# Patient Record
Sex: FEMALE | Race: BLACK OR AFRICAN AMERICAN | Employment: OTHER | URBAN - METROPOLITAN AREA
[De-identification: names, ages, dates, MRNs, and addresses within clinical notes are randomized per-mention and may not be internally consistent; named-entity substitution may affect disease eponyms.]

---

## 2017-02-07 ENCOUNTER — HOSPITAL ENCOUNTER (EMERGENCY)
Age: 52
Discharge: HOME | End: 2017-02-07
Attending: EMERGENCY MEDICINE | Admitting: EMERGENCY MEDICINE
Payer: MEDICARE

## 2017-02-07 ENCOUNTER — APPOINTMENT (OUTPATIENT)
Dept: GENERAL RADIOLOGY | Age: 52
End: 2017-02-07
Attending: EMERGENCY MEDICINE
Payer: MEDICARE

## 2017-02-07 VITALS
RESPIRATION RATE: 20 BRPM | SYSTOLIC BLOOD PRESSURE: 152 MMHG | BODY MASS INDEX: 25.74 KG/M2 | DIASTOLIC BLOOD PRESSURE: 90 MMHG | HEART RATE: 95 BPM | WEIGHT: 154.5 LBS | OXYGEN SATURATION: 97 % | TEMPERATURE: 98 F | HEIGHT: 65 IN

## 2017-02-07 DIAGNOSIS — J02.0 ACUTE STREPTOCOCCAL PHARYNGITIS: Primary | ICD-10-CM

## 2017-02-07 DIAGNOSIS — J06.9 ACUTE UPPER RESPIRATORY INFECTION: ICD-10-CM

## 2017-02-07 LAB — S PYO AG THROAT QL: POSITIVE

## 2017-02-07 PROCEDURE — 71020 XR CHEST PA LAT: CPT

## 2017-02-07 PROCEDURE — 99283 EMERGENCY DEPT VISIT LOW MDM: CPT

## 2017-02-07 PROCEDURE — 87880 STREP A ASSAY W/OPTIC: CPT

## 2017-02-07 PROCEDURE — 93005 ELECTROCARDIOGRAM TRACING: CPT

## 2017-02-07 RX ORDER — AMOXICILLIN 500 MG/1
1000 TABLET, FILM COATED ORAL 2 TIMES DAILY
Qty: 20 TAB | Refills: 0 | Status: SHIPPED | OUTPATIENT
Start: 2017-02-07 | End: 2017-02-17

## 2017-02-07 NOTE — ED TRIAGE NOTES
Triage Note: patient is coming in with cough, sore throat and headache for the past 3 weeks. Patient is also having some chest pain with the coughing.

## 2017-02-08 LAB
ATRIAL RATE: 91 BPM
CALCULATED P AXIS, ECG09: 55 DEGREES
CALCULATED R AXIS, ECG10: 2 DEGREES
CALCULATED T AXIS, ECG11: 58 DEGREES
DIAGNOSIS, 93000: NORMAL
P-R INTERVAL, ECG05: 144 MS
Q-T INTERVAL, ECG07: 370 MS
QRS DURATION, ECG06: 66 MS
QTC CALCULATION (BEZET), ECG08: 455 MS
VENTRICULAR RATE, ECG03: 91 BPM
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
## 2017-02-08 NOTE — ED NOTES
Pt given discharge instructions. Pt had no questions regarding instructions. Pt ambulatory with slow, steady gait at the time of discharge.

## 2017-02-27 ENCOUNTER — HOSPITAL ENCOUNTER (EMERGENCY)
Age: 52
Discharge: HOME | End: 2017-02-28
Attending: EMERGENCY MEDICINE
Payer: MEDICARE

## 2017-02-27 ENCOUNTER — APPOINTMENT (OUTPATIENT)
Dept: GENERAL RADIOLOGY | Age: 52
End: 2017-02-27
Attending: EMERGENCY MEDICINE
Payer: MEDICARE

## 2017-02-27 DIAGNOSIS — G89.29 CHRONIC RIGHT-SIDED LOW BACK PAIN WITH SCIATICA, SCIATICA LATERALITY UNSPECIFIED: Primary | ICD-10-CM

## 2017-02-27 DIAGNOSIS — M54.40 CHRONIC RIGHT-SIDED LOW BACK PAIN WITH SCIATICA, SCIATICA LATERALITY UNSPECIFIED: Primary | ICD-10-CM

## 2017-02-27 DIAGNOSIS — R73.9 HYPERGLYCEMIA: ICD-10-CM

## 2017-02-27 LAB
ALBUMIN SERPL BCP-MCNC: 3.2 G/DL (ref 3.5–5)
ALBUMIN/GLOB SERPL: 0.7 {RATIO} (ref 1.1–2.2)
ALP SERPL-CCNC: 505 U/L (ref 45–117)
ALT SERPL-CCNC: 32 U/L (ref 12–78)
ANION GAP BLD CALC-SCNC: 12 MMOL/L (ref 5–15)
APPEARANCE UR: CLEAR
AST SERPL W P-5'-P-CCNC: 29 U/L (ref 15–37)
BACTERIA URNS QL MICRO: NEGATIVE /HPF
BASOPHILS # BLD AUTO: 0 K/UL (ref 0–0.1)
BASOPHILS # BLD: 0 % (ref 0–1)
BILIRUB SERPL-MCNC: 0.3 MG/DL (ref 0.2–1)
BILIRUB UR QL: NEGATIVE
BUN SERPL-MCNC: 79 MG/DL (ref 6–20)
BUN/CREAT SERPL: 16 (ref 12–20)
CALCIUM SERPL-MCNC: 8.4 MG/DL (ref 8.5–10.1)
CHLORIDE SERPL-SCNC: 94 MMOL/L (ref 97–108)
CO2 SERPL-SCNC: 21 MMOL/L (ref 21–32)
COLOR UR: ABNORMAL
CREAT SERPL-MCNC: 4.83 MG/DL (ref 0.55–1.02)
EOSINOPHIL # BLD: 0.3 K/UL (ref 0–0.4)
EOSINOPHIL NFR BLD: 3 % (ref 0–7)
EPITH CASTS URNS QL MICRO: ABNORMAL /LPF
ERYTHROCYTE [DISTWIDTH] IN BLOOD BY AUTOMATED COUNT: 12.2 % (ref 11.5–14.5)
GLOBULIN SER CALC-MCNC: 4.8 G/DL (ref 2–4)
GLUCOSE BLD STRIP.AUTO-MCNC: 240 MG/DL (ref 65–100)
GLUCOSE BLD STRIP.AUTO-MCNC: 420 MG/DL (ref 65–100)
GLUCOSE BLD STRIP.AUTO-MCNC: 488 MG/DL (ref 65–100)
GLUCOSE SERPL-MCNC: 450 MG/DL (ref 65–100)
GLUCOSE UR STRIP.AUTO-MCNC: >1000 MG/DL
HCT VFR BLD AUTO: 32.7 % (ref 35–47)
HGB BLD-MCNC: 11.3 G/DL (ref 11.5–16)
HGB UR QL STRIP: ABNORMAL
HYALINE CASTS URNS QL MICRO: ABNORMAL /LPF (ref 0–5)
KETONES UR QL STRIP.AUTO: NEGATIVE MG/DL
LEUKOCYTE ESTERASE UR QL STRIP.AUTO: NEGATIVE
LYMPHOCYTES # BLD AUTO: 29 % (ref 12–49)
LYMPHOCYTES # BLD: 2.2 K/UL (ref 0.8–3.5)
MCH RBC QN AUTO: 29.8 PG (ref 26–34)
MCHC RBC AUTO-ENTMCNC: 34.6 G/DL (ref 30–36.5)
MCV RBC AUTO: 86.3 FL (ref 80–99)
MONOCYTES # BLD: 0.6 K/UL (ref 0–1)
MONOCYTES NFR BLD AUTO: 8 % (ref 5–13)
NEUTS SEG # BLD: 4.6 K/UL (ref 1.8–8)
NEUTS SEG NFR BLD AUTO: 60 % (ref 32–75)
NITRITE UR QL STRIP.AUTO: NEGATIVE
PH UR STRIP: 6.5 [PH] (ref 5–8)
PLATELET # BLD AUTO: 293 K/UL (ref 150–400)
POTASSIUM SERPL-SCNC: 3.3 MMOL/L (ref 3.5–5.1)
PROT SERPL-MCNC: 8 G/DL (ref 6.4–8.2)
PROT UR STRIP-MCNC: 100 MG/DL
RBC # BLD AUTO: 3.79 M/UL (ref 3.8–5.2)
RBC #/AREA URNS HPF: ABNORMAL /HPF (ref 0–5)
SERVICE CMNT-IMP: ABNORMAL
SODIUM SERPL-SCNC: 127 MMOL/L (ref 136–145)
SP GR UR REFRACTOMETRY: 1.01 (ref 1–1.03)
UROBILINOGEN UR QL STRIP.AUTO: 0.2 EU/DL (ref 0.2–1)
WBC # BLD AUTO: 7.7 K/UL (ref 3.6–11)
WBC URNS QL MICRO: ABNORMAL /HPF (ref 0–4)

## 2017-02-27 PROCEDURE — 73502 X-RAY EXAM HIP UNI 2-3 VIEWS: CPT

## 2017-02-27 PROCEDURE — 85025 COMPLETE CBC W/AUTO DIFF WBC: CPT | Performed by: EMERGENCY MEDICINE

## 2017-02-27 PROCEDURE — 36415 COLL VENOUS BLD VENIPUNCTURE: CPT | Performed by: EMERGENCY MEDICINE

## 2017-02-27 PROCEDURE — 81001 URINALYSIS AUTO W/SCOPE: CPT | Performed by: EMERGENCY MEDICINE

## 2017-02-27 PROCEDURE — 72100 X-RAY EXAM L-S SPINE 2/3 VWS: CPT

## 2017-02-27 PROCEDURE — 80053 COMPREHEN METABOLIC PANEL: CPT | Performed by: EMERGENCY MEDICINE

## 2017-02-27 PROCEDURE — 74011636637 HC RX REV CODE- 636/637: Performed by: EMERGENCY MEDICINE

## 2017-02-27 PROCEDURE — 74011250636 HC RX REV CODE- 250/636: Performed by: EMERGENCY MEDICINE

## 2017-02-27 PROCEDURE — 74011250637 HC RX REV CODE- 250/637: Performed by: EMERGENCY MEDICINE

## 2017-02-27 PROCEDURE — 96361 HYDRATE IV INFUSION ADD-ON: CPT

## 2017-02-27 PROCEDURE — 96365 THER/PROPH/DIAG IV INF INIT: CPT

## 2017-02-27 PROCEDURE — 82962 GLUCOSE BLOOD TEST: CPT

## 2017-02-27 PROCEDURE — 99285 EMERGENCY DEPT VISIT HI MDM: CPT

## 2017-02-27 PROCEDURE — 96375 TX/PRO/DX INJ NEW DRUG ADDON: CPT

## 2017-02-27 RX ORDER — HYDROCODONE BITARTRATE AND ACETAMINOPHEN 10; 325 MG/1; MG/1
1 TABLET ORAL
Status: COMPLETED | OUTPATIENT
Start: 2017-02-27 | End: 2017-02-27

## 2017-02-27 RX ORDER — HYDROCODONE BITARTRATE AND ACETAMINOPHEN 5; 325 MG/1; MG/1
1-2 TABLET ORAL
Qty: 20 TAB | Refills: 0 | Status: ON HOLD | OUTPATIENT
Start: 2017-02-27 | End: 2017-03-14

## 2017-02-27 RX ORDER — DIAZEPAM 10 MG/2ML
2.5 INJECTION INTRAMUSCULAR
Status: COMPLETED | OUTPATIENT
Start: 2017-02-27 | End: 2017-02-27

## 2017-02-27 RX ORDER — INSULIN GLARGINE 100 [IU]/ML
20 INJECTION, SOLUTION SUBCUTANEOUS
Status: COMPLETED | OUTPATIENT
Start: 2017-02-27 | End: 2017-02-27

## 2017-02-27 RX ORDER — DIAZEPAM 5 MG/1
5 TABLET ORAL
Qty: 12 TAB | Refills: 0 | Status: SHIPPED | OUTPATIENT
Start: 2017-02-27 | End: 2017-07-27

## 2017-02-27 RX ADMIN — SODIUM CHLORIDE 1000 ML: 900 INJECTION, SOLUTION INTRAVENOUS at 22:53

## 2017-02-27 RX ADMIN — SODIUM CHLORIDE 1000 ML: 900 INJECTION, SOLUTION INTRAVENOUS at 23:37

## 2017-02-27 RX ADMIN — INSULIN GLARGINE 20 UNITS: 100 INJECTION, SOLUTION SUBCUTANEOUS at 23:36

## 2017-02-27 RX ADMIN — HUMAN INSULIN 10 UNITS: 100 INJECTION, SOLUTION SUBCUTANEOUS at 22:55

## 2017-02-27 RX ADMIN — HYDROCODONE BITARTRATE AND ACETAMINOPHEN 1 TABLET: 10; 325 TABLET ORAL at 22:52

## 2017-02-27 RX ADMIN — DIAZEPAM 2.5 MG: 5 INJECTION, SOLUTION INTRAMUSCULAR; INTRAVENOUS at 22:54

## 2017-02-27 NOTE — Clinical Note
Work up Vedantu was reassuring. I believe you have acute on chronic back pain. I am prescribing a short course of pain/spasm medication. It's imperative you get close follow up with your primary care doctor and a pain management doctor of your ongoing  symptoms.

## 2017-02-28 VITALS
SYSTOLIC BLOOD PRESSURE: 191 MMHG | HEART RATE: 102 BPM | WEIGHT: 153.5 LBS | TEMPERATURE: 97.4 F | HEIGHT: 65 IN | DIASTOLIC BLOOD PRESSURE: 98 MMHG | BODY MASS INDEX: 25.58 KG/M2 | RESPIRATION RATE: 16 BRPM | OXYGEN SATURATION: 95 %

## 2017-02-28 LAB
GLUCOSE BLD STRIP.AUTO-MCNC: 103 MG/DL (ref 65–100)
GLUCOSE BLD STRIP.AUTO-MCNC: 109 MG/DL (ref 65–100)
SERVICE CMNT-IMP: ABNORMAL
SERVICE CMNT-IMP: ABNORMAL

## 2017-02-28 PROCEDURE — 74011250636 HC RX REV CODE- 250/636: Performed by: EMERGENCY MEDICINE

## 2017-02-28 PROCEDURE — 82962 GLUCOSE BLOOD TEST: CPT

## 2017-02-28 RX ORDER — ONDANSETRON 2 MG/ML
8 INJECTION INTRAMUSCULAR; INTRAVENOUS
Status: COMPLETED | OUTPATIENT
Start: 2017-02-28 | End: 2017-02-28

## 2017-02-28 RX ORDER — HYDROMORPHONE HYDROCHLORIDE 1 MG/ML
1 INJECTION, SOLUTION INTRAMUSCULAR; INTRAVENOUS; SUBCUTANEOUS ONCE
Status: COMPLETED | OUTPATIENT
Start: 2017-02-28 | End: 2017-02-28

## 2017-02-28 RX ORDER — ONDANSETRON 2 MG/ML
INJECTION INTRAMUSCULAR; INTRAVENOUS
Status: DISCONTINUED
Start: 2017-02-28 | End: 2017-02-28 | Stop reason: HOSPADM

## 2017-02-28 RX ADMIN — ONDANSETRON 8 MG: 2 INJECTION INTRAMUSCULAR; INTRAVENOUS at 00:57

## 2017-02-28 RX ADMIN — HYDROMORPHONE HYDROCHLORIDE 1 MG: 1 INJECTION, SOLUTION INTRAMUSCULAR; INTRAVENOUS; SUBCUTANEOUS at 00:24

## 2017-02-28 RX ADMIN — PROMETHAZINE HYDROCHLORIDE 12.5 MG: 25 INJECTION INTRAMUSCULAR; INTRAVENOUS at 01:45

## 2017-02-28 NOTE — ED NOTES
MD reviewed discharge instructions and options with patient; patient verbalized understanding. RN reviewed discharge instructions using teachback method. Pt. Attempted to call spouse for ride home; went straight to . Pt sleeping on stretcher; we will continue to try to reach a ride.

## 2017-02-28 NOTE — DISCHARGE INSTRUCTIONS
Back Pain: Care Instructions  Your Care Instructions    Back pain has many possible causes. It is often related to problems with muscles and ligaments of the back. It may also be related to problems with the nerves, discs, or bones of the back. Moving, lifting, standing, sitting, or sleeping in an awkward way can strain the back. Sometimes you don't notice the injury until later. Arthritis is another common cause of back pain. Although it may hurt a lot, back pain usually improves on its own within several weeks. Most people recover in 12 weeks or less. Using good home treatment and being careful not to stress your back can help you feel better sooner. Follow-up care is a key part of your treatment and safety. Be sure to make and go to all appointments, and call your doctor if you are having problems. Its also a good idea to know your test results and keep a list of the medicines you take. How can you care for yourself at home? · Sit or lie in positions that are most comfortable and reduce your pain. Try one of these positions when you lie down:  ¨ Lie on your back with your knees bent and supported by large pillows. ¨ Lie on the floor with your legs on the seat of a sofa or chair. Sandra Gal on your side with your knees and hips bent and a pillow between your legs. ¨ Lie on your stomach if it does not make pain worse. · Do not sit up in bed, and avoid soft couches and twisted positions. Bed rest can help relieve pain at first, but it delays healing. Avoid bed rest after the first day of back pain. · Change positions every 30 minutes. If you must sit for long periods of time, take breaks from sitting. Get up and walk around, or lie in a comfortable position. · Try using a heating pad on a low or medium setting for 15 to 20 minutes every 2 or 3 hours. Try a warm shower in place of one session with the heating pad. · You can also try an ice pack for 10 to 15 minutes every 2 to 3 hours.  Put a thin cloth between the ice pack and your skin. · Take pain medicines exactly as directed. ¨ If the doctor gave you a prescription medicine for pain, take it as prescribed. ¨ If you are not taking a prescription pain medicine, ask your doctor if you can take an over-the-counter medicine. · Take short walks several times a day. You can start with 5 to 10 minutes, 3 or 4 times a day, and work up to longer walks. Walk on level surfaces and avoid hills and stairs until your back is better. · Return to work and other activities as soon as you can. Continued rest without activity is usually not good for your back. · To prevent future back pain, do exercises to stretch and strengthen your back and stomach. Learn how to use good posture, safe lifting techniques, and proper body mechanics. When should you call for help? Call your doctor now or seek immediate medical care if:  · You have new or worsening numbness in your legs. · You have new or worsening weakness in your legs. (This could make it hard to stand up.)  · You lose control of your bladder or bowels. Watch closely for changes in your health, and be sure to contact your doctor if:  · Your pain gets worse. · You are not getting better after 2 weeks. Where can you learn more? Go to http://nery-peña.info/. Enter S712 in the search box to learn more about \"Back Pain: Care Instructions. \"  Current as of: May 23, 2016  Content Version: 11.1  © 0897-9381 [employer]. Care instructions adapted under license by [employer] (which disclaims liability or warranty for this information). If you have questions about a medical condition or this instruction, always ask your healthcare professional. [address] any warranty or liability for your use of this information.          Chronic Pain: Care Instructions  Your Care Instructions  Chronic pain is pain that lasts a long time (months or even years) and may or may not have a clear cause. It is different from acute pain, which usually does have a clear cause--like an injury or illness--and gets better over time. Chronic pain:  · Lasts over time but may vary from day to day. · Does not go away despite efforts to end it. · May disrupt your sleep and lead to fatigue. · May cause depression or anxiety. · May make your muscles tense, causing more pain. · Can disrupt your work, hobbies, home life, and relationships with friends and family. Chronic pain is a very real condition. It is not just in your head. Treatment can help and usually includes several methods used together, such as medicines, physical therapy, exercise, and other treatments. Learning how to relax and changing negative thought patterns can also help you cope. Chronic pain is complex. Taking an active role in your treatment will help you better manage your pain. Tell your doctor if you have trouble dealing with your pain. You may have to try several things before you find what works best for you. Follow-up care is a key part of your treatment and safety. Be sure to make and go to all appointments, and call your doctor if you are having problems. Its also a good idea to know your test results and keep a list of the medicines you take. How can you care for yourself at home? · Pace yourself. Break up large jobs into smaller tasks. Save harder tasks for days when you have less pain, or go back and forth between hard tasks and easier ones. Take rest breaks. · Relax, and reduce stress. Relaxation techniques such as deep breathing or meditation can help. · Keep moving. Gentle, daily exercise can help reduce pain over the long run. Try low- or no-impact exercises such as walking, swimming, and stationary biking. Do stretches to stay flexible. · Try heat, cold packs, and massage. · Get enough sleep. Chronic pain can make you tired and drain your energy.  Talk with your doctor if you have trouble sleeping because of pain. · Think positive. Your thoughts can affect your pain level. Do things that you enjoy to distract yourself when you have pain instead of focusing on the pain. See a movie, read a book, listen to music, or spend time with a friend. · If you think you are depressed, talk to your doctor about treatment. · Keep a daily pain diary. Record how your moods, thoughts, sleep patterns, activities, and medicine affect your pain. You may find that your pain is worse during or after certain activities or when you are feeling a certain emotion. Having a record of your pain can help you and your doctor find the best ways to treat your pain. · Take pain medicines exactly as directed. ¨ If the doctor gave you a prescription medicine for pain, take it as prescribed. ¨ If you are not taking a prescription pain medicine, ask your doctor if you can take an over-the-counter medicine. Reducing constipation caused by pain medicine  · Include fruits, vegetables, beans, and whole grains in your diet each day. These foods are high in fiber. · Drink plenty of fluids, enough so that your urine is light yellow or clear like water. If you have kidney, heart, or liver disease and have to limit fluids, talk with your doctor before you increase the amount of fluids you drink. · If your doctor recommends it, get more exercise. Walking is a good choice. Bit by bit, increase the amount you walk every day. Try for at least 30 minutes on most days of the week. · Schedule time each day for a bowel movement. A daily routine may help. Take your time and do not strain when having a bowel movement. When should you call for help? Call your doctor now or seek immediate medical care if:  · Your pain gets worse or is out of control. · You feel down or blue, or you do not enjoy things like you once did. You may be depressed, which is common in people with chronic pain. Depression can be treated.   · You have vomiting or cramps for more than 2 hours. Watch closely for changes in your health, and be sure to contact your doctor if:  · You cannot sleep because of pain. · You are very worried or anxious about your pain. · You have trouble taking your pain medicine. · You have any concerns about your pain medicine. · You have trouble with bowel movements, such as:  ¨ No bowel movement in 3 days. ¨ Blood in the anal area, in your stool, or on the toilet paper. ¨ Diarrhea for more than 24 hours. Where can you learn more? Go to http://nery-peña.info/. Enter N004 in the search box to learn more about \"Chronic Pain: Care Instructions. \"  Current as of: February 19, 2016  Content Version: 11.1  © 1673-3399 [employer]. Care instructions adapted under license by [employer] (which disclaims liability or warranty for this information). If you have questions about a medical condition or this instruction, always ask your healthcare professional. [place] [address] any warranty or liability for your use of this information. Developing a Pain Management Plan: Care Instructions  Your Care Instructions  Some diseases and injuries can cause pain that lasts a long time. You don't need to live with uncontrolled pain. A pain management plan helps you find ways to control pain with side effects you can live with. There are things you can do to help with pain. Only you know how much pain you feel. Constant pain can make you depressed. It can cause stress and make it hard for you to eat and sleep. But there are ways to control the pain. They can help you stay active, improve your mood, and heal faster. Your plan can include many types of pain control. You may take prescription or over-the-counter drugs. You can also try physical treatments and behavioral methods. Some medical treatments also help with pain. For example, radiation can be used to reduce pain from bone cancer.   You and your doctor will work to make your plan. Be sure to read it often. Change it if your pain is not under control. Follow-up care is a key part of your treatment and safety. Be sure to make and go to all appointments, and call your doctor if you are having problems. It's also a good idea to know your test results and keep a list of the medicines you take. How can you care for yourself at home? Physical treatments  · Be safe with medicines. Take pain medicines exactly as directed. ¨ If the doctor gave you a prescription medicine for pain, take it exactly as prescribed. ¨ If you are not taking a prescription pain medicine, ask your doctor if you can take an over-the-counter medicine. · If your pain medicine causes side effects such as constipation or nausea, you may need to take other medicines for those problems. Talk to your doctor about any side effects you have. · Hot or cold compresses applied directly to the skin can help sore muscles. Put ice or a cold pack on the painful area for 10 to 20 minutes at a time. Put a thin cloth between the ice and your skin. You may find that it helps to switch between cold and heat. Try a heating pad set on low or a warm cloth on the area for 10 to 15 minutes at a time. · Hydrotherapy uses flowing water to relax muscles. You may want to sit in a hot tub or a steam bath. Or use a shower or a sitz bath to help your pain. · Massage therapy is rubbing the soft tissues of the body. It eases tension and pain. It also improves blood flow and helps you relax. · Transcutaneous electrical nerve stimulation (TENS) uses a gentle electric current applied to the skin for pain relief. You can use TENS at home after you learn how. · Acupuncture is a form of traditional [facility] medicine. It uses very thin needles inserted into certain points of the body. It is done by a person with special training (acupuncturist).   · If you get physical therapy, make sure to do any home exercises or stretching your therapist has prescribed. · Stay as active as you can. Try to get some physical activity every day. Behavioral treatments  · Biofeedback teaches you to control a body function that you normally don't think about. These are things like skin temperature, heart rate, and blood pressure. At first, you will use a machine to give you feedback on the function you want to control. Then a therapist will teach you what to do next. Over time, you can stop using the machine. · Breathing techniques can help you relax and get rid of tension. · Guided imagery is a series of thoughts and images that can focus your attention away from your pain. When you do it, you use all your senses to help your body respond as though what you are imagining is real.  · Hypnosis is a state of focused concentration that makes you less aware of your surroundings. It may cause your brain to release chemicals that relieve pain. You can have a therapist help you through hypnosis. Or you can learn to use it on yourself. · Cognitive behavioral therapy is a type of counseling. It helps you change your thought patterns. Changes in your thoughts can help change your behavior and the way you perceive your body. Other treatments and ideas  · Aromatherapy uses the scent of oils obtained from plants to help you relax or to relieve stress. · Meditation focuses your attention to give a clear awareness of your life. You sit quietly, focus on one image or sound, and breathe deeply. · Yoga uses stretching and exercises (called postures) to reduce stress and improve flexibility and health. · Keep track of your pain in a pain diary. This can help you understand how the things you do affect your pain. · In rare cases, your doctor may advise you to get a nerve block to help with pain. A nerve block is a shot of medicine to interrupt pain signals to your brain. · Some hospitals have special pain clinics or centers.  Your doctor may refer you to a pain clinic or [place]. Or you can ask your doctor about them. Where can you learn more? Go to http://nery-peña.info/. Enter K281 in the search box to learn more about \"Developing a Pain Management Plan: Care Instructions. \"  Current as of: February 19, 2016  Content Version: 11.1  © 8386-8828 [employer]. Care instructions adapted under license by [email or web address] (which disclaims liability or warranty for this information). If you have questions about a medical condition or this instruction, always ask your healthcare professional. [address] any warranty or liability for your use of this information. We hope that we have addressed all of your medical concerns. The examination and treatment you received in the Emergency Department were for an emergent problem and were not intended as complete care. It is important that you follow up with your healthcare provider(s) for ongoing care. If your symptoms worsen or do not improve as expected, and you are unable to reach your usual health care provider(s), you should return to the Emergency Department. Today's healthcare is undergoing tremendous change, and patient satisfaction surveys are one of the many tools to assess the quality of medical care. You may receive a survey from the [employer] organization regarding your experience in the Emergency Department. I hope that your experience has been completely positive, particularly the medical care that I provided. As such, please participate in the survey; anything less than excellent does not meet my expectations or intentions. [address] [facility] and [address] [place] [address] participate in nationally recognized quality of care measures. If your blood pressure is greater than 120/80, as reported below, we urge that you seek medical care to address the potential of high blood pressure, commonly known as hypertension. Hypertension can be hereditary or can be caused by certain medical conditions, pain, stress, or \"white coat syndrome. \"       Please make an appointment with your health care provider(s) for follow up of your Emergency Department visit. VITALS:   Patient Vitals for the past 8 hrs:   Temp Pulse Resp BP SpO2   02/27/17 2058 98.4 °F (36.9 °C) 100 16 (!) 179/97 99 %          Thank you for allowing us to provide you with medical care today. We realize that you have many choices for your emergency care needs. Please choose us in the future for any continued health care needs.       Nikhil Gilliland MD    [facility]   Office: [phone]            Recent Results (from the past 24 hour(s))   URINALYSIS W/MICROSCOPIC    Collection Time: 02/27/17  9:15 PM   Result Value Ref Range    Color YELLOW/STRAW      Appearance CLEAR CLEAR      Specific gravity 1.011 1.003 - 1.030      pH (UA) 6.5 5.0 - 8.0      Protein 100 (A) NEG mg/dL    Glucose >1000 (A) NEG mg/dL    Ketone NEGATIVE  NEG mg/dL    Bilirubin NEGATIVE  NEG      Blood MODERATE (A) NEG      Urobilinogen 0.2 0.2 - 1.0 EU/dL    Nitrites NEGATIVE  NEG      Leukocyte Esterase NEGATIVE  NEG      WBC 0-4 0 - 4 /hpf    RBC 5-10 0 - 5 /hpf    Epithelial cells FEW FEW /lpf    Bacteria NEGATIVE  NEG /hpf    Hyaline cast 0-2 0 - 5 /lpf   GLUCOSE, POC    Collection Time: 02/27/17  9:18 PM   Result Value Ref Range    Glucose (POC) 488 (H) 65 - 100 mg/dL    Performed by Jessica SHARMA    CBC WITH AUTOMATED DIFF    Collection Time: 02/27/17  9:46 PM   Result Value Ref Range    WBC 7.7 3.6 - 11.0 K/uL    RBC 3.79 (L) 3.80 - 5.20 M/uL    HGB 11.3 (L) 11.5 - 16.0 g/dL    HCT 32.7 (L) 35.0 - 47.0 %    MCV 86.3 80.0 - 99.0 FL    MCH 29.8 26.0 - 34.0 PG    MCHC 34.6 30.0 - 36.5 g/dL    RDW 12.2 11.5 - 14.5 %    PLATELET 308 755 - 433 K/uL    NEUTROPHILS 60 32 - 75 %    LYMPHOCYTES 29 12 - 49 %    MONOCYTES 8 5 - 13 %    EOSINOPHILS 3 0 - 7 % BASOPHILS 0 0 - 1 %    ABS. NEUTROPHILS 4.6 1.8 - 8.0 K/UL    ABS. LYMPHOCYTES 2.2 0.8 - 3.5 K/UL    ABS. MONOCYTES 0.6 0.0 - 1.0 K/UL    ABS. EOSINOPHILS 0.3 0.0 - 0.4 K/UL    ABS. BASOPHILS 0.0 0.0 - 0.1 K/UL   METABOLIC PANEL, COMPREHENSIVE    Collection Time: 02/27/17  9:46 PM   Result Value Ref Range    Sodium 127 (L) 136 - 145 mmol/L    Potassium 3.3 (L) 3.5 - 5.1 mmol/L    Chloride 94 (L) 97 - 108 mmol/L    CO2 21 21 - 32 mmol/L    Anion gap 12 5 - 15 mmol/L    Glucose 450 (H) 65 - 100 mg/dL    BUN 79 (H) 6 - 20 MG/DL    Creatinine 4.83 (H) 0.55 - 1.02 MG/DL    BUN/Creatinine ratio 16 12 - 20      GFR est AA 12 (L) >60 ml/min/1.73m2    GFR est non-AA 9 (L) >60 ml/min/1.73m2    Calcium 8.4 (L) 8.5 - 10.1 MG/DL    Bilirubin, total 0.3 0.2 - 1.0 MG/DL    ALT (SGPT) 32 12 - 78 U/L    AST (SGOT) 29 15 - 37 U/L    Alk. phosphatase 505 (H) 45 - 117 U/L    Protein, total 8.0 6.4 - 8.2 g/dL    Albumin 3.2 (L) 3.5 - 5.0 g/dL    Globulin 4.8 (H) 2.0 - 4.0 g/dL    A-G Ratio 0.7 (L) 1.1 - 2.2     GLUCOSE, POC    Collection Time: 02/27/17 10:59 PM   Result Value Ref Range    Glucose (POC) 420 (H) 65 - 100 mg/dL    Performed by Román Mireles, POC    Collection Time: 02/27/17 11:36 PM   Result Value Ref Range    Glucose (POC) 240 (H) 65 - 100 mg/dL    Performed by Meliton Cain        Xr Spine Lumb 2 Or 3 V    Result Date: 2/27/2017  EXAM:  XR SPINE LUMB 2 OR 3 V INDICATION:   pain COMPARISON: Lumbar spine exam of 9/24/2012. FINDINGS: AP, lateral and spot lateral views of the lumbar spine demonstrate normal alignment. The vertebral body heights and disc spaces are well-preserved with small marginal osteophytes diffusely. There is no fracture, subluxation or other abnormality. IMPRESSION: No acute findings. Generative spine change. Xr Hip Rt W Or Wo Pelv 2-3 Vws    Result Date: 2/27/2017  EXAM:  XR HIP RT W OR WO PELV 2-3 VWS INDICATION: Lower back pain, greater on the right, associated leg spasms. COMPARISON: CT abdomen pelvis 11/17/2016. [facility] FINDINGS: An AP view of the pelvis and a frogleg lateral view of the right hip demonstrate no fracture, dislocation or other acute abnormality. Atherosclerotic calcifications are noted. IMPRESSION: No acute findings.
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
## 2017-02-28 NOTE — ED TRIAGE NOTES
Today I started with lower back pain especially on my R side. I am also having severe spasms in my legs. Denies urinary discomfort.

## 2017-03-11 ENCOUNTER — HOSPITAL ENCOUNTER (EMERGENCY)
Age: 52
Discharge: HOME | End: 2017-03-11
Attending: EMERGENCY MEDICINE
Payer: MEDICARE

## 2017-03-11 ENCOUNTER — APPOINTMENT (OUTPATIENT)
Dept: CT IMAGING | Age: 52
End: 2017-03-11
Attending: EMERGENCY MEDICINE
Payer: MEDICARE

## 2017-03-11 VITALS
HEART RATE: 109 BPM | OXYGEN SATURATION: 98 % | DIASTOLIC BLOOD PRESSURE: 74 MMHG | TEMPERATURE: 98.3 F | RESPIRATION RATE: 18 BRPM | SYSTOLIC BLOOD PRESSURE: 108 MMHG | HEIGHT: 65 IN | BODY MASS INDEX: 24.16 KG/M2 | WEIGHT: 145 LBS

## 2017-03-11 DIAGNOSIS — R10.9 FLANK PAIN: Primary | ICD-10-CM

## 2017-03-11 DIAGNOSIS — G89.4 CHRONIC PAIN SYNDROME: ICD-10-CM

## 2017-03-11 DIAGNOSIS — R73.9 HYPERGLYCEMIA: ICD-10-CM

## 2017-03-11 DIAGNOSIS — I10 ESSENTIAL HYPERTENSION: ICD-10-CM

## 2017-03-11 LAB
ALBUMIN SERPL BCP-MCNC: 2.9 G/DL (ref 3.5–5)
ALBUMIN/GLOB SERPL: 0.5 {RATIO} (ref 1.1–2.2)
ALP SERPL-CCNC: 488 U/L (ref 45–117)
ALT SERPL-CCNC: 29 U/L (ref 12–78)
AMYLASE SERPL-CCNC: 55 U/L (ref 25–115)
ANION GAP BLD CALC-SCNC: 11 MMOL/L (ref 5–15)
APPEARANCE UR: CLEAR
AST SERPL W P-5'-P-CCNC: 31 U/L (ref 15–37)
BACTERIA URNS QL MICRO: NEGATIVE /HPF
BASOPHILS # BLD AUTO: 0 K/UL (ref 0–0.1)
BASOPHILS # BLD: 0 % (ref 0–1)
BILIRUB SERPL-MCNC: 0.4 MG/DL (ref 0.2–1)
BILIRUB UR QL: NEGATIVE
BUN SERPL-MCNC: 69 MG/DL (ref 6–20)
BUN/CREAT SERPL: 15 (ref 12–20)
CALCIUM SERPL-MCNC: 8.4 MG/DL (ref 8.5–10.1)
CHLORIDE SERPL-SCNC: 100 MMOL/L (ref 97–108)
CO2 SERPL-SCNC: 19 MMOL/L (ref 21–32)
COLOR UR: ABNORMAL
CREAT SERPL-MCNC: 4.66 MG/DL (ref 0.55–1.02)
EOSINOPHIL # BLD: 0.2 K/UL (ref 0–0.4)
EOSINOPHIL NFR BLD: 3 % (ref 0–7)
EPITH CASTS URNS QL MICRO: ABNORMAL /LPF
ERYTHROCYTE [DISTWIDTH] IN BLOOD BY AUTOMATED COUNT: 12.6 % (ref 11.5–14.5)
GLOBULIN SER CALC-MCNC: 5.3 G/DL (ref 2–4)
GLUCOSE SERPL-MCNC: 287 MG/DL (ref 65–100)
GLUCOSE UR STRIP.AUTO-MCNC: >1000 MG/DL
HCT VFR BLD AUTO: 34.6 % (ref 35–47)
HGB BLD-MCNC: 11.8 G/DL (ref 11.5–16)
HGB UR QL STRIP: ABNORMAL
HYALINE CASTS URNS QL MICRO: ABNORMAL /LPF (ref 0–5)
KETONES UR QL STRIP.AUTO: NEGATIVE MG/DL
LEUKOCYTE ESTERASE UR QL STRIP.AUTO: NEGATIVE
LIPASE SERPL-CCNC: 256 U/L (ref 73–393)
LYMPHOCYTES # BLD AUTO: 24 % (ref 12–49)
LYMPHOCYTES # BLD: 1.8 K/UL (ref 0.8–3.5)
MCH RBC QN AUTO: 30.1 PG (ref 26–34)
MCHC RBC AUTO-ENTMCNC: 34.1 G/DL (ref 30–36.5)
MCV RBC AUTO: 88.3 FL (ref 80–99)
MONOCYTES # BLD: 0.5 K/UL (ref 0–1)
MONOCYTES NFR BLD AUTO: 7 % (ref 5–13)
NEUTS SEG # BLD: 5 K/UL (ref 1.8–8)
NEUTS SEG NFR BLD AUTO: 66 % (ref 32–75)
NITRITE UR QL STRIP.AUTO: NEGATIVE
PH UR STRIP: 6.5 [PH] (ref 5–8)
PLATELET # BLD AUTO: 227 K/UL (ref 150–400)
POTASSIUM SERPL-SCNC: 5 MMOL/L (ref 3.5–5.1)
PROT SERPL-MCNC: 8.2 G/DL (ref 6.4–8.2)
PROT UR STRIP-MCNC: 300 MG/DL
RBC # BLD AUTO: 3.92 M/UL (ref 3.8–5.2)
RBC #/AREA URNS HPF: ABNORMAL /HPF (ref 0–5)
SODIUM SERPL-SCNC: 130 MMOL/L (ref 136–145)
SP GR UR REFRACTOMETRY: 1.01 (ref 1–1.03)
UA: UC IF INDICATED,UAUC: ABNORMAL
UROBILINOGEN UR QL STRIP.AUTO: 0.2 EU/DL (ref 0.2–1)
WBC # BLD AUTO: 7.5 K/UL (ref 3.6–11)
WBC URNS QL MICRO: ABNORMAL /HPF (ref 0–4)

## 2017-03-11 PROCEDURE — 74011250636 HC RX REV CODE- 250/636: Performed by: EMERGENCY MEDICINE

## 2017-03-11 PROCEDURE — 36415 COLL VENOUS BLD VENIPUNCTURE: CPT | Performed by: EMERGENCY MEDICINE

## 2017-03-11 PROCEDURE — 96374 THER/PROPH/DIAG INJ IV PUSH: CPT

## 2017-03-11 PROCEDURE — 85025 COMPLETE CBC W/AUTO DIFF WBC: CPT | Performed by: EMERGENCY MEDICINE

## 2017-03-11 PROCEDURE — 81001 URINALYSIS AUTO W/SCOPE: CPT | Performed by: EMERGENCY MEDICINE

## 2017-03-11 PROCEDURE — 80053 COMPREHEN METABOLIC PANEL: CPT | Performed by: EMERGENCY MEDICINE

## 2017-03-11 PROCEDURE — 83690 ASSAY OF LIPASE: CPT | Performed by: EMERGENCY MEDICINE

## 2017-03-11 PROCEDURE — 74176 CT ABD & PELVIS W/O CONTRAST: CPT

## 2017-03-11 PROCEDURE — 82150 ASSAY OF AMYLASE: CPT | Performed by: EMERGENCY MEDICINE

## 2017-03-11 PROCEDURE — 96375 TX/PRO/DX INJ NEW DRUG ADDON: CPT

## 2017-03-11 PROCEDURE — 96361 HYDRATE IV INFUSION ADD-ON: CPT

## 2017-03-11 PROCEDURE — 99284 EMERGENCY DEPT VISIT MOD MDM: CPT

## 2017-03-11 RX ORDER — OXYCODONE HYDROCHLORIDE 5 MG/1
5 CAPSULE ORAL
Qty: 10 CAP | Refills: 0 | Status: ON HOLD | OUTPATIENT
Start: 2017-03-11 | End: 2017-03-14

## 2017-03-11 RX ORDER — ONDANSETRON 2 MG/ML
8 INJECTION INTRAMUSCULAR; INTRAVENOUS
Status: COMPLETED | OUTPATIENT
Start: 2017-03-11 | End: 2017-03-11

## 2017-03-11 RX ORDER — HYDRALAZINE HYDROCHLORIDE 20 MG/ML
20 INJECTION INTRAMUSCULAR; INTRAVENOUS
Status: COMPLETED | OUTPATIENT
Start: 2017-03-11 | End: 2017-03-11

## 2017-03-11 RX ORDER — KETOROLAC TROMETHAMINE 30 MG/ML
30 INJECTION, SOLUTION INTRAMUSCULAR; INTRAVENOUS
Status: COMPLETED | OUTPATIENT
Start: 2017-03-11 | End: 2017-03-11

## 2017-03-11 RX ORDER — HYDROMORPHONE HYDROCHLORIDE 1 MG/ML
1 INJECTION, SOLUTION INTRAMUSCULAR; INTRAVENOUS; SUBCUTANEOUS
Status: COMPLETED | OUTPATIENT
Start: 2017-03-11 | End: 2017-03-11

## 2017-03-11 RX ADMIN — KETOROLAC TROMETHAMINE 30 MG: 30 INJECTION, SOLUTION INTRAMUSCULAR at 12:48

## 2017-03-11 RX ADMIN — HYDROMORPHONE HYDROCHLORIDE 1 MG: 1 INJECTION, SOLUTION INTRAMUSCULAR; INTRAVENOUS; SUBCUTANEOUS at 12:48

## 2017-03-11 RX ADMIN — HYDRALAZINE HYDROCHLORIDE 20 MG: 20 INJECTION INTRAMUSCULAR; INTRAVENOUS at 14:44

## 2017-03-11 RX ADMIN — SODIUM CHLORIDE 1000 ML: 900 INJECTION, SOLUTION INTRAVENOUS at 12:48

## 2017-03-11 RX ADMIN — ONDANSETRON 8 MG: 2 INJECTION INTRAMUSCULAR; INTRAVENOUS at 12:48

## 2017-03-11 NOTE — DISCHARGE INSTRUCTIONS
We hope that we have addressed all of your medical concerns. The examination and treatment you received in the Emergency Department were for an emergent problem and were not intended as complete care. It is important that you follow up with your healthcare provider(s) for ongoing care. If your symptoms worsen or do not improve as expected, and you are unable to reach your usual health care provider(s), you should return to the Emergency Department. Today's healthcare is undergoing tremendous change, and patient satisfaction surveys are one of the many tools to assess the quality of medical care. You may receive a survey from the [employer] regarding your experience in the Emergency Department. I hope that your experience has been completely positive, particularly the medical care that I provided. As such, please participate in the survey; anything less than excellent does not meet my expectations or intentions. [facility] and [facility] participate in nationally recognized quality of care measures. If your blood pressure is greater than 120/80, as reported below, we urge that you seek medical care to address the potential of high blood pressure, commonly known as hypertension. Hypertension can be hereditary or can be caused by certain medical conditions, pain, stress, or \"white coat syndrome. \"       Please make an appointment with your health care provider(s) for follow up of your Emergency Department visit. VITALS:   Patient Vitals for the past 8 hrs:   Temp Pulse Resp BP SpO2   03/11/17 1330 - - - (!) 170/130 96 %   03/11/17 1300 - - - (!) 200/112 98 %   03/11/17 1152 98.3 °F (36.8 °C) (!) 109 18 (!) 169/94 100 %          Thank you for allowing us to provide you with medical care today. We realize that you have many choices for your emergency care needs. Please choose us in the future for any continued health care needs. Anant Spear MD    [address] [facility].   Office: [phone]            Recent Results (from the past 24 hour(s))   CBC WITH AUTOMATED DIFF    Collection Time: 03/11/17 12:37 PM   Result Value Ref Range    WBC 7.5 3.6 - 11.0 K/uL    RBC 3.92 3.80 - 5.20 M/uL    HGB 11.8 11.5 - 16.0 g/dL    HCT 34.6 (L) 35.0 - 47.0 %    MCV 88.3 80.0 - 99.0 FL    MCH 30.1 26.0 - 34.0 PG    MCHC 34.1 30.0 - 36.5 g/dL    RDW 12.6 11.5 - 14.5 %    PLATELET 729 913 - 607 K/uL    NEUTROPHILS 66 32 - 75 %    LYMPHOCYTES 24 12 - 49 %    MONOCYTES 7 5 - 13 %    EOSINOPHILS 3 0 - 7 %    BASOPHILS 0 0 - 1 %    ABS. NEUTROPHILS 5.0 1.8 - 8.0 K/UL    ABS. LYMPHOCYTES 1.8 0.8 - 3.5 K/UL    ABS. MONOCYTES 0.5 0.0 - 1.0 K/UL    ABS. EOSINOPHILS 0.2 0.0 - 0.4 K/UL    ABS. BASOPHILS 0.0 0.0 - 0.1 K/UL   METABOLIC PANEL, COMPREHENSIVE    Collection Time: 03/11/17 12:37 PM   Result Value Ref Range    Sodium 130 (L) 136 - 145 mmol/L    Potassium 5.0 3.5 - 5.1 mmol/L    Chloride 100 97 - 108 mmol/L    CO2 19 (L) 21 - 32 mmol/L    Anion gap 11 5 - 15 mmol/L    Glucose 287 (H) 65 - 100 mg/dL    BUN 69 (H) 6 - 20 MG/DL    Creatinine 4.66 (H) 0.55 - 1.02 MG/DL    BUN/Creatinine ratio 15 12 - 20      GFR est AA 12 (L) >60 ml/min/1.73m2    GFR est non-AA 10 (L) >60 ml/min/1.73m2    Calcium 8.4 (L) 8.5 - 10.1 MG/DL    Bilirubin, total 0.4 0.2 - 1.0 MG/DL    ALT (SGPT) 29 12 - 78 U/L    AST (SGOT) 31 15 - 37 U/L    Alk.  phosphatase 488 (H) 45 - 117 U/L    Protein, total 8.2 6.4 - 8.2 g/dL    Albumin 2.9 (L) 3.5 - 5.0 g/dL    Globulin 5.3 (H) 2.0 - 4.0 g/dL    A-G Ratio 0.5 (L) 1.1 - 2.2     LIPASE    Collection Time: 03/11/17 12:37 PM   Result Value Ref Range    Lipase 256 73 - 393 U/L   AMYLASE    Collection Time: 03/11/17 12:37 PM   Result Value Ref Range    Amylase 55 25 - 115 U/L   URINALYSIS W/ REFLEX CULTURE    Collection Time: 03/11/17  1:50 PM   Result Value Ref Range    Color YELLOW/STRAW      Appearance CLEAR CLEAR      Specific gravity 1.013 1.003 - 1.030      pH (UA) 6.5 5.0 - 8.0      Protein 300 (A) NEG mg/dL    Glucose >1000 (A) NEG mg/dL    Ketone NEGATIVE  NEG mg/dL    Bilirubin NEGATIVE  NEG      Blood MODERATE (A) NEG      Urobilinogen 0.2 0.2 - 1.0 EU/dL    Nitrites NEGATIVE  NEG      Leukocyte Esterase NEGATIVE  NEG      WBC 0-4 0 - 4 /hpf    RBC 5-10 0 - 5 /hpf    Epithelial cells FEW FEW /lpf    Bacteria NEGATIVE  NEG /hpf    UA:UC IF INDICATED PENDING     Hyaline cast 0-2 0 - 5 /lpf       Ct Abd Pelv Wo Cont    Result Date: 3/11/2017  INDICATION: Left flank pain  since this morning. History of hepatitis C. COMPARISON: 11/17/2016 TECHNIQUE: Thin axial images were obtained through the abdomen and pelvis. Coronal and sagittal reconstructions were generated. Oral contrast was not administered. CT dose reduction was achieved through use of a standardized protocol tailored for this examination and automatic exposure control for dose modulation. The absence of intravenous contrast material reduces the sensitivity for evaluation of the solid parenchymal organs of the abdomen. FINDINGS: LUNG BASES: Clear. INCIDENTALLY IMAGED HEART AND MEDIASTINUM: Unremarkable. LIVER: No mass or biliary dilatation. GALLBLADDER: Unremarkable. SPLEEN: No mass. PANCREAS: No mass or ductal dilatation. ADRENALS: Unremarkable. KIDNEYS/URETERS: Nonobstructing intrarenal calculi bilaterally. No hydronephrosis or parenchymal mass obvious. STOMACH: Unremarkable. SMALL BOWEL: No dilatation or wall thickening. COLON: No dilatation or wall thickening. APPENDIX: Unremarkable. PERITONEUM: No ascites or pneumoperitoneum. RETROPERITONEUM: No lymphadenopathy or aortic aneurysm. REPRODUCTIVE ORGANS: The uterus is enlarged and heterogeneous with calcification, suggesting leiomyomata. URINARY BLADDER: No mass or calculus. Air in the urinary bladder lumen suggests recent catheterization. BONES: No destructive bone lesion.  ADDITIONAL COMMENTS: N/A IMPRESSION: 1. Nonobstructing intrarenal calculi bilaterally with no CT evidence for urinary obstruction at this time. 2. Uterine enlargement with degenerating leiomyomata. 3. Air in the urinary bladder lumen. Correlate for recent catheterization. Learning About High Blood Sugar  What is high blood sugar? Your body turns the food you eat into glucose (sugar), which it uses for energy. But if your body isn't able to use the sugar right away, it can build up in your blood and lead to high blood sugar. When the amount of sugar in your blood stays too high for too much of the time, you may have diabetes. Diabetes is a disease that can cause serious health problems. The good news is that lifestyle changes may help you get your blood sugar back to normal and avoid or delay diabetes. What causes high blood sugar? Sugar (glucose) can build up in your blood if you:  · Are overweight. · Have a family history of diabetes. · Take certain medicines, such as steroids. What are the symptoms? Having high blood sugar may not cause any symptoms at all. Or it may make you feel very thirsty or very hungry. You may also urinate more often than usual, have blurry vision, or lose weight without trying. How is high blood sugar treated? You can take steps to lower your blood sugar level if you understand what makes it get higher. Your doctor may want you to learn how to test your blood sugar level at home. Then you can see how illness, stress, or different kinds of food or medicine raise or lower your blood sugar level. Other tests may be needed to see if you have diabetes. How can you prevent high blood sugar? · Watch your weight. If you're overweight, losing just a small amount of weight may help. Reducing fat around your waist is most important. · Limit the amount of calories, sweets, and unhealthy fat you eat. Ask your doctor if a dietitian can help you.  A registered dietitian can help you create meal plans that fit your lifestyle. · Get at least 30 minutes of exercise on most days of the week. Exercise helps control your blood sugar. It also helps you maintain a healthy weight. Walking is a good choice. You also may want to do other activities, such as running, swimming, cycling, or playing tennis or team sports. · If your doctor prescribed medicines, take them exactly as prescribed. Call your doctor if you think you are having a problem with your medicine. You will get more details on the specific medicines your doctor prescribes. Follow-up care is a key part of your treatment and safety. Be sure to make and go to all appointments, and call your doctor if you are having problems. It's also a good idea to know your test results and keep a list of the medicines you take. Where can you learn more? Go to http://nery-peña.info/. Enter O108 in the search box to learn more about \"Learning About High Blood Sugar. \"  Current as of: May 23, 2016  Content Version: 11.1  © 2290-0862 [employer]. Care instructions adapted under license by [employer] (which disclaims liability or warranty for this information). If you have questions about a medical condition or this instruction, always ask your healthcare professional. [address] any warranty or liability for your use of this information. Abdominal Pain: Care Instructions  Your Care Instructions    Abdominal pain has many possible causes. Some aren't serious and get better on their own in a few days. Others need more testing and treatment. If your pain continues or gets worse, you need to be rechecked and may need more tests to find out what is wrong. You may need surgery to correct the problem. Don't ignore new symptoms, such as fever, nausea and vomiting, urination problems, pain that gets worse, and dizziness. These may be signs of a more serious problem.   Your doctor may have recommended a follow-up visit in the next 8 to 12 hours. If you are not getting better, you may need more tests or treatment. The doctor has checked you carefully, but problems can develop later. If you notice any problems or new symptoms, get medical treatment right away. Follow-up care is a key part of your treatment and safety. Be sure to make and go to all appointments, and call your doctor if you are having problems. It's also a good idea to know your test results and keep a list of the medicines you take. How can you care for yourself at home? · Rest until you feel better. · To prevent dehydration, drink plenty of fluids, enough so that your urine is light yellow or clear like water. Choose water and other caffeine-free clear liquids until you feel better. If you have kidney, heart, or liver disease and have to limit fluids, talk with your doctor before you increase the amount of fluids you drink. · If your stomach is upset, eat mild foods, such as rice, dry toast or crackers, bananas, and applesauce. Try eating several small meals instead of two or three large ones. · Wait until 48 hours after all symptoms have gone away before you have spicy foods, alcohol, and drinks that contain caffeine. · Do not eat foods that are high in fat. · Avoid anti-inflammatory medicines such as aspirin, ibuprofen (Advil, Motrin), and naproxen (Aleve). These can cause stomach upset. Talk to your doctor if you take daily aspirin for another health problem. When should you call for help? Call 911 anytime you think you may need emergency care. For example, call if:  · You passed out (lost consciousness). · You pass maroon or very bloody stools. · You vomit blood or what looks like coffee grounds. · You have new, severe belly pain. Call your doctor now or seek immediate medical care if:  · Your pain gets worse, especially if it becomes focused in one area of your belly. · You have a new or higher fever.   · Your stools are black and look like tar, or they have streaks of blood. · You have unexpected vaginal bleeding. · You have symptoms of a urinary tract infection. These may include:  ¨ Pain when you urinate. ¨ Urinating more often than usual.  ¨ Blood in your urine. · You are dizzy or lightheaded, or you feel like you may faint. Watch closely for changes in your health, and be sure to contact your doctor if:  · You are not getting better after 1 day (24 hours). Where can you learn more? Go to http://nery-peña.info/. Enter U730 in the search box to learn more about \"Abdominal Pain: Care Instructions. \"  Current as of: May 27, 2016  Content Version: 11.1  © 3150-3673 [employer]. Care instructions adapted under license by [employer] (which disclaims liability or warranty for this information). If you have questions about a medical condition or this instruction, always ask your healthcare professional. [place] [address] any warranty or liability for your use of this information. High Blood Pressure: Care Instructions  Your Care Instructions  If your blood pressure is usually above 140/90, you have high blood pressure, or hypertension. That means the top number is 140 or higher or the bottom number is 90 or higher, or both. Despite what a lot of people think, high blood pressure usually doesn't cause headaches or make you feel dizzy or lightheaded. It usually has no symptoms. But it does increase your risk for heart attack, stroke, and kidney or eye damage. The higher your blood pressure, the more your risk increases. Your doctor will give you a goal for your blood pressure. Your goal will be based on your health and your age. An example of a goal is to keep your blood pressure below 140/90. Lifestyle changes, such as eating healthy and being active, are always important to help lower blood pressure.  You might also take medicine to reach your blood pressure goal.  Follow-up care is a key part of your treatment and safety. Be sure to make and go to all appointments, and call your doctor if you are having problems. It's also a good idea to know your test results and keep a list of the medicines you take. How can you care for yourself at home? Medical treatment  · If you stop taking your medicine, your blood pressure will go back up. You may take one or more types of medicine to lower your blood pressure. Be safe with medicines. Take your medicine exactly as prescribed. Call your doctor if you think you are having a problem with your medicine. · Talk to your doctor before you start taking aspirin every day. Aspirin can help certain people lower their risk of a heart attack or stroke. But taking aspirin isn't right for everyone, because it can cause serious bleeding. · See your doctor regularly. You may need to see the doctor more often at first or until your blood pressure comes down. · If you are taking blood pressure medicine, talk to your doctor before you take decongestants or anti-inflammatory medicine, such as ibuprofen. Some of these medicines can raise blood pressure. · Learn how to check your blood pressure at home. Lifestyle changes  · Stay at a healthy weight. This is especially important if you put on weight around the waist. Losing even 10 pounds can help you lower your blood pressure. · If your doctor recommends it, get more exercise. Walking is a good choice. Bit by bit, increase the amount you walk every day. Try for at least 30 minutes on most days of the week. You also may want to swim, bike, or do other activities. · Avoid or limit alcohol. Talk to your doctor about whether you can drink any alcohol. · Try to limit how much sodium you eat to less than 2,300 milligrams (mg) a day. Your doctor may ask you to try to eat less than 1,500 mg a day.   · Eat plenty of fruits (such as bananas and oranges), vegetables, legumes, whole grains, and low-fat dairy products. · Lower the amount of saturated fat in your diet. Saturated fat is found in animal products such as milk, cheese, and meat. Limiting these foods may help you lose weight and also lower your risk for heart disease. · Do not smoke. Smoking increases your risk for heart attack and stroke. If you need help quitting, talk to your doctor about stop-smoking programs and medicines. These can increase your chances of quitting for good. When should you call for help? Call 911 anytime you think you may need emergency care. This may mean having symptoms that suggest that your blood pressure is causing a serious heart or blood vessel problem. Your blood pressure may be over 180/110. For example, call 911 if:  · You have symptoms of a heart attack. These may include:  ¨ Chest pain or pressure, or a strange feeling in the chest.  ¨ Sweating. ¨ Shortness of breath. ¨ Nausea or vomiting. ¨ Pain, pressure, or a strange feeling in the back, neck, jaw, or upper belly or in one or both shoulders or arms. ¨ Lightheadedness or sudden weakness. ¨ A fast or irregular heartbeat. · You have symptoms of a stroke. These may include:  ¨ Sudden numbness, tingling, weakness, or loss of movement in your face, arm, or leg, especially on only one side of your body. ¨ Sudden vision changes. ¨ Sudden trouble speaking. ¨ Sudden confusion or trouble understanding simple statements. ¨ Sudden problems with walking or balance. ¨ A sudden, severe headache that is different from past headaches. · You have severe back or belly pain. Do not wait until your blood pressure comes down on its own. Get help right away. Call your doctor now or seek immediate care if:  · Your blood pressure is much higher than normal (such as 180/110 or higher), but you don't have symptoms. · You think high blood pressure is causing symptoms, such as:  ¨ Severe headache. ¨ Blurry vision.   Watch closely for changes in your health, and be sure to contact your doctor if:  · Your blood pressure measures 140/90 or higher at least 2 times. That means the top number is 140 or higher or the bottom number is 90 or higher, or both. · You think you may be having side effects from your blood pressure medicine. · Your blood pressure is usually normal, but it goes above normal at least 2 times. Where can you learn more? Go to http://nery-peña.info/. Enter Y814 in the search box to learn more about \"High Blood Pressure: Care Instructions. \"  Current as of: August 8, 2016  Content Version: 11.1  © 5409-6587 [employer]. Care instructions adapted under license by [employer] (which disclaims liability or warranty for this information). If you have questions about a medical condition or this instruction, always ask your healthcare professional. [address] any warranty or liability for your use of this information.
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
## 2017-03-13 ENCOUNTER — APPOINTMENT (OUTPATIENT)
Dept: ULTRASOUND IMAGING | Age: 52
End: 2017-03-13
Attending: EMERGENCY MEDICINE
Payer: MEDICARE

## 2017-03-13 ENCOUNTER — HOSPITAL ENCOUNTER (OUTPATIENT)
Age: 52
Setting detail: OBSERVATION
Discharge: HOME | End: 2017-03-14
Attending: EMERGENCY MEDICINE | Admitting: FAMILY MEDICINE
Payer: MEDICARE

## 2017-03-13 DIAGNOSIS — R10.9 FLANK PAIN: Primary | ICD-10-CM

## 2017-03-13 DIAGNOSIS — N39.0 URINARY TRACT INFECTION WITHOUT HEMATURIA, SITE UNSPECIFIED: ICD-10-CM

## 2017-03-13 LAB
ALBUMIN SERPL BCP-MCNC: 3.2 G/DL (ref 3.5–5)
ALBUMIN SERPL BCP-MCNC: 3.3 G/DL (ref 3.5–5)
ALBUMIN/GLOB SERPL: 0.6 {RATIO} (ref 1.1–2.2)
ALBUMIN/GLOB SERPL: 0.6 {RATIO} (ref 1.1–2.2)
ALP SERPL-CCNC: 505 U/L (ref 45–117)
ALP SERPL-CCNC: 520 U/L (ref 45–117)
ALT SERPL-CCNC: 30 U/L (ref 12–78)
ALT SERPL-CCNC: 31 U/L (ref 12–78)
AMPHET UR QL SCN: NEGATIVE
ANION GAP BLD CALC-SCNC: 11 MMOL/L (ref 5–15)
ANION GAP BLD CALC-SCNC: 12 MMOL/L (ref 5–15)
APPEARANCE UR: ABNORMAL
AST SERPL W P-5'-P-CCNC: 26 U/L (ref 15–37)
AST SERPL W P-5'-P-CCNC: 26 U/L (ref 15–37)
BACTERIA URNS QL MICRO: ABNORMAL /HPF
BARBITURATES UR QL SCN: NEGATIVE
BASOPHILS # BLD AUTO: 0 K/UL (ref 0–0.1)
BASOPHILS # BLD AUTO: 0 K/UL (ref 0–0.1)
BASOPHILS # BLD: 0 % (ref 0–1)
BASOPHILS # BLD: 0 % (ref 0–1)
BENZODIAZ UR QL: NEGATIVE
BILIRUB SERPL-MCNC: 0.4 MG/DL (ref 0.2–1)
BILIRUB SERPL-MCNC: 0.5 MG/DL (ref 0.2–1)
BILIRUB UR QL: NEGATIVE
BUN SERPL-MCNC: 59 MG/DL (ref 6–20)
BUN SERPL-MCNC: 61 MG/DL (ref 6–20)
BUN/CREAT SERPL: 13 (ref 12–20)
BUN/CREAT SERPL: 13 (ref 12–20)
CALCIUM SERPL-MCNC: 8.9 MG/DL (ref 8.5–10.1)
CALCIUM SERPL-MCNC: 9 MG/DL (ref 8.5–10.1)
CANNABINOIDS UR QL SCN: NEGATIVE
CHLORIDE SERPL-SCNC: 100 MMOL/L (ref 97–108)
CHLORIDE SERPL-SCNC: 100 MMOL/L (ref 97–108)
CO2 SERPL-SCNC: 21 MMOL/L (ref 21–32)
CO2 SERPL-SCNC: 22 MMOL/L (ref 21–32)
COCAINE UR QL SCN: NEGATIVE
COLOR UR: ABNORMAL
CREAT SERPL-MCNC: 4.69 MG/DL (ref 0.55–1.02)
CREAT SERPL-MCNC: 4.73 MG/DL (ref 0.55–1.02)
DRUG SCRN COMMENT,DRGCM: NORMAL
EOSINOPHIL # BLD: 0.1 K/UL (ref 0–0.4)
EOSINOPHIL # BLD: 0.2 K/UL (ref 0–0.4)
EOSINOPHIL NFR BLD: 1 % (ref 0–7)
EOSINOPHIL NFR BLD: 2 % (ref 0–7)
EPITH CASTS URNS QL MICRO: ABNORMAL /LPF
ERYTHROCYTE [DISTWIDTH] IN BLOOD BY AUTOMATED COUNT: 12.4 % (ref 11.5–14.5)
ERYTHROCYTE [DISTWIDTH] IN BLOOD BY AUTOMATED COUNT: 12.7 % (ref 11.5–14.5)
EST. AVERAGE GLUCOSE BLD GHB EST-MCNC: 252 MG/DL
GLOBULIN SER CALC-MCNC: 5.3 G/DL (ref 2–4)
GLOBULIN SER CALC-MCNC: 5.4 G/DL (ref 2–4)
GLUCOSE SERPL-MCNC: 318 MG/DL (ref 65–100)
GLUCOSE SERPL-MCNC: 327 MG/DL (ref 65–100)
GLUCOSE UR STRIP.AUTO-MCNC: >1000 MG/DL
HBA1C MFR BLD: 10.4 % (ref 4.2–6.3)
HCT VFR BLD AUTO: 30.2 % (ref 35–47)
HCT VFR BLD AUTO: 36.2 % (ref 35–47)
HGB BLD-MCNC: 10.4 G/DL (ref 11.5–16)
HGB BLD-MCNC: 12.4 G/DL (ref 11.5–16)
HGB UR QL STRIP: ABNORMAL
HYALINE CASTS URNS QL MICRO: ABNORMAL /LPF (ref 0–5)
KETONES UR QL STRIP.AUTO: ABNORMAL MG/DL
LACTATE SERPL-SCNC: 1.3 MMOL/L (ref 0.4–2)
LEUKOCYTE ESTERASE UR QL STRIP.AUTO: ABNORMAL
LIPASE SERPL-CCNC: 238 U/L (ref 73–393)
LIPASE SERPL-CCNC: 258 U/L (ref 73–393)
LYMPHOCYTES # BLD AUTO: 18 % (ref 12–49)
LYMPHOCYTES # BLD AUTO: 25 % (ref 12–49)
LYMPHOCYTES # BLD: 1.5 K/UL (ref 0.8–3.5)
LYMPHOCYTES # BLD: 1.9 K/UL (ref 0.8–3.5)
MCH RBC QN AUTO: 29.9 PG (ref 26–34)
MCH RBC QN AUTO: 30 PG (ref 26–34)
MCHC RBC AUTO-ENTMCNC: 34.3 G/DL (ref 30–36.5)
MCHC RBC AUTO-ENTMCNC: 34.4 G/DL (ref 30–36.5)
MCV RBC AUTO: 86.8 FL (ref 80–99)
MCV RBC AUTO: 87.4 FL (ref 80–99)
METHADONE UR QL: NEGATIVE
MONOCYTES # BLD: 0.6 K/UL (ref 0–1)
MONOCYTES # BLD: 0.7 K/UL (ref 0–1)
MONOCYTES NFR BLD AUTO: 7 % (ref 5–13)
MONOCYTES NFR BLD AUTO: 9 % (ref 5–13)
NEUTS SEG # BLD: 5 K/UL (ref 1.8–8)
NEUTS SEG # BLD: 6.2 K/UL (ref 1.8–8)
NEUTS SEG NFR BLD AUTO: 64 % (ref 32–75)
NEUTS SEG NFR BLD AUTO: 74 % (ref 32–75)
NITRITE UR QL STRIP.AUTO: NEGATIVE
OPIATES UR QL: NEGATIVE
PCP UR QL: NEGATIVE
PH UR STRIP: 6 [PH] (ref 5–8)
PLATELET # BLD AUTO: 260 K/UL (ref 150–400)
PLATELET # BLD AUTO: 303 K/UL (ref 150–400)
POTASSIUM SERPL-SCNC: 3.6 MMOL/L (ref 3.5–5.1)
POTASSIUM SERPL-SCNC: 4.3 MMOL/L (ref 3.5–5.1)
PROT SERPL-MCNC: 8.5 G/DL (ref 6.4–8.2)
PROT SERPL-MCNC: 8.7 G/DL (ref 6.4–8.2)
PROT UR STRIP-MCNC: 300 MG/DL
RBC # BLD AUTO: 3.48 M/UL (ref 3.8–5.2)
RBC # BLD AUTO: 4.14 M/UL (ref 3.8–5.2)
RBC #/AREA URNS HPF: ABNORMAL /HPF (ref 0–5)
SODIUM SERPL-SCNC: 133 MMOL/L (ref 136–145)
SODIUM SERPL-SCNC: 133 MMOL/L (ref 136–145)
SP GR UR REFRACTOMETRY: 1.01 (ref 1–1.03)
UROBILINOGEN UR QL STRIP.AUTO: 0.2 EU/DL (ref 0.2–1)
WBC # BLD AUTO: 7.8 K/UL (ref 3.6–11)
WBC # BLD AUTO: 8.4 K/UL (ref 3.6–11)
WBC URNS QL MICRO: ABNORMAL /HPF (ref 0–4)

## 2017-03-13 PROCEDURE — 83036 HEMOGLOBIN GLYCOSYLATED A1C: CPT | Performed by: EMERGENCY MEDICINE

## 2017-03-13 PROCEDURE — 36415 COLL VENOUS BLD VENIPUNCTURE: CPT | Performed by: EMERGENCY MEDICINE

## 2017-03-13 PROCEDURE — 83605 ASSAY OF LACTIC ACID: CPT | Performed by: EMERGENCY MEDICINE

## 2017-03-13 PROCEDURE — 87186 SC STD MICRODIL/AGAR DIL: CPT | Performed by: EMERGENCY MEDICINE

## 2017-03-13 PROCEDURE — 81001 URINALYSIS AUTO W/SCOPE: CPT | Performed by: EMERGENCY MEDICINE

## 2017-03-13 PROCEDURE — 96367 TX/PROPH/DG ADDL SEQ IV INF: CPT

## 2017-03-13 PROCEDURE — 96372 THER/PROPH/DIAG INJ SC/IM: CPT

## 2017-03-13 PROCEDURE — 99218 HC RM OBSERVATION: CPT

## 2017-03-13 PROCEDURE — 96376 TX/PRO/DX INJ SAME DRUG ADON: CPT

## 2017-03-13 PROCEDURE — 85025 COMPLETE CBC W/AUTO DIFF WBC: CPT | Performed by: EMERGENCY MEDICINE

## 2017-03-13 PROCEDURE — 80053 COMPREHEN METABOLIC PANEL: CPT | Performed by: EMERGENCY MEDICINE

## 2017-03-13 PROCEDURE — 96361 HYDRATE IV INFUSION ADD-ON: CPT

## 2017-03-13 PROCEDURE — 74011250636 HC RX REV CODE- 250/636: Performed by: FAMILY MEDICINE

## 2017-03-13 PROCEDURE — 80307 DRUG TEST PRSMV CHEM ANLYZR: CPT | Performed by: EMERGENCY MEDICINE

## 2017-03-13 PROCEDURE — 74011000258 HC RX REV CODE- 258: Performed by: EMERGENCY MEDICINE

## 2017-03-13 PROCEDURE — 76770 US EXAM ABDO BACK WALL COMP: CPT

## 2017-03-13 PROCEDURE — 99284 EMERGENCY DEPT VISIT MOD MDM: CPT

## 2017-03-13 PROCEDURE — 87086 URINE CULTURE/COLONY COUNT: CPT | Performed by: EMERGENCY MEDICINE

## 2017-03-13 PROCEDURE — 83690 ASSAY OF LIPASE: CPT | Performed by: EMERGENCY MEDICINE

## 2017-03-13 PROCEDURE — 74011250637 HC RX REV CODE- 250/637: Performed by: EMERGENCY MEDICINE

## 2017-03-13 PROCEDURE — 96375 TX/PRO/DX INJ NEW DRUG ADDON: CPT

## 2017-03-13 PROCEDURE — 96365 THER/PROPH/DIAG IV INF INIT: CPT

## 2017-03-13 PROCEDURE — 87077 CULTURE AEROBIC IDENTIFY: CPT | Performed by: EMERGENCY MEDICINE

## 2017-03-13 PROCEDURE — 74011250637 HC RX REV CODE- 250/637: Performed by: FAMILY MEDICINE

## 2017-03-13 PROCEDURE — 65660000000 HC RM CCU STEPDOWN

## 2017-03-13 PROCEDURE — 74011250636 HC RX REV CODE- 250/636: Performed by: EMERGENCY MEDICINE

## 2017-03-13 PROCEDURE — 87040 BLOOD CULTURE FOR BACTERIA: CPT | Performed by: EMERGENCY MEDICINE

## 2017-03-13 RX ORDER — SODIUM CHLORIDE 9 MG/ML
150 INJECTION, SOLUTION INTRAVENOUS CONTINUOUS
Status: DISCONTINUED | OUTPATIENT
Start: 2017-03-13 | End: 2017-03-14

## 2017-03-13 RX ORDER — HYDROMORPHONE HYDROCHLORIDE 1 MG/ML
1 INJECTION, SOLUTION INTRAMUSCULAR; INTRAVENOUS; SUBCUTANEOUS
Status: COMPLETED | OUTPATIENT
Start: 2017-03-13 | End: 2017-03-13

## 2017-03-13 RX ORDER — HYDRALAZINE HYDROCHLORIDE 20 MG/ML
10 INJECTION INTRAMUSCULAR; INTRAVENOUS
Status: DISCONTINUED | OUTPATIENT
Start: 2017-03-13 | End: 2017-03-14 | Stop reason: HOSPADM

## 2017-03-13 RX ORDER — DIAZEPAM 5 MG/1
5 TABLET ORAL
Status: DISCONTINUED | OUTPATIENT
Start: 2017-03-13 | End: 2017-03-14 | Stop reason: HOSPADM

## 2017-03-13 RX ORDER — DEXTROSE 50 % IN WATER (D50W) INTRAVENOUS SYRINGE
12.5-25 AS NEEDED
Status: DISCONTINUED | OUTPATIENT
Start: 2017-03-13 | End: 2017-03-14 | Stop reason: HOSPADM

## 2017-03-13 RX ORDER — HYDROMORPHONE HYDROCHLORIDE 2 MG/1
2 TABLET ORAL
Status: DISCONTINUED | OUTPATIENT
Start: 2017-03-13 | End: 2017-03-14

## 2017-03-13 RX ORDER — CLONIDINE HYDROCHLORIDE 0.1 MG/1
0.2 TABLET ORAL
Status: COMPLETED | OUTPATIENT
Start: 2017-03-13 | End: 2017-03-13

## 2017-03-13 RX ORDER — INSULIN LISPRO 100 [IU]/ML
INJECTION, SOLUTION INTRAVENOUS; SUBCUTANEOUS EVERY 4 HOURS
Status: DISCONTINUED | OUTPATIENT
Start: 2017-03-13 | End: 2017-03-14 | Stop reason: HOSPADM

## 2017-03-13 RX ORDER — DIPHENHYDRAMINE HYDROCHLORIDE 50 MG/ML
25 INJECTION, SOLUTION INTRAMUSCULAR; INTRAVENOUS
Status: COMPLETED | OUTPATIENT
Start: 2017-03-13 | End: 2017-03-13

## 2017-03-13 RX ORDER — ONDANSETRON 2 MG/ML
4 INJECTION INTRAMUSCULAR; INTRAVENOUS
Status: DISCONTINUED | OUTPATIENT
Start: 2017-03-13 | End: 2017-03-14 | Stop reason: HOSPADM

## 2017-03-13 RX ORDER — KETOROLAC TROMETHAMINE 30 MG/ML
30 INJECTION, SOLUTION INTRAMUSCULAR; INTRAVENOUS
Status: COMPLETED | OUTPATIENT
Start: 2017-03-13 | End: 2017-03-13

## 2017-03-13 RX ORDER — MAGNESIUM SULFATE 100 %
4 CRYSTALS MISCELLANEOUS AS NEEDED
Status: DISCONTINUED | OUTPATIENT
Start: 2017-03-13 | End: 2017-03-14 | Stop reason: HOSPADM

## 2017-03-13 RX ORDER — INSULIN GLARGINE 100 [IU]/ML
25 INJECTION, SOLUTION SUBCUTANEOUS
Status: DISCONTINUED | OUTPATIENT
Start: 2017-03-13 | End: 2017-03-14

## 2017-03-13 RX ADMIN — HYDRALAZINE HYDROCHLORIDE 10 MG: 20 INJECTION INTRAMUSCULAR; INTRAVENOUS at 22:33

## 2017-03-13 RX ADMIN — DIAZEPAM 5 MG: 5 TABLET ORAL at 23:13

## 2017-03-13 RX ADMIN — HYDROMORPHONE HYDROCHLORIDE 2 MG: 2 TABLET ORAL at 23:13

## 2017-03-13 RX ADMIN — DIPHENHYDRAMINE HYDROCHLORIDE 25 MG: 50 INJECTION, SOLUTION INTRAMUSCULAR; INTRAVENOUS at 18:11

## 2017-03-13 RX ADMIN — CLONIDINE HYDROCHLORIDE 0.2 MG: 0.1 TABLET ORAL at 17:27

## 2017-03-13 RX ADMIN — SODIUM CHLORIDE 1000 ML: 900 INJECTION, SOLUTION INTRAVENOUS at 19:44

## 2017-03-13 RX ADMIN — KETOROLAC TROMETHAMINE 30 MG: 30 INJECTION INTRAMUSCULAR; INTRAVENOUS at 17:27

## 2017-03-13 RX ADMIN — HYDROMORPHONE HYDROCHLORIDE 1 MG: 1 INJECTION, SOLUTION INTRAMUSCULAR; INTRAVENOUS; SUBCUTANEOUS at 19:41

## 2017-03-13 RX ADMIN — CEFTRIAXONE 1 G: 1 INJECTION, POWDER, FOR SOLUTION INTRAMUSCULAR; INTRAVENOUS at 19:44

## 2017-03-13 RX ADMIN — PROMETHAZINE HYDROCHLORIDE 12.5 MG: 25 INJECTION INTRAMUSCULAR; INTRAVENOUS at 18:11

## 2017-03-13 RX ADMIN — SODIUM CHLORIDE 125 ML/HR: 900 INJECTION, SOLUTION INTRAVENOUS at 21:33

## 2017-03-13 RX ADMIN — ONDANSETRON 4 MG: 2 INJECTION INTRAMUSCULAR; INTRAVENOUS at 23:13

## 2017-03-13 RX ADMIN — SODIUM CHLORIDE 1000 ML: 900 INJECTION, SOLUTION INTRAVENOUS at 17:33

## 2017-03-13 NOTE — IP AVS SNAPSHOT
[address] 
[phone] Patient: Taurus Schmitt MRN: [id number] :1965 You are allergic to the following No active allergies Recent Documentation OB Status Smoking Status Postmenopausal Never Smoker Unresulted Labs Order Current Status CULTURE, MRSA In process CULTURE, BLOOD, PAIRED Preliminary result CULTURE, URINE Preliminary result Emergency Contacts Name Discharge Info Relation Home Work Mobile [facility] DISCHARGE CAREGIVER [3] Sister [23] [phone] Wayne Hwang DISCHARGE CAREGIVER [3] Spouse [3]   [phone] About your hospitalization You were admitted on:  2017 You last received care in the:  [address] [facility] You were discharged on:  2017 Unit phone number:  [phone] Why you were hospitalized Your primary diagnosis was:  Acute Cystitis Your diagnoses also included:  Abdominal Pain, Chronic Pain, Flank Pain, Ckd (Chronic Kidney Disease) Stage 4, Gfr 15-29 Ml/Min (Hcc), Chronic Pain Syndrome, Htn (Hypertension), Uti (Urinary Tract Infection) Providers Seen During Your Hospitalizations Provider Role Specialty Primary office phone Tito Betts MD Attending Provider Emergency Medicine [phone] Lolis Babcock MD Attending Provider Hospitalist [phone] Kathleen Schlatter, MD Attending Provider Hospitalist [phone] Your Primary Care Physician (PCP) Primary Care Physician Office Phone Office Fax Ignacioclementina GIRARD [phone] Follow-up Information Follow up With Details Comments Contact Info MD Heriberto Ruiz  [address] [facility] [address] 
[phone] Current Discharge Medication List  
  
START taking these medications Dose & Instructions Dispensing Information Comments Morning Noon Evening Bedtime  
 cephALEXin 500 mg capsule Commonly known as:  Aleja Screws Your last dose was: Your next dose is:    
   
   
 Dose:  500 mg Take 1 Cap by mouth two (2) times a day for 7 days. Indications: UTI Quantity:  14 Cap Refills:  0 CONTINUE these medications which have CHANGED Dose & Instructions Dispensing Information Comments Morning Noon Evening Bedtime  
 insulin glargine 100 unit/mL injection Commonly known as:  LANTUS What changed:  how much to take Your last dose was: Your next dose is:    
   
   
 Dose:  25 Units 25 Units by SubCUTAneous route nightly. Quantity:  1 Vial  
Refills:  0 CONTINUE these medications which have NOT CHANGED Dose & Instructions Dispensing Information Comments Morning Noon Evening Bedtime  
 acetaminophen 325 mg tablet Commonly known as:  TYLENOL Your last dose was: Your next dose is:    
   
   
 Dose:  650 mg Take 2 Tabs by mouth every four (4) hours as needed. Quantity:  30 Tab Refills:  0  
     
   
   
   
  
 aspirin 81 mg chewable tablet Your last dose was: Your next dose is:    
   
   
 Dose:  81 mg Take 1 Tab by mouth daily. Quantity:  30 Tab Refills:  0  
     
   
   
   
  
 bisacodyl 5 mg EC tablet Commonly known as:  DULCOLAX Your last dose was: Your next dose is:    
   
   
 Dose:  5 mg Take 1 Tab by mouth daily as needed for Constipation. Quantity:  30 Tab Refills:  0  
     
   
   
   
  
 diazePAM 5 mg tablet Commonly known as:  VALIUM Your last dose was: Your next dose is:    
   
   
 Dose:  5 mg Take 1 Tab by mouth three (3) times daily as needed for Anxiety (spasm). Max Daily Amount: 15 mg. Quantity:  12 Tab Refills:  0 HumaLOG 100 unit/mL injection Generic drug:  insulin lispro Your last dose was: Your next dose is:    
   
   
 Dose:  5 Units 5 Units by SubCUTAneous route three (3) times daily (with meals). Refills:  0  
     
   
   
   
  
 metoprolol tartrate 25 mg tablet Commonly known as:  LOPRESSOR Your last dose was: Your next dose is:    
   
   
 Dose:  25 mg Take 25 mg by mouth two (2) times a day. Refills:  0  
     
   
   
   
  
 ondansetron hcl 4 mg tablet Commonly known as:  ZOFRAN (AS HYDROCHLORIDE) Your last dose was: Your next dose is:    
   
   
 Dose:  4 mg Take 1 Tab by mouth every eight (8) hours as needed for Nausea. Quantity:  16 Tab Refills:  0  
     
   
   
   
  
 polyethylene glycol 17 gram packet Commonly known as:  Paulette Kehr Your last dose was: Your next dose is:    
   
   
 Dose:  17 g Take 1 Packet by mouth daily. Quantity:  30 Packet Refills:  0 Where to Get Your Medications Information on where to get these meds will be given to you by the nurse or doctor. ! Ask your nurse or doctor about these medications  
  cephALEXin 500 mg capsule  
 insulin glargine 100 unit/mL injection Discharge Instructions Discharge Instructions PATIENT ID: Jeanna Terry MRN: [id number] YOB: 1965 DATE OF ADMISSION: 3/13/2017  4:17 PM   
DATE OF DISCHARGE: 3/14/2017 PRIMARY CARE PROVIDER: Sangeeta Krishnan MD  
 
ATTENDING PHYSICIAN: Estrada Nelson MD 
DISCHARGING PROVIDER: Latosha Arevalo NP To contact this individual call [phone] and ask the  to page. If unavailable ask to be transferred the Adult Hospitalist Department. DISCHARGE DIAGNOSES UTI 
 
CONSULTATIONS: None PROCEDURES/SURGERIES: * No surgery found * PENDING TEST RESULTS:  
At the time of discharge the following test results are still pending: urine culture FOLLOW UP APPOINTMENTS:  
Follow-up Information Follow up With Details Comments Contact Info Ray Garrett MD   [address] 
 
[facility] [phone] ADDITIONAL CARE RECOMMENDATIONS: 
1. We have prescribed you Keflex an antibiotic for your urinary tract infection. 2. Recommending you increase your Lantus to 25 Units at bed time. Please follow up with your primary for further management of your diabetes. Make sure to check your sugars at least twice a day and record it to take to your follow up appointment. 3. Continue your other home medications as prescribed. DIET: Diabetic ACTIVITY: as tolerated WOUND CARE: none EQUIPMENT needed: none DISCHARGE MEDICATIONS: 
Cephalexin (By mouth) Cephalexin (tde-x-AFC-in) Treats infections. This medicine is a cephalosporin antibiotic. Brand Name(s):Keflex There may be other brand names for this medicine. When This Medicine Should Not Be Used: This medicine is not right for everyone. Do not use this medicine if you had an allergic reaction to cephalexin or another cephalosporin medicine. How to Use This Medicine:  
Capsule, Tablet, Tablet for Suspension, Liquid · Your doctor will tell you how much medicine to use. Do not use more than directed. · Read and follow the patient instructions that come with this medicine. Talk to your doctor or pharmacist if you have any questions. · You may take your medicine with food or milk to avoid stomach upset. · Oral liquid: Shake well just before use. Measure the oral liquid medicine with a marked measuring spoon, oral syringe, or medicine cup. · Take all of the medicine in your prescription to clear up your infection, even if you feel better after the first few doses. · Missed dose: Take a dose as soon as you remember. If it is almost time for your next dose, wait until then and take a regular dose.  Do not take extra medicine to make up for a missed dose. · Capsule or tablet: Store at room temperature away from heat, moisture, and direct light. · Oral liquid: Store in the refrigerator for 14 days. After 14 days, throw away any unused medicine. Do not freeze. Drugs and Foods to Avoid: Ask your doctor or pharmacist before using any other medicine, including over-the-counter medicines, vitamins, and herbal products. · Some medicines and foods can affect how cephalexin works. Tell your doctor if you are also using ¨ Metformin ¨ Probenecid Warnings While Using This Medicine: · Tell your doctor if you are pregnant or breastfeeding, or if you have kidney disease, liver disease, or a history of digestive problems, such as colitis. Tell your doctor if you are allergic to penicillin. · This medicine can cause diarrhea. Call your doctor if the diarrhea becomes severe, does not stop, or is bloody. Do not take any medicine to stop diarrhea until you have talked to your doctor. Diarrhea can occur 2 months or more after you stop taking this medicine. · Tell any doctor or dentist who treats you that you are using this medicine. This medicine may affect certain medical test results. · Call your doctor if your symptoms do not improve or if they get worse. · Keep all medicine out of the reach of children. Never share your medicine with anyone. Possible Side Effects While Using This Medicine:  
Call your doctor right away if you notice any of these side effects: · Allergic reaction: Itching or hives, swelling in your face or hands, swelling or tingling in your mouth or throat, chest tightness, trouble breathing · Blistering, peeling, red skin rash · Severe diarrhea, especially if bloody or ongoing · Severe stomach pain, vomiting If you notice these less serious side effects, talk with your doctor: · Mild diarrhea or nausea If you notice other side effects that you think are caused by this medicine, tell your doctor. Call your doctor for medical advice about side effects. You may report side effects to FDA at 3-141-FGI-5309 © 2016 [address] is for End User's use only and may not be sold, redistributed or otherwise used for commercial purposes. The above information is an  only. It is not intended as medical advice for individual conditions or treatments. Talk to your doctor, nurse or pharmacist before following any medical regimen to see if it is safe and effective for you. See Medication Reconciliation Form · It is important that you take the medication exactly as they are prescribed. · Keep your medication in the bottles provided by the pharmacist and keep a list of the medication names, dosages, and times to be taken in your wallet. · Do not take other medications without consulting your doctor. NOTIFY YOUR PHYSICIAN FOR ANY OF THE FOLLOWING:  
Fever over 101 degrees for 24 hours. Chest pain, shortness of breath, fever, chills, nausea, vomiting, diarrhea, change in mentation, falling, weakness, bleeding. Severe pain or pain not relieved by medications. Or, any other signs or symptoms that you may have questions about. DISPOSITION: 
 X Home With: 
 OT  PT  [facility]  RN  
  
 SNF/Inpatient Rehab/LTAC Independent/assisted living Hospice Other: PROBLEM LIST Updated: Yes X Signed:  
Michael Cotter NP 
3/14/2017 
12:42 PM 
 
 
Discharge Instructions Attachments/References UTI (URINARY TRACT INFECTION): FEMALE (ENGLISH) Discharge Orders None [employer] Announcement We are excited to announce that we are making your provider's discharge notes available to you in Weizoom. You will see these notes when they are completed and signed by the physician that discharged you from your recent hospital stay.   If you have any questions or concerns about any information you see in PluggedInt, please call the [facility] Information Department where you were seen or reach out to your Primary Care Provider for more information about your plan of care. Introducing [facility] [place] [facility]! Dalton Lau introduces [employer] patient portal. Now you can access parts of your medical record, email your doctor's office, and request medication refills online. 1. In your internet browser, go to [email or web address] 2. Click on the First Time User? Click Here link in the Sign In box. You will see the New Member Sign Up page. 3. Enter your [employer] Access Code exactly as it appears below. You will not need to use this code after youve completed the sign-up process. If you do not sign up before the expiration date, you must request a new code. · [employer] Access Code: [email or web address] Expires: 5/8/2017  7:47 PM 
 
4. Enter the last four digits of your Social Security Number (xxxx) and Date of Birth (mm/dd/yyyy) as indicated and click Submit. You will be taken to the next sign-up page. 5. Create a [employer]is ID. This will be your [employer] login ID and cannot be changed, so think of one that is secure and easy to remember. 6. Create a [employer] password. You can change your password at any time. 7. Enter your Password Reset Question and Answer. This can be used at a later time if you forget your password. 8. Enter your e-mail address. You will receive e-mail notification when new information is available in [address] 9. Click Sign Up. You can now view and download portions of your medical record. 10. Click the Download Summary menu link to download a portable copy of your medical information. If you have questions, please visit the Frequently Asked Questions section of the [employer] website. Remember, [employer] is NOT to be used for urgent needs. For medical emergencies, dial 911. Now available from your iPhone and Android! General Information Please provide this summary of care documentation to your next provider. Patient Signature:  ____________________________________________________________ Date:  ____________________________________________________________  
  
Molly Brianna Provider Signature:  ____________________________________________________________ Date:  ____________________________________________________________ More Information Urinary Tract Infection in Women: Care Instructions Your Care Instructions A urinary tract infection, or UTI, is a general term for an infection anywhere between the kidneys and the urethra (where urine comes out). Most UTIs are bladder infections. They often cause pain or burning when you urinate. UTIs are caused by bacteria and can be cured with antibiotics. Be sure to complete your treatment so that the infection goes away. Follow-up care is a key part of your treatment and safety. Be sure to make and go to all appointments, and call your doctor if you are having problems. It's also a good idea to know your test results and keep a list of the medicines you take. How can you care for yourself at home? · Take your antibiotics as directed. Do not stop taking them just because you feel better. You need to take the full course of antibiotics. · Drink extra water and other fluids for the next day or two. This may help wash out the bacteria that are causing the infection. (If you have kidney, heart, or liver disease and have to limit fluids, talk with your doctor before you increase your fluid intake.) · Avoid drinks that are carbonated or have caffeine. They can irritate the bladder. · Urinate often. Try to empty your bladder each time. · To relieve pain, take a hot bath or lay a heating pad set on low over your lower belly or genital area. Never go to sleep with a heating pad in place. To prevent UTIs · Drink plenty of water each day. This helps you urinate often, which clears bacteria from your system. (If you have kidney, heart, or liver disease and have to limit fluids, talk with your doctor before you increase your fluid intake.) · Consider adding cranberry juice to your diet. · Urinate when you need to. · Urinate right after you have sex. · Change sanitary pads often. · Avoid douches, bubble baths, feminine hygiene sprays, and other feminine hygiene products that have deodorants. · After going to the bathroom, wipe from front to back. When should you call for help? Call your doctor now or seek immediate medical care if: · Symptoms such as fever, chills, nausea, or vomiting get worse or appear for the first time. · You have new pain in your back just below your rib cage. This is called flank pain. · There is new blood or pus in your urine. · You have any problems with your antibiotic medicine. Watch closely for changes in your health, and be sure to contact your doctor if: 
· You are not getting better after taking an antibiotic for 2 days. · Your symptoms go away but then come back. Where can you learn more? Go to http://nery-peña.info/. Enter A290 in the search box to learn more about \"Urinary Tract Infection in Women: Care Instructions. \" Current as of: August 12, 2016 Content Version: 11.1 © 5935-0221 [employer]. Care instructions adapted under license by [employer] (which disclaims liability or warranty for this information). If you have questions about a medical condition or this instruction, always ask your healthcare professional. [address] any warranty or liability for your use of this information.

## 2017-03-13 NOTE — ED PROVIDER NOTES
HPI Comments: 46 y.o. female with past medical history significant for CKD, hyperlipidemia, pancreatitis, DM, HTN, chronic lower back pain, constipation, UTI, C. Difficile colitis, migraines, and chronic Hep C w/o coma who presents from home via private vehicle with chief complaint of abdominal pain. Pt c/o LUQ abdominal pain, left flank pain, and her upper back \"burning\". + Nausea and vomiting. Per , pt has intermittently had this complaint over the last 5 years. She has been evaluated ~35 times with multiple imaging without a definitive dx. He states that the pt will normally get a shot of medication and is then stable for d/c home. He seems to believe that the pt's condition is related to uncontrolled blood sugar, which he states fluctuates in the 300 - 400 range. Pt was last seen 2 days ago for this complaint with a negative work-up. There are no other acute medical concerns at this time. Social hx: Never smoker. No current alcohol use (Quit a few months ago, h/o alcohol abuse)    PCP: Nisha Vizcaino MD    Old Chart Review: 6 abdominal CT's over the last 2 years, most recently 2 days ago on 3/11/17, have all been unimpressive. Note written by Nicol Padilla, as dictated by Adalberto Lambert MD 4:55 PM    The history is provided by the patient and the spouse.         Past Medical History:   Diagnosis Date    C. difficile colitis 6/2012    Chronic low back pain     CKD (chronic kidney disease)     stage 3 to 4, baseline Cr 2    Constipation     Diabetes (HCC)     A1c 8.2 3/2012    Hep C w/o coma, chronic (HCC)     Hyperlipemia     Hypertension     Migraines     Pancreatitis 2780    alcoholic    UTI (lower urinary tract infection) 6/20012       Past Surgical History:   Procedure Laterality Date    HX ORTHOPAEDIC      lumbar sprain; back surgery    MI COLONOSCOPY FLX DX W/COLLJ SPEC WHEN PFRMD  11/12/2012              Family History:   Problem Relation Age of Onset    Diabetes Mother     Kidney Disease Mother     Diabetes Sister        Social History     Social History    Marital status:      Spouse name: manda maxwell give info    Number of children: 11    Years of education: 8th can re     Occupational History     Not Employed     on disability for [employer]      Social History Main Topics    Smoking status: Never Smoker    Smokeless tobacco: Never Used    Alcohol use No      Comment: Quit few months ago, hx of abuse    Drug use: No    Sexual activity: Yes     Partners: Male     Other Topics Concern    Not on file     Social History Narrative    Lives with daughter and     Ambulated independently    Doesn't work         ALLERGIES: Review of patient's allergies indicates no known allergies. Review of Systems   Constitutional: Negative for activity change, appetite change and fatigue. HENT: Negative for ear pain, facial swelling, sore throat and trouble swallowing. Eyes: Negative for pain, discharge and visual disturbance. Respiratory: Negative for chest tightness, shortness of breath and wheezing. Cardiovascular: Negative for chest pain and palpitations. Gastrointestinal: Positive for abdominal pain, nausea and vomiting. Negative for blood in stool. Genitourinary: Positive for flank pain. Negative for difficulty urinating and hematuria. Musculoskeletal: Negative for arthralgias, joint swelling, myalgias and neck pain. Skin: Negative for color change and rash. Neurological: Negative for dizziness, weakness, numbness and headaches. Hematological: Negative for adenopathy. Does not bruise/bleed easily. Psychiatric/Behavioral: Negative for behavioral problems, confusion and sleep disturbance. All other systems reviewed and are negative. Vitals:    03/13/17 1726   BP: (!) 191/103   Pulse: (!) 111   Resp: 24   Temp: 98.9 °F (37.2 °C)            Physical Exam   Constitutional: She is oriented to person, place, and time.  She appears well-developed and well-nourished. Pt is hysterical without tears. HENT:   Head: Normocephalic and atraumatic. Nose: Nose normal.   Mouth/Throat: Oropharynx is clear and moist.   Eyes: Conjunctivae and EOM are normal. Pupils are equal, round, and reactive to light. No scleral icterus. Neck: Normal range of motion. Neck supple. No JVD present. No tracheal deviation present. No thyromegaly present. No carotid bruits noted. Cardiovascular: Normal rate, regular rhythm, normal heart sounds and intact distal pulses. Exam reveals no gallop and no friction rub. No murmur heard. Pulmonary/Chest: Effort normal and breath sounds normal. No respiratory distress. She has no wheezes. She has no rales. She exhibits no tenderness. No chest wall tenderness. Abdominal: Soft. Bowel sounds are normal. She exhibits no distension and no mass. There is no tenderness. There is no rebound and no guarding. No reproducible tenderness. Musculoskeletal: Normal range of motion. She exhibits no edema or tenderness. Lymphadenopathy:     She has no cervical adenopathy. Neurological: She is alert and oriented to person, place, and time. She has normal reflexes. No cranial nerve deficit. Coordination normal.   Skin: Skin is warm and dry. No rash noted. No erythema. Psychiatric: She has a normal mood and affect. Her behavior is normal. Judgment and thought content normal.   Nursing note and vitals reviewed.      Note written by Nicol Wood, as dictated by Heidy Pickard MD 4:55 PM        MDM  Number of Diagnoses or Management Options     Amount and/or Complexity of Data Reviewed  Clinical lab tests: ordered and reviewed  Tests in the radiology section of CPT®: ordered and reviewed  Decide to obtain previous medical records or to obtain history from someone other than the patient: yes  Review and summarize past medical records: yes  Discuss the patient with other providers: yes  Independent visualization of images, tracings, or specimens: yes    Risk of Complications, Morbidity, and/or Mortality  Presenting problems: high  Diagnostic procedures: high  Management options: high    Patient Progress  Patient progress: stable    ED Course       Procedures    Urinalysis reveals possible UTI. Pt has a h/o urinary stents. According to the , he thinks the patient may still have the stents. Given the severity of the pain, her tachycardia and possible uti, will US the kidneys and have the hospitalist to see. I believe the patient has a chronic pain problem and am unclear as to the current cause of pain. This has been a recurrent problem for 5 years with the pain. CONSULT NOTE:  8:59 PM Jose Luis Leone MD spoke with Dr. Caridad Hill, Consult for Hospitalist.  Discussed available diagnostic tests and clinical findings. He is in agreement with care plans as outlined. Dr. Caridad Hill will see and admit the pt.

## 2017-03-13 NOTE — ED TRIAGE NOTES
Pt reports left rib/flank pain that began several days ago. Pt was seen here on 3/11 and was discharged.  reports pt was given prescription for percocet. Pt states her pain is much worse.

## 2017-03-14 ENCOUNTER — APPOINTMENT (OUTPATIENT)
Dept: CT IMAGING | Age: 52
End: 2017-03-14
Attending: FAMILY MEDICINE
Payer: MEDICARE

## 2017-03-14 VITALS
OXYGEN SATURATION: 98 % | TEMPERATURE: 97.9 F | SYSTOLIC BLOOD PRESSURE: 105 MMHG | RESPIRATION RATE: 18 BRPM | HEART RATE: 80 BPM | DIASTOLIC BLOOD PRESSURE: 69 MMHG

## 2017-03-14 PROBLEM — N30.00 ACUTE CYSTITIS: Status: ACTIVE | Noted: 2017-03-14

## 2017-03-14 LAB
ATRIAL RATE: 93 BPM
CALCULATED P AXIS, ECG09: 53 DEGREES
CALCULATED R AXIS, ECG10: 19 DEGREES
CALCULATED T AXIS, ECG11: 74 DEGREES
DIAGNOSIS, 93000: NORMAL
GLUCOSE BLD STRIP.AUTO-MCNC: 137 MG/DL (ref 65–100)
GLUCOSE BLD STRIP.AUTO-MCNC: 202 MG/DL (ref 65–100)
GLUCOSE BLD STRIP.AUTO-MCNC: 215 MG/DL (ref 65–100)
GLUCOSE BLD STRIP.AUTO-MCNC: 347 MG/DL (ref 65–100)
P-R INTERVAL, ECG05: 148 MS
Q-T INTERVAL, ECG07: 400 MS
QRS DURATION, ECG06: 68 MS
QTC CALCULATION (BEZET), ECG08: 497 MS
SERVICE CMNT-IMP: ABNORMAL
TROPONIN I SERPL-MCNC: 0.34 NG/ML
VENTRICULAR RATE, ECG03: 93 BPM

## 2017-03-14 PROCEDURE — 96376 TX/PRO/DX INJ SAME DRUG ADON: CPT

## 2017-03-14 PROCEDURE — 74176 CT ABD & PELVIS W/O CONTRAST: CPT

## 2017-03-14 PROCEDURE — 74011250636 HC RX REV CODE- 250/636: Performed by: FAMILY MEDICINE

## 2017-03-14 PROCEDURE — 84484 ASSAY OF TROPONIN QUANT: CPT | Performed by: FAMILY MEDICINE

## 2017-03-14 PROCEDURE — 74011250636 HC RX REV CODE- 250/636: Performed by: INTERNAL MEDICINE

## 2017-03-14 PROCEDURE — 36415 COLL VENOUS BLD VENIPUNCTURE: CPT | Performed by: FAMILY MEDICINE

## 2017-03-14 PROCEDURE — 93005 ELECTROCARDIOGRAM TRACING: CPT

## 2017-03-14 PROCEDURE — 74011250637 HC RX REV CODE- 250/637: Performed by: FAMILY MEDICINE

## 2017-03-14 PROCEDURE — 99218 HC RM OBSERVATION: CPT

## 2017-03-14 PROCEDURE — 74011250637 HC RX REV CODE- 250/637: Performed by: INTERNAL MEDICINE

## 2017-03-14 PROCEDURE — 82962 GLUCOSE BLOOD TEST: CPT

## 2017-03-14 RX ORDER — OXYCODONE HCL 10 MG/1
10 TABLET, FILM COATED, EXTENDED RELEASE ORAL EVERY 12 HOURS
Status: COMPLETED | OUTPATIENT
Start: 2017-03-14 | End: 2017-03-14

## 2017-03-14 RX ORDER — BISACODYL 5 MG
5 TABLET, DELAYED RELEASE (ENTERIC COATED) ORAL
Status: DISCONTINUED | OUTPATIENT
Start: 2017-03-14 | End: 2017-03-14 | Stop reason: HOSPADM

## 2017-03-14 RX ORDER — INSULIN GLARGINE 100 [IU]/ML
25 INJECTION, SOLUTION SUBCUTANEOUS
Status: DISCONTINUED | OUTPATIENT
Start: 2017-03-14 | End: 2017-03-14 | Stop reason: HOSPADM

## 2017-03-14 RX ORDER — SODIUM CHLORIDE 9 MG/ML
125 INJECTION, SOLUTION INTRAVENOUS CONTINUOUS
Status: DISCONTINUED | OUTPATIENT
Start: 2017-03-14 | End: 2017-03-14

## 2017-03-14 RX ORDER — GUAIFENESIN 100 MG/5ML
81 LIQUID (ML) ORAL DAILY
Status: DISCONTINUED | OUTPATIENT
Start: 2017-03-14 | End: 2017-03-14 | Stop reason: HOSPADM

## 2017-03-14 RX ORDER — METOPROLOL TARTRATE 25 MG/1
25 TABLET, FILM COATED ORAL 2 TIMES DAILY
Status: DISCONTINUED | OUTPATIENT
Start: 2017-03-14 | End: 2017-03-14 | Stop reason: HOSPADM

## 2017-03-14 RX ORDER — CEPHALEXIN 500 MG/1
500 CAPSULE ORAL 2 TIMES DAILY
Qty: 14 CAP | Refills: 0 | Status: SHIPPED | OUTPATIENT
Start: 2017-03-14 | End: 2017-03-21

## 2017-03-14 RX ORDER — INSULIN GLARGINE 100 [IU]/ML
25 INJECTION, SOLUTION SUBCUTANEOUS
Qty: 1 VIAL | Refills: 0 | Status: ON HOLD
Start: 2017-03-14 | End: 2018-11-27 | Stop reason: ALTCHOICE

## 2017-03-14 RX ORDER — ACETAMINOPHEN 325 MG/1
650 TABLET ORAL
Status: DISCONTINUED | OUTPATIENT
Start: 2017-03-14 | End: 2017-03-14 | Stop reason: HOSPADM

## 2017-03-14 RX ORDER — HYDROMORPHONE HYDROCHLORIDE 1 MG/ML
1 INJECTION, SOLUTION INTRAMUSCULAR; INTRAVENOUS; SUBCUTANEOUS
Status: DISCONTINUED | OUTPATIENT
Start: 2017-03-14 | End: 2017-03-14

## 2017-03-14 RX ORDER — SODIUM CHLORIDE 0.9 % (FLUSH) 0.9 %
5-10 SYRINGE (ML) INJECTION AS NEEDED
Status: DISCONTINUED | OUTPATIENT
Start: 2017-03-14 | End: 2017-03-14 | Stop reason: HOSPADM

## 2017-03-14 RX ORDER — POLYETHYLENE GLYCOL 3350 17 G/17G
17 POWDER, FOR SOLUTION ORAL DAILY
Status: DISCONTINUED | OUTPATIENT
Start: 2017-03-14 | End: 2017-03-14 | Stop reason: HOSPADM

## 2017-03-14 RX ORDER — SODIUM CHLORIDE 0.9 % (FLUSH) 0.9 %
5-10 SYRINGE (ML) INJECTION EVERY 8 HOURS
Status: DISCONTINUED | OUTPATIENT
Start: 2017-03-14 | End: 2017-03-14 | Stop reason: HOSPADM

## 2017-03-14 RX ORDER — OXYCODONE HYDROCHLORIDE 5 MG/1
5 TABLET ORAL
Status: DISCONTINUED | OUTPATIENT
Start: 2017-03-14 | End: 2017-03-14 | Stop reason: HOSPADM

## 2017-03-14 RX ADMIN — OXYCODONE HYDROCHLORIDE 10 MG: 10 TABLET, FILM COATED, EXTENDED RELEASE ORAL at 01:04

## 2017-03-14 RX ADMIN — ASPIRIN 81 MG CHEWABLE TABLET 81 MG: 81 TABLET CHEWABLE at 08:40

## 2017-03-14 RX ADMIN — Medication 10 ML: at 00:49

## 2017-03-14 RX ADMIN — HYDROMORPHONE HYDROCHLORIDE 1 MG: 1 INJECTION, SOLUTION INTRAMUSCULAR; INTRAVENOUS; SUBCUTANEOUS at 02:57

## 2017-03-14 RX ADMIN — OXYCODONE HYDROCHLORIDE 5 MG: 5 TABLET ORAL at 10:31

## 2017-03-14 RX ADMIN — Medication 1 CAPSULE: at 08:40

## 2017-03-14 RX ADMIN — ACETAMINOPHEN 650 MG: 325 TABLET, FILM COATED ORAL at 08:41

## 2017-03-14 RX ADMIN — INSULIN GLARGINE 25 UNITS: 100 INJECTION, SOLUTION SUBCUTANEOUS at 01:09

## 2017-03-14 RX ADMIN — INSULIN LISPRO 2 UNITS: 100 INJECTION, SOLUTION INTRAVENOUS; SUBCUTANEOUS at 05:23

## 2017-03-14 RX ADMIN — INSULIN LISPRO 7 UNITS: 100 INJECTION, SOLUTION INTRAVENOUS; SUBCUTANEOUS at 01:04

## 2017-03-14 RX ADMIN — POLYETHYLENE GLYCOL 3350 17 G: 17 POWDER, FOR SOLUTION ORAL at 08:45

## 2017-03-14 RX ADMIN — SODIUM CHLORIDE 125 ML/HR: 900 INJECTION, SOLUTION INTRAVENOUS at 00:46

## 2017-03-14 RX ADMIN — DIAZEPAM 5 MG: 5 TABLET ORAL at 08:43

## 2017-03-14 RX ADMIN — INSULIN LISPRO 3 UNITS: 100 INJECTION, SOLUTION INTRAVENOUS; SUBCUTANEOUS at 10:25

## 2017-03-14 RX ADMIN — Medication 10 ML: at 05:24

## 2017-03-14 RX ADMIN — METOPROLOL TARTRATE 25 MG: 25 TABLET ORAL at 08:42

## 2017-03-14 RX ADMIN — ACETAMINOPHEN 650 MG: 325 TABLET, FILM COATED ORAL at 11:55

## 2017-03-14 RX ADMIN — HYDROMORPHONE HYDROCHLORIDE 0.5 MG: 1 INJECTION, SOLUTION INTRAMUSCULAR; INTRAVENOUS; SUBCUTANEOUS at 11:55

## 2017-03-14 NOTE — PROGRESS NOTES
Notified oncoming nurse about pt's troponin 0.34. NP notified about pt's labs. Charge nurse notified of lab situation, and about calling hospitalist to no avail. Pt is sleeping at the moment.

## 2017-03-14 NOTE — PROGRESS NOTES
Bedside and Verbal shift change report given to Indiana Sosa (oncoming nurse) by Benedict Bhandari (offgoing nurse). Report included the following information SBAR, Kardex, Intake/Output, MAR and Accordion.

## 2017-03-14 NOTE — ED NOTES
Bedside report received from [facility]. All questions answered. Pt resting comfortably. V/S stable, and no distress noted. Call bell within reach.

## 2017-03-14 NOTE — H&P
[address] [facility], [address]   HISTORY AND PHYSICAL       Name:  Asia Dempsey   MR#:  [id number]   :  1965   Account #:  [de-identified]        Date of Adm:  2017       REFERRING PHYSICIAN: Kaylene Lima MD     CHIEF COMPLAINT: Left flank pain. HISTORY OF PRESENT ILLNESS: The patient is a 51-year-old   female with a history of multiple admissions secondary to abdominal   pain, history of CKD stage 4, hyperlipidemia, chronic pancreatitis,   diabetes mellitus type 2, hypertension, chronic back pain, constipation,   multiple episodes of UTIs, C difficile, migraines, and chronic hepatitis   C, who presents to the hospital with the above-mentioned symptoms. The patient reports that she started experiencing some left upper   quadrant/left flank pain that started in her upper back with nausea and   vomiting that started about 3-4 days back. The patient reports that she   was seen in the ER, had a CT scan done, which basically showed   some nonobstructive calculi with no hydronephrosis, was discharged   on oral pain medications, but continued to have significant pain with   the same. Before I entered the room, the patient was resting in bed   and appeared to be comfortable, but when I entered the room, the   patient started crying and appeared to be in significant distress and   started clutching the left flank. The patient has been evaluated more   than 35 times with multiple imaging without a definitive diagnosis, . The patient's , per ER note, stated that the patient will   normally get a shot of medication and is then stable for discharge   home. The patient's  reported to the ER physician that he   seems to believe that the patient's condition is related to blood sugar   fluctuations  .  The patient was given multiple doses of IV pain   medication and continued to be in pain and was requested to be   admitted for further management and evaluation. The patient currently   denies any headache, blurry vision, sore throat, trouble swallowing,   trouble with speech. Denies any chest pain, shortness of breath,   cough, fever, chills. Denies any diarrhea, constipation, hematemesis,   melena, hemoptysis, urinary symptoms, focal or generalized   neurological weakness, vaginal discharge, vaginal bleeding, or any   other symptoms or problems. The patient reports that only IV pain   medications work for her. PAST MEDICAL HISTORY: See above. HOME MEDICATIONS:   Currently the patient is on multiple medications including pain   medications. The patient is on   1. Oxycodone 5 mg every 4 hours as needed. 2. Norco 1-2 tablets every 4 hours as needed for pain. 3. Diazepam 5 mg t.i.d. as needed. 4. Lantus 25 units at bedtime. 5. Tylenol p.r.n.   6. Aspirin 81 mg daily. 7. Dulcolax 1 tablet as needed. 8. Roxicodone 5 mg every 4 hours as needed. 9. MiraLax as needed. 10. Zofran p.r.n.   11. Humalog sliding scale. 12. Lopressor 25 mg b.i.d. SOCIAL HISTORY: The patient denies tobacco abuse, or IV drug   abuse. Lives at home. The patient does have a history of significant   alcohol abuse. REVIEW OF SYSTEMS: A 10-point review of systems done, which   was essentially negative except for the symptoms mentioned above. FAMILY HISTORY: Mother had history of diabetes and kidney disease. A sister has history of diabetes. ALLERGIES: NO KNOWN DRUG ALLERGIES. PHYSICAL EXAMINATION   VITAL SIGNS: Temperature 98.4, pulse 105, respiratory rate 20, blood   pressure 185/98. GENERAL: Alert x3, tearful female, appears to be in significant pain. HEENT: Pupils equal and reactive to light. Dry mucous membranes. NECK: Supple. CHEST: Clear to auscultation bilaterally. CORONARY: Tachycardic. ABDOMEN: Soft, tender to palpation diffusely, but more pronounced in   the left upper quadrant and left flank. No rebound. Mild guarding.    Bowel sounds were hyperactive. EXTREMITIES: No clubbing, no cyanosis, no edema. NEUROPSYCHIATRIC: Pleasant mood and affect. Sensory grossly   within normal limits. DTR 1+. Strength 5/5. SKIN: Warm. LABORATORY DATA: White count 8.4, hemoglobin 10.4, hematocrit   30.2, platelets 855. Urine shows moderate amount of blood,    WBCs, 10-20 RBCs, 4+ bacteria. Sodium 133, potassium 3.6, chloride   100, bicarbonate 21, BUN 59, creatinine 4.69, calcium 8.9, bilirubin   total 0.4, ALT 31, AST 26, alkaline phosphatase 505, lipase 238. Lactic   acid 1.3. Blood cultures are pending. DIAGNOSTIC DATA: Ultrasound of the abdomen and pelvis was done,   which showed mild echogenic renal parenchyma as may be seen with   medical renal disease, no hydronephrosis. ASSESSMENT AND PLAN:   1. Left flank pain/abdominal pain, unclear etiology. The patient   appeared to have a urinary tract infection, but does not really decipher   the cause of her significant and distressing pain, and I am concerned   about opioid abuse and thus, will observe the patient in the hospital.   We will start the patient on IV hydration, IV antibiotics. Urine has been   sent for culture. I will not start the patient on any IV opioids and after   much deliberation, told the patient that she could have Dilaudid p.o.,   but not IV at this point of time. The patient understands and verbalizes   consent, does not appear to be happy with the same. May   consider getting a Neurology consult in the morning, and further   intervention will be per hospital course. The patient does not exhibit   any pain relating to any musculoskeletal problems at this point of time,   and thus will continue to monitor, provide neurovascular checks and   further intervention will be per hospital course. We will reassess as   needed. 2. Hyperglycemia with mild hyperosmolar hyperglycemic state.  The   patient will be started on aggressive IV hydration and continue sliding scale NovoLog insulin as well as Lantus. Will provide diabetic diet and   further intervention will be per hospital course. Will reassess as   needed. 3. Chronic kidney disease, stable. Continue to monitor. 4. Hypertension, suboptimally controlled. Will start the patient on p.r.n.   medications  for pain. We will continue to monitor. 5. Chronic hepatitis C without coma. Currently stable. Will monitor. 6. Gastrointestinal and deep venous thrombosis prophylaxis. The   patient will be on sequential compression devices.          Keith Bernardo MD MM / Rosalind Felder   D:  03/13/2017   22:09   T:  03/14/2017   06:29   Job #:  [id number]

## 2017-03-14 NOTE — PROGRESS NOTES
Primary Nurse Miquel Rashid RN and Ajith Reyes RN performed a dual skin assessment on this patient No impairment noted  Julius score is 23

## 2017-03-14 NOTE — DISCHARGE SUMMARY
Discharge Summary       PATIENT ID: Rupa Khan  MRN: [id number]   YOB: 1965    DATE OF ADMISSION: 3/13/2017  4:17 PM    DATE OF DISCHARGE: 3/14/2017   PRIMARY CARE PROVIDER: Ramy Cool MD     ATTENDING PHYSICIAN: David Wen MD  DISCHARGING PROVIDER: Juan Francisco Hogan NP    To contact this individual call [phone] and ask the  to page. If unavailable ask to be transferred the Adult Hospitalist Department. CONSULTATIONS: None    PROCEDURES/SURGERIES: * No surgery found *    ADMITTING DIAGNOSES & HOSPITAL COURSE:   Dx: UTI     46 yof with pmh of multiple admissions for abdominal pain, CKD stage 4, Hyperlipidemia, Chronic Pancreatitis, Type 2 DM, Hypertension, Chronic back pain, constipation, UTIs, Cdiff, migraines, and Hep C who presented with left flank pain with nausea and vomiting that started 3-4 days ago. CT of the abdomen showed no acute findings. UA + for UTI. Pt given abx with improvement of sxs. Patient tolerating po diet. Patient stable for discharge home.      DISCHARGE DIAGNOSES / PLAN:      UTI with left flank pain   -UA +, urine cx >100,000 gram negative rods   -change Rocephin to po Keflex   -CT abd negative for pyelo  -pt tolerating regular diet     Uncontrolled Type 2 DM   -hgb A1c 10.4   -pt reports she was prescribed 40 units of Lantus by pcp but decreased it herself to 20 units d/t blood sugars in the 60s in the morning   -will increase Lantus to 25 U   -continue meal time insulin   -f/u with pcp for further mgmt of DM     Chronic Pain   -reports she previously followed a pain mgmt provider who discharged her from the practice for breaking contract and getting medications from different ED providers   -advised pt to f/u with pcp for referral to pain mgmt provider     CKD  -at baseline   -continue OP f/u     Elevated Troponin   -denies any chest pain, EKG normal   -on chart review troponin chronically elevated   -suspect r/t dehydration with underlying CKD       PENDING TEST RESULTS:   At the time of discharge the following test results are still pending: urine culture    FOLLOW UP APPOINTMENTS:    Follow-up Information     Follow up With Details Nilda Herrera, [address] [address] [phone]           ADDITIONAL CARE RECOMMENDATIONS:  1. We have prescribed you Keflex an antibiotic for your urinary tract infection. 2. Recommending you increase your Lantus to 25 Units at bed time. Please follow up with your primary for further management of your diabetes. Make sure to check your sugars at least twice a day and record it to take to your follow up appointment. 3. Continue your other home medications as prescribed. DIET: Diabetic     ACTIVITY: Activity as tolerated    WOUND CARE: none    EQUIPMENT needed: none      DISCHARGE MEDICATIONS:  Discharge Medication List as of 3/14/2017  2:04 PM      START taking these medications    Details   cephALEXin (KEFLEX) 500 mg capsule Take 1 Cap by mouth two (2) times a day for 7 days. Indications: UTI, Print, Disp-14 Cap, R-0         CONTINUE these medications which have CHANGED    Details   insulin glargine (LANTUS) 100 unit/mL injection 25 Units by SubCUTAneous route nightly., No Print, Disp-1 Vial, R-0         CONTINUE these medications which have NOT CHANGED    Details   diazePAM (VALIUM) 5 mg tablet Take 1 Tab by mouth three (3) times daily as needed for Anxiety (spasm). Max Daily Amount: 15 mg., Print, Disp-12 Tab, R-0      acetaminophen (TYLENOL) 325 mg tablet Take 2 Tabs by mouth every four (4) hours as needed., Normal, Disp-30 Tab, R-0      aspirin 81 mg chewable tablet Take 1 Tab by mouth daily. , Normal, Disp-30 Tab, R-0      bisacodyl (DULCOLAX) 5 mg EC tablet Take 1 Tab by mouth daily as needed for Constipation. , Normal, Disp-30 Tab, R-0      polyethylene glycol (MIRALAX) 17 gram packet Take 1 Packet by mouth daily. , Normal, Disp-30 Packet, R-0      ondansetron hcl (ZOFRAN, AS HYDROCHLORIDE,) 4 mg tablet Take 1 Tab by mouth every eight (8) hours as needed for Nausea., Normal, Disp-16 Tab, R-0      insulin lispro (HUMALOG) 100 unit/mL injection 5 Units by SubCUTAneous route three (3) times daily (with meals). , Historical Med      metoprolol (LOPRESSOR) 25 mg tablet Take 25 mg by mouth two (2) times a day., Historical Med         STOP taking these medications       oxyCODONE IR (ROXICODONE) 5 mg immediate release tablet Comments:   Reason for Stopping:                 NOTIFY YOUR PHYSICIAN FOR ANY OF THE FOLLOWING:   Fever over 101 degrees for 24 hours. Chest pain, shortness of breath, fever, chills, nausea, vomiting, diarrhea, change in mentation, falling, weakness, bleeding. Severe pain or pain not relieved by medications. Or, any other signs or symptoms that you may have questions about. DISPOSITION:  X  Home With:   OT  PT  HH  RN       Long term SNF/Inpatient Rehab    Independent/assisted living    Hospice    Other:       PATIENT CONDITION AT DISCHARGE:     Functional status    Poor     Deconditioned    X Independent      Cognition   X  Lucid     Forgetful     Dementia      Catheters/lines (plus indication)    Parra     PICC     PEG    X None      Code status   X  Full code     DNR      PHYSICAL EXAMINATION AT DISCHARGE:  General: No acute distress, cooperative, pleasant   EENT: EOMI. Anicteric sclerae. Oral mucous moist, oropharynx benign  Resp: CTA bilaterally. No wheezing/rhonchi/rales. No accessory muscle use  CV: Regular rhythm, normal rate, no murmurs, gallops, rubs  GI: Soft, non distended, non tender. normoactive bowel sounds,   Extremities: No edema, warm, 2+ pulses throughout  Neurologic: Moves all extremities. AAOx3, CN II-XII grossly intact  Psych: Good insight. Not anxious nor agitated.   Skin: Good Turgor, no rashes or ulcers      CHRONIC MEDICAL DIAGNOSES:  Problem List as of 3/14/2017  Date Reviewed: 3/13/2017          Codes Class Noted - Resolved    * (Principal)Acute cystitis ICD-10-CM: N30.00  ICD-9-CM: 595.0  3/14/2017 - Present        Acute pancreatitis ICD-10-CM: K85.90  ICD-9-CM: 868.3  5/25/2015 - Present        Flank pain ICD-10-CM: R10.9  ICD-9-CM: 789.09  4/14/2015 - Present        Hydronephrosis with infection ICD-10-CM: N13.6  ICD-9-CM: 590.80  4/9/2015 - Present        SIRS (systemic inflammatory response syndrome) ([facility] ICD-10-CM: R65.10  ICD-9-CM: 995.90  4/8/2015 - Present        Hyperglycemia ICD-10-CM: R73.9  ICD-9-CM: 790.29  11/18/2013 - Present    Overview Signed 11/18/2013  8:47 PM by Francie Venegas     Hyperosmolar, hyperglycemia state             Abdominal pain ICD-10-CM: R10.9  ICD-9-CM: 789.00  11/18/2013 - Present        Lower urinary tract infectious disease ICD-10-CM: N39.0  ICD-9-CM: 599.0  11/18/2013 - Present        Chronic pain ICD-10-CM: G89.29  ICD-9-CM: 338.29  11/18/2013 - Present    Overview Signed 11/18/2013  8:48 PM by Francie Venegas     Low back, sees pain doctor, do not DC pt with pain RX, needs to go to her pain MD at DC             Hyponatremia ICD-10-CM: E87.1  ICD-9-CM: 276.1  11/18/2013 - Present        HTN (hypertension) ICD-10-CM: I10  ICD-9-CM: 401.9  12/20/2012 - Present        Chronic lumbar radiculopathy ICD-10-CM: M54.16  ICD-9-CM: 724.4  12/6/2012 - Present        Chronic pain syndrome ICD-10-CM: G89.4  ICD-9-CM: 338.4  12/6/2012 - Present        IDDM (insulin dependent diabetes mellitus) ([facility] ICD-10-CM: E11.9, Z79.4  ICD-9-CM: 250.00, V58.67  10/16/2012 - Present        Back pain, lumbosacral ICD-10-CM: M54.5, M54.89  ICD-9-CM: 724.2, 724.6  6/24/2012 - Present    Overview Signed 6/24/2012 10:18 PM by Erendira Fischer     Acute on chronic               Gastroparesis ICD-10-CM: K31.84  ICD-9-CM: 536.3  6/7/2012 - Present        Abdominal pain, LUQ (left upper quadrant) ICD-10-CM: R10.12  ICD-9-CM: 789.02  6/7/2012 - Present        Syncope and collapse ICD-10-CM: R55  ICD-9-CM: 780.2  4/22/2012 - Present        Depression ICD-10-CM: F32.9  ICD-9-CM: 359  4/22/2012 - Present        CKD (chronic kidney disease) stage 4, GFR 15-29 ml/min (HCC) (Chronic) ICD-10-CM: N18.4  ICD-9-CM: 585.4  4/22/2012 - Present        Intractable abdominal pain ICD-10-CM: R10.9  ICD-9-CM: 789.00  4/21/2012 - Present        HTN (hypertension) ICD-10-CM: I10  ICD-9-CM: 401.9  4/12/2012 - Present        RESOLVED: Pancreatitis ICD-10-CM: K85.90  ICD-9-CM: 191.8  11/17/2016 - 11/20/2016        RESOLVED: Constipation ICD-10-CM: K59.00  ICD-9-CM: 564.00  11/17/2016 - 11/20/2016        RESOLVED: Chest pain ICD-10-CM: R07.9  ICD-9-CM: 786.50  11/17/2016 - 11/20/2016        RESOLVED: Cellulitis ICD-10-CM: L03.90  ICD-9-CM: 682.9  2/3/2016 - 2/6/2016        RESOLVED: HTN (hypertension), malignant ICD-10-CM: I10  ICD-9-CM: 401.0  4/17/2015 - 4/20/2015        RESOLVED: HTN (hypertension), malignant ICD-10-CM: I10  ICD-9-CM: 401.0  11/6/2012 - 12/20/2012        RESOLVED: Chest pain, unspecified ICD-10-CM: R07.9  ICD-9-CM: 786.50  11/6/2012 - 11/15/2012        RESOLVED: UTI (lower urinary tract infection) ICD-10-CM: N39.0  ICD-9-CM: 599.0  11/6/2012 - 11/15/2012        RESOLVED: GI bleeding ICD-10-CM: K92.2  ICD-9-CM: 578.9  11/6/2012 - 12/20/2012        RESOLVED: Chest pain ICD-10-CM: R07.9  ICD-9-CM: 786.50  8/5/2012 - 10/16/2012        RESOLVED: Hyponatremia ICD-10-CM: E87.1  ICD-9-CM: 276.1  8/4/2012 - 11/15/2012        RESOLVED: ARF (acute renal failure) ([facility] ICD-10-CM: N17.9  ICD-9-CM: 584.9  8/4/2012 - 10/16/2012        RESOLVED: Leukocytosis, unspecified ICD-10-CM: L11.379  ICD-9-CM: 288.60  8/4/2012 - 12/20/2012        RESOLVED: Abdominal pain ICD-10-CM: R10.9  ICD-9-CM: 789.00  6/24/2012 - 10/16/2012    Overview Addendum 6/24/2012 10:19 PM by Tonie Henson     Acute on chronic left and right upper quadrant.              RESOLVED: Orthostatic hypotension ICD-10-CM: I95.1  ICD-9-CM: 458.0  6/24/2012 - 10/16/2012        RESOLVED: UTI (urinary tract infection) ICD-10-CM: N39.0  ICD-9-CM: 599.0  6/24/2012 - 10/16/2012        RESOLVED: Hyponatremia ICD-10-CM: E87.1  ICD-9-CM: 276.1  6/24/2012 - 11/15/2012    Overview Signed 6/24/2012 10:21 PM by Lora Plane     Acute on chronic             RESOLVED: DM (diabetes mellitus) type II controlled with renal manifestation ([facility] ICD-10-CM: E11.29  ICD-9-CM: 250.40  6/24/2012 - 10/16/2012        RESOLVED: Colitis due to Clostridium difficile ICD-10-CM: A04.7  ICD-9-CM: 008.45  6/7/2012 - 10/16/2012        RESOLVED: Diarrhea ICD-10-CM: R19.7  ICD-9-CM: 787.91  6/7/2012 - 10/16/2012        RESOLVED: Acute renal failure ([facility].) ICD-10-CM: N17.9  ICD-9-CM: 584.9  4/21/2012 - 10/16/2012        RESOLVED: Hyponatremia ICD-10-CM: E87.1  ICD-9-CM: 276.1  4/21/2012 - 11/15/2012        RESOLVED: UTI (urinary tract infection) ICD-10-CM: N39.0  ICD-9-CM: 599.0  4/21/2012 - 10/16/2012        RESOLVED: Nausea & vomiting ICD-10-CM: R11.2  ICD-9-CM: 787.01  3/16/2012 - 11/15/2012        RESOLVED: Acute pancreatitis ICD-10-CM: K85.90  ICD-9-CM: 577.0  8/1/2010 - 2/26/2012        RESOLVED: Cholelithiasis ICD-10-CM: K80.20  ICD-9-CM: 574.20  8/1/2010 - 11/20/2016              Greater than 30 minutes were spent with the patient on counseling and coordination of care    Signed:   Lupe Best NP  3/14/2017  12:56 PM

## 2017-03-14 NOTE — PROGRESS NOTES
Paged hospitalist concerning patient's HR and pain. Dr Cristine Li ordered dilaudid 1 Mg IV every 3 hours PRN .  To continue fluids on patient and continue monitoring

## 2017-03-14 NOTE — PROGRESS NOTES
[id number] paged hospitalist    0700 repaged hospitalist    0715 repaged hospitalist    0730 paged hospitalist and notified on coming charge nurse    Augustine 51 paged hospitalist and charge nurse paged hospitalist too

## 2017-03-14 NOTE — ED NOTES
Pt initially refusing pain medications after requesting them because \"they are pills. They are not going to work. I want  The liquid. Give it to me in my IV. \" explained to pt that this route of medication is what was ordered. Pt took medication.

## 2017-03-14 NOTE — PROGRESS NOTES
TRANSFER - IN REPORT:    Verbal report received from Emma(poornima) on Bishnu Theba  being received from ED(unit) for routine progression of care      Report consisted of patients Situation, Background, Assessment and   Recommendations(SBAR). Information from the following report(s) SBAR, Kardex, Intake/Output, MAR and Accordion was reviewed with the receiving nurse. Opportunity for questions and clarification was provided. Assessment completed upon patients arrival to unit and care assumed.

## 2017-03-14 NOTE — PROGRESS NOTES
Care Management Interventions  PCP Verified by CM: Yes (Dr. Vikas Simeon)  Palliative Care Consult (Criteria: CHF and RRAT>21): No  Reason for No Palliative Care Consult: Other (see comment) (no order/no needs)  Mode of Transport at Discharge: Other (see comment) (family/car)  Transition of Care Consult (CM Consult): Other (no needs/ follow up apt with PCP will be added to AVS by )  MyChart Signup: No  Discharge Durable Medical Equipment: No  Health Maintenance Reviewed: Yes  Physical Therapy Consult: No  Occupational Therapy Consult: No  Speech Therapy Consult: No  Current Support Network: Lives with Spouse  Plan discussed with Pt/Family/Caregiver: Yes  Discharge Location  Discharge Placement: Home    Order for follow up PCP apt. Noted. CRM called x3 and received VM with the practice. CRM emailed [address] [place] [address] specialist Sakshi Loges to try and get through to make this apt. And contact the patient at home ( the patient is being discharge) The patient was instructed that if she did not hear from anyone in the next 24 hours regarding this apt, she would need to call to make the apt. To see her PCP within 5-7 days.  ETHEL

## 2017-03-14 NOTE — ROUTINE PROCESS
TRANSFER - OUT REPORT:    Verbal report given to MICHEL Felix, RN(name) on Henrry Murry  being transferred to (unit) for routine progression of care       Report consisted of patients Situation, Background, Assessment and   Recommendations(SBAR). Information from the following report(s) SBAR, ED Summary, Intake/Output, MAR and Recent Results was reviewed with the receiving nurse. Lines:   Peripheral IV 03/13/17 Right Wrist (Active)   Site Assessment Clean, dry, & intact 3/13/2017  4:05 PM   Phlebitis Assessment 0 3/13/2017  4:05 PM   Infiltration Assessment 0 3/13/2017  4:05 PM   Dressing Status Clean, dry, & intact 3/13/2017  4:05 PM       Peripheral IV 03/13/17 Right; Outer Arm (Active)   Site Assessment Clean, dry, & intact 3/13/2017  6:11 PM   Phlebitis Assessment 0 3/13/2017  6:11 PM   Infiltration Assessment 0 3/13/2017  6:11 PM   Dressing Status Clean, dry, & intact 3/13/2017  6:11 PM   Hub Color/Line Status Pink;Flushed;Patent 3/13/2017  6:11 PM        Opportunity for questions and clarification was provided.       Patient transported with:  transport

## 2017-03-14 NOTE — DISCHARGE INSTRUCTIONS
Discharge Instructions       PATIENT ID: Brandy John  MRN: [id number]   YOB: 1965    DATE OF ADMISSION: 3/13/2017  4:17 PM    DATE OF DISCHARGE: 3/14/2017    PRIMARY CARE PROVIDER: Mayra Friedman MD     ATTENDING PHYSICIAN: Sharda Keyes MD  DISCHARGING PROVIDER: Roseann Anaya NP    To contact this individual call [phone] and ask the  to page. If unavailable ask to be transferred the Adult Hospitalist Department. DISCHARGE DIAGNOSES UTI    CONSULTATIONS: None    PROCEDURES/SURGERIES: * No surgery found *    PENDING TEST RESULTS:   At the time of discharge the following test results are still pending: urine culture    FOLLOW UP APPOINTMENTS:   Follow-up Information     Follow up With Details Comments [place] [address], [address]  [phone]             ADDITIONAL CARE RECOMMENDATIONS:  1. We have prescribed you Keflex an antibiotic for your urinary tract infection. 2. Recommending you increase your Lantus to 25 Units at bed time. Please follow up with your primary for further management of your diabetes. Make sure to check your sugars at least twice a day and record it to take to your follow up appointment. 3. Continue your other home medications as prescribed. DIET: Diabetic    ACTIVITY: as tolerated    WOUND CARE: none    EQUIPMENT needed: none      DISCHARGE MEDICATIONS:  Cephalexin (By mouth)   Cephalexin (oeb-b-DHE-in)  Treats infections. This medicine is a cephalosporin antibiotic. Brand Name(s):Keflex   There may be other brand names for this medicine. When This Medicine Should Not Be Used: This medicine is not right for everyone. Do not use this medicine if you had an allergic reaction to cephalexin or another cephalosporin medicine. How to Use This Medicine:   Capsule, Tablet, Tablet for Suspension, Liquid  · Your doctor will tell you how much medicine to use.  Do not use more than directed. · Read and follow the patient instructions that come with this medicine. Talk to your doctor or pharmacist if you have any questions. · You may take your medicine with food or milk to avoid stomach upset. · Oral liquid: Shake well just before use. Measure the oral liquid medicine with a marked measuring spoon, oral syringe, or medicine cup. · Take all of the medicine in your prescription to clear up your infection, even if you feel better after the first few doses. · Missed dose: Take a dose as soon as you remember. If it is almost time for your next dose, wait until then and take a regular dose. Do not take extra medicine to make up for a missed dose. · Capsule or tablet: Store at room temperature away from heat, moisture, and direct light. · Oral liquid: Store in the refrigerator for 14 days. After 14 days, throw away any unused medicine. Do not freeze. Drugs and Foods to Avoid:   Ask your doctor or pharmacist before using any other medicine, including over-the-counter medicines, vitamins, and herbal products. · Some medicines and foods can affect how cephalexin works. Tell your doctor if you are also using  ¨ Metformin  ¨ Probenecid  Warnings While Using This Medicine:   · Tell your doctor if you are pregnant or breastfeeding, or if you have kidney disease, liver disease, or a history of digestive problems, such as colitis. Tell your doctor if you are allergic to penicillin. · This medicine can cause diarrhea. Call your doctor if the diarrhea becomes severe, does not stop, or is bloody. Do not take any medicine to stop diarrhea until you have talked to your doctor. Diarrhea can occur 2 months or more after you stop taking this medicine. · Tell any doctor or dentist who treats you that you are using this medicine. This medicine may affect certain medical test results. · Call your doctor if your symptoms do not improve or if they get worse.   · Keep all medicine out of the reach of children. Never share your medicine with anyone. Possible Side Effects While Using This Medicine:   Call your doctor right away if you notice any of these side effects:  · Allergic reaction: Itching or hives, swelling in your face or hands, swelling or tingling in your mouth or throat, chest tightness, trouble breathing  · Blistering, peeling, red skin rash  · Severe diarrhea, especially if bloody or ongoing  · Severe stomach pain, vomiting  If you notice these less serious side effects, talk with your doctor:   · Mild diarrhea or nausea  If you notice other side effects that you think are caused by this medicine, tell your doctor. Call your doctor for medical advice about side effects. You may report side effects to FDA at 4-382-FJY-2957  © 2016 [address] is for End User's use only and may not be sold, redistributed or otherwise used for commercial purposes. The above information is an  only. It is not intended as medical advice for individual conditions or treatments. Talk to your doctor, nurse or pharmacist before following any medical regimen to see if it is safe and effective for you. See Medication Reconciliation Form    · It is important that you take the medication exactly as they are prescribed. · Keep your medication in the bottles provided by the pharmacist and keep a list of the medication names, dosages, and times to be taken in your wallet. · Do not take other medications without consulting your doctor. NOTIFY YOUR PHYSICIAN FOR ANY OF THE FOLLOWING:   Fever over 101 degrees for 24 hours. Chest pain, shortness of breath, fever, chills, nausea, vomiting, diarrhea, change in mentation, falling, weakness, bleeding. Severe pain or pain not relieved by medications. Or, any other signs or symptoms that you may have questions about.       DISPOSITION:   X Home With:   OT  PT  HH  RN       SNF/Inpatient Rehab/LTAC    Independent/assisted living    Hospice    Other:       PROBLEM LIST Updated:   Yes X       Signed:   López Baron NP  3/14/2017  12:42 PM

## 2017-03-14 NOTE — PROGRESS NOTES
I have reviewed discharge instructions with the patient. The patient and spouse verbalized understanding. Discussed with the patient and all questioned fully answered. Discharge medications reviewed with patient and appropriate educational materials and side effects teaching were provided. IV removed, went down in wheelchair with prescriptions and DC instructions.

## 2017-03-15 ENCOUNTER — TELEPHONE (OUTPATIENT)
Dept: FAMILY MEDICINE CLINIC | Age: 52
End: 2017-03-15

## 2017-03-15 LAB
BACTERIA SPEC CULT: ABNORMAL
CC UR VC: ABNORMAL
SERVICE CMNT-IMP: ABNORMAL
SERVICE CMNT-IMP: ABNORMAL

## 2017-03-18 LAB
BACTERIA SPEC CULT: NORMAL
SERVICE CMNT-IMP: NORMAL

## 2017-05-07 ENCOUNTER — HOSPITAL ENCOUNTER (EMERGENCY)
Age: 52
Discharge: HOME | End: 2017-05-07
Attending: EMERGENCY MEDICINE
Payer: MEDICARE

## 2017-05-07 ENCOUNTER — APPOINTMENT (OUTPATIENT)
Dept: GENERAL RADIOLOGY | Age: 52
End: 2017-05-07
Attending: PHYSICIAN ASSISTANT
Payer: MEDICARE

## 2017-05-07 VITALS
HEIGHT: 65 IN | HEART RATE: 94 BPM | OXYGEN SATURATION: 100 % | TEMPERATURE: 97.7 F | SYSTOLIC BLOOD PRESSURE: 159 MMHG | BODY MASS INDEX: 24.66 KG/M2 | RESPIRATION RATE: 16 BRPM | DIASTOLIC BLOOD PRESSURE: 93 MMHG | WEIGHT: 148 LBS

## 2017-05-07 DIAGNOSIS — M79.605 LEFT LEG PAIN: Primary | ICD-10-CM

## 2017-05-07 DIAGNOSIS — N39.0 URINARY TRACT INFECTION WITHOUT HEMATURIA, SITE UNSPECIFIED: ICD-10-CM

## 2017-05-07 LAB
ALBUMIN SERPL BCP-MCNC: 3 G/DL (ref 3.5–5)
ALBUMIN/GLOB SERPL: 0.6 {RATIO} (ref 1.1–2.2)
ALP SERPL-CCNC: 429 U/L (ref 45–117)
ALT SERPL-CCNC: 27 U/L (ref 12–78)
ANION GAP BLD CALC-SCNC: 11 MMOL/L (ref 5–15)
APPEARANCE UR: ABNORMAL
AST SERPL W P-5'-P-CCNC: 24 U/L (ref 15–37)
BACTERIA URNS QL MICRO: ABNORMAL /HPF
BASOPHILS # BLD AUTO: 0 K/UL (ref 0–0.1)
BASOPHILS # BLD: 0 % (ref 0–1)
BILIRUB SERPL-MCNC: 0.3 MG/DL (ref 0.2–1)
BILIRUB UR QL: NEGATIVE
BUN SERPL-MCNC: 86 MG/DL (ref 6–20)
BUN/CREAT SERPL: 16 (ref 12–20)
CALCIUM SERPL-MCNC: 8.6 MG/DL (ref 8.5–10.1)
CHLORIDE SERPL-SCNC: 101 MMOL/L (ref 97–108)
CO2 SERPL-SCNC: 23 MMOL/L (ref 21–32)
COLOR UR: ABNORMAL
CREAT SERPL-MCNC: 5.27 MG/DL (ref 0.55–1.02)
EOSINOPHIL # BLD: 0.2 K/UL (ref 0–0.4)
EOSINOPHIL NFR BLD: 2 % (ref 0–7)
EPITH CASTS URNS QL MICRO: ABNORMAL /LPF
ERYTHROCYTE [DISTWIDTH] IN BLOOD BY AUTOMATED COUNT: 13 % (ref 11.5–14.5)
GLOBULIN SER CALC-MCNC: 4.7 G/DL (ref 2–4)
GLUCOSE SERPL-MCNC: 258 MG/DL (ref 65–100)
GLUCOSE UR STRIP.AUTO-MCNC: 500 MG/DL
HCT VFR BLD AUTO: 32.4 % (ref 35–47)
HGB BLD-MCNC: 11 G/DL (ref 11.5–16)
HGB UR QL STRIP: ABNORMAL
KETONES UR QL STRIP.AUTO: NEGATIVE MG/DL
LEUKOCYTE ESTERASE UR QL STRIP.AUTO: ABNORMAL
LYMPHOCYTES # BLD AUTO: 27 % (ref 12–49)
LYMPHOCYTES # BLD: 2.2 K/UL (ref 0.8–3.5)
MCH RBC QN AUTO: 29.6 PG (ref 26–34)
MCHC RBC AUTO-ENTMCNC: 34 G/DL (ref 30–36.5)
MCV RBC AUTO: 87.3 FL (ref 80–99)
MONOCYTES # BLD: 0.7 K/UL (ref 0–1)
MONOCYTES NFR BLD AUTO: 8 % (ref 5–13)
NEUTS SEG # BLD: 5.2 K/UL (ref 1.8–8)
NEUTS SEG NFR BLD AUTO: 63 % (ref 32–75)
NITRITE UR QL STRIP.AUTO: POSITIVE
PH UR STRIP: 6 [PH] (ref 5–8)
PLATELET # BLD AUTO: 273 K/UL (ref 150–400)
POTASSIUM SERPL-SCNC: 3.6 MMOL/L (ref 3.5–5.1)
PROT SERPL-MCNC: 7.7 G/DL (ref 6.4–8.2)
PROT UR STRIP-MCNC: 100 MG/DL
RBC # BLD AUTO: 3.71 M/UL (ref 3.8–5.2)
RBC #/AREA URNS HPF: ABNORMAL /HPF (ref 0–5)
SODIUM SERPL-SCNC: 135 MMOL/L (ref 136–145)
SP GR UR REFRACTOMETRY: 1.01 (ref 1–1.03)
UROBILINOGEN UR QL STRIP.AUTO: 0.2 EU/DL (ref 0.2–1)
WBC # BLD AUTO: 8.2 K/UL (ref 3.6–11)
WBC URNS QL MICRO: ABNORMAL /HPF (ref 0–4)

## 2017-05-07 PROCEDURE — 80053 COMPREHEN METABOLIC PANEL: CPT | Performed by: PHYSICIAN ASSISTANT

## 2017-05-07 PROCEDURE — 99283 EMERGENCY DEPT VISIT LOW MDM: CPT

## 2017-05-07 PROCEDURE — 81001 URINALYSIS AUTO W/SCOPE: CPT | Performed by: PHYSICIAN ASSISTANT

## 2017-05-07 PROCEDURE — 87086 URINE CULTURE/COLONY COUNT: CPT | Performed by: PHYSICIAN ASSISTANT

## 2017-05-07 PROCEDURE — 36415 COLL VENOUS BLD VENIPUNCTURE: CPT | Performed by: PHYSICIAN ASSISTANT

## 2017-05-07 PROCEDURE — 85025 COMPLETE CBC W/AUTO DIFF WBC: CPT | Performed by: PHYSICIAN ASSISTANT

## 2017-05-07 PROCEDURE — 74011250637 HC RX REV CODE- 250/637: Performed by: PHYSICIAN ASSISTANT

## 2017-05-07 PROCEDURE — 93971 EXTREMITY STUDY: CPT

## 2017-05-07 PROCEDURE — 73502 X-RAY EXAM HIP UNI 2-3 VIEWS: CPT

## 2017-05-07 RX ORDER — CYCLOBENZAPRINE HCL 5 MG
5 TABLET ORAL 2 TIMES DAILY
Qty: 20 TAB | Refills: 0 | Status: SHIPPED | OUTPATIENT
Start: 2017-05-07 | End: 2017-07-27

## 2017-05-07 RX ORDER — OXYCODONE AND ACETAMINOPHEN 5; 325 MG/1; MG/1
1 TABLET ORAL
Status: COMPLETED | OUTPATIENT
Start: 2017-05-07 | End: 2017-05-07

## 2017-05-07 RX ORDER — HYDROCODONE BITARTRATE AND ACETAMINOPHEN 5; 325 MG/1; MG/1
1 TABLET ORAL
Qty: 12 TAB | Refills: 0 | Status: SHIPPED | OUTPATIENT
Start: 2017-05-07 | End: 2017-07-27

## 2017-05-07 RX ORDER — CEPHALEXIN 500 MG/1
500 CAPSULE ORAL 4 TIMES DAILY
Qty: 28 CAP | Refills: 0 | Status: SHIPPED | OUTPATIENT
Start: 2017-05-07 | End: 2017-05-14

## 2017-05-07 RX ADMIN — OXYCODONE HYDROCHLORIDE AND ACETAMINOPHEN 1 TABLET: 5; 325 TABLET ORAL at 14:13
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
## 2017-05-07 NOTE — ED TRIAGE NOTES
Triage Note Pt arrives to ED for pain to her left leg. States the pain began approximately 5 days ago. Denies any injury to leg, states she just woke up with this pain one morning.

## 2017-05-07 NOTE — PROCEDURES
[facility]  *** FINAL REPORT ***    Name: Tatyana Smith  MRN: [id number]  : 01 Dec 1965  HIS Order #: [id number]  [id number] [facility] Visit #: [id number]  Date: 07 May 2017    TYPE OF TEST: Peripheral Venous Testing    REASON FOR TEST  Pain in limb    Left Leg:-  Deep venous thrombosis:           No  Superficial venous thrombosis:    No  Deep venous insufficiency:        Not examined  Superficial venous insufficiency: Not examined      INTERPRETATION/FINDINGS  PROCEDURE:  Color duplex ultrasound imaging of lower extremity veins. FINDINGS:       Right: The common femoral vein is patent and without evidence of  thrombus. Phasic flow is observed. This extremity was not otherwise  evaluated. Left:   The common femoral, deep femoral, femoral, popliteal,  posterior tibial, peroneal, and great saphenous are patent and without   evidence of thrombus;  each is fully compressible and there is no  narrowing of the flow channel on color Doppler imaging. Phasic flow  is observed in the common femoral vein. IMPRESSION:  No evidence of left lower extremity vein thrombosis. ADDITIONAL COMMENTS    I have personally reviewed the data relevant to the interpretation of  this  study.     TECHNOLOGIST: Flaquito Koenig RVT  Signed: 2017 03:08 PM    PHYSICIAN: Idania Matso MD  Signed: 2017 07:18 AM

## 2017-05-09 LAB
BACTERIA SPEC CULT: NORMAL
CC UR VC: NORMAL
SERVICE CMNT-IMP: NORMAL

## 2017-06-25 ENCOUNTER — HOSPITAL ENCOUNTER (EMERGENCY)
Age: 52
Discharge: HOME | End: 2017-06-25
Attending: EMERGENCY MEDICINE
Payer: MEDICARE

## 2017-06-25 VITALS
DIASTOLIC BLOOD PRESSURE: 104 MMHG | OXYGEN SATURATION: 100 % | BODY MASS INDEX: 23.99 KG/M2 | HEART RATE: 95 BPM | WEIGHT: 144 LBS | SYSTOLIC BLOOD PRESSURE: 161 MMHG | HEIGHT: 65 IN | RESPIRATION RATE: 16 BRPM | TEMPERATURE: 97.8 F

## 2017-06-25 DIAGNOSIS — M79.605 LEG PAIN, BILATERAL: Primary | ICD-10-CM

## 2017-06-25 DIAGNOSIS — B37.2 SKIN YEAST INFECTION: ICD-10-CM

## 2017-06-25 DIAGNOSIS — M79.604 LEG PAIN, BILATERAL: Primary | ICD-10-CM

## 2017-06-25 LAB
ALBUMIN SERPL BCP-MCNC: 2.9 G/DL (ref 3.5–5)
ALBUMIN/GLOB SERPL: 0.6 {RATIO} (ref 1.1–2.2)
ALP SERPL-CCNC: 404 U/L (ref 45–117)
ALT SERPL-CCNC: 22 U/L (ref 12–78)
ANION GAP BLD CALC-SCNC: 12 MMOL/L (ref 5–15)
APPEARANCE UR: ABNORMAL
AST SERPL W P-5'-P-CCNC: 17 U/L (ref 15–37)
BACTERIA URNS QL MICRO: ABNORMAL /HPF
BASOPHILS # BLD AUTO: 0 K/UL (ref 0–0.1)
BASOPHILS # BLD: 0 % (ref 0–1)
BILIRUB SERPL-MCNC: 0.3 MG/DL (ref 0.2–1)
BILIRUB UR QL: NEGATIVE
BUN SERPL-MCNC: 71 MG/DL (ref 6–20)
BUN/CREAT SERPL: 14 (ref 12–20)
CALCIUM SERPL-MCNC: 8.4 MG/DL (ref 8.5–10.1)
CHLORIDE SERPL-SCNC: 101 MMOL/L (ref 97–108)
CK MB CFR SERPL CALC: 3.1 % (ref 0–2.5)
CK MB SERPL-MCNC: 3.9 NG/ML (ref 5–25)
CK SERPL-CCNC: 127 U/L (ref 26–192)
CO2 SERPL-SCNC: 20 MMOL/L (ref 21–32)
COLOR UR: ABNORMAL
CREAT SERPL-MCNC: 4.97 MG/DL (ref 0.55–1.02)
EOSINOPHIL # BLD: 0.2 K/UL (ref 0–0.4)
EOSINOPHIL NFR BLD: 3 % (ref 0–7)
EPITH CASTS URNS QL MICRO: ABNORMAL /LPF
ERYTHROCYTE [DISTWIDTH] IN BLOOD BY AUTOMATED COUNT: 12.9 % (ref 11.5–14.5)
GLOBULIN SER CALC-MCNC: 4.7 G/DL (ref 2–4)
GLUCOSE SERPL-MCNC: 246 MG/DL (ref 65–100)
GLUCOSE UR STRIP.AUTO-MCNC: 250 MG/DL
HCT VFR BLD AUTO: 31.8 % (ref 35–47)
HGB BLD-MCNC: 10.6 G/DL (ref 11.5–16)
HGB UR QL STRIP: ABNORMAL
KETONES UR QL STRIP.AUTO: NEGATIVE MG/DL
LEUKOCYTE ESTERASE UR QL STRIP.AUTO: ABNORMAL
LYMPHOCYTES # BLD AUTO: 23 % (ref 12–49)
LYMPHOCYTES # BLD: 1.7 K/UL (ref 0.8–3.5)
MCH RBC QN AUTO: 28.9 PG (ref 26–34)
MCHC RBC AUTO-ENTMCNC: 33.3 G/DL (ref 30–36.5)
MCV RBC AUTO: 86.6 FL (ref 80–99)
MONOCYTES # BLD: 0.7 K/UL (ref 0–1)
MONOCYTES NFR BLD AUTO: 9 % (ref 5–13)
NEUTS SEG # BLD: 4.6 K/UL (ref 1.8–8)
NEUTS SEG NFR BLD AUTO: 65 % (ref 32–75)
NITRITE UR QL STRIP.AUTO: NEGATIVE
PH UR STRIP: 6 [PH] (ref 5–8)
PLATELET # BLD AUTO: 258 K/UL (ref 150–400)
POTASSIUM SERPL-SCNC: 3.6 MMOL/L (ref 3.5–5.1)
PROT SERPL-MCNC: 7.6 G/DL (ref 6.4–8.2)
PROT UR STRIP-MCNC: 100 MG/DL
RBC # BLD AUTO: 3.67 M/UL (ref 3.8–5.2)
RBC #/AREA URNS HPF: ABNORMAL /HPF (ref 0–5)
SODIUM SERPL-SCNC: 133 MMOL/L (ref 136–145)
SP GR UR REFRACTOMETRY: 1.01 (ref 1–1.03)
UROBILINOGEN UR QL STRIP.AUTO: 0.2 EU/DL (ref 0.2–1)
WBC # BLD AUTO: 7.2 K/UL (ref 3.6–11)
WBC URNS QL MICRO: >100 /HPF (ref 0–4)

## 2017-06-25 PROCEDURE — 96374 THER/PROPH/DIAG INJ IV PUSH: CPT

## 2017-06-25 PROCEDURE — 81001 URINALYSIS AUTO W/SCOPE: CPT | Performed by: EMERGENCY MEDICINE

## 2017-06-25 PROCEDURE — 36415 COLL VENOUS BLD VENIPUNCTURE: CPT | Performed by: EMERGENCY MEDICINE

## 2017-06-25 PROCEDURE — 82550 ASSAY OF CK (CPK): CPT | Performed by: EMERGENCY MEDICINE

## 2017-06-25 PROCEDURE — 99283 EMERGENCY DEPT VISIT LOW MDM: CPT

## 2017-06-25 PROCEDURE — 74011250636 HC RX REV CODE- 250/636: Performed by: EMERGENCY MEDICINE

## 2017-06-25 PROCEDURE — 96361 HYDRATE IV INFUSION ADD-ON: CPT

## 2017-06-25 PROCEDURE — 93970 EXTREMITY STUDY: CPT

## 2017-06-25 PROCEDURE — 85025 COMPLETE CBC W/AUTO DIFF WBC: CPT | Performed by: EMERGENCY MEDICINE

## 2017-06-25 PROCEDURE — 80053 COMPREHEN METABOLIC PANEL: CPT | Performed by: EMERGENCY MEDICINE

## 2017-06-25 PROCEDURE — 74011250637 HC RX REV CODE- 250/637: Performed by: EMERGENCY MEDICINE

## 2017-06-25 RX ORDER — HYDROCODONE BITARTRATE AND ACETAMINOPHEN 5; 325 MG/1; MG/1
1 TABLET ORAL
Status: COMPLETED | OUTPATIENT
Start: 2017-06-25 | End: 2017-06-25

## 2017-06-25 RX ORDER — HYDROCODONE BITARTRATE AND ACETAMINOPHEN 10; 325 MG/1; MG/1
1 TABLET ORAL
Qty: 12 TAB | Refills: 0 | Status: SHIPPED | OUTPATIENT
Start: 2017-06-25 | End: 2017-07-27

## 2017-06-25 RX ORDER — KETOROLAC TROMETHAMINE 30 MG/ML
15 INJECTION, SOLUTION INTRAMUSCULAR; INTRAVENOUS
Status: DISCONTINUED | OUTPATIENT
Start: 2017-06-25 | End: 2017-06-25 | Stop reason: HOSPADM

## 2017-06-25 RX ORDER — NYSTATIN 100000 U/G
OINTMENT TOPICAL 2 TIMES DAILY
Qty: 15 G | Refills: 0 | Status: SHIPPED | OUTPATIENT
Start: 2017-06-25 | End: 2017-07-27

## 2017-06-25 RX ORDER — KETOROLAC TROMETHAMINE 30 MG/ML
15 INJECTION, SOLUTION INTRAMUSCULAR; INTRAVENOUS
Status: COMPLETED | OUTPATIENT
Start: 2017-06-25 | End: 2017-06-25

## 2017-06-25 RX ADMIN — HYDROCODONE BITARTRATE AND ACETAMINOPHEN 1 TABLET: 5; 325 TABLET ORAL at 19:13

## 2017-06-25 RX ADMIN — KETOROLAC TROMETHAMINE 15 MG: 30 INJECTION, SOLUTION INTRAMUSCULAR at 16:23

## 2017-06-25 RX ADMIN — SODIUM CHLORIDE 1000 ML: 900 INJECTION, SOLUTION INTRAVENOUS at 16:23

## 2017-06-25 NOTE — ED TRIAGE NOTES
Pt reports rash to left inner thigh. Pt also C/O bilateral al leg pain for the past 3 weeks. Pt denies fall or injury.

## 2017-06-25 NOTE — ED NOTES
I have reviewed discharge instructions with the patient. The patient verbalized understanding. Pt assisted to exit via wheelchair.

## 2017-06-25 NOTE — PROCEDURES
[address]  *** FINAL REPORT ***    Name: Tim Francis  MRN: [id number]  : 01 Dec 1965  HIS Order #: [id number]  [address] [facility] Visit #: [id number]  Date: 2017    TYPE OF TEST: Peripheral Venous Testing    REASON FOR TEST  Pain in limb    Right Leg:-  Deep venous thrombosis:           No  Superficial venous thrombosis:    No  Deep venous insufficiency:        Not examined  Superficial venous insufficiency: Not examined    Left Leg:-  Deep venous thrombosis:           No  Superficial venous thrombosis:    No  Deep venous insufficiency:        Not examined  Superficial venous insufficiency: Not examined      INTERPRETATION/FINDINGS  PROCEDURE:  Color duplex ultrasound imaging of lower extremity veins. FINDINGS:       Right: The common femoral, deep femoral, femoral, popliteal,  posterior tibial, peroneal, and great saphenous are patent and without   evidence of thrombus;  each is fully compressible and there is no  narrowing of the flow channel on color Doppler imaging. Phasic flow  is observed in the common femoral vein. Left:   The common femoral, deep femoral, femoral, popliteal,  posterior tibial, peroneal, and great saphenous are patent and without   evidence of thrombus;  each is fully compressible and there is no  narrowing of the flow channel on color Doppler imaging. Phasic flow  is observed in the common femoral vein. IMPRESSION:  No evidence of right or left lower extremity vein  thrombosis. ADDITIONAL COMMENTS    I have personally reviewed the data relevant to the interpretation of  this  study.     TECHNOLOGIST: Dali Katz RVT  Signed: 2017 06:41 PM    PHYSICIAN: Crystal Kidd MD  Signed: 2017 07:32 AM
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
## 2017-07-27 ENCOUNTER — APPOINTMENT (OUTPATIENT)
Dept: CT IMAGING | Age: 52
End: 2017-07-27
Attending: PHYSICIAN ASSISTANT
Payer: MEDICARE

## 2017-07-27 ENCOUNTER — HOSPITAL ENCOUNTER (EMERGENCY)
Age: 52
Discharge: HOME | End: 2017-07-27
Attending: EMERGENCY MEDICINE
Payer: MEDICARE

## 2017-07-27 VITALS
OXYGEN SATURATION: 97 % | TEMPERATURE: 98.3 F | HEIGHT: 65 IN | SYSTOLIC BLOOD PRESSURE: 138 MMHG | WEIGHT: 148.6 LBS | HEART RATE: 80 BPM | DIASTOLIC BLOOD PRESSURE: 80 MMHG | BODY MASS INDEX: 24.76 KG/M2 | RESPIRATION RATE: 16 BRPM

## 2017-07-27 DIAGNOSIS — M79.652 LEFT THIGH PAIN: Primary | ICD-10-CM

## 2017-07-27 PROCEDURE — 74011250637 HC RX REV CODE- 250/637: Performed by: PHYSICIAN ASSISTANT

## 2017-07-27 PROCEDURE — 73700 CT LOWER EXTREMITY W/O DYE: CPT

## 2017-07-27 PROCEDURE — 99283 EMERGENCY DEPT VISIT LOW MDM: CPT

## 2017-07-27 RX ORDER — HYDROCODONE BITARTRATE AND ACETAMINOPHEN 5; 325 MG/1; MG/1
1 TABLET ORAL
Status: COMPLETED | OUTPATIENT
Start: 2017-07-27 | End: 2017-07-27

## 2017-07-27 RX ORDER — CYCLOBENZAPRINE HCL 10 MG
10 TABLET ORAL
Qty: 12 TAB | Refills: 0 | Status: SHIPPED | OUTPATIENT
Start: 2017-07-27 | End: 2018-04-18

## 2017-07-27 RX ADMIN — HYDROCODONE BITARTRATE AND ACETAMINOPHEN 1 TABLET: 5; 325 TABLET ORAL at 13:29

## 2017-07-27 NOTE — DISCHARGE INSTRUCTIONS
We hope that we have addressed all of your medical concerns. The examination and treatment you received in the Emergency Department were for an emergent problem and were not intended as complete care. It is important that you follow up with your healthcare provider(s) for ongoing care. If your symptoms worsen or do not improve as expected, and you are unable to reach your usual health care provider(s), you should return to the Emergency Department. Today's healthcare is undergoing tremendous change, and patient satisfaction surveys are one of the many tools to assess the quality of medical care. You may receive a survey from the [facility] regarding your experience in the Emergency Department. I hope that your experience has been completely positive, particularly the medical care that I provided. As such, please participate in the survey; anything less than excellent does not meet my expectations or intentions. [facility] and [address] [place] [address] participate in nationally recognized quality of care measures. If your blood pressure is greater than 120/80, as reported below, we urge that you seek medical care to address the potential of high blood pressure, commonly known as hypertension. Hypertension can be hereditary or can be caused by certain medical conditions, pain, stress, or \"white coat syndrome. \"       Please make an appointment with your health care provider(s) for follow up of your Emergency Department visit. VITALS:   Patient Vitals for the past 8 hrs:   Temp Pulse Resp BP SpO2   07/27/17 1259 97.6 °F (36.4 °C) 86 18 (!) 158/93 99 %          Thank you for allowing us to provide you with medical care today. We realize that you have many choices for your emergency care needs. Please choose us in the future for any continued health care needs. Yara Lemos, [address] [place] [address]. Office: [phone]            No results found for this or any previous visit (from the past 24 hour(s)). Ct Low Ext Lt Wo Cont    Result Date: 7/27/2017  INDICATION:  persistent left hip pain symptoms present for a month. EXAM: CT bony pelvis. May 7, 2017 and November 17, 2016. Thin section axial images were obtained. From these sagittal and coronal reformats were performed. CT dose reduction was achieved through use of a standardized protocol tailored for this examination and automatic exposure control for dose modulation. Adaptive statistical iterative reconstruction (ASIR) was utilized. FINDINGS: Bone mineral density is decreased. There is subtle bone resorption at the SI joints bilaterally. Findings can be seen with renal osteodystrophy. There is no bone destruction. Alignment is normal. Minimal spurring at the bilateral hips without significant joint space loss. There are subchondral degenerative changes of the posterior margin of the right acetabulum. There are degenerative changes L4-5 and L5-S1. Patient has an enlarged uterus with multiple calcified fibroids. Intrapelvic bowel is nondistended. There are extensive vascular calcifications IMPRESSION: 1. 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 2. Mild degenerative changes of the bilateral hips. No acute fracture            Muscle Aches: Care Instructions  Your Care Instructions  Muscle aches have many possible causes. Some common ones are overuse, tension, and injuries such as a strained muscle. An infection such as the flu can cause muscle aches. Or the aches may be caused by some medicines, such as statins. Muscle aches may also be a symptom of a disease like lupus or fibromyalgia. Myalgia is the medical term for muscle aches. The doctor will do a physical exam and ask questions to try to find what is causing your pain. You may also have tests such as blood tests or imaging tests like X-rays.  These can help find or rule out serious problems. The doctor has checked you carefully, but problems can develop later. If you notice any problems or new symptoms, get medical treatment right away. Follow-up care is a key part of your treatment and safety. Be sure to make and go to all appointments, and call your doctor if you are having problems. It's also a good idea to know your test results and keep a list of the medicines you take. How can you care for yourself at home? · Rest the area that hurts. You may need to stop or reduce the activity that causes your symptoms. Then you can return to it slowly. · Put ice or a cold pack on the area for 10 to 20 minutes at a time to ease pain. Put a thin cloth between the ice and your skin. · Take an over-the-counter pain medicine, such as acetaminophen (Tylenol), ibuprofen (Advil, Motrin), or naproxen (Aleve). Be safe with medicines. Read and follow all instructions on the label. When should you call for help? Call your doctor now or seek immediate medical care if:  · Your pain gets worse. · You have new symptoms, such as a fever, swelling, or a rash. Watch closely for changes in your health, and be sure to contact your doctor if:  · You do not get better as expected. Where can you learn more? Go to http://nery-peña.info/. Enter G355 in the search box to learn more about \"Muscle Aches: Care Instructions. \"  Current as of: October 14, 2016  Content Version: 11.3  © 5549-9888 [employer]. Care instructions adapted under license by [employer] (which disclaims liability or warranty for this information). If you have questions about a medical condition or this instruction, always ask your healthcare professional. [address] any warranty or liability for your use of this information.
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
## 2017-07-27 NOTE — ED TRIAGE NOTES
Pt. complains of left leg pain. Denies back or hip pain. Pain present for a month, seen here 2-3 weeks ago for the same. No longer has PCP to follow-up with. Denies fall or injury.

## 2017-10-02 ENCOUNTER — APPOINTMENT (OUTPATIENT)
Dept: CT IMAGING | Age: 52
End: 2017-10-02
Attending: EMERGENCY MEDICINE
Payer: MEDICARE

## 2017-10-02 ENCOUNTER — HOSPITAL ENCOUNTER (EMERGENCY)
Age: 52
Discharge: HOME | End: 2017-10-02
Attending: EMERGENCY MEDICINE
Payer: MEDICARE

## 2017-10-02 ENCOUNTER — HOSPITAL ENCOUNTER (EMERGENCY)
Age: 52
Discharge: HOME | End: 2017-10-03
Attending: EMERGENCY MEDICINE
Payer: MEDICARE

## 2017-10-02 VITALS
OXYGEN SATURATION: 96 % | HEIGHT: 65 IN | HEART RATE: 116 BPM | SYSTOLIC BLOOD PRESSURE: 181 MMHG | DIASTOLIC BLOOD PRESSURE: 106 MMHG | TEMPERATURE: 98 F | RESPIRATION RATE: 20 BRPM

## 2017-10-02 DIAGNOSIS — R10.9 FLANK PAIN: Primary | ICD-10-CM

## 2017-10-02 DIAGNOSIS — N39.0 URINARY TRACT INFECTION WITHOUT HEMATURIA, SITE UNSPECIFIED: ICD-10-CM

## 2017-10-02 DIAGNOSIS — N30.00 ACUTE CYSTITIS WITHOUT HEMATURIA: ICD-10-CM

## 2017-10-02 DIAGNOSIS — M54.50 RIGHT-SIDED LOW BACK PAIN WITHOUT SCIATICA, UNSPECIFIED CHRONICITY: Primary | ICD-10-CM

## 2017-10-02 DIAGNOSIS — N18.9 CHRONIC RENAL IMPAIRMENT, UNSPECIFIED CKD STAGE: ICD-10-CM

## 2017-10-02 DIAGNOSIS — I15.9 SECONDARY HYPERTENSION: ICD-10-CM

## 2017-10-02 LAB
ALBUMIN SERPL-MCNC: 3.1 G/DL (ref 3.5–5)
ALBUMIN/GLOB SERPL: 0.6 {RATIO} (ref 1.1–2.2)
ALP SERPL-CCNC: 481 U/L (ref 45–117)
ALT SERPL-CCNC: 30 U/L (ref 12–78)
ANION GAP SERPL CALC-SCNC: 14 MMOL/L (ref 5–15)
ANION GAP SERPL CALC-SCNC: 16 MMOL/L (ref 5–15)
APPEARANCE UR: ABNORMAL
AST SERPL-CCNC: 30 U/L (ref 15–37)
BACTERIA URNS QL MICRO: ABNORMAL /HPF
BASOPHILS # BLD: 0 K/UL (ref 0–0.1)
BASOPHILS NFR BLD: 0 % (ref 0–1)
BILIRUB SERPL-MCNC: 0.4 MG/DL (ref 0.2–1)
BILIRUB UR QL: NEGATIVE
BUN SERPL-MCNC: 74 MG/DL (ref 6–20)
BUN SERPL-MCNC: 76 MG/DL (ref 6–20)
BUN/CREAT SERPL: 13 (ref 12–20)
BUN/CREAT SERPL: 13 (ref 12–20)
CALCIUM SERPL-MCNC: 7.8 MG/DL (ref 8.5–10.1)
CALCIUM SERPL-MCNC: 8.3 MG/DL (ref 8.5–10.1)
CHLORIDE SERPL-SCNC: 95 MMOL/L (ref 97–108)
CHLORIDE SERPL-SCNC: 98 MMOL/L (ref 97–108)
CO2 SERPL-SCNC: 21 MMOL/L (ref 21–32)
CO2 SERPL-SCNC: 22 MMOL/L (ref 21–32)
COLOR UR: ABNORMAL
CREAT SERPL-MCNC: 5.74 MG/DL (ref 0.55–1.02)
CREAT SERPL-MCNC: 5.74 MG/DL (ref 0.55–1.02)
EOSINOPHIL # BLD: 0.2 K/UL (ref 0–0.4)
EOSINOPHIL NFR BLD: 2 % (ref 0–7)
EPITH CASTS URNS QL MICRO: ABNORMAL /LPF
ERYTHROCYTE [DISTWIDTH] IN BLOOD BY AUTOMATED COUNT: 12.9 % (ref 11.5–14.5)
GLOBULIN SER CALC-MCNC: 4.9 G/DL (ref 2–4)
GLUCOSE SERPL-MCNC: 310 MG/DL (ref 65–100)
GLUCOSE SERPL-MCNC: 390 MG/DL (ref 65–100)
GLUCOSE UR STRIP.AUTO-MCNC: 500 MG/DL
HCT VFR BLD AUTO: 29.9 % (ref 35–47)
HGB BLD-MCNC: 10.2 G/DL (ref 11.5–16)
HGB UR QL STRIP: ABNORMAL
HYALINE CASTS URNS QL MICRO: ABNORMAL /LPF (ref 0–5)
KETONES UR QL STRIP.AUTO: NEGATIVE MG/DL
LEUKOCYTE ESTERASE UR QL STRIP.AUTO: ABNORMAL
LIPASE SERPL-CCNC: 194 U/L (ref 73–393)
LYMPHOCYTES # BLD: 1.6 K/UL (ref 0.8–3.5)
LYMPHOCYTES NFR BLD: 21 % (ref 12–49)
MCH RBC QN AUTO: 29.3 PG (ref 26–34)
MCHC RBC AUTO-ENTMCNC: 34.1 G/DL (ref 30–36.5)
MCV RBC AUTO: 85.9 FL (ref 80–99)
MONOCYTES # BLD: 0.5 K/UL (ref 0–1)
MONOCYTES NFR BLD: 6 % (ref 5–13)
NEUTS SEG # BLD: 5.2 K/UL (ref 1.8–8)
NEUTS SEG NFR BLD: 71 % (ref 32–75)
NITRITE UR QL STRIP.AUTO: NEGATIVE
PH UR STRIP: 6 [PH] (ref 5–8)
PLATELET # BLD AUTO: 268 K/UL (ref 150–400)
POTASSIUM SERPL-SCNC: 3.1 MMOL/L (ref 3.5–5.1)
POTASSIUM SERPL-SCNC: 3.7 MMOL/L (ref 3.5–5.1)
PROT SERPL-MCNC: 8 G/DL (ref 6.4–8.2)
PROT UR STRIP-MCNC: 300 MG/DL
RBC # BLD AUTO: 3.48 M/UL (ref 3.8–5.2)
RBC #/AREA URNS HPF: ABNORMAL /HPF (ref 0–5)
SODIUM SERPL-SCNC: 133 MMOL/L (ref 136–145)
SODIUM SERPL-SCNC: 133 MMOL/L (ref 136–145)
SP GR UR REFRACTOMETRY: 1.01 (ref 1–1.03)
UR CULT HOLD, URHOLD: NORMAL
UROBILINOGEN UR QL STRIP.AUTO: 0.2 EU/DL (ref 0.2–1)
WBC # BLD AUTO: 7.4 K/UL (ref 3.6–11)
WBC URNS QL MICRO: >100 /HPF (ref 0–4)

## 2017-10-02 PROCEDURE — 87186 SC STD MICRODIL/AGAR DIL: CPT | Performed by: EMERGENCY MEDICINE

## 2017-10-02 PROCEDURE — 80048 BASIC METABOLIC PNL TOTAL CA: CPT | Performed by: EMERGENCY MEDICINE

## 2017-10-02 PROCEDURE — 96375 TX/PRO/DX INJ NEW DRUG ADDON: CPT

## 2017-10-02 PROCEDURE — 36415 COLL VENOUS BLD VENIPUNCTURE: CPT | Performed by: EMERGENCY MEDICINE

## 2017-10-02 PROCEDURE — 87086 URINE CULTURE/COLONY COUNT: CPT | Performed by: EMERGENCY MEDICINE

## 2017-10-02 PROCEDURE — 85025 COMPLETE CBC W/AUTO DIFF WBC: CPT | Performed by: EMERGENCY MEDICINE

## 2017-10-02 PROCEDURE — 85610 PROTHROMBIN TIME: CPT | Performed by: EMERGENCY MEDICINE

## 2017-10-02 PROCEDURE — 96374 THER/PROPH/DIAG INJ IV PUSH: CPT

## 2017-10-02 PROCEDURE — 74176 CT ABD & PELVIS W/O CONTRAST: CPT

## 2017-10-02 PROCEDURE — 74011250636 HC RX REV CODE- 250/636: Performed by: EMERGENCY MEDICINE

## 2017-10-02 PROCEDURE — 87077 CULTURE AEROBIC IDENTIFY: CPT | Performed by: EMERGENCY MEDICINE

## 2017-10-02 PROCEDURE — 96361 HYDRATE IV INFUSION ADD-ON: CPT

## 2017-10-02 PROCEDURE — 99284 EMERGENCY DEPT VISIT MOD MDM: CPT

## 2017-10-02 RX ORDER — CEPHALEXIN 500 MG/1
500 CAPSULE ORAL
Status: DISCONTINUED | OUTPATIENT
Start: 2017-10-02 | End: 2017-10-02

## 2017-10-02 RX ORDER — CEFTRIAXONE 250 MG/8ML
250 INJECTION, POWDER, FOR SOLUTION INTRAMUSCULAR; INTRAVENOUS
Status: DISCONTINUED | OUTPATIENT
Start: 2017-10-02 | End: 2017-10-02

## 2017-10-02 RX ORDER — MORPHINE SULFATE 2 MG/ML
4 INJECTION, SOLUTION INTRAMUSCULAR; INTRAVENOUS ONCE
Status: COMPLETED | OUTPATIENT
Start: 2017-10-02 | End: 2017-10-02

## 2017-10-02 RX ORDER — METOPROLOL TARTRATE 25 MG/1
25 TABLET, FILM COATED ORAL
Status: COMPLETED | OUTPATIENT
Start: 2017-10-02 | End: 2017-10-03

## 2017-10-02 RX ORDER — ONDANSETRON 4 MG/1
4 TABLET, ORALLY DISINTEGRATING ORAL
Status: COMPLETED | OUTPATIENT
Start: 2017-10-02 | End: 2017-10-02

## 2017-10-02 RX ORDER — ONDANSETRON 2 MG/ML
4 INJECTION INTRAMUSCULAR; INTRAVENOUS ONCE
Status: COMPLETED | OUTPATIENT
Start: 2017-10-02 | End: 2017-10-02

## 2017-10-02 RX ORDER — ONDANSETRON 4 MG/1
4 TABLET, FILM COATED ORAL
Qty: 12 TAB | Refills: 0 | Status: SHIPPED | OUTPATIENT
Start: 2017-10-02 | End: 2017-10-03

## 2017-10-02 RX ORDER — ACETAMINOPHEN 325 MG/1
650 TABLET ORAL
Qty: 30 TAB | Refills: 0 | Status: SHIPPED | OUTPATIENT
Start: 2017-10-02 | End: 2018-02-27

## 2017-10-02 RX ORDER — CEPHALEXIN 500 MG/1
500 CAPSULE ORAL 2 TIMES DAILY
Qty: 14 CAP | Refills: 0 | Status: SHIPPED | OUTPATIENT
Start: 2017-10-02 | End: 2017-10-09

## 2017-10-02 RX ORDER — MORPHINE SULFATE 4 MG/ML
4 INJECTION, SOLUTION INTRAMUSCULAR; INTRAVENOUS ONCE
Status: COMPLETED | OUTPATIENT
Start: 2017-10-02 | End: 2017-10-02

## 2017-10-02 RX ORDER — ACETAMINOPHEN 325 MG/1
650 TABLET ORAL
Status: COMPLETED | OUTPATIENT
Start: 2017-10-02 | End: 2017-10-02

## 2017-10-02 RX ADMIN — ONDANSETRON 4 MG: 4 TABLET, ORALLY DISINTEGRATING ORAL at 13:06

## 2017-10-02 RX ADMIN — MORPHINE SULFATE 4 MG: 4 INJECTION, SOLUTION INTRAMUSCULAR; INTRAVENOUS at 10:47

## 2017-10-02 RX ADMIN — ACETAMINOPHEN 650 MG: 325 TABLET, FILM COATED ORAL at 13:06

## 2017-10-02 RX ADMIN — LIDOCAINE HYDROCHLORIDE 250 MG: 10 INJECTION, SOLUTION EPIDURAL; INFILTRATION; INTRACAUDAL; PERINEURAL at 13:14

## 2017-10-02 RX ADMIN — SODIUM CHLORIDE 1000 ML: 900 INJECTION, SOLUTION INTRAVENOUS at 23:46

## 2017-10-02 RX ADMIN — ONDANSETRON 4 MG: 2 INJECTION INTRAMUSCULAR; INTRAVENOUS at 23:46

## 2017-10-02 RX ADMIN — ONDANSETRON 4 MG: 4 TABLET, ORALLY DISINTEGRATING ORAL at 10:47

## 2017-10-02 RX ADMIN — MORPHINE SULFATE 4 MG: 2 INJECTION, SOLUTION INTRAMUSCULAR; INTRAVENOUS at 23:46

## 2017-10-02 NOTE — ED NOTES
Bedside and Verbal shift change report given to [address] [place] [address] (oncoming nurse) by Leda Mackay RN (offgoing nurse). Report included the following information ED Summary.
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
## 2017-10-02 NOTE — ED NOTES
Unable to obtain venous blood draw, PA notified new orders received for arterial blood draw.   Respiratory notified

## 2017-10-02 NOTE — ED NOTES
Went over discharge instructions with pt and family member. Pt and family member verbalized understanding. Gave prescriptions for zofran, acetaminophen, and keflex. Left department moaning and crying but no tears seen. Left in wheelchair.

## 2017-10-02 NOTE — ED TRIAGE NOTES
Pt hollering and screaming, c/o right lower back pain, pt seen at [facility] on Saturday and was told it was lumbar disc disease, family member stated she has been seen for the same here  , pt screaming that she is hot, family member stated it might be peripheral nerve pain, pt laying on her stomach across bed side ways. ........................ family member telling pt to calm down . .................. family stated that we need to use the ultrasound machine to start her iv

## 2017-10-02 NOTE — ED NOTES
I personally saw and examined the patient. I have reviewed and agree with the MLP's findings, including all diagnostic interpretations, and plans as written. I was present during the key portions of separately billed procedures.     Nilay Felix MD

## 2017-10-03 VITALS
RESPIRATION RATE: 18 BRPM | BODY MASS INDEX: 24.3 KG/M2 | HEART RATE: 89 BPM | OXYGEN SATURATION: 97 % | DIASTOLIC BLOOD PRESSURE: 90 MMHG | SYSTOLIC BLOOD PRESSURE: 155 MMHG | HEIGHT: 65 IN | TEMPERATURE: 97.3 F

## 2017-10-03 VITALS
BODY MASS INDEX: 24.32 KG/M2 | SYSTOLIC BLOOD PRESSURE: 144 MMHG | OXYGEN SATURATION: 100 % | RESPIRATION RATE: 18 BRPM | HEIGHT: 65 IN | TEMPERATURE: 98.1 F | DIASTOLIC BLOOD PRESSURE: 89 MMHG | WEIGHT: 146 LBS | HEART RATE: 87 BPM

## 2017-10-03 DIAGNOSIS — N30.00 ACUTE CYSTITIS WITHOUT HEMATURIA: Primary | ICD-10-CM

## 2017-10-03 LAB
ANION GAP BLD CALC-SCNC: 19 MMOL/L (ref 5–15)
APPEARANCE UR: ABNORMAL
BACTERIA URNS QL MICRO: NEGATIVE /HPF
BASOPHILS # BLD: 0 K/UL (ref 0–0.1)
BASOPHILS # BLD: 0 K/UL (ref 0–0.1)
BASOPHILS NFR BLD: 0 % (ref 0–1)
BASOPHILS NFR BLD: 0 % (ref 0–1)
BILIRUB UR QL: NEGATIVE
BUN BLD-MCNC: 84 MG/DL (ref 9–20)
CA-I BLD-MCNC: 0.94 MMOL/L (ref 1.12–1.32)
CHLORIDE BLD-SCNC: 96 MMOL/L (ref 98–107)
CO2 BLD-SCNC: 21 MMOL/L (ref 21–32)
COLOR UR: ABNORMAL
CREAT BLD-MCNC: 5.7 MG/DL (ref 0.6–1.3)
DIFFERENTIAL METHOD BLD: ABNORMAL
EOSINOPHIL # BLD: 0 K/UL (ref 0–0.4)
EOSINOPHIL # BLD: 0 K/UL (ref 0–0.4)
EOSINOPHIL NFR BLD: 0 % (ref 0–7)
EOSINOPHIL NFR BLD: 0 % (ref 0–7)
EPITH CASTS URNS QL MICRO: ABNORMAL /LPF
ERYTHROCYTE [DISTWIDTH] IN BLOOD BY AUTOMATED COUNT: 12.5 % (ref 11.5–14.5)
ERYTHROCYTE [DISTWIDTH] IN BLOOD BY AUTOMATED COUNT: 12.8 % (ref 11.5–14.5)
GLUCOSE BLD-MCNC: 302 MG/DL (ref 65–100)
GLUCOSE UR STRIP.AUTO-MCNC: 500 MG/DL
HCT VFR BLD AUTO: 31.3 % (ref 35–47)
HCT VFR BLD AUTO: 31.7 % (ref 35–47)
HCT VFR BLD CALC: 35 % (ref 35–47)
HGB BLD-MCNC: 10.8 G/DL (ref 11.5–16)
HGB BLD-MCNC: 10.8 G/DL (ref 11.5–16)
HGB BLD-MCNC: 11.9 GM/DL (ref 11.5–16)
HGB UR QL STRIP: ABNORMAL
HYALINE CASTS URNS QL MICRO: ABNORMAL /LPF (ref 0–5)
INR PPP: 1.1 (ref 0.9–1.1)
KETONES UR QL STRIP.AUTO: NEGATIVE MG/DL
LEUKOCYTE ESTERASE UR QL STRIP.AUTO: ABNORMAL
LYMPHOCYTES # BLD: 0.5 K/UL (ref 0.8–3.5)
LYMPHOCYTES # BLD: 1.4 K/UL (ref 0.8–3.5)
LYMPHOCYTES NFR BLD: 12 % (ref 12–49)
LYMPHOCYTES NFR BLD: 9 % (ref 12–49)
MCH RBC QN AUTO: 29.5 PG (ref 26–34)
MCH RBC QN AUTO: 29.6 PG (ref 26–34)
MCHC RBC AUTO-ENTMCNC: 34.1 G/DL (ref 30–36.5)
MCHC RBC AUTO-ENTMCNC: 34.5 G/DL (ref 30–36.5)
MCV RBC AUTO: 85.5 FL (ref 80–99)
MCV RBC AUTO: 86.8 FL (ref 80–99)
MONOCYTES # BLD: 0.1 K/UL (ref 0–1)
MONOCYTES # BLD: 0.7 K/UL (ref 0–1)
MONOCYTES NFR BLD: 2 % (ref 5–13)
MONOCYTES NFR BLD: 7 % (ref 5–13)
NEUTS SEG # BLD: 5.4 K/UL (ref 1.8–8)
NEUTS SEG # BLD: 8.8 K/UL (ref 1.8–8)
NEUTS SEG NFR BLD: 81 % (ref 32–75)
NEUTS SEG NFR BLD: 89 % (ref 32–75)
NITRITE UR QL STRIP.AUTO: NEGATIVE
PH UR STRIP: 7.5 [PH] (ref 5–8)
PLATELET # BLD AUTO: 311 K/UL (ref 150–400)
PLATELET # BLD AUTO: 319 K/UL (ref 150–400)
PLATELET COMMENTS,PCOM: ABNORMAL
POTASSIUM BLD-SCNC: 3.7 MMOL/L (ref 3.5–5.1)
PROT UR STRIP-MCNC: 300 MG/DL
PROTHROMBIN TIME: 10.8 SEC (ref 9–11.1)
RBC # BLD AUTO: 3.65 M/UL (ref 3.8–5.2)
RBC # BLD AUTO: 3.66 M/UL (ref 3.8–5.2)
RBC #/AREA URNS HPF: ABNORMAL /HPF (ref 0–5)
RBC MORPH BLD: ABNORMAL
SERVICE CMNT-IMP: ABNORMAL
SODIUM BLD-SCNC: 131 MMOL/L (ref 136–145)
SP GR UR REFRACTOMETRY: 1.02 (ref 1–1.03)
UA: UC IF INDICATED,UAUC: ABNORMAL
UROBILINOGEN UR QL STRIP.AUTO: 0.2 EU/DL (ref 0.2–1)
WBC # BLD AUTO: 10.9 K/UL (ref 3.6–11)
WBC # BLD AUTO: 6 K/UL (ref 3.6–11)
WBC URNS QL MICRO: >100 /HPF (ref 0–4)

## 2017-10-03 PROCEDURE — 96372 THER/PROPH/DIAG INJ SC/IM: CPT

## 2017-10-03 PROCEDURE — 87086 URINE CULTURE/COLONY COUNT: CPT | Performed by: EMERGENCY MEDICINE

## 2017-10-03 PROCEDURE — 74011250637 HC RX REV CODE- 250/637: Performed by: EMERGENCY MEDICINE

## 2017-10-03 PROCEDURE — 81001 URINALYSIS AUTO W/SCOPE: CPT | Performed by: EMERGENCY MEDICINE

## 2017-10-03 PROCEDURE — 36600 WITHDRAWAL OF ARTERIAL BLOOD: CPT

## 2017-10-03 PROCEDURE — 99283 EMERGENCY DEPT VISIT LOW MDM: CPT

## 2017-10-03 PROCEDURE — 80047 BASIC METABLC PNL IONIZED CA: CPT

## 2017-10-03 PROCEDURE — 74011250636 HC RX REV CODE- 250/636: Performed by: EMERGENCY MEDICINE

## 2017-10-03 RX ORDER — HYDROCODONE BITARTRATE AND ACETAMINOPHEN 5; 325 MG/1; MG/1
1 TABLET ORAL
Qty: 20 TAB | Refills: 0 | Status: SHIPPED | OUTPATIENT
Start: 2017-10-03 | End: 2018-02-27

## 2017-10-03 RX ORDER — MORPHINE SULFATE 4 MG/ML
4 INJECTION, SOLUTION INTRAMUSCULAR; INTRAVENOUS ONCE
Status: COMPLETED | OUTPATIENT
Start: 2017-10-03 | End: 2017-10-03

## 2017-10-03 RX ORDER — TRAMADOL HYDROCHLORIDE 50 MG/1
50 TABLET ORAL
COMMUNITY
End: 2018-02-27

## 2017-10-03 RX ORDER — ONDANSETRON 4 MG/1
4 TABLET, ORALLY DISINTEGRATING ORAL
Qty: 10 TAB | Refills: 0 | Status: SHIPPED | OUTPATIENT
Start: 2017-10-03 | End: 2018-04-18

## 2017-10-03 RX ORDER — ONDANSETRON 4 MG/1
4 TABLET, ORALLY DISINTEGRATING ORAL
Status: COMPLETED | OUTPATIENT
Start: 2017-10-03 | End: 2017-10-03

## 2017-10-03 RX ORDER — ONDANSETRON 2 MG/ML
4 INJECTION INTRAMUSCULAR; INTRAVENOUS ONCE
Status: DISCONTINUED | OUTPATIENT
Start: 2017-10-03 | End: 2017-10-03

## 2017-10-03 RX ORDER — CEPHALEXIN 500 MG/1
500 CAPSULE ORAL
Status: COMPLETED | OUTPATIENT
Start: 2017-10-03 | End: 2017-10-03

## 2017-10-03 RX ADMIN — METOPROLOL TARTRATE 25 MG: 25 TABLET, FILM COATED ORAL at 00:29

## 2017-10-03 RX ADMIN — ONDANSETRON 4 MG: 4 TABLET, ORALLY DISINTEGRATING ORAL at 03:20

## 2017-10-03 RX ADMIN — MORPHINE SULFATE 4 MG: 4 INJECTION, SOLUTION INTRAMUSCULAR; INTRAVENOUS at 18:09

## 2017-10-03 RX ADMIN — CEPHALEXIN 500 MG: 500 CAPSULE ORAL at 18:10

## 2017-10-03 NOTE — DISCHARGE INSTRUCTIONS
Chronic Kidney Disease: Care Instructions  Your Care Instructions  Chronic kidney disease happens when your kidneys don't work as well as they should. Your kidneys have a few important jobs. They remove waste from your blood. This waste leaves your body in your urine. They also balance your body's fluids and chemicals. When your kidneys don't work well, extra waste and fluid can build up. This can poison the body and sometimes cause death. The most common causes of this disease are diabetes and high blood pressure. In some cases, the disease develops in 2 to 3 months. But it usually develops over many years. If you take medicine and make healthy changes to your lifestyle, you may be able to prevent the disease from getting worse. But if your kidney damage gets worse, you may need dialysis or a kidney transplant. Dialysis uses a machine to filter waste from the blood. A transplant is surgery to give you a healthy kidney from another person. Follow-up care is a key part of your treatment and safety. Be sure to make and go to all appointments, and call your doctor if you are having problems. It's also a good idea to know your test results and keep a list of the medicines you take. How can you care for yourself at home? Treatments and appointments  · Be safe with medicines. Take your medicines exactly as prescribed. Call your doctor if you have any problems with your medicine. You also may take medicine to control your blood pressure or to treat diabetes. Many people who have diabetes take blood pressure medicine. · If you have diabetes, do your best to keep your blood sugar in your target range. You may do this by eating healthy food and exercising. You may also take medicines. · Go to your dialysis appointments if you have this treatment. · Do not take ibuprofen (Advil, Motrin), naproxen (Aleve), or similar medicines, unless your doctor tells you to. These may make the disease worse.   · Do not take any vitamins, over-the-counter medicines, or herbal products without talking to your doctor first.  · Do not smoke or use other tobacco products. Smoking can reduce blood flow to the kidneys. If you need help quitting, talk to your doctor about stop-smoking programs and medicines. These can increase your chances of quitting for good. · Do not drink alcohol or use illegal drugs. · Talk to your doctor about an exercise plan. Exercise helps lower your blood pressure. It also makes you feel better. · If you have an advance directive, let your doctor know. It may include a living will and a durable power of  for health care. If you don't have one, you may want to prepare one. It lets your doctor and loved ones know your health care wishes if you become unable to speak for yourself. Diet  · Talk to a registered dietitian. He or she can help you make a meal plan that is right for you. Most people with kidney disease need to limit salt (sodium), fluids, and protein. Some also have to limit potassium and phosphorus. · You may have to give up many foods you like. But try to focus on the fact that this will help you stay healthy for as long as possible. · If you have a hard time eating enough, talk to your doctor or dietitian about ways to add calories to your diet. · Your diet may change as your disease changes. See your doctor for regular testing. And work with a dietitian to change your diet as needed. When should you call for help? Call 911 anytime you think you may need emergency care. For example, call if:  · You passed out (lost consciousness). Call your doctor now or seek immediate medical care if:  · You have less urine than normal or no urine. · You have trouble urinating or can urinate only very small amounts. · You are confused or have trouble thinking clearly. · You feel weaker or more tired than usual.  · You are very thirsty, lightheaded, or dizzy. · You have nausea and vomiting.   · You have new swelling of your arms or feet, or your swelling is worse. · You have blood in your urine. · You have new or worse trouble breathing. Watch closely for changes in your health, and be sure to contact your doctor if:  · You have any problems with your medicine or other treatment. Where can you learn more? Go to http://nery-peña.info/. Enter N276 in the search box to learn more about \"Chronic Kidney Disease: Care Instructions. \"  Current as of: April 3, 2017  Content Version: 11.3  © 2696-1326 [employer]. Care instructions adapted under license by [employer] (which disclaims liability or warranty for this information). If you have questions about a medical condition or this instruction, always ask your healthcare professional. [address] any warranty or liability for your use of this information. Abdominal Pain: Care Instructions  Your Care Instructions    Abdominal pain has many possible causes. Some aren't serious and get better on their own in a few days. Others need more testing and treatment. If your pain continues or gets worse, you need to be rechecked and may need more tests to find out what is wrong. You may need surgery to correct the problem. Don't ignore new symptoms, such as fever, nausea and vomiting, urination problems, pain that gets worse, and dizziness. These may be signs of a more serious problem. Your doctor may have recommended a follow-up visit in the next 8 to 12 hours. If you are not getting better, you may need more tests or treatment. The doctor has checked you carefully, but problems can develop later. If you notice any problems or new symptoms, get medical treatment right away. Follow-up care is a key part of your treatment and safety. Be sure to make and go to all appointments, and call your doctor if you are having problems.  It's also a good idea to know your test results and keep a list of the medicines you take. How can you care for yourself at home? · Rest until you feel better. · To prevent dehydration, drink plenty of fluids, enough so that your urine is light yellow or clear like water. Choose water and other caffeine-free clear liquids until you feel better. If you have kidney, heart, or liver disease and have to limit fluids, talk with your doctor before you increase the amount of fluids you drink. · If your stomach is upset, eat mild foods, such as rice, dry toast or crackers, bananas, and applesauce. Try eating several small meals instead of two or three large ones. · Wait until 48 hours after all symptoms have gone away before you have spicy foods, alcohol, and drinks that contain caffeine. · Do not eat foods that are high in fat. · Avoid anti-inflammatory medicines such as aspirin, ibuprofen (Advil, Motrin), and naproxen (Aleve). These can cause stomach upset. Talk to your doctor if you take daily aspirin for another health problem. When should you call for help? Call 911 anytime you think you may need emergency care. For example, call if:  · You passed out (lost consciousness). · You pass maroon or very bloody stools. · You vomit blood or what looks like coffee grounds. · You have new, severe belly pain. Call your doctor now or seek immediate medical care if:  · Your pain gets worse, especially if it becomes focused in one area of your belly. · You have a new or higher fever. · Your stools are black and look like tar, or they have streaks of blood. · You have unexpected vaginal bleeding. · You have symptoms of a urinary tract infection. These may include:  ¨ Pain when you urinate. ¨ Urinating more often than usual.  ¨ Blood in your urine. · You are dizzy or lightheaded, or you feel like you may faint. Watch closely for changes in your health, and be sure to contact your doctor if:  · You are not getting better after 1 day (24 hours). Where can you learn more?   Go to http://nery-peña.info/. Enter O953 in the search box to learn more about \"Abdominal Pain: Care Instructions. \"  Current as of: March 20, 2017  Content Version: 11.3  © 8143-0397 [employer] Care instructions adapted under license by [employer] (which disclaims liability or warranty for this information). If you have questions about a medical condition or this instruction, always ask your healthcare professional. [place] [address] any warranty or liability for your use of this information. Urinary Tract Infection in Women: Care Instructions  Your Care Instructions    A urinary tract infection, or UTI, is a general term for an infection anywhere between the kidneys and the urethra (where urine comes out). Most UTIs are bladder infections. They often cause pain or burning when you urinate. UTIs are caused by bacteria and can be cured with antibiotics. Be sure to complete your treatment so that the infection goes away. Follow-up care is a key part of your treatment and safety. Be sure to make and go to all appointments, and call your doctor if you are having problems. It's also a good idea to know your test results and keep a list of the medicines you take. How can you care for yourself at home? · Take your antibiotics as directed. Do not stop taking them just because you feel better. You need to take the full course of antibiotics. · Drink extra water and other fluids for the next day or two. This may help wash out the bacteria that are causing the infection. (If you have kidney, heart, or liver disease and have to limit fluids, talk with your doctor before you increase your fluid intake.)  · Avoid drinks that are carbonated or have caffeine. They can irritate the bladder. · Urinate often. Try to empty your bladder each time. · To relieve pain, take a hot bath or lay a heating pad set on low over your lower belly or genital area.  Never go to sleep with a heating pad in place. To prevent UTIs  · Drink plenty of water each day. This helps you urinate often, which clears bacteria from your system. (If you have kidney, heart, or liver disease and have to limit fluids, talk with your doctor before you increase your fluid intake.)  · Urinate when you need to. · Urinate right after you have sex. · Change sanitary pads often. · Avoid douches, bubble baths, feminine hygiene sprays, and other feminine hygiene products that have deodorants. · After going to the bathroom, wipe from front to back. When should you call for help? Call your doctor now or seek immediate medical care if:  · Symptoms such as fever, chills, nausea, or vomiting get worse or appear for the first time. · You have new pain in your back just below your rib cage. This is called flank pain. · There is new blood or pus in your urine. · You have any problems with your antibiotic medicine. Watch closely for changes in your health, and be sure to contact your doctor if:  · You are not getting better after taking an antibiotic for 2 days. · Your symptoms go away but then come back. Where can you learn more? Go to http://nery-peña.info/. Enter K017 in the search box to learn more about \"Urinary Tract Infection in Women: Care Instructions. \"  Current as of: November 28, 2016  Content Version: 11.3  © 5858-4988 [employer]. Care instructions adapted under license by [employer] (which disclaims liability or warranty for this information). If you have questions about a medical condition or this instruction, always ask your healthcare professional. [address] any warranty or liability for your use of this information.

## 2017-10-03 NOTE — ED PROVIDER NOTES
HPI Comments: 46 y.o. female with past medical history significant for CKD, pancreatitis, DM, HTN, chronic low back pain, UTI, constipation, C.difficile colitis, migraines, hepatitis C, lumbar disc disease, and hyperlipidemia who presents from home with chief complaint of back pain. Pt was seen in the ED 2x in the past day for lower back and abd pain. She says she \"can't drink or eat and is throwing up black stuff. \" Pt thinks she is dehydrated. Pt states that \"they were going to admit last night but she had to go home to get her kids. \" Per , pt has renal disease but he has not heard anything about starting her on dialysis. Pt was given Tramadol last night but has had no relief of symptoms. There are no other acute medical concerns at this time. Social hx: hx of EtOH abuse (quit a few months ago); (-) tobacco use    PCP: Tracey Nagy MD    Note written by Nicol Mcelroy, as dictated by Do Fields MD 3:20 PM      The history is provided by the patient. No  was used.         Past Medical History:   Diagnosis Date    C. difficile colitis 6/2012    Chronic low back pain     CKD (chronic kidney disease)     stage 3 to 4, baseline Cr 2    Constipation     Diabetes (HCC)     A1c 8.2 3/2012    Hep C w/o coma, chronic (HCC)     Hyperlipemia     Hypertension     Lumbar disc disease     Migraines     Pancreatitis 0084    alcoholic    UTI (lower urinary tract infection) 6/20012       Past Surgical History:   Procedure Laterality Date    HX ORTHOPAEDIC      lumbar sprain; back surgery    MS COLONOSCOPY FLX DX W/COLLJ SPEC WHEN PFRMD  11/12/2012              Family History:   Problem Relation Age of Onset    Diabetes Mother     Kidney Disease Mother     Diabetes Sister        Social History     Social History    Marital status:      Spouse name: manda azul to give info    Number of children: 5    Years of education: 8th can re     Occupational History   Not Employed     on disability for [employer]      Social History Main Topics    Smoking status: Never Smoker    Smokeless tobacco: Never Used    Alcohol use No      Comment: Quit few months ago, hx of abuse    Drug use: No    Sexual activity: Yes     Partners: Male     Other Topics Concern    Not on file     Social History Narrative    Lives with daughter and     Ambulated independently    Doesn't work         ALLERGIES: Review of patient's allergies indicates no known allergies. Review of Systems   Constitutional: Positive for appetite change. Respiratory: Negative for shortness of breath. Cardiovascular: Negative for chest pain. Gastrointestinal: Positive for abdominal pain and vomiting. Musculoskeletal: Positive for back pain. All other systems reviewed and are negative. Vitals:    10/03/17 1513   BP: 153/80   Pulse: 99   Resp: 16   Temp: 98 °F (36.7 °C)   SpO2: 98%   Height: 5' 5\" (1.651 m)            Physical Exam   Constitutional: She is oriented to person, place, and time. She appears well-developed and well-nourished. No distress. HENT:   Head: Normocephalic and atraumatic. Eyes: Conjunctivae are normal. No scleral icterus. Neck: Neck supple. No tracheal deviation present. Cardiovascular: Normal rate, regular rhythm, normal heart sounds and intact distal pulses. Exam reveals no gallop and no friction rub. No murmur heard. Pulmonary/Chest: Effort normal and breath sounds normal. She has no wheezes. She has no rales. Abdominal: Soft. She exhibits no distension. There is no tenderness. There is no rebound and no guarding. Pt appears to be in moderate pain but abd pain was completely non-tender. No CVA tenderness. Musculoskeletal: She exhibits no edema. Neurological: She is alert and oriented to person, place, and time. Skin: Skin is warm and dry. No rash noted. Psychiatric: She has a normal mood and affect. Nursing note and vitals reviewed.      Note written by Nicol Thomas, as dictated by Xuan Kaiser MD 3:20 PM        [facility]  ED Course       Procedures

## 2017-10-03 NOTE — ED PROVIDER NOTES
HPI Comments: 49-year-old female with past medical history significant for chronic renal insufficiency, diabetes, hypertension, high cholesterol presents with complaints of right flank pain x3 days. Patient reports intermittent right flank pain worsening over past 3 days with nausea and vomiting. Denies urinary complaints. In the emergency department earlier today with same complaint and diagnosed with urinary tract infection. Denies fever, chills. Denies chest pain, shortness of breath. Patient reports minimal improvement while in emergency department earlier today. Discharged with Keflex. Patient also reports she is missed 2 days of blood pressure medication. Denies tobacco use or drug use of alcohol use  Primary care physicianCeleste  Denies having any nephrologist      The history is provided by the patient, the spouse and medical records. No  was used.         Past Medical History:   Diagnosis Date    C. difficile colitis 6/2012    Chronic low back pain     CKD (chronic kidney disease)     stage 3 to 4, baseline Cr 2    Constipation     Diabetes (HCC)     A1c 8.2 3/2012    Hep C w/o coma, chronic (HCC)     Hyperlipemia     Hypertension     Lumbar disc disease     Migraines     Pancreatitis 4492    alcoholic    UTI (lower urinary tract infection) 6/20012       Past Surgical History:   Procedure Laterality Date    HX ORTHOPAEDIC      lumbar sprain; back surgery    FL COLONOSCOPY FLX DX W/COLLJ SPEC WHEN PFRMD  11/12/2012              Family History:   Problem Relation Age of Onset    Diabetes Mother     Kidney Disease Mother     Diabetes Sister        Social History     Social History    Marital status:      Spouse name: manda azul to give info    Number of children: 5    Years of education: 8th can re     Occupational History     Not Employed     on disability for [employer]      Social History Main Topics    Smoking status: Never Smoker    Smokeless tobacco: Never Used    Alcohol use No      Comment: Quit few months ago, hx of abuse    Drug use: No    Sexual activity: Yes     Partners: Male     Other Topics Concern    Not on file     Social History Narrative    Lives with daughter and     Ambulated independently    Doesn't work         ALLERGIES: Review of patient's allergies indicates no known allergies. Review of Systems   Constitutional: Negative for chills and fever. HENT: Negative for congestion, nosebleeds and rhinorrhea. Eyes: Negative for pain and redness. Respiratory: Negative for cough and shortness of breath. Cardiovascular: Negative for chest pain and palpitations. Gastrointestinal: Positive for abdominal pain, nausea and vomiting. Genitourinary: Positive for flank pain. Negative for dysuria, frequency, vaginal bleeding and vaginal pain. Musculoskeletal: Negative for myalgias. Skin: Negative for rash and wound. Neurological: Negative for seizures, syncope and weakness. Hematological: Does not bruise/bleed easily. Psychiatric/Behavioral: Negative for agitation, confusion, dysphoric mood and suicidal ideas. The patient is not nervous/anxious. Vitals:    10/03/17 0029 10/03/17 0110 10/03/17 0123 10/03/17 0130   BP: (!) 177/101  (!) 170/102 (!) 169/105   Pulse: (!) 116 93 97 86   Resp:  14 14 18   Temp:       SpO2:  98% 98% 98%   Weight:       Height:                Physical Exam   Constitutional: She is oriented to person, place, and time. She appears well-developed and well-nourished. HENT:   Head: Normocephalic and atraumatic. Eyes: EOM are normal. Pupils are equal, round, and reactive to light. Neck: Normal range of motion. Neck supple. No tracheal deviation present. Cardiovascular: Normal rate, regular rhythm, normal heart sounds and intact distal pulses. Pulmonary/Chest: Effort normal and breath sounds normal. No stridor. No respiratory distress. She has no wheezes. She has no rales.  She exhibits no tenderness. Abdominal: Soft. Bowel sounds are normal. She exhibits no distension. There is no tenderness. There is no rebound. Musculoskeletal: Normal range of motion. She exhibits no edema or tenderness. Neurological: She is alert and oriented to person, place, and time. No cranial nerve deficit. Skin: Skin is warm and dry. No rash noted. No pallor. Psychiatric: She has a normal mood and affect. Nursing note and vitals reviewed. MDM  Number of Diagnoses or Management Options  Chronic renal impairment, unspecified CKD stage:   Flank pain:   Secondary hypertension:   Urinary tract infection without hematuria, site unspecified:   Diagnosis management comments: 57-year-old female with chronic renal insufficiency, hypertension, high cholesterol presents with right flank pain and nausea and vomiting x3 days. Diagnosed with urinary tract infection today in the ER. Noncompliant with antihypertensives. Patient appears uncomfortable, crying and holding her right flank in bed. Nontoxic, afebrile, hypertensive, tachycardic. Plan-CT abdomen and pelvis. Reviewed labs from earlier today we'll repeat metabolic panel to insure no acute change in creatinine. Labs remarkable for BUN 74, creatinine 5.74 (chronic elevation)  CT shows renal arterial calcification, no hydronephrosis       Amount and/or Complexity of Data Reviewed  Clinical lab tests: ordered and reviewed  Tests in the radiology section of CPT®: ordered and reviewed  Obtain history from someone other than the patient: yes  Review and summarize past medical records: yes  Independent visualization of images, tracings, or specimens: yes    Risk of Complications, Morbidity, and/or Mortality  Presenting problems: moderate  Diagnostic procedures: moderate  Management options: moderate    Patient Progress  Patient progress: improved    ED Course     PROGRESS NOTE:  1:56 AM  Pt feeling better. BP and HR down.  Pt agreeable with plan for discharge and to f/u with PCP and Nephrologist.     Procedures

## 2017-10-03 NOTE — ED TRIAGE NOTES
\"We were here and treated like wet dog. First shift did nothing we will see about this. \" Reports vomiting four times since leaving at 1330. Patient reports right sided pain. \"The Dr today tested her stomach and said it was soft but didn't see nothing. Didn't get a IV. \"

## 2017-10-03 NOTE — ED TRIAGE NOTES
Arrives crying in triage with no tears swelling or falling. Seen here twice yesterday for same complaint.  +abdominal pain and back pain x 6 days.

## 2017-10-03 NOTE — ED NOTES
Bedside and Verbal shift change report received from Issac Sanchez, [facility] (offgoing nurse). Report included the following information SBAR, ED Summary, MAR and Recent Results.

## 2017-10-03 NOTE — ED NOTES
PROGRESS NOTE:  6:39 PM  Pt now feels much better. Gave first dose of oral abx and encouraged her to take her abx as prescribed by Dr. Kim Velasco. Recommend renal f/u and close f/u by PCP. Pt has been very noncompliant and is a known chronic pain pt with drug seeking behavior.

## 2017-10-03 NOTE — ED NOTES
Pt states that she feels better and thinks that her stomach ache is due to acid reflux. MD notified.

## 2017-10-04 LAB
BACTERIA SPEC CULT: ABNORMAL
BACTERIA SPEC CULT: NORMAL
CC UR VC: ABNORMAL
CC UR VC: NORMAL
SERVICE CMNT-IMP: ABNORMAL
SERVICE CMNT-IMP: NORMAL

## 2017-11-21 PROCEDURE — 96374 THER/PROPH/DIAG INJ IV PUSH: CPT

## 2017-11-21 PROCEDURE — 96361 HYDRATE IV INFUSION ADD-ON: CPT

## 2017-11-21 PROCEDURE — 99285 EMERGENCY DEPT VISIT HI MDM: CPT

## 2017-11-22 ENCOUNTER — HOSPITAL ENCOUNTER (EMERGENCY)
Age: 52
Discharge: HOME | End: 2017-11-22
Attending: EMERGENCY MEDICINE | Admitting: EMERGENCY MEDICINE
Payer: MEDICARE

## 2017-11-22 ENCOUNTER — APPOINTMENT (OUTPATIENT)
Dept: CT IMAGING | Age: 52
End: 2017-11-22
Attending: EMERGENCY MEDICINE
Payer: MEDICARE

## 2017-11-22 VITALS
HEIGHT: 65 IN | RESPIRATION RATE: 16 BRPM | TEMPERATURE: 98.2 F | BODY MASS INDEX: 24.99 KG/M2 | OXYGEN SATURATION: 96 % | HEART RATE: 80 BPM | DIASTOLIC BLOOD PRESSURE: 86 MMHG | SYSTOLIC BLOOD PRESSURE: 150 MMHG | WEIGHT: 150 LBS

## 2017-11-22 DIAGNOSIS — K31.84 GASTROPARESIS: ICD-10-CM

## 2017-11-22 DIAGNOSIS — N18.5 CKD (CHRONIC KIDNEY DISEASE) STAGE 5, GFR LESS THAN 15 ML/MIN (HCC): ICD-10-CM

## 2017-11-22 DIAGNOSIS — G89.29 OTHER CHRONIC PAIN: ICD-10-CM

## 2017-11-22 DIAGNOSIS — R73.9 HYPERGLYCEMIA: Primary | ICD-10-CM

## 2017-11-22 LAB
ALBUMIN SERPL-MCNC: 3.3 G/DL (ref 3.5–5)
ALBUMIN/GLOB SERPL: 0.7 {RATIO} (ref 1.1–2.2)
ALP SERPL-CCNC: 501 U/L (ref 45–117)
ALT SERPL-CCNC: 20 U/L (ref 12–78)
AMYLASE SERPL-CCNC: 51 U/L (ref 25–115)
ANION GAP SERPL CALC-SCNC: 15 MMOL/L (ref 5–15)
APPEARANCE UR: CLEAR
ARTERIAL PATENCY WRIST A: ABNORMAL
AST SERPL-CCNC: 25 U/L (ref 15–37)
ATRIAL RATE: 117 BPM
B-OH-BUTYR SERPL-SCNC: 0.54 MMOL/L
BACTERIA URNS QL MICRO: NEGATIVE /HPF
BASE DEFICIT BLDV-SCNC: 4 MMOL/L
BASOPHILS # BLD: 0 K/UL (ref 0–0.1)
BASOPHILS NFR BLD: 0 % (ref 0–1)
BDY SITE: ABNORMAL
BILIRUB SERPL-MCNC: 0.4 MG/DL (ref 0.2–1)
BILIRUB UR QL: NEGATIVE
BUN SERPL-MCNC: 63 MG/DL (ref 6–20)
BUN/CREAT SERPL: 12 (ref 12–20)
CALCIUM SERPL-MCNC: 9.8 MG/DL (ref 8.5–10.1)
CALCULATED P AXIS, ECG09: 54 DEGREES
CALCULATED R AXIS, ECG10: 10 DEGREES
CALCULATED T AXIS, ECG11: 70 DEGREES
CHLORIDE SERPL-SCNC: 101 MMOL/L (ref 97–108)
CO2 SERPL-SCNC: 17 MMOL/L (ref 21–32)
COLOR UR: ABNORMAL
CREAT SERPL-MCNC: 5.08 MG/DL (ref 0.55–1.02)
DIAGNOSIS, 93000: NORMAL
EOSINOPHIL # BLD: 0.1 K/UL (ref 0–0.4)
EOSINOPHIL NFR BLD: 1 % (ref 0–7)
EPITH CASTS URNS QL MICRO: ABNORMAL /LPF
ERYTHROCYTE [DISTWIDTH] IN BLOOD BY AUTOMATED COUNT: 13.2 % (ref 11.5–14.5)
GAS FLOW.O2 O2 DELIVERY SYS: ABNORMAL L/MIN
GLOBULIN SER CALC-MCNC: 5 G/DL (ref 2–4)
GLUCOSE BLD STRIP.AUTO-MCNC: 274 MG/DL (ref 65–100)
GLUCOSE BLD STRIP.AUTO-MCNC: 416 MG/DL (ref 65–100)
GLUCOSE SERPL-MCNC: 418 MG/DL (ref 65–100)
GLUCOSE UR STRIP.AUTO-MCNC: >1000 MG/DL
HCO3 BLDV-SCNC: 20.6 MMOL/L (ref 23–28)
HCT VFR BLD AUTO: 31.4 % (ref 35–47)
HGB BLD-MCNC: 10.6 G/DL (ref 11.5–16)
HGB UR QL STRIP: ABNORMAL
HYALINE CASTS URNS QL MICRO: ABNORMAL /LPF (ref 0–5)
KETONES UR QL STRIP.AUTO: NEGATIVE MG/DL
LEUKOCYTE ESTERASE UR QL STRIP.AUTO: NEGATIVE
LIPASE SERPL-CCNC: 199 U/L (ref 73–393)
LYMPHOCYTES # BLD: 1.5 K/UL (ref 0.8–3.5)
LYMPHOCYTES NFR BLD: 16 % (ref 12–49)
MAGNESIUM SERPL-MCNC: 2.4 MG/DL (ref 1.6–2.4)
MCH RBC QN AUTO: 30.3 PG (ref 26–34)
MCHC RBC AUTO-ENTMCNC: 33.8 G/DL (ref 30–36.5)
MCV RBC AUTO: 89.7 FL (ref 80–99)
MONOCYTES # BLD: 0.5 K/UL (ref 0–1)
MONOCYTES NFR BLD: 6 % (ref 5–13)
NEUTS SEG # BLD: 7.3 K/UL (ref 1.8–8)
NEUTS SEG NFR BLD: 77 % (ref 32–75)
NITRITE UR QL STRIP.AUTO: NEGATIVE
P-R INTERVAL, ECG05: 150 MS
PCO2 BLDV: 34.2 MMHG (ref 41–51)
PH BLDV: 7.39 [PH] (ref 7.32–7.42)
PH UR STRIP: 6 [PH] (ref 5–8)
PHOSPHATE SERPL-MCNC: 5.3 MG/DL (ref 2.6–4.7)
PLATELET # BLD AUTO: 305 K/UL (ref 150–400)
PO2 BLDV: 99 MMHG (ref 25–40)
POTASSIUM SERPL-SCNC: 4.3 MMOL/L (ref 3.5–5.1)
PROT SERPL-MCNC: 8.3 G/DL (ref 6.4–8.2)
PROT UR STRIP-MCNC: 100 MG/DL
Q-T INTERVAL, ECG07: 338 MS
QRS DURATION, ECG06: 72 MS
QTC CALCULATION (BEZET), ECG08: 471 MS
RBC # BLD AUTO: 3.5 M/UL (ref 3.8–5.2)
RBC #/AREA URNS HPF: ABNORMAL /HPF (ref 0–5)
SAO2 % BLDV: 98 % (ref 65–88)
SERVICE CMNT-IMP: ABNORMAL
SERVICE CMNT-IMP: ABNORMAL
SODIUM SERPL-SCNC: 133 MMOL/L (ref 136–145)
SP GR UR REFRACTOMETRY: 1.01 (ref 1–1.03)
SPECIMEN TYPE: ABNORMAL
UR CULT HOLD, URHOLD: NORMAL
UROBILINOGEN UR QL STRIP.AUTO: 0.2 EU/DL (ref 0.2–1)
VENTRICULAR RATE, ECG03: 117 BPM
WBC # BLD AUTO: 9.4 K/UL (ref 3.6–11)
WBC URNS QL MICRO: ABNORMAL /HPF (ref 0–4)

## 2017-11-22 PROCEDURE — 74011250636 HC RX REV CODE- 250/636: Performed by: EMERGENCY MEDICINE

## 2017-11-22 PROCEDURE — 82803 BLOOD GASES ANY COMBINATION: CPT

## 2017-11-22 PROCEDURE — 36415 COLL VENOUS BLD VENIPUNCTURE: CPT | Performed by: EMERGENCY MEDICINE

## 2017-11-22 PROCEDURE — 74011000250 HC RX REV CODE- 250: Performed by: EMERGENCY MEDICINE

## 2017-11-22 PROCEDURE — 96374 THER/PROPH/DIAG INJ IV PUSH: CPT

## 2017-11-22 PROCEDURE — 82010 KETONE BODYS QUAN: CPT | Performed by: EMERGENCY MEDICINE

## 2017-11-22 PROCEDURE — 74176 CT ABD & PELVIS W/O CONTRAST: CPT

## 2017-11-22 PROCEDURE — 96375 TX/PRO/DX INJ NEW DRUG ADDON: CPT

## 2017-11-22 PROCEDURE — 93005 ELECTROCARDIOGRAM TRACING: CPT

## 2017-11-22 PROCEDURE — 82150 ASSAY OF AMYLASE: CPT | Performed by: EMERGENCY MEDICINE

## 2017-11-22 PROCEDURE — 74011636637 HC RX REV CODE- 636/637: Performed by: EMERGENCY MEDICINE

## 2017-11-22 PROCEDURE — 83690 ASSAY OF LIPASE: CPT | Performed by: EMERGENCY MEDICINE

## 2017-11-22 PROCEDURE — 85025 COMPLETE CBC W/AUTO DIFF WBC: CPT | Performed by: EMERGENCY MEDICINE

## 2017-11-22 PROCEDURE — 96361 HYDRATE IV INFUSION ADD-ON: CPT

## 2017-11-22 PROCEDURE — 83735 ASSAY OF MAGNESIUM: CPT | Performed by: EMERGENCY MEDICINE

## 2017-11-22 PROCEDURE — 81001 URINALYSIS AUTO W/SCOPE: CPT | Performed by: EMERGENCY MEDICINE

## 2017-11-22 PROCEDURE — 84100 ASSAY OF PHOSPHORUS: CPT | Performed by: EMERGENCY MEDICINE

## 2017-11-22 PROCEDURE — 80053 COMPREHEN METABOLIC PANEL: CPT | Performed by: EMERGENCY MEDICINE

## 2017-11-22 PROCEDURE — 82962 GLUCOSE BLOOD TEST: CPT

## 2017-11-22 RX ORDER — LABETALOL HYDROCHLORIDE 5 MG/ML
20 INJECTION, SOLUTION INTRAVENOUS
Status: COMPLETED | OUTPATIENT
Start: 2017-11-22 | End: 2017-11-22

## 2017-11-22 RX ORDER — HYDROMORPHONE HYDROCHLORIDE 2 MG/ML
1 INJECTION, SOLUTION INTRAMUSCULAR; INTRAVENOUS; SUBCUTANEOUS
Status: COMPLETED | OUTPATIENT
Start: 2017-11-22 | End: 2017-11-22

## 2017-11-22 RX ORDER — KETOROLAC TROMETHAMINE 30 MG/ML
30 INJECTION, SOLUTION INTRAMUSCULAR; INTRAVENOUS
Status: COMPLETED | OUTPATIENT
Start: 2017-11-22 | End: 2017-11-22

## 2017-11-22 RX ORDER — METOCLOPRAMIDE 10 MG/1
10 TABLET ORAL
Qty: 12 TAB | Refills: 0 | Status: SHIPPED | OUTPATIENT
Start: 2017-11-22 | End: 2017-12-02

## 2017-11-22 RX ORDER — ONDANSETRON 2 MG/ML
8 INJECTION INTRAMUSCULAR; INTRAVENOUS
Status: COMPLETED | OUTPATIENT
Start: 2017-11-22 | End: 2017-11-22

## 2017-11-22 RX ORDER — HYDROCODONE BITARTRATE AND ACETAMINOPHEN 5; 325 MG/1; MG/1
1-2 TABLET ORAL
Qty: 20 TAB | Refills: 0 | Status: SHIPPED | OUTPATIENT
Start: 2017-11-22 | End: 2018-02-27

## 2017-11-22 RX ADMIN — SODIUM CHLORIDE 500 ML: 900 INJECTION, SOLUTION INTRAVENOUS at 01:50

## 2017-11-22 RX ADMIN — HUMAN INSULIN 15 UNITS: 100 INJECTION, SOLUTION SUBCUTANEOUS at 03:32

## 2017-11-22 RX ADMIN — KETOROLAC TROMETHAMINE 30 MG: 30 INJECTION, SOLUTION INTRAMUSCULAR at 01:52

## 2017-11-22 RX ADMIN — ONDANSETRON 8 MG: 2 INJECTION INTRAMUSCULAR; INTRAVENOUS at 01:51

## 2017-11-22 RX ADMIN — HYDROMORPHONE HYDROCHLORIDE 1 MG: 2 INJECTION, SOLUTION INTRAMUSCULAR; INTRAVENOUS; SUBCUTANEOUS at 01:51

## 2017-11-22 RX ADMIN — LABETALOL HYDROCHLORIDE 20 MG: 5 INJECTION INTRAVENOUS at 04:22

## 2017-11-22 RX ADMIN — SODIUM CHLORIDE 1000 ML: 900 INJECTION, SOLUTION INTRAVENOUS at 03:23

## 2017-11-22 NOTE — ED PROVIDER NOTES
HPI Comments: 46 y.o. female with past medical history significant for CKD, DM, HTN, Pancreatitis, Hep C, C. Diff, Lumbar disc disease who presents from home with chief complaint of back pain and abdominal pain. Pt reports acute on chronic exacerbation of diffuse low back pain since 12 yesterday afternoon. She reports being evaluated at [facility] earlier in the evening for symptoms where she was given Reglan and had blood work performed. Pt denies any imaging being done. She states that the medication helped alleviate symptoms temporarily but that the pain returned at 2300 (2.5 hours pta). She describes the pain as severe. She also c/o generalized abdominal pain most significant to epigastric region, nausea and vomiting. She denies recent illness or urinary symptoms. There are no other acute medical concerns at this time. Chart review: Pt evaluated in ED multiple times for symptoms significant for flank pain, low back pain and UTI. Pt most recently evaluated 10/3/17 for acute cystitis. PCP: Hari Zaragoza MD    Note written by Clevester Jeans, Scribe, as dictated by Chelo Mccurdy MD 2:08 AM    The history is provided by the patient. No  was used.         Past Medical History:   Diagnosis Date    C. difficile colitis 6/2012    Chronic low back pain     CKD (chronic kidney disease)     stage 3 to 4, baseline Cr 2    Constipation     Diabetes (HCC)     A1c 8.2 3/2012    Hep C w/o coma, chronic (HCC)     Hyperlipemia     Hypertension     Lumbar disc disease     Migraines     Pancreatitis 3859    alcoholic    UTI (lower urinary tract infection) 6/20012       Past Surgical History:   Procedure Laterality Date    HX ORTHOPAEDIC      lumbar sprain; back surgery    RI COLONOSCOPY FLX DX W/COLLJ SPEC WHEN PFRMD  11/12/2012              Family History:   Problem Relation Age of Onset    Diabetes Mother     Kidney Disease Mother     Diabetes Sister        Social History     Social History    Marital status:      Spouse name: manda maxwell give info    Number of children: 11    Years of education: 8th can re     Occupational History     Not Employed     on disability for [employer]      Social History Main Topics    Smoking status: Never Smoker    Smokeless tobacco: Never Used    Alcohol use No      Comment: Quit few months ago, hx of abuse    Drug use: No    Sexual activity: Yes     Partners: Male     Other Topics Concern    Not on file     Social History Narrative    Lives with daughter and     Ambulated independently    Doesn't work         ALLERGIES: Review of patient's allergies indicates no known allergies. Review of Systems   Constitutional: Negative for fever. HENT: Negative for congestion and sore throat. Respiratory: Negative for cough and shortness of breath. Cardiovascular: Negative for chest pain. Gastrointestinal: Positive for abdominal pain (generalized, epigastric), diarrhea, nausea and vomiting. Negative for constipation. Genitourinary: Negative for difficulty urinating, dysuria and hematuria. Musculoskeletal: Positive for back pain. Negative for neck pain. Skin: Negative for rash. Neurological: Negative for headaches. All other systems reviewed and are negative. Vitals:    11/22/17 0002   BP: (!) 190/119   Pulse: (!) 121   Resp: 20   Temp: 98.4 °F (36.9 °C)   SpO2: 98%   Weight: 68 kg (150 lb)   Height: 5' 5\" (1.651 m)            Physical Exam   Nursing note and vitals reviewed. CONSTITUTIONAL: Well-appearing; well-nourished; in mild distress  HEAD: Normocephalic; atraumatic  EYES: PERRL; EOM intact; conjunctiva and sclera are clear bilaterally. ENT: No rhinorrhea; normal pharynx with no tonsillar hypertrophy; mucous membranes pink/moist, no erythema, no exudate. NECK: Supple; non-tender; no cervical lymphadenopathy  CARD: Normal S1, S2; no murmurs, rubs, or gallops. Regular rate and rhythm.   RESP: Normal respiratory effort; breath sounds clear and equal bilaterally; no wheezes, rhonchi, or rales. ABD: Normal bowel sounds; non-distended; non-tender; no palpable organomegaly, no masses, no bruits. Back Exam: Normal inspection; no vertebral point tenderness, no CVA tenderness. Normal range of motion. EXT: Normal ROM in all four extremities; non-tender to palpation; no swelling or deformity; distal pulses are normal, no edema. SKIN: Warm; dry; no rash. NEURO:Alert and oriented x 3, coherent, JESSE-XII grossly intact, sensory and motor are non-focal.      MDM  Number of Diagnoses or Management Options  CKD (chronic kidney disease) stage 5, GFR less than 15 ml/min ([address]):   Gastroparesis:   Hyperglycemia:   Other chronic pain:   Diagnosis management comments: Assessment: this 59-year-old female, who presents to the ED with a cockup of abdominal pain accompanied by nausea. No vomiting. The patient has a history of multiple ED visits for similar symptoms. In fact, she was just discharged from [facility] for this same approximately 24 hours ago. The patient has a history of chronic pain and narcotic dependency as well as multiple ED visits to the emergency room or hospital for the same symptoms      Plan: EKG/ lab/ IV fluid/ CT scan of the abdomen and pelvis/ antihypertensive/  antiemetics and analgesia/ p.o. challenge serial exam/ Monitor and Reevaluate.          Amount and/or Complexity of Data Reviewed  Clinical lab tests: ordered and reviewed  Tests in the radiology section of CPT®: ordered and reviewed  Tests in the medicine section of CPT®: reviewed and ordered  Discussion of test results with the performing providers: yes  Decide to obtain previous medical records or to obtain history from someone other than the patient: yes  Obtain history from someone other than the patient: yes  Review and summarize past medical records: yes  Discuss the patient with other providers: yes  Independent visualization of images, tracings, or specimens: yes    Risk of Complications, Morbidity, and/or Mortality  Presenting problems: moderate  Diagnostic procedures: moderate  Management options: moderate      ED Course       Procedures     ED EKG interpretation:  Rhythm: sinus tachycardia; and regular . Rate (approx.): 117; Axis: left axis deviation; P wave: normal; QRS interval: normal ; ST/T wave: non-specific changes; in  Lead: Diffusely; Other findings: abnormal ekg. This EKG was interpreted by Denys Denis MD,ED Provider. Progress Note:   Pt has been reexamined by Denys Denis MD. Pt is feeling much better. Symptoms have improved. All available results have been reviewed with pt and any available family. Pt understands sx, dx, and tx in ED. Care plan has been outlined and questions have been answered. Pt was given PO challenge that was well tolerated. She denies any further discomfort. Pt is ready to go home. Will send home on Gastroparesis/ HTN/ chronic pain/ hyperglycemia instructions. Prescription of Norco/ zofran. . Outpatient referral with PCP as needed. Written by Denys Denis MD,5:18 AM    .   .

## 2017-11-22 NOTE — ED TRIAGE NOTES
TRIAGE NOTE: Patient arrived from home with c/o upper abdominal pain that radiates to the back since about noon today. Patient took tylenol around 12pm with no relief. Denies urinary symptoms. +nausea. Patient was seen at [facility] today and discharged. Crying in triage.

## 2017-11-22 NOTE — DISCHARGE INSTRUCTIONS
We hope that we have addressed all of your medical concerns. The examination and treatment you received in the Emergency Department were for an emergent problem and were not intended as complete care. It is important that you follow up with your healthcare provider(s) for ongoing care. If your symptoms worsen or do not improve as expected, and you are unable to reach your usual health care provider(s), you should return to the Emergency Department. Today's healthcare is undergoing tremendous change, and patient satisfaction surveys are one of the many tools to assess the quality of medical care. You may receive a survey from the [employer] regarding your experience in the Emergency Department. I hope that your experience has been completely positive, particularly the medical care that I provided. As such, please participate in the survey; anything less than excellent does not meet my expectations or intentions. [facility] and 8 [facility] participate in nationally recognized quality of care measures. If your blood pressure is greater than 120/80, as reported below, we urge that you seek medical care to address the potential of high blood pressure, commonly known as hypertension. Hypertension can be hereditary or can be caused by certain medical conditions, pain, stress, or \"white coat syndrome. \"       Please make an appointment with your health care provider(s) for follow up of your Emergency Department visit. VITALS:   Patient Vitals for the past 8 hrs:   Temp Pulse Resp BP SpO2   11/22/17 0445 - - - 150/86 96 %   11/22/17 0430 - - - 146/85 97 %   11/22/17 0422 - (!) 109 - (!) 178/97 -   11/22/17 0330 - - - (!) 184/99 95 %   11/22/17 0315 - - - 156/86 95 %   11/22/17 0002 98.4 °F (36.9 °C) (!) 121 20 (!) 190/119 98 %          Thank you for allowing us to provide you with medical care today.   We realize that you have many choices for your emergency care needs. Please choose us in the future for any continued health care needs. Shilpi Damon MD    [address] [facility].   Office: [phone]            Recent Results (from the past 24 hour(s))   EKG, 12 LEAD, INITIAL    Collection Time: 11/22/17 12:07 AM   Result Value Ref Range    Ventricular Rate 117 BPM    Atrial Rate 117 BPM    P-R Interval 150 ms    QRS Duration 72 ms    Q-T Interval 338 ms    QTC Calculation (Bezet) 471 ms    Calculated P Axis 54 degrees    Calculated R Axis 10 degrees    Calculated T Axis 70 degrees    Diagnosis       Sinus tachycardia  When compared with ECG of 14-MAR-2017 08:33,  No significant change was found     CBC WITH AUTOMATED DIFF    Collection Time: 11/22/17 12:15 AM   Result Value Ref Range    WBC 9.4 3.6 - 11.0 K/uL    RBC 3.50 (L) 3.80 - 5.20 M/uL    HGB 10.6 (L) 11.5 - 16.0 g/dL    HCT 31.4 (L) 35.0 - 47.0 %    MCV 89.7 80.0 - 99.0 FL    MCH 30.3 26.0 - 34.0 PG    MCHC 33.8 30.0 - 36.5 g/dL    RDW 13.2 11.5 - 14.5 %    PLATELET 196 837 - 859 K/uL    NEUTROPHILS 77 (H) 32 - 75 %    LYMPHOCYTES 16 12 - 49 %    MONOCYTES 6 5 - 13 %    EOSINOPHILS 1 0 - 7 %    BASOPHILS 0 0 - 1 %    ABS. NEUTROPHILS 7.3 1.8 - 8.0 K/UL    ABS. LYMPHOCYTES 1.5 0.8 - 3.5 K/UL    ABS. MONOCYTES 0.5 0.0 - 1.0 K/UL    ABS. EOSINOPHILS 0.1 0.0 - 0.4 K/UL    ABS.  BASOPHILS 0.0 0.0 - 0.1 K/UL   METABOLIC PANEL, COMPREHENSIVE    Collection Time: 11/22/17 12:15 AM   Result Value Ref Range    Sodium 133 (L) 136 - 145 mmol/L    Potassium 4.3 3.5 - 5.1 mmol/L    Chloride 101 97 - 108 mmol/L    CO2 17 (L) 21 - 32 mmol/L    Anion gap 15 5 - 15 mmol/L    Glucose 418 (H) 65 - 100 mg/dL    BUN 63 (H) 6 - 20 MG/DL    Creatinine 5.08 (H) 0.55 - 1.02 MG/DL    BUN/Creatinine ratio 12 12 - 20      GFR est AA 11 (L) >60 ml/min/1.73m2    GFR est non-AA 9 (L) >60 ml/min/1.73m2    Calcium 9.8 8.5 - 10.1 MG/DL    Bilirubin, total 0.4 0.2 - 1.0 MG/DL    ALT (SGPT) 20 12 - 78 U/L    AST (SGOT) 25 15 - 37 U/L    Alk.  phosphatase 501 (H) 45 - 117 U/L    Protein, total 8.3 (H) 6.4 - 8.2 g/dL    Albumin 3.3 (L) 3.5 - 5.0 g/dL    Globulin 5.0 (H) 2.0 - 4.0 g/dL    A-G Ratio 0.7 (L) 1.1 - 2.2     LIPASE    Collection Time: 11/22/17 12:15 AM   Result Value Ref Range    Lipase 199 73 - 393 U/L   AMYLASE    Collection Time: 11/22/17 12:15 AM   Result Value Ref Range    Amylase 51 25 - 115 U/L   MAGNESIUM    Collection Time: 11/22/17 12:15 AM   Result Value Ref Range    Magnesium 2.4 1.6 - 2.4 mg/dL   PHOSPHORUS    Collection Time: 11/22/17 12:15 AM   Result Value Ref Range    Phosphorus 5.3 (H) 2.6 - 4.7 MG/DL   BETA-HYDROXYBUTYRATE    Collection Time: 11/22/17 12:15 AM   Result Value Ref Range    B-hydroxybutyrate 0.54 (H) <0.40 mmol/L   URINALYSIS W/MICROSCOPIC    Collection Time: 11/22/17  2:21 AM   Result Value Ref Range    Color YELLOW/STRAW      Appearance CLEAR CLEAR      Specific gravity 1.015 1.003 - 1.030      pH (UA) 6.0 5.0 - 8.0      Protein 100 (A) NEG mg/dL    Glucose >1000 (A) NEG mg/dL    Ketone NEGATIVE  NEG mg/dL    Bilirubin NEGATIVE  NEG      Blood SMALL (A) NEG      Urobilinogen 0.2 0.2 - 1.0 EU/dL    Nitrites NEGATIVE  NEG      Leukocyte Esterase NEGATIVE  NEG      WBC 0-4 0 - 4 /hpf    RBC 0-5 0 - 5 /hpf    Epithelial cells FEW FEW /lpf    Bacteria NEGATIVE  NEG /hpf    Hyaline cast 0-2 0 - 5 /lpf   URINE CULTURE HOLD SAMPLE    Collection Time: 11/22/17  2:21 AM   Result Value Ref Range    Urine culture hold URINE ON HOLD IN MICROBIOLOGY DEPT FOR 3 DAYS     GLUCOSE, POC    Collection Time: 11/22/17  3:18 AM   Result Value Ref Range    Glucose (POC) 416 (H) 65 - 100 mg/dL    Performed by Yazan GIRARD    POC VENOUS BLOOD GAS    Collection Time: 11/22/17  3:34 AM   Result Value Ref Range    Device: ROOM AIR      pH, venous (POC) 7.388 7.32 - 7.42      pCO2, venous (POC) 34.2 (L) 41 - 51 MMHG    pO2, venous (POC) 99 (H) 25 - 40 mmHg    HCO3, venous (POC) 20.6 (L) 23.0 - 28.0 MMOL/L    sO2, venous (POC) 98 (H) 65 - 88 %    Base deficit, venous (POC) 4 mmol/L    Allens test (POC) N/A      Site OTHER      Specimen type (POC) VENOUS BLOOD     GLUCOSE, POC    Collection Time: 11/22/17  4:54 AM   Result Value Ref Range    Glucose (POC) 274 (H) 65 - 100 mg/dL    Performed by Ryan GIRARD        Ct Abd Pelv Wo Cont    Result Date: 11/22/2017  EXAM:  CT ABD PELV WO CONT INDICATION: Epigastric pain since yesterday. COMPARISON: CT 10/2/2017. TECHNIQUE: Helical CT of the abdomen  and pelvis  without contrast. Coronal and sagittal reformats are performed. CT dose reduction was achieved through use of a standardized protocol tailored for this examination and automatic exposure control for dose modulation. Adaptive statistical iterative reconstruction (ASIR) was utilized. FINDINGS: Solid organ evaluation is limited without contrast. The visualized lung bases demonstrate no mass or consolidation. The heart size is normal. There is no pericardial or pleural effusion. There is no renal, ureteral, or bladder calculus. The kidneys are symmetric without hydronephrosis. There is no perinephric fluid or fat stranding. The liver, spleen, pancreas, and adrenal glands are normal.  The gall bladder is present  without intra- or extra-hepatic biliary dilatation. There are no dilated bowel loops. The appendix is normal.  There are no enlarged lymph nodes. There is no free fluid or free air. The aorta tapers without aneurysm. There is aortoiliac atherosclerosis and diffuse vascular calcification throughout the abdomen and pelvis. The urinary bladder is unremarkable. There is no adnexal mass. Uterine fibroids are again noted. Are degenerative changes in the spine without aggressive bony lesion. IMPRESSION: No acute abnormality in the abdomen or pelvis.             Chronic Pain: Care Instructions  Your Care Instructions    Chronic pain is pain that lasts a long time (months or even years) and may or may not have a clear cause. It is different from acute pain, which usually does have a clear cause-like an injury or illness-and gets better over time. Chronic pain:  · Lasts over time but may vary from day to day. · Does not go away despite efforts to end it. · May disrupt your sleep and lead to fatigue. · May cause depression or anxiety. · May make your muscles tense, causing more pain. · Can disrupt your work, hobbies, home life, and relationships with friends and family. Chronic pain is a very real condition. It is not just in your head. Treatment can help and usually includes several methods used together, such as medicines, physical therapy, exercise, and other treatments. Learning how to relax and changing negative thought patterns can also help you cope. Chronic pain is complex. Taking an active role in your treatment will help you better manage your pain. Tell your doctor if you have trouble dealing with your pain. You may have to try several things before you find what works best for you. Follow-up care is a key part of your treatment and safety. Be sure to make and go to all appointments, and call your doctor if you are having problems. It's also a good idea to know your test results and keep a list of the medicines you take. How can you care for yourself at home? · Pace yourself. Break up large jobs into smaller tasks. Save harder tasks for days when you have less pain, or go back and forth between hard tasks and easier ones. Take rest breaks. · Relax, and reduce stress. Relaxation techniques such as deep breathing or meditation can help. · Keep moving. Gentle, daily exercise can help reduce pain over the long run. Try low- or no-impact exercises such as walking, swimming, and stationary biking. Do stretches to stay flexible. · Try heat, cold packs, and massage. · Get enough sleep. Chronic pain can make you tired and drain your energy.  Talk with your doctor if you have trouble sleeping because of pain.  · Think positive. Your thoughts can affect your pain level. Do things that you enjoy to distract yourself when you have pain instead of focusing on the pain. See a movie, read a book, listen to music, or spend time with a friend. · If you think you are depressed, talk to your doctor about treatment. · Keep a daily pain diary. Record how your moods, thoughts, sleep patterns, activities, and medicine affect your pain. You may find that your pain is worse during or after certain activities or when you are feeling a certain emotion. Having a record of your pain can help you and your doctor find the best ways to treat your pain. · Take pain medicines exactly as directed. ¨ If the doctor gave you a prescription medicine for pain, take it as prescribed. ¨ If you are not taking a prescription pain medicine, ask your doctor if you can take an over-the-counter medicine. Reducing constipation caused by pain medicine  · Include fruits, vegetables, beans, and whole grains in your diet each day. These foods are high in fiber. · Drink plenty of fluids, enough so that your urine is light yellow or clear like water. If you have kidney, heart, or liver disease and have to limit fluids, talk with your doctor before you increase the amount of fluids you drink. · If your doctor recommends it, get more exercise. Walking is a good choice. Bit by bit, increase the amount you walk every day. Try for at least 30 minutes on most days of the week. · Schedule time each day for a bowel movement. A daily routine may help. Take your time and do not strain when having a bowel movement. When should you call for help? Call your doctor now or seek immediate medical care if:  ? · Your pain gets worse or is out of control. ? · You feel down or blue, or you do not enjoy things like you once did. You may be depressed, which is common in people with chronic pain. Depression can be treated.    ? · You have vomiting or cramps for more than 2 hours. ? Watch closely for changes in your health, and be sure to contact your doctor if:  ? · You cannot sleep because of pain. ? · You are very worried or anxious about your pain. ? · You have trouble taking your pain medicine. ? · You have any concerns about your pain medicine. ? · You have trouble with bowel movements, such as:  ¨ No bowel movement in 3 days. ¨ Blood in the anal area, in your stool, or on the toilet paper. ¨ Diarrhea for more than 24 hours. Where can you learn more? Go to http://nery-peña.info/. Enter N004 in the search box to learn more about \"Chronic Pain: Care Instructions. \"  Current as of: October 14, 2016  Content Version: 11.4  © 3522-1501 [employer] Care instructions adapted under license by [employer] (which disclaims liability or warranty for this information). If you have questions about a medical condition or this instruction, always ask your healthcare professional. [place] [address] any warranty or liability for your use of this information. Gastroparesis: Care Instructions  Your Care Instructions    When you have gastroparesis, your stomach takes a lot longer to empty. This delay can cause belly pain, bloating, and belching. It also can cause hiccups, heartburn, nausea or vomiting. You may not feel like eating. These symptoms may come and go. They most often occur during and after meals. You may feel full after only a few bites of food. This condition occurs when the nerves to the stomach don't work properly. Diabetes is the most common cause of this nerve damage. Gastroparesis can make it harder to control your blood sugar levels. But keeping your blood sugar levels under control may help with your symptoms. Parkinson's disease, stroke, and some medicines can also cause this condition. Home treatment can often help. Follow-up care is a key part of your treatment and safety.  Be sure to make and go to all appointments, and call your doctor if you are having problems. It's also a good idea to know your test results and keep a list of the medicines you take. How can you care for yourself at home? · Eat several small meals each day rather than three large meals. · Eat foods that are low in fiber and fat. · If your doctor suggests it, take medicines that help the stomach empty more quickly. These are called motility agents. When should you call for help? Call your doctor now or seek immediate medical care if:  ? · You are vomiting. ? · You have new or worse belly pain. ? · You have a fever. ? · You cannot pass stools or gas. ? Watch closely for changes in your health, and be sure to contact your doctor if you have any problems. Where can you learn more? Go to http://nery-peña.info/. Enter M106 in the search box to learn more about \"Gastroparesis: Care Instructions. \"  Current as of: May 12, 2017  Content Version: 11.4  © 5052-9184 [employer]. Care instructions adapted under license by [employer] (which disclaims liability or warranty for this information). If you have questions about a medical condition or this instruction, always ask your healthcare professional. [address] any warranty or liability for your use of this information. Kidney Disease and High Blood Pressure: Care Instructions  Your Care Instructions    Long-term (chronic) kidney disease happens when the kidneys cannot remove waste and keep your body's fluids and chemicals in balance. Usually, the kidneys remove waste from the blood through the urine. When the kidneys are not working well, waste can build up so much that it poisons the body. Kidney disease can make you very tired. It also can cause swelling, or edema, in your legs or other areas of your body. High blood pressure is one of the major causes of chronic kidney disease.  And kidney disease can also cause high blood pressure. No matter which came first, having high blood pressure damages the tiny blood vessels in the kidneys. If you have high blood pressure, it is important to lower it. There are many things you can do to lower your blood pressure, which may help slow or stop the damage to your kidneys. Follow-up care is a key part of your treatment and safety. Be sure to make and go to all appointments, and call your doctor if you are having problems. It's also a good idea to know your test results and keep a list of the medicines you take. How can you care for yourself at home? · Be safe with medicines. Take your medicines exactly as prescribed. Call your doctor if you have any problems with your medicine. You will probably need more than one medicine to lower your blood pressure. You will get more details on the specific medicines your doctor prescribes. · Work with your doctor and a dietitian to plan meals that have the right amount of nutrients for you. You will probably have to limit salt, fluids, and protein. · Stay at a healthy weight. This is very important if you put on weight around the waist. Losing even 10 pounds can help you lower your blood pressure. · Manage other health problems such as diabetes and high cholesterol. You can help lower your risk for heart disease and blood vessel problems with a healthy lifestyle along with medicines. · Do not take ibuprofen (Advil, Motrin) or naproxen (Aleve), or similar medicines, unless your doctor tells you to. They may make chronic kidney disease worse. It is okay to take acetaminophen (Tylenol). · If your doctor recommends it, get more exercise. Walking is a good choice. Bit by bit, increase the amount you walk every day. Try for at least 30 minutes on most days of the week. You also may want to swim, bike, or do other activities. · Limit or avoid alcohol. Talk to your doctor about whether you can drink any alcohol.   · Do not smoke or allow others to smoke around you. If you need help quitting, talk to your doctor about stop-smoking programs and medicines. These can increase your chances of quitting for good. When should you call for help? Call 911 anytime you think you may need emergency care. For example, call if:  ? · You passed out (lost consciousness). ?Call your doctor now or seek immediate medical care if:  ? · You have new or worse nausea and vomiting. ? · You have much less urine than normal, or you have no urine. ? · You are feeling confused or cannot think clearly. ? · You have new or more blood in your urine. ? · You have new swelling. ? · You are dizzy or lightheaded, or you feel like you may faint. ? Watch closely for changes in your health, and be sure to contact your doctor if:  ? · You do not get better as expected. Where can you learn more? Go to http://nery-peña.info/. Enter E358 in the search box to learn more about \"Kidney Disease and High Blood Pressure: Care Instructions. \"  Current as of: May 12, 2017  Content Version: 11.4  © 0461-3966 [employer]. Care instructions adapted under license by [employer] (which disclaims liability or warranty for this information). If you have questions about a medical condition or this instruction, always ask your healthcare professional. [address] any warranty or liability for your use of this information.

## 2017-11-23 ENCOUNTER — HOSPITAL ENCOUNTER (EMERGENCY)
Age: 52
Discharge: HOME | End: 2017-11-23
Attending: STUDENT IN AN ORGANIZED HEALTH CARE EDUCATION/TRAINING PROGRAM
Payer: MEDICARE

## 2017-11-23 VITALS
WEIGHT: 142.13 LBS | SYSTOLIC BLOOD PRESSURE: 185 MMHG | BODY MASS INDEX: 23.68 KG/M2 | DIASTOLIC BLOOD PRESSURE: 95 MMHG | TEMPERATURE: 98.7 F | HEIGHT: 65 IN | RESPIRATION RATE: 16 BRPM | HEART RATE: 104 BPM | OXYGEN SATURATION: 98 %

## 2017-11-23 DIAGNOSIS — R10.84 ABDOMINAL PAIN, GENERALIZED: Primary | ICD-10-CM

## 2017-11-23 LAB
ALBUMIN SERPL-MCNC: 3.3 G/DL (ref 3.5–5)
ALBUMIN/GLOB SERPL: 0.7 {RATIO} (ref 1.1–2.2)
ALP SERPL-CCNC: 504 U/L (ref 45–117)
ALT SERPL-CCNC: 21 U/L (ref 12–78)
ANION GAP SERPL CALC-SCNC: 13 MMOL/L (ref 5–15)
APPEARANCE UR: ABNORMAL
AST SERPL-CCNC: 18 U/L (ref 15–37)
BACTERIA URNS QL MICRO: ABNORMAL /HPF
BASOPHILS # BLD: 0 K/UL (ref 0–0.1)
BASOPHILS NFR BLD: 0 % (ref 0–1)
BILIRUB SERPL-MCNC: 0.4 MG/DL (ref 0.2–1)
BILIRUB UR QL: NEGATIVE
BUN SERPL-MCNC: 61 MG/DL (ref 6–20)
BUN/CREAT SERPL: 12 (ref 12–20)
CALCIUM SERPL-MCNC: 9 MG/DL (ref 8.5–10.1)
CHLORIDE SERPL-SCNC: 102 MMOL/L (ref 97–108)
CO2 SERPL-SCNC: 22 MMOL/L (ref 21–32)
COLOR UR: ABNORMAL
CREAT SERPL-MCNC: 5.13 MG/DL (ref 0.55–1.02)
EOSINOPHIL # BLD: 0.1 K/UL (ref 0–0.4)
EOSINOPHIL NFR BLD: 2 % (ref 0–7)
EPITH CASTS URNS QL MICRO: ABNORMAL /LPF
ERYTHROCYTE [DISTWIDTH] IN BLOOD BY AUTOMATED COUNT: 13.2 % (ref 11.5–14.5)
GLOBULIN SER CALC-MCNC: 4.8 G/DL (ref 2–4)
GLUCOSE SERPL-MCNC: 237 MG/DL (ref 65–100)
GLUCOSE UR STRIP.AUTO-MCNC: 500 MG/DL
HCT VFR BLD AUTO: 30.8 % (ref 35–47)
HGB BLD-MCNC: 10.2 G/DL (ref 11.5–16)
HGB UR QL STRIP: ABNORMAL
HYALINE CASTS URNS QL MICRO: ABNORMAL /LPF (ref 0–5)
KETONES UR QL STRIP.AUTO: NEGATIVE MG/DL
LEUKOCYTE ESTERASE UR QL STRIP.AUTO: NEGATIVE
LIPASE SERPL-CCNC: 199 U/L (ref 73–393)
LYMPHOCYTES # BLD: 1.6 K/UL (ref 0.8–3.5)
LYMPHOCYTES NFR BLD: 18 % (ref 12–49)
MCH RBC QN AUTO: 29.7 PG (ref 26–34)
MCHC RBC AUTO-ENTMCNC: 33.1 G/DL (ref 30–36.5)
MCV RBC AUTO: 89.8 FL (ref 80–99)
MONOCYTES # BLD: 0.4 K/UL (ref 0–1)
MONOCYTES NFR BLD: 4 % (ref 5–13)
NEUTS SEG # BLD: 6.7 K/UL (ref 1.8–8)
NEUTS SEG NFR BLD: 76 % (ref 32–75)
NITRITE UR QL STRIP.AUTO: NEGATIVE
PH UR STRIP: 7 [PH] (ref 5–8)
PLATELET # BLD AUTO: 294 K/UL (ref 150–400)
POTASSIUM SERPL-SCNC: 3.7 MMOL/L (ref 3.5–5.1)
PROT SERPL-MCNC: 8.1 G/DL (ref 6.4–8.2)
PROT UR STRIP-MCNC: 300 MG/DL
RBC # BLD AUTO: 3.43 M/UL (ref 3.8–5.2)
RBC #/AREA URNS HPF: ABNORMAL /HPF (ref 0–5)
SODIUM SERPL-SCNC: 137 MMOL/L (ref 136–145)
SP GR UR REFRACTOMETRY: 1.01 (ref 1–1.03)
UROBILINOGEN UR QL STRIP.AUTO: 0.2 EU/DL (ref 0.2–1)
WBC # BLD AUTO: 8.7 K/UL (ref 3.6–11)
WBC URNS QL MICRO: ABNORMAL /HPF (ref 0–4)

## 2017-11-23 PROCEDURE — 80053 COMPREHEN METABOLIC PANEL: CPT | Performed by: STUDENT IN AN ORGANIZED HEALTH CARE EDUCATION/TRAINING PROGRAM

## 2017-11-23 PROCEDURE — 96374 THER/PROPH/DIAG INJ IV PUSH: CPT

## 2017-11-23 PROCEDURE — 96376 TX/PRO/DX INJ SAME DRUG ADON: CPT

## 2017-11-23 PROCEDURE — 99284 EMERGENCY DEPT VISIT MOD MDM: CPT

## 2017-11-23 PROCEDURE — 74011250636 HC RX REV CODE- 250/636: Performed by: STUDENT IN AN ORGANIZED HEALTH CARE EDUCATION/TRAINING PROGRAM

## 2017-11-23 PROCEDURE — 96361 HYDRATE IV INFUSION ADD-ON: CPT

## 2017-11-23 PROCEDURE — 83690 ASSAY OF LIPASE: CPT | Performed by: STUDENT IN AN ORGANIZED HEALTH CARE EDUCATION/TRAINING PROGRAM

## 2017-11-23 PROCEDURE — 81001 URINALYSIS AUTO W/SCOPE: CPT | Performed by: STUDENT IN AN ORGANIZED HEALTH CARE EDUCATION/TRAINING PROGRAM

## 2017-11-23 PROCEDURE — 85025 COMPLETE CBC W/AUTO DIFF WBC: CPT | Performed by: STUDENT IN AN ORGANIZED HEALTH CARE EDUCATION/TRAINING PROGRAM

## 2017-11-23 PROCEDURE — 36415 COLL VENOUS BLD VENIPUNCTURE: CPT | Performed by: STUDENT IN AN ORGANIZED HEALTH CARE EDUCATION/TRAINING PROGRAM

## 2017-11-23 RX ORDER — HYDROMORPHONE HYDROCHLORIDE 1 MG/ML
2 INJECTION, SOLUTION INTRAMUSCULAR; INTRAVENOUS; SUBCUTANEOUS ONCE
Status: DISCONTINUED | OUTPATIENT
Start: 2017-11-23 | End: 2017-11-23

## 2017-11-23 RX ORDER — HYDROMORPHONE HYDROCHLORIDE 2 MG/ML
2 INJECTION, SOLUTION INTRAMUSCULAR; INTRAVENOUS; SUBCUTANEOUS ONCE
Status: COMPLETED | OUTPATIENT
Start: 2017-11-23 | End: 2017-11-23

## 2017-11-23 RX ORDER — HYDROMORPHONE HYDROCHLORIDE 2 MG/ML
1 INJECTION, SOLUTION INTRAMUSCULAR; INTRAVENOUS; SUBCUTANEOUS
Status: COMPLETED | OUTPATIENT
Start: 2017-11-23 | End: 2017-11-23

## 2017-11-23 RX ORDER — LORAZEPAM 2 MG/ML
2 INJECTION INTRAMUSCULAR
Status: DISCONTINUED | OUTPATIENT
Start: 2017-11-23 | End: 2017-11-23 | Stop reason: HOSPADM

## 2017-11-23 RX ADMIN — HYDROMORPHONE HYDROCHLORIDE 1 MG: 2 INJECTION, SOLUTION INTRAMUSCULAR; INTRAVENOUS; SUBCUTANEOUS at 12:13

## 2017-11-23 RX ADMIN — HYDROMORPHONE HYDROCHLORIDE 1 MG: 2 INJECTION, SOLUTION INTRAMUSCULAR; INTRAVENOUS; SUBCUTANEOUS at 13:19

## 2017-11-23 RX ADMIN — SODIUM CHLORIDE 1000 ML: 900 INJECTION, SOLUTION INTRAVENOUS at 12:00

## 2017-11-23 NOTE — DISCHARGE INSTRUCTIONS
Abdominal Pain: Care Instructions  Your Care Instructions    Abdominal pain has many possible causes. Some aren't serious and get better on their own in a few days. Others need more testing and treatment. If your pain continues or gets worse, you need to be rechecked and may need more tests to find out what is wrong. You may need surgery to correct the problem. Don't ignore new symptoms, such as fever, nausea and vomiting, urination problems, pain that gets worse, and dizziness. These may be signs of a more serious problem. Your doctor may have recommended a follow-up visit in the next 8 to 12 hours. If you are not getting better, you may need more tests or treatment. The doctor has checked you carefully, but problems can develop later. If you notice any problems or new symptoms, get medical treatment right away. Follow-up care is a key part of your treatment and safety. Be sure to make and go to all appointments, and call your doctor if you are having problems. It's also a good idea to know your test results and keep a list of the medicines you take. How can you care for yourself at home? · Rest until you feel better. · To prevent dehydration, drink plenty of fluids, enough so that your urine is light yellow or clear like water. Choose water and other caffeine-free clear liquids until you feel better. If you have kidney, heart, or liver disease and have to limit fluids, talk with your doctor before you increase the amount of fluids you drink. · If your stomach is upset, eat mild foods, such as rice, dry toast or crackers, bananas, and applesauce. Try eating several small meals instead of two or three large ones. · Wait until 48 hours after all symptoms have gone away before you have spicy foods, alcohol, and drinks that contain caffeine. · Do not eat foods that are high in fat. · Avoid anti-inflammatory medicines such as aspirin, ibuprofen (Advil, Motrin), and naproxen (Aleve).  These can cause stomach upset. Talk to your doctor if you take daily aspirin for another health problem. When should you call for help? Call 911 anytime you think you may need emergency care. For example, call if:  ? · You passed out (lost consciousness). ? · You pass maroon or very bloody stools. ? · You vomit blood or what looks like coffee grounds. ? · You have new, severe belly pain. ?Call your doctor now or seek immediate medical care if:  ? · Your pain gets worse, especially if it becomes focused in one area of your belly. ? · You have a new or higher fever. ? · Your stools are black and look like tar, or they have streaks of blood. ? · You have unexpected vaginal bleeding. ? · You have symptoms of a urinary tract infection. These may include:  ¨ Pain when you urinate. ¨ Urinating more often than usual.  ¨ Blood in your urine. ? · You are dizzy or lightheaded, or you feel like you may faint. ? Watch closely for changes in your health, and be sure to contact your doctor if:  ? · You are not getting better after 1 day (24 hours). Where can you learn more? Go to http://nery-peña.info/. Enter G783 in the search box to learn more about \"Abdominal Pain: Care Instructions. \"  Current as of: March 20, 2017  Content Version: 11.4  © 0702-2531 [employer]. Care instructions adapted under license by [employer] (which disclaims liability or warranty for this information). If you have questions about a medical condition or this instruction, always ask your healthcare professional. [place] [address] any warranty or liability for your use of this information.

## 2017-11-23 NOTE — ED NOTES
1145: Assumed care of pt. Bedside report received from Jose Atwood RN. Paramedic and RN at bedside for [address] IV placement. Pt placed on monitor x3.   states the problem is \"uncontrollable blood sugar, this morning it was 172\"
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
## 2017-11-23 NOTE — ED TRIAGE NOTES
Triage Note: Patient is coming in with abdominal pain for awhile was seen here in the ED on Monday. Patient has been having nausea and vomiting. Patient took 1 Hydrocodone pill about 1 hour ago. Patient is crying uncontrollably in triage.

## 2017-11-23 NOTE — ED NOTES
Patient verbalizes understanding of discharge instructions. Minda wheeled to waiting room in no acute distress at discharge.

## 2018-01-22 ENCOUNTER — HOSPITAL ENCOUNTER (EMERGENCY)
Age: 53
Discharge: LEFT BEFORE DISCHARGE | End: 2018-01-22
Attending: EMERGENCY MEDICINE
Payer: MEDICARE

## 2018-01-22 VITALS
DIASTOLIC BLOOD PRESSURE: 82 MMHG | BODY MASS INDEX: 24.16 KG/M2 | HEART RATE: 83 BPM | OXYGEN SATURATION: 98 % | TEMPERATURE: 98 F | SYSTOLIC BLOOD PRESSURE: 154 MMHG | HEIGHT: 65 IN | RESPIRATION RATE: 16 BRPM | WEIGHT: 145 LBS

## 2018-01-22 DIAGNOSIS — Z53.21 PATIENT LEFT WITHOUT BEING SEEN: Primary | ICD-10-CM

## 2018-01-22 PROCEDURE — 99281 EMR DPT VST MAYX REQ PHY/QHP: CPT

## 2018-01-23 NOTE — ED TRIAGE NOTES
Pt states that she has pain in her right and left thigh that is \"gettin worser\", pt states that she has had this pain for about a month, pt denies any injury, pt states that the pain started in her back put moved to her thighs.
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
## 2018-02-27 ENCOUNTER — HOSPITAL ENCOUNTER (EMERGENCY)
Age: 53
Discharge: HOME | End: 2018-02-27
Attending: EMERGENCY MEDICINE
Payer: MEDICARE

## 2018-02-27 ENCOUNTER — APPOINTMENT (OUTPATIENT)
Dept: GENERAL RADIOLOGY | Age: 53
End: 2018-02-27
Attending: EMERGENCY MEDICINE
Payer: MEDICARE

## 2018-02-27 VITALS
SYSTOLIC BLOOD PRESSURE: 160 MMHG | HEIGHT: 65 IN | WEIGHT: 145 LBS | OXYGEN SATURATION: 100 % | HEART RATE: 76 BPM | TEMPERATURE: 97.4 F | RESPIRATION RATE: 16 BRPM | BODY MASS INDEX: 24.16 KG/M2 | DIASTOLIC BLOOD PRESSURE: 95 MMHG

## 2018-02-27 DIAGNOSIS — M25.561 ARTHRALGIA OF BOTH LOWER LEGS: Primary | ICD-10-CM

## 2018-02-27 DIAGNOSIS — M25.562 ARTHRALGIA OF BOTH LOWER LEGS: Primary | ICD-10-CM

## 2018-02-27 LAB
ALBUMIN SERPL-MCNC: 3 G/DL (ref 3.5–5)
ALBUMIN/GLOB SERPL: 0.7 {RATIO} (ref 1.1–2.2)
ALP SERPL-CCNC: 359 U/L (ref 45–117)
ALT SERPL-CCNC: 19 U/L (ref 12–78)
ANION GAP SERPL CALC-SCNC: 12 MMOL/L (ref 5–15)
APPEARANCE UR: CLEAR
AST SERPL-CCNC: 14 U/L (ref 15–37)
BACTERIA URNS QL MICRO: NEGATIVE /HPF
BILIRUB SERPL-MCNC: 0.4 MG/DL (ref 0.2–1)
BILIRUB UR QL: NEGATIVE
BUN SERPL-MCNC: 79 MG/DL (ref 6–20)
BUN/CREAT SERPL: 14 (ref 12–20)
CALCIUM SERPL-MCNC: 8.3 MG/DL (ref 8.5–10.1)
CHLORIDE SERPL-SCNC: 103 MMOL/L (ref 97–108)
CK SERPL-CCNC: 92 U/L (ref 26–192)
CO2 SERPL-SCNC: 21 MMOL/L (ref 21–32)
COLOR UR: ABNORMAL
CREAT SERPL-MCNC: 5.58 MG/DL (ref 0.55–1.02)
EPITH CASTS URNS QL MICRO: ABNORMAL /LPF
GLOBULIN SER CALC-MCNC: 4.6 G/DL (ref 2–4)
GLUCOSE SERPL-MCNC: 107 MG/DL (ref 65–100)
GLUCOSE UR STRIP.AUTO-MCNC: 250 MG/DL
HGB UR QL STRIP: ABNORMAL
KETONES UR QL STRIP.AUTO: NEGATIVE MG/DL
LEUKOCYTE ESTERASE UR QL STRIP.AUTO: NEGATIVE
NITRITE UR QL STRIP.AUTO: NEGATIVE
PH UR STRIP: 7 [PH] (ref 5–8)
POTASSIUM SERPL-SCNC: 3.9 MMOL/L (ref 3.5–5.1)
PROT SERPL-MCNC: 7.6 G/DL (ref 6.4–8.2)
PROT UR STRIP-MCNC: 100 MG/DL
RBC #/AREA URNS HPF: ABNORMAL /HPF (ref 0–5)
SODIUM SERPL-SCNC: 136 MMOL/L (ref 136–145)
SP GR UR REFRACTOMETRY: 1.01 (ref 1–1.03)
UA: UC IF INDICATED,UAUC: ABNORMAL
UROBILINOGEN UR QL STRIP.AUTO: 0.2 EU/DL (ref 0.2–1)
WBC URNS QL MICRO: ABNORMAL /HPF (ref 0–4)

## 2018-02-27 PROCEDURE — 99283 EMERGENCY DEPT VISIT LOW MDM: CPT

## 2018-02-27 PROCEDURE — 74011250636 HC RX REV CODE- 250/636: Performed by: EMERGENCY MEDICINE

## 2018-02-27 PROCEDURE — 81001 URINALYSIS AUTO W/SCOPE: CPT | Performed by: EMERGENCY MEDICINE

## 2018-02-27 PROCEDURE — 72100 X-RAY EXAM L-S SPINE 2/3 VWS: CPT

## 2018-02-27 PROCEDURE — 96374 THER/PROPH/DIAG INJ IV PUSH: CPT

## 2018-02-27 PROCEDURE — 93970 EXTREMITY STUDY: CPT

## 2018-02-27 PROCEDURE — 80053 COMPREHEN METABOLIC PANEL: CPT | Performed by: EMERGENCY MEDICINE

## 2018-02-27 PROCEDURE — 82550 ASSAY OF CK (CPK): CPT | Performed by: EMERGENCY MEDICINE

## 2018-02-27 RX ORDER — HYDROCODONE BITARTRATE AND ACETAMINOPHEN 5; 325 MG/1; MG/1
1 TABLET ORAL
Qty: 12 TAB | Refills: 0 | Status: SHIPPED | OUTPATIENT
Start: 2018-02-27 | End: 2018-04-18

## 2018-02-27 RX ORDER — KETOROLAC TROMETHAMINE 30 MG/ML
30 INJECTION, SOLUTION INTRAMUSCULAR; INTRAVENOUS
Status: COMPLETED | OUTPATIENT
Start: 2018-02-27 | End: 2018-02-27

## 2018-02-27 RX ADMIN — KETOROLAC TROMETHAMINE 30 MG: 30 INJECTION, SOLUTION INTRAMUSCULAR at 14:29

## 2018-02-27 NOTE — DISCHARGE INSTRUCTIONS
Musculoskeletal Pain: Care Instructions  Your Care Instructions    Different problems with the bones, muscles, nerves, ligaments, and tendons in the body can cause pain. One or more areas of your body may ache or burn. Or they may feel tired, stiff, or sore. The medical term for this type of pain is musculoskeletal pain. It can have many different causes. Sometimes the pain is caused by an injury such as a strain or sprain. Or you might have pain from using one part of your body in the same way over and over again. This is called overuse. In some cases, the cause of the pain is another health problem such as arthritis or fibromyalgia. The doctor will examine you and ask you questions about your health to help find the cause of your pain. Blood tests or imaging tests like an X-ray may also be helpful. But sometimes doctors can't find a cause of the pain. Treatment depends on your symptoms and the cause of the pain, if known. The doctor has checked you carefully, but problems can develop later. If you notice any problems or new symptoms, get medical treatment right away. Follow-up care is a key part of your treatment and safety. Be sure to make and go to all appointments, and call your doctor if you are having problems. It's also a good idea to know your test results and keep a list of the medicines you take. How can you care for yourself at home? · Rest until you feel better. · Do not do anything that makes the pain worse. Return to exercise gradually if you feel better and your doctor says it's okay. · Be safe with medicines. Read and follow all instructions on the label. ¨ If the doctor gave you a prescription medicine for pain, take it as prescribed. ¨ If you are not taking a prescription pain medicine, ask your doctor if you can take an over-the-counter medicine. · Put ice or a cold pack on the area for 10 to 20 minutes at a time to ease pain.  Put a thin cloth between the ice and your skin.  When should you call for help? Call your doctor now or seek immediate medical care if:  ? · You have new pain, or your pain gets worse. ? · You have new symptoms such as a fever, a rash, or chills. ? Watch closely for changes in your health, and be sure to contact your doctor if:  ? · You do not get better as expected. Where can you learn more? Go to http://nery-peña.info/. Enter Y719 in the search box to learn more about \"Musculoskeletal Pain: Care Instructions. \"  Current as of: October 14, 2016  Content Version: 11.4  © 8175-6203 [employer]. Care instructions adapted under license by [employer] (which disclaims liability or warranty for this information). If you have questions about a medical condition or this instruction, always ask your healthcare professional. [address] any warranty or liability for your use of this information. Leg Pain: Care Instructions  Your Care Instructions  Many things can cause leg pain. Too much exercise or overuse can cause a muscle cramp (or charley horse). You can get leg cramps from not eating a balanced diet that has enough potassium, calcium, and other minerals. If you do not drink enough fluids or are taking certain medicines, you may develop leg cramps. Other causes of leg pain include injuries, blood flow problems, nerve damage, and twisted and enlarged veins (varicose veins). You can usually ease pain with self-care. Your doctor may recommend that you rest your leg and keep it elevated. Follow-up care is a key part of your treatment and safety. Be sure to make and go to all appointments, and call your doctor if you are having problems. It's also a good idea to know your test results and keep a list of the medicines you take. How can you care for yourself at home? · Take pain medicines exactly as directed.   ¨ If the doctor gave you a prescription medicine for pain, take it as prescribed. ¨ If you are not taking a prescription pain medicine, ask your doctor if you can take an over-the-counter medicine. · Take any other medicines exactly as prescribed. Call your doctor if you think you are having a problem with your medicine. · Rest your leg while you have pain, and avoid standing for long periods of time. · Prop up your leg at or above the level of your heart when possible. · Make sure you are eating a balanced diet that is rich in calcium, potassium, and magnesium, especially if you are pregnant. · If directed by your doctor, put ice or a cold pack on the area for 10 to 20 minutes at a time. Put a thin cloth between the ice and your skin. · Your leg may be in a splint, a brace, or an elastic bandage, and you may have crutches to help you walk. Follow your doctor's directions about how long to wear supports and how to use the crutches. When should you call for help? Call 911 anytime you think you may need emergency care. For example, call if:  ? · You have sudden chest pain and shortness of breath, or you cough up blood. ? · Your leg is cool or pale or changes color. ?Call your doctor now or seek immediate medical care if:  ? · You have increasing or severe pain. ? · Your leg suddenly feels weak and you cannot move it. ? · You have signs of a blood clot, such as:  ¨ Pain in your calf, back of the knee, thigh, or groin. ¨ Redness and swelling in your leg or groin. ? · You have signs of infection, such as:  ¨ Increased pain, swelling, warmth, or redness. ¨ Red streaks leading from the sore area. ¨ Pus draining from a place on your leg. ¨ A fever. ? · You cannot bear weight on your leg. ? Watch closely for changes in your health, and be sure to contact your doctor if:  ? · You do not get better as expected. Where can you learn more? Go to http://nery-peña.info/.   Enter Z442 in the search box to learn more about \"Leg Pain: Care Instructions. \"  Current as of: March 20, 2017  Content Version: 11.4  © 4194-7207 [employer], [facility]. Care instructions adapted under license by [employer] (which disclaims liability or warranty for this information). If you have questions about a medical condition or this instruction, always ask your healthcare professional. [place] [address] any warranty or liability for your use of this information.
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
## 2018-02-27 NOTE — PROCEDURES
[facility]  *** FINAL REPORT ***    Name: Zenon Reyes  MRN: [id number]  : 01 Dec 1965  HIS Order #: [id number]  [address] [facility] Visit #: [id number]  Date: 2018    TYPE OF TEST: Peripheral Venous Testing    REASON FOR TEST  Pain in limb    Right Leg:-  Deep venous thrombosis:           No  Superficial venous thrombosis:    No  Deep venous insufficiency:        Not examined  Superficial venous insufficiency: Not examined    Left Leg:-  Deep venous thrombosis:           No  Superficial venous thrombosis:    No  Deep venous insufficiency:        Not examined  Superficial venous insufficiency: Not examined      INTERPRETATION/FINDINGS  PROCEDURE:  Color duplex ultrasound imaging of lower extremity veins. FINDINGS:       Right: The common femoral, deep femoral, femoral, popliteal,  posterior tibial, peroneal, and great saphenous are patent and without   evidence of thrombus;  each is fully compressible and there is no  narrowing of the flow channel on color Doppler imaging. Phasic flow  is observed in the common femoral vein. Left:   The common femoral, deep femoral, femoral, popliteal,  posterior tibial, peroneal, and great saphenous are patent and without   evidence of thrombus;  each is fully compressible and there is no  narrowing of the flow channel on color Doppler imaging. Phasic flow  is observed in the common femoral vein. IMPRESSION:  No evidence of right or left lower extremity vein  thrombosis. Incidentally, extensive bilateral lower extremity  arterial calcification is noted. Doppler waveforms from the distal  anterior tibial artery are multiphasic bilaterally, which is not  consistent with hemodynamically significant lower extremity arterial  obstruction. ADDITIONAL COMMENTS    I have personally reviewed the data relevant to the interpretation of  this  study.     TECHNOLOGIST: Alex Palacio RVT  Signed: 2018 03:28 PM    PHYSICIAN: Leticia Chacko MD  Signed: 2018 04:12 PM

## 2018-02-27 NOTE — ED NOTES
The patient was given discharge instructions and questions were answered. Patient is in no apparent distress at time of discharge. Wheeled from department by .

## 2018-04-18 ENCOUNTER — HOSPITAL ENCOUNTER (EMERGENCY)
Age: 53
Discharge: HOME | End: 2018-04-18
Attending: EMERGENCY MEDICINE
Payer: MEDICARE

## 2018-04-18 VITALS
HEART RATE: 103 BPM | TEMPERATURE: 98.8 F | OXYGEN SATURATION: 100 % | BODY MASS INDEX: 24.16 KG/M2 | DIASTOLIC BLOOD PRESSURE: 87 MMHG | WEIGHT: 145 LBS | RESPIRATION RATE: 16 BRPM | SYSTOLIC BLOOD PRESSURE: 158 MMHG | HEIGHT: 65 IN

## 2018-04-18 DIAGNOSIS — L02.212 ABSCESS OF BACK: Primary | ICD-10-CM

## 2018-04-18 DIAGNOSIS — L03.312 CELLULITIS OF BACK: ICD-10-CM

## 2018-04-18 PROCEDURE — 99283 EMERGENCY DEPT VISIT LOW MDM: CPT

## 2018-04-18 PROCEDURE — 77030019895 HC PCKNG STRP IODO -A

## 2018-04-18 PROCEDURE — 75810000289 HC I&D ABSCESS SIMP/COMP/MULT

## 2018-04-18 RX ORDER — HYDROCODONE BITARTRATE AND ACETAMINOPHEN 5; 325 MG/1; MG/1
1 TABLET ORAL
Qty: 12 TAB | Refills: 0 | Status: SHIPPED | OUTPATIENT
Start: 2018-04-18 | End: 2018-04-21 | Stop reason: ALTCHOICE

## 2018-04-18 RX ORDER — LIDOCAINE HYDROCHLORIDE AND EPINEPHRINE 10; 10 MG/ML; UG/ML
1.5 INJECTION, SOLUTION INFILTRATION; PERINEURAL ONCE
Status: DISCONTINUED | OUTPATIENT
Start: 2018-04-18 | End: 2018-04-18 | Stop reason: HOSPADM

## 2018-04-18 RX ORDER — SULFAMETHOXAZOLE AND TRIMETHOPRIM 800; 160 MG/1; MG/1
2 TABLET ORAL 2 TIMES DAILY
Qty: 40 TAB | Refills: 0 | Status: SHIPPED | OUTPATIENT
Start: 2018-04-18 | End: 2018-04-28

## 2018-04-18 RX ORDER — IBUPROFEN 800 MG/1
800 TABLET ORAL
Qty: 20 TAB | Refills: 0 | Status: SHIPPED | OUTPATIENT
Start: 2018-04-18 | End: 2018-04-25

## 2018-04-18 NOTE — ED TRIAGE NOTES
Assumed care of this patient. She is alert and oriented x3. She is sitting on edge of bed in no acute distress, placed in gown. Callbell within reach. Patient reports that she has been having bumps appear on her trunk and back and one area has now become tender, swollen and reddened.
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
## 2018-04-21 ENCOUNTER — APPOINTMENT (OUTPATIENT)
Dept: CT IMAGING | Age: 53
End: 2018-04-21
Attending: EMERGENCY MEDICINE
Payer: MEDICARE

## 2018-04-21 ENCOUNTER — HOSPITAL ENCOUNTER (EMERGENCY)
Age: 53
Discharge: HOME | End: 2018-04-21
Attending: EMERGENCY MEDICINE
Payer: MEDICARE

## 2018-04-21 VITALS
WEIGHT: 145 LBS | OXYGEN SATURATION: 99 % | RESPIRATION RATE: 16 BRPM | DIASTOLIC BLOOD PRESSURE: 110 MMHG | SYSTOLIC BLOOD PRESSURE: 197 MMHG | TEMPERATURE: 98.9 F | HEART RATE: 112 BPM | HEIGHT: 65 IN | BODY MASS INDEX: 24.16 KG/M2

## 2018-04-21 DIAGNOSIS — N18.4 CKD (CHRONIC KIDNEY DISEASE) STAGE 4, GFR 15-29 ML/MIN (HCC): Chronic | ICD-10-CM

## 2018-04-21 DIAGNOSIS — R10.9 INTRACTABLE ABDOMINAL PAIN: Primary | ICD-10-CM

## 2018-04-21 DIAGNOSIS — R10.9 LEFT FLANK PAIN: ICD-10-CM

## 2018-04-21 LAB
ALBUMIN SERPL-MCNC: 3.2 G/DL (ref 3.5–5)
ALBUMIN/GLOB SERPL: 0.6 {RATIO} (ref 1.1–2.2)
ALP SERPL-CCNC: 211 U/L (ref 45–117)
ALT SERPL-CCNC: 19 U/L (ref 12–78)
ANION GAP BLD CALC-SCNC: 20 MMOL/L (ref 10–20)
ANION GAP SERPL CALC-SCNC: 15 MMOL/L (ref 5–15)
AST SERPL-CCNC: 21 U/L (ref 15–37)
BASOPHILS # BLD: 0 K/UL (ref 0–0.1)
BASOPHILS NFR BLD: 0 % (ref 0–1)
BILIRUB SERPL-MCNC: 0.3 MG/DL (ref 0.2–1)
BUN BLD-MCNC: 82 MG/DL (ref 9–20)
BUN SERPL-MCNC: 81 MG/DL (ref 6–20)
BUN/CREAT SERPL: 13 (ref 12–20)
CA-I BLD-MCNC: 1.07 MMOL/L (ref 1.12–1.32)
CALCIUM SERPL-MCNC: 9.3 MG/DL (ref 8.5–10.1)
CHLORIDE BLD-SCNC: 95 MMOL/L (ref 98–107)
CHLORIDE SERPL-SCNC: 95 MMOL/L (ref 97–108)
CO2 BLD-SCNC: 20 MMOL/L (ref 21–32)
CO2 SERPL-SCNC: 20 MMOL/L (ref 21–32)
CREAT BLD-MCNC: 6.5 MG/DL (ref 0.6–1.3)
CREAT SERPL-MCNC: 6.39 MG/DL (ref 0.55–1.02)
DIFFERENTIAL METHOD BLD: ABNORMAL
EOSINOPHIL # BLD: 0.1 K/UL (ref 0–0.4)
EOSINOPHIL NFR BLD: 1 % (ref 0–7)
ERYTHROCYTE [DISTWIDTH] IN BLOOD BY AUTOMATED COUNT: 12.1 % (ref 11.5–14.5)
GLOBULIN SER CALC-MCNC: 5.1 G/DL (ref 2–4)
GLUCOSE BLD-MCNC: 266 MG/DL (ref 65–100)
GLUCOSE SERPL-MCNC: 263 MG/DL (ref 65–100)
HCT VFR BLD AUTO: 27 % (ref 35–47)
HCT VFR BLD CALC: 30 % (ref 35–47)
HGB BLD-MCNC: 9.4 G/DL (ref 11.5–16)
IMM GRANULOCYTES # BLD: 0 K/UL (ref 0–0.04)
IMM GRANULOCYTES NFR BLD AUTO: 0 % (ref 0–0.5)
LIPASE SERPL-CCNC: 159 U/L (ref 73–393)
LYMPHOCYTES # BLD: 1.2 K/UL (ref 0.8–3.5)
LYMPHOCYTES NFR BLD: 12 % (ref 12–49)
MCH RBC QN AUTO: 29.8 PG (ref 26–34)
MCHC RBC AUTO-ENTMCNC: 34.8 G/DL (ref 30–36.5)
MCV RBC AUTO: 85.7 FL (ref 80–99)
MONOCYTES # BLD: 0.5 K/UL (ref 0–1)
MONOCYTES NFR BLD: 5 % (ref 5–13)
NEUTS SEG # BLD: 7.6 K/UL (ref 1.8–8)
NEUTS SEG NFR BLD: 81 % (ref 32–75)
NRBC # BLD: 0 K/UL (ref 0–0.01)
NRBC BLD-RTO: 0 PER 100 WBC
PLATELET # BLD AUTO: 309 K/UL (ref 150–400)
PMV BLD AUTO: 9 FL (ref 8.9–12.9)
POTASSIUM BLD-SCNC: 3.6 MMOL/L (ref 3.5–5.1)
POTASSIUM SERPL-SCNC: 3.7 MMOL/L (ref 3.5–5.1)
PROT SERPL-MCNC: 8.3 G/DL (ref 6.4–8.2)
RBC # BLD AUTO: 3.15 M/UL (ref 3.8–5.2)
SERVICE CMNT-IMP: ABNORMAL
SODIUM BLD-SCNC: 130 MMOL/L (ref 136–145)
SODIUM SERPL-SCNC: 130 MMOL/L (ref 136–145)
WBC # BLD AUTO: 9.4 K/UL (ref 3.6–11)

## 2018-04-21 PROCEDURE — 80053 COMPREHEN METABOLIC PANEL: CPT | Performed by: EMERGENCY MEDICINE

## 2018-04-21 PROCEDURE — 74011250636 HC RX REV CODE- 250/636: Performed by: EMERGENCY MEDICINE

## 2018-04-21 PROCEDURE — 85025 COMPLETE CBC W/AUTO DIFF WBC: CPT | Performed by: EMERGENCY MEDICINE

## 2018-04-21 PROCEDURE — 99284 EMERGENCY DEPT VISIT MOD MDM: CPT

## 2018-04-21 PROCEDURE — 74011250636 HC RX REV CODE- 250/636

## 2018-04-21 PROCEDURE — 96372 THER/PROPH/DIAG INJ SC/IM: CPT

## 2018-04-21 PROCEDURE — 83690 ASSAY OF LIPASE: CPT | Performed by: EMERGENCY MEDICINE

## 2018-04-21 PROCEDURE — 36415 COLL VENOUS BLD VENIPUNCTURE: CPT | Performed by: EMERGENCY MEDICINE

## 2018-04-21 PROCEDURE — 74011250637 HC RX REV CODE- 250/637: Performed by: EMERGENCY MEDICINE

## 2018-04-21 PROCEDURE — 74176 CT ABD & PELVIS W/O CONTRAST: CPT

## 2018-04-21 PROCEDURE — 80047 BASIC METABLC PNL IONIZED CA: CPT

## 2018-04-21 RX ORDER — ONDANSETRON 4 MG/1
4 TABLET, ORALLY DISINTEGRATING ORAL
Status: COMPLETED | OUTPATIENT
Start: 2018-04-21 | End: 2018-04-21

## 2018-04-21 RX ORDER — MORPHINE SULFATE 2 MG/ML
8 INJECTION, SOLUTION INTRAMUSCULAR; INTRAVENOUS
Status: COMPLETED | OUTPATIENT
Start: 2018-04-21 | End: 2018-04-21

## 2018-04-21 RX ORDER — MORPHINE SULFATE 4 MG/ML
INJECTION INTRAVENOUS
Status: DISCONTINUED
Start: 2018-04-21 | End: 2018-04-21 | Stop reason: HOSPADM

## 2018-04-21 RX ORDER — MORPHINE SULFATE 2 MG/ML
4 INJECTION, SOLUTION INTRAMUSCULAR; INTRAVENOUS
Status: DISCONTINUED | OUTPATIENT
Start: 2018-04-21 | End: 2018-04-21

## 2018-04-21 RX ORDER — ONDANSETRON 2 MG/ML
4 INJECTION INTRAMUSCULAR; INTRAVENOUS
Status: DISCONTINUED | OUTPATIENT
Start: 2018-04-21 | End: 2018-04-21

## 2018-04-21 RX ORDER — TRAMADOL HYDROCHLORIDE 50 MG/1
50 TABLET ORAL
Qty: 20 TAB | Refills: 0 | Status: SHIPPED | OUTPATIENT
Start: 2018-04-21 | End: 2018-05-09

## 2018-04-21 RX ORDER — FENTANYL CITRATE 50 UG/ML
100 INJECTION, SOLUTION INTRAMUSCULAR; INTRAVENOUS
Status: DISCONTINUED | OUTPATIENT
Start: 2018-04-21 | End: 2018-04-21

## 2018-04-21 RX ORDER — MORPHINE SULFATE 4 MG/ML
INJECTION INTRAVENOUS
Status: COMPLETED
Start: 2018-04-21 | End: 2018-04-21

## 2018-04-21 RX ORDER — ONDANSETRON 4 MG/1
4 TABLET, ORALLY DISINTEGRATING ORAL
Qty: 10 TAB | Refills: 0 | Status: SHIPPED | OUTPATIENT
Start: 2018-04-21 | End: 2018-05-09

## 2018-04-21 RX ORDER — MORPHINE SULFATE 4 MG/ML
4 INJECTION INTRAVENOUS
Status: COMPLETED | OUTPATIENT
Start: 2018-04-21 | End: 2018-04-21

## 2018-04-21 RX ORDER — LORAZEPAM 2 MG/ML
1 INJECTION INTRAMUSCULAR
Status: COMPLETED | OUTPATIENT
Start: 2018-04-21 | End: 2018-04-21

## 2018-04-21 RX ADMIN — MORPHINE SULFATE 4 MG: 4 INJECTION INTRAVENOUS at 13:07

## 2018-04-21 RX ADMIN — LORAZEPAM 1 MG: 2 INJECTION INTRAMUSCULAR; INTRAVENOUS at 14:06

## 2018-04-21 RX ADMIN — ONDANSETRON 4 MG: 4 TABLET, ORALLY DISINTEGRATING ORAL at 13:01

## 2018-04-21 RX ADMIN — ONDANSETRON 4 MG: 4 TABLET, ORALLY DISINTEGRATING ORAL at 15:06

## 2018-04-21 RX ADMIN — MORPHINE SULFATE 8 MG: 2 INJECTION, SOLUTION INTRAMUSCULAR; INTRAVENOUS at 14:47
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
## 2018-04-21 NOTE — ED TRIAGE NOTES
Pt arrived via EMS for complaint of lower back left flank pain, with radiating to the left abdomen. 10/10, pt crying and rolling around on stretcher redirected to to stay on stretcher to prevent fall.

## 2018-04-21 NOTE — DISCHARGE INSTRUCTIONS
Abdominal Pain: Care Instructions  Your Care Instructions    Abdominal pain has many possible causes. Some aren't serious and get better on their own in a few days. Others need more testing and treatment. If your pain continues or gets worse, you need to be rechecked and may need more tests to find out what is wrong. You may need surgery to correct the problem. Don't ignore new symptoms, such as fever, nausea and vomiting, urination problems, pain that gets worse, and dizziness. These may be signs of a more serious problem. Your doctor may have recommended a follow-up visit in the next 8 to 12 hours. If you are not getting better, you may need more tests or treatment. The doctor has checked you carefully, but problems can develop later. If you notice any problems or new symptoms, get medical treatment right away. Follow-up care is a key part of your treatment and safety. Be sure to make and go to all appointments, and call your doctor if you are having problems. It's also a good idea to know your test results and keep a list of the medicines you take. How can you care for yourself at home? · Rest until you feel better. · To prevent dehydration, drink plenty of fluids, enough so that your urine is light yellow or clear like water. Choose water and other caffeine-free clear liquids until you feel better. If you have kidney, heart, or liver disease and have to limit fluids, talk with your doctor before you increase the amount of fluids you drink. · If your stomach is upset, eat mild foods, such as rice, dry toast or crackers, bananas, and applesauce. Try eating several small meals instead of two or three large ones. · Wait until 48 hours after all symptoms have gone away before you have spicy foods, alcohol, and drinks that contain caffeine. · Do not eat foods that are high in fat. · Avoid anti-inflammatory medicines such as aspirin, ibuprofen (Advil, Motrin), and naproxen (Aleve).  These can cause stomach upset. Talk to your doctor if you take daily aspirin for another health problem. When should you call for help? Call 911 anytime you think you may need emergency care. For example, call if:  ? · You passed out (lost consciousness). ? · You pass maroon or very bloody stools. ? · You vomit blood or what looks like coffee grounds. ? · You have new, severe belly pain. ?Call your doctor now or seek immediate medical care if:  ? · Your pain gets worse, especially if it becomes focused in one area of your belly. ? · You have a new or higher fever. ? · Your stools are black and look like tar, or they have streaks of blood. ? · You have unexpected vaginal bleeding. ? · You have symptoms of a urinary tract infection. These may include:  ¨ Pain when you urinate. ¨ Urinating more often than usual.  ¨ Blood in your urine. ? · You are dizzy or lightheaded, or you feel like you may faint. ? Watch closely for changes in your health, and be sure to contact your doctor if:  ? · You are not getting better after 1 day (24 hours). Where can you learn more? Go to http://[employer].info/. Enter Z711 in the search box to learn more about \"Abdominal Pain: Care Instructions. \"  Current as of: March 20, 2017  Content Version: 11.4  © 9423-1232 [employer]. Care instructions adapted under license by [employer] (which disclaims liability or warranty for this information). If you have questions about a medical condition or this instruction, always ask your healthcare professional. [place] [address] any warranty or liability for your use of this information. Thank you! Thank you for allowing us to provide you with excellent care today. We hope we addressed all of your concerns and needs. We strive to provide excellent quality care in the Emergency Department. You may receive a survey after your visit to evaluate the care you were provided. Should you receive a survey from us, we invite you to share your experience and tell us what made it excellent. It was a pleasure serving you, we invite you to share your experience with us, in our pursuit for excellence, should you be selected to receive a survey. If you feel that you have not received excellent quality care or timely care, please ask to speak to the nurse manager. Please choose us in the future for your continued health care needs. ------------------------------------------------------------------------------------------------------------  The exam and treatment you received in the Emergency Department were for an urgent problem and are not intended as complete care. It is important that you follow up with a doctor, nurse practitioner, or physician assistant for ongoing care. If your symptoms become worse or you do not improve as expected and you are unable to reach your usual health care provider, you should return to the Emergency Department. We are available 24 hours a day. Please take your discharge instructions with you when you go to your follow-up appointment. If you have any problem arranging a follow-up appointment, contact the Emergency Department immediately. If a prescription has been provided, please have it filled as soon as possible to prevent a delay in treatment. Read the entire medication instruction sheet provided to you by the pharmacy. If you have any questions or reservations about taking the medication due to side effects or interactions with other medications, please call your primary care physician or contact the ER to speak with the charge nurse. Make an appointment with your family doctor or the physician you were referred to for follow-up of this visit as instructed on your discharge paperwork, as this is mandatory follow-up. Return to the ER if you are unable to be seen or if you are unable to be seen in a timely manner.     If you have any problem arranging the follow-up visit, contact the Emergency Department immediately. Flank Pain: Care Instructions  Your Care Instructions  Flank pain is pain on the side of the back just below the rib cage and above the waist. It can be on one or both sides. Flank pain has many possible causes, including a kidney stone, a urinary tract infection, or back strain. Flank pain may get better on its own. But don't ignore new symptoms, such as fever, nausea and vomiting, urination problems, pain that gets worse, and dizziness. These may be signs of a more serious problem. You may have to have tests or other treatment. Follow-up care is a key part of your treatment and safety. Be sure to make and go to all appointments, and call your doctor if you are having problems. It's also a good idea to know your test results and keep a list of the medicines you take. How can you care for yourself at home? · Rest until you feel better. · Take pain medicines exactly as directed. ¨ If the doctor gave you a prescription medicine for pain, take it as prescribed. ¨ If you are not taking a prescription pain medicine, ask your doctor if you can take an over-the-counter pain medicine, such as acetaminophen (Tylenol), ibuprofen (Advil, Motrin), or naproxen (Aleve). Read and follow all instructions on the label. · If your doctor prescribed antibiotics, take them as directed. Do not stop taking them just because you feel better. You need to take the full course of antibiotics. · To apply heat, put a warm water bottle, a heating pad set on low, or a warm cloth on the painful area. Do not go to sleep with a heating pad on your skin. · To prevent dehydration, drink plenty of fluids, enough so that your urine is light yellow or clear like water. Choose water and other caffeine-free clear liquids until you feel better.  If you have kidney, heart, or liver disease and have to limit fluids, talk with your doctor before you increase the amount of fluids you drink. When should you call for help? Call your doctor now or seek immediate medical care if:  ? · Your flank pain gets worse. ? · You have new symptoms, such as fever, nausea, or vomiting. ? · You have symptoms of a urinary problem. For example:  ¨ You have blood or pus in your urine. ¨ You have chills or body aches. ¨ It hurts to urinate. ¨ You have groin or belly pain. ? Watch closely for changes in your health, and be sure to contact your doctor if you do not get better as expected. Where can you learn more? Go to http://[employer].info/. Enter S191 in the search box to learn more about \"Flank Pain: Care Instructions. \"  Current as of: March 20, 2017  Content Version: 11.4  © 4987-8093 [employer]. Care instructions adapted under license by [email or web address] (which disclaims liability or warranty for this information). If you have questions about a medical condition or this instruction, always ask your healthcare professional. [place] [address] any warranty or liability for your use of this information.

## 2018-04-21 NOTE — ED NOTES
Pt continues to be restless, screaming and moving all around the room and stretcher. Refuses to keep gown on and and is screaming \"oh it hurts\" Pt is dry heaving and anxious. Attempted to redirect pt unsuccessful. Pt is inconsolable. No visible tears noted. Pt screams louder when RN or personal enter room. Offered reassurance to pt on plan of care.

## 2018-04-22 ENCOUNTER — HOSPITAL ENCOUNTER (EMERGENCY)
Age: 53
Discharge: HOME | End: 2018-04-22
Attending: EMERGENCY MEDICINE
Payer: MEDICARE

## 2018-04-22 ENCOUNTER — APPOINTMENT (OUTPATIENT)
Dept: CT IMAGING | Age: 53
End: 2018-04-22
Attending: EMERGENCY MEDICINE
Payer: MEDICARE

## 2018-04-22 VITALS
TEMPERATURE: 98.7 F | BODY MASS INDEX: 21.11 KG/M2 | HEIGHT: 69 IN | HEART RATE: 102 BPM | OXYGEN SATURATION: 100 % | WEIGHT: 142.5 LBS | SYSTOLIC BLOOD PRESSURE: 169 MMHG | DIASTOLIC BLOOD PRESSURE: 91 MMHG | RESPIRATION RATE: 15 BRPM

## 2018-04-22 DIAGNOSIS — R10.84 ABDOMINAL PAIN, GENERALIZED: Primary | ICD-10-CM

## 2018-04-22 DIAGNOSIS — R11.2 NON-INTRACTABLE VOMITING WITH NAUSEA, UNSPECIFIED VOMITING TYPE: ICD-10-CM

## 2018-04-22 LAB
ALBUMIN SERPL-MCNC: 3.1 G/DL (ref 3.5–5)
ALBUMIN/GLOB SERPL: 0.6 {RATIO} (ref 1.1–2.2)
ALP SERPL-CCNC: 187 U/L (ref 45–117)
ALT SERPL-CCNC: 20 U/L (ref 12–78)
ANION GAP SERPL CALC-SCNC: 19 MMOL/L (ref 5–15)
APPEARANCE UR: CLEAR
AST SERPL-CCNC: 26 U/L (ref 15–37)
BACTERIA URNS QL MICRO: NEGATIVE /HPF
BASOPHILS # BLD: 0 K/UL (ref 0–0.1)
BASOPHILS NFR BLD: 0 % (ref 0–1)
BILIRUB SERPL-MCNC: 0.3 MG/DL (ref 0.2–1)
BILIRUB UR QL: NEGATIVE
BUN SERPL-MCNC: 86 MG/DL (ref 6–20)
BUN/CREAT SERPL: 13 (ref 12–20)
CALCIUM SERPL-MCNC: 9 MG/DL (ref 8.5–10.1)
CHLORIDE SERPL-SCNC: 96 MMOL/L (ref 97–108)
CO2 SERPL-SCNC: 20 MMOL/L (ref 21–32)
COLOR UR: ABNORMAL
CREAT SERPL-MCNC: 6.43 MG/DL (ref 0.55–1.02)
DIFFERENTIAL METHOD BLD: ABNORMAL
EOSINOPHIL # BLD: 0 K/UL (ref 0–0.4)
EOSINOPHIL NFR BLD: 0 % (ref 0–7)
EPITH CASTS URNS QL MICRO: ABNORMAL /LPF
ERYTHROCYTE [DISTWIDTH] IN BLOOD BY AUTOMATED COUNT: 12.2 % (ref 11.5–14.5)
GLOBULIN SER CALC-MCNC: 5.1 G/DL (ref 2–4)
GLUCOSE SERPL-MCNC: 222 MG/DL (ref 65–100)
GLUCOSE UR STRIP.AUTO-MCNC: 500 MG/DL
HCT VFR BLD AUTO: 25.8 % (ref 35–47)
HGB BLD-MCNC: 8.9 G/DL (ref 11.5–16)
HGB UR QL STRIP: ABNORMAL
IMM GRANULOCYTES # BLD: 0.1 K/UL (ref 0–0.04)
IMM GRANULOCYTES NFR BLD AUTO: 1 % (ref 0–0.5)
KETONES UR QL STRIP.AUTO: NEGATIVE MG/DL
LEUKOCYTE ESTERASE UR QL STRIP.AUTO: NEGATIVE
LIPASE SERPL-CCNC: 483 U/L (ref 73–393)
LYMPHOCYTES # BLD: 0.9 K/UL (ref 0.8–3.5)
LYMPHOCYTES NFR BLD: 10 % (ref 12–49)
MCH RBC QN AUTO: 29.4 PG (ref 26–34)
MCHC RBC AUTO-ENTMCNC: 34.5 G/DL (ref 30–36.5)
MCV RBC AUTO: 85.1 FL (ref 80–99)
MONOCYTES # BLD: 0.7 K/UL (ref 0–1)
MONOCYTES NFR BLD: 7 % (ref 5–13)
NEUTS SEG # BLD: 7.4 K/UL (ref 1.8–8)
NEUTS SEG NFR BLD: 81 % (ref 32–75)
NITRITE UR QL STRIP.AUTO: NEGATIVE
NRBC # BLD: 0 K/UL (ref 0–0.01)
NRBC BLD-RTO: 0 PER 100 WBC
PH UR STRIP: 7.5 [PH] (ref 5–8)
PLATELET # BLD AUTO: 319 K/UL (ref 150–400)
PMV BLD AUTO: 8.2 FL (ref 8.9–12.9)
POTASSIUM SERPL-SCNC: 3.9 MMOL/L (ref 3.5–5.1)
PROT SERPL-MCNC: 8.2 G/DL (ref 6.4–8.2)
PROT UR STRIP-MCNC: >300 MG/DL
RBC # BLD AUTO: 3.03 M/UL (ref 3.8–5.2)
RBC #/AREA URNS HPF: ABNORMAL /HPF (ref 0–5)
SODIUM SERPL-SCNC: 135 MMOL/L (ref 136–145)
SP GR UR REFRACTOMETRY: 1.01 (ref 1–1.03)
UROBILINOGEN UR QL STRIP.AUTO: 0.2 EU/DL (ref 0.2–1)
WBC # BLD AUTO: 9 K/UL (ref 3.6–11)
WBC URNS QL MICRO: ABNORMAL /HPF (ref 0–4)

## 2018-04-22 PROCEDURE — 74176 CT ABD & PELVIS W/O CONTRAST: CPT

## 2018-04-22 PROCEDURE — 36600 WITHDRAWAL OF ARTERIAL BLOOD: CPT

## 2018-04-22 PROCEDURE — 74011250636 HC RX REV CODE- 250/636: Performed by: EMERGENCY MEDICINE

## 2018-04-22 PROCEDURE — 80053 COMPREHEN METABOLIC PANEL: CPT | Performed by: EMERGENCY MEDICINE

## 2018-04-22 PROCEDURE — 36415 COLL VENOUS BLD VENIPUNCTURE: CPT | Performed by: EMERGENCY MEDICINE

## 2018-04-22 PROCEDURE — 83690 ASSAY OF LIPASE: CPT | Performed by: EMERGENCY MEDICINE

## 2018-04-22 PROCEDURE — 81001 URINALYSIS AUTO W/SCOPE: CPT | Performed by: EMERGENCY MEDICINE

## 2018-04-22 PROCEDURE — 85025 COMPLETE CBC W/AUTO DIFF WBC: CPT | Performed by: EMERGENCY MEDICINE

## 2018-04-22 PROCEDURE — 99285 EMERGENCY DEPT VISIT HI MDM: CPT

## 2018-04-22 PROCEDURE — 96372 THER/PROPH/DIAG INJ SC/IM: CPT

## 2018-04-22 PROCEDURE — 74011250637 HC RX REV CODE- 250/637: Performed by: EMERGENCY MEDICINE

## 2018-04-22 RX ORDER — HYDROCODONE BITARTRATE AND ACETAMINOPHEN 5; 325 MG/1; MG/1
1 TABLET ORAL
Status: COMPLETED | OUTPATIENT
Start: 2018-04-22 | End: 2018-04-22

## 2018-04-22 RX ORDER — PROMETHAZINE HYDROCHLORIDE 25 MG/1
25 SUPPOSITORY RECTAL
Status: COMPLETED | OUTPATIENT
Start: 2018-04-22 | End: 2018-04-22

## 2018-04-22 RX ORDER — PROMETHAZINE HYDROCHLORIDE 25 MG/1
25 SUPPOSITORY RECTAL
Qty: 12 SUPPOSITORY | Refills: 0 | Status: SHIPPED | OUTPATIENT
Start: 2018-04-22 | End: 2018-05-10

## 2018-04-22 RX ORDER — MORPHINE SULFATE 2 MG/ML
4 INJECTION, SOLUTION INTRAMUSCULAR; INTRAVENOUS
Status: COMPLETED | OUTPATIENT
Start: 2018-04-22 | End: 2018-04-22

## 2018-04-22 RX ORDER — ONDANSETRON 4 MG/1
4 TABLET, ORALLY DISINTEGRATING ORAL
Status: COMPLETED | OUTPATIENT
Start: 2018-04-22 | End: 2018-04-22

## 2018-04-22 RX ORDER — PROMETHAZINE HYDROCHLORIDE 25 MG/1
25 TABLET ORAL
Qty: 12 TAB | Refills: 0 | Status: SHIPPED | OUTPATIENT
Start: 2018-04-22 | End: 2018-05-10

## 2018-04-22 RX ORDER — ONDANSETRON 2 MG/ML
4 INJECTION INTRAMUSCULAR; INTRAVENOUS
Status: DISCONTINUED | OUTPATIENT
Start: 2018-04-22 | End: 2018-04-22

## 2018-04-22 RX ADMIN — HYDROCODONE BITARTRATE AND ACETAMINOPHEN 1 TABLET: 5; 325 TABLET ORAL at 08:09

## 2018-04-22 RX ADMIN — PROMETHAZINE HYDROCHLORIDE 25 MG: 25 SUPPOSITORY RECTAL at 09:15

## 2018-04-22 RX ADMIN — MORPHINE SULFATE 4 MG: 2 INJECTION, SOLUTION INTRAMUSCULAR; INTRAVENOUS at 10:32

## 2018-04-22 RX ADMIN — ONDANSETRON 4 MG: 4 TABLET, ORALLY DISINTEGRATING ORAL at 08:09

## 2018-04-22 NOTE — ED NOTES
Pt C/O abdominal pain starting yesterday with N&V this AM. Pt has recent hx of reoccuring abdominal pain. Has been seen in several ED, including [facility] yesterday. Pt had vomited enroute to ED in ambulance. Emesis is a black liquid. Pt indicating LUQ pain radiating to her back. No active vomiting at this time. Pt is in fetal position on bed, holding LUQ. Emergency Department Nursing Plan of Care       The Nursing Plan of Care is developed from the Nursing assessment and Emergency Department Attending provider initial evaluation. The plan of care may be reviewed in the ED Provider note.     The Plan of Care was developed with the following considerations:   Patient / Family readiness to learn indicated by:verbalized understanding  Persons(s) to be included in education: patient  Barriers to Learning/Limitations:No    Signed     Amy Atkins RN    4/22/2018   6:59 AM

## 2018-04-22 NOTE — ED NOTES
Patient has been instructed that they have been given morphine which contains opioids, benzodiazepines, or other sedating drugs. Patient is aware that they  will need to refrain from driving or operating heavy machinery after taking this medication. Patient also instructed that they need to avoid drinking alcohol and using other products containing opioids, benzodiazepines, or other sedating drugs. Patient verbalized understanding.

## 2018-04-22 NOTE — ED PROVIDER NOTES
EMERGENCY DEPARTMENT HISTORY AND PHYSICAL EXAM      Date: 4/22/2018  Patient Name: Giovanni Stallings    History of Presenting Illness     Chief Complaint   Patient presents with    Abdominal Pain     N&V       History Provided By: Patient    HPI: Giovanni Stallings, 46 y.o. female with PMHx significant for CKD, Pancreatitis, DM, HTN, presents ambulatory to the ED with cc of chronic 10/10 left sided abdominal pain and left sided back pain with associated nausea and vomiting for the past few days. She has had 3 episodes of non bloody emesis yesterday and one episode on route today. Pt reports being seen at [facility] ED yesterday where she had blood drawn, a CT of her abdomen and pain medication. Pt states her pain continued after her discharge from [facility]. She has had similar pain in the past but does not remember what she was diagnosed with. Pt does have a hx of CKD but is not currently on dialysis. Pt denies any dysuria, CP, SOB, hx of abdominal surgeries, or problems with her bowels. In the room she is crying and in a supine position but able to answer questions. Per nursing pt had also been to other emergency departments recently including [facility] and [facility] for the same complaints. Social Hx: - Tobacco (-), - EtOH (hx of alcohol abuse but pt denies any recently), - illicit drug use (-)    PCP: Nathan Garza NP    There are no other complaints, changes, or physical findings at this time. Current Outpatient Prescriptions   Medication Sig Dispense Refill    promethazine (PHENERGAN) 25 mg tablet Take 1 Tab by mouth every six (6) hours as needed for Nausea. 12 Tab 0    promethazine (PHENERGAN) 25 mg suppository Insert 1 Suppository into rectum every six (6) hours as needed for Nausea. 12 Suppository 0    ondansetron (ZOFRAN ODT) 4 mg disintegrating tablet Take 1 Tab by mouth every eight (8) hours as needed for Nausea. 10 Tab 0    traMADol (ULTRAM) 50 mg tablet Take 1 Tab by mouth every six (6) hours as needed for Pain.  Max Daily Amount: 200 mg. 20 Tab 0    ibuprofen (MOTRIN) 800 mg tablet Take 1 Tab by mouth every eight (8) hours as needed for Pain for up to 7 days. 20 Tab 0    trimethoprim-sulfamethoxazole (BACTRIM DS, SEPTRA DS) 160-800 mg per tablet Take 2 Tabs by mouth two (2) times a day for 10 days. 40 Tab 0    insulin glargine (LANTUS) 100 unit/mL injection 25 Units by SubCUTAneous route nightly. 1 Vial 0    insulin lispro (HUMALOG) 100 unit/mL injection 5 Units by SubCUTAneous route three (3) times daily (with meals).  metoprolol (LOPRESSOR) 25 mg tablet Take 25 mg by mouth two (2) times a day. Past History     Past Medical History:  Past Medical History:   Diagnosis Date    C. difficile colitis 6/2012    Chronic low back pain     CKD (chronic kidney disease)     stage 3 to 4, baseline Cr 2    Constipation     Diabetes (HCC)     A1c 8.2 3/2012    Hep C w/o coma, chronic (HCC)     Hyperlipemia     Hypertension     Lumbar disc disease     Migraines     Pancreatitis 2256    alcoholic    UTI (lower urinary tract infection) 6/20012       Past Surgical History:  Past Surgical History:   Procedure Laterality Date    HX ORTHOPAEDIC      lumbar sprain; back surgery    UT COLONOSCOPY FLX DX W/COLLJ SPEC WHEN PFRMD  11/12/2012            Family History:  Family History   Problem Relation Age of Onset    Diabetes Mother     Kidney Disease Mother     Diabetes Sister        Social History:  Social History   Substance Use Topics    Smoking status: Never Smoker    Smokeless tobacco: Never Used    Alcohol use No      Comment: Quit few months ago, hx of abuse       Allergies:  No Known Allergies      Review of Systems   Review of Systems   Constitutional: Negative for chills and fever. HENT: Negative for congestion, rhinorrhea, sneezing and sore throat. Eyes: Negative for redness and visual disturbance. Respiratory: Negative for shortness of breath. Cardiovascular: Negative for leg swelling. Gastrointestinal: Positive for abdominal pain. Negative for nausea and vomiting. Genitourinary: Negative for difficulty urinating and frequency. Musculoskeletal: Positive for back pain. Negative for myalgias and neck stiffness. Skin: Negative for rash. Neurological: Negative for dizziness, syncope, weakness and headaches. Hematological: Negative for adenopathy. All other systems reviewed and are negative. Physical Exam   Physical Exam   Constitutional: She is oriented to person, place, and time. She appears well-developed and well-nourished. She appears distressed (moderate). HENT:   Head: Normocephalic. Mouth/Throat: Oropharynx is clear and moist.   Eyes: Conjunctivae and EOM are normal. Pupils are equal, round, and reactive to light. Neck: Normal range of motion. Neck supple. Cardiovascular: Normal rate, regular rhythm, normal heart sounds and intact distal pulses. Pulmonary/Chest: Effort normal and breath sounds normal.   Abdominal: Soft. Bowel sounds are normal. She exhibits no distension. There is tenderness in the left upper quadrant and left lower quadrant. There is no rebound. Musculoskeletal: Normal range of motion. She exhibits no edema or deformity. Neurological: She is alert and oriented to person, place, and time. Skin: Skin is warm and dry. Psychiatric: She has a normal mood and affect.  Her behavior is normal. Judgment and thought content normal.         Diagnostic Study Results     Labs -     Recent Results (from the past 12 hour(s))   CBC WITH AUTOMATED DIFF    Collection Time: 04/22/18  8:05 AM   Result Value Ref Range    WBC 9.0 3.6 - 11.0 K/uL    RBC 3.03 (L) 3.80 - 5.20 M/uL    HGB 8.9 (L) 11.5 - 16.0 g/dL    HCT 25.8 (L) 35.0 - 47.0 %    MCV 85.1 80.0 - 99.0 FL    MCH 29.4 26.0 - 34.0 PG    MCHC 34.5 30.0 - 36.5 g/dL    RDW 12.2 11.5 - 14.5 %    PLATELET 699 354 - 113 K/uL    MPV 8.2 (L) 8.9 - 12.9 FL    NRBC 0.0 0  WBC    ABSOLUTE NRBC 0.00 0.00 - 0.01 K/uL    NEUTROPHILS 81 (H) 32 - 75 %    LYMPHOCYTES 10 (L) 12 - 49 %    MONOCYTES 7 5 - 13 %    EOSINOPHILS 0 0 - 7 %    BASOPHILS 0 0 - 1 %    IMMATURE GRANULOCYTES 1 (H) 0.0 - 0.5 %    ABS. NEUTROPHILS 7.4 1.8 - 8.0 K/UL    ABS. LYMPHOCYTES 0.9 0.8 - 3.5 K/UL    ABS. MONOCYTES 0.7 0.0 - 1.0 K/UL    ABS. EOSINOPHILS 0.0 0.0 - 0.4 K/UL    ABS. BASOPHILS 0.0 0.0 - 0.1 K/UL    ABS. IMM. GRANS. 0.1 (H) 0.00 - 0.04 K/UL    DF AUTOMATED     METABOLIC PANEL, COMPREHENSIVE    Collection Time: 04/22/18  8:05 AM   Result Value Ref Range    Sodium 135 (L) 136 - 145 mmol/L    Potassium 3.9 3.5 - 5.1 mmol/L    Chloride 96 (L) 97 - 108 mmol/L    CO2 20 (L) 21 - 32 mmol/L    Anion gap 19 (H) 5 - 15 mmol/L    Glucose 222 (H) 65 - 100 mg/dL    BUN 86 (H) 6 - 20 MG/DL    Creatinine 6.43 (H) 0.55 - 1.02 MG/DL    BUN/Creatinine ratio 13 12 - 20      GFR est AA 8 (L) >60 ml/min/1.73m2    GFR est non-AA 7 (L) >60 ml/min/1.73m2    Calcium 9.0 8.5 - 10.1 MG/DL    Bilirubin, total 0.3 0.2 - 1.0 MG/DL    ALT (SGPT) 20 12 - 78 U/L    AST (SGOT) 26 15 - 37 U/L    Alk.  phosphatase 187 (H) 45 - 117 U/L    Protein, total 8.2 6.4 - 8.2 g/dL    Albumin 3.1 (L) 3.5 - 5.0 g/dL    Globulin 5.1 (H) 2.0 - 4.0 g/dL    A-G Ratio 0.6 (L) 1.1 - 2.2     LIPASE    Collection Time: 04/22/18  8:05 AM   Result Value Ref Range    Lipase 483 (H) 73 - 393 U/L   URINALYSIS W/ RFLX MICROSCOPIC    Collection Time: 04/22/18  9:18 AM   Result Value Ref Range    Color YELLOW/STRAW      Appearance CLEAR CLEAR      Specific gravity 1.015 1.003 - 1.030      pH (UA) 7.5 5.0 - 8.0      Protein >300 (A) NEG mg/dL    Glucose 500 (A) NEG mg/dL    Ketone NEGATIVE  NEG mg/dL    Bilirubin NEGATIVE  NEG      Blood SMALL (A) NEG      Urobilinogen 0.2 0.2 - 1.0 EU/dL    Nitrites NEGATIVE  NEG      Leukocyte Esterase NEGATIVE  NEG     URINE MICROSCOPIC ONLY    Collection Time: 04/22/18  9:18 AM   Result Value Ref Range    WBC 0-4 0 - 4 /hpf    RBC 0-5 0 - 5 /hpf    Epithelial cells MODERATE (A) FEW /lpf    Bacteria NEGATIVE  NEG /hpf       Radiologic Studies -   CT ABD PELV WO CONT   Final Result        CT Results  (Last 48 hours)               04/22/18 0941  CT ABD PELV WO CONT Final result    Impression:  IMPRESSION:       1. No acute genitourinary pathology. No significant change. 2. Findings compatible with moderate constipation. Narrative:  EXAM:  CT ABD PELV WO CONT       INDICATION: Abdominal pain       COMPARISON: CT 4/21/2018 in addition to numerous prior CT scans       CONTRAST:  None. TECHNIQUE:    Thin axial images were obtained through the abdomen and pelvis. Coronal and   sagittal reconstructions were generated. Oral contrast was not administered. CT   dose reduction was achieved through use of a standardized protocol tailored for   this examination and automatic exposure control for dose modulation. The absence of intravenous contrast material reduces the sensitivity for   evaluation of the solid parenchymal organs of the abdomen. FINDINGS:    LUNG BASES: Clear. INCIDENTALLY IMAGED HEART AND MEDIASTINUM: Unremarkable. LIVER: No mass or biliary dilatation. GALLBLADDER: Unremarkable. SPLEEN: No mass. PANCREAS: No mass or ductal dilatation. ADRENALS: Unremarkable. KIDNEYS/URETERS: No mass, calculus, or hydronephrosis. STOMACH: There is a small hiatal hernia   SMALL BOWEL: No dilatation or wall thickening. COLON: No dilatation or wall thickening. There is evidence of moderate   constipation. APPENDIX: Unremarkable. PERITONEUM: No ascites or pneumoperitoneum. RETROPERITONEUM: No lymphadenopathy or aortic aneurysm. REPRODUCTIVE ORGANS: Uterus is noted. There are extensive vascular   calcifications in the pelvis. A calcified uterine fibroid is suspected. There is   no significant pelvic free fluid. URINARY BLADDER: No mass or calculus. BONES: No destructive bone lesion.    ADDITIONAL COMMENTS: N/A           04/21/18 1419  CT ABD PELV WO CONT Final result    Impression:  IMPRESSION:       1. No urinary tract calculi or hydronephrosis. No acute findings in the abdomen   or pelvis. 2. Extensive vascular calcification. Narrative:  EXAM:  CT ABD PELV WO CONT       INDICATION: Flank pain, stone disease suspected  , left flank pain and lower   back pain       COMPARISON: CT of 11/22/2017       CONTRAST:  None. TECHNIQUE:    Thin axial images were obtained through the abdomen and pelvis. Coronal and   sagittal reconstructions were generated. Oral contrast was not administered. CT   dose reduction was achieved through use of a standardized protocol tailored for   this examination and automatic exposure control for dose modulation. The absence of intravenous contrast material reduces the sensitivity for   evaluation of the solid parenchymal organs of the abdomen. FINDINGS:    LUNG BASES: Clear. INCIDENTALLY IMAGED HEART AND MEDIASTINUM: Unremarkable. LIVER: No mass or biliary dilatation. GALLBLADDER: Unremarkable. SPLEEN: No mass. PANCREAS: No mass or ductal dilatation. ADRENALS: Unremarkable. KIDNEYS/URETERS: No mass, calculus, or hydronephrosis. Renal vascular   calcification. STOMACH: Unremarkable. SMALL BOWEL: No dilatation or wall thickening. COLON: No dilatation or wall thickening. Fecal retention. APPENDIX: Normal.   PERITONEUM: No ascites or pneumoperitoneum. RETROPERITONEUM: No lymphadenopathy or aortic aneurysm. REPRODUCTIVE ORGANS: Extensive uterine calcifications, unchanged. Some focal and   appear to be fibroids. Others more scattered and peripheral, of uncertain   etiology, possibly vascular. URINARY BLADDER: No mass or calculus. BONES: No destructive bone lesion. Sclerotic bones likely due to   hyperparathyroidism. ADDITIONAL COMMENTS: Extensive calcification in the visceral vessels. Limited   aortic calcification.                CXR Results  (Last 48 hours) None            Medical Decision Making   I am the first provider for this patient. I reviewed the vital signs, available nursing notes, past medical history, past surgical history, family history and social history. Vital Signs-Reviewed the patient's vital signs. Patient Vitals for the past 12 hrs:   Temp Pulse Resp BP SpO2   04/22/18 1041 - (!) 102 15 (!) 169/91 100 %   04/22/18 0820 - (!) 105 18 - -   04/22/18 0815 - - - - 100 %   04/22/18 0813 - - - 146/86 -   04/22/18 0652 98.7 °F (37.1 °C) (!) 117 22 (!) 167/98 100 %     Records Reviewed: Nursing Notes and Old Medical Records    Provider Notes (Medical Decision Making):   DDx: Cholecystitis, gastroenteritis, chronic abdominal pain. ED Course:   Initial assessment performed. The patients presenting problems have been discussed, and they are in agreement with the care plan formulated and outlined with them. I have encouraged them to ask questions as they arise throughout their visit. PROGRESS NOTE:  7:56 AM  Nursing is unable to get IV access, I will attempt an EJ. PROGRESS NOTE:  8:05 AM  Attempted EJ access but was unsuccessful, was very difficult to get access, after access was unable to thread the catheter. Will just straight stick her for labs. PROGRESS NOTE:  10:07 AM  Went to re-evaluate the pt, Pt was updated on her results, she still endorses being in pain. Will order some more pain medication and re-evaluate later. PROGRESS NOTE:  11:09 AM  Pt is feeling much better, is in bed smiling and asking for a cab home. Ready for discharge. Critical Care Time:   None. Disposition:  DISCHARGE NOTE  11:10 AM  The patient has been re-evaluated and is ready for discharge. Reviewed available results with patient. Counseled pt on diagnosis and care plan. Pt has expressed understanding, and all questions have been answered. Pt agrees with plan and agrees to F/U as recommended, or return to the ED if their sxs worsen.  Discharge instructions have been provided and explained to the pt, along with reasons to return to the ED. PLAN:  1. Current Discharge Medication List      START taking these medications    Details   promethazine (PHENERGAN) 25 mg tablet Take 1 Tab by mouth every six (6) hours as needed for Nausea. Qty: 12 Tab, Refills: 0      promethazine (PHENERGAN) 25 mg suppository Insert 1 Suppository into rectum every six (6) hours as needed for Nausea. Qty: 12 Suppository, Refills: 0           2. Follow-up Information     Follow up With Details Comments Contact Info    Ashley Stern NP Call  [address]  [facility] access hospital [address] [phone]      [facility] EMERGENCY DEPT  As needed, If symptoms worsen [address] [facility]  [phone]        Return to ED if worse     Diagnosis     Clinical Impression:   1. Abdominal pain, generalized    2. Non-intractable vomiting with nausea, unspecified vomiting type        Attestations: This note is prepared by Harjinder Tomas acting as Scribe for MD Gina Jackson MD : The scribe's documentation has been prepared under my direction and personally reviewed by me in its entirety. I confirm that the note above accurately reflects all work, treatment, procedures, and medical decision making performed by me.

## 2018-04-22 NOTE — ED NOTES
Patient states she was at [address] yesterday and they were unable to start an IV; also relays the morphine they gave her didn't help with her pain.

## 2018-04-22 NOTE — ED NOTES
Patient now crying out again. States the pain is 10/10. Patient noted to have her head face down in the bed with her butt in the air. Patient also noted to be dry heaving. Tried redirecting patient to proper positioning with no resolution. MD notified.

## 2018-04-22 NOTE — ED NOTES
Discharge instructions were given to the patient by DESIREE Newell RN. .     The patient left the Emergency Department ambulatory, alert and oriented and in no acute distress with 2 prescription(s). The patient was encouraged to call or return to the ED for worsening symptoms or problems and was encouraged to schedule a follow up appointment for continuing care. Patient leaving ED accompanied by self. The patient verbalized understanding of discharge instructions and prescriptions, all questions were answered. The patient has no further concerns at this time. Patient declined wheelchair transfer upon ED discharge.

## 2018-04-22 NOTE — ED NOTES
Patient has been instructed that they have been given hydrocodone which contains opioids, benzodiazepines, or other sedating drugs. Patient is aware that they  will need to refrain from driving or operating heavy machinery after taking this medication. Patient also instructed that they need to avoid drinking alcohol and using other products containing opioids, benzodiazepines, or other sedating drugs. Patient verbalized understanding.

## 2018-04-22 NOTE — DISCHARGE INSTRUCTIONS
Abdominal Pain: Care Instructions  Your Care Instructions    Abdominal pain has many possible causes. Some aren't serious and get better on their own in a few days. Others need more testing and treatment. If your pain continues or gets worse, you need to be rechecked and may need more tests to find out what is wrong. You may need surgery to correct the problem. Don't ignore new symptoms, such as fever, nausea and vomiting, urination problems, pain that gets worse, and dizziness. These may be signs of a more serious problem. Your doctor may have recommended a follow-up visit in the next 8 to 12 hours. If you are not getting better, you may need more tests or treatment. The doctor has checked you carefully, but problems can develop later. If you notice any problems or new symptoms, get medical treatment right away. Follow-up care is a key part of your treatment and safety. Be sure to make and go to all appointments, and call your doctor if you are having problems. It's also a good idea to know your test results and keep a list of the medicines you take. How can you care for yourself at home? · Rest until you feel better. · To prevent dehydration, drink plenty of fluids, enough so that your urine is light yellow or clear like water. Choose water and other caffeine-free clear liquids until you feel better. If you have kidney, heart, or liver disease and have to limit fluids, talk with your doctor before you increase the amount of fluids you drink. · If your stomach is upset, eat mild foods, such as rice, dry toast or crackers, bananas, and applesauce. Try eating several small meals instead of two or three large ones. · Wait until 48 hours after all symptoms have gone away before you have spicy foods, alcohol, and drinks that contain caffeine. · Do not eat foods that are high in fat. · Avoid anti-inflammatory medicines such as aspirin, ibuprofen (Advil, Motrin), and naproxen (Aleve).  These can cause stomach upset. Talk to your doctor if you take daily aspirin for another health problem. When should you call for help? Call 911 anytime you think you may need emergency care. For example, call if:  ? · You passed out (lost consciousness). ? · You pass maroon or very bloody stools. ? · You vomit blood or what looks like coffee grounds. ? · You have new, severe belly pain. ?Call your doctor now or seek immediate medical care if:  ? · Your pain gets worse, especially if it becomes focused in one area of your belly. ? · You have a new or higher fever. ? · Your stools are black and look like tar, or they have streaks of blood. ? · You have unexpected vaginal bleeding. ? · You have symptoms of a urinary tract infection. These may include:  ¨ Pain when you urinate. ¨ Urinating more often than usual.  ¨ Blood in your urine. ? · You are dizzy or lightheaded, or you feel like you may faint. ? Watch closely for changes in your health, and be sure to contact your doctor if:  ? · You are not getting better after 1 day (24 hours). Where can you learn more? Go to http://neryFanIQpeña.info/. Enter P287 in the search box to learn more about \"Abdominal Pain: Care Instructions. \"  Current as of: March 20, 2017  Content Version: 11.4  © 2675-8044 [employer]. Care instructions adapted under license by [employer] (which disclaims liability or warranty for this information). If you have questions about a medical condition or this instruction, always ask your healthcare professional. [place] [address] any warranty or liability for your use of this information. Nausea and Vomiting: Care Instructions  Your Care Instructions    When you are nauseated, you may feel weak and sweaty and notice a lot of saliva in your mouth. Nausea often leads to vomiting.  Most of the time you do not need to worry about nausea and vomiting, but they can be signs of other illnesses. Two common causes of nausea and vomiting are stomach flu and food poisoning. Nausea and vomiting from viral stomach flu will usually start to improve within 24 hours. Nausea and vomiting from food poisoning may last from 12 to 48 hours. The doctor has checked you carefully, but problems can develop later. If you notice any problems or new symptoms, get medical treatment right away. Follow-up care is a key part of your treatment and safety. Be sure to make and go to all appointments, and call your doctor if you are having problems. It's also a good idea to know your test results and keep a list of the medicines you take. How can you care for yourself at home? · To prevent dehydration, drink plenty of fluids, enough so that your urine is light yellow or clear like water. Choose water and other caffeine-free clear liquids until you feel better. If you have kidney, heart, or liver disease and have to limit fluids, talk with your doctor before you increase the amount of fluids you drink. · Rest in bed until you feel better. · When you are able to eat, try clear soups, mild foods, and liquids until all symptoms are gone for 12 to 48 hours. Other good choices include dry toast, crackers, cooked cereal, and gelatin dessert, such as Jell-O. When should you call for help? Call 911 anytime you think you may need emergency care. For example, call if:  ? · You passed out (lost consciousness). ?Call your doctor now or seek immediate medical care if:  ? · You have symptoms of dehydration, such as:  ¨ Dry eyes and a dry mouth. ¨ Passing only a little dark urine. ¨ Feeling thirstier than usual.   ? · You have new or worsening belly pain. ? · You have a new or higher fever. ? · You vomit blood or what looks like coffee grounds. ? Watch closely for changes in your health, and be sure to contact your doctor if:  ? · You have ongoing nausea and vomiting. ? · Your vomiting is getting worse.    ? · Your vomiting lasts longer than 2 days. ? · You are not getting better as expected. Where can you learn more? Go to http://nery-peña.info/. Enter 25 [id number] in the search box to learn more about \"Nausea and Vomiting: Care Instructions. \"  Current as of: March 20, 2017  Content Version: 11.4  © 5391-5138 [employer]. Care instructions adapted under license by [employer] (which disclaims liability or warranty for this information). If you have questions about a medical condition or this instruction, always ask your healthcare professional. [place] [address] any warranty or liability for your use of this information.

## 2018-04-22 NOTE — ED NOTES
Bedside and Verbal shift change report given to DESIREE Wilkinson RN (oncoming nurse) by Em Rodriguez RN   (offgoing nurse). Report included the following information SBAR.

## 2018-05-09 ENCOUNTER — HOSPITAL ENCOUNTER (EMERGENCY)
Age: 53
Discharge: HOME | End: 2018-05-09
Attending: EMERGENCY MEDICINE
Payer: MEDICARE

## 2018-05-09 ENCOUNTER — APPOINTMENT (OUTPATIENT)
Dept: GENERAL RADIOLOGY | Age: 53
End: 2018-05-09
Attending: EMERGENCY MEDICINE
Payer: MEDICARE

## 2018-05-09 ENCOUNTER — APPOINTMENT (OUTPATIENT)
Dept: CT IMAGING | Age: 53
End: 2018-05-09
Attending: EMERGENCY MEDICINE
Payer: MEDICARE

## 2018-05-09 VITALS
TEMPERATURE: 97.9 F | SYSTOLIC BLOOD PRESSURE: 198 MMHG | DIASTOLIC BLOOD PRESSURE: 107 MMHG | HEART RATE: 91 BPM | OXYGEN SATURATION: 100 % | RESPIRATION RATE: 18 BRPM

## 2018-05-09 DIAGNOSIS — R10.9 FLANK PAIN: Primary | ICD-10-CM

## 2018-05-09 LAB
ALBUMIN SERPL-MCNC: 3.8 G/DL (ref 3.5–5)
ALBUMIN/GLOB SERPL: 0.8 {RATIO} (ref 1.1–2.2)
ALP SERPL-CCNC: 264 U/L (ref 45–117)
ALT SERPL-CCNC: 28 U/L (ref 12–78)
ANION GAP SERPL CALC-SCNC: 10 MMOL/L (ref 5–15)
APPEARANCE UR: CLEAR
AST SERPL-CCNC: 16 U/L (ref 15–37)
ATRIAL RATE: 88 BPM
BACTERIA URNS QL MICRO: NEGATIVE /HPF
BASOPHILS # BLD: 0 K/UL (ref 0–0.1)
BASOPHILS NFR BLD: 0 % (ref 0–1)
BILIRUB SERPL-MCNC: 0.4 MG/DL (ref 0.2–1)
BILIRUB UR QL: NEGATIVE
BUN SERPL-MCNC: 63 MG/DL (ref 6–20)
BUN/CREAT SERPL: 13 (ref 12–20)
CALCIUM SERPL-MCNC: 9.5 MG/DL (ref 8.5–10.1)
CALCULATED P AXIS, ECG09: 65 DEGREES
CALCULATED R AXIS, ECG10: 28 DEGREES
CALCULATED T AXIS, ECG11: 66 DEGREES
CHLORIDE SERPL-SCNC: 98 MMOL/L (ref 97–108)
CO2 SERPL-SCNC: 25 MMOL/L (ref 21–32)
COLOR UR: ABNORMAL
CREAT SERPL-MCNC: 4.69 MG/DL (ref 0.55–1.02)
DIAGNOSIS, 93000: NORMAL
DIFFERENTIAL METHOD BLD: ABNORMAL
EOSINOPHIL # BLD: 0.2 K/UL (ref 0–0.4)
EOSINOPHIL NFR BLD: 2 % (ref 0–7)
EPITH CASTS URNS QL MICRO: ABNORMAL /LPF
ERYTHROCYTE [DISTWIDTH] IN BLOOD BY AUTOMATED COUNT: 13.2 % (ref 11.5–14.5)
GLOBULIN SER CALC-MCNC: 4.8 G/DL (ref 2–4)
GLUCOSE BLD STRIP.AUTO-MCNC: 282 MG/DL (ref 65–100)
GLUCOSE BLD STRIP.AUTO-MCNC: 311 MG/DL (ref 65–100)
GLUCOSE SERPL-MCNC: 293 MG/DL (ref 65–100)
GLUCOSE UR STRIP.AUTO-MCNC: 500 MG/DL
HCT VFR BLD AUTO: 30.5 % (ref 35–47)
HGB BLD-MCNC: 9.9 G/DL (ref 11.5–16)
HGB UR QL STRIP: ABNORMAL
HYALINE CASTS URNS QL MICRO: ABNORMAL /LPF (ref 0–5)
IMM GRANULOCYTES # BLD: 0 K/UL (ref 0–0.04)
IMM GRANULOCYTES NFR BLD AUTO: 0 % (ref 0–0.5)
INR PPP: 0.9 (ref 0.9–1.1)
KETONES UR QL STRIP.AUTO: NEGATIVE MG/DL
LEUKOCYTE ESTERASE UR QL STRIP.AUTO: NEGATIVE
LYMPHOCYTES # BLD: 2.3 K/UL (ref 0.8–3.5)
LYMPHOCYTES NFR BLD: 21 % (ref 12–49)
MAGNESIUM SERPL-MCNC: 1.7 MG/DL (ref 1.6–2.4)
MCH RBC QN AUTO: 29.5 PG (ref 26–34)
MCHC RBC AUTO-ENTMCNC: 32.5 G/DL (ref 30–36.5)
MCV RBC AUTO: 90.8 FL (ref 80–99)
MONOCYTES # BLD: 0.6 K/UL (ref 0–1)
MONOCYTES NFR BLD: 6 % (ref 5–13)
NEUTS SEG # BLD: 7.8 K/UL (ref 1.8–8)
NEUTS SEG NFR BLD: 71 % (ref 32–75)
NITRITE UR QL STRIP.AUTO: NEGATIVE
NRBC # BLD: 0 K/UL (ref 0–0.01)
NRBC BLD-RTO: 0 PER 100 WBC
P-R INTERVAL, ECG05: 164 MS
PH UR STRIP: 7 [PH] (ref 5–8)
PLATELET # BLD AUTO: 288 K/UL (ref 150–400)
PMV BLD AUTO: 9.1 FL (ref 8.9–12.9)
POTASSIUM SERPL-SCNC: 3.1 MMOL/L (ref 3.5–5.1)
PROT SERPL-MCNC: 8.6 G/DL (ref 6.4–8.2)
PROT UR STRIP-MCNC: 100 MG/DL
PROTHROMBIN TIME: 9.4 SEC (ref 9–11.1)
Q-T INTERVAL, ECG07: 388 MS
QRS DURATION, ECG06: 72 MS
QTC CALCULATION (BEZET), ECG08: 469 MS
RBC # BLD AUTO: 3.36 M/UL (ref 3.8–5.2)
RBC #/AREA URNS HPF: ABNORMAL /HPF (ref 0–5)
SERVICE CMNT-IMP: ABNORMAL
SERVICE CMNT-IMP: ABNORMAL
SODIUM SERPL-SCNC: 133 MMOL/L (ref 136–145)
SP GR UR REFRACTOMETRY: 1.01 (ref 1–1.03)
TROPONIN I SERPL-MCNC: <0.04 NG/ML
UROBILINOGEN UR QL STRIP.AUTO: 0.2 EU/DL (ref 0.2–1)
VENTRICULAR RATE, ECG03: 88 BPM
WBC # BLD AUTO: 10.9 K/UL (ref 3.6–11)
WBC URNS QL MICRO: ABNORMAL /HPF (ref 0–4)

## 2018-05-09 PROCEDURE — 96374 THER/PROPH/DIAG INJ IV PUSH: CPT

## 2018-05-09 PROCEDURE — 85610 PROTHROMBIN TIME: CPT | Performed by: EMERGENCY MEDICINE

## 2018-05-09 PROCEDURE — 74022 RADEX COMPL AQT ABD SERIES: CPT

## 2018-05-09 PROCEDURE — 85025 COMPLETE CBC W/AUTO DIFF WBC: CPT | Performed by: EMERGENCY MEDICINE

## 2018-05-09 PROCEDURE — 74011250636 HC RX REV CODE- 250/636: Performed by: EMERGENCY MEDICINE

## 2018-05-09 PROCEDURE — 82962 GLUCOSE BLOOD TEST: CPT

## 2018-05-09 PROCEDURE — 83735 ASSAY OF MAGNESIUM: CPT | Performed by: EMERGENCY MEDICINE

## 2018-05-09 PROCEDURE — 74011636637 HC RX REV CODE- 636/637: Performed by: EMERGENCY MEDICINE

## 2018-05-09 PROCEDURE — 96372 THER/PROPH/DIAG INJ SC/IM: CPT

## 2018-05-09 PROCEDURE — 74176 CT ABD & PELVIS W/O CONTRAST: CPT

## 2018-05-09 PROCEDURE — 96361 HYDRATE IV INFUSION ADD-ON: CPT

## 2018-05-09 PROCEDURE — 99284 EMERGENCY DEPT VISIT MOD MDM: CPT

## 2018-05-09 PROCEDURE — 80053 COMPREHEN METABOLIC PANEL: CPT | Performed by: EMERGENCY MEDICINE

## 2018-05-09 PROCEDURE — 36415 COLL VENOUS BLD VENIPUNCTURE: CPT | Performed by: EMERGENCY MEDICINE

## 2018-05-09 PROCEDURE — 81001 URINALYSIS AUTO W/SCOPE: CPT | Performed by: EMERGENCY MEDICINE

## 2018-05-09 PROCEDURE — 84484 ASSAY OF TROPONIN QUANT: CPT | Performed by: EMERGENCY MEDICINE

## 2018-05-09 PROCEDURE — 93005 ELECTROCARDIOGRAM TRACING: CPT

## 2018-05-09 RX ORDER — ONDANSETRON 4 MG/1
4 TABLET, ORALLY DISINTEGRATING ORAL
Qty: 10 TAB | Refills: 0 | Status: SHIPPED | OUTPATIENT
Start: 2018-05-09 | End: 2018-05-11

## 2018-05-09 RX ORDER — SODIUM CHLORIDE 0.9 % (FLUSH) 0.9 %
5-10 SYRINGE (ML) INJECTION EVERY 8 HOURS
Status: DISCONTINUED | OUTPATIENT
Start: 2018-05-09 | End: 2018-05-09 | Stop reason: HOSPADM

## 2018-05-09 RX ORDER — SODIUM CHLORIDE 0.9 % (FLUSH) 0.9 %
5-10 SYRINGE (ML) INJECTION AS NEEDED
Status: DISCONTINUED | OUTPATIENT
Start: 2018-05-09 | End: 2018-05-09 | Stop reason: HOSPADM

## 2018-05-09 RX ORDER — ONDANSETRON 2 MG/ML
4 INJECTION INTRAMUSCULAR; INTRAVENOUS
Status: DISCONTINUED | OUTPATIENT
Start: 2018-05-09 | End: 2018-05-09

## 2018-05-09 RX ORDER — MORPHINE SULFATE 4 MG/ML
4 INJECTION INTRAVENOUS
Status: DISCONTINUED | OUTPATIENT
Start: 2018-05-09 | End: 2018-05-09

## 2018-05-09 RX ORDER — ONDANSETRON 2 MG/ML
8 INJECTION INTRAMUSCULAR; INTRAVENOUS
Status: COMPLETED | OUTPATIENT
Start: 2018-05-09 | End: 2018-05-09

## 2018-05-09 RX ORDER — MORPHINE SULFATE 4 MG/ML
4 INJECTION INTRAVENOUS
Status: COMPLETED | OUTPATIENT
Start: 2018-05-09 | End: 2018-05-09

## 2018-05-09 RX ORDER — ONDANSETRON 2 MG/ML
4 INJECTION INTRAMUSCULAR; INTRAVENOUS
Status: COMPLETED | OUTPATIENT
Start: 2018-05-09 | End: 2018-05-09

## 2018-05-09 RX ORDER — TRAMADOL HYDROCHLORIDE 50 MG/1
50 TABLET ORAL
Qty: 6 TAB | Refills: 0 | Status: ON HOLD | OUTPATIENT
Start: 2018-05-09 | End: 2018-07-11

## 2018-05-09 RX ADMIN — ONDANSETRON 4 MG: 2 INJECTION INTRAMUSCULAR; INTRAVENOUS at 10:09

## 2018-05-09 RX ADMIN — MORPHINE SULFATE 4 MG: 4 INJECTION INTRAVENOUS at 10:09

## 2018-05-09 RX ADMIN — INSULIN HUMAN 5 UNITS: 100 INJECTION, SOLUTION PARENTERAL at 10:04

## 2018-05-09 RX ADMIN — SODIUM CHLORIDE 1000 ML: 900 INJECTION, SOLUTION INTRAVENOUS at 08:16

## 2018-05-09 RX ADMIN — INSULIN HUMAN 10 UNITS: 100 INJECTION, SOLUTION PARENTERAL at 10:10

## 2018-05-09 RX ADMIN — Medication 10 ML: at 08:17

## 2018-05-09 RX ADMIN — ONDANSETRON 8 MG: 2 INJECTION INTRAMUSCULAR; INTRAVENOUS at 08:17

## 2018-05-09 NOTE — ED NOTES
Pt standing up, bent over the side of the bed. Instructed pt that it is best to get in bed to prevent falling and to prevent loss of IV access. Informed pt that her IV will come out if she continues to stand.

## 2018-05-09 NOTE — ED NOTES
Male visitor arrived to pick pt up. Visitor asked pt what she received for medication. Pt stated morphine. Visitor asked \"why??\". Visitor then stated, \"come on here, you've out stayed your welcome. Lets go. \" Visitor requested wheel chair to get pt to car. Wheel chair provided. Male pt wheeled pt out of dept.

## 2018-05-09 NOTE — DISCHARGE INSTRUCTIONS
Learning About High Blood Sugar  What is high blood sugar? Your body turns the food you eat into glucose (sugar), which it uses for energy. But if your body isn't able to use the sugar right away, it can build up in your blood and lead to high blood sugar. When the amount of sugar in your blood stays too high for too much of the time, you may have diabetes. Diabetes is a disease that can cause serious health problems. The good news is that lifestyle changes may help you get your blood sugar back to normal and avoid or delay diabetes. What causes high blood sugar? Sugar (glucose) can build up in your blood if you:  · Are overweight. · Have a family history of diabetes. · Take certain medicines, such as steroids. What are the symptoms? Having high blood sugar may not cause any symptoms at all. Or it may make you feel very thirsty or very hungry. You may also urinate more often than usual, have blurry vision, or lose weight without trying. How is high blood sugar treated? You can take steps to lower your blood sugar level if you understand what makes it get higher. Your doctor may want you to learn how to test your blood sugar level at home. Then you can see how illness, stress, or different kinds of food or medicine raise or lower your blood sugar level. Other tests may be needed to see if you have diabetes. How can you prevent high blood sugar? · Watch your weight. If you're overweight, losing just a small amount of weight may help. Reducing fat around your waist is most important. · Limit the amount of calories, sweets, and unhealthy fat you eat. Ask your doctor if a dietitian can help you. A registered dietitian can help you create meal plans that fit your lifestyle. · Get at least 30 minutes of exercise on most days of the week. Exercise helps control your blood sugar. It also helps you maintain a healthy weight. Walking is a good choice.  You also may want to do other activities, such as running, swimming, cycling, or playing tennis or team sports. · If your doctor prescribed medicines, take them exactly as prescribed. Call your doctor if you think you are having a problem with your medicine. You will get more details on the specific medicines your doctor prescribes. Follow-up care is a key part of your treatment and safety. Be sure to make and go to all appointments, and call your doctor if you are having problems. It's also a good idea to know your test results and keep a list of the medicines you take. Where can you learn more? Go to http://nery-peña.info/. Enter O108 in the search box to learn more about \"Learning About High Blood Sugar. \"  Current as of: March 13, 2017  Content Version: 11.4  © 7271-8588 [employer]. Care instructions adapted under license by [employer] (which disclaims liability or warranty for this information). If you have questions about a medical condition or this instruction, always ask your healthcare professional. [place] [address] any warranty or liability for your use of this information. Abdominal Pain: Care Instructions  Your Care Instructions    Abdominal pain has many possible causes. Some aren't serious and get better on their own in a few days. Others need more testing and treatment. If your pain continues or gets worse, you need to be rechecked and may need more tests to find out what is wrong. You may need surgery to correct the problem. Don't ignore new symptoms, such as fever, nausea and vomiting, urination problems, pain that gets worse, and dizziness. These may be signs of a more serious problem. Your doctor may have recommended a follow-up visit in the next 8 to 12 hours. If you are not getting better, you may need more tests or treatment. The doctor has checked you carefully, but problems can develop later.  If you notice any problems or new symptoms, get medical treatment right away.  Follow-up care is a key part of your treatment and safety. Be sure to make and go to all appointments, and call your doctor if you are having problems. It's also a good idea to know your test results and keep a list of the medicines you take. How can you care for yourself at home? · Rest until you feel better. · To prevent dehydration, drink plenty of fluids, enough so that your urine is light yellow or clear like water. Choose water and other caffeine-free clear liquids until you feel better. If you have kidney, heart, or liver disease and have to limit fluids, talk with your doctor before you increase the amount of fluids you drink. · If your stomach is upset, eat mild foods, such as rice, dry toast or crackers, bananas, and applesauce. Try eating several small meals instead of two or three large ones. · Wait until 48 hours after all symptoms have gone away before you have spicy foods, alcohol, and drinks that contain caffeine. · Do not eat foods that are high in fat. · Avoid anti-inflammatory medicines such as aspirin, ibuprofen (Advil, Motrin), and naproxen (Aleve). These can cause stomach upset. Talk to your doctor if you take daily aspirin for another health problem. When should you call for help? Call 911 anytime you think you may need emergency care. For example, call if:  ? · You passed out (lost consciousness). ? · You pass maroon or very bloody stools. ? · You vomit blood or what looks like coffee grounds. ? · You have new, severe belly pain. ?Call your doctor now or seek immediate medical care if:  ? · Your pain gets worse, especially if it becomes focused in one area of your belly. ? · You have a new or higher fever. ? · Your stools are black and look like tar, or they have streaks of blood. ? · You have unexpected vaginal bleeding. ? · You have symptoms of a urinary tract infection. These may include:  ¨ Pain when you urinate.   ¨ Urinating more often than usual.  ¨ Blood in your urine. ? · You are dizzy or lightheaded, or you feel like you may faint. ? Watch closely for changes in your health, and be sure to contact your doctor if:  ? · You are not getting better after 1 day (24 hours). Where can you learn more? Go to http://nery-peña.info/. Enter N041 in the search box to learn more about \"Abdominal Pain: Care Instructions. \"  Current as of: March 20, 2017  Content Version: 11.4  © 8975-7928 [employer]. Care instructions adapted under license by [employer] (which disclaims liability or warranty for this information). If you have questions about a medical condition or this instruction, always ask your healthcare professional. [address] any warranty or liability for your use of this information.

## 2018-05-09 NOTE — ED NOTES
This RN was notified by [facility] that when passing pt's room, he witnessed pt with finger in her open mouth, gesturing it down her throat. Made MD aware of this information.
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
## 2018-05-09 NOTE — ED NOTES
Dr. Lashon Watkins at bedside to provide discharge paperwork. Vital signs stable. Pt in no apparent distress at this time. Mental status at baseline. Ambulatory to waiting room with steady gate, discharge paperwork in hand.

## 2018-05-09 NOTE — ED NOTES
Asked pt if she needed a phone to call a ride to pick her up. Pt denied. Asked pt if someone was coming to pick her up, she stated someone was coming to get her.

## 2018-05-10 ENCOUNTER — HOSPITAL ENCOUNTER (OUTPATIENT)
Age: 53
Setting detail: OBSERVATION
Discharge: HOME | End: 2018-05-11
Attending: EMERGENCY MEDICINE | Admitting: INTERNAL MEDICINE
Payer: MEDICARE

## 2018-05-10 DIAGNOSIS — R11.2 INTRACTABLE VOMITING WITH NAUSEA, UNSPECIFIED VOMITING TYPE: ICD-10-CM

## 2018-05-10 DIAGNOSIS — R03.0 ELEVATED BLOOD PRESSURE READING: ICD-10-CM

## 2018-05-10 DIAGNOSIS — R07.9 ACUTE CHEST PAIN: Primary | ICD-10-CM

## 2018-05-10 LAB
ALBUMIN SERPL-MCNC: 3.3 G/DL (ref 3.5–5)
ALBUMIN/GLOB SERPL: 0.7 {RATIO} (ref 1.1–2.2)
ALP SERPL-CCNC: 198 U/L (ref 45–117)
ALT SERPL-CCNC: 26 U/L (ref 12–78)
ANION GAP SERPL CALC-SCNC: 12 MMOL/L (ref 5–15)
AST SERPL-CCNC: 20 U/L (ref 15–37)
BASOPHILS # BLD: 0 K/UL (ref 0–0.1)
BASOPHILS NFR BLD: 0 % (ref 0–1)
BILIRUB SERPL-MCNC: 0.4 MG/DL (ref 0.2–1)
BUN SERPL-MCNC: 63 MG/DL (ref 6–20)
BUN/CREAT SERPL: 14 (ref 12–20)
CALCIUM SERPL-MCNC: 9.4 MG/DL (ref 8.5–10.1)
CHLORIDE SERPL-SCNC: 96 MMOL/L (ref 97–108)
CO2 SERPL-SCNC: 19 MMOL/L (ref 21–32)
CREAT SERPL-MCNC: 4.57 MG/DL (ref 0.55–1.02)
DIFFERENTIAL METHOD BLD: ABNORMAL
EOSINOPHIL # BLD: 0 K/UL (ref 0–0.4)
EOSINOPHIL NFR BLD: 0 % (ref 0–7)
ERYTHROCYTE [DISTWIDTH] IN BLOOD BY AUTOMATED COUNT: 13.1 % (ref 11.5–14.5)
GLOBULIN SER CALC-MCNC: 4.6 G/DL (ref 2–4)
GLUCOSE BLD STRIP.AUTO-MCNC: 311 MG/DL (ref 65–100)
GLUCOSE SERPL-MCNC: 272 MG/DL (ref 65–100)
HCT VFR BLD AUTO: 28.9 % (ref 35–47)
HGB BLD-MCNC: 9.2 G/DL (ref 11.5–16)
IMM GRANULOCYTES # BLD: 0 K/UL (ref 0–0.04)
IMM GRANULOCYTES NFR BLD AUTO: 0 % (ref 0–0.5)
LIPASE SERPL-CCNC: 218 U/L (ref 73–393)
LYMPHOCYTES # BLD: 1.7 K/UL (ref 0.8–3.5)
LYMPHOCYTES NFR BLD: 19 % (ref 12–49)
MCH RBC QN AUTO: 30.3 PG (ref 26–34)
MCHC RBC AUTO-ENTMCNC: 31.8 G/DL (ref 30–36.5)
MCV RBC AUTO: 95.1 FL (ref 80–99)
MONOCYTES # BLD: 0.6 K/UL (ref 0–1)
MONOCYTES NFR BLD: 6 % (ref 5–13)
NEUTS SEG # BLD: 6.8 K/UL (ref 1.8–8)
NEUTS SEG NFR BLD: 74 % (ref 32–75)
NRBC # BLD: 0 K/UL (ref 0–0.01)
NRBC BLD-RTO: 0 PER 100 WBC
PLATELET # BLD AUTO: 221 K/UL (ref 150–400)
PMV BLD AUTO: 9.1 FL (ref 8.9–12.9)
POTASSIUM SERPL-SCNC: 3.5 MMOL/L (ref 3.5–5.1)
PROT SERPL-MCNC: 7.9 G/DL (ref 6.4–8.2)
RBC # BLD AUTO: 3.04 M/UL (ref 3.8–5.2)
SERVICE CMNT-IMP: ABNORMAL
SODIUM SERPL-SCNC: 127 MMOL/L (ref 136–145)
TROPONIN I SERPL-MCNC: <0.04 NG/ML
WBC # BLD AUTO: 9.1 K/UL (ref 3.6–11)

## 2018-05-10 PROCEDURE — 99218 HC RM OBSERVATION: CPT

## 2018-05-10 PROCEDURE — 74011250636 HC RX REV CODE- 250/636: Performed by: INTERNAL MEDICINE

## 2018-05-10 PROCEDURE — 85025 COMPLETE CBC W/AUTO DIFF WBC: CPT | Performed by: PHYSICIAN ASSISTANT

## 2018-05-10 PROCEDURE — 96375 TX/PRO/DX INJ NEW DRUG ADDON: CPT

## 2018-05-10 PROCEDURE — 65660000000 HC RM CCU STEPDOWN

## 2018-05-10 PROCEDURE — 99285 EMERGENCY DEPT VISIT HI MDM: CPT

## 2018-05-10 PROCEDURE — 74011000250 HC RX REV CODE- 250: Performed by: EMERGENCY MEDICINE

## 2018-05-10 PROCEDURE — 84484 ASSAY OF TROPONIN QUANT: CPT | Performed by: PHYSICIAN ASSISTANT

## 2018-05-10 PROCEDURE — 74011636637 HC RX REV CODE- 636/637: Performed by: INTERNAL MEDICINE

## 2018-05-10 PROCEDURE — 36415 COLL VENOUS BLD VENIPUNCTURE: CPT | Performed by: PHYSICIAN ASSISTANT

## 2018-05-10 PROCEDURE — 74011250636 HC RX REV CODE- 250/636: Performed by: EMERGENCY MEDICINE

## 2018-05-10 PROCEDURE — 74011250637 HC RX REV CODE- 250/637: Performed by: EMERGENCY MEDICINE

## 2018-05-10 PROCEDURE — 83690 ASSAY OF LIPASE: CPT | Performed by: PHYSICIAN ASSISTANT

## 2018-05-10 PROCEDURE — 93005 ELECTROCARDIOGRAM TRACING: CPT

## 2018-05-10 PROCEDURE — 80053 COMPREHEN METABOLIC PANEL: CPT | Performed by: PHYSICIAN ASSISTANT

## 2018-05-10 PROCEDURE — 82962 GLUCOSE BLOOD TEST: CPT

## 2018-05-10 PROCEDURE — 96361 HYDRATE IV INFUSION ADD-ON: CPT

## 2018-05-10 PROCEDURE — 96374 THER/PROPH/DIAG INJ IV PUSH: CPT

## 2018-05-10 RX ORDER — SODIUM BICARBONATE 650 MG/1
650 TABLET ORAL 2 TIMES DAILY
COMMUNITY

## 2018-05-10 RX ORDER — INSULIN LISPRO 100 [IU]/ML
INJECTION, SOLUTION INTRAVENOUS; SUBCUTANEOUS
Status: DISCONTINUED | OUTPATIENT
Start: 2018-05-10 | End: 2018-05-11 | Stop reason: HOSPADM

## 2018-05-10 RX ORDER — HYDROMORPHONE HYDROCHLORIDE 2 MG/ML
0.5 INJECTION, SOLUTION INTRAMUSCULAR; INTRAVENOUS; SUBCUTANEOUS
Status: COMPLETED | OUTPATIENT
Start: 2018-05-10 | End: 2018-05-10

## 2018-05-10 RX ORDER — HYDRALAZINE HYDROCHLORIDE 20 MG/ML
10 INJECTION INTRAMUSCULAR; INTRAVENOUS
Status: DISCONTINUED | OUTPATIENT
Start: 2018-05-10 | End: 2018-05-11 | Stop reason: HOSPADM

## 2018-05-10 RX ORDER — METOPROLOL TARTRATE 50 MG/1
50 TABLET ORAL 2 TIMES DAILY
COMMUNITY
End: 2019-12-11

## 2018-05-10 RX ORDER — HYDROMORPHONE HYDROCHLORIDE 1 MG/ML
1 INJECTION, SOLUTION INTRAMUSCULAR; INTRAVENOUS; SUBCUTANEOUS
Status: DISCONTINUED | OUTPATIENT
Start: 2018-05-10 | End: 2018-05-11

## 2018-05-10 RX ORDER — DEXTROSE 50 % IN WATER (D50W) INTRAVENOUS SYRINGE
12.5-25 AS NEEDED
Status: DISCONTINUED | OUTPATIENT
Start: 2018-05-10 | End: 2018-05-11 | Stop reason: HOSPADM

## 2018-05-10 RX ORDER — SODIUM CHLORIDE 9 MG/ML
75 INJECTION, SOLUTION INTRAVENOUS CONTINUOUS
Status: DISCONTINUED | OUTPATIENT
Start: 2018-05-10 | End: 2018-05-11 | Stop reason: HOSPADM

## 2018-05-10 RX ORDER — ACETAMINOPHEN 325 MG/1
650 TABLET ORAL
Status: DISCONTINUED | OUTPATIENT
Start: 2018-05-10 | End: 2018-05-11 | Stop reason: HOSPADM

## 2018-05-10 RX ORDER — ONDANSETRON 2 MG/ML
4 INJECTION INTRAMUSCULAR; INTRAVENOUS
Status: COMPLETED | OUTPATIENT
Start: 2018-05-10 | End: 2018-05-10

## 2018-05-10 RX ORDER — ONDANSETRON 2 MG/ML
4 INJECTION INTRAMUSCULAR; INTRAVENOUS
Status: DISCONTINUED | OUTPATIENT
Start: 2018-05-10 | End: 2018-05-11 | Stop reason: HOSPADM

## 2018-05-10 RX ORDER — MAGNESIUM SULFATE 100 %
4 CRYSTALS MISCELLANEOUS AS NEEDED
Status: DISCONTINUED | OUTPATIENT
Start: 2018-05-10 | End: 2018-05-11 | Stop reason: HOSPADM

## 2018-05-10 RX ORDER — ATORVASTATIN CALCIUM 20 MG/1
20 TABLET, FILM COATED ORAL
COMMUNITY

## 2018-05-10 RX ORDER — CALCITRIOL 0.25 UG/1
0.25 CAPSULE ORAL DAILY
Status: ON HOLD | COMMUNITY
End: 2018-07-11

## 2018-05-10 RX ORDER — OXYCODONE AND ACETAMINOPHEN 5; 325 MG/1; MG/1
1 TABLET ORAL
Status: DISCONTINUED | OUTPATIENT
Start: 2018-05-10 | End: 2018-05-11

## 2018-05-10 RX ORDER — LABETALOL HYDROCHLORIDE 5 MG/ML
20 INJECTION, SOLUTION INTRAVENOUS ONCE
Status: COMPLETED | OUTPATIENT
Start: 2018-05-10 | End: 2018-05-10

## 2018-05-10 RX ADMIN — ONDANSETRON 4 MG: 2 INJECTION INTRAMUSCULAR; INTRAVENOUS at 19:18

## 2018-05-10 RX ADMIN — SODIUM CHLORIDE 75 ML/HR: 900 INJECTION, SOLUTION INTRAVENOUS at 21:54

## 2018-05-10 RX ADMIN — INSULIN LISPRO 4 UNITS: 100 INJECTION, SOLUTION INTRAVENOUS; SUBCUTANEOUS at 22:10

## 2018-05-10 RX ADMIN — HYDROMORPHONE HYDROCHLORIDE 0.5 MG: 2 INJECTION INTRAMUSCULAR; INTRAVENOUS; SUBCUTANEOUS at 19:28

## 2018-05-10 RX ADMIN — LIDOCAINE HYDROCHLORIDE 40 ML: 20 SOLUTION ORAL; TOPICAL at 20:23

## 2018-05-10 RX ADMIN — LABETALOL HYDROCHLORIDE 20 MG: 5 INJECTION, SOLUTION INTRAVENOUS at 19:21

## 2018-05-10 NOTE — ED NOTES
4:37 PM  I have evaluated the patient as the Provider in Triage. I have reviewed Her vital signs and the triage nurse assessment. I have talked with the patient and any available family and advised that I am the provider in triage and have ordered the appropriate study to initiate their work up based on the clinical presentation during my assessment. I have advised that the patient will be accommodated in the Main ED as soon as possible. I have also requested to contact the triage nurse or myself immediately if the patient experiences any changes in their condition during this brief waiting period. Patient reports 3 day history of epigastric pain and vomiting, pain radiates to her low back. Was seen at [facility] 2 days ago and was prescribed tramadol.   CHARITY Sheehan

## 2018-05-10 NOTE — ED TRIAGE NOTES
\"shes been bouncing around im gonna be honest with you. We got a panaceas problem. We give her insulin and it wont take. They cant find anything wrong. \" Patient tearful in triage. Reports chest pain and epigastric pain which has worsened. \"They wanted to prescribe malox but it anit helping. \" Reports vomitting for three days. Patient seen at [facility] yesterday for same issue. Patient states she was prescribed pain meds and nausea meds yesterday but has not gotten them from pharmacy.

## 2018-05-10 NOTE — IP AVS SNAPSHOT
[address] [place] [address] 
[phone] Patient: Doe Potts MRN: [id number] :1965 About your hospitalization You were admitted on:  May 10, 2018 You last received care in the:  [facility] 4 SURG/BARIATRICS You were discharged on:  May 11, 2018 Why you were hospitalized Your primary diagnosis was:  Chest Pain Follow-up Information Follow up With Details Comments Contact Info Francesca Mckee NP In 1 week Discharge follow up  Keyshawn Aguilar [address] [facility] 
[phone] Your Nephrologist  In 1 week Discharge follow up Discharge Orders None A check edgar indicates which time of day the medication should be taken. My Medications START taking these medications Instructions Each Dose to Equal  
 Morning Noon Evening Bedtime  
 aspirin 81 mg chewable tablet Start taking on:  2018 Your last dose was: Your next dose is: Take 1 Tab by mouth daily. 81 mg  
    
   
   
   
  
 cloNIDine HCl 0.1 mg tablet Commonly known as:  CATAPRES Your last dose was: Your next dose is: Take 1 Tab by mouth two (2) times a day. 0.1 mg  
    
   
   
   
  
  
CONTINUE taking these medications Instructions Each Dose to Equal  
 Morning Noon Evening Bedtime  
 atorvastatin 20 mg tablet Commonly known as:  LIPITOR Your last dose was: Your next dose is: Take 20 mg by mouth daily. 20 mg  
    
   
   
   
  
 calcitRIOL 0.25 mcg capsule Commonly known as:  ROCALTROL Your last dose was: Your next dose is: Take 0.25 mcg by mouth daily. 0.25 mcg HumaLOG U-100 Insulin 100 unit/mL injection Generic drug:  insulin lispro Your last dose was: Your next dose is:    
   
   
 5 Units by SubCUTAneous route three (3) times daily (with meals). 5 Units  
    
   
   
   
  
 insulin glargine 100 unit/mL injection Commonly known as:  LANTUS U-100 INSULIN Your last dose was: Your next dose is:    
   
   
 25 Units by SubCUTAneous route nightly. 25 Units  
    
   
   
   
  
 metoprolol tartrate 50 mg tablet Commonly known as:  LOPRESSOR Your last dose was: Your next dose is: Take 50 mg by mouth two (2) times a day. 50 mg  
    
   
   
   
  
 ondansetron 4 mg disintegrating tablet Commonly known as:  ZOFRAN ODT Your last dose was: Your next dose is: Take 1 Tab by mouth every eight (8) hours as needed for Nausea. 4 mg  
    
   
   
   
  
 sodium bicarbonate 650 mg tablet Your last dose was: Your next dose is: Take 650 mg by mouth two (2) times a day. 650 mg  
    
   
   
   
  
 traMADol 50 mg tablet Commonly known as:  ULTRAM  
   
Your last dose was: Your next dose is: Take 1 Tab by mouth every six (6) hours as needed for Pain. Max Daily Amount: 200 mg.  
 50 mg Where to Get Your Medications Information on where to get these meds will be given to you by the nurse or doctor. ! Ask your nurse or doctor about these medications  
  aspirin 81 mg chewable tablet  
 cloNIDine HCl 0.1 mg tablet  
 ondansetron 4 mg disintegrating tablet Opioid Education Prescription Opioids: What You Need to Know: 
 
 
 You have been admitted to the hospital with the following diagnoses: · Chest pain FOLLOW-UP CARE RECOMMENDATIONS: 
 
APPOINTMENTS: 
Follow-up Information Follow up With Details Comments Contact Jacob Mukherjee NP In 1 week Discharge follow up  Keyshawn Cadena 57 
[phone] Your Nephrologist  In 1 week Discharge follow up FOLLOW-UP TESTS recommended: - New BP medicine: Clonidine twice daily PENDING TEST RESULTS: 
At the time of your discharge the following test results are still pending: none Please make sure you review these results with your outpatient follow-up provider(s). SYMPTOMS to watch for: chest pain, shortness of breath, fever, chills, nausea, vomiting, diarrhea, change in mentation, falling, weakness, bleeding. DIET/what to eat:  Renal Diet ACTIVITY:  Activity as tolerated WOUND CARE: NONE 
 
EQUIPMENT needed:  none What to do if new or unexpected symptoms occur? If you experience any of the above symptoms (or should other concerns or questions arise after discharge) please call your primary care physician. Return to the emergency room if you cannot get hold of your doctor. · It is very important that you keep your follow-up appointment(s). · Please bring discharge papers, medication list (and/or medication bottles) to your follow-up appointments for review by your outpatient provider(s). · Please check the list of medications and be sure it includes every medication (even non-prescription medications) that your provider wants you to take. · It is important that you take the medication exactly as they are prescribed. · Keep your medication in the bottles provided by the pharmacist and keep a list of the medication names, dosages, and times to be taken in your wallet. · Do not take other medications without consulting your doctor.   
· If you have any questions about your medications or other instructions, please talk to your nurse or care provider before you leave the hospital. 
 
I understand that if any problems occur once I am at home I am to contact my physician. These instructions were explained to me and I had the opportunity to ask questions. [facility] Announcement We are excited to announce that we are making your provider's discharge notes available to you in [facility]. You will see these notes when they are completed and signed by the physician that discharged you from your recent hospital stay. If you have any questions or concerns about any information you see in [facility], please call the Health Information Department where you were seen or reach out to your Primary Care Provider for more information about your plan of care. Introducing [facility]! [facility] introduces [facility] patient portal. Now you can access parts of your medical record, email your doctor's office, and request medication refills online. 1. In your internet browser, go to [email or web address] 2. Click on the First Time User? Click Here link in the Sign In box. You will see the New Member Sign Up page. 3. Enter your [facility] Access Code exactly as it appears below. You will not need to use this code after youve completed the sign-up process. If you do not sign up before the expiration date, you must request a new code. · [facility] Access Code: [email or web address] Expires: 5/12/2018  8:30 PM 
 
4. Enter the last four digits of your Social Security Number (xxxx) and Date of Birth (mm/dd/yyyy) as indicated and click Submit. You will be taken to the next sign-up page. 5. Create a [facility] ID. This will be your [facility] login ID and cannot be changed, so think of one that is secure and easy to remember. 6. Create a [facility] password. You can change your password at any time. 7. Enter your Password Reset Question and Answer.  This can be used at a later time if you forget your password. 8. Enter your e-mail address. You will receive e-mail notification when new information is available in [address] 9. Click Sign Up. You can now view and download portions of your medical record. 10. Click the Download Summary menu link to download a portable copy of your medical information. If you have questions, please visit the Frequently Asked Questions section of the [employer] website. Remember, [employer] is NOT to be used for urgent needs. For medical emergencies, dial 911. Now available from your iPhone and Android! Introducing [employer] As a [employer] patient, I wanted to make you aware of our electronic visit tool called [employer] [employer]. [employer] 24/7 allows you to connect within minutes with a medical provider 24 hours a day, seven days a week via a mobile device or tablet or logging into a secure website from your computer. You can access [employer] Still from anywhere in the [place]. A virtual visit might be right for you when you have a simple condition and feel like you just dont want to get out of bed, or cant get away from work for an appointment, when your regular [employer] provider is not available (evenings, weekends or holidays), or when youre out of town and need minor care. Electronic visits cost only $49 and if the [employer] 24/7 provider determines a prescription is needed to treat your condition, one can be electronically transmitted to a nearby pharmacy*. Please take a moment to enroll today if you have not already done so. The enrollment process is free and takes just a few minutes. To enroll, please download the [employer] 24/7 monica to your tablet or phone, or visit [email or web address] to enroll on your computer.    
And, as an [address] [place] [address] patient with a [employer] account, the results of your visits will be scanned into your electronic medical record and your primary care provider will be able to view the scanned results. We urge you to continue to see your regular [facility] provider for your ongoing medical care. And while your primary care provider may not be the one available when you seek a [facility] virtual visit, the peace of mind you get from getting a real diagnosis real time can be priceless. For more information on [facility], view our Frequently Asked Questions (FAQs) at [email or web address]. Sincerely, 
 
Jada Bradford MD 
Chief Medical Officer [address] *:  certain medications cannot be prescribed via [facility] Unresulted Labs-Please follow up with your PCP about these lab tests Order Current Status CULTURE, MRSA In process EKG, 12 LEAD, INITIAL Preliminary result Providers Seen During Your Hospitalization Provider Specialty Primary office phone Dodie Pizano MD Emergency Medicine [phone] Vlad Roblero MD Internal Medicine [phone] Antonette Ordonez MD Internal Medicine [phone] Your Primary Care Physician (PCP) Primary Care Physician Office Phone Office Fax Selma Stevenson 316 [phone] You are allergic to the following No active allergies Recent Documentation Height Weight BMI OB Status Smoking Status 1.753 m 60.4 kg 19.66 kg/m2 Postmenopausal Never Smoker Emergency Contacts Name Discharge Info Relation Home Work [facility] DISCHARGE CAREGIVER [3] Spouse [3] [phone] [facility] DISCHARGE CAREGIVER [3] Sister [23] [phone] Patient Belongings The following personal items are in your possession at time of discharge: 
  Dental Appliances: None  Visual Aid: None      Home Medications: None   Jewelry: None  Clothing: Pants, At bedside    Other Valuables: None Discharge Instructions Attachments/References MEFS - CLONIDINE/CHLORTHALIDONE (CLORPRES) - (BY MOUTH) (ENGLISH) MEFS - ONDANSETRON (ZOFRAN, ZOFRAN ODT, ZUPLENZ) - (BY MOUTH, INTO THE MOUTH) (ENGLISH) Patient Handouts Clonidine/Chlorthalidone (Clorpres) - (By mouth) Why this medicine is used:  
Treats high blood pressure. Contact a nurse or doctor right away if you have: 
· Fast, slow, pounding, or uneven heartbeat · Shortness of breath · Lightheadedness, dizziness, fainting · Confusion, weakness, numbness or tingling in hands, feet, or lips · Dry mouth, increased thirst, muscle cramps, nausea or vomiting Common side effects: · Mild diarrhea, constipation, or stomach upset · Dry eyes, mouth, throat · Headache, tiredness, dizziness © 2017 [address] Information is for End User's use only and may not be sold, redistributed or otherwise used for commercial purposes. Ondansetron (Zofran, Zofran ODT, Zuplenz) - (By mouth, Into the mouth) Why this medicine is used:  
Prevents nausea and vomiting. Contact a nurse or doctor right away if you have: 
· Fast, pounding, or uneven heartbeat · Lightheadedness or fainting · Trouble breathing Common side effects: 
· Headache, tiredness · Constipation, diarrhea © 2017 [address] Information is for End User's use only and may not be sold, redistributed or otherwise used for commercial purposes. Please provide this summary of care documentation to your next provider. Signatures-by signing, you are acknowledging that this After Visit Summary has been reviewed with you and you have received a copy. Patient Signature:  ____________________________________________________________ Date:  ____________________________________________________________  
  
Daiana James Provider Signature:  ____________________________________________________________ Date:  ____________________________________________________________
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
## 2018-05-11 ENCOUNTER — APPOINTMENT (OUTPATIENT)
Dept: CT IMAGING | Age: 53
End: 2018-05-11
Attending: INTERNAL MEDICINE
Payer: MEDICARE

## 2018-05-11 VITALS
HEIGHT: 69 IN | OXYGEN SATURATION: 96 % | HEART RATE: 96 BPM | WEIGHT: 133.16 LBS | TEMPERATURE: 98 F | RESPIRATION RATE: 16 BRPM | BODY MASS INDEX: 19.72 KG/M2 | SYSTOLIC BLOOD PRESSURE: 164 MMHG | DIASTOLIC BLOOD PRESSURE: 95 MMHG

## 2018-05-11 LAB
ALBUMIN SERPL-MCNC: 3 G/DL (ref 3.5–5)
ALBUMIN/GLOB SERPL: 0.8 {RATIO} (ref 1.1–2.2)
ALP SERPL-CCNC: 173 U/L (ref 45–117)
ALT SERPL-CCNC: 22 U/L (ref 12–78)
AMPHET UR QL SCN: NEGATIVE
ANION GAP SERPL CALC-SCNC: 12 MMOL/L (ref 5–15)
APPEARANCE UR: CLEAR
AST SERPL-CCNC: 23 U/L (ref 15–37)
ATRIAL RATE: 99 BPM
ATTENDING PHYSICIAN, CST07: NORMAL
BACTERIA URNS QL MICRO: NEGATIVE /HPF
BARBITURATES UR QL SCN: NEGATIVE
BASOPHILS # BLD: 0 K/UL (ref 0–0.1)
BASOPHILS NFR BLD: 0 % (ref 0–1)
BENZODIAZ UR QL: NEGATIVE
BILIRUB SERPL-MCNC: 0.4 MG/DL (ref 0.2–1)
BILIRUB UR QL: NEGATIVE
BUN SERPL-MCNC: 64 MG/DL (ref 6–20)
BUN/CREAT SERPL: 14 (ref 12–20)
CALCIUM SERPL-MCNC: 8.7 MG/DL (ref 8.5–10.1)
CALCULATED P AXIS, ECG09: 64 DEGREES
CALCULATED R AXIS, ECG10: 10 DEGREES
CALCULATED T AXIS, ECG11: 70 DEGREES
CANNABINOIDS UR QL SCN: NEGATIVE
CHLORIDE SERPL-SCNC: 98 MMOL/L (ref 97–108)
CHOLEST SERPL-MCNC: 207 MG/DL
CK MB CFR SERPL CALC: 2.4 % (ref 0–2.5)
CK MB SERPL-MCNC: 2.3 NG/ML (ref 5–25)
CK SERPL-CCNC: 95 U/L (ref 26–192)
CO2 SERPL-SCNC: 23 MMOL/L (ref 21–32)
COCAINE UR QL SCN: NEGATIVE
COLOR UR: ABNORMAL
CREAT SERPL-MCNC: 4.55 MG/DL (ref 0.55–1.02)
CRP SERPL-MCNC: <0.29 MG/DL (ref 0–0.6)
DIAGNOSIS, 93000: NORMAL
DIAGNOSIS, 93000: NORMAL
DIFFERENTIAL METHOD BLD: ABNORMAL
DRUG SCRN COMMENT,DRGCM: ABNORMAL
DUKE TM SCORE RESULT, CST14: NORMAL
DUKE TREADMILL SCORE, CST13: NORMAL
ECG INTERP BEFORE EX, CST11: NORMAL
ECG INTERP DURING EX, CST12: NORMAL
EOSINOPHIL # BLD: 0.1 K/UL (ref 0–0.4)
EOSINOPHIL NFR BLD: 1 % (ref 0–7)
EPITH CASTS URNS QL MICRO: ABNORMAL /LPF
ERYTHROCYTE [DISTWIDTH] IN BLOOD BY AUTOMATED COUNT: 12.9 % (ref 11.5–14.5)
ERYTHROCYTE [SEDIMENTATION RATE] IN BLOOD: 61 MM/HR (ref 0–30)
EST. AVERAGE GLUCOSE BLD GHB EST-MCNC: 240 MG/DL
FERRITIN SERPL-MCNC: 559 NG/ML (ref 8–252)
FOLATE SERPL-MCNC: 13.4 NG/ML (ref 5–21)
FUNCTIONAL CAPACITY, CST17: NORMAL
GLOBULIN SER CALC-MCNC: 4 G/DL (ref 2–4)
GLUCOSE BLD STRIP.AUTO-MCNC: 140 MG/DL (ref 65–100)
GLUCOSE BLD STRIP.AUTO-MCNC: 208 MG/DL (ref 65–100)
GLUCOSE BLD STRIP.AUTO-MCNC: 227 MG/DL (ref 65–100)
GLUCOSE SERPL-MCNC: 201 MG/DL (ref 65–100)
GLUCOSE UR STRIP.AUTO-MCNC: 500 MG/DL
HBA1C MFR BLD: 10 % (ref 4.2–6.3)
HCT VFR BLD AUTO: 25.1 % (ref 35–47)
HDLC SERPL-MCNC: 68 MG/DL
HDLC SERPL: 3 {RATIO} (ref 0–5)
HGB BLD-MCNC: 8.3 G/DL (ref 11.5–16)
HGB UR QL STRIP: ABNORMAL
IMM GRANULOCYTES # BLD: 0 K/UL (ref 0–0.04)
IMM GRANULOCYTES NFR BLD AUTO: 1 % (ref 0–0.5)
IRON SATN MFR SERPL: 58 % (ref 20–50)
IRON SERPL-MCNC: 147 UG/DL (ref 35–150)
IRON SERPL-MCNC: 153 UG/DL (ref 35–150)
KETONES UR QL STRIP.AUTO: NEGATIVE MG/DL
KNOWN CARDIAC CONDITION, CST08: NORMAL
LDLC SERPL CALC-MCNC: 117.6 MG/DL (ref 0–100)
LEUKOCYTE ESTERASE UR QL STRIP.AUTO: NEGATIVE
LIPID PROFILE,FLP: ABNORMAL
LYMPHOCYTES # BLD: 1.5 K/UL (ref 0.8–3.5)
LYMPHOCYTES NFR BLD: 23 % (ref 12–49)
MAGNESIUM SERPL-MCNC: 1.7 MG/DL (ref 1.6–2.4)
MAX. DIASTOLIC BP, CST04: 99 MMHG
MAX. HEART RATE, CST05: 155 BPM
MAX. SYSTOLIC BP, CST03: 177 MMHG
MCH RBC QN AUTO: 30.3 PG (ref 26–34)
MCHC RBC AUTO-ENTMCNC: 33.1 G/DL (ref 30–36.5)
MCV RBC AUTO: 91.6 FL (ref 80–99)
METHADONE UR QL: NEGATIVE
MONOCYTES # BLD: 0.6 K/UL (ref 0–1)
MONOCYTES NFR BLD: 9 % (ref 5–13)
NEUTS SEG # BLD: 4.2 K/UL (ref 1.8–8)
NEUTS SEG NFR BLD: 66 % (ref 32–75)
NITRITE UR QL STRIP.AUTO: NEGATIVE
NRBC # BLD: 0 K/UL (ref 0–0.01)
NRBC BLD-RTO: 0 PER 100 WBC
OPIATES UR QL: POSITIVE
OVERALL BP RESPONSE TO EXERCISE, CST16: NORMAL
OVERALL HR RESPONSE TO EXERCISE, CST15: NORMAL
P-R INTERVAL, ECG05: 138 MS
PCP UR QL: NEGATIVE
PEAK EX METS, CST10: 1 METS
PH UR STRIP: 7.5 [PH] (ref 5–8)
PHOSPHATE SERPL-MCNC: 4.8 MG/DL (ref 2.6–4.7)
PLATELET # BLD AUTO: 217 K/UL (ref 150–400)
PMV BLD AUTO: 9.3 FL (ref 8.9–12.9)
POTASSIUM SERPL-SCNC: 3.8 MMOL/L (ref 3.5–5.1)
PROT SERPL-MCNC: 7 G/DL (ref 6.4–8.2)
PROT UR STRIP-MCNC: 300 MG/DL
PROTOCOL NAME, CST01: NORMAL
Q-T INTERVAL, ECG07: 360 MS
QRS DURATION, ECG06: 62 MS
QTC CALCULATION (BEZET), ECG08: 462 MS
RBC # BLD AUTO: 2.74 M/UL (ref 3.8–5.2)
RBC #/AREA URNS HPF: ABNORMAL /HPF (ref 0–5)
SERVICE CMNT-IMP: ABNORMAL
SODIUM SERPL-SCNC: 133 MMOL/L (ref 136–145)
SP GR UR REFRACTOMETRY: 1.01 (ref 1–1.03)
TEST INDICATION, CST09: NORMAL
TIBC SERPL-MCNC: 253 UG/DL (ref 250–450)
TRIGL SERPL-MCNC: 107 MG/DL (ref ?–150)
TROPONIN I SERPL-MCNC: <0.04 NG/ML
TROPONIN I SERPL-MCNC: <0.04 NG/ML
TSH SERPL DL<=0.05 MIU/L-ACNC: 0.88 UIU/ML (ref 0.36–3.74)
UROBILINOGEN UR QL STRIP.AUTO: 0.2 EU/DL (ref 0.2–1)
VENTRICULAR RATE, ECG03: 99 BPM
VIT B12 SERPL-MCNC: 1027 PG/ML (ref 193–986)
VLDLC SERPL CALC-MCNC: 21.4 MG/DL
WBC # BLD AUTO: 6.4 K/UL (ref 3.6–11)
WBC URNS QL MICRO: ABNORMAL /HPF (ref 0–4)

## 2018-05-11 PROCEDURE — 74011250637 HC RX REV CODE- 250/637: Performed by: HOSPITALIST

## 2018-05-11 PROCEDURE — 82728 ASSAY OF FERRITIN: CPT | Performed by: INTERNAL MEDICINE

## 2018-05-11 PROCEDURE — 86140 C-REACTIVE PROTEIN: CPT | Performed by: INTERNAL MEDICINE

## 2018-05-11 PROCEDURE — 74011250636 HC RX REV CODE- 250/636: Performed by: SPECIALIST

## 2018-05-11 PROCEDURE — 70450 CT HEAD/BRAIN W/O DYE: CPT

## 2018-05-11 PROCEDURE — 99218 HC RM OBSERVATION: CPT

## 2018-05-11 PROCEDURE — 82962 GLUCOSE BLOOD TEST: CPT

## 2018-05-11 PROCEDURE — 96372 THER/PROPH/DIAG INJ SC/IM: CPT

## 2018-05-11 PROCEDURE — 83540 ASSAY OF IRON: CPT | Performed by: INTERNAL MEDICINE

## 2018-05-11 PROCEDURE — 96376 TX/PRO/DX INJ SAME DRUG ADON: CPT

## 2018-05-11 PROCEDURE — 80061 LIPID PANEL: CPT | Performed by: INTERNAL MEDICINE

## 2018-05-11 PROCEDURE — 82550 ASSAY OF CK (CPK): CPT | Performed by: INTERNAL MEDICINE

## 2018-05-11 PROCEDURE — 74011636637 HC RX REV CODE- 636/637: Performed by: INTERNAL MEDICINE

## 2018-05-11 PROCEDURE — 80307 DRUG TEST PRSMV CHEM ANLYZR: CPT | Performed by: INTERNAL MEDICINE

## 2018-05-11 PROCEDURE — 83036 HEMOGLOBIN GLYCOSYLATED A1C: CPT | Performed by: INTERNAL MEDICINE

## 2018-05-11 PROCEDURE — 74011250637 HC RX REV CODE- 250/637: Performed by: INTERNAL MEDICINE

## 2018-05-11 PROCEDURE — 93351 STRESS TTE COMPLETE: CPT | Performed by: SPECIALIST

## 2018-05-11 PROCEDURE — 84443 ASSAY THYROID STIM HORMONE: CPT | Performed by: INTERNAL MEDICINE

## 2018-05-11 PROCEDURE — 85025 COMPLETE CBC W/AUTO DIFF WBC: CPT | Performed by: INTERNAL MEDICINE

## 2018-05-11 PROCEDURE — 96361 HYDRATE IV INFUSION ADD-ON: CPT

## 2018-05-11 PROCEDURE — 84484 ASSAY OF TROPONIN QUANT: CPT | Performed by: INTERNAL MEDICINE

## 2018-05-11 PROCEDURE — 80053 COMPREHEN METABOLIC PANEL: CPT | Performed by: INTERNAL MEDICINE

## 2018-05-11 PROCEDURE — 82607 VITAMIN B-12: CPT | Performed by: INTERNAL MEDICINE

## 2018-05-11 PROCEDURE — 84100 ASSAY OF PHOSPHORUS: CPT | Performed by: INTERNAL MEDICINE

## 2018-05-11 PROCEDURE — 74011250636 HC RX REV CODE- 250/636: Performed by: NURSE PRACTITIONER

## 2018-05-11 PROCEDURE — 83735 ASSAY OF MAGNESIUM: CPT | Performed by: INTERNAL MEDICINE

## 2018-05-11 PROCEDURE — 93351 STRESS TTE COMPLETE: CPT

## 2018-05-11 PROCEDURE — 36415 COLL VENOUS BLD VENIPUNCTURE: CPT | Performed by: INTERNAL MEDICINE

## 2018-05-11 PROCEDURE — 82746 ASSAY OF FOLIC ACID SERUM: CPT | Performed by: INTERNAL MEDICINE

## 2018-05-11 PROCEDURE — 74011250636 HC RX REV CODE- 250/636: Performed by: INTERNAL MEDICINE

## 2018-05-11 PROCEDURE — 81001 URINALYSIS AUTO W/SCOPE: CPT | Performed by: INTERNAL MEDICINE

## 2018-05-11 PROCEDURE — 85652 RBC SED RATE AUTOMATED: CPT | Performed by: INTERNAL MEDICINE

## 2018-05-11 RX ORDER — SODIUM BICARBONATE 650 MG/1
650 TABLET ORAL 2 TIMES DAILY
Status: DISCONTINUED | OUTPATIENT
Start: 2018-05-11 | End: 2018-05-11 | Stop reason: HOSPADM

## 2018-05-11 RX ORDER — GUAIFENESIN 100 MG/5ML
81 LIQUID (ML) ORAL DAILY
Status: DISCONTINUED | OUTPATIENT
Start: 2018-05-11 | End: 2018-05-11 | Stop reason: HOSPADM

## 2018-05-11 RX ORDER — INSULIN GLARGINE 100 [IU]/ML
25 INJECTION, SOLUTION SUBCUTANEOUS
Status: DISCONTINUED | OUTPATIENT
Start: 2018-05-11 | End: 2018-05-11 | Stop reason: HOSPADM

## 2018-05-11 RX ORDER — ATROPINE SULFATE 0.1 MG/ML
1 INJECTION INTRAVENOUS
Status: COMPLETED | OUTPATIENT
Start: 2018-05-11 | End: 2018-05-11

## 2018-05-11 RX ORDER — CLONIDINE HYDROCHLORIDE 0.1 MG/1
0.1 TABLET ORAL 2 TIMES DAILY
Status: DISCONTINUED | OUTPATIENT
Start: 2018-05-11 | End: 2018-05-11 | Stop reason: HOSPADM

## 2018-05-11 RX ORDER — SODIUM CHLORIDE 0.9 % (FLUSH) 0.9 %
5-10 SYRINGE (ML) INJECTION EVERY 8 HOURS
Status: DISCONTINUED | OUTPATIENT
Start: 2018-05-11 | End: 2018-05-11 | Stop reason: HOSPADM

## 2018-05-11 RX ORDER — ONDANSETRON 4 MG/1
4 TABLET, ORALLY DISINTEGRATING ORAL
Qty: 15 TAB | Refills: 0 | Status: SHIPPED | OUTPATIENT
Start: 2018-05-11 | End: 2019-07-18

## 2018-05-11 RX ORDER — CALCITRIOL 0.25 UG/1
0.25 CAPSULE ORAL DAILY
Status: DISCONTINUED | OUTPATIENT
Start: 2018-05-11 | End: 2018-05-11 | Stop reason: HOSPADM

## 2018-05-11 RX ORDER — CLONIDINE HYDROCHLORIDE 0.1 MG/1
0.1 TABLET ORAL 2 TIMES DAILY
Qty: 60 TAB | Refills: 1 | Status: SHIPPED | OUTPATIENT
Start: 2018-05-11

## 2018-05-11 RX ORDER — SODIUM CHLORIDE 0.9 % (FLUSH) 0.9 %
10 SYRINGE (ML) INJECTION
Status: COMPLETED | OUTPATIENT
Start: 2018-05-11 | End: 2018-05-11

## 2018-05-11 RX ORDER — METOPROLOL TARTRATE 50 MG/1
50 TABLET ORAL 2 TIMES DAILY
Status: DISCONTINUED | OUTPATIENT
Start: 2018-05-11 | End: 2018-05-11 | Stop reason: HOSPADM

## 2018-05-11 RX ORDER — ATORVASTATIN CALCIUM 20 MG/1
20 TABLET, FILM COATED ORAL DAILY
Status: DISCONTINUED | OUTPATIENT
Start: 2018-05-11 | End: 2018-05-11 | Stop reason: HOSPADM

## 2018-05-11 RX ORDER — HEPARIN SODIUM 5000 [USP'U]/ML
5000 INJECTION, SOLUTION INTRAVENOUS; SUBCUTANEOUS EVERY 8 HOURS
Status: DISCONTINUED | OUTPATIENT
Start: 2018-05-11 | End: 2018-05-11 | Stop reason: HOSPADM

## 2018-05-11 RX ORDER — GUAIFENESIN 100 MG/5ML
81 LIQUID (ML) ORAL DAILY
Qty: 30 TAB | Refills: 1 | Status: SHIPPED | OUTPATIENT
Start: 2018-05-12

## 2018-05-11 RX ORDER — SODIUM CHLORIDE 0.9 % (FLUSH) 0.9 %
5-10 SYRINGE (ML) INJECTION AS NEEDED
Status: DISCONTINUED | OUTPATIENT
Start: 2018-05-11 | End: 2018-05-11 | Stop reason: HOSPADM

## 2018-05-11 RX ORDER — DOBUTAMINE HYDROCHLORIDE 200 MG/100ML
10-40 INJECTION INTRAVENOUS
Status: DISCONTINUED | OUTPATIENT
Start: 2018-05-11 | End: 2018-05-11 | Stop reason: SDUPTHER

## 2018-05-11 RX ORDER — NALOXONE HYDROCHLORIDE 0.4 MG/ML
0.4 INJECTION, SOLUTION INTRAMUSCULAR; INTRAVENOUS; SUBCUTANEOUS AS NEEDED
Status: DISCONTINUED | OUTPATIENT
Start: 2018-05-11 | End: 2018-05-11 | Stop reason: HOSPADM

## 2018-05-11 RX ADMIN — CLONIDINE HYDROCHLORIDE 0.1 MG: 0.1 TABLET ORAL at 17:45

## 2018-05-11 RX ADMIN — Medication 10 ML: at 15:42

## 2018-05-11 RX ADMIN — SODIUM BICARBONATE 650 MG: 650 TABLET ORAL at 09:46

## 2018-05-11 RX ADMIN — SODIUM BICARBONATE 650 MG: 650 TABLET ORAL at 17:45

## 2018-05-11 RX ADMIN — ONDANSETRON 4 MG: 2 INJECTION INTRAMUSCULAR; INTRAVENOUS at 03:28

## 2018-05-11 RX ADMIN — METOPROLOL TARTRATE 50 MG: 50 TABLET ORAL at 09:46

## 2018-05-11 RX ADMIN — CALCITRIOL 0.25 MCG: 0.25 CAPSULE, LIQUID FILLED ORAL at 09:46

## 2018-05-11 RX ADMIN — ACETAMINOPHEN 650 MG: 325 TABLET, FILM COATED ORAL at 10:05

## 2018-05-11 RX ADMIN — CLONIDINE HYDROCHLORIDE 0.1 MG: 0.1 TABLET ORAL at 09:46

## 2018-05-11 RX ADMIN — HYDROMORPHONE HYDROCHLORIDE 1 MG: 1 INJECTION, SOLUTION INTRAMUSCULAR; INTRAVENOUS; SUBCUTANEOUS at 01:14

## 2018-05-11 RX ADMIN — INSULIN LISPRO 3 UNITS: 100 INJECTION, SOLUTION INTRAVENOUS; SUBCUTANEOUS at 06:32

## 2018-05-11 RX ADMIN — ASPIRIN 81 MG 81 MG: 81 TABLET ORAL at 09:46

## 2018-05-11 RX ADMIN — METOPROLOL TARTRATE 50 MG: 50 TABLET ORAL at 17:45

## 2018-05-11 RX ADMIN — HEPARIN SODIUM 5000 UNITS: 5000 INJECTION, SOLUTION INTRAVENOUS; SUBCUTANEOUS at 06:32

## 2018-05-11 RX ADMIN — ATROPINE SULFATE 0.5 MG: 0.1 INJECTION, SOLUTION INTRAVENOUS at 15:42

## 2018-05-11 RX ADMIN — DOBUTAMINE HYDROCHLORIDE 4.9 MCG/KG/MIN: 200 INJECTION INTRAVENOUS at 15:42

## 2018-05-11 RX ADMIN — INSULIN LISPRO 3 UNITS: 100 INJECTION, SOLUTION INTRAVENOUS; SUBCUTANEOUS at 11:32

## 2018-05-11 RX ADMIN — ATORVASTATIN CALCIUM 20 MG: 20 TABLET, FILM COATED ORAL at 09:46

## 2018-05-11 RX ADMIN — HEPARIN SODIUM 5000 UNITS: 5000 INJECTION, SOLUTION INTRAVENOUS; SUBCUTANEOUS at 15:38

## 2018-05-11 RX ADMIN — Medication 10 ML: at 15:38

## 2018-05-11 NOTE — DISCHARGE INSTRUCTIONS
Please bring this form with you to show your primary care provider at your follow-up appointment. Primary care provider:  Dr. Alfonso Vazquez NP    Discharging provider:  Tevin Quintana MD    You have been admitted to the hospital with the following diagnoses:  · Chest pain    FOLLOW-UP CARE RECOMMENDATIONS:    APPOINTMENTS:  Follow-up Information     Follow up With Details Comments Contact Info    Rgioberto Mercedes NP In 1 week Discharge follow up  [address] [facility]  [phone]      Your Nephrologist  In 1 week Discharge follow up              FOLLOW-UP TESTS recommended:   - New BP medicine: Clonidine twice daily    PENDING TEST RESULTS:  At the time of your discharge the following test results are still pending: none  Please make sure you review these results with your outpatient follow-up provider(s). SYMPTOMS to watch for: chest pain, shortness of breath, fever, chills, nausea, vomiting, diarrhea, change in mentation, falling, weakness, bleeding. DIET/what to eat:  Renal Diet    ACTIVITY:  Activity as tolerated    WOUND CARE: NONE    EQUIPMENT needed:  none      What to do if new or unexpected symptoms occur? If you experience any of the above symptoms (or should other concerns or questions arise after discharge) please call your primary care physician. Return to the emergency room if you cannot get hold of your doctor. · It is very important that you keep your follow-up appointment(s). · Please bring discharge papers, medication list (and/or medication bottles) to your follow-up appointments for review by your outpatient provider(s). · Please check the list of medications and be sure it includes every medication (even non-prescription medications) that your provider wants you to take. · It is important that you take the medication exactly as they are prescribed.    · Keep your medication in the bottles provided by the pharmacist and keep a list of the medication names, dosages, and times to be taken in your wallet. · Do not take other medications without consulting your doctor. · If you have any questions about your medications or other instructions, please talk to your nurse or care provider before you leave the hospital.    I understand that if any problems occur once I am at home I am to contact my physician. These instructions were explained to me and I had the opportunity to ask questions.

## 2018-05-11 NOTE — ROUTINE PROCESS
TRANSFER - IN REPORT:    Verbal report received from aldair(name) on Bambi Rios  being received from ED(unit) for routine progression of care      Report consisted of patients Situation, Background, Assessment and   Recommendations(SBAR). Information from the following report(s) SBAR, Kardex, ED Summary, Intake/Output, MAR, Recent Results and Cardiac Rhythm NSR was reviewed with the receiving nurse. Opportunity for questions and clarification was provided. Assessment completed upon patients arrival to unit and care assumed.

## 2018-05-11 NOTE — ED NOTES
Pt resting comfortably at this time. Respirations even and unlabored with symmetrical chest rise. Remains on cardiac monitor, VSS.

## 2018-05-11 NOTE — ED NOTES
Pt remains resting comfortably at this time,  Respirations even and unlabored with symmetrical chest rise.

## 2018-05-11 NOTE — DISCHARGE SUMMARY
Discharge Summary     Patient:  Mei Swanson       MRN: [id number]       YOB: 1965       Age: 46 y.o. Date of admission:  5/10/2018    Date of discharge:  5/11/2018    Primary care provider: Dr. Dierdre Lesch, NP    Admitting provider:  Abby Godinez MD    Discharging provider:  Sadi Jones MD - [phone]  If unavailable, call [phone] and ask the  to page the triage hospitalist.    Consultations  · IP CONSULT TO CARDIOLOGY    Procedures  · * No surgery found *    Discharge destination: HOME . The patient is stable for discharge. Admission diagnosis  · Chest pain    Current Discharge Medication List      START taking these medications    Details   aspirin 81 mg chewable tablet Take 1 Tab by mouth daily. Qty: 30 Tab, Refills: 1      cloNIDine HCl (CATAPRES) 0.1 mg tablet Take 1 Tab by mouth two (2) times a day. Qty: 60 Tab, Refills: 1         CONTINUE these medications which have CHANGED    Details   ondansetron (ZOFRAN ODT) 4 mg disintegrating tablet Take 1 Tab by mouth every eight (8) hours as needed for Nausea. Qty: 15 Tab, Refills: 0         CONTINUE these medications which have NOT CHANGED    Details   metoprolol tartrate (LOPRESSOR) 50 mg tablet Take 50 mg by mouth two (2) times a day. sodium bicarbonate 650 mg tablet Take 650 mg by mouth two (2) times a day. atorvastatin (LIPITOR) 20 mg tablet Take 20 mg by mouth daily. calcitRIOL (ROCALTROL) 0.25 mcg capsule Take 0.25 mcg by mouth daily. traMADol (ULTRAM) 50 mg tablet Take 1 Tab by mouth every six (6) hours as needed for Pain. Max Daily Amount: 200 mg. Qty: 6 Tab, Refills: 0    Associated Diagnoses: Flank pain      insulin glargine (LANTUS) 100 unit/mL injection 25 Units by SubCUTAneous route nightly.   Qty: 1 Vial, Refills: 0      insulin lispro (HUMALOG) 100 unit/mL injection 5 Units by SubCUTAneous route three (3) times daily (with meals). Follow-up Information     Follow up With Details Comments Contact Info    Paul Martinez NP In 1 week Discharge follow up  [address] [facility]  [phone]      Your Nephrologist  In 1 week Discharge follow up            Final discharge diagnoses and brief hospital course  Please also refer to the admission H&P for details on the presenting problem. 47 yo female with a PMH of HTN, HLD, CKD, DM, migraines, pancreatitis, and chronic low back pain who was admitted on 05/10/2018 for chest pain. Pt reported the chest pain started 3 days ago when she was lying in bed and sat up for a drink of water. Pt reported left sided, constant, dull chest pain rated 10/10. Pt denies any radiation of the pain, says it does not get worse with exertion but does worsen with deep breathing. Reports taking tums and that it helps the symptoms for approx 30 minutes and then they come back. Reports intermitent dizziness, palpitations, SOB, and abdominal pain since the chest pain started but denies any of these symptoms currently. She reports having a headache, N/V with associated chills for the past 3 days.     Chest pain, atypical  - trop minimally elevated , likely due to hypertension and CKD 5  - Stress echo: normal  - appreciate cardiology eval    HTN uncontrolled  - c/w Lopressor and ASA  - Added Clonidine BID    CKD 5  - stable  - follow outpatient nephrology    DM type 2 uncontrolled  - Better with diet control  - resume Lantus at home    HLD  - c/w lipitor     FOLLOW-UP TESTS recommended:   - New BP medicine: Clonidine twice daily     PENDING TEST RESULTS:  At the time of your discharge the following test results are still pending: none  Please make sure you review these results with your outpatient follow-up provider(s).     SYMPTOMS to watch for: chest pain, shortness of breath, fever, chills, nausea, vomiting, diarrhea, change in mentation, falling, weakness, bleeding.     DIET/what to eat:  Renal Diet     ACTIVITY:  Activity as tolerated     WOUND CARE: NONE     EQUIPMENT needed:  none       Physical examination at discharge  Visit Vitals    BP (!) 164/95 (BP Patient Position: At rest)    Pulse 96    Temp 98 °F (36.7 °C)    Resp 16    Ht 5' 9\" (1.753 m)    Wt 60.4 kg (133 lb 2.5 oz)    SpO2 96%    BMI 19.66 kg/m2     Ao3  PERRLA  EOMI  Lung: CTA  CVS: RRR  ABd; soft NT ND  Ext: no edema    Pertinent imaging studies:  Stress Echo:  Dobutamine at 10,20,30 and 40 mcg/kg/min   then atropine 0.5 mg to target heart rate   Images show normal doubutamine stress echo      Recent Labs      05/11/18   0349  05/10/18   1704  05/09/18   0823   WBC  6.4  9.1  10.9   HGB  8.3*  9.2*  9.9*   HCT  25.1*  28.9*  30.5*   PLT  217  221  288     Recent Labs      05/11/18   0349  05/10/18   1704 05/09/18   0823   NA  133*  127*  133*   K  3.8  3.5  3.1*   CL  98  96*  98   CO2  23  19*  25   BUN  64*  63*  63*   CREA  4.55*  4.57*  4.69*   GLU  201*  272*  293*   CA  8.7  9.4  9.5   MG  1.7   --   1.7   PHOS  4.8*   --    --      Recent Labs      05/11/18   0349  05/10/18   1704  05/09/18   0823   SGOT  23  20  16   AP  173*  198*  264*   TP  7.0  7.9  8.6*   ALB  3.0*  3.3*  3.8   GLOB  4.0  4.6*  4.8*   LPSE   --   218   --      Recent Labs      05/09/18   0823   INR  0.9   PTP  9.4      Recent Labs      05/11/18 0349   TIBC  253   PSAT  58*   FERR  559*      No results for input(s): PH, PCO2, PO2 in the last 72 hours.   Recent Labs      05/11/18 0349   CPK  95   CKMB  2.3     No components found for: [place]    Chronic Diagnoses:    Problem List as of 5/11/2018  Date Reviewed: 5/11/2018          Codes Class Noted - Resolved    * (Principal)Chest pain ICD-10-CM: R07.9  ICD-9-CM: 786.50  5/10/2018 - Present        Acute cystitis ICD-10-CM: N30.00  ICD-9-CM: 595.0  3/14/2017 - Present        Acute pancreatitis ICD-10-CM: K85.90  ICD-9-CM: 906.1  5/25/2015 - Present        Flank pain ICD-10-CM: R10.9  ICD-9-CM: 789.09  4/14/2015 - Present        Hydronephrosis with infection ICD-10-CM: N13.6  ICD-9-CM: 590.80  4/9/2015 - Present        SIRS (systemic inflammatory response syndrome) ([facility] ICD-10-CM: R65.10  ICD-9-CM: 995.90  4/8/2015 - Present        Hyperglycemia ICD-10-CM: R73.9  ICD-9-CM: 790.29  11/18/2013 - Present    Overview Signed 11/18/2013  8:47 PM by Manasa Lazo     Hyperosmolar, hyperglycemia state             Abdominal pain ICD-10-CM: R10.9  ICD-9-CM: 789.00  11/18/2013 - Present        Lower urinary tract infectious disease ICD-10-CM: N39.0  ICD-9-CM: 599.0  11/18/2013 - Present        Chronic pain ICD-10-CM: G89.29  ICD-9-CM: 338.29  11/18/2013 - Present    Overview Signed 11/18/2013  8:48 PM by Manasa Lazo     Low back, sees pain doctor, do not DC pt with pain RX, needs to go to her pain MD at [facility]             Hyponatremia ICD-10-CM: E87.1  ICD-9-CM: 276.1  11/18/2013 - Present        HTN (hypertension) ICD-10-CM: I10  ICD-9-CM: 401.9  12/20/2012 - Present        Chronic lumbar radiculopathy ICD-10-CM: M54.16  ICD-9-CM: 724.4  12/6/2012 - Present        Chronic pain syndrome ICD-10-CM: G89.4  ICD-9-CM: 338.4  12/6/2012 - Present        IDDM (insulin dependent diabetes mellitus) ([facility] ICD-10-CM: E11.9, Z79.4  ICD-9-CM: 250.00, V58.67  10/16/2012 - Present        Back pain, lumbosacral ICD-10-CM: M54.5  ICD-9-CM: 724.2, 724.6  6/24/2012 - Present    Overview Signed 6/24/2012 10:18 PM by Destiny Sack     Acute on chronic               Gastroparesis ICD-10-CM: K31.84  ICD-9-CM: 536.3  6/7/2012 - Present        Abdominal pain, LUQ (left upper quadrant) ICD-10-CM: R10.12  ICD-9-CM: 789.02  6/7/2012 - Present        Syncope and collapse ICD-10-CM: R55  ICD-9-CM: 780.2  4/22/2012 - Present        Depression ICD-10-CM: F32.9  ICD-9-CM: 051  4/22/2012 - Present        CKD (chronic kidney disease) stage 4, GFR 15-29 ml/min (HCC) (Chronic) ICD-10-CM: N18.4  ICD-9-CM: 585.4 4/22/2012 - Present        Intractable abdominal pain ICD-10-CM: R10.9  ICD-9-CM: 789.00  4/21/2012 - Present        HTN (hypertension) ICD-10-CM: I10  ICD-9-CM: 401.9  4/12/2012 - Present        RESOLVED: Pancreatitis ICD-10-CM: K85.90  ICD-9-CM: 112.4  11/17/2016 - 11/20/2016        RESOLVED: Constipation ICD-10-CM: K59.00  ICD-9-CM: 564.00  11/17/2016 - 11/20/2016        RESOLVED: Chest pain ICD-10-CM: R07.9  ICD-9-CM: 786.50  11/17/2016 - 11/20/2016        RESOLVED: Cellulitis ICD-10-CM: L03.90  ICD-9-CM: 682.9  2/3/2016 - 2/6/2016        RESOLVED: HTN (hypertension), malignant ICD-10-CM: I10  ICD-9-CM: 401.0  4/17/2015 - 4/20/2015        RESOLVED: HTN (hypertension), malignant ICD-10-CM: I10  ICD-9-CM: 401.0  11/6/2012 - 12/20/2012        RESOLVED: Chest pain, unspecified ICD-10-CM: R07.9  ICD-9-CM: 786.50  11/6/2012 - 11/15/2012        RESOLVED: UTI (lower urinary tract infection) ICD-10-CM: N39.0  ICD-9-CM: 599.0  11/6/2012 - 11/15/2012        RESOLVED: GI bleeding ICD-10-CM: K92.2  ICD-9-CM: 578.9  11/6/2012 - 12/20/2012        RESOLVED: Chest pain ICD-10-CM: R07.9  ICD-9-CM: 786.50  8/5/2012 - 10/16/2012        RESOLVED: Hyponatremia ICD-10-CM: E87.1  ICD-9-CM: 276.1  8/4/2012 - 11/15/2012        RESOLVED: ARF (acute renal failure) ([facility] ICD-10-CM: N17.9  ICD-9-CM: 584.9  8/4/2012 - 10/16/2012        RESOLVED: Leukocytosis, unspecified ICD-10-CM: E81.159  ICD-9-CM: 288.60  8/4/2012 - 12/20/2012        RESOLVED: Abdominal pain ICD-10-CM: R10.9  ICD-9-CM: 789.00  6/24/2012 - 10/16/2012    Overview Addendum 6/24/2012 10:19 PM by Archana Grant     Acute on chronic left and right upper quadrant.              RESOLVED: Orthostatic hypotension ICD-10-CM: I95.1  ICD-9-CM: 458.0  6/24/2012 - 10/16/2012        RESOLVED: UTI (urinary tract infection) ICD-10-CM: N39.0  ICD-9-CM: 599.0  6/24/2012 - 10/16/2012        RESOLVED: Hyponatremia ICD-10-CM: E87.1  ICD-9-CM: 276.1  6/24/2012 - 11/15/2012    Overview Signed 6/24/2012 10:21 PM by Yamileth Bruno     Acute on chronic             RESOLVED: DM (diabetes mellitus) type II controlled with renal manifestation ([facility] ICD-10-CM: E11.29  ICD-9-CM: 250.40  6/24/2012 - 10/16/2012        RESOLVED: Colitis due to Clostridium difficile ICD-10-CM: A04.72  ICD-9-CM: 008.45  6/7/2012 - 10/16/2012        RESOLVED: Diarrhea ICD-10-CM: R19.7  ICD-9-CM: 787.91  6/7/2012 - 10/16/2012        RESOLVED: Acute renal failure ([facility] ICD-10-CM: N17.9  ICD-9-CM: 584.9  4/21/2012 - 10/16/2012        RESOLVED: Hyponatremia ICD-10-CM: E87.1  ICD-9-CM: 276.1  4/21/2012 - 11/15/2012        RESOLVED: UTI (urinary tract infection) ICD-10-CM: N39.0  ICD-9-CM: 599.0  4/21/2012 - 10/16/2012        RESOLVED: Nausea & vomiting ICD-10-CM: R11.2  ICD-9-CM: 787.01  3/16/2012 - 11/15/2012        RESOLVED: Acute pancreatitis ICD-10-CM: K85.90  ICD-9-CM: 577.0  8/1/2010 - 2/26/2012        RESOLVED: Cholelithiasis ICD-10-CM: K80.20  ICD-9-CM: 574.20  8/1/2010 - 11/20/2016              Time spent on discharge related activities today greater than 30 minutes.       Signed:  Priscilla Ladd MD                 Hospitalist, Internal Medicine      Cc: Daniel Kessler NP

## 2018-05-11 NOTE — DIABETES MGMT
DTC Progress Note    Recommendations/ Comments: Chart reviewed for hyperglycemia, noted Lantus ordered. If appropriate, please consider:  - Adding mealtime insulin, Humalog 4 units AC     Current hospital DM medication: Lantus 25 units HS, Humalog for correction, normal sensitivity scale     Chart reviewed on Fatmata Cortes. Patient is a 46 y.o. female with hx Type 2 Diabetes on Lantus 25 units daily and Humalog 5 units AC  at home. A1c:   Lab Results   Component Value Date/Time    Hemoglobin A1c 10.0 (H) 05/11/2018 03:49 AM    Hemoglobin A1c 10.4 (H) 03/13/2017 05:21 PM       Recent Glucose Results:   Lab Results   Component Value Date/Time     (H) 05/11/2018 03:49 AM     (H) 05/10/2018 05:04 PM    GLUCPOC 227 (H) 05/11/2018 06:20 AM    GLUCPOC 311 (H) 05/10/2018 09:58 PM        Lab Results   Component Value Date/Time    Creatinine 4.55 (H) 05/11/2018 03:49 AM     Estimated Creatinine Clearance: 13.8 mL/min (based on Cr of 4.55). Active Orders   Diet    DIET DIABETIC CONSISTENT CARB Regular; 2 GM NA (House Low NA)        PO intake: No data found. Will continue to follow as needed.     Thank you  Twila Van RD  [facility]  Pager: [phone]

## 2018-05-11 NOTE — PROGRESS NOTES
1482 - patient on unit from ED. VSS.   0730 - Bedside and Verbal shift change report given to Connie (oncoming nurse) by Ana M Obando (offgoing nurse). Report included the following information SBAR, Kardex, ED Summary, Intake/Output, MAR, Recent Results and Cardiac Rhythm NSR.
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
## 2018-05-11 NOTE — ED NOTES
TRANSFER - OUT REPORT:    Verbal report given to [employer] on [employer] Solo  being transferred to [facility](unit) for routine progression of care       Report consisted of patients Situation, Background, Assessment and   Recommendations(SBAR). Information from the following report(s) ED Summary was reviewed with the receiving nurse. Lines:   Peripheral IV 05/10/18 Right Antecubital (Active)   Site Assessment Clean, dry, & intact 5/10/2018  7:00 PM   Phlebitis Assessment 0 5/10/2018  7:00 PM   Infiltration Assessment 0 5/10/2018  7:00 PM   Dressing Status Clean, dry, & intact 5/10/2018  7:00 PM   Dressing Type Transparent 5/10/2018  7:00 PM   Hub Color/Line Status Green 5/10/2018  7:00 PM   Alcohol Cap Used No 5/10/2018  7:00 PM        Opportunity for questions and clarification was provided.       Patient transported with:   [employer]

## 2018-05-11 NOTE — PROGRESS NOTES
Reason for Admission:  Chest pain, CKD                RRAT Score:  21                Resources/supports as identified by patient/family:   Patient lives with her daughters ages 6and 32years old. Top Challenges facing patient (as identified by patient/family and CM): Patient has had 6 ER visits since January 2018 for chronic pain. Patient said her problem is yet to be  diagnosed. Patient thinks her current chest pain is related to her back problem. She has a hx of a back surgery years ago. Patient said one of the reasons she is on disability was due to her back problems. Finances/Medication cost?   Patient is receiving disability benefits-[employer] and [employer] [place]. She also has Medicare and [facility] Medicaid to cover prescription costs               Transportation? Patient said her daughter would provide transportation as needed. Patient could also get transportation through [place]. Support system or lack thereof? Patient has support                     Living arrangements? Patient lives in her 2 story private home with steps inside and outside              Self-care/ADL's/Cognition? Patient is alert, oriented and answered questions appropriately. She is independent without any assistive devices          Current Advanced Directive/Advance Care Plan:  None on file and not interested at this time                          Plan for utilizing home health:  She would benefit from home care at discharge. Likelihood of readmission:  High due to hx of past frequent ER visits. Transition of Care Plan:  CM met with patient to discuss discharge planning and Obs. Status. Patient expressed that her pain has not been better managed causing her to  return to the ED every 2-3 days. She said she was given Tramadol which causes her to itch therefore could not take it but was told to take Tylenol which did not relieve her pain.   Patient said she could not make her PCP appointments because she was in and out of the ER. CM instructed patient to try and make her PCP appointments so her PCP could better manage her condition. Patient did not voice any discharge barriers. Patient did not have any questions regarding Obs. Status, she signed both the Medicare and State Obs. Letters. The Letters were placed in patient's chart. CM will follow patient as needed. Patient will be discharged home in care of her daughter and followed by her PCP for post hospital visit. Lieutenant Joe MSA, RN, CRM. Care Management Interventions  PCP Verified by CM: Yes  Palliative Care Criteria Met (RRAT>21 & CHF Dx)?: No  Mode of Transport at Discharge:  Other (see comment) (Private car)  Transition of Care Consult (CM Consult): Discharge Planning  MyChart Signup: No  Discharge Durable Medical Equipment: No  Health Maintenance Reviewed: Yes  Physical Therapy Consult: No  Occupational Therapy Consult: No  Speech Therapy Consult: No  Current Support Network: Lives Alone (Lives with her daughters ages 6 and 32.)  Confirm Follow Up Transport: Family  Plan discussed with Pt/Family/Caregiver: Yes  Discharge Location  Discharge Placement: Home with family assistance

## 2018-05-11 NOTE — ED NOTES
Care assumed of pt at this time. Currently alert and oriented times 4. Respirations even and unlabored with symmetrical chest rise. Remains on cardiac monitor, VSS.

## 2018-05-11 NOTE — PROGRESS NOTES
Primary Nurse Marianna Mccauley RN and KENNETH back performed a dual skin assessment on this patient Impairment noted- see wound doc flow sheet  Julius score is 21

## 2018-05-11 NOTE — PROGRESS NOTES
Admission Medication Reconciliation:    Information obtained from: patient, rx query    Significant PMH/Disease States:   Past Medical History:   Diagnosis Date    C. difficile colitis 2012    Chronic low back pain     CKD (chronic kidney disease)     stage 3 to 4, baseline Cr 2    Constipation     Diabetes (HCC)     A1c 8.2 3/2012    Hep C w/o coma, chronic (HCC)     Hyperlipemia     Hypertension     Lumbar disc disease     Migraines     Pancreatitis 0667    alcoholic    UTI (lower urinary tract infection)        Chief Complaint for this Admission:  abd pain    Allergies:  Review of patient's allergies indicates no known allergies. Prior to Admission Medications:   Prior to Admission Medications   Prescriptions Last Dose Informant Patient Reported? Taking?   atorvastatin (LIPITOR) 20 mg tablet 2018 at Unknown time  Yes Yes   Sig: Take 20 mg by mouth daily. calcitRIOL (ROCALTROL) 0.25 mcg capsule 2018 at Unknown time  Yes Yes   Sig: Take 0.25 mcg by mouth daily. insulin glargine (LANTUS) 100 unit/mL injection 2018 at Unknown time  No Yes   Si Units by SubCUTAneous route nightly. insulin lispro (HUMALOG) 100 unit/mL injection 2018 at Unknown time  Yes Yes   Si Units by SubCUTAneous route three (3) times daily (with meals). metoprolol tartrate (LOPRESSOR) 50 mg tablet 2018 at Unknown time  Yes Yes   Sig: Take 50 mg by mouth two (2) times a day. ondansetron (ZOFRAN ODT) 4 mg disintegrating tablet 5/10/2018 at Unknown time  No Yes   Sig: Take 1 Tab by mouth every eight (8) hours as needed for Nausea. sodium bicarbonate 650 mg tablet 2018 at Unknown time  Yes Yes   Sig: Take 650 mg by mouth two (2) times a day. traMADol (ULTRAM) 50 mg tablet   No Yes   Sig: Take 1 Tab by mouth every six (6) hours as needed for Pain. Max Daily Amount: 200 mg. Facility-Administered Medications: None         Comments/Recommendations: Removed promethazine.  Added sodium bicarb, atorvastatin, and calcitriol. Changed metoprolol from 25 mg BID to 50 mg BID. Patient confirmed NKDA. Patient did not have any questions at the time of encounter.

## 2018-05-11 NOTE — H&P
[address] [facility] [place]  HISTORY AND PHYSICAL      Kasia BOWIE  MR#: [id number]  : 1965  ACCOUNT #: [de-identified]   ADMIT DATE: 05/10/2018    PRIMARY CARE PHYSICIAN: Kavin Alfonso. Order for observation was placed at 2120 hours, and the patient was seen shortly after. SOURCE OF INFORMATION:  The patient. CHIEF COMPLAINT:  Chest pain. HISTORY OF PRESENT ILLNESS:  This is a 45-year-old woman with a past medical history significant for chronic kidney disease, type 2 diabetes, dyslipidemia, pancreatitis, chronic low back pain, migraine headache was in her usual state of health until about 3 days ago when the patient developed chest pain. The pain is located at the left side of the chest.  The pain is constant, 10/10 in severity. The pain was brought on by nausea and vomiting. No known relieving factors. No associated fever, rigors and chills. The nausea and vomiting started about three days ago as well. The patient has had history of pancreatitis and thought that nausea and vomiting are as a result of another episode of pancreatitis. Patient was seen at [facility] a day before. A CT scan of the abdomen and a chest x-ray were obtained; these were negative. The patient was discharged home on tramadol and Zofran without any significant relief. Patient has had multiple emergency room visits. She has been seen in the emergency room six times over the past five months. The patient has had a CT scan of the abdomen 22 times since 2013 according to the report. She was last admitted here from 2017 to 2017. Patient was admitted for suspected urinary tract infection. A kidney infection was ruled out. The patient came to the emergency room because of her worsening symptoms. When the patient arrived at the emergency room, her first set of troponin was negative. Patient was referred to the hospitalist service for evaluation for admission. Patient also complained of a headache. The headache is diffuse in location. No associated dizziness or blurring of vision. PAST MEDICAL HISTORY:  Hypertension, dyslipidemia, type 2 diabetes, chronic kidney disease, migraine headache, pancreatitis, chronic low back pain. ALLERGIES:  NO KNOWN DRUG ALLERGIES. MEDICATIONS:  Lipitor 20 mg daily, Rocaltrol 0.25 mcg daily, Lantus insulin 25 units subcutaneously daily at bedtime, sliding scale with insulin coverage, Lopressor 50 mg twice daily, Zofran 4 mg every 8 hours as needed for nausea, sodium bicarbonate 650 mg twice daily, tramadol 50 mg every 6 hours as needed for pain. FAMILY HISTORY:  This was reviewed. Her mother had kidney disease and diabetes. PAST SURGICAL HISTORY:  This is significant for back surgery. SOCIAL HISTORY:  The patient denies alcohol and tobacco abuse. REVIEW OF SYSTEMS:  HEAD, EYES, EARS, NOSE, THROAT:  This is positive for headache. No dizziness, no blurring of vision, no photophobia. RESPIRATORY SYSTEM:  No cough, no shortness of breath, no hemoptysis. CARDIOVASCULAR SYSTEM:  This is positive for chest pain. No orthopnea, no palpitation. GASTROINTESTINAL SYSTEM:  This is positive for nausea and vomiting. No diarrhea, no constipation. GENITOURINARY SYSTEM:  No dysuria, no urgency and no frequency. All other systems are reviewed, and they are negative. PHYSICAL EXAMINATION:  GENERAL APPEARANCE:  The patient appeared ill, in moderate distress. VITAL SIGNS:  On arrival at the emergency room, temperature 98.3, pulse 103, respiratory rate 16, blood pressure 195/118, oxygen saturation 98% on room air. HEAD:  Normocephalic, atraumatic. EYES:  Normal eye movement, no redness, no drainage, no discharge. EARS:  Normal external ears with no obvious drainage. NOSE:  No deformity and no drainage. MOUTH AND THROAT:  No visible oral lesions. Dry oral mucosa. NECK:  Supple, no JVD, no thyromegaly.   CHEST:  Clear breath sounds. No wheezing, no crackles. HEART:  Normal S1 and S2, regular. No clinically appreciable murmur. ABDOMEN:  Soft, nontender. Normal bowel sounds. CENTRAL NERVOUS SYSTEM:  Alert, oriented x3. No gross focal neurological deficit. EXTREMITIES:  No edema. Pulses 2+ bilaterally. MUSCULOSKELETAL SYSTEM:  No obvious joint deformity and swelling. SKIN:  No active skin lesions seen in the exposed part of the body. PSYCHIATRIC:  Normal mood and affect. LYMPHATIC SYSTEM:  No cervical lymphadenopathy. DIAGNOSTIC DATA:  EKG shows normal sinus rhythm, no ST and T-wave abnormalities. LABORATORY DATA:  Chemistry:  Sodium 127, potassium 3.5, chloride 96, CO2 of 19, glucose 272, BUN 63, creatinine 4.57, calcium 9.4, bilirubin total 0.4, ALT 26, AST 20, alkaline phosphatase 198, total protein 7.9, albumin level 3.3, globulin 4.6, lipase level 218. Cardiac profile, troponin is less than 0.04. Hematology:  WBC 9.1, hemoglobin 9.2, hematocrit 28.9, platelets are 711. ASSESSMENT:  1. Chest pain. 2.  Intractable nausea and vomiting. 3.  Hypertension. 4.  Type 2 diabetes with hyperglycemia. 5.  Dyslipidemia. 6.  Chronic kidney disease stage V.  7.  Hyponatremia. 8.  Headache. 9.  Anemia. PLAN:  1. Chest pain. We will place the patient on observation. We will obtain serial cardiac markers to rule out acute myocardial infarction. We will start the patient on aspirin and continue beta blocker. Cardiology consult will be requested to assist in the evaluation and treatment of chest pain. If the patient's chest pain is persistent after evaluation by cardiologist, the patient may require a VQ scan of the chest to evaluate the patient for a pulmonary embolism if her chest pain is determined by the cardiologist to be not cardiac in origin. Lipase is within normal limits. 2.  Intractable nausea and vomiting. We will carry out supportive therapy. We will check a urine drug screen.   Lipase is within normal limits. Patient has had multiple abdominal CT scans including a CT scan of the abdomen done yesterday at [facility], which was said to be negative. 3.  Hypertension. We will resume home medications. Patient will also be placed on Apresoline as needed for blood pressure control. We will check a TSH level. 4.  Type 2 diabetes with hyperglycemia. We will place the patient on sliding scale with insulin coverage. We will check hemoglobin A1c level. We will resume her home basal insulin. 5.  Dyslipidemia. We will resume Lipitor and check a lipid profile. 6.  Chronic kidney disease stage V. We will continue to monitor the patient's renal function. Patient will continue to follow by her nephrologist.  The patient may require inpatient nephrology consult if her status changed from observation status to full admission. 7.  Hyponatremia. This is most likely due to volume depletion. We will carry out hydration with normal saline. 8.  Headache. We will obtain a CT scan of the head to rule out acute pathology. We will check ESR and C-reactive protein levels to rule out systemic inflammation. 9.  Anemia. This is most likely due to chronic kidney disease. We will carry out anemia workup including checking a stool guaiac to rule out occult GI bleed. 10.  Other issues. CODE STATUS:  THE PATIENT IS FULL CODE. We will place the patient on heparin for DVT prophylaxis.       MD MARGIE Enriquez/MN  D: 05/11/2018 02:08     T: 05/11/2018 04:29  JOB #: [id number]  CC: Nerissa Juarez NP

## 2018-05-12 LAB
BACTERIA SPEC CULT: ABNORMAL
BACTERIA SPEC CULT: ABNORMAL
SERVICE CMNT-IMP: ABNORMAL

## 2018-05-27 ENCOUNTER — HOSPITAL ENCOUNTER (INPATIENT)
Age: 53
LOS: 2 days | Discharge: HOME | DRG: 439 | End: 2018-05-29
Attending: EMERGENCY MEDICINE | Admitting: INTERNAL MEDICINE
Payer: MEDICARE

## 2018-05-27 ENCOUNTER — APPOINTMENT (OUTPATIENT)
Dept: GENERAL RADIOLOGY | Age: 53
DRG: 439 | End: 2018-05-27
Attending: EMERGENCY MEDICINE
Payer: MEDICARE

## 2018-05-27 ENCOUNTER — APPOINTMENT (OUTPATIENT)
Dept: CT IMAGING | Age: 53
DRG: 439 | End: 2018-05-27
Attending: NURSE PRACTITIONER
Payer: MEDICARE

## 2018-05-27 DIAGNOSIS — K85.90 ACUTE PANCREATITIS, UNSPECIFIED COMPLICATION STATUS, UNSPECIFIED PANCREATITIS TYPE: Primary | ICD-10-CM

## 2018-05-27 DIAGNOSIS — R79.89 CREATININE ELEVATION: ICD-10-CM

## 2018-05-27 LAB
ALBUMIN SERPL-MCNC: 3.7 G/DL (ref 3.5–5)
ALBUMIN/GLOB SERPL: 0.7 {RATIO} (ref 1.1–2.2)
ALP SERPL-CCNC: 250 U/L (ref 45–117)
ALT SERPL-CCNC: 24 U/L (ref 12–78)
ANION GAP SERPL CALC-SCNC: 15 MMOL/L (ref 5–15)
APPEARANCE UR: CLEAR
AST SERPL-CCNC: 28 U/L (ref 15–37)
BACTERIA URNS QL MICRO: NEGATIVE /HPF
BASOPHILS # BLD: 0 K/UL (ref 0–0.1)
BASOPHILS NFR BLD: 0 % (ref 0–1)
BILIRUB SERPL-MCNC: 0.4 MG/DL (ref 0.2–1)
BILIRUB UR QL: NEGATIVE
BUN SERPL-MCNC: 64 MG/DL (ref 6–20)
BUN/CREAT SERPL: 12 (ref 12–20)
CALCIUM SERPL-MCNC: 9.5 MG/DL (ref 8.5–10.1)
CHLORIDE SERPL-SCNC: 103 MMOL/L (ref 97–108)
CO2 SERPL-SCNC: 19 MMOL/L (ref 21–32)
COLOR UR: ABNORMAL
CREAT SERPL-MCNC: 5.26 MG/DL (ref 0.55–1.02)
DIFFERENTIAL METHOD BLD: ABNORMAL
EOSINOPHIL # BLD: 0.1 K/UL (ref 0–0.4)
EOSINOPHIL NFR BLD: 2 % (ref 0–7)
EPITH CASTS URNS QL MICRO: ABNORMAL /LPF
ERYTHROCYTE [DISTWIDTH] IN BLOOD BY AUTOMATED COUNT: 13.6 % (ref 11.5–14.5)
GLOBULIN SER CALC-MCNC: 5.3 G/DL (ref 2–4)
GLUCOSE BLD STRIP.AUTO-MCNC: 267 MG/DL (ref 65–100)
GLUCOSE SERPL-MCNC: 192 MG/DL (ref 65–100)
GLUCOSE UR STRIP.AUTO-MCNC: 500 MG/DL
HCT VFR BLD AUTO: 35.1 % (ref 35–47)
HGB BLD-MCNC: 11.2 G/DL (ref 11.5–16)
HGB UR QL STRIP: ABNORMAL
HYALINE CASTS URNS QL MICRO: ABNORMAL /LPF (ref 0–5)
IMM GRANULOCYTES # BLD: 0 K/UL (ref 0–0.04)
IMM GRANULOCYTES NFR BLD AUTO: 0 % (ref 0–0.5)
KETONES UR QL STRIP.AUTO: NEGATIVE MG/DL
LEUKOCYTE ESTERASE UR QL STRIP.AUTO: NEGATIVE
LIPASE SERPL-CCNC: 442 U/L (ref 73–393)
LYMPHOCYTES # BLD: 1.7 K/UL (ref 0.8–3.5)
LYMPHOCYTES NFR BLD: 22 % (ref 12–49)
MCH RBC QN AUTO: 30.9 PG (ref 26–34)
MCHC RBC AUTO-ENTMCNC: 31.9 G/DL (ref 30–36.5)
MCV RBC AUTO: 96.7 FL (ref 80–99)
MONOCYTES # BLD: 0.4 K/UL (ref 0–1)
MONOCYTES NFR BLD: 6 % (ref 5–13)
NEUTS SEG # BLD: 5.7 K/UL (ref 1.8–8)
NEUTS SEG NFR BLD: 71 % (ref 32–75)
NITRITE UR QL STRIP.AUTO: NEGATIVE
NRBC # BLD: 0 K/UL (ref 0–0.01)
NRBC BLD-RTO: 0 PER 100 WBC
PH UR STRIP: 7 [PH] (ref 5–8)
PLATELET # BLD AUTO: 160 K/UL (ref 150–400)
POTASSIUM SERPL-SCNC: 4.4 MMOL/L (ref 3.5–5.1)
PROT SERPL-MCNC: 9 G/DL (ref 6.4–8.2)
PROT UR STRIP-MCNC: 300 MG/DL
RBC # BLD AUTO: 3.63 M/UL (ref 3.8–5.2)
RBC #/AREA URNS HPF: ABNORMAL /HPF (ref 0–5)
SERVICE CMNT-IMP: ABNORMAL
SODIUM SERPL-SCNC: 137 MMOL/L (ref 136–145)
SP GR UR REFRACTOMETRY: 1.01 (ref 1–1.03)
TROPONIN I SERPL-MCNC: <0.04 NG/ML
UR CULT HOLD, URHOLD: NORMAL
UROBILINOGEN UR QL STRIP.AUTO: 0.2 EU/DL (ref 0.2–1)
WBC # BLD AUTO: 8 K/UL (ref 3.6–11)
WBC URNS QL MICRO: ABNORMAL /HPF (ref 0–4)

## 2018-05-27 PROCEDURE — 36415 COLL VENOUS BLD VENIPUNCTURE: CPT | Performed by: EMERGENCY MEDICINE

## 2018-05-27 PROCEDURE — 74011250636 HC RX REV CODE- 250/636: Performed by: NURSE PRACTITIONER

## 2018-05-27 PROCEDURE — 96375 TX/PRO/DX INJ NEW DRUG ADDON: CPT

## 2018-05-27 PROCEDURE — 80053 COMPREHEN METABOLIC PANEL: CPT | Performed by: EMERGENCY MEDICINE

## 2018-05-27 PROCEDURE — 74011250636 HC RX REV CODE- 250/636: Performed by: INTERNAL MEDICINE

## 2018-05-27 PROCEDURE — 83690 ASSAY OF LIPASE: CPT | Performed by: EMERGENCY MEDICINE

## 2018-05-27 PROCEDURE — 84484 ASSAY OF TROPONIN QUANT: CPT | Performed by: EMERGENCY MEDICINE

## 2018-05-27 PROCEDURE — 96374 THER/PROPH/DIAG INJ IV PUSH: CPT

## 2018-05-27 PROCEDURE — 65660000000 HC RM CCU STEPDOWN

## 2018-05-27 PROCEDURE — 85025 COMPLETE CBC W/AUTO DIFF WBC: CPT | Performed by: EMERGENCY MEDICINE

## 2018-05-27 PROCEDURE — 93005 ELECTROCARDIOGRAM TRACING: CPT

## 2018-05-27 PROCEDURE — 74176 CT ABD & PELVIS W/O CONTRAST: CPT

## 2018-05-27 PROCEDURE — 82962 GLUCOSE BLOOD TEST: CPT

## 2018-05-27 PROCEDURE — 74022 RADEX COMPL AQT ABD SERIES: CPT

## 2018-05-27 PROCEDURE — 99284 EMERGENCY DEPT VISIT MOD MDM: CPT

## 2018-05-27 PROCEDURE — 96372 THER/PROPH/DIAG INJ SC/IM: CPT

## 2018-05-27 PROCEDURE — 96376 TX/PRO/DX INJ SAME DRUG ADON: CPT

## 2018-05-27 PROCEDURE — 81001 URINALYSIS AUTO W/SCOPE: CPT | Performed by: EMERGENCY MEDICINE

## 2018-05-27 PROCEDURE — 74011250637 HC RX REV CODE- 250/637: Performed by: INTERNAL MEDICINE

## 2018-05-27 RX ORDER — SODIUM CHLORIDE 9 MG/ML
75 INJECTION, SOLUTION INTRAVENOUS ONCE
Status: DISCONTINUED | OUTPATIENT
Start: 2018-05-27 | End: 2018-05-27

## 2018-05-27 RX ORDER — OXYCODONE AND ACETAMINOPHEN 5; 325 MG/1; MG/1
1 TABLET ORAL
Status: DISCONTINUED | OUTPATIENT
Start: 2018-05-27 | End: 2018-05-28

## 2018-05-27 RX ORDER — CALCITRIOL 0.25 UG/1
0.25 CAPSULE ORAL DAILY
Status: DISCONTINUED | OUTPATIENT
Start: 2018-05-28 | End: 2018-05-29 | Stop reason: HOSPADM

## 2018-05-27 RX ORDER — ONDANSETRON 4 MG/1
4 TABLET, ORALLY DISINTEGRATING ORAL
Status: DISCONTINUED | OUTPATIENT
Start: 2018-05-27 | End: 2018-05-28 | Stop reason: CLARIF

## 2018-05-27 RX ORDER — HYDRALAZINE HYDROCHLORIDE 20 MG/ML
20 INJECTION INTRAMUSCULAR; INTRAVENOUS ONCE
Status: DISPENSED | OUTPATIENT
Start: 2018-05-27 | End: 2018-05-28

## 2018-05-27 RX ORDER — HYDROMORPHONE HYDROCHLORIDE 1 MG/ML
0.5 INJECTION, SOLUTION INTRAMUSCULAR; INTRAVENOUS; SUBCUTANEOUS ONCE
Status: COMPLETED | OUTPATIENT
Start: 2018-05-27 | End: 2018-05-27

## 2018-05-27 RX ORDER — SODIUM CHLORIDE 0.9 % (FLUSH) 0.9 %
5-10 SYRINGE (ML) INJECTION EVERY 8 HOURS
Status: DISCONTINUED | OUTPATIENT
Start: 2018-05-27 | End: 2018-05-29 | Stop reason: HOSPADM

## 2018-05-27 RX ORDER — METOPROLOL TARTRATE 50 MG/1
50 TABLET ORAL 2 TIMES DAILY
Status: DISCONTINUED | OUTPATIENT
Start: 2018-05-27 | End: 2018-05-29 | Stop reason: HOSPADM

## 2018-05-27 RX ORDER — CLONIDINE HYDROCHLORIDE 0.1 MG/1
0.1 TABLET ORAL 3 TIMES DAILY
Status: DISCONTINUED | OUTPATIENT
Start: 2018-05-27 | End: 2018-05-29 | Stop reason: HOSPADM

## 2018-05-27 RX ORDER — INSULIN GLARGINE 100 [IU]/ML
25 INJECTION, SOLUTION SUBCUTANEOUS
Status: DISCONTINUED | OUTPATIENT
Start: 2018-05-27 | End: 2018-05-29 | Stop reason: HOSPADM

## 2018-05-27 RX ORDER — ONDANSETRON 2 MG/ML
4 INJECTION INTRAMUSCULAR; INTRAVENOUS
Status: COMPLETED | OUTPATIENT
Start: 2018-05-27 | End: 2018-05-27

## 2018-05-27 RX ORDER — HEPARIN SODIUM 5000 [USP'U]/ML
5000 INJECTION, SOLUTION INTRAVENOUS; SUBCUTANEOUS EVERY 8 HOURS
Status: DISCONTINUED | OUTPATIENT
Start: 2018-05-27 | End: 2018-05-29 | Stop reason: HOSPADM

## 2018-05-27 RX ORDER — GUAIFENESIN 100 MG/5ML
81 LIQUID (ML) ORAL DAILY
Status: DISCONTINUED | OUTPATIENT
Start: 2018-05-28 | End: 2018-05-29 | Stop reason: HOSPADM

## 2018-05-27 RX ORDER — SODIUM CHLORIDE 0.9 % (FLUSH) 0.9 %
5-10 SYRINGE (ML) INJECTION AS NEEDED
Status: DISCONTINUED | OUTPATIENT
Start: 2018-05-27 | End: 2018-05-29 | Stop reason: HOSPADM

## 2018-05-27 RX ORDER — ATORVASTATIN CALCIUM 20 MG/1
20 TABLET, FILM COATED ORAL DAILY
Status: DISCONTINUED | OUTPATIENT
Start: 2018-05-28 | End: 2018-05-29 | Stop reason: HOSPADM

## 2018-05-27 RX ORDER — KETOROLAC TROMETHAMINE 30 MG/ML
30 INJECTION, SOLUTION INTRAMUSCULAR; INTRAVENOUS ONCE
Status: COMPLETED | OUTPATIENT
Start: 2018-05-27 | End: 2018-05-27

## 2018-05-27 RX ORDER — HEPARIN SODIUM 5000 [USP'U]/ML
5000 INJECTION, SOLUTION INTRAVENOUS; SUBCUTANEOUS EVERY 8 HOURS
Status: DISCONTINUED | OUTPATIENT
Start: 2018-05-27 | End: 2018-05-27 | Stop reason: SDUPTHER

## 2018-05-27 RX ADMIN — HYDROMORPHONE HYDROCHLORIDE 0.5 MG: 1 INJECTION, SOLUTION INTRAMUSCULAR; INTRAVENOUS; SUBCUTANEOUS at 16:38

## 2018-05-27 RX ADMIN — HYDROMORPHONE HYDROCHLORIDE 0.5 MG: 1 INJECTION, SOLUTION INTRAMUSCULAR; INTRAVENOUS; SUBCUTANEOUS at 21:29

## 2018-05-27 RX ADMIN — HYDROMORPHONE HYDROCHLORIDE 0.5 MG: 1 INJECTION, SOLUTION INTRAMUSCULAR; INTRAVENOUS; SUBCUTANEOUS at 18:40

## 2018-05-27 RX ADMIN — KETOROLAC TROMETHAMINE 30 MG: 30 INJECTION, SOLUTION INTRAMUSCULAR; INTRAVENOUS at 15:35

## 2018-05-27 RX ADMIN — ONDANSETRON 4 MG: 2 INJECTION INTRAMUSCULAR; INTRAVENOUS at 15:35

## 2018-05-27 RX ADMIN — HEPARIN SODIUM 5000 UNITS: 5000 INJECTION, SOLUTION INTRAVENOUS; SUBCUTANEOUS at 21:28

## 2018-05-27 RX ADMIN — CLONIDINE HYDROCHLORIDE 0.1 MG: 0.1 TABLET ORAL at 21:29

## 2018-05-27 NOTE — ED TRIAGE NOTES
Triage:  Pt to ED due to sudden onset of sharp left lower abd and flank region pain following a bowel movement. Pt states since the pain has been sharp and consistent. Pt also mentions increased nausea and pain following the onset of the pain. Pt tearful in triage due to pain.

## 2018-05-27 NOTE — ED TRIAGE NOTES
Pt mentions recently complaint of constipation and took an enema this AM. Pt states 2 bowel movements since.

## 2018-05-27 NOTE — IP AVS SNAPSHOT
[address] [address] 
[phone] Patient: Bambi Rios MRN: [id number] :1965 A check edgar indicates which time of day the medication should be taken. My Medications CONTINUE taking these medications Instructions Each Dose to Equal  
 Morning Noon Evening Bedtime  
 aspirin 81 mg chewable tablet Your last dose was: Your next dose is: Take 1 Tab by mouth daily. 81 mg  
    
   
   
   
  
 atorvastatin 20 mg tablet Commonly known as:  LIPITOR Your last dose was: Your next dose is: Take 20 mg by mouth daily. 20 mg  
    
   
   
   
  
 calcitRIOL 0.25 mcg capsule Commonly known as:  ROCALTROL Your last dose was: Your next dose is: Take 0.25 mcg by mouth daily. 0.25 mcg  
    
   
   
   
  
 cloNIDine HCl 0.1 mg tablet Commonly known as:  CATAPRES Your last dose was: Your next dose is: Take 1 Tab by mouth two (2) times a day. 0.1 mg  
    
   
   
   
  
 HumaLOG U-100 Insulin 100 unit/mL injection Generic drug:  insulin lispro Your last dose was: Your next dose is:    
   
   
 5 Units by SubCUTAneous route three (3) times daily (with meals). 5 Units  
    
   
   
   
  
 insulin glargine 100 unit/mL injection Commonly known as:  LANTUS U-100 INSULIN Your last dose was: Your next dose is:    
   
   
 25 Units by SubCUTAneous route nightly. 25 Units  
    
   
   
   
  
 metoprolol tartrate 50 mg tablet Commonly known as:  LOPRESSOR Your last dose was: Your next dose is: Take 50 mg by mouth two (2) times a day. 50 mg  
    
   
   
   
  
 ondansetron 4 mg disintegrating tablet Commonly known as:  ZOFRAN ODT Your last dose was: Your next dose is: Take 1 Tab by mouth every eight (8) hours as needed for Nausea. 4 mg  
    
   
   
   
  
 sodium bicarbonate 650 mg tablet Your last dose was: Your next dose is: Take 650 mg by mouth two (2) times a day. 650 mg  
    
   
   
   
  
 traMADol 50 mg tablet Commonly known as:  ULTRAM  
   
Your last dose was: Your next dose is: Take 1 Tab by mouth every six (6) hours as needed for Pain.  Max Daily Amount: 200 mg.  
 50 mg

## 2018-05-27 NOTE — ED PROVIDER NOTES
HPI Comments: 46 y.o. female with past medical history significant for CKD with baseline creatinine of 2, hyperlipidemia, pancreatitis, diabetes, HTN, chronic low back pain, C. Diff, migraines, and Hep C who presents from home accompanied by a family member with chief complaint of flank pain. Patient reports acute onset of left flank pain radiating around to her superior umbilicus and left abdominal pain following a BM today. The pain is sharp and constant. + Nausea and vomiting. Patient has had 23 CT scans since 2013. There are no other acute medical concerns at this time. Social hx: Never smoker. No alcohol use (hx of alcohol abuse, quit a few months ago). No illicit drug use. PCP: Jeff Bansal NP  Past Medical History:  6/2012: C. difficile colitis  No date: Chronic low back pain  No date: CKD (chronic kidney disease)      Comment: stage 3 to 4, baseline Cr 2  No date: Constipation  No date: Diabetes ([facility] [address]      Comment: A1c 8.2 3/2012  No date: Hep C w/o coma, chronic (HCC)  No date: Hyperlipemia  No date: Hypertension  No date: Lumbar disc disease  No date: Migraines  2009: Pancreatitis      Comment: alcoholic  0/67228: UTI (lower urinary tract infection)    Past Surgical History:  No date: HX ORTHOPAEDIC      Comment: lumbar sprain; back surgery  11/12/2012: ID COLONOSCOPY FLX DX W/COLLJ SPEC WHEN PFRMD      Comment:        Note written by Nicol eLon, as dictated by Will Barnett NP 2:43 PM      The history is provided by the patient.         Past Medical History:   Diagnosis Date    C. difficile colitis 6/2012    Chronic low back pain     CKD (chronic kidney disease)     stage 3 to 4, baseline Cr 2    Constipation     Diabetes (HCC)     A1c 8.2 3/2012    Hep C w/o coma, chronic (HCC)     Hyperlipemia     Hypertension     Lumbar disc disease     Migraines     Pancreatitis 9185    alcoholic    UTI (lower urinary tract infection) 6/20012       Past Surgical History:   Procedure Laterality Date    HX ORTHOPAEDIC      lumbar sprain; back surgery    CA COLONOSCOPY FLX DX W/COLLJ SPEC WHEN PFRMD  11/12/2012              Family History:   Problem Relation Age of Onset    Diabetes Mother     Kidney Disease Mother     Diabetes Sister        Social History     Social History    Marital status:      Spouse name: manda maxwell give info    Number of children: 5    Years of education: 8th can re     Occupational History     Not Employed     on disability for [facility] [employer]      Social History Main Topics    Smoking status: Never Smoker    Smokeless tobacco: Never Used    Alcohol use No      Comment: Quit few months ago, hx of abuse    Drug use: No    Sexual activity: Yes     Partners: Male     Other Topics Concern    Not on file     Social History Narrative    Lives with daughter and     Ambulated independently    Doesn't work         ALLERGIES: Review of patient's allergies indicates no known allergies. Review of Systems   Constitutional: Negative for appetite change, chills, diaphoresis, fatigue and fever. HENT: Negative for congestion, ear discharge, ear pain, rhinorrhea, sinus pain, sinus pressure, sore throat and trouble swallowing. Eyes: Negative for photophobia, pain, redness and visual disturbance. Respiratory: Negative for cough, chest tightness, shortness of breath and wheezing. Cardiovascular: Negative for chest pain and palpitations. Gastrointestinal: Positive for abdominal pain, nausea and vomiting. Negative for abdominal distention and diarrhea. Endocrine: Negative. Genitourinary: Negative for difficulty urinating, dysuria, flank pain, frequency and urgency. Musculoskeletal: Negative for arthralgias, back pain, neck pain and neck stiffness. + Left flank pain   Skin: Negative for color change, pallor, rash and wound. Allergic/Immunologic: Negative.     Neurological: Negative for dizziness, speech difficulty, weakness, light-headedness and headaches. Hematological: Does not bruise/bleed easily. Psychiatric/Behavioral: Negative for behavioral problems. The patient is not nervous/anxious. All other systems reviewed and are negative. Vitals:    05/27/18 1328   BP: (!) 207/127   Pulse: (!) 120   Resp: 22   Temp: 98 °F (36.7 °C)   SpO2: 96%   Weight: 61.2 kg (135 lb)   Height: 5' 9\" (1.753 m)            Physical Exam   Constitutional: She is oriented to person, place, and time. She appears well-developed and well-nourished. She appears distressed (mild distress related to pain). HENT:   Head: Normocephalic and atraumatic. Right Ear: External ear normal.   Left Ear: External ear normal.   Nose: Nose normal.   Mouth/Throat: Oropharynx is clear and moist.   Eyes: Conjunctivae and EOM are normal. Pupils are equal, round, and reactive to light. Right eye exhibits no discharge. Left eye exhibits no discharge. Neck: Normal range of motion. Neck supple. No JVD present. No tracheal deviation present. Cardiovascular: Regular rhythm, normal heart sounds and intact distal pulses. Exam reveals no gallop. No murmur heard. Tachycardia 120's   Pulmonary/Chest: Effort normal and breath sounds normal. No respiratory distress. She has no wheezes. She has no rales. She exhibits no tenderness. Abdominal: Soft. Bowel sounds are normal. She exhibits no distension. There is tenderness. There is guarding. There is no rebound. Genitourinary:   Genitourinary Comments: Negative, denies any urinary urgency or frequency. Patient with increased creatinine, nephrology consulted, IVF infusing. Musculoskeletal: Normal range of motion. She exhibits no edema or tenderness. Neurological: She is alert and oriented to person, place, and time. Skin: Skin is warm and dry. No rash noted. No erythema. No pallor. Psychiatric: She has a normal mood and affect. Her behavior is normal. Judgment and thought content normal.   Nursing note and vitals reviewed. MDM  Number of Diagnoses or Management Options  Acute pancreatitis, unspecified complication status, unspecified pancreatitis type: new and requires workup  Creatinine elevation: established and worsening  Diagnosis management comments: Plan:  Admit to hospital for worsening creatinine, acute pancreatitis and pain control. Amount and/or Complexity of Data Reviewed  Clinical lab tests: ordered and reviewed  Tests in the radiology section of CPT®: ordered and reviewed          ED Course     2:43 PM  I have evaluated the patient as the Provider in Triage. I have reviewed Her vital signs and the triage nurse assessment. I have talked with the patient and any available family and advised that I am the provider in triage and have ordered the appropriate study to initiate their work up based on the clinical presentation during my assessment. I have advised that the patient will be accommodated in the Main ED as soon as possible. I have also requested to contact the triage nurse or myself immediately if the patient experiences any changes in their condition during this brief waiting period. Barb Savage NP    2:43 PM  C/o acute onset left abdomen and flank pain with associated nausea and vomiting. Patient crying in triage. R/o renal calculi. 1857: Spoke with Dr. Hayley Morrison who will admit patient for EZEKIEL and pancreatitis, pain control. 1908: Spoke with nephrology, Dr. Yahaira Ordonez, who will follow as inpatient.      Procedures

## 2018-05-27 NOTE — IP AVS SNAPSHOT
[address] 
[phone] Patient: Danny Miller MRN: [id number] :1965 About your hospitalization You were admitted on:  May 27, 2018 You last received care in the:  [facility] 6S NEURO-SCI TELE You were discharged on:  May 29, 2018 Why you were hospitalized Your primary diagnosis was:  Not on File Your diagnoses also included:  Pancreatitis Follow-up Information Follow up With Details Comments Contact Info Dutch Durand NP On 2018 Hospital f/u PCP appointment 18 @ 10:15 a.m.  [address] [facility] 
[phone] Discharge Orders None A check edgar indicates which time of day the medication should be taken. My Medications CONTINUE taking these medications Instructions Each Dose to Equal  
 Morning Noon Evening Bedtime  
 aspirin 81 mg chewable tablet Your last dose was: Your next dose is: Take 1 Tab by mouth daily. 81 mg  
    
   
   
   
  
 atorvastatin 20 mg tablet Commonly known as:  LIPITOR Your last dose was: Your next dose is: Take 20 mg by mouth daily. 20 mg  
    
   
   
   
  
 calcitRIOL 0.25 mcg capsule Commonly known as:  ROCALTROL Your last dose was: Your next dose is: Take 0.25 mcg by mouth daily. 0.25 mcg  
    
   
   
   
  
 cloNIDine HCl 0.1 mg tablet Commonly known as:  CATAPRES Your last dose was: Your next dose is: Take 1 Tab by mouth two (2) times a day. 0.1 mg  
    
   
   
   
  
 HumaLOG U-100 Insulin 100 unit/mL injection Generic drug:  insulin lispro Your last dose was: Your next dose is:    
   
   
 5 Units by SubCUTAneous route three (3) times daily (with meals). 5 Units  
    
   
   
   
  
 insulin glargine 100 unit/mL injection Commonly known as:  LANTUS U-100 INSULIN Your last dose was: Your next dose is:    
   
   
 25 Units by SubCUTAneous route nightly. 25 Units  
    
   
   
   
  
 metoprolol tartrate 50 mg tablet Commonly known as:  LOPRESSOR Your last dose was: Your next dose is: Take 50 mg by mouth two (2) times a day. 50 mg  
    
   
   
   
  
 ondansetron 4 mg disintegrating tablet Commonly known as:  ZOFRAN ODT Your last dose was: Your next dose is: Take 1 Tab by mouth every eight (8) hours as needed for Nausea. 4 mg  
    
   
   
   
  
 sodium bicarbonate 650 mg tablet Your last dose was: Your next dose is: Take 650 mg by mouth two (2) times a day. 650 mg  
    
   
   
   
  
 traMADol 50 mg tablet Commonly known as:  ULTRAM  
   
Your last dose was: Your next dose is: Take 1 Tab by mouth every six (6) hours as needed for Pain. Max Daily Amount: 200 mg.  
 50 mg Opioid Education Prescription Opioids: What You Need to Know: 
 
Prescription opioids can be used to help relieve moderate-to-severe pain and are often prescribed following a surgery or injury, or for certain health conditions. These medications can be an important part of treatment but also come with serious risks. Opioids are strong pain medicines. Examples include hydrocodone, oxycodone, fentanyl, and morphine. Heroin is an example of an illegal opioid. It is important to work with your health care provider to make sure you are getting the safest, most effective care. WHAT ARE THE RISKS AND SIDE EFFECTS OF OPIOID USE? Prescription opioids carry serious risks of addiction and overdose, especially with prolonged use. An opioid overdose, often marked by slow breathing, can cause sudden death.   The use of prescription opioids can have a number of side effects as well, even when taken as directed. · Tolerance-meaning you might need to take more of a medication for the same pain relief · Physical dependence-meaning you have symptoms of withdrawal when the medication is stopped. Withdrawal symptoms can include nausea, sweating, chills, diarrhea, stomach cramps, and muscle aches. Withdrawal can last up to several weeks, depending on which drug you took and how long you took it. · Increased sensitivity to pain · Constipation · Nausea, vomiting, and dry mouth · Sleepiness and dizziness · Confusion · Depression · Low levels of testosterone that can result in lower sex drive, energy, and strength · Itching and sweating RISKS ARE GREATER WITH:      
· History of drug misuse, substance use disorder, or overdose · Mental health conditions (such as depression or anxiety) · Sleep apnea · Older age (72 years or older) · Pregnancy Avoid alcohol while taking prescription opioids. Also, unless specifically advised by your health care provider, medications to avoid include: · Benzodiazepines (such as Xanax or Valium) · Muscle relaxants (such as Soma or Flexeril) · Hypnotics (such as Ambien or Lunesta) · Other prescription opioids KNOW YOUR OPTIONS Talk to your health care provider about ways to manage your pain that don't involve prescription opioids. Some of these options may actually work better and have fewer risks and side effects. Options may include: 
· Pain relievers such as acetaminophen, ibuprofen, and naproxen · Some medications that are also used for depression or seizures · Physical therapy and exercise · Counseling to help patients learn how to cope better with triggers of pain and stress. · Application of heat or cold compress · Massage therapy · Relaxation techniques Be Informed Make sure you know the name of your medication, how much and how often to take it, and its potential risks & side effects. IF YOU ARE PRESCRIBED OPIOIDS FOR PAIN: 
· Never take opioids in greater amounts or more often than prescribed. Remember the goal is not to be pain-free but to manage your pain at a tolerable level. · Follow up with your primary care provider to: · Work together to create a plan on how to manage your pain. · Talk about ways to help manage your pain that don't involve prescription opioids. · Talk about any and all concerns and side effects. · Help prevent misuse and abuse. · Never sell or share prescription opioids · Help prevent misuse and abuse. · Store prescription opioids in a secure place and out of reach of others (this may include visitors, children, friends, and family). · Safely dispose of unused/unwanted prescription opioids: Find your community drug take-back program or your pharmacy mail-back program, or flush them down the toilet, following guidance from the Food and Drug Administration (www.fda.gov/Drugs/ResourcesForYou). · Visit www.cdc.gov/drugoverdose to learn about the risks of opioid abuse and overdose. · If you believe you may be struggling with addiction, tell your health care provider and ask for guidance or call [address] [place] [address] at 2-236-863-FSQW. Discharge Instructions None [employer] Announcement We are excited to announce that we are making your provider's discharge notes available to you in [employer]. You will see these notes when they are completed and signed by the physician that discharged you from your recent hospital stay. If you have any questions or concerns about any information you see in [employer], please call the Health Information Department where you were seen or reach out to your Primary Care Provider for more information about your plan of care. Introducing [facility] & HEALTH SERVICES! [employer] introduces [employer] patient portal. Now you can access parts of your medical record, email your doctor's office, and request medication refills online. 1. In your internet browser, go to [email or web address] 2. Click on the First Time User? Click Here link in the Sign In box. You will see the New Member Sign Up page. 3. Enter your Cant[employer] Access Code exactly as it appears below. You will not need to use this code after youve completed the sign-up process. If you do not sign up before the expiration date, you must request a new code. · [employer] Access Code: [email or web address] Expires: 8/10/2018  5:24 AM 
 
4. Enter the last four digits of your Social Security Number (xxxx) and Date of Birth (mm/dd/yyyy) as indicated and click Submit. You will be taken to the next sign-up page. 5. Create a Cant[employer]ia ID. This will be your [employer]ia login ID and cannot be changed, so think of one that is secure and easy to remember. 6. Create a [employer] password. You can change your password at any time. 7. Enter your Password Reset Question and Answer. This can be used at a later time if you forget your password. 8. Enter your e-mail address. You will receive e-mail notification when new information is available in [address] 9. Click Sign Up. You can now view and download portions of your medical record. 10. Click the Download Summary menu link to download a portable copy of your medical information. If you have questions, please visit the Frequently Asked Questions section of the [employer] website. Remember, [employer] is NOT to be used for urgent needs. For medical emergencies, dial 911. Now available from your iPhone and Android! Introducing Cali Still As a [employer] patient, I wanted to make you aware of our electronic visit tool called Cali Still. [employer] 24/7 allows you to connect within minutes with a medical provider 24 hours a day, seven days a week via a mobile device or tablet or logging into a secure website from your computer. You can access [employer] from anywhere in the [place]. A virtual visit might be right for you when you have a simple condition and feel like you just dont want to get out of bed, or cant get away from work for an appointment, when your regular Benuel Tommy provider is not available (evenings, weekends or holidays), or when youre out of town and need minor care. Electronic visits cost only $49 and if the [employer] 24/7 provider determines a prescription is needed to treat your condition, one can be electronically transmitted to a nearby pharmacy*. Please take a moment to enroll today if you have not already done so. The enrollment process is free and takes just a few minutes. To enroll, please download the NPC III 24/7 monica to your tablet or phone, or visit [email or web address] to enroll on your computer. And, as an [address] [place] [address] patient with a [employer] account, the results of your visits will be scanned into your electronic medical record and your primary care provider will be able to view the scanned results. We urge you to continue to see your regular Benuel Tommy provider for your ongoing medical care. And while your primary care provider may not be the one available when you seek a Cali Moradahliafin virtual visit, the peace of mind you get from getting a real diagnosis real time can be priceless. For more information on Redeemdahliafin, view our Frequently Asked Questions (FAQs) at [email or web address]. Sincerely, 
 
Hilary Nelson MD 
Chief Medical Officer Eliane Gordon *:  certain medications cannot be prescribed via Redeemdahliafin Providers Seen During Your Hospitalization Provider Specialty Primary office phone Prashant Davidson MD Emergency Medicine [phone] Britany Griggs MD Internal Medicine [phone] Beti Rivers MD Hospitalist [phone] Dale Garibay MD Hospitalist [phone] Your Primary Care Physician (PCP) Primary Care Physician Office Phone Office Fax Selma Stevenson [phone] You are allergic to the following No active allergies Recent Documentation Height Weight Breastfeeding? BMI OB Status Smoking Status 1.753 m 63 kg No 20.51 kg/m2 Postmenopausal Never Smoker Emergency Contacts Name Discharge Info Relation Home Work [facility] DISCHARGE CAREGIVER [3] Spouse [3] [phone] [facility] DISCHARGE CAREGIVER [3] Sister [23] [phone] Patient Belongings The following personal items are in your possession at time of discharge: 
  Dental Appliances: None  Visual Aid: None      Home Medications: None   Jewelry: None  Clothing: None    Other Valuables: None Please provide this summary of care documentation to your next provider. Signatures-by signing, you are acknowledging that this After Visit Summary has been reviewed with you and you have received a copy. Patient Signature:  ____________________________________________________________ Date:  ____________________________________________________________  
  
[facility] Provider Signature:  ____________________________________________________________ Date:  ____________________________________________________________

## 2018-05-28 LAB
CREAT UR-MCNC: 29.3 MG/DL
GLUCOSE BLD STRIP.AUTO-MCNC: 133 MG/DL (ref 65–100)
GLUCOSE BLD STRIP.AUTO-MCNC: 194 MG/DL (ref 65–100)
GLUCOSE BLD STRIP.AUTO-MCNC: 260 MG/DL (ref 65–100)
GLUCOSE BLD STRIP.AUTO-MCNC: 301 MG/DL (ref 65–100)
GLUCOSE BLD STRIP.AUTO-MCNC: 62 MG/DL (ref 65–100)
GLUCOSE BLD STRIP.AUTO-MCNC: 70 MG/DL (ref 65–100)
POTASSIUM UR-SCNC: 31 MMOL/L
PROT UR-MCNC: 296 MG/DL (ref 0–11.9)
SERVICE CMNT-IMP: ABNORMAL
SERVICE CMNT-IMP: NORMAL
SODIUM UR-SCNC: 92 MMOL/L

## 2018-05-28 PROCEDURE — 74011250636 HC RX REV CODE- 250/636: Performed by: INTERNAL MEDICINE

## 2018-05-28 PROCEDURE — 82570 ASSAY OF URINE CREATININE: CPT | Performed by: INTERNAL MEDICINE

## 2018-05-28 PROCEDURE — 74011250637 HC RX REV CODE- 250/637: Performed by: INTERNAL MEDICINE

## 2018-05-28 PROCEDURE — 82962 GLUCOSE BLOOD TEST: CPT

## 2018-05-28 PROCEDURE — 65660000000 HC RM CCU STEPDOWN

## 2018-05-28 PROCEDURE — 84300 ASSAY OF URINE SODIUM: CPT | Performed by: INTERNAL MEDICINE

## 2018-05-28 PROCEDURE — 74011636637 HC RX REV CODE- 636/637: Performed by: INTERNAL MEDICINE

## 2018-05-28 PROCEDURE — 74011000250 HC RX REV CODE- 250: Performed by: INTERNAL MEDICINE

## 2018-05-28 PROCEDURE — 84156 ASSAY OF PROTEIN URINE: CPT | Performed by: INTERNAL MEDICINE

## 2018-05-28 PROCEDURE — 84133 ASSAY OF URINE POTASSIUM: CPT | Performed by: INTERNAL MEDICINE

## 2018-05-28 PROCEDURE — 74011000258 HC RX REV CODE- 258: Performed by: INTERNAL MEDICINE

## 2018-05-28 RX ORDER — ONDANSETRON 2 MG/ML
4 INJECTION INTRAMUSCULAR; INTRAVENOUS
Status: DISCONTINUED | OUTPATIENT
Start: 2018-05-28 | End: 2018-05-29 | Stop reason: HOSPADM

## 2018-05-28 RX ORDER — FENTANYL CITRATE 50 UG/ML
25 INJECTION, SOLUTION INTRAMUSCULAR; INTRAVENOUS
Status: DISCONTINUED | OUTPATIENT
Start: 2018-05-28 | End: 2018-05-28

## 2018-05-28 RX ORDER — SODIUM CHLORIDE 9 MG/ML
75 INJECTION, SOLUTION INTRAVENOUS CONTINUOUS
Status: DISCONTINUED | OUTPATIENT
Start: 2018-05-28 | End: 2018-05-28

## 2018-05-28 RX ORDER — DEXTROSE 50 % IN WATER (D50W) INTRAVENOUS SYRINGE
12.5-25 AS NEEDED
Status: DISCONTINUED | OUTPATIENT
Start: 2018-05-28 | End: 2018-05-29 | Stop reason: HOSPADM

## 2018-05-28 RX ORDER — LIDOCAINE 50 MG/G
2 PATCH TOPICAL EVERY 24 HOURS
Status: DISCONTINUED | OUTPATIENT
Start: 2018-05-28 | End: 2018-05-29 | Stop reason: HOSPADM

## 2018-05-28 RX ORDER — OXYCODONE AND ACETAMINOPHEN 5; 325 MG/1; MG/1
1 TABLET ORAL
Status: DISCONTINUED | OUTPATIENT
Start: 2018-05-28 | End: 2018-05-29 | Stop reason: HOSPADM

## 2018-05-28 RX ORDER — FENTANYL CITRATE 50 UG/ML
25 INJECTION, SOLUTION INTRAMUSCULAR; INTRAVENOUS ONCE
Status: COMPLETED | OUTPATIENT
Start: 2018-05-28 | End: 2018-05-28

## 2018-05-28 RX ORDER — INSULIN LISPRO 100 [IU]/ML
INJECTION, SOLUTION INTRAVENOUS; SUBCUTANEOUS EVERY 6 HOURS
Status: DISCONTINUED | OUTPATIENT
Start: 2018-05-28 | End: 2018-05-28

## 2018-05-28 RX ORDER — MAGNESIUM SULFATE 100 %
4 CRYSTALS MISCELLANEOUS AS NEEDED
Status: DISCONTINUED | OUTPATIENT
Start: 2018-05-28 | End: 2018-05-29 | Stop reason: HOSPADM

## 2018-05-28 RX ADMIN — Medication 10 ML: at 00:02

## 2018-05-28 RX ADMIN — Medication 10 ML: at 05:58

## 2018-05-28 RX ADMIN — CLONIDINE HYDROCHLORIDE 0.1 MG: 0.1 TABLET ORAL at 23:06

## 2018-05-28 RX ADMIN — ATORVASTATIN CALCIUM 20 MG: 20 TABLET, FILM COATED ORAL at 09:14

## 2018-05-28 RX ADMIN — SODIUM CHLORIDE: 450 INJECTION, SOLUTION INTRAVENOUS at 12:10

## 2018-05-28 RX ADMIN — SODIUM CHLORIDE 75 ML/HR: 900 INJECTION, SOLUTION INTRAVENOUS at 09:14

## 2018-05-28 RX ADMIN — ONDANSETRON 4 MG: 2 INJECTION INTRAMUSCULAR; INTRAVENOUS at 12:05

## 2018-05-28 RX ADMIN — FENTANYL CITRATE 25 MCG: 50 INJECTION, SOLUTION INTRAMUSCULAR; INTRAVENOUS at 02:33

## 2018-05-28 RX ADMIN — HEPARIN SODIUM 5000 UNITS: 5000 INJECTION, SOLUTION INTRAVENOUS; SUBCUTANEOUS at 23:04

## 2018-05-28 RX ADMIN — ASPIRIN 81 MG CHEWABLE TABLET 81 MG: 81 TABLET CHEWABLE at 09:13

## 2018-05-28 RX ADMIN — CLONIDINE HYDROCHLORIDE 0.1 MG: 0.1 TABLET ORAL at 09:13

## 2018-05-28 RX ADMIN — CLONIDINE HYDROCHLORIDE 0.1 MG: 0.1 TABLET ORAL at 15:26

## 2018-05-28 RX ADMIN — HEPARIN SODIUM 5000 UNITS: 5000 INJECTION, SOLUTION INTRAVENOUS; SUBCUTANEOUS at 12:28

## 2018-05-28 RX ADMIN — METOPROLOL TARTRATE 50 MG: 50 TABLET ORAL at 00:15

## 2018-05-28 RX ADMIN — ONDANSETRON 4 MG: 4 TABLET, ORALLY DISINTEGRATING ORAL at 09:13

## 2018-05-28 RX ADMIN — INSULIN LISPRO 2 UNITS: 100 INJECTION, SOLUTION INTRAVENOUS; SUBCUTANEOUS at 12:27

## 2018-05-28 RX ADMIN — OXYCODONE HYDROCHLORIDE AND ACETAMINOPHEN 1 TABLET: 5; 325 TABLET ORAL at 15:26

## 2018-05-28 RX ADMIN — OXYCODONE HYDROCHLORIDE AND ACETAMINOPHEN 1 TABLET: 5; 325 TABLET ORAL at 00:15

## 2018-05-28 RX ADMIN — METOPROLOL TARTRATE 50 MG: 50 TABLET ORAL at 18:36

## 2018-05-28 RX ADMIN — OXYCODONE HYDROCHLORIDE AND ACETAMINOPHEN 1 TABLET: 5; 325 TABLET ORAL at 09:18

## 2018-05-28 RX ADMIN — INSULIN GLARGINE 25 UNITS: 100 INJECTION, SOLUTION SUBCUTANEOUS at 00:16

## 2018-05-28 RX ADMIN — ONDANSETRON 4 MG: 4 TABLET, ORALLY DISINTEGRATING ORAL at 00:00

## 2018-05-28 RX ADMIN — METOPROLOL TARTRATE 50 MG: 50 TABLET ORAL at 09:14

## 2018-05-28 RX ADMIN — CALCITRIOL 0.25 MCG: 0.25 CAPSULE, LIQUID FILLED ORAL at 09:13

## 2018-05-28 RX ADMIN — Medication 10 ML: at 15:17

## 2018-05-28 RX ADMIN — INSULIN GLARGINE 25 UNITS: 100 INJECTION, SOLUTION SUBCUTANEOUS at 23:05

## 2018-05-28 RX ADMIN — HEPARIN SODIUM 5000 UNITS: 5000 INJECTION, SOLUTION INTRAVENOUS; SUBCUTANEOUS at 05:58

## 2018-05-28 NOTE — CONSULTS
Pt has been seeing Dr Gray Mckee lately in office  Have asked Dr Samuel Peraza to see today after d/w Dr Willis August MD  [facility]  Office :[phone]  Fax: [phone]

## 2018-05-28 NOTE — ED NOTES
TRANSFER - OUT REPORT:    Verbal report given to [address] [place] [address] on Luz Matos  being transferred to [facility] for routine progression of care       Report consisted of patients Situation, Background, Assessment and   Recommendations(SBAR). Information from the following report(s) SBAR was reviewed with the receiving nurse. Lines:       Opportunity for questions and clarification was provided.       Patient transported with:   Registered Nurse

## 2018-05-28 NOTE — CONSULTS
NEPHROLOGY CONSULT NOTE     Patient: Giovanni Stallings MRN: [id number]  PCP: Nathan Garza NP   :     1965  Age:   46 y.o. Sex:  female      Referring physician: Claire Hanson MD       Reason for consultation:      EZEKIEL  CKD      Ms. Rick Smith was seen and examined in Room 652 this morning. Admission Date: 2018  2:45 PM  LOS: 1 day     DISCUSSION / PLAN :   Ms. Rick Smith has an established history of Stage 5 CKD . She is not far from her baseline GFR. Pancreatitis may have caused a decrease in her oral intake ---> volume contraction. There are no clear-cut nephrotoxins. AIN / Gurvinder Wallace seem unlikely. Recommendations :      1. Urine lytes  2. Cautious hydration. Will switch to a bicarb based solution. 3. Avoid NSAIDs and IV contrast.  4. Follow lytes. 5. Check the PTH. 6. Check Iron Studies  7. Hold ACE_Is, ARBs and Renin Antagonists. There is no immediate indication for acute dialysis at this time. Active Problems / Assessment AAActive  : Active Problems:    Pancreatitis (2018)    EZEKIEL  CKD - Stage 4/5  HTN  Hyperlipidemia  Secondary Hyperparathyroidism  Vitamin D Deficiency  Metabolic Acidosis - sec to CKD     Subjective:     \" My side \"     \" Back hurting \"         HPI:       Ms. Rick Smith is a 46year old lady with a Hx of Stage 4 / 5 CKD, DM, HTN , HLD and Anemia in CKD. She is followed by my partner Dr. Vernon Contreras. She was last seen in the   Office on 2018 . Her BUN and Creat were 97 and 5.47 respectively (on 1/3/2018). CKD was attribute to DM and HTN. Ms. Rick Smith reported left - sided abdominal pain . It was graded @ 10/10 and radiated to the back. There was no suggestion of GI bleeding. There was no history of fever . However, she reported chills , myalgia and arthralgia. There were no reported new medications. She does not use herbal preparations. She was evaluated in the ER @ [facility]. She had an elevated lipase level.  She was admitted with a diagnosis of pancreatitis. Lab Review :    5/10/18 ---> BUN 63 , Creat 4.57    5/27/18 ---> BUN 64 , Creat 5.26        Past Medical Hx:   Past Medical History:   Diagnosis Date    C. difficile colitis 6/2012    Chronic low back pain     CKD (chronic kidney disease)     stage 3 to 4, baseline Cr 2    Constipation     Diabetes (HCC)     A1c 8.2 3/2012    Hep C w/o coma, chronic (HCC)     Hyperlipemia     Hypertension     Lumbar disc disease     Migraines     Pancreatitis 4775    alcoholic    UTI (lower urinary tract infection) 6/20012        Past Surgical Hx:     Past Surgical History:   Procedure Laterality Date    HX ORTHOPAEDIC      lumbar sprain; back surgery    PA COLONOSCOPY FLX DX W/COLLJ SPEC WHEN PFRMD  11/12/2012            Medications:  Prior to Admission medications    Medication Sig Start Date End Date Taking? Authorizing Provider   aspirin 81 mg chewable tablet Take 1 Tab by mouth daily. 5/12/18   Robel Brown MD   cloNIDine HCl (CATAPRES) 0.1 mg tablet Take 1 Tab by mouth two (2) times a day. 5/11/18   Robel Brown MD   ondansetron (ZOFRAN ODT) 4 mg disintegrating tablet Take 1 Tab by mouth every eight (8) hours as needed for Nausea. 5/11/18   Robel Brown MD   metoprolol tartrate (LOPRESSOR) 50 mg tablet Take 50 mg by mouth two (2) times a day. Historical Provider   sodium bicarbonate 650 mg tablet Take 650 mg by mouth two (2) times a day. Historical Provider   atorvastatin (LIPITOR) 20 mg tablet Take 20 mg by mouth daily. Historical Provider   calcitRIOL (ROCALTROL) 0.25 mcg capsule Take 0.25 mcg by mouth daily. Historical Provider   traMADol (ULTRAM) 50 mg tablet Take 1 Tab by mouth every six (6) hours as needed for Pain. Max Daily Amount: 200 mg. 5/9/18   Elizabeth Berry MD   insulin glargine (LANTUS) 100 unit/mL injection 25 Units by SubCUTAneous route nightly.  3/14/17   Horace Malagon NP   insulin lispro (HUMALOG) 100 unit/mL injection 5 Units by SubCUTAneous route three (3) times daily (with meals). Yann Venegas MD       No Known Allergies    Social Hx:     There is no history of smoking. She worked in [employer]. Family History   Problem Relation Age of Onset    Diabetes Mother     Kidney Disease Mother     Diabetes Sister      Mom ---> ESRD ()      Dad ----> ESRD ()            Review of Systems:        General - Chills    CVS - Neg for chest pain    RS - Neg for hemoptysis    GI - Anorexia , Vomiting    Renal - Neg for dysuria , hematuria    Derm - Pruritus    ENT - Neg for sore throat    M/S  - Myalgia , arthralgia    Psyche - Depression             Objective:    Vitals:    Vitals:    18 1944 18 0015 18 0517 18 0840   BP: (!) 181/99 (!) 180/102 173/84    Pulse: (!) 117 (!) 120 (!) 120    Resp:     Temp:  98.6 °F (37 °C) 98.5 °F (36.9 °C)    SpO2: 95% 97% 97%    Weight:    63.1 kg (139 lb 1.6 oz)   Height:         I&O's:   0701 -  0700  In: 300 [P.O.:300]  Out: 300 [Urine:300]  Visit Vitals    /84 (BP 1 Location: Right arm, BP Patient Position: At rest)    Pulse (!) 120    Temp 98.5 °F (36.9 °C)    Resp 18    Ht 5' 9\" (1.753 m)    Wt 63.1 kg (139 lb 1.6 oz)    LMP 09/15/2013    SpO2 97%    Breastfeeding No    BMI 20.54 kg/m2       Physical Exam:      Seen in Room 652 @ [facility]      General: Calm  '  HEENT: Sclerae without icterus.     Lungs : Clear to auscultation, No wheezes, no rales    CVS: RRR, S1 , S2 , No S 3 gallop , No pericardial rub    Abdomen: Soft, Non tender, No guarding    Extremities: No leg oedema    Skin: Tattoo - > right leg       MS: Wasting (mild)    Neurologic: Responding top questions appropriately    Psych: Normal affect          Laboratory Results:    Lab Results   Component Value Date    BUN 64 (H) 2018     2018    K 4.4 2018     2018    CO2 19 (L) 2018       Lab Results   Component Value Date    BUN 64 (H) 05/27/2018    BUN 64 (H) 05/11/2018    BUN 63 (H) 05/10/2018    BUN 63 (H) 05/09/2018    BUN 86 (H) 04/22/2018    K 4.4 05/27/2018    K 3.8 05/11/2018    K 3.5 05/10/2018    K 3.1 (L) 05/09/2018    K 3.9 04/22/2018       Lab Results   Component Value Date    WBC 8.0 05/27/2018    RBC 3.63 (L) 05/27/2018    HGB 11.2 (L) 05/27/2018    HCT 35.1 05/27/2018    MCV 96.7 05/27/2018    MCH 30.9 05/27/2018    RDW 13.6 05/27/2018     05/27/2018       Lab Results   Component Value Date    PHOS 4.8 (H) 05/11/2018       Urine dipstick:   Lab Results   Component Value Date/Time    Color YELLOW/STRAW 05/27/2018 08:24 PM    Appearance CLEAR 05/27/2018 08:24 PM    Specific gravity 1.013 05/27/2018 08:24 PM    Specific gravity 1.012 05/11/2018 09:49 AM    pH (UA) 7.0 05/27/2018 08:24 PM    Protein 300 (A) 05/27/2018 08:24 PM    Glucose 500 (A) 05/27/2018 08:24 PM    Ketone NEGATIVE  05/27/2018 08:24 PM    Bilirubin NEGATIVE  05/27/2018 08:24 PM    Urobilinogen 0.2 05/27/2018 08:24 PM    Nitrites NEGATIVE  05/27/2018 08:24 PM    Leukocyte Esterase NEGATIVE  05/27/2018 08:24 PM    Epithelial cells FEW 05/27/2018 08:24 PM    Bacteria NEGATIVE  05/27/2018 08:24 PM    WBC 0-4 05/27/2018 08:24 PM    RBC 5-10 05/27/2018 08:24 PM       I have reviewed the following:     Pertinent labs       Care Plan discussed with:  Patient           Medications list Personally Reviewed   [x]      Yes     []               No        Thank you for allowing us to participate in the care of this patient. Please dont hesitate to call with any questions    Eulalia Mandujano MD  5/28/2018    Michael Peña  [address]  Phone - [phone]   Fax - [phone]  [email or web address]

## 2018-05-28 NOTE — ED NOTES
Nieves Yee, , made aware of patient arrival. Per her, she will alert primary RN. PT assisted to bed and given blanket for comfort. Call bell in reach.  Bed in low locked position

## 2018-05-28 NOTE — PROGRESS NOTES
Problem: Falls - Risk of  Goal: *Absence of Falls  Document Darryl Fall Risk and appropriate interventions in the flowsheet.    Outcome: Progressing Towards Goal  Fall Risk Interventions:            Medication Interventions: Evaluate medications/consider consulting pharmacy, Patient to call before getting OOB

## 2018-05-28 NOTE — PROGRESS NOTES
Patient crying about abdomen pain, percocet given less than an hour ago, encouraged to use coping skills, and will monitor. 0300 patient was yelling and screaming in room called the on call for orders. A one time dose of fentanyl 25 mcg was given. 0400 pt started yelling and screaming again. Patient was encouraged to use other coping skills due to increase anxiety. 0500 Patient would not sit still for lab work stating \" the last time I came in they gave me pain meds in order to make me feel better. The patient also stated she was kicked out of the pain clinic. Patient also states she does not take tramadol because it does not work. I explained that all the CT were negative even the last one she had 3 weeks ago. I ask if the patient wanted me to call her spouse and she did not reply. I ask about deep breathing, anxiety medications and depression medication and the patient shouted at me I don't want any of that stuff. By this time the patient is crying out 90 minutes , no tears at this time. Patient is due pain medication at 0800, she is aware of this as well.

## 2018-05-28 NOTE — DIABETES MGMT
DTC Progress Note    Recommendations/ Comments: If appropriate, please consider:  1. Resuming home prandial insulin if pt consuming at least 50% of meals, would consider starting with 3 units ac tid given current renal status  2. Change correctional insulin scale to high sensitivity due to current renal status - Cr 5.26  3. Add consistent carb to diet orders     Current hospital DM medication:   -Lantus 25 units at bedtime  -Humalog normal sensitivity correction    Chart reviewed on [facility] for hyperglycemia. Patient is a 46 y.o. female with known history of Type 2 Diabetes on Humalog 5 units ac tid and Lantus 25 units nightly at home. A1c:   Lab Results   Component Value Date/Time    Hemoglobin A1c 10.0 (H) 05/11/2018 03:49 AM    Hemoglobin A1c 10.4 (H) 03/13/2017 05:21 PM       Recent Glucose Results:   Lab Results   Component Value Date/Time    GLUCPOC 260 (H) 05/28/2018 12:10 PM    GLUCPOC 301 (H) 05/28/2018 08:19 AM    GLUCPOC 267 (H) 05/27/2018 08:21 PM        Lab Results   Component Value Date/Time    Creatinine 5.26 (H) 05/27/2018 02:24 PM     Estimated Creatinine Clearance: 12.5 mL/min (based on Cr of 5.26). Active Orders   Diet    DIET RENAL Regular        PO intake: No data found. Will continue to follow as needed.     Thank you    Marianna Ramírez RD, [address]

## 2018-05-28 NOTE — CONSULTS
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 dextrose (D50W) injection syrg 12.5-25 g  12.5-25 g IntraVENous PRN    glucagon (GLUCAGEN) injection 1 mg  1 mg IntraMUSCular PRN    lidocaine (LIDODERM) 5 % patch 2 Patch  2 Patch TransDERmal Q24H    ondansetron (ZOFRAN) injection 4 mg  4 mg IntraVENous Q6H PRN    sodium bicarbonate (8.4%) 75 mEq in 0.45% sodium chloride 1,000 mL IVPB   IntraVENous CONTINUOUS    aspirin chewable tablet 81 mg  81 mg Oral DAILY    atorvastatin (LIPITOR) tablet 20 mg  20 mg Oral DAILY    calcitRIOL (ROCALTROL) capsule 0.25 mcg  0.25 mcg Oral DAILY    cloNIDine HCl (CATAPRES) tablet 0.1 mg  0.1 mg Oral TID    metoprolol tartrate (LOPRESSOR) tablet 50 mg  50 mg Oral BID    insulin glargine (LANTUS) injection 25 Units  25 Units SubCUTAneous QHS    heparin (porcine) injection 5,000 Units  5,000 Units SubCUTAneous Q8H    sodium chloride (NS) flush 5-10 mL  5-10 mL IntraVENous Q8H    sodium chloride (NS) flush 5-10 mL  5-10 mL IntraVENous PRN      SCHEDULED MEDICATIONS  Current Facility-Administered Medications   Medication Dose Route Frequency    insulin lispro (HUMALOG) injection   SubCUTAneous Q6H    lidocaine (LIDODERM) 5 % patch 2 Patch  2 Patch TransDERmal Q24H    sodium bicarbonate (8.4%) 75 mEq in 0.45% sodium chloride 1,000 mL IVPB   IntraVENous CONTINUOUS    aspirin chewable tablet 81 mg  81 mg Oral DAILY    atorvastatin (LIPITOR) tablet 20 mg  20 mg Oral DAILY    calcitRIOL (ROCALTROL) capsule 0.25 mcg  0.25 mcg Oral DAILY    cloNIDine HCl (CATAPRES) tablet 0.1 mg  0.1 mg Oral TID    metoprolol tartrate (LOPRESSOR) tablet 50 mg  50 mg Oral BID    insulin glargine (LANTUS) injection 25 Units  25 Units SubCUTAneous QHS    heparin (porcine) injection 5,000 Units  5,000 Units SubCUTAneous Q8H    sodium chloride (NS) flush 5-10 mL  5-10 mL IntraVENous Q8H                ASSESSMENT & PLAN        The patient Doe Potts is a 46 y.o.  female who presents at this time for treatment of the following diagnoses:  Patient Active Hospital Problem List:   Pancreatitis (5/27/2018)    Assessment: not present    Plan: as above  Pain: Likely fictitious, perhaps drug seeking. Possibility that stress leads to some discomfort and coming to hospital has a respite quality given likely quite active family life. Pt also impressive in her glibness. A coordinated, multidisplinary treatment team round was conducted with the patient; that includes the nurse, unit pharmcist,  and writer all present. The following regarding medications was addressed during rounds with patient:   the risks and benefits of the proposed medication. The patient was given the opportunity to ask questions. Informed consent given to the use of the above medications. I will continue to adjust psychiatric and non-psychiatric medications (see above \"medication\" section and orders section for details) as deemed appropriate & based upon diagnoses and response to treatment. I have reviewed admission (and previous/old) labs and medical tests in the EHR and or transferring hospital documents.  I will continue to order blood tests/labs and diagnostic tests as deemed appropriate and review results as they become available (see orders for details). I have reviewed old psychiatric and medical records available in the EHR. I Will order additional psychiatric records from other institutions to further elucidate the nature of patient's psychopathology and review once available. I will gather additional collateral information from   friends, family and o/p treatment team to further elucidate the nature of patient's psychopathology and baselline level of psychiatric functioning.   Discharge when medically stable                     SIGNED:    Georges Grey MD  5/28/2018

## 2018-05-28 NOTE — H&P
[address]  HISTORY AND PHYSICAL      Kurt BOWIE  MR#: [id number]  : 1965  ACCOUNT #: [de-identified]   ADMIT DATE: 2018    CHIEF COMPLAINT:  Left flank pain going into lower back pain for the last few days. HISTORY OF PRESENT ILLNESS:  The patient is a 41-year-old female with a known history of diabetes mellitus and end-stage renal disease who has stage IV kidney  failure, presents with complaints of left flank pain and lower back pain. The patient has a history of kidney stones and low back issues. She has been taking medications for pain in the past.  As per the patient, her condition got worse earlier this evening, described the pain about a 9/10. It was associated with some dizziness and nausea. No vomiting or diarrhea. PAST MEDICAL HISTORY:  Chronic kidney disease, stage IV; hypertension; chronic intractable abdominal pain; chronic lower back pain; history of migraines; history of pancreatitis, alcohol-induced; and hepatitis C.    SOCIAL HISTORY:  The patient has been a former smoker. Currently, denies any alcohol abuse. PAST SURGICAL HISTORY:  Status post history of orthopedic lumbar spine surgery. ALLERGIES:  NO KNOWN ALLERGIES. MEDICATIONS:  The patient is on the following medications:    1. Aspirin 81 mg p.o. q. day. 2.  Clonidine 0.1 mg 3 times a day. 3.  Zofran 4 mg q. 6 p.r.n.  4.  Metoprolol 50 mg p.o. b.i.d.  5.  Lipitor 20 mg p.o. at bedtime. 6.  Iron supplements. 7.  Insulin Lantus (glargine) 25 units subQ at bedtime. REVIEW OF SYSTEMS:    CONSTITUTIONAL:  Negative fever. Negative chills. Negative night sweats. EYES:  Negative visual problems. Negative eye pain. Negative diplopia. HENT:  Negative sore throat. Negative earaches. Negative postnasal drip. PULMONARY:  Negative cough. Negative wheezing. CARDIOVASCULAR:  Negative chest pain. Negative dyspnea on exertion.   GASTROINTESTINAL:  Positive abdominal pain, left flank. MUSCULOSKELETAL:  Positive pain. PHYSICAL EXAMINATION:  VITAL SIGNS:  The patient's blood pressure is 181/99, pulse rate of 117, temp of 98.4, saturation of 95% on room air. GENERAL:  Patient looks much older than her age. HEENT:  Pupils are equal and react to light. No pallor. No icterus. NECK:  Supple. LUNGS:  Clear. CARDIOVASCULAR:  S1, S2 audible. No S3 or S4. ABDOMEN:  Soft, nontender. Mild flank tenderness is noted. No guarding, no rigidity, no rebound. EXTREMITIES:  There is no edema. LABORATORY DATA:  Showed a normal WBC count. CMP showed a BUN of 64, creatinine of 5.26, same as the baseline. Alkaline phosphatase was 250. Lipase was elevated at 442. ASSESSMENT AND PLAN:  1. Possible pancreatitis causing the symptoms. The patient will be kept n.p.o. Pain management will manage her pain p.r.n. Currently, the patient is very comfortable with no discomfort at this time. 2.  Diabetes mellitus, type 2. We will cover with sliding scale and also with a fixed dose of Lantus. 3.  Hypertension. Blood pressure is elevated. For her hypertension, the patient was given a dose of hydralazine and was also put on clonidine. 4.  CODE STATUS:  FULL CODE. 5.  Deep venous thrombosis prophylaxis. The patient was given heparin.       MD MARY CARMEN Bob/SHIRA  D: 05/27/2018 20:02     T: 05/27/2018 20:52  JOB #: [id number]

## 2018-05-28 NOTE — PROGRESS NOTES
rec patient from emergency with BP of 187/99 heart rate 117, gave medication for medication for bp and pain.

## 2018-05-28 NOTE — PROGRESS NOTES
Hospitalist Progress Note  Claire Hanson MD  Answering service: [phone] from in house phone  Cell: [phone]      Date of Service:  2018  NAME:  Giovanni Stallings  :  1965  MRN:  [id number]      Admission Summary/Course: The patient is a 66-year-old female with a known history of diabetes mellitus and end-stage renal disease who has stage IV kidney  failure, presents with complaints of left flank pain and lower back pain. The patient has a history of kidney stones and low back issues. Per admission note she was admitted for pancreatitis, but patient is without abdominal or epigastric pain. Creatinine notable for elevation from baseline. nephro consulted. Interval history / Subjective:     Patient has been crying in pain to nursing. Concern for some drug seeking behavior. Patient seen at the bedside. Stating this is not different than her chronic pain. Assessment & Plan:     L flank/back pain, chronic: Low suspicion for pancreatitis or acute etiology.   -take off NPO. Start Renal diet  -pain management without IV narcotics  -concern for drug seeking    Tachycardia, on and off. Appears to be sinus.  -likely related to pain/agitation    Elevated creatinine in the setting of known CKD, stage IV: Nephrology was consulted. -continue fluids for now  -repeat bmp in AM  -appreciate recs    Diabetes mellitus, type 2: Continue sliding scale and lantus. Hypertension: Continue home meds. Code status: FULL  DVT prophylaxis: heparin    Care Plan discussed with: Patient/Family and Nurse  Disposition: Home w/Family, likely tomorrow     Hospital Problems  Date Reviewed: 2018          Codes Class Noted POA    Pancreatitis ICD-10-CM: K85.90  ICD-9-CM: 616.9  2018 Unknown              Review of Systems:   Pertinent items are noted in HPI. Vital Signs:    Last 24hrs VS reviewed since prior progress note.  Most recent are:  Visit Vitals    /83 (BP 1 Location: Right arm, BP Patient Position: At rest)    Pulse 94    Temp 98.4 °F (36.9 °C)    Resp 10    Ht 5' 9\" (1.753 m)    Wt 63.1 kg (139 lb 1.6 oz)    SpO2 97%    Breastfeeding No    BMI 20.54 kg/m2         Intake/Output Summary (Last 24 hours) at 05/28/18 1515  Last data filed at 05/28/18 1217   Gross per 24 hour   Intake              300 ml   Output              900 ml   Net             -600 ml        Physical Examination:             Constitutional:  No acute distress, cooperative   ENT:  Neck supple   Resp:  CTA bilaterally. No wheezing. CV:  Regular rhythm, normal rate, no murmurs. GI:  Soft, non distended, non tender, normoactive bowel sounds. Musculoskeletal:  Warm. No edema    Neurologic:  Moves all extremities. AAOx3. Data Review:    Review and/or order of clinical lab test  Review and/or order of tests in the medicine section of [facility]      Labs:     Recent Labs      05/27/18   1424   WBC  8.0   HGB  11.2*   HCT  35.1   PLT  160     Recent Labs      05/27/18   1424   NA  137   K  4.4   CL  103   CO2  19*   BUN  64*   CREA  5.26*   GLU  192*   CA  9.5     Recent Labs      05/27/18   1424   SGOT  28   ALT  24   AP  250*   TBILI  0.4   TP  9.0*   ALB  3.7   GLOB  5.3*   LPSE  442*     No results for input(s): INR, PTP, APTT in the last 72 hours. No lab exists for component: INREXT   No results for input(s): FE, TIBC, PSAT, FERR in the last 72 hours. Lab Results   Component Value Date/Time    Folate 13.4 05/11/2018 03:49 AM      No results for input(s): PH, PCO2, PO2 in the last 72 hours.   Recent Labs      05/27/18   1424   TROIQ  <0.04     Lab Results   Component Value Date/Time    Cholesterol, total 207 (H) 05/11/2018 03:49 AM    HDL Cholesterol 68 05/11/2018 03:49 AM    LDL,Direct 153 (H) 11/18/2013 09:18 PM    LDL, calculated 117.6 (H) 05/11/2018 03:49 AM    Triglyceride 107 05/11/2018 03:49 AM    CHOL/HDL Ratio 3.0 05/11/2018 03:49 AM     Lab Results   Component Value Date/Time    Glucose (POC) 260 (H) 05/28/2018 12:10 PM    Glucose (POC) 301 (H) 05/28/2018 08:19 AM    Glucose (POC) 267 (H) 05/27/2018 08:21 PM    Glucose (POC) 140 (H) 05/11/2018 05:26 PM    Glucose (POC) 208 (H) 05/11/2018 11:11 AM     Lab Results   Component Value Date/Time    Color YELLOW/STRAW 05/27/2018 08:24 PM    Appearance CLEAR 05/27/2018 08:24 PM    Specific gravity 1.013 05/27/2018 08:24 PM    Specific gravity 1.012 05/11/2018 09:49 AM    pH (UA) 7.0 05/27/2018 08:24 PM    Protein 300 (A) 05/27/2018 08:24 PM    Glucose 500 (A) 05/27/2018 08:24 PM    Ketone NEGATIVE  05/27/2018 08:24 PM    Bilirubin NEGATIVE  05/27/2018 08:24 PM    Urobilinogen 0.2 05/27/2018 08:24 PM    Nitrites NEGATIVE  05/27/2018 08:24 PM    Leukocyte Esterase NEGATIVE  05/27/2018 08:24 PM    Epithelial cells FEW 05/27/2018 08:24 PM    Bacteria NEGATIVE  05/27/2018 08:24 PM    WBC 0-4 05/27/2018 08:24 PM    RBC 5-10 05/27/2018 08:24 PM         Medications Reviewed:     Current Facility-Administered Medications   Medication Dose Route Frequency    oxyCODONE-acetaminophen (PERCOCET) 5-325 mg per tablet 1 Tab  1 Tab Oral Q8H PRN    insulin lispro (HUMALOG) injection   SubCUTAneous Q6H    glucose chewable tablet 16 g  4 Tab Oral PRN    dextrose (D50W) injection syrg 12.5-25 g  12.5-25 g IntraVENous PRN    glucagon (GLUCAGEN) injection 1 mg  1 mg IntraMUSCular PRN    lidocaine (LIDODERM) 5 % patch 2 Patch  2 Patch TransDERmal Q24H    ondansetron (ZOFRAN) injection 4 mg  4 mg IntraVENous Q6H PRN    sodium bicarbonate (8.4%) 75 mEq in 0.45% sodium chloride 1,000 mL IVPB   IntraVENous CONTINUOUS    aspirin chewable tablet 81 mg  81 mg Oral DAILY    atorvastatin (LIPITOR) tablet 20 mg  20 mg Oral DAILY    calcitRIOL (ROCALTROL) capsule 0.25 mcg  0.25 mcg Oral DAILY    cloNIDine HCl (CATAPRES) tablet 0.1 mg  0.1 mg Oral TID    metoprolol tartrate (LOPRESSOR) tablet 50 mg  50 mg Oral BID    insulin glargine (LANTUS) injection 25 Units  25 Units SubCUTAneous QHS    heparin (porcine) injection 5,000 Units  5,000 Units SubCUTAneous Q8H    sodium chloride (NS) flush 5-10 mL  5-10 mL IntraVENous Q8H    sodium chloride (NS) flush 5-10 mL  5-10 mL IntraVENous PRN     ______________________________________________________________________  EXPECTED LENGTH OF STAY: - - -  ACTUAL LENGTH OF STAY:          1                 Mario Romano MD

## 2018-05-29 VITALS
DIASTOLIC BLOOD PRESSURE: 74 MMHG | BODY MASS INDEX: 20.57 KG/M2 | OXYGEN SATURATION: 96 % | HEART RATE: 78 BPM | SYSTOLIC BLOOD PRESSURE: 123 MMHG | HEIGHT: 69 IN | RESPIRATION RATE: 16 BRPM | TEMPERATURE: 97.5 F | WEIGHT: 138.89 LBS

## 2018-05-29 LAB
ATRIAL RATE: 124 BPM
BACTERIA SPEC CULT: ABNORMAL
BACTERIA SPEC CULT: ABNORMAL
CALCULATED P AXIS, ECG09: 53 DEGREES
CALCULATED R AXIS, ECG10: 23 DEGREES
CALCULATED T AXIS, ECG11: 72 DEGREES
DIAGNOSIS, 93000: NORMAL
GLUCOSE BLD STRIP.AUTO-MCNC: 177 MG/DL (ref 65–100)
GLUCOSE BLD STRIP.AUTO-MCNC: 180 MG/DL (ref 65–100)
P-R INTERVAL, ECG05: 142 MS
Q-T INTERVAL, ECG07: 314 MS
QRS DURATION, ECG06: 68 MS
QTC CALCULATION (BEZET), ECG08: 451 MS
SERVICE CMNT-IMP: ABNORMAL
VENTRICULAR RATE, ECG03: 124 BPM

## 2018-05-29 PROCEDURE — 74011000258 HC RX REV CODE- 258: Performed by: INTERNAL MEDICINE

## 2018-05-29 PROCEDURE — 74011250637 HC RX REV CODE- 250/637: Performed by: INTERNAL MEDICINE

## 2018-05-29 PROCEDURE — 74011250636 HC RX REV CODE- 250/636: Performed by: INTERNAL MEDICINE

## 2018-05-29 PROCEDURE — 74011000250 HC RX REV CODE- 250: Performed by: INTERNAL MEDICINE

## 2018-05-29 PROCEDURE — 82962 GLUCOSE BLOOD TEST: CPT

## 2018-05-29 RX ORDER — LIDOCAINE 50 MG/G
PATCH TOPICAL
Qty: 1 EACH | Refills: 0 | Status: SHIPPED | OUTPATIENT
Start: 2018-05-30 | End: 2018-11-23

## 2018-05-29 RX ORDER — LIDOCAINE 50 MG/G
PATCH TOPICAL
Qty: 1 EACH | Refills: 0 | Status: SHIPPED | OUTPATIENT
Start: 2018-05-30 | End: 2018-05-29

## 2018-05-29 RX ADMIN — ASPIRIN 81 MG CHEWABLE TABLET 81 MG: 81 TABLET CHEWABLE at 09:44

## 2018-05-29 RX ADMIN — CALCITRIOL 0.25 MCG: 0.25 CAPSULE, LIQUID FILLED ORAL at 09:45

## 2018-05-29 RX ADMIN — METOPROLOL TARTRATE 50 MG: 50 TABLET ORAL at 09:45

## 2018-05-29 RX ADMIN — HEPARIN SODIUM 5000 UNITS: 5000 INJECTION, SOLUTION INTRAVENOUS; SUBCUTANEOUS at 04:00

## 2018-05-29 RX ADMIN — SODIUM CHLORIDE: 450 INJECTION, SOLUTION INTRAVENOUS at 02:51

## 2018-05-29 RX ADMIN — ATORVASTATIN CALCIUM 20 MG: 20 TABLET, FILM COATED ORAL at 09:45

## 2018-05-29 RX ADMIN — OXYCODONE HYDROCHLORIDE AND ACETAMINOPHEN 1 TABLET: 5; 325 TABLET ORAL at 09:45

## 2018-05-29 NOTE — PROGRESS NOTES
Patient difficult stick. Labs were unable to be obtained. MD Staci Juarez aware. Patient to obtain labs outpatient.

## 2018-05-29 NOTE — DIABETES MGMT
DTC Progress Note    Recommendations/ Comments: Chart reviewed on Mei Swanson for fluctuating blood sugars (ranged 62 - 260 mg/dL in the last 24 hours.)  If appropriate, please consider:  1. Resuming home prandial insulin if pt consuming at least 50% of meals, would consider starting with 3 units ac tid given current renal status  2. Adding back correctional insulin scale to high sensitivity due to current renal status - Cr 5.26     Current hospital DM medication:   -Lantus 25 units at bedtime    Patient is a 46 y.o. female with known history of Type 2 Diabetes on Humalog 5 units ac tid and Lantus 25 units nightly at home. A1c:   Lab Results   Component Value Date/Time    Hemoglobin A1c 10.0 (H) 05/11/2018 03:49 AM    Hemoglobin A1c 10.4 (H) 03/13/2017 05:21 PM       Recent Glucose Results:   Lab Results   Component Value Date/Time    GLUCPOC 180 (H) 05/29/2018 07:09 AM    GLUCPOC 194 (H) 05/28/2018 11:02 PM    GLUCPOC 133 (H) 05/28/2018 05:36 PM        Lab Results   Component Value Date/Time    Creatinine 5.26 (H) 05/27/2018 02:24 PM     Estimated Creatinine Clearance: 12.5 mL/min (based on Cr of 5.26). Active Orders   Diet    DIET DIABETIC WITH OPTIONS Consistent Carb 1800kcal; Regular; 70-70-70 (House)        PO intake: No data found. Will continue to follow as needed.     Thank you  Gabbie Lu, MS, RN, CDE

## 2018-05-29 NOTE — DISCHARGE SUMMARY
Discharge Summary       PATIENT ID: Dwight Starks  MRN: [id number]   YOB: 1965    DATE OF ADMISSION: 5/27/2018  2:45 PM    DATE OF DISCHARGE: 5/29/2018   PRIMARY CARE PROVIDER: Marjan Otoole NP     ATTENDING PHYSICIAN: Dr. Keyana Parson  DISCHARGING PROVIDER: Amanda Lara MD    To contact this individual call [phone] and ask the  to page. If unavailable ask to be transferred the Adult Hospitalist Department. CONSULTATIONS: IP CONSULT TO NEPHROLOGY  IP CONSULT TO NEPHROLOGY  IP CONSULT TO PSYCHIATRY    PROCEDURES/SURGERIES: * No surgery found *    ADMITTING DIAGNOSES & HOSPITAL COURSE:   43-year-old female with a known history of diabetes mellitus and end-stage renal disease who has stage IV kidney  failure, presents with complaints of left flank pain and lower back pain. DISCHARGE DIAGNOSES / PLAN:      L flank/back pain, chronic: Resolved spontaneously   -concern for drug seeking  - F/u pcp outpatient   1. PENDING TEST RESULTS:   At the time of discharge the following test results are still pending    FOLLOW UP APPOINTMENTS:    Follow-up Information     Follow up With Details Comments Contact Info    Paul Martinez NP   [address] [facility]  [phone]             ADDITIONAL CARE RECOMMENDATIONS:     DIET: Resume previous diet  Oral Nutritional Supplements:     ACTIVITY: Activity as tolerated    WOUND CARE: none    EQUIPMENT needed: none      DISCHARGE MEDICATIONS:  Current Discharge Medication List      CONTINUE these medications which have NOT CHANGED    Details   aspirin 81 mg chewable tablet Take 1 Tab by mouth daily. Qty: 30 Tab, Refills: 1      cloNIDine HCl (CATAPRES) 0.1 mg tablet Take 1 Tab by mouth two (2) times a day. Qty: 60 Tab, Refills: 1      ondansetron (ZOFRAN ODT) 4 mg disintegrating tablet Take 1 Tab by mouth every eight (8) hours as needed for Nausea.   Qty: 15 Tab, Refills: 0      metoprolol tartrate (LOPRESSOR) 50 mg tablet Take 50 mg by mouth two (2) times a day. sodium bicarbonate 650 mg tablet Take 650 mg by mouth two (2) times a day. atorvastatin (LIPITOR) 20 mg tablet Take 20 mg by mouth daily. calcitRIOL (ROCALTROL) 0.25 mcg capsule Take 0.25 mcg by mouth daily. traMADol (ULTRAM) 50 mg tablet Take 1 Tab by mouth every six (6) hours as needed for Pain. Max Daily Amount: 200 mg. Qty: 6 Tab, Refills: 0    Associated Diagnoses: Flank pain      insulin glargine (LANTUS) 100 unit/mL injection 25 Units by SubCUTAneous route nightly. Qty: 1 Vial, Refills: 0      insulin lispro (HUMALOG) 100 unit/mL injection 5 Units by SubCUTAneous route three (3) times daily (with meals). NOTIFY YOUR PHYSICIAN FOR ANY OF THE FOLLOWING:   Fever over 101 degrees for 24 hours. Chest pain, shortness of breath, fever, chills, nausea, vomiting, diarrhea, change in mentation, falling, weakness, bleeding. Severe pain or pain not relieved by medications. Or, any other signs or symptoms that you may have questions about.     DISPOSITION:    Home With:   OT  PT  HH  RN       Long term SNF/Inpatient Rehab    Independent/assisted living    Hospice    Other:       PATIENT CONDITION AT DISCHARGE:     Functional status    Poor     Deconditioned     Independent      Cognition     Lucid     Forgetful     Dementia      Catheters/lines (plus indication)    Parra     PICC     PEG     None      Code status     Full code     DNR      PHYSICAL EXAMINATION AT DISCHARGE:   Refer to Progress Note       CHRONIC MEDICAL DIAGNOSES:  Problem List as of 5/29/2018  Date Reviewed: 5/29/2018          Codes Class Noted - Resolved    Pancreatitis ICD-10-CM: K85.90  ICD-9-CM: 982.5  5/27/2018 - Present        Chest pain ICD-10-CM: R07.9  ICD-9-CM: 786.50  5/10/2018 - Present        Acute cystitis ICD-10-CM: N30.00  ICD-9-CM: 595.0  3/14/2017 - Present        Acute pancreatitis ICD-10-CM: K85.90  ICD-9-CM: 761.6  5/25/2015 - Present        Flank pain ICD-10-CM: R10.9  ICD-9-CM: 789.09  4/14/2015 - Present        Hydronephrosis with infection ICD-10-CM: N13.6  ICD-9-CM: 590.80  4/9/2015 - Present        SIRS (systemic inflammatory response syndrome) ([facility] ICD-10-CM: R65.10  ICD-9-CM: 995.90  4/8/2015 - Present        Hyperglycemia ICD-10-CM: R73.9  ICD-9-CM: 790.29  11/18/2013 - Present    Overview Signed 11/18/2013  8:47 PM by Jenelle Diez     Hyperosmolar, hyperglycemia state             Abdominal pain ICD-10-CM: R10.9  ICD-9-CM: 789.00  11/18/2013 - Present        Lower urinary tract infectious disease ICD-10-CM: N39.0  ICD-9-CM: 599.0  11/18/2013 - Present        Chronic pain ICD-10-CM: G89.29  ICD-9-CM: 338.29  11/18/2013 - Present    Overview Signed 11/18/2013  8:48 PM by Jenelle Diez     Low back, sees pain doctor, do not DC pt with pain RX, needs to go to her pain MD at DC             Hyponatremia ICD-10-CM: E87.1  ICD-9-CM: 276.1  11/18/2013 - Present        HTN (hypertension) ICD-10-CM: I10  ICD-9-CM: 401.9  12/20/2012 - Present        Chronic lumbar radiculopathy ICD-10-CM: M54.16  ICD-9-CM: 724.4  12/6/2012 - Present        Chronic pain syndrome ICD-10-CM: G89.4  ICD-9-CM: 338.4  12/6/2012 - Present        IDDM (insulin dependent diabetes mellitus) ([facility] ICD-10-CM: E11.9, Z79.4  ICD-9-CM: 250.00, V58.67  10/16/2012 - Present        Back pain, lumbosacral ICD-10-CM: M54.5  ICD-9-CM: 724.2, 724.6  6/24/2012 - Present    Overview Signed 6/24/2012 10:18 PM by Arvid Keto     Acute on chronic               Gastroparesis ICD-10-CM: K31.84  ICD-9-CM: 536.3  6/7/2012 - Present        Abdominal pain, LUQ (left upper quadrant) ICD-10-CM: R10.12  ICD-9-CM: 789.02  6/7/2012 - Present        Syncope and collapse ICD-10-CM: R55  ICD-9-CM: 780.2  4/22/2012 - Present        Depression ICD-10-CM: F32.9  ICD-9-CM: 816  4/22/2012 - Present        CKD (chronic kidney disease) stage 4, GFR 15-29 ml/min (HCC) (Chronic) ICD-10-CM: N18.4  ICD-9-CM: 585.4 4/22/2012 - Present        Intractable abdominal pain ICD-10-CM: R10.9  ICD-9-CM: 789.00  4/21/2012 - Present        HTN (hypertension) ICD-10-CM: I10  ICD-9-CM: 401.9  4/12/2012 - Present        RESOLVED: Pancreatitis ICD-10-CM: K85.90  ICD-9-CM: 740.9  11/17/2016 - 11/20/2016        RESOLVED: Constipation ICD-10-CM: K59.00  ICD-9-CM: 564.00  11/17/2016 - 11/20/2016        RESOLVED: Chest pain ICD-10-CM: R07.9  ICD-9-CM: 786.50  11/17/2016 - 11/20/2016        RESOLVED: Cellulitis ICD-10-CM: L03.90  ICD-9-CM: 682.9  2/3/2016 - 2/6/2016        RESOLVED: HTN (hypertension), malignant ICD-10-CM: I10  ICD-9-CM: 401.0  4/17/2015 - 4/20/2015        RESOLVED: HTN (hypertension), malignant ICD-10-CM: I10  ICD-9-CM: 401.0  11/6/2012 - 12/20/2012        RESOLVED: Chest pain, unspecified ICD-10-CM: R07.9  ICD-9-CM: 786.50  11/6/2012 - 11/15/2012        RESOLVED: UTI (lower urinary tract infection) ICD-10-CM: N39.0  ICD-9-CM: 599.0  11/6/2012 - 11/15/2012        RESOLVED: GI bleeding ICD-10-CM: K92.2  ICD-9-CM: 578.9  11/6/2012 - 12/20/2012        RESOLVED: Chest pain ICD-10-CM: R07.9  ICD-9-CM: 786.50  8/5/2012 - 10/16/2012        RESOLVED: Hyponatremia ICD-10-CM: E87.1  ICD-9-CM: 276.1  8/4/2012 - 11/15/2012        RESOLVED: ARF (acute renal failure) ([facility] ICD-10-CM: N17.9  ICD-9-CM: 584.9  8/4/2012 - 10/16/2012        RESOLVED: Leukocytosis, unspecified ICD-10-CM: H65.215  ICD-9-CM: 288.60  8/4/2012 - 12/20/2012        RESOLVED: Abdominal pain ICD-10-CM: R10.9  ICD-9-CM: 789.00  6/24/2012 - 10/16/2012    Overview Addendum 6/24/2012 10:19 PM by Archana Grant     Acute on chronic left and right upper quadrant.              RESOLVED: Orthostatic hypotension ICD-10-CM: I95.1  ICD-9-CM: 458.0  6/24/2012 - 10/16/2012        RESOLVED: UTI (urinary tract infection) ICD-10-CM: N39.0  ICD-9-CM: 599.0  6/24/2012 - 10/16/2012        RESOLVED: Hyponatremia ICD-10-CM: E87.1  ICD-9-CM: 276.1  6/24/2012 - 11/15/2012    Overview Signed 6/24/2012 10:21 PM by Pipo Foley     Acute on chronic             RESOLVED: DM (diabetes mellitus) type II controlled with renal manifestation ([facility] ICD-10-CM: E11.29  ICD-9-CM: 250.40  6/24/2012 - 10/16/2012        RESOLVED: Colitis due to Clostridium difficile ICD-10-CM: A04.72  ICD-9-CM: 008.45  6/7/2012 - 10/16/2012        RESOLVED: Diarrhea ICD-10-CM: R19.7  ICD-9-CM: 787.91  6/7/2012 - 10/16/2012        RESOLVED: Acute renal failure ([facility] ICD-10-CM: N17.9  ICD-9-CM: 584.9  4/21/2012 - 10/16/2012        RESOLVED: Hyponatremia ICD-10-CM: E87.1  ICD-9-CM: 276.1  4/21/2012 - 11/15/2012        RESOLVED: UTI (urinary tract infection) ICD-10-CM: N39.0  ICD-9-CM: 599.0  4/21/2012 - 10/16/2012        RESOLVED: Nausea & vomiting ICD-10-CM: R11.2  ICD-9-CM: 787.01  3/16/2012 - 11/15/2012        RESOLVED: Acute pancreatitis ICD-10-CM: K85.90  ICD-9-CM: 577.0  8/1/2010 - 2/26/2012        RESOLVED: Cholelithiasis ICD-10-CM: K80.20  ICD-9-CM: 574.20  8/1/2010 - 11/20/2016              Greater than 30 minutes were spent with the patient on counseling and coordination of care    Signed:   Jimmy Guadarrama MD  5/29/2018  11:06 AM

## 2018-05-29 NOTE — PROGRESS NOTES
Earlier attempt to draw blood for labs not successful. Pt refused to allow 2nd attempt. States she is a hard stick and is hard to get blood from. Will see if dayshift can change her mind.

## 2018-05-29 NOTE — PROGRESS NOTES
Name: Violet Quintero MRN: [id number]   : 1965 Hospital: [facility]   Date: 2018        IMPRESSION:   · EZEKIEL  · CKD  · HTN  · Pancreatitis - symptomatically better      PLAN:   · Ms. Carleen Reece is refusing lab draws this morning. · She is anticipating discharge today. · F/U in one to 2 weeks (post discharge)     Subjective/Interval History:       Abdominal pain has improved. There were no new complaints. MS. Carleen Reece refused lab draws this morning. Objective:   Vital Signs:    Visit Vitals    /74    Pulse 78    Temp 97.5 °F (36.4 °C)    Resp 16    Ht 5' 9\" (1.753 m)    Wt 63 kg (138 lb 14.2 oz)    LMP 09/15/2013    SpO2 96%    Breastfeeding No    BMI 20.51 kg/m2       O2 Device: Room air       Temp (24hrs), Av.1 °F (36.7 °C), Min:97.5 °F (36.4 °C), Max:98.6 °F (37 °C)       Intake/Output:   Last shift:         Last 3 shifts:  1901 -  0700  In: 9111 [P.O.:750; I.V.:1472]  Out: 1400 [Urine:1400]    Intake/Output Summary (Last 24 hours) at 18 1109  Last data filed at 18 0543   Gross per 24 hour   Intake             1922 ml   Output             1100 ml   Net              822 ml        Physical Exam:    Seen in Room 652    General:    Alert, cooperative, no distress, appears stated age. She was smiling    Head:   Normocephalic. Eyes:   No icterus. Lungs:   Clear to auscultation, No Wheezing ,rhonchi  Or rales. Heart:   Regular rate and rhythm,  no S 3  Gallop. Abdomen:   Soft, non-tender. Extremities: No leg oedema. Psych:  Calm. Neurologic: Alert and oriented .        DATA:  Labs:  Recent Labs      18   1424   NA  137   K  4.4   CL  103   CO2  19*   BUN  64*   CREA  5.26*   CA  9.5   ALB  3.7     Recent Labs      18   1424   WBC  8.0   HGB  11.2*   HCT  35.1   PLT  160     Recent Labs      18   1213   PRETTY  92   KU  31   CREAU  29.30       Total time spent with patient:             Care Plan discussed with:       Patient, Gopal Lambert MD

## 2018-05-29 NOTE — ROUTINE PROCESS
Bedside shift change report given to Gian Brizuela (oncoming nurse) by Caridad Alves (offgoing nurse). Report included the following information SBAR, Kardex, Procedure Summary, MAR, Accordion, Recent Results, Med Rec Status and Cardiac Rhythm NSR.

## 2018-05-29 NOTE — PROGRESS NOTES
Hospital follow-up PCP transitional care appointment has been scheduled with Dr. Yelitza Frost for Tuesday, 6/5/18 at 10:15 a.m. Pending patient discharge.   Tomeka Hay, Care Management Specialist.

## 2018-06-05 ENCOUNTER — HOSPITAL ENCOUNTER (EMERGENCY)
Age: 53
Discharge: HOME | End: 2018-06-06
Attending: EMERGENCY MEDICINE | Admitting: EMERGENCY MEDICINE
Payer: MEDICARE

## 2018-06-05 ENCOUNTER — APPOINTMENT (OUTPATIENT)
Dept: GENERAL RADIOLOGY | Age: 53
End: 2018-06-05
Attending: EMERGENCY MEDICINE
Payer: MEDICARE

## 2018-06-05 VITALS
BODY MASS INDEX: 21.03 KG/M2 | DIASTOLIC BLOOD PRESSURE: 99 MMHG | OXYGEN SATURATION: 100 % | HEART RATE: 102 BPM | SYSTOLIC BLOOD PRESSURE: 165 MMHG | TEMPERATURE: 98.2 F | RESPIRATION RATE: 18 BRPM | WEIGHT: 142.4 LBS

## 2018-06-05 DIAGNOSIS — K59.09 OTHER CONSTIPATION: Primary | ICD-10-CM

## 2018-06-05 PROCEDURE — 74022 RADEX COMPL AQT ABD SERIES: CPT

## 2018-06-05 PROCEDURE — 74011250637 HC RX REV CODE- 250/637: Performed by: EMERGENCY MEDICINE

## 2018-06-05 PROCEDURE — 99283 EMERGENCY DEPT VISIT LOW MDM: CPT

## 2018-06-05 RX ORDER — POLYETHYLENE GLYCOL 3350 17 G/17G
POWDER, FOR SOLUTION ORAL
Qty: 289 G | Refills: 0 | Status: ON HOLD | OUTPATIENT
Start: 2018-06-05 | End: 2018-07-11

## 2018-06-05 RX ADMIN — SODIUM PHOSPHATE, DIBASIC AND SODIUM PHOSPHATE, MONOBASIC 133 ML: 7; 19 ENEMA RECTAL at 23:18

## 2018-06-06 NOTE — ED PROVIDER NOTES
HPI Comments: 46 y.o. female with past medical history significant for DM, CKD, HTN, chronic abdominal pain, chronic lower back pain, s/p spinal surgery, history of migraines, history of alcohol-induced pancreatitis, Hepatitis C, who presents ambulatory to the ED accompanied by her spouse, with chief complaint of constipation. Pt reports that her LBM was at least 4 days ago. Additionally c/o moderate lower abdominal discomfort, as well as chronic leg pain.  states that when patient was admitted to the hospital a few weeks ago she was given narcotic pain medications for symptom control, but she did not receive a stool softener. Pt denies purchasing OTC stool-softeners, but she reports that she did purchase prune juice. She did not end up drinking the prune juice and instead decided to come to the ED for evaluation and treatment of her constipation. Pt denies abdominal surgical history. She specifically denies any nausea, vomiting, diarrhea, fevers, chills, cough, congestion, chest pain, shortness of breath, difficulty urinating, dysuria, skin rash, or headache. There are no other acute medical concerns at this time. Review of medical records indicats that patient was most recently admitted to the hospital 5/27-5/29/2018 for left flank pain and lower back pain, thought to be acute pancreatitis. Pt's symptoms resolved spontaneously and she was discharged. Social hx: Negative for Tobacco use; Negative for EtOH use; Negative for Illicit Drug Abuse    PCP: Hannah Barbour NP    Note written by Tressa Salazar. Gian Stanton, as dictated by Shahab Hewitt MD 10:53 PM     The history is provided by the patient, the spouse and medical records. No  was used.         Past Medical History:   Diagnosis Date    C. difficile colitis 6/2012    Chronic low back pain     CKD (chronic kidney disease)     stage 3 to 4, baseline Cr 2    Constipation     Diabetes (HCC)     A1c 8.2 3/2012    Hep C w/o coma, chronic ([facility] [address]     Hyperlipemia     Hypertension     Lumbar disc disease     Migraines     Pancreatitis 8366    alcoholic    UTI (lower urinary tract infection) 6/20012       Past Surgical History:   Procedure Laterality Date    HX ORTHOPAEDIC      lumbar sprain; back surgery    [place] COLONOSCOPY FLX DX W/COLLJ SPEC WHEN PFRMD  11/12/2012              Family History:   Problem Relation Age of Onset    Diabetes Mother     Kidney Disease Mother     Diabetes Sister        Social History     Social History    Marital status:      Spouse name: manda maxwell give info    Number of children: 5    Years of education: 8th can re     Occupational History     Not Employed     on disability for [employer]      Social History Main Topics    Smoking status: Never Smoker    Smokeless tobacco: Never Used    Alcohol use No      Comment: Quit few months ago, hx of abuse    Drug use: No    Sexual activity: Yes     Partners: Male     Other Topics Concern    Not on file     Social History Narrative    Lives with daughter and     Ambulated independently    Doesn't work         ALLERGIES: Review of patient's allergies indicates no known allergies. Review of Systems   Constitutional: Negative for chills and fever. HENT: Negative for congestion. Respiratory: Negative for cough and shortness of breath. Cardiovascular: Negative for chest pain. Gastrointestinal: Positive for abdominal pain and constipation. Negative for diarrhea, nausea and vomiting. Genitourinary: Negative for difficulty urinating and dysuria. Musculoskeletal: Positive for myalgias (leg pain). Skin: Negative for rash. Neurological: Negative for headaches. All other systems reviewed and are negative. Vitals:    06/05/18 2150   BP: (!) 165/99   Pulse: (!) 102   Resp: 18   Temp: 98.2 °F (36.8 °C)   SpO2: 100%   Weight: 64.6 kg (142 lb 6.4 oz)            Physical Exam   Nursing note and vitals reviewed. CONSTITUTIONAL: Well-appearing; well-nourished; in no apparent distress  HEAD: Normocephalic; atraumatic  EYES: PERRL; EOM intact; conjunctiva and sclera are clear bilaterally. ENT: No rhinorrhea; normal pharynx with no tonsillar hypertrophy; mucous membranes pink/moist, no erythema, no exudate. NECK: Supple; non-tender; no cervical lymphadenopathy  CARD: Normal S1, S2; no murmurs, rubs, or gallops. Regular rate and rhythm. RESP: Normal respiratory effort; breath sounds clear and equal bilaterally; no wheezes, rhonchi, or rales. ABD: Normal bowel sounds; non-distended; non-tender; no palpable organomegaly, no masses, no bruits. Back Exam: Normal inspection; no vertebral point tenderness, no CVA tenderness. Normal range of motion. EXT: Normal ROM in all four extremities; non-tender to palpation; no swelling or deformity; distal pulses are normal, no edema. SKIN: Warm; dry; no rash. NEURO:Alert and oriented x 3, coherent, JESSE-XII grossly intact, sensory and motor are non-focal.        MDM  Number of Diagnoses or Management Options  Other constipation:   Diagnosis management comments: Assessment: abdominal pain, suspect obstipation rule out bowel obstruction/ chronic pain and narcotic use. Plan: abdominal x-ray/ rectal Fleet enema/ Monitor and Reevaluate.          Amount and/or Complexity of Data Reviewed  Clinical lab tests: ordered and reviewed  Tests in the radiology section of CPT®: ordered and reviewed  Tests in the medicine section of CPT®: reviewed and ordered  Discussion of test results with the performing providers: yes  Decide to obtain previous medical records or to obtain history from someone other than the patient: yes  Obtain history from someone other than the patient: yes  Review and summarize past medical records: yes  Discuss the patient with other providers: yes  Independent visualization of images, tracings, or specimens: yes    Risk of Complications, Morbidity, and/or Mortality  Presenting problems: moderate  Diagnostic procedures: moderate  Management options: moderate          ED Course       Procedures     ABDOMINAL XRAY INTERPRETATION (ED MD)  Fecal impaction; No free air. No evidence of obstruction. No air-fluid levels. Mau Paulson MD 10:59 PM    Progress Note:   Pt has been reexamined by Mau Paulson MD. Pt is feeling much better. Symptoms have improved. All available results have been reviewed with pt and any available family. Pt understands sx, dx, and tx in ED. Care plan has been outlined and questions have been answered. Pt is ready to go home. Will send home on abdominal pain/constipation instruction. Prescription of MiraLax. Outpatient referral with PCP as needed. Written by Mau Paulson MD,11:00 PM    .   .

## 2018-06-06 NOTE — ED NOTES
Pt had results of moderate amount of formed stool. Pt reports some relief of rectal and abdominal pain.

## 2018-06-06 NOTE — ED TRIAGE NOTES
TRIAGE NOTE:  Patient arrives with c/o constipation, patient reports LBM 4 days ago. Patient denies nausea and vomiting.

## 2018-06-06 NOTE — DISCHARGE INSTRUCTIONS
Constipation: Care Instructions  Your Care Instructions       Constipation: Care Instructions  Your Care Instructions    Constipation means that you have a hard time passing stools (bowel movements). People pass stools from 3 times a day to once every 3 days. What is normal for you may be different. Constipation may occur with pain in the rectum and cramping. The pain may get worse when you try to pass stools. Sometimes there are small amounts of bright red blood on toilet paper or the surface of stools. This is because of enlarged veins near the rectum (hemorrhoids). A few changes in your diet and lifestyle may help you avoid ongoing constipation. Your doctor may also prescribe medicine to help loosen your stool. Some medicines can cause constipation. These include pain medicines and antidepressants. Tell your doctor about all the medicines you take. Your doctor may want to make a medicine change to ease your symptoms. Follow-up care is a key part of your treatment and safety. Be sure to make and go to all appointments, and call your doctor if you are having problems. It's also a good idea to know your test results and keep a list of the medicines you take. How can you care for yourself at home? · Drink plenty of fluids, enough so that your urine is light yellow or clear like water. If you have kidney, heart, or liver disease and have to limit fluids, talk with your doctor before you increase the amount of fluids you drink. · Include high-fiber foods in your diet each day. These include fruits, vegetables, beans, and whole grains. · Get at least 30 minutes of exercise on most days of the week. Walking is a good choice. You also may want to do other activities, such as running, swimming, cycling, or playing tennis or team sports. · Take a fiber supplement, such as Citrucel or Metamucil, every day. Read and follow all instructions on the label. · Schedule time each day for a bowel movement.  A daily routine may help. Take your time having your bowel movement. · Support your feet with a small step stool when you sit on the toilet. This helps flex your hips and places your pelvis in a squatting position. · Your doctor may recommend an over-the-counter laxative to relieve your constipation. Examples are Milk of Magnesia and MiraLax. Read and follow all instructions on the label. Do not use laxatives on a long-term basis. When should you call for help? Call your doctor now or seek immediate medical care if:  ? · You have new or worse belly pain. ? · You have new or worse nausea or vomiting. ? · You have blood in your stools. ? Watch closely for changes in your health, and be sure to contact your doctor if:  ? · Your constipation is getting worse. ? · You do not get better as expected. Where can you learn more? Go to http://neryiwipeña.info/. Enter 21 [phone] in the search box to learn more about \"Constipation: Care Instructions. \"  Current as of: March 20, 2017  Content Version: 11.4  © 3715-0638 [employer]. Care instructions adapted under license by [employer] (which disclaims liability or warranty for this information). If you have questions about a medical condition or this instruction, always ask your healthcare professional. [address] any warranty or liability for your use of this information. Constipation: Care Instructions  Your Care Instructions    Constipation means that you have a hard time passing stools (bowel movements). People pass stools from 3 times a day to once every 3 days. What is normal for you may be different. Constipation may occur with pain in the rectum and cramping. The pain may get worse when you try to pass stools. Sometimes there are small amounts of bright red blood on toilet paper or the surface of stools. This is because of enlarged veins near the rectum (hemorrhoids).   A few changes in your diet and lifestyle may help you avoid ongoing constipation. Your doctor may also prescribe medicine to help loosen your stool. Some medicines can cause constipation. These include pain medicines and antidepressants. Tell your doctor about all the medicines you take. Your doctor may want to make a medicine change to ease your symptoms. Follow-up care is a key part of your treatment and safety. Be sure to make and go to all appointments, and call your doctor if you are having problems. It's also a good idea to know your test results and keep a list of the medicines you take. How can you care for yourself at home? · Drink plenty of fluids, enough so that your urine is light yellow or clear like water. If you have kidney, heart, or liver disease and have to limit fluids, talk with your doctor before you increase the amount of fluids you drink. · Include high-fiber foods in your diet each day. These include fruits, vegetables, beans, and whole grains. · Get at least 30 minutes of exercise on most days of the week. Walking is a good choice. You also may want to do other activities, such as running, swimming, cycling, or playing tennis or team sports. · Take a fiber supplement, such as Citrucel or Metamucil, every day. Read and follow all instructions on the label. · Schedule time each day for a bowel movement. A daily routine may help. Take your time having your bowel movement. · Support your feet with a small step stool when you sit on the toilet. This helps flex your hips and places your pelvis in a squatting position. · Your doctor may recommend an over-the-counter laxative to relieve your constipation. Examples are Milk of Magnesia and MiraLax. Read and follow all instructions on the label. Do not use laxatives on a long-term basis. When should you call for help? Call your doctor now or seek immediate medical care if:  ? · You have new or worse belly pain. ? · You have new or worse nausea or vomiting.    ? · You have blood in your stools. ? Watch closely for changes in your health, and be sure to contact your doctor if:  ? · Your constipation is getting worse. ? · You do not get better as expected. Where can you learn more? Go to http://nery-peña.info/. Enter 21  in the search box to learn more about \"Constipation: Care Instructions. \"  Current as of: March 20, 2017  Content Version: 11.4  © 3852-5684 [employer]. Care instructions adapted under license by [employer] (which disclaims liability or warranty for this information). If you have questions about a medical condition or this instruction, always ask your healthcare professional. [address] any warranty or liability for your use of this information. Constipation means that you have a hard time passing stools (bowel movements). People pass stools from 3 times a day to once every 3 days. What is normal for you may be different. Constipation may occur with pain in the rectum and cramping. The pain may get worse when you try to pass stools. Sometimes there are small amounts of bright red blood on toilet paper or the surface of stools. This is because of enlarged veins near the rectum (hemorrhoids). A few changes in your diet and lifestyle may help you avoid ongoing constipation. Your doctor may also prescribe medicine to help loosen your stool. Some medicines can cause constipation. These include pain medicines and antidepressants. Tell your doctor about all the medicines you take. Your doctor may want to make a medicine change to ease your symptoms. Follow-up care is a key part of your treatment and safety. Be sure to make and go to all appointments, and call your doctor if you are having problems. It's also a good idea to know your test results and keep a list of the medicines you take. How can you care for yourself at home?   · Drink plenty of fluids, enough so that your urine is light yellow or clear like water. If you have kidney, heart, or liver disease and have to limit fluids, talk with your doctor before you increase the amount of fluids you drink. · Include high-fiber foods in your diet each day. These include fruits, vegetables, beans, and whole grains. · Get at least 30 minutes of exercise on most days of the week. Walking is a good choice. You also may want to do other activities, such as running, swimming, cycling, or playing tennis or team sports. · Take a fiber supplement, such as Citrucel or Metamucil, every day. Read and follow all instructions on the label. · Schedule time each day for a bowel movement. A daily routine may help. Take your time having your bowel movement. · Support your feet with a small step stool when you sit on the toilet. This helps flex your hips and places your pelvis in a squatting position. · Your doctor may recommend an over-the-counter laxative to relieve your constipation. Examples are Milk of Magnesia and MiraLax. Read and follow all instructions on the label. Do not use laxatives on a long-term basis. When should you call for help? Call your doctor now or seek immediate medical care if:  ? · You have new or worse belly pain. ? · You have new or worse nausea or vomiting. ? · You have blood in your stools. ? Watch closely for changes in your health, and be sure to contact your doctor if:  ? · Your constipation is getting worse. ? · You do not get better as expected. Where can you learn more? Go to http://nery-peña.info/. Enter 21 [phone] in the search box to learn more about \"Constipation: Care Instructions. \"  Current as of: March 20, 2017  Content Version: 11.4  © 2485-6805 [employer]. Care instructions adapted under license by [employer] (which disclaims liability or warranty for this information).  If you have questions about a medical condition or this instruction, always ask your healthcare professional. [address] any warranty or liability for your use of this information.

## 2018-06-06 NOTE — ED NOTES
Pt given extensive instructions on use of Fleet's Saline Enema. Pt verbalizes understanding to ambulated to restroom for self-administration. Male  with patient, states he will assist.  Pt instructed not to flush results, to allow staff to visualize results.

## 2018-06-11 ENCOUNTER — APPOINTMENT (OUTPATIENT)
Dept: GENERAL RADIOLOGY | Age: 53
End: 2018-06-11
Attending: EMERGENCY MEDICINE
Payer: MEDICARE

## 2018-06-11 ENCOUNTER — HOSPITAL ENCOUNTER (EMERGENCY)
Age: 53
Discharge: HOME | End: 2018-06-11
Attending: EMERGENCY MEDICINE
Payer: MEDICARE

## 2018-06-11 VITALS
HEART RATE: 91 BPM | WEIGHT: 145 LBS | TEMPERATURE: 98.2 F | SYSTOLIC BLOOD PRESSURE: 176 MMHG | RESPIRATION RATE: 16 BRPM | OXYGEN SATURATION: 98 % | HEIGHT: 65 IN | BODY MASS INDEX: 24.16 KG/M2 | DIASTOLIC BLOOD PRESSURE: 100 MMHG

## 2018-06-11 DIAGNOSIS — K59.00 CONSTIPATION, UNSPECIFIED CONSTIPATION TYPE: ICD-10-CM

## 2018-06-11 DIAGNOSIS — R10.12 ABDOMINAL PAIN, LUQ (LEFT UPPER QUADRANT): Primary | ICD-10-CM

## 2018-06-11 LAB
ALBUMIN SERPL-MCNC: 3.2 G/DL (ref 3.5–5)
ALBUMIN/GLOB SERPL: 0.7 {RATIO} (ref 1.1–2.2)
ALP SERPL-CCNC: 239 U/L (ref 45–117)
ALT SERPL-CCNC: 19 U/L (ref 12–78)
ANION GAP SERPL CALC-SCNC: 13 MMOL/L (ref 5–15)
APPEARANCE UR: CLEAR
AST SERPL-CCNC: 23 U/L (ref 15–37)
BACTERIA URNS QL MICRO: NEGATIVE /HPF
BASOPHILS # BLD: 0 K/UL (ref 0–0.1)
BASOPHILS NFR BLD: 0 % (ref 0–1)
BILIRUB SERPL-MCNC: 0.3 MG/DL (ref 0.2–1)
BILIRUB UR QL: NEGATIVE
BUN SERPL-MCNC: 69 MG/DL (ref 6–20)
BUN/CREAT SERPL: 12 (ref 12–20)
CALCIUM SERPL-MCNC: 8.8 MG/DL (ref 8.5–10.1)
CHLORIDE SERPL-SCNC: 102 MMOL/L (ref 97–108)
CO2 SERPL-SCNC: 20 MMOL/L (ref 21–32)
COLOR UR: ABNORMAL
CREAT SERPL-MCNC: 5.79 MG/DL (ref 0.55–1.02)
DIFFERENTIAL METHOD BLD: ABNORMAL
EOSINOPHIL # BLD: 0.3 K/UL (ref 0–0.4)
EOSINOPHIL NFR BLD: 3 % (ref 0–7)
EPITH CASTS URNS QL MICRO: ABNORMAL /LPF
ERYTHROCYTE [DISTWIDTH] IN BLOOD BY AUTOMATED COUNT: 13.6 % (ref 11.5–14.5)
GLOBULIN SER CALC-MCNC: 4.7 G/DL (ref 2–4)
GLUCOSE SERPL-MCNC: 163 MG/DL (ref 65–100)
GLUCOSE UR STRIP.AUTO-MCNC: 250 MG/DL
HCT VFR BLD AUTO: 29.2 % (ref 35–47)
HGB BLD-MCNC: 9.5 G/DL (ref 11.5–16)
HGB UR QL STRIP: ABNORMAL
IMM GRANULOCYTES # BLD: 0 K/UL (ref 0–0.04)
IMM GRANULOCYTES NFR BLD AUTO: 0 % (ref 0–0.5)
KETONES UR QL STRIP.AUTO: NEGATIVE MG/DL
LEUKOCYTE ESTERASE UR QL STRIP.AUTO: NEGATIVE
LIPASE SERPL-CCNC: 196 U/L (ref 73–393)
LYMPHOCYTES # BLD: 3 K/UL (ref 0.8–3.5)
LYMPHOCYTES NFR BLD: 35 % (ref 12–49)
MCH RBC QN AUTO: 30.5 PG (ref 26–34)
MCHC RBC AUTO-ENTMCNC: 32.5 G/DL (ref 30–36.5)
MCV RBC AUTO: 93.9 FL (ref 80–99)
MONOCYTES # BLD: 0.7 K/UL (ref 0–1)
MONOCYTES NFR BLD: 8 % (ref 5–13)
NEUTS SEG # BLD: 4.5 K/UL (ref 1.8–8)
NEUTS SEG NFR BLD: 53 % (ref 32–75)
NITRITE UR QL STRIP.AUTO: NEGATIVE
NRBC # BLD: 0 K/UL (ref 0–0.01)
NRBC BLD-RTO: 0 PER 100 WBC
PH UR STRIP: 7 [PH] (ref 5–8)
PLATELET # BLD AUTO: 308 K/UL (ref 150–400)
PMV BLD AUTO: 9.4 FL (ref 8.9–12.9)
POTASSIUM SERPL-SCNC: 4.4 MMOL/L (ref 3.5–5.1)
PROT SERPL-MCNC: 7.9 G/DL (ref 6.4–8.2)
PROT UR STRIP-MCNC: 100 MG/DL
RBC # BLD AUTO: 3.11 M/UL (ref 3.8–5.2)
RBC #/AREA URNS HPF: ABNORMAL /HPF (ref 0–5)
SODIUM SERPL-SCNC: 135 MMOL/L (ref 136–145)
SP GR UR REFRACTOMETRY: 1.01 (ref 1–1.03)
UROBILINOGEN UR QL STRIP.AUTO: 0.2 EU/DL (ref 0.2–1)
WBC # BLD AUTO: 8.4 K/UL (ref 3.6–11)
WBC URNS QL MICRO: ABNORMAL /HPF (ref 0–4)

## 2018-06-11 PROCEDURE — 99283 EMERGENCY DEPT VISIT LOW MDM: CPT

## 2018-06-11 PROCEDURE — 83690 ASSAY OF LIPASE: CPT | Performed by: EMERGENCY MEDICINE

## 2018-06-11 PROCEDURE — 80053 COMPREHEN METABOLIC PANEL: CPT | Performed by: EMERGENCY MEDICINE

## 2018-06-11 PROCEDURE — 85025 COMPLETE CBC W/AUTO DIFF WBC: CPT | Performed by: EMERGENCY MEDICINE

## 2018-06-11 PROCEDURE — 74022 RADEX COMPL AQT ABD SERIES: CPT

## 2018-06-11 PROCEDURE — 36415 COLL VENOUS BLD VENIPUNCTURE: CPT | Performed by: STUDENT IN AN ORGANIZED HEALTH CARE EDUCATION/TRAINING PROGRAM

## 2018-06-11 PROCEDURE — 81001 URINALYSIS AUTO W/SCOPE: CPT | Performed by: EMERGENCY MEDICINE

## 2018-06-11 RX ORDER — POLYETHYLENE GLYCOL 3350, SODIUM SULFATE ANHYDROUS, SODIUM BICARBONATE, SODIUM CHLORIDE, POTASSIUM CHLORIDE 236; 22.74; 6.74; 5.86; 2.97 G/4L; G/4L; G/4L; G/4L; G/4L
1000 POWDER, FOR SOLUTION ORAL
Qty: 2000 ML | Refills: 0 | Status: SHIPPED | OUTPATIENT
Start: 2018-06-11 | End: 2018-06-11

## 2018-06-11 NOTE — PROGRESS NOTES
Date of previous inpatient admission/ ED visit? EDV 6/5/18  Constipation, Inpatient Admission 5/27/18-5/29/18 Pancreatitis,  OBS 5/10/18-5/11/18 chest pain, EDV 5/9/18    What brought the patient back to ED? Left upper quadrant pain    Did patient decline recommended services during last admission/ ED visit (if yes, what)? No    Has patient seen a provider since their last inpatient admission/ED visit (if yes, when)? No    CM Interventions:    Care Management Interventions  PCP Verified by CM: Yes (Maggie Ordonez/BARBIE)  Palliative Care Criteria Met (RRAT>21 & CHF Dx)?: No  Transition of Care Consult (CM Consult): Discharge Planning, Other (Readmission  RRAT 23/1/8  , multiple ED visits, inpatient stay and OBS stay)  MyChart Signup: No  Discharge Durable Medical Equipment: No  Health Maintenance Reviewed: Yes  Physical Therapy Consult: No  Occupational Therapy Consult: No  Speech Therapy Consult: No  Current Support Network: Own Home (Lives with daughters, verified address, receives disability and food stamps.)  Plan discussed with Pt/Family/Caregiver: Yes (Met with patient in ED 17, discussed frequency of visits and asked  to work together with her to develop plan.)  Discharge Location  Discharge Placement: Home    Call made to PCP office, left voice message for BARBIE Kramer. RAMÓN attempting to find out when she was last seen in MD office and if she is following up from multiple EDV and inpatient by PCP. NP is with [facility]  [phone].       RAMÓN Lomax

## 2018-06-11 NOTE — DISCHARGE INSTRUCTIONS
Abdominal Pain: Care Instructions  Your Care Instructions    Abdominal pain has many possible causes. Some aren't serious and get better on their own in a few days. Others need more testing and treatment. If your pain continues or gets worse, you need to be rechecked and may need more tests to find out what is wrong. You may need surgery to correct the problem. Don't ignore new symptoms, such as fever, nausea and vomiting, urination problems, pain that gets worse, and dizziness. These may be signs of a more serious problem. Your doctor may have recommended a follow-up visit in the next 8 to 12 hours. If you are not getting better, you may need more tests or treatment. The doctor has checked you carefully, but problems can develop later. If you notice any problems or new symptoms, get medical treatment right away. Follow-up care is a key part of your treatment and safety. Be sure to make and go to all appointments, and call your doctor if you are having problems. It's also a good idea to know your test results and keep a list of the medicines you take. How can you care for yourself at home? · Rest until you feel better. · To prevent dehydration, drink plenty of fluids, enough so that your urine is light yellow or clear like water. Choose water and other caffeine-free clear liquids until you feel better. If you have kidney, heart, or liver disease and have to limit fluids, talk with your doctor before you increase the amount of fluids you drink. · If your stomach is upset, eat mild foods, such as rice, dry toast or crackers, bananas, and applesauce. Try eating several small meals instead of two or three large ones. · Wait until 48 hours after all symptoms have gone away before you have spicy foods, alcohol, and drinks that contain caffeine. · Do not eat foods that are high in fat. · Avoid anti-inflammatory medicines such as aspirin, ibuprofen (Advil, Motrin), and naproxen (Aleve).  These can cause stomach upset. Talk to your doctor if you take daily aspirin for another health problem. When should you call for help? Call 911 anytime you think you may need emergency care. For example, call if:  ? · You passed out (lost consciousness). ? · You pass maroon or very bloody stools. ? · You vomit blood or what looks like coffee grounds. ? · You have new, severe belly pain. ?Call your doctor now or seek immediate medical care if:  ? · Your pain gets worse, especially if it becomes focused in one area of your belly. ? · You have a new or higher fever. ? · Your stools are black and look like tar, or they have streaks of blood. ? · You have unexpected vaginal bleeding. ? · You have symptoms of a urinary tract infection. These may include:  ¨ Pain when you urinate. ¨ Urinating more often than usual.  ¨ Blood in your urine. ? · You are dizzy or lightheaded, or you feel like you may faint. ? Watch closely for changes in your health, and be sure to contact your doctor if:  ? · You are not getting better after 1 day (24 hours). Where can you learn more? Go to http://nery-peña.info/. Enter P678 in the search box to learn more about \"Abdominal Pain: Care Instructions. \"  Current as of: March 20, 2017  Content Version: 11.4  © 0432-8668 [employer]. Care instructions adapted under license by [employer] (which disclaims liability or warranty for this information). If you have questions about a medical condition or this instruction, always ask your healthcare professional. [place] [address] any warranty or liability for your use of this information. Constipation: Care Instructions  Your Care Instructions    Constipation means that you have a hard time passing stools (bowel movements). People pass stools from 3 times a day to once every 3 days. What is normal for you may be different.  Constipation may occur with pain in the rectum and cramping. The pain may get worse when you try to pass stools. Sometimes there are small amounts of bright red blood on toilet paper or the surface of stools. This is because of enlarged veins near the rectum (hemorrhoids). A few changes in your diet and lifestyle may help you avoid ongoing constipation. Your doctor may also prescribe medicine to help loosen your stool. Some medicines can cause constipation. These include pain medicines and antidepressants. Tell your doctor about all the medicines you take. Your doctor may want to make a medicine change to ease your symptoms. Follow-up care is a key part of your treatment and safety. Be sure to make and go to all appointments, and call your doctor if you are having problems. It's also a good idea to know your test results and keep a list of the medicines you take. How can you care for yourself at home? · Drink plenty of fluids, enough so that your urine is light yellow or clear like water. If you have kidney, heart, or liver disease and have to limit fluids, talk with your doctor before you increase the amount of fluids you drink. · Include high-fiber foods in your diet each day. These include fruits, vegetables, beans, and whole grains. · Get at least 30 minutes of exercise on most days of the week. Walking is a good choice. You also may want to do other activities, such as running, swimming, cycling, or playing tennis or team sports. · Take a fiber supplement, such as Citrucel or Metamucil, every day. Read and follow all instructions on the label. · Schedule time each day for a bowel movement. A daily routine may help. Take your time having your bowel movement. · Support your feet with a small step stool when you sit on the toilet. This helps flex your hips and places your pelvis in a squatting position. · Your doctor may recommend an over-the-counter laxative to relieve your constipation. Examples are Milk of Magnesia and MiraLax.  Read and follow all instructions on the label. Do not use laxatives on a long-term basis. When should you call for help? Call your doctor now or seek immediate medical care if:  ? · You have new or worse belly pain. ? · You have new or worse nausea or vomiting. ? · You have blood in your stools. ? Watch closely for changes in your health, and be sure to contact your doctor if:  ? · Your constipation is getting worse. ? · You do not get better as expected. Where can you learn more? Go to http://nery-peña.info/. Enter 21  in the search box to learn more about \"Constipation: Care Instructions. \"  Current as of: March 20, 2017  Content Version: 11.4  © 9380-4473 [employer]. Care instructions adapted under license by [employer] (which disclaims liability or warranty for this information). If you have questions about a medical condition or this instruction, always ask your healthcare professional. [place] [address] any warranty or liability for your use of this information.
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
## 2018-06-11 NOTE — Clinical Note
Continue your routine medications after discharge from the Emergency Department Use bowel prep as directed

## 2018-07-02 ENCOUNTER — HOSPITAL ENCOUNTER (EMERGENCY)
Age: 53
Discharge: HOME | End: 2018-07-02
Attending: EMERGENCY MEDICINE
Payer: MEDICARE

## 2018-07-02 ENCOUNTER — APPOINTMENT (OUTPATIENT)
Dept: CT IMAGING | Age: 53
End: 2018-07-02
Attending: PHYSICIAN ASSISTANT
Payer: MEDICARE

## 2018-07-02 VITALS
RESPIRATION RATE: 16 BRPM | TEMPERATURE: 98.3 F | SYSTOLIC BLOOD PRESSURE: 158 MMHG | DIASTOLIC BLOOD PRESSURE: 91 MMHG | HEART RATE: 95 BPM | OXYGEN SATURATION: 99 %

## 2018-07-02 DIAGNOSIS — N30.01 ACUTE HEMORRHAGIC CYSTITIS: Primary | ICD-10-CM

## 2018-07-02 LAB
ALBUMIN SERPL-MCNC: 3.7 G/DL (ref 3.5–5)
ALBUMIN/GLOB SERPL: 0.7 {RATIO} (ref 1.1–2.2)
ALP SERPL-CCNC: 277 U/L (ref 45–117)
ALT SERPL-CCNC: 21 U/L (ref 12–78)
ANION GAP SERPL CALC-SCNC: 17 MMOL/L (ref 5–15)
APPEARANCE UR: ABNORMAL
AST SERPL-CCNC: 20 U/L (ref 15–37)
BACTERIA URNS QL MICRO: ABNORMAL /HPF
BASOPHILS # BLD: 0 K/UL (ref 0–0.1)
BASOPHILS NFR BLD: 0 % (ref 0–1)
BILIRUB SERPL-MCNC: 0.4 MG/DL (ref 0.2–1)
BILIRUB UR QL: NEGATIVE
BUN SERPL-MCNC: 71 MG/DL (ref 6–20)
BUN/CREAT SERPL: 12 (ref 12–20)
CALCIUM SERPL-MCNC: 9.6 MG/DL (ref 8.5–10.1)
CHLORIDE SERPL-SCNC: 91 MMOL/L (ref 97–108)
CO2 SERPL-SCNC: 17 MMOL/L (ref 21–32)
COLOR UR: ABNORMAL
CREAT SERPL-MCNC: 6.04 MG/DL (ref 0.55–1.02)
DIFFERENTIAL METHOD BLD: ABNORMAL
EOSINOPHIL # BLD: 0.1 K/UL (ref 0–0.4)
EOSINOPHIL NFR BLD: 1 % (ref 0–7)
EPITH CASTS URNS QL MICRO: ABNORMAL /LPF
ERYTHROCYTE [DISTWIDTH] IN BLOOD BY AUTOMATED COUNT: 12.2 % (ref 11.5–14.5)
GLOBULIN SER CALC-MCNC: 5.2 G/DL (ref 2–4)
GLUCOSE SERPL-MCNC: 204 MG/DL (ref 65–100)
GLUCOSE UR STRIP.AUTO-MCNC: 250 MG/DL
HCT VFR BLD AUTO: 32.3 % (ref 35–47)
HGB BLD-MCNC: 11.1 G/DL (ref 11.5–16)
HGB UR QL STRIP: ABNORMAL
IMM GRANULOCYTES # BLD: 0 K/UL (ref 0–0.04)
IMM GRANULOCYTES NFR BLD AUTO: 0 % (ref 0–0.5)
KETONES UR QL STRIP.AUTO: NEGATIVE MG/DL
LEUKOCYTE ESTERASE UR QL STRIP.AUTO: ABNORMAL
LIPASE SERPL-CCNC: 230 U/L (ref 73–393)
LYMPHOCYTES # BLD: 1.4 K/UL (ref 0.8–3.5)
LYMPHOCYTES NFR BLD: 15 % (ref 12–49)
MCH RBC QN AUTO: 30.4 PG (ref 26–34)
MCHC RBC AUTO-ENTMCNC: 34.4 G/DL (ref 30–36.5)
MCV RBC AUTO: 88.5 FL (ref 80–99)
MONOCYTES # BLD: 0.6 K/UL (ref 0–1)
MONOCYTES NFR BLD: 6 % (ref 5–13)
NEUTS SEG # BLD: 7.3 K/UL (ref 1.8–8)
NEUTS SEG NFR BLD: 78 % (ref 32–75)
NITRITE UR QL STRIP.AUTO: POSITIVE
NRBC # BLD: 0 K/UL (ref 0–0.01)
NRBC BLD-RTO: 0 PER 100 WBC
PH UR STRIP: 5.5 [PH] (ref 5–8)
PLATELET # BLD AUTO: 310 K/UL (ref 150–400)
PMV BLD AUTO: 8.6 FL (ref 8.9–12.9)
POTASSIUM SERPL-SCNC: 3.7 MMOL/L (ref 3.5–5.1)
PROT SERPL-MCNC: 8.9 G/DL (ref 6.4–8.2)
PROT UR STRIP-MCNC: 100 MG/DL
RBC # BLD AUTO: 3.65 M/UL (ref 3.8–5.2)
RBC #/AREA URNS HPF: ABNORMAL /HPF (ref 0–5)
SODIUM SERPL-SCNC: 125 MMOL/L (ref 136–145)
SP GR UR REFRACTOMETRY: 1.01 (ref 1–1.03)
UR CULT HOLD, URHOLD: NORMAL
UROBILINOGEN UR QL STRIP.AUTO: 0.2 EU/DL (ref 0.2–1)
WBC # BLD AUTO: 9.4 K/UL (ref 3.6–11)
WBC URNS QL MICRO: >100 /HPF (ref 0–4)

## 2018-07-02 PROCEDURE — 81001 URINALYSIS AUTO W/SCOPE: CPT | Performed by: PHYSICIAN ASSISTANT

## 2018-07-02 PROCEDURE — 74011250637 HC RX REV CODE- 250/637: Performed by: PHYSICIAN ASSISTANT

## 2018-07-02 PROCEDURE — 83690 ASSAY OF LIPASE: CPT | Performed by: PHYSICIAN ASSISTANT

## 2018-07-02 PROCEDURE — 96361 HYDRATE IV INFUSION ADD-ON: CPT

## 2018-07-02 PROCEDURE — 99284 EMERGENCY DEPT VISIT MOD MDM: CPT

## 2018-07-02 PROCEDURE — 36415 COLL VENOUS BLD VENIPUNCTURE: CPT | Performed by: PHYSICIAN ASSISTANT

## 2018-07-02 PROCEDURE — 74011250636 HC RX REV CODE- 250/636: Performed by: PHYSICIAN ASSISTANT

## 2018-07-02 PROCEDURE — 74176 CT ABD & PELVIS W/O CONTRAST: CPT

## 2018-07-02 PROCEDURE — 96374 THER/PROPH/DIAG INJ IV PUSH: CPT

## 2018-07-02 PROCEDURE — 85025 COMPLETE CBC W/AUTO DIFF WBC: CPT | Performed by: PHYSICIAN ASSISTANT

## 2018-07-02 PROCEDURE — 96375 TX/PRO/DX INJ NEW DRUG ADDON: CPT

## 2018-07-02 PROCEDURE — 80053 COMPREHEN METABOLIC PANEL: CPT | Performed by: PHYSICIAN ASSISTANT

## 2018-07-02 RX ORDER — HALOPERIDOL 5 MG/ML
5 INJECTION INTRAMUSCULAR
Status: COMPLETED | OUTPATIENT
Start: 2018-07-02 | End: 2018-07-02

## 2018-07-02 RX ORDER — CEPHALEXIN 500 MG/1
500 CAPSULE ORAL 2 TIMES DAILY
Qty: 14 CAP | Refills: 0 | Status: ON HOLD | OUTPATIENT
Start: 2018-07-02 | End: 2018-07-11

## 2018-07-02 RX ORDER — KETOROLAC TROMETHAMINE 30 MG/ML
30 INJECTION, SOLUTION INTRAMUSCULAR; INTRAVENOUS
Status: COMPLETED | OUTPATIENT
Start: 2018-07-02 | End: 2018-07-02

## 2018-07-02 RX ORDER — ONDANSETRON 4 MG/1
4 TABLET, ORALLY DISINTEGRATING ORAL
Status: COMPLETED | OUTPATIENT
Start: 2018-07-02 | End: 2018-07-02

## 2018-07-02 RX ORDER — PHENAZOPYRIDINE HYDROCHLORIDE 200 MG/1
200 TABLET, FILM COATED ORAL 3 TIMES DAILY
Qty: 6 TAB | Refills: 0 | Status: SHIPPED | OUTPATIENT
Start: 2018-07-02 | End: 2018-07-04

## 2018-07-02 RX ORDER — ONDANSETRON 2 MG/ML
4 INJECTION INTRAMUSCULAR; INTRAVENOUS
Status: DISCONTINUED | OUTPATIENT
Start: 2018-07-02 | End: 2018-07-02

## 2018-07-02 RX ADMIN — KETOROLAC TROMETHAMINE 30 MG: 30 INJECTION INTRAMUSCULAR; INTRAVENOUS at 13:57

## 2018-07-02 RX ADMIN — SODIUM CHLORIDE 1000 ML: 900 INJECTION, SOLUTION INTRAVENOUS at 13:57

## 2018-07-02 RX ADMIN — HALOPERIDOL LACTATE 5 MG: 5 INJECTION, SOLUTION INTRAMUSCULAR at 13:57

## 2018-07-02 RX ADMIN — ONDANSETRON 4 MG: 4 TABLET, ORALLY DISINTEGRATING ORAL at 13:57

## 2018-07-02 NOTE — ED NOTES
I have reviewed discharge instructions with the patient. The patient verbalized understanding. Time allotted for questions. VSS. Pt ambulatory out of unit with .

## 2018-07-02 NOTE — ED NOTES
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
## 2018-07-02 NOTE — DISCHARGE INSTRUCTIONS
Urinary Tract Infection in Pregnancy: Care Instructions  Your Care Instructions    A urinary tract infection, or UTI, is an infection of the bladder and other urinary structures. Most UTIs occur in the bladder. They often cause pain or burning when you urinate. UTI is the most common bacterial infection in pregnancy. If untreated, a UTI could lead to problems such as a kidney infection or  labor. Most UTIs can be cured with antibiotics. Your doctor will prescribe an antibiotic that is safe during pregnancy. Be sure to finish your medicine so that the infection does not spread to your kidneys. Follow-up care is a key part of your treatment and safety. Be sure to make and go to all appointments, and call your doctor if you are having problems. It's also a good idea to know your test results and keep a list of the medicines you take. How can you care for yourself at home? · Take your antibiotics as directed. Do not stop taking them just because you feel better. You need to take the full course of antibiotics. · Drink extra water and other fluids for the next day or two. This will help wash out the bacteria causing the infection. If you have kidney, heart, or liver disease and have to limit fluids, talk with your doctor before you increase the amount of fluids you drink. · Do not drink alcohol. · Urinate often. Try to empty your bladder each time. Preventing UTIs  · Drink plenty of fluids, enough so that your urine is light yellow or clear like water. This helps you urinate often, which clears bacteria from your system. If you have kidney, heart, or liver disease and have to limit fluids, talk with your doctor before you increase the amount of fluids you drink. · Urinate when you first have the urge. · Urinate right after you have sex. This is the best way for women to avoid UTIs. · When going to the bathroom, wipe from front to back to keep bacteria from entering the vagina or urethra.   When should you call for help? Call your doctor now or seek immediate medical care if:  ? · You have symptoms of a worse urinary tract infection. These may include:  ¨ Pain or burning when you urinate. ¨ A frequent need to urinate without being able to pass much urine. ¨ Pain in the flank, which is just below the rib cage and above the waist on either side of the back. ¨ Blood in your urine. ¨ A fever. ? Watch closely for changes in your health, and be sure to contact your doctor if:  ? · You do not get better as expected. Where can you learn more? Go to http://nery-peña.info/. Enter M982 in the search box to learn more about \"Urinary Tract Infection in Pregnancy: Care Instructions. \"  Current as of: March 16, 2017  Content Version: 11.4  © 1524-6436 [employer]. Care instructions adapted under license by [employer] (which disclaims liability or warranty for this information). If you have questions about a medical condition or this instruction, always ask your healthcare professional. [address] any warranty or liability for your use of this information.

## 2018-07-06 ENCOUNTER — HOSPITAL ENCOUNTER (EMERGENCY)
Age: 53
Discharge: HOME | End: 2018-07-06
Attending: EMERGENCY MEDICINE
Payer: MEDICARE

## 2018-07-06 VITALS
BODY MASS INDEX: 22.44 KG/M2 | TEMPERATURE: 97.6 F | DIASTOLIC BLOOD PRESSURE: 90 MMHG | WEIGHT: 134.7 LBS | SYSTOLIC BLOOD PRESSURE: 149 MMHG | HEART RATE: 92 BPM | HEIGHT: 65 IN | RESPIRATION RATE: 16 BRPM | OXYGEN SATURATION: 98 %

## 2018-07-06 DIAGNOSIS — L02.91 ABSCESS: Primary | ICD-10-CM

## 2018-07-06 PROCEDURE — 74011250637 HC RX REV CODE- 250/637: Performed by: PHYSICIAN ASSISTANT

## 2018-07-06 PROCEDURE — 75810000289 HC I&D ABSCESS SIMP/COMP/MULT

## 2018-07-06 PROCEDURE — 99283 EMERGENCY DEPT VISIT LOW MDM: CPT

## 2018-07-06 PROCEDURE — 74011000250 HC RX REV CODE- 250: Performed by: PHYSICIAN ASSISTANT

## 2018-07-06 RX ORDER — NAPROXEN 500 MG/1
500 TABLET ORAL
Qty: 20 TAB | Refills: 0 | Status: SHIPPED | OUTPATIENT
Start: 2018-07-06 | End: 2018-07-06

## 2018-07-06 RX ORDER — NAPROXEN 500 MG/1
500 TABLET ORAL
Qty: 20 TAB | Refills: 0 | Status: ON HOLD | OUTPATIENT
Start: 2018-07-06 | End: 2018-07-11

## 2018-07-06 RX ORDER — NAPROXEN 250 MG/1
500 TABLET ORAL
Status: COMPLETED | OUTPATIENT
Start: 2018-07-06 | End: 2018-07-06

## 2018-07-06 RX ORDER — SULFAMETHOXAZOLE AND TRIMETHOPRIM 800; 160 MG/1; MG/1
2 TABLET ORAL
Status: COMPLETED | OUTPATIENT
Start: 2018-07-06 | End: 2018-07-06

## 2018-07-06 RX ORDER — SULFAMETHOXAZOLE AND TRIMETHOPRIM 800; 160 MG/1; MG/1
2 TABLET ORAL 2 TIMES DAILY
Qty: 40 TAB | Refills: 0 | Status: SHIPPED | OUTPATIENT
Start: 2018-07-06 | End: 2018-07-06

## 2018-07-06 RX ORDER — LIDOCAINE HYDROCHLORIDE 10 MG/ML
1 INJECTION, SOLUTION EPIDURAL; INFILTRATION; INTRACAUDAL; PERINEURAL ONCE
Status: COMPLETED | OUTPATIENT
Start: 2018-07-06 | End: 2018-07-06

## 2018-07-06 RX ORDER — SULFAMETHOXAZOLE AND TRIMETHOPRIM 800; 160 MG/1; MG/1
2 TABLET ORAL 2 TIMES DAILY
Qty: 40 TAB | Refills: 0 | Status: SHIPPED | OUTPATIENT
Start: 2018-07-06 | End: 2018-07-12

## 2018-07-06 RX ADMIN — NAPROXEN 500 MG: 250 TABLET ORAL at 19:13

## 2018-07-06 RX ADMIN — LIDOCAINE HYDROCHLORIDE 1 ML: 10 INJECTION, SOLUTION EPIDURAL; INFILTRATION; INTRACAUDAL; PERINEURAL at 18:46

## 2018-07-06 RX ADMIN — SULFAMETHOXAZOLE AND TRIMETHOPRIM 2 TABLET: 800; 160 TABLET ORAL at 19:13

## 2018-07-06 NOTE — DISCHARGE INSTRUCTIONS

## 2018-07-06 NOTE — LETTER
[address] 
[facility] EMERGENCY DEPT 
[address] [place] [address] [id number] [phone] Work/School Note Date: 7/6/2018 To Whom It May concern: 
 
Niko Mota was seen and treated today in the emergency room by the following provider(s): 
Attending Provider: Sawyer Silva MD 
Physician Assistant: CHARITY Olson. Niko Mota may return to work on 7/9/18 or sooner, if feeling better. Sincerely, Dee Donato, PA

## 2018-07-07 ENCOUNTER — HOSPITAL ENCOUNTER (EMERGENCY)
Age: 53
Discharge: HOME | End: 2018-07-07
Attending: EMERGENCY MEDICINE | Admitting: EMERGENCY MEDICINE
Payer: MEDICARE

## 2018-07-07 VITALS
OXYGEN SATURATION: 98 % | DIASTOLIC BLOOD PRESSURE: 90 MMHG | RESPIRATION RATE: 16 BRPM | BODY MASS INDEX: 22.42 KG/M2 | HEIGHT: 65 IN | HEART RATE: 78 BPM | SYSTOLIC BLOOD PRESSURE: 140 MMHG | TEMPERATURE: 97.4 F

## 2018-07-07 DIAGNOSIS — E11.42 PERIPHERAL SENSORY NEUROPATHY DUE TO TYPE 2 DIABETES MELLITUS (HCC): ICD-10-CM

## 2018-07-07 DIAGNOSIS — M54.50 LOW BACK PAIN WITHOUT SCIATICA, UNSPECIFIED BACK PAIN LATERALITY, UNSPECIFIED CHRONICITY: Primary | ICD-10-CM

## 2018-07-07 LAB
ALBUMIN SERPL-MCNC: 3.8 G/DL (ref 3.5–5)
ALBUMIN/GLOB SERPL: 0.7 {RATIO} (ref 1.1–2.2)
ALP SERPL-CCNC: 234 U/L (ref 45–117)
ALT SERPL-CCNC: 19 U/L (ref 12–78)
ANION GAP SERPL CALC-SCNC: 14 MMOL/L (ref 5–15)
APPEARANCE UR: CLEAR
AST SERPL-CCNC: 15 U/L (ref 15–37)
BACTERIA URNS QL MICRO: NEGATIVE /HPF
BASOPHILS # BLD: 0 K/UL (ref 0–0.1)
BASOPHILS NFR BLD: 0 % (ref 0–1)
BILIRUB SERPL-MCNC: 0.5 MG/DL (ref 0.2–1)
BILIRUB UR QL: NEGATIVE
BUN SERPL-MCNC: 67 MG/DL (ref 6–20)
BUN/CREAT SERPL: 11 (ref 12–20)
CALCIUM SERPL-MCNC: 9.2 MG/DL (ref 8.5–10.1)
CHLORIDE SERPL-SCNC: 97 MMOL/L (ref 97–108)
CO2 SERPL-SCNC: 19 MMOL/L (ref 21–32)
COLOR UR: ABNORMAL
CREAT SERPL-MCNC: 6.25 MG/DL (ref 0.55–1.02)
DIFFERENTIAL METHOD BLD: ABNORMAL
EOSINOPHIL # BLD: 0.1 K/UL (ref 0–0.4)
EOSINOPHIL NFR BLD: 1 % (ref 0–7)
EPITH CASTS URNS QL MICRO: ABNORMAL /LPF
ERYTHROCYTE [DISTWIDTH] IN BLOOD BY AUTOMATED COUNT: 12.5 % (ref 11.5–14.5)
GLOBULIN SER CALC-MCNC: 5.6 G/DL (ref 2–4)
GLUCOSE SERPL-MCNC: 282 MG/DL (ref 65–100)
GLUCOSE UR STRIP.AUTO-MCNC: 500 MG/DL
HCT VFR BLD AUTO: 33.3 % (ref 35–47)
HGB BLD-MCNC: 11.2 G/DL (ref 11.5–16)
HGB UR QL STRIP: ABNORMAL
HYALINE CASTS URNS QL MICRO: ABNORMAL /LPF (ref 0–5)
IMM GRANULOCYTES # BLD: 0.1 K/UL (ref 0–0.04)
IMM GRANULOCYTES NFR BLD AUTO: 1 % (ref 0–0.5)
KETONES UR QL STRIP.AUTO: NEGATIVE MG/DL
LEUKOCYTE ESTERASE UR QL STRIP.AUTO: ABNORMAL
LYMPHOCYTES # BLD: 1.5 K/UL (ref 0.8–3.5)
LYMPHOCYTES NFR BLD: 12 % (ref 12–49)
MCH RBC QN AUTO: 30.5 PG (ref 26–34)
MCHC RBC AUTO-ENTMCNC: 33.6 G/DL (ref 30–36.5)
MCV RBC AUTO: 90.7 FL (ref 80–99)
MONOCYTES # BLD: 0.5 K/UL (ref 0–1)
MONOCYTES NFR BLD: 4 % (ref 5–13)
NEUTS SEG # BLD: 10.6 K/UL (ref 1.8–8)
NEUTS SEG NFR BLD: 83 % (ref 32–75)
NITRITE UR QL STRIP.AUTO: NEGATIVE
NRBC # BLD: 0 K/UL (ref 0–0.01)
NRBC BLD-RTO: 0 PER 100 WBC
PH UR STRIP: 7.5 [PH] (ref 5–8)
PLATELET # BLD AUTO: 328 K/UL (ref 150–400)
PMV BLD AUTO: 8.6 FL (ref 8.9–12.9)
POTASSIUM SERPL-SCNC: 3.4 MMOL/L (ref 3.5–5.1)
PROT SERPL-MCNC: 9.4 G/DL (ref 6.4–8.2)
PROT UR STRIP-MCNC: 300 MG/DL
RBC # BLD AUTO: 3.67 M/UL (ref 3.8–5.2)
RBC #/AREA URNS HPF: ABNORMAL /HPF (ref 0–5)
SODIUM SERPL-SCNC: 130 MMOL/L (ref 136–145)
SP GR UR REFRACTOMETRY: 1.01 (ref 1–1.03)
UROBILINOGEN UR QL STRIP.AUTO: 0.2 EU/DL (ref 0.2–1)
WBC # BLD AUTO: 12.8 K/UL (ref 3.6–11)
WBC URNS QL MICRO: ABNORMAL /HPF (ref 0–4)

## 2018-07-07 PROCEDURE — 81001 URINALYSIS AUTO W/SCOPE: CPT | Performed by: NURSE PRACTITIONER

## 2018-07-07 PROCEDURE — 85025 COMPLETE CBC W/AUTO DIFF WBC: CPT | Performed by: NURSE PRACTITIONER

## 2018-07-07 PROCEDURE — 74011250636 HC RX REV CODE- 250/636: Performed by: NURSE PRACTITIONER

## 2018-07-07 PROCEDURE — 99283 EMERGENCY DEPT VISIT LOW MDM: CPT

## 2018-07-07 PROCEDURE — 80053 COMPREHEN METABOLIC PANEL: CPT | Performed by: NURSE PRACTITIONER

## 2018-07-07 PROCEDURE — 96375 TX/PRO/DX INJ NEW DRUG ADDON: CPT

## 2018-07-07 PROCEDURE — 36415 COLL VENOUS BLD VENIPUNCTURE: CPT | Performed by: NURSE PRACTITIONER

## 2018-07-07 PROCEDURE — 96374 THER/PROPH/DIAG INJ IV PUSH: CPT

## 2018-07-07 RX ORDER — HALOPERIDOL 5 MG/ML
5 INJECTION INTRAMUSCULAR
Status: COMPLETED | OUTPATIENT
Start: 2018-07-07 | End: 2018-07-07

## 2018-07-07 RX ORDER — FENTANYL CITRATE 50 UG/ML
50 INJECTION, SOLUTION INTRAMUSCULAR; INTRAVENOUS ONCE
Status: COMPLETED | OUTPATIENT
Start: 2018-07-07 | End: 2018-07-07

## 2018-07-07 RX ORDER — ONDANSETRON 2 MG/ML
4 INJECTION INTRAMUSCULAR; INTRAVENOUS
Status: COMPLETED | OUTPATIENT
Start: 2018-07-07 | End: 2018-07-07

## 2018-07-07 RX ORDER — KETOROLAC TROMETHAMINE 30 MG/ML
30 INJECTION, SOLUTION INTRAMUSCULAR; INTRAVENOUS
Status: DISCONTINUED | OUTPATIENT
Start: 2018-07-07 | End: 2018-07-07 | Stop reason: CLARIF

## 2018-07-07 RX ORDER — ONDANSETRON 4 MG/1
8 TABLET, ORALLY DISINTEGRATING ORAL
Status: DISCONTINUED | OUTPATIENT
Start: 2018-07-07 | End: 2018-07-07 | Stop reason: CLARIF

## 2018-07-07 RX ADMIN — FENTANYL CITRATE 50 MCG: 50 INJECTION, SOLUTION INTRAMUSCULAR; INTRAVENOUS at 12:08

## 2018-07-07 RX ADMIN — HALOPERIDOL LACTATE 5 MG: 5 INJECTION, SOLUTION INTRAMUSCULAR at 13:47

## 2018-07-07 RX ADMIN — ONDANSETRON 4 MG: 2 INJECTION INTRAMUSCULAR; INTRAVENOUS at 12:07

## 2018-07-07 NOTE — ED TRIAGE NOTES
Patient reports lower back pain, left side pain, abdominal pain, nausea, vomiting, tingling to L hand and bilateral feet beginning this morning. Patient crying in triage.
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
## 2018-07-07 NOTE — PROGRESS NOTES
Chart and NATHAN report reviewed. Pt has had 16 ED visits in the past 6 months with most recent as of yesterday at [facility] yesterday. During a recent ED admission, CM attempted to confirm pt's PCP, unable to reach PCP listed, Dr. Siobhan Siddiqi to confirm if pt is still active with her primary care doctor.  
 
ALINE Pinzon

## 2018-07-07 NOTE — ED NOTES
Attempted PIV for labs and medications without success. Pt's  states she normally needs ultrasound IV. Will attempt this when ultrasound is available. Pt does stop wailing when RN is attempting procedures but immediately starts again when they are done. Pt's  is at bedside.

## 2018-07-09 ENCOUNTER — HOSPITAL ENCOUNTER (EMERGENCY)
Age: 53
Discharge: HOME | End: 2018-07-09
Attending: EMERGENCY MEDICINE
Payer: MEDICARE

## 2018-07-09 VITALS
HEIGHT: 65 IN | DIASTOLIC BLOOD PRESSURE: 93 MMHG | SYSTOLIC BLOOD PRESSURE: 170 MMHG | WEIGHT: 134 LBS | TEMPERATURE: 98.1 F | BODY MASS INDEX: 22.33 KG/M2 | RESPIRATION RATE: 16 BRPM | HEART RATE: 89 BPM | OXYGEN SATURATION: 99 %

## 2018-07-09 DIAGNOSIS — G89.29 CHRONIC LOW BACK PAIN, UNSPECIFIED BACK PAIN LATERALITY, WITH SCIATICA PRESENCE UNSPECIFIED: ICD-10-CM

## 2018-07-09 DIAGNOSIS — M54.5 CHRONIC LOW BACK PAIN, UNSPECIFIED BACK PAIN LATERALITY, WITH SCIATICA PRESENCE UNSPECIFIED: ICD-10-CM

## 2018-07-09 DIAGNOSIS — R10.84 ABDOMINAL PAIN, GENERALIZED: Primary | ICD-10-CM

## 2018-07-09 LAB
ALBUMIN SERPL-MCNC: 3.7 G/DL (ref 3.5–5)
ALBUMIN/GLOB SERPL: 0.7 {RATIO} (ref 1.1–2.2)
ALP SERPL-CCNC: 213 U/L (ref 45–117)
ALT SERPL-CCNC: 21 U/L (ref 12–78)
ANION GAP SERPL CALC-SCNC: 14 MMOL/L (ref 5–15)
AST SERPL-CCNC: 21 U/L (ref 15–37)
BASOPHILS # BLD: 0 K/UL (ref 0–0.1)
BASOPHILS NFR BLD: 0 % (ref 0–1)
BILIRUB SERPL-MCNC: 0.4 MG/DL (ref 0.2–1)
BUN SERPL-MCNC: 72 MG/DL (ref 6–20)
BUN/CREAT SERPL: 10 (ref 12–20)
CALCIUM SERPL-MCNC: 9.1 MG/DL (ref 8.5–10.1)
CHLORIDE SERPL-SCNC: 101 MMOL/L (ref 97–108)
CO2 SERPL-SCNC: 19 MMOL/L (ref 21–32)
CREAT SERPL-MCNC: 7.05 MG/DL (ref 0.55–1.02)
DIFFERENTIAL METHOD BLD: ABNORMAL
EOSINOPHIL # BLD: 0.3 K/UL (ref 0–0.4)
EOSINOPHIL NFR BLD: 3 % (ref 0–7)
ERYTHROCYTE [DISTWIDTH] IN BLOOD BY AUTOMATED COUNT: 12.6 % (ref 11.5–14.5)
GLOBULIN SER CALC-MCNC: 5.4 G/DL (ref 2–4)
GLUCOSE SERPL-MCNC: 255 MG/DL (ref 65–100)
HCT VFR BLD AUTO: 33.6 % (ref 35–47)
HGB BLD-MCNC: 11.1 G/DL (ref 11.5–16)
IMM GRANULOCYTES # BLD: 0 K/UL (ref 0–0.04)
IMM GRANULOCYTES NFR BLD AUTO: 0 % (ref 0–0.5)
LIPASE SERPL-CCNC: 570 U/L (ref 73–393)
LYMPHOCYTES # BLD: 2.8 K/UL (ref 0.8–3.5)
LYMPHOCYTES NFR BLD: 31 % (ref 12–49)
MCH RBC QN AUTO: 30 PG (ref 26–34)
MCHC RBC AUTO-ENTMCNC: 33 G/DL (ref 30–36.5)
MCV RBC AUTO: 90.8 FL (ref 80–99)
MONOCYTES # BLD: 0.6 K/UL (ref 0–1)
MONOCYTES NFR BLD: 7 % (ref 5–13)
NEUTS SEG # BLD: 5.2 K/UL (ref 1.8–8)
NEUTS SEG NFR BLD: 58 % (ref 32–75)
NRBC # BLD: 0 K/UL (ref 0–0.01)
NRBC BLD-RTO: 0 PER 100 WBC
PLATELET # BLD AUTO: 339 K/UL (ref 150–400)
PMV BLD AUTO: 8.8 FL (ref 8.9–12.9)
POTASSIUM SERPL-SCNC: 4.5 MMOL/L (ref 3.5–5.1)
PROT SERPL-MCNC: 9.1 G/DL (ref 6.4–8.2)
RBC # BLD AUTO: 3.7 M/UL (ref 3.8–5.2)
SODIUM SERPL-SCNC: 134 MMOL/L (ref 136–145)
WBC # BLD AUTO: 9 K/UL (ref 3.6–11)

## 2018-07-09 PROCEDURE — 85025 COMPLETE CBC W/AUTO DIFF WBC: CPT | Performed by: EMERGENCY MEDICINE

## 2018-07-09 PROCEDURE — 74011000250 HC RX REV CODE- 250: Performed by: EMERGENCY MEDICINE

## 2018-07-09 PROCEDURE — 74011250637 HC RX REV CODE- 250/637: Performed by: EMERGENCY MEDICINE

## 2018-07-09 PROCEDURE — 83690 ASSAY OF LIPASE: CPT | Performed by: EMERGENCY MEDICINE

## 2018-07-09 PROCEDURE — 93005 ELECTROCARDIOGRAM TRACING: CPT

## 2018-07-09 PROCEDURE — 80053 COMPREHEN METABOLIC PANEL: CPT | Performed by: EMERGENCY MEDICINE

## 2018-07-09 PROCEDURE — 36415 COLL VENOUS BLD VENIPUNCTURE: CPT | Performed by: EMERGENCY MEDICINE

## 2018-07-09 PROCEDURE — 99283 EMERGENCY DEPT VISIT LOW MDM: CPT

## 2018-07-09 RX ORDER — FAMOTIDINE 20 MG/1
20 TABLET, FILM COATED ORAL
Status: COMPLETED | OUTPATIENT
Start: 2018-07-09 | End: 2018-07-09

## 2018-07-09 RX ORDER — SUCRALFATE 1 G/10ML
1 SUSPENSION ORAL 4 TIMES DAILY
Qty: 414 ML | Refills: 0 | Status: SHIPPED | OUTPATIENT
Start: 2018-07-09 | End: 2019-07-18

## 2018-07-09 RX ADMIN — FAMOTIDINE 20 MG: 20 TABLET ORAL at 21:27

## 2018-07-09 RX ADMIN — LIDOCAINE HYDROCHLORIDE 40 ML: 20 SOLUTION ORAL; TOPICAL at 21:27

## 2018-07-10 ENCOUNTER — HOSPITAL ENCOUNTER (INPATIENT)
Age: 53
LOS: 2 days | Discharge: HOME | DRG: 638 | End: 2018-07-12
Attending: EMERGENCY MEDICINE | Admitting: INTERNAL MEDICINE
Payer: MEDICARE

## 2018-07-10 ENCOUNTER — APPOINTMENT (OUTPATIENT)
Dept: CT IMAGING | Age: 53
DRG: 638 | End: 2018-07-10
Attending: EMERGENCY MEDICINE
Payer: MEDICARE

## 2018-07-10 DIAGNOSIS — G62.9 NEUROPATHY: ICD-10-CM

## 2018-07-10 DIAGNOSIS — I16.0 HYPERTENSIVE URGENCY: Primary | ICD-10-CM

## 2018-07-10 DIAGNOSIS — R07.89 ATYPICAL CHEST PAIN: ICD-10-CM

## 2018-07-10 DIAGNOSIS — K31.84 GASTROPARESIS: ICD-10-CM

## 2018-07-10 DIAGNOSIS — N18.4 CHRONIC RENAL INSUFFICIENCY, STAGE 4 (SEVERE) (HCC): ICD-10-CM

## 2018-07-10 DIAGNOSIS — R73.9 HYPERGLYCEMIA: ICD-10-CM

## 2018-07-10 PROBLEM — E11.10 DKA (DIABETIC KETOACIDOSES): Status: ACTIVE | Noted: 2018-07-10

## 2018-07-10 LAB
ADMINISTERED INITIALS, ADMINIT: NORMAL
ALBUMIN SERPL-MCNC: 3.8 G/DL (ref 3.5–5)
ALBUMIN/GLOB SERPL: 0.7 {RATIO} (ref 1.1–2.2)
ALP SERPL-CCNC: 214 U/L (ref 45–117)
ALT SERPL-CCNC: 19 U/L (ref 12–78)
ANION GAP BLD CALC-SCNC: 18 MMOL/L (ref 10–20)
ANION GAP SERPL CALC-SCNC: 15 MMOL/L (ref 5–15)
ANION GAP SERPL CALC-SCNC: 17 MMOL/L (ref 5–15)
ANION GAP SERPL CALC-SCNC: 20 MMOL/L (ref 5–15)
AST SERPL-CCNC: 21 U/L (ref 15–37)
ATRIAL RATE: 116 BPM
ATRIAL RATE: 118 BPM
BASOPHILS # BLD: 0 K/UL (ref 0–0.1)
BASOPHILS NFR BLD: 0 % (ref 0–1)
BILIRUB SERPL-MCNC: 0.3 MG/DL (ref 0.2–1)
BUN BLD-MCNC: 68 MG/DL (ref 9–20)
BUN SERPL-MCNC: 70 MG/DL (ref 6–20)
BUN SERPL-MCNC: 72 MG/DL (ref 6–20)
BUN SERPL-MCNC: 73 MG/DL (ref 6–20)
BUN/CREAT SERPL: 11 (ref 12–20)
BUN/CREAT SERPL: 12 (ref 12–20)
BUN/CREAT SERPL: 12 (ref 12–20)
CA-I BLD-MCNC: 1.2 MMOL/L (ref 1.12–1.32)
CALCIUM SERPL-MCNC: 9 MG/DL (ref 8.5–10.1)
CALCIUM SERPL-MCNC: 9.1 MG/DL (ref 8.5–10.1)
CALCIUM SERPL-MCNC: 9.5 MG/DL (ref 8.5–10.1)
CALCULATED P AXIS, ECG09: 66 DEGREES
CALCULATED P AXIS, ECG09: 75 DEGREES
CALCULATED R AXIS, ECG10: 13 DEGREES
CALCULATED R AXIS, ECG10: 44 DEGREES
CALCULATED T AXIS, ECG11: 66 DEGREES
CALCULATED T AXIS, ECG11: 82 DEGREES
CHLORIDE BLD-SCNC: 100 MMOL/L (ref 98–107)
CHLORIDE SERPL-SCNC: 103 MMOL/L (ref 97–108)
CHLORIDE SERPL-SCNC: 93 MMOL/L (ref 97–108)
CHLORIDE SERPL-SCNC: 98 MMOL/L (ref 97–108)
CK MB CFR SERPL CALC: 2.2 % (ref 0–2.5)
CK MB SERPL-MCNC: 3 NG/ML (ref 5–25)
CK SERPL-CCNC: 138 U/L (ref 26–192)
CO2 BLD-SCNC: 20 MMOL/L (ref 21–32)
CO2 SERPL-SCNC: 17 MMOL/L (ref 21–32)
CO2 SERPL-SCNC: 17 MMOL/L (ref 21–32)
CO2 SERPL-SCNC: 19 MMOL/L (ref 21–32)
CREAT BLD-MCNC: 6.6 MG/DL (ref 0.6–1.3)
CREAT SERPL-MCNC: 6.1 MG/DL (ref 0.55–1.02)
CREAT SERPL-MCNC: 6.15 MG/DL (ref 0.55–1.02)
CREAT SERPL-MCNC: 6.42 MG/DL (ref 0.55–1.02)
D50 ADMINISTERED, D50ADM: 0 ML
D50 ORDER, D50ORD: 0 ML
DIAGNOSIS, 93000: NORMAL
DIAGNOSIS, 93000: NORMAL
DIFFERENTIAL METHOD BLD: ABNORMAL
EOSINOPHIL # BLD: 0 K/UL (ref 0–0.4)
EOSINOPHIL NFR BLD: 0 % (ref 0–7)
ERYTHROCYTE [DISTWIDTH] IN BLOOD BY AUTOMATED COUNT: 12.6 % (ref 11.5–14.5)
EST. AVERAGE GLUCOSE BLD GHB EST-MCNC: 197 MG/DL
GLOBULIN SER CALC-MCNC: 5.1 G/DL (ref 2–4)
GLSCOM COMMENTS: NORMAL
GLUCOSE BLD STRIP.AUTO-MCNC: 214 MG/DL (ref 65–100)
GLUCOSE BLD STRIP.AUTO-MCNC: 282 MG/DL (ref 65–100)
GLUCOSE BLD STRIP.AUTO-MCNC: 412 MG/DL (ref 65–100)
GLUCOSE BLD-MCNC: 343 MG/DL (ref 65–100)
GLUCOSE SERPL-MCNC: 176 MG/DL (ref 65–100)
GLUCOSE SERPL-MCNC: 200 MG/DL (ref 65–100)
GLUCOSE SERPL-MCNC: 349 MG/DL (ref 65–100)
GLUCOSE, GLC: 106 MG/DL
GLUCOSE, GLC: 116 MG/DL
GLUCOSE, GLC: 207 MG/DL
GLUCOSE, GLC: 211 MG/DL
GLUCOSE, GLC: 214 MG/DL
GLUCOSE, GLC: 216 MG/DL
GLUCOSE, GLC: 241 MG/DL
GLUCOSE, GLC: 413 MG/DL
HBA1C MFR BLD: 8.5 % (ref 4.2–6.3)
HCT VFR BLD AUTO: 32.2 % (ref 35–47)
HCT VFR BLD CALC: 34 % (ref 35–47)
HGB BLD-MCNC: 11.1 G/DL (ref 11.5–16)
HIGH TARGET, HITG: 250 MG/DL
IMM GRANULOCYTES # BLD: 0 K/UL (ref 0–0.04)
IMM GRANULOCYTES NFR BLD AUTO: 0 % (ref 0–0.5)
INSULIN ADMINSTERED, INSADM: 0 UNITS/HOUR
INSULIN ADMINSTERED, INSADM: 0.6 UNITS/HOUR
INSULIN ADMINSTERED, INSADM: 2.9 UNITS/HOUR
INSULIN ADMINSTERED, INSADM: 3 UNITS/HOUR
INSULIN ADMINSTERED, INSADM: 3.1 UNITS/HOUR
INSULIN ADMINSTERED, INSADM: 3.1 UNITS/HOUR
INSULIN ADMINSTERED, INSADM: 3.6 UNITS/HOUR
INSULIN ADMINSTERED, INSADM: 7.1 UNITS/HOUR
INSULIN ORDER, INSORD: 0 UNITS/HOUR
INSULIN ORDER, INSORD: 0.6 UNITS/HOUR
INSULIN ORDER, INSORD: 2.9 UNITS/HOUR
INSULIN ORDER, INSORD: 3 UNITS/HOUR
INSULIN ORDER, INSORD: 3.1 UNITS/HOUR
INSULIN ORDER, INSORD: 3.1 UNITS/HOUR
INSULIN ORDER, INSORD: 3.6 UNITS/HOUR
INSULIN ORDER, INSORD: 7.1 UNITS/HOUR
LACTATE SERPL-SCNC: 1.1 MMOL/L (ref 0.4–2)
LIPASE SERPL-CCNC: 372 U/L (ref 73–393)
LOW TARGET, LOT: 150 MG/DL
LYMPHOCYTES # BLD: 1.1 K/UL (ref 0.8–3.5)
LYMPHOCYTES NFR BLD: 16 % (ref 12–49)
MAGNESIUM SERPL-MCNC: 2 MG/DL (ref 1.6–2.4)
MAGNESIUM SERPL-MCNC: 2.2 MG/DL (ref 1.6–2.4)
MCH RBC QN AUTO: 30.2 PG (ref 26–34)
MCHC RBC AUTO-ENTMCNC: 34.5 G/DL (ref 30–36.5)
MCV RBC AUTO: 87.7 FL (ref 80–99)
MINUTES UNTIL NEXT BG, NBG: 120 MIN
MINUTES UNTIL NEXT BG, NBG: 60 MIN
MONOCYTES # BLD: 0.2 K/UL (ref 0–1)
MONOCYTES NFR BLD: 3 % (ref 5–13)
MULTIPLIER, MUL: 0
MULTIPLIER, MUL: 0.01
MULTIPLIER, MUL: 0.02
NEUTS SEG # BLD: 5.4 K/UL (ref 1.8–8)
NEUTS SEG NFR BLD: 80 % (ref 32–75)
NRBC # BLD: 0 K/UL (ref 0–0.01)
NRBC BLD-RTO: 0 PER 100 WBC
ORDER INITIALS, ORDINIT: NORMAL
P-R INTERVAL, ECG05: 142 MS
P-R INTERVAL, ECG05: 154 MS
PHOSPHATE SERPL-MCNC: 5 MG/DL (ref 2.6–4.7)
PLATELET # BLD AUTO: 319 K/UL (ref 150–400)
PMV BLD AUTO: 8.7 FL (ref 8.9–12.9)
POTASSIUM BLD-SCNC: 4 MMOL/L (ref 3.5–5.1)
POTASSIUM SERPL-SCNC: 3.7 MMOL/L (ref 3.5–5.1)
POTASSIUM SERPL-SCNC: 3.7 MMOL/L (ref 3.5–5.1)
POTASSIUM SERPL-SCNC: 4.2 MMOL/L (ref 3.5–5.1)
PROT SERPL-MCNC: 8.9 G/DL (ref 6.4–8.2)
Q-T INTERVAL, ECG07: 328 MS
Q-T INTERVAL, ECG07: 336 MS
QRS DURATION, ECG06: 68 MS
QRS DURATION, ECG06: 70 MS
QTC CALCULATION (BEZET), ECG08: 455 MS
QTC CALCULATION (BEZET), ECG08: 470 MS
RBC # BLD AUTO: 3.67 M/UL (ref 3.8–5.2)
SERVICE CMNT-IMP: ABNORMAL
SODIUM BLD-SCNC: 132 MMOL/L (ref 136–145)
SODIUM SERPL-SCNC: 130 MMOL/L (ref 136–145)
SODIUM SERPL-SCNC: 132 MMOL/L (ref 136–145)
SODIUM SERPL-SCNC: 137 MMOL/L (ref 136–145)
TROPONIN I SERPL-MCNC: <0.05 NG/ML
VENTRICULAR RATE, ECG03: 116 BPM
VENTRICULAR RATE, ECG03: 118 BPM
WBC # BLD AUTO: 6.8 K/UL (ref 3.6–11)

## 2018-07-10 PROCEDURE — 74011250637 HC RX REV CODE- 250/637: Performed by: EMERGENCY MEDICINE

## 2018-07-10 PROCEDURE — 74011000258 HC RX REV CODE- 258: Performed by: INTERNAL MEDICINE

## 2018-07-10 PROCEDURE — 74011250636 HC RX REV CODE- 250/636: Performed by: INTERNAL MEDICINE

## 2018-07-10 PROCEDURE — 83735 ASSAY OF MAGNESIUM: CPT | Performed by: INTERNAL MEDICINE

## 2018-07-10 PROCEDURE — 83690 ASSAY OF LIPASE: CPT | Performed by: EMERGENCY MEDICINE

## 2018-07-10 PROCEDURE — 84484 ASSAY OF TROPONIN QUANT: CPT | Performed by: EMERGENCY MEDICINE

## 2018-07-10 PROCEDURE — 85025 COMPLETE CBC W/AUTO DIFF WBC: CPT | Performed by: EMERGENCY MEDICINE

## 2018-07-10 PROCEDURE — 96375 TX/PRO/DX INJ NEW DRUG ADDON: CPT

## 2018-07-10 PROCEDURE — 80053 COMPREHEN METABOLIC PANEL: CPT | Performed by: EMERGENCY MEDICINE

## 2018-07-10 PROCEDURE — 74011250636 HC RX REV CODE- 250/636: Performed by: EMERGENCY MEDICINE

## 2018-07-10 PROCEDURE — 74011000250 HC RX REV CODE- 250: Performed by: EMERGENCY MEDICINE

## 2018-07-10 PROCEDURE — 94761 N-INVAS EAR/PLS OXIMETRY MLT: CPT

## 2018-07-10 PROCEDURE — 80047 BASIC METABLC PNL IONIZED CA: CPT

## 2018-07-10 PROCEDURE — 74011250637 HC RX REV CODE- 250/637: Performed by: INTERNAL MEDICINE

## 2018-07-10 PROCEDURE — 83735 ASSAY OF MAGNESIUM: CPT

## 2018-07-10 PROCEDURE — 36415 COLL VENOUS BLD VENIPUNCTURE: CPT

## 2018-07-10 PROCEDURE — 96374 THER/PROPH/DIAG INJ IV PUSH: CPT

## 2018-07-10 PROCEDURE — 65660000001 HC RM ICU INTERMED STEPDOWN

## 2018-07-10 PROCEDURE — 83036 HEMOGLOBIN GLYCOSYLATED A1C: CPT | Performed by: INTERNAL MEDICINE

## 2018-07-10 PROCEDURE — 74011636637 HC RX REV CODE- 636/637: Performed by: INTERNAL MEDICINE

## 2018-07-10 PROCEDURE — 36600 WITHDRAWAL OF ARTERIAL BLOOD: CPT

## 2018-07-10 PROCEDURE — 82550 ASSAY OF CK (CPK): CPT | Performed by: EMERGENCY MEDICINE

## 2018-07-10 PROCEDURE — 93005 ELECTROCARDIOGRAM TRACING: CPT

## 2018-07-10 PROCEDURE — 74011000250 HC RX REV CODE- 250: Performed by: INTERNAL MEDICINE

## 2018-07-10 PROCEDURE — 83605 ASSAY OF LACTIC ACID: CPT | Performed by: EMERGENCY MEDICINE

## 2018-07-10 PROCEDURE — 99285 EMERGENCY DEPT VISIT HI MDM: CPT

## 2018-07-10 PROCEDURE — 74176 CT ABD & PELVIS W/O CONTRAST: CPT

## 2018-07-10 PROCEDURE — 80048 BASIC METABOLIC PNL TOTAL CA: CPT

## 2018-07-10 PROCEDURE — 74011636637 HC RX REV CODE- 636/637: Performed by: EMERGENCY MEDICINE

## 2018-07-10 PROCEDURE — 82962 GLUCOSE BLOOD TEST: CPT

## 2018-07-10 PROCEDURE — 84100 ASSAY OF PHOSPHORUS: CPT | Performed by: INTERNAL MEDICINE

## 2018-07-10 RX ORDER — MORPHINE SULFATE 2 MG/ML
2 INJECTION, SOLUTION INTRAMUSCULAR; INTRAVENOUS
Status: COMPLETED | OUTPATIENT
Start: 2018-07-10 | End: 2018-07-10

## 2018-07-10 RX ORDER — METOPROLOL TARTRATE 5 MG/5ML
5 INJECTION INTRAVENOUS EVERY 6 HOURS
Status: DISCONTINUED | OUTPATIENT
Start: 2018-07-10 | End: 2018-07-11

## 2018-07-10 RX ORDER — MORPHINE SULFATE 4 MG/ML
2 INJECTION, SOLUTION INTRAMUSCULAR; INTRAVENOUS ONCE
Status: DISCONTINUED | OUTPATIENT
Start: 2018-07-10 | End: 2018-07-10

## 2018-07-10 RX ORDER — DEXTROSE 50 % IN WATER (D50W) INTRAVENOUS SYRINGE
12.5-25 AS NEEDED
Status: DISCONTINUED | OUTPATIENT
Start: 2018-07-10 | End: 2018-07-10

## 2018-07-10 RX ORDER — DICYCLOMINE HYDROCHLORIDE 10 MG/ML
20 INJECTION INTRAMUSCULAR
Status: DISCONTINUED | OUTPATIENT
Start: 2018-07-10 | End: 2018-07-12 | Stop reason: HOSPADM

## 2018-07-10 RX ORDER — MAGNESIUM SULFATE 100 %
4 CRYSTALS MISCELLANEOUS AS NEEDED
Status: DISCONTINUED | OUTPATIENT
Start: 2018-07-10 | End: 2018-07-10

## 2018-07-10 RX ORDER — ONDANSETRON 2 MG/ML
4 INJECTION INTRAMUSCULAR; INTRAVENOUS
Status: COMPLETED | OUTPATIENT
Start: 2018-07-10 | End: 2018-07-10

## 2018-07-10 RX ORDER — DEXTROSE, SODIUM CHLORIDE, AND POTASSIUM CHLORIDE 5; .9; .15 G/100ML; G/100ML; G/100ML
150 INJECTION INTRAVENOUS CONTINUOUS
Status: DISCONTINUED | OUTPATIENT
Start: 2018-07-10 | End: 2018-07-11

## 2018-07-10 RX ORDER — LABETALOL HYDROCHLORIDE 5 MG/ML
20 INJECTION, SOLUTION INTRAVENOUS AS NEEDED
Status: DISCONTINUED | OUTPATIENT
Start: 2018-07-10 | End: 2018-07-10

## 2018-07-10 RX ORDER — MAGNESIUM SULFATE 100 %
4 CRYSTALS MISCELLANEOUS AS NEEDED
Status: DISCONTINUED | OUTPATIENT
Start: 2018-07-10 | End: 2018-07-12 | Stop reason: HOSPADM

## 2018-07-10 RX ORDER — SODIUM CHLORIDE 9 MG/ML
150 INJECTION, SOLUTION INTRAVENOUS CONTINUOUS
Status: DISCONTINUED | OUTPATIENT
Start: 2018-07-10 | End: 2018-07-10

## 2018-07-10 RX ORDER — LABETALOL HYDROCHLORIDE 5 MG/ML
20 INJECTION, SOLUTION INTRAVENOUS
Status: DISCONTINUED | OUTPATIENT
Start: 2018-07-10 | End: 2018-07-12 | Stop reason: HOSPADM

## 2018-07-10 RX ORDER — MORPHINE SULFATE 4 MG/ML
2 INJECTION INTRAVENOUS ONCE
Status: COMPLETED | OUTPATIENT
Start: 2018-07-10 | End: 2018-07-10

## 2018-07-10 RX ORDER — TRAMADOL HYDROCHLORIDE 50 MG/1
50 TABLET ORAL
Status: DISCONTINUED | OUTPATIENT
Start: 2018-07-10 | End: 2018-07-12 | Stop reason: HOSPADM

## 2018-07-10 RX ORDER — DEXTROSE 50 % IN WATER (D50W) INTRAVENOUS SYRINGE
12.5-25 AS NEEDED
Status: DISCONTINUED | OUTPATIENT
Start: 2018-07-10 | End: 2018-07-12 | Stop reason: HOSPADM

## 2018-07-10 RX ORDER — INSULIN LISPRO 100 [IU]/ML
INJECTION, SOLUTION INTRAVENOUS; SUBCUTANEOUS
Status: DISCONTINUED | OUTPATIENT
Start: 2018-07-10 | End: 2018-07-11

## 2018-07-10 RX ORDER — HYDRALAZINE HYDROCHLORIDE 20 MG/ML
20 INJECTION INTRAMUSCULAR; INTRAVENOUS
Status: COMPLETED | OUTPATIENT
Start: 2018-07-10 | End: 2018-07-10

## 2018-07-10 RX ORDER — DICYCLOMINE HYDROCHLORIDE 10 MG/1
10 CAPSULE ORAL 4 TIMES DAILY
Status: DISCONTINUED | OUTPATIENT
Start: 2018-07-10 | End: 2018-07-10

## 2018-07-10 RX ORDER — METOCLOPRAMIDE HYDROCHLORIDE 5 MG/ML
10 INJECTION INTRAMUSCULAR; INTRAVENOUS
Status: COMPLETED | OUTPATIENT
Start: 2018-07-10 | End: 2018-07-10

## 2018-07-10 RX ADMIN — SODIUM CHLORIDE 7.1 UNITS/HR: 900 INJECTION, SOLUTION INTRAVENOUS at 14:34

## 2018-07-10 RX ADMIN — MORPHINE SULFATE 2 MG: 2 INJECTION, SOLUTION INTRAMUSCULAR; INTRAVENOUS at 12:17

## 2018-07-10 RX ADMIN — PHENOBARBITAL ELIXIR 50 ML: 16.2; .1037; .0065; .0194 ELIXIR ORAL at 12:00

## 2018-07-10 RX ADMIN — MORPHINE SULFATE 2 MG: 4 INJECTION INTRAVENOUS at 16:20

## 2018-07-10 RX ADMIN — HYDRALAZINE HYDROCHLORIDE 20 MG: 20 INJECTION INTRAMUSCULAR; INTRAVENOUS at 14:30

## 2018-07-10 RX ADMIN — METOCLOPRAMIDE 10 MG: 5 INJECTION, SOLUTION INTRAMUSCULAR; INTRAVENOUS at 14:21

## 2018-07-10 RX ADMIN — ONDANSETRON 4 MG: 2 INJECTION, SOLUTION INTRAMUSCULAR; INTRAVENOUS at 12:00

## 2018-07-10 RX ADMIN — MORPHINE SULFATE 2 MG: 2 INJECTION, SOLUTION INTRAMUSCULAR; INTRAVENOUS at 14:21

## 2018-07-10 RX ADMIN — SODIUM CHLORIDE 1000 ML: 900 INJECTION, SOLUTION INTRAVENOUS at 12:00

## 2018-07-10 RX ADMIN — METOPROLOL TARTRATE 5 MG: 5 INJECTION, SOLUTION INTRAVENOUS at 23:54

## 2018-07-10 RX ADMIN — SODIUM CHLORIDE 150 ML/HR: 900 INJECTION, SOLUTION INTRAVENOUS at 16:34

## 2018-07-10 RX ADMIN — HUMAN INSULIN 10 UNITS: 100 INJECTION, SOLUTION SUBCUTANEOUS at 14:21

## 2018-07-10 RX ADMIN — METOPROLOL TARTRATE 5 MG: 5 INJECTION, SOLUTION INTRAVENOUS at 18:05

## 2018-07-10 RX ADMIN — DEXTROSE, SODIUM CHLORIDE, AND POTASSIUM CHLORIDE 150 ML/HR: 5; .9; .15 INJECTION INTRAVENOUS at 18:25

## 2018-07-10 NOTE — ED NOTES
Patient returns to ED for c/o back pain and generalized abdominal pain that has persisted intermittently since onset early this morning. Patient's  at bedside indicates patient was seen at [facility] last night for similar episode; states \"they didn't do nothing for her. She didn't even get narcotics or anything. \"  Patient did receive GI cocktail last night in ED and states mild relief of s/s last night. Patient tearful,restless and moaning at this time. Unable to sit in bed due to c/o pain. MD aware of patient's concerns. Emergency Department Nursing Plan of Care       The Nursing Plan of Care is developed from the Nursing assessment and Emergency Department Attending provider initial evaluation. The plan of care may be reviewed in the ED Provider note.     The Plan of Care was developed with the following considerations:   Patient / Family readiness to learn indicated by:verbalized understanding  Persons(s) to be included in education: patient and family  Barriers to Learning/Limitations:agitated    Signed     Tamera Xavier RN    7/10/2018   12:50 PM

## 2018-07-10 NOTE — IP AVS SNAPSHOT
[address] [facility] 
 
 
 [address] [facility] Patient: Bishnu Bobo MRN: [id number] :1965 About your hospitalization You were admitted on:  July 10, 2018 You last received care in the:  [place] [address] [address] [facility] You were discharged on:  2018 Why you were hospitalized Your primary diagnosis was:  Dka (Diabetic Ketoacidoses) (Hcc) Follow-up Information Follow up With Details Comments Contact Info Polina Logan NP On 2018 Your appointment is scheduled  at 2:15pm. [address] 
[phone] Mildred Viramontes MD On 2018 Your appointment time is 3:45PM. 208 [facility] [address] [place] [address] 
[phone] Lita Diehl MD Schedule an appointment as soon as possible for a visit Please call and schedule you follow-up. [address] [place] [address] 
[phone] Discharge Orders None A check edgar indicates which time of day the medication should be taken. My Medications START taking these medications Instructions Each Dose to Equal  
 Morning Noon Evening Bedtime  
 cephALEXin 250 mg capsule Commonly known as:  Aleja Screws Your last dose was: Your next dose is: Take 1 Cap by mouth every twenty-four (24) hours for 5 days. 250 mg CONTINUE taking these medications Instructions Each Dose to Equal  
 Morning Noon Evening Bedtime  
 aspirin 81 mg chewable tablet Your last dose was: Your next dose is: Take 1 Tab by mouth daily. 81 mg  
    
   
   
   
  
 atorvastatin 20 mg tablet Commonly known as:  LIPITOR Your last dose was: Your next dose is: Take 20 mg by mouth nightly. 20 mg  
    
   
   
   
  
 cloNIDine HCl 0.1 mg tablet Commonly known as:  CATAPRES  
   
 Your last dose was: Your next dose is: Take 1 Tab by mouth two (2) times a day. 0.1 mg  
    
   
   
   
  
 insulin aspart U-100 100 unit/mL injection Commonly known as:  Beni Torres Your last dose was: Your next dose is:    
   
   
 5 Units by SubCUTAneous route Before breakfast, lunch, and dinner. 5 Units  
    
   
   
   
  
 insulin glargine 100 unit/mL injection Commonly known as:  LANTUS U-100 INSULIN Your last dose was: Your next dose is:    
   
   
 25 Units by SubCUTAneous route nightly. 25 Units  
    
   
   
   
  
 lidocaine 5 % Commonly known as:  Shelda Euler Your last dose was: Your next dose is:    
   
   
 Apply patch to the affected area for 12 hours a day and remove for 12 hours a day. metoprolol tartrate 50 mg tablet Commonly known as:  LOPRESSOR Your last dose was: Your next dose is: Take 50 mg by mouth two (2) times a day. 50 mg  
    
   
   
   
  
 ondansetron 4 mg disintegrating tablet Commonly known as:  ZOFRAN ODT Your last dose was: Your next dose is: Take 1 Tab by mouth every eight (8) hours as needed for Nausea. 4 mg  
    
   
   
   
  
 sodium bicarbonate 650 mg tablet Your last dose was: Your next dose is: Take 650 mg by mouth two (2) times a day. 650 mg  
    
   
   
   
  
 sucralfate 100 mg/mL suspension Commonly known as:  Princess Fang Your last dose was: Your next dose is: Take 5 mL by mouth four (4) times daily. 1 tsp STOP taking these medications   
 trimethoprim-sulfamethoxazole 160-800 mg per tablet Commonly known as:  BACTRIM DS Where to Get Your Medications These medications were sent to [facility], [address], [address] [place] [id number] Phone:  [phone]  
  cephALEXin 250 mg capsule Discharge Instructions Blood Urea Nitrogen (BUN) Test: About This Test 
What is it? A blood urea nitrogen (BUN) test measures the amount of nitrogen in your blood that comes from the waste product urea. Urea is made in the liver and passed out of your body in the urine. If your kidneys are not able to remove urea from the blood normally, your BUN level rises. Dehydration can also make your BUN level higher. A BUN test may be done with a blood creatinine test. The level of creatinine in your blood also tells how well your kidneys are working. A high creatinine level may mean your kidneys are not working properly. BUN and creatinine tests can be used together to find the BUN-to-creatinine ratio. Why is this test done? A BUN test is done to: 
· See if your kidneys are working normally. · See if your kidney disease is getting worse. · See if treatment of your kidney disease is working. · Check for severe dehydration. Dehydration generally causes BUN levels to rise more than creatinine levels. This causes a high BUN-to-creatinine ratio. How can you prepare for the test? 
· Do not eat a lot of meat or other protein in the 24 hours before having a BUN test. 
What happens during the test? 
· A health professional takes a sample of your blood. What else should you know about the test? 
· Your results will include an explanation of what a \"normal\" result is. This is called a \"reference range. \" It is just a guide. Your doctor will evaluate your results based on your health and other factors. This means that a value that falls outside the normal values listed may still be normal for you. · Make sure your doctor knows all of the medicines, vitamins, herbal products, and supplements you take. What happens after the test? 
· You will probably be able to go home right away. · You can go back to your usual activities right away. Follow-up care is a key part of your treatment and safety. Be sure to make and go to all appointments, and call your doctor if you are having problems. It's also a good idea to keep a list of the medicines you take. Ask your doctor when you can expect to have your test results. Where can you learn more? Go to http://nery-peña.info/. Enter X153 in the search box to learn more about \"Blood Urea Nitrogen (BUN) Test: About This Test.\" Current as of: May 12, 2017 Content Version: 11.7 © 3352-3108 [employer]. Care instructions adapted under license by [employer] (which disclaims liability or warranty for this information). If you have questions about a medical condition or this instruction, always ask your healthcare professional. [address] any warranty or liability for your use of this information. Diabetic Ketoacidosis (DKA): Care Instructions Your Care Instructions Diabetic ketoacidosis (DKA) happens when the body does not have enough insulin and can't get the sugar it needs for energy. When the body can't use sugar for energy, it starts to use fat for energy. This process makes fatty acids called ketones. The ketones build up in the blood and change the chemical balance in your body. This problem can be very dangerous and needs to be treated. Without treatment, it can lead to a coma or death. DKA occurs most often in people with type 1 diabetes. But people with type 2 diabetes also can get it. DKA can be caused by many things. It can happen if you don't take enough insulin. It can also happen if you have an infection or illness like the flu. Sometimes it happens if you are very dehydrated. DKA can only be treated with insulin and fluids. These are often given in a vein (IV). Follow-up care is a key part of your treatment and safety.  Be sure to make and go to all appointments, and call your doctor if you are having problems. It's also a good idea to know your test results and keep a list of the medicines you take. How can you care for yourself at home? To reduce your chance of ketoacidosis: · Take your insulin and other diabetes medicines on time and in the right dose. ¨ If an infection caused your DKA and your doctor prescribed antibiotics, take them as directed. Do not stop taking them just because you feel better. You need to take the full course of antibiotics. · Test your blood sugar before meals and at bedtime or as often as your doctor advises. This is the best way to know when your blood sugar is high so you can treat it early. Watching for symptoms is not as helpful. This is because you may not have symptoms until your blood sugar is very high. Or you may not notice them. · Teach others at work and at home how to check your blood sugar. Make sure that someone else knows how do it in case you can't. · Wear or carry medical identification at all times. This is very important in case you are too sick or injured to speak for yourself. · Talk to your doctor about when you can start to exercise again. · Eat regular meals that spread your calories and carbohydrate throughout the day. This will help keep your blood sugar steady. · When you are sick: ¨ Take your insulin and diabetes medicines. This is important even if you are vomiting and having trouble eating or drinking. Your blood sugar may go up because you are sick. If you are eating less than normal, you may need to change your dose of insulin. Talk with your doctor about a plan when you are well. Then you will know what to do when you are sick. ¨ Drink extra fluids to prevent dehydration. These include water, broth, and sugar-free drinks. If you don't drink enough, the insulin from your shot may not get into your blood. So your blood sugar may go up. ¨ Try to eat as you normally do, with a focus on healthy food choices. ¨ Check your blood sugar at least every 3 to 4 hours. Check it more often if it's rising fast. If your doctor has told you to take an extra insulin dose for high blood sugar levels (for example, above 240 mg/dL) be sure to take the right amount. If you're not sure how much to take, call your doctor. ¨ Check your temperature and pulse often. If your temperature goes up, call your doctor. You may be getting worse. ¨ If you take insulin, check your urine or blood for ketones, especially when you have high blood sugar (for example, above 240 mg/dL). Call your doctor if your ketone level is moderate or high. If you know your blood sugar is high, treat it before it gets worse. · If you missed your usual dose of insulin or other diabetes medicine, take the missed dose or take the amount your doctor told you to take if this happens. · If you and your doctor decide on a dose of extra-fast-acting insulin, give yourself the right dose. If you take insulin and your doctor has not told you how much fast-acting insulin to take based on your blood sugar level, call your doctor. · Drink extra water or sugar-free drinks to prevent dehydration. · Wait 30 minutes after you take extra insulin or missed medicines. Then check your blood sugar again. · If symptoms of high blood sugar get worse or your blood sugar level keeps rising, call your doctor. If you start to feel sleepy or confused, call 911. When should you call for help? Call 911 anytime you think you may need emergency care. For example, call if: 
  · You passed out (lost consciousness).  
  · You are confused or cannot think clearly.  
  · Your blood sugar is very high or very low.  
 Watch closely for changes in your health, and be sure to contact your doctor if: 
  · Your blood sugar stays outside the level your doctor set for you.  
  · You have any problems. Where can you learn more? Go to http://nery-peña.info/. Titi Frye in the search box to learn more about \"Diabetic Ketoacidosis (DKA): Care Instructions. \" Current as of: December 7, 2017 Content Version: 11.7 © 5238-9994 [employer]. Care instructions adapted under license by [employer] (which disclaims liability or warranty for this information). If you have questions about a medical condition or this instruction, always ask your healthcare professional. [address] any warranty or liability for your use of this information. Kidney Disease and High Blood Pressure: Care Instructions Your Care Instructions Long-term (chronic) kidney disease happens when the kidneys cannot remove waste and keep your body's fluids and chemicals in balance. Usually, the kidneys remove waste from the blood through the urine. When the kidneys are not working well, waste can build up so much that it poisons the body. Kidney disease can make you very tired. It also can cause swelling, or edema, in your legs or other areas of your body. High blood pressure is one of the major causes of chronic kidney disease. And kidney disease can also cause high blood pressure. No matter which came first, having high blood pressure damages the tiny blood vessels in the kidneys. If you have high blood pressure, it is important to lower it. There are many things you can do to lower your blood pressure, which may help slow or stop the damage to your kidneys. Follow-up care is a key part of your treatment and safety. Be sure to make and go to all appointments, and call your doctor if you are having problems. It's also a good idea to know your test results and keep a list of the medicines you take. How can you care for yourself at home? · Be safe with medicines. Take your medicines exactly as prescribed. Call your doctor if you have any problems with your medicine.  You will probably need more than one medicine to lower your blood pressure. You will get more details on the specific medicines your doctor prescribes. · Work with your doctor and a dietitian to plan meals that have the right amount of nutrients for you. You will probably have to limit salt, fluids, and protein. · Stay at a healthy weight. This is very important if you put on weight around the waist. Losing even 10 pounds can help you lower your blood pressure. · Manage other health problems such as diabetes and high cholesterol. You can help lower your risk for heart disease and blood vessel problems with a healthy lifestyle along with medicines. · Do not take ibuprofen (Advil, Motrin) or naproxen (Aleve), or similar medicines, unless your doctor tells you to. They may make chronic kidney disease worse. It is okay to take acetaminophen (Tylenol). · If your doctor recommends it, get more exercise. Walking is a good choice. Bit by bit, increase the amount you walk every day. Try for at least 30 minutes on most days of the week. You also may want to swim, bike, or do other activities. · Limit or avoid alcohol. Talk to your doctor about whether you can drink any alcohol. · Do not smoke or allow others to smoke around you. If you need help quitting, talk to your doctor about stop-smoking programs and medicines. These can increase your chances of quitting for good. When should you call for help? Call 911 anytime you think you may need emergency care. For example, call if: 
  · You passed out (lost consciousness).  
 Call your doctor now or seek immediate medical care if: 
  · You have new or worse nausea and vomiting.  
  · You have much less urine than normal, or you have no urine.  
  · You are feeling confused or cannot think clearly.  
  · You have new or more blood in your urine.  
  · You have new swelling.  
  · You are dizzy or lightheaded, or you feel like you may faint.  Watch closely for changes in your health, and be sure to contact your doctor if: 
  · You do not get better as expected. Where can you learn more? Go to http://nery-peña.info/. Enter U883 in the search box to learn more about \"Kidney Disease and High Blood Pressure: Care Instructions. \" Current as of: May 12, 2017 Content Version: 11.7 © 4958-9889 [employer]. Care instructions adapted under license by [employer] (which disclaims liability or warranty for this information). If you have questions about a medical condition or this instruction, always ask your healthcare professional. [address] any warranty or liability for your use of this information. Learning About Chronic Kidney Disease and Potassium What is potassium? Potassium is a mineral. It helps keep the right mix of fluids in your body. It also helps your nerves, muscles, and heart work properly. Most people get enough potassium from the foods they eat. How does chronic kidney disease affect potassium levels? Healthy kidneys keep the right balance of minerals in your blood. This includes potassium. If you have long-term (chronic) kidney disease, it is hard for your kidneys to control the amount of potassium in your blood. You may get too much potassium. This can be harmful. In some cases, other medicines may make your body get rid of too much potassium. If this happens, you may need to take a potassium supplement. How can you manage your potassium level? You can learn how much potassium is in certain foods. Then you can control how much potassium you get in your diet. Your doctor or dietitian can help you plan a diet that gives you the right amount of potassium. There is no diet that is right for everyone. Your diet will be based on how well your kidneys are working and whether you are on dialysis. Your diet may change as your disease changes. See your doctor for regular testing. Testing helps you know when to change your diet. Your doctor or dietitian can help you do this. Changing your diet can be hard. You may have to give up many foods you like. But it is very important to make the recommended changes. They will help you stay healthy for as long as possible. What foods and products have potassium? You can control the amount of potassium in your diet if you know which foods are low or high in potassium. Foods low in potassium · Blueberries and raspberries · White or brown rice, spaghetti, and macaroni · Cucumbers, radishes, and hummus Foods high in potassium · Apricots, oranges, prunes, and bananas · Broccoli, spinach, and potatoes · Milk and yogurt Other products that may have potassium · Diet or protein drinks and diet bars often have potassium. It is also in sports drinks, such as Gatorade. These are meant to replace potassium you lose during exercise. · Do not use a salt substitute or \"lite\" salt without talking to your doctor first. These often are very high in potassium. · Be sure to tell your doctor about any prescription or over-the-counter medicines you take. Some medicines can raise your level of potassium. Where can you learn more? Go to http://nery-peña.info/. Enter H520 in the search box to learn more about \"Learning About Chronic Kidney Disease and Potassium. \" Current as of: May 12, 2017 Content Version: 11.7 © 6735-0738 [employer]. Care instructions adapted under license by [employer] (which disclaims liability or warranty for this information). If you have questions about a medical condition or this instruction, always ask your healthcare professional. [place] [address] any warranty or liability for your use of this information. Learning About Chronic Kidney Disease and Potassium What is potassium? Potassium is a mineral. It helps keep the right mix of fluids in your body. It also helps your nerves, muscles, and heart work properly. Most people get enough potassium from the foods they eat. How does chronic kidney disease affect potassium levels? Healthy kidneys keep the right balance of minerals in your blood. This includes potassium. If you have long-term (chronic) kidney disease, it is hard for your kidneys to control the amount of potassium in your blood. You may get too much potassium. This can be harmful. In some cases, other medicines may make your body get rid of too much potassium. If this happens, you may need to take a potassium supplement. How can you manage your potassium level? You can learn how much potassium is in certain foods. Then you can control how much potassium you get in your diet. Your doctor or dietitian can help you plan a diet that gives you the right amount of potassium. There is no diet that is right for everyone. Your diet will be based on how well your kidneys are working and whether you are on dialysis. Your diet may change as your disease changes. See your doctor for regular testing. Testing helps you know when to change your diet. Your doctor or dietitian can help you do this. Changing your diet can be hard. You may have to give up many foods you like. But it is very important to make the recommended changes. They will help you stay healthy for as long as possible. What foods and products have potassium? You can control the amount of potassium in your diet if you know which foods are low or high in potassium. Foods low in potassium · Blueberries and raspberries · White or brown rice, spaghetti, and macaroni · Cucumbers, radishes, and hummus Foods high in potassium · Apricots, oranges, prunes, and bananas · Broccoli, spinach, and potatoes · Milk and yogurt Other products that may have potassium · Diet or protein drinks and diet bars often have potassium. It is also in sports drinks, such as Gatorade. These are meant to replace potassium you lose during exercise. · Do not use a salt substitute or \"lite\" salt without talking to your doctor first. These often are very high in potassium. · Be sure to tell your doctor about any prescription or over-the-counter medicines you take. Some medicines can raise your level of potassium. Where can you learn more? Go to http://nery-peña.info/. Enter V197 in the search box to learn more about \"Learning About Chronic Kidney Disease and Potassium. \" Current as of: May 12, 2017 Content Version: 11.7 © 9729-0922 [employer]. Care instructions adapted under license by Teach[employer] (which disclaims liability or warranty for this information). If you have questions about a medical condition or this instruction, always ask your healthcare professional. [address] any warranty or liability for your use of this information. Diet for Chronic Kidney Disease (Before Dialysis): Care Instructions Your Care Instructions When you have chronic kidney disease, you need to change your diet to avoid foods that make your kidneys worse. You may need to limit salt, fluids, and protein. You also may need to limit minerals such as potassium and phosphorus. A diet for chronic kidney disease takes planning. A dietitian who specializes in kidney disease can help you plan meals that meet your needs. These guidelines are for people who are not on dialysis. Talk with your doctor or dietitian to make sure your diet is right for your condition. Do not change your diet without talking to your doctor or dietitian. Follow-up care is a key part of your treatment and safety.  Be sure to make and go to all appointments, and call your doctor if you are having problems. It's also a good idea to know your test results and keep a list of the medicines you take. How can you care for yourself at home? · Work with your doctor or dietitian to create a food plan. · Do not skip meals or go for many hours without eating. If you do not feel very hungry, try to eat 4 or 5 small meals instead of 1 or 2 big meals. · If you have a hard time eating enough, talk to your doctor or dietitian about ways you can add calories to your diet. · Do not take any vitamins or minerals, supplements, or herbal products without talking to your doctor first. 
· Check with your doctor about whether it is safe for you to drink alcohol. To get the right amount of protein · Ask your doctor or dietitian how much protein you can have each day. Most people with chronic kidney disease need to limit the amount of protein they eat. But you still need some protein to stay healthy. · Include all sources of protein in your daily protein count. Besides meat, poultry, fish, and eggs, protein is found in milk and milk products, beans and nuts, breads, cereals, and vegetables. To limit salt · Read food labels on cans and food packages. The labels tell you how much sodium is in each serving. Make sure that you look at the serving size. If you eat more than the serving size, you will get more sodium than what is listed on the label. · Do not add salt to your food. Avoid foods that list salt, sodium, or monosodium glutamate (MSG) as an ingredient. · Buy foods that are labeled \"no salt added,\" \"sodium-free,\" or \"low-sodium. \" Foods labeled \"reduced-sodium\" and \"light sodium\" may still have too much sodium. · Limit processed foods, fast food, and restaurant foods. These types of food are very high in sodium. · Avoid salted pretzels, chips, popcorn, and other salted snacks. · Avoid smoked, cured, salted, and canned meat, fish, and poultry. This includes ham, shaikh, hot dogs, and luncheon meats. · You may use lemon, herbs, and spices to flavor your meals. To limit potassium · Choose low-potassium fruits such as applesauce, pineapple, grapes, blueberries, and raspberries. · Choose low-potassium vegetables such as lettuce, green beans, cucumber, and radishes. · Choose low-potassium foods such as hummus, spaghetti, macaroni, rice, tortillas, and bagels. · Limit or avoid high-potassium foods such as milk, bananas, oranges, cantaloupe, potatoes, spinach, tomatoes, broccoli, and sweet potatoes. · Do not use a salt substitute or lite salt unless your doctor says it is okay. Most salt substitutes and lite salts are high in potassium. To limit phosphorus · Follow your food plan to know how much milk and milk products you can have. · Limit nuts, peanut butter, seeds, lentils, beans, organ meats, and sardines. · Avoid cola drinks. · Avoid bran breads or bran cereals. If you need to limit fluids · Know how much fluid you can drink. Every day fill a pitcher with that amount of water. If you drink another fluid (such as coffee) that day, pour an equal amount of water out of the pitcher. · Count foods that are liquid at room temperature, such as gelatin dessert and ice cream, as fluids. Where can you learn more? Go to http://nery-peña.info/. Enter L977 in the search box to learn more about \"Diet for Chronic Kidney Disease (Before Dialysis): Care Instructions. \" Current as of: June 5, 2017 Content Version: 11.7 © 2293-2468 [facility]. Care instructions adapted under license by [employer] (which disclaims liability or warranty for this information). If you have questions about a medical condition or this instruction, always ask your healthcare professional. [place] [address] any warranty or liability for your use of this information. Chronic Kidney Disease: Care Instructions Your Care Instructions Chronic kidney disease happens when your kidneys don't work as well as they should. Your kidneys have a few important jobs. They remove waste from your blood. This waste leaves your body in your urine. They also balance your body's fluids and chemicals. When your kidneys don't work well, extra waste and fluid can build up. This can poison the body and sometimes cause death. The most common causes of this disease are diabetes and high blood pressure. In some cases, the disease develops in 2 to 3 months. But it usually develops over many years. If you take medicine and make healthy changes to your lifestyle, you may be able to prevent the disease from getting worse. But if your kidney damage gets worse, you may need dialysis or a kidney transplant. Dialysis uses a machine to filter waste from the blood. A transplant is surgery to give you a healthy kidney from another person. Follow-up care is a key part of your treatment and safety. Be sure to make and go to all appointments, and call your doctor if you are having problems. It's also a good idea to know your test results and keep a list of the medicines you take. How can you care for yourself at home? 
 Treatments and appointments 
  · Be safe with medicines. Take your medicines exactly as prescribed. Call your doctor if you have any problems with your medicine. You also may take medicine to control your blood pressure or to treat diabetes. Many people who have diabetes take blood pressure medicine.  
  · If you have diabetes, do your best to keep your blood sugar in your target range. You may do this by eating healthy food and exercising. You may also take medicines.  
  · Go to your dialysis appointments if you have this treatment.  
  · Do not take ibuprofen (Advil, Motrin), naproxen (Aleve), or similar medicines, unless your doctor tells you to.  These may make the disease worse.  
  · Do not take any vitamins, over-the-counter medicines, or herbal products without talking to your doctor first.  
  · Do not smoke or use other tobacco products. Smoking can reduce blood flow to the kidneys. If you need help quitting, talk to your doctor about stop-smoking programs and medicines. These can increase your chances of quitting for good.  
  · Do not drink alcohol or use illegal drugs.  
  · Talk to your doctor about an exercise plan. Exercise helps lower your blood pressure. It also makes you feel better.  
  · If you have an advance directive, let your doctor know. It may include a living will and a durable power of  for health care. If you don't have one, you may want to prepare one. It lets your doctor and loved ones know your health care wishes if you become unable to speak for yourself. Diet 
  · Talk to a registered dietitian. He or she can help you make a meal plan that is right for you. Most people with kidney disease need to limit salt (sodium), fluids, and protein. Some also have to limit potassium and phosphorus.  
  · You may have to give up many foods you like. But try to focus on the fact that this will help you stay healthy for as long as possible.  
  · If you have a hard time eating enough, talk to your doctor or dietitian about ways to add calories to your diet.  
  · Your diet may change as your disease changes. See your doctor for regular testing. And work with a dietitian to change your diet as needed. When should you call for help? Call 911 anytime you think you may need emergency care. For example, call if: 
  · You passed out (lost consciousness).  
 Call your doctor now or seek immediate medical care if: 
  · You have less urine than normal or no urine.  
  · You have trouble urinating or can urinate only very small amounts.  
  · You are confused or have trouble thinking clearly.  
  · You feel weaker or more tired than usual.  
  · You are very thirsty, lightheaded, or dizzy.  
  · You have nausea and vomiting.   · You have new swelling of your arms or feet, or your swelling is worse.  
  · You have blood in your urine.  
  · You have new or worse trouble breathing.  
 Watch closely for changes in your health, and be sure to contact your doctor if: 
  · You have any problems with your medicine or other treatment. Where can you learn more? Go to http://nery-peña.info/. Enter N276 in the search box to learn more about \"Chronic Kidney Disease: Care Instructions. \" Current as of: May 12, 2017 Content Version: 11.7 © 2468-4445 [employer]. Care instructions adapted under license by [employer] (which disclaims liability or warranty for this information). If you have questions about a medical condition or this instruction, always ask your healthcare professional. [address] any warranty or liability for your use of this information. Zoomph Announcement We are excited to announce that we are making your provider's discharge notes available to you in Zoomph. You will see these notes when they are completed and signed by the physician that discharged you from your recent hospital stay. If you have any questions or concerns about any information you see in Zoomph, please call the Health Information Department where you were seen or reach out to your Primary Care Provider for more information about your plan of care. Introducing \[facility]\"" & HEALTH SERVICES! Alfred Ludwig introduces Zoomph patient portal. Now you can access parts of your medical record, email your doctor's office, and request medication refills online. 1. In your internet browser, go to [email or web address] 2. Click on the First Time User? Click Here link in the Sign In box. You will see the New Member Sign Up page. 3. Enter your Zoomph Access Code exactly as it appears below.  You will not need to use this code after youve completed the sign-up process. If you do not sign up before the expiration date, you must request a new code. · Anaergia Access Code: [email or web address] Expires: 8/10/2018  5:24 AM 
 
4. Enter the last four digits of your Social Security Number (xxxx) and Date of Birth (mm/dd/yyyy) as indicated and click Submit. You will be taken to the next sign-up page. 5. Create a Anaergia ID. This will be your Anaergia login ID and cannot be changed, so think of one that is secure and easy to remember. 6. Create a Anaergia password. You can change your password at any time. 7. Enter your Password Reset Question and Answer. This can be used at a later time if you forget your password. 8. Enter your e-mail address. You will receive e-mail notification when new information is available in [address] 9. Click Sign Up. You can now view and download portions of your medical record. 10. Click the Download Summary menu link to download a portable copy of your medical information. If you have questions, please visit the Frequently Asked Questions section of the Anaergia website. Remember, Anaergia is NOT to be used for urgent needs. For medical emergencies, dial 911. Now available from your iPhone and Android! Introducing Cali Still As a [facility] patient, I wanted to make you aware of our electronic visit tool called Cali Still. [facility] 24/7 allows you to connect within minutes with a medical provider 24 hours a day, seven days a week via a mobile device or tablet or logging into a secure website from your computer. You can access Cali Still from anywhere in the [place].  
 
A virtual visit might be right for you when you have a simple condition and feel like you just dont want to get out of bed, or cant get away from work for an appointment, when your regular [facility]ie Collado provider is not available (evenings, weekends or holidays), or when youre out of town and need minor care. Electronic visits cost only $49 and if the [facility] 24/[employer] provider determines a prescription is needed to treat your condition, one can be electronically transmitted to a nearby pharmacy*. Please take a moment to enroll today if you have not already done so. The enrollment process is free and takes just a few minutes. To enroll, please download the ShapeUp monica to your tablet or phone, or visit [email or web address] to enroll on your computer. And, as an [address] [place] [address] patient with a GliaCure account, the results of your visits will be scanned into your electronic medical record and your primary care provider will be able to view the scanned results. We urge you to continue to see your regular [facility] provider for your ongoing medical care. And while your primary care provider may not be the one available when you seek a PLAYD8dahliafin virtual visit, the peace of mind you get from getting a real diagnosis real time can be priceless. For more information on TaskBeat, view our Frequently Asked Questions (FAQs) at [email or web address]. Sincerely, 
 
Valeriy Simpson MD 
Chief Medical Officer [employer] [place] [employer] *:  certain medications cannot be prescribed via PLAYD8dahliafin Providers Seen During Your Hospitalization Provider Specialty Primary office phone Michelle Yeager MD Emergency Medicine [phone] Mohit Lan MD Hospitalist [phone] Your Primary Care Physician (PCP) Primary Care Physician Office Phone Office Fax Selma Stevenson [phone] You are allergic to the following No active allergies Recent Documentation Height Weight BMI OB Status Smoking Status 1.651 m 60 kg 22.01 kg/m2 Postmenopausal Never Smoker Emergency Contacts Name Discharge Info Relation Home Work [facility] DISCHARGE CAREGIVER [3] Spouse [3] [phone] [facility] DISCHARGE CAREGIVER [3] Sister [23] [phone] Patient Belongings The following personal items are in your possession at time of discharge: 
  Dental Appliances: None  Visual Aid: None      Home Medications: None   Jewelry: None  Clothing: Pants, Undergarments, With patient    Other Valuables: None  Personal Items Sent to Safe: none Please provide this summary of care documentation to your next provider. Signatures-by signing, you are acknowledging that this After Visit Summary has been reviewed with you and you have received a copy. Patient Signature:  ____________________________________________________________ Date:  ____________________________________________________________  
  
Tramaine Fournier Provider Signature:  ____________________________________________________________ Date:  ____________________________________________________________
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
## 2018-07-10 NOTE — PROGRESS NOTES
TRANSFER - IN REPORT:    Verbal report received from [facility]) on Kaley Guerra  being received from ED(unit) for routine progression of care      Report consisted of patients Situation, Background, Assessment and   Recommendations(SBAR). Information from the following report(s) SBAR, Kardex, ED Summary, Intake/Output, MAR, Recent Results, Med Rec Status and Cardiac Rhythm Sinus Tachycardia was reviewed with the receiving nurse. Opportunity for questions and clarification was provided. Assessment completed upon patients arrival to unit and care assumed. 1520 pt come to floor by stretcher. pt c/o abdominal pain. pt assist to bed. pt assessed and continue care assumed. pt on insulin drip at 7. 1units/hour by rt hand # 22 iv.

## 2018-07-10 NOTE — PROGRESS NOTES
CM reviewed chart. CM met with pt, but pt was hardly able to talk and asked to speak at a later time because she was not feeling well.        [facility] Madeline MSW, QMHP

## 2018-07-10 NOTE — ED TRIAGE NOTES
Patient reports vomiting yesterday and left hand and bilateral foot numbness. At end of Triage , reports pain under left breast to back.   Unable to keep meds down
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
## 2018-07-10 NOTE — DISCHARGE INSTRUCTIONS
Abdominal Pain: Care Instructions  Your Care Instructions    Abdominal pain has many possible causes. Some aren't serious and get better on their own in a few days. Others need more testing and treatment. If your pain continues or gets worse, you need to be rechecked and may need more tests to find out what is wrong. You may need surgery to correct the problem. Don't ignore new symptoms, such as fever, nausea and vomiting, urination problems, pain that gets worse, and dizziness. These may be signs of a more serious problem. Your doctor may have recommended a follow-up visit in the next 8 to 12 hours. If you are not getting better, you may need more tests or treatment. The doctor has checked you carefully, but problems can develop later. If you notice any problems or new symptoms, get medical treatment right away. Follow-up care is a key part of your treatment and safety. Be sure to make and go to all appointments, and call your doctor if you are having problems. It's also a good idea to know your test results and keep a list of the medicines you take. How can you care for yourself at home? · Rest until you feel better. · To prevent dehydration, drink plenty of fluids, enough so that your urine is light yellow or clear like water. Choose water and other caffeine-free clear liquids until you feel better. If you have kidney, heart, or liver disease and have to limit fluids, talk with your doctor before you increase the amount of fluids you drink. · If your stomach is upset, eat mild foods, such as rice, dry toast or crackers, bananas, and applesauce. Try eating several small meals instead of two or three large ones. · Wait until 48 hours after all symptoms have gone away before you have spicy foods, alcohol, and drinks that contain caffeine. · Do not eat foods that are high in fat. · Avoid anti-inflammatory medicines such as aspirin, ibuprofen (Advil, Motrin), and naproxen (Aleve).  These can cause stomach upset. Talk to your doctor if you take daily aspirin for another health problem. When should you call for help? Call 911 anytime you think you may need emergency care. For example, call if:  ? · You passed out (lost consciousness). ? · You pass maroon or very bloody stools. ? · You vomit blood or what looks like coffee grounds. ? · You have new, severe belly pain. ?Call your doctor now or seek immediate medical care if:  ? · Your pain gets worse, especially if it becomes focused in one area of your belly. ? · You have a new or higher fever. ? · Your stools are black and look like tar, or they have streaks of blood. ? · You have unexpected vaginal bleeding. ? · You have symptoms of a urinary tract infection. These may include:  ¨ Pain when you urinate. ¨ Urinating more often than usual.  ¨ Blood in your urine. ? · You are dizzy or lightheaded, or you feel like you may faint. ? Watch closely for changes in your health, and be sure to contact your doctor if:  ? · You are not getting better after 1 day (24 hours). Where can you learn more? Go to http://nery-peña.info/. Enter Z605 in the search box to learn more about \"Abdominal Pain: Care Instructions. \"  Current as of: March 20, 2017  Content Version: 11.4  © 5838-8032 [employer]. Care instructions adapted under license by [employer] (which disclaims liability or warranty for this information). If you have questions about a medical condition or this instruction, always ask your healthcare professional. [place] [address] any warranty or liability for your use of this information. We hope that we have addressed all of your medical concerns. The examination and treatment you received in the Emergency Department were for an emergent problem and were not intended as complete care. It is important that you follow up with your healthcare provider(s) for ongoing care.  If your symptoms worsen or do not improve as expected, and you are unable to reach your usual health care provider(s), you should return to the Emergency Department. Today's healthcare is undergoing tremendous change, and patient satisfaction surveys are one of the many tools to assess the quality of medical care. You may receive a survey from the [employer] organization regarding your experience in the Emergency Department. I hope that your experience has been completely positive, particularly the medical care that I provided. As such, please participate in the survey; anything less than excellent does not meet my expectations or intentions. [facility] and [address] [place] [address] participate in nationally recognized quality of care measures. If your blood pressure is greater than 120/80, as reported below, we urge that you seek medical care to address the potential of high blood pressure, commonly known as hypertension. Hypertension can be hereditary or can be caused by certain medical conditions, pain, stress, or \"white coat syndrome. \"       Please make an appointment with your health care provider(s) for follow up of your Emergency Department visit. VITALS:   Patient Vitals for the past 8 hrs:   Temp Pulse Resp BP SpO2   07/09/18 2254 98.1 °F (36.7 °C) 89 16 (!) 170/93 99 %   07/09/18 2029 98.2 °F (36.8 °C) (!) 122 16 (!) 200/108 95 %          Thank you for allowing us to provide you with medical care today. We realize that you have many choices for your emergency care needs. Please choose us in the future for any continued health care needs.       Tc Marx MD    [facility].   Office: [phone]            Recent Results (from the past 24 hour(s))   EKG, 12 LEAD, INITIAL    Collection Time: 07/09/18  8:46 PM   Result Value Ref Range    Ventricular Rate 116 BPM    Atrial Rate 116 BPM    P-R Interval 142 ms QRS Duration 70 ms    Q-T Interval 328 ms    QTC Calculation (Bezet) 455 ms    Calculated P Axis 75 degrees    Calculated R Axis 44 degrees    Calculated T Axis 82 degrees    Diagnosis       Sinus tachycardia  When compared with ECG of 27-MAY-2018 14:40,  No significant change was found     CBC WITH AUTOMATED DIFF    Collection Time: 07/09/18 10:00 PM   Result Value Ref Range    WBC 9.0 3.6 - 11.0 K/uL    RBC 3.70 (L) 3.80 - 5.20 M/uL    HGB 11.1 (L) 11.5 - 16.0 g/dL    HCT 33.6 (L) 35.0 - 47.0 %    MCV 90.8 80.0 - 99.0 FL    MCH 30.0 26.0 - 34.0 PG    MCHC 33.0 30.0 - 36.5 g/dL    RDW 12.6 11.5 - 14.5 %    PLATELET 603 089 - 063 K/uL    MPV 8.8 (L) 8.9 - 12.9 FL    NRBC 0.0 0  WBC    ABSOLUTE NRBC 0.00 0.00 - 0.01 K/uL    NEUTROPHILS 58 32 - 75 %    LYMPHOCYTES 31 12 - 49 %    MONOCYTES 7 5 - 13 %    EOSINOPHILS 3 0 - 7 %    BASOPHILS 0 0 - 1 %    IMMATURE GRANULOCYTES 0 0.0 - 0.5 %    ABS. NEUTROPHILS 5.2 1.8 - 8.0 K/UL    ABS. LYMPHOCYTES 2.8 0.8 - 3.5 K/UL    ABS. MONOCYTES 0.6 0.0 - 1.0 K/UL    ABS. EOSINOPHILS 0.3 0.0 - 0.4 K/UL    ABS. BASOPHILS 0.0 0.0 - 0.1 K/UL    ABS. IMM. GRANS. 0.0 0.00 - 0.04 K/UL    DF AUTOMATED     METABOLIC PANEL, COMPREHENSIVE    Collection Time: 07/09/18 10:00 PM   Result Value Ref Range    Sodium 134 (L) 136 - 145 mmol/L    Potassium 4.5 3.5 - 5.1 mmol/L    Chloride 101 97 - 108 mmol/L    CO2 19 (L) 21 - 32 mmol/L    Anion gap 14 5 - 15 mmol/L    Glucose 255 (H) 65 - 100 mg/dL    BUN 72 (H) 6 - 20 MG/DL    Creatinine 7.05 (H) 0.55 - 1.02 MG/DL    BUN/Creatinine ratio 10 (L) 12 - 20      GFR est AA 7 (L) >60 ml/min/1.73m2    GFR est non-AA 6 (L) >60 ml/min/1.73m2    Calcium 9.1 8.5 - 10.1 MG/DL    Bilirubin, total 0.4 0.2 - 1.0 MG/DL    ALT (SGPT) 21 12 - 78 U/L    AST (SGOT) 21 15 - 37 U/L    Alk.  phosphatase 213 (H) 45 - 117 U/L    Protein, total 9.1 (H) 6.4 - 8.2 g/dL    Albumin 3.7 3.5 - 5.0 g/dL    Globulin 5.4 (H) 2.0 - 4.0 g/dL    A-G Ratio 0.7 (L) 1.1 - 2.2 LIPASE    Collection Time: 07/09/18 10:00 PM   Result Value Ref Range    Lipase 570 (H) 73 - 393 U/L       No results found.

## 2018-07-10 NOTE — ED NOTES
Patient awake and resting in bed. Patient received Morphine and is now reporting 8/10 pain. Not tearful at this time.

## 2018-07-10 NOTE — ED NOTES
TRANSFER - OUT REPORT:    Verbal report given to Marek Hardy RN on Henrry Murry  being transferred to PCU  for routine progression of care       Report consisted of patients Situation, Background, Assessment and   Recommendations(SBAR). Information from the following report(s) SBAR was reviewed with the receiving nurse. Lines:   Peripheral IV 07/10/18 Left Hand (Active)   Site Assessment Clean, dry, & intact 7/10/2018  2:08 PM   Phlebitis Assessment 0 7/10/2018  2:08 PM   Infiltration Assessment 0 7/10/2018  2:08 PM   Dressing Status Clean, dry, & intact 7/10/2018  2:08 PM        Opportunity for questions and clarification was provided.       Patient transported with:   Registered Nurse

## 2018-07-10 NOTE — ED TRIAGE NOTES
Patient crying and moaning throughout traige. Patient states \"I have chest pain. \" When asked to point where pain is patient pointing to LUQ. Reports \"it might be acid reflux. \" Patient's 4th visit to this ER in a week per significant other for same issues. No active vomiting noted in triage.

## 2018-07-10 NOTE — ED NOTES
Pt received Morphine per MD order; soon after receiving pain medications patient is restless, tearful and crying again. States she can't stay in the stretcher b/c of back pain. MD aware of patient's escalating behavior. Patient does not follow commands. Patient on cardiac monitor-sinus tachycardia with a rate of 133 while agitated.

## 2018-07-10 NOTE — IP AVS SNAPSHOT
Espinoza Enamorado 
 
 
 [address] [facility] Patient: Henrry Murry MRN: [id number] :1965 A check edgar indicates which time of day the medication should be taken. My Medications START taking these medications Instructions Each Dose to Equal  
 Morning Noon Evening Bedtime  
 cephALEXin 250 mg capsule Commonly known as:  Carroll Gilbert Your last dose was: Your next dose is: Take 1 Cap by mouth every twenty-four (24) hours for 5 days. 250 mg CONTINUE taking these medications Instructions Each Dose to Equal  
 Morning Noon Evening Bedtime  
 aspirin 81 mg chewable tablet Your last dose was: Your next dose is: Take 1 Tab by mouth daily. 81 mg  
    
   
   
   
  
 atorvastatin 20 mg tablet Commonly known as:  LIPITOR Your last dose was: Your next dose is: Take 20 mg by mouth nightly. 20 mg  
    
   
   
   
  
 cloNIDine HCl 0.1 mg tablet Commonly known as:  CATAPRES Your last dose was: Your next dose is: Take 1 Tab by mouth two (2) times a day. 0.1 mg  
    
   
   
   
  
 insulin aspart U-100 100 unit/mL injection Commonly known as:  Erin Reese Your last dose was: Your next dose is:    
   
   
 5 Units by SubCUTAneous route Before breakfast, lunch, and dinner. 5 Units  
    
   
   
   
  
 insulin glargine 100 unit/mL injection Commonly known as:  LANTUS U-100 INSULIN Your last dose was: Your next dose is:    
   
   
 25 Units by SubCUTAneous route nightly. 25 Units  
    
   
   
   
  
 lidocaine 5 % Commonly known as:  Jin Serge Your last dose was: Your next dose is:    
   
   
 Apply patch to the affected area for 12 hours a day and remove for 12 hours a day. metoprolol tartrate 50 mg tablet Commonly known as:  LOPRESSOR Your last dose was: Your next dose is: Take 50 mg by mouth two (2) times a day. 50 mg  
    
   
   
   
  
 ondansetron 4 mg disintegrating tablet Commonly known as:  ZOFRAN ODT Your last dose was: Your next dose is: Take 1 Tab by mouth every eight (8) hours as needed for Nausea. 4 mg  
    
   
   
   
  
 sodium bicarbonate 650 mg tablet Your last dose was: Your next dose is: Take 650 mg by mouth two (2) times a day. 650 mg  
    
   
   
   
  
 sucralfate 100 mg/mL suspension Commonly known as:  Remington Candelaria Your last dose was: Your next dose is: Take 5 mL by mouth four (4) times daily. 1 tsp STOP taking these medications   
 trimethoprim-sulfamethoxazole 160-800 mg per tablet Commonly known as:  BACTRIM DS Where to Get Your Medications These medications were sent to [facility] , [address], [address] [place] [id number] Phone:  [phone]  
  cephALEXin 250 mg capsule
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
## 2018-07-11 LAB
ADMINISTERED INITIALS, ADMINIT: NORMAL
AMPHET UR QL SCN: NEGATIVE
ANION GAP SERPL CALC-SCNC: 14 MMOL/L (ref 5–15)
ANION GAP SERPL CALC-SCNC: 14 MMOL/L (ref 5–15)
ANION GAP SERPL CALC-SCNC: 15 MMOL/L (ref 5–15)
BACTERIA SPEC CULT: NORMAL
BACTERIA SPEC CULT: NORMAL
BARBITURATES UR QL SCN: NEGATIVE
BENZODIAZ UR QL: NEGATIVE
BUN SERPL-MCNC: 64 MG/DL (ref 6–20)
BUN SERPL-MCNC: 67 MG/DL (ref 6–20)
BUN SERPL-MCNC: 69 MG/DL (ref 6–20)
BUN/CREAT SERPL: 12 (ref 12–20)
CALCIUM SERPL-MCNC: 9.1 MG/DL (ref 8.5–10.1)
CALCIUM SERPL-MCNC: 9.2 MG/DL (ref 8.5–10.1)
CALCIUM SERPL-MCNC: 9.4 MG/DL (ref 8.5–10.1)
CANNABINOIDS UR QL SCN: NEGATIVE
CHLORIDE SERPL-SCNC: 101 MMOL/L (ref 97–108)
CHLORIDE SERPL-SCNC: 103 MMOL/L (ref 97–108)
CHLORIDE SERPL-SCNC: 107 MMOL/L (ref 97–108)
CO2 SERPL-SCNC: 17 MMOL/L (ref 21–32)
CO2 SERPL-SCNC: 18 MMOL/L (ref 21–32)
CO2 SERPL-SCNC: 19 MMOL/L (ref 21–32)
COCAINE UR QL SCN: NEGATIVE
CREAT SERPL-MCNC: 5.51 MG/DL (ref 0.55–1.02)
CREAT SERPL-MCNC: 5.76 MG/DL (ref 0.55–1.02)
CREAT SERPL-MCNC: 5.8 MG/DL (ref 0.55–1.02)
D50 ADMINISTERED, D50ADM: 0 ML
D50 ORDER, D50ORD: 0 ML
DRUG SCRN COMMENT,DRGCM: ABNORMAL
GLSCOM COMMENTS: NORMAL
GLUCOSE BLD STRIP.AUTO-MCNC: 106 MG/DL (ref 65–100)
GLUCOSE BLD STRIP.AUTO-MCNC: 116 MG/DL (ref 65–100)
GLUCOSE BLD STRIP.AUTO-MCNC: 121 MG/DL (ref 65–100)
GLUCOSE BLD STRIP.AUTO-MCNC: 122 MG/DL (ref 65–100)
GLUCOSE BLD STRIP.AUTO-MCNC: 129 MG/DL (ref 65–100)
GLUCOSE BLD STRIP.AUTO-MCNC: 171 MG/DL (ref 65–100)
GLUCOSE BLD STRIP.AUTO-MCNC: 171 MG/DL (ref 65–100)
GLUCOSE BLD STRIP.AUTO-MCNC: 174 MG/DL (ref 65–100)
GLUCOSE BLD STRIP.AUTO-MCNC: 195 MG/DL (ref 65–100)
GLUCOSE BLD STRIP.AUTO-MCNC: 207 MG/DL (ref 65–100)
GLUCOSE BLD STRIP.AUTO-MCNC: 208 MG/DL (ref 65–100)
GLUCOSE BLD STRIP.AUTO-MCNC: 211 MG/DL (ref 65–100)
GLUCOSE BLD STRIP.AUTO-MCNC: 216 MG/DL (ref 65–100)
GLUCOSE BLD STRIP.AUTO-MCNC: 234 MG/DL (ref 65–100)
GLUCOSE BLD STRIP.AUTO-MCNC: 241 MG/DL (ref 65–100)
GLUCOSE BLD STRIP.AUTO-MCNC: 258 MG/DL (ref 65–100)
GLUCOSE BLD STRIP.AUTO-MCNC: 261 MG/DL (ref 65–100)
GLUCOSE BLD STRIP.AUTO-MCNC: 269 MG/DL (ref 65–100)
GLUCOSE BLD STRIP.AUTO-MCNC: 275 MG/DL (ref 65–100)
GLUCOSE BLD STRIP.AUTO-MCNC: 279 MG/DL (ref 65–100)
GLUCOSE BLD STRIP.AUTO-MCNC: 289 MG/DL (ref 65–100)
GLUCOSE SERPL-MCNC: 127 MG/DL (ref 65–100)
GLUCOSE SERPL-MCNC: 170 MG/DL (ref 65–100)
GLUCOSE SERPL-MCNC: 284 MG/DL (ref 65–100)
GLUCOSE, GLC: 121 MG/DL
GLUCOSE, GLC: 122 MG/DL
GLUCOSE, GLC: 129 MG/DL
GLUCOSE, GLC: 171 MG/DL
GLUCOSE, GLC: 171 MG/DL
GLUCOSE, GLC: 174 MG/DL
GLUCOSE, GLC: 208 MG/DL
GLUCOSE, GLC: 234 MG/DL
GLUCOSE, GLC: 258 MG/DL
GLUCOSE, GLC: 269 MG/DL
GLUCOSE, GLC: 275 MG/DL
GLUCOSE, GLC: 279 MG/DL
GLUCOSE, GLC: 289 MG/DL
HIGH TARGET, HITG: 250 MG/DL
INSULIN ADMINSTERED, INSADM: 0 UNITS/HOUR
INSULIN ADMINSTERED, INSADM: 0.1 UNITS/HOUR
INSULIN ADMINSTERED, INSADM: 0.1 UNITS/HOUR
INSULIN ADMINSTERED, INSADM: 0.7 UNITS/HOUR
INSULIN ADMINSTERED, INSADM: 1.1 UNITS/HOUR
INSULIN ADMINSTERED, INSADM: 1.2 UNITS/HOUR
INSULIN ADMINSTERED, INSADM: 2.3 UNITS/HOUR
INSULIN ADMINSTERED, INSADM: 3.4 UNITS/HOUR
INSULIN ADMINSTERED, INSADM: 4.2 UNITS/HOUR
INSULIN ADMINSTERED, INSADM: 4.3 UNITS/HOUR
INSULIN ADMINSTERED, INSADM: 5.2 UNITS/HOUR
INSULIN ADMINSTERED, INSADM: 5.9 UNITS/HOUR
INSULIN ADMINSTERED, INSADM: 6.6 UNITS/HOUR
INSULIN ORDER, INSORD: 0 UNITS/HOUR
INSULIN ORDER, INSORD: 0.1 UNITS/HOUR
INSULIN ORDER, INSORD: 0.1 UNITS/HOUR
INSULIN ORDER, INSORD: 0.7 UNITS/HOUR
INSULIN ORDER, INSORD: 1.1 UNITS/HOUR
INSULIN ORDER, INSORD: 1.2 UNITS/HOUR
INSULIN ORDER, INSORD: 2.3 UNITS/HOUR
INSULIN ORDER, INSORD: 3.4 UNITS/HOUR
INSULIN ORDER, INSORD: 4.2 UNITS/HOUR
INSULIN ORDER, INSORD: 4.3 UNITS/HOUR
INSULIN ORDER, INSORD: 5.2 UNITS/HOUR
INSULIN ORDER, INSORD: 5.9 UNITS/HOUR
INSULIN ORDER, INSORD: 6.6 UNITS/HOUR
LOW TARGET, LOT: 150 MG/DL
MAGNESIUM SERPL-MCNC: 2 MG/DL (ref 1.6–2.4)
MAGNESIUM SERPL-MCNC: 2 MG/DL (ref 1.6–2.4)
MAGNESIUM SERPL-MCNC: 2.1 MG/DL (ref 1.6–2.4)
METHADONE UR QL: NEGATIVE
MINUTES UNTIL NEXT BG, NBG: 60 MIN
MULTIPLIER, MUL: 0
MULTIPLIER, MUL: 0.01
MULTIPLIER, MUL: 0.02
MULTIPLIER, MUL: 0.03
OPIATES UR QL: POSITIVE
ORDER INITIALS, ORDINIT: NORMAL
PCP UR QL: NEGATIVE
POTASSIUM SERPL-SCNC: 3.9 MMOL/L (ref 3.5–5.1)
SERVICE CMNT-IMP: ABNORMAL
SERVICE CMNT-IMP: NORMAL
SODIUM SERPL-SCNC: 134 MMOL/L (ref 136–145)
SODIUM SERPL-SCNC: 135 MMOL/L (ref 136–145)
SODIUM SERPL-SCNC: 139 MMOL/L (ref 136–145)

## 2018-07-11 PROCEDURE — 80048 BASIC METABOLIC PNL TOTAL CA: CPT

## 2018-07-11 PROCEDURE — 74011636637 HC RX REV CODE- 636/637: Performed by: INTERNAL MEDICINE

## 2018-07-11 PROCEDURE — 83735 ASSAY OF MAGNESIUM: CPT | Performed by: INTERNAL MEDICINE

## 2018-07-11 PROCEDURE — 74011250637 HC RX REV CODE- 250/637: Performed by: EMERGENCY MEDICINE

## 2018-07-11 PROCEDURE — 82962 GLUCOSE BLOOD TEST: CPT

## 2018-07-11 PROCEDURE — 83735 ASSAY OF MAGNESIUM: CPT

## 2018-07-11 PROCEDURE — 74011250636 HC RX REV CODE- 250/636: Performed by: INTERNAL MEDICINE

## 2018-07-11 PROCEDURE — 36600 WITHDRAWAL OF ARTERIAL BLOOD: CPT

## 2018-07-11 PROCEDURE — 36415 COLL VENOUS BLD VENIPUNCTURE: CPT

## 2018-07-11 PROCEDURE — 74011250637 HC RX REV CODE- 250/637: Performed by: INTERNAL MEDICINE

## 2018-07-11 PROCEDURE — 74011000250 HC RX REV CODE- 250: Performed by: INTERNAL MEDICINE

## 2018-07-11 PROCEDURE — 80307 DRUG TEST PRSMV CHEM ANLYZR: CPT | Performed by: INTERNAL MEDICINE

## 2018-07-11 PROCEDURE — 65660000000 HC RM CCU STEPDOWN

## 2018-07-11 RX ORDER — MAGNESIUM SULFATE 100 %
4 CRYSTALS MISCELLANEOUS AS NEEDED
Status: DISCONTINUED | OUTPATIENT
Start: 2018-07-11 | End: 2018-07-11 | Stop reason: SDUPTHER

## 2018-07-11 RX ORDER — DIPHENHYDRAMINE HCL 25 MG
CAPSULE ORAL
Status: DISPENSED
Start: 2018-07-11 | End: 2018-07-11

## 2018-07-11 RX ORDER — ATORVASTATIN CALCIUM 10 MG/1
20 TABLET, FILM COATED ORAL
Status: DISCONTINUED | OUTPATIENT
Start: 2018-07-11 | End: 2018-07-12 | Stop reason: HOSPADM

## 2018-07-11 RX ORDER — SODIUM BICARBONATE 650 MG/1
650 TABLET ORAL 2 TIMES DAILY
Status: DISCONTINUED | OUTPATIENT
Start: 2018-07-11 | End: 2018-07-12 | Stop reason: HOSPADM

## 2018-07-11 RX ORDER — DEXTROSE 50 % IN WATER (D50W) INTRAVENOUS SYRINGE
12.5-25 AS NEEDED
Status: DISCONTINUED | OUTPATIENT
Start: 2018-07-11 | End: 2018-07-11 | Stop reason: SDUPTHER

## 2018-07-11 RX ORDER — SUCRALFATE 1 G/10ML
0.5 SUSPENSION ORAL
Status: DISCONTINUED | OUTPATIENT
Start: 2018-07-11 | End: 2018-07-12 | Stop reason: HOSPADM

## 2018-07-11 RX ORDER — INSULIN LISPRO 100 [IU]/ML
INJECTION, SOLUTION INTRAVENOUS; SUBCUTANEOUS
Status: DISCONTINUED | OUTPATIENT
Start: 2018-07-11 | End: 2018-07-12 | Stop reason: HOSPADM

## 2018-07-11 RX ORDER — CALCITRIOL 0.25 UG/1
0.25 CAPSULE ORAL DAILY
Status: DISCONTINUED | OUTPATIENT
Start: 2018-07-12 | End: 2018-07-12 | Stop reason: HOSPADM

## 2018-07-11 RX ORDER — SODIUM CHLORIDE 9 MG/ML
75 INJECTION, SOLUTION INTRAVENOUS CONTINUOUS
Status: DISCONTINUED | OUTPATIENT
Start: 2018-07-11 | End: 2018-07-12

## 2018-07-11 RX ORDER — INSULIN GLARGINE 100 [IU]/ML
20 INJECTION, SOLUTION SUBCUTANEOUS
Status: COMPLETED | OUTPATIENT
Start: 2018-07-11 | End: 2018-07-11

## 2018-07-11 RX ORDER — CLONIDINE HYDROCHLORIDE 0.1 MG/1
0.1 TABLET ORAL 2 TIMES DAILY
Status: DISCONTINUED | OUTPATIENT
Start: 2018-07-11 | End: 2018-07-12 | Stop reason: HOSPADM

## 2018-07-11 RX ORDER — INSULIN ASPART 100 [IU]/ML
5 INJECTION, SOLUTION INTRAVENOUS; SUBCUTANEOUS
COMMUNITY

## 2018-07-11 RX ORDER — DIPHENHYDRAMINE HCL 25 MG
50 CAPSULE ORAL
Status: COMPLETED | OUTPATIENT
Start: 2018-07-11 | End: 2018-07-11

## 2018-07-11 RX ORDER — GUAIFENESIN 100 MG/5ML
81 LIQUID (ML) ORAL DAILY
Status: DISCONTINUED | OUTPATIENT
Start: 2018-07-12 | End: 2018-07-12 | Stop reason: HOSPADM

## 2018-07-11 RX ORDER — METOPROLOL TARTRATE 50 MG/1
50 TABLET ORAL 2 TIMES DAILY
Status: DISCONTINUED | OUTPATIENT
Start: 2018-07-11 | End: 2018-07-12 | Stop reason: HOSPADM

## 2018-07-11 RX ADMIN — SODIUM BICARBONATE TAB 650 MG 650 MG: 650 TAB at 17:07

## 2018-07-11 RX ADMIN — DICYCLOMINE HYDROCHLORIDE 20 MG: 20 INJECTION, SOLUTION INTRAMUSCULAR at 04:02

## 2018-07-11 RX ADMIN — INSULIN LISPRO 5 UNITS: 100 INJECTION, SOLUTION INTRAVENOUS; SUBCUTANEOUS at 17:07

## 2018-07-11 RX ADMIN — DEXTROSE, SODIUM CHLORIDE, AND POTASSIUM CHLORIDE 150 ML/HR: 5; .9; .15 INJECTION INTRAVENOUS at 01:31

## 2018-07-11 RX ADMIN — METOPROLOL TARTRATE 50 MG: 50 TABLET ORAL at 22:55

## 2018-07-11 RX ADMIN — DIPHENHYDRAMINE HYDROCHLORIDE 50 MG: 25 CAPSULE ORAL at 01:15

## 2018-07-11 RX ADMIN — LABETALOL HYDROCHLORIDE 20 MG: 5 INJECTION, SOLUTION INTRAVENOUS at 09:33

## 2018-07-11 RX ADMIN — SODIUM CHLORIDE 75 ML/HR: 900 INJECTION, SOLUTION INTRAVENOUS at 12:28

## 2018-07-11 RX ADMIN — INSULIN GLARGINE 20 UNITS: 100 INJECTION, SOLUTION SUBCUTANEOUS at 12:27

## 2018-07-11 RX ADMIN — DEXTROSE, SODIUM CHLORIDE, AND POTASSIUM CHLORIDE 150 ML/HR: 5; .9; .15 INJECTION INTRAVENOUS at 08:21

## 2018-07-11 RX ADMIN — METOPROLOL TARTRATE 5 MG: 5 INJECTION, SOLUTION INTRAVENOUS at 12:28

## 2018-07-11 RX ADMIN — CLONIDINE HYDROCHLORIDE 0.1 MG: 0.1 TABLET ORAL at 17:07

## 2018-07-11 RX ADMIN — METOPROLOL TARTRATE 5 MG: 5 INJECTION, SOLUTION INTRAVENOUS at 06:16

## 2018-07-11 RX ADMIN — DICYCLOMINE HYDROCHLORIDE 20 MG: 20 INJECTION, SOLUTION INTRAMUSCULAR at 00:00

## 2018-07-11 RX ADMIN — ATORVASTATIN CALCIUM 20 MG: 10 TABLET, FILM COATED ORAL at 22:55

## 2018-07-11 RX ADMIN — TRAMADOL HYDROCHLORIDE 50 MG: 50 TABLET, FILM COATED ORAL at 01:15

## 2018-07-11 NOTE — PROGRESS NOTES
[facility]: MED RECONCILIATION    Comments/Recommendations:    Patient was able to name most medications without prompting   Per patient, was prescribed cephalexin for UTI on 7/2/18 x 7 days, but only took one dose   Per patient, was prescribed trimethoprim/sulfamethoxazole on 7/7/18 x 10 days for \"a boil that was removed\" from her back. She has not started this yet - would not recommend this agent with her renal function   Patient was prescribed #4 tablets of hydromorphone 2 mg on 18   Per patient, has not taken any oral medications recently due to vomiting   Per patient, used a lidocaine patch yesterday, but is not currently wearing one    Medications removed:  · Calcitriol 0.25 mcg daily - patient states not a current med (refilled in 2018)  · Cephalexin - patient only took one dose and then stopped  · Naproxen - not a current med  · Polyethylene glycol - not a current med  · Tramadol - not a current med. Causes itching    Medications adjusted:  · Humalog changed to Novolog (refilled in May 2018)    Information obtained from: Patient, current electronic medical records, and RxQuery    Allergies: Review of patient's allergies indicates no known allergies. Prior to Admission Medications:   Prior to Admission Medications   Prescriptions Last Dose Informant Patient Reported? Taking?   aspirin 81 mg chewable tablet  Self No Yes   Sig: Take 1 Tab by mouth daily. atorvastatin (LIPITOR) 20 mg tablet  Self Yes Yes   Sig: Take 20 mg by mouth nightly. cloNIDine HCl (CATAPRES) 0.1 mg tablet  Self No Yes   Sig: Take 1 Tab by mouth two (2) times a day. insulin aspart U-100 (NOVOLOG) 100 unit/mL injection 2018 Self Yes Yes   Si Units by SubCUTAneous route Before breakfast, lunch, and dinner. insulin glargine (LANTUS) 100 unit/mL injection 2018 Self No Yes   Si Units by SubCUTAneous route nightly.    lidocaine (LIDODERM) 5 % 7/10/2018 at Unknown time Self No Yes   Sig: Apply patch to the affected area for 12 hours a day and remove for 12 hours a day. metoprolol tartrate (LOPRESSOR) 50 mg tablet  Self Yes Yes   Sig: Take 50 mg by mouth two (2) times a day. ondansetron (ZOFRAN ODT) 4 mg disintegrating tablet  Self No Yes   Sig: Take 1 Tab by mouth every eight (8) hours as needed for Nausea. sodium bicarbonate 650 mg tablet  Self Yes Yes   Sig: Take 650 mg by mouth two (2) times a day. sucralfate (CARAFATE) 100 mg/mL suspension  Self No Yes   Sig: Take 5 mL by mouth four (4) times daily. trimethoprim-sulfamethoxazole (BACTRIM DS) 160-800 mg per tablet Not started yet Self No Yes   Sig: Take 2 Tabs by mouth two (2) times a day.         Thank you,  Devon Ferguson, PharmD, BCPS  [phone]

## 2018-07-11 NOTE — PROGRESS NOTES
Pt able to tolerate clear liquid. no nausea/vomiting noticed. 1400 pt sleeping. Insulin drip discontinued as per Dr Yun Hutchins. 1415 pt spouse at bedside. 1730 pt tolerated GI Lyte diet. no discomfort noticed. 1910 . Bedside and Verbal shift change report given to [facility] Rn (oncoming nurse) by Brooklyn Bacon rn (offgoing nurse). Report included the following information SBAR, Kardex, Intake/Output, MAR, Recent Results, Med Rec Status and Cardiac Rhythm NSR.

## 2018-07-11 NOTE — DIABETES MGMT
DTC Progress Note Recommendations/ Comments: Pt discussed with rounding team and Dr. Mushtaq Rodriguez- patient crying during rounds- consult received for assessment of home management- will defer education at this time given patient's emotional status. Patient remains on insulin drip at this time. Per Dr. Mushtaq Rodriguez, anion gap slightly elevated is not a concern as patient with stage 5 CKD. Chart reviewed on Yaneli Al during Multidisciplinary Rounds. A1c:  
Lab Results Component Value Date/Time Hemoglobin A1c 8.5 (H) 07/10/2018 05:05 PM  
 
 
Recent Glucose Results:  
Lab Results Component Value Date/Time  (H) 07/11/2018 09:15 AM  
  (H) 07/11/2018 05:32 AM  
  (H) 07/11/2018 01:34 AM  
 GLUCPOC 171 (H) 07/11/2018 11:45 AM  
 GLUCPOC 129 (H) 07/11/2018 10:30 AM  
 GLUCPOC 121 (H) 07/11/2018 09:22 AM  
  
 
Lab Results Component Value Date/Time Creatinine 5.51 (H) 07/11/2018 09:15 AM  
 
Estimated Creatinine Clearance: 10.7 mL/min (based on Cr of 5.51). Active Orders Diet DIET CLEAR LIQUID Disposable Dishes PO intake: No data found. Will continue to follow as needed. Thank you. Dre Cox RD, CDE [facility] Office:  [phone]

## 2018-07-11 NOTE — ROUTINE PROCESS
1925: Bedside shift change report given to [facility] (oncoming nurse) by Antonette Smalls (offgoing nurse). Report included the following information SBAR, Kardex, ED Summary, Intake/Output, MAR, Recent Results and Cardiac Rhythm SR, ST.   2340: Bed alarm kept going off. Pt kept trying to get OOB. RN instructed pt to use call bell for assistance and to stay in bed. Pt still trying to get OOB. Pt yelling, tearless crying, stating her stomach and back hurt. 2345: Pt c/o 10/10, throbbing abd and back pain. Ultram offered. Pt refused. Pt stated Ultram makes her itch. 0000: Bentyl IM given. 0002: Pt resting quietly with eyes closed. 0044: Pt trying to get OOB, yelling, tearless crying. Pt states her back hurts. 200: Spoke with Dr. Dean Flores. New order given: Benadryl 50mg PO once so pt can take Ultram.   0052: Pt resting quietly with eyes closed. Benadryl needs to be verified by pharmacy. 0115: Benadryl and Ultram given. 0124: Pt heard from nurses' station to be wretching. RN went to assess pt. Emesis bag given to pt. Pt gagged once while RN was at bedside. RN stayed at bedside for several minutes. 0145: Pt resting quietly with eyes closed. 0720: Bedside shift change report given to Samir (oncoming nurse) by [facility] (offgoing nurse).  Report included the following information SBAR, Kardex, ED Summary, Intake/Output, MAR, Recent Results and Cardiac Rhythm SR, ST.

## 2018-07-11 NOTE — PROGRESS NOTES
Hospitalist Progress Note NAME: Nunu Mroeno :  1965 MRN:  [id number] Room Number:  Ifeoma Guillen  @ [facility] Summary: 46 y.o. female whom presented on 7/10/2018 with Assessment / Plan: 
 
Mild DKA:POA resolved Poorly controlled, IDDM Non compliance with insulin A1c 8.5,  
diabetic diet Give Lantus 20 units now,ISS Significant time spent on counseling patient on the need to be compliant with diabetic medications, including risks of damage to renal, cardiac and nervous systems. 
  
Acute on chronic intractable nausea, vomiting and abdominal pain As per records this is the patient's 28 CT abdominal pelvis and bones are persistent since . Again, no acute process in the abdomen and pelvis were determined. UDS pos for opiods 
advance to clears, IV fluids, prn pain meds and antiemetics Gen surgery consult appreciated, no plans for surgical intervention Concern for opioid seeking behavior States only dilaudid works for her, not ready to take any other pain med Hypertension Resume Metoprolol CKD stage V Not on dialysis Follows up with nephrology outpatient Chronic anemia and metabolic acidosis Anemia of chronic kidney disease Body mass index is 22.01 kg/(m^2). Surrogate Decision Maker: Code status: Full Prophylaxis: Hep SQ Recommended Disposition: Home w/Family Subjective: Chief Complaint / Reason for Physician Visit \"crying in pain,no one dose anything for me\". Discussed with RN events overnight. Review of Systems: 
Symptom Y/N Comments  Symptom Y/N Comments Fever/Chills n   Chest Pain n   
Poor Appetite n   Edema n   
Cough n   Abdominal Pain y Sputum n   Joint Pain n   
SOB/GRIFFITHS n   Pruritis/Rash Nausea/vomit n   Tolerating PT/OT Diarrhea n   Tolerating Diet y Constipation n   Other Could NOT obtain due to:   
 
Objective: VITALS:  
Last 24hrs VS reviewed since prior progress note. Most recent are: 
Patient Vitals for the past 24 hrs: 
 Temp Pulse Resp BP SpO2  
07/11/18 1151 97.4 °F (36.3 °C) 80 12 132/69 100 % 07/11/18 0933 - (!) 104 - (!) 181/105 -  
07/11/18 0827 98.2 °F (36.8 °C) (!) 104 20 (!) 187/104 100 % 07/11/18 0600 - (!) 105 14 (!) 177/94 100 % 07/11/18 0359 97.7 °F (36.5 °C) (!) 111 14 163/81 100 % 07/11/18 0004 - 96 - - -  
07/10/18 2353 97.7 °F (36.5 °C) (!) 114 14 155/82 100 % 07/10/18 2000 97.9 °F (36.6 °C) 87 12 122/77 100 % 07/10/18 1805 - (!) 104 - 146/85 -  
07/10/18 1539 - - - - 100 % 07/10/18 1537 99.1 °F (37.3 °C) (!) 110 22 132/65 100 % 07/10/18 1443 - (!) 120 19 - 100 % 07/10/18 1440 - (!) 116 22 136/77 100 % 07/10/18 1430 - (!) 122 - (!) 169/95 - Intake/Output Summary (Last 24 hours) at 07/11/18 1327 Last data filed at 07/11/18 5522 Gross per 24 hour Intake          1915.23 ml Output             1600 ml Net           315.23 ml PHYSICAL EXAM: 
General: WD, WN. Alert, cooperative, CRYING IN PAIN   
EENT:  EOMI. Anicteric sclerae. MMM Resp:  CTA bilaterally, no wheezing or rales. No accessory muscle use CV:  Regular  rhythm,  No edema GI:  Soft, Non distended, LUQ tender+.  +Bowel sounds Neurologic:  Alert and oriented X 3, normal speech, Psych:   Good insight. Not anxious nor agitated Skin:  No rashes. No jaundice Reviewed most current lab test results and cultures  YES Reviewed most current radiology test results   YES Review and summation of old records today    NO Reviewed patient's current orders and MAR    YES 
PMH/SH reviewed - no change compared to H&P 
________________________________________________________________________ Care Plan discussed with: 
  Comments Patient x Family RN x Care Manager x Consultant  x Multidiciplinary team rounds were held today with , nursing, pharmacist and clinical coordinator.   Patient's plan of care was discussed; medications were reviewed and discharge planning was addressed. ________________________________________________________________________ Total NON critical care TIME:  40   Minutes Total CRITICAL CARE TIME Spent:   Minutes non procedure based Comments >50% of visit spent in counseling and coordination of care    
________________________________________________________________________ Rodney Ceja MD  
 
Procedures: see electronic medical records for all procedures/Xrays and details which were not copied into this note but were reviewed prior to creation of Plan. LABS: 
I reviewed today's most current labs and imaging studies. Pertinent labs include: 
Recent Labs  
   07/10/18 
 1116  07/09/18 
 2200 WBC  6.8  9.0 HGB  11.1*  11.1*  
HCT  32.2*  33.6*  
PLT  319  339 Recent Labs  
   07/11/18 
 0940  07/11/18 
 0915  07/11/18 
 0532  07/11/18 
 0134   07/10/18 
 1705  07/10/18 
 1116  07/09/18 
 2200 NA   --   139  135*  134*   < >  132*  130*  134* K   --   3.9  3.9  3.9   < >  3.7  4.2  4.5  
CL   --   107  103  101   < >  98  93*  101 CO2   --   17*  18*  19*   < >  17*  17*  19* GLU   --   127*  284*  170*   < >  200*  349*  255* BUN   --   64*  67*  69*   < >  73*  70*  72* CREA   --   5.51*  5.76*  5.80*   < >  6.15*  6.42*  7.05* CA   --   9.4  9.2  9.1   < >  9.0  9.5  9.1 MG  2.0   --   2.0  2.1   < >  2.0   --    --   
PHOS   --    --    --    --    --   5.0*   --    --   
ALB   --    --    --    --    --    --   3.8  3.7 TBILI   --    --    --    --    --    --   0.3  0.4 SGOT   --    --    --    --    --    --   21  21 ALT   --    --    --    --    --    --   19  21  
 < > = values in this interval not displayed.   
 
 
Signed: Rodney Ceja MD

## 2018-07-11 NOTE — PROGRESS NOTES
Problem: Falls - Risk of  Goal: *Absence of Falls  Document Darryl Fall Risk and appropriate interventions in the flowsheet.    Outcome: Progressing Towards Goal  Fall Risk Interventions:            Medication Interventions: Teach patient to arise slowly    Elimination Interventions: Bed/chair exit alarm, Call light in reach

## 2018-07-11 NOTE — PROGRESS NOTES
1045: IDR with team.  Patient sitting in the chair. Crying in pain stating. \" no one do anything, I'm telling you something is wrong, nobody cares they treat me bad (patient used profanity). MD reassure her that the whole team is here to discuss her care. Patient kept moaning and crying. MD state she will consult Dr. Raffaele Sanders. reviewed medical records patient has been to the ER a couple of times before being admitted. 18:  CM reviewed patient chart  Patient 45 y/o female insured with [employer] Medicare Part A&B and [facility] Medicaid. Admitted at [facility] on 7/10/2018 for DKA. PMHx  CKD, hyperlipemia, gastroparesis, pancreatitis, DM, HTN, UTI, lumbar disc dz. Patient state she [facility] [employer] [facility] [employer] [facility] [address]. CM went to assess patient this afternoon and patient is in the bed sitting up and eating. More engage in communicating with me. CM ask what was different from this morning due to her being upset. Patient state the RN gave me some pain medication and her pain is better. Patient last  PCP visit on Friday 7/6/2018. Patient support system include her  Yayo Olea [phone] and sister Davon Roche [phone]. Patient is inpatient has Medicare. Will issue IMM Letter. CM to follow-up tomorrow. Assess for ongoing await surgery evaluation. Lisa Jones [facility] CM. Care Management Interventions  PCP Verified by CM: Yes  Last Visit to PCP: 07/06/18  Palliative Care Criteria Met (RRAT>21 & CHF Dx)?: No  Mode of Transport at Discharge:  Other (see comment) ()  Transition of Care Consult (CM Consult): Discharge Planning  Health Maintenance Reviewed: Yes  Current Support Network: Lives with Spouse  Confirm Follow Up Transport: Family  Plan discussed with Pt/Family/Caregiver: Yes  Freedom of Choice Offered: Yes  Discharge Location  Discharge Placement: Home

## 2018-07-11 NOTE — PROGRESS NOTES
Problem: Falls - Risk of  Goal: *Absence of Falls  Document Darryl Fall Risk and appropriate interventions in the flowsheet.    Outcome: Progressing Towards Goal  Fall Risk Interventions:            Medication Interventions: Teach patient to arise slowly, Patient to call before getting OOB

## 2018-07-12 VITALS
HEART RATE: 85 BPM | RESPIRATION RATE: 16 BRPM | SYSTOLIC BLOOD PRESSURE: 127 MMHG | BODY MASS INDEX: 22.04 KG/M2 | HEIGHT: 65 IN | WEIGHT: 132.28 LBS | DIASTOLIC BLOOD PRESSURE: 80 MMHG | TEMPERATURE: 98.1 F | OXYGEN SATURATION: 100 %

## 2018-07-12 LAB
ANION GAP SERPL CALC-SCNC: 12 MMOL/L (ref 5–15)
BUN SERPL-MCNC: 59 MG/DL (ref 6–20)
BUN/CREAT SERPL: 11 (ref 12–20)
CALCIUM SERPL-MCNC: 8.1 MG/DL (ref 8.5–10.1)
CHLORIDE SERPL-SCNC: 107 MMOL/L (ref 97–108)
CO2 SERPL-SCNC: 19 MMOL/L (ref 21–32)
CREAT SERPL-MCNC: 5.44 MG/DL (ref 0.55–1.02)
GLUCOSE SERPL-MCNC: 154 MG/DL (ref 65–100)
POTASSIUM SERPL-SCNC: 4.1 MMOL/L (ref 3.5–5.1)
SODIUM SERPL-SCNC: 138 MMOL/L (ref 136–145)

## 2018-07-12 PROCEDURE — 36415 COLL VENOUS BLD VENIPUNCTURE: CPT | Performed by: INTERNAL MEDICINE

## 2018-07-12 PROCEDURE — 74011636637 HC RX REV CODE- 636/637: Performed by: INTERNAL MEDICINE

## 2018-07-12 PROCEDURE — 74011250636 HC RX REV CODE- 250/636: Performed by: INTERNAL MEDICINE

## 2018-07-12 PROCEDURE — 80048 BASIC METABOLIC PNL TOTAL CA: CPT | Performed by: INTERNAL MEDICINE

## 2018-07-12 PROCEDURE — 74011250637 HC RX REV CODE- 250/637: Performed by: INTERNAL MEDICINE

## 2018-07-12 PROCEDURE — 82962 GLUCOSE BLOOD TEST: CPT

## 2018-07-12 RX ORDER — HEPARIN SODIUM 5000 [USP'U]/ML
5000 INJECTION, SOLUTION INTRAVENOUS; SUBCUTANEOUS EVERY 8 HOURS
Status: DISCONTINUED | OUTPATIENT
Start: 2018-07-12 | End: 2018-07-12 | Stop reason: HOSPADM

## 2018-07-12 RX ORDER — INSULIN GLARGINE 100 [IU]/ML
25 INJECTION, SOLUTION SUBCUTANEOUS
Status: DISCONTINUED | OUTPATIENT
Start: 2018-07-12 | End: 2018-07-12 | Stop reason: HOSPADM

## 2018-07-12 RX ORDER — CEPHALEXIN 250 MG/1
250 CAPSULE ORAL EVERY 24 HOURS
Qty: 5 CAP | Refills: 0 | Status: SHIPPED | OUTPATIENT
Start: 2018-07-12 | End: 2018-07-17

## 2018-07-12 RX ORDER — CEPHALEXIN 250 MG/1
250 CAPSULE ORAL EVERY 24 HOURS
Status: DISCONTINUED | OUTPATIENT
Start: 2018-07-12 | End: 2018-07-12 | Stop reason: HOSPADM

## 2018-07-12 RX ADMIN — SODIUM BICARBONATE TAB 650 MG 650 MG: 650 TAB at 09:33

## 2018-07-12 RX ADMIN — ASPIRIN 81 MG 81 MG: 81 TABLET ORAL at 09:33

## 2018-07-12 RX ADMIN — METOPROLOL TARTRATE 50 MG: 50 TABLET ORAL at 09:36

## 2018-07-12 RX ADMIN — SODIUM CHLORIDE 75 ML/HR: 900 INJECTION, SOLUTION INTRAVENOUS at 02:00

## 2018-07-12 RX ADMIN — CALCITRIOL 0.25 MCG: 0.25 CAPSULE, LIQUID FILLED ORAL at 12:33

## 2018-07-12 RX ADMIN — CEPHALEXIN 250 MG: 250 CAPSULE ORAL at 12:32

## 2018-07-12 RX ADMIN — CLONIDINE HYDROCHLORIDE 0.1 MG: 0.1 TABLET ORAL at 09:33

## 2018-07-12 RX ADMIN — INSULIN LISPRO 2 UNITS: 100 INJECTION, SOLUTION INTRAVENOUS; SUBCUTANEOUS at 12:33

## 2018-07-12 NOTE — DISCHARGE INSTRUCTIONS
Blood Urea Nitrogen (BUN) Test: About This Test  What is it? A blood urea nitrogen (BUN) test measures the amount of nitrogen in your blood that comes from the waste product urea. Urea is made in the liver and passed out of your body in the urine. If your kidneys are not able to remove urea from the blood normally, your BUN level rises. Dehydration can also make your BUN level higher. A BUN test may be done with a blood creatinine test. The level of creatinine in your blood also tells how well your kidneys are working. A high creatinine level may mean your kidneys are not working properly. BUN and creatinine tests can be used together to find the BUN-to-creatinine ratio. Why is this test done? A BUN test is done to:  · See if your kidneys are working normally. · See if your kidney disease is getting worse. · See if treatment of your kidney disease is working. · Check for severe dehydration. Dehydration generally causes BUN levels to rise more than creatinine levels. This causes a high BUN-to-creatinine ratio. How can you prepare for the test?  · Do not eat a lot of meat or other protein in the 24 hours before having a BUN test.  What happens during the test?  · A health professional takes a sample of your blood. What else should you know about the test?  · Your results will include an explanation of what a \"normal\" result is. This is called a \"reference range. \" It is just a guide. Your doctor will evaluate your results based on your health and other factors. This means that a value that falls outside the normal values listed may still be normal for you. · Make sure your doctor knows all of the medicines, vitamins, herbal products, and supplements you take. What happens after the test?  · You will probably be able to go home right away. · You can go back to your usual activities right away. Follow-up care is a key part of your treatment and safety.  Be sure to make and go to all appointments, and call your doctor if you are having problems. It's also a good idea to keep a list of the medicines you take. Ask your doctor when you can expect to have your test results. Where can you learn more? Go to http://nery-peña.info/. Enter E488 in the search box to learn more about \"Blood Urea Nitrogen (BUN) Test: About This Test.\"  Current as of: May 12, 2017  Content Version: 11.7  © 6755-3267 [employer]. Care instructions adapted under license by [facility] (which disclaims liability or warranty for this information). If you have questions about a medical condition or this instruction, always ask your healthcare professional. [address] any warranty or liability for your use of this information. Diabetic Ketoacidosis (DKA): Care Instructions  Your Care Instructions  Diabetic ketoacidosis (DKA) happens when the body does not have enough insulin and can't get the sugar it needs for energy. When the body can't use sugar for energy, it starts to use fat for energy. This process makes fatty acids called ketones. The ketones build up in the blood and change the chemical balance in your body. This problem can be very dangerous and needs to be treated. Without treatment, it can lead to a coma or death. DKA occurs most often in people with type 1 diabetes. But people with type 2 diabetes also can get it. DKA can be caused by many things. It can happen if you don't take enough insulin. It can also happen if you have an infection or illness like the flu. Sometimes it happens if you are very dehydrated. DKA can only be treated with insulin and fluids. These are often given in a vein (IV). Follow-up care is a key part of your treatment and safety. Be sure to make and go to all appointments, and call your doctor if you are having problems. It's also a good idea to know your test results and keep a list of the medicines you take.   How can you care for yourself at home? To reduce your chance of ketoacidosis:  · Take your insulin and other diabetes medicines on time and in the right dose. ¨ If an infection caused your DKA and your doctor prescribed antibiotics, take them as directed. Do not stop taking them just because you feel better. You need to take the full course of antibiotics. · Test your blood sugar before meals and at bedtime or as often as your doctor advises. This is the best way to know when your blood sugar is high so you can treat it early. Watching for symptoms is not as helpful. This is because you may not have symptoms until your blood sugar is very high. Or you may not notice them. · Teach others at work and at home how to check your blood sugar. Make sure that someone else knows how do it in case you can't. · Wear or carry medical identification at all times. This is very important in case you are too sick or injured to speak for yourself. · Talk to your doctor about when you can start to exercise again. · Eat regular meals that spread your calories and carbohydrate throughout the day. This will help keep your blood sugar steady. · When you are sick:  ¨ Take your insulin and diabetes medicines. This is important even if you are vomiting and having trouble eating or drinking. Your blood sugar may go up because you are sick. If you are eating less than normal, you may need to change your dose of insulin. Talk with your doctor about a plan when you are well. Then you will know what to do when you are sick. ¨ Drink extra fluids to prevent dehydration. These include water, broth, and sugar-free drinks. If you don't drink enough, the insulin from your shot may not get into your blood. So your blood sugar may go up. ¨ Try to eat as you normally do, with a focus on healthy food choices. ¨ Check your blood sugar at least every 3 to 4 hours.  Check it more often if it's rising fast. If your doctor has told you to take an extra insulin dose for high blood sugar levels (for example, above 240 mg/dL) be sure to take the right amount. If you're not sure how much to take, call your doctor. ¨ Check your temperature and pulse often. If your temperature goes up, call your doctor. You may be getting worse. ¨ If you take insulin, check your urine or blood for ketones, especially when you have high blood sugar (for example, above 240 mg/dL). Call your doctor if your ketone level is moderate or high. If you know your blood sugar is high, treat it before it gets worse. · If you missed your usual dose of insulin or other diabetes medicine, take the missed dose or take the amount your doctor told you to take if this happens. · If you and your doctor decide on a dose of extra-fast-acting insulin, give yourself the right dose. If you take insulin and your doctor has not told you how much fast-acting insulin to take based on your blood sugar level, call your doctor. · Drink extra water or sugar-free drinks to prevent dehydration. · Wait 30 minutes after you take extra insulin or missed medicines. Then check your blood sugar again. · If symptoms of high blood sugar get worse or your blood sugar level keeps rising, call your doctor. If you start to feel sleepy or confused, call 911. When should you call for help? Call 911 anytime you think you may need emergency care. For example, call if:    · You passed out (lost consciousness).     · You are confused or cannot think clearly.     · Your blood sugar is very high or very low.    Watch closely for changes in your health, and be sure to contact your doctor if:    · Your blood sugar stays outside the level your doctor set for you.     · You have any problems. Where can you learn more? Go to http://nery-peña.info/. [place] in the search box to learn more about \"Diabetic Ketoacidosis (DKA): Care Instructions. \"  Current as of: December 7, 2017  Content Version: 11.7  © 5747-0832 [employer]. Care instructions adapted under license by [employer] (which disclaims liability or warranty for this information). If you have questions about a medical condition or this instruction, always ask your healthcare professional. [address] any warranty or liability for your use of this information. Kidney Disease and High Blood Pressure: Care Instructions  Your Care Instructions    Long-term (chronic) kidney disease happens when the kidneys cannot remove waste and keep your body's fluids and chemicals in balance. Usually, the kidneys remove waste from the blood through the urine. When the kidneys are not working well, waste can build up so much that it poisons the body. Kidney disease can make you very tired. It also can cause swelling, or edema, in your legs or other areas of your body. High blood pressure is one of the major causes of chronic kidney disease. And kidney disease can also cause high blood pressure. No matter which came first, having high blood pressure damages the tiny blood vessels in the kidneys. If you have high blood pressure, it is important to lower it. There are many things you can do to lower your blood pressure, which may help slow or stop the damage to your kidneys. Follow-up care is a key part of your treatment and safety. Be sure to make and go to all appointments, and call your doctor if you are having problems. It's also a good idea to know your test results and keep a list of the medicines you take. How can you care for yourself at home? · Be safe with medicines. Take your medicines exactly as prescribed. Call your doctor if you have any problems with your medicine. You will probably need more than one medicine to lower your blood pressure. You will get more details on the specific medicines your doctor prescribes.   · Work with your doctor and a dietitian to plan meals that have the right amount of nutrients for you. You will probably have to limit salt, fluids, and protein. · Stay at a healthy weight. This is very important if you put on weight around the waist. Losing even 10 pounds can help you lower your blood pressure. · Manage other health problems such as diabetes and high cholesterol. You can help lower your risk for heart disease and blood vessel problems with a healthy lifestyle along with medicines. · Do not take ibuprofen (Advil, Motrin) or naproxen (Aleve), or similar medicines, unless your doctor tells you to. They may make chronic kidney disease worse. It is okay to take acetaminophen (Tylenol). · If your doctor recommends it, get more exercise. Walking is a good choice. Bit by bit, increase the amount you walk every day. Try for at least 30 minutes on most days of the week. You also may want to swim, bike, or do other activities. · Limit or avoid alcohol. Talk to your doctor about whether you can drink any alcohol. · Do not smoke or allow others to smoke around you. If you need help quitting, talk to your doctor about stop-smoking programs and medicines. These can increase your chances of quitting for good. When should you call for help? Call 911 anytime you think you may need emergency care. For example, call if:    · You passed out (lost consciousness).    Call your doctor now or seek immediate medical care if:    · You have new or worse nausea and vomiting.     · You have much less urine than normal, or you have no urine.     · You are feeling confused or cannot think clearly.     · You have new or more blood in your urine.     · You have new swelling.     · You are dizzy or lightheaded, or you feel like you may faint.    Watch closely for changes in your health, and be sure to contact your doctor if:    · You do not get better as expected. Where can you learn more? Go to http://nery-peña.info/.   Enter D010 in the search box to learn more about \"Kidney Disease and High Blood Pressure: Care Instructions. \"  Current as of: May 12, 2017  Content Version: 11.7  © 9488-1514 [employer]. Care instructions adapted under license by [employer] (which disclaims liability or warranty for this information). If you have questions about a medical condition or this instruction, always ask your healthcare professional. [address] any warranty or liability for your use of this information. Learning About Chronic Kidney Disease and Potassium  What is potassium? Potassium is a mineral. It helps keep the right mix of fluids in your body. It also helps your nerves, muscles, and heart work properly. Most people get enough potassium from the foods they eat. How does chronic kidney disease affect potassium levels? Healthy kidneys keep the right balance of minerals in your blood. This includes potassium. If you have long-term (chronic) kidney disease, it is hard for your kidneys to control the amount of potassium in your blood. You may get too much potassium. This can be harmful. In some cases, other medicines may make your body get rid of too much potassium. If this happens, you may need to take a potassium supplement. How can you manage your potassium level? You can learn how much potassium is in certain foods. Then you can control how much potassium you get in your diet. Your doctor or dietitian can help you plan a diet that gives you the right amount of potassium. There is no diet that is right for everyone. Your diet will be based on how well your kidneys are working and whether you are on dialysis. Your diet may change as your disease changes. See your doctor for regular testing. Testing helps you know when to change your diet. Your doctor or dietitian can help you do this. Changing your diet can be hard. You may have to give up many foods you like. But it is very important to make the recommended changes.  They will help you stay healthy for as long as possible. What foods and products have potassium? You can control the amount of potassium in your diet if you know which foods are low or high in potassium. Foods low in potassium  · Blueberries and raspberries  · White or brown rice, spaghetti, and macaroni  · Cucumbers, radishes, and hummus  Foods high in potassium  · Apricots, oranges, prunes, and bananas  · Broccoli, spinach, and potatoes  · Milk and yogurt  Other products that may have potassium  · Diet or protein drinks and diet bars often have potassium. It is also in sports drinks, such as Gatorade. These are meant to replace potassium you lose during exercise. · Do not use a salt substitute or \"lite\" salt without talking to your doctor first. These often are very high in potassium. · Be sure to tell your doctor about any prescription or over-the-counter medicines you take. Some medicines can raise your level of potassium. Where can you learn more? Go to http://nery-peña.info/. Enter Y498 in the search box to learn more about \"Learning About Chronic Kidney Disease and Potassium. \"  Current as of: May 12, 2017  Content Version: 11.7  © 8484-9398 [employer]. Care instructions adapted under license by [employer] (which disclaims liability or warranty for this information). If you have questions about a medical condition or this instruction, always ask your healthcare professional. [place] [address] any warranty or liability for your use of this information. Learning About Chronic Kidney Disease and Potassium  What is potassium? Potassium is a mineral. It helps keep the right mix of fluids in your body. It also helps your nerves, muscles, and heart work properly. Most people get enough potassium from the foods they eat. How does chronic kidney disease affect potassium levels? Healthy kidneys keep the right balance of minerals in your blood.  This includes potassium. If you have long-term (chronic) kidney disease, it is hard for your kidneys to control the amount of potassium in your blood. You may get too much potassium. This can be harmful. In some cases, other medicines may make your body get rid of too much potassium. If this happens, you may need to take a potassium supplement. How can you manage your potassium level? You can learn how much potassium is in certain foods. Then you can control how much potassium you get in your diet. Your doctor or dietitian can help you plan a diet that gives you the right amount of potassium. There is no diet that is right for everyone. Your diet will be based on how well your kidneys are working and whether you are on dialysis. Your diet may change as your disease changes. See your doctor for regular testing. Testing helps you know when to change your diet. Your doctor or dietitian can help you do this. Changing your diet can be hard. You may have to give up many foods you like. But it is very important to make the recommended changes. They will help you stay healthy for as long as possible. What foods and products have potassium? You can control the amount of potassium in your diet if you know which foods are low or high in potassium. Foods low in potassium  · Blueberries and raspberries  · White or brown rice, spaghetti, and macaroni  · Cucumbers, radishes, and hummus  Foods high in potassium  · Apricots, oranges, prunes, and bananas  · Broccoli, spinach, and potatoes  · Milk and yogurt  Other products that may have potassium  · Diet or protein drinks and diet bars often have potassium. It is also in sports drinks, such as Gatorade. These are meant to replace potassium you lose during exercise. · Do not use a salt substitute or \"lite\" salt without talking to your doctor first. These often are very high in potassium. · Be sure to tell your doctor about any prescription or over-the-counter medicines you take.  Some medicines can raise your level of potassium. Where can you learn more? Go to http://nery-peña.info/. Enter X333 in the search box to learn more about \"Learning About Chronic Kidney Disease and Potassium. \"  Current as of: May 12, 2017  Content Version: 11.7  © 4413-5203 [employer]. Care instructions adapted under license by [employer] (which disclaims liability or warranty for this information). If you have questions about a medical condition or this instruction, always ask your healthcare professional. [address] any warranty or liability for your use of this information. Diet for Chronic Kidney Disease (Before Dialysis): Care Instructions  Your Care Instructions  When you have chronic kidney disease, you need to change your diet to avoid foods that make your kidneys worse. You may need to limit salt, fluids, and protein. You also may need to limit minerals such as potassium and phosphorus. A diet for chronic kidney disease takes planning. A dietitian who specializes in kidney disease can help you plan meals that meet your needs. These guidelines are for people who are not on dialysis. Talk with your doctor or dietitian to make sure your diet is right for your condition. Do not change your diet without talking to your doctor or dietitian. Follow-up care is a key part of your treatment and safety. Be sure to make and go to all appointments, and call your doctor if you are having problems. It's also a good idea to know your test results and keep a list of the medicines you take. How can you care for yourself at home? · Work with your doctor or dietitian to create a food plan. · Do not skip meals or go for many hours without eating. If you do not feel very hungry, try to eat 4 or 5 small meals instead of 1 or 2 big meals.   · If you have a hard time eating enough, talk to your doctor or dietitian about ways you can add calories to your diet. · Do not take any vitamins or minerals, supplements, or herbal products without talking to your doctor first.  · Check with your doctor about whether it is safe for you to drink alcohol. To get the right amount of protein  · Ask your doctor or dietitian how much protein you can have each day. Most people with chronic kidney disease need to limit the amount of protein they eat. But you still need some protein to stay healthy. · Include all sources of protein in your daily protein count. Besides meat, poultry, fish, and eggs, protein is found in milk and milk products, beans and nuts, breads, cereals, and vegetables. To limit salt  · Read food labels on cans and food packages. The labels tell you how much sodium is in each serving. Make sure that you look at the serving size. If you eat more than the serving size, you will get more sodium than what is listed on the label. · Do not add salt to your food. Avoid foods that list salt, sodium, or monosodium glutamate (MSG) as an ingredient. · Buy foods that are labeled \"no salt added,\" \"sodium-free,\" or \"low-sodium. \" Foods labeled \"reduced-sodium\" and \"light sodium\" may still have too much sodium. · Limit processed foods, fast food, and restaurant foods. These types of food are very high in sodium. · Avoid salted pretzels, chips, popcorn, and other salted snacks. · Avoid smoked, cured, salted, and canned meat, fish, and poultry. This includes ham, shaikh, hot dogs, and luncheon meats. · You may use lemon, herbs, and spices to flavor your meals. To limit potassium  · Choose low-potassium fruits such as applesauce, pineapple, grapes, blueberries, and raspberries. · Choose low-potassium vegetables such as lettuce, green beans, cucumber, and radishes. · Choose low-potassium foods such as hummus, spaghetti, macaroni, rice, tortillas, and bagels.   · Limit or avoid high-potassium foods such as milk, bananas, oranges, cantaloupe, potatoes, spinach, tomatoes, broccoli, and sweet potatoes. · Do not use a salt substitute or lite salt unless your doctor says it is okay. Most salt substitutes and lite salts are high in potassium. To limit phosphorus  · Follow your food plan to know how much milk and milk products you can have. · Limit nuts, peanut butter, seeds, lentils, beans, organ meats, and sardines. · Avoid cola drinks. · Avoid bran breads or bran cereals. If you need to limit fluids  · Know how much fluid you can drink. Every day fill a pitcher with that amount of water. If you drink another fluid (such as coffee) that day, pour an equal amount of water out of the pitcher. · Count foods that are liquid at room temperature, such as gelatin dessert and ice cream, as fluids. Where can you learn more? Go to http://neryBIMApeña.info/. Enter M232 in the search box to learn more about \"Diet for Chronic Kidney Disease (Before Dialysis): Care Instructions. \"  Current as of: June 5, 2017  Content Version: 11.7  © 7784-3303 [employer]. Care instructions adapted under license by [employer] (which disclaims liability or warranty for this information). If you have questions about a medical condition or this instruction, always ask your healthcare professional. [place] [address] any warranty or liability for your use of this information. Chronic Kidney Disease: Care Instructions  Your Care Instructions    Chronic kidney disease happens when your kidneys don't work as well as they should. Your kidneys have a few important jobs. They remove waste from your blood. This waste leaves your body in your urine. They also balance your body's fluids and chemicals. When your kidneys don't work well, extra waste and fluid can build up. This can poison the body and sometimes cause death. The most common causes of this disease are diabetes and high blood pressure. In some cases, the disease develops in 2 to 3 months. But it usually develops over many years. If you take medicine and make healthy changes to your lifestyle, you may be able to prevent the disease from getting worse. But if your kidney damage gets worse, you may need dialysis or a kidney transplant. Dialysis uses a machine to filter waste from the blood. A transplant is surgery to give you a healthy kidney from another person. Follow-up care is a key part of your treatment and safety. Be sure to make and go to all appointments, and call your doctor if you are having problems. It's also a good idea to know your test results and keep a list of the medicines you take. How can you care for yourself at home?   Treatments and appointments    · Be safe with medicines. Take your medicines exactly as prescribed. Call your doctor if you have any problems with your medicine. You also may take medicine to control your blood pressure or to treat diabetes. Many people who have diabetes take blood pressure medicine.     · If you have diabetes, do your best to keep your blood sugar in your target range. You may do this by eating healthy food and exercising. You may also take medicines.     · Go to your dialysis appointments if you have this treatment.     · Do not take ibuprofen (Advil, Motrin), naproxen (Aleve), or similar medicines, unless your doctor tells you to. These may make the disease worse.     · Do not take any vitamins, over-the-counter medicines, or herbal products without talking to your doctor first.     · Do not smoke or use other tobacco products. Smoking can reduce blood flow to the kidneys. If you need help quitting, talk to your doctor about stop-smoking programs and medicines. These can increase your chances of quitting for good.     · Do not drink alcohol or use illegal drugs.     · Talk to your doctor about an exercise plan. Exercise helps lower your blood pressure. It also makes you feel better.     · If you have an advance directive, let your doctor know. It may include a living will and a durable power of  for health care. If you don't have one, you may want to prepare one. It lets your doctor and loved ones know your health care wishes if you become unable to speak for yourself. Diet    · Talk to a registered dietitian. He or she can help you make a meal plan that is right for you. Most people with kidney disease need to limit salt (sodium), fluids, and protein. Some also have to limit potassium and phosphorus.     · You may have to give up many foods you like. But try to focus on the fact that this will help you stay healthy for as long as possible.     · If you have a hard time eating enough, talk to your doctor or dietitian about ways to add calories to your diet.     · Your diet may change as your disease changes. See your doctor for regular testing. And work with a dietitian to change your diet as needed. When should you call for help? Call 911 anytime you think you may need emergency care. For example, call if:    · You passed out (lost consciousness).    Call your doctor now or seek immediate medical care if:    · You have less urine than normal or no urine.     · You have trouble urinating or can urinate only very small amounts.     · You are confused or have trouble thinking clearly.     · You feel weaker or more tired than usual.     · You are very thirsty, lightheaded, or dizzy.     · You have nausea and vomiting.     · You have new swelling of your arms or feet, or your swelling is worse.     · You have blood in your urine.     · You have new or worse trouble breathing.    Watch closely for changes in your health, and be sure to contact your doctor if:    · You have any problems with your medicine or other treatment. Where can you learn more? Go to http://nery-peña.info/. Enter N276 in the search box to learn more about \"Chronic Kidney Disease: Care Instructions. \"  Current as of:  May 12, 2017  Content Version: 11.7  © 4304-2129 [employer], [employer]. Care instructions adapted under license by [employer] (which disclaims liability or warranty for this information). If you have questions about a medical condition or this instruction, always ask your healthcare professional. [address] any warranty or liability for your use of this information.
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
## 2018-07-12 NOTE — PROGRESS NOTES
Attended interdisciplinary rounds on PCU where patient's care was discussed. Chaplain Juan Cha M.Div.    [facility] Service [phone])

## 2018-07-12 NOTE — PROGRESS NOTES
Problem: Falls - Risk of  Goal: *Absence of Falls  Document Darryl Fall Risk and appropriate interventions in the flowsheet.    Outcome: Resolved/Met Date Met: 07/12/18  Fall Risk Interventions:            Medication Interventions: Teach patient to arise slowly, Patient to call before getting OOB    Elimination Interventions: Bed/chair exit alarm

## 2018-07-12 NOTE — PROGRESS NOTES
1025: IDR with team.  Patient cleared for discharged. Discuss patient electrolyte and patient diet which she was able to tolerate. Pharmacy had some concern of antibiotic stated patient prescribed on July 7th for a broil on her back. RN and MD to follow up. Lisa Jnoes [facility] CM. Reason for Admission: DKA, Pain on side. RRAT Score: 23  Resources/supports as identified by patient/family:  Aurea Anand [phone]. Sister Edward Gardner [phone]. Top Challenges facing patient:  Finances: At this time patient state no financial distress. Medication cost: Patient has Medicare and Medicaid. Transportation:  [place]  Support system or lack thereof:   and sister. Living arrangements: Lives with daughters, grandchildren. Self-care/ADLs/Cognition:  Patient is able to provide self-care. Current Advanced Directive/Advance Care Plan: Not on file  Plan for utilizing home health: Not at this time. Likelihood of readmission: Moderate to High  Transition of Care Plan:     1119:  CM went to see patient. Patient in much better spirit today. Patient state her pain is controled and feels better. CM inform patient of scheduled appointments. Patient  Simi Daugherty at bedside. CM has permission to speak by patient. Patient IDDM poorly controlled. Cm ask patient what assistance she needs to help maintain. Patient state when she is sick she does not take her medications. CM suggest teaching with a DTC RN patient state she knows what to do. But does not when she is sick. Patient has had 12 ED visits within the past six month. Dates: 7/10, 7/9, 7/7, 7/6, 7/2, 6/11, 6/5. There is some concern of opioids seeking. Patient has hx of alcoholic pancreatitis and opioid seeking behavior. CM reviewed old CM notes. Patient receives disability and food stamps. Discussion between patient and CM ( ED) or better management and control of diabetes, medication ED visits, and PCP visits. IMM Letter sign.  On patient chart. Lisa Jones [facility] CM. Care Management Interventions  PCP Verified by CM: Yes  Last Visit to PCP: 07/06/18  Palliative Care Criteria Met (RRAT>21 & CHF Dx)?: No (patient RRAT score 23)  Mode of Transport at Discharge:  Other (see comment) ()  Transition of Care Consult (CM Consult): Discharge Planning  Discharge Durable Medical Equipment: No  Health Maintenance Reviewed: Yes  Physical Therapy Consult: No  Occupational Therapy Consult: No  Speech Therapy Consult: No  Current Support Network: Own Home (Patient lives with daughters and granddaughters.)  Confirm Follow Up Transport: Family  Plan discussed with Pt/Family/Caregiver: Yes  Freedom of Choice Offered: Yes  Discharge Location  Discharge Placement: Home

## 2018-07-12 NOTE — PROGRESS NOTES
0730 Bedside and Verbal shift change report given to me (oncoming nurse) by Alisa Page RN (offgoing nurse). Report included the following information SBAR, Kardex, MAR, Recent Results and Cardiac Rhythm Sinus rhythm, sinus tachycardia.

## 2018-07-12 NOTE — PROGRESS NOTES
Problem: Falls - Risk of  Goal: *Absence of Falls  Document Darryl Fall Risk and appropriate interventions in the flowsheet. Outcome: Progressing Towards Goal  Fall Risk Interventions:            Medication Interventions: Teach patient to arise slowly, Patient to call before getting OOB    Elimination Interventions: Bed/chair exit alarm             Problem: DKA: Discharge Outcomes  Goal: *Blood glucose at patient's target range  Outcome: Progressing Towards Goal  HS BG was 195.  No insulin coverage given  Goal: *Acidosis resolved  Outcome: Resolved/Met Date Met: 07/12/18  Anion gap closed and patient off insulin gtt as of 7/11/12  Goal: *Tolerating diet  Outcome: Progressing Towards Goal  Patient tolerating diet but has fair appetite

## 2018-07-12 NOTE — DISCHARGE SUMMARY
Hospitalist Discharge Summary     Patient ID:  Kaley Guerra  [id number]  20 y.o.  1965    PCP on record: Gerardo Talbert NP    Admit date: 7/10/2018  Discharge date and time: 7/12/2018      Admission Diagnoses: DKA (diabetic ketoacidoses) [facility])    Discharge Diagnoses:    Principal Problem:    DKA (diabetic ketoacidoses) ([address] (7/10/2018)         Hospital Course:     Mild DKA:POA resolved  Poorly controlled, IDDM  Non compliance with insulin  A1c 8.5,   diabetic diet  Give Lantus 20 units now,ISS  Significant time spent on counseling patient on the need to be compliant with diabetic medications, including risks of damage to renal, cardiac and nervous systems. Acute on chronic intractable nausea, vomiting and abdominal pain  As per records this is the patient's 28 CT abdominal pelvis and bones are persistent since 2012. Again, no acute process in the abdomen and pelvis were determined. UDS pos for opiods  advance to clears, IV fluids, prn pain meds and antiemetics  Gen surgery consult appreciated, no plans for surgical intervention     Concern for opioid seeking behavior  States only dilaudid works for her, not ready to take any other pain med    Hypertension  Resume Metoprolol    CKD stage V  Not on dialysis  Follows up with nephrology outpatient    Chronic anemia and metabolic acidosis  Anemia of chronic kidney disease     boil on back, keflex given for 5 days      CONSULTATIONS:  IP CONSULT TO HOSPITALIST  IP CONSULT TO GENERAL SURGERY    Excerpted HPI from H&P of Marjan Rowe MD:  Svitlana Coulter is a 46 y.o.  female with PMH of IDDM,ABD PAIN,HTN who presents to ED with c/o above. Patient reports acute on chronic onset of left-sided chest pain which is constant, 10 x 10 in severity and radiating to the back. It is also associated with nausea and vomiting. Patient reports taking over the counter meds without any relief.   Patient had innumerable admissions regarding the same problem in multiple hospitals and also multiple ED visits. This is a 4th ED visit in past 1 week. She also reports unexpected weight loss due to decreased appetite and increased acute on chronic numbness in her bilateral upper and lower extremities. She states compliance with her Humalog nut has not been taking her Lantus due to inability to keep anything down. She has had multiple CT abdomen and pelvis without any significant finding. Radiology report this is patient's 28 CT abdomen pelvis and bonsecour system since 2012. In ED, patient was in moderate distress secondary to abdominal pain, nausea and vomiting. Labs were remarkable for mild DKA with anion gap 20.         ______________________________________________________________________  DISCHARGE SUMMARY/HOSPITAL COURSE:  for full details see H&P, daily progress notes, labs, consult notes. _______________________________________________________________________  Patient seen and examined by me on discharge day. Pertinent Findings:  Gen:    Not in distress  Chest: Clear lungs  CVS:   Regular rhythm. No edema  Abd:  Soft, not distended, not tender  Neuro:  Alert with good insight. Oriented to person, place, and time   _______________________________________________________________________  DISCHARGE MEDICATIONS:   Current Discharge Medication List      START taking these medications    Details   cephALEXin (KEFLEX) 250 mg capsule Take 1 Cap by mouth every twenty-four (24) hours for 5 days. Qty: 5 Cap, Refills: 0         CONTINUE these medications which have NOT CHANGED    Details   insulin aspart U-100 (NOVOLOG) 100 unit/mL injection 5 Units by SubCUTAneous route Before breakfast, lunch, and dinner. sucralfate (CARAFATE) 100 mg/mL suspension Take 5 mL by mouth four (4) times daily. Qty: 414 mL, Refills: 0      lidocaine (LIDODERM) 5 % Apply patch to the affected area for 12 hours a day and remove for 12 hours a day.   Qty: 1 Each, Refills: 0      aspirin 81 mg chewable tablet Take 1 Tab by mouth daily. Qty: 30 Tab, Refills: 1      cloNIDine HCl (CATAPRES) 0.1 mg tablet Take 1 Tab by mouth two (2) times a day. Qty: 60 Tab, Refills: 1      ondansetron (ZOFRAN ODT) 4 mg disintegrating tablet Take 1 Tab by mouth every eight (8) hours as needed for Nausea. Qty: 15 Tab, Refills: 0      metoprolol tartrate (LOPRESSOR) 50 mg tablet Take 50 mg by mouth two (2) times a day. sodium bicarbonate 650 mg tablet Take 650 mg by mouth two (2) times a day. atorvastatin (LIPITOR) 20 mg tablet Take 20 mg by mouth nightly. insulin glargine (LANTUS) 100 unit/mL injection 25 Units by SubCUTAneous route nightly. Qty: 1 Vial, Refills: 0         STOP taking these medications       trimethoprim-sulfamethoxazole (BACTRIM DS) 160-800 mg per tablet Comments:   Reason for Stopping:         calcitRIOL (ROCALTROL) 0.25 mcg capsule Comments:   Reason for Stopping:               My Recommended Diet, Activity, Wound Care, and follow-up labs are listed in the patient's Discharge Insturctions which I have personally completed and reviewed.     _______________________________________________________________________  DISPOSITION:     Home with Family: x   Home with HH/PT/OT/RN:    SNF/LTC:    BINA:    OTHER:        Condition at Discharge:  Stable  _______________________________________________________________________  Follow up with:   PCP : Sudie Shone, NP  Follow-up Information     Follow up With Details Comments 3612 Dax Mireles NP   [address] [facility]  [phone]                Total time in minutes spent coordinating this discharge (includes going over instructions, follow-up, prescriptions, and preparing report for sign off to her PCP) :  30 minutes    Signed:  Isidro Cueva MD

## 2018-07-13 ENCOUNTER — TELEPHONE (OUTPATIENT)
Dept: CASE MANAGEMENT | Age: 53
End: 2018-07-13

## 2018-07-13 LAB
GLUCOSE BLD STRIP.AUTO-MCNC: 120 MG/DL (ref 65–100)
GLUCOSE BLD STRIP.AUTO-MCNC: 143 MG/DL (ref 65–100)
SERVICE CMNT-IMP: ABNORMAL
SERVICE CMNT-IMP: ABNORMAL

## 2018-07-13 NOTE — TELEPHONE ENCOUNTER
CM follow-up call:  CM called patient. [phone]. Patient  answered. CM identified self. CM ask to speak with patient.  continues to speak for patient about medication. CM ask again to speak with the patient. Patient verified full name and . Patient has no questions about discharged. Patient state she tried to go pick-up her medication at [facility]. Patient state the pharmacy did not have her medication. CM called pharmacy [phone]. Spoke with the pharmacist. He state a keflex was pick-up yesterday @ 2382. CM called patient back. Stated what pharmacist said. She stated she assume she would have more medication. Per AVS only the Keflex was ordered. Patient had no other questions. Lisa Jones [facility] CM.

## 2018-08-02 ENCOUNTER — HOSPITAL ENCOUNTER (OUTPATIENT)
Dept: GENERAL RADIOLOGY | Age: 53
Discharge: HOME | End: 2018-08-02
Payer: MEDICARE

## 2018-08-02 DIAGNOSIS — M54.9 BACK PAIN: ICD-10-CM

## 2018-08-02 DIAGNOSIS — M25.559 HIP PAIN: ICD-10-CM

## 2018-08-02 PROCEDURE — 73502 X-RAY EXAM HIP UNI 2-3 VIEWS: CPT

## 2018-08-02 PROCEDURE — 72100 X-RAY EXAM L-S SPINE 2/3 VWS: CPT

## 2018-08-13 ENCOUNTER — HOSPITAL ENCOUNTER (OUTPATIENT)
Dept: PHYSICAL THERAPY | Age: 53
Discharge: HOME | End: 2018-08-13
Payer: MEDICARE

## 2018-08-13 PROCEDURE — 97110 THERAPEUTIC EXERCISES: CPT | Performed by: PHYSICAL THERAPIST

## 2018-08-13 PROCEDURE — 97162 PT EVAL MOD COMPLEX 30 MIN: CPT | Performed by: PHYSICAL THERAPIST

## 2018-08-13 PROCEDURE — G8978 MOBILITY CURRENT STATUS: HCPCS | Performed by: PHYSICAL THERAPIST

## 2018-08-13 PROCEDURE — G8979 MOBILITY GOAL STATUS: HCPCS | Performed by: PHYSICAL THERAPIST

## 2018-08-13 NOTE — PROGRESS NOTES
PT INITIAL EVALUATION NOTE - [facility] 2-15    Patient Name: Darrius Bennett  Date:2018  : 1965  [x]  Patient  Verified  [place]or: [place] MED[place]ARE / Plan: [place] MEDICARE PART A & B / Product Type: Medicare /    In time:1208  Out time:100  Total Treatment Time (min): 52  Total Timed Codes (min): 25  1:1 Treatment Time ( only): 42   Visit #: 1     Treatment Area: Low back pain [M54.5]    SUBJECTIVE  Pain Level (0-10 scale): 10 (wrote 3/10 on her intake form)   Any medication changes, allergies to medications, adverse drug reactions, diagnosis change, or new procedure performed?: [] No    [x] Yes (see summary sheet for update)  Subjective:    Pt reports that she has had back pain for 5 years. She states that it all began during her surgery. She is unable to relate what surgery she had at this time. She indicates that the pain is in the low back and the anterior left hip. She describes this pain as tingling. It does not travel below the hip. She reports she has problems getting out of bed, bending, walking, and completing household chores. She lives with 2 daughters who are able to help with some of the household chores. She has 3-4 steps to enter her home, but does not climb steps tot he second floor. She also complains of a headache today. Recent x-rays were negative with the exception of some wedging at L4. OBJECTIVE  Posture:  Slouched in chair  Gait and Functional Mobility:  compensated trendelenburg, antalgic  Palpation: tenderness through out the low back and hips        Lumbar AROM:          R  L    Flexion     50%P!      Extension    75%      Side Bending   100%  100%    Rotation   75%  50%        LOWER QUARTER   MUSCLE STRENGTH  KEY       R  L  0 - No Contraction  L1, L2 Psoas  2  2  1 - Trace   L3 Quads  4  4  2 - Poor   L4 Tib Ant  3  3  3 - Fair    L5 EHL  3  3  4 - Good   S1 Peroneals  NT  NT  5 - Normal   S2 Hams  3  3    Flexibility: decreased hamstring       MMT:               HIP ER: 3              HIP Abd: 2  Neurological: Reflexes / Sensations: grossly intact  Special Tests:    Trendelenberg: +    FABERS: +   Forward Bend: +    Slump: +   H.S. SLR: +     Piriformis Ext: +   Long Sit: +     Lumbar Distraction: NT   SI Compression/Distraction: +    Modality rationale: decrease inflammation and decrease pain to improve the patients ability to complete all activity   Min Type Additional Details                       10 [x]  Ice     []  Heat  []  Ice massage Position:supine  Location:low back and hip       25 min Therapeutic Exercise:  [x] See flow sheet :   Rationale: increase ROM, increase strength, improve coordination, improve balance and increase proprioception to improve the patients ability to complete all activity            With   [x] TE   [] TA   [] neuro   [] other: Patient Education: [x] Review HEP    [] Progressed/Changed HEP based on:   [x] positioning   [x] body mechanics   [] transfers   [x] heat/ice application    [] other:      Other Objective/Functional Measures:FOTO 31    Pain Level (0-10 scale) post treatment: 8-9    ASSESSMENT/Changes in Function:     [x]  See Plan of YAKOV Franks 8/13/2018  12:08 PM

## 2018-08-16 ENCOUNTER — HOSPITAL ENCOUNTER (OUTPATIENT)
Dept: PHYSICAL THERAPY | Age: 53
Discharge: HOME | End: 2018-08-16
Payer: MEDICARE

## 2018-08-16 PROCEDURE — 97110 THERAPEUTIC EXERCISES: CPT | Performed by: PHYSICAL THERAPIST

## 2018-08-16 NOTE — PROGRESS NOTES
PT DAILY TREATMENT NOTE - [facility] 2-15    Patient Name: Carmle Mast  Date:2018  : 1965  [x]  Patient  Verified  Payor: [place] MEDICARE / Plan: [place] MEDICARE PART A & B / Product Type: Medicare /    In time:230  Out time:321  Total Treatment Time (min): 51  Total Timed Codes (min): 41  1:1 Treatment Time (MC only): 26   Visit #: 2     Treatment Area: Low back pain [M54.5]    SUBJECTIVE  Pain Level (0-10 scale): 7  Any medication changes, allergies to medications, adverse drug reactions, diagnosis change, or new procedure performed?: [x] No    [] Yes (see summary sheet for update)  Subjective functional status/changes:   [] No changes reported  Pt reports that she may be a little better    OBJECTIVE    Modality rationale: decrease inflammation and decrease pain to improve the patients ability to complete all activity   Min Type Additional Details                       10 [x]  Ice     []  Heat  []  Ice massage Position:supine  Location:left hip and low back       41 min Therapeutic Exercise:  [x] See flow sheet :   Rationale: increase ROM, increase strength, improve coordination, improve balance and increase proprioception to improve the patients ability to tolerate all activity    Pain Level (0-10 scale) post treatment: 7    ASSESSMENT/Changes in Function:   Pt has a poor tolerance for activity and has increased pain the more she is asked to do. There is palpable swelling/inflammation at the proximal anterior left thigh that is tender with attempted soft tissue work. This pain is located over hip flexors. Progression will be difficult given her tolerance for activity at this time.   Will continue as able  Patient will continue to benefit from skilled PT services to modify and progress therapeutic interventions, address functional mobility deficits, address ROM deficits, address strength deficits, analyze and address soft tissue restrictions, analyze and cue movement patterns, analyze and modify body mechanics/ergonomics, assess and modify postural abnormalities and address imbalance/dizziness to attain remaining goals.      []  See Plan of Care  []  See progress note/recertification  []  See Discharge Summary         PLAN  [x]  Upgrade activities as tolerated     [x]  Continue plan of care  [x]  Update interventions per flow sheet       []  Discharge due to:_  []  Other:_      Brianna Dose, PT 8/16/2018  3:26 PM

## 2018-08-21 ENCOUNTER — APPOINTMENT (OUTPATIENT)
Dept: PHYSICAL THERAPY | Age: 53
End: 2018-08-21
Payer: MEDICARE

## 2018-08-22 NOTE — PROGRESS NOTES
[address] [address] (MOB IV), [address] [facility]  [address] [facility] [address],  [address]  Phone: [phone] Fax: [phone]     Plan of Care/Statement of Necessity for Physical Therapy Services  2-15    Patient name: Chloe Waller  : 1965  Provider#: [id number]  Referral source: Beau Barnett MD      Medical/Treatment Diagnosis: Low back pain [M54.5]     Prior Hospitalization: see medical history     Comorbidities: back pain, diabetes, headaches,HBP, kidney/bladder/urination problems, prior surgery  Prior Level of Function: 20 minutes of exercise seldom if ever  Medications: Verified on Patient Summary List  Start of Care: 18      Onset Date: chronic   The Plan of Care and following information is based on the information from the initial evaluation. Assessment/ key information: Pt is a 45 yo female with a chronic history of LBP. She is in today to begin therapy. She presents with decreased lumbar ROM into flexion and extension and she demonstrates decreased if not poor strength testing bilaterally with hip flexion being the weakest.  Her gait is antalgic with compensated trendelenburg gait. She is tender to palpation through the back and left hip. It is difficult to assess specifically as all activity produces high levels of pain. She will benefit from PT to assist in pain reduction through exercise and modalities to improve her ability to maintain better core stability to support her back with all ADL's  Evaluation Complexity History HIGH Complexity :3+ comorbidities / personal factors will impact the outcome/ POC ; Examination HIGH Complexity : 4+ Standardized tests and measures addressing body structure, function, activity limitation and / or participation in recreation  ;Presentation MEDIUM Complexity : Evolving with changing characteristics  ; Clinical Decision Making MEDIUM Complexity : FOTO score of 26-74  Overall Complexity Rating: MEDIUM   Problem List: pain affecting function, decrease ROM, decrease strength, edema affecting function, impaired gait/ balance, decrease ADL/ functional abilitiies, decrease activity tolerance, decrease flexibility/ joint mobility and decrease transfer abilities   Treatment Plan may include any combination of the following: Therapeutic exercise, Therapeutic activities, Neuromuscular re-education, Physical agent/modality, Gait/balance training, Manual therapy, Patient education, Self Care training, Functional mobility training, Home safety training and Stair training  Patient / Family readiness to learn indicated by: asking questions, trying to perform skills and interest  Persons(s) to be included in education: patient (P)  Barriers to Learning/Limitations: None  Patient Goal (s): ease pain  Patient Self Reported Health Status: poor  Rehabilitation Potential: fair    Short Term Goals: To be accomplished in 6-8 treatments:   Pt will be I with HEP   Pt will complain of pain 5-6/10 with all activity   Pt will increase her ROM to complete all ADL's  Long Term Goals: To be accomplished in 12-14 treatments:   Pt will complain of pain 1-2/10 with all activity   Pt will increase core stability to stabilize the spine and support her low back with all activity   Pt will be able to complete all ADL's and household chores without increased symptoms  Frequency / Duration: Patient to be seen 2 times per week for 12-14 treatments. Patient/ Caregiver education and instruction: self care, activity modification and exercises    [x]  Plan of care has been reviewed with PTA    G-Codes (GP)  Mobility   Current  CL= 60-79%   Goal  CK= 40-59%    The severity rating is based on clinical judgment and the FOTO Score score.     Certification Period: 8/13/18-11/11/18  Bull Blancas, PT 8/22/2018 9:27 AM    ________________________________________________________________________    I certify that the above Therapy Services are being furnished while the patient is under my care. I agree with the treatment plan and certify that this therapy is necessary.     [de-identified] Signature:____________________  Date:____________Time: _________

## 2018-08-23 ENCOUNTER — HOSPITAL ENCOUNTER (OUTPATIENT)
Dept: PHYSICAL THERAPY | Age: 53
Discharge: HOME | End: 2018-08-23
Payer: MEDICARE

## 2018-08-23 PROCEDURE — 97110 THERAPEUTIC EXERCISES: CPT

## 2018-08-23 NOTE — PROGRESS NOTES
PT DAILY TREATMENT NOTE - [facility] 2-15    Patient Name: Mercedes Maldonado  Date:2018  : 1965  [x]  Patient  Verified  [place] / Plan: [place] MED[place] PART [place] & B / Product Type: Medicare /    In time:1015  Out time:1105  Total Treatment Time (min): 50  Total Timed Codes (min): 40  1:1 Treatment Time ( only): 40   Visit #: 3     Treatment Area: Low back pain [M54.5]    SUBJECTIVE  Pain Level (0-10 scale): 5/10  Any medication changes, allergies to medications, adverse drug reactions, diagnosis change, or new procedure performed?: [x] No    [] Yes (see summary sheet for update)  Subjective functional status/changes:   [] No changes reported  Patient reports being on her feet and walking around increases her pain, and sitting down is the only thing to help elevate the pain. Hasn't been able to perform her HEP every day.     OBJECTIVE    Modality rationale: decrease inflammation and decrease pain to improve the patients ability to perform ADLs and reduce pain levels   Min Type Additional Details    [] Estim: []Att   []Unatt        []TENS instruct                  []IFC  []Premod   []NMES                     []Other:  []w/US   []w/ice   []w/heat  Position:  Location:    []  Traction: [] Cervical       []Lumbar                       [] Prone          []Supine                       []Intermittent   []Continuous Lbs:  [] before manual  [] after manual  []w/heat    []  Ultrasound: []Continuous   [] Pulsed at:                           []1MHz   []3MHz Location:  W/cm2:    [] Paraffin         Location:   []w/heat   10 [x]  Ice     []  Heat  []  Ice massage Position: supine  Location: L hip & low back    []  Laser  []  Other: Position:  Location:      []  Vasopneumatic Device Pressure:       [] lo [] med [] hi   Temperature:      [x] Skin assessment post-treatment:  [x]intact []redness- no adverse reaction    []redness  adverse reaction:     40 min Therapeutic Exercise:  [x] See flow sheet :   Rationale: increase ROM and increase strength to improve the patients ability to perform ADLs and reduce pain levels    With   [x] TE   [] TA   [] neuro   [] other: Patient Education: [x] Review HEP    [] Progressed/Changed HEP based on:   [] positioning   [] body mechanics   [] transfers   [] heat/ice application    [] other:      Other Objective/Functional Measures: none noted     Pain Level (0-10 scale) post treatment: 0/10    ASSESSMENT/Changes in Function:   Difficulty performing PPT due to poor TA contraction ability. Difficulty with getting full extension in the RLE due to tightness of the hamstrings. Pain experienced while performing standing hip extension and abduction. Demonstrates compensating trendelenburg bilateral. Will continue to progress therex as tolerated. Patient will continue to benefit from skilled PT services to modify and progress therapeutic interventions, address functional mobility deficits, address ROM deficits, address strength deficits, analyze and address soft tissue restrictions, analyze and cue movement patterns and analyze and modify body mechanics/ergonomics to attain remaining goals. []  See Plan of Care  []  See progress note/recertification  []  See Discharge Summary         Progress towards goals / Updated goals:  Patient is progressing towards goals, will continue to strengthen the hip stabilizers in order to reduce pain levels.     PLAN  []  Upgrade activities as tolerated     []  Continue plan of care  []  Update interventions per flow sheet       []  Discharge due to:_  []  Other:_      Nathaniel Cohen 8/23/2018  10:16 AM

## 2018-10-15 ENCOUNTER — APPOINTMENT (OUTPATIENT)
Dept: CT IMAGING | Age: 53
End: 2018-10-15
Attending: EMERGENCY MEDICINE
Payer: MEDICARE

## 2018-10-15 ENCOUNTER — HOSPITAL ENCOUNTER (EMERGENCY)
Age: 53
Discharge: HOME | End: 2018-10-15
Attending: EMERGENCY MEDICINE
Payer: MEDICARE

## 2018-10-15 ENCOUNTER — HOSPITAL ENCOUNTER (EMERGENCY)
Age: 53
Discharge: HOME | End: 2018-10-15
Attending: EMERGENCY MEDICINE | Admitting: EMERGENCY MEDICINE
Payer: MEDICARE

## 2018-10-15 ENCOUNTER — HOSPITAL ENCOUNTER (EMERGENCY)
Age: 53
Discharge: HOME | End: 2018-10-16
Attending: EMERGENCY MEDICINE
Payer: MEDICARE

## 2018-10-15 VITALS
WEIGHT: 115 LBS | BODY MASS INDEX: 19.63 KG/M2 | SYSTOLIC BLOOD PRESSURE: 131 MMHG | OXYGEN SATURATION: 100 % | TEMPERATURE: 98.2 F | DIASTOLIC BLOOD PRESSURE: 80 MMHG | HEIGHT: 64 IN | HEART RATE: 97 BPM | RESPIRATION RATE: 17 BRPM

## 2018-10-15 VITALS
DIASTOLIC BLOOD PRESSURE: 108 MMHG | RESPIRATION RATE: 18 BRPM | SYSTOLIC BLOOD PRESSURE: 191 MMHG | HEIGHT: 64 IN | HEART RATE: 117 BPM | WEIGHT: 130 LBS | OXYGEN SATURATION: 100 % | BODY MASS INDEX: 22.2 KG/M2

## 2018-10-15 DIAGNOSIS — R10.84 ABDOMINAL PAIN, GENERALIZED: Primary | ICD-10-CM

## 2018-10-15 DIAGNOSIS — R73.9 HYPERGLYCEMIA: ICD-10-CM

## 2018-10-15 DIAGNOSIS — R10.13 ABDOMINAL PAIN, EPIGASTRIC: ICD-10-CM

## 2018-10-15 DIAGNOSIS — H60.501 ACUTE OTITIS EXTERNA OF RIGHT EAR, UNSPECIFIED TYPE: Primary | ICD-10-CM

## 2018-10-15 DIAGNOSIS — R11.2 NON-INTRACTABLE VOMITING WITH NAUSEA, UNSPECIFIED VOMITING TYPE: Primary | ICD-10-CM

## 2018-10-15 DIAGNOSIS — N18.6 END STAGE CHRONIC KIDNEY DISEASE (HCC): ICD-10-CM

## 2018-10-15 LAB
ALBUMIN SERPL-MCNC: 3.5 G/DL (ref 3.5–5)
ALBUMIN/GLOB SERPL: 0.8 {RATIO} (ref 1.1–2.2)
ALP SERPL-CCNC: 189 U/L (ref 45–117)
ALT SERPL-CCNC: 20 U/L (ref 12–78)
ANION GAP SERPL CALC-SCNC: 15 MMOL/L (ref 5–15)
AST SERPL-CCNC: 19 U/L (ref 15–37)
BILIRUB SERPL-MCNC: 0.3 MG/DL (ref 0.2–1)
BUN SERPL-MCNC: 93 MG/DL (ref 6–20)
BUN/CREAT SERPL: 16 (ref 12–20)
CALCIUM SERPL-MCNC: 9.5 MG/DL (ref 8.5–10.1)
CHLORIDE SERPL-SCNC: 94 MMOL/L (ref 97–108)
CO2 SERPL-SCNC: 22 MMOL/L (ref 21–32)
CREAT SERPL-MCNC: 5.91 MG/DL (ref 0.55–1.02)
GLOBULIN SER CALC-MCNC: 4.6 G/DL (ref 2–4)
GLUCOSE SERPL-MCNC: 293 MG/DL (ref 65–100)
LACTATE SERPL-SCNC: 1.1 MMOL/L (ref 0.4–2)
LIPASE SERPL-CCNC: 259 U/L (ref 73–393)
POTASSIUM SERPL-SCNC: 3.5 MMOL/L (ref 3.5–5.1)
PROT SERPL-MCNC: 8.1 G/DL (ref 6.4–8.2)
SODIUM SERPL-SCNC: 131 MMOL/L (ref 136–145)

## 2018-10-15 PROCEDURE — 74011250636 HC RX REV CODE- 250/636: Performed by: EMERGENCY MEDICINE

## 2018-10-15 PROCEDURE — 99284 EMERGENCY DEPT VISIT MOD MDM: CPT

## 2018-10-15 PROCEDURE — 74176 CT ABD & PELVIS W/O CONTRAST: CPT

## 2018-10-15 PROCEDURE — 74011250637 HC RX REV CODE- 250/637: Performed by: EMERGENCY MEDICINE

## 2018-10-15 PROCEDURE — 96372 THER/PROPH/DIAG INJ SC/IM: CPT

## 2018-10-15 PROCEDURE — 83605 ASSAY OF LACTIC ACID: CPT

## 2018-10-15 PROCEDURE — 36415 COLL VENOUS BLD VENIPUNCTURE: CPT

## 2018-10-15 PROCEDURE — 96361 HYDRATE IV INFUSION ADD-ON: CPT

## 2018-10-15 PROCEDURE — 96360 HYDRATION IV INFUSION INIT: CPT

## 2018-10-15 PROCEDURE — 83690 ASSAY OF LIPASE: CPT

## 2018-10-15 PROCEDURE — 96374 THER/PROPH/DIAG INJ IV PUSH: CPT

## 2018-10-15 PROCEDURE — 80053 COMPREHEN METABOLIC PANEL: CPT

## 2018-10-15 PROCEDURE — 96375 TX/PRO/DX INJ NEW DRUG ADDON: CPT

## 2018-10-15 PROCEDURE — 74011636637 HC RX REV CODE- 636/637: Performed by: EMERGENCY MEDICINE

## 2018-10-15 RX ORDER — CLONIDINE HYDROCHLORIDE 0.1 MG/1
0.1 TABLET ORAL
Status: COMPLETED | OUTPATIENT
Start: 2018-10-15 | End: 2018-10-15

## 2018-10-15 RX ORDER — HALOPERIDOL 5 MG/ML
5 INJECTION INTRAMUSCULAR
Status: COMPLETED | OUTPATIENT
Start: 2018-10-15 | End: 2018-10-15

## 2018-10-15 RX ORDER — HYDROCODONE BITARTRATE AND ACETAMINOPHEN 5; 325 MG/1; MG/1
1 TABLET ORAL
Qty: 20 TAB | Refills: 0 | Status: SHIPPED | OUTPATIENT
Start: 2018-10-15 | End: 2019-07-18

## 2018-10-15 RX ORDER — HYDROCODONE BITARTRATE AND ACETAMINOPHEN 5; 325 MG/1; MG/1
1 TABLET ORAL
Status: COMPLETED | OUTPATIENT
Start: 2018-10-15 | End: 2018-10-15

## 2018-10-15 RX ORDER — CIPROFLOXACIN AND FLUOCINOLONE ACETONIDE .75; .0625 MG/.25ML; MG/.25ML
0.25 SOLUTION AURICULAR (OTIC)
Status: COMPLETED | OUTPATIENT
Start: 2018-10-15 | End: 2018-10-15

## 2018-10-15 RX ORDER — METOPROLOL TARTRATE 50 MG/1
50 TABLET ORAL
Status: COMPLETED | OUTPATIENT
Start: 2018-10-15 | End: 2018-10-15

## 2018-10-15 RX ORDER — HALOPERIDOL 5 MG/ML
5 INJECTION INTRAMUSCULAR
Status: DISCONTINUED | OUTPATIENT
Start: 2018-10-15 | End: 2018-10-15

## 2018-10-15 RX ORDER — CIPROFLOXACIN AND DEXAMETHASONE 3; 1 MG/ML; MG/ML
4 SUSPENSION/ DROPS AURICULAR (OTIC) 2 TIMES DAILY
Qty: 10 ML | Refills: 0 | Status: SHIPPED | OUTPATIENT
Start: 2018-10-15 | End: 2019-07-18

## 2018-10-15 RX ORDER — MORPHINE SULFATE 2 MG/ML
4 INJECTION, SOLUTION INTRAMUSCULAR; INTRAVENOUS
Status: COMPLETED | OUTPATIENT
Start: 2018-10-15 | End: 2018-10-15

## 2018-10-15 RX ORDER — AMOXICILLIN AND CLAVULANATE POTASSIUM 875; 125 MG/1; MG/1
1 TABLET, FILM COATED ORAL
Status: COMPLETED | OUTPATIENT
Start: 2018-10-15 | End: 2018-10-15

## 2018-10-15 RX ORDER — AMOXICILLIN AND CLAVULANATE POTASSIUM 875; 125 MG/1; MG/1
1 TABLET, FILM COATED ORAL 2 TIMES DAILY
Qty: 14 TAB | Refills: 0 | Status: SHIPPED | OUTPATIENT
Start: 2018-10-15 | End: 2019-02-23

## 2018-10-15 RX ORDER — ONDANSETRON 4 MG/1
4 TABLET, ORALLY DISINTEGRATING ORAL
Qty: 10 TAB | Refills: 0 | Status: SHIPPED | OUTPATIENT
Start: 2018-10-15 | End: 2019-07-18

## 2018-10-15 RX ORDER — INSULIN LISPRO 100 [IU]/ML
5 INJECTION, SOLUTION INTRAVENOUS; SUBCUTANEOUS
Status: COMPLETED | OUTPATIENT
Start: 2018-10-15 | End: 2018-10-15

## 2018-10-15 RX ORDER — ONDANSETRON 2 MG/ML
4 INJECTION INTRAMUSCULAR; INTRAVENOUS
Status: COMPLETED | OUTPATIENT
Start: 2018-10-15 | End: 2018-10-15

## 2018-10-15 RX ADMIN — HALOPERIDOL LACTATE 5 MG: 5 INJECTION, SOLUTION INTRAMUSCULAR at 22:58

## 2018-10-15 RX ADMIN — CLONIDINE HYDROCHLORIDE 0.1 MG: 0.1 TABLET ORAL at 14:59

## 2018-10-15 RX ADMIN — METOPROLOL TARTRATE 50 MG: 50 TABLET ORAL at 14:59

## 2018-10-15 RX ADMIN — SODIUM CHLORIDE 1000 ML: 900 INJECTION, SOLUTION INTRAVENOUS at 14:24

## 2018-10-15 RX ADMIN — HYDROCODONE BITARTRATE AND ACETAMINOPHEN 1 TABLET: 5; 325 TABLET ORAL at 06:18

## 2018-10-15 RX ADMIN — MORPHINE SULFATE 4 MG: 2 INJECTION, SOLUTION INTRAMUSCULAR; INTRAVENOUS at 12:28

## 2018-10-15 RX ADMIN — (CIPROFLOXACIN AND FLUOCINOLONE ACETONIDE) 0.25 ML: .75; .0625 SOLUTION AURICULAR (OTIC) at 06:18

## 2018-10-15 RX ADMIN — SODIUM CHLORIDE 1000 ML: 900 INJECTION, SOLUTION INTRAVENOUS at 22:43

## 2018-10-15 RX ADMIN — INSULIN LISPRO 5 UNITS: 100 INJECTION, SOLUTION INTRAVENOUS; SUBCUTANEOUS at 14:24

## 2018-10-15 RX ADMIN — AMOXICILLIN AND CLAVULANATE POTASSIUM 1 TABLET: 875; 125 TABLET, FILM COATED ORAL at 06:08

## 2018-10-15 RX ADMIN — ONDANSETRON 4 MG: 2 INJECTION, SOLUTION INTRAMUSCULAR; INTRAVENOUS at 12:25

## 2018-10-15 NOTE — ED NOTES
PIV removed intact and pt for DC home. Plan of care accepted by pt and medication. Patient (s)  given copy of dc instructions and 1 script(s). Patient (s)  verbalized understanding of instructions and script (s). Patient given a current medication reconciliation form and verbalized understanding of their medications. Patient (s) verbalized understanding of the importance of discussing medications with  his or her physician or clinic they will be following up with. Patient alert and oriented and in no acute distress. Patient discharged home ambulatory with self.

## 2018-10-15 NOTE — DISCHARGE INSTRUCTIONS

## 2018-10-15 NOTE — DISCHARGE INSTRUCTIONS
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
## 2018-10-15 NOTE — ED NOTES
Pt c/o abdominal pain and N/V. Pt crying and unable to acquire answers to questions r/t her symptoms crying. Emergency Department Nursing Plan of Care The Nursing Plan of Care is developed from the Nursing assessment and Emergency Department Attending provider initial evaluation. The plan of care may be reviewed in the ED Provider note. The Plan of Care was developed with the following considerations:  
Patient / Family readiness to learn indicated by:verbalized understanding Persons(s) to be included in education: patient Barriers to Learning/Limitations:No 
 
Signed Keke Molina RN   
10/15/2018   12:09 PM

## 2018-10-16 VITALS
OXYGEN SATURATION: 99 % | HEART RATE: 114 BPM | WEIGHT: 130 LBS | SYSTOLIC BLOOD PRESSURE: 157 MMHG | RESPIRATION RATE: 22 BRPM | BODY MASS INDEX: 21.66 KG/M2 | TEMPERATURE: 98.1 F | HEIGHT: 65 IN | DIASTOLIC BLOOD PRESSURE: 92 MMHG

## 2018-10-16 LAB
ALBUMIN SERPL-MCNC: 4.7 G/DL (ref 3.5–5)
ALBUMIN/GLOB SERPL: 0.7 {RATIO} (ref 1.1–2.2)
ALP SERPL-CCNC: 241 U/L (ref 45–117)
ALT SERPL-CCNC: 31 U/L (ref 12–78)
ANION GAP SERPL CALC-SCNC: 12 MMOL/L (ref 5–15)
AST SERPL-CCNC: 39 U/L (ref 15–37)
BASOPHILS # BLD: 0 K/UL (ref 0–0.1)
BASOPHILS NFR BLD: 0 % (ref 0–1)
BILIRUB SERPL-MCNC: 0.5 MG/DL (ref 0.2–1)
BUN SERPL-MCNC: 96 MG/DL (ref 6–20)
BUN/CREAT SERPL: 16 (ref 12–20)
CALCIUM SERPL-MCNC: 10.9 MG/DL (ref 8.5–10.1)
CHLORIDE SERPL-SCNC: 95 MMOL/L (ref 97–108)
CO2 SERPL-SCNC: 24 MMOL/L (ref 21–32)
CREAT SERPL-MCNC: 6.05 MG/DL (ref 0.55–1.02)
DIFFERENTIAL METHOD BLD: ABNORMAL
EOSINOPHIL # BLD: 0 K/UL (ref 0–0.4)
EOSINOPHIL NFR BLD: 0 % (ref 0–7)
ERYTHROCYTE [DISTWIDTH] IN BLOOD BY AUTOMATED COUNT: 12.6 % (ref 11.5–14.5)
GLOBULIN SER CALC-MCNC: 6.6 G/DL (ref 2–4)
GLUCOSE SERPL-MCNC: 190 MG/DL (ref 65–100)
HCT VFR BLD AUTO: 38.4 % (ref 35–47)
HGB BLD-MCNC: 13.2 G/DL (ref 11.5–16)
IMM GRANULOCYTES # BLD: 0 K/UL (ref 0–0.04)
IMM GRANULOCYTES NFR BLD AUTO: 0 % (ref 0–0.5)
LYMPHOCYTES # BLD: 1.1 K/UL (ref 0.8–3.5)
LYMPHOCYTES NFR BLD: 15 % (ref 12–49)
MCH RBC QN AUTO: 29.5 PG (ref 26–34)
MCHC RBC AUTO-ENTMCNC: 34.4 G/DL (ref 30–36.5)
MCV RBC AUTO: 85.9 FL (ref 80–99)
MONOCYTES # BLD: 0.2 K/UL (ref 0–1)
MONOCYTES NFR BLD: 2 % (ref 5–13)
NEUTS SEG # BLD: 6.2 K/UL (ref 1.8–8)
NEUTS SEG NFR BLD: 83 % (ref 32–75)
NRBC # BLD: 0 K/UL (ref 0–0.01)
NRBC BLD-RTO: 0 PER 100 WBC
PLATELET # BLD AUTO: 309 K/UL (ref 150–400)
PMV BLD AUTO: 8.9 FL (ref 8.9–12.9)
POTASSIUM SERPL-SCNC: 4.3 MMOL/L (ref 3.5–5.1)
PROT SERPL-MCNC: 11.3 G/DL (ref 6.4–8.2)
RBC # BLD AUTO: 4.47 M/UL (ref 3.8–5.2)
SODIUM SERPL-SCNC: 131 MMOL/L (ref 136–145)
WBC # BLD AUTO: 7.5 K/UL (ref 3.6–11)

## 2018-10-16 PROCEDURE — 85025 COMPLETE CBC W/AUTO DIFF WBC: CPT | Performed by: EMERGENCY MEDICINE

## 2018-10-16 PROCEDURE — 36415 COLL VENOUS BLD VENIPUNCTURE: CPT | Performed by: EMERGENCY MEDICINE

## 2018-10-16 PROCEDURE — 96361 HYDRATE IV INFUSION ADD-ON: CPT

## 2018-10-16 PROCEDURE — 80053 COMPREHEN METABOLIC PANEL: CPT | Performed by: EMERGENCY MEDICINE

## 2018-10-16 NOTE — ED TRIAGE NOTES
Pt brought to room from triage co abdominal pain starting at 7am.  Pt states has been N/V. Denies diarrhea. Pt placed on monitor x2, pt refusing to stay seated on bed.

## 2018-10-16 NOTE — DISCHARGE INSTRUCTIONS
Abdominal Pain: Care Instructions  Your Care Instructions    Abdominal pain has many possible causes. Some aren't serious and get better on their own in a few days. Others need more testing and treatment. If your pain continues or gets worse, you need to be rechecked and may need more tests to find out what is wrong. You may need surgery to correct the problem. Don't ignore new symptoms, such as fever, nausea and vomiting, urination problems, pain that gets worse, and dizziness. These may be signs of a more serious problem. Your doctor may have recommended a follow-up visit in the next 8 to 12 hours. If you are not getting better, you may need more tests or treatment. The doctor has checked you carefully, but problems can develop later. If you notice any problems or new symptoms, get medical treatment right away. Follow-up care is a key part of your treatment and safety. Be sure to make and go to all appointments, and call your doctor if you are having problems. It's also a good idea to know your test results and keep a list of the medicines you take. How can you care for yourself at home? · Rest until you feel better. · To prevent dehydration, drink plenty of fluids, enough so that your urine is light yellow or clear like water. Choose water and other caffeine-free clear liquids until you feel better. If you have kidney, heart, or liver disease and have to limit fluids, talk with your doctor before you increase the amount of fluids you drink. · If your stomach is upset, eat mild foods, such as rice, dry toast or crackers, bananas, and applesauce. Try eating several small meals instead of two or three large ones. · Wait until 48 hours after all symptoms have gone away before you have spicy foods, alcohol, and drinks that contain caffeine. · Do not eat foods that are high in fat. · Avoid anti-inflammatory medicines such as aspirin, ibuprofen (Advil, Motrin), and naproxen (Aleve).  These can cause stomach upset. Talk to your doctor if you take daily aspirin for another health problem. When should you call for help? Call 911 anytime you think you may need emergency care. For example, call if:    · You passed out (lost consciousness).     · You pass maroon or very bloody stools.     · You vomit blood or what looks like coffee grounds.     · You have new, severe belly pain.    Call your doctor now or seek immediate medical care if:    · Your pain gets worse, especially if it becomes focused in one area of your belly.     · You have a new or higher fever.     · Your stools are black and look like tar, or they have streaks of blood.     · You have unexpected vaginal bleeding.     · You have symptoms of a urinary tract infection. These may include:  ¨ Pain when you urinate. ¨ Urinating more often than usual.  ¨ Blood in your urine.     · You are dizzy or lightheaded, or you feel like you may faint.    Watch closely for changes in your health, and be sure to contact your doctor if:    · You are not getting better after 1 day (24 hours). Where can you learn more? Go to http://neryFriendseepeña.info/. Enter D463 in the search box to learn more about \"Abdominal Pain: Care Instructions. \"  Current as of: November 20, 2017  Content Version: 11.8  © 6921-8392 [employer]. Care instructions adapted under license by [employer] (which disclaims liability or warranty for this information). If you have questions about a medical condition or this instruction, always ask your healthcare professional. [place] [address] any warranty or liability for your use of this information. Nausea and Vomiting: Care Instructions  Your Care Instructions    When you are nauseated, you may feel weak and sweaty and notice a lot of saliva in your mouth. Nausea often leads to vomiting.  Most of the time you do not need to worry about nausea and vomiting, but they can be signs of other illnesses. Two common causes of nausea and vomiting are stomach flu and food poisoning. Nausea and vomiting from viral stomach flu will usually start to improve within 24 hours. Nausea and vomiting from food poisoning may last from 12 to 48 hours. The doctor has checked you carefully, but problems can develop later. If you notice any problems or new symptoms, get medical treatment right away. Follow-up care is a key part of your treatment and safety. Be sure to make and go to all appointments, and call your doctor if you are having problems. It's also a good idea to know your test results and keep a list of the medicines you take. How can you care for yourself at home? · To prevent dehydration, drink plenty of fluids, enough so that your urine is light yellow or clear like water. Choose water and other caffeine-free clear liquids until you feel better. If you have kidney, heart, or liver disease and have to limit fluids, talk with your doctor before you increase the amount of fluids you drink. · Rest in bed until you feel better. · When you are able to eat, try clear soups, mild foods, and liquids until all symptoms are gone for 12 to 48 hours. Other good choices include dry toast, crackers, cooked cereal, and gelatin dessert, such as Jell-O. When should you call for help? Call 911 anytime you think you may need emergency care. For example, call if:    · You passed out (lost consciousness).    Call your doctor now or seek immediate medical care if:    · You have symptoms of dehydration, such as:  ¨ Dry eyes and a dry mouth. ¨ Passing only a little dark urine.   ¨ Feeling thirstier than usual.     · You have new or worsening belly pain.     · You have a new or higher fever.     · You vomit blood or what looks like coffee grounds.    Watch closely for changes in your health, and be sure to contact your doctor if:    · You have ongoing nausea and vomiting.     · Your vomiting is getting worse.     · Your vomiting lasts longer than 2 days.     · You are not getting better as expected. Where can you learn more? Go to http://nery-peña.info/. Enter 25 [id number] in the search box to learn more about \"Nausea and Vomiting: Care Instructions. \"  Current as of: November 20, 2017  Content Version: 11.8  © 4986-2613 [employer]. Care instructions adapted under license by [employer] (which disclaims liability or warranty for this information). If you have questions about a medical condition or this instruction, always ask your healthcare professional. [address] any warranty or liability for your use of this information. Kidney Disease and High Blood Pressure: Care Instructions  Your Care Instructions    Long-term (chronic) kidney disease happens when the kidneys cannot remove waste and keep your body's fluids and chemicals in balance. Usually, the kidneys remove waste from the blood through the urine. When the kidneys are not working well, waste can build up so much that it poisons the body. Kidney disease can make you very tired. It also can cause swelling, or edema, in your legs or other areas of your body. High blood pressure is one of the major causes of chronic kidney disease. And kidney disease can also cause high blood pressure. No matter which came first, having high blood pressure damages the tiny blood vessels in the kidneys. If you have high blood pressure, it is important to lower it. There are many things you can do to lower your blood pressure, which may help slow or stop the damage to your kidneys. Follow-up care is a key part of your treatment and safety. Be sure to make and go to all appointments, and call your doctor if you are having problems. It's also a good idea to know your test results and keep a list of the medicines you take. How can you care for yourself at home? · Be safe with medicines.  Take your medicines exactly as prescribed. Call your doctor if you have any problems with your medicine. You will probably need more than one medicine to lower your blood pressure. You will get more details on the specific medicines your doctor prescribes. · Work with your doctor and a dietitian to plan meals that have the right amount of nutrients for you. You will probably have to limit salt, fluids, and protein. · Stay at a healthy weight. This is very important if you put on weight around the waist. Losing even 10 pounds can help you lower your blood pressure. · Manage other health problems such as diabetes and high cholesterol. You can help lower your risk for heart disease and blood vessel problems with a healthy lifestyle along with medicines. · Do not take ibuprofen (Advil, Motrin) or naproxen (Aleve), or similar medicines, unless your doctor tells you to. They may make chronic kidney disease worse. It is okay to take acetaminophen (Tylenol). · If your doctor recommends it, get more exercise. Walking is a good choice. Bit by bit, increase the amount you walk every day. Try for at least 30 minutes on most days of the week. You also may want to swim, bike, or do other activities. · Limit or avoid alcohol. Talk to your doctor about whether you can drink any alcohol. · Do not smoke or allow others to smoke around you. If you need help quitting, talk to your doctor about stop-smoking programs and medicines. These can increase your chances of quitting for good. When should you call for help? Call 911 anytime you think you may need emergency care.  For example, call if:    · You passed out (lost consciousness).    Call your doctor now or seek immediate medical care if:    · You have new or worse nausea and vomiting.     · You have much less urine than normal, or you have no urine.     · You are feeling confused or cannot think clearly.     · You have new or more blood in your urine.     · You have new swelling.     · You are dizzy or lightheaded, or you feel like you may faint.    Watch closely for changes in your health, and be sure to contact your doctor if:    · You do not get better as expected. Where can you learn more? Go to http://nery-peña.info/. Enter A917 in the search box to learn more about \"Kidney Disease and High Blood Pressure: Care Instructions. \"  Current as of: March 15, 2018  Content Version: 11.8  © 7406-5214 [employer]. Care instructions adapted under license by [employer] (which disclaims liability or warranty for this information). If you have questions about a medical condition or this instruction, always ask your healthcare professional. [address] any warranty or liability for your use of this information.
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
## 2018-10-16 NOTE — ED NOTES
3rd attempt at IV insertion with no success. Jolene Carpio MD informed. Will have attempt at [phone] [facility] guided IV.

## 2018-10-16 NOTE — ED NOTES
Discharge instructions given to patient by Homa Cotter MD. Pt verbalized understanding of discharge instructions. Pt discharged without difficulty. Pt discharged in stable condition via ambulation, accompanied by significant other.

## 2018-10-30 NOTE — ANCILLARY DISCHARGE INSTRUCTIONS
[facility] . [address] (MO[address] IV), [address] [place] Phone: [phone] Fax: [phone] Discharge Summary 2-15 Patient name: Bishnu Bobo  : 1965  Provider#: [id number] Referral source: Marilee Canseco MD     
Medical/Treatment Diagnosis: Low back pain [M54.5] Prior Hospitalization: see medical history Comorbidities: See Plan of Care Prior Level of Function: See Plan of Care Medications: Verified on Patient Summary List 
 
Start of Care: 18      Onset Date:Visits from Start of Care: 3     Missed Visits: 2 Reporting Period : 18 to 18 Assessment/Summary of care: Pt is discharged today, 10/30/2018, as they have stopped attending therapy. Final objective data and outcomes were unable to be obtained. RECOMMENDATIONS: 
[x]Discontinue therapy: []Patient has reached or is progressing toward set goals [x]Patient is non-compliant or has abdicated 
   []Due to lack of appreciable progress towards set goals []Other Lala Generous, PT 10/30/2018 12:55 PM

## 2018-11-07 ENCOUNTER — DOCUMENTATION ONLY (OUTPATIENT)
Dept: CASE MANAGEMENT | Age: 53
End: 2018-11-07

## 2018-11-07 NOTE — MANAGEMENT PLAN
Patient Management Plan        Pt Name: Adrienne powell :1965        Management Plan by: High ED Utilizers Team                                                                                           Date Placed:  18     Pt's Physicians:         Primary Care:  Lindsey Valentine [phone] (Outside Provider)                  Summary    Reason for Referral: This patient has been provided a management plan for Chronic Pain and Radiation Limitation     Patient with frequent visits for: Various pain complaints (ear pain, abdominal pain)     Warning/Safety Alert:     :   Prescriptions: 7   Prescribers:  6  Pharmacies:  3  Pt scored a 33 on the VOA ([employer] Opioid Advisor). This score indicates a 55.1% probability of OD or Resp. Depression within the next 6 months. Situation: Chronic Conditions Summary -     Root Cause Medical Problems List:  CKD, DM, HTN, CAD, pancreatitis, Hep C, chronic pain  Root Cause Psychosocial Problems List:  Depression, hx of ETOH abuse, Opioid Use D/O (18 note by psychiatrist, Dr. Rachell Bence: Pain: Likely fictitious, perhaps drug seeking)    Root Cause Social Determinants of Health (SDOH):  , unemployed (disability), 5 children, 3 grandchildren who are cared for by patient and  (stressor? ?). Significant childhood and adult trauma hx    Incidence of Testing: Over past 12 months: 13 XR, 15 CT (numerous testing in the past several years)    Pattern of access between Oct 15, 2018-Oct 18, 2018:  [facility] ED visits, 7 ED visits @ [facility], 3 ED visits at [facility]. Sporadic times. 4 hospitalizations. Goals/Interventions:      ED Provider/nursing(together) to discuss mgmt. plan w/ pt (document)   ED Provider to review  with pt   Nursing to obtain ordered UDS   Nursing/CM obtain updated list of current providers and attempt to obtain BEBETO for coordination of care   Consider non-narcotic medications/alternatives to benzodiazepines unless emergently necessary.  Educate patient on the appropriate use of ED. Provide Urgent Care and Dispatch Real information   Include the Opioid overdose: Naloxone general Info in AVS. Encourage pt to identify someone who can administer Narcan in the event pt becomes unresponsive.  Provide pt with High ED utilizer letter and [facility] Opioid Prescribing Guidelines    Offer pt resource: [facility]PPORT WARM LINE  ([phone]) 8 am - midnight 7 days/week   Consider Palliative consult   Referral for [facility] strongly encouraged: [phone] (let her know they offer counseling and psychiatry as well)   CM encouraged to determine if pt sees endocrinologist and assist w/ f/u and/or DTC consult/Referral   May also refer to [facility] group at [facility]: [phone]    During Business Hours: CM/PCPs Office   After Hours: BSMART(only if warranted)     Challenges for Self Management:      Impairment of cognition and/or functional limitations:  8th grade educational level    Financial Constraints:  Medicaid/Medicare   Poor follow ups    Utilization: HIGH ED Utilization and admissions   Emotional status of Patient: May demonstrate emotional dysregulation   Behavioral Health Factors:  RADHA may contribute to pain perception; poor coping skills   Motivational level:  Given hx, motivation unlikely but continued support/encouragement/education is recommended  Transportation: Prior to arranging Medicaid transport, ask pt to call family member for ride home, if pt cannot locate ride, staff to call emergency contact and request ride for pt. Medication Management: Complications from poor adherence, Poor Adherence  Non-Compliance w/ insulin     Advanced Care Plan: None          ** This plan has been created by the Care Coordination Committee, a multi-disciplinary team. The patient and their physician were invited to participate in this plan.  This management plan is intended to provide consistent evaluation and treatment for this patient not to supersede physician judgment. **

## 2018-11-23 ENCOUNTER — HOSPITAL ENCOUNTER (INPATIENT)
Age: 53
LOS: 4 days | Discharge: HOME | DRG: 682 | End: 2018-11-27
Attending: EMERGENCY MEDICINE | Admitting: INTERNAL MEDICINE
Payer: MEDICARE

## 2018-11-23 ENCOUNTER — APPOINTMENT (OUTPATIENT)
Dept: GENERAL RADIOLOGY | Age: 53
DRG: 682 | End: 2018-11-23
Attending: INTERNAL MEDICINE
Payer: MEDICARE

## 2018-11-23 ENCOUNTER — APPOINTMENT (OUTPATIENT)
Dept: CT IMAGING | Age: 53
DRG: 682 | End: 2018-11-23
Attending: INTERNAL MEDICINE
Payer: MEDICARE

## 2018-11-23 DIAGNOSIS — N17.9 ACUTE RENAL FAILURE, UNSPECIFIED ACUTE RENAL FAILURE TYPE (HCC): Primary | ICD-10-CM

## 2018-11-23 DIAGNOSIS — E11.42 DIABETIC POLYNEUROPATHY ASSOCIATED WITH TYPE 2 DIABETES MELLITUS (HCC): ICD-10-CM

## 2018-11-23 DIAGNOSIS — G93.40 ENCEPHALOPATHY: ICD-10-CM

## 2018-11-23 DIAGNOSIS — F19.20 POLYSUBSTANCE (INCLUDING OPIOIDS) DEPENDENCE, DAILY USE (HCC): ICD-10-CM

## 2018-11-23 DIAGNOSIS — M54.50 ACUTE LEFT-SIDED LOW BACK PAIN WITHOUT SCIATICA: ICD-10-CM

## 2018-11-23 DIAGNOSIS — F11.20 POLYSUBSTANCE (INCLUDING OPIOIDS) DEPENDENCE, DAILY USE (HCC): ICD-10-CM

## 2018-11-23 PROBLEM — M54.9 BACK PAIN: Status: ACTIVE | Noted: 2018-11-23

## 2018-11-23 PROBLEM — R41.82 ALTERED MENTAL STATE: Status: ACTIVE | Noted: 2018-11-23

## 2018-11-23 PROBLEM — N18.9 CKD (CHRONIC KIDNEY DISEASE): Status: ACTIVE | Noted: 2018-11-23

## 2018-11-23 LAB
ALBUMIN SERPL-MCNC: 3.8 G/DL (ref 3.5–5)
ALBUMIN/GLOB SERPL: 0.7 {RATIO} (ref 1.1–2.2)
ALP SERPL-CCNC: 256 U/L (ref 45–117)
ALT SERPL-CCNC: 24 U/L (ref 12–78)
AMMONIA PLAS-SCNC: <10 UMOL/L
AMPHET UR QL SCN: NEGATIVE
ANION GAP SERPL CALC-SCNC: 14 MMOL/L (ref 5–15)
APPEARANCE UR: ABNORMAL
APTT PPP: 27.9 SEC (ref 22.1–32)
ARTERIAL PATENCY WRIST A: YES
AST SERPL-CCNC: 19 U/L (ref 15–37)
ATRIAL RATE: 113 BPM
BACTERIA URNS QL MICRO: ABNORMAL /HPF
BARBITURATES UR QL SCN: NEGATIVE
BASE DEFICIT BLDA-SCNC: 4.8 MMOL/L
BDY SITE: ABNORMAL
BENZODIAZ UR QL: NEGATIVE
BILIRUB SERPL-MCNC: 0.3 MG/DL (ref 0.2–1)
BILIRUB UR QL: NEGATIVE
BREATHS.SPONTANEOUS ON VENT: 20
BUN SERPL-MCNC: 98 MG/DL (ref 6–20)
BUN/CREAT SERPL: 14 (ref 12–20)
CALCIUM SERPL-MCNC: 9.1 MG/DL (ref 8.5–10.1)
CALCULATED P AXIS, ECG09: 55 DEGREES
CALCULATED R AXIS, ECG10: 34 DEGREES
CALCULATED T AXIS, ECG11: 55 DEGREES
CANNABINOIDS UR QL SCN: NEGATIVE
CHLORIDE SERPL-SCNC: 97 MMOL/L (ref 97–108)
CK SERPL-CCNC: 96 U/L (ref 26–192)
CO2 SERPL-SCNC: 21 MMOL/L (ref 21–32)
COCAINE UR QL SCN: NEGATIVE
COLOR UR: ABNORMAL
CREAT SERPL-MCNC: 7.08 MG/DL (ref 0.55–1.02)
DIAGNOSIS, 93000: NORMAL
DRUG SCRN COMMENT,DRGCM: NORMAL
EPITH CASTS URNS QL MICRO: ABNORMAL /LPF
ERYTHROCYTE [DISTWIDTH] IN BLOOD BY AUTOMATED COUNT: 12.9 % (ref 11.5–14.5)
EST. AVERAGE GLUCOSE BLD GHB EST-MCNC: 235 MG/DL
EST. AVERAGE GLUCOSE BLD GHB EST-MCNC: 237 MG/DL
GLOBULIN SER CALC-MCNC: 5.4 G/DL (ref 2–4)
GLUCOSE BLD STRIP.AUTO-MCNC: 154 MG/DL (ref 65–100)
GLUCOSE BLD STRIP.AUTO-MCNC: 179 MG/DL (ref 65–100)
GLUCOSE BLD STRIP.AUTO-MCNC: 205 MG/DL (ref 65–100)
GLUCOSE BLD STRIP.AUTO-MCNC: 205 MG/DL (ref 65–100)
GLUCOSE BLD STRIP.AUTO-MCNC: 208 MG/DL (ref 65–100)
GLUCOSE BLD STRIP.AUTO-MCNC: 295 MG/DL (ref 65–100)
GLUCOSE BLD STRIP.AUTO-MCNC: 388 MG/DL (ref 65–100)
GLUCOSE SERPL-MCNC: 415 MG/DL (ref 65–100)
GLUCOSE UR STRIP.AUTO-MCNC: 500 MG/DL
HBA1C MFR BLD: 9.8 % (ref 4.2–6.3)
HBA1C MFR BLD: 9.9 % (ref 4.2–6.3)
HCO3 BLDA-SCNC: 21 MMOL/L (ref 22–26)
HCT VFR BLD AUTO: 32.9 % (ref 35–47)
HGB BLD-MCNC: 11.4 G/DL (ref 11.5–16)
HGB UR QL STRIP: ABNORMAL
HYALINE CASTS URNS QL MICRO: ABNORMAL /LPF (ref 0–5)
INR PPP: 1 (ref 0.9–1.1)
KETONES UR QL STRIP.AUTO: NEGATIVE MG/DL
LEUKOCYTE ESTERASE UR QL STRIP.AUTO: ABNORMAL
LIPASE SERPL-CCNC: 242 U/L (ref 73–393)
MCH RBC QN AUTO: 29.9 PG (ref 26–34)
MCHC RBC AUTO-ENTMCNC: 34.7 G/DL (ref 30–36.5)
MCV RBC AUTO: 86.4 FL (ref 80–99)
METHADONE UR QL: NEGATIVE
NITRITE UR QL STRIP.AUTO: NEGATIVE
NRBC # BLD: 0 K/UL (ref 0–0.01)
NRBC BLD-RTO: 0 PER 100 WBC
OPIATES UR QL: NEGATIVE
P-R INTERVAL, ECG05: 156 MS
PCO2 BLDA: 39 MMHG (ref 35–45)
PCP UR QL: NEGATIVE
PH BLDA: 7.34 [PH] (ref 7.35–7.45)
PH UR STRIP: 7.5 [PH] (ref 5–8)
PLATELET # BLD AUTO: 300 K/UL (ref 150–400)
PMV BLD AUTO: 9.1 FL (ref 8.9–12.9)
PO2 BLDA: 102 MMHG (ref 80–100)
POTASSIUM SERPL-SCNC: 3.6 MMOL/L (ref 3.5–5.1)
PROT SERPL-MCNC: 9.2 G/DL (ref 6.4–8.2)
PROT UR STRIP-MCNC: 100 MG/DL
PROTHROMBIN TIME: 10 SEC (ref 9–11.1)
Q-T INTERVAL, ECG07: 344 MS
QRS DURATION, ECG06: 72 MS
QTC CALCULATION (BEZET), ECG08: 471 MS
RBC # BLD AUTO: 3.81 M/UL (ref 3.8–5.2)
RBC #/AREA URNS HPF: ABNORMAL /HPF (ref 0–5)
SAO2 % BLD: 97 % (ref 92–97)
SAO2% DEVICE SAO2% SENSOR NAME: ABNORMAL
SERVICE CMNT-IMP: ABNORMAL
SODIUM SERPL-SCNC: 132 MMOL/L (ref 136–145)
SP GR UR REFRACTOMETRY: 1.01 (ref 1–1.03)
SPECIMEN SITE: ABNORMAL
THERAPEUTIC RANGE,PTTT: NORMAL SECS (ref 58–77)
TROPONIN I SERPL-MCNC: <0.05 NG/ML
TROPONIN I SERPL-MCNC: <0.05 NG/ML
UA: UC IF INDICATED,UAUC: ABNORMAL
UROBILINOGEN UR QL STRIP.AUTO: 0.2 EU/DL (ref 0.2–1)
VENTRICULAR RATE, ECG03: 113 BPM
WBC # BLD AUTO: 8.6 K/UL (ref 3.6–11)
WBC URNS QL MICRO: ABNORMAL /HPF (ref 0–4)

## 2018-11-23 PROCEDURE — 74011250636 HC RX REV CODE- 250/636: Performed by: EMERGENCY MEDICINE

## 2018-11-23 PROCEDURE — 74011250637 HC RX REV CODE- 250/637: Performed by: EMERGENCY MEDICINE

## 2018-11-23 PROCEDURE — 82550 ASSAY OF CK (CPK): CPT

## 2018-11-23 PROCEDURE — 81001 URINALYSIS AUTO W/SCOPE: CPT

## 2018-11-23 PROCEDURE — 84484 ASSAY OF TROPONIN QUANT: CPT

## 2018-11-23 PROCEDURE — 80053 COMPREHEN METABOLIC PANEL: CPT

## 2018-11-23 PROCEDURE — 77030011943

## 2018-11-23 PROCEDURE — 85730 THROMBOPLASTIN TIME PARTIAL: CPT

## 2018-11-23 PROCEDURE — 96376 TX/PRO/DX INJ SAME DRUG ADON: CPT

## 2018-11-23 PROCEDURE — 74011636637 HC RX REV CODE- 636/637: Performed by: INTERNAL MEDICINE

## 2018-11-23 PROCEDURE — 74011250636 HC RX REV CODE- 250/636: Performed by: HOSPITALIST

## 2018-11-23 PROCEDURE — 71045 X-RAY EXAM CHEST 1 VIEW: CPT

## 2018-11-23 PROCEDURE — 83690 ASSAY OF LIPASE: CPT

## 2018-11-23 PROCEDURE — 74011000250 HC RX REV CODE- 250: Performed by: INTERNAL MEDICINE

## 2018-11-23 PROCEDURE — 74011250637 HC RX REV CODE- 250/637: Performed by: INTERNAL MEDICINE

## 2018-11-23 PROCEDURE — 83036 HEMOGLOBIN GLYCOSYLATED A1C: CPT

## 2018-11-23 PROCEDURE — 70450 CT HEAD/BRAIN W/O DYE: CPT

## 2018-11-23 PROCEDURE — 65660000000 HC RM CCU STEPDOWN

## 2018-11-23 PROCEDURE — 36600 WITHDRAWAL OF ARTERIAL BLOOD: CPT

## 2018-11-23 PROCEDURE — 74011250636 HC RX REV CODE- 250/636: Performed by: INTERNAL MEDICINE

## 2018-11-23 PROCEDURE — 80307 DRUG TEST PRSMV CHEM ANLYZR: CPT

## 2018-11-23 PROCEDURE — 74011250636 HC RX REV CODE- 250/636

## 2018-11-23 PROCEDURE — 36415 COLL VENOUS BLD VENIPUNCTURE: CPT

## 2018-11-23 PROCEDURE — 87086 URINE CULTURE/COLONY COUNT: CPT

## 2018-11-23 PROCEDURE — 96375 TX/PRO/DX INJ NEW DRUG ADDON: CPT

## 2018-11-23 PROCEDURE — 72100 X-RAY EXAM L-S SPINE 2/3 VWS: CPT

## 2018-11-23 PROCEDURE — 85027 COMPLETE CBC AUTOMATED: CPT

## 2018-11-23 PROCEDURE — 82962 GLUCOSE BLOOD TEST: CPT

## 2018-11-23 PROCEDURE — 74011000258 HC RX REV CODE- 258: Performed by: INTERNAL MEDICINE

## 2018-11-23 PROCEDURE — 96361 HYDRATE IV INFUSION ADD-ON: CPT

## 2018-11-23 PROCEDURE — 99285 EMERGENCY DEPT VISIT HI MDM: CPT

## 2018-11-23 PROCEDURE — 85610 PROTHROMBIN TIME: CPT

## 2018-11-23 PROCEDURE — 93005 ELECTROCARDIOGRAM TRACING: CPT

## 2018-11-23 PROCEDURE — 82803 BLOOD GASES ANY COMBINATION: CPT

## 2018-11-23 PROCEDURE — 74011000250 HC RX REV CODE- 250: Performed by: EMERGENCY MEDICINE

## 2018-11-23 PROCEDURE — 74011636637 HC RX REV CODE- 636/637: Performed by: EMERGENCY MEDICINE

## 2018-11-23 PROCEDURE — 82140 ASSAY OF AMMONIA: CPT

## 2018-11-23 PROCEDURE — 96374 THER/PROPH/DIAG INJ IV PUSH: CPT

## 2018-11-23 RX ORDER — METOPROLOL TARTRATE 50 MG/1
50 TABLET ORAL 2 TIMES DAILY
Status: DISCONTINUED | OUTPATIENT
Start: 2018-11-23 | End: 2018-11-24

## 2018-11-23 RX ORDER — ATORVASTATIN CALCIUM 20 MG/1
20 TABLET, FILM COATED ORAL
Status: DISCONTINUED | OUTPATIENT
Start: 2018-11-23 | End: 2018-11-27 | Stop reason: HOSPADM

## 2018-11-23 RX ORDER — GUAIFENESIN 100 MG/5ML
81 LIQUID (ML) ORAL DAILY
Status: DISCONTINUED | OUTPATIENT
Start: 2018-11-24 | End: 2018-11-27 | Stop reason: HOSPADM

## 2018-11-23 RX ORDER — KETOROLAC TROMETHAMINE 30 MG/ML
30 INJECTION, SOLUTION INTRAMUSCULAR; INTRAVENOUS
Status: DISCONTINUED | OUTPATIENT
Start: 2018-11-23 | End: 2018-11-23

## 2018-11-23 RX ORDER — HYDRALAZINE HYDROCHLORIDE 20 MG/ML
10 INJECTION INTRAMUSCULAR; INTRAVENOUS
Status: DISCONTINUED | OUTPATIENT
Start: 2018-11-23 | End: 2018-11-27 | Stop reason: HOSPADM

## 2018-11-23 RX ORDER — ONDANSETRON 2 MG/ML
INJECTION INTRAMUSCULAR; INTRAVENOUS
Status: COMPLETED
Start: 2018-11-23 | End: 2018-11-23

## 2018-11-23 RX ORDER — CYCLOBENZAPRINE HCL 5 MG
5 TABLET ORAL
Qty: 20 TAB | Refills: 0 | Status: SHIPPED | OUTPATIENT
Start: 2018-11-23 | End: 2019-07-18

## 2018-11-23 RX ORDER — NIFEDIPINE 60 MG/1
60 TABLET, EXTENDED RELEASE ORAL DAILY
Status: DISCONTINUED | OUTPATIENT
Start: 2018-11-23 | End: 2018-11-24

## 2018-11-23 RX ORDER — CYCLOBENZAPRINE HCL 10 MG
10 TABLET ORAL
Status: COMPLETED | OUTPATIENT
Start: 2018-11-23 | End: 2018-11-23

## 2018-11-23 RX ORDER — ONDANSETRON 2 MG/ML
4 INJECTION INTRAMUSCULAR; INTRAVENOUS
Status: ACTIVE | OUTPATIENT
Start: 2018-11-23 | End: 2018-11-23

## 2018-11-23 RX ORDER — METOPROLOL TARTRATE 5 MG/5ML
5 INJECTION INTRAVENOUS
Status: COMPLETED | OUTPATIENT
Start: 2018-11-23 | End: 2018-11-23

## 2018-11-23 RX ORDER — CLONIDINE HYDROCHLORIDE 0.1 MG/1
0.1 TABLET ORAL
Status: COMPLETED | OUTPATIENT
Start: 2018-11-23 | End: 2018-11-23

## 2018-11-23 RX ORDER — METOCLOPRAMIDE HYDROCHLORIDE 5 MG/ML
10 INJECTION INTRAMUSCULAR; INTRAVENOUS
Status: COMPLETED | OUTPATIENT
Start: 2018-11-23 | End: 2018-11-23

## 2018-11-23 RX ORDER — SUCRALFATE 1 G/10ML
0.5 SUSPENSION ORAL 4 TIMES DAILY
Status: DISCONTINUED | OUTPATIENT
Start: 2018-11-23 | End: 2018-11-27 | Stop reason: HOSPADM

## 2018-11-23 RX ORDER — MAGNESIUM SULFATE 100 %
4 CRYSTALS MISCELLANEOUS AS NEEDED
Status: DISCONTINUED | OUTPATIENT
Start: 2018-11-23 | End: 2018-11-27 | Stop reason: HOSPADM

## 2018-11-23 RX ORDER — LIDOCAINE 4 G/100G
1 PATCH TOPICAL EVERY 24 HOURS
Status: DISCONTINUED | OUTPATIENT
Start: 2018-11-24 | End: 2018-11-27 | Stop reason: HOSPADM

## 2018-11-23 RX ORDER — INSULIN LISPRO 100 [IU]/ML
INJECTION, SOLUTION INTRAVENOUS; SUBCUTANEOUS
Status: DISCONTINUED | OUTPATIENT
Start: 2018-11-23 | End: 2018-11-23

## 2018-11-23 RX ORDER — LIDOCAINE 4 G/100G
1 PATCH TOPICAL
Status: COMPLETED | OUTPATIENT
Start: 2018-11-23 | End: 2018-11-23

## 2018-11-23 RX ORDER — MAGNESIUM SULFATE 100 %
4 CRYSTALS MISCELLANEOUS AS NEEDED
Status: DISCONTINUED | OUTPATIENT
Start: 2018-11-23 | End: 2018-11-25 | Stop reason: SDUPTHER

## 2018-11-23 RX ORDER — LIDOCAINE 4 G/100G
1 PATCH TOPICAL EVERY 12 HOURS
Qty: 10 PATCH | Refills: 0 | Status: SHIPPED | OUTPATIENT
Start: 2018-11-23 | End: 2019-07-31

## 2018-11-23 RX ORDER — CLONIDINE HYDROCHLORIDE 0.1 MG/1
0.2 TABLET ORAL 2 TIMES DAILY
Status: DISCONTINUED | OUTPATIENT
Start: 2018-11-23 | End: 2018-11-24

## 2018-11-23 RX ORDER — INSULIN LISPRO 100 [IU]/ML
INJECTION, SOLUTION INTRAVENOUS; SUBCUTANEOUS
Status: DISCONTINUED | OUTPATIENT
Start: 2018-11-23 | End: 2018-11-27 | Stop reason: HOSPADM

## 2018-11-23 RX ORDER — INSULIN GLARGINE 100 [IU]/ML
25 INJECTION, SOLUTION SUBCUTANEOUS
Status: DISCONTINUED | OUTPATIENT
Start: 2018-11-23 | End: 2018-11-25

## 2018-11-23 RX ORDER — INSULIN GLARGINE 100 [IU]/ML
20 INJECTION, SOLUTION SUBCUTANEOUS
Status: DISCONTINUED | OUTPATIENT
Start: 2018-11-23 | End: 2018-11-23

## 2018-11-23 RX ORDER — SODIUM BICARBONATE 650 MG/1
650 TABLET ORAL 2 TIMES DAILY
Status: DISCONTINUED | OUTPATIENT
Start: 2018-11-23 | End: 2018-11-27 | Stop reason: HOSPADM

## 2018-11-23 RX ORDER — ONDANSETRON 2 MG/ML
4 INJECTION INTRAMUSCULAR; INTRAVENOUS
Status: DISCONTINUED | OUTPATIENT
Start: 2018-11-23 | End: 2018-11-27 | Stop reason: HOSPADM

## 2018-11-23 RX ORDER — DEXTROSE 50 % IN WATER (D50W) INTRAVENOUS SYRINGE
25-50 AS NEEDED
Status: DISCONTINUED | OUTPATIENT
Start: 2018-11-23 | End: 2018-11-25 | Stop reason: SDUPTHER

## 2018-11-23 RX ORDER — LORAZEPAM 2 MG/ML
1 INJECTION INTRAMUSCULAR
Status: COMPLETED | OUTPATIENT
Start: 2018-11-23 | End: 2018-11-23

## 2018-11-23 RX ORDER — GABAPENTIN 300 MG/1
300 CAPSULE ORAL
Status: COMPLETED | OUTPATIENT
Start: 2018-11-23 | End: 2018-11-23

## 2018-11-23 RX ORDER — DOCUSATE SODIUM 100 MG/1
100 CAPSULE, LIQUID FILLED ORAL 2 TIMES DAILY
Status: DISCONTINUED | OUTPATIENT
Start: 2018-11-23 | End: 2018-11-27 | Stop reason: HOSPADM

## 2018-11-23 RX ORDER — CLONIDINE HYDROCHLORIDE 0.1 MG/1
0.2 TABLET ORAL
Status: COMPLETED | OUTPATIENT
Start: 2018-11-23 | End: 2018-11-23

## 2018-11-23 RX ORDER — DEXTROSE 50 % IN WATER (D50W) INTRAVENOUS SYRINGE
25-50 AS NEEDED
Status: DISCONTINUED | OUTPATIENT
Start: 2018-11-23 | End: 2018-11-27 | Stop reason: HOSPADM

## 2018-11-23 RX ORDER — SODIUM CHLORIDE 0.9 % (FLUSH) 0.9 %
5-10 SYRINGE (ML) INJECTION AS NEEDED
Status: DISCONTINUED | OUTPATIENT
Start: 2018-11-23 | End: 2018-11-27 | Stop reason: HOSPADM

## 2018-11-23 RX ORDER — KETOROLAC TROMETHAMINE 30 MG/ML
15 INJECTION, SOLUTION INTRAMUSCULAR; INTRAVENOUS
Status: COMPLETED | OUTPATIENT
Start: 2018-11-23 | End: 2018-11-23

## 2018-11-23 RX ORDER — CLONIDINE HYDROCHLORIDE 0.1 MG/1
0.1 TABLET ORAL 2 TIMES DAILY
Status: DISCONTINUED | OUTPATIENT
Start: 2018-11-23 | End: 2018-11-23

## 2018-11-23 RX ORDER — GABAPENTIN 300 MG/1
300 CAPSULE ORAL 3 TIMES DAILY
Qty: 60 CAP | Refills: 0 | Status: SHIPPED | OUTPATIENT
Start: 2018-11-23 | End: 2019-07-18

## 2018-11-23 RX ORDER — LABETALOL HYDROCHLORIDE 5 MG/ML
20 INJECTION, SOLUTION INTRAVENOUS
Status: DISCONTINUED | OUTPATIENT
Start: 2018-11-23 | End: 2018-11-27 | Stop reason: HOSPADM

## 2018-11-23 RX ORDER — SODIUM CHLORIDE 0.9 % (FLUSH) 0.9 %
5-10 SYRINGE (ML) INJECTION EVERY 8 HOURS
Status: DISCONTINUED | OUTPATIENT
Start: 2018-11-23 | End: 2018-11-27 | Stop reason: HOSPADM

## 2018-11-23 RX ADMIN — CLONIDINE HYDROCHLORIDE 0.2 MG: 0.1 TABLET ORAL at 15:31

## 2018-11-23 RX ADMIN — LABETALOL HYDROCHLORIDE 20 MG: 5 INJECTION INTRAVENOUS at 16:27

## 2018-11-23 RX ADMIN — KETOROLAC TROMETHAMINE 15 MG: 30 INJECTION, SOLUTION INTRAMUSCULAR at 03:00

## 2018-11-23 RX ADMIN — METOPROLOL TARTRATE 5 MG: 5 INJECTION, SOLUTION INTRAVENOUS at 14:00

## 2018-11-23 RX ADMIN — Medication 10 ML: at 08:28

## 2018-11-23 RX ADMIN — NIFEDIPINE 60 MG: 60 TABLET, FILM COATED, EXTENDED RELEASE ORAL at 17:31

## 2018-11-23 RX ADMIN — METOCLOPRAMIDE 10 MG: 5 INJECTION, SOLUTION INTRAMUSCULAR; INTRAVENOUS at 03:28

## 2018-11-23 RX ADMIN — INSULIN LISPRO 7 UNITS: 100 INJECTION, SOLUTION INTRAVENOUS; SUBCUTANEOUS at 11:35

## 2018-11-23 RX ADMIN — CEFTRIAXONE 1 G: 1 INJECTION, POWDER, FOR SOLUTION INTRAMUSCULAR; INTRAVENOUS at 11:01

## 2018-11-23 RX ADMIN — INSULIN HUMAN 10 UNITS: 100 INJECTION, SOLUTION PARENTERAL at 04:16

## 2018-11-23 RX ADMIN — ONDANSETRON 4 MG: 2 INJECTION INTRAMUSCULAR; INTRAVENOUS at 03:01

## 2018-11-23 RX ADMIN — SODIUM CHLORIDE 1000 ML: 900 INJECTION, SOLUTION INTRAVENOUS at 03:30

## 2018-11-23 RX ADMIN — CLONIDINE HYDROCHLORIDE 0.1 MG: 0.1 TABLET ORAL at 14:28

## 2018-11-23 RX ADMIN — SUCRALFATE 0.5 G: 1 SUSPENSION ORAL at 17:39

## 2018-11-23 RX ADMIN — Medication 10 ML: at 21:32

## 2018-11-23 RX ADMIN — METOPROLOL TARTRATE 50 MG: 50 TABLET ORAL at 17:39

## 2018-11-23 RX ADMIN — GABAPENTIN 300 MG: 300 CAPSULE ORAL at 03:12

## 2018-11-23 RX ADMIN — HYDRALAZINE HYDROCHLORIDE 10 MG: 20 INJECTION INTRAMUSCULAR; INTRAVENOUS at 11:34

## 2018-11-23 RX ADMIN — CYCLOBENZAPRINE HYDROCHLORIDE 10 MG: 10 TABLET, FILM COATED ORAL at 03:12

## 2018-11-23 RX ADMIN — SODIUM CHLORIDE 1000 ML: 900 INJECTION, SOLUTION INTRAVENOUS at 04:18

## 2018-11-23 RX ADMIN — SODIUM CHLORIDE 500 ML: 900 INJECTION, SOLUTION INTRAVENOUS at 20:10

## 2018-11-23 RX ADMIN — SUCRALFATE 0.5 G: 1 SUSPENSION ORAL at 15:33

## 2018-11-23 RX ADMIN — Medication 10 ML: at 15:32

## 2018-11-23 RX ADMIN — LORAZEPAM 1 MG: 2 INJECTION INTRAMUSCULAR; INTRAVENOUS at 03:28

## 2018-11-23 RX ADMIN — LORAZEPAM 1 MG: 2 INJECTION INTRAMUSCULAR; INTRAVENOUS at 07:29

## 2018-11-23 RX ADMIN — INSULIN LISPRO 2 UNITS: 100 INJECTION, SOLUTION INTRAVENOUS; SUBCUTANEOUS at 17:38

## 2018-11-23 RX ADMIN — CLONIDINE HYDROCHLORIDE 0.2 MG: 0.1 TABLET ORAL at 17:39

## 2018-11-23 RX ADMIN — SODIUM BICARBONATE 650 MG: 650 TABLET ORAL at 17:39

## 2018-11-23 RX ADMIN — CLONIDINE HYDROCHLORIDE 0.1 MG: 0.1 TABLET ORAL at 05:05

## 2018-11-23 RX ADMIN — INSULIN GLARGINE 25 UNITS: 100 INJECTION, SOLUTION SUBCUTANEOUS at 21:31

## 2018-11-23 NOTE — ED NOTES
Patient easily aroused. Patient awake long enough to take sips of water with and without straw. Patient given clonidine.

## 2018-11-23 NOTE — DIABETES MGMT
DTC Consult Note Recommendations/ Comments: Noted consult for Assessment of Home management. Pt currently not appropriate at this time as pt with AMS, remains confused. Noted Lantus 20 units ordered and lispro correction - resistant sensitivity ordered. If appropriate, please consider :  
1. Changing correction to high sensitivity given renal status 2. Continue titrating Lantus towards home dose until fasting BG , 180 mg/dL 3. Resuming prandial insulin once diet resumed and pt eating 50% or more of all meals Current hospital DM medication: Lantus 20 units, lispro correction - resistant sensitivity Consult received for:  [x]             Assessment of home management 
              []      Medication Recommendations []             Meter/monitoring 
   []             Insulin instruction []             New diagnosis []             Outpatient education []             Insulin pump patient []             Insulin infusion 
   []             DKA/HHS Chart reviewed and initial evaluation complete on Sandra Wilkins. Patient is a 46 y.o. female with known DM on Lantus 25 units, novolog 5 units with meals at home. A1c:  
Lab Results Component Value Date/Time Hemoglobin A1c 8.5 (H) 07/10/2018 05:05 PM  
 
 
Recent Glucose Results:  
Lab Results Component Value Date/Time  (H) 11/23/2018 03:03 AM  
 GLUCPOC 295 (H) 11/23/2018 11:17 AM  
 GLUCPOC 208 (H) 11/23/2018 07:26 AM  
 GLUCPOC 179 (H) 11/23/2018 05:59 AM  
  
 
Lab Results Component Value Date/Time Creatinine 7.08 (H) 11/23/2018 03:03 AM  
 
Estimated Creatinine Clearance: 8 mL/min (A) (based on SCr of 7.08 mg/dL (H)). Active Orders Diet DIET NPO  
  
 
PO intake: No data found. Will continue to follow as needed. Thank you. Wander Jean [facility] Office: [phone]

## 2018-11-23 NOTE — PROGRESS NOTES
Renal - 
 
Called with Renal consult, but Ms. Prince Toribio has been followed by [facility] in the past.  
 
Changed consult to [facility] and her nurse is calling the consult now. BP and HR quite high - I ordered a one time dose of Clonidine 0.2 mg po. Phi Rashid

## 2018-11-23 NOTE — CONSULTS
NEUROLOGY CONSULT NOTE    Patient ID:  Venecia Fisher  [id number]  63 y.o.  1965    Date of Consultation:  November 23, 2018    Referring Physician: Dr. Kal Moe    Reason for Consultation:  Altered mental status    History of Present Illness:     Patient Active Problem List    Diagnosis Date Noted    Back pain 11/23/2018    CKD (chronic kidney disease) 11/23/2018    EZEKIEL (acute kidney injury) ([address] 11/23/2018    Altered mental state 11/23/2018    DKA (diabetic ketoacidoses) ([address] 07/10/2018    Pancreatitis 05/27/2018    Chest pain 05/10/2018    Acute cystitis 03/14/2017    Acute pancreatitis 05/25/2015    Flank pain 04/14/2015    Hydronephrosis with infection 04/09/2015    SIRS (systemic inflammatory response syndrome) [facility] 04/08/2015    Hyperglycemia 11/18/2013    Abdominal pain 11/18/2013    Lower urinary tract infectious disease 11/18/2013    Chronic pain 11/18/2013    Hyponatremia 11/18/2013    HTN (hypertension) 12/20/2012    Chronic lumbar radiculopathy 12/06/2012    Chronic pain syndrome 12/06/2012    IDDM (insulin dependent diabetes mellitus) ([facility] 10/16/2012    Back pain, lumbosacral 06/24/2012    Gastroparesis 06/07/2012    Abdominal pain, LUQ (left upper quadrant) 06/07/2012    Syncope and collapse 04/22/2012    Depression 04/22/2012    CKD (chronic kidney disease) stage 4, GFR 15-29 ml/min ([facility]) 04/22/2012    Intractable abdominal pain 04/21/2012    HTN (hypertension) 04/12/2012     Past Medical History:   Diagnosis Date    C. difficile colitis 6/2012    Chronic low back pain     CKD (chronic kidney disease)     stage 3 to 4, baseline Cr 2    Constipation     Diabetes ([address]     A1c 8.2 3/2012    Hep C w/o coma, chronic ([facility])     Hyperlipemia     Hypertension     Lumbar disc disease     Migraines     Pancreatitis 3704    alcoholic    UTI (lower urinary tract infection) 6/20012      Past Surgical History:   Procedure Laterality Date    HX ORTHOPAEDIC lumbar sprain; back surgery    HX UROLOGICAL  2014    kidney stone removed    MS COLONOSCOPY FLX DX W/COLLJ SPEC WHEN PFRMD  11/12/2012           Prior to Admission medications    Medication Sig Start Date End Date Taking? Authorizing Provider   gabapentin (NEURONTIN) 300 mg capsule Take 1 Cap by mouth three (3) times daily. 11/23/18  Yes Ashley Barnard MD   cyclobenzaprine (FLEXERIL) 5 mg tablet Take 1 Tab by mouth three (3) times daily as needed for Muscle Spasm(s). 11/23/18  Yes Ashley Barnard MD   lidocaine (LIDOCARE) 4 % patch 1 Patch by TransDERmal route every twelve (12) hours every twelve (12) hours. 11/23/18  Yes Ashley Barnard MD   HYDROcodone-acetaminophen (NORCO) 5-325 mg per tablet Take 1 Tab by mouth every four (4) hours as needed for Pain. Max Daily Amount: 6 Tabs. 10/15/18   Clementina Sun MD   ciprofloxacin-dexamethasone (CIPRODEX) 0.3-0.1 % otic suspension Administer 4 Drops in left ear two (2) times a day. 10/15/18   Clementina Sun MD   amoxicillin-clavulanate (AUGMENTIN) 875-125 mg per tablet Take 1 Tab by mouth two (2) times a day. 10/15/18   Clementina Sun MD   ondansetron (ZOFRAN ODT) 4 mg disintegrating tablet Take 1 Tab by mouth every eight (8) hours as needed for Nausea. 10/15/18   Leigh Ann Landaverde MD   insulin aspart U-100 (NOVOLOG) 100 unit/mL injection 5 Units by SubCUTAneous route Before breakfast, lunch, and dinner. Provider, Historical   sucralfate (CARAFATE) 100 mg/mL suspension Take 5 mL by mouth four (4) times daily. 7/9/18   Isidoro Woods MD   aspirin 81 mg chewable tablet Take 1 Tab by mouth daily. 5/12/18   Malcolm Rashid MD   cloNIDine HCl (CATAPRES) 0.1 mg tablet Take 1 Tab by mouth two (2) times a day. 5/11/18   Malcolm Rashid MD   ondansetron (ZOFRAN ODT) 4 mg disintegrating tablet Take 1 Tab by mouth every eight (8) hours as needed for Nausea.  5/11/18   Malcolm Rashid MD   metoprolol tartrate (LOPRESSOR) 50 mg tablet Take 50 mg by mouth two (2) times a day. Provider, Historical   sodium bicarbonate 650 mg tablet Take 650 mg by mouth two (2) times a day. Provider, Historical   atorvastatin (LIPITOR) 20 mg tablet Take 20 mg by mouth nightly. Provider, Historical   insulin glargine (LANTUS) 100 unit/mL injection 25 Units by SubCUTAneous route nightly. 3/14/17   Yamilex Arias NP     No Known Allergies   Social History     Tobacco Use    Smoking status: Never Smoker    Smokeless tobacco: Never Used   Substance Use Topics    Alcohol use: No     Comment: Quit few months ago, hx of abuse      Family History   Problem Relation Age of Onset    Diabetes Mother     Kidney Disease Mother     Diabetes Sister         Subjective:      Fernando Brock is a 46 y.o.  AA female with history of diabetic neuropathy, CKD, HLD, pancreatitis, DM, HTN, chronic lower back pain, UTI, difficile colitis, migraines, Hep C without coma, lumbar disc disease who was admitted from the ER altered mental status, back and leg pain and worsening renal failure. Per ER note, patient complained of moderate constant and sharp back pain. Also mentions bilateral leg pain and tingling for the past 2 weeks. Patient reports missing medications she is suppose to be taking. She states the pain is worse with movement and weight bearing. Noted to be lethargic in the ER. In the blood pressure is 157/108 with tachycardia at 114. .  Laboratory workup revealed mild anemia with hemoglobin of 11.4, worsening creatinine at 7.08, decreased GFR, hyponatremia with sodium 132 and significantly elevated blood sugar at 388. Urine drug screen was negative. Urinalysis and likely has UTI. Lumbar x-ray revealed degenerative disc disease L5-S1. Head CT without contrast revealed no acute process. Chest x-ray was negative. Today, when seen patient was lethargic but arousable to name calling and able to respond intermittently and follows simple commands.     Outside reports reviewed: office notes, ER records, radiology reports, lab reports, historical medical records. Review of Systems:    Pertinent items are noted in HPI. Objective:     Patient Vitals for the past 8 hrs:   BP Temp Pulse Resp SpO2 Height Weight   11/23/18 1428 (!) 185/112  (!) 111       11/23/18 1419 (!) 185/112 98.3 °F (36.8 °C) (!) 107 23 100 %     11/23/18 1400 (!) 183/106  (!) 123       11/23/18 1314 (!) 168/99 98.4 °F (36.9 °C) (!) 116 27 99 %     11/23/18 1215 (!) 170/99  (!) 116 25 100 %     11/23/18 1134 (!) 172/95  (!) 109       11/23/18 1130 (!) 172/95  (!) 106 25 100 %     11/23/18 1045 (!) 181/100  (!) 107  100 %     11/23/18 1033 (!) 186/105  (!) 113 20 100 % 5' 4\" (1.626 m) 130 lb (59 kg)   11/23/18 0732 (!) 176/96 98 °F (36.7 °C) (!) 113 18 100 %             NEUROLOGICAL EXAM:    Appearance: The patient is well developed, well nourished, unable to provide a coherent history and is in no acute distress. Mental Status: Lethargic but easily arousable to name calling and tactile stimulation. Oriented to place and person. Fluent, no aphasia or dysarthria. Good name and repetition. Intermittently follows simple commands. Cranial Nerves:   Blinks to threat. TAMIKA, EOM's full, no nystagmus, no ptosis. Facial sensation is normal. Corneal reflexes are intact. Facial movement is symmetric. Hearing is normal bilaterally. Palate is midline with normal sternocleidomastoid and trapezius muscles are normal. Tongue is midline. Motor:  5/5 strength. . Normal bulk and tone. No fasciculations. Reflexes:   Deep tendon reflexes 1+/4 and symmetrical. Toes downgoing. Sensory:   Decrease stocking distribution. Gait:  Not tested. Tremor:   No tremor noted. Cerebellar:  Intact. Neurovascular:  No carotid bruits.        Imaging  CT Head: reviewed    Lab Review    Recent Results (from the past 24 hour(s))   CBC W/O DIFF    Collection Time: 11/23/18  3:03 AM   Result Value Ref Range WBC 8.6 3.6 - 11.0 K/uL    RBC 3.81 3.80 - 5.20 M/uL    HGB 11.4 (L) 11.5 - 16.0 g/dL    HCT 32.9 (L) 35.0 - 47.0 %    MCV 86.4 80.0 - 99.0 FL    MCH 29.9 26.0 - 34.0 PG    MCHC 34.7 30.0 - 36.5 g/dL    RDW 12.9 11.5 - 14.5 %    PLATELET 458 059 - 296 K/uL    MPV 9.1 8.9 - 12.9 FL    NRBC 0.0 0  WBC    ABSOLUTE NRBC 0.00 0.00 - 3.45 K/uL   METABOLIC PANEL, COMPREHENSIVE    Collection Time: 11/23/18  3:03 AM   Result Value Ref Range    Sodium 132 (L) 136 - 145 mmol/L    Potassium 3.6 3.5 - 5.1 mmol/L    Chloride 97 97 - 108 mmol/L    CO2 21 21 - 32 mmol/L    Anion gap 14 5 - 15 mmol/L    Glucose 415 (H) 65 - 100 mg/dL    BUN 98 (H) 6 - 20 MG/DL    Creatinine 7.08 (H) 0.55 - 1.02 MG/DL    BUN/Creatinine ratio 14 12 - 20      GFR est AA 7 (L) >60 ml/min/1.73m2    GFR est non-AA 6 (L) >60 ml/min/1.73m2    Calcium 9.1 8.5 - 10.1 MG/DL    Bilirubin, total 0.3 0.2 - 1.0 MG/DL    ALT (SGPT) 24 12 - 78 U/L    AST (SGOT) 19 15 - 37 U/L    Alk.  phosphatase 256 (H) 45 - 117 U/L    Protein, total 9.2 (H) 6.4 - 8.2 g/dL    Albumin 3.8 3.5 - 5.0 g/dL    Globulin 5.4 (H) 2.0 - 4.0 g/dL    A-G Ratio 0.7 (L) 1.1 - 2.2     LIPASE    Collection Time: 11/23/18  3:03 AM   Result Value Ref Range    Lipase 242 73 - 393 U/L   GLUCOSE, POC    Collection Time: 11/23/18  3:25 AM   Result Value Ref Range    Glucose (POC) 388 (H) 65 - 100 mg/dL    Performed by Nallely Chacon, POC    Collection Time: 11/23/18  5:59 AM   Result Value Ref Range    Glucose (POC) 179 (H) 65 - 100 mg/dL    Performed by [address] [place] [address], POC    Collection Time: 11/23/18  7:26 AM   Result Value Ref Range    Glucose (POC) 208 (H) 65 - 100 mg/dL    Performed by Jong Anaya    EKG, 12 LEAD, INITIAL    Collection Time: 11/23/18  8:08 AM   Result Value Ref Range    Ventricular Rate 113 BPM    Atrial Rate 113 BPM    P-R Interval 156 ms    QRS Duration 72 ms    Q-T Interval 344 ms    QTC Calculation (Bezet) 471 ms    Calculated P Axis 55 degrees Calculated R Axis 34 degrees    Calculated T Axis 55 degrees    Diagnosis       Sinus tachycardia  When compared with ECG of 10-JUL-2018 10:57,  Criteria for Septal infarct are no longer present  Confirmed by Magno Ty ([id number]) on 11/23/2018 9:55:22 AM     DRUG SCREEN, URINE    Collection Time: 11/23/18  9:36 AM   Result Value Ref Range    AMPHETAMINES NEGATIVE  NEG      BARBITURATES NEGATIVE  NEG      BENZODIAZEPINES NEGATIVE  NEG      COCAINE NEGATIVE  NEG      METHADONE NEGATIVE  NEG      OPIATES NEGATIVE  NEG      PCP(PHENCYCLIDINE) NEGATIVE  NEG      THC (TH-CANNABINOL) NEGATIVE  NEG      Drug screen comment (NOTE)    URINALYSIS W/ REFLEX CULTURE    Collection Time: 11/23/18  9:36 AM   Result Value Ref Range    Color YELLOW/STRAW      Appearance CLOUDY (A) CLEAR      Specific gravity 1.012 1.003 - 1.030      pH (UA) 7.5 5.0 - 8.0      Protein 100 (A) NEG mg/dL    Glucose 500 (A) NEG mg/dL    Ketone NEGATIVE  NEG mg/dL    Bilirubin NEGATIVE  NEG      Blood SMALL (A) NEG      Urobilinogen 0.2 0.2 - 1.0 EU/dL    Nitrites NEGATIVE  NEG      Leukocyte Esterase TRACE (A) NEG      WBC 5-10 0 - 4 /hpf    RBC 0-5 0 - 5 /hpf    Epithelial cells FEW FEW /lpf    Bacteria 2+ (A) NEG /hpf    UA:UC IF INDICATED URINE CULTURE ORDERED (A) CNI      Hyaline cast 0-2 0 - 5 /lpf   PTT    Collection Time: 11/23/18  9:36 AM   Result Value Ref Range    aPTT 27.9 22.1 - 32.0 sec    aPTT, therapeutic range     58.0 - 77.0 SECS   PROTHROMBIN TIME + INR    Collection Time: 11/23/18  9:36 AM   Result Value Ref Range    INR 1.0 0.9 - 1.1      Prothrombin time 10.0 9.0 - 11.1 sec   CK    Collection Time: 11/23/18  9:36 AM   Result Value Ref Range    CK 96 26 - 192 U/L   TROPONIN I    Collection Time: 11/23/18  9:36 AM   Result Value Ref Range    Troponin-I, Qt. <0.05 <0.05 ng/mL   AMMONIA    Collection Time: 11/23/18  9:36 AM   Result Value Ref Range    Ammonia <10 <32 UMOL/L   HEMOGLOBIN A1C WITH EAG    Collection Time: 11/23/18  9:36 AM   Result Value Ref Range    Hemoglobin A1c 9.8 (H) 4.2 - 6.3 %    Est. average glucose 235 mg/dL   BLOOD GAS, ARTERIAL    Collection Time: 11/23/18  9:50 AM   Result Value Ref Range    pH 7.34 (L) 7.35 - 7.45      PCO2 39 35.0 - 45.0 mmHg    PO2 102 (H) 80 - 100 mmHg    O2 SAT 97 92 - 97 %    BICARBONATE 21 (L) 22 - 26 mmol/L    BASE DEFICIT 4.8 mmol/L    O2 METHOD ROOM AIR      SPONTANEOUS RATE 20.0      Sample source ARTERIAL      SITE RIGHT RADIAL      REDD'S TEST YES     GLUCOSE, POC    Collection Time: 11/23/18 11:17 AM   Result Value Ref Range    Glucose (POC) 295 (H) 65 - 100 mg/dL    Performed by Sammie Perrin (PCT)          Assessment:   Encephalopathy  Diabetic neuropathy    Plan:   Neurological examination mainly reveals cognitive impairment with no focal findings. Findings more consistent with encephalopathy  There is likely multifactorial (medication, hyperglycemia, hyponatremia, worsening renal function, infection and etc.). Exam also reveals distal lower extremity sensory deficits in this patient with known history of diabetic neuropathy. Likely aggravated by chronic kidney disease as well. Head CT without contrast did not reveal any acute process. No indication to do any other neuroimaging at this time. Recommend limiting sedating medication. Recommend EEG if fluctuation in mentation persist despite treatment of her underlying medical issues. No further recommendations from a neurological standpoint. Thank you for the consult.

## 2018-11-23 NOTE — H&P
Hospitalist Admission NoteNAME: Aleksandr Cancino :  1965 MRN:  [id number] Date/Time:  2018 7:47 AM 
 
Patient PCP: Laurie Roe NP 
______________________________________________________________________ Given the patient's current clinical presentation, I have a high level of concern for decompensation if discharged from the emergency department. Complex decision making was performed, which includes reviewing the patient's available past medical records, laboratory results, and x-ray films. My assessment of this patient's clinical condition and my plan of care is as follows. Assessment / Plan: Altered Mental Status: Likely multifactorial 2/2 ezekiel/ckd/pain meds/hyperglycemia 
- renal consulted 
- fingersticks 
- alcohol and Utox/abg/ammonia/abg ordered - CT head ordered 
- no signs/sx of sepsis at this time but will have a low threshold to culture  
and start abx (CXR PND, UA  Ordered) 
- continuous pulse oximetry with supplemental O2 prn 
- fall/aspiration precautions - CM consult for drug abuse resources Back pain Per er no neuro  sx No signs of infection in back No h/o ivda 
xrays ordered Lido patch Poorly controlled controlled, IDDM 
lantus ISS 
a1c 
  
Concern for opioid w/d Limit pain meds Has clonidine for HTN Use lido patch Check ua tox Hypertensive urgency As needed hydralazine Resume home meds once able to tolerate p.o. EZEKIEL/CKD stage V Not on dialysis Renal consult today Chronic anemia Anemia of chronic kidney disease 
  
Body mass index is 22.01 kg/(m^2). Code Status: full Surrogate Decision Maker: 
  
DVT Prophylaxis: SCD's if ct head ngt use heparin GI Prophylaxis: not indicated 
  
Baseline: independent Subjective: CHIEF COMPLAINT: back pain 
vomiting HISTORY OF PRESENT ILLNESS:   Per records Lawerance Search is a 46 y.o.   female with PMHx significant for diabetic neuropathy, CKD, HLD, pancreatitis, DM, HTN, chronic lower back pain, UTI, difficile colitis, migraines, Hep C without coma, lumbar disc disease, presents by EMS to the ED with cc of moderate, constant, sharp back pain that stared 2 hours ago. Pt states she has had bilateral leg pain for the past 2 weeks. Pt notes some tingling to her bilateral LEs as well. Pt notes she has not been lifting anything heavy lately. She states the pain is worse with movement and weight bearing. She specifically denies numbness, weakness, incontinence (bowel or bladder), saddle anesthesia, nausea, vomiting, fever, chills, neck pain, SOB, CP, dysuria, hematuria.   
  
 
 
We were asked to admit for work up and evaluation of the above problems. Past Medical History:  
Diagnosis Date  C. difficile colitis 6/2012  Chronic low back pain  CKD (chronic kidney disease) stage 3 to 4, baseline Cr 2  
 Constipation  Diabetes ([facility] [address] A1c 8.2 3/2012  Hep C w/o coma, chronic (HCC)  Hyperlipemia  Hypertension  Lumbar disc disease  Migraines  Pancreatitis 3965  
 alcoholic  UTI (lower urinary tract infection) 6/20012 Past Surgical History:  
Procedure Laterality Date  HX ORTHOPAEDIC    
 lumbar sprain; back surgery  HX UROLOGICAL  2014  
 kidney stone removed  NC COLONOSCOPY FLX DX W/COLLJ SPEC WHEN PFRMD  11/12/2012 Social History Tobacco Use  Smoking status: Never Smoker  Smokeless tobacco: Never Used Substance Use Topics  Alcohol use: No  
  Comment: Quit few months ago, hx of abuse Family History Problem Relation Age of Onset  Diabetes Mother  Kidney Disease Mother  Diabetes Sister No Known Allergies Prior to Admission medications Medication Sig Start Date End Date Taking? Authorizing Provider  
gabapentin (NEURONTIN) 300 mg capsule Take 1 Cap by mouth three (3) times daily.  11/23/18  Yes Urvashi Muñoz MD  
 cyclobenzaprine (FLEXERIL) 5 mg tablet Take 1 Tab by mouth three (3) times daily as needed for Muscle Spasm(s). 11/23/18  Yes Risa Lundy MD  
lidocaine (LIDOCARE) 4 % patch 1 Patch by TransDERmal route every twelve (12) hours every twelve (12) hours. 11/23/18  Yes Risa Lundy MD  
HYDROcodone-acetaminophen (NORCO) 5-325 mg per tablet Take 1 Tab by mouth every four (4) hours as needed for Pain. Max Daily Amount: 6 Tabs. 10/15/18   Clementina Sun MD  
ciprofloxacin-dexamethasone (CIPRODEX) 0.3-0.1 % otic suspension Administer 4 Drops in left ear two (2) times a day. 10/15/18   Clementina Sun MD  
amoxicillin-clavulanate (AUGMENTIN) 875-125 mg per tablet Take 1 Tab by mouth two (2) times a day. 10/15/18   Clementina Sun MD  
ondansetron (ZOFRAN ODT) 4 mg disintegrating tablet Take 1 Tab by mouth every eight (8) hours as needed for Nausea. 10/15/18   Rosalia Porter MD  
insulin aspart U-100 (NOVOLOG) 100 unit/mL injection 5 Units by SubCUTAneous route Before breakfast, lunch, and dinner. Provider, Historical  
sucralfate (CARAFATE) 100 mg/mL suspension Take 5 mL by mouth four (4) times daily. 7/9/18   Jessi Roblero MD  
aspirin 81 mg chewable tablet Take 1 Tab by mouth daily. 5/12/18   Luli Ribeiro MD  
cloNIDine HCl (CATAPRES) 0.1 mg tablet Take 1 Tab by mouth two (2) times a day. 5/11/18   Luli Ribeiro MD  
ondansetron (ZOFRAN ODT) 4 mg disintegrating tablet Take 1 Tab by mouth every eight (8) hours as needed for Nausea. 5/11/18   Luli Ribeiro MD  
metoprolol tartrate (LOPRESSOR) 50 mg tablet Take 50 mg by mouth two (2) times a day. Provider, Historical  
sodium bicarbonate 650 mg tablet Take 650 mg by mouth two (2) times a day. Provider, Historical  
atorvastatin (LIPITOR) 20 mg tablet Take 20 mg by mouth nightly.     Provider, Historical  
insulin glargine (LANTUS) 100 unit/mL injection 25 Units by SubCUTAneous route nightly. 3/14/17   Erik Barrera NP  
 
 
REVIEW OF SYSTEMS:    
I am not able to complete the review of systems because: The patient is intubated and sedated  
x The patient has altered mental status due to his acute medical problems The patient has baseline aphasia from prior stroke(s) The patient has baseline dementia and is not reliable historian The patient is in acute medical distress and unable to provide information Objective: VITALS:   
Visit Vitals BP (!) 174/98 Pulse (!) 114 Temp 98.2 °F (36.8 °C) Resp 16 SpO2 100% PHYSICAL EXAM: 
 
General:    lethargic, responds to simple commands  no distress, appears stated age. Very dishevel HEENT: Atraumatic, anicteric sclerae, pink conjunctivae No oral ulcers, mucosa moist, throat clear, dentition poor Neck:  Supple, symmetrical,  thyroid: non tender no meningismus Lungs:   Decrease bs  bilaterally. No Wheezing or Rhonchi. No rales. Chest wall:  No tenderness  No Accessory muscle use. Heart:   Tachy rhythm,  No  murmur   No edema Abdomen:   Soft, mild tender. no rt no guarding  Not distended. Bowel sounds normal 
Extremities: No cyanosis. No clubbing,   
  Skin turgor normal, Capillary refill normal, Radial dial pulse 2+ Skin:     Not pale. Not Jaundiced  No rashes Psych:  Unable to assess . Not anxious or agitated. Neurologic: EOMs intact. No facial asymmetry. No aphasia or slurred speech. Symmetrical strength, Alert and oriented X 1. Unable to fully assess neuro status 
 
_______________________________________________________________________ Care Plan discussed with: 
  Comments Patient x Family RN x Care Manager Consultant:  isaias   
_______________________________________________________________________ Expected  Disposition:  
Home with Family x HH/PT/OT/RN   
SNF/LTC   
BINA   
________________________________________________________________________ TOTAL TIME:  90 Minutes Critical Care Provided     Minutes non procedure based Comments  
 x Reviewed previous records  
>50% of visit spent in counseling and coordination of care x Discussion with patient and/or family and questions answered 
  
 
________________________________________________________________________ Signed: Elio Peace MD 
 
Procedures: see electronic medical records for all procedures/Xrays and details which were not copied into this note but were reviewed prior to creation of Plan. LAB DATA REVIEWED:   
Recent Results (from the past 24 hour(s)) CBC W/O DIFF Collection Time: 11/23/18  3:03 AM  
Result Value Ref Range WBC 8.6 3.6 - 11.0 K/uL  
 RBC 3.81 3.80 - 5.20 M/uL  
 HGB 11.4 (L) 11.5 - 16.0 g/dL HCT 32.9 (L) 35.0 - 47.0 % MCV 86.4 80.0 - 99.0 FL  
 MCH 29.9 26.0 - 34.0 PG  
 MCHC 34.7 30.0 - 36.5 g/dL  
 RDW 12.9 11.5 - 14.5 % PLATELET 955 948 - 705 K/uL MPV 9.1 8.9 - 12.9 FL  
 NRBC 0.0 0  WBC ABSOLUTE NRBC 0.00 0.00 - 0.01 K/uL METABOLIC PANEL, COMPREHENSIVE Collection Time: 11/23/18  3:03 AM  
Result Value Ref Range Sodium 132 (L) 136 - 145 mmol/L Potassium 3.6 3.5 - 5.1 mmol/L Chloride 97 97 - 108 mmol/L  
 CO2 21 21 - 32 mmol/L Anion gap 14 5 - 15 mmol/L Glucose 415 (H) 65 - 100 mg/dL BUN 98 (H) 6 - 20 MG/DL Creatinine 7.08 (H) 0.55 - 1.02 MG/DL  
 BUN/Creatinine ratio 14 12 - 20 GFR est AA 7 (L) >60 ml/min/1.73m2 GFR est non-AA 6 (L) >60 ml/min/1.73m2 Calcium 9.1 8.5 - 10.1 MG/DL Bilirubin, total 0.3 0.2 - 1.0 MG/DL  
 ALT (SGPT) 24 12 - 78 U/L  
 AST (SGOT) 19 15 - 37 U/L Alk. phosphatase 256 (H) 45 - 117 U/L Protein, total 9.2 (H) 6.4 - 8.2 g/dL Albumin 3.8 3.5 - 5.0 g/dL Globulin 5.4 (H) 2.0 - 4.0 g/dL A-G Ratio 0.7 (L) 1.1 - 2.2 LIPASE Collection Time: 11/23/18  3:03 AM  
Result Value Ref Range  Lipase 242 73 - 393 U/L  
GLUCOSE, POC  
 Collection Time: 11/23/18  3:25 AM  
Result Value Ref Range Glucose (POC) 388 (H) 65 - 100 mg/dL Performed by Janis Swenson GLUCOSE, POC Collection Time: 11/23/18  5:59 AM  
Result Value Ref Range Glucose (POC) 179 (H) 65 - 100 mg/dL Performed by Ayesha Tyler GLUCOSE, POC Collection Time: 11/23/18  7:26 AM  
Result Value Ref Range Glucose (POC) 208 (H) 65 - 100 mg/dL Performed by Janis Swenson

## 2018-11-23 NOTE — PROGRESS NOTES
Physical Therapy Returned to see pt for eval. Spoke with RN who states pt is still lethargic and with very high BPs currently. He recommends deferring at this time. Will monitor and follow for eval as pt is able to tolerate.  
 
Tamar Braga, PT

## 2018-11-23 NOTE — CONSULTS
NEPHROLOGY CONSULT NOTE     Patient: Marcie Harrington MRN: [id number]  PCP: Cathy Quevedo NP   :     1965  Age:   46 y.o. Sex:  female      Referring physician: Jo Ann Hua,*  Reason for consultation: 46 y.o. female with EZEKIEL (acute kidney injury) ([facility]  CKD (chronic kidney disease)  Back pain  EZEKIEL (acute kidney injury) ([facility]  CKD (chronic kidney disease)  Altered mental state complicated by EZEKIEL   Admission Date: 2018  2:18 AM  LOS: 0 days      ASSESSMENT and PLAN :   EZEKIEL on CKD:  - mild rise may be from her HTN, but likely has progressive CKD  - BP control for tonight  - daily labs  - she is approaching dialysis but is in no urgent need tonight    CKD V:  - from DM and HTN  - baseline Cr 5.5 to 6  - f/b Dr. Alex Guevara    Malignant HTN:  - increase clonidine to 0.2mg BID and cont metoprolol  - will add nifedipine XL 60mg daily to start tonight  - PRN IV labetalol and hydralazine for SBP > 180    AMS:  - neg head CT, ammonia neg  - per neuro    Anemia of CKD  - hgb stable    Back pain:  - neg plain films  - per hospitalist    IDDM:  - per hospitalist     Active Problems / Assessment AAActive  : Active Problems:    Back pain (2018)      CKD (chronic kidney disease) (2018)      EZEKIEL (acute kidney injury) ([facility] (2018)      Altered mental state (2018)         Subjective:   HPI: Marcie Harrington is a 46 y.o.  female who has been admitted to the hospital for back pain and BP 220s/120s and AMS. She has a hx of CKD V, f/b Dr. Alex Guevara, baseline Cr appears to be around 5.5 to 6. Her Cr today is 7, normal K and bicarb. She also has malignant HTN, poorly controlled DM, chronic pancreatitis. She was given clonidine and hydralazine with minimal improvement in her BP. CT head neg, CXR and XR spine neg. She is encephalopathic and unable to provide any history. Urine drug screen neg on admission. Past Medical Hx:   Past Medical History:   Diagnosis Date    C. difficile colitis 6/2012    Chronic low back pain     CKD (chronic kidney disease)     stage 3 to 4, baseline Cr 2    Constipation     Diabetes (HCC)     A1c 8.2 3/2012    Hep C w/o coma, chronic (HCC)     Hyperlipemia     Hypertension     Lumbar disc disease     Migraines     Pancreatitis 1555    alcoholic    UTI (lower urinary tract infection) 6/20012        Past Surgical Hx:     Past Surgical History:   Procedure Laterality Date    HX ORTHOPAEDIC      lumbar sprain; back surgery    HX UROLOGICAL  2014    kidney stone removed    SD COLONOSCOPY FLX DX W/COLLJ SPEC WHEN PFRMD  11/12/2012            Medications:  Prior to Admission medications    Medication Sig Start Date End Date Taking? Authorizing Provider   gabapentin (NEURONTIN) 300 mg capsule Take 1 Cap by mouth three (3) times daily. 11/23/18  Yes Ashley Barnard MD   cyclobenzaprine (FLEXERIL) 5 mg tablet Take 1 Tab by mouth three (3) times daily as needed for Muscle Spasm(s). 11/23/18  Yes Ashley Barnard MD   lidocaine (LIDOCARE) 4 % patch 1 Patch by TransDERmal route every twelve (12) hours every twelve (12) hours. 11/23/18  Yes Ashley Barnard MD   HYDROcodone-acetaminophen (NORCO) 5-325 mg per tablet Take 1 Tab by mouth every four (4) hours as needed for Pain. Max Daily Amount: 6 Tabs. 10/15/18   Clementina Sun MD   ciprofloxacin-dexamethasone (CIPRODEX) 0.3-0.1 % otic suspension Administer 4 Drops in left ear two (2) times a day. 10/15/18   Clementina Sun MD   amoxicillin-clavulanate (AUGMENTIN) 875-125 mg per tablet Take 1 Tab by mouth two (2) times a day. 10/15/18   Clementina Sun MD   ondansetron (ZOFRAN ODT) 4 mg disintegrating tablet Take 1 Tab by mouth every eight (8) hours as needed for Nausea. 10/15/18   Leigh Ann Landaverde MD   insulin aspart U-100 (NOVOLOG) 100 unit/mL injection 5 Units by SubCUTAneous route Before breakfast, lunch, and dinner.     Provider, Historical   sucralfate (CARAFATE) 100 mg/mL suspension Take 5 mL by mouth four (4) times daily. 7/9/18   Martínez Andujar MD   aspirin 81 mg chewable tablet Take 1 Tab by mouth daily. 5/12/18   Shay Dickerson MD   cloNIDine HCl (CATAPRES) 0.1 mg tablet Take 1 Tab by mouth two (2) times a day. 5/11/18   Shay Dickerson MD   ondansetron (ZOFRAN ODT) 4 mg disintegrating tablet Take 1 Tab by mouth every eight (8) hours as needed for Nausea. 5/11/18   Shay Dickerson MD   metoprolol tartrate (LOPRESSOR) 50 mg tablet Take 50 mg by mouth two (2) times a day. Provider, Historical   sodium bicarbonate 650 mg tablet Take 650 mg by mouth two (2) times a day. Provider, Historical   atorvastatin (LIPITOR) 20 mg tablet Take 20 mg by mouth nightly. Provider, Historical   insulin glargine (LANTUS) 100 unit/mL injection 25 Units by SubCUTAneous route nightly. 3/14/17   Carrol Neumann NP       No Known Allergies    Social Hx:  reports that  has never smoked. she has never used smokeless tobacco. She reports that she does not drink alcohol or use drugs. Family History   Problem Relation Age of Onset    Diabetes Mother     Kidney Disease Mother     Diabetes Sister        Review of Systems:  A twelve point review of system was performed today. Pertinent positives and negatives are mentioned in the HPI. The reminder of the ROS is negative and noncontributory.      Objective:    Vitals:    Vitals:    11/23/18 1419 11/23/18 1428 11/23/18 1535 11/23/18 1610   BP: (!) 185/112 (!) 185/112 (!) 188/109 (!) 190/108   Pulse: (!) 107 (!) 111 (!) 105 (!) 108   Resp: 23 18 18 21   Temp: 98.3 °F (36.8 °C) 98.2 °F (36.8 °C) 98.1 °F (36.7 °C) 98.2 °F (36.8 °C)   SpO2: 100%   100%   Weight:       Height:         I&O's:  11/22 0701 - 11/23 0700  In: 2000 [I.V.:2000]  Out: -   Visit Vitals  BP (!) 190/108   Pulse (!) 108   Temp 98.2 °F (36.8 °C)   Resp 21   Ht 5' 4\" (1.626 m)   Wt 59 kg (130 lb)   LMP 09/15/2013   SpO2 100%   BMI 22.31 kg/m²       Physical Exam:  General:confused  HEENT: Eyes are PERRL. Conjunctiva without pallor ,erythema. The sclerae without icterus. .   Neck:Supple,no mass palpable  Lungs : Clears to auscultation Bilaterally, Normal respiratory effort  CVS: RRR, S1 S2 normal, No rub,  no LE edema  Abdomen: Soft, Non tender, No hepatosplenomegaly, bowel sounds present  Extremities: No cyanosis, No clubbing  Skin: No rash or lesions.   Lymph nodes: No palpable nodes  MS: No joint swelling, erythema, warmth  Neurologic: confused  Psych: unable to assess    Laboratory Results:    Lab Results   Component Value Date    BUN 98 (H) 11/23/2018     (L) 11/23/2018    K 3.6 11/23/2018    CL 97 11/23/2018    CO2 21 11/23/2018       Lab Results   Component Value Date    BUN 98 (H) 11/23/2018    BUN 96 (H) 10/16/2018    BUN 93 (H) 10/15/2018    BUN 59 (H) 07/12/2018    BUN 64 (H) 07/11/2018    K 3.6 11/23/2018    K 4.3 10/16/2018    K 3.5 10/15/2018    K 4.1 07/12/2018    K 3.9 07/11/2018       Lab Results   Component Value Date    WBC 8.6 11/23/2018    RBC 3.81 11/23/2018    HGB 11.4 (L) 11/23/2018    HCT 32.9 (L) 11/23/2018    MCV 86.4 11/23/2018    MCH 29.9 11/23/2018    RDW 12.9 11/23/2018     11/23/2018       Lab Results   Component Value Date    PHOS 5.0 (H) 07/10/2018       Urine dipstick:   Lab Results   Component Value Date/Time    Color YELLOW/STRAW 11/23/2018 09:36 AM    Appearance CLOUDY (A) 11/23/2018 09:36 AM    Specific gravity 1.012 11/23/2018 09:36 AM    Specific gravity 1.012 05/11/2018 09:49 AM    pH (UA) 7.5 11/23/2018 09:36 AM    Protein 100 (A) 11/23/2018 09:36 AM    Glucose 500 (A) 11/23/2018 09:36 AM    Ketone NEGATIVE  11/23/2018 09:36 AM    Bilirubin NEGATIVE  11/23/2018 09:36 AM    Urobilinogen 0.2 11/23/2018 09:36 AM    Nitrites NEGATIVE  11/23/2018 09:36 AM    Leukocyte Esterase TRACE (A) 11/23/2018 09:36 AM    Epithelial cells FEW 11/23/2018 09:36 AM    Bacteria 2+ (A) 11/23/2018 09:36 AM    WBC 5-10 11/23/2018 09:36 AM RBC 0-5 11/23/2018 09:36 AM       I have reviewed the following: All pertinent labs, microbiology data, radiology imaging for my assessment          Thank you for allowing us to participate in the care of this patient. We will follow patient.  Please dont hesitate to call with any questions    Marcelino Dejesus MD  11/23/2018    [facility]

## 2018-11-23 NOTE — LETTER
[address] 
\[facility]\"" [address] [facility] [address] [address] 
[phone] Work/School Note Date: 11/23/2018 To Whom It May concern: 
 
Marcie Harrington was seen and treated and realized from hospital today  by the following [address] Sincerely, Pearl Jimenez MD

## 2018-11-23 NOTE — ED NOTES
Assumed care of patient from [facility], [facility]. Patient resting comfortably on stretcher with call bell within reach. Rates pain 3/10. Patient's stretcher in lowest position, with side rails up x2. Patient on the monitor x2. Patient updated on plan of care at this time. Patient's belongings are in close proximity. Patient assessed for all personal needs that may be completed by RN. No further complaints noted at this time.

## 2018-11-23 NOTE — ED NOTES
Have not heard back from Neurologist at this time. Office states physician is aware of consult.  
 
 
Verbal order for 5mg of lopressor provided for patient's HR and BP

## 2018-11-23 NOTE — PROGRESS NOTES
Physical Therapy Received PT eval order. Chart reviewed. Pt currently at testing. Will monitor and eval as available and appropriate.  
 
Shani David, PT

## 2018-11-23 NOTE — DISCHARGE INSTRUCTIONS
Please the list of Pain Management Specialist below for your convenience:    List of Pain Management Specialists:    Siri Vu M.D. [phone] Pain Management  Jennie Larios M.D. [phone] Physical Medicine and Radha Buckley M.D. [phone] Physical Medicine and Cam Saint., M.D. [phone] Pain Management  Yulia Ngo M.D. [phone] Pain Management    Dr. Maris Parikh, [facility]8 [address]                                         566 [facility], [address] [facility], [address]  [phone]    Pain Management Center                            LUIS DANIEL Marti MD DO  [address], [address]  [phone]    Dr. Merlyn Hamm                                   [address]                                     [place] [phone]                                                                            Dr. Terri Padilla, [facility] [address] [facility]  [address] [place] [address] [facility], [facility]9 [facility], [address] [place] [address]  www. [employer]                                            [phone]  [phone] Dr. Joao Lema, [address] [place] [address], [address]       [phone]              Dr. Danielle Scanlon[address] [place], 21 [facility]  21       Dr. Charito Campbell [address] [place] [address]. [address] [place] [address]. [address], [address] [facility], [facility] [address]  [phone]  [phone]   [address] [facility]  7785 [facility], 200 [address] [facility]  [phone]    Pain Management, Physical Medicine & Rehabilitation  Dr. Sivan Azar, [address] [place] [address]    Pain Management  Dr. Elizabeth Raya  1282 [facility], 40 [facility]    Physical Medicine & Rehabilitation, Pain Management  Ronit Mac MD  5534 [facility], [address] [place] [address]   Dr. Rizwan Drake DO, PMR  [address] [facility], [address] [place] [address]  [phone]      Please also consider natural alternatives to pain relief such as physical therapy, massage therapy, acupuncture, chiropractors, reflexology, and trigger point injections. [employer]  [email or web address]

## 2018-11-23 NOTE — ROUTINE PROCESS
TRANSFER - IN REPORT: 
 
Verbal report received from Genesis(name) on Jeny Singh  being received from ED(unit) for routine progression of care Report consisted of patients Situation, Background, Assessment and  
Recommendations(SBAR). Information from the following report(s) SBAR, Kardex, Intake/Output, MAR, Accordion and Recent Results was reviewed with the receiving nurse. Opportunity for questions and clarification was provided. Assessment completed upon patients arrival to unit and care assumed. Primary Nurse Josefina Escobar RN and Kaylene David RN performed a dual skin assessment on this patient Impairment noted- see wound doc flow sheet Julius score is 14 Small boil left lower gluteal.  Bruises throughout back side. 1450: PT blood pressure elevated 185/112. 
1458: MD oneida robbins, RN told to page Nephrology. 1505: No answer from Nephrology on call MD.  
1520: MD Christin robbins, will come assess pt. MD Pb Calderon ordered one time dose of Clonidine 0.2 mg po 
1720: RN not able to complete admission database, pt not able focus and does not want to answer questions at this time. 1910: Bedside shift change report given to Carolyn (oncoming nurse) by Xuan Marcial (offgoing nurse). Report included the following information SBAR, Kardex, Intake/Output, MAR, Accordion and Recent Results.

## 2018-11-23 NOTE — ED PROVIDER NOTES
EMERGENCY DEPARTMENT HISTORY AND PHYSICAL EXAM 
 
 
Date: 11/23/2018 Patient Name: Fernando Brock History of Presenting Illness Chief Complaint Patient presents with  Generalized Body Aches  
  x several weeks  Back Pain  
  lower back pain x several hours  Vomiting 1 episode that started after the back pain, hx of DM History Provided By: Patient HPI: Fernando Brock, 46 y.o. female with PMHx significant for diabetic neuropathy, CKD, HLD, pancreatitis, DM, HTN, chronic lower back pain, UTI, difficile colitis, migraines, Hep C without coma, lumbar disc disease, presents by EMS to the ED with cc of moderate, constant, sharp back pain that stared 2 hours ago. Pt states she has had bilateral leg pain for the past 2 weeks. Pt notes some tingling to her bilateral LEs as well. Pt states she has her PCP manage her diabetic neuropathy and has a care management plan. She states she has not had her gabapentin or lantis. Pt notes she has not been lifting anything heavy lately. She states the pain is worse with movement and weight bearing. She specifically denies numbness, weakness, incontinence (bowel or bladder), saddle anesthesia, nausea, vomiting, fever, chills, neck pain, SOB, CP, dysuria, hematuria. There are no other complaints, changes, or physical findings at this time. Medical History: diabetic neuropathy, CKD, HLD, pancreatitis, DM, HTN, chronic lower back pain, UTI, difficile colitis, migraines, Hep C without coma, lumbar disc disease Surgical History: lumbar sprain (back surgery), colonoscopy, kidney stone removal 
Social History: -tobacco, -EtOH (former smoker), -Illicit Drugs PCP: Eran Richard NP Current Facility-Administered Medications Medication Dose Route Frequency Provider Last Rate Last Dose  lidocaine 4 % patch 1 Patch  1 Patch TransDERmal NOW Marycruz Ray MD   1 Patch at 11/23/18 0300  ondansetron (ZOFRAN) injection 4 mg  4 mg IntraVENous NOW Marycruz Ray MD      
 cloNIDine HCl (CATAPRES) tablet 0.1 mg  0.1 mg Oral NOW Marycruz Ray MD      
 sodium chloride 0.9 % bolus infusion 1,000 mL  1,000 mL IntraVENous NOW Marycruz Ray MD      
 sodium chloride 0.9 % bolus infusion 1,000 mL  1,000 mL IntraVENous NOW Marycruz Ray MD      
 insulin regular (NOVOLIN R, HUMULIN R) injection 10 Units  10 Units IntraVENous NOW Marycruz Ray MD      
 
Current Outpatient Medications Medication Sig Dispense Refill  gabapentin (NEURONTIN) 300 mg capsule Take 1 Cap by mouth three (3) times daily. 60 Cap 0  cyclobenzaprine (FLEXERIL) 5 mg tablet Take 1 Tab by mouth three (3) times daily as needed for Muscle Spasm(s). 20 Tab 0  
 lidocaine (LIDOCARE) 4 % patch 1 Patch by TransDERmal route every twelve (12) hours every twelve (12) hours. 10 Patch 0  
 HYDROcodone-acetaminophen (NORCO) 5-325 mg per tablet Take 1 Tab by mouth every four (4) hours as needed for Pain. Max Daily Amount: 6 Tabs. 20 Tab 0  
 ciprofloxacin-dexamethasone (CIPRODEX) 0.3-0.1 % otic suspension Administer 4 Drops in left ear two (2) times a day. 10 mL 0  
 amoxicillin-clavulanate (AUGMENTIN) 875-125 mg per tablet Take 1 Tab by mouth two (2) times a day. 14 Tab 0  
 ondansetron (ZOFRAN ODT) 4 mg disintegrating tablet Take 1 Tab by mouth every eight (8) hours as needed for Nausea. 10 Tab 0  
 insulin aspart U-100 (NOVOLOG) 100 unit/mL injection 5 Units by SubCUTAneous route Before breakfast, lunch, and dinner.  sucralfate (CARAFATE) 100 mg/mL suspension Take 5 mL by mouth four (4) times daily. 414 mL 0  
 aspirin 81 mg chewable tablet Take 1 Tab by mouth daily. 30 Tab 1  cloNIDine HCl (CATAPRES) 0.1 mg tablet Take 1 Tab by mouth two (2) times a day. 60 Tab 1  
 ondansetron (ZOFRAN ODT) 4 mg disintegrating tablet Take 1 Tab by mouth every eight (8) hours as needed for Nausea.  15 Tab 0  
  metoprolol tartrate (LOPRESSOR) 50 mg tablet Take 50 mg by mouth two (2) times a day.  sodium bicarbonate 650 mg tablet Take 650 mg by mouth two (2) times a day.  atorvastatin (LIPITOR) 20 mg tablet Take 20 mg by mouth nightly.  insulin glargine (LANTUS) 100 unit/mL injection 25 Units by SubCUTAneous route nightly. 1 Vial 0 Past History Past Medical History: 
Past Medical History:  
Diagnosis Date  C. difficile colitis 6/2012  Chronic low back pain  CKD (chronic kidney disease) stage 3 to 4, baseline Cr 2  
 Constipation  Diabetes ([facility] [address] A1c 8.2 3/2012  Hep C w/o coma, chronic (HCC)  Hyperlipemia  Hypertension  Lumbar disc disease  Migraines  Pancreatitis 4812  
 alcoholic  UTI (lower urinary tract infection) 6/20012 Past Surgical History: 
Past Surgical History:  
Procedure Laterality Date  HX ORTHOPAEDIC    
 lumbar sprain; back surgery  HX UROLOGICAL  2014  
 kidney stone removed  NV COLONOSCOPY FLX DX W/COLLJ SPEC WHEN PFRMD  11/12/2012 Family History: 
Family History Problem Relation Age of Onset  Diabetes Mother  Kidney Disease Mother  Diabetes Sister Social History: 
Social History Tobacco Use  Smoking status: Never Smoker  Smokeless tobacco: Never Used Substance Use Topics  Alcohol use: No  
  Comment: Quit few months ago, hx of abuse  Drug use: No  
 
 
Allergies: 
No Known Allergies Review of Systems Review of Systems Constitutional: Negative for chills and fever. Respiratory: Negative for cough and shortness of breath. Cardiovascular: Negative for chest pain. Gastrointestinal: Negative for constipation, diarrhea, nausea and vomiting. Genitourinary: Negative for dysuria and hematuria. Musculoskeletal: Positive for back pain. Negative for neck pain. Reports LE pain Neurological: Negative for dizziness, weakness, light-headedness, numbness and headaches. Denies incontinence (bowel or bladder) denies saddle anesthesia, reports tingling to her bilateral LEs All other systems reviewed and are negative. Physical Exam  
Physical Exam  
Constitutional: She is oriented to person, place, and time. She appears well-developed and well-nourished. Pt is crying HENT:  
Head: Normocephalic and atraumatic. Eyes: Conjunctivae and EOM are normal.  
Neck: Normal range of motion. Neck supple. Cardiovascular: Regular rhythm. Tachycardia present. Pulmonary/Chest: Effort normal and breath sounds normal. No respiratory distress. Abdominal: Soft. She exhibits no distension. There is no tenderness. Musculoskeletal: Normal range of motion. Tender over left lower back. Negative straight leg sign. Pain elicited on movement Neurological: She is alert and oriented to person, place, and time. Skin: Skin is warm and dry. Psychiatric:  
Pt has a flat affect Nursing note and vitals reviewed. Diagnostic Study Results Labs - Recent Results (from the past 12 hour(s)) CBC W/O DIFF Collection Time: 11/23/18  3:03 AM  
Result Value Ref Range WBC 8.6 3.6 - 11.0 K/uL  
 RBC 3.81 3.80 - 5.20 M/uL  
 HGB 11.4 (L) 11.5 - 16.0 g/dL HCT 32.9 (L) 35.0 - 47.0 % MCV 86.4 80.0 - 99.0 FL  
 MCH 29.9 26.0 - 34.0 PG  
 MCHC 34.7 30.0 - 36.5 g/dL  
 RDW 12.9 11.5 - 14.5 % PLATELET 876 850 - 892 K/uL MPV 9.1 8.9 - 12.9 FL  
 NRBC 0.0 0  WBC ABSOLUTE NRBC 0.00 0.00 - 0.01 K/uL METABOLIC PANEL, COMPREHENSIVE Collection Time: 11/23/18  3:03 AM  
Result Value Ref Range Sodium 132 (L) 136 - 145 mmol/L Potassium 3.6 3.5 - 5.1 mmol/L Chloride 97 97 - 108 mmol/L  
 CO2 21 21 - 32 mmol/L Anion gap 14 5 - 15 mmol/L Glucose 415 (H) 65 - 100 mg/dL BUN 98 (H) 6 - 20 MG/DL Creatinine 7.08 (H) 0.55 - 1.02 MG/DL  
 BUN/Creatinine ratio 14 12 - 20 GFR est AA 7 (L) >60 ml/min/1.73m2 GFR est non-AA 6 (L) >60 ml/min/1.73m2 Calcium 9.1 8.5 - 10.1 MG/DL Bilirubin, total 0.3 0.2 - 1.0 MG/DL  
 ALT (SGPT) 24 12 - 78 U/L  
 AST (SGOT) 19 15 - 37 U/L Alk. phosphatase 256 (H) 45 - 117 U/L Protein, total 9.2 (H) 6.4 - 8.2 g/dL Albumin 3.8 3.5 - 5.0 g/dL Globulin 5.4 (H) 2.0 - 4.0 g/dL A-G Ratio 0.7 (L) 1.1 - 2.2 LIPASE Collection Time: 11/23/18  3:03 AM  
Result Value Ref Range Lipase 242 73 - 393 U/L  
GLUCOSE, POC Collection Time: 11/23/18  3:25 AM  
Result Value Ref Range Glucose (POC) 388 (H) 65 - 100 mg/dL Performed by Donnie Vigil Medical Decision Making I am the first provider for this patient. I reviewed the vital signs, available nursing notes, past medical history, past surgical history, family history and social history. Vital Signs-Reviewed the patient's vital signs. Patient Vitals for the past 12 hrs: 
 Temp Pulse Resp BP SpO2  
11/23/18 0330     99 % 11/23/18 0245    (!) 151/122 100 % 11/23/18 0222 98.2 °F (36.8 °C) (!) 114 16 (!) 157/108 100 % Pulse Oximetry Analysis - 100% on RA Cardiac Monitor:  
Rate: 114 bpm 
Rhythm: Sinus Tachycardia Records Reviewed: Nursing Notes, Old Medical Records, Previous Radiology Studies and Previous Laboratory Studies Provider Notes (Medical Decision Making): The patient complains of low back pain. These symptoms are consistent with a lumbar strain. Less likely  pathology, aortic dissection or AAA, or cauda equina given that there are no red flags and a benign physical exam. Possibly opioid dependence and withdrawal Sx. Will not give narcotics. Also presenting with diabetic neuropathy. Will do lidocaine patch, toradol, flexeril and gabapentin and have her follow up with pain management. Pt has a care management plan. Will check over her care management plan.   
 
I have recommended rest, avoiding heavy lifting until better, use of intermittent heat (avoid sleeping on a heating pad), and use of OTC NSAID's (Advil, Aleve etc) or Tylenol prn for pain. Call PCP if back pain persists or she develops leg symptoms. It has also been explained that this may take up to three months to fully resolved and that smoking may slow that process even more. ED Course:  
Initial assessment performed. The patients presenting problems have been discussed, and they are in agreement with the care plan formulated and outlined with them. I have encouraged them to ask questions as they arise throughout their visit. Medications  
lidocaine 4 % patch 1 Patch (1 Patch TransDERmal Apply Patch 11/23/18 0300) ondansetron [facility]) injection 4 mg ( IntraVENous Canceled Entry 11/23/18 0252) cloNIDine HCl (CATAPRES) tablet 0.1 mg (not administered)  
sodium chloride 0.9 % bolus infusion 1,000 mL (not administered)  
sodium chloride 0.9 % bolus infusion 1,000 mL (not administered)  
insulin regular (NOVOLIN R, HUMULIN R) injection 10 Units (not administered)  
gabapentin (NEURONTIN) capsule 300 mg (300 mg Oral Given 11/23/18 0312) cyclobenzaprine (FLEXERIL) tablet 10 mg (10 mg Oral Given 11/23/18 0312) ondansetron (ZOFRAN) 4 mg/2 mL injection (4 mg  Given 11/23/18 0301)  
ketorolac (TORADOL) injection 15 mg (15 mg IntraVENous Given 11/23/18 0300) metoclopramide HCl (REGLAN) injection 10 mg (10 mg IntraVENous Given 11/23/18 0328) LORazepam (ATIVAN) injection 1 mg (1 mg IntraVENous Given 11/23/18 0328) Progress Note: 
2:55 AM 
Pt is vomiting. With Hx of pancreatitis will check lipase and liver function. Progress Note: 
3:49 AM 
Pts creatinine today was 7. GFR 7. A significant increase from 1 month ago. Her creatinine has been steadily climbing. Spoke with patient. Her nausea is slightly better. She is not on dialysis and followed up with nephrologist 1 month ago. SO says she is dehydrated.  She has been drinking water but probably not enough. When asked about narcotic use, Pt denies using narcotics, however her SO says she takes [place]. Discussed the possibility of her vomiting being from withdrawal.  
 
Consult Note: 
3:51 AM 
Golden Hui MD spoke with Dr. El Homans, DO, Specialty: hospitalist 
Discussed pt's hx, disposition, and available diagnostic and imaging results. Reviewed care plans. Consultant agrees with plans as outlined. Critical Care Time: CRITICAL CARE NOTE : 
 
3:51 AM 
IMPENDING DETERIORATION -Metabolic and Renal 
ASSOCIATED RISK FACTORS - Hypotension, Metabolic changes and Dehydration MANAGEMENT- Bedside Assessment and Supervision of Care INTERPRETATION -  Blood Pressure and Cardiac Output Measures INTERVENTIONS - Metobolic interventions CASE REVIEW - Hospitalist, Nursing and Family TREATMENT RESPONSE -Stable PERFORMED BY - Self NOTES   : 
I have spent 40 minutes of critical care time involved in lab review, consultations with specialist, family decision- making, bedside attention and documentation. During this entire length of time I was immediately available to the patient . Disposition: 
Admit Note: 
3:51 AM 
Pt is being admitted by Dr. El Homans, DO. The results of their tests and reason(s) for their admission have been discussed with pt and/or available family. They convey agreement and understanding for the need to be admitted and for admission diagnosis. PLAN: 
1. Current Discharge Medication List  
  
START taking these medications Details  
gabapentin (NEURONTIN) 300 mg capsule Take 1 Cap by mouth three (3) times daily. Qty: 60 Cap, Refills: 0  
  
cyclobenzaprine (FLEXERIL) 5 mg tablet Take 1 Tab by mouth three (3) times daily as needed for Muscle Spasm(s). Qty: 20 Tab, Refills: 0  
  
lidocaine (LIDOCARE) 4 % patch 1 Patch by TransDERmal route every twelve (12) hours every twelve (12) hours. Qty: 10 Patch, Refills: 0 STOP taking these medications  
  
 lidocaine (LIDODERM) 5 % Comments:  
Reason for Stoppin.  
Follow-up Information Follow up With Specialties Details Why Contact Info Memo Azevedo NP Nurse Practitioner Schedule an appointment as soon as possible for a visit  [address] [facility] Tammi Lilly 
[phone] Pain Management  Schedule an appointment as soon as possible for a visit Return to ED if worse Diagnosis Clinical Impression: 1. Acute renal failure, unspecified acute renal failure type ([address] 2. Acute left-sided low back pain without sciatica 3. Polysubstance (including opioids) dependence, daily use ([address] 4. Diabetic polyneuropathy associated with type 2 diabetes mellitus ([address] Attestations: This note is prepared by Josh Whitley. Yair Brown, acting as Scribe for Alicia Burkitt, MD. Alicia Burkitt, MD: The scribe's documentation has been prepared under my direction and personally reviewed by me in its entirety. I confirm that the note above accurately reflects all work, treatment, procedures, and medical decision making performed by me. This note will not be viewable in [address]

## 2018-11-23 NOTE — ED NOTES
Patient placed on the monitor x3 at this time. Patient Sinus Tach on the monitor.  PCT at bedside to obtain second PIV

## 2018-11-23 NOTE — ED NOTES
Care of patient assumed from triage. Patient presents with complaints of flank/back pain x Several hours.

## 2018-11-24 LAB
ALBUMIN SERPL-MCNC: 2.9 G/DL (ref 3.5–5)
ALBUMIN/GLOB SERPL: 0.8 {RATIO} (ref 1.1–2.2)
ALP SERPL-CCNC: 156 U/L (ref 45–117)
ALT SERPL-CCNC: 18 U/L (ref 12–78)
ANION GAP SERPL CALC-SCNC: 11 MMOL/L (ref 5–15)
AST SERPL-CCNC: 18 U/L (ref 15–37)
BACTERIA SPEC CULT: ABNORMAL
BASOPHILS # BLD: 0 K/UL (ref 0–0.1)
BASOPHILS NFR BLD: 0 % (ref 0–1)
BILIRUB SERPL-MCNC: 0.4 MG/DL (ref 0.2–1)
BUN SERPL-MCNC: 90 MG/DL (ref 6–20)
BUN/CREAT SERPL: 14 (ref 12–20)
CALCIUM SERPL-MCNC: 8.7 MG/DL (ref 8.5–10.1)
CC UR VC: ABNORMAL
CHLORIDE SERPL-SCNC: 108 MMOL/L (ref 97–108)
CK SERPL-CCNC: 69 U/L (ref 26–192)
CO2 SERPL-SCNC: 21 MMOL/L (ref 21–32)
CREAT SERPL-MCNC: 6.48 MG/DL (ref 0.55–1.02)
DIFFERENTIAL METHOD BLD: ABNORMAL
EOSINOPHIL # BLD: 0.2 K/UL (ref 0–0.4)
EOSINOPHIL NFR BLD: 3 % (ref 0–7)
ERYTHROCYTE [DISTWIDTH] IN BLOOD BY AUTOMATED COUNT: 13.4 % (ref 11.5–14.5)
GLOBULIN SER CALC-MCNC: 3.6 G/DL (ref 2–4)
GLUCOSE BLD STRIP.AUTO-MCNC: 116 MG/DL (ref 65–100)
GLUCOSE BLD STRIP.AUTO-MCNC: 179 MG/DL (ref 65–100)
GLUCOSE BLD STRIP.AUTO-MCNC: 292 MG/DL (ref 65–100)
GLUCOSE BLD STRIP.AUTO-MCNC: 72 MG/DL (ref 65–100)
GLUCOSE BLD STRIP.AUTO-MCNC: 77 MG/DL (ref 65–100)
GLUCOSE BLD STRIP.AUTO-MCNC: 95 MG/DL (ref 65–100)
GLUCOSE SERPL-MCNC: 83 MG/DL (ref 65–100)
HCT VFR BLD AUTO: 26.7 % (ref 35–47)
HGB BLD-MCNC: 9.1 G/DL (ref 11.5–16)
IMM GRANULOCYTES # BLD: 0 K/UL (ref 0–0.04)
IMM GRANULOCYTES NFR BLD AUTO: 0 % (ref 0–0.5)
LYMPHOCYTES # BLD: 2.3 K/UL (ref 0.8–3.5)
LYMPHOCYTES NFR BLD: 35 % (ref 12–49)
MAGNESIUM SERPL-MCNC: 1.9 MG/DL (ref 1.6–2.4)
MCH RBC QN AUTO: 30.1 PG (ref 26–34)
MCHC RBC AUTO-ENTMCNC: 34.1 G/DL (ref 30–36.5)
MCV RBC AUTO: 88.4 FL (ref 80–99)
MONOCYTES # BLD: 0.7 K/UL (ref 0–1)
MONOCYTES NFR BLD: 11 % (ref 5–13)
NEUTS SEG # BLD: 3.3 K/UL (ref 1.8–8)
NEUTS SEG NFR BLD: 51 % (ref 32–75)
NRBC # BLD: 0 K/UL (ref 0–0.01)
NRBC BLD-RTO: 0 PER 100 WBC
PLATELET # BLD AUTO: 238 K/UL (ref 150–400)
PMV BLD AUTO: 8.6 FL (ref 8.9–12.9)
POTASSIUM SERPL-SCNC: 3.7 MMOL/L (ref 3.5–5.1)
PROT SERPL-MCNC: 6.5 G/DL (ref 6.4–8.2)
RBC # BLD AUTO: 3.02 M/UL (ref 3.8–5.2)
SERVICE CMNT-IMP: ABNORMAL
SERVICE CMNT-IMP: NORMAL
SODIUM SERPL-SCNC: 140 MMOL/L (ref 136–145)
TSH SERPL DL<=0.05 MIU/L-ACNC: 0.83 UIU/ML (ref 0.36–3.74)
VIT B12 SERPL-MCNC: 943 PG/ML (ref 193–986)
WBC # BLD AUTO: 6.5 K/UL (ref 3.6–11)

## 2018-11-24 PROCEDURE — 74011000258 HC RX REV CODE- 258: Performed by: INTERNAL MEDICINE

## 2018-11-24 PROCEDURE — 74011250636 HC RX REV CODE- 250/636: Performed by: HOSPITALIST

## 2018-11-24 PROCEDURE — G8979 MOBILITY GOAL STATUS: HCPCS

## 2018-11-24 PROCEDURE — 80053 COMPREHEN METABOLIC PANEL: CPT

## 2018-11-24 PROCEDURE — 82962 GLUCOSE BLOOD TEST: CPT

## 2018-11-24 PROCEDURE — 36415 COLL VENOUS BLD VENIPUNCTURE: CPT

## 2018-11-24 PROCEDURE — 84443 ASSAY THYROID STIM HORMONE: CPT

## 2018-11-24 PROCEDURE — 97116 GAIT TRAINING THERAPY: CPT

## 2018-11-24 PROCEDURE — G8978 MOBILITY CURRENT STATUS: HCPCS

## 2018-11-24 PROCEDURE — 74011636637 HC RX REV CODE- 636/637: Performed by: INTERNAL MEDICINE

## 2018-11-24 PROCEDURE — 83735 ASSAY OF MAGNESIUM: CPT

## 2018-11-24 PROCEDURE — 74011250636 HC RX REV CODE- 250/636: Performed by: INTERNAL MEDICINE

## 2018-11-24 PROCEDURE — 74011250637 HC RX REV CODE- 250/637: Performed by: INTERNAL MEDICINE

## 2018-11-24 PROCEDURE — 74011000250 HC RX REV CODE- 250: Performed by: INTERNAL MEDICINE

## 2018-11-24 PROCEDURE — 82607 VITAMIN B-12: CPT

## 2018-11-24 PROCEDURE — 85025 COMPLETE CBC W/AUTO DIFF WBC: CPT

## 2018-11-24 PROCEDURE — 82550 ASSAY OF CK (CPK): CPT

## 2018-11-24 PROCEDURE — 74011250637 HC RX REV CODE- 250/637: Performed by: HOSPITALIST

## 2018-11-24 PROCEDURE — 97161 PT EVAL LOW COMPLEX 20 MIN: CPT

## 2018-11-24 PROCEDURE — 65660000000 HC RM CCU STEPDOWN

## 2018-11-24 RX ORDER — CLONIDINE HYDROCHLORIDE 0.1 MG/1
0.1 TABLET ORAL 2 TIMES DAILY
Status: DISCONTINUED | OUTPATIENT
Start: 2018-11-24 | End: 2018-11-26

## 2018-11-24 RX ORDER — METOPROLOL TARTRATE 25 MG/1
25 TABLET, FILM COATED ORAL 2 TIMES DAILY
Status: DISCONTINUED | OUTPATIENT
Start: 2018-11-24 | End: 2018-11-26

## 2018-11-24 RX ORDER — MIDODRINE HYDROCHLORIDE 5 MG/1
10 TABLET ORAL ONCE
Status: COMPLETED | OUTPATIENT
Start: 2018-11-24 | End: 2018-11-24

## 2018-11-24 RX ORDER — OXYCODONE AND ACETAMINOPHEN 5; 325 MG/1; MG/1
1 TABLET ORAL ONCE
Status: COMPLETED | OUTPATIENT
Start: 2018-11-24 | End: 2018-11-24

## 2018-11-24 RX ORDER — HEPARIN SODIUM 5000 [USP'U]/ML
5000 INJECTION, SOLUTION INTRAVENOUS; SUBCUTANEOUS EVERY 12 HOURS
Status: DISCONTINUED | OUTPATIENT
Start: 2018-11-24 | End: 2018-11-27 | Stop reason: HOSPADM

## 2018-11-24 RX ADMIN — SUCRALFATE 0.5 G: 1 SUSPENSION ORAL at 09:30

## 2018-11-24 RX ADMIN — HEPARIN SODIUM 5000 UNITS: 5000 INJECTION INTRAVENOUS; SUBCUTANEOUS at 09:29

## 2018-11-24 RX ADMIN — ASPIRIN 81 MG 81 MG: 81 TABLET ORAL at 09:30

## 2018-11-24 RX ADMIN — INSULIN GLARGINE 25 UNITS: 100 INJECTION, SOLUTION SUBCUTANEOUS at 21:53

## 2018-11-24 RX ADMIN — HEPARIN SODIUM 5000 UNITS: 5000 INJECTION INTRAVENOUS; SUBCUTANEOUS at 21:52

## 2018-11-24 RX ADMIN — SUCRALFATE 0.5 G: 1 SUSPENSION ORAL at 21:52

## 2018-11-24 RX ADMIN — MIDODRINE HYDROCHLORIDE 10 MG: 5 TABLET ORAL at 02:56

## 2018-11-24 RX ADMIN — CEFTRIAXONE 1 G: 1 INJECTION, POWDER, FOR SOLUTION INTRAMUSCULAR; INTRAVENOUS at 11:07

## 2018-11-24 RX ADMIN — OXYCODONE AND ACETAMINOPHEN 1 TABLET: 5; 325 TABLET ORAL at 21:52

## 2018-11-24 RX ADMIN — ATORVASTATIN CALCIUM 20 MG: 20 TABLET, FILM COATED ORAL at 21:52

## 2018-11-24 RX ADMIN — SODIUM BICARBONATE 650 MG: 650 TABLET ORAL at 17:39

## 2018-11-24 RX ADMIN — Medication 10 ML: at 21:53

## 2018-11-24 RX ADMIN — INSULIN LISPRO 2 UNITS: 100 INJECTION, SOLUTION INTRAVENOUS; SUBCUTANEOUS at 21:53

## 2018-11-24 RX ADMIN — SODIUM CHLORIDE 250 ML: 900 INJECTION, SOLUTION INTRAVENOUS at 02:12

## 2018-11-24 RX ADMIN — Medication 10 ML: at 13:29

## 2018-11-24 RX ADMIN — SUCRALFATE 0.5 G: 1 SUSPENSION ORAL at 13:28

## 2018-11-24 RX ADMIN — SUCRALFATE 0.5 G: 1 SUSPENSION ORAL at 17:40

## 2018-11-24 RX ADMIN — SODIUM BICARBONATE 650 MG: 650 TABLET ORAL at 09:30

## 2018-11-24 NOTE — PROGRESS NOTES
Nephrology Progress Note [facility] Date of Admission : 11/23/2018 CC: Follow up for CKD V and HTN Assessment and Plan EZEKIEL on CKD: 
- mild rise may be from her HTN, but likely has progressive CKD - Cr stable 
- no urgent needs for HD 
- daily labs while here 
  
CKD V: 
- from DM and HTN 
- baseline Cr 5.5 to 6 
- f/b Dr. Darci Chang 
  
Malignant HTN: 
- clonidine and metoprolol reduced 
- hold nifedipine tonight 
  
AMS: 
- neg head CT, ammonia neg - per neuro and primary team 
  
Anemia of CKD 
- hgb stable 
  
Back pain: 
- neg plain films - per hospitalist 
  
IDDM: 
- per hospitalist  
 
 
Interval History: 
Seen and examined. BP dropped overnight.  meds reduced this AM.  Still lethargic. Cr stable. Current Medications: all current  Medications have been eviewed in [facility] Review of Systems: Pertinent items are noted in HPI. Objective: 
Vitals:   
Vitals:  
 11/24/18 0930 11/24/18 1000 11/24/18 1030 11/24/18 1100 BP: 99/66 92/63 113/78 112/78 Pulse: 78 78 82 82 Resp: 11 14 12 12 Temp:    98.3 °F (36.8 °C) SpO2: 100% 99% 100% 100% Weight:      
Height:      
 
Intake and Output: 
No intake/output data recorded. 11/22 1901 - 11/24 0700 In: 2050 [I.V.:2050] Out: 1270 [ACASO:4319] Physical Examination:General:  lethargic Neck:  Supple, no mass Resp:  Lungs CTA B/L, no wheezing , normal respiratory effort CV:  RRR,  no murmur or rub, no LE edema GI:  Soft, NT, + Bowel sounds, no hepatosplenomegaly Neurologic:  Confused and lethargic Psych:             Unable to evaluate Skin:  No Rash :  No mckeon []    High complexity decision making was performed 
[]    Patient is at high-risk of decompensation with multiple organ involvement Lab Data Personally Reviewed: I have reviewed all the pertinent labs, microbiology data and radiology studies during assessment. Recent Labs  
  11/24/18 
0555 11/23/18 
0936 11/23/18 
0303   --  132* K 3.7  --  3.6   --  97  
CO2 21  --  21  
GLU 83  --  415* BUN 90*  --  98* CREA 6.48*  --  7.08* CA 8.7  --  9.1 MG 1.9  --   --   
ALB 2.9*  --  3.8 SGOT 18  --  19 ALT 18  --  24 INR  --  1.0  --   
 
Recent Labs  
  11/24/18 
0555 11/23/18 
0303 WBC 6.5 8.6 HGB 9.1* 11.4* HCT 26.7* 32.9*  
 300 Lab Results Component Value Date/Time Specimen Description: NARES 11/18/2013 10:02 PM  
 Specimen Description: BLOOD 11/18/2013 09:18 PM  
 Specimen Description: URINE 11/18/2013 09:18 PM  
 
Lab Results Component Value Date/Time Culture result: ALPHA STREPTOCOCCUS (A) 11/23/2018 09:36 AM  
 Culture result: MRSA NOT PRESENT 07/10/2018 06:31 PM  
 Culture result:  07/10/2018 06:31 PM  
      Screening of patient nares for MRSA is for surveillance purposes and, if positive, to facilitate isolation considerations in high risk settings. It is not intended for automatic decolonization interventions per se as regimens are not sufficiently effective to warrant routine use. Recent Results (from the past 24 hour(s)) GLUCOSE, POC Collection Time: 11/23/18  4:21 PM  
Result Value Ref Range Glucose (POC) 205 (H) 65 - 100 mg/dL Performed by Joshua Gonzalez TROPONIN I Collection Time: 11/23/18  5:53 PM  
Result Value Ref Range Troponin-I, Qt. <0.05 <0.05 ng/mL HEMOGLOBIN A1C WITH EAG Collection Time: 11/23/18  5:53 PM  
Result Value Ref Range Hemoglobin A1c 9.9 (H) 4.2 - 6.3 % Est. average glucose 237 mg/dL GLUCOSE, POC Collection Time: 11/23/18  8:29 PM  
Result Value Ref Range Glucose (POC) 205 (H) 65 - 100 mg/dL Performed by Kate Medicbarb GLUCOSE, POC Collection Time: 11/23/18  8:57 PM  
Result Value Ref Range Glucose (POC) 154 (H) 65 - 100 mg/dL Performed by Joaquim Arriaga (RENETTA) METABOLIC PANEL, COMPREHENSIVE Collection Time: 11/24/18  5:55 AM  
Result Value Ref Range  Sodium 140 136 - 145 mmol/L  
 Potassium 3.7 3.5 - 5.1 mmol/L Chloride 108 97 - 108 mmol/L  
 CO2 21 21 - 32 mmol/L Anion gap 11 5 - 15 mmol/L Glucose 83 65 - 100 mg/dL BUN 90 (H) 6 - 20 MG/DL Creatinine 6.48 (H) 0.55 - 1.02 MG/DL  
 BUN/Creatinine ratio 14 12 - 20 GFR est AA 8 (L) >60 ml/min/1.73m2 GFR est non-AA 7 (L) >60 ml/min/1.73m2 Calcium 8.7 8.5 - 10.1 MG/DL Bilirubin, total 0.4 0.2 - 1.0 MG/DL  
 ALT (SGPT) 18 12 - 78 U/L  
 AST (SGOT) 18 15 - 37 U/L Alk. phosphatase 156 (H) 45 - 117 U/L Protein, total 6.5 6.4 - 8.2 g/dL Albumin 2.9 (L) 3.5 - 5.0 g/dL Globulin 3.6 2.0 - 4.0 g/dL A-G Ratio 0.8 (L) 1.1 - 2.2 MAGNESIUM Collection Time: 11/24/18  5:55 AM  
Result Value Ref Range Magnesium 1.9 1.6 - 2.4 mg/dL VITAMIN B12 Collection Time: 11/24/18  5:55 AM  
Result Value Ref Range Vitamin B12 943 193 - 986 pg/mL CBC WITH AUTOMATED DIFF Collection Time: 11/24/18  5:55 AM  
Result Value Ref Range WBC 6.5 3.6 - 11.0 K/uL  
 RBC 3.02 (L) 3.80 - 5.20 M/uL HGB 9.1 (L) 11.5 - 16.0 g/dL HCT 26.7 (L) 35.0 - 47.0 % MCV 88.4 80.0 - 99.0 FL  
 MCH 30.1 26.0 - 34.0 PG  
 MCHC 34.1 30.0 - 36.5 g/dL  
 RDW 13.4 11.5 - 14.5 % PLATELET 738 435 - 927 K/uL MPV 8.6 (L) 8.9 - 12.9 FL  
 NRBC 0.0 0  WBC ABSOLUTE NRBC 0.00 0.00 - 0.01 K/uL NEUTROPHILS 51 32 - 75 % LYMPHOCYTES 35 12 - 49 % MONOCYTES 11 5 - 13 % EOSINOPHILS 3 0 - 7 % BASOPHILS 0 0 - 1 % IMMATURE GRANULOCYTES 0 0.0 - 0.5 % ABS. NEUTROPHILS 3.3 1.8 - 8.0 K/UL  
 ABS. LYMPHOCYTES 2.3 0.8 - 3.5 K/UL  
 ABS. MONOCYTES 0.7 0.0 - 1.0 K/UL  
 ABS. EOSINOPHILS 0.2 0.0 - 0.4 K/UL  
 ABS. BASOPHILS 0.0 0.0 - 0.1 K/UL  
 ABS. IMM. GRANS. 0.0 0.00 - 0.04 K/UL  
 DF AUTOMATED    
CK Collection Time: 11/24/18  5:55 AM  
Result Value Ref Range CK 69 26 - 192 U/L  
TSH 3RD GENERATION Collection Time: 11/24/18  5:55 AM  
Result Value Ref Range TSH 0.83 0.36 - 3.74 uIU/mL GLUCOSE, POC  
 Collection Time: 11/24/18  7:52 AM  
Result Value Ref Range Glucose (POC) 95 65 - 100 mg/dL Performed by Matt Lazcano GLUCOSE, POC Collection Time: 11/24/18 11:26 AM  
Result Value Ref Range Glucose (POC) 77 65 - 100 mg/dL Performed by Matt Smith MD 
[facility]  
[address], [address] [facility] Phone - [phone] Fax - [phone] 
[email or web address]

## 2018-11-24 NOTE — PROGRESS NOTES
PCU SHIFT NURSING NOTE Bedside and Verbal shift change report given to Claudette Correa RN (oncoming nurse) by Severo Jo RN (offgoing nurse). Report included the following information SBAR, Kardex, ED Summary, MAR, Accordion and Recent Results. Shift Summary:  
 
 
Admission Date 11/23/2018 Admission Diagnosis EZEKIEL (acute kidney injury) ([facility] [address] CKD (chronic kidney disease) Back pain EZEKIEL (acute kidney injury) ([facility] [address] CKD (chronic kidney disease) Altered mental state Consults IP CONSULT TO NEUROLOGY 
IP CONSULT TO NEPHROLOGY Consults []PT []OT []Speech  
[]Case Management  
  
[] Palliative Cardiac Monitoring Order []Yes []No  
 
IV drips []Yes Drip:                            Dose: 
Drip:                            Dose: 
Drip:                            Dose:  
[]No  
 
GI Prophylaxis []Yes []No  
 
 
 
DVT Prophylaxis SCDs:  Sequential Compression Device: Bilateral  
  Patient Refused VTE Prophylaxis: Yes Chris stockings:     
  
[] Medication []Contraindicated []None Activity Level Activity Level: Bed Rest   
 Activity Assistance: Partial (one person) Purposeful Rounding every 1-2 hour? []Yes Medrano Score  Total Score: 4 Bed Alarm (If score 3 or >) []Yes  
[] Refused (See signed refusal form in chart) Julius Score  Julius Score: 16 Julius Score (if score 14 or less) []PMT consult  
[]Wound Care consult []Specialty bed  
[] Nutrition consult Needs prior to discharge:  
Home O2 required:   
[]Yes []No  
 If yes, how much O2 required? Other:  
 Last Bowel Movement:    
  
Influenza Vaccine Received Flu Vaccine for Current Season (usually Sept-March): No  
 Patient/Guardian Refused (Notify MD): Yes Pneumonia Vaccine Diet Active Orders Diet DIET NPO  
  
LDAs Peripheral IV 11/23/18 Anterior;Right;Superior; Upper Arm (Active) Site Assessment Clean, dry, & intact 11/23/2018  7:45 PM  
 Phlebitis Assessment 0 11/23/2018  7:45 PM  
Infiltration Assessment 0 11/23/2018  7:45 PM  
Dressing Status Clean, dry, & intact 11/23/2018  7:45 PM  
Dressing Type Tape;Transparent 11/23/2018  3:04 PM  
Hub Color/Line Status Pink;Capped;Flushed;Patent 11/23/2018  3:04 PM  
Action Taken Open ports on tubing capped 11/23/2018  3:04 PM  
Alcohol Cap Used Yes 11/23/2018  3:04 PM  
   
Peripheral IV 11/23/18 Left Antecubital (Active) Site Assessment Clean, dry, & intact 11/23/2018  7:45 PM  
Phlebitis Assessment 0 11/23/2018  7:45 PM  
Infiltration Assessment 0 11/23/2018  7:45 PM  
Dressing Status Clean, dry, & intact 11/23/2018  7:45 PM  
Dressing Type Tape;Transparent 11/23/2018  3:04 PM  
Hub Color/Line Status Blue 11/23/2018  3:04 PM  
Action Taken Blood drawn 11/23/2018  9:42 AM  
                  
Urinary Catheter Intake & Output Date 11/23/18 0700 - 11/24/18 2362 11/24/18 0700 - 11/25/18 8385 Shift 7055-0021 1647-5159 24 Hour Total 4636-1988 2373-1007 24 Hour Total  
INTAKE  
I.V.(mL/kg/hr) 50(0.1)  50 Volume (cefTRIAXone (ROCEPHIN) 1 g in 0.9% sodium chloride (MBP/ADV) 50 mL) 50  50 Shift Total(mL/kg) 50(0.8)  50(0.8) OUTPUT Urine(mL/kg/hr) 770(1.1) 500 1270 Urine 300  300 Urine Voided 470 500 970 Urine Occurrence(s) 1 x  1 x Shift Total(mL/kg) 770(13.1) 500(8.5) 1270(21.5) NET -720 500 -1220 Weight (kg) 59 59 59 59 59 59 Readmission Risk Assessment Tool Score Medium Risk   
      
 20 Total Score 4 IP Visits Last 12 Months (1-3=4, 4=9, >4=11) 9 Pt. Coverage (Medicare=5 , Medicaid, or Self-Pay=4) 7 Charlson Comorbidity Score (Age + Comorbid Conditions) Criteria that do not apply:  
 Has Seen PCP in Last 6 Months (Yes=3, No=0) . Living with Significant Other. Assisted Living. LTAC. SNF. or  
Rehab Patient Length of Stay (>5 days = 3) Expected Length of Stay - - - Actual Length of Stay 1

## 2018-11-24 NOTE — PROGRESS NOTES
**Consult Information** 
Member Facility: [facility] Facility MRN: [id number] Consult ID: [id number] Facility Time Zone: ET 
Date and Time of Consult: 11/24/2018 01:50:58 AM 
Requesting Clinician: Bev Bruno Patient Name: Susana Grace Date of Birth: [birth year] Gender: Female **Clinical Note** Clinical Note: Patient has finished bolus and BPs are trending back down. Last 3 BPs were 64/44, 73/53, and 81/57. Patient does not endorse any symptoms of the BP. Patient also just voided 500. Would you like to do another bolus or possibly some albumin? Will give another 250 cc bolus.  one time midodrine 10 mg

## 2018-11-24 NOTE — PROGRESS NOTES
PCU SHIFT NURSING NOTE Bedside and Verbal shift change report given to Bernice Lopez RN (oncoming nurse) by Keri Haynes RN (offgoing nurse). Report included the following information SBAR, Kardex, MAR, Recent Results and Med Rec Status. Shift Summary:  
0930:  Spoke with MD.  Hold am BP meds. Will continue to monitor. May allow pt to eat. Placed order for Diabetic Diet. 1126:  Glucose 77. Tech provided juice. 1154:  Glucose 72. Went ahead and set pt up to eat with crackers. 1229:  Pt glucose now 116. Pt has eaten tray as well. Will monitor. 1735:  /69. Pt scheduled to received Metoprolol 25 mg PO and Clonidine 0.1mg PO. Paged and spoke with Dr. Tara Engel, received verbal order to hold these 2 meds for now. Admission Date 11/23/2018 Admission Diagnosis EZEKIEL (acute kidney injury) ([facility] [address] CKD (chronic kidney disease) Back pain EZEKIEL (acute kidney injury) ([facility] [address] CKD (chronic kidney disease) Altered mental state Consults IP CONSULT TO NEUROLOGY 
IP CONSULT TO NEPHROLOGY Consults [x]PT []OT []Speech [x]Case Management  
  
[] Palliative Cardiac Monitoring Order  
[x]Yes []No  
 
IV drips []Yes Drip:                            Dose: 
Drip:                            Dose: 
Drip:                            Dose:  
[x]No  
 
GI Prophylaxis [x]Yes []No  
 
 
 
DVT Prophylaxis SCDs:  Sequential Compression Device: Bilateral  
  Patient Refused VTE Prophylaxis: Yes Chris stockings:     
  
[x] Medication []Contraindicated []None Activity Level Activity Level: Bed Rest   
 Activity Assistance: Partial (one person) Purposeful Rounding every 1-2 hour? [x]Yes Medrano Score  Total Score: 4 Bed Alarm (If score 3 or >) [x]Yes  
[] Refused (See signed refusal form in chart) Julius Score  Julius Score: 16 Julius Score (if score 14 or less) []PMT consult  
[]Wound Care consult []Specialty bed  
[] Nutrition consult Needs prior to discharge:  
Home O2 required:   
[]Yes  
[x]No  
 If yes, how much O2 required? Other:  
 Last Bowel Movement:    
  
Influenza Vaccine Received Flu Vaccine for Current Season (usually Sept-March): No  
 Patient/Guardian Refused (Notify MD): Yes Pneumonia Vaccine Diet Active Orders Diet DIET DIABETIC CONSISTENT CARB Regular LDAs Peripheral IV 11/23/18 Anterior;Right;Superior; Upper Arm (Active) Site Assessment Clean, dry, & intact 11/24/2018  8:24 AM  
Phlebitis Assessment 0 11/24/2018  8:24 AM  
Infiltration Assessment 0 11/24/2018  8:24 AM  
Dressing Status Clean, dry, & intact 11/24/2018  8:24 AM  
Dressing Type Tape;Transparent 11/24/2018  8:24 AM  
Hub Color/Line Status Pink 11/24/2018  8:24 AM  
Action Taken Open ports on tubing capped 11/23/2018  3:04 PM  
Alcohol Cap Used Yes 11/23/2018  3:04 PM  
   
Peripheral IV 11/23/18 Left Antecubital (Active) Site Assessment Clean, dry, & intact 11/24/2018  8:24 AM  
Phlebitis Assessment 0 11/24/2018  8:24 AM  
Infiltration Assessment 0 11/24/2018  8:24 AM  
Dressing Status Clean, dry, & intact 11/24/2018  8:24 AM  
Dressing Type Tape;Transparent 11/24/2018  8:24 AM  
Hub Color/Line Status Pink 11/24/2018  8:24 AM  
Action Taken Blood drawn 11/23/2018  9:42 AM  
                  
Urinary Catheter Intake & Output Date 11/23/18 0700 - 11/24/18 9440 11/24/18 0700 - 11/25/18 5824 Shift [id number] 1900-0659 24 Hour Total 2753-6100 5848-0424 24 Hour Total  
INTAKE  
P.O.    480  480  
  P. O.    480  480  
I. V.(mL/kg/hr) 50(0.1)  50(0) Volume (cefTRIAXone (ROCEPHIN) 1 g in 0.9% sodium chloride (MBP/ADV) 50 mL) 50  50 Shift Total(mL/kg) 50(0.8)  50(0.8) 480(8.1)  480(8.1) OUTPUT Urine(mL/kg/hr) 770(1.1) 500(0.7) 1270(0.9) Urine 300  300 Urine Voided 470 500 970 Urine Occurrence(s) 1 x  1 x 1 x  1 x Shift Total(mL/kg) 770(13.1) 500(8.5) 1270(21.5) NET -720 -500 -1220 843 355 Weight (kg) 59 59 59 59 59 59 Readmission Risk Assessment Tool Score Medium Risk   
      
 20 Total Score 4 IP Visits Last 12 Months (1-3=4, 4=9, >4=11) 9 Pt. Coverage (Medicare=5 , Medicaid, or Self-Pay=4) 7 Charlson Comorbidity Score (Age + Comorbid Conditions) Criteria that do not apply:  
 Has Seen PCP in Last 6 Months (Yes=3, No=0) . Living with Significant Other. Assisted Living. LTAC. SNF. or  
Rehab Patient Length of Stay (>5 days = 3) Expected Length of Stay - - - Actual Length of Stay 1

## 2018-11-24 NOTE — PROGRESS NOTES
Hospitalist Progress Note NAME: Venecia Fisher :  1965 MRN:  [id number] Assessment / Plan: 
CKD V/End stage renal disease, worsening function. nephrology on board , she is approaching dialysis but is in no urgent need tonight as per nephro.no urgent HD needs. Baseline Cr 5.5 to 6 and on admission 7.08 Will continue to follow up renal function Electrolytes Within Acceptable Range. Malignant HTN/ hypertensive emergency 
nephrology help appreciated Reduced doses of BP meds for hypotension Now on clonidine to 0.1mg BID and cont metoprolol 25 mg Also on nifedipine XL 60mg daily PRN IV labetalol and hydralazine for SBP > 180 Metabolic encephalopathy from above reasons. Resolved  
neg head CT, ammonia neg May do EEG if worse as per neurology. Poorly controlled controlled, type 2 DM Hemoglobin A1c is 9.8 Now on lantus 25 at bed time and Humalog as per the sliding scale. Anemia of CKD 
hgb stable at 9.1 this am. 
Back pain: 
neg plain films Pain meds for now. Suspected opiate abuse on admission, will watch for that. Body mass index is 22.31 kg/m².: 18.5 - 24.9 Normal weight Code status: Full Prophylaxis: Hep SQ Recommended Disposition: TBD Subjective: Chief Complaint / Reason for Physician Visit \"i am feeling fine now she said. \". Discussed with RN events overnight. Review of Systems: 
Symptom Y/N Comments  Symptom Y/N Comments Fever/Chills n   Chest Pain n   
Poor Appetite    Edema Cough n   Abdominal Pain n   
Sputum    Joint Pain y Back pain is better now. SOB/GRIFFITHS n   Pruritis/Rash Nausea/vomit    Tolerating PT/OT Diarrhea    Tolerating Diet Constipation    Other Could NOT obtain due to:   
 
Objective: VITALS:  
Last 24hrs VS reviewed since prior progress note. Most recent are: 
Patient Vitals for the past 24 hrs: 
 Temp Pulse Resp BP SpO2  
18 0744 97.8 °F (36.6 °C) 77 16 (!) 86/54 95 % 11/24/18 0333 98 °F (36.7 °C) 88 16 (!) 87/65 100 % 11/23/18 2310 97.6 °F (36.4 °C) 92 17 102/78 100 % 11/23/18 2030  96 15 97/71 100 % 11/23/18 2015  98 (!) 33 104/73 100 % 11/23/18 2000  100 17 (!) 86/61 100 % 11/23/18 1945 98 °F (36.7 °C) 99 16 99/68 100 % 11/23/18 1930  (!) 101 18 (!) 78/53 100 % 11/23/18 1700 98.2 °F (36.8 °C) (!) 104 18 141/83 100 % 11/23/18 1643 98 °F (36.7 °C) 100 20 (!) 156/103 100 % 11/23/18 1610 98.2 °F (36.8 °C) (!) 108 21 (!) 190/108 100 % 11/23/18 1535 98.1 °F (36.7 °C) (!) 105 18 (!) 188/109   
11/23/18 1428 98.2 °F (36.8 °C) (!) 111 18 (!) 185/112   
11/23/18 1419 98.3 °F (36.8 °C) (!) 107 23 (!) 185/112 100 % 11/23/18 1400  (!) 123  (!) 183/106   
11/23/18 1314 98.4 °F (36.9 °C) (!) 116 27 (!) 168/99 99 % 11/23/18 1215  (!) 116 25 (!) 170/99 100 % 11/23/18 1134  (!) 109  (!) 172/95   
11/23/18 1130  (!) 106 25 (!) 172/95 100 % 11/23/18 1045  (!) 107  (!) 181/100 100 % 11/23/18 1033  (!) 113 20 (!) 186/105 100 % Intake/Output Summary (Last 24 hours) at 11/24/2018 4399 Last data filed at 11/24/2018 3926 Gross per 24 hour Intake 50 ml Output 1270 ml Net -1220 ml PHYSICAL EXAM: 
General: WD, WN. Alert, cooperative, no acute distress   
EENT:  EOMI. Anicteric sclerae. MMM Resp:  CTA bilaterally, no wheezing or rales. No accessory muscle use CV:  Regular  rhythm,  No edema GI:  Soft, Non distended, Non tender.  +Bowel sounds Neurologic:  Alert and oriented X 3, normal speech, Reviewed most current lab test results and cultures  YES Reviewed most current radiology test results   YES Review and summation of old records today    NO Reviewed patient's current orders and MAR    YES 
PMH/SH reviewed - no change compared to H&P 
________________________________________________________________________ Care Plan discussed with: 
  Comments Patient y Family RN y   
Care Manager Consultant Multidiciplinary team rounds were held today with , nursing, pharmacist and clinical coordinator. Patient's plan of care was discussed; medications were reviewed and discharge planning was addressed. ________________________________________________________________________ Total NON critical care TIME:  30   Minutes Total CRITICAL CARE TIME Spent:   Minutes non procedure based Comments >50% of visit spent in counseling and coordination of care    
________________________________________________________________________ Angelina Landers MD  
 
Procedures: see electronic medical records for all procedures/Xrays and details which were not copied into this note but were reviewed prior to creation of Plan. LABS: 
I reviewed today's most current labs and imaging studies. Pertinent labs include: 
Recent Labs  
  11/24/18 
0555 11/23/18 
0303 WBC 6.5 8.6 HGB 9.1* 11.4* HCT 26.7* 32.9*  
 300 Recent Labs  
  11/24/18 
0555 11/23/18 
0936 11/23/18 
0303   --  132* K 3.7  --  3.6   --  97  
CO2 21  --  21  
GLU 83  --  415* BUN 90*  --  98* CREA 6.48*  --  7.08* CA 8.7  --  9.1 MG 1.9  --   --   
ALB 2.9*  --  3.8 TBILI 0.4  --  0.3 SGOT 18  --  19 ALT 18  --  24 INR  --  1.0  --   
 
 
Signed:  Angelina Landers MD

## 2018-11-24 NOTE — PROGRESS NOTES
Problem: Mobility Impaired (Adult and Pediatric) Goal: *Acute Goals and Plan of Care (Insert Text) Physical Therapy Goals Initiated 11/24/2018 1. Patient will move from supine to sit and sit to supine  in bed with independence within 7 day(s). 2.  Patient will transfer from bed to chair and chair to bed with modified independence using the least restrictive device within 7 day(s). 3.  Patient will perform sit to stand with independence within 7 day(s). 4.  Patient will ambulate with modified independence for 150 feet with the least restrictive device within 7 day(s). 5.  Patient will ascend/descend 5 stairs with 2 handrail(s) with supervision/set-up within 7 day(s). physical Therapy EVALUATION Patient: Speedy Blum (62 y.o. female) Date: 11/24/2018 Primary Diagnosis: EZEKIEL (acute kidney injury) ([facility] [address] CKD (chronic kidney disease) Back pain EZEKIEL (acute kidney injury) ([facility] [address] CKD (chronic kidney disease) Altered mental state Precautions: back pain, fall, unsteady ASSESSMENT : 
Based on the objective data described below, the patient presents with admitted with AMS, back pain and leg pain. Pts mental status seems to have moslty cleared . Pt answered questions appropriately, however she has a flat affect and is still very sleepy. Pt's vitals were stable , and cleared by nurse. Pt's bed mobility is good, and she stood with min A. However her balance is compromised for her age, and HHA ambulation was unsafe. Pt did better with walker. Gait is step through elif,but antalgic with limp on L. Pt had back surgery 10 years ago. Generally pt is limited in balance safety, gait distance, gait quality, and endurance. Due to isolation room (old MRSA) pt stayed in room today, and did several laps with PT -total 40 ft. Pain 7/10 Needs walker for safety. Pt has a walker at home.   Discussed ways to help with back pain and suggested pt see PCP re possible therapy. Will cont seeing pt 3x/wk and if pt improves should not need any HHPT. Pt has family living with her. Suggest OP PT if possible at discharge. (Pt does not have a car). Chandni Hall Patient will benefit from skilled intervention to address the above impairments. Patients rehabilitation potential is considered to be Good Factors which may influence rehabilitation potential include:  
[]         None noted 
[x]         Mental ability/status [x]         Medical condition 
[x]         Home/family situation and support systems 
[]         Safety awareness [x]         Pain tolerance/management 
[]         Other: PLAN : 
Recommendations and Planned Interventions: 
[x]           Bed Mobility Training             []    Neuromuscular Re-Education 
[x]           Transfer Training                   []    Orthotic/Prosthetic Training 
[x]           Gait Training                         []    Modalities [x]           Therapeutic Exercises           []    Edema Management/Control 
[x]           Therapeutic Activities            [x]    Patient and Family Training/Education 
[]           Other (comment): Frequency/Duration: Patient will be followed by physical therapy  3 times a week to address goals. Discharge Recommendations: Outpatient and To Be Determined Further Equipment Recommendations for Discharge: SUBJECTIVE:  
Patient stated very limited verbage, yes, no, etc. 
 
OBJECTIVE DATA SUMMARY:  
HISTORY:   
Past Medical History:  
Diagnosis Date  C. difficile colitis 6/2012  Chronic low back pain  CKD (chronic kidney disease) stage 3 to 4, baseline Cr 2  
 Constipation  Diabetes ([facility] [address] A1c 8.2 3/2012  Hep C w/o coma, chronic (HCC)  Hyperlipemia  Hypertension  Lumbar disc disease  Migraines  Pancreatitis 1475  
 alcoholic  UTI (lower urinary tract infection) 6/20012 Past Surgical History:  
Procedure Laterality Date  HX ORTHOPAEDIC    
 lumbar sprain; back surgery  HX UROLOGICAL  2014  
 kidney stone removed  NH COLONOSCOPY FLX DX W/COLLJ SPEC WHEN PFRMD  11/12/2012 Prior Level of Function/Home Situation: independent Personal factors and/or comorbidities impacting plan of care: no car making OP Pt difficult Home Situation Home Environment: Private residence # Steps to Enter: 3 Rails to Enter: Yes Hand Rails : Bilateral 
One/Two Story Residence: Other (Comment) Living Alone: No 
Support Systems: Family member(s) Patient Expects to be Discharged to[de-identified] Private residence Current DME Used/Available at Home: None EXAMINATION/PRESENTATION/DECISION MAKING: Critical Behavior: 
Neurologic State: Drowsy, Alert Orientation Level: Oriented X4 Cognition: Follows commands Hearing: Auditory Auditory Impairment: NoneSkin:   
Edema:  
Range Of Motion: 
AROM: Generally decreased, functional 
  
  
  
PROM: Generally decreased, functional 
  
  
  
Strength:   
Strength: Generally decreased, functional 
  
  
  
  
  
  
Tone & Sensation:  
Tone: Normal 
  
  
  
  
Sensation: Intact Coordination: 
Coordination: Generally decreased, functional 
Vision:  
  
Functional Mobility: 
Bed Mobility: 
Rolling: Supervision Supine to Sit: Supervision Transfers: 
Sit to Stand: Contact guard assistance Stand to Sit: Contact guard assistance(plopped) Balance:  
Sitting: Intact Standing: Impaired Standing - Static: Good Standing - Dynamic : FairAmbulation/Gait Training:Distance (ft): 40 Feet (ft) Assistive Device: Walker, rolling(and HHA, better with RW) Ambulation - Level of Assistance: Contact guard assistance with walker, min A HHA Gait Description (WDL): Exceptions to [facility] Gait Abnormalities: Antalgic Right Side Weight Bearing: Full Left Side Weight Bearing: Full Base of Support: Widened Speed/Yamilka: Pace decreased (<100 feet/min); Shuffled Step Length: Left shortened;Right shortened Stairs: needs practice Therapeutic Exercises:  
Discussed strengthening LEs, and walking for exercise, as well as moving properly to control back pain Functional Measure: 
 
Elder Mobility Scale 
 
13/20 EMS and G-code impairment scale: 
Percentage of impairment CH 
0% CI 
1-19% CJ 
20-39% CK 
40-59% CL 
60-79% CM 
80-99% CN 
100% EMS Score 0-20 20 17-19 13-16 9-12 5-8 1-4 0 Scores under 10  generally these patients are dependent in mobility maneuvers; require help with 
basic ADL, such as transfers, toileting and dressing. Scores between 10  13  generally these patients are borderline in terms of safe mobility and 
independence in ADL i.e. they require some help with some mobility maneuvers. Scores over 14  Generally these patients are able to perform mobility maneuvers alone and safely 
and are independent in basic ADL. G codes: In compliance with CMSs Claims Based Outcome Reporting, the following G-code set was chosen for this patient based on their primary functional limitation being treated: The outcome measure chosen to determine the severity of the functional limitation was the MEMS with a score of 13/20 which was correlated with the impairment scale. ? Mobility - Walking and Moving Around:  
  - CURRENT STATUS: CJ - 20%-39% impaired, limited or restricted  - GOAL STATUS: CI - 1%-19% impaired, limited or restricted  - D/C STATUS:  ---------------To be determined--------------- Physical Therapy Evaluation Charge Determination History Examination Presentation Decision-Making MEDIUM  Complexity : 1-2 comorbidities / personal factors will impact the outcome/ POC  LOW Complexity : 1-2 Standardized tests and measures addressing body structure, function, activity limitation and / or participation in recreation  LOW Complexity : Stable, uncomplicated  LOW Complexity : FOTO score of  Based on the above components, the patient evaluation is determined to be of the following complexity level: LOW Pain: 
Pain Scale 1: Numeric (0 - 10) Pain Intensity 1: 7 Pain Location 1: Back;Flank Pain Orientation 1: Lower; Left Pain Description 1: Aching;Constant Activity Tolerance:  
fair Please refer to the flowsheet for vital signs taken during this treatment. After treatment:  
[x]         Patient left in no apparent distress sitting up in chair 
[]         Patient left in no apparent distress in bed 
[x]         Call bell left within reach [x]         Nursing notified 
[]         Caregiver present [x]         Bed alarm activated COMMUNICATION/EDUCATION:  
The patients plan of care was discussed with: Registered Nurse. 
[]         Fall prevention education was provided and the patient/caregiver indicated understanding. [x]         Patient/family have participated as able in goal setting and plan of care. []         Patient/family agree to work toward stated goals and plan of care. []         Patient understands intent and goals of therapy, but is neutral about his/her participation. []         Patient is unable to participate in goal setting and plan of care. Thank you for this referral. 
Phil Mccurdy, PT Time Calculation: 22 mins

## 2018-11-24 NOTE — PROGRESS NOTES
**Consult Information** 
Member Facility: [facility] Facility MRN: [id number] Consult ID: [id number] Facility Time Zone: ET 
Date and Time of Consult: 11/23/2018 07:36:39 PM 
Requesting Clinician: Chelle Nelson Time of Call : 11/23/2018 07:45:00 PM 
Patient Name: Shan Jones Date of Birth: [id number] Gender: Female **Clinical Note** Clinical Note: Patient is a new admission. Blood pressures had been high all day. Previous nurse gave multiple medications to bring BP down. Patient is in kidney failure with a BUN of 98 and cr of 7. BP has come down from triples over triples to 70's over 46s. Please advise. will give 500 cc NS bolus ; closely monitor BP. Patient has received IV hydralazine, labetalol as well as oral lopressor, clonidine.

## 2018-11-25 LAB
ALBUMIN SERPL-MCNC: 2.9 G/DL (ref 3.5–5)
ALBUMIN/GLOB SERPL: 0.6 {RATIO} (ref 1.1–2.2)
ALP SERPL-CCNC: 184 U/L (ref 45–117)
ALT SERPL-CCNC: 19 U/L (ref 12–78)
ANION GAP SERPL CALC-SCNC: 9 MMOL/L (ref 5–15)
AST SERPL-CCNC: 16 U/L (ref 15–37)
BACTERIA SPEC CULT: ABNORMAL
BASOPHILS # BLD: 0 K/UL (ref 0–0.1)
BASOPHILS NFR BLD: 0 % (ref 0–1)
BILIRUB SERPL-MCNC: 0.3 MG/DL (ref 0.2–1)
BUN SERPL-MCNC: 89 MG/DL (ref 6–20)
BUN/CREAT SERPL: 14 (ref 12–20)
CALCIUM SERPL-MCNC: 8.3 MG/DL (ref 8.5–10.1)
CHLORIDE SERPL-SCNC: 103 MMOL/L (ref 97–108)
CO2 SERPL-SCNC: 22 MMOL/L (ref 21–32)
CREAT SERPL-MCNC: 6.24 MG/DL (ref 0.55–1.02)
DIFFERENTIAL METHOD BLD: ABNORMAL
EOSINOPHIL # BLD: 0.4 K/UL (ref 0–0.4)
EOSINOPHIL NFR BLD: 6 % (ref 0–7)
ERYTHROCYTE [DISTWIDTH] IN BLOOD BY AUTOMATED COUNT: 13.2 % (ref 11.5–14.5)
GLOBULIN SER CALC-MCNC: 4.5 G/DL (ref 2–4)
GLUCOSE BLD STRIP.AUTO-MCNC: 145 MG/DL (ref 65–100)
GLUCOSE BLD STRIP.AUTO-MCNC: 146 MG/DL (ref 65–100)
GLUCOSE BLD STRIP.AUTO-MCNC: 81 MG/DL (ref 65–100)
GLUCOSE BLD STRIP.AUTO-MCNC: 88 MG/DL (ref 65–100)
GLUCOSE SERPL-MCNC: 152 MG/DL (ref 65–100)
HCT VFR BLD AUTO: 28.1 % (ref 35–47)
HGB BLD-MCNC: 9.4 G/DL (ref 11.5–16)
IMM GRANULOCYTES # BLD: 0 K/UL (ref 0–0.04)
IMM GRANULOCYTES NFR BLD AUTO: 0 % (ref 0–0.5)
LYMPHOCYTES # BLD: 3.4 K/UL (ref 0.8–3.5)
LYMPHOCYTES NFR BLD: 47 % (ref 12–49)
MCH RBC QN AUTO: 29.8 PG (ref 26–34)
MCHC RBC AUTO-ENTMCNC: 33.5 G/DL (ref 30–36.5)
MCV RBC AUTO: 89.2 FL (ref 80–99)
MONOCYTES # BLD: 0.6 K/UL (ref 0–1)
MONOCYTES NFR BLD: 8 % (ref 5–13)
NEUTS SEG # BLD: 2.9 K/UL (ref 1.8–8)
NEUTS SEG NFR BLD: 39 % (ref 32–75)
NRBC # BLD: 0 K/UL (ref 0–0.01)
NRBC BLD-RTO: 0 PER 100 WBC
PLATELET # BLD AUTO: 232 K/UL (ref 150–400)
PMV BLD AUTO: 8.5 FL (ref 8.9–12.9)
POTASSIUM SERPL-SCNC: 4 MMOL/L (ref 3.5–5.1)
PROT SERPL-MCNC: 7.4 G/DL (ref 6.4–8.2)
RBC # BLD AUTO: 3.15 M/UL (ref 3.8–5.2)
SERVICE CMNT-IMP: ABNORMAL
SERVICE CMNT-IMP: NORMAL
SERVICE CMNT-IMP: NORMAL
SODIUM SERPL-SCNC: 134 MMOL/L (ref 136–145)
WBC # BLD AUTO: 7.3 K/UL (ref 3.6–11)

## 2018-11-25 PROCEDURE — 97166 OT EVAL MOD COMPLEX 45 MIN: CPT

## 2018-11-25 PROCEDURE — 82962 GLUCOSE BLOOD TEST: CPT

## 2018-11-25 PROCEDURE — 74011000250 HC RX REV CODE- 250: Performed by: INTERNAL MEDICINE

## 2018-11-25 PROCEDURE — 74011250637 HC RX REV CODE- 250/637: Performed by: INTERNAL MEDICINE

## 2018-11-25 PROCEDURE — 74011250636 HC RX REV CODE- 250/636: Performed by: INTERNAL MEDICINE

## 2018-11-25 PROCEDURE — 80053 COMPREHEN METABOLIC PANEL: CPT

## 2018-11-25 PROCEDURE — 74011250636 HC RX REV CODE- 250/636: Performed by: HOSPITALIST

## 2018-11-25 PROCEDURE — 36415 COLL VENOUS BLD VENIPUNCTURE: CPT

## 2018-11-25 PROCEDURE — 97530 THERAPEUTIC ACTIVITIES: CPT

## 2018-11-25 PROCEDURE — 85025 COMPLETE CBC W/AUTO DIFF WBC: CPT

## 2018-11-25 PROCEDURE — 94760 N-INVAS EAR/PLS OXIMETRY 1: CPT

## 2018-11-25 PROCEDURE — 74011636637 HC RX REV CODE- 636/637: Performed by: INTERNAL MEDICINE

## 2018-11-25 PROCEDURE — 74011000258 HC RX REV CODE- 258: Performed by: INTERNAL MEDICINE

## 2018-11-25 PROCEDURE — 65660000000 HC RM CCU STEPDOWN

## 2018-11-25 RX ORDER — ACETAMINOPHEN 10 MG/ML
1000 INJECTION, SOLUTION INTRAVENOUS ONCE
Status: COMPLETED | OUTPATIENT
Start: 2018-11-25 | End: 2018-11-25

## 2018-11-25 RX ORDER — INSULIN GLARGINE 100 [IU]/ML
15 INJECTION, SOLUTION SUBCUTANEOUS
Status: DISCONTINUED | OUTPATIENT
Start: 2018-11-25 | End: 2018-11-27 | Stop reason: HOSPADM

## 2018-11-25 RX ADMIN — DOCUSATE SODIUM 100 MG: 100 CAPSULE, LIQUID FILLED ORAL at 09:02

## 2018-11-25 RX ADMIN — SUCRALFATE 0.5 G: 1 SUSPENSION ORAL at 22:09

## 2018-11-25 RX ADMIN — SODIUM BICARBONATE 650 MG: 650 TABLET ORAL at 09:01

## 2018-11-25 RX ADMIN — Medication 10 ML: at 22:09

## 2018-11-25 RX ADMIN — CLONIDINE HYDROCHLORIDE 0.1 MG: 0.1 TABLET ORAL at 09:02

## 2018-11-25 RX ADMIN — ASPIRIN 81 MG 81 MG: 81 TABLET ORAL at 09:02

## 2018-11-25 RX ADMIN — SODIUM BICARBONATE 650 MG: 650 TABLET ORAL at 18:46

## 2018-11-25 RX ADMIN — HEPARIN SODIUM 5000 UNITS: 5000 INJECTION INTRAVENOUS; SUBCUTANEOUS at 09:02

## 2018-11-25 RX ADMIN — METOPROLOL TARTRATE 25 MG: 25 TABLET ORAL at 18:46

## 2018-11-25 RX ADMIN — ATORVASTATIN CALCIUM 20 MG: 20 TABLET, FILM COATED ORAL at 22:08

## 2018-11-25 RX ADMIN — ACETAMINOPHEN 1000 MG: 10 INJECTION, SOLUTION INTRAVENOUS at 22:08

## 2018-11-25 RX ADMIN — METOPROLOL TARTRATE 25 MG: 25 TABLET ORAL at 09:02

## 2018-11-25 RX ADMIN — CLONIDINE HYDROCHLORIDE 0.1 MG: 0.1 TABLET ORAL at 18:46

## 2018-11-25 RX ADMIN — SUCRALFATE 0.5 G: 1 SUSPENSION ORAL at 18:46

## 2018-11-25 RX ADMIN — INSULIN GLARGINE 15 UNITS: 100 INJECTION, SOLUTION SUBCUTANEOUS at 22:08

## 2018-11-25 RX ADMIN — HEPARIN SODIUM 5000 UNITS: 5000 INJECTION INTRAVENOUS; SUBCUTANEOUS at 22:09

## 2018-11-25 RX ADMIN — SUCRALFATE 0.5 G: 1 SUSPENSION ORAL at 09:01

## 2018-11-25 RX ADMIN — CEFTRIAXONE 1 G: 1 INJECTION, POWDER, FOR SOLUTION INTRAMUSCULAR; INTRAVENOUS at 10:21

## 2018-11-25 RX ADMIN — DOCUSATE SODIUM 100 MG: 100 CAPSULE, LIQUID FILLED ORAL at 18:46

## 2018-11-25 NOTE — PROGRESS NOTES
**Consult Information** 
Member Facility: [address] [place] [address],[address] Facility MRN: [id number] Consult ID: [id number] Facility Time Zone: ET 
Date and Time of Consult: 11/24/2018 08:51:34 PM 
Requesting Clinician: Aury Rodriguez Patient Name: Cecille Severin Date of Birth: [id number] Gender: Female **Clinical Note** Clinical Note: Chart reviewed, known back pain history. Noted concerns of abuse of pain meds. Will give one dose of Percocet for tonight.

## 2018-11-25 NOTE — PROGRESS NOTES
Hospitalist Progress Note NAME: Neo Varela :  1965 MRN:  [id number] Assessment / Plan: 
CKD V/End stage renal disease, worsening function. nephrology on board. no urgent HD needs. Baseline Cr 5.5 to 6 and on admission 7.08-->6.24 Will continue to follow up renal function Electrolytes Within Acceptable Range. Malignant HTN/ hypertensive emergency. Controlled now. nephrology help appreciated Reduced doses of BP meds for hypotension Now on clonidine to 0.1mg BID and cont metoprolol 25 mg Also on nifedipine XL 60mg daily PRN IV labetalol and hydralazine for SBP > 180 Metabolic encephalopathy from above reasons. Resolved  
neg head CT, ammonia neg May do EEG if worse as per neurology. Poorly controlled controlled, type 2 DM Hemoglobin A1c is 9.8 Continue  lantus 15 units at bed time and Humalog as per the sliding scale in order. Glucose Within Acceptable Range. Anemia of CKD 
hgb stable at 9.1-->9.4  this am. 
Back pain: 
neg plain films Pain meds for now. Suspected opiate abuse on admission, will watch for that. Body mass index is 23.41 kg/m².: 18.5 - 24.9 Normal weight Code status: Full Prophylaxis: Hep SQ Recommended Disposition: TBD Subjective: Chief Complaint / Reason for Physician Visit \"i am feeling fine now she said. \". Discussed with RN events overnight. Review of Systems: 
Symptom Y/N Comments  Symptom Y/N Comments Fever/Chills n   Chest Pain n   
Poor Appetite    Edema Cough n   Abdominal Pain n   
Sputum    Joint Pain y Back pain is better now. SOB/GRIFFITHS n   Pruritis/Rash Nausea/vomit    Tolerating PT/OT Diarrhea    Tolerating Diet Constipation    Other Could NOT obtain due to:   
 
Objective: VITALS:  
Last 24hrs VS reviewed since prior progress note. Most recent are: 
Patient Vitals for the past 24 hrs: 
 Temp Pulse Resp BP SpO2  
18 0305 98.7 °F (37.1 °C) 87 16 110/73 100 % 11/24/18 2330 98.6 °F (37 °C) 92 16 115/71 100 % 11/24/18 2034 97.9 °F (36.6 °C) (!) 101 16 128/84 100 % 11/24/18 1830  91  118/82 100 % 11/24/18 1800  85  121/85 100 % 11/24/18 1730  85  104/69 100 % 11/24/18 1700  85  114/79 99 % 11/24/18 1630  84  115/78 100 % 11/24/18 1600  86  117/81 100 % 11/24/18 1545    105/70   
11/24/18 1537  89  95/69 100 % 11/24/18 1500 98.3 °F (36.8 °C) 88 16 114/77 100 % 11/24/18 1430  83  99/72 99 % 11/24/18 1400  83  107/71 99 % 11/24/18 1330  79  100/67 100 % 11/24/18 1300  79  100/65 99 % 11/24/18 1230  81  105/74   
11/24/18 1200  95  99/78 100 % 11/24/18 1130  88  106/68 100 % 11/24/18 1100 98.3 °F (36.8 °C) 82 12 112/78 100 % 11/24/18 1030  82 12 113/78 100 % 11/24/18 1000  78 14 92/63 99 % 11/24/18 0930  78 11 99/66 100 % 11/24/18 0830  82 15 96/67 100 % 11/24/18 0824  82  104/67  Intake/Output Summary (Last 24 hours) at 11/25/2018 [facility] Last data filed at 11/25/2018 7596 Gross per 24 hour Intake 480 ml Output 1500 ml Net -1020 ml PHYSICAL EXAM: 
General: WD, WN. Alert, cooperative, no acute distress   
EENT:  EOMI. Anicteric sclerae. MMM Resp:  CTA bilaterally, no wheezing or rales. No accessory muscle use CV:  Regular  rhythm,  No edema GI:  Soft, Non distended, Non tender.  +Bowel sounds Neurologic:  Alert and oriented X 3, normal speech, Reviewed most current lab test results and cultures  YES Reviewed most current radiology test results   YES Review and summation of old records today    NO Reviewed patient's current orders and MAR    YES 
PMH/ reviewed - no change compared to H&P 
________________________________________________________________________ Care Plan discussed with: 
  Comments Patient y Family RN y   
Care Manager Consultant                   Multidiciplinary team rounds were held today with case manager, nursing, pharmacist and clinical coordinator. Patient's plan of care was discussed; medications were reviewed and discharge planning was addressed. ________________________________________________________________________ Total NON critical care TIME:  30   Minutes Total CRITICAL CARE TIME Spent:   Minutes non procedure based Comments >50% of visit spent in counseling and coordination of care    
________________________________________________________________________ Angelina Landers MD  
 
Procedures: see electronic medical records for all procedures/Xrays and details which were not copied into this note but were reviewed prior to creation of Plan. LABS: 
I reviewed today's most current labs and imaging studies. Pertinent labs include: 
Recent Labs  
  11/25/18 
0543 11/24/18 
0555 11/23/18 
0303 WBC 7.3 6.5 8.6 HGB 9.4* 9.1* 11.4* HCT 28.1* 26.7* 32.9*  
 238 300 Recent Labs  
  11/25/18 
0543 11/24/18 
0555 11/23/18 
4923 11/23/18 
0303 * 140  --  132* K 4.0 3.7  --  3.6  108  --  97  
CO2 22 21  --  21  
* 83  --  415* BUN 89* 90*  --  98* CREA 6.24* 6.48*  --  7.08* CA 8.3* 8.7  --  9.1 MG  --  1.9  --   --   
ALB 2.9* 2.9*  --  3.8 TBILI 0.3 0.4  --  0.3 SGOT 16 18  --  19 ALT 19 18  --  24 INR  --   --  1.0  --   
 
 
Signed:  Angelina Landers MD

## 2018-11-25 NOTE — PROGRESS NOTES
Problem: Self Care Deficits Care Plan (Adult) Goal: *Acute Goals and Plan of Care (Insert Text) Occupational Therapy Goals Initiated 11/25/2018 1. Patient will perform grooming standing with supervision/set-up within 7 day(s). 2.  Patient will perform lower body dressing with supervision/set-up within 7 day(s). 3.  Patient will perform toilet transfers with supervision/set-up within 7 day(s). 4.  Patient will perform all aspects of toileting with supervision/set-up within 7 day(s). 5.  Patient will participate in upper extremity therapeutic exercise/activities with supervision/set-up for 8 minutes within 7 day(s). 6.  Patient will utilize energy conservation techniques during functional activities with verbal cues within 7 day(s). Occupational Therapy EVALUATION Patient: Ketty Pérez (37 y.o. female) Date: 11/25/2018 Primary Diagnosis: EZEKIEL (acute kidney injury) ([facility] [address] CKD (chronic kidney disease) Back pain EZEKIEL (acute kidney injury) ([facility] [address] CKD (chronic kidney disease) Altered mental state Precautions: Fall ASSESSMENT : 
Based on the objective data described below, the patient presents with impaired activity tolerance, balance and mobility necessary in ADL tasks s/p EZEKIEL CKD with AMS. Nursing cleared for therapy. Received in bed, agreeable for toileting to bathroom. Completed orthostatics patient positive with standing and then reporting dizziness, requesting to sit and needing to urinate. Unable to remain standing to assess additional BP. Use of bed pan secondary to decrease BP, dizziness and urgency. Bridging with supervision, hygiene with set up for bladder and for hands. Encouraged OOB to chair, patient reporting poor sleep last night and requesting to rest in bed. Patient with self limiting behaviors in regards to progressing with therapy and activity with nursing.   Recommend continued OT while in hospital with recommendations for [facility] vs SNF at [place] depending on progression. Recommend with nursing patient to complete as able in order to maintain strength, endurance and independence: ADLs with supervision/setup, OOB to chair 3x/day and mobilizing to the [facility] with RW and min A with BP monitoring. Recommend not to leave patient unattended on [facility]. Discussed with nursing. Vitals:  
 11/25/18 0900 11/25/18 1040 11/25/18 1048 11/25/18 1057 BP: 108/80 101/67 (!) 89/68 103/75 BP 1 Location: Right arm BP Patient Position: Supine; At rest Sitting Standing Supine; Post activity Pulse:      
Resp:      
Temp:      
SpO2:      
Weight:      
Height:      
 
 
 
 
Patient will benefit from skilled intervention to address the above impairments. Patients rehabilitation potential is considered to be Fair Factors which may influence rehabilitation potential include:  
[x]             None noted []             Mental ability/status []             Medical condition []             Home/family situation and support systems []             Safety awareness []             Pain tolerance/management 
[]             Other: PLAN : 
Recommendations and Planned Interventions: 
[x]               Self Care Training                  [x]        Therapeutic Activities [x]               Functional Mobility Training    []        Cognitive Retraining 
[x]               Therapeutic Exercises           [x]        Endurance Activities [x]               Balance Training                   []        Neuromuscular Re-Education []               Visual/Perceptual Training     [x]   Home Safety Training 
[x]               Patient Education                 [x]        Family Training/Education []               Other (comment): Frequency/Duration: Patient will be followed by occupational therapy 4 times a week to address goals. Discharge Recommendations: Home Health with 24 /7 supervision vs SNF depending on progression Further Equipment Recommendations for Discharge: anticipate none SUBJECTIVE:  
Patient stated I didn't go to sleep until 7 this morngin.  OBJECTIVE DATA SUMMARY:  
HISTORY:  
Past Medical History:  
Diagnosis Date  C. difficile colitis 6/2012  Chronic low back pain  CKD (chronic kidney disease) stage 3 to 4, baseline Cr 2  
 Constipation  Diabetes ([facility] [address] A1c 8.2 3/2012  Hep C w/o coma, chronic (HCC)  Hyperlipemia  Hypertension  Lumbar disc disease  Migraines  Pancreatitis 8136  
 alcoholic  UTI (lower urinary tract infection) 6/20012 Past Surgical History:  
Procedure Laterality Date  HX ORTHOPAEDIC    
 lumbar sprain; back surgery  HX UROLOGICAL  2014  
 kidney stone removed  NC COLONOSCOPY FLX DX W/COLLJ SPEC WHEN PFRMD  11/12/2012 Prior Level of Function/Environment/Context: Home with multiple family members. She does not have a formal job, helps to watch her grandchildren of multiple ages. PTA indep with ADL tasks without AD. Occupations in which the patient is/was successful, what are the barriers preventing that success:  
Performance Patterns (routines, roles, habits, and rituals):  
Personal Interests and/or values:  
Expanded or extensive additional review of patient history:  
 
Home Situation Home Environment: Private residence # Steps to Enter: 3 Rails to Enter: Yes Hand Rails : Bilateral 
One/Two Story Residence: Two story, live on 1st floor Living Alone: No 
Support Systems: Family member(s) Patient Expects to be Discharged to[de-identified] Private residence Current DME Used/Available at Home: Walker, rolling Tub or Shower Type: Tub/Shower combination Hand dominance: RightEXAMINATION OF PERFORMANCE DEFICITS: 
Cognitive/Behavioral Status: 
  
Hearing: Auditory Auditory Impairment: None Vision/Perceptual:   
    
 
Corrective Lenses: Reading glasses Range of Motion: 
AROM: Generally decreased, functional 
 PROM: Generally decreased, functional 
  
  
  
  
  
  
Strength: 
Strength: Generally decreased, functional 
  
  
  
  
Coordination: 
Coordination: Generally decreased, functional 
Fine Motor Skills-Upper: Left Intact; Right Intact Gross Motor Skills-Upper: Left Intact; Right Intact Tone & Sensation: 
Tone: Normal 
Sensation: Intact Balance: 
Sitting: Intact Standing: Impaired Standing - Static: Fair Standing - Dynamic : (did not assess secondary to orthostatic hypotension) Functional Mobility and Transfers for ADLs:Bed Mobility: 
Supine to Sit: Supervision Sit to Supine: Supervision Scooting: Supervision Transfers: 
Sit to Stand: Contact guard assistance Stand to Sit: Contact guard assistance ADL Assessment: 
Feeding: Setup Oral Facial Hygiene/Grooming: Setup(seated) Bathing: Moderate assistance Upper Body Dressing: Setup Lower Body Dressing: Moderate assistance Toileting: Moderate assistance(bed level secondary to BP) ADL Intervention and task modifications: 
 Verbal cues for hand placement, sequencing and pacing with bed mob, sitting balance and sit > stand. While standing patient with decrease in BP, reported dizziness and requesting to sit. Reported urgent need for urination, assisted to bed pan with supervision for bridging. Toileting hygiene and hand hygiene at bed level with set up. Encouraged back out of bed to chair, patient requesting to rest.  Agreeable to get up in afternoon, discussed with nursing. BP returning and dizziness resolved with return to supine. Patient instructed and indicated understanding the benefits of maintaining activity tolerance, functional mobility, and independence with self care tasks during acute stay  to ensure safe return home and to baseline.  Encouraged patient to increase frequency and duration OOB, be out of bed for all meals, perform daily ADLs (as approved by RN/MD regarding bathing etc), and performing functional mobility to/from bathroom. Grooming Washing Hands: Supervision/set-up(bed level) Toileting Bladder Hygiene: Supervision/set-up(bed level s/p bed pan) Functional Measure: 
Barthel Index: 
 
Bathin Bladder: 10 Bowels: 10 
Groomin Dressin Feeding: 10 Mobility: 0 Stairs: 0 Toilet Use: 5 Transfer (Bed to Chair and Back): 10 Total: 55 Barthel and G-code impairment scale: 
Percentage of impairment CH 
0% CI 
1-19% CJ 
20-39% CK 
40-59% CL 
60-79% CM 
80-99% CN 
100% Barthel Score 0-100 100 99-80 79-60 59-40 20-39 1-19 
 0 Barthel Score 0-20 20 17-19 13-16 9-12 5-8 1-4 0 The Barthel ADL Index: Guidelines 1. The index should be used as a record of what a patient does, not as a record of what a patient could do. 2. The main aim is to establish degree of independence from any help, physical or verbal, however minor and for whatever reason. 3. The need for supervision renders the patient not independent. 4. A patient's performance should be established using the best available evidence. Asking the patient, friends/relatives and nurses are the usual sources, but direct observation and common sense are also important. However direct testing is not needed. 5. Usually the patient's performance over the preceding 24-48 hours is important, but occasionally longer periods will be relevant. 6. Middle categories imply that the patient supplies over 50 per cent of the effort. 7. Use of aids to be independent is allowed. Rosita Steele., Barthel, D.W. (8347). Functional evaluation: the Barthel Index. [facility] (14)2. MONSE Conley, Emerson Causey.Zach., Tamara, [address] [place] [address] (). Measuring the change indisability after inpatient rehabilitation; comparison of the responsiveness of the Barthel Index and Functional [place] Measure. Journal of Neurology, Neurosurgery, and Psychiatry, 66(4), 772-555. NOEMI Pérez, BRENDA Sullivan, & [place] Leung M.A. (2004.) Assessment of post-stroke quality of life in cost-effectiveness studies: The usefulness of the Barthel Index and the EuroQoL-5D. [facility], 51, 505-87 G codes: In compliance with CMSs Claims Based Outcome Reporting, the following G-code set was chosen for this patient based on their primary functional limitation being treated: The outcome measure chosen to determine the severity of the functional limitation was the Bartehl index with a score of 55/100 which was correlated with the impairment scale. ? Self Care:  
  - CURRENT STATUS: CK - 40%-59% impaired, limited or restricted  - GOAL STATUS: CJ - 20%-39% impaired, limited or restricted  - D/C STATUS:  ---------------To be determined--------------- Occupational Therapy Evaluation Charge Determination History Examination Decision-Making LOW Complexity : Brief history review  LOW Complexity : 1-3 performance deficits relating to physical, cognitive , or psychosocial skils that result in activity limitations and / or participation restrictions  LOW Complexity : No comorbidities that affect functional and no verbal or physical assistance needed to complete eval tasks Based on the above components, the patient evaluation is determined to be of the following complexity level: LOW Pain: 
  Denies pain Activity Tolerance:  
See assessment for BPs with change in position After treatment:  
[] Patient left in no apparent distress sitting up in chair 
[x] Patient left in no apparent distress in bed 
[x] Call bell left within reach [x] Nursing notified 
[] Caregiver present [x] Bed alarm activated COMMUNICATION/EDUCATION:  
The patients plan of care was discussed with: Physical Therapist and Registered Nurse. 
[] Home safety education was provided and the patient/caregiver indicated understanding. [] Patient/family have participated as able in goal setting and plan of care. [x] Patient/family agree to work toward stated goals and plan of care. [] Patient understands intent and goals of therapy, but is neutral about his/her participation. [] Patient is unable to participate in goal setting and plan of care. This patients plan of care is appropriate for delegation to KIMBERLY. Thank you for this referral. 
Brenda Jacobs, OT Time Calculation: 17 mins

## 2018-11-25 NOTE — PROGRESS NOTES
Nephrology Progress Note Ashleigh Garcia Date of Admission : 11/23/2018 CC: Follow up for CKD V and HTN Assessment and Plan EZEKIEL on CKD: 
- mild rise may be from her HTN, but likely has progressive CKD - Cr stable and close to baseline 
- no urgent needs for HD 
- daily labs while here 
  
CKD V: 
- from DM and HTN 
- baseline Cr 5.5 to 6 
- f/b Dr. Claude Godfrey 
  
Malignant HTN: 
- now controlled and stable 
  
Encephalopathy: 
- resolved 
- neg head CT, ammonia neg - per neuro and primary team 
  
Anemia of CKD 
- hgb stable 
  
Back pain: 
- neg plain films - per hospitalist 
  
IDDM: 
- per hospitalist  
 
 
Interval History: 
Seen and examined. BP dropped overnight but stable this AM.  Pt asymptomatic. Awake and talking this AM.  No cp, sob, n/v/d or HAs. Current Medications: all current  Medications have been eviewed in [facility] Review of Systems: Pertinent items are noted in HPI. Objective: 
Vitals:   
Vitals:  
 11/25/18 0900 11/25/18 1040 11/25/18 1048 11/25/18 1057 BP: 108/80 101/67 (!) 89/68 103/75 Pulse:      
Resp:      
Temp:      
SpO2:      
Weight:      
Height:      
 
Intake and Output: 
No intake/output data recorded. 11/23 1901 - 11/25 0700 In: 480 [P.O.:480] Out: 2000 [[id number]] Physical Examination:General:  lethargic Neck:  Supple, no mass Resp:  Lungs CTA B/L, no wheezing , normal respiratory effort CV:  RRR,  no murmur or rub, no LE edema GI:  Soft, NT, + Bowel sounds, no hepatosplenomegaly Neurologic:  Confused and lethargic Psych:             Unable to evaluate Skin:  No Rash :  No mckeon []    High complexity decision making was performed 
[]    Patient is at high-risk of decompensation with multiple organ involvement Lab Data Personally Reviewed: I have reviewed all the pertinent labs, microbiology data and radiology studies during assessment. Recent Labs  
  11/25/18 
0543 11/24/18 
0555 11/23/18 
2768 11/23/18 
4988 * 140  --  132* K 4.0 3.7  --  3.6  108  --  97  
CO2 22 21  --  21  
* 83  --  415* BUN 89* 90*  --  98* CREA 6.24* 6.48*  --  7.08* CA 8.3* 8.7  --  9.1 MG  --  1.9  --   --   
ALB 2.9* 2.9*  --  3.8 SGOT 16 18  --  19 ALT 19 18  --  24 INR  --   --  1.0  --   
 
Recent Labs  
  11/25/18 
0543 11/24/18 
0555 11/23/18 
0303 WBC 7.3 6.5 8.6 HGB 9.4* 9.1* 11.4* HCT 28.1* 26.7* 32.9*  
 238 300 Lab Results Component Value Date/Time Specimen Description: NARES 11/18/2013 10:02 PM  
 Specimen Description: BLOOD 11/18/2013 09:18 PM  
 Specimen Description: URINE 11/18/2013 09:18 PM  
 
Lab Results Component Value Date/Time Culture result: MRSA PRESENT (A) 11/24/2018 11:05 AM  
 Culture result: (A) 11/24/2018 11:05 AM  
  REPORT OF MRSA PRESENT CALLED TO AND READ BACK BY GENA TELLES (KENNETH/[facility]) ON 11/25/18 AT 1151 BY VI  
 Culture result:  11/24/2018 11:05 AM  
      Screening of patient nares for MRSA is for surveillance purposes and, if positive, to facilitate isolation considerations in high risk settings. It is not intended for automatic decolonization interventions per se as regimens are not sufficiently effective to warrant routine use. Recent Results (from the past 24 hour(s)) GLUCOSE, POC Collection Time: 11/24/18 12:29 PM  
Result Value Ref Range Glucose (POC) 116 (H) 65 - 100 mg/dL Performed by Lina Francis GLUCOSE, POC Collection Time: 11/24/18  4:43 PM  
Result Value Ref Range Glucose (POC) 179 (H) 65 - 100 mg/dL Performed by Lina Francis GLUCOSE, POC Collection Time: 11/24/18  9:32 PM  
Result Value Ref Range Glucose (POC) 292 (H) 65 - 100 mg/dL Performed by Megan Willis (CON) PCT   
CBC WITH AUTOMATED DIFF Collection Time: 11/25/18  5:43 AM  
Result Value Ref Range WBC 7.3 3.6 - 11.0 K/uL  
 RBC 3.15 (L) 3.80 - 5.20 M/uL HGB 9.4 (L) 11.5 - 16.0 g/dL HCT 28.1 (L) 35.0 - 47.0 % MCV 89.2 80.0 - 99.0 FL  
 MCH 29.8 26.0 - 34.0 PG  
 MCHC 33.5 30.0 - 36.5 g/dL  
 RDW 13.2 11.5 - 14.5 % PLATELET 945 853 - 637 K/uL MPV 8.5 (L) 8.9 - 12.9 FL  
 NRBC 0.0 0  WBC ABSOLUTE NRBC 0.00 0.00 - 0.01 K/uL NEUTROPHILS 39 32 - 75 % LYMPHOCYTES 47 12 - 49 % MONOCYTES 8 5 - 13 % EOSINOPHILS 6 0 - 7 % BASOPHILS 0 0 - 1 % IMMATURE GRANULOCYTES 0 0.0 - 0.5 % ABS. NEUTROPHILS 2.9 1.8 - 8.0 K/UL  
 ABS. LYMPHOCYTES 3.4 0.8 - 3.5 K/UL  
 ABS. MONOCYTES 0.6 0.0 - 1.0 K/UL  
 ABS. EOSINOPHILS 0.4 0.0 - 0.4 K/UL  
 ABS. BASOPHILS 0.0 0.0 - 0.1 K/UL  
 ABS. IMM. GRANS. 0.0 0.00 - 0.04 K/UL  
 DF AUTOMATED METABOLIC PANEL, COMPREHENSIVE Collection Time: 11/25/18  5:43 AM  
Result Value Ref Range Sodium 134 (L) 136 - 145 mmol/L Potassium 4.0 3.5 - 5.1 mmol/L Chloride 103 97 - 108 mmol/L  
 CO2 22 21 - 32 mmol/L Anion gap 9 5 - 15 mmol/L Glucose 152 (H) 65 - 100 mg/dL BUN 89 (H) 6 - 20 MG/DL Creatinine 6.24 (H) 0.55 - 1.02 MG/DL  
 BUN/Creatinine ratio 14 12 - 20 GFR est AA 9 (L) >60 ml/min/1.73m2 GFR est non-AA 7 (L) >60 ml/min/1.73m2 Calcium 8.3 (L) 8.5 - 10.1 MG/DL Bilirubin, total 0.3 0.2 - 1.0 MG/DL  
 ALT (SGPT) 19 12 - 78 U/L  
 AST (SGOT) 16 15 - 37 U/L Alk. phosphatase 184 (H) 45 - 117 U/L Protein, total 7.4 6.4 - 8.2 g/dL Albumin 2.9 (L) 3.5 - 5.0 g/dL Globulin 4.5 (H) 2.0 - 4.0 g/dL A-G Ratio 0.6 (L) 1.1 - 2.2 GLUCOSE, POC Collection Time: 11/25/18  7:54 AM  
Result Value Ref Range Glucose (POC) 146 (H) 65 - 100 mg/dL Performed by Lina Francis GLUCOSE, POC Collection Time: 11/25/18 11:04 AM  
Result Value Ref Range Glucose (POC) 81 65 - 100 mg/dL Performed by Lina Wilkinson MD 
[address] [place] [address] [facility] Phone - [phone] Fax - [phone] 
[email or web address]

## 2018-11-26 LAB
ALBUMIN SERPL-MCNC: 2.8 G/DL (ref 3.5–5)
ALBUMIN/GLOB SERPL: 0.7 {RATIO} (ref 1.1–2.2)
ALP SERPL-CCNC: 175 U/L (ref 45–117)
ALT SERPL-CCNC: 23 U/L (ref 12–78)
ANION GAP SERPL CALC-SCNC: 11 MMOL/L (ref 5–15)
AST SERPL-CCNC: 24 U/L (ref 15–37)
BASOPHILS # BLD: 0 K/UL (ref 0–0.1)
BASOPHILS NFR BLD: 0 % (ref 0–1)
BILIRUB SERPL-MCNC: 0.2 MG/DL (ref 0.2–1)
BUN SERPL-MCNC: 95 MG/DL (ref 6–20)
BUN/CREAT SERPL: 15 (ref 12–20)
CALCIUM SERPL-MCNC: 8.6 MG/DL (ref 8.5–10.1)
CHLORIDE SERPL-SCNC: 106 MMOL/L (ref 97–108)
CO2 SERPL-SCNC: 21 MMOL/L (ref 21–32)
CREAT SERPL-MCNC: 6.17 MG/DL (ref 0.55–1.02)
DIFFERENTIAL METHOD BLD: ABNORMAL
EOSINOPHIL # BLD: 0.4 K/UL (ref 0–0.4)
EOSINOPHIL NFR BLD: 6 % (ref 0–7)
ERYTHROCYTE [DISTWIDTH] IN BLOOD BY AUTOMATED COUNT: 13.2 % (ref 11.5–14.5)
GLOBULIN SER CALC-MCNC: 4 G/DL (ref 2–4)
GLUCOSE BLD STRIP.AUTO-MCNC: 122 MG/DL (ref 65–100)
GLUCOSE BLD STRIP.AUTO-MCNC: 136 MG/DL (ref 65–100)
GLUCOSE BLD STRIP.AUTO-MCNC: 208 MG/DL (ref 65–100)
GLUCOSE BLD STRIP.AUTO-MCNC: 92 MG/DL (ref 65–100)
GLUCOSE SERPL-MCNC: 68 MG/DL (ref 65–100)
HCT VFR BLD AUTO: 27.2 % (ref 35–47)
HGB BLD-MCNC: 9 G/DL (ref 11.5–16)
IMM GRANULOCYTES # BLD: 0 K/UL (ref 0–0.04)
IMM GRANULOCYTES NFR BLD AUTO: 0 % (ref 0–0.5)
LYMPHOCYTES # BLD: 2.8 K/UL (ref 0.8–3.5)
LYMPHOCYTES NFR BLD: 41 % (ref 12–49)
MCH RBC QN AUTO: 29.9 PG (ref 26–34)
MCHC RBC AUTO-ENTMCNC: 33.1 G/DL (ref 30–36.5)
MCV RBC AUTO: 90.4 FL (ref 80–99)
MONOCYTES # BLD: 0.6 K/UL (ref 0–1)
MONOCYTES NFR BLD: 8 % (ref 5–13)
NEUTS SEG # BLD: 2.9 K/UL (ref 1.8–8)
NEUTS SEG NFR BLD: 44 % (ref 32–75)
NRBC # BLD: 0 K/UL (ref 0–0.01)
NRBC BLD-RTO: 0 PER 100 WBC
PLATELET # BLD AUTO: 251 K/UL (ref 150–400)
PMV BLD AUTO: 9 FL (ref 8.9–12.9)
POTASSIUM SERPL-SCNC: 3.8 MMOL/L (ref 3.5–5.1)
PROT SERPL-MCNC: 6.8 G/DL (ref 6.4–8.2)
RBC # BLD AUTO: 3.01 M/UL (ref 3.8–5.2)
SERVICE CMNT-IMP: ABNORMAL
SERVICE CMNT-IMP: NORMAL
SODIUM SERPL-SCNC: 138 MMOL/L (ref 136–145)
WBC # BLD AUTO: 6.7 K/UL (ref 3.6–11)

## 2018-11-26 PROCEDURE — 74011250636 HC RX REV CODE- 250/636: Performed by: INTERNAL MEDICINE

## 2018-11-26 PROCEDURE — 74011000258 HC RX REV CODE- 258: Performed by: INTERNAL MEDICINE

## 2018-11-26 PROCEDURE — 82962 GLUCOSE BLOOD TEST: CPT

## 2018-11-26 PROCEDURE — 74011250637 HC RX REV CODE- 250/637: Performed by: INTERNAL MEDICINE

## 2018-11-26 PROCEDURE — 36415 COLL VENOUS BLD VENIPUNCTURE: CPT

## 2018-11-26 PROCEDURE — 74011000250 HC RX REV CODE- 250: Performed by: INTERNAL MEDICINE

## 2018-11-26 PROCEDURE — 65660000000 HC RM CCU STEPDOWN

## 2018-11-26 PROCEDURE — 74011636637 HC RX REV CODE- 636/637: Performed by: INTERNAL MEDICINE

## 2018-11-26 PROCEDURE — 85025 COMPLETE CBC W/AUTO DIFF WBC: CPT

## 2018-11-26 PROCEDURE — 97535 SELF CARE MNGMENT TRAINING: CPT | Performed by: OCCUPATIONAL THERAPIST

## 2018-11-26 PROCEDURE — 80053 COMPREHEN METABOLIC PANEL: CPT

## 2018-11-26 RX ORDER — METOPROLOL TARTRATE 50 MG/1
50 TABLET ORAL 2 TIMES DAILY
Status: DISCONTINUED | OUTPATIENT
Start: 2018-11-26 | End: 2018-11-27 | Stop reason: HOSPADM

## 2018-11-26 RX ORDER — CLONIDINE HYDROCHLORIDE 0.1 MG/1
0.2 TABLET ORAL 2 TIMES DAILY
Status: DISCONTINUED | OUTPATIENT
Start: 2018-11-26 | End: 2018-11-27 | Stop reason: HOSPADM

## 2018-11-26 RX ADMIN — Medication 10 ML: at 14:12

## 2018-11-26 RX ADMIN — SODIUM BICARBONATE 650 MG: 650 TABLET ORAL at 18:01

## 2018-11-26 RX ADMIN — SUCRALFATE 0.5 G: 1 SUSPENSION ORAL at 13:17

## 2018-11-26 RX ADMIN — METOPROLOL TARTRATE 50 MG: 50 TABLET ORAL at 18:01

## 2018-11-26 RX ADMIN — Medication 10 ML: at 22:17

## 2018-11-26 RX ADMIN — METOPROLOL TARTRATE 50 MG: 50 TABLET ORAL at 11:08

## 2018-11-26 RX ADMIN — CEFTRIAXONE 1 G: 1 INJECTION, POWDER, FOR SOLUTION INTRAMUSCULAR; INTRAVENOUS at 11:05

## 2018-11-26 RX ADMIN — HEPARIN SODIUM 5000 UNITS: 5000 INJECTION INTRAVENOUS; SUBCUTANEOUS at 22:17

## 2018-11-26 RX ADMIN — SUCRALFATE 0.5 G: 1 SUSPENSION ORAL at 08:46

## 2018-11-26 RX ADMIN — ASPIRIN 81 MG 81 MG: 81 TABLET ORAL at 08:47

## 2018-11-26 RX ADMIN — IRON SUCROSE 300 MG: 20 INJECTION, SOLUTION INTRAVENOUS at 12:14

## 2018-11-26 RX ADMIN — HEPARIN SODIUM 5000 UNITS: 5000 INJECTION INTRAVENOUS; SUBCUTANEOUS at 08:47

## 2018-11-26 RX ADMIN — SODIUM BICARBONATE 650 MG: 650 TABLET ORAL at 08:46

## 2018-11-26 RX ADMIN — INSULIN LISPRO 1 UNITS: 100 INJECTION, SOLUTION INTRAVENOUS; SUBCUTANEOUS at 22:17

## 2018-11-26 RX ADMIN — ATORVASTATIN CALCIUM 20 MG: 20 TABLET, FILM COATED ORAL at 22:17

## 2018-11-26 RX ADMIN — CLONIDINE HYDROCHLORIDE 0.1 MG: 0.1 TABLET ORAL at 08:47

## 2018-11-26 RX ADMIN — ERYTHROPOIETIN 40000 UNITS: 40000 INJECTION, SOLUTION INTRAVENOUS; SUBCUTANEOUS at 14:11

## 2018-11-26 RX ADMIN — SUCRALFATE 0.5 G: 1 SUSPENSION ORAL at 18:01

## 2018-11-26 RX ADMIN — INSULIN GLARGINE 15 UNITS: 100 INJECTION, SOLUTION SUBCUTANEOUS at 22:17

## 2018-11-26 RX ADMIN — CLONIDINE HYDROCHLORIDE 0.2 MG: 0.1 TABLET ORAL at 18:01

## 2018-11-26 RX ADMIN — SUCRALFATE 0.5 G: 1 SUSPENSION ORAL at 22:17

## 2018-11-26 NOTE — DIABETES MGMT
DTC Consult Note Recommendations/ Comments:  No recommendations at this time. DTC will continue to follow. Current hospital DM medication: Lantus 15 units daily and Humalog for correction, high sensitivity scale Consult received for:  [x]             Assessment of home management Chart reviewed and initial evaluation complete on Ashleigh Garcia. Hx of CKD 5, no HD, nephrology following. Also hx of hepatitis c, pancreatitis Patient is a 46 y.o. female with hx Type 2 Diabetes on Lantus 25 units (per PTA medication list) but pt states that her insurance no longer covers Lantus and she mentioned another insulin starting with T but did not remember the name, I suggested [place] and she thinks that is the right insulin, she states to take 18 units of that new insulin ) likely Tresiba, also takes Novolog 4 units before or after meals at home. She reports to miss Novolog sometimes because she forgets. BG monitoring at home 2 times per day, fasting and bedtime. She reports to have frequent hypoglycemia at home and that's why she now decreased her basal insulin to 18 units, on her own. Assessed and instructed patient on the following:  
·  interpretation of lab results, blood sugar goals, complications of diabetes mellitus, hypoglycemia prevention and treatment, SMBG skills, nutrition, referred to Diabetes Educator, site rotation, use of insulin pen and self-injection of insulin Encouraged the following:  
· dietary modifications: plate method, avoid concentrated sweets, regular blood sugar monitorin-3 times daily, follow up with PCP Provided patient with the following: [x]             Survival skills education materials [x]             Insulin education materials []             CHO counting education materials [x]             Outpatient DTC contact number 
             []             Glucometer Discussed with patient and/or family need for follow up appointment for diabetes management after discharge. A1c:  
Lab Results Component Value Date/Time Hemoglobin A1c 9.9 (H) 11/23/2018 05:53 PM  
 
 
Recent Glucose Results:  
Lab Results Component Value Date/Time GLU 68 11/26/2018 05:39 AM  
 GLUCPOC 122 (H) 11/26/2018 04:34 PM  
 GLUCPOC 136 (H) 11/26/2018 11:21 AM  
 GLUCPOC 92 11/26/2018 07:34 AM  
  
 
Lab Results Component Value Date/Time Creatinine 6.17 (H) 11/26/2018 05:39 AM  
 
Estimated Creatinine Clearance: 9.2 mL/min (A) (based on SCr of 6.17 mg/dL (H)). Active Orders Diet DIET DIABETIC CONSISTENT CARB Regular PO intake:  
Patient Vitals for the past 72 hrs: 
 % Diet Eaten 11/26/18 1326 75 % 11/25/18 1758 100 % 11/25/18 1057 100 % 11/25/18 0734 100 % 11/24/18 1200 30 % Will continue to follow as needed. Thank you. Arya Simmons RD, CDE [facility] Pager: [phone]

## 2018-11-26 NOTE — PROGRESS NOTES
Nephrology Progress Note Codey Calderon  
 
[facility]. [facility]                  Phone - [phone] Patient: Bonita Turner YOB: 1965     Admit Date: 11/23/2018 Date- 11/26/2018 CC: Follow up for ckd, htn Subjective: Interval History:  
-  Cr. High but stable 
bp on high side No nausea or vomiting No sob Feels better No chest pain ROS:- as above Assessment:  
 
EZEKIEL on CKD: 
- mild rise may be from her HTN, but likely has progressive CKD 
  
CKD V: 
- from DM and HTN 
- baseline Cr 5.5 to 6 
- f/b Dr. Christiano Robertson 
  
Malignant HTN: 
  
Encephalopathy: 
  
Anemia of CKD 
  
Back pain: 
  
 
 Plan: · Cr stable and close to baseline ·  no urgent needs for HD 
· Increase clonidine to 0.2 mg bid · Give epogen today · Give venofer today · Okay to d/c home- renal stand point 
· F./u with  in 4 weeks. Labs in 1-2 weeks. Physical Exam:  
GEN: NAD NECK- Supple, no thyromegaly RESP: Clear b/l, no wheezing, No accessory muscle use CVS: RRR,S1,S2 SKIN: No Rash, ABDO: soft , non tender, No hepatosplenomegaly EXT: No Edema NEURO: non focal, normal speech Care Plan discussed with:patient Objective:  
Patient Vitals for the past 24 hrs: 
 Temp Pulse Resp BP SpO2  
11/26/18 0846  89  152/87   
11/26/18 0735 98.4 °F (36.9 °C) 91 18 (!) 175/93 100 % 11/26/18 0330 98.8 °F (37.1 °C) 83 18 157/88 100 % 11/25/18 2330 98.6 °F (37 °C) 79 16 (!) 147/91 99 % 11/25/18 1940 98.2 °F (36.8 °C) 83 16 133/88 100 % 11/25/18 1500 98.4 °F (36.9 °C) 82 18 (!) 144/96 100 % 11/25/18 1057    103/75   
11/25/18 1048    (!) 89/68  11/25/18 1040    101/67  Last 3 Recorded Weights in this Encounter 11/23/18 1033 11/25/18 1031 Weight: 59 kg (130 lb) 61.9 kg (136 lb 6.4 oz)  
 
11/24 1901 - 11/26 0700 In: 1180 [P.O.:1080; I.V.:100] Out: 2600 [Urine:2600] Chart reviewed. Pertinent Notes reviewed. Medication list  reviewed Current Facility-Administered Medications Medication  metoprolol tartrate (LOPRESSOR) tablet 50 mg  epoetin kourtney (EPOGEN;PROCRIT) injection 40,000 Units  iron sucrose (VENOFER) 300 mg in 0.9% sodium chloride 250 mL IVPB  cloNIDine HCl (CATAPRES) tablet 0.2 mg  
 insulin glargine (LANTUS) injection 15 Units  heparin (porcine) injection 5,000 Units  atorvastatin (LIPITOR) tablet 20 mg  
 sodium bicarbonate tablet 650 mg  
 sucralfate (CARAFATE) 100 mg/mL oral suspension 0.5 g  
 aspirin chewable tablet 81 mg  
 hydrALAZINE (APRESOLINE) 20 mg/mL injection 10 mg  
 sodium chloride (NS) flush 5-10 mL  sodium chloride (NS) flush 5-10 mL  ondansetron (ZOFRAN) injection 4 mg  docusate sodium (COLACE) capsule 100 mg  
 glucose chewable tablet 16 g  
 dextrose (D50W) injection syrg 12.5-25 g  
 glucagon (GLUCAGEN) injection 1 mg  lidocaine 4 % patch 1 Patch  cefTRIAXone (ROCEPHIN) 1 g in 0.9% sodium chloride (MBP/ADV) 50 mL  insulin lispro (HUMALOG) injection  labetalol (NORMODYNE;TRANDATE) injection 20 mg Data Review : 
Recent Labs  
  11/26/18 
0539 11/25/18 
0543 11/24/18 
4438 WBC 6.7 7.3 6.5 HGB 9.0* 9.4* 9.1*  
 232 238 ANEU 2.9 2.9 3.3  134* 140  
K 3.8 4.0 3.7 GLU 68 152* 83 BUN 95* 89* 90* CREA 6.17* 6.24* 6.48* ALT 23 19 18 SGOT 24 16 18 TBILI 0.2 0.3 0.4 * 184* 156* CA 8.6 8.3* 8.7 MG  --   --  1.9 Lab Results Component Value Date/Time  Culture result: MRSA PRESENT (A) 11/24/2018 11:05 AM  
 Culture result: (A) 11/24/2018 11:05 AM  
  REPORT OF MRSA PRESENT CALLED TO AND READ BACK BY GENA TELLES (RN/[facility]) ON 11/25/18 AT 1151 BY VI  
 Culture result:  11/24/2018 11:05 AM  
      Screening of patient nares for MRSA is for surveillance purposes and, if positive, to facilitate isolation considerations in high risk settings. It is not intended for automatic decolonization interventions per se as regimens are not sufficiently effective to warrant routine use. Lab Results Component Value Date/Time Specimen Description: NARES 11/18/2013 10:02 PM  
 Specimen Description: BLOOD 11/18/2013 09:18 PM  
 Specimen Description: URINE 11/18/2013 09:18 PM  
 
No results for input(s): FE, TIBC, PSAT, FERR in the last 72 hours. Lab Results Component Value Date/Time Sodium,urine random 92 05/28/2018 12:13 PM  
 Creatinine, urine 29.30 05/28/2018 12:13 PM  
 
 
 
 
Sruthi Stafford MD 
[facility] 
 www. [facility] [employer] [address] [place], [address] Phone - [phone] Fax - [phone]

## 2018-11-26 NOTE — PROGRESS NOTES
Hospitalist Progress Note NAME: Aleksandr Cancino :  1965 MRN:  [id number] Assessment / Plan: 
CKD V/End stage renal disease, worsening function. nephrology on board. no urgent HD needs. Baseline Cr 5.5 to 6 and on admission 7.08-->6.24-->6.17 Will continue to follow up renal function Electrolytes Within Acceptable Range. Malignant HTN/ hypertensive emergency. labile BP still adjusting BP 
nephrology help appreciated Now on clonidine to 0.1mg BID and cont metoprolol 25-->50 mg Also on nifedipine XL 60mg daily PRN IV labetalol and hydralazine for SBP > 180 Metabolic encephalopathy from above reasons. Resolved  
neg head CT, ammonia neg May do EEG if worse as per neurology. Type 2 diabetes mellitus with hypoglycemia without coma in the setting of Poorly controlled DM2 Hemoglobin A1c is 9.8 Continue  lantus 15 units at bed time and Humalog as per the sliding scale in order. Glucose Within Acceptable Range. Anemia of CKD 
hgb stable at 9.1-->9.4  this am. 
Back pain: 
neg plain films Pain meds for now. Suspected opiate abuse on admission, will watch for that. Body mass index is 23.41 kg/m².: 18.5 - 24.9 Normal weight Code status: Full Prophylaxis: Hep SQ Recommended Disposition: TBD Subjective: Chief Complaint / Reason for Physician Visit \"i am feeling fine now she said. \". Discussed with RN events overnight. Review of Systems: 
Symptom Y/N Comments  Symptom Y/N Comments Fever/Chills n   Chest Pain n   
Poor Appetite    Edema Cough n   Abdominal Pain n   
Sputum    Joint Pain y Back pain is better now. SOB/GRIFFITHS n   Pruritis/Rash Nausea/vomit    Tolerating PT/OT Diarrhea    Tolerating Diet Constipation    Other Could NOT obtain due to:   
 
Objective: VITALS:  
Last 24hrs VS reviewed since prior progress note. Most recent are: 
Patient Vitals for the past 24 hrs: 
 Temp Pulse Resp BP SpO2 11/26/18 0735 98.4 °F (36.9 °C) 91 18 (!) 175/93 100 % 11/26/18 0330 98.8 °F (37.1 °C) 83 18 157/88 100 % 11/25/18 2330 98.6 °F (37 °C) 79 16 (!) 147/91 99 % 11/25/18 1940 98.2 °F (36.8 °C) 83 16 133/88 100 % 11/25/18 1500 98.4 °F (36.9 °C) 82 18 (!) 144/96 100 % 11/25/18 1057    103/75   
11/25/18 1048    (!) 89/68   
11/25/18 1040    101/67   
11/25/18 0900    108/80  Intake/Output Summary (Last 24 hours) at 11/26/2018 4779 Last data filed at 11/25/2018 1758 Gross per 24 hour Intake 820 ml Output 900 ml Net -80 ml PHYSICAL EXAM: 
General: WD, WN. Alert, cooperative, no acute distress   
EENT:  EOMI. Anicteric sclerae. MMM Resp:  CTA bilaterally, no wheezing or rales. No accessory muscle use CV:  Regular  rhythm,  No edema GI:  Soft, Non distended, Non tender.  +Bowel sounds Neurologic:  Alert and oriented X 3, normal speech, Reviewed most current lab test results and cultures  YES Reviewed most current radiology test results   YES Review and summation of old records today    NO Reviewed patient's current orders and MAR    YES 
PMH/ reviewed - no change compared to H&P 
________________________________________________________________________ Care Plan discussed with: 
  Comments Patient y Family RN y   
Care Manager Consultant Multidiciplinary team rounds were held today with , nursing, pharmacist and clinical coordinator. Patient's plan of care was discussed; medications were reviewed and discharge planning was addressed. ________________________________________________________________________ Total NON critical care TIME:  30   Minutes Total CRITICAL CARE TIME Spent:   Minutes non procedure based Comments >50% of visit spent in counseling and coordination of care    
________________________________________________________________________ [place], MD  
 
 Procedures: see electronic medical records for all procedures/Xrays and details which were not copied into this note but were reviewed prior to creation of Plan. LABS: 
I reviewed today's most current labs and imaging studies. Pertinent labs include: 
Recent Labs  
  11/26/18 0539 11/25/18 0543 11/24/18 0555 WBC 6.7 7.3 6.5 HGB 9.0* 9.4* 9.1*  
HCT 27.2* 28.1* 26.7*  
 232 238 Recent Labs  
  11/26/18 0539 11/25/18 0543 11/24/18 0555 11/23/18 
3732  134* 140  --   
K 3.8 4.0 3.7  --   
 103 108  --   
CO2 21 22 21  --   
GLU 68 152* 83  --   
BUN 95* 89* 90*  --   
CREA 6.17* 6.24* 6.48*  --   
CA 8.6 8.3* 8.7  --   
MG  --   --  1.9  --   
ALB 2.8* 2.9* 2.9*  --   
TBILI 0.2 0.3 0.4  --   
SGOT 24 16 18  --   
ALT 23 19 18  --   
INR  --   --   --  1.0 Signed:  Krista Mora MD

## 2018-11-26 NOTE — PROGRESS NOTES
Problem: Falls - Risk of 
Goal: *Absence of Falls Document [facility] Fall Risk and appropriate interventions in the flowsheet. Outcome: Progressing Towards Goal 
Fall Risk Interventions: 
Mobility Interventions: Bed/chair exit alarm, Patient to call before getting OOB Mentation Interventions: Adequate sleep, hydration, pain control, Bed/chair exit alarm, Door open when patient unattended Medication Interventions: Bed/chair exit alarm, Patient to call before getting OOB, Teach patient to arise slowly Elimination Interventions: Bed/chair exit alarm, Call light in reach, Patient to call for help with toileting needs Problem: Pressure Injury - Risk of 
Goal: *Prevention of pressure injury Document Julius Scale and appropriate interventions in the flowsheet. Outcome: Progressing Towards Goal 
Pressure Injury Interventions: 
Sensory Interventions: Check visual cues for pain, Monitor skin under medical devices Moisture Interventions: Apply protective barrier, creams and emollients, Absorbent underpads Activity Interventions: Increase time out of bed, Pressure redistribution bed/mattress(bed type) Mobility Interventions: PT/OT evaluation Nutrition Interventions: Offer support with meals,snacks and hydration Friction and Shear Interventions: Foam dressings/transparent film/skin sealants, Minimize layers

## 2018-11-26 NOTE — CDMP QUERY
There is noted documentation of \"Poorly controlled controlled, type 2 DM \" on this record. Could this be further clarified as 
 
=>Type 2 diabetes mellitus with hypoglycemia without coma in the setting of Poorly controlled DM2 requiring Inpatient management =>Type 2 diabetes mellitus with hypoglycemia without coma in the setting of Elevated Hgb A1c requiring Inpatient management  
=>Other Explanation of clinical findings =>Clinically Undetermined (no explanation for clinical findings) The medical record reflects the following clinical findings, treatment, and risk factors: 
 
Risk Factors: hx of poorly controlled DM Clinical Indicators: Hgb A1C: 9.8; BS: 415 Treatment: Lantus; SSI Please clarify and document your clinical opinion in the progress notes and discharge summary including the definitive and/or presumptive diagnosis, (suspected or probable), related to the above clinical findings. Please include clinical findings supporting your diagnosis Thank Jean [facility] 
[phone]

## 2018-11-26 NOTE — PROGRESS NOTES
PCU SHIFT NURSING NOTE Bedside shift change report given to Renae Lao (oncoming nurse) by Abigail Chaudhary (offgoing nurse). Report included the following information SBAR, Kardex, Intake/Output and MAR. Shift Summary:  
1235 - PT/OT in to see pt. Reports that pt was very orthostatic and slightly dizzy. 1610 - Ambulated pt to bathroom. Slightly unsteady gait. 200 -  Paged Dr. Isela Cat for pain medication. Waiting for return call. Admission Date 11/23/2018 Admission Diagnosis EZEKIEL (acute kidney injury) ([facility] [address] CKD (chronic kidney disease) Back pain EZEKIEL (acute kidney injury) ([facility] [address] CKD (chronic kidney disease) Altered mental state Consults IP CONSULT TO NEUROLOGY 
IP CONSULT TO NEPHROLOGY Consults []PT []OT []Speech  
[]Case Management  
  
[] Palliative Cardiac Monitoring Order []Yes []No  
 
IV drips []Yes Drip:                            Dose: 
Drip:                            Dose: 
Drip:                            Dose:  
[]No  
 
GI Prophylaxis []Yes []No  
 
 
 
DVT Prophylaxis SCDs:  Sequential Compression Device: Bilateral  
  Patient Refused VTE Prophylaxis: Yes Chris stockings:     
  
[] Medication []Contraindicated []None Activity Level Activity Level: Up with Assistance Activity Assistance: Partial (one person) Purposeful Rounding every 1-2 hour? []Yes Medrano Score  Total Score: 4 Bed Alarm (If score 3 or >) []Yes  
[] Refused (See signed refusal form in chart) Julius Score  Julius Score: 17 Julius Score (if score 14 or less) []PMT consult  
[]Wound Care consult []Specialty bed  
[] Nutrition consult Needs prior to discharge:  
Home O2 required:   
[]Yes []No  
 If yes, how much O2 required? Other:  
 Last Bowel Movement:    
  
Influenza Vaccine Received Flu Vaccine for Current Season (usually Sept-March): No  
 Patient/Guardian Refused (Notify MD): Yes Pneumonia Vaccine Diet Active Orders Diet DIET DIABETIC CONSISTENT CARB Regular LDAs Peripheral IV 11/23/18 Anterior;Right;Superior; Upper Arm (Active) Site Assessment Clean, dry, & intact 11/25/2018  3:05 AM  
Phlebitis Assessment 0 11/25/2018  3:05 AM  
Infiltration Assessment 0 11/25/2018  3:05 AM  
Dressing Status Clean, dry, & intact 11/25/2018  3:05 AM  
Dressing Type Tape;Transparent 11/24/2018  3:00 PM  
Hub Color/Line Status Pink 11/24/2018  3:00 PM  
Action Taken Open ports on tubing capped 11/23/2018  3:04 PM  
Alcohol Cap Used Yes 11/23/2018  3:04 PM  
   
Peripheral IV 11/23/18 Left Antecubital (Active) Site Assessment Clean, dry, & intact 11/25/2018  3:05 AM  
Phlebitis Assessment 0 11/25/2018  3:05 AM  
Infiltration Assessment 0 11/25/2018  3:05 AM  
Dressing Status Clean, dry, & intact 11/25/2018  3:05 AM  
Dressing Type Tape;Transparent 11/24/2018  3:00 PM  
Hub Color/Line Status Pink 11/24/2018  3:00 PM  
Action Taken Blood drawn 11/23/2018  9:42 AM  
                  
Urinary Catheter Intake & Output Date 11/24/18 1900 - 11/25/18 4134 11/25/18 0700 - 11/26/18 9643 Shift 5967-5854 24 Hour Total 2399-7729 4455-7900 24 Hour Total  
INTAKE  
P.O.  480     
  P. O.  480 Shift Total(mL/kg)  480(7.8) OUTPUT Urine(mL/kg/hr) 1500 1500 650(0.9)  650 Urine Voided 1500 1500 650  650 Urine Occurrence(s)  1 x Shift Total(mL/kg) 8004(29.3) 2599(14.2) 650(10.5)  650(10.5) NET -1500 -1020 -650  -650 Weight (kg) 61.9 61.9 61.9 61.9 61.9 Readmission Risk Assessment Tool Score Medium Risk   
      
 20 Total Score 4 IP Visits Last 12 Months (1-3=4, 4=9, >4=11) 9 Pt. Coverage (Medicare=5 , Medicaid, or Self-Pay=4) 7 Charlson Comorbidity Score (Age + Comorbid Conditions) Criteria that do not apply:  
 Has Seen PCP in Last 6 Months (Yes=3, No=0) . Living with Significant Other. Assisted Living. LTAC. SNF. or  
Rehab Patient Length of Stay (>5 days = 3) Expected Length of Stay - - - Actual Length of Stay 2

## 2018-11-26 NOTE — PROGRESS NOTES
SPEECH THERAPY SCREENING: 
SERVICES ARE NOT INDICATED AT THIS TIME [facility] triggered for speech therapy based on results obtained during the nursing admission assessment. The patients chart was reviewed and the patient is not appropriate for a skilled therapy evaluation at this time. Please consult speech therapy if any therapy needs arise. Thank you. Jose Lu, SLP

## 2018-11-26 NOTE — PROGRESS NOTES
Reason for Admission: EZEKIEL, HCC, CKD, back pain, altered mental status RRAT Score:  20 Do you (patient/family) have any concerns for transition/discharge? CM's concerns: pt declining HH, pt seemed to still be in altered mental state and disclosing information that contradicts therapy/MD notes, pt has Lantus questions and concerns. Plan for utilizing home health:  Pt declined [facility] [place] Likelihood of readmission? High, pt declining HH and does not seem to have the support needed at home Transition of Care Plan:       
 
CM made room visit with patient. Pt confirmed demographics, emergency contact, insurance, and PCP (last seen in October 2018). Pt stated she uses [address] on [address] Pt stated she has no problems affording her medication and takes them as prescribed. Pt disclosed she has some questions and concerns about her insurance no longer covering Lantus and being prescribed a different insulin with different instructions of when to take it. CM consulted with pt nurse and expressed pts concerns. Pt stated she lives in a two story home with her three daughters (ages 32, 25, and 6) and three grandchildren. Pt stated there are about 10 steps inside the home between the floors and about 4 entry steps. Pt stated she was independent with ADLs prior to admission. Pt disclosed she does not drive and does not have any DME. Pt stated she has previously used HH but has no history with SNF or Rehab. Pt stated she does no have an advanced directive and does not wish to have one at this time. CM went over possible d/c needs upon d/c. Pt declined all services including HH. CM encouraged [facility] [place] and made pt aware of PT's recommendations.  CM wrote name and work number on pt board and asked for pt to call if she changed her mind before d/c. CM also made pt aware that if she felt that she needed some kind of help post d/c then she could contact her PCP and have services arranged. Pt also stated that she would need transportation upon d/c. CM will set up Ly[employer] upon d/c. CM will continue to follow. Care Management Interventions PCP Verified by CM: Yes Mode of Transport at Discharge: Other (see comment)(pt needs transportation set up) Transition of Care Consult (CM Consult): Discharge Planning Discharge Durable Medical Equipment: No 
Physical Therapy Consult: Yes Occupational Therapy Consult: Yes Speech Therapy Consult: No 
Current Support Network: Own Home, Other(lives with three daughters and three grandchildren) Confirm Follow Up Transport: Other (see comment)(CM to set up Lyft) Plan discussed with Pt/Family/Caregiver: Yes Discharge [facility] [address] [place] [address]

## 2018-11-26 NOTE — PROGRESS NOTES
**Consult Information** 
Member Facility: [address] [place] [address],[address] Facility MRN: [id number] Consult ID: [id number] Facility Time Zone: ET 
Date and Time of Consult: 11/25/2018 07:45:35 PM 
Requesting Clinician: Kj Miller Patient Name: Eda Henson Date of Birth: [id number] Gender: Female **Clinical Note** Clinical Note: Patient requesting Roxicodone for back pain. as per hospitalist note there is concern for opiate abuse. ordered tylenol IV x 1. goal to limit narcotics.

## 2018-11-26 NOTE — PROGRESS NOTES
Problem: Self Care Deficits Care Plan (Adult) Goal: *Acute Goals and Plan of Care (Insert Text) Occupational Therapy Goals Initiated 11/25/2018 1. Patient will perform grooming standing with supervision/set-up within 7 day(s). 2.  Patient will perform lower body dressing with supervision/set-up within 7 day(s). 3.  Patient will perform toilet transfers with supervision/set-up within 7 day(s). 4.  Patient will perform all aspects of toileting with supervision/set-up within 7 day(s). 5.  Patient will participate in upper extremity therapeutic exercise/activities with supervision/set-up for 8 minutes within 7 day(s). 6.  Patient will utilize energy conservation techniques during functional activities with verbal cues within 7 day(s). Occupational Therapy TREATMENT Patient: Marco Antonio Murcia (56 y.o. female) Date: 11/26/2018 Diagnosis: EZEKIEL (acute kidney injury) ([facility] [address] CKD (chronic kidney disease) Back pain EZEKIEL (acute kidney injury) ([facility] [address] CKD (chronic kidney disease) Altered mental state <principal problem not specified> Precautions:   
 
ASSESSMENT: 
Patient has made significant progress, now demonstrating appropriate cognition for the performance of ADLs, as well as intact strength, balance and activity tolerance. She is back to functioning at her independent baseline for ADLs and functional mobility. Patient also able to perform bed making independently. At this time the patient has surpassed all goals and is without further OT needs. Progression toward goals: 
[]       Improving appropriately and progressing toward goals [x]       Improving slowly and progressing toward goals 
[]       Not making progress toward goals and plan of care will be adjusted PLAN: Discharge from OT services. Discharge Recommendations:  None OBJECTIVE DATA SUMMARY:  
Cognitive/Behavioral Status: 
Neurologic State: Alert Orientation Level: Oriented X4 
 Cognition: Appropriate for age attention/concentration; Appropriate safety awareness; Appropriate decision making; Follows commands Safety/Judgement: Awareness of environment; Insight into deficits Functional Mobility and Transfers for ADLs:Bed Mobility: 
Rolling: Independent Supine to Sit: Independent Scooting: Independent Transfers: 
Sit to Stand: Independent Toilet Transfer : Independent Bathroom Mobility: Independent Balance: 
Sitting: Intact Standing: Intact ADL Intervention: 
Grooming Grooming Assistance: Independent(standing at sink) Washing Face: Independent Washing Hands: Independent Upper Body Dressing Assistance Shirt simulation with hospital gown: Independent Lower Body Dressing Assistance Underpants: Independent Socks: Independent Leg Crossed Method Used: Yes Position Performed: Seated edge of bed;Standing;Bending forward method Toileting Toileting Assistance: Independent Bowel Hygiene: Independent Clothing Management: Independent Simulated ADL setup:  
Independent to open and reach into low drawers, as well as to reach high shelves Cognitive Retraining Safety/Judgement: Awareness of environment; Insight into deficits IADL Intervention: 
Patient performed bed making independently Pain: 
Pain Scale 1: Numeric (0 - 10) Pain Intensity 1: 2 Activity Tolerance: VSS After treatment:  
[] Patient left in no apparent distress sitting up in chair 
[x] Patient left in no apparent distress in bed 
[x] Call bell left within reach [x] Nursing notified 
[] Caregiver present [x] Bed alarm activated COMMUNICATION/COLLABORATION:  
The patients plan of care was discussed with: Registered Nurse and  Nick Cunningham, OTR/L Time Calculation: 17 mins

## 2018-11-26 NOTE — PROGRESS NOTES
PCU SHIFT NURSING NOTE Bedside and Verbal shift change report given to Alma Rosa Gardner RN (oncoming nurse) by Tomas Coe RN (offgoing nurse). Report included the following information SBAR, Kardex, MAR, Recent Results and Cardiac Rhythm NSR. Shift Summary:  
0730:  Pt resting in bed. No signs of distress. 0340:  Medicated with am meds. Gave am clonidine. Will recheck BP and then give metoprolol. 1655:  Pt states she is leaving. Pt is starting to get dressed. Attempted to explain to pt that Nephrology was OK with her being discharged but it was up to the hospitalist.  She states she has to leave, she has court and her kids are missing work to get kids on the bus. Pt sat back in bed and nurse called MD> 
1700:  Spoke with Dr. Darin Barton and he is not discharging pt today. He wants to monitor bp with medication changes over the evening and discharge tomorrow. Spoke with pt. Pt sitting on bed staring at wall. She wants time to think it over, if she will stay or leave. Explained to pt about AMA and the risk of her leaving. Will check back with pt.  
1740:  Pt asleep and resting. Will continue to monitor. Admission Date 11/23/2018 Admission Diagnosis EZEKIEL (acute kidney injury) ([facility] [address] CKD (chronic kidney disease) Back pain EZEKIEL (acute kidney injury) ([facility] [address] CKD (chronic kidney disease) Altered mental state Consults IP CONSULT TO NEUROLOGY 
IP CONSULT TO NEPHROLOGY Consults [x]PT [x]OT []Speech [x]Case Management  
  
[] Palliative Cardiac Monitoring Order  
[x]Yes []No  
 
IV drips []Yes Drip:                            Dose: 
Drip:                            Dose: 
Drip:                            Dose:  
[x]No  
 
GI Prophylaxis [x]Yes []No  
 
 
 
DVT Prophylaxis SCDs:  Sequential Compression Device: Bilateral  
  Patient Refused VTE Prophylaxis: Yes Chris stockings:     
  
[x] Medication []Contraindicated []None Activity Level Activity Level: Up with Assistance Activity Assistance: Partial (one person) Purposeful Rounding every 1-2 hour? []Yes Medrano Score  Total Score: 4 Bed Alarm (If score 3 or >) [x]Yes  
[] Refused (See signed refusal form in chart) Julius Score  Julius Score: 21 Julius Score (if score 14 or less) []PMT consult  
[]Wound Care consult []Specialty bed  
[] Nutrition consult Needs prior to discharge:  
Home O2 required:   
[]Yes  
[x]No  
 If yes, how much O2 required? Other:  
 Last Bowel Movement:    
  
Influenza Vaccine Received Flu Vaccine for Current Season (usually Sept-March): No  
 Patient/Guardian Refused (Notify MD): Yes Pneumonia Vaccine Diet Active Orders Diet DIET DIABETIC CONSISTENT CARB Regular LDAs Peripheral IV 11/23/18 Left Antecubital (Active) Site Assessment Clean, dry, & intact 11/26/2018  3:30 AM  
Phlebitis Assessment 0 11/26/2018  3:30 AM  
Infiltration Assessment 0 11/26/2018  3:30 AM  
Dressing Status Clean, dry, & intact 11/26/2018  3:30 AM  
Dressing Type Tape;Transparent 11/25/2018  5:58 PM  
Hub Color/Line Status Infusing 11/25/2018  5:58 PM  
Action Taken Blood drawn 11/23/2018  9:42 AM  
                  
Urinary Catheter Intake & Output Date 11/25/18 0700 - 11/26/18 0772 11/26/18 0700 - 11/27/18 2585 Shift 5834-8914 2832-9208 24 Hour Total 6188-0120 7416-9917 24 Hour Total  
INTAKE  
[address]  1080     
  P. O[address]  1080     
I. V.(mL/kg/hr) 100(0.1)  100(0.1) Volume (cefTRIAXone (ROCEPHIN) 1 g in 0.9% sodium chloride (MBP/ADV) 50 mL) 100  100 Shift Total(mL/kg) 1180(19.1)  1180(19.1) OUTPUT Urine(mL/kg/hr) 1100(1.5)  1100(0.7) Urine Voided 1100  1100 Shift Total(mL/kg) 1100(17.8)  1100(17.8) NET 80  80 Weight (kg) 61.9 61.9 61.9 61.9 61.9 61.9 Readmission Risk Assessment Tool Score Medium Risk   
      
 20 Total Score 4 IP Visits Last 12 Months (1-3=4, 4=9, >4=11) 9 Pt. Coverage (Medicare=5 , Medicaid, or Self-Pay=4) 7 Charlson Comorbidity Score (Age + Comorbid Conditions) Criteria that do not apply:  
 Has Seen PCP in Last 6 Months (Yes=3, No=0) . Living with Significant Other. Assisted Living. LTAC. SNF. or  
Rehab Patient Length of Stay (>5 days = 3) Expected Length of Stay - - - Actual Length of Stay 3

## 2018-11-27 VITALS
DIASTOLIC BLOOD PRESSURE: 90 MMHG | RESPIRATION RATE: 18 BRPM | WEIGHT: 132.28 LBS | HEIGHT: 64 IN | TEMPERATURE: 98.1 F | BODY MASS INDEX: 22.58 KG/M2 | SYSTOLIC BLOOD PRESSURE: 138 MMHG | HEART RATE: 74 BPM | OXYGEN SATURATION: 95 %

## 2018-11-27 LAB
ALBUMIN SERPL-MCNC: 2.7 G/DL (ref 3.5–5)
ALBUMIN/GLOB SERPL: 0.6 {RATIO} (ref 1.1–2.2)
ALP SERPL-CCNC: 179 U/L (ref 45–117)
ALT SERPL-CCNC: 23 U/L (ref 12–78)
ANION GAP SERPL CALC-SCNC: 8 MMOL/L (ref 5–15)
AST SERPL-CCNC: 20 U/L (ref 15–37)
BASOPHILS # BLD: 0 K/UL (ref 0–0.1)
BASOPHILS NFR BLD: 0 % (ref 0–1)
BILIRUB SERPL-MCNC: 0.2 MG/DL (ref 0.2–1)
BUN SERPL-MCNC: 89 MG/DL (ref 6–20)
BUN/CREAT SERPL: 15 (ref 12–20)
CALCIUM SERPL-MCNC: 8.8 MG/DL (ref 8.5–10.1)
CHLORIDE SERPL-SCNC: 104 MMOL/L (ref 97–108)
CO2 SERPL-SCNC: 22 MMOL/L (ref 21–32)
CREAT SERPL-MCNC: 5.77 MG/DL (ref 0.55–1.02)
DIFFERENTIAL METHOD BLD: ABNORMAL
EOSINOPHIL # BLD: 0.4 K/UL (ref 0–0.4)
EOSINOPHIL NFR BLD: 5 % (ref 0–7)
ERYTHROCYTE [DISTWIDTH] IN BLOOD BY AUTOMATED COUNT: 13.3 % (ref 11.5–14.5)
GLOBULIN SER CALC-MCNC: 4.3 G/DL (ref 2–4)
GLUCOSE BLD STRIP.AUTO-MCNC: 111 MG/DL (ref 65–100)
GLUCOSE BLD STRIP.AUTO-MCNC: 86 MG/DL (ref 65–100)
GLUCOSE SERPL-MCNC: 198 MG/DL (ref 65–100)
HCT VFR BLD AUTO: 28 % (ref 35–47)
HGB BLD-MCNC: 9.2 G/DL (ref 11.5–16)
IMM GRANULOCYTES # BLD: 0.1 K/UL (ref 0–0.04)
IMM GRANULOCYTES NFR BLD AUTO: 1 % (ref 0–0.5)
LYMPHOCYTES # BLD: 2.5 K/UL (ref 0.8–3.5)
LYMPHOCYTES NFR BLD: 37 % (ref 12–49)
MCH RBC QN AUTO: 29.7 PG (ref 26–34)
MCHC RBC AUTO-ENTMCNC: 32.9 G/DL (ref 30–36.5)
MCV RBC AUTO: 90.3 FL (ref 80–99)
MONOCYTES # BLD: 0.6 K/UL (ref 0–1)
MONOCYTES NFR BLD: 9 % (ref 5–13)
NEUTS SEG # BLD: 3.2 K/UL (ref 1.8–8)
NEUTS SEG NFR BLD: 47 % (ref 32–75)
NRBC # BLD: 0 K/UL (ref 0–0.01)
NRBC BLD-RTO: 0 PER 100 WBC
PLATELET # BLD AUTO: 245 K/UL (ref 150–400)
PMV BLD AUTO: 8.9 FL (ref 8.9–12.9)
POTASSIUM SERPL-SCNC: 4 MMOL/L (ref 3.5–5.1)
PROT SERPL-MCNC: 7 G/DL (ref 6.4–8.2)
RBC # BLD AUTO: 3.1 M/UL (ref 3.8–5.2)
SERVICE CMNT-IMP: ABNORMAL
SERVICE CMNT-IMP: NORMAL
SODIUM SERPL-SCNC: 134 MMOL/L (ref 136–145)
WBC # BLD AUTO: 6.7 K/UL (ref 3.6–11)

## 2018-11-27 PROCEDURE — 74011250637 HC RX REV CODE- 250/637: Performed by: INTERNAL MEDICINE

## 2018-11-27 PROCEDURE — 36415 COLL VENOUS BLD VENIPUNCTURE: CPT

## 2018-11-27 PROCEDURE — 85025 COMPLETE CBC W/AUTO DIFF WBC: CPT

## 2018-11-27 PROCEDURE — 74011000250 HC RX REV CODE- 250: Performed by: INTERNAL MEDICINE

## 2018-11-27 PROCEDURE — 80053 COMPREHEN METABOLIC PANEL: CPT

## 2018-11-27 PROCEDURE — 82962 GLUCOSE BLOOD TEST: CPT

## 2018-11-27 RX ORDER — TRAMADOL HYDROCHLORIDE 50 MG/1
50 TABLET ORAL
Status: DISCONTINUED | OUTPATIENT
Start: 2018-11-27 | End: 2018-11-27 | Stop reason: HOSPADM

## 2018-11-27 RX ORDER — ACETAMINOPHEN 325 MG/1
650 TABLET ORAL
Status: DISCONTINUED | OUTPATIENT
Start: 2018-11-27 | End: 2018-11-27 | Stop reason: HOSPADM

## 2018-11-27 RX ADMIN — SUCRALFATE 0.5 G: 1 SUSPENSION ORAL at 10:24

## 2018-11-27 RX ADMIN — METOPROLOL TARTRATE 50 MG: 50 TABLET ORAL at 10:24

## 2018-11-27 RX ADMIN — ASPIRIN 81 MG 81 MG: 81 TABLET ORAL at 10:24

## 2018-11-27 RX ADMIN — DOCUSATE SODIUM 100 MG: 100 CAPSULE, LIQUID FILLED ORAL at 10:24

## 2018-11-27 RX ADMIN — CLONIDINE HYDROCHLORIDE 0.2 MG: 0.1 TABLET ORAL at 10:24

## 2018-11-27 RX ADMIN — TRAMADOL HYDROCHLORIDE 50 MG: 50 TABLET, FILM COATED ORAL at 04:01

## 2018-11-27 RX ADMIN — SODIUM BICARBONATE 650 MG: 650 TABLET ORAL at 10:24

## 2018-11-27 NOTE — DISCHARGE SUMMARY
Hospitalist Discharge Summary     Patient ID:  Praveena Marquez  [id number]  89 y.o.  1965    PCP on record: Lindsey Valentine NP    Admit date: 11/23/2018  Discharge date and time: 11/27/2018      DISCHARGE DIAGNOSIS:    CKD V/End stage renal disease, worsening function. nephrology on board. no urgent HD needs. Baseline Cr 5.5 to 6 and on admission 7.08-->6.24-->6.17 , no need of HD as per nephroloy  Electrolytes Within Acceptable Range. Malignant HTN/ hypertensive emergency. labile BP still adjusting BP  Continue home medications   Metabolic encephalopathy from above reasons. Resolved   neg head CT, ammonia neg  May do EEG if worse as per neurology. Type 2 diabetes mellitus with hypoglycemia without coma in the setting of Poorly controlled DM2  Hemoglobin A1c is 9.8  continue home meds  Anemia of CKD  hgb stable at 9.1-->9.4  this am.  Back pain:  neg plain films  Pain meds for now. Suspected opiate abuse on admission. CONSULTATIONS:  IP CONSULT TO NEUROLOGY  IP CONSULT TO NEPHROLOGY    Excerpted HPI from H&P of Edie Tristan MD:  Marylu Mina is a 46 y.o.   female with PMHx significant for diabetic neuropathy, CKD, HLD, pancreatitis, DM, HTN, chronic lower back pain, UTI, difficile colitis, migraines, Hep C without coma, lumbar disc disease, presents by EMS to the ED with cc of moderate, constant, sharp back pain that stared 2 hours ago. Pt states she has had bilateral leg pain for the past 2 weeks. Pt notes some tingling to her bilateral LEs as well. Pt notes she has not been lifting anything heavy lately. She states the pain is worse with movement and weight bearing.  She specifically denies numbness, weakness, incontinence (bowel or bladder), saddle anesthesia, nausea, vomiting, fever, chills, neck pain, SOB, CP, dysuria, hematuria.             ______________________________________________________________________  DISCHARGE SUMMARY/HOSPITAL COURSE:  for full details see H&P, daily progress notes, labs, consult notes. _______________________________________________________________________  Patient seen and examined by me on discharge day. Pertinent Findings:  General:          WD, WN. Alert, cooperative, no acute distress    EENT:              EOMI. Anicteric sclerae. MMM  Resp:               CTA bilaterally, no wheezing or rales. No accessory muscle use  CV:                  Regular  rhythm,  No edema  GI:                   Soft, Non distended, Non tender.  +Bowel sounds  Neurologic:      Alert and oriented X 3, normal speech,      _______________________________________________________________________  DISCHARGE MEDICATIONS:   Current Discharge Medication List      START taking these medications    Details   gabapentin (NEURONTIN) 300 mg capsule Take 1 Cap by mouth three (3) times daily. Qty: 60 Cap, Refills: 0      cyclobenzaprine (FLEXERIL) 5 mg tablet Take 1 Tab by mouth three (3) times daily as needed for Muscle Spasm(s). Qty: 20 Tab, Refills: 0      lidocaine (LIDOCARE) 4 % patch 1 Patch by TransDERmal route every twelve (12) hours every twelve (12) hours. Qty: 10 Patch, Refills: 0         CONTINUE these medications which have NOT CHANGED    Details   HYDROcodone-acetaminophen (NORCO) 5-325 mg per tablet Take 1 Tab by mouth every four (4) hours as needed for Pain. Max Daily Amount: 6 Tabs. Qty: 20 Tab, Refills: 0    Associated Diagnoses: Acute otitis externa of right ear, unspecified type      ciprofloxacin-dexamethasone (CIPRODEX) 0.3-0.1 % otic suspension Administer 4 Drops in left ear two (2) times a day. Qty: 10 mL, Refills: 0      amoxicillin-clavulanate (AUGMENTIN) 875-125 mg per tablet Take 1 Tab by mouth two (2) times a day. Qty: 14 Tab, Refills: 0      !! ondansetron (ZOFRAN ODT) 4 mg disintegrating tablet Take 1 Tab by mouth every eight (8) hours as needed for Nausea.   Qty: 10 Tab, Refills: 0      insulin aspart U-100 (NOVOLOG) 100 unit/mL injection 5 Units by SubCUTAneous route Before breakfast, lunch, and dinner. sucralfate (CARAFATE) 100 mg/mL suspension Take 5 mL by mouth four (4) times daily. Qty: 414 mL, Refills: 0      aspirin 81 mg chewable tablet Take 1 Tab by mouth daily. Qty: 30 Tab, Refills: 1      cloNIDine HCl (CATAPRES) 0.1 mg tablet Take 1 Tab by mouth two (2) times a day. Qty: 60 Tab, Refills: 1      !! ondansetron (ZOFRAN ODT) 4 mg disintegrating tablet Take 1 Tab by mouth every eight (8) hours as needed for Nausea. Qty: 15 Tab, Refills: 0      metoprolol tartrate (LOPRESSOR) 50 mg tablet Take 50 mg by mouth two (2) times a day. sodium bicarbonate 650 mg tablet Take 650 mg by mouth two (2) times a day. atorvastatin (LIPITOR) 20 mg tablet Take 20 mg by mouth nightly. insulin glargine (LANTUS) 100 unit/mL injection 25 Units by SubCUTAneous route nightly. Qty: 1 Vial, Refills: 0       !! - Potential duplicate medications found. Please discuss with provider. STOP taking these medications       lidocaine (LIDODERM) 5 % Comments:   Reason for Stopping:               My Recommended Diet, Activity, Wound Care, and follow-up labs are listed in the patient's Discharge Insturctions which I have personally completed and reviewed.     ______________________________________________________________________    Risk of deterioration: Moderate    Condition at Discharge:  Stable  ______________________________________________________________________    Disposition  Home with family, no needs    ______________________________________________________________________    Care Plan discussed with:   Patient, Family, RN, Care Manager, Consultant    Comment:   ______________________________________________________________________    Code Status: Full Code  ___

## 2018-11-27 NOTE — PROGRESS NOTES
CM has set up transportation via [employer] that will be arriving today at 11:30am in the  loop. CM will continue to follow until pt is picked up via [employer]. UPDATE: 
Pt stated that she has a key to her home and her daughter will be at home upon arrival. Pt stated daughter is aware of d/c. [address] [place] [address]

## 2018-11-27 NOTE — ROUTINE PROCESS
Nephrology follow up appointment scheduled with Dr. Bre Koch on 1/3/18 at 3:30PM.  Apt added to AVS

## 2018-11-27 NOTE — PROGRESS NOTES
Reviewed discharge instructions with Pt. Verbalized understanding. Copy of discharge instructions, AVS and printed prescriptions provided in discharge booklet. Follow up appointments were made for patient. All questions were appropriate to content and were answered to pt's satisfaction. Telemetry and all IV's were removed. Pt assisted in getting dressed. Voices no c/o at this time. Pt taken to exit via wheelchair with PCT Vicente Bermudez.

## 2019-02-22 ENCOUNTER — APPOINTMENT (OUTPATIENT)
Dept: CT IMAGING | Age: 54
End: 2019-02-22
Attending: EMERGENCY MEDICINE
Payer: MEDICARE

## 2019-02-22 ENCOUNTER — HOSPITAL ENCOUNTER (EMERGENCY)
Age: 54
Discharge: HOME | End: 2019-02-22
Attending: EMERGENCY MEDICINE
Payer: MEDICARE

## 2019-02-22 ENCOUNTER — HOSPITAL ENCOUNTER (EMERGENCY)
Age: 54
Discharge: HOME | End: 2019-02-23
Attending: EMERGENCY MEDICINE
Payer: MEDICARE

## 2019-02-22 VITALS
HEIGHT: 65 IN | BODY MASS INDEX: 24.16 KG/M2 | SYSTOLIC BLOOD PRESSURE: 184 MMHG | HEART RATE: 119 BPM | OXYGEN SATURATION: 98 % | DIASTOLIC BLOOD PRESSURE: 133 MMHG | WEIGHT: 145 LBS | TEMPERATURE: 98.6 F | RESPIRATION RATE: 22 BRPM

## 2019-02-22 DIAGNOSIS — R73.9 HYPERGLYCEMIA: ICD-10-CM

## 2019-02-22 DIAGNOSIS — K59.00 CONSTIPATION, UNSPECIFIED CONSTIPATION TYPE: ICD-10-CM

## 2019-02-22 DIAGNOSIS — Z76.5 DRUG-SEEKING BEHAVIOR: Primary | ICD-10-CM

## 2019-02-22 DIAGNOSIS — R10.84 ABDOMINAL PAIN, GENERALIZED: ICD-10-CM

## 2019-02-22 DIAGNOSIS — I10 ACCELERATED HYPERTENSION: ICD-10-CM

## 2019-02-22 DIAGNOSIS — E13.65 UNCONTROLLED OTHER SPECIFIED DIABETES MELLITUS WITH HYPERGLYCEMIA (HCC): ICD-10-CM

## 2019-02-22 DIAGNOSIS — N18.9 CHRONIC KIDNEY DISEASE, UNSPECIFIED CKD STAGE: ICD-10-CM

## 2019-02-22 DIAGNOSIS — R10.84 RECURRENT GENERALIZED ABDOMINAL PAIN: Primary | ICD-10-CM

## 2019-02-22 DIAGNOSIS — N30.00 ACUTE CYSTITIS WITHOUT HEMATURIA: ICD-10-CM

## 2019-02-22 DIAGNOSIS — E11.65 UNCONTROLLED TYPE 2 DIABETES MELLITUS WITH HYPERGLYCEMIA (HCC): ICD-10-CM

## 2019-02-22 LAB
ALBUMIN SERPL-MCNC: 3.5 G/DL (ref 3.5–5)
ALBUMIN/GLOB SERPL: 0.7 {RATIO} (ref 1.1–2.2)
ALP SERPL-CCNC: 249 U/L (ref 45–117)
ALT SERPL-CCNC: 20 U/L (ref 12–78)
ANION GAP SERPL CALC-SCNC: 13 MMOL/L (ref 5–15)
AST SERPL-CCNC: 29 U/L (ref 15–37)
BASOPHILS # BLD: 0 K/UL (ref 0–0.1)
BASOPHILS NFR BLD: 1 % (ref 0–1)
BILIRUB SERPL-MCNC: 0.4 MG/DL (ref 0.2–1)
BUN SERPL-MCNC: 90 MG/DL (ref 6–20)
BUN/CREAT SERPL: 14 (ref 12–20)
CALCIUM SERPL-MCNC: 8.5 MG/DL (ref 8.5–10.1)
CHLORIDE SERPL-SCNC: 100 MMOL/L (ref 97–108)
CO2 SERPL-SCNC: 20 MMOL/L (ref 21–32)
COMMENT, HOLDF: NORMAL
CREAT SERPL-MCNC: 6.52 MG/DL (ref 0.55–1.02)
DIFFERENTIAL METHOD BLD: ABNORMAL
EOSINOPHIL # BLD: 0.3 K/UL (ref 0–0.4)
EOSINOPHIL NFR BLD: 4 % (ref 0–7)
ERYTHROCYTE [DISTWIDTH] IN BLOOD BY AUTOMATED COUNT: 13.6 % (ref 11.5–14.5)
GLOBULIN SER CALC-MCNC: 5.1 G/DL (ref 2–4)
GLUCOSE BLD STRIP.AUTO-MCNC: 382 MG/DL (ref 65–100)
GLUCOSE SERPL-MCNC: 373 MG/DL (ref 65–100)
HCT VFR BLD AUTO: 32.9 % (ref 35–47)
HGB BLD-MCNC: 11.1 G/DL (ref 11.5–16)
IMM GRANULOCYTES # BLD AUTO: 0 K/UL (ref 0–0.04)
IMM GRANULOCYTES NFR BLD AUTO: 0 % (ref 0–0.5)
LIPASE SERPL-CCNC: 228 U/L (ref 73–393)
LYMPHOCYTES # BLD: 2.6 K/UL (ref 0.8–3.5)
LYMPHOCYTES NFR BLD: 31 % (ref 12–49)
MCH RBC QN AUTO: 29 PG (ref 26–34)
MCHC RBC AUTO-ENTMCNC: 33.7 G/DL (ref 30–36.5)
MCV RBC AUTO: 85.9 FL (ref 80–99)
MONOCYTES # BLD: 0.6 K/UL (ref 0–1)
MONOCYTES NFR BLD: 7 % (ref 5–13)
NEUTS SEG # BLD: 4.9 K/UL (ref 1.8–8)
NEUTS SEG NFR BLD: 58 % (ref 32–75)
NRBC # BLD: 0 K/UL (ref 0–0.01)
NRBC BLD-RTO: 0 PER 100 WBC
PLATELET # BLD AUTO: 273 K/UL (ref 150–400)
PMV BLD AUTO: 9.3 FL (ref 8.9–12.9)
POTASSIUM SERPL-SCNC: 3.9 MMOL/L (ref 3.5–5.1)
PROT SERPL-MCNC: 8.6 G/DL (ref 6.4–8.2)
RBC # BLD AUTO: 3.83 M/UL (ref 3.8–5.2)
SAMPLES BEING HELD,HOLD: NORMAL
SERVICE CMNT-IMP: ABNORMAL
SODIUM SERPL-SCNC: 133 MMOL/L (ref 136–145)
WBC # BLD AUTO: 8.4 K/UL (ref 3.6–11)

## 2019-02-22 PROCEDURE — 74176 CT ABD & PELVIS W/O CONTRAST: CPT

## 2019-02-22 PROCEDURE — 93005 ELECTROCARDIOGRAM TRACING: CPT

## 2019-02-22 PROCEDURE — 85025 COMPLETE CBC W/AUTO DIFF WBC: CPT

## 2019-02-22 PROCEDURE — 96361 HYDRATE IV INFUSION ADD-ON: CPT

## 2019-02-22 PROCEDURE — 99285 EMERGENCY DEPT VISIT HI MDM: CPT

## 2019-02-22 PROCEDURE — 83690 ASSAY OF LIPASE: CPT

## 2019-02-22 PROCEDURE — 99282 EMERGENCY DEPT VISIT SF MDM: CPT

## 2019-02-22 PROCEDURE — 96375 TX/PRO/DX INJ NEW DRUG ADDON: CPT

## 2019-02-22 PROCEDURE — 82962 GLUCOSE BLOOD TEST: CPT

## 2019-02-22 PROCEDURE — 74011250636 HC RX REV CODE- 250/636: Performed by: EMERGENCY MEDICINE

## 2019-02-22 PROCEDURE — 74011000250 HC RX REV CODE- 250: Performed by: EMERGENCY MEDICINE

## 2019-02-22 PROCEDURE — 36415 COLL VENOUS BLD VENIPUNCTURE: CPT

## 2019-02-22 PROCEDURE — 80053 COMPREHEN METABOLIC PANEL: CPT

## 2019-02-22 PROCEDURE — 74011250637 HC RX REV CODE- 250/637: Performed by: EMERGENCY MEDICINE

## 2019-02-22 PROCEDURE — 96372 THER/PROPH/DIAG INJ SC/IM: CPT

## 2019-02-22 PROCEDURE — 74011636637 HC RX REV CODE- 636/637: Performed by: EMERGENCY MEDICINE

## 2019-02-22 PROCEDURE — 96374 THER/PROPH/DIAG INJ IV PUSH: CPT

## 2019-02-22 RX ORDER — ONDANSETRON 2 MG/ML
4 INJECTION INTRAMUSCULAR; INTRAVENOUS
Status: COMPLETED | OUTPATIENT
Start: 2019-02-22 | End: 2019-02-22

## 2019-02-22 RX ORDER — HALOPERIDOL 5 MG/ML
2 INJECTION INTRAMUSCULAR ONCE
Status: DISCONTINUED | OUTPATIENT
Start: 2019-02-22 | End: 2019-02-22

## 2019-02-22 RX ORDER — DIPHENHYDRAMINE HYDROCHLORIDE 50 MG/ML
50 INJECTION, SOLUTION INTRAMUSCULAR; INTRAVENOUS
Status: COMPLETED | OUTPATIENT
Start: 2019-02-22 | End: 2019-02-22

## 2019-02-22 RX ORDER — KETOROLAC TROMETHAMINE 30 MG/ML
30 INJECTION, SOLUTION INTRAMUSCULAR; INTRAVENOUS
Status: COMPLETED | OUTPATIENT
Start: 2019-02-22 | End: 2019-02-22

## 2019-02-22 RX ORDER — LABETALOL HYDROCHLORIDE 5 MG/ML
10 INJECTION, SOLUTION INTRAVENOUS
Status: COMPLETED | OUTPATIENT
Start: 2019-02-22 | End: 2019-02-22

## 2019-02-22 RX ORDER — HALOPERIDOL 5 MG/ML
5 INJECTION INTRAMUSCULAR
Status: COMPLETED | OUTPATIENT
Start: 2019-02-22 | End: 2019-02-22

## 2019-02-22 RX ORDER — FAMOTIDINE 20 MG/1
20 TABLET, FILM COATED ORAL
Status: DISCONTINUED | OUTPATIENT
Start: 2019-02-22 | End: 2019-02-23 | Stop reason: HOSPADM

## 2019-02-22 RX ORDER — ONDANSETRON 4 MG/1
4 TABLET, ORALLY DISINTEGRATING ORAL
Qty: 20 TAB | Refills: 0 | Status: SHIPPED | OUTPATIENT
Start: 2019-02-22 | End: 2020-01-14

## 2019-02-22 RX ORDER — DEXTROMETHORPHAN POLISTIREX 30 MG/5 ML
1 SUSPENSION, EXTENDED RELEASE 12 HR ORAL
Qty: 1 BOTTLE | Refills: 0 | Status: SHIPPED | OUTPATIENT
Start: 2019-02-22 | End: 2019-02-22

## 2019-02-22 RX ORDER — LIDOCAINE HYDROCHLORIDE 20 MG/ML
15 SOLUTION OROPHARYNGEAL
Status: COMPLETED | OUTPATIENT
Start: 2019-02-22 | End: 2019-02-22

## 2019-02-22 RX ORDER — POLYETHYLENE GLYCOL 3350 17 G/17G
17 POWDER, FOR SOLUTION ORAL DAILY
Qty: 289 G | Refills: 0 | Status: SHIPPED | OUTPATIENT
Start: 2019-02-22 | End: 2019-07-18

## 2019-02-22 RX ORDER — LORAZEPAM 2 MG/ML
0.5 INJECTION INTRAMUSCULAR ONCE
Status: COMPLETED | OUTPATIENT
Start: 2019-02-22 | End: 2019-02-22

## 2019-02-22 RX ADMIN — ALUMINUM HYDROXIDE AND MAGNESIUM HYDROXIDE 30 ML: 200; 200 SUSPENSION ORAL at 21:50

## 2019-02-22 RX ADMIN — DIPHENHYDRAMINE HYDROCHLORIDE 50 MG: 50 INJECTION, SOLUTION INTRAMUSCULAR; INTRAVENOUS at 05:03

## 2019-02-22 RX ADMIN — INSULIN HUMAN 10 UNITS: 100 INJECTION, SOLUTION PARENTERAL at 22:48

## 2019-02-22 RX ADMIN — SODIUM CHLORIDE 1000 ML: 900 INJECTION, SOLUTION INTRAVENOUS at 05:01

## 2019-02-22 RX ADMIN — HALOPERIDOL LACTATE 5 MG: 5 INJECTION INTRAMUSCULAR at 21:52

## 2019-02-22 RX ADMIN — KETOROLAC TROMETHAMINE 30 MG: 30 INJECTION, SOLUTION INTRAMUSCULAR; INTRAVENOUS at 04:06

## 2019-02-22 RX ADMIN — LABETALOL HYDROCHLORIDE 10 MG: 5 INJECTION INTRAVENOUS at 23:35

## 2019-02-22 RX ADMIN — LIDOCAINE HYDROCHLORIDE 15 ML: 20 SOLUTION ORAL; TOPICAL at 21:49

## 2019-02-22 RX ADMIN — LORAZEPAM 0.5 MG: 2 INJECTION INTRAMUSCULAR; INTRAVENOUS at 05:22

## 2019-02-22 RX ADMIN — SODIUM CHLORIDE 1000 ML: 900 INJECTION, SOLUTION INTRAVENOUS at 21:56

## 2019-02-22 RX ADMIN — ONDANSETRON 4 MG: 2 INJECTION INTRAMUSCULAR; INTRAVENOUS at 05:03

## 2019-02-22 NOTE — ED TRIAGE NOTES
Assumed care of pt from EMS. Pt reports left sided flank pain that started an hour ago. Pt refusing to sit on the bed. Pt is crying,  at bedside. Pt reports nausea and vomiting started an hour ago.

## 2019-02-22 NOTE — DISCHARGE INSTRUCTIONS
Thank you for allowing us to take care of you today! We hope we addressed all of your concerns and needs. We strive to provide excellent quality care in the Emergency Department. You will receive a survey after your visit to evaluate the care you were provided. Should you receive a survey from us, we invite you to share your experience and tell us what made it excellent. It was a pleasure serving you, we invite you to share your experience with us, in our pursuit for excellence, should you be selected to receive a survey. The exam and treatment you received in the Emergency Department were for an urgent problem and are not intended as complete care. It is important that you follow up with a doctor, nurse practitioner, or physician assistant for ongoing care. If your symptoms become worse or you do not improve as expected and you are unable to reach your usual health care provider, you should return to the Emergency Department. We are available 24 hours a day. Please take your discharge instructions with you when you go to your follow-up appointment. If you have any problem arranging a follow-up appointment, contact the Emergency Department immediately. If a prescription has been provided, please have it filled as soon as possible to prevent a delay in treatment. Read the entire medication instruction sheet provided to you by the pharmacy. If you have any questions or reservations about taking the medication due to side effects or interactions with other medications, please call your primary care physician or contact the ER to speak with the charge nurse. Make an appointment with your family doctor or the physician you were referred to for follow-up of this visit as instructed on your discharge paperwork, as this is mandatory follow-up. Return to the ER if you are unable to be seen or if you are unable to be seen in a timely manner.     If you have any problem arranging the follow-up visit, contact the Emergency Department immediately. I hope you feel better and thank you again for allow us to provide you with excellent care today at [address] [place] [address]! Warmest regards,    Bre Roberto MD  Emergency Medicine Physician  [address] [facility]              Patient Education        Constipation: Care Instructions  Your Care Instructions    Constipation means that you have a hard time passing stools (bowel movements). People pass stools from 3 times a day to once every 3 days. What is normal for you may be different. Constipation may occur with pain in the rectum and cramping. The pain may get worse when you try to pass stools. Sometimes there are small amounts of bright red blood on toilet paper or the surface of stools. This is because of enlarged veins near the rectum (hemorrhoids). A few changes in your diet and lifestyle may help you avoid ongoing constipation. Your doctor may also prescribe medicine to help loosen your stool. Some medicines can cause constipation. These include pain medicines and antidepressants. Tell your doctor about all the medicines you take. Your doctor may want to make a medicine change to ease your symptoms. Follow-up care is a key part of your treatment and safety. Be sure to make and go to all appointments, and call your doctor if you are having problems. It's also a good idea to know your test results and keep a list of the medicines you take. How can you care for yourself at home? · Drink plenty of fluids, enough so that your urine is light yellow or clear like water. If you have kidney, heart, or liver disease and have to limit fluids, talk with your doctor before you increase the amount of fluids you drink. · Include high-fiber foods in your diet each day. These include fruits, vegetables, beans, and whole grains. · Get at least 30 minutes of exercise on most days of the week. Walking is a good choice. You also may want to do other activities, such as running, swimming, cycling, or playing tennis or team sports. · Take a fiber supplement, such as Citrucel or Metamucil, every day. Read and follow all instructions on the label. · Schedule time each day for a bowel movement. A daily routine may help. Take your time having your bowel movement. · Support your feet with a small step stool when you sit on the toilet. This helps flex your hips and places your pelvis in a squatting position. · Your doctor may recommend an over-the-counter laxative to relieve your constipation. Examples are Milk of Magnesia and MiraLax. Read and follow all instructions on the label. Do not use laxatives on a long-term basis. When should you call for help? Call your doctor now or seek immediate medical care if:    · You have new or worse belly pain.     · You have new or worse nausea or vomiting.     · You have blood in your stools.    Watch closely for changes in your health, and be sure to contact your doctor if:    · Your constipation is getting worse.     · You do not get better as expected. Where can you learn more? Go to http://nery-peña.info/. Enter 21 [phone] in the search box to learn more about \"Constipation: Care Instructions. \"  Current as of: September 23, 2018  Content Version: 11.9  © 8920-4770 [employer]. Care instructions adapted under license by [employer] (which disclaims liability or warranty for this information). If you have questions about a medical condition or this instruction, always ask your healthcare professional. [place] [address] any warranty or liability for your use of this information.

## 2019-02-22 NOTE — ED NOTES
Dr. Ruthie Zuñiga reviewed discharge instructions with the patient. The patient verbalized understanding. All questions and concerns were addressed. The patient declined a wheelchair and is discharged ambulatory in the care of family members with instructions and prescriptions in hand. Pt is alert and oriented x 4. Respirations are clear and unlabored.

## 2019-02-22 NOTE — ED PROVIDER NOTES
EMERGENCY DEPARTMENT HISTORY AND PHYSICAL EXAM 
 
 
Date: 2/22/2019 Patient Name: Rose Monroe History of Presenting Illness Chief Complaint Patient presents with  Flank Pain  
  left sided  Nausea History Provided By: Patient, Patient's  and EMS 
 
HPI: Rose Monroe, 48 y.o. female with PMHx significant for CKD, ESRD, hyperlipemia, pancreatitis, DM, HTN, and hepatitis C, presents via EMS to the ED with cc of new onset 10/10 sharp L flank pain x 6 hours with associated nausea. Pt additionally reports 2 episodes of NBNB emesis pta. She notes that she took a muscle relaxer with minimal relief of sxs. Pt notes that the pain woke her from her sleep. She states that she had constipation earlier today that was relieved with 2 enemas. She additionally notes that she is followed by nephrology for her ESRD. Pt denies any other modifying factors. She denies any associated sxs. Additionally, pt specifically denies any recent fever, chills, headache, diarrhea, CP, SOB, lightheadedness, dizziness, numbness, weakness, tingling, BLE swelling, heart palpitations, urinary sxs, changes in BM, changes in PO intake, melena, hematochezia, cough, or congestion. PCP: [facility] NP 
 
PMHx: Significant for  CKD, ESRD, hyperlipemia, pancreatitis, DM, HTN, and hepatitis C 
PSHx: Significant for EtOH abuse Social Hx: -tobacco, -EtOH, -Illicit Drugs There are no other complaints, changes or physical findings at this time. Past History Past Medical History: 
Past Medical History:  
Diagnosis Date  C. difficile colitis 6/2012  Chronic low back pain  CKD (chronic kidney disease) stage 3 to 4, baseline Cr 2  
 Constipation  Diabetes ([facility] [address] A1c 8.2 3/2012  Hep C w/o coma, chronic (HCC)  Hyperlipemia  Hypertension  Lumbar disc disease  Migraines  Pancreatitis 0661  
 alcoholic  UTI (lower urinary tract infection) 6/20012 Past Surgical History: Past Surgical History:  
Procedure Laterality Date  HX ORTHOPAEDIC    
 lumbar sprain; back surgery  HX UROLOGICAL  2014  
 kidney stone removed  TX COLONOSCOPY FLX DX W/COLLJ SPEC WHEN PFRMD  11/12/2012 Family History: 
Family History Problem Relation Age of Onset  Diabetes Mother  Kidney Disease Mother  Diabetes Sister Social History: 
Social History Tobacco Use  Smoking status: Never Smoker  Smokeless tobacco: Never Used Substance Use Topics  Alcohol use: No  
  Comment: Quit few months ago, hx of abuse  Drug use: No  
 
 
Allergies: 
No Known Allergies Medications: No current facility-administered medications on file prior to encounter. Current Outpatient Medications on File Prior to Encounter Medication Sig Dispense Refill  insulin glargine U-300 conc 300 unit/mL (1.5 mL) inpn 25 Units by SubCUTAneous route daily.  gabapentin (NEURONTIN) 300 mg capsule Take 1 Cap by mouth three (3) times daily. 60 Cap 0  cyclobenzaprine (FLEXERIL) 5 mg tablet Take 1 Tab by mouth three (3) times daily as needed for Muscle Spasm(s). 20 Tab 0  
 lidocaine (LIDOCARE) 4 % patch 1 Patch by TransDERmal route every twelve (12) hours every twelve (12) hours. 10 Patch 0  
 HYDROcodone-acetaminophen (NORCO) 5-325 mg per tablet Take 1 Tab by mouth every four (4) hours as needed for Pain. Max Daily Amount: 6 Tabs. 20 Tab 0  
 ciprofloxacin-dexamethasone (CIPRODEX) 0.3-0.1 % otic suspension Administer 4 Drops in left ear two (2) times a day. 10 mL 0  
 amoxicillin-clavulanate (AUGMENTIN) 875-125 mg per tablet Take 1 Tab by mouth two (2) times a day. 14 Tab 0  
 ondansetron (ZOFRAN ODT) 4 mg disintegrating tablet Take 1 Tab by mouth every eight (8) hours as needed for Nausea. 10 Tab 0  
 insulin aspart U-100 (NOVOLOG) 100 unit/mL injection 5 Units by SubCUTAneous route Before breakfast, lunch, and dinner.  sucralfate (CARAFATE) 100 mg/mL suspension Take 5 mL by mouth four (4) times daily. 414 mL 0  
 aspirin 81 mg chewable tablet Take 1 Tab by mouth daily. 30 Tab 1  cloNIDine HCl (CATAPRES) 0.1 mg tablet Take 1 Tab by mouth two (2) times a day. 60 Tab 1  
 ondansetron (ZOFRAN ODT) 4 mg disintegrating tablet Take 1 Tab by mouth every eight (8) hours as needed for Nausea. 15 Tab 0  
 metoprolol tartrate (LOPRESSOR) 50 mg tablet Take 50 mg by mouth two (2) times a day.  sodium bicarbonate 650 mg tablet Take 650 mg by mouth two (2) times a day.  atorvastatin (LIPITOR) 20 mg tablet Take 20 mg by mouth nightly. Review of Systems Review of Systems Constitutional: Negative. Negative for chills and fever. HENT: Negative. Negative for congestion, facial swelling, rhinorrhea, sore throat, trouble swallowing and voice change. Eyes: Negative. Respiratory: Negative. Negative for apnea, cough, chest tightness, shortness of breath and wheezing. Cardiovascular: Negative. Negative for chest pain, palpitations and leg swelling. Gastrointestinal: Positive for abdominal pain (L flank), nausea and vomiting. Negative for abdominal distention, blood in stool, constipation and diarrhea. Endocrine: Negative. Negative for cold intolerance, heat intolerance and polyuria. Genitourinary: Negative. Negative for difficulty urinating, dysuria, flank pain, frequency, hematuria and urgency. Musculoskeletal: Negative. Negative for arthralgias, back pain, myalgias, neck pain and neck stiffness. Skin: Negative. Negative for color change and rash. Neurological: Negative. Negative for dizziness, syncope, facial asymmetry, speech difficulty, weakness, light-headedness, numbness and headaches. Hematological: Negative. Does not bruise/bleed easily. Psychiatric/Behavioral: Negative. Negative for confusion and self-injury. The patient is not nervous/anxious.    
 
 
Physical Exam  
Physical Exam  
 Constitutional: She is oriented to person, place, and time. She appears well-developed and well-nourished. No distress. HENT:  
Head: Normocephalic and atraumatic. Mouth/Throat: Oropharynx is clear and moist. No oropharyngeal exudate. Eyes: Conjunctivae and EOM are normal. Pupils are equal, round, and reactive to light. Neck: Normal range of motion. Cardiovascular: Normal rate, regular rhythm and normal heart sounds. Exam reveals no gallop and no friction rub. No murmur heard. Pulmonary/Chest: Effort normal and breath sounds normal. No respiratory distress. She has no wheezes. She has no rales. She exhibits no tenderness. Abdominal: Soft. Bowel sounds are normal. She exhibits no distension and no mass. There is tenderness (L flank, TTP). There is no rebound and no guarding. Musculoskeletal: Normal range of motion. She exhibits no edema, tenderness or deformity. Neurological: She is alert and oriented to person, place, and time. She displays normal reflexes. No cranial nerve deficit. She exhibits normal muscle tone. Coordination normal.  
Skin: Skin is warm. No rash noted. She is not diaphoretic. Psychiatric: She has a normal mood and affect. Nursing note and vitals reviewed. Diagnostic Study Results Labs - Recent Results (from the past 24 hour(s)) CBC WITH AUTOMATED DIFF Collection Time: 02/22/19  4:27 AM  
Result Value Ref Range WBC 8.4 3.6 - 11.0 K/uL  
 RBC 3.83 3.80 - 5.20 M/uL  
 HGB 11.1 (L) 11.5 - 16.0 g/dL HCT 32.9 (L) 35.0 - 47.0 % MCV 85.9 80.0 - 99.0 FL  
 MCH 29.0 26.0 - 34.0 PG  
 MCHC 33.7 30.0 - 36.5 g/dL  
 RDW 13.6 11.5 - 14.5 % PLATELET 137 357 - 688 K/uL MPV 9.3 8.9 - 12.9 FL  
 NRBC 0.0 0  WBC ABSOLUTE NRBC 0.00 0.00 - 0.01 K/uL NEUTROPHILS 58 32 - 75 % LYMPHOCYTES 31 12 - 49 % MONOCYTES 7 5 - 13 % EOSINOPHILS 4 0 - 7 % BASOPHILS 1 0 - 1 % IMMATURE GRANULOCYTES 0 0.0 - 0.5 % ABS. NEUTROPHILS 4.9 1.8 - 8.0 K/UL ABS. LYMPHOCYTES 2.6 0.8 - 3.5 K/UL  
 ABS. MONOCYTES 0.6 0.0 - 1.0 K/UL  
 ABS. EOSINOPHILS 0.3 0.0 - 0.4 K/UL  
 ABS. BASOPHILS 0.0 0.0 - 0.1 K/UL  
 ABS. IMM. GRANS. 0.0 0.00 - 0.04 K/UL  
 DF AUTOMATED METABOLIC PANEL, COMPREHENSIVE Collection Time: 02/22/19  4:27 AM  
Result Value Ref Range Sodium 133 (L) 136 - 145 mmol/L Potassium 3.9 3.5 - 5.1 mmol/L Chloride 100 97 - 108 mmol/L  
 CO2 20 (L) 21 - 32 mmol/L Anion gap 13 5 - 15 mmol/L Glucose 373 (H) 65 - 100 mg/dL BUN 90 (H) 6 - 20 MG/DL Creatinine 6.52 (H) 0.55 - 1.02 MG/DL  
 BUN/Creatinine ratio 14 12 - 20 GFR est AA 8 (L) >60 ml/min/1.73m2 GFR est non-AA 7 (L) >60 ml/min/1.73m2 Calcium 8.5 8.5 - 10.1 MG/DL Bilirubin, total 0.4 0.2 - 1.0 MG/DL  
 ALT (SGPT) 20 12 - 78 U/L  
 AST (SGOT) 29 15 - 37 U/L Alk. phosphatase 249 (H) 45 - 117 U/L Protein, total 8.6 (H) 6.4 - 8.2 g/dL Albumin 3.5 3.5 - 5.0 g/dL Globulin 5.1 (H) 2.0 - 4.0 g/dL A-G Ratio 0.7 (L) 1.1 - 2.2 LIPASE Collection Time: 02/22/19  4:27 AM  
Result Value Ref Range Lipase 228 73 - 393 U/L Radiologic Studies -  
CT ABD PELV WO CONT Final Result IMPRESSION:  
  
1. Moderate colonic fecal burden may represent constipation. 2. No hydronephrosis. 3. Enlarged uterus contains calcified fibroids. CT Results  (Last 48 hours) 02/22/19 0538  CT ABD PELV WO CONT Final result Impression:  IMPRESSION:  
   
1. Moderate colonic fecal burden may represent constipation. 2. No hydronephrosis. 3. Enlarged uterus contains calcified fibroids. Narrative:  EXAM: CT ABD PELV WO CONT INDICATION: Left flank pain for one hour. Associated nausea and vomiting. Chronic kidney disease. Previous pancreatitis and UTI. COMPARISON: CT abdomen/pelvis on 10/15/2018. CONTRAST:  None. TECHNIQUE:   
Thin axial images were obtained through the abdomen and pelvis.  Coronal and  
 sagittal reconstructions were generated. Oral contrast was not administered. CT  
dose reduction was achieved through use of a standardized protocol tailored for  
this examination and automatic exposure control for dose modulation. The absence of intravenous contrast material reduces the sensitivity for  
evaluation of the solid parenchymal organs of the abdomen. FINDINGS:   
LUNG BASES: No pneumonia. INCIDENTALLY IMAGED HEART AND MEDIASTINUM: Normal cardiac size. There are mitral  
valve calcifications. No pericardial effusion. LIVER: No mass or biliary dilatation. GALLBLADDER: Unremarkable. SPLEEN: Normal size. PANCREAS: No inflammation. ADRENALS: Unremarkable. KIDNEYS/URETERS: There are vascular calcifications. No nephrolithiasis or  
hydronephrosis. STOMACH: Nondistended. SMALL BOWEL: No dilatation or wall thickening. COLON: Moderate colonic fecal burden. APPENDIX: Unremarkable. PERITONEUM: No ascites or pneumoperitoneum. RETROPERITONEUM: Aorta is atherosclerotic without aneurysm. No lymphadenopathy. REPRODUCTIVE ORGANS: Mildly enlarged uterus contains heterogeneous  
calcifications. No adnexal mass. URINARY BLADDER: No mass or calculus. BONES: Density most likely represents renal osteodystrophy. ADDITIONAL COMMENTS: N/A Medical Decision Making I am the first provider for this patient. I reviewed the vital signs, available nursing notes, past medical history, past surgical history, family history and social history. Vital Signs-Reviewed the patient's vital signs. Patient Vitals for the past 24 hrs: 
 Temp Pulse Resp BP SpO2  
02/22/19 0355 98.6 °F (37 °C) (!) 119 22 (!) 184/133 98 % Pulse Oximetry Analysis - 98% on RA Cardiac Monitor:  
Rate: 119 bpm 
Rhythm: Sinus Tachycardia Records Reviewed: Nursing Notes, Old Medical Records, Previous electrocardiograms, Previous Radiology Studies and Previous Laboratory Studies Provider Notes (Medical Decision Making): Pt presents with acute abdominal pain; vital signs stable with currently a non-peritoneal exam; DDx includes: Gastroenteritis, hepatitis, pancreatitis, obstruction, appendicitis, viral illness, IBD, diverticulitis, mesenteric ischemia, AAA or descending dissection, ACS, kidney stone. Will get labs, treat symptomatically and obtain serial abdominal exams to determine if a CT is warranted. Will reassess and monitor closely. ED Course:  
Initial assessment performed. The patients presenting problems have been discussed, and they are in agreement with the care plan formulated and outlined with them. I have encouraged them to ask questions as they arise throughout their visit. HYPERTENSION COUNSELING Education was provided to the patient today regarding their hypertension. Patient is made aware of their elevated blood pressure and is instructed to follow up this week with their Primary Care for a recheck. Patient is counseled regarding consequences of chronic, uncontrolled hypertension including kidney disease, heart disease, stroke or even death. Patient states their understanding and agrees to follow up this week. Additionally, during their visit, I discussed sodium restriction, maintaining ideal body weight and regular exercise program as physiologic means to achieve blood pressure control. The patient will strive towards this. ALCOHOL/SUBSTANCE ABUSE COUNSELING: 
Upon evaluation, pt endorsed recent alcohol/illicit drug use. For approximately 15 minutes, pt has been counseled on the dangers of alcohol and illicit drug use on their health, and they were encouraged to quit as soon as possible in order to decrease further risks to their health. Pt has conveyed their understanding of the risks involved should they continue to use these products.  
 
I reviewed our electronic medical record system for any past medical records that were available that may contribute to the patient's current condition, the nursing notes and vital signs from today's visit. Juan J Adams MD 
Medications Administered During ED Course: 
Medications  
ketorolac (TORADOL) injection 30 mg (30 mg IntraVENous Given 2/22/19 0406) diphenhydrAMINE (BENADRYL) injection 50 mg (50 mg IntraVENous Given 2/22/19 0503) ondansetron [facility]) injection 4 mg (4 mg IntraVENous Given 2/22/19 0503)  
sodium chloride 0.9 % bolus infusion 1,000 mL (0 mL IntraVENous IV Completed 2/22/19 0656) LORazepam (ATIVAN) injection 0.5 mg (0.5 mg IntraVENous Given 2/22/19 0522) Progress Note: 
Management plan reviewed with patient. Pt verbalized understanding.  reviewedYes UDS ObtainedNo, pt refused Case Management notifedNo 
Referral for treatmentYes Brochure/letter given/reviewed Yes [facility]SMART referralNo 
 
Progress Note: 
5:10 AM 
Notified by RN that pt is refusing Haldol, states \"I don't want that shit. \" Progress Note: 
6:20 AM 
Pt's HR noted to be 80's on monitor from POD 1 when there is no staff member in the room. Upon entering room, pt starts to hyperventilate and start to c/o of pain, HR jumps to 120's; I discussed with patient labs and imaging results; discussed treatment for constipation and my concerns for her continued drug use. Pt is a part of the ED care management of plan due to her frequency of ED visits. Pt was educated on the appropriate use of the ED and has been provided with the High ED utilizer letter and The [facility] Opioid Prescribing Guidelines. Pt was given Rx's for stool softeners, enema and Good Rx card for low cost Rx's. Progress Note: 
6:30 AM 
Pt given ginger ale and crackers for which she tolerated without issues; pt left ED with  at bedside. Progress Note: 
6:40 AM 
Patient has been reassessed and reports feeling better and symptoms have improved after ED treatment.  Therese Sun is able to tolerate PO and ambulate per baseline. Natalya Crandall final labs and imaging have been reviewed with her. She has been counseled regarding her diagnosis. She verbally conveys understanding and agreement of the signs, symptoms, diagnosis, treatment and prognosis and additionally agrees to follow up as recommended with Dr. Miracle Jim, NP in 24 - 48 hours. She also agrees with the care-plan and conveys that all of her questions have been answered. I have also put together some discharge instructions for her that include: 1) educational information regarding their diagnosis, 2) how to care for their diagnosis at home, as well a 3) list of reasons why they would want to return to the ED prior to their follow-up appointment, should their condition change. I have answered all questions to patient's satisfaction. She both understood and agreed with plan as discussed above. Vital signs stable for discharge. Critical Care Time:  
0 minutes Disposition: 
DISCHARGE NOTE 
6:40 AM 
The patient has been re-evaluated and is ready for discharge. Reviewed available results with patient. Counseled pt on diagnosis and care plan. Pt has expressed understanding, and all questions have been answered. Pt agrees with plan and agrees to F/U as recommended, or return to the ED if their sxs worsen. Discharge instructions have been provided and explained to the pt, along with reasons to return to the ED. Written by Elida Staff, ED Scribe, as dictated by Leanor Cogan, MD. 
 
PLAN: 
1. Discharge Medication List as of 2/22/2019  6:31 AM  
  
CONTINUE these medications which have NOT CHANGED Details  
insulin glargine U-300 conc 300 unit/mL (1.5 mL) inpn 25 Units by SubCUTAneous route daily. , Historical Med  
  
gabapentin (NEURONTIN) 300 mg capsule Take 1 Cap by mouth three (3) times daily. , Normal, Disp-60 Cap, R-0  
  
cyclobenzaprine (FLEXERIL) 5 mg tablet Take 1 Tab by mouth three (3) times daily as needed for Muscle Spasm(s). , Normal, Disp-20 Tab, R-0  
  
lidocaine (LIDOCARE) 4 % patch 1 Patch by TransDERmal route every twelve (12) hours every twelve (12) hours. , Normal, Disp-10 Patch, R-0  
  
HYDROcodone-acetaminophen (NORCO) 5-325 mg per tablet Take 1 Tab by mouth every four (4) hours as needed for Pain. Max Daily Amount: 6 Tabs., Print, Disp-20 Tab, R-0  
  
ciprofloxacin-dexamethasone (CIPRODEX) 0.3-0.1 % otic suspension Administer 4 Drops in left ear two (2) times a day., Print, Disp-10 mL, R-0  
  
amoxicillin-clavulanate (AUGMENTIN) 875-125 mg per tablet Take 1 Tab by mouth two (2) times a day., Print, Disp-14 Tab, R-0  
  
!! ondansetron (ZOFRAN ODT) 4 mg disintegrating tablet Take 1 Tab by mouth every eight (8) hours as needed for Nausea., Normal, Disp-10 Tab, R-0  
  
insulin aspart U-100 (NOVOLOG) 100 unit/mL injection 5 Units by SubCUTAneous route Before breakfast, lunch, and dinner., Historical Med  
  
sucralfate (CARAFATE) 100 mg/mL suspension Take 5 mL by mouth four (4) times daily. , Print, Disp-414 mL, R-0  
  
aspirin 81 mg chewable tablet Take 1 Tab by mouth daily. , Print, Disp-30 Tab, R-1  
  
cloNIDine HCl (CATAPRES) 0.1 mg tablet Take 1 Tab by mouth two (2) times a day., Print, Disp-60 Tab, R-1  
  
!! ondansetron (ZOFRAN ODT) 4 mg disintegrating tablet Take 1 Tab by mouth every eight (8) hours as needed for Nausea. , Print, Disp-15 Tab, R-0  
  
metoprolol tartrate (LOPRESSOR) 50 mg tablet Take 50 mg by mouth two (2) times a day., Historical Med  
  
sodium bicarbonate 650 mg tablet Take 650 mg by mouth two (2) times a day., Historical Med  
  
atorvastatin (LIPITOR) 20 mg tablet Take 20 mg by mouth nightly., Historical Med  
  
 !! - Potential duplicate medications found. Please discuss with provider. 2.  
Follow-up Information Follow up With Specialties Details Why Contact Info Joe Huynh NP Nurse Practitioner   Keyshawn Aguilar [address] [facility] [address] [id number] [phone] hospitals EMERGENCY DEPT Emergency Medicine  As needed, If symptoms worsen [address] [facility] 
[phone] Return to ED if worse Diagnosis Clinical Impression: 1. Constipation, unspecified constipation type 2. Abdominal pain, generalized 3. Drug-seeking behavior 4. Uncontrolled type 2 diabetes mellitus with hyperglycemia ([facility] [address] 5. Chronic kidney disease, unspecified CKD stage 6. Accelerated hypertension Attestations This note is prepared by Kathy Roberts, acting as Scribe for MD Lashaun Hyman MD : The scribe's documentation has been prepared under my direction and personally reviewed by me in its entirety. I confirm that the note above accurately reflects all work, treatment, procedures, and medical decision making performed by me. This note is prepared by Jesu Galicia, acting as Scribe for Lashaun Bazan MD. Lashaun Bazan MD: The scribe's documentation has been prepared under my direction and personally reviewed by me in its entirety. I confirm that the note above accurately reflects all work, treatment, procedures, and medical decision making performed by me. 
: 
This note will not be viewable in [address] Milly Schaffer

## 2019-02-23 VITALS
OXYGEN SATURATION: 100 % | HEART RATE: 113 BPM | RESPIRATION RATE: 16 BRPM | SYSTOLIC BLOOD PRESSURE: 174 MMHG | DIASTOLIC BLOOD PRESSURE: 141 MMHG | BODY MASS INDEX: 24.13 KG/M2 | HEIGHT: 65 IN | TEMPERATURE: 98.1 F | WEIGHT: 144.84 LBS

## 2019-02-23 LAB
ALBUMIN SERPL-MCNC: 3.2 G/DL (ref 3.5–5)
ALBUMIN/GLOB SERPL: 0.7 {RATIO} (ref 1.1–2.2)
ALP SERPL-CCNC: 207 U/L (ref 45–117)
ALT SERPL-CCNC: 22 U/L (ref 12–78)
AMPHET UR QL SCN: NEGATIVE
ANION GAP SERPL CALC-SCNC: 12 MMOL/L (ref 5–15)
APPEARANCE UR: ABNORMAL
AST SERPL-CCNC: 21 U/L (ref 15–37)
ATRIAL RATE: 123 BPM
BACTERIA URNS QL MICRO: ABNORMAL /HPF
BARBITURATES UR QL SCN: NEGATIVE
BENZODIAZ UR QL: NEGATIVE
BILIRUB SERPL-MCNC: 0.4 MG/DL (ref 0.2–1)
BILIRUB UR QL: NEGATIVE
BUN SERPL-MCNC: 92 MG/DL (ref 6–20)
BUN/CREAT SERPL: 15 (ref 12–20)
CALCIUM SERPL-MCNC: 8 MG/DL (ref 8.5–10.1)
CALCULATED P AXIS, ECG09: 61 DEGREES
CALCULATED R AXIS, ECG10: 22 DEGREES
CALCULATED T AXIS, ECG11: 73 DEGREES
CANNABINOIDS UR QL SCN: NEGATIVE
CHLORIDE SERPL-SCNC: 107 MMOL/L (ref 97–108)
CO2 SERPL-SCNC: 20 MMOL/L (ref 21–32)
COCAINE UR QL SCN: NEGATIVE
COLOR UR: ABNORMAL
CREAT SERPL-MCNC: 6.05 MG/DL (ref 0.55–1.02)
DIAGNOSIS, 93000: NORMAL
DRUG SCRN COMMENT,DRGCM: NORMAL
EPITH CASTS URNS QL MICRO: ABNORMAL /LPF
GLOBULIN SER CALC-MCNC: 4.6 G/DL (ref 2–4)
GLUCOSE SERPL-MCNC: 303 MG/DL (ref 65–100)
GLUCOSE UR STRIP.AUTO-MCNC: 500 MG/DL
HGB UR QL STRIP: ABNORMAL
HYALINE CASTS URNS QL MICRO: ABNORMAL /LPF (ref 0–5)
KETONES UR QL STRIP.AUTO: NEGATIVE MG/DL
LEUKOCYTE ESTERASE UR QL STRIP.AUTO: ABNORMAL
METHADONE UR QL: NEGATIVE
NITRITE UR QL STRIP.AUTO: NEGATIVE
OPIATES UR QL: NEGATIVE
P-R INTERVAL, ECG05: 138 MS
PCP UR QL: NEGATIVE
PH UR STRIP: 7.5 [PH] (ref 5–8)
POTASSIUM SERPL-SCNC: 3.1 MMOL/L (ref 3.5–5.1)
PROT SERPL-MCNC: 7.8 G/DL (ref 6.4–8.2)
PROT UR STRIP-MCNC: 300 MG/DL
Q-T INTERVAL, ECG07: 342 MS
QRS DURATION, ECG06: 68 MS
QTC CALCULATION (BEZET), ECG08: 489 MS
RBC #/AREA URNS HPF: ABNORMAL /HPF (ref 0–5)
SODIUM SERPL-SCNC: 139 MMOL/L (ref 136–145)
SP GR UR REFRACTOMETRY: 1.02 (ref 1–1.03)
UA: UC IF INDICATED,UAUC: ABNORMAL
UROBILINOGEN UR QL STRIP.AUTO: 0.2 EU/DL (ref 0.2–1)
VENTRICULAR RATE, ECG03: 123 BPM
WBC URNS QL MICRO: >100 /HPF (ref 0–4)

## 2019-02-23 PROCEDURE — 96365 THER/PROPH/DIAG IV INF INIT: CPT

## 2019-02-23 PROCEDURE — 87086 URINE CULTURE/COLONY COUNT: CPT

## 2019-02-23 PROCEDURE — 74011250636 HC RX REV CODE- 250/636: Performed by: EMERGENCY MEDICINE

## 2019-02-23 PROCEDURE — 81001 URINALYSIS AUTO W/SCOPE: CPT

## 2019-02-23 PROCEDURE — 96361 HYDRATE IV INFUSION ADD-ON: CPT

## 2019-02-23 PROCEDURE — 80307 DRUG TEST PRSMV CHEM ANLYZR: CPT

## 2019-02-23 PROCEDURE — 74011000258 HC RX REV CODE- 258: Performed by: EMERGENCY MEDICINE

## 2019-02-23 RX ORDER — CEPHALEXIN 500 MG/1
500 CAPSULE ORAL 4 TIMES DAILY
Qty: 28 CAP | Refills: 0 | Status: SHIPPED | OUTPATIENT
Start: 2019-02-23 | End: 2019-03-02

## 2019-02-23 RX ADMIN — SODIUM CHLORIDE 1000 ML: 900 INJECTION, SOLUTION INTRAVENOUS at 01:35

## 2019-02-23 RX ADMIN — CEFTRIAXONE 1 G: 1 INJECTION, POWDER, FOR SOLUTION INTRAMUSCULAR; INTRAVENOUS at 01:35

## 2019-02-23 NOTE — DISCHARGE INSTRUCTIONS
Patient Education        Urinary Tract Infection in Women: Care Instructions  Your Care Instructions    A urinary tract infection, or UTI, is a general term for an infection anywhere between the kidneys and the urethra (where urine comes out). Most UTIs are bladder infections. They often cause pain or burning when you urinate. UTIs are caused by bacteria and can be cured with antibiotics. Be sure to complete your treatment so that the infection goes away. Follow-up care is a key part of your treatment and safety. Be sure to make and go to all appointments, and call your doctor if you are having problems. It's also a good idea to know your test results and keep a list of the medicines you take. How can you care for yourself at home? · Take your antibiotics as directed. Do not stop taking them just because you feel better. You need to take the full course of antibiotics. · Drink extra water and other fluids for the next day or two. This may help wash out the bacteria that are causing the infection. (If you have kidney, heart, or liver disease and have to limit fluids, talk with your doctor before you increase your fluid intake.)  · Avoid drinks that are carbonated or have caffeine. They can irritate the bladder. · Urinate often. Try to empty your bladder each time. · To relieve pain, take a hot bath or lay a heating pad set on low over your lower belly or genital area. Never go to sleep with a heating pad in place. To prevent UTIs  · Drink plenty of water each day. This helps you urinate often, which clears bacteria from your system. (If you have kidney, heart, or liver disease and have to limit fluids, talk with your doctor before you increase your fluid intake.)  · Urinate when you need to. · Urinate right after you have sex. · Change sanitary pads often. · Avoid douches, bubble baths, feminine hygiene sprays, and other feminine hygiene products that have deodorants.   · After going to the bathroom, wipe from front to back. When should you call for help? Call your doctor now or seek immediate medical care if:    · Symptoms such as fever, chills, nausea, or vomiting get worse or appear for the first time.     · You have new pain in your back just below your rib cage. This is called flank pain.     · There is new blood or pus in your urine.     · You have any problems with your antibiotic medicine.    Watch closely for changes in your health, and be sure to contact your doctor if:    · You are not getting better after taking an antibiotic for 2 days.     · Your symptoms go away but then come back. Where can you learn more? Go to http://nery-peña.info/. Enter N614 in the search box to learn more about \"Urinary Tract Infection in Women: Care Instructions. \"  Current as of: March 20, 2018  Content Version: 11.9  © 8943-4895 [employer]. Care instructions adapted under license by [employer] (which disclaims liability or warranty for this information). If you have questions about a medical condition or this instruction, always ask your healthcare professional. [address] any warranty or liability for your use of this information. Patient Education        Learning About High Blood Sugar  What is high blood sugar? Your body turns the food you eat into glucose (sugar), which it uses for energy. But if your body isn't able to use the sugar right away, it can build up in your blood and lead to high blood sugar. When the amount of sugar in your blood stays too high for too much of the time, you may have diabetes. Diabetes is a disease that can cause serious health problems. The good news is that lifestyle changes may help you get your blood sugar back to normal and avoid or delay diabetes. What causes high blood sugar? Sugar (glucose) can build up in your blood if you:  · Are overweight. · Have a family history of diabetes.   · Take certain medicines, such as steroids. What are the symptoms? Having high blood sugar may not cause any symptoms at all. Or it may make you feel very thirsty or very hungry. You may also urinate more often than usual, have blurry vision, or lose weight without trying. How is high blood sugar treated? You can take steps to lower your blood sugar level if you understand what makes it get higher. Your doctor may want you to learn how to test your blood sugar level at home. Then you can see how illness, stress, or different kinds of food or medicine raise or lower your blood sugar level. Other tests may be needed to see if you have diabetes. How can you prevent high blood sugar? · Watch your weight. If you're overweight, losing just a small amount of weight may help. Reducing fat around your waist is most important. · Limit the amount of calories, sweets, and unhealthy fat you eat. Ask your doctor if a dietitian can help you. A registered dietitian can help you create meal plans that fit your lifestyle. · Get at least 30 minutes of exercise on most days of the week. Exercise helps control your blood sugar. It also helps you maintain a healthy weight. Walking is a good choice. You also may want to do other activities, such as running, swimming, cycling, or playing tennis or team sports. · If your doctor prescribed medicines, take them exactly as prescribed. Call your doctor if you think you are having a problem with your medicine. You will get more details on the specific medicines your doctor prescribes. Follow-up care is a key part of your treatment and safety. Be sure to make and go to all appointments, and call your doctor if you are having problems. It's also a good idea to know your test results and keep a list of the medicines you take. Where can you learn more? Go to http://nery-peña.info/.   Enter O108 in the search box to learn more about \"Learning About High Blood Sugar.\"  Current as of: July 25, 2018  Content Version: 11.9  © 9484-2846 [employer]. Care instructions adapted under license by [employer] (which disclaims liability or warranty for this information). If you have questions about a medical condition or this instruction, always ask your healthcare professional. [address] any warranty or liability for your use of this information. Patient Education        Abdominal Pain: Care Instructions  Your Care Instructions    Abdominal pain has many possible causes. Some aren't serious and get better on their own in a few days. Others need more testing and treatment. If your pain continues or gets worse, you need to be rechecked and may need more tests to find out what is wrong. You may need surgery to correct the problem. Don't ignore new symptoms, such as fever, nausea and vomiting, urination problems, pain that gets worse, and dizziness. These may be signs of a more serious problem. Your doctor may have recommended a follow-up visit in the next 8 to 12 hours. If you are not getting better, you may need more tests or treatment. The doctor has checked you carefully, but problems can develop later. If you notice any problems or new symptoms, get medical treatment right away. Follow-up care is a key part of your treatment and safety. Be sure to make and go to all appointments, and call your doctor if you are having problems. It's also a good idea to know your test results and keep a list of the medicines you take. How can you care for yourself at home? · Rest until you feel better. · To prevent dehydration, drink plenty of fluids, enough so that your urine is light yellow or clear like water. Choose water and other caffeine-free clear liquids until you feel better. If you have kidney, heart, or liver disease and have to limit fluids, talk with your doctor before you increase the amount of fluids you drink.   · If your stomach is upset, eat mild foods, such as rice, dry toast or crackers, bananas, and applesauce. Try eating several small meals instead of two or three large ones. · Wait until 48 hours after all symptoms have gone away before you have spicy foods, alcohol, and drinks that contain caffeine. · Do not eat foods that are high in fat. · Avoid anti-inflammatory medicines such as aspirin, ibuprofen (Advil, Motrin), and naproxen (Aleve). These can cause stomach upset. Talk to your doctor if you take daily aspirin for another health problem. When should you call for help? Call 911 anytime you think you may need emergency care. For example, call if:    · You passed out (lost consciousness).     · You pass maroon or very bloody stools.     · You vomit blood or what looks like coffee grounds.     · You have new, severe belly pain.    Call your doctor now or seek immediate medical care if:    · Your pain gets worse, especially if it becomes focused in one area of your belly.     · You have a new or higher fever.     · Your stools are black and look like tar, or they have streaks of blood.     · You have unexpected vaginal bleeding.     · You have symptoms of a urinary tract infection. These may include:  ? Pain when you urinate. ? Urinating more often than usual.  ? Blood in your urine.     · You are dizzy or lightheaded, or you feel like you may faint.    Watch closely for changes in your health, and be sure to contact your doctor if:    · You are not getting better after 1 day (24 hours). Where can you learn more? Go to http://nery-peña.info/. Enter N566 in the search box to learn more about \"Abdominal Pain: Care Instructions. \"  Current as of: September 23, 2018  Content Version: 11.9  © 4868-1353 [employer]. Care instructions adapted under license by [employer] (which disclaims liability or warranty for this information).  If you have questions about a medical condition or this instruction, always ask your healthcare professional. [place] [address] any warranty or liability for your use of this information.

## 2019-02-23 NOTE — ED PROVIDER NOTES
EMERGENCY DEPARTMENT HISTORY AND PHYSICAL EXAM  
     
 
Date: 2/22/2019 Patient Name: Lisa Colunga History of Presenting Illness Chief Complaint Patient presents with  Abdominal Pain Presents to the ED via EMS with c/o diffuse abdominal pain-evaluated in the ED this morning for the same. She did not fill her prescriptions and is not feeling better. Pt is tearful, but no obvious distress is noted. History Provided By: Patient HPI: Lisa Colunga is a 48 y.o. female, pmhx HTN, DM, HLD, CKD, Hep C and C. difficile, who presents via EMS to the ED c/o gradual onset, constant, worsening epigastric abd pain since earlier today with associated B/L LE pain and 10/10 left flank pain. Pain is moderate in intensity and unimproved with pain or constipation medications. Pt was seen here earlier today where a CT showed constipation. After going home and taking all of her prescribed medications her pain did not improve, prompting the trip back to the ED for further evaluation. Pt was given Ativan and Toradol while in the ED. Pt is on a pain management plan. Pt pecifically denies any recent fevers, chills, CP, SOB or headache. PCP: Dajuan Winn NP Allergies: none PMHx: Significant for HTN, DM, HLD, CKD, Hep C and C. difficile PSHx: Significant for back surgery, kidney stone removal 
Social Hx: - tobacco  + EtOH (quit few months ago, Hx of abuse) , - Illicit Drugs There are no other complaints, changes, or physical findings at this time. Current Facility-Administered Medications Medication Dose Route Frequency Provider Last Rate Last Dose  cefTRIAXone (ROCEPHIN) 1 g in 0.9% sodium chloride (MBP/ADV) 50 mL  1 g IntraVENous NOW Cinthya Oswald  mL/hr at 02/23/19 0135 1 g at 02/23/19 0135  famotidine (PEPCID) tablet 20 mg  20 mg Oral NOW Cinthya Oswald MD      
 sodium chloride 0.9 % bolus infusion 1,000 mL  1,000 mL IntraVENous NOW Hafsa Craft MD 1,000 mL/hr at 02/23/19 0135 1,000 mL at 02/23/19 [phone] Current Outpatient Medications Medication Sig Dispense Refill  cephALEXin (KEFLEX) 500 mg capsule Take 1 Cap by mouth four (4) times daily for 7 days. 28 Cap 0  
 polyethylene glycol (MIRALAX) 17 gram/dose powder Take 17 g by mouth daily. 1 tablespoon with 8 oz of water daily 289 g 0  
 ondansetron (ZOFRAN ODT) 4 mg disintegrating tablet Take 1 Tab by mouth every eight (8) hours as needed for Nausea. 20 Tab 0  
 insulin glargine U-300 conc 300 unit/mL (1.5 mL) inpn 25 Units by SubCUTAneous route daily.  gabapentin (NEURONTIN) 300 mg capsule Take 1 Cap by mouth three (3) times daily. 60 Cap 0  cyclobenzaprine (FLEXERIL) 5 mg tablet Take 1 Tab by mouth three (3) times daily as needed for Muscle Spasm(s). 20 Tab 0  
 lidocaine (LIDOCARE) 4 % patch 1 Patch by TransDERmal route every twelve (12) hours every twelve (12) hours. 10 Patch 0  
 HYDROcodone-acetaminophen (NORCO) 5-325 mg per tablet Take 1 Tab by mouth every four (4) hours as needed for Pain. Max Daily Amount: 6 Tabs. 20 Tab 0  
 ciprofloxacin-dexamethasone (CIPRODEX) 0.3-0.1 % otic suspension Administer 4 Drops in left ear two (2) times a day. 10 mL 0  
 ondansetron (ZOFRAN ODT) 4 mg disintegrating tablet Take 1 Tab by mouth every eight (8) hours as needed for Nausea. 10 Tab 0  
 insulin aspart U-100 (NOVOLOG) 100 unit/mL injection 5 Units by SubCUTAneous route Before breakfast, lunch, and dinner.  sucralfate (CARAFATE) 100 mg/mL suspension Take 5 mL by mouth four (4) times daily. 414 mL 0  
 aspirin 81 mg chewable tablet Take 1 Tab by mouth daily. 30 Tab 1  cloNIDine HCl (CATAPRES) 0.1 mg tablet Take 1 Tab by mouth two (2) times a day. 60 Tab 1  
 ondansetron (ZOFRAN ODT) 4 mg disintegrating tablet Take 1 Tab by mouth every eight (8) hours as needed for Nausea.  15 Tab 0  
 metoprolol tartrate (LOPRESSOR) 50 mg tablet Take 50 mg by mouth two (2) times a day.  sodium bicarbonate 650 mg tablet Take 650 mg by mouth two (2) times a day.  atorvastatin (LIPITOR) 20 mg tablet Take 20 mg by mouth nightly. Past History Past Medical History: 
Past Medical History:  
Diagnosis Date  C. difficile colitis 6/2012  Chronic low back pain  CKD (chronic kidney disease) stage 3 to 4, baseline Cr 2  
 Constipation  Diabetes ([facility] [address] A1c 8.2 3/2012  Hep C w/o coma, chronic (HCC)  Hyperlipemia  Hypertension  Lumbar disc disease  Migraines  Pancreatitis 5271  
 alcoholic  UTI (lower urinary tract infection) 6/20012 Past Surgical History: 
Past Surgical History:  
Procedure Laterality Date  HX ORTHOPAEDIC    
 lumbar sprain; back surgery  HX UROLOGICAL  2014  
 kidney stone removed  IN COLONOSCOPY FLX DX W/COLLJ SPEC WHEN PFRMD  11/12/2012 Family History: 
Family History Problem Relation Age of Onset  Diabetes Mother  Kidney Disease Mother  Diabetes Sister Social History: 
Social History Tobacco Use  Smoking status: Never Smoker  Smokeless tobacco: Never Used Substance Use Topics  Alcohol use: No  
  Comment: Quit few months ago, hx of abuse  Drug use: No  
 
 
Allergies: 
No Known Allergies Review of Systems Review of Systems Constitutional: Negative. Negative for fever. Eyes: Negative. Respiratory: Negative. Negative for shortness of breath. Cardiovascular: Negative for chest pain. Gastrointestinal: Negative for abdominal pain, nausea and vomiting. Endocrine: Negative. Genitourinary: Negative. Negative for difficulty urinating, dysuria and hematuria. Musculoskeletal: Negative. Skin: Negative. Neurological: Negative. Psychiatric/Behavioral: Negative for suicidal ideas. All other systems reviewed and are negative.  
 
 
Physical Exam  
Physical Exam  
 Constitutional: She is oriented to person, place, and time. She appears well-developed and well-nourished. She appears distressed (pt partially laying on bed while standing next to it. crying out and moaning while SO provides majority of history). HENT:  
Head: Normocephalic and atraumatic. Nose: Nose normal.  
Eyes: Conjunctivae and EOM are normal. No scleral icterus. Neck: Normal range of motion. No tracheal deviation present. Cardiovascular: Normal rate, regular rhythm, normal heart sounds and intact distal pulses. Exam reveals no friction rub. No murmur heard. Pulmonary/Chest: Effort normal and breath sounds normal. No stridor. No respiratory distress. She has no wheezes. She has no rales. Abdominal: Soft. Bowel sounds are normal. She exhibits no distension. There is generalized tenderness. There is guarding. There is no rigidity, no rebound, no tenderness at McBurney's point and negative Gallardo's sign. Musculoskeletal: Normal range of motion. She exhibits no tenderness. Neurological: She is alert and oriented to person, place, and time. No cranial nerve deficit. Skin: Skin is warm and dry. No rash noted. She is not diaphoretic. Psychiatric: Her behavior is normal. Judgment and thought content normal. Her mood appears anxious. Her affect is labile. Her speech is not rapid and/or pressured and not slurred. Cognition and memory are normal.  
Nursing note and vitals reviewed. Diagnostic Study Results Labs - Recent Results (from the past 12 hour(s)) EKG, 12 LEAD, INITIAL Collection Time: 02/22/19  9:43 PM  
Result Value Ref Range Ventricular Rate 123 BPM  
 Atrial Rate 123 BPM  
 P-R Interval 138 ms QRS Duration 68 ms Q-T Interval 342 ms QTC Calculation (Bezet) 489 ms Calculated P Axis 61 degrees Calculated R Axis 22 degrees Calculated T Axis 73 degrees Diagnosis Sinus tachycardia When compared with ECG of 23-NOV-2018 08:08, 
 No significant change was found GLUCOSE, POC Collection Time: 02/22/19 10:08 PM  
Result Value Ref Range Glucose (POC) 382 (H) 65 - 100 mg/dL Performed by [facility] METABOLIC PANEL, COMPREHENSIVE Collection Time: 02/22/19 11:35 PM  
Result Value Ref Range Sodium 139 136 - 145 mmol/L Potassium 3.1 (L) 3.5 - 5.1 mmol/L Chloride 107 97 - 108 mmol/L  
 CO2 20 (L) 21 - 32 mmol/L Anion gap 12 5 - 15 mmol/L Glucose 303 (H) 65 - 100 mg/dL BUN 92 (H) 6 - 20 MG/DL Creatinine 6.05 (H) 0.55 - 1.02 MG/DL  
 BUN/Creatinine ratio 15 12 - 20 GFR est AA 9 (L) >60 ml/min/1.73m2 GFR est non-AA 7 (L) >60 ml/min/1.73m2 Calcium 8.0 (L) 8.5 - 10.1 MG/DL Bilirubin, total 0.4 0.2 - 1.0 MG/DL  
 ALT (SGPT) 22 12 - 78 U/L  
 AST (SGOT) 21 15 - 37 U/L Alk. phosphatase 207 (H) 45 - 117 U/L Protein, total 7.8 6.4 - 8.2 g/dL Albumin 3.2 (L) 3.5 - 5.0 g/dL Globulin 4.6 (H) 2.0 - 4.0 g/dL A-G Ratio 0.7 (L) 1.1 - 2.2 SAMPLES BEING HELD Collection Time: 02/22/19 11:35 PM  
Result Value Ref Range SAMPLES BEING HELD LV   
 COMMENT Add-on orders for these samples will be processed based on acceptable specimen integrity and analyte stability, which may vary by analyte. URINALYSIS W/ REFLEX CULTURE Collection Time: 02/23/19 12:04 AM  
Result Value Ref Range Color YELLOW/STRAW Appearance CLOUDY (A) CLEAR Specific gravity 1.016 1.003 - 1.030    
 pH (UA) 7.5 5.0 - 8.0 Protein 300 (A) NEG mg/dL Glucose 500 (A) NEG mg/dL Ketone NEGATIVE  NEG mg/dL Bilirubin NEGATIVE  NEG Blood MODERATE (A) NEG Urobilinogen 0.2 0.2 - 1.0 EU/dL Nitrites NEGATIVE  NEG Leukocyte Esterase MODERATE (A) NEG    
 WBC >100 (H) 0 - 4 /hpf  
 RBC 10-20 0 - 5 /hpf Epithelial cells FEW FEW /lpf Bacteria 2+ (A) NEG /hpf  
 UA:UC IF INDICATED URINE CULTURE ORDERED (A) CNI Hyaline cast 0-2 0 - 5 /lpf DRUG SCREEN, URINE  
 Collection Time: 02/23/19 12:04 AM  
Result Value Ref Range AMPHETAMINES NEGATIVE  NEG    
 BARBITURATES NEGATIVE  NEG BENZODIAZEPINES NEGATIVE  NEG    
 COCAINE NEGATIVE  NEG METHADONE NEGATIVE  NEG    
 OPIATES NEGATIVE  NEG    
 PCP(PHENCYCLIDINE) NEGATIVE  NEG    
 THC (TH-CANNABINOL) NEGATIVE  NEG Drug screen comment (NOTE) Radiologic Studies - No orders to display CT Results  (Last 48 hours) 02/22/19 0538  CT ABD PELV WO CONT Final result Impression:  IMPRESSION:  
   
1. Moderate colonic fecal burden may represent constipation. 2. No hydronephrosis. 3. Enlarged uterus contains calcified fibroids. Narrative:  EXAM: CT ABD PELV WO CONT INDICATION: Left flank pain for one hour. Associated nausea and vomiting. Chronic kidney disease. Previous pancreatitis and UTI. COMPARISON: CT abdomen/pelvis on 10/15/2018. CONTRAST:  None. TECHNIQUE:   
Thin axial images were obtained through the abdomen and pelvis. Coronal and  
sagittal reconstructions were generated. Oral contrast was not administered. CT  
dose reduction was achieved through use of a standardized protocol tailored for  
this examination and automatic exposure control for dose modulation. The absence of intravenous contrast material reduces the sensitivity for  
evaluation of the solid parenchymal organs of the abdomen. FINDINGS:   
LUNG BASES: No pneumonia. INCIDENTALLY IMAGED HEART AND MEDIASTINUM: Normal cardiac size. There are mitral  
valve calcifications. No pericardial effusion. LIVER: No mass or biliary dilatation. GALLBLADDER: Unremarkable. SPLEEN: Normal size. PANCREAS: No inflammation. ADRENALS: Unremarkable. KIDNEYS/URETERS: There are vascular calcifications. No nephrolithiasis or  
hydronephrosis. STOMACH: Nondistended. SMALL BOWEL: No dilatation or wall thickening. COLON: Moderate colonic fecal burden. APPENDIX: Unremarkable. PERITONEUM: No ascites or pneumoperitoneum. RETROPERITONEUM: Aorta is atherosclerotic without aneurysm. No lymphadenopathy. REPRODUCTIVE ORGANS: Mildly enlarged uterus contains heterogeneous  
calcifications. No adnexal mass. URINARY BLADDER: No mass or calculus. BONES: Density most likely represents renal osteodystrophy. ADDITIONAL COMMENTS: N/A Medical Decision Making I am the first provider for this patient. I reviewed the vital signs, available nursing notes, past medical history, past surgical history, family history and social history. Vital Signs-Reviewed the patient's vital signs. Patient Vitals for the past 12 hrs: 
 Temp Pulse Resp BP SpO2  
02/22/19 2335  (!) 122  (!) 199/108   
02/22/19 2230  (!) 135 26 (!) 193/112 99 % 02/22/19 2200  (!) 124 20 (!) 196/113 100 % 02/22/19 1950 98.1 °F (36.7 °C) (!) 115 19 (!) 202/126 99 % Pulse Oximetry Analysis - 99% on RA Cardiac Monitor:  
Rate: 115 bpm 
Rhythm: Sinus Tachycardia Records Reviewed: Nursing Notes and Old Medical Records Provider Notes (Medical Decision Making): DDX: 
Chronic constipation, reflux, opiate dependence, non compliance with medical regime, hyperglycemia, dehydration Plan: 
Reviewed chart from earlier this am. Pt with CT showing large stool burden, no obstruction. Will check bg, ua, uds, provide haldol for abdominal pain. Have reviewed care management plan. Impression: 
Recurrent abdominal pain, constipation ED Course:  
Initial assessment performed. The patients presenting problems have been discussed, and they are in agreement with the care plan formulated and outlined with them. I have encouraged them to ask questions as they arise throughout their visit.  
 
I reviewed our electronic medical record system for any past medical records that were available that may contribute to the patients current condition, the nursing notes and and vital signs from today's visit Nursing notes will be reviewed as they become available in realtime while the pt has been in the ED. Thao Medina MD 
 
EKG interpretation 6039: sinus tach, nl Axis, rate 123; , QRS 68, QTc 489; no acute ischemia; Thao Medina MD 
 
I personally reviewed pt's imaging. Official read by radiology noted above. Thao Medina MD 
 
9:35 PM 
I have reviewed the pt's Pain Management Plan as provided in their chart and reviewed all pertinent parts with the patient. Thao Medina MD 
 
ED Course as of Feb 23 0200 Fri Feb 22, 2019  
2245 Pt is currently sleeping.  [AS] Sat Feb 23, 2019  
0140 Pt's BP is 185/94. [AS] ED Course User Index [AS] Leo Stajoey  
 
 
1:38 AM 
Progress note: 
Pt noted to be feeling better, resolution of pain and nausea, is ready for discharge. Discussed lab and imaging findings with pt and family, specifically noting presence of a UTI. Pt will follow up as instructed. All questions have been answered, pt voiced understanding and agreement with plan. If narcotics were prescribed, pt's  record was reviewed and pt was advised not to drive or operate heavy machinery. If abx were prescribed, pt advised that diarrhea and rash are possible side effects of the medications. Specific return precautions provided in addition to instructions for pt to return to the ED immediately should sx worsen at any time. Thao Medina MD 
 
 
Critical Care Time:  
 
0 Diagnosis Clinical Impression: 1. Recurrent generalized abdominal pain 2. Hyperglycemia 3. Uncontrolled other specified diabetes mellitus with hyperglycemia ([facility] [address] 4. Acute cystitis without hematuria PLAN: 
1. Discharge Current Discharge Medication List  
  
START taking these medications Details  
cephALEXin (KEFLEX) 500 mg capsule Take 1 Cap by mouth four (4) times daily for 7 days. Qty: 28 Cap, Refills: 0  
  
  
 
2. Follow-up Information Follow up With Specialties Details Why Contact Info Anne Day NP Nurse Practitioner Schedule an appointment as soon as possible for a visit in 2 days  [address] 
[phone] Return to ED if worse Disposition: 
 
1:42 AM  
The patient's results have been reviewed with family and/or caregiver. They verbally convey their understanding and agreement of the patient's signs, symptoms, diagnosis, treatment and prognosis and additionally agree to follow up as recommended in the discharge instructions or to return to the Emergency Room should the patient's condition change prior to their follow-up appointment. The family and/or caregiver verbally agrees with the care-plan and all of their questions have been answered. The discharge instructions have also been provided to the them with educational information regarding the patient's diagnosis as well a list of reasons why the patient would want to return to the ER prior to their follow-up appointment should their condition change. Thao Medina MD 
 
 
 
 
Attestations: This note is prepared by Fernando Maldonado, acting as Scribe for MD Thao Reyna MD : The scribe's documentation has been prepared under my direction and personally reviewed by me in its entirety. I confirm that the note above accurately reflects all work, treatment, procedures, and medical decision making performed by me. This note will not be viewable in [address]

## 2019-02-23 NOTE — ED NOTES
Pt discharged by Dr. Katy Chou. Pt provided with discharge instructions Rx and instructions on follow up care. Pt out of ED in stable condition accompanied by .

## 2019-02-24 LAB
BACTERIA SPEC CULT: ABNORMAL
CC UR VC: ABNORMAL
SERVICE CMNT-IMP: ABNORMAL

## 2019-05-04 ENCOUNTER — HOSPITAL ENCOUNTER (EMERGENCY)
Age: 54
Discharge: HOME | End: 2019-05-04
Attending: EMERGENCY MEDICINE
Payer: MEDICARE

## 2019-05-04 ENCOUNTER — APPOINTMENT (OUTPATIENT)
Dept: CT IMAGING | Age: 54
End: 2019-05-04
Attending: PHYSICIAN ASSISTANT
Payer: MEDICARE

## 2019-05-04 VITALS
WEIGHT: 150.79 LBS | TEMPERATURE: 98 F | DIASTOLIC BLOOD PRESSURE: 103 MMHG | BODY MASS INDEX: 25.74 KG/M2 | OXYGEN SATURATION: 100 % | HEIGHT: 64 IN | SYSTOLIC BLOOD PRESSURE: 121 MMHG | HEART RATE: 85 BPM | RESPIRATION RATE: 18 BRPM

## 2019-05-04 DIAGNOSIS — K59.00 CONSTIPATION, UNSPECIFIED CONSTIPATION TYPE: Primary | ICD-10-CM

## 2019-05-04 LAB
ALBUMIN SERPL-MCNC: 3.9 G/DL (ref 3.5–5)
ALBUMIN/GLOB SERPL: 0.8 {RATIO} (ref 1.1–2.2)
ALP SERPL-CCNC: 154 U/L (ref 45–117)
ALT SERPL-CCNC: 23 U/L (ref 12–78)
AMPHET UR QL SCN: NEGATIVE
ANION GAP SERPL CALC-SCNC: 13 MMOL/L (ref 5–15)
APPEARANCE UR: CLEAR
AST SERPL-CCNC: 23 U/L (ref 15–37)
BACTERIA URNS QL MICRO: ABNORMAL /HPF
BARBITURATES UR QL SCN: NEGATIVE
BASOPHILS # BLD: 0 K/UL (ref 0–0.1)
BASOPHILS NFR BLD: 0 % (ref 0–1)
BENZODIAZ UR QL: NEGATIVE
BILIRUB SERPL-MCNC: 0.3 MG/DL (ref 0.2–1)
BILIRUB UR QL: NEGATIVE
BUN SERPL-MCNC: 80 MG/DL (ref 6–20)
BUN/CREAT SERPL: 14 (ref 12–20)
CALCIUM SERPL-MCNC: 10.6 MG/DL (ref 8.5–10.1)
CANNABINOIDS UR QL SCN: NEGATIVE
CHLORIDE SERPL-SCNC: 89 MMOL/L (ref 97–108)
CO2 SERPL-SCNC: 23 MMOL/L (ref 21–32)
COCAINE UR QL SCN: NEGATIVE
COLOR UR: ABNORMAL
CREAT SERPL-MCNC: 5.68 MG/DL (ref 0.55–1.02)
DIFFERENTIAL METHOD BLD: ABNORMAL
DRUG SCRN COMMENT,DRGCM: NORMAL
EOSINOPHIL # BLD: 0.2 K/UL (ref 0–0.4)
EOSINOPHIL NFR BLD: 2 % (ref 0–7)
EPITH CASTS URNS QL MICRO: ABNORMAL /LPF
ERYTHROCYTE [DISTWIDTH] IN BLOOD BY AUTOMATED COUNT: 12.8 % (ref 11.5–14.5)
GLOBULIN SER CALC-MCNC: 4.6 G/DL (ref 2–4)
GLUCOSE SERPL-MCNC: 138 MG/DL (ref 65–100)
GLUCOSE UR STRIP.AUTO-MCNC: 250 MG/DL
HCT VFR BLD AUTO: 29.5 % (ref 35–47)
HGB BLD-MCNC: 10.1 G/DL (ref 11.5–16)
HGB UR QL STRIP: ABNORMAL
IMM GRANULOCYTES # BLD AUTO: 0 K/UL (ref 0–0.04)
IMM GRANULOCYTES NFR BLD AUTO: 0 % (ref 0–0.5)
KETONES UR QL STRIP.AUTO: NEGATIVE MG/DL
LEUKOCYTE ESTERASE UR QL STRIP.AUTO: NEGATIVE
LIPASE SERPL-CCNC: 257 U/L (ref 73–393)
LYMPHOCYTES # BLD: 2.4 K/UL (ref 0.8–3.5)
LYMPHOCYTES NFR BLD: 26 % (ref 12–49)
MCH RBC QN AUTO: 29.2 PG (ref 26–34)
MCHC RBC AUTO-ENTMCNC: 34.2 G/DL (ref 30–36.5)
MCV RBC AUTO: 85.3 FL (ref 80–99)
METHADONE UR QL: NEGATIVE
MONOCYTES # BLD: 0.6 K/UL (ref 0–1)
MONOCYTES NFR BLD: 7 % (ref 5–13)
NEUTS SEG # BLD: 5.9 K/UL (ref 1.8–8)
NEUTS SEG NFR BLD: 65 % (ref 32–75)
NITRITE UR QL STRIP.AUTO: NEGATIVE
NRBC # BLD: 0 K/UL (ref 0–0.01)
NRBC BLD-RTO: 0 PER 100 WBC
OPIATES UR QL: NEGATIVE
PCP UR QL: NEGATIVE
PH UR STRIP: 6.5 [PH] (ref 5–8)
PLATELET # BLD AUTO: 265 K/UL (ref 150–400)
PMV BLD AUTO: 8.8 FL (ref 8.9–12.9)
POTASSIUM SERPL-SCNC: 3.5 MMOL/L (ref 3.5–5.1)
PROT SERPL-MCNC: 8.5 G/DL (ref 6.4–8.2)
PROT UR STRIP-MCNC: 100 MG/DL
RBC # BLD AUTO: 3.46 M/UL (ref 3.8–5.2)
RBC #/AREA URNS HPF: ABNORMAL /HPF (ref 0–5)
SODIUM SERPL-SCNC: 125 MMOL/L (ref 136–145)
SP GR UR REFRACTOMETRY: 1.01 (ref 1–1.03)
UA: UC IF INDICATED,UAUC: ABNORMAL
UROBILINOGEN UR QL STRIP.AUTO: 0.2 EU/DL (ref 0.2–1)
WBC # BLD AUTO: 9.3 K/UL (ref 3.6–11)
WBC URNS QL MICRO: ABNORMAL /HPF (ref 0–4)

## 2019-05-04 PROCEDURE — 74011250637 HC RX REV CODE- 250/637: Performed by: PHYSICIAN ASSISTANT

## 2019-05-04 PROCEDURE — 96375 TX/PRO/DX INJ NEW DRUG ADDON: CPT

## 2019-05-04 PROCEDURE — 83690 ASSAY OF LIPASE: CPT

## 2019-05-04 PROCEDURE — 81001 URINALYSIS AUTO W/SCOPE: CPT

## 2019-05-04 PROCEDURE — 96361 HYDRATE IV INFUSION ADD-ON: CPT

## 2019-05-04 PROCEDURE — 80053 COMPREHEN METABOLIC PANEL: CPT

## 2019-05-04 PROCEDURE — 80307 DRUG TEST PRSMV CHEM ANLYZR: CPT

## 2019-05-04 PROCEDURE — 74011250636 HC RX REV CODE- 250/636: Performed by: PHYSICIAN ASSISTANT

## 2019-05-04 PROCEDURE — 87086 URINE CULTURE/COLONY COUNT: CPT

## 2019-05-04 PROCEDURE — 74176 CT ABD & PELVIS W/O CONTRAST: CPT

## 2019-05-04 PROCEDURE — 36415 COLL VENOUS BLD VENIPUNCTURE: CPT

## 2019-05-04 PROCEDURE — 85025 COMPLETE CBC W/AUTO DIFF WBC: CPT

## 2019-05-04 PROCEDURE — 99285 EMERGENCY DEPT VISIT HI MDM: CPT

## 2019-05-04 PROCEDURE — 96374 THER/PROPH/DIAG INJ IV PUSH: CPT

## 2019-05-04 PROCEDURE — 74011000250 HC RX REV CODE- 250: Performed by: PHYSICIAN ASSISTANT

## 2019-05-04 RX ORDER — KETOROLAC TROMETHAMINE 30 MG/ML
15 INJECTION, SOLUTION INTRAMUSCULAR; INTRAVENOUS
Status: COMPLETED | OUTPATIENT
Start: 2019-05-04 | End: 2019-05-04

## 2019-05-04 RX ORDER — LORAZEPAM 2 MG/ML
0.5 INJECTION INTRAMUSCULAR
Status: COMPLETED | OUTPATIENT
Start: 2019-05-04 | End: 2019-05-04

## 2019-05-04 RX ORDER — ONDANSETRON 2 MG/ML
4 INJECTION INTRAMUSCULAR; INTRAVENOUS
Status: COMPLETED | OUTPATIENT
Start: 2019-05-04 | End: 2019-05-04

## 2019-05-04 RX ORDER — DIPHENHYDRAMINE HYDROCHLORIDE 50 MG/ML
25 INJECTION, SOLUTION INTRAMUSCULAR; INTRAVENOUS
Status: COMPLETED | OUTPATIENT
Start: 2019-05-04 | End: 2019-05-04

## 2019-05-04 RX ORDER — SODIUM CHLORIDE 9 MG/ML
1000 INJECTION, SOLUTION INTRAVENOUS CONTINUOUS
Status: DISCONTINUED | OUTPATIENT
Start: 2019-05-04 | End: 2019-05-04 | Stop reason: HOSPADM

## 2019-05-04 RX ADMIN — PROCHLORPERAZINE EDISYLATE 10 MG: 5 INJECTION INTRAMUSCULAR; INTRAVENOUS at 14:38

## 2019-05-04 RX ADMIN — LORAZEPAM 0.5 MG: 2 INJECTION INTRAMUSCULAR; INTRAVENOUS at 12:03

## 2019-05-04 RX ADMIN — SODIUM CHLORIDE 1000 ML: 900 INJECTION, SOLUTION INTRAVENOUS at 13:10

## 2019-05-04 RX ADMIN — ONDANSETRON 4 MG: 2 INJECTION INTRAMUSCULAR; INTRAVENOUS at 13:07

## 2019-05-04 RX ADMIN — DIPHENHYDRAMINE HYDROCHLORIDE 25 MG: 50 INJECTION, SOLUTION INTRAMUSCULAR; INTRAVENOUS at 14:38

## 2019-05-04 RX ADMIN — LIDOCAINE HYDROCHLORIDE 40 ML: 20 SOLUTION ORAL; TOPICAL at 14:38

## 2019-05-04 RX ADMIN — KETOROLAC TROMETHAMINE 15 MG: 30 INJECTION, SOLUTION INTRAMUSCULAR at 12:18
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
## 2019-05-04 NOTE — PROGRESS NOTES
Spiritual Care Assessment/Progress Note [address] 
 
 
NAME: Taurus Schmitt      MRN: [id number] AGE: 48 y.o. SEX: female Latter day Affiliation: No Amish Language: [place] 5/4/2019     Total Time (in minutes): 15 Spiritual Assessment begun in [facility] EMERGENCY DEPT through conversation with: 
  
    [x]Patient        [x] Family    [] Friend(s) Reason for Consult: Emergency Department visit Spiritual beliefs: (Please include comment if needed) 
   [] Identifies with a salma tradition:     
   [] Supported by a salma community:        
   [] Claims no spiritual orientation:       
   [] Seeking spiritual identity:            
   [] Adheres to an individual form of spirituality:       
   [x] Not able to assess:  Did not indicate Identified resources for coping:  
   [] Prayer                           
   [] Music                  [] Guided Imagery 
   [] Family/friends                 [] Pet visits [] Devotional reading                         [x] Unknown 
   [] Other:                                         
 
 
Interventions offered during this visit: (See comments for more details) Patient Interventions: Affirmation of emotions/emotional suffering, Normalization of emotional/spiritual concerns, Initial/Spiritual assessment, patient floor Family/Friend(s): Initial Assessment Plan of Care: 
 
 [] Support spiritual and/or cultural needs  
 [] Support AMD and/or advance care planning process    
 [] Support grieving process 
 [] Coordinate Rites and/or Rituals  
 [] Coordination with community clergy 
 [x] No spiritual needs identified at this time 
 [] Detailed Plan of Care below (See Comments)  [] Make referral to Music Therapy 
[] Make referral to Pet Therapy    
[] Make referral to Addiction services 
[] Make referral to [facility] 
[] Make referral to Spiritual Care Partner 
[] No future visits requested [x] Follow up visits as needed Comments: While responding to call in ER, saw this patient crying out in her room. Patient's  was present as well at a tech at bedside. Explained my role as jesse;  inquired if this  is [place]audi. Offered calming presence, encouraging relaxation and focus on breathing to help ease pain. She appeared to calm down momentarily as tech was able to get IV access.  seemed concerned. Visit ended when ER staff notified  of need elsewhere. CLAYTON Sarabia, [facility],  [facility]  Paging Service  [phone])

## 2019-05-04 NOTE — DISCHARGE INSTRUCTIONS
Patient Education        Constipation: Care Instructions  Your Care Instructions    Constipation means that you have a hard time passing stools (bowel movements). People pass stools from 3 times a day to once every 3 days. What is normal for you may be different. Constipation may occur with pain in the rectum and cramping. The pain may get worse when you try to pass stools. Sometimes there are small amounts of bright red blood on toilet paper or the surface of stools. This is because of enlarged veins near the rectum (hemorrhoids). A few changes in your diet and lifestyle may help you avoid ongoing constipation. Your doctor may also prescribe medicine to help loosen your stool. Some medicines can cause constipation. These include pain medicines and antidepressants. Tell your doctor about all the medicines you take. Your doctor may want to make a medicine change to ease your symptoms. Follow-up care is a key part of your treatment and safety. Be sure to make and go to all appointments, and call your doctor if you are having problems. It's also a good idea to know your test results and keep a list of the medicines you take. How can you care for yourself at home? · Drink plenty of fluids, enough so that your urine is light yellow or clear like water. If you have kidney, heart, or liver disease and have to limit fluids, talk with your doctor before you increase the amount of fluids you drink. · Include high-fiber foods in your diet each day. These include fruits, vegetables, beans, and whole grains. · Get at least 30 minutes of exercise on most days of the week. Walking is a good choice. You also may want to do other activities, such as running, swimming, cycling, or playing tennis or team sports. · Take a fiber supplement, such as Citrucel or Metamucil, every day. Read and follow all instructions on the label. · Schedule time each day for a bowel movement. A daily routine may help.  Take your time having your bowel movement. · Support your feet with a small step stool when you sit on the toilet. This helps flex your hips and places your pelvis in a squatting position. · Your doctor may recommend an over-the-counter laxative to relieve your constipation. Examples are Milk of Magnesia and MiraLax. Read and follow all instructions on the label. Do not use laxatives on a long-term basis. When should you call for help? Call your doctor now or seek immediate medical care if:    · You have new or worse belly pain.     · You have new or worse nausea or vomiting.     · You have blood in your stools.    Watch closely for changes in your health, and be sure to contact your doctor if:    · Your constipation is getting worse.     · You do not get better as expected. Where can you learn more? Go to http://nery-peña.info/. Enter 21 [phone] in the search box to learn more about \"Constipation: Care Instructions. \"  Current as of: September 23, 2018  Content Version: 11.9  © 5151-2061 [employer]. Care instructions adapted under license by [employer] (which disclaims liability or warranty for this information). If you have questions about a medical condition or this instruction, always ask your healthcare professional. [address] [address] any warranty or liability for your use of this information.

## 2019-05-05 ENCOUNTER — HOSPITAL ENCOUNTER (EMERGENCY)
Age: 54
Discharge: HOME | End: 2019-05-05
Attending: EMERGENCY MEDICINE
Payer: MEDICARE

## 2019-05-05 VITALS
BODY MASS INDEX: 23.86 KG/M2 | OXYGEN SATURATION: 100 % | RESPIRATION RATE: 18 BRPM | SYSTOLIC BLOOD PRESSURE: 184 MMHG | DIASTOLIC BLOOD PRESSURE: 95 MMHG | HEART RATE: 116 BPM | WEIGHT: 139 LBS | TEMPERATURE: 98.3 F

## 2019-05-05 DIAGNOSIS — R11.0 NAUSEA WITHOUT VOMITING: ICD-10-CM

## 2019-05-05 DIAGNOSIS — K59.01 SLOW TRANSIT CONSTIPATION: Primary | ICD-10-CM

## 2019-05-05 PROCEDURE — 74011250636 HC RX REV CODE- 250/636: Performed by: EMERGENCY MEDICINE

## 2019-05-05 PROCEDURE — 74011250637 HC RX REV CODE- 250/637: Performed by: EMERGENCY MEDICINE

## 2019-05-05 PROCEDURE — 99283 EMERGENCY DEPT VISIT LOW MDM: CPT

## 2019-05-05 PROCEDURE — 96372 THER/PROPH/DIAG INJ SC/IM: CPT

## 2019-05-05 RX ORDER — DICYCLOMINE HYDROCHLORIDE 10 MG/1
10 CAPSULE ORAL 4 TIMES DAILY
Qty: 20 CAP | Refills: 0 | Status: SHIPPED | OUTPATIENT
Start: 2019-05-05 | End: 2019-05-10

## 2019-05-05 RX ORDER — ONDANSETRON 4 MG/1
8 TABLET, ORALLY DISINTEGRATING ORAL
Status: COMPLETED | OUTPATIENT
Start: 2019-05-05 | End: 2019-05-05

## 2019-05-05 RX ORDER — DICYCLOMINE HYDROCHLORIDE 10 MG/ML
20 INJECTION INTRAMUSCULAR
Status: COMPLETED | OUTPATIENT
Start: 2019-05-05 | End: 2019-05-05

## 2019-05-05 RX ORDER — FAMOTIDINE 20 MG/1
20 TABLET, FILM COATED ORAL 2 TIMES DAILY
Qty: 20 TAB | Refills: 0 | Status: SHIPPED | OUTPATIENT
Start: 2019-05-05 | End: 2019-07-18

## 2019-05-05 RX ADMIN — ONDANSETRON 8 MG: 4 TABLET, ORALLY DISINTEGRATING ORAL at 05:18

## 2019-05-05 RX ADMIN — DICYCLOMINE HYDROCHLORIDE 20 MG: 20 INJECTION, SOLUTION INTRAMUSCULAR at 05:18
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
## 2019-05-05 NOTE — DISCHARGE INSTRUCTIONS
Patient Education        Constipation: Care Instructions  Your Care Instructions    Constipation means that you have a hard time passing stools (bowel movements). People pass stools from 3 times a day to once every 3 days. What is normal for you may be different. Constipation may occur with pain in the rectum and cramping. The pain may get worse when you try to pass stools. Sometimes there are small amounts of bright red blood on toilet paper or the surface of stools. This is because of enlarged veins near the rectum (hemorrhoids). A few changes in your diet and lifestyle may help you avoid ongoing constipation. Your doctor may also prescribe medicine to help loosen your stool. Some medicines can cause constipation. These include pain medicines and antidepressants. Tell your doctor about all the medicines you take. Your doctor may want to make a medicine change to ease your symptoms. Follow-up care is a key part of your treatment and safety. Be sure to make and go to all appointments, and call your doctor if you are having problems. It's also a good idea to know your test results and keep a list of the medicines you take. How can you care for yourself at home? · Drink plenty of fluids, enough so that your urine is light yellow or clear like water. If you have kidney, heart, or liver disease and have to limit fluids, talk with your doctor before you increase the amount of fluids you drink. · Include high-fiber foods in your diet each day. These include fruits, vegetables, beans, and whole grains. · Get at least 30 minutes of exercise on most days of the week. Walking is a good choice. You also may want to do other activities, such as running, swimming, cycling, or playing tennis or team sports. · Take a fiber supplement, such as Citrucel or Metamucil, every day. Read and follow all instructions on the label. · Schedule time each day for a bowel movement. A daily routine may help.  Take your time having your bowel movement. · Support your feet with a small step stool when you sit on the toilet. This helps flex your hips and places your pelvis in a squatting position. · Your doctor may recommend an over-the-counter laxative to relieve your constipation. Examples are Milk of Magnesia and MiraLax. Read and follow all instructions on the label. Do not use laxatives on a long-term basis. When should you call for help? Call your doctor now or seek immediate medical care if:    · You have new or worse belly pain.     · You have new or worse nausea or vomiting.     · You have blood in your stools.    Watch closely for changes in your health, and be sure to contact your doctor if:    · Your constipation is getting worse.     · You do not get better as expected. Where can you learn more? Go to http://nery-peña.info/. Enter 21 [phone] in the search box to learn more about \"Constipation: Care Instructions. \"  Current as of: September 23, 2018  Content Version: 11.9  © 2763-3153 [employer]. Care instructions adapted under license by [employer] (which disclaims liability or warranty for this information). If you have questions about a medical condition or this instruction, always ask your healthcare professional. [place] [address] any warranty or liability for your use of this information.

## 2019-05-05 NOTE — ED NOTES
Patient presents to the ED with complaint of constipation - Patient was seen yesterday on 05/04/2019 for constipation and was given prescription for Golytely and states that they didn't have time to fill her prescription for the constipation and now she has returned

## 2019-05-06 LAB
BACTERIA SPEC CULT: NORMAL
CC UR VC: NORMAL
SERVICE CMNT-IMP: NORMAL

## 2019-07-18 ENCOUNTER — HOSPITAL ENCOUNTER (EMERGENCY)
Age: 54
Discharge: HOME | End: 2019-07-18
Attending: EMERGENCY MEDICINE
Payer: MEDICARE

## 2019-07-18 VITALS
WEIGHT: 140 LBS | RESPIRATION RATE: 16 BRPM | HEIGHT: 65 IN | DIASTOLIC BLOOD PRESSURE: 85 MMHG | SYSTOLIC BLOOD PRESSURE: 152 MMHG | TEMPERATURE: 98.1 F | OXYGEN SATURATION: 100 % | HEART RATE: 84 BPM | BODY MASS INDEX: 23.32 KG/M2

## 2019-07-18 DIAGNOSIS — G89.29 ACUTE EXACERBATION OF CHRONIC LOW BACK PAIN: Primary | ICD-10-CM

## 2019-07-18 DIAGNOSIS — M54.50 ACUTE EXACERBATION OF CHRONIC LOW BACK PAIN: Primary | ICD-10-CM

## 2019-07-18 PROCEDURE — 74011250637 HC RX REV CODE- 250/637: Performed by: PHYSICIAN ASSISTANT

## 2019-07-18 PROCEDURE — 74011000250 HC RX REV CODE- 250: Performed by: PHYSICIAN ASSISTANT

## 2019-07-18 PROCEDURE — 99284 EMERGENCY DEPT VISIT MOD MDM: CPT

## 2019-07-18 RX ORDER — METHOCARBAMOL 750 MG/1
750 TABLET, FILM COATED ORAL 4 TIMES DAILY
Qty: 20 TAB | Refills: 0 | Status: SHIPPED | OUTPATIENT
Start: 2019-07-18 | End: 2019-09-12

## 2019-07-18 RX ORDER — LIDOCAINE 4 G/100G
1 PATCH TOPICAL
Status: DISCONTINUED | OUTPATIENT
Start: 2019-07-18 | End: 2019-07-18 | Stop reason: HOSPADM

## 2019-07-18 RX ORDER — METHOCARBAMOL 750 MG/1
750 TABLET, FILM COATED ORAL
Status: COMPLETED | OUTPATIENT
Start: 2019-07-18 | End: 2019-07-18

## 2019-07-18 RX ADMIN — METHOCARBAMOL TABLETS 750 MG: 750 TABLET, COATED ORAL at 10:16
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
## 2019-07-18 NOTE — ED NOTES
Pt reports she took extra strength aspirin without relief this morning. Pain of left lower back has been bothering her x 8 months. Lidocaine patch not effective. Pt reports when sitting left lower back is noted and when standing the pain goes down her left leg, pt does have neuropathy. Pain got worse so came in today.

## 2019-07-18 NOTE — ED NOTES
CHARITY Molina have reviewed discharge instructions with the patient. The patient verbalized understanding. Pt left ambulatory with family driving pt home.

## 2019-07-18 NOTE — DISCHARGE INSTRUCTIONS
Patient Education        Getting Back to Normal After Low Back Pain: Care Instructions  Your Care Instructions  Almost everyone has low back pain at some time. The good news is that most low back pain will go away in a few days or weeks with some basic self-care. Some people are afraid that doing too much may make their pain worse. In the past, people stayed in bed, thinking this would help their backs. Now doctors think that, in most cases, getting back to your normal activities is good for your back, as long as you avoid doing things that make your pain worse. Follow-up care is a key part of your treatment and safety. Be sure to make and go to all appointments, and call your doctor if you are having problems. It's also a good idea to know your test results and keep a list of the medicines you take. How can you care for yourself at home? Ease back into daily activities  · For the first day or two of pain, take it easy. But as soon as possible, get back to your normal daily life and activities. · Get gentle exercise, such as walking. Movement keeps your spine flexible and helps your muscles stay strong. · If you are an athlete, return to your activity carefully. Choose a low-impact option until your pain is under control. Avoid or change activities that cause pain  · Try to avoid too much bending, heavy lifting, or reaching. These movements put extra stress on your back. · In bed, try lying on your side with a pillow between your knees. Or lie on your back on the floor with a pillow under your knees. · When you sit, place a small pillow, a rolled-up towel, or a lumbar roll in the curve of your back for extra support. · Try putting one foot up on a stool or changing positions every few minutes if you have to stand still for a period of time. Pay attention to body mechanics and posture  Body mechanics are the way you use your body. Posture is the way you sit or stand. · Take extra care when you lift.  When you must lift, bend your knees and keep your back straight. Avoid twisting, and keep the load close to your body. · Stand or sit tall, with your shoulders back and your stomach pulled in to support your back. Get support when you need it  · Let people know when you need a helping hand. Get family members or friends to help out with tasks you cannot do right now. · Be honest with your doctor about how the pain affects you. · If you have had to take time off work, talk to your doctor and boss about a gradual [id number] plan. Find out if there are other ways you could do your job to avoid hurting your back again. Reduce stress  Worrying about the pain can cause you to tense the muscles in your lower back. This in turn causes more pain. Here are a few things you can do to relax your mind and your muscles:  · Take 10 to 15 minutes to sit quietly and breathe deeply. Try to focus only on your breathing. If you cannot keep thoughts away, think about things that make you feel good. · Get involved in your favorite hobby, or try something new. · Talk to a friend, read a book, or listen to your favorite music. · Find a counselor you like and trust. Talk openly and honestly about your problems. Be willing to make some changes. When should you call for help? Call 911 anytime you think you may need emergency care. For example, call if:    · You are unable to move a leg at all.   [facility] your doctor now or seek immediate medical care if:    · You have new or worse symptoms in your legs, belly, or buttocks. Symptoms may include:  ? Numbness or tingling. ? Weakness. ? Pain.     · You lose bladder or bowel control.    Watch closely for changes in your health, and be sure to contact your doctor if:    · You have a fever, lose weight, or don't feel well.     · You are not getting better as expected. Where can you learn more? Go to http://nery-peña.info/.   Enter L696 in the search box to learn more about \"Getting Back to Normal After Low Back Pain: Care Instructions. \"  Current as of: September 20, 2018  Content Version: 11.9  © 3177-4667 [employer]. Care instructions adapted under license by [employer] (which disclaims liability or warranty for this information). If you have questions about a medical condition or this instruction, always ask your healthcare professional. [facility] any warranty or liability for your use of this information. Patient Education        Chronic Pain: Care Instructions  Your Care Instructions    Chronic pain is pain that lasts a long time (months or even years) and may or may not have a clear cause. It is different from acute pain, which usually does have a clear cause--like an injury or illness--and gets better over time. Chronic pain:  · Lasts over time but may vary from day to day. · Does not go away despite efforts to end it. · May disrupt your sleep and lead to fatigue. · May cause depression or anxiety. · May make your muscles tense, causing more pain. · Can disrupt your work, hobbies, home life, and relationships with friends and family. Chronic pain is a very real condition. It is not just in your head. Treatment can help and usually includes several methods used together, such as medicines, physical therapy, exercise, and other treatments. Learning how to relax and changing negative thought patterns can also help you cope. Chronic pain is complex. Taking an active role in your treatment will help you better manage your pain. Tell your doctor if you have trouble dealing with your pain. You may have to try several things before you find what works best for you. Follow-up care is a key part of your treatment and safety. Be sure to make and go to all appointments, and call your doctor if you are having problems. It's also a good idea to know your test results and keep a list of the medicines you take.   How can you care for yourself at home? · Pace yourself. Break up large jobs into smaller tasks. Save harder tasks for days when you have less pain, or go back and forth between hard tasks and easier ones. Take rest breaks. · Relax, and reduce stress. Relaxation techniques such as deep breathing or meditation can help. · Keep moving. Gentle, daily exercise can help reduce pain over the long run. Try low- or no-impact exercises such as walking, swimming, and stationary biking. Do stretches to stay flexible. · Try heat, cold packs, and massage. · Get enough sleep. Chronic pain can make you tired and drain your energy. Talk with your doctor if you have trouble sleeping because of pain. · Think positive. Your thoughts can affect your pain level. Do things that you enjoy to distract yourself when you have pain instead of focusing on the pain. See a movie, read a book, listen to music, or spend time with a friend. · If you think you are depressed, talk to your doctor about treatment. · Keep a daily pain diary. Record how your moods, thoughts, sleep patterns, activities, and medicine affect your pain. You may find that your pain is worse during or after certain activities or when you are feeling a certain emotion. Having a record of your pain can help you and your doctor find the best ways to treat your pain. · Take pain medicines exactly as directed. ? If the doctor gave you a prescription medicine for pain, take it as prescribed. ? If you are not taking a prescription pain medicine, ask your doctor if you can take an over-the-counter medicine. Reducing constipation caused by pain medicine  · Include fruits, vegetables, beans, and whole grains in your diet each day. These foods are high in fiber. · Drink plenty of fluids, enough so that your urine is light yellow or clear like water.  If you have kidney, heart, or liver disease and have to limit fluids, talk with your doctor before you increase the amount of fluids you drink.  · If your doctor recommends it, get more exercise. Walking is a good choice. Bit by bit, increase the amount you walk every day. Try for at least 30 minutes on most days of the week. · Schedule time each day for a bowel movement. A daily routine may help. Take your time and do not strain when having a bowel movement. When should you call for help? Call your doctor now or seek immediate medical care if:    · Your pain gets worse or is out of control.     · You feel down or blue, or you do not enjoy things like you once did. You may be depressed, which is common in people with chronic pain. Depression can be treated.     · You have vomiting or cramps for more than 2 hours.    Watch closely for changes in your health, and be sure to contact your doctor if:    · You cannot sleep because of pain.     · You are very worried or anxious about your pain.     · You have trouble taking your pain medicine.     · You have any concerns about your pain medicine.     · You have trouble with bowel movements, such as:  ? No bowel movement in 3 days. ? Blood in the anal area, in your stool, or on the toilet paper. ? Diarrhea for more than 24 hours. Where can you learn more? Go to http://nery-peña.info/. Enter N004 in the search box to learn more about \"Chronic Pain: Care Instructions. \"  Current as of: Lety 3, 2018  Content Version: 11.9  © 6500-5583 [employer]. Care instructions adapted under license by [employer] (which disclaims liability or warranty for this information). If you have questions about a medical condition or this instruction, always ask your healthcare professional. [place] [address] any warranty or liability for your use of this information.        List of Pain Management Specialists:    Hector Jacome M.D. [phone] Pain Management  Major Leslie M.D. [phone] Physical Medicine and Rehabilitation  Keith Ma BEHZAD QUINN [phone] Physical Medicine and Sarah Toney M.D. [phone] Pain Management  Brien Redmond M.D. [phone] Pain Management    Dr. Haritha Aquino, [address] [facility] [address]                                         [address] [place] [address], 995 [facility], [address] [place] [address]  [phone]    [facility]                            LUIS DANIEL Rivas MD DO  [address]                               200 [facility], [address] [address]                            1400 [facility], [address] [place] [address] Ave  [facility] 1209    Dr. Archana Oliva. [address] [address]                                                                            Dr. Katalina Conteh, [facility] [address] [facility]  [address] [id number] [facility], [facility], [address] [facility]                                        [address] [facility], [address]  [email or web address]. [email or web address]                                            [phone]                                                                                                                          Dr. Jared Mcneil Reach [address] [place], [place] [address] Makenzie Herrera, [address]       [phone]  [phone]              Dr. Solitario Buitrago  PCP/Pain Specialist                                   2900 [facility], [address] [place] [address]  21       Dr. Evelia Leary  [address] [place] [address]. Diane Bobo 74, [address] [address]  [phone]  [phone]     Please also consider natural alternatives to pain relief such as physical therapy, massage therapy, acupuncture, chiropractors, reflexology, and trigger point injections.

## 2019-07-19 ENCOUNTER — APPOINTMENT (OUTPATIENT)
Dept: GENERAL RADIOLOGY | Age: 54
End: 2019-07-19
Attending: EMERGENCY MEDICINE
Payer: MEDICARE

## 2019-07-19 ENCOUNTER — HOSPITAL ENCOUNTER (EMERGENCY)
Age: 54
Discharge: HOME | End: 2019-07-19
Attending: EMERGENCY MEDICINE
Payer: MEDICARE

## 2019-07-19 VITALS — HEART RATE: 97 BPM | DIASTOLIC BLOOD PRESSURE: 79 MMHG | SYSTOLIC BLOOD PRESSURE: 165 MMHG | OXYGEN SATURATION: 100 %

## 2019-07-19 DIAGNOSIS — G89.29 CHRONIC LEFT-SIDED LOW BACK PAIN WITH SCIATICA, SCIATICA LATERALITY UNSPECIFIED: Primary | ICD-10-CM

## 2019-07-19 DIAGNOSIS — I10 ACCELERATED HYPERTENSION: ICD-10-CM

## 2019-07-19 DIAGNOSIS — M54.40 CHRONIC LEFT-SIDED LOW BACK PAIN WITH SCIATICA, SCIATICA LATERALITY UNSPECIFIED: Primary | ICD-10-CM

## 2019-07-19 DIAGNOSIS — S39.012A ACUTE MYOFASCIAL STRAIN OF LUMBAR REGION, INITIAL ENCOUNTER: ICD-10-CM

## 2019-07-19 DIAGNOSIS — G89.4 CHRONIC PAIN SYNDROME: ICD-10-CM

## 2019-07-19 LAB
AMPHET UR QL SCN: NEGATIVE
APPEARANCE UR: CLEAR
BACTERIA URNS QL MICRO: NEGATIVE /HPF
BARBITURATES UR QL SCN: NEGATIVE
BENZODIAZ UR QL: NEGATIVE
BILIRUB UR QL: NEGATIVE
CANNABINOIDS UR QL SCN: NEGATIVE
COCAINE UR QL SCN: NEGATIVE
COLOR UR: ABNORMAL
DRUG SCRN COMMENT,DRGCM: NORMAL
EPITH CASTS URNS QL MICRO: ABNORMAL /LPF
GLUCOSE UR STRIP.AUTO-MCNC: 250 MG/DL
HGB UR QL STRIP: ABNORMAL
KETONES UR QL STRIP.AUTO: NEGATIVE MG/DL
LEUKOCYTE ESTERASE UR QL STRIP.AUTO: NEGATIVE
METHADONE UR QL: NEGATIVE
NITRITE UR QL STRIP.AUTO: NEGATIVE
OPIATES UR QL: NEGATIVE
PCP UR QL: NEGATIVE
PH UR STRIP: 6.5 [PH] (ref 5–8)
PROT UR STRIP-MCNC: 100 MG/DL
RBC #/AREA URNS HPF: ABNORMAL /HPF (ref 0–5)
SP GR UR REFRACTOMETRY: 1.01 (ref 1–1.03)
UA: UC IF INDICATED,UAUC: ABNORMAL
UROBILINOGEN UR QL STRIP.AUTO: 0.2 EU/DL (ref 0.2–1)
WBC URNS QL MICRO: ABNORMAL /HPF (ref 0–4)

## 2019-07-19 PROCEDURE — 74011250637 HC RX REV CODE- 250/637: Performed by: EMERGENCY MEDICINE

## 2019-07-19 PROCEDURE — 81001 URINALYSIS AUTO W/SCOPE: CPT

## 2019-07-19 PROCEDURE — 72100 X-RAY EXAM L-S SPINE 2/3 VWS: CPT

## 2019-07-19 PROCEDURE — 96372 THER/PROPH/DIAG INJ SC/IM: CPT

## 2019-07-19 PROCEDURE — 99284 EMERGENCY DEPT VISIT MOD MDM: CPT

## 2019-07-19 PROCEDURE — 80307 DRUG TEST PRSMV CHEM ANLYZR: CPT

## 2019-07-19 PROCEDURE — 74011250636 HC RX REV CODE- 250/636: Performed by: EMERGENCY MEDICINE

## 2019-07-19 RX ORDER — KETOROLAC TROMETHAMINE 30 MG/ML
30 INJECTION, SOLUTION INTRAMUSCULAR; INTRAVENOUS
Status: COMPLETED | OUTPATIENT
Start: 2019-07-19 | End: 2019-07-19

## 2019-07-19 RX ORDER — DICLOFENAC SODIUM 75 MG/1
75 TABLET, DELAYED RELEASE ORAL 2 TIMES DAILY
Qty: 20 TAB | Refills: 0 | Status: SHIPPED | OUTPATIENT
Start: 2019-07-19 | End: 2019-08-02

## 2019-07-19 RX ORDER — DIAZEPAM 5 MG/1
5 TABLET ORAL
Status: COMPLETED | OUTPATIENT
Start: 2019-07-19 | End: 2019-07-19

## 2019-07-19 RX ORDER — DEXAMETHASONE SODIUM PHOSPHATE 4 MG/ML
10 INJECTION, SOLUTION INTRA-ARTICULAR; INTRALESIONAL; INTRAMUSCULAR; INTRAVENOUS; SOFT TISSUE
Status: DISCONTINUED | OUTPATIENT
Start: 2019-07-19 | End: 2019-07-19

## 2019-07-19 RX ORDER — GABAPENTIN 300 MG/1
300 CAPSULE ORAL ONCE
Status: COMPLETED | OUTPATIENT
Start: 2019-07-19 | End: 2019-07-19

## 2019-07-19 RX ORDER — CLONIDINE HYDROCHLORIDE 0.1 MG/1
0.2 TABLET ORAL
Status: COMPLETED | OUTPATIENT
Start: 2019-07-19 | End: 2019-07-19

## 2019-07-19 RX ADMIN — KETOROLAC TROMETHAMINE 30 MG: 30 INJECTION, SOLUTION INTRAMUSCULAR at 02:00

## 2019-07-19 RX ADMIN — CLONIDINE HYDROCHLORIDE 0.2 MG: 0.1 TABLET ORAL at 02:01

## 2019-07-19 RX ADMIN — GABAPENTIN 300 MG: 300 CAPSULE ORAL at 02:01

## 2019-07-19 RX ADMIN — DIAZEPAM 5 MG: 5 TABLET ORAL at 02:01

## 2019-07-19 NOTE — ED PROVIDER NOTES
EMERGENCY DEPARTMENT HISTORY AND PHYSICAL EXAM      Date: 7/19/2019  Patient Name: Ciro Tang  Patient Age and Sex: 48 y.o. female    History of Presenting Illness     Chief Complaint   Patient presents with    Back Pain     pt has chronic pain, seen here yesterday went home with pain meds, however states the pain is unrelieved        History Provided By: Patient    HPI: Ciro Tang, 48 y.o. female with past medical history as documented below presents to the ED with c/o of persistent two days of left sided lower back pain unrelieved with home remedies and OTC aspirin and Tylenol. Pt states she has h/o chronic low back pain and was seen yesterday in the ED and prescribed Robaxin. She has taken the medications without relief. She denies any numbness, weakness, saddle anesthesia, urinary or stool incontinence. She denies any recent trauma or injuries. She does state that movement makes the pain worse and rest and warm compresses help relieve the pain. Pt is well-known to the ED for h/o depression, ETOH abuse, opioid use, drug seeking behavior. Pt denies any other alleviating or exacerbating factors. Additionally, pt specifically denies any recent fever, chills, headache, nausea, vomiting, abdominal pain, CP, SOB, lightheadedness, dizziness, numbness, weakness, BLE swelling, heart palpitations, urinary sxs, diarrhea, constipation, melena, hematochezia, cough, or congestion. PCP: Pavan Land, NP    There are no other complaints, changes or physical findings at this time.      Past History   Past Medical History:  Past Medical History:   Diagnosis Date    C. difficile colitis 6/2012    Chronic low back pain     CKD (chronic kidney disease)     stage 3 to 4, baseline Cr 2    Constipation     Diabetes (HCC)     A1c 8.2 3/2012    Hep C w/o coma, chronic (HCC)     Hyperlipemia     Hypertension     Lumbar disc disease     Migraines     Pancreatitis 5101    alcoholic    UTI (lower urinary tract infection) 6/20012       Past Surgical History:  Past Surgical History:   Procedure Laterality Date    HX ORTHOPAEDIC      lumbar sprain; back surgery    HX UROLOGICAL  2014    kidney stone removed    NV COLONOSCOPY FLX DX W/COLLJ SPEC WHEN PFRMD  11/12/2012            Family History:  Family History   Problem Relation Age of Onset    Diabetes Mother     Kidney Disease Mother     Diabetes Sister        Social History:  Social History     Tobacco Use    Smoking status: Never Smoker    Smokeless tobacco: Never Used   Substance Use Topics    Alcohol use: No     Comment: Quit few months ago, hx of abuse    Drug use: No       Allergies:  No Known Allergies    Current Medications:  Current Facility-Administered Medications on File Prior to Encounter   Medication Dose Route Frequency Provider Last Rate Last Dose    [COMPLETED] methocarbamol (ROBAXIN) tablet 750 mg  750 mg Oral NOW Estela Chamorro PA   750 mg at 07/18/19 1016    [COMPLETED] lidocaine 4 % patch 1 Patch  1 Patch TransDERmal NOW Genny Anaya PA   1 Patch at 07/18/19 1015     Current Outpatient Medications on File Prior to Encounter   Medication Sig Dispense Refill    methocarbamol (ROBAXIN-750) 750 mg tablet Take 1 Tab by mouth four (4) times daily. 20 Tab 0    ondansetron (ZOFRAN ODT) 4 mg disintegrating tablet Take 1 Tab by mouth every eight (8) hours as needed for Nausea. 20 Tab 0    insulin glargine U-300 conc 300 unit/mL (1.5 mL) inpn 25 Units by SubCUTAneous route daily.  lidocaine (LIDOCARE) 4 % patch 1 Patch by TransDERmal route every twelve (12) hours every twelve (12) hours. 10 Patch 0    insulin aspart U-100 (NOVOLOG) 100 unit/mL injection 5 Units by SubCUTAneous route Before breakfast, lunch, and dinner.  aspirin 81 mg chewable tablet Take 1 Tab by mouth daily. 30 Tab 1    cloNIDine HCl (CATAPRES) 0.1 mg tablet Take 1 Tab by mouth two (2) times a day.  60 Tab 1    metoprolol tartrate (LOPRESSOR) 50 mg tablet Take 50 mg by mouth two (2) times a day.  sodium bicarbonate 650 mg tablet Take 650 mg by mouth two (2) times a day.  atorvastatin (LIPITOR) 20 mg tablet Take 20 mg by mouth nightly. Review of Systems   Review of Systems   Constitutional: Negative. Negative for chills and fever. HENT: Negative. Negative for congestion, facial swelling, rhinorrhea, sore throat, trouble swallowing and voice change. Eyes: Negative. Respiratory: Negative. Negative for apnea, cough, chest tightness, shortness of breath and wheezing. Cardiovascular: Negative. Negative for chest pain, palpitations and leg swelling. Gastrointestinal: Negative. Negative for abdominal distention, abdominal pain, blood in stool, constipation, diarrhea, nausea and vomiting. Endocrine: Negative. Negative for cold intolerance, heat intolerance and polyuria. Genitourinary: Negative. Negative for difficulty urinating, dysuria, flank pain, frequency, hematuria and urgency. Musculoskeletal: Positive for back pain. Negative for arthralgias, myalgias, neck pain and neck stiffness. Skin: Negative. Negative for color change and rash. Neurological: Negative. Negative for dizziness, syncope, facial asymmetry, speech difficulty, weakness, light-headedness, numbness and headaches. Hematological: Negative. Does not bruise/bleed easily. Psychiatric/Behavioral: Negative. Negative for confusion and self-injury. The patient is not nervous/anxious. Physical Exam   Physical Exam   Constitutional: She is oriented to person, place, and time. She appears well-developed and well-nourished. No distress. HENT:   Head: Normocephalic and atraumatic. Mouth/Throat: Oropharynx is clear and moist. No oropharyngeal exudate. Eyes: Pupils are equal, round, and reactive to light. Conjunctivae and EOM are normal.   Neck: Normal range of motion. Cardiovascular: Normal rate, regular rhythm and normal heart sounds.  Exam reveals no gallop and no friction rub. No murmur heard. Pulmonary/Chest: Effort normal and breath sounds normal. No respiratory distress. She has no wheezes. She has no rales. She exhibits no tenderness. Abdominal: Soft. Bowel sounds are normal. She exhibits no distension and no mass. There is no tenderness. There is no rebound and no guarding. Musculoskeletal: Normal range of motion. She exhibits tenderness (TTP paralumbar spine, no midline TTP, no stepoffs, negative SLR bilaterally). She exhibits no edema or deformity. Neurological: She is alert and oriented to person, place, and time. She displays normal reflexes. No cranial nerve deficit. She exhibits normal muscle tone. Coordination normal.   Skin: Skin is warm. No rash noted. She is not diaphoretic. Psychiatric: She has a normal mood and affect. Nursing note and vitals reviewed.       Diagnostic Study Results     Labs -  Recent Results (from the past 24 hour(s))   URINALYSIS W/ REFLEX CULTURE    Collection Time: 07/19/19  2:36 AM   Result Value Ref Range    Color YELLOW/STRAW      Appearance CLEAR CLEAR      Specific gravity 1.008 1.003 - 1.030      pH (UA) 6.5 5.0 - 8.0      Protein 100 (A) NEG mg/dL    Glucose 250 (A) NEG mg/dL    Ketone NEGATIVE  NEG mg/dL    Bilirubin NEGATIVE  NEG      Blood MODERATE (A) NEG      Urobilinogen 0.2 0.2 - 1.0 EU/dL    Nitrites NEGATIVE  NEG      Leukocyte Esterase NEGATIVE  NEG      WBC 0-4 0 - 4 /hpf    RBC 5-10 0 - 5 /hpf    Epithelial cells FEW FEW /lpf    Bacteria NEGATIVE  NEG /hpf    UA:UC IF INDICATED CULTURE NOT INDICATED BY UA RESULT CNI     DRUG SCREEN, URINE    Collection Time: 07/19/19  2:36 AM   Result Value Ref Range    AMPHETAMINES NEGATIVE  NEG      BARBITURATES NEGATIVE  NEG      BENZODIAZEPINES NEGATIVE  NEG      COCAINE NEGATIVE  NEG      METHADONE NEGATIVE  NEG      OPIATES NEGATIVE  NEG      PCP(PHENCYCLIDINE) NEGATIVE  NEG      THC (TH-CANNABINOL) NEGATIVE  NEG      Drug screen comment (NOTE) Radiologic Studies -   XR SPINE LUMB 2 OR 3 V           CT Results  (Last 48 hours)    None        CXR Results  (Last 48 hours)    None          Medical Decision Making   I am the first provider for this patient. I reviewed the vital signs, available nursing notes, past medical history, past surgical history, family history and social history. Vital Signs-Reviewed the patient's vital signs. Patient Vitals for the past 24 hrs:   Pulse BP SpO2   07/19/19 0245  165/79 100 %   07/19/19 0235  (!) 171/92    07/19/19 0200 97 (!) 191/123 99 %   07/19/19 0145  (!) 199/110 100 %   07/19/19 0130  (!) 210/112 98 %   07/19/19 0126   100 %   07/19/19 0126  (!) 200/114 100 %   07/19/19 0115  (!) 205/117 100 %   07/19/19 0111  (!) 209/114        Pulse Oximetry Analysis - 100% on RA    Cardiac Monitor:   Rate: 97 bpm  Rhythm: Normal Sinus Rhythm      Records Reviewed: Nursing Notes, Old Medical Records, Previous electrocardiograms, Previous Radiology Studies and Previous Laboratory Studies    Provider Notes (Medical Decision Making): The patient presents with chronic low back pain. Stable vitals and benign exam. DDx: strain, sprain, sciatica, MSK pain. Clinical presentation not consistent with  pathology, aortic dissection or AAA. There is no urine/bowel incontinence or perianal numbess to suggest cauda equina. No fever/chills, IVDA to suggest epidural abscess or discitis. No focal weakness or sensory changes to suggest transverse myelitis. Therefore MRI not indicated. No risk of compression fracture (not in right age group or suffer from [address]) or trauma to warrant x-ray. I have recommended rest, avoiding heavy lifting until better, use of intermittent heat (avoid sleeping on a heating pad), and use of OTC NSAID's (Advil, Aleve etc) or Tylenol prn for pain. Call PCP if back pain persists or she develops leg symptoms or other concerning red flags. Will provide pain control and refer to PT.     ED Course:   Initial assessment performed. The patients presenting problems have been discussed, and they are in agreement with the care plan formulated and outlined with them. I have encouraged them to ask questions as they arise throughout their visit. HYPERTENSION COUNSELING   Education was provided to the patient today regarding their hypertension. Patient is made aware of their elevated blood pressure and is instructed to follow up this week with their Primary Care for a recheck. Patient is counseled regarding consequences of chronic, uncontrolled hypertension including kidney disease, heart disease, stroke or even death. Patient states their understanding and agrees to follow up this week. Additionally, during their visit, I discussed sodium restriction, maintaining ideal body weight and regular exercise program as physiologic means to achieve blood pressure control. The patient will strive towards this. I reviewed our electronic medical record system for any past medical records that were available that may contribute to the patient's current condition, the nursing notes and vital signs from today's visit. Maira Peters MD    Medications Administered During ED Course:  Medications   ketorolac (TORADOL) injection 30 mg (30 mg IntraMUSCular Given 7/19/19 0200)   cloNIDine HCl (CATAPRES) tablet 0.2 mg (0.2 mg Oral Given 7/19/19 0201)   gabapentin (NEURONTIN) capsule 300 mg (300 mg Oral Given 7/19/19 0201)   diazePAM (VALIUM) tablet 5 mg (5 mg Oral Given 7/19/19 0201)     Progress Note:  Pt is a part of the ED care management of plan due to his frequency of ED visits. Will obtain UDS.  reviewed with patient. Will consider non-narcotic therapy and will consult his pain management specialist at time of dispo. Pt was educated on the appropriate use of the ED and has been provided with the High ED utilizer letter and [facility] Opioid Prescribing Guidelines.     Management plan reviewed with patient. Pt verbalized understanding.  reviewedYes  UDS ObtainedYes  Case Management notifedNo  Referral for treatmentYes  Brochure/letter given/reviewed Yes  [employer] referralNo    Progress Note:  Patient has been reassessed and reports feeling better and symptoms have improved after ED treatment. Dela Cushing is able to tolerate PO and ambulate per baseline. Beth Felipe final labs and imaging have been reviewed with her. She has been counseled regarding her diagnosis. She verbally conveys understanding and agreement of the signs, symptoms, diagnosis, treatment and prognosis and additionally agrees to follow up as recommended with Dr. Luis Ritchie, BARBIE in 24 - 48 hours. She also agrees with the care-plan and conveys that all of her questions have been answered. I have also put together some discharge instructions for her that include: 1) educational information regarding their diagnosis, 2) how to care for their diagnosis at home, as well a 3) list of reasons why they would want to return to the ED prior to their follow-up appointment, should their condition change. I have answered all questions to the patient's satisfaction. Strict return precautions given. She both understood and agreed with plan as discussed above. Vital signs stable for discharge. Disposition: DISCHARGE     The pt is ready for discharge. The pt's signs, symptoms, diagnosis, and discharge instructions have been discussed and pt has conveyed their understanding. The pt is to follow up as recommended or return to ER should their symptoms worsen. Plan has been discussed and pt is in agreement. PLAN:  1.    Discharge Medication List as of 7/19/2019  3:41 AM      START taking these medications    Details   diclofenac EC (VOLTAREN) 75 mg EC tablet Take 1 Tab by mouth two (2) times a day., Print, Disp-20 Tab, R-0         CONTINUE these medications which have NOT CHANGED    Details   methocarbamol (ROBAXIN-750) 750 mg tablet Take 1 Tab by mouth four (4) times daily. , Normal, Disp-20 Tab, R-0      ondansetron (ZOFRAN ODT) 4 mg disintegrating tablet Take 1 Tab by mouth every eight (8) hours as needed for Nausea. , Print, Disp-20 Tab, R-0      insulin glargine U-300 conc 300 unit/mL (1.5 mL) inpn 25 Units by SubCUTAneous route daily. , Historical Med      lidocaine (LIDOCARE) 4 % patch 1 Patch by TransDERmal route every twelve (12) hours every twelve (12) hours. , Normal, Disp-10 Patch, R-0      insulin aspart U-100 (NOVOLOG) 100 unit/mL injection 5 Units by SubCUTAneous route Before breakfast, lunch, and dinner., Historical Med      aspirin 81 mg chewable tablet Take 1 Tab by mouth daily. , Print, Disp-30 Tab, R-1      cloNIDine HCl (CATAPRES) 0.1 mg tablet Take 1 Tab by mouth two (2) times a day., Print, Disp-60 Tab, R-1      metoprolol tartrate (LOPRESSOR) 50 mg tablet Take 50 mg by mouth two (2) times a day., Historical Med      sodium bicarbonate 650 mg tablet Take 650 mg by mouth two (2) times a day., Historical Med      atorvastatin (LIPITOR) 20 mg tablet Take 20 mg by mouth nightly., Historical Med           2. Follow-up Information     Follow up With Specialties Details Why Contact Jacob Lazaro Patient, NP Nurse Practitioner   [facility]ha[facility] Rodgersmarko [address] [place] [id number]  [phone]      [facility] EMERGENCY DEPT Emergency Medicine  As needed, If symptoms worsen [address] [place] [address]  [phone]          Return to ED if worse  Diagnosis     Clinical Impression:   1. Chronic left-sided low back pain with sciatica, sciatica laterality unspecified    2. Chronic pain syndrome    3. Accelerated hypertension    4. Acute myofascial strain of lumbar region, initial encounter        Attestation:  I personally performed the services described in this documentation on this date 7/19/2019 for patient, Pranav Elliott. Bernabe De Paz MD    Please note that this dictation was completed with [employer], the [employer] voice recognition software.  Quite often unanticipated grammatical, syntax, homophones, and other interpretive errors are inadvertently transcribed by the computer software. Please disregard these errors. Please excuse any errors that have escaped final proofreading. This note will not be viewable in [address]

## 2019-07-19 NOTE — ED TRIAGE NOTES
Pt arrives via EMS with c/o back pain, left leg pain, and left hip pain that is chronic from an injury that occurred 20 years ago.     Pt states she was seen here yesterday and was given pain meds without relief in pain or symptoms     Pt placed x2 on monitor, pt A/O x4, bed in low psoition

## 2019-07-19 NOTE — DISCHARGE INSTRUCTIONS
Thank you for allowing us to take care of you today! We hope we addressed all of your concerns and needs. We strive to provide excellent quality care in the Emergency Department. You will receive a survey after your visit to evaluate the care you were provided. Should you receive a survey from us, we invite you to share your experience and tell us what made it excellent. It was a pleasure serving you, we invite you to share your experience with us, in our pursuit for excellence, should you be selected to receive a survey. The exam and treatment you received in the Emergency Department were for an urgent problem and are not intended as complete care. It is important that you follow up with a doctor, nurse practitioner, or physician assistant for ongoing care. If your symptoms become worse or you do not improve as expected and you are unable to reach your usual health care provider, you should return to the Emergency Department. We are available 24 hours a day. Please take your discharge instructions with you when you go to your follow-up appointment. If you have any problem arranging a follow-up appointment, contact the Emergency Department immediately. If a prescription has been provided, please have it filled as soon as possible to prevent a delay in treatment. Read the entire medication instruction sheet provided to you by the pharmacy. If you have any questions or reservations about taking the medication due to side effects or interactions with other medications, please call your primary care physician or contact the ER to speak with the charge nurse. Make an appointment with your family doctor or the physician you were referred to for follow-up of this visit as instructed on your discharge paperwork, as this is mandatory follow-up. Return to the ER if you are unable to be seen or if you are unable to be seen in a timely manner.     If you have any problem arranging the follow-up visit, contact the Emergency Department immediately. I hope you feel better and thank you again for allow us to provide you with excellent care today at [place] [address].! Warmest regards,    Bashir Mckay MD  Emergency Medicine Physician  [place] [address].              Patient Education        Back Pain: Care Instructions  Your Care Instructions    Back pain has many possible causes. It is often related to problems with muscles and ligaments of the back. It may also be related to problems with the nerves, discs, or bones of the back. Moving, lifting, standing, sitting, or sleeping in an awkward way can strain the back. Sometimes you don't notice the injury until later. Arthritis is another common cause of back pain. Although it may hurt a lot, back pain usually improves on its own within several weeks. Most people recover in 12 weeks or less. Using good home treatment and being careful not to stress your back can help you feel better sooner. Follow-up care is a key part of your treatment and safety. Be sure to make and go to all appointments, and call your doctor if you are having problems. It's also a good idea to know your test results and keep a list of the medicines you take. How can you care for yourself at home? · Sit or lie in positions that are most comfortable and reduce your pain. Try one of these positions when you lie down:  ? Lie on your back with your knees bent and supported by large pillows. ? Lie on the floor with your legs on the seat of a sofa or chair. ? Lie on your side with your knees and hips bent and a pillow between your legs. ? Lie on your stomach if it does not make pain worse. · Do not sit up in bed, and avoid soft couches and twisted positions. Bed rest can help relieve pain at first, but it delays healing. Avoid bed rest after the first day of back pain. · Change positions every 30 minutes.  If you must sit for long periods of time, take breaks from sitting. Get up and walk around, or lie in a comfortable position. · Try using a heating pad on a low or medium setting for 15 to 20 minutes every 2 or 3 hours. Try a warm shower in place of one session with the heating pad. · You can also try an ice pack for 10 to 15 minutes every 2 to 3 hours. Put a thin cloth between the ice pack and your skin. · Take pain medicines exactly as directed. ? If the doctor gave you a prescription medicine for pain, take it as prescribed. ? If you are not taking a prescription pain medicine, ask your doctor if you can take an over-the-counter medicine. · Take short walks several times a day. You can start with 5 to 10 minutes, 3 or 4 times a day, and work up to longer walks. Walk on level surfaces and avoid hills and stairs until your back is better. · Return to work and other activities as soon as you can. Continued rest without activity is usually not good for your back. · To prevent future back pain, do exercises to stretch and strengthen your back and stomach. Learn how to use good posture, safe lifting techniques, and proper body mechanics. When should you call for help? Call your doctor now or seek immediate medical care if:    · You have new or worsening numbness in your legs.     · You have new or worsening weakness in your legs. (This could make it hard to stand up.)     · You lose control of your bladder or bowels.    Watch closely for changes in your health, and be sure to contact your doctor if:    · You have a fever, lose weight, or don't feel well.     · You do not get better as expected. Where can you learn more? Go to http://nery-peña.info/. Enter K389 in the search box to learn more about \"Back Pain: Care Instructions. \"  Current as of: September 20, 2018  Content Version: 11.9  © 3427-1959 [employer].  Care instructions adapted under license by [employer] (which disclaims liability or warranty for this information). If you have questions about a medical condition or this instruction, always ask your healthcare professional. [place] [address] any warranty or liability for your use of this information.

## 2019-07-29 ENCOUNTER — OFFICE VISIT (OUTPATIENT)
Dept: SURGERY | Age: 54
End: 2019-07-29

## 2019-07-29 VITALS
OXYGEN SATURATION: 100 % | SYSTOLIC BLOOD PRESSURE: 123 MMHG | HEART RATE: 74 BPM | DIASTOLIC BLOOD PRESSURE: 81 MMHG | WEIGHT: 129.6 LBS | BODY MASS INDEX: 21.59 KG/M2 | RESPIRATION RATE: 20 BRPM | HEIGHT: 65 IN

## 2019-07-29 DIAGNOSIS — N18.5 CKD (CHRONIC KIDNEY DISEASE) STAGE 5, GFR LESS THAN 15 ML/MIN (HCC): Primary | ICD-10-CM

## 2019-07-29 NOTE — Clinical Note
Subjective: The patient is a 59-year-old woman, referred by Dr. Jose Reddy regarding dialysis access. The patient has CKD5, but is not presently requiring dialysis. I spoke with Dr. Paola Marcial because the patient will likely soon require dialysis. The patient is right handed. She has no history of pacemaker or defibrillator. The patient has no history of axillary node surgery. The patient reports relatively poor control of diabetes. She reports her blood sugars typically around the 200s. Patient has history in 2012 of C-difficile colitis. She has history of chronic low back pain and sciatica, chronic constipation, hepatitis C, hyperlipidemia, hypertension, migraines, alcoholic pancreatitis. The patient has never smoked. It is reported that the patient no longer uses alcohol. The patient reports that she does not consistently follow a diabetic diet. The patient denies history of MI or coronary intervention. She has no history of anginal chest pain. The patient denies shortness of breath at rest or with exertion. Physical Examination: 
GENERAL:  Patient is an alert woman in no acute distress. VITALS:  BP: 123/81. P: 74.  R: 20.  O2 sat: 100% on room air. WT: 129 lbs. HT: 5'5\". HEAD/NECK:  Examination showed no jaundice, mass or thyromegaly. CHEST:  Lungs clear bilaterally without rales, rhonchi or wheezes. CV:  Heart is regular without murmur, gallop or rub. NEURO:  The patient is alert and oriented. She moves all extremities equally. Patient's movements are symmetrical.  Speech is normal. 
EXTREMITIEES:  Examination of the upper extremities reveals 1-2+ palpable radial pulses bilaterally. Ulnar pulses were not palpable. On the right side ulnar artery doppler signal was very reduced. The right palmar arch did not appear by doppler exam to be intact.  
 
On the left side radial and ulnar doppler signals were normal and left palmar arch did appear intact to doppler exam.   
 
 Duplex ultrasound evaluation of the veins of the upper extremities was carried out for the purposes of dialysis access mapping. On the left side it was noted that radial and brachial arteries have some calcification. The median antebrachial vein on the left is small at 1.0 mm. The left basilic tributary was 1.5 mm diameter. There was a small cephalic vein at the upper arm about 1.5 mm diameter. The left posterior basilic vein tributary was 2.4 mm diameter at the elbow level. The left basilic trunk distally was 3.9 mm diameter. Most proximally the left basilic/brachial vein was 5 mm diameter. On the right side median antebrachial vein was 1 mm diameter. The basilic vein trunk distally was 1.2 mm diameter. Most proximally the brachial vein of the right was 3.7 mm diameter. The patient has on this ultrasound examination relatively marginal veins for creation of an arterial venous fistula. The better arteries are on the left arm. I spoke with Dr. Farnaz Shelton and she did feel that patient should proceed with arterial venous access, whether it is a fistula or a graft. I explained to the patient that I would plan to reassess veins with ultrasound after induction of general anesthesia in the operating room with plans for surgery on the left upper extremity. Depending on the appearance of the veins at that examination, I would determine whether we would plan for a creation of arterial venous fistula or insertion of arterial venous graft in the left upper extremity. If the patient had a fistula created, this should be the first of two separate operations in a staged manner. I would anticipate the surgery would be done as an outpatient. Typical operative and postoperative course were reviewed with the patient. Risks versus benefits of the surgery were reviewed with the patient.   Risks would include bleeding, infection, failure of the fistula or graft, ischemia of the hand, swelling of the arm, injury to vessels or nerves, risks associated with general anesthesia, etc. Patient understands and wishes to proceed. Final Diagnosis:  CKD5.  
 
Maciel Alba M.D.

## 2019-07-30 NOTE — PERIOP NOTES
Noted Na+ 125 on 5/4/19. Notified Rosales Escudero in Dr. Elza Keith office. Rosales Escudero to call back if any additional lab work is available.

## 2019-07-31 PROBLEM — R41.82 ALTERED MENTAL STATE: Status: RESOLVED | Noted: 2018-11-23 | Resolved: 2019-07-31

## 2019-07-31 PROBLEM — N18.5 CKD (CHRONIC KIDNEY DISEASE) STAGE 5, GFR LESS THAN 15 ML/MIN (HCC): Status: ACTIVE | Noted: 2018-11-23

## 2019-07-31 NOTE — PERIOP NOTES
[facility]  Preoperative Instructions        Surgery Date 8/2/19         Time of Arrival 1100 am    Contact # [phone]    1. On the day of your surgery, please report to the Surgical Services Registration Desk and sign in at your designated time. The Surgery Center is located to the right of the Emergency Room. 2. You must have someone with you to drive you home. You should not drive a car for 24 hours following surgery. Please make arrangements for a friend or family member to stay with you for the first 24 hours after your surgery. 3. Do not have anything to eat or drink (including water, gum, mints, coffee, juice) after midnight ? 8/1/19  . ? This may not apply to medications prescribed by your physician. ?(Please note below the special instructions with medications to take the morning of your procedure.)    4. We recommend you do not drink any alcoholic beverages for 24 hours before and after your surgery. 5. Contact your surgeons office for instructions on the following medications: non-steroidal anti-inflammatory drugs (i.e. Advil, Aleve), vitamins, and supplements. (Some surgeons will want you to stop these medications prior to surgery and others may allow you to take them)  **If you are currently taking Plavix, Coumadin, Aspirin and/or other blood-thinning agents, contact your surgeon for instructions. ** Your surgeon will partner with the physician prescribing these medications to determine if it is safe to stop or if you need to continue taking. Please do not stop taking these medications without instructions from your surgeon    6. Wear comfortable clothes. Wear glasses instead of contacts. Do not bring any money or jewelry. Please bring picture ID, insurance card, and any prearranged co-payment or hospital payment. Do not wear make-up, particularly mascara the morning of your surgery. Do not wear nail polish, particularly if you are having foot /hand surgery. Wear your hair loose or down, no ponytails, buns, joanie pins or clips. All body piercings must be removed. Please shower with antibacterial soap for three consecutive days before and on the morning of surgery, but do not apply any lotions, powders or deodorants after the shower on the day of surgery. Please use a fresh towels after each shower. Please sleep in clean clothes and change bed linens the night before surgery. Please do not shave for 48 hours prior to surgery. Shaving of the face is acceptable. 7. You should understand that if you do not follow these instructions your surgery may be cancelled. If your physical condition changes (I.e. fever, cold or flu) please contact your surgeon as soon as possible. 8. It is important that you be on time. If a situation occurs where you may be late, please call [phone] (OR Holding Area). 9. If you have any questions and or problems, please call [phone] (Pre-admission Testing). 10. Your surgery time may be subject to change. You will receive a phone call the evening prior if your time changes. 11.  If having outpatient surgery, you must have someone to drive you here, stay with you during the duration of your stay, and to drive you home at time of discharge. 12.   In an effort to improve the efficiency, privacy, and safety for all of our Pre-op patients visitors are not allowed in the Holding area. Once you arrive and are registered your family/visitors will be asked to remain in the waiting room. The Pre-op staff will get you from the Surgical Waiting Area and will explain to you and your family/visitors that the Pre-op phase is beginning. The staff will answer any questions and provide instructions for tracking of the patient, by use of the existing tracking number and color-coded status board in the waiting room.   At this time the staff will also ask for your designated spokesperson information in the event that the physician or staff need to provide an update or obtain any pertinent information. The designated spokesperson will be notified if the physician needs to speak to family during the pre-operative phase. If at any time your family/visitors has questions or concerns they may approach the volunteer desk in the waiting area for assistance. Special Instructions:    MEDICATIONS TO TAKE THE MORNING OF SURGERY WITH A SIP OF WATER: Clonidine, Tresiba, Metoprolol, Zofran if needed      I understand a pre-operative phone call will be made to verify my surgery time. In the event that I am not available, I give permission for a message to be left on my answering service and/or with another person?   yes         ___________________      __________   _________    (Signature of Patient)             (Witness)                (Date and Time)

## 2019-07-31 NOTE — PERIOP NOTES
Office notes dated 7/22/19 and labs dated 7/15/19 received from Dr. Ashely Chase. Copies on paper chart.

## 2019-08-01 ENCOUNTER — ANESTHESIA EVENT (OUTPATIENT)
Dept: SURGERY | Age: 54
End: 2019-08-01
Payer: MEDICARE

## 2019-08-01 NOTE — PROGRESS NOTES
Subjective: The patient is a 59-year-old woman, referred by Dr. Neeraj Tariq regarding dialysis access. The patient has CKD5, but is not presently requiring dialysis. I spoke with Dr. Hay Ramirez because the patient will likely soon require dialysis. The patient is right handed. She has no history of pacemaker or defibrillator. The patient has no history of axillary node surgery. The patient reports relatively poor control of diabetes. She reports her blood sugars typically around the 200s. Patient has history in 2012 of C-difficile colitis. She has history of chronic low back pain and sciatica, chronic constipation, hepatitis C, hyperlipidemia, hypertension, migraines, alcoholic pancreatitis. The patient has never smoked. It is reported that the patient no longer uses alcohol. The patient reports that she does not consistently follow a diabetic diet. The patient denies history of MI or coronary intervention. She has no history of anginal chest pain. The patient denies shortness of breath at rest or with exertion. Physical Examination: 
GENERAL:  Patient is an alert woman in no acute distress. VITALS:  BP: 123/81. P: 74.  R: 20.  O2 sat: 100% on room air. WT: 129 lbs. HT: 5'5\". HEAD/NECK:  Examination showed no jaundice, mass or thyromegaly. CHEST:  Lungs clear bilaterally without rales, rhonchi or wheezes. CV:  Heart is regular without murmur, gallop or rub. NEURO:  The patient is alert and oriented. She moves all extremities equally. Patient's movements are symmetrical.  Speech is normal. 
EXTREMITIEES:  Examination of the upper extremities reveals 1-2+ palpable radial pulses bilaterally. Ulnar pulses were not palpable. On the right side ulnar artery doppler signal was very reduced. The right palmar arch did not appear by doppler exam to be intact.  
 
On the left side radial and ulnar doppler signals were normal and left palmar arch did appear intact to doppler exam.   
 
 Duplex ultrasound evaluation of the veins of the upper extremities was carried out for the purposes of dialysis access mapping. On the left side it was noted that radial and brachial arteries have some calcification. The median antebrachial vein on the left is small at 1.0 mm. The left basilic tributary was 1.5 mm diameter. There was a small cephalic vein at the upper arm about 1.5 mm diameter. The left posterior basilic vein tributary was 2.4 mm diameter at the elbow level. The left basilic trunk distally was 3.9 mm diameter. Most proximally the left basilic/brachial vein was 5 mm diameter. On the right side median antebrachial vein was 1 mm diameter. The basilic vein trunk distally was 1.2 mm diameter. Most proximally the brachial vein of the right was 3.7 mm diameter. The patient has on this ultrasound examination relatively marginal veins for creation of an arterial venous fistula. The better arteries are on the left arm. I spoke with Dr. Syeda Katz and she did feel that patient should proceed with arterial venous access, whether it is a fistula or a graft. I explained to the patient that I would plan to reassess veins with ultrasound after induction of general anesthesia in the operating room with plans for surgery on the left upper extremity. Depending on the appearance of the veins at that examination, I would determine whether we would plan for a creation of arterial venous fistula or insertion of arterial venous graft in the left upper extremity. If the patient had a fistula created, this should be the first of two separate operations in a staged manner. I would anticipate the surgery would be done as an outpatient. Typical operative and postoperative course were reviewed with the patient. Risks versus benefits of the surgery were reviewed with the patient.   Risks would include bleeding, infection, failure of the fistula or graft, ischemia of the hand, swelling of the arm, injury to vessels or nerves, risks associated with general anesthesia, etc. Patient understands and wishes to proceed. Final Diagnosis:  CKD5.  
 
Darling Billy M.D.

## 2019-08-01 NOTE — ANESTHESIA PREPROCEDURE EVALUATION
Anesthetic History   No history of anesthetic complications            Review of Systems / Medical History  Patient summary reviewed, nursing notes reviewed and pertinent labs reviewed    Pulmonary  Within defined limits                 Neuro/Psych         Psychiatric history     Cardiovascular    Hypertension          Hyperlipidemia    Exercise tolerance: <4 METS     GI/Hepatic/Renal       Hepatitis: type C  Renal disease: ESRD and stones  Liver disease    Comments: H/O C.  Difficile Colitis  Gastroparesis  H/O Pancreatitis Endo/Other  Within defined limits  Diabetes: poorly controlled, type 1, using insulin         Other Findings   Comments: Alcoholism    Chronic Low Back Pain           Physical Exam    Airway  Mallampati: II  TM Distance: > 6 cm  Neck ROM: normal range of motion   Mouth opening: Normal     Cardiovascular  Regular rate and rhythm,  S1 and S2 normal,  no murmur, click, rub, or gallop             Dental    Dentition: Poor dentition  Comments: Several missing   Pulmonary  Breath sounds clear to auscultation               Abdominal  GI exam deferred       Other Findings            Anesthetic Plan    ASA: 3, emergent  Anesthesia type: general          Induction: Intravenous  Anesthetic plan and risks discussed with: Patient

## 2019-08-02 ENCOUNTER — HOSPITAL ENCOUNTER (OUTPATIENT)
Age: 54
Setting detail: OUTPATIENT SURGERY
Discharge: HOME | End: 2019-08-02
Attending: SURGERY | Admitting: SURGERY
Payer: MEDICARE

## 2019-08-02 ENCOUNTER — ANESTHESIA (OUTPATIENT)
Dept: SURGERY | Age: 54
End: 2019-08-02
Payer: MEDICARE

## 2019-08-02 VITALS
TEMPERATURE: 98.6 F | BODY MASS INDEX: 21.82 KG/M2 | WEIGHT: 130.95 LBS | HEIGHT: 65 IN | RESPIRATION RATE: 12 BRPM | SYSTOLIC BLOOD PRESSURE: 147 MMHG | DIASTOLIC BLOOD PRESSURE: 63 MMHG | HEART RATE: 92 BPM | OXYGEN SATURATION: 98 %

## 2019-08-02 DIAGNOSIS — L76.82 PAIN AT SURGICAL INCISION: Primary | ICD-10-CM

## 2019-08-02 LAB
ANION GAP BLD CALC-SCNC: 17 MMOL/L (ref 10–20)
BUN BLD-MCNC: 90 MG/DL (ref 9–20)
CA-I BLD-MCNC: 1.15 MMOL/L (ref 1.12–1.32)
CHLORIDE BLD-SCNC: 97 MMOL/L (ref 98–107)
CO2 BLD-SCNC: 23 MMOL/L (ref 21–32)
CREAT BLD-MCNC: 6.4 MG/DL (ref 0.6–1.3)
GLUCOSE BLD STRIP.AUTO-MCNC: 95 MG/DL (ref 65–100)
GLUCOSE BLD-MCNC: 99 MG/DL (ref 65–100)
HCT VFR BLD CALC: 33 % (ref 35–47)
POTASSIUM BLD-SCNC: 4 MMOL/L (ref 3.5–5.1)
SERVICE CMNT-IMP: ABNORMAL
SERVICE CMNT-IMP: NORMAL
SODIUM BLD-SCNC: 132 MMOL/L (ref 136–145)

## 2019-08-02 PROCEDURE — 80047 BASIC METABLC PNL IONIZED CA: CPT

## 2019-08-02 PROCEDURE — 77030013567 HC DRN WND RESERV BARD -A: Performed by: SURGERY

## 2019-08-02 PROCEDURE — 74011000250 HC RX REV CODE- 250: Performed by: SURGERY

## 2019-08-02 PROCEDURE — 77030026438 HC STYL ET INTUB CARD -A: Performed by: NURSE ANESTHETIST, CERTIFIED REGISTERED

## 2019-08-02 PROCEDURE — 77030002888 HC SUT CHRMC J&J -A: Performed by: SURGERY

## 2019-08-02 PROCEDURE — 77030040361 HC SLV COMPR DVT MDII -B: Performed by: SURGERY

## 2019-08-02 PROCEDURE — 77030019908 HC STETH ESOPH SIMS -A: Performed by: NURSE ANESTHETIST, CERTIFIED REGISTERED

## 2019-08-02 PROCEDURE — 74011250636 HC RX REV CODE- 250/636: Performed by: NURSE ANESTHETIST, CERTIFIED REGISTERED

## 2019-08-02 PROCEDURE — 82962 GLUCOSE BLOOD TEST: CPT

## 2019-08-02 PROCEDURE — 76210000016 HC OR PH I REC 1 TO 1.5 HR: Performed by: SURGERY

## 2019-08-02 PROCEDURE — 74011250636 HC RX REV CODE- 250/636: Performed by: SURGERY

## 2019-08-02 PROCEDURE — 74011000250 HC RX REV CODE- 250: Performed by: NURSE ANESTHETIST, CERTIFIED REGISTERED

## 2019-08-02 PROCEDURE — 74011000250 HC RX REV CODE- 250: Performed by: ANESTHESIOLOGY

## 2019-08-02 PROCEDURE — 77030008684 HC TU ET CUF COVD -B: Performed by: NURSE ANESTHETIST, CERTIFIED REGISTERED

## 2019-08-02 PROCEDURE — 77030002916 HC SUT ETHLN J&J -A: Performed by: SURGERY

## 2019-08-02 PROCEDURE — 77030011640 HC PAD GRND REM COVD -A: Performed by: SURGERY

## 2019-08-02 PROCEDURE — 76060000036 HC ANESTHESIA 2.5 TO 3 HR: Performed by: SURGERY

## 2019-08-02 PROCEDURE — 77030002996 HC SUT SLK J&J -A: Performed by: SURGERY

## 2019-08-02 PROCEDURE — 76210000023 HC REC RM PH II 2 TO 2.5 HR: Performed by: SURGERY

## 2019-08-02 PROCEDURE — 74011250636 HC RX REV CODE- 250/636: Performed by: ANESTHESIOLOGY

## 2019-08-02 PROCEDURE — 76010000132 HC OR TIME 2.5 TO 3 HR: Performed by: SURGERY

## 2019-08-02 PROCEDURE — 77030008467 HC STPLR SKN COVD -B: Performed by: SURGERY

## 2019-08-02 PROCEDURE — 77030002987 HC SUT PROL J&J -B: Performed by: SURGERY

## 2019-08-02 PROCEDURE — 77030020255 HC SOL INJ LR 1000ML BG: Performed by: SURGERY

## 2019-08-02 PROCEDURE — 77030002986 HC SUT PROL J&J -A: Performed by: SURGERY

## 2019-08-02 RX ORDER — CEFAZOLIN SODIUM/WATER 2 G/20 ML
2 SYRINGE (ML) INTRAVENOUS ONCE
Status: COMPLETED | OUTPATIENT
Start: 2019-08-02 | End: 2019-08-02

## 2019-08-02 RX ORDER — DIPHENHYDRAMINE HYDROCHLORIDE 50 MG/ML
12.5 INJECTION, SOLUTION INTRAMUSCULAR; INTRAVENOUS AS NEEDED
Status: DISCONTINUED | OUTPATIENT
Start: 2019-08-02 | End: 2019-08-02 | Stop reason: HOSPADM

## 2019-08-02 RX ORDER — CISATRACURIUM BESYLATE 2 MG/ML
INJECTION, SOLUTION INTRAVENOUS AS NEEDED
Status: DISCONTINUED | OUTPATIENT
Start: 2019-08-02 | End: 2019-08-02 | Stop reason: HOSPADM

## 2019-08-02 RX ORDER — FENTANYL CITRATE 50 UG/ML
INJECTION, SOLUTION INTRAMUSCULAR; INTRAVENOUS AS NEEDED
Status: DISCONTINUED | OUTPATIENT
Start: 2019-08-02 | End: 2019-08-02 | Stop reason: HOSPADM

## 2019-08-02 RX ORDER — LIDOCAINE HYDROCHLORIDE 20 MG/ML
INJECTION, SOLUTION EPIDURAL; INFILTRATION; INTRACAUDAL; PERINEURAL AS NEEDED
Status: DISCONTINUED | OUTPATIENT
Start: 2019-08-02 | End: 2019-08-02 | Stop reason: HOSPADM

## 2019-08-02 RX ORDER — MIDAZOLAM HYDROCHLORIDE 1 MG/ML
INJECTION, SOLUTION INTRAMUSCULAR; INTRAVENOUS AS NEEDED
Status: DISCONTINUED | OUTPATIENT
Start: 2019-08-02 | End: 2019-08-02 | Stop reason: HOSPADM

## 2019-08-02 RX ORDER — HYDROCODONE BITARTRATE AND ACETAMINOPHEN 5; 325 MG/1; MG/1
1 TABLET ORAL
Qty: 20 TAB | Refills: 0 | Status: SHIPPED | OUTPATIENT
Start: 2019-08-02 | End: 2019-08-06

## 2019-08-02 RX ORDER — PROPOFOL 10 MG/ML
INJECTION, EMULSION INTRAVENOUS AS NEEDED
Status: DISCONTINUED | OUTPATIENT
Start: 2019-08-02 | End: 2019-08-02 | Stop reason: HOSPADM

## 2019-08-02 RX ORDER — LIDOCAINE HYDROCHLORIDE 10 MG/ML
0.1 INJECTION, SOLUTION EPIDURAL; INFILTRATION; INTRACAUDAL; PERINEURAL AS NEEDED
Status: DISCONTINUED | OUTPATIENT
Start: 2019-08-02 | End: 2019-08-02 | Stop reason: HOSPADM

## 2019-08-02 RX ORDER — EPHEDRINE SULFATE/0.9% NACL/PF 50 MG/5 ML
SYRINGE (ML) INTRAVENOUS AS NEEDED
Status: DISCONTINUED | OUTPATIENT
Start: 2019-08-02 | End: 2019-08-02 | Stop reason: HOSPADM

## 2019-08-02 RX ORDER — PROCHLORPERAZINE EDISYLATE 5 MG/ML
INJECTION INTRAMUSCULAR; INTRAVENOUS
Status: DISCONTINUED
Start: 2019-08-02 | End: 2019-08-02 | Stop reason: HOSPADM

## 2019-08-02 RX ORDER — CEFAZOLIN SODIUM 1 G/3ML
2 INJECTION, POWDER, FOR SOLUTION INTRAMUSCULAR; INTRAVENOUS ONCE
Status: DISCONTINUED | OUTPATIENT
Start: 2019-08-02 | End: 2019-08-02 | Stop reason: SDUPTHER

## 2019-08-02 RX ORDER — ONDANSETRON 2 MG/ML
INJECTION INTRAMUSCULAR; INTRAVENOUS AS NEEDED
Status: DISCONTINUED | OUTPATIENT
Start: 2019-08-02 | End: 2019-08-02 | Stop reason: HOSPADM

## 2019-08-02 RX ORDER — FENTANYL CITRATE 50 UG/ML
25 INJECTION, SOLUTION INTRAMUSCULAR; INTRAVENOUS
Status: DISCONTINUED | OUTPATIENT
Start: 2019-08-02 | End: 2019-08-02 | Stop reason: HOSPADM

## 2019-08-02 RX ORDER — SODIUM CHLORIDE 0.9 % (FLUSH) 0.9 %
5-40 SYRINGE (ML) INJECTION AS NEEDED
Status: DISCONTINUED | OUTPATIENT
Start: 2019-08-02 | End: 2019-08-02 | Stop reason: HOSPADM

## 2019-08-02 RX ORDER — PHENYLEPHRINE HCL IN 0.9% NACL 0.4MG/10ML
SYRINGE (ML) INTRAVENOUS AS NEEDED
Status: DISCONTINUED | OUTPATIENT
Start: 2019-08-02 | End: 2019-08-02 | Stop reason: HOSPADM

## 2019-08-02 RX ORDER — SODIUM CHLORIDE, SODIUM LACTATE, POTASSIUM CHLORIDE, CALCIUM CHLORIDE 600; 310; 30; 20 MG/100ML; MG/100ML; MG/100ML; MG/100ML
25 INJECTION, SOLUTION INTRAVENOUS CONTINUOUS
Status: DISCONTINUED | OUTPATIENT
Start: 2019-08-02 | End: 2019-08-02 | Stop reason: HOSPADM

## 2019-08-02 RX ORDER — SODIUM CHLORIDE 9 MG/ML
25 INJECTION, SOLUTION INTRAVENOUS CONTINUOUS
Status: DISCONTINUED | OUTPATIENT
Start: 2019-08-02 | End: 2019-08-02 | Stop reason: HOSPADM

## 2019-08-02 RX ORDER — HYDROMORPHONE HYDROCHLORIDE 1 MG/ML
0.2 INJECTION, SOLUTION INTRAMUSCULAR; INTRAVENOUS; SUBCUTANEOUS
Status: DISCONTINUED | OUTPATIENT
Start: 2019-08-02 | End: 2019-08-02 | Stop reason: HOSPADM

## 2019-08-02 RX ORDER — SODIUM CHLORIDE 0.9 % (FLUSH) 0.9 %
5-40 SYRINGE (ML) INJECTION EVERY 8 HOURS
Status: DISCONTINUED | OUTPATIENT
Start: 2019-08-02 | End: 2019-08-02 | Stop reason: HOSPADM

## 2019-08-02 RX ORDER — SUCCINYLCHOLINE CHLORIDE 20 MG/ML
INJECTION INTRAMUSCULAR; INTRAVENOUS AS NEEDED
Status: DISCONTINUED | OUTPATIENT
Start: 2019-08-02 | End: 2019-08-02 | Stop reason: HOSPADM

## 2019-08-02 RX ORDER — ROCURONIUM BROMIDE 10 MG/ML
INJECTION, SOLUTION INTRAVENOUS AS NEEDED
Status: DISCONTINUED | OUTPATIENT
Start: 2019-08-02 | End: 2019-08-02 | Stop reason: HOSPADM

## 2019-08-02 RX ORDER — HYDROMORPHONE HYDROCHLORIDE 2 MG/ML
INJECTION, SOLUTION INTRAMUSCULAR; INTRAVENOUS; SUBCUTANEOUS AS NEEDED
Status: DISCONTINUED | OUTPATIENT
Start: 2019-08-02 | End: 2019-08-02 | Stop reason: HOSPADM

## 2019-08-02 RX ADMIN — PHENYLEPHRINE HYDROCHLORIDE 40 MCG/MIN: 10 INJECTION INTRAVENOUS at 13:48

## 2019-08-02 RX ADMIN — CISATRACURIUM BESYLATE 6 MG: 2 INJECTION INTRAVENOUS at 13:50

## 2019-08-02 RX ADMIN — FENTANYL CITRATE 50 MCG: 50 INJECTION, SOLUTION INTRAMUSCULAR; INTRAVENOUS at 13:37

## 2019-08-02 RX ADMIN — Medication 80 MCG: at 13:48

## 2019-08-02 RX ADMIN — SODIUM CHLORIDE 25 ML/HR: 900 INJECTION, SOLUTION INTRAVENOUS at 11:54

## 2019-08-02 RX ADMIN — Medication 80 MCG: at 13:46

## 2019-08-02 RX ADMIN — SODIUM CHLORIDE 1 G: 900 INJECTION, SOLUTION INTRAVENOUS at 14:10

## 2019-08-02 RX ADMIN — ROCURONIUM BROMIDE 5 MG: 10 INJECTION INTRAVENOUS at 13:37

## 2019-08-02 RX ADMIN — HYDROMORPHONE HYDROCHLORIDE 0.5 MG: 2 INJECTION, SOLUTION INTRAMUSCULAR; INTRAVENOUS; SUBCUTANEOUS at 16:15

## 2019-08-02 RX ADMIN — PROPOFOL 30 MG: 10 INJECTION, EMULSION INTRAVENOUS at 13:56

## 2019-08-02 RX ADMIN — PROCHLORPERAZINE EDISYLATE 2.5 MG: 5 INJECTION INTRAMUSCULAR; INTRAVENOUS at 17:00

## 2019-08-02 RX ADMIN — ONDANSETRON HYDROCHLORIDE 4 MG: 2 INJECTION, SOLUTION INTRAMUSCULAR; INTRAVENOUS at 16:01

## 2019-08-02 RX ADMIN — MIDAZOLAM HYDROCHLORIDE 2 MG: 1 INJECTION INTRAMUSCULAR; INTRAVENOUS at 13:31

## 2019-08-02 RX ADMIN — Medication 2 G: at 13:50

## 2019-08-02 RX ADMIN — HYDROMORPHONE HYDROCHLORIDE 0.5 MG: 2 INJECTION, SOLUTION INTRAMUSCULAR; INTRAVENOUS; SUBCUTANEOUS at 15:55

## 2019-08-02 RX ADMIN — SUCCINYLCHOLINE CHLORIDE 120 MG: 20 INJECTION, SOLUTION INTRAMUSCULAR; INTRAVENOUS at 13:37

## 2019-08-02 RX ADMIN — PROPOFOL 140 MG: 10 INJECTION, EMULSION INTRAVENOUS at 13:37

## 2019-08-02 RX ADMIN — FENTANYL CITRATE 25 MCG: 50 INJECTION, SOLUTION INTRAMUSCULAR; INTRAVENOUS at 17:20

## 2019-08-02 RX ADMIN — CISATRACURIUM BESYLATE 2 MG: 2 INJECTION INTRAVENOUS at 14:56

## 2019-08-02 RX ADMIN — Medication 20 MG: at 13:42

## 2019-08-02 RX ADMIN — Medication 80 MCG: at 16:04

## 2019-08-02 RX ADMIN — FENTANYL CITRATE 25 MCG: 50 INJECTION, SOLUTION INTRAMUSCULAR; INTRAVENOUS at 17:15

## 2019-08-02 RX ADMIN — LIDOCAINE HYDROCHLORIDE 60 MG: 20 INJECTION, SOLUTION EPIDURAL; INFILTRATION; INTRACAUDAL; PERINEURAL at 13:37

## 2019-08-02 RX ADMIN — FENTANYL CITRATE 50 MCG: 50 INJECTION, SOLUTION INTRAMUSCULAR; INTRAVENOUS at 13:57

## 2019-08-02 NOTE — ANESTHESIA POSTPROCEDURE EVALUATION
Procedure(s):  CREATION OF ARTERIO VENOUS FISTULA.    general    Anesthesia Post Evaluation        Patient location during evaluation: PACU  Note status: Adequate. Level of consciousness: responsive to verbal stimuli and sleepy but conscious  Pain management: satisfactory to patient  Airway patency: patent  Anesthetic complications: no  Cardiovascular status: acceptable  Respiratory status: acceptable  Hydration status: acceptable  Comments: +Post-Anesthesia Evaluation and Assessment    Patient: Tatyana Lutz MRN: [id number]  SSN: [id number]   YOB: 1965  Age: 48 y.o. Sex: female      Cardiovascular Function/Vital Signs    /69   Pulse 89   Temp 37.6 °C (99.6 °F)   Resp 14   Ht 5' 5\" (1.651 m)   Wt 59.4 kg (130 lb 15.3 oz)   SpO2 100%   BMI 21.79 kg/m²     Patient is status post Procedure(s):  CREATION OF ARTERIO VENOUS FISTULA. Nausea/Vomiting: Controlled. Postoperative hydration reviewed and adequate. Pain:  Pain Scale 1: Numeric (0 - 10) (08/02/19 1730)  Pain Intensity 1: 4 (08/02/19 1730)   Managed. Neurological Status:   Neuro (WDL): Exceptions to WDL (08/02/19 1621)   At baseline. Mental Status and Level of Consciousness: Arousable. Pulmonary Status:   O2 Device: Room air (08/02/19 1621)   Adequate oxygenation and airway patent. Complications related to anesthesia: None    Post-anesthesia assessment completed. No concerns. Signed By: Richmond Merritt MD    8/2/2019  Post anesthesia nausea and vomiting:  controlled      Vitals Value Taken Time   /69 8/2/2019  5:30 PM   Temp 37.6 °C (99.6 °F) 8/2/2019  4:21 PM   Pulse 87 8/2/2019  5:40 PM   Resp 7 8/2/2019  5:40 PM   SpO2 98 % 8/2/2019  5:40 PM   Vitals shown include unvalidated device data.

## 2019-08-02 NOTE — ROUTINE PROCESS
TRANSFER - IN REPORT: 
 
Verbal report received from MARSHA Aparicio RN(name) on Tyler Rios  being received from OR(unit) for routine post - op Report consisted of patients Situation, Background, Assessment and  
Recommendations(SBAR). Information from the following report(s) OR Summary was reviewed with the receiving nurse. Opportunity for questions and clarification was provided. Assessment completed upon patients arrival to unit and care assumed.

## 2019-08-02 NOTE — ROUTINE PROCESS
TRANSFER - OUT REPORT: 
 
Verbal report given to AZEB Lanza RN(name) on Mic Juarez  being transferred to OR(unit) for routine progression of care Report consisted of patients Situation, Background, Assessment and  
Recommendations(SBAR). Information from the following report(s) OR Summary, Intake/Output and MAR was reviewed with the receiving nurse. Opportunity for questions and clarification was provided. Patient transported with: 
 Registered Nurse

## 2019-08-02 NOTE — OP NOTES
Operative Report    CPT 64996      Creation of AV Fistula Left Arm  (Brachio - Basilic)      Patient:  Yasemin Blancas    Nephrologist: Dr Iva Thomas    Date of Surgery:  8/2/2019    Preoperative Diagnosis - CKD 5    Postoperative Diagnosis - Same    Anesthesia - General    Surgeon - Isabell Ward    Surg Asst-1: Vizcaino Kalen    Procedure - Creation of AV fistula left Arm    Operative Summary -     The patient was taken to the operating room and placed on the operating table in the supine position. The patient's left arm was  prepared and steriley draped. The location of the veins had been reconfirmed by ultrasound prior to surgery. An incision was made on the medial aspect of the arm near the elbow. The basilic vein was exposed and freed for about 5 cm. It was around 2.8 mm in diameter. The brachial artery was exposed through a separate incision above the antecubital and surrounded by vessel loops. The vein was ligated distally and divided and was gently distended with heparinized lactated Ringer's solution. It was marked for orientation. The brachial artery was controlled proximally and distally with Sanchez Giselle bulldog clamps. A small longitudinal arteriotomy was made. The end of the vein was spatulated and brought over to the artery . Using 6-O prolene an end vein to side artery anastomosis was made. Prior to completion, the artery was flushed proximally and distally. After completion, with initiation of flow, there was a thrill at the AV anastomosis and a 1 +palpable pulse in the radial artery, as had been true pre-op. The wounds were irrigated with antibiotic solution and closed with 3-0 chromic in the subcutaneous layer and with 4-0 nylon at the skin at the anastomotic incision. Staples were used at the vein exposure incision. A gauze dressing was applied. The patient tolerated the procedure well and was taken to the PACU in stable condition.      Specimen - none     Drain - none    Complications - none    Estimated Blood Loss - 25 ml    Implant - none    G Minerva Claude, MD

## 2019-08-02 NOTE — PERIOP NOTES
1811 In Phase II with patient, called  in waiting room to join us for discharge review. Patient gagging, looked around the door to see patient shoving 2 fingers down her throat and gagging herself. When asked what she was doing she stated \"I'm sick to my stomach\"  She is nodding off from time to time, easily aroused to voice and following commands. 36  at bedside and reviewing discharge instructions. Allowed time for questions and answers. Provided with script and instructions. He is attempting to call family for ride home, but no answer. [address] unable to get family on the phone. Tried yellow cab but not available. I will continue to monitor her until able to find a ride. 26  has stepped out to make more calls.

## 2019-08-02 NOTE — DISCHARGE INSTRUCTIONS
Discharge Instructions Following AV Fistula Surgery        Keep dressing in place for two days. Keep incision dry for two days. No lifting over fifteen pounds with operated arm for two weeks. See Dr. Jose Martinez in the office in two weeks - call for appointment - [phone]. Take pain medications as prescribed as needed or use Tylenol. Do not drive while taking narcotic pain medication. Do not restart taking Aspirin until Sunday 8/4/2019. For any problems, call Dr. Jose Martinez at [phone] or [phone] (after hours)      WAQAR Hall MD      DISCHARGE SUMMARY from Nurse    PATIENT INSTRUCTIONS:    After general anesthesia or intravenous sedation, for 24 hours or while taking prescription Narcotics:  · Limit your activities  · Do not drive and operate hazardous machinery  · Do not make important personal or business decisions  · Do  not drink alcoholic beverages  · If you have not urinated within 8 hours after discharge, please contact your surgeon on call. Report the following to your surgeon:  · Excessive pain, swelling, redness or odor of or around the surgical area  · Temperature over 100.5  · Nausea and vomiting lasting longer than 4 hours or if unable to take medications  · Any signs of decreased circulation or nerve impairment to extremity: change in color, persistent  numbness, tingling, coldness or increase pain  · Any questions      *  Please give a list of your current medications to your Primary Care Provider. *  Please update this list whenever your medications are discontinued, doses are      changed, or new medications (including over-the-counter products) are added. *  Please carry medication information at all times in case of emergency situations.     These are general instructions for a healthy lifestyle:    No smoking/ No tobacco products/ Avoid exposure to second hand smoke  Surgeon General's Warning:  Quitting smoking now greatly reduces serious risk to your health. Obesity, smoking, and sedentary lifestyle greatly increases your risk for illness    A healthy diet, regular physical exercise & weight monitoring are important for maintaining a healthy lifestyle    You may be retaining fluid if you have a history of heart failure or if you experience any of the following symptoms:  Weight gain of 3 pounds or more overnight or 5 pounds in a week, increased swelling in our hands or feet or shortness of breath while lying flat in bed. Please call your doctor as soon as you notice any of these symptoms; do not wait until your next office visit. The discharge information has been reviewed with the patient and spouse. The patient and spouse verbalized understanding. Discharge medications reviewed with the patient and spouse and appropriate educational materials and side effects teaching were provided. ___________________________________________________________________________________________________________________________________    Patient Education      Hydrocodone/Acetaminophen (Vicodin, Norco, Lortab) - (By mouth)   Why this medicine is used:   Treats pain. Contact a nurse or doctor right away if you have:  · Blistering, peeling, red skin rash  · Fast or slow heartbeat, shallow breathing, blue lips, fingernails, or skin  · Anxiety, restlessness, muscle spasms, twitching, seeing or hearing things that are not there  · Dark urine or pale stools, yellow skin or eyes  · Extreme weakness, sweating, seizures, cold or clammy skin  · Lightheadedness, dizziness, fainting, fever, sweating     Common side effects:  · Constipation, nausea, vomiting, loss of appetite, stomach pain  · Tiredness or sleepiness  © 2017 Formerly named [facility] Information is for End User's use only and may not be sold, redistributed or otherwise used for commercial purposes.

## 2019-08-03 LAB
BACTERIA SPEC CULT: ABNORMAL
BACTERIA SPEC CULT: ABNORMAL
SERVICE CMNT-IMP: ABNORMAL

## 2019-08-20 ENCOUNTER — HOSPITAL ENCOUNTER (INPATIENT)
Age: 54
LOS: 3 days | Discharge: HOME | DRG: 683 | End: 2019-08-23
Attending: EMERGENCY MEDICINE | Admitting: INTERNAL MEDICINE
Payer: MEDICARE

## 2019-08-20 ENCOUNTER — APPOINTMENT (OUTPATIENT)
Dept: CT IMAGING | Age: 54
DRG: 683 | End: 2019-08-20
Attending: EMERGENCY MEDICINE
Payer: MEDICARE

## 2019-08-20 DIAGNOSIS — R11.2 INTRACTABLE VOMITING WITH NAUSEA, UNSPECIFIED VOMITING TYPE: Primary | ICD-10-CM

## 2019-08-20 DIAGNOSIS — N19 UREMIA: ICD-10-CM

## 2019-08-20 DIAGNOSIS — E11.65 UNCONTROLLED TYPE 2 DIABETES MELLITUS WITH HYPERGLYCEMIA (HCC): ICD-10-CM

## 2019-08-20 DIAGNOSIS — G89.4 CHRONIC PAIN SYNDROME: ICD-10-CM

## 2019-08-20 DIAGNOSIS — I10 ACCELERATED HYPERTENSION: ICD-10-CM

## 2019-08-20 DIAGNOSIS — K52.9 COLITIS: ICD-10-CM

## 2019-08-20 PROBLEM — E11.9 DM (DIABETES MELLITUS) (HCC): Status: ACTIVE | Noted: 2019-08-20

## 2019-08-20 PROBLEM — N18.6 ESRD (END STAGE RENAL DISEASE) (HCC): Status: ACTIVE | Noted: 2019-08-20

## 2019-08-20 LAB
ALBUMIN SERPL-MCNC: 4 G/DL (ref 3.5–5)
ALBUMIN/GLOB SERPL: 0.9 {RATIO} (ref 1.1–2.2)
ALP SERPL-CCNC: 143 U/L (ref 45–117)
ALT SERPL-CCNC: 25 U/L (ref 12–78)
ANION GAP SERPL CALC-SCNC: 14 MMOL/L (ref 5–15)
AST SERPL-CCNC: 28 U/L (ref 15–37)
BASOPHILS # BLD: 0 K/UL (ref 0–0.1)
BASOPHILS NFR BLD: 0 % (ref 0–1)
BILIRUB SERPL-MCNC: 0.4 MG/DL (ref 0.2–1)
BUN SERPL-MCNC: 102 MG/DL (ref 6–20)
BUN/CREAT SERPL: 17 (ref 12–20)
CALCIUM SERPL-MCNC: 9.4 MG/DL (ref 8.5–10.1)
CHLORIDE SERPL-SCNC: 98 MMOL/L (ref 97–108)
CO2 SERPL-SCNC: 23 MMOL/L (ref 21–32)
CREAT SERPL-MCNC: 6.16 MG/DL (ref 0.55–1.02)
DIFFERENTIAL METHOD BLD: ABNORMAL
EOSINOPHIL # BLD: 0.2 K/UL (ref 0–0.4)
EOSINOPHIL NFR BLD: 2 % (ref 0–7)
ERYTHROCYTE [DISTWIDTH] IN BLOOD BY AUTOMATED COUNT: 13.8 % (ref 11.5–14.5)
GLOBULIN SER CALC-MCNC: 4.7 G/DL (ref 2–4)
GLUCOSE BLD STRIP.AUTO-MCNC: 310 MG/DL (ref 65–100)
GLUCOSE BLD STRIP.AUTO-MCNC: 369 MG/DL (ref 65–100)
GLUCOSE SERPL-MCNC: 297 MG/DL (ref 65–100)
HCT VFR BLD AUTO: 30.6 % (ref 35–47)
HGB BLD-MCNC: 10 G/DL (ref 11.5–16)
IMM GRANULOCYTES # BLD AUTO: 0 K/UL (ref 0–0.04)
IMM GRANULOCYTES NFR BLD AUTO: 1 % (ref 0–0.5)
LIPASE SERPL-CCNC: 266 U/L (ref 73–393)
LYMPHOCYTES # BLD: 1.8 K/UL (ref 0.8–3.5)
LYMPHOCYTES NFR BLD: 22 % (ref 12–49)
MCH RBC QN AUTO: 29.7 PG (ref 26–34)
MCHC RBC AUTO-ENTMCNC: 32.7 G/DL (ref 30–36.5)
MCV RBC AUTO: 90.8 FL (ref 80–99)
MONOCYTES # BLD: 0.5 K/UL (ref 0–1)
MONOCYTES NFR BLD: 6 % (ref 5–13)
NEUTS SEG # BLD: 5.6 K/UL (ref 1.8–8)
NEUTS SEG NFR BLD: 69 % (ref 32–75)
NRBC # BLD: 0 K/UL (ref 0–0.01)
NRBC BLD-RTO: 0 PER 100 WBC
PLATELET # BLD AUTO: 288 K/UL (ref 150–400)
PMV BLD AUTO: 8.8 FL (ref 8.9–12.9)
POTASSIUM SERPL-SCNC: 3.8 MMOL/L (ref 3.5–5.1)
PROT SERPL-MCNC: 8.7 G/DL (ref 6.4–8.2)
RBC # BLD AUTO: 3.37 M/UL (ref 3.8–5.2)
SERVICE CMNT-IMP: ABNORMAL
SERVICE CMNT-IMP: ABNORMAL
SODIUM SERPL-SCNC: 135 MMOL/L (ref 136–145)
WBC # BLD AUTO: 8 K/UL (ref 3.6–11)

## 2019-08-20 PROCEDURE — 74011250636 HC RX REV CODE- 250/636: Performed by: EMERGENCY MEDICINE

## 2019-08-20 PROCEDURE — 85025 COMPLETE CBC W/AUTO DIFF WBC: CPT

## 2019-08-20 PROCEDURE — 96374 THER/PROPH/DIAG INJ IV PUSH: CPT

## 2019-08-20 PROCEDURE — 83690 ASSAY OF LIPASE: CPT

## 2019-08-20 PROCEDURE — 36415 COLL VENOUS BLD VENIPUNCTURE: CPT

## 2019-08-20 PROCEDURE — 93005 ELECTROCARDIOGRAM TRACING: CPT

## 2019-08-20 PROCEDURE — 82962 GLUCOSE BLOOD TEST: CPT

## 2019-08-20 PROCEDURE — 80053 COMPREHEN METABOLIC PANEL: CPT

## 2019-08-20 PROCEDURE — 99285 EMERGENCY DEPT VISIT HI MDM: CPT

## 2019-08-20 PROCEDURE — 74011250636 HC RX REV CODE- 250/636: Performed by: INTERNAL MEDICINE

## 2019-08-20 PROCEDURE — 65660000000 HC RM CCU STEPDOWN

## 2019-08-20 PROCEDURE — 96375 TX/PRO/DX INJ NEW DRUG ADDON: CPT

## 2019-08-20 PROCEDURE — 74176 CT ABD & PELVIS W/O CONTRAST: CPT

## 2019-08-20 PROCEDURE — 74011250637 HC RX REV CODE- 250/637: Performed by: INTERNAL MEDICINE

## 2019-08-20 PROCEDURE — 74011636637 HC RX REV CODE- 636/637: Performed by: FAMILY MEDICINE

## 2019-08-20 RX ORDER — INSULIN LISPRO 100 [IU]/ML
INJECTION, SOLUTION INTRAVENOUS; SUBCUTANEOUS
Status: DISCONTINUED | OUTPATIENT
Start: 2019-08-20 | End: 2019-08-23 | Stop reason: HOSPADM

## 2019-08-20 RX ORDER — ATORVASTATIN CALCIUM 20 MG/1
20 TABLET, FILM COATED ORAL
Status: DISCONTINUED | OUTPATIENT
Start: 2019-08-21 | End: 2019-08-23 | Stop reason: HOSPADM

## 2019-08-20 RX ORDER — HALOPERIDOL 5 MG/ML
5 INJECTION INTRAMUSCULAR
Status: COMPLETED | OUTPATIENT
Start: 2019-08-20 | End: 2019-08-20

## 2019-08-20 RX ORDER — LEVOFLOXACIN 5 MG/ML
750 INJECTION, SOLUTION INTRAVENOUS
Status: COMPLETED | OUTPATIENT
Start: 2019-08-20 | End: 2019-08-20

## 2019-08-20 RX ORDER — SODIUM BICARBONATE 650 MG/1
650 TABLET ORAL 2 TIMES DAILY
Status: DISCONTINUED | OUTPATIENT
Start: 2019-08-21 | End: 2019-08-23 | Stop reason: HOSPADM

## 2019-08-20 RX ORDER — INSULIN LISPRO 100 [IU]/ML
5 INJECTION, SOLUTION INTRAVENOUS; SUBCUTANEOUS
Status: DISCONTINUED | OUTPATIENT
Start: 2019-08-21 | End: 2019-08-21

## 2019-08-20 RX ORDER — SODIUM CHLORIDE 0.9 % (FLUSH) 0.9 %
5-40 SYRINGE (ML) INJECTION EVERY 8 HOURS
Status: DISCONTINUED | OUTPATIENT
Start: 2019-08-21 | End: 2019-08-23 | Stop reason: HOSPADM

## 2019-08-20 RX ORDER — METOPROLOL TARTRATE 50 MG/1
50 TABLET ORAL 2 TIMES DAILY
Status: DISCONTINUED | OUTPATIENT
Start: 2019-08-21 | End: 2019-08-21

## 2019-08-20 RX ORDER — METHOCARBAMOL 500 MG/1
750 TABLET, FILM COATED ORAL 4 TIMES DAILY
Status: DISCONTINUED | OUTPATIENT
Start: 2019-08-21 | End: 2019-08-23 | Stop reason: HOSPADM

## 2019-08-20 RX ORDER — SODIUM CHLORIDE 9 MG/ML
75 INJECTION, SOLUTION INTRAVENOUS CONTINUOUS
Status: DISCONTINUED | OUTPATIENT
Start: 2019-08-20 | End: 2019-08-23 | Stop reason: HOSPADM

## 2019-08-20 RX ORDER — INSULIN GLARGINE 100 [IU]/ML
25 INJECTION, SOLUTION SUBCUTANEOUS DAILY
Status: DISCONTINUED | OUTPATIENT
Start: 2019-08-21 | End: 2019-08-23 | Stop reason: HOSPADM

## 2019-08-20 RX ORDER — HEPARIN SODIUM 5000 [USP'U]/ML
5000 INJECTION, SOLUTION INTRAVENOUS; SUBCUTANEOUS EVERY 8 HOURS
Status: DISCONTINUED | OUTPATIENT
Start: 2019-08-21 | End: 2019-08-23 | Stop reason: HOSPADM

## 2019-08-20 RX ORDER — SODIUM CHLORIDE 0.9 % (FLUSH) 0.9 %
5-40 SYRINGE (ML) INJECTION AS NEEDED
Status: DISCONTINUED | OUTPATIENT
Start: 2019-08-20 | End: 2019-08-23 | Stop reason: HOSPADM

## 2019-08-20 RX ORDER — METRONIDAZOLE 500 MG/100ML
500 INJECTION, SOLUTION INTRAVENOUS ONCE
Status: COMPLETED | OUTPATIENT
Start: 2019-08-20 | End: 2019-08-21

## 2019-08-20 RX ORDER — DIPHENHYDRAMINE HYDROCHLORIDE 50 MG/ML
25 INJECTION, SOLUTION INTRAMUSCULAR; INTRAVENOUS
Status: COMPLETED | OUTPATIENT
Start: 2019-08-20 | End: 2019-08-20

## 2019-08-20 RX ORDER — GUAIFENESIN 100 MG/5ML
81 LIQUID (ML) ORAL DAILY
Status: DISCONTINUED | OUTPATIENT
Start: 2019-08-21 | End: 2019-08-23 | Stop reason: HOSPADM

## 2019-08-20 RX ORDER — CLONIDINE HYDROCHLORIDE 0.1 MG/1
0.1 TABLET ORAL 2 TIMES DAILY
Status: DISCONTINUED | OUTPATIENT
Start: 2019-08-21 | End: 2019-08-23 | Stop reason: HOSPADM

## 2019-08-20 RX ORDER — ONDANSETRON 2 MG/ML
4 INJECTION INTRAMUSCULAR; INTRAVENOUS
Status: COMPLETED | OUTPATIENT
Start: 2019-08-20 | End: 2019-08-20

## 2019-08-20 RX ORDER — METOCLOPRAMIDE HYDROCHLORIDE 5 MG/ML
10 INJECTION INTRAMUSCULAR; INTRAVENOUS
Status: COMPLETED | OUTPATIENT
Start: 2019-08-20 | End: 2019-08-20

## 2019-08-20 RX ORDER — OXYCODONE AND ACETAMINOPHEN 5; 325 MG/1; MG/1
1 TABLET ORAL
Status: DISCONTINUED | OUTPATIENT
Start: 2019-08-20 | End: 2019-08-21

## 2019-08-20 RX ORDER — FENTANYL CITRATE 50 UG/ML
50 INJECTION, SOLUTION INTRAMUSCULAR; INTRAVENOUS
Status: COMPLETED | OUTPATIENT
Start: 2019-08-20 | End: 2019-08-21

## 2019-08-20 RX ORDER — ONDANSETRON 4 MG/1
4 TABLET, ORALLY DISINTEGRATING ORAL
Status: DISCONTINUED | OUTPATIENT
Start: 2019-08-20 | End: 2019-08-23 | Stop reason: HOSPADM

## 2019-08-20 RX ORDER — LEVOFLOXACIN 5 MG/ML
500 INJECTION, SOLUTION INTRAVENOUS
Status: DISCONTINUED | OUTPATIENT
Start: 2019-08-22 | End: 2019-08-23 | Stop reason: HOSPADM

## 2019-08-20 RX ORDER — DEXTROSE MONOHYDRATE 100 MG/ML
125-250 INJECTION, SOLUTION INTRAVENOUS AS NEEDED
Status: DISCONTINUED | OUTPATIENT
Start: 2019-08-20 | End: 2019-08-23 | Stop reason: HOSPADM

## 2019-08-20 RX ORDER — HYDRALAZINE HYDROCHLORIDE 20 MG/ML
10 INJECTION INTRAMUSCULAR; INTRAVENOUS
Status: DISCONTINUED | OUTPATIENT
Start: 2019-08-20 | End: 2019-08-23 | Stop reason: HOSPADM

## 2019-08-20 RX ORDER — ONDANSETRON 2 MG/ML
4 INJECTION INTRAMUSCULAR; INTRAVENOUS
Status: DISCONTINUED | OUTPATIENT
Start: 2019-08-20 | End: 2019-08-23 | Stop reason: HOSPADM

## 2019-08-20 RX ORDER — ONDANSETRON 4 MG/1
4 TABLET, ORALLY DISINTEGRATING ORAL
Status: DISCONTINUED | OUTPATIENT
Start: 2019-08-20 | End: 2019-08-21 | Stop reason: SDUPTHER

## 2019-08-20 RX ORDER — INSULIN LISPRO 100 [IU]/ML
7 INJECTION, SOLUTION INTRAVENOUS; SUBCUTANEOUS ONCE
Status: COMPLETED | OUTPATIENT
Start: 2019-08-20 | End: 2019-08-20

## 2019-08-20 RX ORDER — MAGNESIUM SULFATE 100 %
4 CRYSTALS MISCELLANEOUS AS NEEDED
Status: DISCONTINUED | OUTPATIENT
Start: 2019-08-20 | End: 2019-08-23 | Stop reason: HOSPADM

## 2019-08-20 RX ADMIN — HYDRALAZINE HYDROCHLORIDE 10 MG: 20 INJECTION INTRAMUSCULAR; INTRAVENOUS at 23:37

## 2019-08-20 RX ADMIN — Medication 10 ML: at 23:38

## 2019-08-20 RX ADMIN — SODIUM CHLORIDE 50 ML/HR: 900 INJECTION, SOLUTION INTRAVENOUS at 22:24

## 2019-08-20 RX ADMIN — FENTANYL CITRATE 50 MCG: 50 INJECTION, SOLUTION INTRAMUSCULAR; INTRAVENOUS at 23:37

## 2019-08-20 RX ADMIN — ATORVASTATIN CALCIUM 20 MG: 20 TABLET, FILM COATED ORAL at 23:37

## 2019-08-20 RX ADMIN — METOCLOPRAMIDE 10 MG: 5 INJECTION, SOLUTION INTRAMUSCULAR; INTRAVENOUS at 19:56

## 2019-08-20 RX ADMIN — INSULIN LISPRO 7 UNITS: 100 INJECTION, SOLUTION INTRAVENOUS; SUBCUTANEOUS at 23:54

## 2019-08-20 RX ADMIN — METRONIDAZOLE 500 MG: 500 INJECTION, SOLUTION INTRAVENOUS at 23:38

## 2019-08-20 RX ADMIN — HALOPERIDOL LACTATE 5 MG: 5 INJECTION, SOLUTION INTRAMUSCULAR at 16:15

## 2019-08-20 RX ADMIN — LEVOFLOXACIN 750 MG: 5 INJECTION, SOLUTION INTRAVENOUS at 22:24

## 2019-08-20 RX ADMIN — DIPHENHYDRAMINE HYDROCHLORIDE 25 MG: 50 INJECTION, SOLUTION INTRAMUSCULAR; INTRAVENOUS at 16:15

## 2019-08-20 RX ADMIN — ONDANSETRON 4 MG: 2 INJECTION INTRAMUSCULAR; INTRAVENOUS at 19:59

## 2019-08-20 RX ADMIN — OXYCODONE HYDROCHLORIDE AND ACETAMINOPHEN 1 TABLET: 5; 325 TABLET ORAL at 23:37

## 2019-08-20 NOTE — ED NOTES
Patient complains of generalized abdominal pain and nausea for 3 hours. Patient standing over the bed with no clothes on and will not hold still and is thrashing around.  Patient has chronic pain and is about to start dialysis

## 2019-08-20 NOTE — ED PROVIDER NOTES
EMERGENCY DEPARTMENT HISTORY AND PHYSICAL EXAM      Date: 8/20/2019  Patient Name: Maurilio Forrest  Patient Age and Sex: 48 y.o. female    History of Presenting Illness     Chief Complaint   Patient presents with    Abdominal Pain     started 2 hours pta in ED, states left side and back. History Provided By: Patient and EMS    HPI: Maurilio Forrest, 48 y.o. female with past medical history as documented below presents to the ED with c/o acute onset of abdominal pain with intractable nausea vomiting for the past 2 hours. She reports that she recently had a left upper extremity AV fistula placed by vascular surgery in anticipation for dialysis. She reports that for the past several hours she has had intractable non bilious, nonbloody emesis. Additionally, she reports generalized abdominal pain. She reports a history of type 2 diabetes and states that her sugars been under control. She is well-known to our facility for history of chronic pain syndrome and history of malingering and drug abuse. She has tried over-the-counter pain medications without much relief of symptoms. She is adamant that she did not have any alcohol or drug use prior to arrival. She denies any recent sick contacts. Pt denies any other alleviating or exacerbating factors. Additionally, pt specifically denies any recent fever, chills, headache, CP, SOB, lightheadedness, dizziness, numbness, weakness, BLE swelling, heart palpitations, urinary sxs, diarrhea, constipation, melena, hematochezia, cough, or congestion. There are no other complaints, changes or physical findings at this time.      PCP: Becky Estevez NP    Past History   Past Medical History:  Past Medical History:   Diagnosis Date    C. difficile colitis 6/2012    Chronic kidney disease     Stage V    Chronic low back pain     Chronic pain     back & left leg    CKD (chronic kidney disease)     stage 3 to 4, baseline Cr 2    Constipation     Diabetes (HCC)     A1c 8.2 3/2012    Hep C w/o coma, chronic (HCC)     Hyperlipemia     Hypertension     Lumbar disc disease     Migraines     Pancreatitis 9140    alcoholic    UTI (lower urinary tract infection) 6/20012       Past Surgical History:  Past Surgical History:   Procedure Laterality Date    HX LUMBAR DISKECTOMY  1980's    HX ORTHOPAEDIC      lumbar sprain; back surgery    HX UROLOGICAL  2014    kidney stone removed    NM COLONOSCOPY FLX DX W/COLLJ SPEC WHEN PFRMD  11/12/2012            Family History:  Family History   Problem Relation Age of Onset    Diabetes Mother     Kidney Disease Mother     Diabetes Sister     Diabetes Father     Diabetes Brother        Social History:  Social History     Tobacco Use    Smoking status: Never Smoker    Smokeless tobacco: Never Used   Substance Use Topics    Alcohol use: No     Comment: Quit few months ago, hx of abuse    Drug use: No       Allergies:  No Known Allergies    Current Medications:  No current facility-administered medications on file prior to encounter. Current Outpatient Medications on File Prior to Encounter   Medication Sig Dispense Refill    insulin degludec (TRESIBA U-100 INSULIN SC) 25 Units by SubCUTAneous route daily.  methocarbamol (ROBAXIN-750) 750 mg tablet Take 1 Tab by mouth four (4) times daily. (Patient taking differently: Take 750 mg by mouth as needed.) 20 Tab 0    ondansetron (ZOFRAN ODT) 4 mg disintegrating tablet Take 1 Tab by mouth every eight (8) hours as needed for Nausea. 20 Tab 0    insulin aspart U-100 (NOVOLOG) 100 unit/mL injection 5 Units by SubCUTAneous route Before breakfast, lunch, and dinner.  aspirin 81 mg chewable tablet Take 1 Tab by mouth daily. 30 Tab 1    cloNIDine HCl (CATAPRES) 0.1 mg tablet Take 1 Tab by mouth two (2) times a day. 60 Tab 1    metoprolol tartrate (LOPRESSOR) 50 mg tablet Take 50 mg by mouth two (2) times a day.       sodium bicarbonate 650 mg tablet Take 650 mg by mouth two (2) times a day.  atorvastatin (LIPITOR) 20 mg tablet Take 20 mg by mouth nightly. Review of Systems   Review of Systems   Constitutional: Negative. Negative for chills and fever. HENT: Negative. Negative for congestion, facial swelling, rhinorrhea, sore throat, trouble swallowing and voice change. Eyes: Negative. Respiratory: Negative. Negative for apnea, cough, chest tightness, shortness of breath and wheezing. Cardiovascular: Negative. Negative for chest pain, palpitations and leg swelling. Gastrointestinal: Positive for abdominal pain, nausea and vomiting. Negative for abdominal distention, blood in stool, constipation and diarrhea. Endocrine: Negative. Negative for cold intolerance, heat intolerance and polyuria. Genitourinary: Negative. Negative for difficulty urinating, dysuria, flank pain, frequency, hematuria and urgency. Musculoskeletal: Negative. Negative for arthralgias, back pain, myalgias, neck pain and neck stiffness. Skin: Negative. Negative for color change and rash. Neurological: Negative. Negative for dizziness, syncope, facial asymmetry, speech difficulty, weakness, light-headedness, numbness and headaches. Hematological: Negative. Does not bruise/bleed easily. Psychiatric/Behavioral: Negative. Negative for confusion and self-injury. The patient is not nervous/anxious. Physical Exam   Physical Exam   Constitutional: She is oriented to person, place, and time. She appears well-developed and well-nourished. She appears toxic. She has a sickly appearance. She appears ill. She appears distressed. Appears tearful, in mild distress secondary to pain   HENT:   Head: Normocephalic and atraumatic. Mouth/Throat: Oropharynx is clear and moist. No oropharyngeal exudate. Eyes: Pupils are equal, round, and reactive to light. Conjunctivae and EOM are normal.   Neck: Normal range of motion.    Cardiovascular: Normal rate, regular rhythm and normal heart sounds. Exam reveals no gallop and no friction rub. No murmur heard. Pulmonary/Chest: Effort normal and breath sounds normal. No respiratory distress. She has no wheezes. She has no rales. She exhibits no tenderness. Abdominal: Soft. Bowel sounds are normal. She exhibits no distension and no mass. There is no tenderness. There is no rebound and no guarding. Musculoskeletal: Normal range of motion. She exhibits no edema, tenderness or deformity. Left upper extremity AV fistula with good thrill   Neurological: She is alert and oriented to person, place, and time. She displays normal reflexes. No cranial nerve deficit. She exhibits normal muscle tone. Coordination normal.   Skin: Skin is warm. No rash noted. She is not diaphoretic. Psychiatric: She has a normal mood and affect. Nursing note and vitals reviewed. Diagnostic Study Results     Labs -  No results found for this or any previous visit (from the past 24 hour(s)). Radiologic Studies -   No orders to display     CT Results  (Last 48 hours)    None        CXR Results  (Last 48 hours)    None          Medical Decision Making   I am the first provider for this patient. I reviewed the vital signs, available nursing notes, past medical history, past surgical history, family history and social history. Vital Signs-Reviewed the patient's vital signs. Patient Vitals for the past 24 hrs:   Temp Pulse Resp BP SpO2   08/20/19 1443 97.7 °F (36.5 °C) (!) 120 20 (!) 177/134 100 %       Pulse Oximetry Analysis - 100% on RA    Cardiac Monitor:   Rate: 120 bpm  Rhythm: Sinus Tachycardia      ED EKG interpretation:  Rhythm: sinus tachycardia; and regular . Rate (approx.): 122; Axis: normal; P wave: normal; QRS interval: normal ; ST/T wave: normal; Other findings: normal. This EKG was interpreted by Reyes Confer, M.D.     Records Reviewed: Nursing Notes, Old Medical Records, Previous electrocardiograms, Previous Radiology Studies and Previous Laboratory Studies    Provider Notes (Medical Decision Making):   Pt presents with acute abdominal pain; vital signs stable with currently a non-peritoneal exam; DDx includes: Gastroenteritis, hepatitis, pancreatitis, obstruction, appendicitis, viral illness, IBD, diverticulitis, mesenteric ischemia, AAA or descending dissection, ACS, kidney stone. Will get labs, treat symptomatically and obtain serial abdominal exams to determine if additional imaging is indicated. Will reassess and monitor closely. ED Course:   Initial assessment performed. The patients presenting problems have been discussed, and they are in agreement with the care plan formulated and outlined with them. I have encouraged them to ask questions as they arise throughout their visit. HYPERTENSION COUNSELING   Education was provided to the patient today regarding their hypertension. Patient is made aware of their elevated blood pressure and is instructed to follow up this week with their Primary Care for a recheck. Patient is counseled regarding consequences of chronic, uncontrolled hypertension including kidney disease, heart disease, stroke or even death. Patient states their understanding and agrees to follow up this week. Additionally, during their visit, I discussed sodium restriction, maintaining ideal body weight and regular exercise program as physiologic means to achieve blood pressure control. The patient will strive towards this. I reviewed our electronic medical record system for any past medical records that were available that may contribute to the patient's current condition, the nursing notes and vital signs from today's visit.   Sulma Britt MD    Orders Placed During ED Course:  Orders Placed This Encounter    SEVERE SEPSIS AND SEPTIC SHOCK BUNDLE INITIATED    CENTRAL VENOUS LINE INSERTION    CULTURE, URINE    CT ABD PELV WO CONT    XR CHEST PORT    CBC WITH AUTOMATED DIFF    METABOLIC PANEL, COMPREHENSIVE    URINALYSIS W/ REFLEX CULTURE    LIPASE    CBC W/O DIFF    HEMOGLOBIN A1C WITH EAG    TROPONIN I    DRUG SCREEN, URINE    CBC W/O DIFF    IRON PROFILE    CBC W/O DIFF    SAMPLES BEING HELD    NURSING-MISCELLANEOUS: no IV nor needle sticks in the left arm. also no BP's in the left arm.  CONTINUOUS    IP CONSULT TO NEPHROLOGY    GLUCOSE, POC    GLUCOSE, POC    GLUCOSE, POC    GLUCOSE, POC    GLUCOSE, POC    GLUCOSE, POC    GLUCOSE, POC    GLUCOSE, POC    GLUCOSE, POC    GLUCOSE, POC    GLUCOSE, POC    GLUCOSE, POC    GLUCOSE, POC    GLUCOSE, POC    EKG 12 LEAD INITIAL    haloperidol lactate (HALDOL) injection 5 mg    diphenhydrAMINE (BENADRYL) injection 25 mg    ondansetron (ZOFRAN) injection 4 mg    metoclopramide HCl (REGLAN) injection 10 mg    levoFLOXacin (LEVAQUIN) 750 mg in D5W IVPB    metroNIDAZOLE (FLAGYL) IVPB premix 500 mg    DISCONTD: ondansetron (ZOFRAN) injection 4 mg    fentaNYL citrate (PF) injection 50 mcg    DISCONTD: aspirin chewable tablet 81 mg    DISCONTD: metoprolol tartrate (LOPRESSOR) tablet 50 mg    DISCONTD: sodium bicarbonate tablet 650 mg    DISCONTD: atorvastatin (LIPITOR) tablet 20 mg    DISCONTD: cloNIDine HCl (CATAPRES) tablet 0.1 mg    DISCONTD: insulin lispro (HUMALOG) injection 5 Units    DISCONTD: ondansetron (ZOFRAN ODT) tablet 4 mg    DISCONTD: methocarbamol (ROBAXIN) tablet 750 mg    DISCONTD: insulin glargine (LANTUS) injection 25 Units    DISCONTD: sodium chloride (NS) flush 5-40 mL    DISCONTD: sodium chloride (NS) flush 5-40 mL    DISCONTD: ondansetron (ZOFRAN ODT) tablet 4 mg    DISCONTD: heparin (porcine) injection 5,000 Units    DISCONTD: oxyCODONE-acetaminophen (PERCOCET) 5-325 mg per tablet 1 Tab    DISCONTD: levoFLOXacin (LEVAQUIN) 500 mg in D5W IVPB    DISCONTD: insulin lispro (HUMALOG) injection    DISCONTD: glucose chewable tablet 16 g    DISCONTD: glucagon (GLUCAGEN) injection 1 mg    DISCONTD: dextrose 10% infusion 125-250 mL  DISCONTD: hydrALAZINE (APRESOLINE) 20 mg/mL injection 10 mg    DISCONTD: 0.9% sodium chloride infusion    insulin lispro (HUMALOG) injection 7 Units    hydrALAZINE (APRESOLINE) 20 mg/mL injection 20 mg    DISCONTD: metroNIDAZOLE (FLAGYL) IVPB premix 500 mg    DISCONTD: metoprolol tartrate (LOPRESSOR) tablet 75 mg    DISCONTD: promethazine (PHENERGAN) with saline injection 12.5 mg    DISCONTD: famotidine (PF) (PEPCID) 20 mg in sodium chloride 0.9% 10 mL injection    DISCONTD: prochlorperazine (COMPAZINE) with saline injection 5 mg    DISCONTD: epoetin kourtney-epbx (RETACRIT) injection 10,000 Units    DISCONTD: acetaminophen (TYLENOL) tablet 1,000 mg    DISCONTD: metoprolol tartrate (LOPRESSOR) tablet 50 mg    DISCONTD: prochlorperazine (COMPAZINE) with saline injection 2.5 mg    DISCONTD: lidocaine 4 % patch 1 Patch    promethazine (PHENERGAN) 12.5 mg tablet    levoFLOXacin (LEVAQUIN) 500 mg tablet    metroNIDAZOLE (FLAGYL) 500 mg tablet    DISCONTD: epoetin kourtney-epbx (RETACRIT) injection 10,000 Units    INITIAL PHYSICIAN ORDER: INPATIENT Telemetry; 3.  Patient receiving treatment that can only be provided in an inpatient setting (further clarification in H&P documentation)     Medications Administered:  Medications   haloperidol lactate (HALDOL) injection 5 mg (5 mg IntraVENous Given 8/20/19 1615)   diphenhydrAMINE (BENADRYL) injection 25 mg (25 mg IntraVENous Given 8/20/19 1615)   ondansetron (ZOFRAN) injection 4 mg (4 mg IntraVENous Given 8/20/19 1959)   metoclopramide HCl (REGLAN) injection 10 mg (10 mg IntraVENous Given 8/20/19 1956)   levoFLOXacin (LEVAQUIN) 750 mg in D5W IVPB (750 mg IntraVENous New Bag 8/20/19 2224)   metroNIDAZOLE (FLAGYL) IVPB premix 500 mg (500 mg IntraVENous New Bag 8/20/19 2338)   fentaNYL citrate (PF) injection 50 mcg (50 mcg IntraVENous Given 8/21/19 0632)   insulin lispro (HUMALOG) injection 7 Units (7 Units SubCUTAneous Given 8/20/19 1609)   hydrALAZINE (APRESOLINE) 20 mg/mL injection 20 mg (20 mg IntraVENous Given 8/21/19 6649)     Progress Note:  Elevated BP readings noted, pt noncompliant with medications, will give dose of IV hydralazine. Pt reports minimal pain relief with IV benadryl, still nauseous. Awaiting CT    Progress Note:  She is now found standing over the bed with no clothes on. She is holding onto the side of the stretcher and thrashing about. Will give a dose of IV fentanyl and IV Reglan. CT with evidence of colitis. There is no fever, no white count. Empiric IV antibiotic started. Progress Note:  Patient continues with intractable nausea and vomiting. She does have a history of drug-seeking behavior however this could be also related to her stage 4 CKD and uremia. Pt is a part of the ED care management of plan due to his frequency of ED visits. Will obtain UDS.  reviewed with patient. Will consider non-narcotic therapy and will consult his pain management specialist at time of dispo. Pt was educated on the appropriate use of the ED and has been provided with the High ED utilizer letter and The [facility] Opioid Prescribing Guidelines. Management plan reviewed with patient. Pt verbalized understanding.    reviewedYes  UDS ObtainedYes  Case Management notifedNo  Referral for treatmentYes  Brochure/letter given/reviewed Yes  [employer] referralNo    CRITICAL CARE NOTE :    IMPENDING DETERIORATION -Respiratory, Cardiovascular, Metabolic and Renal  ASSOCIATED RISK FACTORS - Hypotension, Shock, Hypoxia, Dysrhythmia, Metabolic changes, Dehydration and Vascular Compromise  MANAGEMENT- Bedside Assessment and Supervision of Care  INTERPRETATION -  Xrays, ECG, Blood Pressure and Cardiac Output Measures   INTERVENTIONS - hemodynamic mngmt, vascular control and Metobolic interventions  CASE REVIEW - Hospitalist, Nursing and Family  TREATMENT RESPONSE -Improved  PERFORMED BY - Self    NOTES   :    I have spent 55 minutes of critical care time involved in lab review, consultations with specialist, family decision- making, bedside attention and documentation. During this entire length of time I was immediately available to the patient . Critical Care: The reason for providing this level of medical care for this critically ill patient was due to a critical illness that impaired one or more vital organ systems, such that there was a high probability of imminent or life threatening deterioration in the patient's condition. This care involved high complexity decision making to assess, manipulate, and support vital system functions, to treat this degree of vital organ system failure, and to prevent further life threatening deterioration of the patients condition. Cris Guidry MD    Consult Note:  Cris Guidry MD spoke with Dr. Salome Patrick,   Specialty: Hospitalist  Discussed pt's hx, disposition, and available diagnostic and imaging results. Reviewed care plans. Agree with management and plan thus far. Consultant will evaluate pt for admission. Disposition: ADMIT  Patient is being admitted to the hospital.  The results of their tests and reasons for their admission have been discussed with them and/or available family. They convey agreement and understanding for the need to be admitted and for their admission diagnosis. Consultation has been made with the inpatient physician specialist for hospitalization. Diagnosis     Clinical Impression:   1. Intractable vomiting with nausea, unspecified vomiting type    2. Colitis    3. Uremia    4. Hypotension, unspecified hypotension type    5. Uncontrolled type 2 diabetes mellitus with hyperglycemia ([facility] [address]        Attestation:  I personally performed the services described in this documentation on this date 8/20/2019 for patientRahul. Cris Guidry MD    Please note that this dictation was completed with M-DAQ, the [employer] voice recognition software.  Quite often unanticipated grammatical, syntax, homophones, and other interpretive errors are inadvertently transcribed by the computer software. Please disregard these errors. Please excuse any errors that have escaped final proofreading. This note will not be viewable in [address]

## 2019-08-21 ENCOUNTER — APPOINTMENT (OUTPATIENT)
Dept: GENERAL RADIOLOGY | Age: 54
DRG: 683 | End: 2019-08-21
Attending: ANESTHESIOLOGY
Payer: MEDICARE

## 2019-08-21 PROBLEM — E11.9 DM (DIABETES MELLITUS) (HCC): Chronic | Status: ACTIVE | Noted: 2019-08-20

## 2019-08-21 PROBLEM — N18.6 ESRD (END STAGE RENAL DISEASE) (HCC): Chronic | Status: ACTIVE | Noted: 2019-08-20

## 2019-08-21 LAB
AMPHET UR QL SCN: NEGATIVE
ANION GAP SERPL CALC-SCNC: 14 MMOL/L (ref 5–15)
APPEARANCE UR: ABNORMAL
BACTERIA URNS QL MICRO: ABNORMAL /HPF
BARBITURATES UR QL SCN: NEGATIVE
BENZODIAZ UR QL: NEGATIVE
BILIRUB UR QL: NEGATIVE
BUN SERPL-MCNC: 103 MG/DL (ref 6–20)
BUN/CREAT SERPL: 17 (ref 12–20)
CALCIUM SERPL-MCNC: 9.5 MG/DL (ref 8.5–10.1)
CANNABINOIDS UR QL SCN: NEGATIVE
CHLORIDE SERPL-SCNC: 100 MMOL/L (ref 97–108)
CO2 SERPL-SCNC: 21 MMOL/L (ref 21–32)
COCAINE UR QL SCN: NEGATIVE
COLOR UR: ABNORMAL
CREAT SERPL-MCNC: 6.07 MG/DL (ref 0.55–1.02)
DRUG SCRN COMMENT,DRGCM: NORMAL
EPITH CASTS URNS QL MICRO: ABNORMAL /LPF
ERYTHROCYTE [DISTWIDTH] IN BLOOD BY AUTOMATED COUNT: 13.8 % (ref 11.5–14.5)
EST. AVERAGE GLUCOSE BLD GHB EST-MCNC: 206 MG/DL
GLUCOSE BLD STRIP.AUTO-MCNC: 136 MG/DL (ref 65–100)
GLUCOSE BLD STRIP.AUTO-MCNC: 238 MG/DL (ref 65–100)
GLUCOSE BLD STRIP.AUTO-MCNC: 337 MG/DL (ref 65–100)
GLUCOSE BLD STRIP.AUTO-MCNC: 59 MG/DL (ref 65–100)
GLUCOSE BLD STRIP.AUTO-MCNC: 93 MG/DL (ref 65–100)
GLUCOSE SERPL-MCNC: 332 MG/DL (ref 65–100)
GLUCOSE UR STRIP.AUTO-MCNC: >1000 MG/DL
HBA1C MFR BLD: 8.8 % (ref 4.2–6.3)
HCT VFR BLD AUTO: 27.1 % (ref 35–47)
HGB BLD-MCNC: 9.3 G/DL (ref 11.5–16)
HGB UR QL STRIP: ABNORMAL
KETONES UR QL STRIP.AUTO: NEGATIVE MG/DL
LEUKOCYTE ESTERASE UR QL STRIP.AUTO: NEGATIVE
MCH RBC QN AUTO: 30.7 PG (ref 26–34)
MCHC RBC AUTO-ENTMCNC: 34.3 G/DL (ref 30–36.5)
MCV RBC AUTO: 89.4 FL (ref 80–99)
METHADONE UR QL: NEGATIVE
NITRITE UR QL STRIP.AUTO: NEGATIVE
NRBC # BLD: 0 K/UL (ref 0–0.01)
NRBC BLD-RTO: 0 PER 100 WBC
OPIATES UR QL: NEGATIVE
PCP UR QL: NEGATIVE
PH UR STRIP: 6.5 [PH] (ref 5–8)
PLATELET # BLD AUTO: 276 K/UL (ref 150–400)
PMV BLD AUTO: 9.1 FL (ref 8.9–12.9)
POTASSIUM SERPL-SCNC: 3.6 MMOL/L (ref 3.5–5.1)
PROT UR STRIP-MCNC: 100 MG/DL
RBC # BLD AUTO: 3.03 M/UL (ref 3.8–5.2)
RBC #/AREA URNS HPF: ABNORMAL /HPF (ref 0–5)
SERVICE CMNT-IMP: ABNORMAL
SERVICE CMNT-IMP: NORMAL
SODIUM SERPL-SCNC: 135 MMOL/L (ref 136–145)
SP GR UR REFRACTOMETRY: 1.01 (ref 1–1.03)
TROPONIN I SERPL-MCNC: <0.05 NG/ML
UA: UC IF INDICATED,UAUC: ABNORMAL
UROBILINOGEN UR QL STRIP.AUTO: 0.2 EU/DL (ref 0.2–1)
WBC # BLD AUTO: 9.4 K/UL (ref 3.6–11)
WBC URNS QL MICRO: ABNORMAL /HPF (ref 0–4)
YEAST URNS QL MICRO: PRESENT

## 2019-08-21 PROCEDURE — 85027 COMPLETE CBC AUTOMATED: CPT

## 2019-08-21 PROCEDURE — C1751 CATH, INF, PER/CENT/MIDLINE: HCPCS

## 2019-08-21 PROCEDURE — 65660000000 HC RM CCU STEPDOWN

## 2019-08-21 PROCEDURE — 02HV33Z INSERTION OF INFUSION DEVICE INTO SUPERIOR VENA CAVA, PERCUTANEOUS APPROACH: ICD-10-PCS | Performed by: ANESTHESIOLOGY

## 2019-08-21 PROCEDURE — 74011000250 HC RX REV CODE- 250: Performed by: INTERNAL MEDICINE

## 2019-08-21 PROCEDURE — 74011636637 HC RX REV CODE- 636/637: Performed by: INTERNAL MEDICINE

## 2019-08-21 PROCEDURE — 36556 INSERT NON-TUNNEL CV CATH: CPT

## 2019-08-21 PROCEDURE — 74011250637 HC RX REV CODE- 250/637: Performed by: INTERNAL MEDICINE

## 2019-08-21 PROCEDURE — 87086 URINE CULTURE/COLONY COUNT: CPT

## 2019-08-21 PROCEDURE — 74011250636 HC RX REV CODE- 250/636: Performed by: INTERNAL MEDICINE

## 2019-08-21 PROCEDURE — 80048 BASIC METABOLIC PNL TOTAL CA: CPT

## 2019-08-21 PROCEDURE — 80307 DRUG TEST PRSMV CHEM ANLYZR: CPT

## 2019-08-21 PROCEDURE — 81001 URINALYSIS AUTO W/SCOPE: CPT

## 2019-08-21 PROCEDURE — 36592 COLLECT BLOOD FROM PICC: CPT

## 2019-08-21 PROCEDURE — 84484 ASSAY OF TROPONIN QUANT: CPT

## 2019-08-21 PROCEDURE — 82962 GLUCOSE BLOOD TEST: CPT

## 2019-08-21 PROCEDURE — 71045 X-RAY EXAM CHEST 1 VIEW: CPT

## 2019-08-21 PROCEDURE — 83036 HEMOGLOBIN GLYCOSYLATED A1C: CPT

## 2019-08-21 PROCEDURE — 36415 COLL VENOUS BLD VENIPUNCTURE: CPT

## 2019-08-21 RX ORDER — HYDRALAZINE HYDROCHLORIDE 20 MG/ML
20 INJECTION INTRAMUSCULAR; INTRAVENOUS ONCE
Status: COMPLETED | OUTPATIENT
Start: 2019-08-21 | End: 2019-08-21

## 2019-08-21 RX ORDER — METRONIDAZOLE 500 MG/100ML
500 INJECTION, SOLUTION INTRAVENOUS EVERY 12 HOURS
Status: DISCONTINUED | OUTPATIENT
Start: 2019-08-21 | End: 2019-08-23 | Stop reason: HOSPADM

## 2019-08-21 RX ORDER — ACETAMINOPHEN 500 MG
1000 TABLET ORAL
Status: DISCONTINUED | OUTPATIENT
Start: 2019-08-21 | End: 2019-08-23 | Stop reason: HOSPADM

## 2019-08-21 RX ORDER — METOPROLOL TARTRATE 50 MG/1
50 TABLET ORAL 2 TIMES DAILY
Status: DISCONTINUED | OUTPATIENT
Start: 2019-08-21 | End: 2019-08-23 | Stop reason: HOSPADM

## 2019-08-21 RX ADMIN — SODIUM CHLORIDE 50 ML/HR: 900 INJECTION, SOLUTION INTRAVENOUS at 19:59

## 2019-08-21 RX ADMIN — METHOCARBAMOL TABLETS 750 MG: 500 TABLET, COATED ORAL at 22:28

## 2019-08-21 RX ADMIN — SODIUM BICARBONATE 650 MG: 650 TABLET ORAL at 09:41

## 2019-08-21 RX ADMIN — CLONIDINE HYDROCHLORIDE 0.1 MG: 0.1 TABLET ORAL at 09:37

## 2019-08-21 RX ADMIN — METHOCARBAMOL TABLETS 750 MG: 500 TABLET, COATED ORAL at 09:40

## 2019-08-21 RX ADMIN — INSULIN LISPRO 3 UNITS: 100 INJECTION, SOLUTION INTRAVENOUS; SUBCUTANEOUS at 12:15

## 2019-08-21 RX ADMIN — METRONIDAZOLE 500 MG: 500 INJECTION, SOLUTION INTRAVENOUS at 09:41

## 2019-08-21 RX ADMIN — INSULIN LISPRO 7 UNITS: 100 INJECTION, SOLUTION INTRAVENOUS; SUBCUTANEOUS at 09:33

## 2019-08-21 RX ADMIN — METOPROLOL TARTRATE 75 MG: 50 TABLET ORAL at 09:37

## 2019-08-21 RX ADMIN — ASPIRIN 81 MG 81 MG: 81 TABLET ORAL at 09:41

## 2019-08-21 RX ADMIN — Medication 40 ML: at 06:00

## 2019-08-21 RX ADMIN — Medication 10 ML: at 13:24

## 2019-08-21 RX ADMIN — HEPARIN SODIUM 5000 UNITS: 5000 INJECTION INTRAVENOUS; SUBCUTANEOUS at 06:00

## 2019-08-21 RX ADMIN — FENTANYL CITRATE 50 MCG: 50 INJECTION, SOLUTION INTRAMUSCULAR; INTRAVENOUS at 06:32

## 2019-08-21 RX ADMIN — HEPARIN SODIUM 5000 UNITS: 5000 INJECTION INTRAVENOUS; SUBCUTANEOUS at 14:49

## 2019-08-21 RX ADMIN — METRONIDAZOLE 500 MG: 500 INJECTION, SOLUTION INTRAVENOUS at 20:13

## 2019-08-21 RX ADMIN — HYDRALAZINE HYDROCHLORIDE 10 MG: 20 INJECTION INTRAMUSCULAR; INTRAVENOUS at 06:32

## 2019-08-21 RX ADMIN — FAMOTIDINE 20 MG: 10 INJECTION INTRAVENOUS at 12:16

## 2019-08-21 RX ADMIN — INSULIN GLARGINE 25 UNITS: 100 INJECTION, SOLUTION SUBCUTANEOUS at 09:34

## 2019-08-21 RX ADMIN — SODIUM BICARBONATE 650 MG: 650 TABLET ORAL at 17:35

## 2019-08-21 RX ADMIN — HEPARIN SODIUM 5000 UNITS: 5000 INJECTION INTRAVENOUS; SUBCUTANEOUS at 22:27

## 2019-08-21 RX ADMIN — OXYCODONE HYDROCHLORIDE AND ACETAMINOPHEN 1 TABLET: 5; 325 TABLET ORAL at 08:04

## 2019-08-21 RX ADMIN — ONDANSETRON 4 MG: 2 INJECTION INTRAMUSCULAR; INTRAVENOUS at 08:03

## 2019-08-21 RX ADMIN — PROCHLORPERAZINE EDISYLATE 5 MG: 5 INJECTION INTRAMUSCULAR; INTRAVENOUS at 09:31

## 2019-08-21 RX ADMIN — Medication 10 ML: at 22:27

## 2019-08-21 RX ADMIN — METHOCARBAMOL TABLETS 750 MG: 500 TABLET, COATED ORAL at 12:17

## 2019-08-21 RX ADMIN — OXYCODONE HYDROCHLORIDE AND ACETAMINOPHEN 1 TABLET: 5; 325 TABLET ORAL at 04:24

## 2019-08-21 RX ADMIN — EPOETIN ALFA-EPBX 10000 UNITS: 10000 INJECTION, SOLUTION INTRAVENOUS; SUBCUTANEOUS at 20:13

## 2019-08-21 RX ADMIN — ATORVASTATIN CALCIUM 20 MG: 20 TABLET, FILM COATED ORAL at 20:14

## 2019-08-21 RX ADMIN — HYDRALAZINE HYDROCHLORIDE 20 MG: 20 INJECTION INTRAMUSCULAR; INTRAVENOUS at 09:34

## 2019-08-21 RX ADMIN — METHOCARBAMOL TABLETS 750 MG: 500 TABLET, COATED ORAL at 17:31

## 2019-08-21 RX ADMIN — ONDANSETRON 4 MG: 2 INJECTION INTRAMUSCULAR; INTRAVENOUS at 04:24

## 2019-08-21 RX ADMIN — INSULIN LISPRO 5 UNITS: 100 INJECTION, SOLUTION INTRAVENOUS; SUBCUTANEOUS at 12:15

## 2019-08-21 RX ADMIN — INSULIN LISPRO 5 UNITS: 100 INJECTION, SOLUTION INTRAVENOUS; SUBCUTANEOUS at 09:33

## 2019-08-21 NOTE — ED NOTES
TRANSFER - OUT REPORT:    Verbal report given to Ericstanley Bates (name) on Tyler Rios  being transferred to med surg (unit) for routine progression of care       Report consisted of patients Situation, Background, Assessment and   Recommendations(SBAR). Information from the following report(s) SBAR was reviewed with the receiving nurse. Lines:   Peripheral IV 08/20/19 Right Antecubital (Active)   Site Assessment Clean, dry, & intact 8/20/2019  4:03 PM   Phlebitis Assessment 0 8/20/2019  4:03 PM   Infiltration Assessment 0 8/20/2019  4:03 PM   Dressing Status Clean, dry, & intact 8/20/2019  4:03 PM   Hub Color/Line Status Green;Capped;Flushed 8/20/2019  4:03 PM        Opportunity for questions and clarification was provided.       Patient transported with:   [employer]

## 2019-08-21 NOTE — PROGRESS NOTES
Patient arrived to floor from ER patient is crying out tearful and vomiting brown emesis. Will notify telehospitalist oncall with update.

## 2019-08-21 NOTE — ROUTINE PROCESS
Bedside and Verbal shift change report given to Jackson Haskins. 291 (oncoming nurse) by Priscila Cabral (offgoing nurse). Report included the following information SBAR, Kardex, Procedure Summary, MAR, Recent Results, Cardiac Rhythm ST and Quality Measures. Zone Phone:   [phone]      Significant changes during shift:  CVC placed, mews score 4.         Patient Information    Phillip Beck  48 y.o.  8/20/2019  2:52 PM by Idris Lu MD. Phillip Beck was admitted from Home    Problem List    Patient Active Problem List    Diagnosis Date Noted    Colitis 08/20/2019    DM (diabetes mellitus) ([facility] [address] 08/20/2019    ESRD (end stage renal disease) ([facility] [address] 08/20/2019    Back pain 11/23/2018    CKD (chronic kidney disease) stage 5, GFR less than 15 ml/min ([address] 11/23/2018    EZEKIEL (acute kidney injury) ([facility] [address] 11/23/2018    DKA (diabetic ketoacidoses) ([facility] [address] 07/10/2018    Pancreatitis 05/27/2018    Chest pain 05/10/2018    Acute cystitis 03/14/2017    Acute pancreatitis 05/25/2015    Flank pain 04/14/2015    Hydronephrosis with infection 04/09/2015    SIRS (systemic inflammatory response syndrome) ([address] 04/08/2015    Hyperglycemia 11/18/2013    Abdominal pain 11/18/2013    Lower urinary tract infectious disease 11/18/2013    Chronic pain 11/18/2013    Hyponatremia 11/18/2013    HTN (hypertension) 12/20/2012    Chronic lumbar radiculopathy 12/06/2012    Chronic pain syndrome 12/06/2012    IDDM (insulin dependent diabetes mellitus) ([facility] [address] 10/16/2012    Back pain, lumbosacral 06/24/2012    Gastroparesis 06/07/2012    Abdominal pain, LUQ (left upper quadrant) 06/07/2012    Syncope and collapse 04/22/2012    Depression 04/22/2012    CKD (chronic kidney disease) stage 4, GFR 15-29 ml/min (formerly [facility]) 04/22/2012    Intractable abdominal pain 04/21/2012    HTN (hypertension) 04/12/2012     Past Medical History:   Diagnosis Date    C. difficile colitis 6/2012    Chronic kidney disease     Stage V    Chronic low back pain     Chronic pain     back & left leg    CKD (chronic kidney disease)     stage 3 to 4, baseline Cr 2    Constipation     Diabetes (HCC)     A1c 8.2 3/2012    Hep C w/o coma, chronic (HCC)     Hyperlipemia     Hypertension     Lumbar disc disease     Migraines     Pancreatitis 1430    alcoholic    UTI (lower urinary tract infection) 6/20012         Core Measures:    CVA: No No  CHF:No No  PNA:No No      Activity Status:    OOB to Chair No  Ambulated this shift No   Bed Rest Yes        LINES AND DRAINS:    Central Line? Yes Placement date 8/21/19 Reason Medically Necessary Inadequate venous access      DVT prophylaxis:    DVT prophylaxis Med- Yes  DVT prophylaxis SCD or DEANDRE- No       Patient Safety:    Falls Score Total Score: 4  Safety Level_______  Bed Alarm On? Yes  Sitter?  No    Plan for upcoming shift: nephrology consult         Discharge Plan: Yes home     Active Consults:  IP CONSULT TO NEPHROLOGY

## 2019-08-21 NOTE — PROGRESS NOTES
Spoke to DR Favio Ghosh because B/P very low, recheck  about 4 times catapres and [id number] held, MND stated she will catch up later just held Blood pressure meds.  MD also aware iof Bloo0d sugar 0f 61 MD stated just to D/C insulin for meal coverage just continue with sliding scale

## 2019-08-21 NOTE — PROGRESS NOTES
Problem: Falls - Risk of  Goal: *Absence of Falls  Description  Document Marina Nathan Fall Risk and appropriate interventions in the flowsheet.   Outcome: Progressing Towards Goal  Note:   Fall Risk Interventions:  Mobility Interventions: Bed/chair exit alarm, Communicate number of staff needed for ambulation/transfer, Patient to call before getting OOB, Strengthening exercises (ROM-active/passive), Utilize walker, cane, or other assistive device, Utilize gait belt for transfers/ambulation         Medication Interventions: Assess postural VS orthostatic hypotension, Bed/chair exit alarm, Evaluate medications/consider consulting pharmacy, Patient to call before getting OOB, Teach patient to arise slowly, Utilize gait belt for transfers/ambulation    Elimination Interventions: Bed/chair exit alarm, Call light in reach, Elevated toilet seat, Patient to call for help with toileting needs, Stay With Me (per policy), Toileting schedule/hourly rounds, Toilet paper/wipes in reach    History of Falls Interventions: Bed/chair exit alarm, Consult care management for discharge planning, Door open when patient unattended, Evaluate medications/consider consulting pharmacy, Investigate reason for fall, Room close to nurse's station, Utilize gait belt for transfer/ambulation, Assess for delayed presentation/identification of injury for 48 hrs (comment for end date), Vital signs minimum Q4HRs X 24 hrs (comment for end date)         Problem: Patient Education: Go to Patient Education Activity  Goal: Patient/Family Education  Outcome: Progressing Towards Goal     Problem: Patient Education: Go to Patient Education Activity  Goal: Patient/Family Education  Outcome: Progressing Towards Goal     Problem: Sepsis: Day of Diagnosis  Goal: Off Pathway (Use only if patient is Off Pathway)  Outcome: Progressing Towards Goal  Goal: *Fluid resuscitation  Outcome: Progressing Towards Goal  Goal: *Paired blood cultures prior to first dose of antibiotic  Outcome: Progressing Towards Goal  Goal: *First dose of  appropriate antibiotic within 3 hours of arrival to ED, within 1 hour of arrival to ICU  Outcome: Progressing Towards Goal  Goal: *Lactic acid with first set of blood cultures  Outcome: Progressing Towards Goal  Goal: *Pneumococcal immunization (if eligible)  Outcome: Progressing Towards Goal  Goal: *Influenza immunization (if eligible)  Outcome: Progressing Towards Goal  Goal: Activity/Safety  Outcome: Progressing Towards Goal  Goal: Consults, if ordered  Outcome: Progressing Towards Goal  Goal: Diagnostic Test/Procedures  Outcome: Progressing Towards Goal  Goal: Nutrition/Diet  Outcome: Progressing Towards Goal  Goal: Discharge Planning  Outcome: Progressing Towards Goal  Goal: Medications  Outcome: Progressing Towards Goal  Goal: Respiratory  Outcome: Progressing Towards Goal  Goal: Treatments/Interventions/Procedures  Outcome: Progressing Towards Goal  Goal: Psychosocial  Outcome: Progressing Towards Goal     Problem: Sepsis: Day 2  Goal: Off Pathway (Use only if patient is Off Pathway)  Outcome: Progressing Towards Goal  Goal: *Central Venous Pressure maintained at 8-12 mm Hg  Outcome: Progressing Towards Goal  Goal: *Hemodynamically stable  Outcome: Progressing Towards Goal  Goal: *Tolerating diet  Outcome: Progressing Towards Goal  Goal: Activity/Safety  Outcome: Progressing Towards Goal  Goal: Consults, if ordered  Outcome: Progressing Towards Goal  Goal: Diagnostic Test/Procedures  Outcome: Progressing Towards Goal  Goal: Nutrition/Diet  Outcome: Progressing Towards Goal  Goal: Discharge Planning  Outcome: Progressing Towards Goal  Goal: Medications  Outcome: Progressing Towards Goal  Goal: Respiratory  Outcome: Progressing Towards Goal  Goal: Treatments/Interventions/Procedures  Outcome: Progressing Towards Goal  Goal: Psychosocial  Outcome: Progressing Towards Goal     Problem: Sepsis: Day 3  Goal: Off Pathway (Use only if patient is Off Pathway)  Outcome: Progressing Towards Goal  Goal: *Central Venous Pressure maintained at 8-12 mm Hg  Outcome: Progressing Towards Goal  Goal: *Oxygen saturation within defined limits  Outcome: Progressing Towards Goal  Goal: *Vital sign stability  Outcome: Progressing Towards Goal  Goal: *Tolerating diet  Outcome: Progressing Towards Goal  Goal: *Demonstrates progressive activity  Outcome: Progressing Towards Goal  Goal: Activity/Safety  Outcome: Progressing Towards Goal  Goal: Consults, if ordered  Outcome: Progressing Towards Goal  Goal: Diagnostic Test/Procedures  Outcome: Progressing Towards Goal  Goal: Nutrition/Diet  Outcome: Progressing Towards Goal  Goal: Discharge Planning  Outcome: Progressing Towards Goal  Goal: Medications  Outcome: Progressing Towards Goal  Goal: Respiratory  Outcome: Progressing Towards Goal  Goal: Treatments/Interventions/Procedures  Outcome: Progressing Towards Goal  Goal: Psychosocial  Outcome: Progressing Towards Goal     Problem: Sepsis: Day 4  Goal: Off Pathway (Use only if patient is Off Pathway)  Outcome: Progressing Towards Goal  Goal: Activity/Safety  Outcome: Progressing Towards Goal  Goal: Consults, if ordered  Outcome: Progressing Towards Goal  Goal: Diagnostic Test/Procedures  Outcome: Progressing Towards Goal  Goal: Nutrition/Diet  Outcome: Progressing Towards Goal  Goal: Discharge Planning  Outcome: Progressing Towards Goal  Goal: Medications  Outcome: Progressing Towards Goal  Goal: Respiratory  Outcome: Progressing Towards Goal  Goal: Treatments/Interventions/Procedures  Outcome: Progressing Towards Goal  Goal: Psychosocial  Outcome: Progressing Towards Goal  Goal: *Oxygen saturation within defined limits  Outcome: Progressing Towards Goal  Goal: *Hemodynamically stable  Outcome: Progressing Towards Goal  Goal: *Vital signs within defined limits  Outcome: Progressing Towards Goal  Goal: *Tolerating diet  Outcome: Progressing Towards Goal  Goal: *Demonstrates progressive activity  Outcome: Progressing Towards Goal  Goal: *Fluid volume maintenance  Outcome: Progressing Towards Goal     Problem: Sepsis: Day 5  Goal: Off Pathway (Use only if patient is Off Pathway)  Outcome: Progressing Towards Goal  Goal: *Oxygen saturation within defined limits  Outcome: Progressing Towards Goal  Goal: *Vital signs within defined limits  Outcome: Progressing Towards Goal  Goal: *Tolerating diet  Outcome: Progressing Towards Goal  Goal: *Demonstrates progressive activity  Outcome: Progressing Towards Goal  Goal: *Discharge plan identified  Outcome: Progressing Towards Goal  Goal: Activity/Safety  Outcome: Progressing Towards Goal  Goal: Consults, if ordered  Outcome: Progressing Towards Goal  Goal: Diagnostic Test/Procedures  Outcome: Progressing Towards Goal  Goal: Nutrition/Diet  Outcome: Progressing Towards Goal  Goal: Discharge Planning  Outcome: Progressing Towards Goal  Goal: Medications  Outcome: Progressing Towards Goal  Goal: Respiratory  Outcome: Progressing Towards Goal  Goal: Treatments/Interventions/Procedures  Outcome: Progressing Towards Goal  Goal: Psychosocial  Outcome: Progressing Towards Goal     Problem: Sepsis: Day 6  Goal: Off Pathway (Use only if patient is Off Pathway)  Outcome: Progressing Towards Goal  Goal: *Oxygen saturation within defined limits  Outcome: Progressing Towards Goal  Goal: *Vital signs within defined limits  Outcome: Progressing Towards Goal  Goal: *Tolerating diet  Outcome: Progressing Towards Goal  Goal: *Demonstrates progressive activity  Outcome: Progressing Towards Goal  Goal: Influenza immunization  Outcome: Progressing Towards Goal  Goal: *Pneumococcal immunization  Outcome: Progressing Towards Goal  Goal: Activity/Safety  Outcome: Progressing Towards Goal  Goal: Diagnostic Test/Procedures  Outcome: Progressing Towards Goal  Goal: Nutrition/Diet  Outcome: Progressing Towards Goal  Goal: Discharge Planning  Outcome: Progressing Towards Goal  Goal: Medications  Outcome: Progressing Towards Goal  Goal: Respiratory  Outcome: Progressing Towards Goal  Goal: Treatments/Interventions/Procedures  Outcome: Progressing Towards Goal  Goal: Psychosocial  Outcome: Progressing Towards Goal     Problem: Sepsis: Discharge Outcomes  Goal: *Vital signs within defined limits  Outcome: Progressing Towards Goal  Goal: *Tolerating diet  Outcome: Progressing Towards Goal  Goal: *Verbalizes understanding and describes prescribed diet  Outcome: Progressing Towards Goal  Goal: *Demonstrates progressive activity  Outcome: Progressing Towards Goal  Goal: *Describes follow-up/return visits to physicians  Outcome: Progressing Towards Goal  Goal: *Verbalizes name, dosage, time, side effects, and number of days to continue medications  Outcome: Progressing Towards Goal  Goal: *Influenza immunization (Oct-Mar only)  Outcome: Progressing Towards Goal  Goal: *Pneumococcal immunization  Outcome: Progressing Towards Goal  Goal: *Lungs clear or at baseline  Outcome: Progressing Towards Goal  Goal: *Oxygen saturation returns to baseline or 90% or better on room air  Outcome: Progressing Towards Goal  Goal: *Glycemic control  Outcome: Progressing Towards Goal  Goal: *Absence of deep venous thrombosis signs and symptoms(Stroke Metric)  Outcome: Progressing Towards Goal  Goal: *Describes available resources and support systems  Outcome: Progressing Towards Goal  Goal: *Optimal pain control at patient's stated goal  Outcome: Progressing Towards Goal

## 2019-08-21 NOTE — PROGRESS NOTES
[facility] and requested arterial blood draw for lab work and line placement for medication administration. Patient mews score 3. Telephone order obtained for central line insertion Stat. 0120- New orders obtained for central line placement stat. 0149- Anesthesiologist to the floor to evaluate patient and necessity of line. Consent form signed. 0215- Order obtained for Chest Xray to evaluate line placement.

## 2019-08-21 NOTE — PROGRESS NOTES
Hospitalist Progress Note    NAME: Elpidio Arredondo   :  1965   MRN:  [id number]       Assessment / Plan:  Intractable Nausea with Vomiting POA  Colitis POA  could also be related to [address] 4 CKD POA- not yet on dialysis    Cont tele till tachycardia resolved  Cont IV fluids  Aggressive Nausea control- added Phenergan prn to Zofran  Avoid opioids with pt's h/o chronic pain syndrome & h/o abuse   Cont empiric IV ABx for now  S/p creation of AV Fistula Left Arm (Brachio - Basilic) on 87 by Dr Claire Jade Nephrology consulted for further recommendationa     DM type 2 uncontrolled with Hyperglycemia POA  A1c= 8.8    Cont home Lantus + Scheduled Lispro Q AC & HS  SSI lispro correction scale here  Diabetic diet restrictions     HTN: poorly controlled due to missed BP meds yesterday  Sinus Tachycardia POA- improved with increased BB    Continue home meds and will add hydralazine PRN  Increase dose of Metoprolol to  75 mg BID- titrate as tolerated    Abnormal EKG POA- no CP  Troponin negative  Observe for now  No further workup for now        Code Status: full  Surrogate Decision Maker:      DVT Prophylaxis: Sq heparin  GI Prophylaxis: not indicated     Baseline: lives at home. As per patient before hospitalization she was able to do all ADL by herself    Recommended Disposition: Home w/Family with family when medically cleared 2-3 days anticipated if no initiation of HD     Subjective:     Chief Complaint / Reason for Physician Visit:F/U Nausea/vomiting, Colitis, Sinus tachycardia, CKD stage5, ?uremia  \"I am very nauseous\". Discussed with RN events overnight.      Review of Systems:  Symptom Y/N Comments  Symptom Y/N Comments   Fever/Chills n   Chest Pain n    Poor Appetite y   Edema n    Cough n   Abdominal Pain y    Sputum n   Joint Pain     SOB/GRIFFITHS n   Pruritis/Rash     Nausea/vomit y   Tolerating PT/OT y    Diarrhea    Tolerating Diet y    Constipation    Other       Could NOT obtain due to: Objective:     VITALS:   Last 24hrs VS reviewed since prior progress note. Most recent are:  Patient Vitals for the past 24 hrs:   Temp Pulse Resp BP SpO2   08/21/19 1132 97 °F (36.1 °C) 95 20 97/56 98 %   08/21/19 1038  97      08/21/19 0934 97.8 °F (36.6 °C) (!) 137  (!) 134/92    08/21/19 0417 98.2 °F (36.8 °C) (!) 134 20 (!) 177/98 98 %   08/21/19 0400  (!) 137      08/21/19 0233  (!) 127  147/81    08/21/19 0000  (!) 128  164/85    08/20/19 2308 97.7 °F (36.5 °C) (!) 120 20 (!) 199/100 100 %   08/20/19 2229 97.8 °F (36.6 °C) (!) 119 20 122/85 99 %   08/20/19 2200  (!) 128 19 (!) 181/109 99 %   08/20/19 2130  (!) 113 17 (!) 188/107 100 %   08/20/19 2045  (!) 127 19 181/88 92 %   08/20/19 2015  (!) 107 21 (!) 184/99 100 %   08/20/19 1954  (!) 118 21 (!) 180/105 100 %   08/20/19 1928     100 %   08/20/19 1924     100 %   08/20/19 1858     100 %   08/20/19 1730    (!) 182/99 100 %       Intake/Output Summary (Last 24 hours) at 8/21/2019 1537  Last data filed at 8/21/2019 1424  Gross per 24 hour   Intake 240 ml   Output 700 ml   Net -460 ml        PHYSICAL EXAM:  General: WD, WN. Alert, cooperative, no acute distress    EENT:  EOMI. Anicteric sclerae. MMM  Resp:  CTA bilaterally, no wheezing or rales. No accessory muscle use  CV:  Regular  rhythm,  No edema  GI:  Soft, Non distended, Non tender.  +Bowel sounds  Neurologic:  Alert and oriented X 3, normal speech,   Psych:   Good insight. Not anxious nor agitated  Skin/extremities: No rashes.   No jaundice, L UE AVF surgical site noted with staples +    Reviewed most current lab test results and cultures  YES  Reviewed most current radiology test results   YES  Review and summation of old records today    NO  Reviewed patient's current orders and MAR    YES  PMH/SH reviewed - no change compared to H&P  ________________________________________________________________________  Care Plan discussed with:    Comments   Patient x Family      RN x    Care Manager x Gi   Consultant                       x Multidiciplinary team rounds were held today with , nursing, pharmacist and clinical coordinator. Patient's plan of care was discussed; medications were reviewed and discharge planning was addressed. ________________________________________________________________________  Total NON critical care TIME:  36   Minutes    Total CRITICAL CARE TIME Spent:   Minutes non procedure based      Comments   >50% of visit spent in counseling and coordination of care     ________________________________________________________________________  Karissa Ashby MD     Procedures: see electronic medical records for all procedures/Xrays and details which were not copied into this note but were reviewed prior to creation of Plan. LABS:  I reviewed today's most current labs and imaging studies.   Pertinent labs include:  Recent Labs     08/21/19 0255 08/20/19  1548   WBC 9.4 8.0   HGB 9.3* 10.0*   HCT 27.1* 30.6*    288     Recent Labs     08/21/19 0255 08/20/19  1548   * 135*   K 3.6 3.8    98   CO2 21 23   * 297*   * 102*   CREA 6.07* 6.16*   CA 9.5 9.4   ALB  --  4.0   TBILI  --  0.4   SGOT  --  28   ALT  --  25       Signed: Karissa Ashby MD

## 2019-08-21 NOTE — PROGRESS NOTES
Central Line Procedure Note    Indication: Inadequate venous access    Risks, benefits, alternatives explained and patient agrees to proceed. Patient positioned in Trendelenburg. 7-Step Sterility Protocol followed. (cap, mask sterile gown, sterile gloves, large sterile sheet, hand hygiene, 2% chlorhexidine for cutaneous antisepsis)  5 mL 1% Lidocaine placed at insertion site. Right internal jugular cannulated x 1 attempt(s) utilizing the Seldinger technique with ultrasound guidance. Catheter secured & Biopatch applied. Sterile Tegaderm placed. CXR pending.     Care turned over to covering Attending MD.
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
## 2019-08-21 NOTE — PROGRESS NOTES
Admitting physician called and notified of EKG changes present on admission to the floor. Updated regarding patient status of crying out in pain, elevated BP, actively vomiting brown emesis. Stated phenergan would be added to orders for comfort.

## 2019-08-21 NOTE — PROGRESS NOTES
Pharmacy Automatic Renal Dosing Protocol    Current Regimen:  Famotidine 20 mg IV q12h    Labs:  Recent Labs     19  0255 19  1548   CREA 6.07* 6.16*   * 102*   WBC 9.4 8.0     Temp (24hrs), Av.9 °F (36.6 °C), Min:97.7 °F (36.5 °C), Max:98.2 °F (36.8 °C)    Creatinine Clearance (mL/min) or Dialysis: 9.6    Impression/Plan:   Adjust famotidine to 20 mg IV daily       Pharmacy will follow daily and adjust medications as appropriate for renal function and/or serum levels. Thank you,  Javier Silva    Renal Dosing  [email or web address]/[place]/[facility]/Pharmacy/Clinical%20Companion/Renal%[id number]. pdf

## 2019-08-21 NOTE — PROGRESS NOTES
telehospitalist on call physician paged regarding blood glucose of 369, awaiting orders for insulin coverage.

## 2019-08-21 NOTE — PROGRESS NOTES
Pharmacy Automatic Renal Dosing Protocol - Antimicrobials    Indication for Antimicrobials: intra abdominal infection     Current Regimen of Each Antimicrobial:  Levaquin 750 mg IV once then 500 mg  (Start Date ; Day # 1)    Previous Antimicrobial Therapy:   (Start Date ; Day    Goal Level:   (AUC: 400 - 600 mg/hr/Liter/day)     Date Dose & Interval Measured (mcg/mL) Extrapolated (mcg/mL)                       Date & time of next level:     Significant Cultures:       Radiology / Imaging results: (X-ray, CT scan or MRI):     Paralysis, amputations, malnutrition:     Labs:  Recent Labs     19  1548   CREA 6.16*   *   WBC 8.0     Temp (24hrs), Av.7 °F (36.5 °C), Min:97.7 °F (36.5 °C), Max:97.7 °F (36.5 °C)    Creatinine Clearance (mL/min) or Dialysis: 9    Impression/Plan:   Levaquin appropriate  Antimicrobial stop date pending  Bmp daily per protocol     Pharmacy will follow daily and adjust medications as appropriate for renal function and/or serum levels. Thank you,  Jt Shine, PHARMD    Recommended duration of therapy  http://[facility]/[place]/[facility]/Pharmacy/Clinical%20Companion/[email or web address]. docx    Renal Dosing  http://[facility]/[place]/[facility]/Pharmacy/Clinical%20Companion/Renal%[id number]. pdf

## 2019-08-21 NOTE — PROGRESS NOTES
Bedside shift change report given to Marcus goel (oncoming nurse) by Patel Li (offgoing nurse). Report included the following information SBAR, Kardex, Procedure Summary, Intake/Output, MAR, Recent Results and Cardiac Rhythm NSR.

## 2019-08-22 PROBLEM — N18.5 CKD (CHRONIC KIDNEY DISEASE) STAGE 5, GFR LESS THAN 15 ML/MIN (HCC): Chronic | Status: ACTIVE | Noted: 2018-11-23

## 2019-08-22 LAB
ALBUMIN SERPL-MCNC: 2.7 G/DL (ref 3.5–5)
ANION GAP SERPL CALC-SCNC: 12 MMOL/L (ref 5–15)
ATRIAL RATE: 122 BPM
BACTERIA SPEC CULT: NORMAL
BUN SERPL-MCNC: 106 MG/DL (ref 6–20)
BUN/CREAT SERPL: 17 (ref 12–20)
CALCIUM SERPL-MCNC: 8.1 MG/DL (ref 8.5–10.1)
CALCULATED P AXIS, ECG09: 66 DEGREES
CALCULATED R AXIS, ECG10: 26 DEGREES
CALCULATED T AXIS, ECG11: 79 DEGREES
CC UR VC: NORMAL
CHLORIDE SERPL-SCNC: 102 MMOL/L (ref 97–108)
CO2 SERPL-SCNC: 22 MMOL/L (ref 21–32)
CREAT SERPL-MCNC: 6.19 MG/DL (ref 0.55–1.02)
DIAGNOSIS, 93000: NORMAL
ERYTHROCYTE [DISTWIDTH] IN BLOOD BY AUTOMATED COUNT: 14.2 % (ref 11.5–14.5)
GLUCOSE BLD STRIP.AUTO-MCNC: 123 MG/DL (ref 65–100)
GLUCOSE BLD STRIP.AUTO-MCNC: 141 MG/DL (ref 65–100)
GLUCOSE BLD STRIP.AUTO-MCNC: 230 MG/DL (ref 65–100)
GLUCOSE BLD STRIP.AUTO-MCNC: 78 MG/DL (ref 65–100)
GLUCOSE BLD STRIP.AUTO-MCNC: 94 MG/DL (ref 65–100)
GLUCOSE SERPL-MCNC: 174 MG/DL (ref 65–100)
HCT VFR BLD AUTO: 22.6 % (ref 35–47)
HGB BLD-MCNC: 7.3 G/DL (ref 11.5–16)
IRON SATN MFR SERPL: 73 % (ref 20–50)
IRON SERPL-MCNC: 165 UG/DL (ref 35–150)
MCH RBC QN AUTO: 29.6 PG (ref 26–34)
MCHC RBC AUTO-ENTMCNC: 32.3 G/DL (ref 30–36.5)
MCV RBC AUTO: 91.5 FL (ref 80–99)
NRBC # BLD: 0 K/UL (ref 0–0.01)
NRBC BLD-RTO: 0 PER 100 WBC
P-R INTERVAL, ECG05: 142 MS
PHOSPHATE SERPL-MCNC: 6.9 MG/DL (ref 2.6–4.7)
PLATELET # BLD AUTO: 232 K/UL (ref 150–400)
PMV BLD AUTO: 8.9 FL (ref 8.9–12.9)
POTASSIUM SERPL-SCNC: 4.1 MMOL/L (ref 3.5–5.1)
Q-T INTERVAL, ECG07: 336 MS
QRS DURATION, ECG06: 72 MS
QTC CALCULATION (BEZET), ECG08: 478 MS
RBC # BLD AUTO: 2.47 M/UL (ref 3.8–5.2)
SERVICE CMNT-IMP: ABNORMAL
SERVICE CMNT-IMP: NORMAL
SODIUM SERPL-SCNC: 136 MMOL/L (ref 136–145)
TIBC SERPL-MCNC: 225 UG/DL (ref 250–450)
VENTRICULAR RATE, ECG03: 122 BPM
WBC # BLD AUTO: 8.1 K/UL (ref 3.6–11)

## 2019-08-22 PROCEDURE — 74011250636 HC RX REV CODE- 250/636: Performed by: INTERNAL MEDICINE

## 2019-08-22 PROCEDURE — 74011250637 HC RX REV CODE- 250/637: Performed by: INTERNAL MEDICINE

## 2019-08-22 PROCEDURE — 80069 RENAL FUNCTION PANEL: CPT

## 2019-08-22 PROCEDURE — 74011000250 HC RX REV CODE- 250: Performed by: INTERNAL MEDICINE

## 2019-08-22 PROCEDURE — 94760 N-INVAS EAR/PLS OXIMETRY 1: CPT

## 2019-08-22 PROCEDURE — 82962 GLUCOSE BLOOD TEST: CPT

## 2019-08-22 PROCEDURE — 85027 COMPLETE CBC AUTOMATED: CPT

## 2019-08-22 PROCEDURE — 74011636637 HC RX REV CODE- 636/637: Performed by: INTERNAL MEDICINE

## 2019-08-22 PROCEDURE — 36415 COLL VENOUS BLD VENIPUNCTURE: CPT

## 2019-08-22 PROCEDURE — 83540 ASSAY OF IRON: CPT

## 2019-08-22 PROCEDURE — 65660000000 HC RM CCU STEPDOWN

## 2019-08-22 RX ORDER — LIDOCAINE 4 G/100G
1 PATCH TOPICAL EVERY 24 HOURS
Status: DISCONTINUED | OUTPATIENT
Start: 2019-08-23 | End: 2019-08-23 | Stop reason: HOSPADM

## 2019-08-22 RX ADMIN — ACETAMINOPHEN 1000 MG: 500 TABLET ORAL at 22:16

## 2019-08-22 RX ADMIN — FAMOTIDINE 20 MG: 10 INJECTION INTRAVENOUS at 10:46

## 2019-08-22 RX ADMIN — CLONIDINE HYDROCHLORIDE 0.1 MG: 0.1 TABLET ORAL at 18:08

## 2019-08-22 RX ADMIN — ASPIRIN 81 MG 81 MG: 81 TABLET ORAL at 10:46

## 2019-08-22 RX ADMIN — METRONIDAZOLE 500 MG: 500 INJECTION, SOLUTION INTRAVENOUS at 10:47

## 2019-08-22 RX ADMIN — METHOCARBAMOL TABLETS 750 MG: 500 TABLET, COATED ORAL at 10:45

## 2019-08-22 RX ADMIN — SODIUM BICARBONATE 650 MG: 650 TABLET ORAL at 18:11

## 2019-08-22 RX ADMIN — Medication 40 ML: at 05:12

## 2019-08-22 RX ADMIN — METOPROLOL TARTRATE 50 MG: 50 TABLET ORAL at 18:07

## 2019-08-22 RX ADMIN — Medication 10 ML: at 21:35

## 2019-08-22 RX ADMIN — METRONIDAZOLE 500 MG: 500 INJECTION, SOLUTION INTRAVENOUS at 20:23

## 2019-08-22 RX ADMIN — INSULIN GLARGINE 25 UNITS: 100 INJECTION, SOLUTION SUBCUTANEOUS at 10:46

## 2019-08-22 RX ADMIN — METHOCARBAMOL TABLETS 750 MG: 500 TABLET, COATED ORAL at 22:16

## 2019-08-22 RX ADMIN — LEVOFLOXACIN 500 MG: 5 INJECTION, SOLUTION INTRAVENOUS at 22:20

## 2019-08-22 RX ADMIN — SODIUM CHLORIDE 75 ML/HR: 900 INJECTION, SOLUTION INTRAVENOUS at 17:11

## 2019-08-22 RX ADMIN — ATORVASTATIN CALCIUM 20 MG: 20 TABLET, FILM COATED ORAL at 20:23

## 2019-08-22 RX ADMIN — INSULIN LISPRO 3 UNITS: 100 INJECTION, SOLUTION INTRAVENOUS; SUBCUTANEOUS at 12:32

## 2019-08-22 RX ADMIN — HEPARIN SODIUM 5000 UNITS: 5000 INJECTION INTRAVENOUS; SUBCUTANEOUS at 21:35

## 2019-08-22 RX ADMIN — HEPARIN SODIUM 5000 UNITS: 5000 INJECTION INTRAVENOUS; SUBCUTANEOUS at 05:11

## 2019-08-22 RX ADMIN — SODIUM BICARBONATE 650 MG: 650 TABLET ORAL at 10:47

## 2019-08-22 RX ADMIN — METHOCARBAMOL TABLETS 750 MG: 500 TABLET, COATED ORAL at 18:11

## 2019-08-22 RX ADMIN — Medication 10 ML: at 14:02

## 2019-08-22 RX ADMIN — HEPARIN SODIUM 5000 UNITS: 5000 INJECTION INTRAVENOUS; SUBCUTANEOUS at 14:01

## 2019-08-22 RX ADMIN — Medication 20 ML: at 14:07

## 2019-08-22 NOTE — ROUTINE PROCESS
Bedside and Verbal shift change report given to [facility] (oncoming nurse) by Annika Houston (offgoing nurse).  Report included the following information SBAR, Kardex, Procedure Summary, MAR, Recent Results, Cardiac Rhythm ST and Quality Measures.     Zone Phone:   [phone]        Significant changes during shift:  Staples removed by vascular MD        Patient Information     Tanna Bob  48 y.o.  8/20/2019  2:52 PM by Liliya Hyde MD. Tanna Bob was admitted from Home     Problem List          Patient Active Problem List     Diagnosis Date Noted    Colitis 08/20/2019    DM (diabetes mellitus) ([facility] 08/20/2019    ESRD (end stage renal disease) ([facility] 08/20/2019    Back pain 11/23/2018    CKD (chronic kidney disease) stage 5, GFR less than 15 ml/min ([facility] 11/23/2018    EZEKIEL (acute kidney injury) ([facility] 11/23/2018    DKA (diabetic ketoacidoses) ([facility] 07/10/2018    Pancreatitis 05/27/2018    Chest pain 05/10/2018    Acute cystitis 03/14/2017    Acute pancreatitis 05/25/2015    Flank pain 04/14/2015    Hydronephrosis with infection 04/09/2015    SIRS (systemic inflammatory response syndrome) ([facility] 04/08/2015    Hyperglycemia 11/18/2013    Abdominal pain 11/18/2013    Lower urinary tract infectious disease 11/18/2013    Chronic pain 11/18/2013    Hyponatremia 11/18/2013    HTN (hypertension) 12/20/2012    Chronic lumbar radiculopathy 12/06/2012    Chronic pain syndrome 12/06/2012    IDDM (insulin dependent diabetes mellitus) ([facility] 10/16/2012    Back pain, lumbosacral 06/24/2012    Gastroparesis 06/07/2012    Abdominal pain, LUQ (left upper quadrant) 06/07/2012    Syncope and collapse 04/22/2012    Depression 04/22/2012    CKD (chronic kidney disease) stage 4, GFR 15-29 ml/min ([facility]) 04/22/2012    Intractable abdominal pain 04/21/2012    HTN (hypertension) 04/12/2012    Nausea & vomiting 03/16/2012           Past Medical History:   Diagnosis Date    C. difficile colitis 6/2012  Chronic kidney disease       Stage V    Chronic low back pain      Chronic pain       back & left leg    CKD (chronic kidney disease)       stage 3 to 4, baseline Cr 2    Constipation      Diabetes (HCC)       A1c 8.2 3/2012    Hep C w/o coma, chronic (HCC)      Hyperlipemia      Hypertension      Lumbar disc disease      Migraines      Pancreatitis 1816     alcoholic    UTI (lower urinary tract infection) 6/20012            Core Measures:     CVA: No No  CHF:No No  PNA:No No        Activity Status:     OOB to Chair No  Ambulated this shift No   Bed Rest Yes           LINES AND DRAINS:     Central Line? Yes Placement date 8/21/19 Reason Medically Necessary Inadequate venous access        DVT prophylaxis:     DVT prophylaxis Med- Yes  DVT prophylaxis SCD or DEANDRE- No         Patient Safety:     Falls Score Total Score: 4  Safety Level_______  Bed Alarm On? Yes  Sitter?  No     Plan for upcoming shift: safety, IV antibiotics, IVF, fs ac and hs           Discharge Plan: Yes home when stable     Active Consults:  IP CONSULT TO NEPHROLOGY

## 2019-08-22 NOTE — ROUTINE PROCESS
Bedside and Verbal shift change report given to Chelle Feliz RN (oncoming nurse) by [facility] (offgoing nurse).  Report included the following information SBAR, Kardex, Procedure Summary, MAR, Recent Results, Cardiac Rhythm ST and Quality Measures.     Zone Phone:   [phone]        Significant changes during shift:  Low sugar and low blood pressure, insulin before meals  was D./C and Blood pressure meds were held        Patient Information     Wang Lucio  48 y.o.  8/20/2019  2:52 PM by Emperatriz José MD. Wang Lucio was admitted from Home     Problem List          Patient Active Problem List     Diagnosis Date Noted    Colitis 08/20/2019    DM (diabetes mellitus) ([facility] 08/20/2019    ESRD (end stage renal disease) ([facility] 08/20/2019    Back pain 11/23/2018    CKD (chronic kidney disease) stage 5, GFR less than 15 ml/min ([address] 11/23/2018    EZEKIEL (acute kidney injury) ([facility] 11/23/2018    DKA (diabetic ketoacidoses) ([facility] 07/10/2018    Pancreatitis 05/27/2018    Chest pain 05/10/2018    Acute cystitis 03/14/2017    Acute pancreatitis 05/25/2015    Flank pain 04/14/2015    Hydronephrosis with infection 04/09/2015    SIRS (systemic inflammatory response syndrome) ([facility] 04/08/2015    Hyperglycemia 11/18/2013    Abdominal pain 11/18/2013    Lower urinary tract infectious disease 11/18/2013    Chronic pain 11/18/2013    Hyponatremia 11/18/2013    HTN (hypertension) 12/20/2012    Chronic lumbar radiculopathy 12/06/2012    Chronic pain syndrome 12/06/2012    IDDM (insulin dependent diabetes mellitus) ([facility] 10/16/2012    Back pain, lumbosacral 06/24/2012    Gastroparesis 06/07/2012    Abdominal pain, LUQ (left upper quadrant) 06/07/2012    Syncope and collapse 04/22/2012    Depression 04/22/2012    CKD (chronic kidney disease) stage 4, GFR 15-29 ml/min ([facility] 04/22/2012    Intractable abdominal pain 04/21/2012    HTN (hypertension) 04/12/2012    Nausea & vomiting 03/16/2012           Past Medical History:   Diagnosis Date    C. difficile colitis 6/2012    Chronic kidney disease       Stage V    Chronic low back pain      Chronic pain       back & left leg    CKD (chronic kidney disease)       stage 3 to 4, baseline Cr 2    Constipation      Diabetes ([facility]       A1c 8.2 3/2012    Hep C w/o coma, chronic (HCC)      Hyperlipemia      Hypertension      Lumbar disc disease      Migraines      Pancreatitis 1408     alcoholic    UTI (lower urinary tract infection) 6/20012            Core Measures:     CVA: No No  CHF:No No  PNA:No No        Activity Status:     OOB to Chair No  Ambulated this shift No   Bed Rest Yes           LINES AND DRAINS:     Central Line? Yes Placement date 8/21/19 Reason Medically Necessary Inadequate venous access        DVT prophylaxis:     DVT prophylaxis Med- Yes  DVT prophylaxis SCD or DEANDRE- No         Patient Safety:     Falls Score Total Score: 4  Safety Level_______  Bed Alarm On? Yes  Sitter?  No     Plan for upcoming shift: safety, IV antibiotics, IVF, fs ac and hs           Discharge Plan: Yes home when stable     Active Consults:  IP CONSULT TO NEPHROLOGY

## 2019-08-22 NOTE — PROGRESS NOTES
Hospitalist Progress Note    NAME: Celia Daily   :  1965   MRN:  [id number]       Assessment / Plan:  Intractable Nausea with Vomiting POA- improved in last 24 hrs  Colitis POA  could also be related to [address] 4 CKD POA- not yet on dialysis  Hypotension    Cont tele as pt is hypotensive in past 12 hrs- tachycardia resolved  Cont IV fluids for now due to Hypotension  Aggressive Nausea control- added Phenergan prn to Zofran, decrease dose to 2.5mg to help with Hypotension  Avoid opioids with pt's h/o chronic pain syndrome & h/o abuse   Cont empiric IV ABx for now  S/p creation of AV Fistula Left Arm (Brachio - Basilic) on 67 by Dr Marya Yin Nephrology consult noted- no plans to initiate HD at the moment- will follow here for now till pt made up her mind depending on how she feels after Tx for Colitis/N/V     DM type 2 uncontrolled with Hyperglycemia POA- Hypoglycemia noted yesterday, likely due to poor Po intake yesterday due to N/V  A1c= 8.8    Cont home Lantus for her baseline needs  DCd home scheduled Lispro Q AC for now  SSI lispro correction scale here as needed  Diabetic diet restrictions - encourage to finish her meals as tolerated     HTN: poorly controlled due to missed BP meds before arrival, now Hypotensive in past 24 hrs  Sinus Tachycardia POA-resolved now    Holding home BP meds for now- Clonidine, Metoprolol  IV hydralazine PRN    Abnormal EKG POA- no CP  Troponin negative  Observe for now  No further workup for now        Code Status: full  Surrogate Decision Maker:      DVT Prophylaxis: Sq heparin  GI Prophylaxis: not indicated     Baseline: lives at home.  As per patient before hospitalization she was able to do all ADL by herself    Recommended Disposition: Home w/Family with family when medically cleared 2-3 days anticipated if no initiation of HD     Subjective:     Chief Complaint / Reason for Physician Visit:F/U Nausea/vomiting, Colitis, Sinus tachycardia, CKD stage5, ?uremia, Hypotension  \"I am very nauseous\". Discussed with RN events overnight. Review of Systems:  Symptom Y/N Comments  Symptom Y/N Comments   Fever/Chills n   Chest Pain n    Poor Appetite y   Edema n    Cough n   Abdominal Pain y    Sputum n   Joint Pain     SOB/GRIFFITHS n   Pruritis/Rash     Nausea/vomit y   Tolerating PT/OT y    Diarrhea    Tolerating Diet y    Constipation    Other       Could NOT obtain due to:      Objective:     VITALS:   Last 24hrs VS reviewed since prior progress note. Most recent are:  Patient Vitals for the past 24 hrs:   Temp Pulse Resp BP SpO2   08/22/19 0447 98.1 °F (36.7 °C) 88 18 94/60 100 %   08/21/19 2349 98 °F (36.7 °C) 86 20 95/61 97 %   08/21/19 1956 97.4 °F (36.3 °C) 80 20 109/74 99 %   08/21/19 1845    (!) 88/55    08/21/19 1743    (!) 77/52    08/21/19 1739    (!) 76/51    08/21/19 1736  70  (!) 73/49    08/21/19 1530 97.2 °F (36.2 °C) 84 20 (!) 81/56 94 %   08/21/19 1132 97 °F (36.1 °C) 95 20 97/56 98 %   08/21/19 1038  97      08/21/19 0934 97.8 °F (36.6 °C) (!) 137  (!) 134/92        Intake/Output Summary (Last 24 hours) at 8/22/2019 0735  Last data filed at 8/21/2019 2255  Gross per 24 hour   Intake 1150 ml   Output    Net 1150 ml        PHYSICAL EXAM:  General: WD, WN. Alert, cooperative, no acute distress    EENT:  EOMI. Anicteric sclerae. MMM, L IJ noted +  Resp:  CTA bilaterally, no wheezing or rales. No accessory muscle use  CV:  Regular  rhythm,  No edema  GI:  Soft, Non distended, Non tender.  +Bowel sounds  Neurologic:  Alert and oriented X 3, normal speech,   Psych:   Good insight. Not anxious nor agitated  Skin/extremities: No rashes.   No jaundice, L UE AVF surgical site noted with staples +    Reviewed most current lab test results and cultures  YES  Reviewed most current radiology test results   YES  Review and summation of old records today    NO  Reviewed patient's current orders and MAR    YES  PMH/SH reviewed - no change compared to H&P  ________________________________________________________________________  Care Plan discussed with:    Comments   Patient x    Family      RN x    Care Manager x Gi   Consultant                       x Multidiciplinary team rounds were held today with , nursing, pharmacist and clinical coordinator. Patient's plan of care was discussed; medications were reviewed and discharge planning was addressed. ________________________________________________________________________  Total NON critical care TIME:  26   Minutes    Total CRITICAL CARE TIME Spent:   Minutes non procedure based      Comments   >50% of visit spent in counseling and coordination of care     ________________________________________________________________________  Qi Garcia MD     Procedures: see electronic medical records for all procedures/Xrays and details which were not copied into this note but were reviewed prior to creation of Plan. LABS:  I reviewed today's most current labs and imaging studies.   Pertinent labs include:  Recent Labs     08/22/19 0452 08/21/19 0255 08/20/19  1548   WBC 8.1 9.4 8.0   HGB 7.3* 9.3* 10.0*   HCT 22.6* 27.1* 30.6*    276 288     Recent Labs     08/22/19 0452 08/21/19  0255 08/20/19  1548    135* 135*   K 4.1 3.6 3.8    100 98   CO2 22 21 23   * 332* 297*   * 103* 102*   CREA 6.19* 6.07* 6.16*   CA 8.1* 9.5 9.4   PHOS 6.9*  --   --    ALB 2.7*  --  4.0   TBILI  --   --  0.4   SGOT  --   --  28   ALT  --   --  25       Signed: Qi Garcia MD

## 2019-08-22 NOTE — ROUTINE PROCESS
Bedside and Verbal shift change report given to [address] (oncoming nurse) by Evan Zuñiga RN (offgoing nurse). Report included the following information SBAR, Kardex, Procedure Summary, MAR, Recent Results, Cardiac Rhythm ST and Quality Measures.     Zone Phone:   [phone]      Significant changes during shift:  Low sugar and low blood pressure, insulin before meals  was D./C and Blood pressure meds were held      Patient Information    Kian Akins  48 y.o.  8/20/2019  2:52 PM by Corrina Mccord MD. Kian Akins was admitted from Home    Problem List    Patient Active Problem List    Diagnosis Date Noted    Colitis 08/20/2019    DM (diabetes mellitus) ([facility] 08/20/2019    ESRD (end stage renal disease) ([facility] 08/20/2019    Back pain 11/23/2018    CKD (chronic kidney disease) stage 5, GFR less than 15 ml/min ([facility] 11/23/2018    EZEKIEL (acute kidney injury) ([facility] 11/23/2018    [facility]acidoses) [facility] 07/10/2018    Pancreatitis 05/27/2018    Chest pain 05/10/2018    Acute cystitis 03/14/2017    Acute pancreatitis 05/25/2015    Flank pain 04/14/2015    Hydronephrosis with infection 04/09/2015    SIRS (systemic inflammatory response syndrome) ([facility] 04/08/2015    Hyperglycemia 11/18/2013    Abdominal pain 11/18/2013    Lower urinary tract infectious disease 11/18/2013    Chronic pain 11/18/2013    Hyponatremia 11/18/2013    HTN (hypertension) 12/20/2012    Chronic lumbar radiculopathy 12/06/2012    Chronic pain syndrome 12/06/2012    IDDM (insulin dependent diabetes mellitus) ([facility] 10/16/2012    Back pain, lumbosacral 06/24/2012    Gastroparesis 06/07/2012    Abdominal pain, LUQ (left upper quadrant) 06/07/2012    Syncope and collapse 04/22/2012    Depression 04/22/2012    CKD (chronic kidney disease) stage 4, GFR 15-29 ml/min ([facility] 04/22/2012    Intractable abdominal pain 04/21/2012    HTN (hypertension) 04/12/2012    Nausea & vomiting 03/16/2012     Past Medical History: Diagnosis Date    C. difficile colitis 6/2012    Chronic kidney disease     Stage V    Chronic low back pain     Chronic pain     back & left leg    CKD (chronic kidney disease)     stage 3 to 4, baseline Cr 2    Constipation     Diabetes (HCC)     A1c 8.2 3/2012    Hep C w/o coma, chronic (HCC)     Hyperlipemia     Hypertension     Lumbar disc disease     Migraines     Pancreatitis 7575    alcoholic    UTI (lower urinary tract infection) 6/20012         Core Measures:    CVA: No No  CHF:No No  PNA:No No      Activity Status:    OOB to Chair No  Ambulated this shift No   Bed Rest Yes        LINES AND DRAINS:    Central Line? Yes Placement date 8/21/19 Reason Medically Necessary Inadequate venous access      DVT prophylaxis:    DVT prophylaxis Med- Yes  DVT prophylaxis SCD or DEANDRE- No       Patient Safety:    Falls Score Total Score: 4  Safety Level_______  Bed Alarm On? Yes  Sitter?  No    Plan for upcoming shift: safety, IV antibiotics, IVF, fs ac and hs        Discharge Plan: Yes home when stable    Active Consults:  IP CONSULT TO NEPHROLOGY

## 2019-08-22 NOTE — PROGRESS NOTES
[email or web address]                  Phone - [phone]   Renal Progress Note    Subjective:   Patient: Candido Campbell                    YOB: 1965               Date- 8/22/2019          Reason for visit: CRI    Admission Date: 8/20/2019       IMPRESSION:     Chronic renal insufficiency, stage V (note, the pt is NOT YET ESRF). Her symptoms of N/V and hiccups could certainly be due to uremia. If so, we should start HD now. However, uremia usually does not cause sudden onset of N/V, as is seen here, and it does not cause abdominal pain. The patient did have a left AVF placed on 8/2 by Dr. Hayder Brooks, but is not yet ready to use. Her Bun/creat are very high, but not different than they were 6 months ago. Bun/creat 90/6.5 in 2/19 and 103/6.1 in 8/19. Pt feeling much better on 8/22 and is unwilling to start HD at this time.     N/V and abdominal pain X 2 days. Much improved on 8/22.     Hypertension.     Anemia secondary to renal failure.     Diabetes Mellitus.              Plan:         Discussed the option of starting dialysis now vs waiting to see how she does. Dialysis now would require placement of another central line. The AVF is not ready to use. The patient is not willing to start dialysis, lizz since her symptoms have improved.       No IV fluids.      started EPO   Daily labs while here. No IV or sticks in the left arm.     Low phos diet.     Notify Dr. Hayder Brooks of pt's location, as her staples were to come out tomorrow. Above d/w the patient, Dr. Molly Spear, and the floor team.       Interim history:    On 8/21: admitted to the hospital for N/V and abdominal pain X 2 days. .  Patient was referred for follow up of renal disease. She has stage V renal failure and had a left AVF placed on 8/2 by Dr. Hayder Brooks but is not yet on dialysis. She is followed by Dr. Emilie Reynolds. The patient says her appetite has been good in general.  The GI symptoms started suddenly 2 days ago.   She continues to feel poorly. She denies any SOB, chest pain, or other complaints. On : The patient is feeling much better and smiling today. Says her GI sx's are completely gone.       ROS as above, plus: no fever/chills. No HEENT complaints. No SOB. No chest pain. No diarrhea. No joint pain. No skin rashes. Objective:     Visit Vitals  /74   Pulse 97   Temp 98.3 °F (36.8 °C)   Resp 21   Ht 5' 5\" (1.651 m)   Wt 63.5 kg (140 lb)   LMP 09/15/2013   SpO2 100%   BMI 23.30 kg/m²     Temp (24hrs), Av.9 °F (36.6 °C), Min:97.2 °F (36.2 °C), Max:98.4 °F (36.9 °C)      No intake/output data recorded.  1901 -  0700  In: 1150 [P.O.:700; I.V.:450]  Out: 700 [Urine:200]    Physical Exam:  General:  alert, cooperative, no distress  Eye:  conjunctivae/corneas clear. EOM's intact. Neurologic:  grossly non-focal  Neck:  supple; no JVD  Lungs:  clear to auscultation bilaterally; no resp distress  Heart:  regular rate and rhythm; no gallop or rub  Skin:  no rash or abnormalities  Left arm:  + AVF with + bruit  Abdomen:  soft, non-tender. No masses,  no organomegaly   Extremities: no edema    Data Review:     Recent Labs     19  0452 19  0255 19  1548   WBC 8.1 9.4 8.0   HGB 7.3* 9.3* 10.0*    135* 135*   K 4.1 3.6 3.8    100 98   CO2 22 21 23   * 103* 102*   CREA 6.19* 6.07* 6.16*   CA 8.1* 9.5 9.4   ALB 2.7*  --  4.0   PHOS 6.9*  --   --        Assessment:     Active Problems:    Nausea & vomiting (3/16/2012)      Chronic pain syndrome (2012)      HTN (hypertension) (2012)      Colitis (2019)      DM (diabetes mellitus) ([facility] (2019)      ESRD (end stage renal disease) ([facility] (2019)      Chart reviewed. Pertinent Notes Reviewed. Medications list Personally Reviewed.      Leah Paez, [address] [facility]  [facility]  [facility], [address]  [place], [address]  Phone - [phone]    Fax - [phone]  [email or web address]                                         [facility]   [address] [facility] Elida Du [address] [place] [address]  Phone - [phone]  Fax - [phone]. [email or web address]

## 2019-08-22 NOTE — DIABETES MGMT
[facility]    DTC Progress Note    Recommendations/ Comments: If appropriate, please consider changing correctional insulin to Lispro Correctional insulin with high sensitivity given renal function at this time. Chart reviewed on Gallito Dalton. Patient is a 48 y.o. female with known type 2 diabetes on  25 units tresiba daily, 5 units of novolog ac meals at home. A1c:   Lab Results   Component Value Date/Time    Hemoglobin A1c 8.8 (H) 08/21/2019 02:55 AM    Hemoglobin A1c 9.9 (H) 11/23/2018 05:53 PM       Recent Glucose Results:   Lab Results   Component Value Date/Time     (H) 08/22/2019 04:52 AM    GLUCPOC 230 (H) 08/22/2019 11:18 AM    GLUCPOC 141 (H) 08/22/2019 06:59 AM    GLUCPOC 136 (H) 08/21/2019 09:26 PM        Lab Results   Component Value Date/Time    Creatinine 6.19 (H) 08/22/2019 04:52 AM     Estimated Creatinine Clearance: 9.5 mL/min (A) (based on SCr of 6.19 mg/dL (H)). Active Orders   Diet    DIET DIABETIC CONSISTENT CARB Regular; 2 GM NA (House Low NA); AHA-LOW-CHOL FAT; Low Phosphorus        PO intake:   Patient Vitals for the past 72 hrs:   % Diet Eaten   08/22/19 1234 90 %   08/21/19 1743 30 %   08/21/19 1424 50 %       Current hospital DM medication: 25 units Lantus daily, insulin lispro with meals and hs    Will continue to follow as needed. Thank you.     Time spent: 10 minutes  Justin Huang, [address] [place] [address], [address]  Office:  [phone]

## 2019-08-22 NOTE — PROGRESS NOTES
Problem: Falls - Risk of  Goal: *Absence of Falls  Description  Document Rosio Olson Fall Risk and appropriate interventions in the flowsheet.   Outcome: Progressing Towards Goal  Note:   Fall Risk Interventions:  Mobility Interventions: Bed/chair exit alarm, Patient to call before getting OOB         Medication Interventions: Bed/chair exit alarm, Evaluate medications/consider consulting pharmacy, Patient to call before getting OOB    Elimination Interventions: Bed/chair exit alarm, Call light in reach, Patient to call for help with toileting needs    History of Falls Interventions: Bed/chair exit alarm

## 2019-08-22 NOTE — PROGRESS NOTES
Problem: Falls - Risk of  Goal: *Absence of Falls  Description  Document Rosio Wesley Fall Risk and appropriate interventions in the flowsheet.   Outcome: Progressing Towards Goal  Note:   Fall Risk Interventions:  Mobility Interventions: Bed/chair exit alarm         Medication Interventions: Bed/chair exit alarm, Patient to call before getting OOB    Elimination Interventions: Call light in reach, Bed/chair exit alarm, Toileting schedule/hourly rounds    History of Falls Interventions: Bed/chair exit alarm, Room close to nurse's station, Door open when patient unattended         Problem: Patient Education: Go to Patient Education Activity  Goal: Patient/Family Education  Outcome: Progressing Towards Goal

## 2019-08-22 NOTE — PROGRESS NOTES
Breonna: home with   1) will need transportation arranged upon d/c    Reason for Admission:   ESRD                  RRAT Score:  20                Do you (patient/family) have any concerns for transition/discharge? No concerns at this time                Plan for utilizing home health:  No recommendations at this time     Current Advanced Directive/Advance Care Plan:  Not at this time             Transition of Care Plan:          Patient is a 47 y/o [place] American female who was admitted to [facility] for ESRD. CM made room visit with patient who was alert and oriented with no visitors present in room. Patient confirmed demographics, insurance, and PCP (last seen 4 mo ago). Patient also reported that she sees her PCP to assist in managing her DM. Patient reported that she has an appointment scheduled with her Nephrologist on 8/27/19. Patient uses [facility]. Patient and  reside in a two story home with 4 entry steps. Patient reported that her bedroom is on the main level of the home. Patient stated that her daughter lives in [place] and her  is her only support in the area. Pt has DME including cane and rollator, which is new. Prior to admission patient was independent with ALDs, IADLs (uses cane and recently started using new rollator). Patient stated that she nor her  drive. Pt relies on Medicaid transport services to get to and from doctors appointments and errands. Patient denied any history of HH, SNF, or IPR. Patient would like to return home upon d/c and declined home health services. Patient stated that she will need transportation arranged upon d/c. Patient confirmed she has house keys with her. CM will continue to follow.      Care Management Interventions  PCP Verified by CM: Yes(last seen 4 mo ago)  Mode of Transport at Discharge: (needs transport arranged)  Transition of Care Consult (CM Consult): Discharge Planning  Discharge Durable Medical Equipment: No  Physical Therapy Consult: No  Occupational Therapy Consult: No  Speech Therapy Consult: No  Current Support Network: Lives with Spouse, Own Home  Confirm Follow Up Transport: Cab(medicaid transport)  Plan discussed with Pt/Family/Caregiver: Yes  Discharge Location  Discharge Placement: [facility] [address] [facility], [address]

## 2019-08-22 NOTE — PROGRESS NOTES
Noted MEWS score was a 3 because heart rate elevated, pt asymptomatic, I will recheck at 6 PM and give her metoprolol ordered.

## 2019-08-23 VITALS
BODY MASS INDEX: 21.6 KG/M2 | WEIGHT: 129.63 LBS | HEART RATE: 77 BPM | HEIGHT: 65 IN | TEMPERATURE: 98.1 F | RESPIRATION RATE: 18 BRPM | DIASTOLIC BLOOD PRESSURE: 84 MMHG | OXYGEN SATURATION: 100 % | SYSTOLIC BLOOD PRESSURE: 128 MMHG

## 2019-08-23 LAB
ALBUMIN SERPL-MCNC: 2.7 G/DL (ref 3.5–5)
ANION GAP SERPL CALC-SCNC: 9 MMOL/L (ref 5–15)
BUN SERPL-MCNC: 92 MG/DL (ref 6–20)
BUN/CREAT SERPL: 16 (ref 12–20)
CALCIUM SERPL-MCNC: 7.6 MG/DL (ref 8.5–10.1)
CHLORIDE SERPL-SCNC: 104 MMOL/L (ref 97–108)
CO2 SERPL-SCNC: 22 MMOL/L (ref 21–32)
COMMENT, HOLDF: NORMAL
CREAT SERPL-MCNC: 5.91 MG/DL (ref 0.55–1.02)
ERYTHROCYTE [DISTWIDTH] IN BLOOD BY AUTOMATED COUNT: 14 % (ref 11.5–14.5)
GLUCOSE BLD STRIP.AUTO-MCNC: 116 MG/DL (ref 65–100)
GLUCOSE BLD STRIP.AUTO-MCNC: 161 MG/DL (ref 65–100)
GLUCOSE SERPL-MCNC: 108 MG/DL (ref 65–100)
HCT VFR BLD AUTO: 21.4 % (ref 35–47)
HGB BLD-MCNC: 7.1 G/DL (ref 11.5–16)
MCH RBC QN AUTO: 30.6 PG (ref 26–34)
MCHC RBC AUTO-ENTMCNC: 33.2 G/DL (ref 30–36.5)
MCV RBC AUTO: 92.2 FL (ref 80–99)
NRBC # BLD: 0 K/UL (ref 0–0.01)
NRBC BLD-RTO: 0 PER 100 WBC
PHOSPHATE SERPL-MCNC: 5 MG/DL (ref 2.6–4.7)
PLATELET # BLD AUTO: 202 K/UL (ref 150–400)
PMV BLD AUTO: 8.9 FL (ref 8.9–12.9)
POTASSIUM SERPL-SCNC: 3.9 MMOL/L (ref 3.5–5.1)
RBC # BLD AUTO: 2.32 M/UL (ref 3.8–5.2)
SAMPLES BEING HELD,HOLD: NORMAL
SERVICE CMNT-IMP: ABNORMAL
SERVICE CMNT-IMP: ABNORMAL
SODIUM SERPL-SCNC: 135 MMOL/L (ref 136–145)
WBC # BLD AUTO: 6.7 K/UL (ref 3.6–11)

## 2019-08-23 PROCEDURE — 80069 RENAL FUNCTION PANEL: CPT

## 2019-08-23 PROCEDURE — 82962 GLUCOSE BLOOD TEST: CPT

## 2019-08-23 PROCEDURE — 36415 COLL VENOUS BLD VENIPUNCTURE: CPT

## 2019-08-23 PROCEDURE — 74011250637 HC RX REV CODE- 250/637: Performed by: INTERNAL MEDICINE

## 2019-08-23 PROCEDURE — 74011250636 HC RX REV CODE- 250/636: Performed by: INTERNAL MEDICINE

## 2019-08-23 PROCEDURE — 74011636637 HC RX REV CODE- 636/637: Performed by: INTERNAL MEDICINE

## 2019-08-23 PROCEDURE — 85027 COMPLETE CBC AUTOMATED: CPT

## 2019-08-23 PROCEDURE — 74011000250 HC RX REV CODE- 250: Performed by: INTERNAL MEDICINE

## 2019-08-23 RX ORDER — METRONIDAZOLE 500 MG/1
500 TABLET ORAL 3 TIMES DAILY
Qty: 15 TAB | Refills: 0 | Status: SHIPPED | OUTPATIENT
Start: 2019-08-23 | End: 2019-08-28

## 2019-08-23 RX ORDER — LEVOFLOXACIN 500 MG/1
500 TABLET, FILM COATED ORAL EVERY OTHER DAY
Qty: 4 TAB | Refills: 0 | Status: SHIPPED | OUTPATIENT
Start: 2019-08-23 | End: 2019-08-27

## 2019-08-23 RX ORDER — PROMETHAZINE HYDROCHLORIDE 12.5 MG/1
12.5 TABLET ORAL
Qty: 20 TAB | Refills: 0 | Status: SHIPPED | OUTPATIENT
Start: 2019-08-23 | End: 2019-09-12

## 2019-08-23 RX ADMIN — INSULIN GLARGINE 25 UNITS: 100 INJECTION, SOLUTION SUBCUTANEOUS at 09:10

## 2019-08-23 RX ADMIN — METOPROLOL TARTRATE 50 MG: 50 TABLET ORAL at 09:11

## 2019-08-23 RX ADMIN — FAMOTIDINE 20 MG: 10 INJECTION INTRAVENOUS at 09:11

## 2019-08-23 RX ADMIN — METRONIDAZOLE 500 MG: 500 INJECTION, SOLUTION INTRAVENOUS at 09:11

## 2019-08-23 RX ADMIN — INSULIN LISPRO 2 UNITS: 100 INJECTION, SOLUTION INTRAVENOUS; SUBCUTANEOUS at 12:03

## 2019-08-23 RX ADMIN — HEPARIN SODIUM 5000 UNITS: 5000 INJECTION INTRAVENOUS; SUBCUTANEOUS at 05:01

## 2019-08-23 RX ADMIN — ASPIRIN 81 MG 81 MG: 81 TABLET ORAL at 09:11

## 2019-08-23 RX ADMIN — CLONIDINE HYDROCHLORIDE 0.1 MG: 0.1 TABLET ORAL at 09:11

## 2019-08-23 RX ADMIN — Medication 10 ML: at 05:02

## 2019-08-23 RX ADMIN — SODIUM CHLORIDE 75 ML/HR: 900 INJECTION, SOLUTION INTRAVENOUS at 05:01

## 2019-08-23 RX ADMIN — SODIUM BICARBONATE 650 MG: 650 TABLET ORAL at 09:11

## 2019-08-23 RX ADMIN — METHOCARBAMOL TABLETS 750 MG: 500 TABLET, COATED ORAL at 09:10

## 2019-08-23 NOTE — DISCHARGE INSTRUCTIONS
HOSPITALIST DISCHARGE INSTRUCTIONS    NAME: Dominik Resendiz   :  1965   MRN:  [id number]     Date/Time:  2019 11:48 AM    ADMIT DATE: 2019     DISCHARGE DATE: 2019     DISCHARGE DIAGNOSIS:  Intractable Nausea with Vomiting POA- resolved now  Colitis POA- cont PO Abx x 5 more days now  could also be related to [address] [place] [address] 4-5 CKD POA- not yet on dialysis, will consuider HD in near future but not now  Hypotension- resolved  DM type 2 uncontrolled with Hyperglycemia POA-now FS improved & stable  HTN  Sinus Tachycardia POA-resolved now  Abnormal EKG POA- no CP  Full code    Active Problems:    Nausea & vomiting (3/16/2012)      Chronic pain syndrome (2012)      HTN (hypertension) (2012)      CKD (chronic kidney disease) stage 5, GFR less than 15 ml/min ([facility] (2018)      Colitis (2019)      DM (diabetes mellitus) ([facility] (2019)         MEDICATIONS:  As per medication reconciliation  list  · It is important that you take the medication exactly as they are prescribed. · Keep your medication in the bottles provided by the pharmacist and keep a list of the medication names, dosages, and times to be taken in your wallet. · Do not take other medications without consulting your doctor. Pain Management: per above medications    What to do at Home    Recommended diet:  Cardiac Diet, Diabetic Diet and Renal Diet    Recommended activity: Activity as tolerated    If you have questions regarding the hospital related prescriptions or hospital related issues please call [facility] at [phone].     If you experience any of the following symptoms then please call your primary care physician or return to the emergency room if you cannot get hold of your doctor:  Fever, chills, nausea, vomiting, diarrhea, change in mentation, falling, bleeding, shortness of breath,     Follow Up:  Dr. Roxy Alvarez, NP  you are to call and set up an appointment to see them in 7-10 days. Dr Farnaz Shelton (Primary Nephrology) in 1 week for CKD stage 5 progressing towards ESRD needing HD soon  Dr Tito Ganser ([facility] surgery) in 1 month    Information obtained by :  I understand that if any problems occur once I am at home I am to contact my physician. I understand and acknowledge receipt of the instructions indicated above.                                                                                                                                            Physician's or R.N.'s Signature                                                                  Date/Time                                                                                                                                              Patient or Representative Signature                                                          Date/Time

## 2019-08-23 NOTE — PROGRESS NOTES
Telehospitalist: Patient requests Lidocaine patch for chronic back pain. Apply 12 hours on and 12 hours off.  No call back requested by RN

## 2019-08-23 NOTE — PROGRESS NOTES
CLAUDETTE Plan:    1) Home w/ family -son and  to assist at home    2) PCP follow-up with Max Hart, NP - [phone]    3) Nephrology follow-up at [facility] as previously scheduled    4) Roundtrip cab transport home at [facility].  Patient will need to be in front lobby for pick-up. Patient demonstrated walking in room. She states she uses a cane at home at baseline. Care Management Interventions  PCP Verified by CM: Yes  Mode of Transport at Discharge:  Other (see comment)(Roundtrip - cab ride home - family has no transportation)  Transition of Care Consult (CM Consult): Discharge Planning(Home w/ family and outpatient renal follow-up)  Discharge Durable Medical Equipment: No  Physical Therapy Consult: No  Occupational Therapy Consult: No  Speech Therapy Consult: No  Current Support Network: Lives with Spouse( and son will be at he home to assist patient at home)  Confirm Follow Up Transport: Family(Family will arrange [employer] transportation as needed for MD follow-up)  Plan discussed with Pt/Family/Caregiver: Yes  Discharge Location  Discharge Placement: Home with family assistance(Home w/ family)    Lexi Meng RN, BSN, [address] [place] [address]     [phone]

## 2019-08-23 NOTE — DISCHARGE SUMMARY
Hospitalist Discharge Summary     Patient ID:  Gregg Aguilar  [id number]  48 y.o.  1965 8/20/2019    PCP on record: Cary Salinas NP    Admit date: 8/20/2019  Discharge date and time: 8/23/2019    DISCHARGE DIAGNOSIS:    Intractable Nausea with Vomiting POA- resolved now  Colitis POA- cont PO Abx x 5 more days now  could also be related to [address] [place] [address] 4-5 CKD POA- not yet on dialysis, will consuider HD in near future but not now  Hypotension- resolved  DM type 2 uncontrolled with Hyperglycemia POA-now FS improved & stable  HTN  Sinus Tachycardia POA-resolved now  Abnormal EKG POA- no CP  Full code        CONSULTATIONS:  IP CONSULT TO NEPHROLOGY  IP CONSULT TO VASCULAR SURGERY    Excerpted HPI from H&P of Joanna Tay MD:  [age] y.o. Female PMH ESRD,HTN,DM, C.diff who comes to the hospital because for the past 2 days she has been having N/V some abdominal pain and has been feeling weak. She denies diarrhea, travel Hx, sick contacts     We were asked to admit for work up and evaluation of the above problems\"    ______________________________________________________________________  DISCHARGE SUMMARY/HOSPITAL COURSE:  for full details see H&P, daily progress notes, labs, consult notes.      Intractable Nausea with Vomiting POA- improved in last 24 hrs  Colitis POA  could also be related to Uremia/stage 4 CKD POA- not yet on dialysis  Hypotension     Cont tele as pt is hypotensive in past 12 hrs- tachycardia resolved  Cont IV fluids for now due to Hypotension  Aggressive Nausea control- added Phenergan prn to Zofran, decrease dose to 2.5mg to help with Hypotension  Avoid opioids with pt's h/o chronic pain syndrome & h/o abuse   Cont empiric IV ABx for now  S/p creation of AV Fistula Left Arm (Brachio - Basilic) on 5/0/38 by Dr Ganesh Alfredo Nephrology consult noted- no plans to initiate HD at the moment- will follow here for now till pt made up her mind depending on how she feels after Tx for Colitis/N/V     DM type 2 uncontrolled with Hyperglycemia POA- Hypoglycemia noted yesterday, likely due to poor Po intake yesterday due to N/V  A1c= 8.8     Cont home Lantus for her baseline needs  DCd home scheduled Lispro Q AC for now  SSI lispro correction scale here as needed  Diabetic diet restrictions - encourage to finish her meals as tolerated     HTN: poorly controlled due to missed BP meds before arrival, now Hypotensive in past 24 hrs  Sinus Tachycardia POA-resolved now     Holding home BP meds for now- Clonidine, Metoprolol  IV hydralazine PRN     Abnormal EKG POA- no CP  Troponin negative  Observe for now  No further workup for now        Code Status: full  Surrogate Decision Maker:      DVT Prophylaxis: Sq heparin  GI Prophylaxis: not indicated        _______________________________________________________________________  Patient seen and examined by me on discharge day. Pertinent Findings:  Gen:    Not in distress  Chest: Clear lungs  CVS:   Regular rhythm. No edema  Abd:  Soft, not distended, not tender  Neuro:  Alert, oriented x 3  Ext: L Arm AVF site noted (staples out) now  _______________________________________________________________________  DISCHARGE MEDICATIONS:   Current Discharge Medication List      START taking these medications    Details   promethazine (PHENERGAN) 12.5 mg tablet Take 1 Tab by mouth every six (6) hours as needed for Nausea. Qty: 20 Tab, Refills: 0      levoFLOXacin (LEVAQUIN) 500 mg tablet Take 1 Tab by mouth every other day for 4 days. Qty: 4 Tab, Refills: 0      metroNIDAZOLE (FLAGYL) 500 mg tablet Take 1 Tab by mouth three (3) times daily for 5 days. Qty: 15 Tab, Refills: 0         CONTINUE these medications which have NOT CHANGED    Details   insulin degludec (TRESIBA U-100 INSULIN SC) 25 Units by SubCUTAneous route daily. methocarbamol (ROBAXIN-750) 750 mg tablet Take 1 Tab by mouth four (4) times daily.   Qty: 20 Tab, Refills: 0 ondansetron (ZOFRAN ODT) 4 mg disintegrating tablet Take 1 Tab by mouth every eight (8) hours as needed for Nausea. Qty: 20 Tab, Refills: 0      insulin aspart U-100 (NOVOLOG) 100 unit/mL injection 5 Units by SubCUTAneous route Before breakfast, lunch, and dinner. aspirin 81 mg chewable tablet Take 1 Tab by mouth daily. Qty: 30 Tab, Refills: 1      cloNIDine HCl (CATAPRES) 0.1 mg tablet Take 1 Tab by mouth two (2) times a day. Qty: 60 Tab, Refills: 1      metoprolol tartrate (LOPRESSOR) 50 mg tablet Take 50 mg by mouth two (2) times a day. sodium bicarbonate 650 mg tablet Take 650 mg by mouth two (2) times a day. atorvastatin (LIPITOR) 20 mg tablet Take 20 mg by mouth nightly. Patient Follow Up Instructions:    Activity: Activity as tolerated  Diet: Cardiac Diet, Diabetic Diet and Renal Diet    Follow-up with Dr Royal Chappell ([address] [place] [address]) in 1 month , Dr Jania Ortiz (nephrology) in 1 week    Follow-up Information     Follow up With Specialties Details Why Contact Info    Marylen Laura, NP Nurse Practitioner   Keyshawn Aguilar [address] [place], [place] [id number] [phone]          ________________________________________________________________    Risk of deterioration: Low    Condition at Discharge:  Stable  __________________________________________________________________    Disposition  Home with family, no needs    ____________________________________________________________________    Code Status: Full Code  ___________________________________________________________________      Total time in minutes spent coordinating this discharge (includes going over instructions, follow-up, prescriptions, and preparing report for sign off to her PCP) :  36 minutes    Signed:  Michelle Finch MD

## 2019-08-23 NOTE — PROGRESS NOTES
Assumed care of pt this am. Pt is alert and oriented resting in bed at this time. IJ removed with no SS of bleeding, or SOB. Pt now resting flat in the bed.

## 2019-08-23 NOTE — PROGRESS NOTES
No acute events overnight. OOB to [facility] with stand by assist.  Voiding. +Thrill and bruit to LUE graft. Contact precautions maintained. Denies pain. Appeared to rest comfortably.

## 2019-08-23 NOTE — ROUTINE PROCESS
Bedside and Verbal shift change report given to [address] [place] [address] (oncoming nurse) by [facility] (offgoing nurse).  Report included the following information SBAR, Kardex, Procedure Summary, MAR, Recent Results, Cardiac Rhythm ST and Quality Measures.     Zone Phone:   [phone]        Significant changes during shift:  Complaint of back pain and leg pain, tylenol and robaxin was given, still pt with back pain lidocaine patch was apply      Patient Information     Tanna Bob  48 y.o.  8/20/2019  2:52 PM by Carolyn Norris MD. Tanna Bob was admitted from Home     Problem List          Patient Active Problem List     Diagnosis Date Noted    Colitis 08/20/2019    DM (diabetes mellitus) ([facility] 08/20/2019    ESRD (end stage renal disease) ([facility] 08/20/2019    Back pain 11/23/2018    CKD (chronic kidney disease) stage 5, GFR less than 15 ml/min ([facility] 11/23/2018    EZEKIEL (acute kidney injury) ([facility] 11/23/2018    DKA (diabetic ketoacidoses) ([facility] 07/10/2018    Pancreatitis 05/27/2018    Chest pain 05/10/2018    Acute cystitis 03/14/2017    Acute pancreatitis 05/25/2015    Flank pain 04/14/2015    Hydronephrosis with infection 04/09/2015    SIRS (systemic inflammatory response syndrome) ([facility] 04/08/2015    Hyperglycemia 11/18/2013    Abdominal pain 11/18/2013    Lower urinary tract infectious disease 11/18/2013    Chronic pain 11/18/2013    Hyponatremia 11/18/2013    HTN (hypertension) 12/20/2012    Chronic lumbar radiculopathy 12/06/2012    Chronic pain syndrome 12/06/2012    IDDM (insulin dependent diabetes mellitus) ([facility] 10/16/2012    Back pain, lumbosacral 06/24/2012    Gastroparesis 06/07/2012    Abdominal pain, LUQ (left upper quadrant) 06/07/2012    Syncope and collapse 04/22/2012    Depression 04/22/2012    CKD (chronic kidney disease) stage 4, GFR 15-29 ml/min ([facility] 04/22/2012    Intractable abdominal pain 04/21/2012    HTN (hypertension) 04/12/2012    Nausea & vomiting 03/16/2012         Past Medical History:   Diagnosis Date    C. difficile colitis 6/2012    Chronic kidney disease       Stage V    Chronic low back pain      Chronic pain       back & left leg    CKD (chronic kidney disease)       stage 3 to 4, baseline Cr 2    Constipation      Diabetes ([facility]       A1c 8.2 3/2012    Hep C w/o coma, chronic (HCC)      Hyperlipemia      Hypertension      Lumbar disc disease      Migraines      Pancreatitis 9182     alcoholic    UTI (lower urinary tract infection) 6/20012            Core Measures:     CVA: No No  CHF:No No  PNA:No No        Activity Status:     OOB to Chair No  Ambulated this shift No   Bed Rest Yes           LINES AND DRAINS:     Central Line? Yes Placement date 8/21/19 Reason Medically Necessary Inadequate venous access        DVT prophylaxis:     DVT prophylaxis Med- Yes  DVT prophylaxis SCD or DEANDRE- No         Patient Safety:     Falls Score Total Score: 4  Safety Level_______  Bed Alarm On? Yes  Sitter?  No     Plan for upcoming shift: safety, IV antibiotics, IVF, fs ac and hs           Discharge Plan: Yes home when stable     Active Consults:  IP CONSULT TO NEPHROLOGY

## 2019-08-28 ENCOUNTER — APPOINTMENT (OUTPATIENT)
Dept: CT IMAGING | Age: 54
End: 2019-08-28
Attending: EMERGENCY MEDICINE
Payer: MEDICARE

## 2019-08-28 ENCOUNTER — HOSPITAL ENCOUNTER (EMERGENCY)
Age: 54
Discharge: HOME | End: 2019-08-28
Attending: EMERGENCY MEDICINE
Payer: MEDICARE

## 2019-08-28 ENCOUNTER — HOSPITAL ENCOUNTER (EMERGENCY)
Age: 54
Discharge: LEFT BEFORE DISCHARGE | End: 2019-08-28
Attending: EMERGENCY MEDICINE

## 2019-08-28 VITALS
WEIGHT: 139.99 LBS | RESPIRATION RATE: 22 BRPM | OXYGEN SATURATION: 100 % | SYSTOLIC BLOOD PRESSURE: 177 MMHG | BODY MASS INDEX: 23.32 KG/M2 | TEMPERATURE: 98.3 F | HEART RATE: 114 BPM | DIASTOLIC BLOOD PRESSURE: 100 MMHG | HEIGHT: 65 IN

## 2019-08-28 VITALS
HEART RATE: 108 BPM | BODY MASS INDEX: 23.32 KG/M2 | TEMPERATURE: 98.1 F | DIASTOLIC BLOOD PRESSURE: 111 MMHG | OXYGEN SATURATION: 100 % | WEIGHT: 140 LBS | HEIGHT: 65 IN | RESPIRATION RATE: 18 BRPM | SYSTOLIC BLOOD PRESSURE: 164 MMHG

## 2019-08-28 DIAGNOSIS — G89.29 OTHER CHRONIC PAIN: Primary | ICD-10-CM

## 2019-08-28 DIAGNOSIS — R10.9 ACUTE ABDOMINAL PAIN: Primary | ICD-10-CM

## 2019-08-28 LAB
ALBUMIN SERPL-MCNC: 3.9 G/DL (ref 3.5–5)
ALBUMIN/GLOB SERPL: 0.9 {RATIO} (ref 1.1–2.2)
ALP SERPL-CCNC: 150 U/L (ref 45–117)
ALT SERPL-CCNC: 53 U/L (ref 12–78)
ANION GAP SERPL CALC-SCNC: 12 MMOL/L (ref 5–15)
APPEARANCE UR: CLEAR
AST SERPL-CCNC: 32 U/L (ref 15–37)
BACTERIA URNS QL MICRO: NEGATIVE /HPF
BASOPHILS # BLD: 0.1 K/UL (ref 0–0.1)
BASOPHILS NFR BLD: 0 % (ref 0–1)
BILIRUB SERPL-MCNC: 0.5 MG/DL (ref 0.2–1)
BILIRUB UR QL: NEGATIVE
BUN SERPL-MCNC: 84 MG/DL (ref 6–20)
BUN/CREAT SERPL: 14 (ref 12–20)
CALCIUM SERPL-MCNC: 9.6 MG/DL (ref 8.5–10.1)
CHLORIDE SERPL-SCNC: 100 MMOL/L (ref 97–108)
CO2 SERPL-SCNC: 23 MMOL/L (ref 21–32)
COLOR UR: ABNORMAL
CREAT SERPL-MCNC: 6.13 MG/DL (ref 0.55–1.02)
DIFFERENTIAL METHOD BLD: ABNORMAL
EOSINOPHIL # BLD: 0.3 K/UL (ref 0–0.4)
EOSINOPHIL NFR BLD: 2 % (ref 0–7)
EPITH CASTS URNS QL MICRO: ABNORMAL /LPF
ERYTHROCYTE [DISTWIDTH] IN BLOOD BY AUTOMATED COUNT: 15 % (ref 11.5–14.5)
GLOBULIN SER CALC-MCNC: 4.4 G/DL (ref 2–4)
GLUCOSE SERPL-MCNC: 264 MG/DL (ref 65–100)
GLUCOSE UR STRIP.AUTO-MCNC: >1000 MG/DL
HCT VFR BLD AUTO: 32.8 % (ref 35–47)
HGB BLD-MCNC: 10.7 G/DL (ref 11.5–16)
HGB UR QL STRIP: ABNORMAL
HYALINE CASTS URNS QL MICRO: ABNORMAL /LPF (ref 0–5)
IMM GRANULOCYTES # BLD AUTO: 0.2 K/UL (ref 0–0.04)
IMM GRANULOCYTES NFR BLD AUTO: 1 % (ref 0–0.5)
KETONES UR QL STRIP.AUTO: NEGATIVE MG/DL
LEUKOCYTE ESTERASE UR QL STRIP.AUTO: NEGATIVE
LIPASE SERPL-CCNC: 105 U/L (ref 73–393)
LYMPHOCYTES # BLD: 2.4 K/UL (ref 0.8–3.5)
LYMPHOCYTES NFR BLD: 18 % (ref 12–49)
MCH RBC QN AUTO: 30.1 PG (ref 26–34)
MCHC RBC AUTO-ENTMCNC: 32.6 G/DL (ref 30–36.5)
MCV RBC AUTO: 92.4 FL (ref 80–99)
MONOCYTES # BLD: 0.9 K/UL (ref 0–1)
MONOCYTES NFR BLD: 7 % (ref 5–13)
NEUTS SEG # BLD: 9.6 K/UL (ref 1.8–8)
NEUTS SEG NFR BLD: 72 % (ref 32–75)
NITRITE UR QL STRIP.AUTO: NEGATIVE
NRBC # BLD: 0 K/UL (ref 0–0.01)
NRBC BLD-RTO: 0 PER 100 WBC
PH UR STRIP: 7 [PH] (ref 5–8)
PLATELET # BLD AUTO: 342 K/UL (ref 150–400)
PMV BLD AUTO: 8.8 FL (ref 8.9–12.9)
POTASSIUM SERPL-SCNC: 3.8 MMOL/L (ref 3.5–5.1)
PROT SERPL-MCNC: 8.3 G/DL (ref 6.4–8.2)
PROT UR STRIP-MCNC: 100 MG/DL
RBC # BLD AUTO: 3.55 M/UL (ref 3.8–5.2)
RBC #/AREA URNS HPF: ABNORMAL /HPF (ref 0–5)
SODIUM SERPL-SCNC: 135 MMOL/L (ref 136–145)
SP GR UR REFRACTOMETRY: 1.01 (ref 1–1.03)
TROPONIN I SERPL-MCNC: <0.05 NG/ML
UR CULT HOLD, URHOLD: NORMAL
UROBILINOGEN UR QL STRIP.AUTO: 0.2 EU/DL (ref 0.2–1)
WBC # BLD AUTO: 13.3 K/UL (ref 3.6–11)
WBC URNS QL MICRO: ABNORMAL /HPF (ref 0–4)

## 2019-08-28 PROCEDURE — 80053 COMPREHEN METABOLIC PANEL: CPT

## 2019-08-28 PROCEDURE — 36415 COLL VENOUS BLD VENIPUNCTURE: CPT

## 2019-08-28 PROCEDURE — 93005 ELECTROCARDIOGRAM TRACING: CPT

## 2019-08-28 PROCEDURE — 74011250636 HC RX REV CODE- 250/636: Performed by: EMERGENCY MEDICINE

## 2019-08-28 PROCEDURE — 99284 EMERGENCY DEPT VISIT MOD MDM: CPT

## 2019-08-28 PROCEDURE — 74011250637 HC RX REV CODE- 250/637: Performed by: EMERGENCY MEDICINE

## 2019-08-28 PROCEDURE — 74176 CT ABD & PELVIS W/O CONTRAST: CPT

## 2019-08-28 PROCEDURE — 83690 ASSAY OF LIPASE: CPT

## 2019-08-28 PROCEDURE — 85025 COMPLETE CBC W/AUTO DIFF WBC: CPT

## 2019-08-28 PROCEDURE — 96374 THER/PROPH/DIAG INJ IV PUSH: CPT

## 2019-08-28 PROCEDURE — 81001 URINALYSIS AUTO W/SCOPE: CPT

## 2019-08-28 PROCEDURE — 84484 ASSAY OF TROPONIN QUANT: CPT

## 2019-08-28 RX ORDER — OXYCODONE AND ACETAMINOPHEN 5; 325 MG/1; MG/1
2 TABLET ORAL
Status: COMPLETED | OUTPATIENT
Start: 2019-08-28 | End: 2019-08-28

## 2019-08-28 RX ORDER — ONDANSETRON 4 MG/1
4 TABLET, ORALLY DISINTEGRATING ORAL
Qty: 12 TAB | Refills: 0 | Status: SHIPPED | OUTPATIENT
Start: 2019-08-28 | End: 2019-12-03 | Stop reason: CLARIF

## 2019-08-28 RX ORDER — ONDANSETRON 2 MG/ML
4 INJECTION INTRAMUSCULAR; INTRAVENOUS
Status: COMPLETED | OUTPATIENT
Start: 2019-08-28 | End: 2019-08-28

## 2019-08-28 RX ADMIN — SODIUM CHLORIDE 1000 ML: 900 INJECTION, SOLUTION INTRAVENOUS at 18:10

## 2019-08-28 RX ADMIN — OXYCODONE HYDROCHLORIDE AND ACETAMINOPHEN 2 TABLET: 5; 325 TABLET ORAL at 18:09

## 2019-08-28 RX ADMIN — ONDANSETRON 4 MG: 2 INJECTION INTRAMUSCULAR; INTRAVENOUS at 18:10

## 2019-08-28 NOTE — ED TRIAGE NOTES
Pt to ED via ems with c/o back and leg pain. Per ems, pt c/o back and leg pain which is chronic. EMS also states pt said she vomited prior to their arival. Pt c/o back, abd, and bilateral leg pain.

## 2019-08-28 NOTE — ED TRIAGE NOTES
Triage Note: Patient is coming in from ED [facility] for abdominal pain and back pain. Patient was seen at ED [facility] twice in a 12 hour period, Patient is crying uncontrollable in the waiting room.

## 2019-08-28 NOTE — ED PROVIDER NOTES
48 y.o. female with past medical history significant for CKD, hyperlipidemia, pancreatitis, diabetes, HTN, lumbar disc disease, chronic lower back and bilateral leg pain, UTI, C.diff, migraines, and CKD who presents from [facility] with chief complaint of abdominal pain. Patient reports abdominal pain since last week that resolved after being discharged on 8/23/2019 and returned yesterday with nausea and 13 episodes of vomiting today. She also c/o back pain and bilateral leg pain that is chronic. She was seen at [facility] this morning but left \"because it was too packed\". Patient is urinating 3-4 times per day but states that her PO intake has decreased since yesterday secondary to pain. Denies recently taking narcotic medications. Denies taking any pain medication for current symptoms. Specifically denies any other pain or symptoms. There are no other acute medical concerns at this time. Chart Review:  Patient was admitted 8/20/2019-8/23/2019 for nausea, vomiting, colitis, hypotension, and uremia. She was seen at [facility] today for back pain, leg pain, and abdominal pain. MD informed patient that chronic pain is not treated with opiate medication, and MD felt non-narcotic pain medication is best. MD also noted in chart that patient is a high ED utilizer. Patient's creatinine was 6.0, similar to 8/23/19. Her Hgb was 11.0, compared to 7.0 on 8/23/2019. Social hx: Never tobacco smoker; Quit EtOH use; Denies illicit drug use  PCP: Corrinne Darter, NP    Note written by Nicol Park, as dictated by Socorro Hewitt DO 5:16 PM    The history is provided by the patient and medical records. No  was used.         Past Medical History:   Diagnosis Date    C. difficile colitis 6/2012    Chronic kidney disease     Stage V    Chronic low back pain     Chronic pain     back & left leg    CKD (chronic kidney disease)     stage 3 to 4, baseline Cr 2    Constipation     Diabetes (HCC)     A1c 8.2 3/2012    Hep C w/o coma, chronic (HCC)     Hyperlipemia     Hypertension     Lumbar disc disease     Migraines     Pancreatitis 0002    alcoholic    UTI (lower urinary tract infection) 6/20012       Past Surgical History:   Procedure Laterality Date    HX LUMBAR DISKECTOMY  1980's    HX ORTHOPAEDIC      lumbar sprain; back surgery    HX UROLOGICAL  2014    kidney stone removed    ND COLONOSCOPY FLX DX W/COLLJ SPEC WHEN PFRMD  11/12/2012              Family History:   Problem Relation Age of Onset    Diabetes Mother     Kidney Disease Mother     Diabetes Sister     Diabetes Father     Diabetes Brother        Social History     Socioeconomic History    Marital status:      Spouse name: manda maxwell give info    Number of children: 5    Years of education: 8th can re    Highest education level: Not on file   Occupational History     Employer: NOT EMPLOYED     Comment: 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    Social Needs    Financial resource strain: Not on file    Food insecurity:     Worry: Not on file     Inability: Not on file    Transportation needs:     Medical: Not on file     Non-medical: Not on file   Tobacco Use    Smoking status: Never Smoker    Smokeless tobacco: Never Used   Substance and Sexual Activity    Alcohol use: No     Comment: Quit few months ago, hx of abuse    Drug use: No    Sexual activity: Yes     Partners: Male   Lifestyle    Physical activity:     Days per week: Not on file     Minutes per session: Not on file    Stress: Not on file   Relationships    Social connections:     Talks on phone: Not on file     Gets together: Not on file     Attends [employer] service: Not on file     Active member of club or organization: Not on file     Attends meetings of clubs or organizations: Not on file     Relationship status: Not on file    Intimate partner violence:     Fear of current or ex partner: Not on file     Emotionally abused: Not on file     Physically abused: Not on file     Forced sexual activity: Not on file   Other Topics Concern    Not on file   Social History Narrative    Lives with daughter and     Ambulated independently    Doesn't work         ALLERGIES: Patient has no known allergies. Review of Systems   Gastrointestinal: Positive for abdominal pain, nausea and vomiting. Musculoskeletal: Positive for back pain and myalgias. All other systems reviewed and are negative. There were no vitals filed for this visit. Physical Exam   Constitutional: She appears well-developed and well-nourished. She is cooperative. She appears ill. No distress. Pt is chronically ill-appearing. She is awake and alert. HENT:   Head: Normocephalic and atraumatic. Left Ear: External ear normal.   Nose: Nose normal.   Mouth/Throat: Oropharynx is clear and moist. No oropharyngeal exudate. Eyes: Pupils are equal, round, and reactive to light. Conjunctivae and EOM are normal. Right eye exhibits no discharge. Left eye exhibits no discharge. No scleral icterus. Neck: Normal range of motion. No tracheal deviation present. Cardiovascular: Regular rhythm, normal heart sounds and intact distal pulses. Tachycardia present. Pt is tachycardic. Pulmonary/Chest: Effort normal and breath sounds normal. No stridor. She has no wheezes. She has no rales. Abdominal: Soft. She exhibits no distension and no mass. There is tenderness. There is no rebound and no guarding. Abdomen is soft, no mass. Exhibits mild diffuse tenderness. Musculoskeletal: She exhibits no edema or tenderness. No edema to extremities. Lymphadenopathy:     She has no cervical adenopathy. Neurological: She is alert. Skin: Skin is warm and dry. She is not diaphoretic. Psychiatric: She has a normal mood and affect. Pt is calm and cooperative.        Note written by Nicol Kenney, as dictated by Brenda Salmeron DO 5:16 PM    MDM       Procedures        ED EKG interpretation:  Rhythm: sinus tachycardia; and regular . Rate (approx.): 105 bpm; Axis: normal; ST/T wave: normal.  Note written by Augusto Cornwall Rubio-Reyes, Scribe, as dictated by Skye Templeton, DO 5:40 PM        7:08 PM-   CT neg  Reviewed labs  Pt is sleeping  A little tachy  Feels better  zofran rx    Recent Results (from the past 12 hour(s))   EKG, 12 LEAD, INITIAL    Collection Time: 08/28/19  5:31 PM   Result Value Ref Range    Ventricular Rate 105 BPM    Atrial Rate 105 BPM    P-R Interval 136 ms    QRS Duration 64 ms    Q-T Interval 352 ms    QTC Calculation (Bezet) 465 ms    Calculated P Axis 51 degrees    Calculated R Axis 33 degrees    Calculated T Axis 76 degrees    Diagnosis       Sinus tachycardia with fusion complexes  When compared with ECG of 20-AUG-2019 16:20,  fusion complexes are now present  Criteria for Septal infarct are no longer present     LIPASE    Collection Time: 08/28/19  5:37 PM   Result Value Ref Range    Lipase 105 73 - 393 U/L   TROPONIN I    Collection Time: 08/28/19  5:37 PM   Result Value Ref Range    Troponin-I, Qt. <0.05 <0.05 ng/mL   URINALYSIS W/MICROSCOPIC    Collection Time: 08/28/19  6:16 PM   Result Value Ref Range    Color YELLOW/STRAW      Appearance CLEAR CLEAR      Specific gravity 1.012 1.003 - 1.030      pH (UA) 7.0 5.0 - 8.0      Protein 100 (A) NEG mg/dL    Glucose >1,000 (A) NEG mg/dL    Ketone NEGATIVE  NEG mg/dL    Bilirubin NEGATIVE  NEG      Blood MODERATE (A) NEG      Urobilinogen 0.2 0.2 - 1.0 EU/dL    Nitrites NEGATIVE  NEG      Leukocyte Esterase NEGATIVE  NEG      WBC 0-4 0 - 4 /hpf    RBC 5-10 0 - 5 /hpf    Epithelial cells FEW FEW /lpf    Bacteria NEGATIVE  NEG /hpf    Hyaline cast 0-2 0 - 5 /lpf   URINE CULTURE HOLD SAMPLE    Collection Time: 08/28/19  6:16 PM   Result Value Ref Range    Urine culture hold        URINE ON HOLD IN MICROBIOLOGY DEPT FOR 3 DAYS.  IF UNPRESERVED URINE IS SUBMITTED, IT CANNOT BE USED FOR ADDITIONAL TESTING AFTER 24 HRS, RECOLLECTION WILL BE REQUIRED.

## 2019-08-28 NOTE — ED NOTES
Pt stating that she will not be able to give urine sample until she has medications to control her pain. Provider aware of pt requesting pain medications. Dr. Bailey Dubois she is reviewing pts chart and care plan and will be in to discuss care plan with the patient.

## 2019-08-28 NOTE — ED TRIAGE NOTES
Dr Guerda Caban in triage to speak with patient as she saw patient this am VO obtained to just order basic labs
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
## 2019-08-28 NOTE — DISCHARGE INSTRUCTIONS
Patient Education   Abdominal Pain: After Your Visit to the Emergency Room  Your Care Instructions  Many abdominal problems can cause belly pain. Some are not serious and get better on their own in a few days. Other abdominal problems need more testing and emergency treatment. Your doctor may have recommended a follow-up visit in the next 8 to 12 hours. If you are not getting better, you may need more tests or treatment. Even though you have been released from the emergency room, you still need to watch for any problems. The doctor carefully checked you. But sometimes problems can develop later. If you have new symptoms, or if your symptoms do not get better, return to the emergency room or call your doctor right away. A visit to the emergency room is only one step in your treatment. Even if you feel better, you still need to do what your doctor recommends, such as going to all suggested follow-up appointments and taking medicines exactly as directed. This will help you recover and help prevent future problems. How can you care for yourself at home? · Rest until you feel better. · Drink plenty of fluids to prevent dehydration. Choose water and other caffeine-free clear liquids until you feel better. If you have kidney, heart, or liver disease and have to limit fluids, talk with your doctor before you increase the amount of fluids you drink. · Take your medicines exactly as directed. Call your doctor if you think you are having a problem with your medicine. · Do not drive after taking a prescription pain medicine. When should you call for help? Call 911 if:  · You passed out (lost consciousness). · You have sudden chest pain and shortness of breath, or you cough up blood. · You pass maroon or very bloody stools. · You vomit blood or what looks like coffee grounds. · You have new, severe belly pain. · You have other symptoms that you think are a medical emergency.   Return to the emergency room now if:  · You feel weak and lightheaded. · Your pain becomes focused in one area of your belly. · Your belly pain is worse after you cough or move. · You have a new or higher fever. Call your doctor today if:  · Your stools are black and tarlike or have streaks of blood. · You have new, unusual bleeding or discharge from the vagina. · You have blood in your urine, it hurts when you urinate, or you have to urinate more often than usual.  · Your belly pain has not improved after 1 day. Where can you learn more? Go to [employer]  Enter D163 in the search box to learn more about \"Abdominal Pain: After Your Visit to the Emergency Room. \"   © 0989-2426 [employer], [employer]. Care instructions adapted under license by [facility] (which disclaims liability or warranty for this information). This care instruction is for use with your licensed healthcare professional. If you have questions about a medical condition or this instruction, always ask your healthcare professional. [place] [address] any warranty or liability for your use of this information.   Content Version: 9.3.49163; Last Revised: January 14, 2011

## 2019-08-28 NOTE — DISCHARGE INSTRUCTIONS
Patient Education        Chronic Pain: Care Instructions  Your Care Instructions    Chronic pain is pain that lasts a long time (months or even years) and may or may not have a clear cause. It is different from acute pain, which usually does have a clear cause--like an injury or illness--and gets better over time. Chronic pain:  · Lasts over time but may vary from day to day. · Does not go away despite efforts to end it. · May disrupt your sleep and lead to fatigue. · May cause depression or anxiety. · May make your muscles tense, causing more pain. · Can disrupt your work, hobbies, home life, and relationships with friends and family. Chronic pain is a very real condition. It is not just in your head. Treatment can help and usually includes several methods used together, such as medicines, physical therapy, exercise, and other treatments. Learning how to relax and changing negative thought patterns can also help you cope. Chronic pain is complex. Taking an active role in your treatment will help you better manage your pain. Tell your doctor if you have trouble dealing with your pain. You may have to try several things before you find what works best for you. Follow-up care is a key part of your treatment and safety. Be sure to make and go to all appointments, and call your doctor if you are having problems. It's also a good idea to know your test results and keep a list of the medicines you take. How can you care for yourself at home? · Pace yourself. Break up large jobs into smaller tasks. Save harder tasks for days when you have less pain, or go back and forth between hard tasks and easier ones. Take rest breaks. · Relax, and reduce stress. Relaxation techniques such as deep breathing or meditation can help. · Keep moving. Gentle, daily exercise can help reduce pain over the long run. Try low- or no-impact exercises such as walking, swimming, and stationary biking.  Do stretches to stay flexible. · Try heat, cold packs, and massage. · Get enough sleep. Chronic pain can make you tired and drain your energy. Talk with your doctor if you have trouble sleeping because of pain. · Think positive. Your thoughts can affect your pain level. Do things that you enjoy to distract yourself when you have pain instead of focusing on the pain. See a movie, read a book, listen to music, or spend time with a friend. · If you think you are depressed, talk to your doctor about treatment. · Keep a daily pain diary. Record how your moods, thoughts, sleep patterns, activities, and medicine affect your pain. You may find that your pain is worse during or after certain activities or when you are feeling a certain emotion. Having a record of your pain can help you and your doctor find the best ways to treat your pain. · Take pain medicines exactly as directed. ? If the doctor gave you a prescription medicine for pain, take it as prescribed. ? If you are not taking a prescription pain medicine, ask your doctor if you can take an over-the-counter medicine. Reducing constipation caused by pain medicine  · Include fruits, vegetables, beans, and whole grains in your diet each day. These foods are high in fiber. · Drink plenty of fluids, enough so that your urine is light yellow or clear like water. If you have kidney, heart, or liver disease and have to limit fluids, talk with your doctor before you increase the amount of fluids you drink. · If your doctor recommends it, get more exercise. Walking is a good choice. Bit by bit, increase the amount you walk every day. Try for at least 30 minutes on most days of the week. · Schedule time each day for a bowel movement. A daily routine may help. Take your time and do not strain when having a bowel movement. When should you call for help?   Call your doctor now or seek immediate medical care if:    · Your pain gets worse or is out of control.     · You feel down or blue, or you do not enjoy things like you once did. You may be depressed, which is common in people with chronic pain. Depression can be treated.     · You have vomiting or cramps for more than 2 hours.    Watch closely for changes in your health, and be sure to contact your doctor if:    · You cannot sleep because of pain.     · You are very worried or anxious about your pain.     · You have trouble taking your pain medicine.     · You have any concerns about your pain medicine.     · You have trouble with bowel movements, such as:  ? No bowel movement in 3 days. ? Blood in the anal area, in your stool, or on the toilet paper. ? Diarrhea for more than 24 hours. Where can you learn more? Go to http://nery-peña.info/. Enter N004 in the search box to learn more about \"Chronic Pain: Care Instructions. \"  Current as of: March 28, 2019  Content Version: 12.1  © 3124-4871 [employer]. Care instructions adapted under license by [employer] (which disclaims liability or warranty for this information). If you have questions about a medical condition or this instruction, always ask your healthcare professional. [place] [address] any warranty or liability for your use of this information.

## 2019-08-29 LAB
ATRIAL RATE: 105 BPM
CALCULATED P AXIS, ECG09: 51 DEGREES
CALCULATED R AXIS, ECG10: 33 DEGREES
CALCULATED T AXIS, ECG11: 76 DEGREES
DIAGNOSIS, 93000: NORMAL
P-R INTERVAL, ECG05: 136 MS
Q-T INTERVAL, ECG07: 352 MS
QRS DURATION, ECG06: 64 MS
QTC CALCULATION (BEZET), ECG08: 465 MS
VENTRICULAR RATE, ECG03: 105 BPM

## 2019-09-12 ENCOUNTER — HOSPITAL ENCOUNTER (OUTPATIENT)
Dept: INFUSION THERAPY | Age: 54
Discharge: HOME | End: 2019-09-12
Payer: MEDICARE

## 2019-09-12 VITALS
RESPIRATION RATE: 16 BRPM | SYSTOLIC BLOOD PRESSURE: 157 MMHG | WEIGHT: 135.6 LBS | BODY MASS INDEX: 22.59 KG/M2 | HEIGHT: 65 IN | DIASTOLIC BLOOD PRESSURE: 90 MMHG | OXYGEN SATURATION: 100 % | TEMPERATURE: 97.8 F | HEART RATE: 86 BPM

## 2019-09-12 LAB
ALBUMIN SERPL-MCNC: 3 G/DL (ref 3.5–5)
ANION GAP SERPL CALC-SCNC: 8 MMOL/L (ref 5–15)
BASOPHILS # BLD: 0 K/UL (ref 0–0.1)
BASOPHILS NFR BLD: 0 % (ref 0–1)
BUN SERPL-MCNC: 83 MG/DL (ref 6–20)
BUN/CREAT SERPL: 14 (ref 12–20)
CALCIUM SERPL-MCNC: 8.6 MG/DL (ref 8.5–10.1)
CHLORIDE SERPL-SCNC: 102 MMOL/L (ref 97–108)
CO2 SERPL-SCNC: 24 MMOL/L (ref 21–32)
CREAT SERPL-MCNC: 5.96 MG/DL (ref 0.55–1.02)
DIFFERENTIAL METHOD BLD: ABNORMAL
EOSINOPHIL # BLD: 0.2 K/UL (ref 0–0.4)
EOSINOPHIL NFR BLD: 2 % (ref 0–7)
ERYTHROCYTE [DISTWIDTH] IN BLOOD BY AUTOMATED COUNT: 13.7 % (ref 11.5–14.5)
GLUCOSE SERPL-MCNC: 190 MG/DL (ref 65–100)
HCT VFR BLD AUTO: 26.1 % (ref 35–47)
HGB BLD-MCNC: 8.8 G/DL (ref 11.5–16)
IMM GRANULOCYTES # BLD AUTO: 0 K/UL (ref 0–0.04)
IMM GRANULOCYTES NFR BLD AUTO: 0 % (ref 0–0.5)
IRON SATN MFR SERPL: 32 % (ref 20–50)
IRON SERPL-MCNC: 84 UG/DL (ref 35–150)
LYMPHOCYTES # BLD: 1.6 K/UL (ref 0.8–3.5)
LYMPHOCYTES NFR BLD: 23 % (ref 12–49)
MCH RBC QN AUTO: 31.1 PG (ref 26–34)
MCHC RBC AUTO-ENTMCNC: 33.7 G/DL (ref 30–36.5)
MCV RBC AUTO: 92.2 FL (ref 80–99)
MONOCYTES # BLD: 0.5 K/UL (ref 0–1)
MONOCYTES NFR BLD: 8 % (ref 5–13)
NEUTS SEG # BLD: 4.5 K/UL (ref 1.8–8)
NEUTS SEG NFR BLD: 67 % (ref 32–75)
NRBC # BLD: 0 K/UL (ref 0–0.01)
NRBC BLD-RTO: 0 PER 100 WBC
PHOSPHATE SERPL-MCNC: 7.2 MG/DL (ref 2.6–4.7)
PLATELET # BLD AUTO: 280 K/UL (ref 150–400)
PMV BLD AUTO: 8.5 FL (ref 8.9–12.9)
POTASSIUM SERPL-SCNC: 4.6 MMOL/L (ref 3.5–5.1)
RBC # BLD AUTO: 2.83 M/UL (ref 3.8–5.2)
SODIUM SERPL-SCNC: 134 MMOL/L (ref 136–145)
TIBC SERPL-MCNC: 262 UG/DL (ref 250–450)
WBC # BLD AUTO: 6.8 K/UL (ref 3.6–11)

## 2019-09-12 PROCEDURE — 36415 COLL VENOUS BLD VENIPUNCTURE: CPT

## 2019-09-12 PROCEDURE — 83540 ASSAY OF IRON: CPT

## 2019-09-12 PROCEDURE — 96372 THER/PROPH/DIAG INJ SC/IM: CPT

## 2019-09-12 PROCEDURE — 74011250636 HC RX REV CODE- 250/636: Performed by: INTERNAL MEDICINE

## 2019-09-12 PROCEDURE — 80069 RENAL FUNCTION PANEL: CPT

## 2019-09-12 PROCEDURE — 85025 COMPLETE CBC W/AUTO DIFF WBC: CPT

## 2019-09-12 RX ADMIN — EPOETIN ALFA-EPBX 40000 UNITS: 40000 INJECTION, SOLUTION INTRAVENOUS; SUBCUTANEOUS at 10:27

## 2019-09-18 ENCOUNTER — OFFICE VISIT (OUTPATIENT)
Dept: SURGERY | Age: 54
End: 2019-09-18

## 2019-09-18 VITALS
HEART RATE: 89 BPM | RESPIRATION RATE: 20 BRPM | WEIGHT: 129.8 LBS | HEIGHT: 65 IN | SYSTOLIC BLOOD PRESSURE: 172 MMHG | OXYGEN SATURATION: 100 % | TEMPERATURE: 99 F | BODY MASS INDEX: 21.63 KG/M2 | DIASTOLIC BLOOD PRESSURE: 76 MMHG

## 2019-09-18 DIAGNOSIS — Z09 POSTOPERATIVE EXAMINATION: Primary | ICD-10-CM

## 2019-09-18 NOTE — PROGRESS NOTES
Chief Complaint   Patient presents with    Surgical Follow-up     Post/op AVF on 8/2/19. last seen in the office 8/22/19. 1. Have you been to the ER, urgent care clinic since your last visit? yes/ 8/28/19  Hospitalized since your last visit? 2. Have you seen or consulted any other health care providers outside of the [address] [place] [address] since your last visit? yes Include any pap smears or colon screening. Discussed advanced directive. Patient states that she does not have an advanced directive.

## 2019-09-18 NOTE — PATIENT INSTRUCTIONS
Raissa Due - [phone] [facility], [facility] [[email or web address]] [address], [address]

## 2019-09-18 NOTE — PROGRESS NOTES
The patient is status post creation of left AV fistula on 8/2/2019. The patient has no complaints. She has not required initiation of dialysis.     On examination there is a palpable thrill at the AV anastomosis. There is a palpable radial pulse distally.     Duplex ultrasound shows the outflow basilic vein is 4.8 to 5.8 mm in diameter.      The outflow vein is growing but ideally should reach 6 mm throughout before transposition. I will see the patient again in the office in one month.     Final Diagnosis: Status post AV fistula, post-operative visit.     CC: Dr Muna Dick

## 2019-10-10 ENCOUNTER — HOSPITAL ENCOUNTER (OUTPATIENT)
Dept: INFUSION THERAPY | Age: 54
Discharge: HOME | End: 2019-10-10
Payer: MEDICARE

## 2019-10-10 VITALS
OXYGEN SATURATION: 99 % | RESPIRATION RATE: 16 BRPM | BODY MASS INDEX: 22.03 KG/M2 | WEIGHT: 132.4 LBS | SYSTOLIC BLOOD PRESSURE: 145 MMHG | DIASTOLIC BLOOD PRESSURE: 89 MMHG | HEART RATE: 75 BPM | TEMPERATURE: 98.7 F

## 2019-10-10 LAB
ALBUMIN SERPL-MCNC: 3.2 G/DL (ref 3.5–5)
ANION GAP SERPL CALC-SCNC: 10 MMOL/L (ref 5–15)
BASO+EOS+MONOS # BLD AUTO: 0.6 K/UL (ref 0.2–1.2)
BASO+EOS+MONOS NFR BLD AUTO: 8 % (ref 3.2–16.9)
BUN SERPL-MCNC: 83 MG/DL (ref 6–20)
BUN/CREAT SERPL: 15 (ref 12–20)
CALCIUM SERPL-MCNC: 8.5 MG/DL (ref 8.5–10.1)
CHLORIDE SERPL-SCNC: 99 MMOL/L (ref 97–108)
CO2 SERPL-SCNC: 20 MMOL/L (ref 21–32)
CREAT SERPL-MCNC: 5.46 MG/DL (ref 0.55–1.02)
DIFFERENTIAL METHOD BLD: ABNORMAL
ERYTHROCYTE [DISTWIDTH] IN BLOOD BY AUTOMATED COUNT: 13.9 % (ref 11.8–15.8)
GLUCOSE SERPL-MCNC: 232 MG/DL (ref 65–100)
HCT VFR BLD AUTO: 31.6 % (ref 35–47)
HGB BLD-MCNC: 10.9 G/DL (ref 11.5–16)
IRON SATN MFR SERPL: 32 % (ref 20–50)
IRON SERPL-MCNC: 92 UG/DL (ref 35–150)
LYMPHOCYTES # BLD: 2.2 K/UL (ref 0.8–3.5)
LYMPHOCYTES NFR BLD: 29 % (ref 12–49)
MCH RBC QN AUTO: 30.8 PG (ref 26–34)
MCHC RBC AUTO-ENTMCNC: 34.5 G/DL (ref 30–36.5)
MCV RBC AUTO: 89.3 FL (ref 80–99)
NEUTS SEG # BLD: 4.7 K/UL (ref 1.8–8)
NEUTS SEG NFR BLD: 63 % (ref 32–75)
PHOSPHATE SERPL-MCNC: 7.1 MG/DL (ref 2.6–4.7)
PLATELET # BLD AUTO: 302 K/UL (ref 150–400)
POTASSIUM SERPL-SCNC: 4.3 MMOL/L (ref 3.5–5.1)
RBC # BLD AUTO: 3.54 M/UL (ref 3.8–5.2)
SODIUM SERPL-SCNC: 129 MMOL/L (ref 136–145)
TIBC SERPL-MCNC: 290 UG/DL (ref 250–450)
WBC # BLD AUTO: 7.5 K/UL (ref 3.6–11)

## 2019-10-10 PROCEDURE — 36415 COLL VENOUS BLD VENIPUNCTURE: CPT

## 2019-10-10 PROCEDURE — 83540 ASSAY OF IRON: CPT

## 2019-10-10 PROCEDURE — 80069 RENAL FUNCTION PANEL: CPT

## 2019-10-10 PROCEDURE — 85025 COMPLETE CBC W/AUTO DIFF WBC: CPT

## 2019-10-10 NOTE — PROGRESS NOTES
OPIC VISIT NOTE    1310  Pt arrived at [facility] ambulatory and in no distress for Retacrit. Assessment completed, pt c/o left leg and back pain (chronic). Blood pressure 145/89, pulse 75, temperature 98.7 °F (37.1 °C), resp. rate 16, weight 60.1 kg (132 lb 6.4 oz), last menstrual period 09/15/2013, SpO2 99 %, not currently breastfeeding. Labs drawn peripherally by Figueroa Galloway phlebotomist.    Hgb = 10.9 Patient does not need Retacrit today per MD orders. Recent Results (from the past 12 hour(s))   CBC WITH 3 PART DIFF    Collection Time: 10/10/19  1:15 PM   Result Value Ref Range    WBC 7.5 3.6 - 11.0 K/uL    RBC 3.54 (L) 3.80 - 5.20 M/uL    HGB 10.9 (L) 11.5 - 16.0 g/dL    HCT 31.6 (L) 35.0 - 47.0 %    MCV 89.3 80.0 - 99.0 FL    MCH 30.8 26.0 - 34.0 PG    MCHC 34.5 30.0 - 36.5 g/dL    RDW 13.9 11.8 - 15.8 %    PLATELET 375 740 - 584 K/uL    NEUTROPHILS 63 32 - 75 %    MIXED CELLS 8 3.2 - 16.9 %    LYMPHOCYTES 29 12 - 49 %    ABS. NEUTROPHILS 4.7 1.8 - 8.0 K/UL    ABS. MIXED CELLS 0.6 0.2 - 1.2 K/uL    ABS. LYMPHOCYTES 2.2 0.8 - 3.5 K/UL    DF AUTOMATED       1330  D/C'd from [facility] ambulatory and in no distress accompanied by her . Next appointment is 11/7/19 at 1300.

## 2019-10-17 ENCOUNTER — OFFICE VISIT (OUTPATIENT)
Dept: SURGERY | Age: 54
End: 2019-10-17

## 2019-10-17 VITALS
HEIGHT: 65 IN | BODY MASS INDEX: 21.09 KG/M2 | SYSTOLIC BLOOD PRESSURE: 99 MMHG | TEMPERATURE: 99 F | HEART RATE: 75 BPM | RESPIRATION RATE: 20 BRPM | DIASTOLIC BLOOD PRESSURE: 69 MMHG | OXYGEN SATURATION: 99 % | WEIGHT: 126.6 LBS

## 2019-10-17 DIAGNOSIS — Z09 POSTOPERATIVE EXAMINATION: Primary | ICD-10-CM

## 2019-10-17 RX ORDER — ACETAMINOPHEN 500 MG
TABLET ORAL
COMMUNITY

## 2019-10-17 NOTE — PROGRESS NOTES
The patient is status post creation of left AV fistula on 8/2/2019. The patient has no complaints.     She has not required initiation of dialysis.     On examination there is a palpable thrill at the AV anastomosis. There is a palpable radial pulse distally.     Duplex ultrasound shows the outflow basilic vein is 5.5 to 6.2 mm in diameter.      The outflow vein is growing but ideally should reach 6 mm throughout before transposition.  I will see the patient again in the office in one month.     Final Diagnosis: Status post AV fistula, post-operative visit.     CC: Dr Get Painter

## 2019-10-17 NOTE — PROGRESS NOTES
Chief Complaint   Patient presents with    Surgical Follow-up     S/P Creation of AV fistula left Arm 8/2/19     1. Have you been to the ER, urgent care clinic since your last visit? Hospitalized since your last visit? No    2. Have you seen or consulted any other health care providers outside of the [address] [place] [address] since your last visit? Include any pap smears or colon screening.  No

## 2019-11-07 ENCOUNTER — HOSPITAL ENCOUNTER (OUTPATIENT)
Dept: INFUSION THERAPY | Age: 54
Discharge: HOME | End: 2019-11-07
Payer: MEDICARE

## 2019-11-07 VITALS
TEMPERATURE: 99.8 F | HEART RATE: 74 BPM | RESPIRATION RATE: 18 BRPM | WEIGHT: 131.9 LBS | BODY MASS INDEX: 21.98 KG/M2 | DIASTOLIC BLOOD PRESSURE: 66 MMHG | HEIGHT: 65 IN | SYSTOLIC BLOOD PRESSURE: 107 MMHG

## 2019-11-07 LAB
ALBUMIN SERPL-MCNC: 2.9 G/DL (ref 3.5–5)
ANION GAP SERPL CALC-SCNC: 11 MMOL/L (ref 5–15)
BASO+EOS+MONOS # BLD AUTO: 0.5 K/UL (ref 0.2–1.2)
BASO+EOS+MONOS NFR BLD AUTO: 6 % (ref 3.2–16.9)
BUN SERPL-MCNC: 79 MG/DL (ref 6–20)
BUN/CREAT SERPL: 14 (ref 12–20)
CALCIUM SERPL-MCNC: 8.6 MG/DL (ref 8.5–10.1)
CHLORIDE SERPL-SCNC: 98 MMOL/L (ref 97–108)
CO2 SERPL-SCNC: 21 MMOL/L (ref 21–32)
CREAT SERPL-MCNC: 5.59 MG/DL (ref 0.55–1.02)
DIFFERENTIAL METHOD BLD: ABNORMAL
ERYTHROCYTE [DISTWIDTH] IN BLOOD BY AUTOMATED COUNT: 13.3 % (ref 11.8–15.8)
GLUCOSE SERPL-MCNC: 190 MG/DL (ref 65–100)
HCT VFR BLD AUTO: 30 % (ref 35–47)
HGB BLD-MCNC: 10.3 G/DL (ref 11.5–16)
IRON SATN MFR SERPL: 34 % (ref 20–50)
IRON SERPL-MCNC: 76 UG/DL (ref 35–150)
LYMPHOCYTES # BLD: 2.2 K/UL (ref 0.8–3.5)
LYMPHOCYTES NFR BLD: 29 % (ref 12–49)
MCH RBC QN AUTO: 29.8 PG (ref 26–34)
MCHC RBC AUTO-ENTMCNC: 34.3 G/DL (ref 30–36.5)
MCV RBC AUTO: 86.7 FL (ref 80–99)
NEUTS SEG # BLD: 5 K/UL (ref 1.8–8)
NEUTS SEG NFR BLD: 66 % (ref 32–75)
PHOSPHATE SERPL-MCNC: 5.9 MG/DL (ref 2.6–4.7)
PLATELET # BLD AUTO: 273 K/UL (ref 150–400)
POTASSIUM SERPL-SCNC: 3.8 MMOL/L (ref 3.5–5.1)
RBC # BLD AUTO: 3.46 M/UL (ref 3.8–5.2)
SODIUM SERPL-SCNC: 130 MMOL/L (ref 136–145)
TIBC SERPL-MCNC: 221 UG/DL (ref 250–450)
WBC # BLD AUTO: 7.7 K/UL (ref 3.6–11)

## 2019-11-07 PROCEDURE — 83540 ASSAY OF IRON: CPT

## 2019-11-07 PROCEDURE — 36415 COLL VENOUS BLD VENIPUNCTURE: CPT

## 2019-11-07 PROCEDURE — 80069 RENAL FUNCTION PANEL: CPT

## 2019-11-07 PROCEDURE — 85025 COMPLETE CBC W/AUTO DIFF WBC: CPT

## 2019-11-07 NOTE — PROGRESS NOTES
[facility] Stay Note:  Arrived - 1310    Visit Vitals  /66 (BP 1 Location: Right arm, BP Patient Position: Sitting)   Pulse 74   Temp 99.8 °F (37.7 °C)   Resp 18   Ht 5' 5\" (1.651 m)   Wt 59.8 kg (131 lb 14.4 oz)   BMI 21.95 kg/m²       Assessment - unchanged. Hgb - 10.3  Retacrit held, due to MD order parameters. 1340 - Tolerated well. Pt denies any acute problems/changes. Discharged from [facility] ambulatory. No distress. Next appt: 12/5/19 at 1300.

## 2019-11-18 ENCOUNTER — OFFICE VISIT (OUTPATIENT)
Dept: SURGERY | Age: 54
End: 2019-11-18

## 2019-11-18 VITALS
TEMPERATURE: 98.5 F | HEIGHT: 65 IN | BODY MASS INDEX: 21.49 KG/M2 | DIASTOLIC BLOOD PRESSURE: 86 MMHG | WEIGHT: 129 LBS | HEART RATE: 92 BPM | RESPIRATION RATE: 20 BRPM | SYSTOLIC BLOOD PRESSURE: 147 MMHG | OXYGEN SATURATION: 100 %

## 2019-11-18 DIAGNOSIS — T82.828A FIBROSIS OF ARTERIOVENOUS FISTULA IN DIALYSIS PATIENT (HCC): Primary | ICD-10-CM

## 2019-11-18 DIAGNOSIS — N18.5 CKD (CHRONIC KIDNEY DISEASE) STAGE 5, GFR LESS THAN 15 ML/MIN (HCC): ICD-10-CM

## 2019-11-18 PROBLEM — K85.90 PANCREATITIS: Status: RESOLVED | Noted: 2018-05-27 | Resolved: 2019-11-18

## 2019-11-18 PROBLEM — N30.00 ACUTE CYSTITIS: Status: RESOLVED | Noted: 2017-03-14 | Resolved: 2019-11-18

## 2019-11-18 PROBLEM — K52.9 COLITIS: Status: RESOLVED | Noted: 2019-08-20 | Resolved: 2019-11-18

## 2019-11-18 NOTE — PROGRESS NOTES
The patient is a 48-year old woman who is status post creation of AV fistula left arm on 8/2/19. She is not presently requiring hemodialysis but does have CKD 5. I spoke with Dr. Anthony Mane who felt that the patient did not have imminent need for initiation of dialysis. The patient has a history of relatively poorly controlled diabetes mellitus. In 2012 she had   C. difficile colitis. There is a history of chronic low back pain, hepatitis C, hyperlipidemia, hypertension, migraines, alcoholic pancreatitis. The patient is reported no longer to use alcohol. The patient has never smoked. The patient has no history of MI or cardiac symptoms or coronary intervention. She denies shortness of breath at rest or with exertion. Physical Examination:  GENERAL: On examination the patient is an alert woman in no acute distress. VITAL SIGNS: /86, P 92, R 20, T 98.5, O2 saturation 100% on room air,  pounds, HT 5'5\". HEAD/NECK: No mass, jaundice or thyromegaly. LUNGS: Clear bilaterally without rales, rhonchi or wheezes. HEART: Regular without murmur, gallop or rub. NEURO: Patient is alert and oriented. She moves all extremities equally. Facial movement is symmetrical. Speech is normal.  EXTREMITIES: Exam of the left upper extremity reveals a 1+ left radial pulse. There is no edema in the left arm. There is a good thrill at the AV anastomosis immediately above the left antecubital.     A duplex ultrasound evaluation was carried out of the AV fistula on the left as the outflow is nonpalpable. The anastomosis of brachial artery to the basilic vein is noted itself to be widely patent. The brachial artery proximal to the anastomosis was 4.8 mm diameter and distal to the anastomosis was 5.0 mm diameter. The initial mobilized portion of the basilic vein is 4.8 mm diameter. The basilic vein main trunk is 6.6 mm diameter distally, 5.9 mm in its mid portion.  The smallest diameter is 5.8 mm as the basilic vein joins the brachial vein. The brachial vein more proximally is 6.2 mm diameter. The outflow basilic vein has reached a diameter suitable for transposition. I reviewed with the patient the operative and postoperative course for creation of transposed left brachiobasilic arteriovenous fistula under general anesthesia as an outpatient with 23-hour observation. Risks vs benefits of surgery were reviewed. Risks would include bleeding, infection, failure of the fistula, ischemia of the hand, swelling of the arm, injury to vessels or nerves, risks associated with general anesthesia, etc. The patient understands and wishes to proceed. Addendum: There was mild fibrosis of the mobilized portion of the basilic vein above the anastomosis. This was not flow limiting.               Addendum:  Patient has history of chronic hepatitis C.

## 2019-12-03 RX ORDER — CALCITRIOL 0.25 UG/1
0.25 CAPSULE ORAL DAILY
COMMUNITY

## 2019-12-03 NOTE — PERIOP NOTES
[facility]  Preoperative Instructions        Surgery Date 12/6/19          Time of Arrival 0545    1. On the day of your surgery, please report to the Surgical Services Registration Desk and sign in at your designated time. The Surgery Center is located to the right of the Emergency Room. 2. You must have someone with you to drive you home. You should not drive a car for 24 hours following surgery. Please make arrangements for a friend or family member to stay with you for the first 24 hours after your surgery. 3. Do not have anything to eat or drink (including water, gum, mints, coffee, juice) after midnight ?12/5/19? Petty Braun ? This may not apply to medications prescribed by your physician. ?(Please note below the special instructions with medications to take the morning of your procedure.)    4. We recommend you do not drink any alcoholic beverages for 24 hours before and after your surgery. 5. Contact your surgeons office for instructions on the following medications: non-steroidal anti-inflammatory drugs (i.e. Advil, Aleve), vitamins, and supplements. (Some surgeons will want you to stop these medications prior to surgery and others may allow you to take them)  **If you are currently taking Plavix, Coumadin, Aspirin and/or other blood-thinning agents, contact your surgeon for instructions. ** Your surgeon will partner with the physician prescribing these medications to determine if it is safe to stop or if you need to continue taking. Please do not stop taking these medications without instructions from your surgeon    6. Wear comfortable clothes. Wear glasses instead of contacts. Do not bring any money or jewelry. Please bring picture ID, insurance card, and any prearranged co-payment or hospital payment. Do not wear make-up, particularly mascara the morning of your surgery. Do not wear nail polish, particularly if you are having foot /hand surgery.   Wear your hair loose or down, no ponytails, buns, joanie pins or clips. All body piercings must be removed. Please shower with antibacterial soap for three consecutive days before and on the morning of surgery, but do not apply any lotions, powders or deodorants after the shower on the day of surgery. Please use a fresh towels after each shower. Please sleep in clean clothes and change bed linens the night before surgery. Please do not shave for 48 hours prior to surgery. Shaving of the face is acceptable. 7. You should understand that if you do not follow these instructions your surgery may be cancelled. If your physical condition changes (I.e. fever, cold or flu) please contact your surgeon as soon as possible. 8. It is important that you be on time. If a situation occurs where you may be late, please call [phone] (OR Holding Area). 9. If you have any questions and or problems, please call [phone] (Pre-admission Testing). 10. Your surgery time may be subject to change. You will receive a phone call the evening prior if your time changes. 11.  If having outpatient surgery, you must have someone to drive you here, stay with you during the duration of your stay, and to drive you home at time of discharge. 12.   In an effort to improve the efficiency, privacy, and safety for all of our Pre-op patients visitors are not allowed in the Holding area. Once you arrive and are registered your family/visitors will be asked to remain in the waiting room. The Pre-op staff will get you from the Surgical Waiting Area and will explain to you and your family/visitors that the Pre-op phase is beginning. The staff will answer any questions and provide instructions for tracking of the patient, by use of the existing tracking number and color-coded status board in the waiting room.   At this time the staff will also ask for your designated spokesperson information in the event that the physician or staff need to provide an update or obtain any pertinent information. The designated spokesperson will be notified if the physician needs to speak to family during the pre-operative phase. If at any time your family/visitors has questions or concerns they may approach the volunteer desk in the waiting area for assistance. Special Instructions:    MEDICATIONS TO TAKE THE MORNING OF SURGERY WITH A SIP OF WATER:  Tylenol if needed, clonidine, tresiba, metoprolol, zofran if needed, sodium bicarbonate      I understand a pre-operative phone call will be made to verify my surgery time. In the event that I am not available, I give permission for a message to be left on my answering service and/or with another person?   Yes [phone]         ___________________      __________   _________    (Signature of Patient)             (Witness)                (Date and Time)

## 2019-12-05 ENCOUNTER — HOSPITAL ENCOUNTER (OUTPATIENT)
Dept: INFUSION THERAPY | Age: 54
Discharge: HOME | End: 2019-12-05
Payer: MEDICARE

## 2019-12-05 VITALS
RESPIRATION RATE: 18 BRPM | BODY MASS INDEX: 21.18 KG/M2 | TEMPERATURE: 97.7 F | WEIGHT: 127.3 LBS | HEART RATE: 96 BPM | OXYGEN SATURATION: 99 % | DIASTOLIC BLOOD PRESSURE: 79 MMHG | SYSTOLIC BLOOD PRESSURE: 120 MMHG

## 2019-12-05 LAB
ALBUMIN SERPL-MCNC: 3.5 G/DL (ref 3.5–5)
ANION GAP SERPL CALC-SCNC: 10 MMOL/L (ref 5–15)
BASO+EOS+MONOS # BLD AUTO: 1 K/UL (ref 0.2–1.2)
BASO+EOS+MONOS NFR BLD AUTO: 12 % (ref 3.2–16.9)
BUN SERPL-MCNC: 86 MG/DL (ref 6–20)
BUN/CREAT SERPL: 14 (ref 12–20)
CALCIUM SERPL-MCNC: 9.2 MG/DL (ref 8.5–10.1)
CHLORIDE SERPL-SCNC: 102 MMOL/L (ref 97–108)
CO2 SERPL-SCNC: 23 MMOL/L (ref 21–32)
CREAT SERPL-MCNC: 6.01 MG/DL (ref 0.55–1.02)
DIFFERENTIAL METHOD BLD: ABNORMAL
ERYTHROCYTE [DISTWIDTH] IN BLOOD BY AUTOMATED COUNT: 13.6 % (ref 11.8–15.8)
GLUCOSE SERPL-MCNC: 222 MG/DL (ref 65–100)
HCT VFR BLD AUTO: 33 % (ref 35–47)
HGB BLD-MCNC: 11.4 G/DL (ref 11.5–16)
IRON SATN MFR SERPL: 39 % (ref 20–50)
IRON SERPL-MCNC: 120 UG/DL (ref 35–150)
LYMPHOCYTES # BLD: 2.4 K/UL (ref 0.8–3.5)
LYMPHOCYTES NFR BLD: 29 % (ref 12–49)
MCH RBC QN AUTO: 29.6 PG (ref 26–34)
MCHC RBC AUTO-ENTMCNC: 34.5 G/DL (ref 30–36.5)
MCV RBC AUTO: 85.7 FL (ref 80–99)
NEUTS SEG # BLD: 5 K/UL (ref 1.8–8)
NEUTS SEG NFR BLD: 60 % (ref 32–75)
PHOSPHATE SERPL-MCNC: 5.8 MG/DL (ref 2.6–4.7)
PLATELET # BLD AUTO: 264 K/UL (ref 150–400)
POTASSIUM SERPL-SCNC: 4.3 MMOL/L (ref 3.5–5.1)
RBC # BLD AUTO: 3.85 M/UL (ref 3.8–5.2)
SODIUM SERPL-SCNC: 135 MMOL/L (ref 136–145)
TIBC SERPL-MCNC: 304 UG/DL (ref 250–450)
WBC # BLD AUTO: 8.4 K/UL (ref 3.6–11)

## 2019-12-05 PROCEDURE — 80069 RENAL FUNCTION PANEL: CPT

## 2019-12-05 PROCEDURE — 83540 ASSAY OF IRON: CPT

## 2019-12-05 PROCEDURE — 85025 COMPLETE CBC W/AUTO DIFF WBC: CPT

## 2019-12-05 PROCEDURE — 36415 COLL VENOUS BLD VENIPUNCTURE: CPT

## 2019-12-05 NOTE — PROGRESS NOTES
[facility] Stay Note:  Arrived - 1305    Visit Vitals  /79 (BP 1 Location: Right arm, BP Patient Position: Sitting)   Pulse 96   Temp 97.7 °F (36.5 °C)   Resp 18   Wt 57.7 kg (127 lb 4.8 oz)   LMP 09/15/2013   SpO2 99%   BMI 21.18 kg/m²     Patient is a hard stick, multiple attempts made to draw labs. Labs obtained- CBCap, Renal Panel, and Iron Profile. Recent Results (from the past 12 hour(s))   RENAL FUNCTION PANEL    Collection Time: 12/05/19  1:30 PM   Result Value Ref Range    Sodium 135 (L) 136 - 145 mmol/L    Potassium 4.3 3.5 - 5.1 mmol/L    Chloride 102 97 - 108 mmol/L    CO2 23 21 - 32 mmol/L    Anion gap 10 5 - 15 mmol/L    Glucose 222 (H) 65 - 100 mg/dL    BUN 86 (H) 6 - 20 MG/DL    Creatinine 6.01 (H) 0.55 - 1.02 MG/DL    BUN/Creatinine ratio 14 12 - 20      GFR est AA 9 (L) >60 ml/min/1.73m2    GFR est non-AA 7 (L) >60 ml/min/1.73m2    Calcium 9.2 8.5 - 10.1 MG/DL    Phosphorus 5.8 (H) 2.6 - 4.7 MG/DL    Albumin 3.5 3.5 - 5.0 g/dL   CBC WITH 3 PART DIFF    Collection Time: 12/05/19  1:30 PM   Result Value Ref Range    WBC 8.4 3.6 - 11.0 K/uL    RBC 3.85 3.80 - 5.20 M/uL    HGB 11.4 (L) 11.5 - 16.0 g/dL    HCT 33.0 (L) 35.0 - 47.0 %    MCV 85.7 80.0 - 99.0 FL    MCH 29.6 26.0 - 34.0 PG    MCHC 34.5 30.0 - 36.5 g/dL    RDW 13.6 11.8 - 15.8 %    PLATELET 584 661 - 874 K/uL    NEUTROPHILS 60 32 - 75 %    MIXED CELLS 12 3.2 - 16.9 %    LYMPHOCYTES 29 12 - 49 %    ABS. NEUTROPHILS 5.0 1.8 - 8.0 K/UL    ABS. MIXED CELLS 1.0 0.2 - 1.2 K/uL    ABS. LYMPHOCYTES 2.4 0.8 - 3.5 K/UL    DF AUTOMATED       Iron profile pending at time of note. See [facility] for results. Assessment - unchanged. Hgb - 11.4    Retacrit held per order parameters. 1340 - Tolerated well. Pt denies any acute problems/changes. Discharged from [facility] ambulatory. No distress. Next appt: 1/2/20 @ 1300.

## 2019-12-06 ENCOUNTER — ANESTHESIA EVENT (OUTPATIENT)
Dept: SURGERY | Age: 54
End: 2019-12-06
Payer: MEDICARE

## 2019-12-06 ENCOUNTER — HOSPITAL ENCOUNTER (OUTPATIENT)
Age: 54
Discharge: HOME | End: 2019-12-07
Attending: SURGERY | Admitting: SURGERY
Payer: MEDICARE

## 2019-12-06 ENCOUNTER — ANESTHESIA (OUTPATIENT)
Dept: SURGERY | Age: 54
End: 2019-12-06
Payer: MEDICARE

## 2019-12-06 PROBLEM — N18.6 ESRD (END STAGE RENAL DISEASE) (HCC): Status: ACTIVE | Noted: 2019-12-06

## 2019-12-06 LAB
ANION GAP BLD CALC-SCNC: 15 MMOL/L (ref 10–20)
BUN BLD-MCNC: 85 MG/DL (ref 9–20)
CA-I BLD-MCNC: 1.19 MMOL/L (ref 1.12–1.32)
CHLORIDE BLD-SCNC: 100 MMOL/L (ref 98–107)
CO2 BLD-SCNC: 24 MMOL/L (ref 21–32)
CREAT BLD-MCNC: 6.6 MG/DL (ref 0.6–1.3)
GLUCOSE BLD STRIP.AUTO-MCNC: 176 MG/DL (ref 65–100)
GLUCOSE BLD STRIP.AUTO-MCNC: 247 MG/DL (ref 65–100)
GLUCOSE BLD STRIP.AUTO-MCNC: 253 MG/DL (ref 65–100)
GLUCOSE BLD-MCNC: 215 MG/DL (ref 65–100)
HCT VFR BLD CALC: 32 % (ref 35–47)
POTASSIUM BLD-SCNC: 4.2 MMOL/L (ref 3.5–5.1)
SERVICE CMNT-IMP: ABNORMAL
SODIUM BLD-SCNC: 133 MMOL/L (ref 136–145)

## 2019-12-06 PROCEDURE — 77030008684 HC TU ET CUF COVD -B: Performed by: NURSE ANESTHETIST, CERTIFIED REGISTERED

## 2019-12-06 PROCEDURE — 77030008462 HC STPLR SKN PROX J&J -A: Performed by: SURGERY

## 2019-12-06 PROCEDURE — 74011000250 HC RX REV CODE- 250: Performed by: NURSE ANESTHETIST, CERTIFIED REGISTERED

## 2019-12-06 PROCEDURE — 77030008771 HC TU NG SALEM SUMP -A: Performed by: NURSE ANESTHETIST, CERTIFIED REGISTERED

## 2019-12-06 PROCEDURE — 99218 HC RM OBSERVATION: CPT

## 2019-12-06 PROCEDURE — 74011250636 HC RX REV CODE- 250/636: Performed by: NURSE ANESTHETIST, CERTIFIED REGISTERED

## 2019-12-06 PROCEDURE — 76210000016 HC OR PH I REC 1 TO 1.5 HR: Performed by: SURGERY

## 2019-12-06 PROCEDURE — 82962 GLUCOSE BLOOD TEST: CPT

## 2019-12-06 PROCEDURE — 74011250636 HC RX REV CODE- 250/636: Performed by: SURGERY

## 2019-12-06 PROCEDURE — 77030019905 HC CATH URETH INTMIT MDII -A: Performed by: SURGERY

## 2019-12-06 PROCEDURE — 77030002888 HC SUT CHRMC J&J -A: Performed by: SURGERY

## 2019-12-06 PROCEDURE — 74011636637 HC RX REV CODE- 636/637: Performed by: SURGERY

## 2019-12-06 PROCEDURE — 77030002996 HC SUT SLK J&J -A: Performed by: SURGERY

## 2019-12-06 PROCEDURE — 74011000250 HC RX REV CODE- 250: Performed by: SURGERY

## 2019-12-06 PROCEDURE — 94760 N-INVAS EAR/PLS OXIMETRY 1: CPT

## 2019-12-06 PROCEDURE — 76060000038 HC ANESTHESIA 3.5 TO 4 HR: Performed by: SURGERY

## 2019-12-06 PROCEDURE — 77030018836 HC SOL IRR NACL ICUM -A: Performed by: SURGERY

## 2019-12-06 PROCEDURE — 80047 BASIC METABLC PNL IONIZED CA: CPT

## 2019-12-06 PROCEDURE — 74011250636 HC RX REV CODE- 250/636: Performed by: ANESTHESIOLOGY

## 2019-12-06 PROCEDURE — 77030020255 HC SOL INJ LR 1000ML BG: Performed by: SURGERY

## 2019-12-06 PROCEDURE — 77030019908 HC STETH ESOPH SIMS -A: Performed by: NURSE ANESTHETIST, CERTIFIED REGISTERED

## 2019-12-06 PROCEDURE — 74011250637 HC RX REV CODE- 250/637: Performed by: SURGERY

## 2019-12-06 PROCEDURE — 77030040506 HC DRN WND MDII -A: Performed by: SURGERY

## 2019-12-06 PROCEDURE — 77030002706 HC INSRT CLMP EDWD -A: Performed by: SURGERY

## 2019-12-06 PROCEDURE — 77030040361 HC SLV COMPR DVT MDII -B: Performed by: SURGERY

## 2019-12-06 PROCEDURE — 77030002987 HC SUT PROL J&J -B: Performed by: SURGERY

## 2019-12-06 PROCEDURE — 77030026438 HC STYL ET INTUB CARD -A: Performed by: NURSE ANESTHETIST, CERTIFIED REGISTERED

## 2019-12-06 PROCEDURE — 77030002986 HC SUT PROL J&J -A: Performed by: SURGERY

## 2019-12-06 PROCEDURE — 77030011640 HC PAD GRND REM COVD -A: Performed by: SURGERY

## 2019-12-06 PROCEDURE — 74011000272 HC RX REV CODE- 272: Performed by: SURGERY

## 2019-12-06 PROCEDURE — 77030002916 HC SUT ETHLN J&J -A: Performed by: SURGERY

## 2019-12-06 PROCEDURE — 74011636637 HC RX REV CODE- 636/637: Performed by: ANESTHESIOLOGY

## 2019-12-06 PROCEDURE — 76010000134 HC OR TIME 3.5 TO 4 HR: Performed by: SURGERY

## 2019-12-06 RX ORDER — DEXAMETHASONE SODIUM PHOSPHATE 4 MG/ML
INJECTION, SOLUTION INTRA-ARTICULAR; INTRALESIONAL; INTRAMUSCULAR; INTRAVENOUS; SOFT TISSUE AS NEEDED
Status: DISCONTINUED | OUTPATIENT
Start: 2019-12-06 | End: 2019-12-06 | Stop reason: HOSPADM

## 2019-12-06 RX ORDER — CALCITRIOL 0.25 UG/1
0.25 CAPSULE ORAL DAILY
Status: DISCONTINUED | OUTPATIENT
Start: 2019-12-07 | End: 2019-12-07 | Stop reason: HOSPADM

## 2019-12-06 RX ORDER — DEXTROSE 50 % IN WATER (D50W) INTRAVENOUS SYRINGE
12.5-25 AS NEEDED
Status: DISCONTINUED | OUTPATIENT
Start: 2019-12-06 | End: 2019-12-07 | Stop reason: HOSPADM

## 2019-12-06 RX ORDER — DIPHENHYDRAMINE HYDROCHLORIDE 50 MG/ML
12.5 INJECTION, SOLUTION INTRAMUSCULAR; INTRAVENOUS AS NEEDED
Status: DISCONTINUED | OUTPATIENT
Start: 2019-12-06 | End: 2019-12-06 | Stop reason: HOSPADM

## 2019-12-06 RX ORDER — NALOXONE HYDROCHLORIDE 0.4 MG/ML
0.4 INJECTION, SOLUTION INTRAMUSCULAR; INTRAVENOUS; SUBCUTANEOUS AS NEEDED
Status: DISCONTINUED | OUTPATIENT
Start: 2019-12-06 | End: 2019-12-07 | Stop reason: HOSPADM

## 2019-12-06 RX ORDER — MAGNESIUM SULFATE 100 %
4 CRYSTALS MISCELLANEOUS AS NEEDED
Status: DISCONTINUED | OUTPATIENT
Start: 2019-12-06 | End: 2019-12-07 | Stop reason: HOSPADM

## 2019-12-06 RX ORDER — SODIUM CHLORIDE 0.9 % (FLUSH) 0.9 %
5-40 SYRINGE (ML) INJECTION EVERY 8 HOURS
Status: DISCONTINUED | OUTPATIENT
Start: 2019-12-06 | End: 2019-12-06 | Stop reason: HOSPADM

## 2019-12-06 RX ORDER — METOPROLOL TARTRATE 50 MG/1
50 TABLET ORAL 2 TIMES DAILY
Status: DISCONTINUED | OUTPATIENT
Start: 2019-12-06 | End: 2019-12-07 | Stop reason: HOSPADM

## 2019-12-06 RX ORDER — SODIUM CHLORIDE 0.9 % (FLUSH) 0.9 %
5-40 SYRINGE (ML) INJECTION AS NEEDED
Status: DISCONTINUED | OUTPATIENT
Start: 2019-12-06 | End: 2019-12-07 | Stop reason: HOSPADM

## 2019-12-06 RX ORDER — SUCCINYLCHOLINE CHLORIDE 20 MG/ML
INJECTION INTRAMUSCULAR; INTRAVENOUS AS NEEDED
Status: DISCONTINUED | OUTPATIENT
Start: 2019-12-06 | End: 2019-12-06 | Stop reason: HOSPADM

## 2019-12-06 RX ORDER — CLONIDINE HYDROCHLORIDE 0.1 MG/1
0.1 TABLET ORAL 2 TIMES DAILY
Status: DISCONTINUED | OUTPATIENT
Start: 2019-12-06 | End: 2019-12-07 | Stop reason: HOSPADM

## 2019-12-06 RX ORDER — ROCURONIUM BROMIDE 10 MG/ML
INJECTION, SOLUTION INTRAVENOUS AS NEEDED
Status: DISCONTINUED | OUTPATIENT
Start: 2019-12-06 | End: 2019-12-06 | Stop reason: HOSPADM

## 2019-12-06 RX ORDER — HYDROMORPHONE HYDROCHLORIDE 1 MG/ML
0.5 INJECTION, SOLUTION INTRAMUSCULAR; INTRAVENOUS; SUBCUTANEOUS
Status: DISCONTINUED | OUTPATIENT
Start: 2019-12-06 | End: 2019-12-07 | Stop reason: HOSPADM

## 2019-12-06 RX ORDER — LIDOCAINE HYDROCHLORIDE 10 MG/ML
0.1 INJECTION, SOLUTION EPIDURAL; INFILTRATION; INTRACAUDAL; PERINEURAL AS NEEDED
Status: DISCONTINUED | OUTPATIENT
Start: 2019-12-06 | End: 2019-12-06 | Stop reason: HOSPADM

## 2019-12-06 RX ORDER — HYDROCODONE BITARTRATE AND ACETAMINOPHEN 5; 325 MG/1; MG/1
1 TABLET ORAL
Status: DISCONTINUED | OUTPATIENT
Start: 2019-12-06 | End: 2019-12-07 | Stop reason: HOSPADM

## 2019-12-06 RX ORDER — SODIUM CHLORIDE, SODIUM LACTATE, POTASSIUM CHLORIDE, CALCIUM CHLORIDE 600; 310; 30; 20 MG/100ML; MG/100ML; MG/100ML; MG/100ML
25 INJECTION, SOLUTION INTRAVENOUS CONTINUOUS
Status: DISCONTINUED | OUTPATIENT
Start: 2019-12-06 | End: 2019-12-06 | Stop reason: HOSPADM

## 2019-12-06 RX ORDER — FENTANYL CITRATE 50 UG/ML
25 INJECTION, SOLUTION INTRAMUSCULAR; INTRAVENOUS
Status: DISCONTINUED | OUTPATIENT
Start: 2019-12-06 | End: 2019-12-06 | Stop reason: HOSPADM

## 2019-12-06 RX ORDER — DIPHENHYDRAMINE HYDROCHLORIDE 50 MG/ML
12.5 INJECTION, SOLUTION INTRAMUSCULAR; INTRAVENOUS
Status: DISCONTINUED | OUTPATIENT
Start: 2019-12-06 | End: 2019-12-07 | Stop reason: HOSPADM

## 2019-12-06 RX ORDER — PROPOFOL 10 MG/ML
INJECTION, EMULSION INTRAVENOUS AS NEEDED
Status: DISCONTINUED | OUTPATIENT
Start: 2019-12-06 | End: 2019-12-06 | Stop reason: HOSPADM

## 2019-12-06 RX ORDER — SODIUM CHLORIDE 0.9 % (FLUSH) 0.9 %
5-40 SYRINGE (ML) INJECTION AS NEEDED
Status: DISCONTINUED | OUTPATIENT
Start: 2019-12-06 | End: 2019-12-06 | Stop reason: HOSPADM

## 2019-12-06 RX ORDER — ONDANSETRON 2 MG/ML
INJECTION INTRAMUSCULAR; INTRAVENOUS AS NEEDED
Status: DISCONTINUED | OUTPATIENT
Start: 2019-12-06 | End: 2019-12-06 | Stop reason: HOSPADM

## 2019-12-06 RX ORDER — ACETAMINOPHEN 325 MG/1
650 TABLET ORAL
Status: DISCONTINUED | OUTPATIENT
Start: 2019-12-06 | End: 2019-12-07 | Stop reason: HOSPADM

## 2019-12-06 RX ORDER — EPHEDRINE SULFATE/0.9% NACL/PF 50 MG/5 ML
SYRINGE (ML) INTRAVENOUS AS NEEDED
Status: DISCONTINUED | OUTPATIENT
Start: 2019-12-06 | End: 2019-12-06 | Stop reason: HOSPADM

## 2019-12-06 RX ORDER — INSULIN LISPRO 100 [IU]/ML
INJECTION, SOLUTION INTRAVENOUS; SUBCUTANEOUS
Status: DISCONTINUED | OUTPATIENT
Start: 2019-12-06 | End: 2019-12-07 | Stop reason: HOSPADM

## 2019-12-06 RX ORDER — FENTANYL CITRATE 50 UG/ML
INJECTION, SOLUTION INTRAMUSCULAR; INTRAVENOUS AS NEEDED
Status: DISCONTINUED | OUTPATIENT
Start: 2019-12-06 | End: 2019-12-06 | Stop reason: HOSPADM

## 2019-12-06 RX ORDER — SODIUM CHLORIDE 9 MG/ML
25 INJECTION, SOLUTION INTRAVENOUS CONTINUOUS
Status: DISCONTINUED | OUTPATIENT
Start: 2019-12-06 | End: 2019-12-06

## 2019-12-06 RX ORDER — PHENYLEPHRINE HCL IN 0.9% NACL 0.4MG/10ML
SYRINGE (ML) INTRAVENOUS AS NEEDED
Status: DISCONTINUED | OUTPATIENT
Start: 2019-12-06 | End: 2019-12-06 | Stop reason: HOSPADM

## 2019-12-06 RX ORDER — LIDOCAINE HYDROCHLORIDE 20 MG/ML
INJECTION, SOLUTION EPIDURAL; INFILTRATION; INTRACAUDAL; PERINEURAL AS NEEDED
Status: DISCONTINUED | OUTPATIENT
Start: 2019-12-06 | End: 2019-12-06 | Stop reason: HOSPADM

## 2019-12-06 RX ORDER — ONDANSETRON 2 MG/ML
4 INJECTION INTRAMUSCULAR; INTRAVENOUS
Status: DISCONTINUED | OUTPATIENT
Start: 2019-12-06 | End: 2019-12-07 | Stop reason: HOSPADM

## 2019-12-06 RX ORDER — SODIUM BICARBONATE 650 MG/1
650 TABLET ORAL 2 TIMES DAILY
Status: DISCONTINUED | OUTPATIENT
Start: 2019-12-06 | End: 2019-12-07 | Stop reason: HOSPADM

## 2019-12-06 RX ORDER — MIDAZOLAM HYDROCHLORIDE 1 MG/ML
INJECTION, SOLUTION INTRAMUSCULAR; INTRAVENOUS AS NEEDED
Status: DISCONTINUED | OUTPATIENT
Start: 2019-12-06 | End: 2019-12-06 | Stop reason: HOSPADM

## 2019-12-06 RX ORDER — FAMOTIDINE 10 MG/ML
INJECTION INTRAVENOUS AS NEEDED
Status: DISCONTINUED | OUTPATIENT
Start: 2019-12-06 | End: 2019-12-06 | Stop reason: HOSPADM

## 2019-12-06 RX ORDER — HYDROMORPHONE HYDROCHLORIDE 2 MG/ML
INJECTION, SOLUTION INTRAMUSCULAR; INTRAVENOUS; SUBCUTANEOUS AS NEEDED
Status: DISCONTINUED | OUTPATIENT
Start: 2019-12-06 | End: 2019-12-06 | Stop reason: HOSPADM

## 2019-12-06 RX ORDER — GABAPENTIN 100 MG/1
200 CAPSULE ORAL
Status: DISCONTINUED | OUTPATIENT
Start: 2019-12-06 | End: 2019-12-07 | Stop reason: HOSPADM

## 2019-12-06 RX ORDER — SODIUM CHLORIDE 0.9 % (FLUSH) 0.9 %
5-40 SYRINGE (ML) INJECTION EVERY 8 HOURS
Status: DISCONTINUED | OUTPATIENT
Start: 2019-12-06 | End: 2019-12-07 | Stop reason: HOSPADM

## 2019-12-06 RX ORDER — HYDROMORPHONE HYDROCHLORIDE 1 MG/ML
0.2 INJECTION, SOLUTION INTRAMUSCULAR; INTRAVENOUS; SUBCUTANEOUS
Status: DISCONTINUED | OUTPATIENT
Start: 2019-12-06 | End: 2019-12-06 | Stop reason: HOSPADM

## 2019-12-06 RX ORDER — FAMOTIDINE 10 MG/ML
INJECTION INTRAVENOUS
Status: COMPLETED
Start: 2019-12-06 | End: 2019-12-06

## 2019-12-06 RX ORDER — ATORVASTATIN CALCIUM 20 MG/1
20 TABLET, FILM COATED ORAL
Status: DISCONTINUED | OUTPATIENT
Start: 2019-12-06 | End: 2019-12-07 | Stop reason: HOSPADM

## 2019-12-06 RX ADMIN — Medication 80 MCG: at 08:51

## 2019-12-06 RX ADMIN — Medication 20 MG: at 07:41

## 2019-12-06 RX ADMIN — DEXAMETHASONE SODIUM PHOSPHATE 4 MG: 4 INJECTION, SOLUTION INTRAMUSCULAR; INTRAVENOUS at 08:08

## 2019-12-06 RX ADMIN — Medication 160 MCG: at 10:32

## 2019-12-06 RX ADMIN — SUCCINYLCHOLINE CHLORIDE 160 MG: 20 INJECTION, SOLUTION INTRAMUSCULAR; INTRAVENOUS at 07:39

## 2019-12-06 RX ADMIN — HUMAN INSULIN 4 UNITS: 100 INJECTION, SOLUTION SUBCUTANEOUS at 07:07

## 2019-12-06 RX ADMIN — FENTANYL CITRATE 25 MCG: 50 INJECTION INTRAMUSCULAR; INTRAVENOUS at 09:53

## 2019-12-06 RX ADMIN — Medication 10 ML: at 22:00

## 2019-12-06 RX ADMIN — FENTANYL CITRATE 25 MCG: 50 INJECTION INTRAMUSCULAR; INTRAVENOUS at 10:46

## 2019-12-06 RX ADMIN — SODIUM CHLORIDE 25 ML/HR: 900 INJECTION, SOLUTION INTRAVENOUS at 06:44

## 2019-12-06 RX ADMIN — Medication 160 MCG: at 09:19

## 2019-12-06 RX ADMIN — PROPOFOL 20 MG: 10 INJECTION, EMULSION INTRAVENOUS at 10:46

## 2019-12-06 RX ADMIN — ONDANSETRON 4 MG: 2 INJECTION INTRAMUSCULAR; INTRAVENOUS at 15:01

## 2019-12-06 RX ADMIN — FAMOTIDINE 20 MG: 10 INJECTION INTRAVENOUS at 07:30

## 2019-12-06 RX ADMIN — WATER 2 G: 1 INJECTION INTRAMUSCULAR; INTRAVENOUS; SUBCUTANEOUS at 07:57

## 2019-12-06 RX ADMIN — FENTANYL CITRATE 50 MCG: 50 INJECTION INTRAMUSCULAR; INTRAVENOUS at 07:38

## 2019-12-06 RX ADMIN — FENTANYL CITRATE 50 MCG: 50 INJECTION INTRAMUSCULAR; INTRAVENOUS at 08:36

## 2019-12-06 RX ADMIN — ROCURONIUM BROMIDE 5 MG: 10 INJECTION INTRAVENOUS at 07:39

## 2019-12-06 RX ADMIN — HYDROMORPHONE HYDROCHLORIDE 0.5 MG: 1 INJECTION, SOLUTION INTRAMUSCULAR; INTRAVENOUS; SUBCUTANEOUS at 20:26

## 2019-12-06 RX ADMIN — HYDROMORPHONE HYDROCHLORIDE 0.6 MG: 2 INJECTION, SOLUTION INTRAMUSCULAR; INTRAVENOUS; SUBCUTANEOUS at 11:21

## 2019-12-06 RX ADMIN — SODIUM BICARBONATE 650 MG: 650 TABLET ORAL at 17:02

## 2019-12-06 RX ADMIN — FENTANYL CITRATE 50 MCG: 50 INJECTION INTRAMUSCULAR; INTRAVENOUS at 07:35

## 2019-12-06 RX ADMIN — PROPOFOL 120 MG: 10 INJECTION, EMULSION INTRAVENOUS at 07:39

## 2019-12-06 RX ADMIN — VANCOMYCIN HYDROCHLORIDE 1000 MG: 1 INJECTION, POWDER, LYOPHILIZED, FOR SOLUTION INTRAVENOUS at 06:44

## 2019-12-06 RX ADMIN — LIDOCAINE HYDROCHLORIDE 60 MG: 20 INJECTION, SOLUTION EPIDURAL; INFILTRATION; INTRACAUDAL; PERINEURAL at 07:39

## 2019-12-06 RX ADMIN — Medication 120 MCG: at 07:43

## 2019-12-06 RX ADMIN — INSULIN LISPRO 2 UNITS: 100 INJECTION, SOLUTION INTRAVENOUS; SUBCUTANEOUS at 22:15

## 2019-12-06 RX ADMIN — METOPROLOL TARTRATE 50 MG: 50 TABLET, FILM COATED ORAL at 17:02

## 2019-12-06 RX ADMIN — Medication 200 MCG: at 07:41

## 2019-12-06 RX ADMIN — Medication 80 MCG: at 09:15

## 2019-12-06 RX ADMIN — Medication 5 MG: at 07:43

## 2019-12-06 RX ADMIN — GABAPENTIN 200 MG: 100 CAPSULE ORAL at 20:26

## 2019-12-06 RX ADMIN — Medication 10 ML: at 15:01

## 2019-12-06 RX ADMIN — Medication 80 MCG: at 10:12

## 2019-12-06 RX ADMIN — PROPOFOL 50 MG: 10 INJECTION, EMULSION INTRAVENOUS at 11:06

## 2019-12-06 RX ADMIN — MIDAZOLAM HYDROCHLORIDE 2 MG: 1 INJECTION INTRAMUSCULAR; INTRAVENOUS at 07:30

## 2019-12-06 RX ADMIN — CLONIDINE HYDROCHLORIDE 0.1 MG: 0.1 TABLET ORAL at 17:02

## 2019-12-06 RX ADMIN — Medication 10 ML: at 14:55

## 2019-12-06 RX ADMIN — Medication 200 MCG: at 07:42

## 2019-12-06 RX ADMIN — ONDANSETRON HYDROCHLORIDE 4 MG: 2 INJECTION, SOLUTION INTRAMUSCULAR; INTRAVENOUS at 07:30

## 2019-12-06 RX ADMIN — FENTANYL CITRATE 50 MCG: 50 INJECTION INTRAMUSCULAR; INTRAVENOUS at 11:05

## 2019-12-06 RX ADMIN — HYDROCODONE BITARTRATE AND ACETAMINOPHEN 1 TABLET: 5; 325 TABLET ORAL at 17:05

## 2019-12-06 RX ADMIN — Medication 3 AMPULE: at 06:44

## 2019-12-06 RX ADMIN — INSULIN LISPRO 2 UNITS: 100 INJECTION, SOLUTION INTRAVENOUS; SUBCUTANEOUS at 16:54

## 2019-12-06 RX ADMIN — ATORVASTATIN CALCIUM 20 MG: 20 TABLET, FILM COATED ORAL at 20:26

## 2019-12-06 NOTE — PROGRESS NOTES
Problem: Risk for Spread of Infection  Goal: Prevent transmission of infectious organism to others  Description  Prevent the transmission of infectious organisms to other patients, staff members, and visitors. Outcome: Progressing Towards Goal     Problem: Patient Education:  Go to Education Activity  Goal: Patient/Family Education  Outcome: Progressing Towards Goal     Problem: Falls - Risk of  Goal: *Absence of Falls  Description  Document Sumit Anthony Fall Risk and appropriate interventions in the flowsheet. Outcome: Progressing Towards Goal  Note: Fall Risk Interventions:  Mobility Interventions: Bed/chair exit alarm, Patient to call before getting OOB    Mentation Interventions: Adequate sleep, hydration, pain control, Bed/chair exit alarm, Door open when patient unattended, Increase mobility, More frequent rounding    Medication Interventions: Teach patient to arise slowly, Patient to call before getting OOB, Bed/chair exit alarm    Elimination Interventions: Call light in reach, Bed/chair exit alarm    History of Falls Interventions: Door open when patient unattended         Problem: Patient Education: Go to Patient Education Activity  Goal: Patient/Family Education  Outcome: Progressing Towards Goal     Problem: Pressure Injury - Risk of  Goal: *Prevention of pressure injury  Description  Document Julius Scale and appropriate interventions in the flowsheet.   Outcome: Progressing Towards Goal  Note: Pressure Injury Interventions:  Sensory Interventions: Assess changes in LOC, Discuss PT/OT consult with provider, Check visual cues for pain         Activity Interventions: Increase time out of bed, PT/OT evaluation    Mobility Interventions: HOB 30 degrees or less, PT/OT evaluation    Nutrition Interventions: Document food/fluid/supplement intake

## 2019-12-06 NOTE — PERIOP NOTES
Handoff Report from Operating Room to PACU    Report received from Lamont Mesa RN and Mo Fischer CRNA regarding Sly Kevin. Surgeon(s):  Malik Akers MD  And Procedure(s) (LRB):  CREATION TRANSPOSED ARTERIO VENOUS FISTULA LEFT ARM (Left)  confirmed   with drains and dressings discussed. Anesthesia type, drugs, patient history, complications, estimated blood loss, vital signs, intake and output, and last pain medication, lines and temperature were reviewed.

## 2019-12-06 NOTE — PROGRESS NOTES
General Surgery End of Shift Nursing Note    Bedside shift change report given to Criselda Hermosillo (oncoming nurse) by Nickie Ball RN (offgoing nurse). Report included the following information SBAR, Kardex, OR Summary, Intake/Output, MAR and Recent Results. Shift worked:   7a to [employer] of shift:    S/Psurgery today. Left arm dressing dry, RITCHIE drain in place. Issues for physician to address:        Number times ambulated in hallway past shift: 0 , ambulated to bathroom with one assist, refused chair   Number of times OOB to chair past shift: 0    Pain Management:  Current medication: Oral Norco  Patient states pain is manageable on current pain medication: YES    GI:    Current diet:  DIET RENAL WITH OPTIONS 80-80-80; Regular; Consistent Carb 1800kcal    Tolerating current diet: YES  Passing flatus: YES  Last Bowel Movement: today   Appearance:     Respiratory:    Incentive Spirometer at bedside: YES  Patient instructed on use: NO    Patient Safety:    Falls Score: 2  Bed Alarm On? No  Sitter?  No    Megan Porter RN

## 2019-12-06 NOTE — PROGRESS NOTES
Surgery    Patient is not on dialysis at this point. She can be discharged in AM as long as she remains stable.     Lay Muñoz MD

## 2019-12-06 NOTE — ANESTHESIA PREPROCEDURE EVALUATION
Anesthetic History     PONV          Review of Systems / Medical History  Patient summary reviewed, nursing notes reviewed and pertinent labs reviewed    Pulmonary  Within defined limits                 Neuro/Psych         Psychiatric history    Comments: Depression Cardiovascular    Hypertension          Hyperlipidemia    Exercise tolerance: <4 METS  Comments: TTE (11/19/16):  EF=70%   GI/Hepatic/Renal       Hepatitis: type C  Renal disease: ESRD, stones and dialysis  Liver disease    Comments: H/O C.  Difficile Colitis  Gastroparesis  H/O Alcoholic Pancreatitis (5116) Endo/Other  Within defined limits  Diabetes: poorly controlled, type 1, using insulin         Other Findings   Comments: Alcoholism    Chronic Low Back Pain         Physical Exam    Airway  Mallampati: II  TM Distance: > 6 cm  Neck ROM: normal range of motion   Mouth opening: Normal     Cardiovascular  Regular rate and rhythm,  S1 and S2 normal,  no murmur, click, rub, or gallop             Dental    Dentition: Poor dentition  Comments: Several missing   Pulmonary  Breath sounds clear to auscultation               Abdominal  GI exam deferred       Other Findings            Anesthetic Plan    ASA: 4  Anesthesia type: general and total IV anesthesia    Monitoring Plan: BIS      Induction: Intravenous  Anesthetic plan and risks discussed with: Patient

## 2019-12-06 NOTE — CONSULTS
Called for a consult, but Ms. Laura Harper has been seen by [facility]. Will place consult for their group.      Maurisio Meza

## 2019-12-06 NOTE — ANESTHESIA POSTPROCEDURE EVALUATION
Procedure(s):  CREATION TRANSPOSED ARTERIO VENOUS FISTULA LEFT ARM. general, total IV anesthesia    Anesthesia Post Evaluation        Patient location during evaluation: PACU  Note status: Adequate. Level of consciousness: responsive to verbal stimuli and sleepy but conscious  Pain management: satisfactory to patient  Airway patency: patent  Anesthetic complications: no  Cardiovascular status: acceptable  Respiratory status: acceptable  Hydration status: acceptable  Comments: +Post-Anesthesia Evaluation and Assessment    Patient: Darci Andrade MRN: [id number]  SSN: [id number]   YOB: 1965  Age: 47 y.o. Sex: female      Cardiovascular Function/Vital Signs    /87 (BP 1 Location: Right arm, BP Patient Position: At rest)   Pulse 100   Temp 36.6 °C (97.9 °F)   Resp 13   Ht 5' 5\" (1.651 m)   Wt 53.8 kg (118 lb 9.7 oz)   SpO2 99%   BMI 19.74 kg/m²     Patient is status post Procedure(s):  CREATION TRANSPOSED ARTERIO VENOUS FISTULA LEFT ARM. Nausea/Vomiting: Controlled. Postoperative hydration reviewed and adequate. Pain:  Pain Scale 1: Numeric (0 - 10) (12/06/19 1200)  Pain Intensity 1: 0 (12/06/19 1200)   Managed. Neurological Status:   Neuro (WDL): Exceptions to WDL (12/06/19 1125)   At baseline. Mental Status and Level of Consciousness: Arousable. Pulmonary Status:   O2 Device: Room air (12/06/19 1200)   Adequate oxygenation and airway patent. Complications related to anesthesia: None    Post-anesthesia assessment completed. No concerns. Signed By: Néstor Velazquez MD    12/6/2019  Post anesthesia nausea and vomiting:  controlled      Vitals Value Taken Time   /87 12/6/2019 12:00 PM   Temp 36.6 °C (97.9 °F) 12/6/2019 11:25 AM   Pulse 93 12/6/2019 12:07 PM   Resp 12 12/6/2019 12:07 PM   SpO2 98 % 12/6/2019 12:07 PM   Vitals shown include unvalidated device data.

## 2019-12-06 NOTE — PERIOP NOTES
TRANSFER - OUT REPORT:    Verbal report given to 1101 Deborah Carcamo RN(name) on Mook Reasoner  being transferred to 2111(unit) for routine post - op       Report consisted of patients Situation, Background, Assessment and   Recommendations(SBAR). Information from the following report(s) OR Summary, Procedure Summary, Intake/Output and MAR was reviewed with the receiving nurse. Opportunity for questions and clarification was provided.       Patient transported with:   [employer]

## 2019-12-06 NOTE — DISCHARGE INSTRUCTIONS
Discharge Instructions Following Surgery for Transposed AV Fistula          Keep incision and dressing dry until first office visit. Keep present dressing in place if possible. Replace dressing if needed. No lifting over ten pounds with operated arm for two weeks. Empty RITCHIE drain 1-2 times per day and record output volume on paper (date, time, and amount). -Bring this record to office visit.             -After emptying drain, squeeze bulb and replace cap so that drain bulb has  suction.                -Bulb should look \"collapsed\" to indicate that it has suction. See Dr Jessika Stock in the office on 12/11/2019  -  call to confirm appointment  -  [phone]    Use pain medicines as needed as prescribed or use Tyelnol. Do not drive while taking narcotic pain medication    If you have any problems, call Dr. Jessika Stock at [phone] or [phone] (after hours)      WAQAR Merritt MD

## 2019-12-06 NOTE — OP NOTES
Operative Report    CPT       33436        Creation of Transposed AV Fistula Left Arm  (Brachio - Basilic)          Patient Name:  Keila Mai    12/6/2019    Nephrologist - Dr Reynaldo Olvera    Preoperative Diagnosis - ESRD    Postoperative Diagnosis - Same    Anesthesia - General    Surgeon - WAQAR Esqueda    Procedure - Creation of transposed AV fistula left arm    Operative Summary -     The patient was taken to the operating room and placed on the operating table in the supine position. The patient's left arm was  prepared and steriley draped. The location of the veins had been reconfirmed by ultrasound prior to surgery. There was an existing AV fistula in the arm between the brachial artery and the basilic vein. An incision was made exposing the vein just above the arterial anastomosis. The basilic vein was exposed with a long incision up to the level of the pectoralis muscle. The branches were ligated and divided. The vein was then  clamped and divided near the arterial anastomosis. The stump of the vein on the arterial side was closed with running suture of 5 - 0 prolene. The vein was then gently distended with heparinized lactated Ringer's solution and marked for orientation. The brahcial artery was exposed just above the old anastomotic level. The vein was brought down through a very superficial tunnel in the upper arm with a curved Reading tunneler. The tunneler sheath was withdrawn. The artery was controlled proximally and distally with  bulldog clamps. A short longitudinal arteriotomy was made. Using 6 - 0 prolene an anastomosis was made between the end of the vein and the side of the artery. Prior to completion, the artery was flushed proximally and distally. After completion, with initiation of flow, there was a thrill at the AV anastomosis and a trace palpable pulse in the radial artery at the wrist.      The wound was irrigated with antibiotic solution.   In the bed of the mobilized basilic vein, a 7 mm RITCHIE drain was placed and was brought out through a separate stab wound. The subcutaneous tissue was  closed with 3-0 chromic and the skin with surgical staples. A gauze dressing was applied with kerlix wrap. The patient tolerated the procedure well and was taken to the PACU in stable condition.      Specimen - none     Drain - Luis - Rosario 7 mm    Estimated Blood Loss - 50 ml    Complications - none        LUIS DANIEL Brown MD

## 2019-12-07 VITALS
RESPIRATION RATE: 16 BRPM | DIASTOLIC BLOOD PRESSURE: 56 MMHG | BODY MASS INDEX: 19.76 KG/M2 | HEART RATE: 84 BPM | TEMPERATURE: 97.9 F | SYSTOLIC BLOOD PRESSURE: 91 MMHG | WEIGHT: 118.61 LBS | HEIGHT: 65 IN | OXYGEN SATURATION: 98 %

## 2019-12-07 LAB
ANION GAP SERPL CALC-SCNC: 11 MMOL/L (ref 5–15)
BACTERIA SPEC CULT: ABNORMAL
BACTERIA SPEC CULT: ABNORMAL
BASOPHILS # BLD: 0 K/UL (ref 0–0.1)
BASOPHILS NFR BLD: 0 % (ref 0–1)
BUN SERPL-MCNC: 93 MG/DL (ref 6–20)
BUN/CREAT SERPL: 16 (ref 12–20)
CALCIUM SERPL-MCNC: 9.1 MG/DL (ref 8.5–10.1)
CHLORIDE SERPL-SCNC: 101 MMOL/L (ref 97–108)
CO2 SERPL-SCNC: 22 MMOL/L (ref 21–32)
CREAT SERPL-MCNC: 5.94 MG/DL (ref 0.55–1.02)
DIFFERENTIAL METHOD BLD: ABNORMAL
EOSINOPHIL # BLD: 0.1 K/UL (ref 0–0.4)
EOSINOPHIL NFR BLD: 1 % (ref 0–7)
ERYTHROCYTE [DISTWIDTH] IN BLOOD BY AUTOMATED COUNT: 13.3 % (ref 11.5–14.5)
GLUCOSE BLD STRIP.AUTO-MCNC: 352 MG/DL (ref 65–100)
GLUCOSE SERPL-MCNC: 302 MG/DL (ref 65–100)
HCT VFR BLD AUTO: 30.2 % (ref 35–47)
HGB BLD-MCNC: 9.8 G/DL (ref 11.5–16)
IMM GRANULOCYTES # BLD AUTO: 0.1 K/UL (ref 0–0.04)
IMM GRANULOCYTES NFR BLD AUTO: 1 % (ref 0–0.5)
LYMPHOCYTES # BLD: 1.7 K/UL (ref 0.8–3.5)
LYMPHOCYTES NFR BLD: 17 % (ref 12–49)
MCH RBC QN AUTO: 29.1 PG (ref 26–34)
MCHC RBC AUTO-ENTMCNC: 32.5 G/DL (ref 30–36.5)
MCV RBC AUTO: 89.6 FL (ref 80–99)
MONOCYTES # BLD: 1 K/UL (ref 0–1)
MONOCYTES NFR BLD: 10 % (ref 5–13)
NEUTS SEG # BLD: 7.2 K/UL (ref 1.8–8)
NEUTS SEG NFR BLD: 71 % (ref 32–75)
NRBC # BLD: 0 K/UL (ref 0–0.01)
NRBC BLD-RTO: 0 PER 100 WBC
PLATELET # BLD AUTO: 229 K/UL (ref 150–400)
PMV BLD AUTO: 9.2 FL (ref 8.9–12.9)
POTASSIUM SERPL-SCNC: 4.1 MMOL/L (ref 3.5–5.1)
RBC # BLD AUTO: 3.37 M/UL (ref 3.8–5.2)
SERVICE CMNT-IMP: ABNORMAL
SERVICE CMNT-IMP: ABNORMAL
SODIUM SERPL-SCNC: 134 MMOL/L (ref 136–145)
WBC # BLD AUTO: 10 K/UL (ref 3.6–11)

## 2019-12-07 PROCEDURE — 85025 COMPLETE CBC W/AUTO DIFF WBC: CPT

## 2019-12-07 PROCEDURE — 36415 COLL VENOUS BLD VENIPUNCTURE: CPT

## 2019-12-07 PROCEDURE — 82962 GLUCOSE BLOOD TEST: CPT

## 2019-12-07 PROCEDURE — 74011636637 HC RX REV CODE- 636/637: Performed by: SURGERY

## 2019-12-07 PROCEDURE — 99218 HC RM OBSERVATION: CPT

## 2019-12-07 PROCEDURE — 74011250637 HC RX REV CODE- 250/637: Performed by: SURGERY

## 2019-12-07 PROCEDURE — 74011250636 HC RX REV CODE- 250/636: Performed by: SURGERY

## 2019-12-07 PROCEDURE — 80048 BASIC METABOLIC PNL TOTAL CA: CPT

## 2019-12-07 RX ADMIN — Medication 10 ML: at 04:11

## 2019-12-07 RX ADMIN — SODIUM BICARBONATE 650 MG: 650 TABLET ORAL at 09:11

## 2019-12-07 RX ADMIN — INSULIN LISPRO 4 UNITS: 100 INJECTION, SOLUTION INTRAVENOUS; SUBCUTANEOUS at 09:10

## 2019-12-07 RX ADMIN — HYDROMORPHONE HYDROCHLORIDE 0.5 MG: 1 INJECTION, SOLUTION INTRAMUSCULAR; INTRAVENOUS; SUBCUTANEOUS at 04:10

## 2019-12-07 RX ADMIN — ONDANSETRON 4 MG: 2 INJECTION INTRAMUSCULAR; INTRAVENOUS at 08:11

## 2019-12-07 RX ADMIN — CALCITRIOL 0.25 MCG: 0.25 CAPSULE ORAL at 09:11

## 2019-12-07 RX ADMIN — Medication 10 ML: at 06:00

## 2019-12-07 NOTE — PROGRESS NOTES
Problem: Risk for Spread of Infection  Goal: Prevent transmission of infectious organism to others  Description  Prevent the transmission of infectious organisms to other patients, staff members, and visitors. Outcome: Progressing Towards Goal     Problem: Patient Education:  Go to Education Activity  Goal: Patient/Family Education  Outcome: Progressing Towards Goal     Problem: Falls - Risk of  Goal: *Absence of Falls  Description  Document Obed Brown Fall Risk and appropriate interventions in the flowsheet. Outcome: Progressing Towards Goal  Note: Fall Risk Interventions:  Mobility Interventions: Patient to call before getting OOB    Mentation Interventions: Adequate sleep, hydration, pain control, Door open when patient unattended, Room close to nurse's station    Medication Interventions: Teach patient to arise slowly, Patient to call before getting OOB    Elimination Interventions: Call light in reach, Patient to call for help with toileting needs    History of Falls Interventions: Door open when patient unattended         Problem: Patient Education: Go to Patient Education Activity  Goal: Patient/Family Education  Outcome: Progressing Towards Goal     Problem: Pressure Injury - Risk of  Goal: *Prevention of pressure injury  Description  Document Julius Scale and appropriate interventions in the flowsheet.   Outcome: Progressing Towards Goal  Note: Pressure Injury Interventions:  Sensory Interventions: Keep linens dry and wrinkle-free         Activity Interventions: Increase time out of bed    Mobility Interventions: HOB 30 degrees or less    Nutrition Interventions: Document food/fluid/supplement intake, Offer support with meals,snacks and hydration                     Problem: Patient Education: Go to Patient Education Activity  Goal: Patient/Family Education  Outcome: Progressing Towards Goal

## 2019-12-07 NOTE — PROGRESS NOTES
I have reviewed discharge instructions with the patient. The patient verbalized understanding. Patient given information on how to make follow-up appointments.

## 2019-12-07 NOTE — DISCHARGE SUMMARY
Physician Discharge Summary     Patient ID:  Darci Andrade  [id number]  89 y.o.  1965    Admit Date: 12/6/2019    Discharge Date: 12/7/2019    Admission Diagnoses: ESRD (end stage renal disease) ([facility] [N18.6]    Discharge Diagnoses: Active Problems:    ESRD (end stage renal disease) ([facility] (12/6/2019)         Admission Condition: Good    Discharge Condition: Good    Last Procedure: Procedure(s):  CREATION TRANSPOSED ARTERIO VENOUS FISTULA LEFT ARM      Hospital Course:   Normal hospital course for this procedure. Consults: None    Disposition: home    Patient Instructions:   Current Discharge Medication List      CONTINUE these medications which have NOT CHANGED    Details   calcitRIOL (ROCALTROL) 0.25 mcg capsule Take 0.25 mcg by mouth daily. insulin degludec (TRESIBA U-100 INSULIN SC) 25 Units by SubCUTAneous route daily. insulin aspart U-100 (NOVOLOG) 100 unit/mL injection 5 Units by SubCUTAneous route Before breakfast, lunch, and dinner. cloNIDine HCl (CATAPRES) 0.1 mg tablet Take 1 Tab by mouth two (2) times a day. Qty: 60 Tab, Refills: 1      metoprolol tartrate (LOPRESSOR) 50 mg tablet Take 50 mg by mouth two (2) times a day. sodium bicarbonate 650 mg tablet Take 650 mg by mouth two (2) times a day. atorvastatin (LIPITOR) 20 mg tablet Take 20 mg by mouth nightly. acetaminophen (TYLENOL) 500 mg tablet acetaminophen 500 mg      ondansetron (ZOFRAN ODT) 4 mg disintegrating tablet Take 1 Tab by mouth every eight (8) hours as needed for Nausea. Qty: 20 Tab, Refills: 0      aspirin 81 mg chewable tablet Take 1 Tab by mouth daily. Qty: 30 Tab, Refills: 1           Activity: Activity as tolerated  Diet: Regular Diet  Wound Care: Keep wound clean and dry    Follow-up with Dr. Jayant Hyde in 4 days.   Follow-up tests/labs none    Signed:  Buzz Roque MD  [facility]  12/7/2019  7:42 AM

## 2019-12-07 NOTE — PROGRESS NOTES
End of shift    Uneventful shift  Pnt ambulated from bed to bathroom without difficulty  Medicated for pain as needed; tolerated well  Pnt on Renal diet; tolerated well  Pnt stable; VS WNL  No bowel movement during shift  NADN

## 2019-12-08 ENCOUNTER — APPOINTMENT (OUTPATIENT)
Dept: GENERAL RADIOLOGY | Age: 54
DRG: 312 | End: 2019-12-08
Attending: EMERGENCY MEDICINE
Payer: MEDICARE

## 2019-12-08 ENCOUNTER — HOSPITAL ENCOUNTER (INPATIENT)
Age: 54
LOS: 1 days | Discharge: HOME | DRG: 312 | End: 2019-12-11
Attending: EMERGENCY MEDICINE | Admitting: INTERNAL MEDICINE
Payer: MEDICARE

## 2019-12-08 DIAGNOSIS — N18.5 STAGE 5 CHRONIC KIDNEY DISEASE NOT ON CHRONIC DIALYSIS (HCC): ICD-10-CM

## 2019-12-08 DIAGNOSIS — R73.9 HYPERGLYCEMIA WITHOUT KETOSIS: ICD-10-CM

## 2019-12-08 DIAGNOSIS — L76.82 PAIN AT SURGICAL INCISION: ICD-10-CM

## 2019-12-08 DIAGNOSIS — R77.8 ELEVATED TROPONIN: Primary | ICD-10-CM

## 2019-12-08 LAB
ALBUMIN SERPL-MCNC: 3.9 G/DL (ref 3.5–5)
ALBUMIN/GLOB SERPL: 0.8 {RATIO} (ref 1.1–2.2)
ALP SERPL-CCNC: 204 U/L (ref 45–117)
ALT SERPL-CCNC: 15 U/L (ref 12–78)
ANION GAP SERPL CALC-SCNC: 11 MMOL/L (ref 5–15)
AST SERPL-CCNC: 28 U/L (ref 15–37)
BASOPHILS # BLD: 0 K/UL (ref 0–0.1)
BASOPHILS NFR BLD: 0 % (ref 0–1)
BILIRUB SERPL-MCNC: 0.4 MG/DL (ref 0.2–1)
BUN SERPL-MCNC: 97 MG/DL (ref 6–20)
BUN/CREAT SERPL: 15 (ref 12–20)
CALCIUM SERPL-MCNC: 9.7 MG/DL (ref 8.5–10.1)
CHLORIDE SERPL-SCNC: 95 MMOL/L (ref 97–108)
CK SERPL-CCNC: 231 U/L (ref 26–192)
CO2 SERPL-SCNC: 21 MMOL/L (ref 21–32)
CREAT SERPL-MCNC: 6.47 MG/DL (ref 0.55–1.02)
DIFFERENTIAL METHOD BLD: ABNORMAL
EOSINOPHIL # BLD: 0.1 K/UL (ref 0–0.4)
EOSINOPHIL NFR BLD: 1 % (ref 0–7)
ERYTHROCYTE [DISTWIDTH] IN BLOOD BY AUTOMATED COUNT: 13.4 % (ref 11.5–14.5)
GLOBULIN SER CALC-MCNC: 4.6 G/DL (ref 2–4)
GLUCOSE BLD STRIP.AUTO-MCNC: 232 MG/DL (ref 65–100)
GLUCOSE BLD STRIP.AUTO-MCNC: 246 MG/DL (ref 65–100)
GLUCOSE SERPL-MCNC: 592 MG/DL (ref 65–100)
HCT VFR BLD AUTO: 34.3 % (ref 35–47)
HGB BLD-MCNC: 11.3 G/DL (ref 11.5–16)
IMM GRANULOCYTES # BLD AUTO: 0.1 K/UL (ref 0–0.04)
IMM GRANULOCYTES NFR BLD AUTO: 1 % (ref 0–0.5)
LYMPHOCYTES # BLD: 0.8 K/UL (ref 0.8–3.5)
LYMPHOCYTES NFR BLD: 13 % (ref 12–49)
MCH RBC QN AUTO: 29.1 PG (ref 26–34)
MCHC RBC AUTO-ENTMCNC: 32.9 G/DL (ref 30–36.5)
MCV RBC AUTO: 88.4 FL (ref 80–99)
MONOCYTES # BLD: 0.1 K/UL (ref 0–1)
MONOCYTES NFR BLD: 2 % (ref 5–13)
NEUTS SEG # BLD: 4.9 K/UL (ref 1.8–8)
NEUTS SEG NFR BLD: 83 % (ref 32–75)
NRBC # BLD: 0 K/UL (ref 0–0.01)
NRBC BLD-RTO: 0 PER 100 WBC
PLATELET # BLD AUTO: 189 K/UL (ref 150–400)
PMV BLD AUTO: 9.6 FL (ref 8.9–12.9)
POTASSIUM SERPL-SCNC: 4 MMOL/L (ref 3.5–5.1)
PROT SERPL-MCNC: 8.5 G/DL (ref 6.4–8.2)
RBC # BLD AUTO: 3.88 M/UL (ref 3.8–5.2)
RBC MORPH BLD: ABNORMAL
SERVICE CMNT-IMP: ABNORMAL
SERVICE CMNT-IMP: ABNORMAL
SODIUM SERPL-SCNC: 127 MMOL/L (ref 136–145)
TROPONIN I SERPL-MCNC: 0.62 NG/ML
WBC # BLD AUTO: 6 K/UL (ref 3.6–11)

## 2019-12-08 PROCEDURE — 74011000250 HC RX REV CODE- 250: Performed by: EMERGENCY MEDICINE

## 2019-12-08 PROCEDURE — 84484 ASSAY OF TROPONIN QUANT: CPT

## 2019-12-08 PROCEDURE — 71045 X-RAY EXAM CHEST 1 VIEW: CPT

## 2019-12-08 PROCEDURE — 82550 ASSAY OF CK (CPK): CPT

## 2019-12-08 PROCEDURE — 96375 TX/PRO/DX INJ NEW DRUG ADDON: CPT

## 2019-12-08 PROCEDURE — 99218 HC RM OBSERVATION: CPT

## 2019-12-08 PROCEDURE — 85025 COMPLETE CBC W/AUTO DIFF WBC: CPT

## 2019-12-08 PROCEDURE — 96361 HYDRATE IV INFUSION ADD-ON: CPT

## 2019-12-08 PROCEDURE — 93005 ELECTROCARDIOGRAM TRACING: CPT

## 2019-12-08 PROCEDURE — 74011636637 HC RX REV CODE- 636/637: Performed by: HOSPITALIST

## 2019-12-08 PROCEDURE — 99285 EMERGENCY DEPT VISIT HI MDM: CPT

## 2019-12-08 PROCEDURE — 80053 COMPREHEN METABOLIC PANEL: CPT

## 2019-12-08 PROCEDURE — 74011250637 HC RX REV CODE- 250/637: Performed by: HOSPITALIST

## 2019-12-08 PROCEDURE — 74011250637 HC RX REV CODE- 250/637: Performed by: EMERGENCY MEDICINE

## 2019-12-08 PROCEDURE — 74011636637 HC RX REV CODE- 636/637: Performed by: EMERGENCY MEDICINE

## 2019-12-08 PROCEDURE — 96372 THER/PROPH/DIAG INJ SC/IM: CPT

## 2019-12-08 PROCEDURE — 36415 COLL VENOUS BLD VENIPUNCTURE: CPT

## 2019-12-08 PROCEDURE — 94761 N-INVAS EAR/PLS OXIMETRY MLT: CPT

## 2019-12-08 PROCEDURE — 96374 THER/PROPH/DIAG INJ IV PUSH: CPT

## 2019-12-08 PROCEDURE — 82962 GLUCOSE BLOOD TEST: CPT

## 2019-12-08 PROCEDURE — 74011250636 HC RX REV CODE- 250/636: Performed by: EMERGENCY MEDICINE

## 2019-12-08 PROCEDURE — 74011250636 HC RX REV CODE- 250/636: Performed by: HOSPITALIST

## 2019-12-08 RX ORDER — INSULIN GLARGINE 100 [IU]/ML
20 INJECTION, SOLUTION SUBCUTANEOUS DAILY
Status: DISCONTINUED | OUTPATIENT
Start: 2019-12-09 | End: 2019-12-11 | Stop reason: HOSPADM

## 2019-12-08 RX ORDER — CALCITRIOL 0.25 UG/1
0.25 CAPSULE ORAL DAILY
Status: DISCONTINUED | OUTPATIENT
Start: 2019-12-09 | End: 2019-12-11 | Stop reason: HOSPADM

## 2019-12-08 RX ORDER — INSULIN LISPRO 100 [IU]/ML
INJECTION, SOLUTION INTRAVENOUS; SUBCUTANEOUS
Status: DISCONTINUED | OUTPATIENT
Start: 2019-12-08 | End: 2019-12-11 | Stop reason: HOSPADM

## 2019-12-08 RX ORDER — ONDANSETRON 2 MG/ML
4 INJECTION INTRAMUSCULAR; INTRAVENOUS
Status: DISCONTINUED | OUTPATIENT
Start: 2019-12-08 | End: 2019-12-11 | Stop reason: HOSPADM

## 2019-12-08 RX ORDER — SODIUM BICARBONATE 650 MG/1
650 TABLET ORAL 2 TIMES DAILY
Status: DISCONTINUED | OUTPATIENT
Start: 2019-12-08 | End: 2019-12-11 | Stop reason: HOSPADM

## 2019-12-08 RX ORDER — ATORVASTATIN CALCIUM 20 MG/1
20 TABLET, FILM COATED ORAL
Status: DISCONTINUED | OUTPATIENT
Start: 2019-12-08 | End: 2019-12-11 | Stop reason: HOSPADM

## 2019-12-08 RX ORDER — NALOXONE HYDROCHLORIDE 0.4 MG/ML
0.4 INJECTION, SOLUTION INTRAMUSCULAR; INTRAVENOUS; SUBCUTANEOUS AS NEEDED
Status: DISCONTINUED | OUTPATIENT
Start: 2019-12-08 | End: 2019-12-11 | Stop reason: HOSPADM

## 2019-12-08 RX ORDER — SODIUM CHLORIDE 0.9 % (FLUSH) 0.9 %
5-40 SYRINGE (ML) INJECTION EVERY 8 HOURS
Status: DISCONTINUED | OUTPATIENT
Start: 2019-12-08 | End: 2019-12-11 | Stop reason: HOSPADM

## 2019-12-08 RX ORDER — MAGNESIUM SULFATE 100 %
4 CRYSTALS MISCELLANEOUS AS NEEDED
Status: DISCONTINUED | OUTPATIENT
Start: 2019-12-08 | End: 2019-12-11 | Stop reason: HOSPADM

## 2019-12-08 RX ORDER — LABETALOL HYDROCHLORIDE 5 MG/ML
20 INJECTION, SOLUTION INTRAVENOUS
Status: COMPLETED | OUTPATIENT
Start: 2019-12-08 | End: 2019-12-08

## 2019-12-08 RX ORDER — HYDROMORPHONE HYDROCHLORIDE 1 MG/ML
2 INJECTION, SOLUTION INTRAMUSCULAR; INTRAVENOUS; SUBCUTANEOUS ONCE
Status: COMPLETED | OUTPATIENT
Start: 2019-12-08 | End: 2019-12-08

## 2019-12-08 RX ORDER — HYDROMORPHONE HYDROCHLORIDE 1 MG/ML
1 INJECTION, SOLUTION INTRAMUSCULAR; INTRAVENOUS; SUBCUTANEOUS ONCE
Status: COMPLETED | OUTPATIENT
Start: 2019-12-08 | End: 2019-12-08

## 2019-12-08 RX ORDER — HYDROMORPHONE HYDROCHLORIDE 1 MG/ML
1 INJECTION, SOLUTION INTRAMUSCULAR; INTRAVENOUS; SUBCUTANEOUS ONCE
Status: DISCONTINUED | OUTPATIENT
Start: 2019-12-08 | End: 2019-12-08

## 2019-12-08 RX ORDER — GUAIFENESIN 100 MG/5ML
81 LIQUID (ML) ORAL DAILY
Status: DISCONTINUED | OUTPATIENT
Start: 2019-12-09 | End: 2019-12-11 | Stop reason: HOSPADM

## 2019-12-08 RX ORDER — SODIUM CHLORIDE 0.9 % (FLUSH) 0.9 %
5-40 SYRINGE (ML) INJECTION AS NEEDED
Status: DISCONTINUED | OUTPATIENT
Start: 2019-12-08 | End: 2019-12-11 | Stop reason: HOSPADM

## 2019-12-08 RX ORDER — CLONIDINE HYDROCHLORIDE 0.1 MG/1
0.1 TABLET ORAL 2 TIMES DAILY
Status: DISCONTINUED | OUTPATIENT
Start: 2019-12-08 | End: 2019-12-11 | Stop reason: HOSPADM

## 2019-12-08 RX ORDER — METOPROLOL TARTRATE 50 MG/1
50 TABLET ORAL 2 TIMES DAILY
Status: DISCONTINUED | OUTPATIENT
Start: 2019-12-08 | End: 2019-12-10

## 2019-12-08 RX ORDER — HEPARIN SODIUM 5000 [USP'U]/ML
5000 INJECTION, SOLUTION INTRAVENOUS; SUBCUTANEOUS EVERY 8 HOURS
Status: DISCONTINUED | OUTPATIENT
Start: 2019-12-08 | End: 2019-12-11 | Stop reason: HOSPADM

## 2019-12-08 RX ORDER — HYDROMORPHONE HYDROCHLORIDE 1 MG/ML
1 INJECTION, SOLUTION INTRAMUSCULAR; INTRAVENOUS; SUBCUTANEOUS
Status: DISCONTINUED | OUTPATIENT
Start: 2019-12-08 | End: 2019-12-08

## 2019-12-08 RX ORDER — INSULIN LISPRO 100 [IU]/ML
2 INJECTION, SOLUTION INTRAVENOUS; SUBCUTANEOUS ONCE
Status: COMPLETED | OUTPATIENT
Start: 2019-12-08 | End: 2019-12-08

## 2019-12-08 RX ORDER — ONDANSETRON 2 MG/ML
4 INJECTION INTRAMUSCULAR; INTRAVENOUS
Status: COMPLETED | OUTPATIENT
Start: 2019-12-08 | End: 2019-12-08

## 2019-12-08 RX ORDER — ACETAMINOPHEN 325 MG/1
650 TABLET ORAL EVERY 6 HOURS
Status: DISCONTINUED | OUTPATIENT
Start: 2019-12-08 | End: 2019-12-11 | Stop reason: HOSPADM

## 2019-12-08 RX ORDER — OXYCODONE HYDROCHLORIDE 5 MG/1
5 TABLET ORAL
Status: DISCONTINUED | OUTPATIENT
Start: 2019-12-08 | End: 2019-12-11 | Stop reason: HOSPADM

## 2019-12-08 RX ORDER — ACETAMINOPHEN 325 MG/1
650 TABLET ORAL
Status: DISCONTINUED | OUTPATIENT
Start: 2019-12-08 | End: 2019-12-11 | Stop reason: HOSPADM

## 2019-12-08 RX ORDER — DEXTROSE MONOHYDRATE 25 G/50ML
12.5-25 INJECTION, SOLUTION INTRAVENOUS AS NEEDED
Status: DISCONTINUED | OUTPATIENT
Start: 2019-12-08 | End: 2019-12-11 | Stop reason: HOSPADM

## 2019-12-08 RX ORDER — ASPIRIN 325 MG
325 TABLET ORAL
Status: COMPLETED | OUTPATIENT
Start: 2019-12-08 | End: 2019-12-08

## 2019-12-08 RX ADMIN — ATORVASTATIN CALCIUM 20 MG: 20 TABLET, FILM COATED ORAL at 21:32

## 2019-12-08 RX ADMIN — CLONIDINE HYDROCHLORIDE 0.1 MG: 0.1 TABLET ORAL at 21:32

## 2019-12-08 RX ADMIN — NITROGLYCERIN 1 INCH: 20 OINTMENT TOPICAL at 23:06

## 2019-12-08 RX ADMIN — LABETALOL HYDROCHLORIDE 20 MG: 5 INJECTION INTRAVENOUS at 17:41

## 2019-12-08 RX ADMIN — HUMAN INSULIN 12 UNITS: 100 INJECTION, SOLUTION SUBCUTANEOUS at 17:41

## 2019-12-08 RX ADMIN — ASPIRIN 325 MG: 325 TABLET, FILM COATED ORAL at 17:41

## 2019-12-08 RX ADMIN — SODIUM CHLORIDE 500 ML: 900 INJECTION, SOLUTION INTRAVENOUS at 17:44

## 2019-12-08 RX ADMIN — HYDROMORPHONE HYDROCHLORIDE 2 MG: 1 INJECTION, SOLUTION INTRAMUSCULAR; INTRAVENOUS; SUBCUTANEOUS at 19:19

## 2019-12-08 RX ADMIN — HEPARIN SODIUM 5000 UNITS: 5000 INJECTION INTRAVENOUS; SUBCUTANEOUS at 21:32

## 2019-12-08 RX ADMIN — HYDROMORPHONE HYDROCHLORIDE 1 MG: 1 INJECTION, SOLUTION INTRAMUSCULAR; INTRAVENOUS; SUBCUTANEOUS at 16:32

## 2019-12-08 RX ADMIN — Medication 10 ML: at 22:52

## 2019-12-08 RX ADMIN — METOPROLOL TARTRATE 50 MG: 50 TABLET, FILM COATED ORAL at 21:32

## 2019-12-08 RX ADMIN — HUMAN INSULIN 5 UNITS: 100 INJECTION, SUSPENSION SUBCUTANEOUS at 23:56

## 2019-12-08 RX ADMIN — INSULIN LISPRO 2 UNITS: 100 INJECTION, SOLUTION INTRAVENOUS; SUBCUTANEOUS at 23:56

## 2019-12-08 RX ADMIN — OXYCODONE HYDROCHLORIDE 5 MG: 5 TABLET ORAL at 23:06

## 2019-12-08 RX ADMIN — NITROGLYCERIN 1 INCH: 20 OINTMENT TOPICAL at 20:01

## 2019-12-08 RX ADMIN — ONDANSETRON 4 MG: 2 INJECTION INTRAMUSCULAR; INTRAVENOUS at 17:41

## 2019-12-08 RX ADMIN — ACETAMINOPHEN 650 MG: 325 TABLET ORAL at 21:32

## 2019-12-08 RX ADMIN — SODIUM BICARBONATE 650 MG: 650 TABLET ORAL at 21:32

## 2019-12-08 NOTE — ED NOTES
Assumed care of pt via triage. Pt just had a dialysis fistula placed on Friday and was discharged home on Saturday without any pain medication. Pt states pain is very unbearable. Pt hasnt had a dialysis treatment yet with the newly placed fistula. Pt having pain in her left chest, left arm, left side of her back, along with nausea and vomiting. Bruit and thrill felt and heard in fistula site. Pt resting comfortably on stretcher in position of comfort. Pt in no apparent distress at this time. Call bell within reach. Side rails x2. Connected to monitor x3. Pt a/o x4. Stretcher locked in lowest position. Pt aware of plan to await for MD/PAAddisC/NP assessment, and pt/family verbalizes understanding. Will continue to monitor pt condition.

## 2019-12-08 NOTE — ED NOTES
ED tech able to get blood work at this time but unable to get an IV. Tech will attempt to get IV with ultrasound.

## 2019-12-09 ENCOUNTER — APPOINTMENT (OUTPATIENT)
Dept: NON INVASIVE DIAGNOSTICS | Age: 54
DRG: 312 | End: 2019-12-09
Attending: HOSPITALIST
Payer: MEDICARE

## 2019-12-09 ENCOUNTER — APPOINTMENT (OUTPATIENT)
Dept: ULTRASOUND IMAGING | Age: 54
DRG: 312 | End: 2019-12-09
Attending: INTERNAL MEDICINE
Payer: MEDICARE

## 2019-12-09 LAB
ANION GAP SERPL CALC-SCNC: 13 MMOL/L (ref 5–15)
ATRIAL RATE: 105 BPM
ATRIAL RATE: 91 BPM
AV VELOCITY RATIO: 0.8
BUN SERPL-MCNC: 93 MG/DL (ref 6–20)
BUN/CREAT SERPL: 14 (ref 12–20)
CALCIUM SERPL-MCNC: 8.6 MG/DL (ref 8.5–10.1)
CALCULATED P AXIS, ECG09: 56 DEGREES
CALCULATED P AXIS, ECG09: 70 DEGREES
CALCULATED R AXIS, ECG10: 28 DEGREES
CALCULATED R AXIS, ECG10: 31 DEGREES
CALCULATED T AXIS, ECG11: 71 DEGREES
CALCULATED T AXIS, ECG11: 82 DEGREES
CHLORIDE SERPL-SCNC: 101 MMOL/L (ref 97–108)
CHOLEST SERPL-MCNC: 244 MG/DL
CO2 SERPL-SCNC: 20 MMOL/L (ref 21–32)
CREAT SERPL-MCNC: 6.54 MG/DL (ref 0.55–1.02)
DIAGNOSIS, 93000: NORMAL
DIAGNOSIS, 93000: NORMAL
ECHO AO ROOT DIAM: 2.87 CM
ECHO AV AREA PEAK VELOCITY: 2.4 CM2
ECHO AV AREA/BSA PEAK VELOCITY: 1.5 CM2/M2
ECHO AV CUSP MM: 1.42 CM
ECHO AV PEAK GRADIENT: 9.6 MMHG
ECHO AV PEAK VELOCITY: 155.09 CM/S
ECHO EST RA PRESSURE: 10 MMHG
ECHO LA AREA 4C: 12.9 CM2
ECHO LA MAJOR AXIS: 2.28 CM
ECHO LA TO AORTIC ROOT RATIO: 0.79
ECHO LA VOL 2C: 41.23 ML (ref 22–52)
ECHO LA VOL 4C: 30.04 ML (ref 22–52)
ECHO LA VOL BP: 43.18 ML (ref 22–52)
ECHO LA VOL/BSA BIPLANE: 26.21 ML/M2 (ref 16–28)
ECHO LA VOLUME INDEX A2C: 25.02 ML/M2 (ref 16–28)
ECHO LA VOLUME INDEX A4C: 18.23 ML/M2 (ref 16–28)
ECHO LV E' LATERAL VELOCITY: 6.7 CM/S
ECHO LV E' SEPTAL VELOCITY: 5.99 CM/S
ECHO LV EDV TEICHHOLZ: 0.39 ML
ECHO LV ESV TEICHHOLZ: 0.11 ML
ECHO LV INTERNAL DIMENSION DIASTOLIC: 3.85 CM (ref 3.9–5.3)
ECHO LV INTERNAL DIMENSION SYSTOLIC: 2.34 CM
ECHO LV IVSD: 1.01 CM (ref 0.6–0.9)
ECHO LV IVSS: 1.74 CM
ECHO LV MASS 2D: 132.1 G (ref 67–162)
ECHO LV MASS INDEX 2D: 80.2 G/M2 (ref 43–95)
ECHO LV POSTERIOR WALL DIASTOLIC: 0.97 CM (ref 0.6–0.9)
ECHO LV POSTERIOR WALL SYSTOLIC: 1.68 CM
ECHO LVOT DIAM: 1.96 CM
ECHO LVOT PEAK GRADIENT: 6.1 MMHG
ECHO LVOT PEAK VELOCITY: 123.7 CM/S
ECHO MV A VELOCITY: 117.41 CM/S
ECHO MV AREA PHT: 3.6 CM2
ECHO MV E DECELERATION TIME (DT): 157.4 MS
ECHO MV E VELOCITY: 99.4 CM/S
ECHO MV E/A RATIO: 0.85
ECHO MV E/E' LATERAL: 14.84
ECHO MV E/E' RATIO (AVERAGED): 15.72
ECHO MV E/E' SEPTAL: 16.59
ECHO MV MAX VELOCITY: 125.82 CM/S
ECHO MV MEAN GRADIENT: 2.7 MMHG
ECHO MV MEAN INFLOW VELOCITY: 0.79 M/S
ECHO MV PEAK GRADIENT: 6.3 MMHG
ECHO MV PRESSURE HALF TIME (PHT): 60.4 MS
ECHO MV REGURGITANT PEAK GRADIENT: 73.2 MMHG
ECHO MV REGURGITANT PEAK VELOCITY: 427.78 CM/S
ECHO MV VTI: 29.19 CM
ECHO PULMONARY ARTERY SYSTOLIC PRESSURE (PASP): 22.9 MMHG
ECHO PV MAX VELOCITY: 90.11 CM/S
ECHO PV PEAK GRADIENT: 3.2 MMHG
ECHO RA AREA 4C: 9.99 CM2
ECHO RIGHT VENTRICULAR SYSTOLIC PRESSURE (RVSP): 22.9 MMHG
ECHO RV INTERNAL DIMENSION: 3.33 CM
ECHO RV TAPSE: 2.05 CM (ref 1.5–2)
ECHO TV REGURGITANT MAX VELOCITY: 179.45 CM/S
ECHO TV REGURGITANT PEAK GRADIENT: 12.9 MMHG
ERYTHROCYTE [DISTWIDTH] IN BLOOD BY AUTOMATED COUNT: 13.6 % (ref 11.5–14.5)
GLUCOSE BLD STRIP.AUTO-MCNC: 139 MG/DL (ref 65–100)
GLUCOSE BLD STRIP.AUTO-MCNC: 252 MG/DL (ref 65–100)
GLUCOSE BLD STRIP.AUTO-MCNC: 351 MG/DL (ref 65–100)
GLUCOSE BLD STRIP.AUTO-MCNC: 89 MG/DL (ref 65–100)
GLUCOSE SERPL-MCNC: 263 MG/DL (ref 65–100)
HCT VFR BLD AUTO: 28.1 % (ref 35–47)
HDLC SERPL-MCNC: 70 MG/DL
HDLC SERPL: 3.5 {RATIO} (ref 0–5)
HGB BLD-MCNC: 9.3 G/DL (ref 11.5–16)
LDLC SERPL CALC-MCNC: 151.2 MG/DL (ref 0–100)
LIPID PROFILE,FLP: ABNORMAL
LVFS 2D: 39.15 %
LVSV (TEICH): 27.25 ML
MCH RBC QN AUTO: 29.9 PG (ref 26–34)
MCHC RBC AUTO-ENTMCNC: 33.1 G/DL (ref 30–36.5)
MCV RBC AUTO: 90.4 FL (ref 80–99)
MV DEC SLOPE: 6.32
NRBC # BLD: 0 K/UL (ref 0–0.01)
NRBC BLD-RTO: 0 PER 100 WBC
P-R INTERVAL, ECG05: 146 MS
P-R INTERVAL, ECG05: 154 MS
PLATELET # BLD AUTO: 249 K/UL (ref 150–400)
PMV BLD AUTO: 9.4 FL (ref 8.9–12.9)
POTASSIUM SERPL-SCNC: 3.7 MMOL/L (ref 3.5–5.1)
Q-T INTERVAL, ECG07: 342 MS
Q-T INTERVAL, ECG07: 398 MS
QRS DURATION, ECG06: 72 MS
QRS DURATION, ECG06: 72 MS
QTC CALCULATION (BEZET), ECG08: 452 MS
QTC CALCULATION (BEZET), ECG08: 489 MS
RBC # BLD AUTO: 3.11 M/UL (ref 3.8–5.2)
SERVICE CMNT-IMP: ABNORMAL
SERVICE CMNT-IMP: NORMAL
SODIUM SERPL-SCNC: 134 MMOL/L (ref 136–145)
TRIGL SERPL-MCNC: 114 MG/DL (ref ?–150)
TROPONIN I SERPL-MCNC: 0.34 NG/ML
VENTRICULAR RATE, ECG03: 105 BPM
VENTRICULAR RATE, ECG03: 91 BPM
VLDLC SERPL CALC-MCNC: 22.8 MG/DL
WBC # BLD AUTO: 7.6 K/UL (ref 3.6–11)

## 2019-12-09 PROCEDURE — 99218 HC RM OBSERVATION: CPT

## 2019-12-09 PROCEDURE — 84484 ASSAY OF TROPONIN QUANT: CPT

## 2019-12-09 PROCEDURE — 74011250636 HC RX REV CODE- 250/636: Performed by: INTERNAL MEDICINE

## 2019-12-09 PROCEDURE — 74011000250 HC RX REV CODE- 250: Performed by: INTERNAL MEDICINE

## 2019-12-09 PROCEDURE — 96376 TX/PRO/DX INJ SAME DRUG ADON: CPT

## 2019-12-09 PROCEDURE — 36415 COLL VENOUS BLD VENIPUNCTURE: CPT

## 2019-12-09 PROCEDURE — 74011250637 HC RX REV CODE- 250/637: Performed by: HOSPITALIST

## 2019-12-09 PROCEDURE — 80048 BASIC METABOLIC PNL TOTAL CA: CPT

## 2019-12-09 PROCEDURE — 93306 TTE W/DOPPLER COMPLETE: CPT

## 2019-12-09 PROCEDURE — 85027 COMPLETE CBC AUTOMATED: CPT

## 2019-12-09 PROCEDURE — 77010033678 HC OXYGEN DAILY

## 2019-12-09 PROCEDURE — 80061 LIPID PANEL: CPT

## 2019-12-09 PROCEDURE — 74011250636 HC RX REV CODE- 250/636: Performed by: HOSPITALIST

## 2019-12-09 PROCEDURE — 96365 THER/PROPH/DIAG IV INF INIT: CPT

## 2019-12-09 PROCEDURE — 96372 THER/PROPH/DIAG INJ SC/IM: CPT

## 2019-12-09 PROCEDURE — 82962 GLUCOSE BLOOD TEST: CPT

## 2019-12-09 PROCEDURE — 94760 N-INVAS EAR/PLS OXIMETRY 1: CPT

## 2019-12-09 PROCEDURE — 74011636637 HC RX REV CODE- 636/637: Performed by: HOSPITALIST

## 2019-12-09 PROCEDURE — 76770 US EXAM ABDO BACK WALL COMP: CPT

## 2019-12-09 PROCEDURE — 74011000258 HC RX REV CODE- 258: Performed by: INTERNAL MEDICINE

## 2019-12-09 PROCEDURE — 74011250637 HC RX REV CODE- 250/637: Performed by: NURSE PRACTITIONER

## 2019-12-09 RX ORDER — POLYETHYLENE GLYCOL 3350 17 G/17G
17 POWDER, FOR SOLUTION ORAL DAILY
Status: DISCONTINUED | OUTPATIENT
Start: 2019-12-09 | End: 2019-12-11 | Stop reason: HOSPADM

## 2019-12-09 RX ORDER — BISACODYL 5 MG
5 TABLET, DELAYED RELEASE (ENTERIC COATED) ORAL DAILY PRN
Status: DISCONTINUED | OUTPATIENT
Start: 2019-12-09 | End: 2019-12-11 | Stop reason: HOSPADM

## 2019-12-09 RX ORDER — FACIAL-BODY WIPES
10 EACH TOPICAL DAILY PRN
Status: DISCONTINUED | OUTPATIENT
Start: 2019-12-09 | End: 2019-12-11 | Stop reason: HOSPADM

## 2019-12-09 RX ADMIN — OXYCODONE HYDROCHLORIDE 5 MG: 5 TABLET ORAL at 20:23

## 2019-12-09 RX ADMIN — Medication 10 ML: at 14:58

## 2019-12-09 RX ADMIN — INSULIN LISPRO 10 UNITS: 100 INJECTION, SOLUTION INTRAVENOUS; SUBCUTANEOUS at 09:12

## 2019-12-09 RX ADMIN — HEPARIN SODIUM 5000 UNITS: 5000 INJECTION INTRAVENOUS; SUBCUTANEOUS at 21:44

## 2019-12-09 RX ADMIN — ACETAMINOPHEN 650 MG: 325 TABLET ORAL at 09:13

## 2019-12-09 RX ADMIN — INSULIN LISPRO 5 UNITS: 100 INJECTION, SOLUTION INTRAVENOUS; SUBCUTANEOUS at 11:53

## 2019-12-09 RX ADMIN — NITROGLYCERIN 1 INCH: 20 OINTMENT TOPICAL at 11:53

## 2019-12-09 RX ADMIN — SODIUM BICARBONATE 650 MG: 650 TABLET ORAL at 17:54

## 2019-12-09 RX ADMIN — METOPROLOL TARTRATE 50 MG: 50 TABLET, FILM COATED ORAL at 17:54

## 2019-12-09 RX ADMIN — OXYCODONE HYDROCHLORIDE 5 MG: 5 TABLET ORAL at 07:48

## 2019-12-09 RX ADMIN — Medication 10 ML: at 06:00

## 2019-12-09 RX ADMIN — METOPROLOL TARTRATE 50 MG: 50 TABLET, FILM COATED ORAL at 09:13

## 2019-12-09 RX ADMIN — HEPARIN SODIUM 5000 UNITS: 5000 INJECTION INTRAVENOUS; SUBCUTANEOUS at 03:23

## 2019-12-09 RX ADMIN — NITROGLYCERIN 1 INCH: 20 OINTMENT TOPICAL at 06:00

## 2019-12-09 RX ADMIN — Medication 10 ML: at 21:46

## 2019-12-09 RX ADMIN — ATORVASTATIN CALCIUM 20 MG: 20 TABLET, FILM COATED ORAL at 21:44

## 2019-12-09 RX ADMIN — ASPIRIN 81 MG 81 MG: 81 TABLET ORAL at 09:13

## 2019-12-09 RX ADMIN — INSULIN GLARGINE 20 UNITS: 100 INJECTION, SOLUTION SUBCUTANEOUS at 09:12

## 2019-12-09 RX ADMIN — CLONIDINE HYDROCHLORIDE 0.1 MG: 0.1 TABLET ORAL at 17:54

## 2019-12-09 RX ADMIN — NITROGLYCERIN 1 INCH: 20 OINTMENT TOPICAL at 17:54

## 2019-12-09 RX ADMIN — POLYETHYLENE GLYCOL (3350) 17 G: 17 POWDER, FOR SOLUTION ORAL at 09:13

## 2019-12-09 RX ADMIN — CALCITRIOL 0.25 MCG: 0.25 CAPSULE ORAL at 09:13

## 2019-12-09 RX ADMIN — ACETAMINOPHEN 650 MG: 325 TABLET ORAL at 17:55

## 2019-12-09 RX ADMIN — HEPARIN SODIUM 5000 UNITS: 5000 INJECTION INTRAVENOUS; SUBCUTANEOUS at 12:05

## 2019-12-09 RX ADMIN — EPOETIN ALFA-EPBX 12000 UNITS: 10000 INJECTION, SOLUTION INTRAVENOUS; SUBCUTANEOUS at 21:58

## 2019-12-09 RX ADMIN — ONDANSETRON 4 MG: 2 INJECTION INTRAMUSCULAR; INTRAVENOUS at 15:13

## 2019-12-09 RX ADMIN — SODIUM CHLORIDE: 450 INJECTION, SOLUTION INTRAVENOUS at 09:22

## 2019-12-09 RX ADMIN — ACETAMINOPHEN 650 MG: 325 TABLET ORAL at 21:44

## 2019-12-09 RX ADMIN — ACETAMINOPHEN 650 MG: 325 TABLET ORAL at 03:23

## 2019-12-09 RX ADMIN — CLONIDINE HYDROCHLORIDE 0.1 MG: 0.1 TABLET ORAL at 09:13

## 2019-12-09 RX ADMIN — SODIUM BICARBONATE 650 MG: 650 TABLET ORAL at 09:21

## 2019-12-09 NOTE — ED NOTES
Spoke with MD Rice about patients insulin orders. MD to change orders for insulin. Will give patient medications when medications are changed in [facility]

## 2019-12-09 NOTE — ED NOTES
Called and attempted to give report on patient to Jose Lynn, [address] [place] [address]. RN unaware of patient assignment so she asked if she could call back for report.
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
## 2019-12-09 NOTE — PROGRESS NOTES
Hospitalist Progress Note    NAME: Coleman Chase   :  1965   MRN:  [id number]       Interim Hospital Summary: 47 y.o. female whom presented on 2019 with LUE pain and chest pain. Assessment / Plan:  Chest pain: r/o ACS   Elevated troponin  HTN   - chest pain started on presentation to ED, intermittent, mid sternal to left arm    Troponin 0.62-> 0.34    Echo (2018): TTE 2016- EF 70%, NWMA, no valve disease     Echo (2019):  · Left Ventricle: Normal cavity size, wall thickness and systolic function (ejection fraction normal). Estimated left ventricular ejection fraction is 66 - 70%. Calculated left ventricular ejection fraction is 70%. Biplane method used to measure ejection fraction. No regional wall motion abnormality noted. Normal left ventricular strain. · Mitral Valve: Mitral annular calcification. Trace mitral valve regurgitation is present. Continue with ASA & statin    Continue with with BB and nitrobid    Appreciate cardiology input; stress test tomorrow      AV fistula   - s/p AV fistula placement on 2019, returned to ER with LUE pain    appreciate Dr. Alessio Miles input; (+) thrill, dressing dry and intact, RITCHIE in place     Pain management prn with bowel regimen     IDDM type II with hyperglycemia   - A1C 8.8    Check qac/qhs blood glucose and follow SSI    Diabetic educator has been consulted     EZEKIEL on CKD stage V  Hyponatremia   - appreciate nephrology input; continue with IVF (1/2 NS with sodium bicarb)    Continue with PO bicarb per renal for hx of metabolic acidosis         hyperparathyroidism  - continue with Calcitrol    Code Status: Full code   Surrogate Decision Maker:       DVT Prophylaxis: heparin   GI Prophylaxis: not indicated     Baseline: ambulates with cane/walker       Recommended Disposition: Home w/Family   I reviewed with Dr. Janiya Caceres about the medical history and the findings on the physical examination.   Dr. Janiya Caceres agreed with the patient's diagnosis and concur with the plan. Subjective:     Chief Complaint / Reason for Physician Visit  \"my arm still hurts, my chest feels fine\". Discussed with RN events overnight. Review of Systems:  Symptom Y/N Comments  Symptom Y/N Comments   Fever/Chills n   Chest Pain n    Poor Appetite    Edema     Cough    Abdominal Pain     Sputum    Joint Pain     SOB/GRIFFITHS    Pruritis/Rash     Nausea/vomit n   Tolerating PT/OT     Diarrhea n   Tolerating Diet     Constipation y   Other       Could NOT obtain due to:      Objective:     VITALS:   Last 24hrs VS reviewed since prior progress note. Most recent are:  Patient Vitals for the past 24 hrs:   Temp Pulse Resp BP SpO2   12/09/19 0330 98.8 °F (37.1 °C) 80 18 124/70 98 %   12/08/19 2228 98.4 °F (36.9 °C) 79 16 144/77 100 %   12/08/19 2100  84 12 119/84    12/08/19 2015  89 18 131/60 93 %   12/08/19 2001  93  (!) 121/91    12/08/19 1945  89 21 (!) 146/91 100 %   12/08/19 1930  88 11 133/79    12/08/19 1803  96 17  94 %   12/08/19 1802  95 14  96 %   12/08/19 1800    138/82    12/08/19 1351 97.5 °F (36.4 °C) (!) 116 18 (!) 196/100 100 %       Intake/Output Summary (Last 24 hours) at 12/9/2019 0735  Last data filed at 12/9/2019 0330  Gross per 24 hour   Intake 850 ml   Output 30 ml   Net 820 ml        PHYSICAL EXAM:  General: Ill appearing. Alert, cooperative, no acute distress    EENT:  EOMI. Anicteric sclerae. MMM  Resp:  CTA bilaterally, no wheezing or rales. No accessory muscle use  CV:  Regular  rhythm,  No edema  GI:  Soft, Non distended, Non tender.  +Bowel sounds  Neurologic:  Alert and oriented X 3, normal speech,   Psych:   Good insight. Not anxious nor agitated  Skin:  Left UE: dressing dry and intact, RITCHIE in place.  No jaundice    Reviewed most current lab test results and cultures  YES  Reviewed most current radiology test results   YES  Review and summation of old records today    NO  Reviewed patient's current orders and MAR    YES  PMH/SH reviewed - no change compared to H&P  ________________________________________________________________________  Care Plan discussed with:    Comments   Patient y    Family      RN y    Care Manager     Consultant                        Multidiciplinary team rounds were held today with , nursing, pharmacist and clinical coordinator. Patient's plan of care was discussed; medications were reviewed and discharge planning was addressed. ___________________________________________________________________  Genetta Buerger, NP     Procedures: see electronic medical records for all procedures/Xrays and details which were not copied into this note but were reviewed prior to creation of Plan. LABS:  I reviewed today's most current labs and imaging studies.   Pertinent labs include:  Recent Labs     12/09/19  0403 12/08/19  1555 12/07/19  0504   WBC 7.6 6.0 10.0   HGB 9.3* 11.3* 9.8*   HCT 28.1* 34.3* 30.2*    189 229     Recent Labs     12/09/19  0405 12/08/19  1555 12/07/19  0504   * 127* 134*   K 3.7 4.0 4.1    95* 101   CO2 20* 21 22   * 592* 302*   BUN 93* 97* 93*   CREA 6.54* 6.47* 5.94*   CA 8.6 9.7 9.1   ALB  --  3.9  --    TBILI  --  0.4  --    SGOT  --  28  --    ALT  --  15  --        Signed: )Neeraj Haile NP

## 2019-12-09 NOTE — PROGRESS NOTES
LUE surgical dressing applied, RITCHIE in place with serosang output. Continues to c/o LUE and back pain requiring prn Oxycodone. Consults pending. Appeared to rest comfortably. Care released to [facility].
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
## 2019-12-09 NOTE — DIABETES MGMT
[facility]    [facility] Consult Note    Recommendations/ Comments: Please consider the following: adding Lispro 5 units ac meals    Current hospital DM medication: Lantus 20 units daily, Lispro Correctional insulin with normal sensitivity      DTC will continue to follow patient as needed. ____________________________    Consult received for:   [x]           Hospital Medication Recommendations                 [x]          Assessment of home management      Chart reviewed and initial evaluation complete on Natalie Avelar Patient is a 47 y.o. female with known diabetes on Tresiba 25 units daily, and insulin Aspart 5 units ac meals at home. A1c:   Lab Results   Component Value Date/Time    Hemoglobin A1c 8.8 (H) 08/21/2019 02:55 AM       Recent Glucose Results:   Lab Results   Component Value Date/Time     (H) 12/09/2019 04:05 AM     (H) 12/08/2019 03:55 PM    GLUCPOC 252 (H) 12/09/2019 11:43 AM    GLUCPOC 351 (H) 12/09/2019 08:11 AM    GLUCPOC 246 (H) 12/08/2019 11:47 PM        Lab Results   Component Value Date/Time    Creatinine 6.54 (H) 12/09/2019 04:05 AM       Active Orders   Diet    DIET DIABETIC WITH OPTIONS Consistent Carb 1800kcal; Regular    DIET NPO Except Meds        PO intake: No data found. Patient follow appointment for diabetes management: DTC to follow up with patient to complete education prior to discharge. Thank you.   Bailey Cooley, [address] [place] [address], [address]  Office:  [phone]

## 2019-12-09 NOTE — PROGRESS NOTES
General Surgery End of Shift Nursing Note    Bedside shift change report given to [facility] RN (oncoming nurse). Report included the following information SBAR, Kardex, Intake/Output and Recent Results. Shift worked:   7a to 7p   Summary of shift:    Pt rested quietly in room most of day. ECHO did in the AM      Ultra sound done in the PM     Issues for physician to address:   none     Number times ambulated in hallway past shift: 1    Number of times OOB to chair past shift: 0    Pain Management:  Current medication: see MAR  Patient states pain is manageable on current pain medication: YES    GI:    Current diet:  DIET DIABETIC WITH OPTIONS Consistent Carb 1800kcal; Regular    Tolerating current diet: YES  Passing flatus: YES  Last Bowel Movement: several days ago   Appearance:       Respiratory:    Incentive Spirometer at bedside: YES  Patient instructed on use: YES    Patient Safety:    Falls Score: 2  Bed Alarm On? No  Sitter?  No    Aubrey Hargrove RN

## 2019-12-09 NOTE — ED NOTES
TRANSFER - OUT REPORT:    Verbal report given to [facility], RN(name) on Hayden Bowman  being transferred to [address] [place] [address] Surg(unit) for routine progression of care       Report consisted of patients Situation, Background, Assessment and   Recommendations(SBAR). Information from the following report(s) SBAR, Kardex, ED Summary, MAR, Recent Results and Cardiac Rhythm NSR was reviewed with the receiving nurse. Lines:   Peripheral IV 12/08/19 Right Antecubital (Active)   Site Assessment Clean, dry, & intact 12/8/2019  5:21 PM   Phlebitis Assessment 0 12/8/2019  5:21 PM   Infiltration Assessment 0 12/8/2019  5:21 PM   Dressing Status Clean, dry, & intact 12/8/2019  5:21 PM   Dressing Type Transparent;Tape 12/8/2019  5:21 PM   Hub Color/Line Status Pink;Patent; Flushed 12/8/2019  5:21 PM   Action Taken Catheter retaped;Blood drawn 12/8/2019  5:21 PM        Opportunity for questions and clarification was provided.       Patient transported with:   Tech/Transport
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
## 2019-12-09 NOTE — PROGRESS NOTES
Surgery    Readmission noted. For nephrology and cardiology consults. Some left arm pain. Palpable thrill in new transposed AV fistula. No arm edema. RITCHIE output small. I will follow during admission.     Rebeca Ryan MD

## 2019-12-10 ENCOUNTER — APPOINTMENT (OUTPATIENT)
Dept: NUCLEAR MEDICINE | Age: 54
DRG: 312 | End: 2019-12-10
Attending: NURSE PRACTITIONER
Payer: MEDICARE

## 2019-12-10 ENCOUNTER — HOSPITAL ENCOUNTER (OUTPATIENT)
Dept: NON INVASIVE DIAGNOSTICS | Age: 54
Setting detail: OBSERVATION
Discharge: HOME | DRG: 312 | End: 2019-12-10
Attending: NURSE PRACTITIONER
Payer: MEDICARE

## 2019-12-10 PROBLEM — E11.10 DKA (DIABETIC KETOACIDOSES): Status: RESOLVED | Noted: 2018-07-10 | Resolved: 2019-12-10

## 2019-12-10 PROBLEM — Z99.2 ANEMIA DUE TO CHRONIC KIDNEY DISEASE, ON CHRONIC DIALYSIS (HCC): Status: ACTIVE | Noted: 2019-12-10

## 2019-12-10 PROBLEM — N18.6 ANEMIA DUE TO CHRONIC KIDNEY DISEASE, ON CHRONIC DIALYSIS (HCC): Status: ACTIVE | Noted: 2019-12-10

## 2019-12-10 PROBLEM — R77.8 ELEVATED TROPONIN: Status: ACTIVE | Noted: 2019-12-10

## 2019-12-10 PROBLEM — E21.3 HYPERPARATHYROIDISM (HCC): Status: ACTIVE | Noted: 2019-12-10

## 2019-12-10 PROBLEM — Z98.890 S/P ARTERIOVENOUS (AV) FISTULA CREATION: Status: ACTIVE | Noted: 2019-12-10

## 2019-12-10 PROBLEM — D63.1 ANEMIA DUE TO CHRONIC KIDNEY DISEASE, ON CHRONIC DIALYSIS (HCC): Status: ACTIVE | Noted: 2019-12-10

## 2019-12-10 LAB
ALBUMIN SERPL-MCNC: 2.5 G/DL (ref 3.5–5)
ANION GAP SERPL CALC-SCNC: 10 MMOL/L (ref 5–15)
BASOPHILS # BLD: 0 K/UL (ref 0–0.1)
BASOPHILS NFR BLD: 0 % (ref 0–1)
BUN SERPL-MCNC: 89 MG/DL (ref 6–20)
BUN/CREAT SERPL: 14 (ref 12–20)
CALCIUM SERPL-MCNC: 8 MG/DL (ref 8.5–10.1)
CALCIUM SERPL-MCNC: 8.2 MG/DL (ref 8.5–10.1)
CHLORIDE SERPL-SCNC: 98 MMOL/L (ref 97–108)
CO2 SERPL-SCNC: 25 MMOL/L (ref 21–32)
CREAT SERPL-MCNC: 6.3 MG/DL (ref 0.55–1.02)
DIFFERENTIAL METHOD BLD: ABNORMAL
EOSINOPHIL # BLD: 0.2 K/UL (ref 0–0.4)
EOSINOPHIL NFR BLD: 3 % (ref 0–7)
ERYTHROCYTE [DISTWIDTH] IN BLOOD BY AUTOMATED COUNT: 13.6 % (ref 11.5–14.5)
GLUCOSE BLD STRIP.AUTO-MCNC: 168 MG/DL (ref 65–100)
GLUCOSE BLD STRIP.AUTO-MCNC: 169 MG/DL (ref 65–100)
GLUCOSE BLD STRIP.AUTO-MCNC: 257 MG/DL (ref 65–100)
GLUCOSE BLD STRIP.AUTO-MCNC: 326 MG/DL (ref 65–100)
GLUCOSE SERPL-MCNC: 156 MG/DL (ref 65–100)
HCT VFR BLD AUTO: 24.7 % (ref 35–47)
HGB BLD-MCNC: 8.1 G/DL (ref 11.5–16)
IMM GRANULOCYTES # BLD AUTO: 0 K/UL (ref 0–0.04)
IMM GRANULOCYTES NFR BLD AUTO: 0 % (ref 0–0.5)
INR PPP: 1 (ref 0.9–1.1)
LYMPHOCYTES # BLD: 3.2 K/UL (ref 0.8–3.5)
LYMPHOCYTES NFR BLD: 53 % (ref 12–49)
MCH RBC QN AUTO: 29.3 PG (ref 26–34)
MCHC RBC AUTO-ENTMCNC: 32.8 G/DL (ref 30–36.5)
MCV RBC AUTO: 89.5 FL (ref 80–99)
MONOCYTES # BLD: 0.4 K/UL (ref 0–1)
MONOCYTES NFR BLD: 6 % (ref 5–13)
NEUTS SEG # BLD: 2.4 K/UL (ref 1.8–8)
NEUTS SEG NFR BLD: 38 % (ref 32–75)
NRBC # BLD: 0 K/UL (ref 0–0.01)
NRBC BLD-RTO: 0 PER 100 WBC
PHOSPHATE SERPL-MCNC: 5.4 MG/DL (ref 2.6–4.7)
PLATELET # BLD AUTO: 226 K/UL (ref 150–400)
PMV BLD AUTO: 9.5 FL (ref 8.9–12.9)
POTASSIUM SERPL-SCNC: 3.7 MMOL/L (ref 3.5–5.1)
PROTHROMBIN TIME: 10.1 SEC (ref 9–11.1)
PTH-INTACT SERPL-MCNC: 1175.1 PG/ML (ref 18.4–88)
RBC # BLD AUTO: 2.76 M/UL (ref 3.8–5.2)
SERVICE CMNT-IMP: ABNORMAL
SODIUM SERPL-SCNC: 133 MMOL/L (ref 136–145)
STRESS BASELINE DIAS BP: 77 MMHG
STRESS BASELINE HR: 78 BPM
STRESS BASELINE SYS BP: 130 MMHG
STRESS ESTIMATED WORKLOAD: 1 METS
STRESS EXERCISE DUR MIN: NORMAL
STRESS O2 SAT REST: 100 %
STRESS PEAK DIAS BP: 79 MMHG
STRESS PEAK SYS BP: 135 MMHG
STRESS PERCENT HR ACHIEVED: 66 %
STRESS POST PEAK HR: 109 BPM
STRESS RATE PRESSURE PRODUCT: NORMAL BPM*MMHG
STRESS ST DEPRESSION: 0 MM
STRESS ST ELEVATION: 0 MM
STRESS TARGET HR: 166 BPM
WBC # BLD AUTO: 6.2 K/UL (ref 3.6–11)

## 2019-12-10 PROCEDURE — 36415 COLL VENOUS BLD VENIPUNCTURE: CPT

## 2019-12-10 PROCEDURE — 74011636637 HC RX REV CODE- 636/637: Performed by: HOSPITALIST

## 2019-12-10 PROCEDURE — 74011250636 HC RX REV CODE- 250/636: Performed by: INTERNAL MEDICINE

## 2019-12-10 PROCEDURE — 80069 RENAL FUNCTION PANEL: CPT

## 2019-12-10 PROCEDURE — 74011000258 HC RX REV CODE- 258: Performed by: INTERNAL MEDICINE

## 2019-12-10 PROCEDURE — 74011250636 HC RX REV CODE- 250/636: Performed by: NURSE PRACTITIONER

## 2019-12-10 PROCEDURE — 96372 THER/PROPH/DIAG INJ SC/IM: CPT

## 2019-12-10 PROCEDURE — 74011250637 HC RX REV CODE- 250/637: Performed by: NURSE PRACTITIONER

## 2019-12-10 PROCEDURE — 93017 CV STRESS TEST TRACING ONLY: CPT

## 2019-12-10 PROCEDURE — 85610 PROTHROMBIN TIME: CPT

## 2019-12-10 PROCEDURE — 94760 N-INVAS EAR/PLS OXIMETRY 1: CPT

## 2019-12-10 PROCEDURE — 74011000250 HC RX REV CODE- 250: Performed by: INTERNAL MEDICINE

## 2019-12-10 PROCEDURE — 74011250637 HC RX REV CODE- 250/637: Performed by: HOSPITALIST

## 2019-12-10 PROCEDURE — 74011250636 HC RX REV CODE- 250/636: Performed by: HOSPITALIST

## 2019-12-10 PROCEDURE — A9500 TC99M SESTAMIBI: HCPCS

## 2019-12-10 PROCEDURE — 83970 ASSAY OF PARATHORMONE: CPT

## 2019-12-10 PROCEDURE — 85025 COMPLETE CBC W/AUTO DIFF WBC: CPT

## 2019-12-10 PROCEDURE — 82962 GLUCOSE BLOOD TEST: CPT

## 2019-12-10 PROCEDURE — 99218 HC RM OBSERVATION: CPT

## 2019-12-10 RX ORDER — SODIUM CHLORIDE 0.9 % (FLUSH) 0.9 %
10 SYRINGE (ML) INJECTION AS NEEDED
Status: DISCONTINUED | OUTPATIENT
Start: 2019-12-10 | End: 2019-12-14 | Stop reason: HOSPADM

## 2019-12-10 RX ORDER — METOPROLOL TARTRATE 25 MG/1
25 TABLET, FILM COATED ORAL 2 TIMES DAILY
Status: DISCONTINUED | OUTPATIENT
Start: 2019-12-10 | End: 2019-12-11 | Stop reason: HOSPADM

## 2019-12-10 RX ADMIN — SODIUM BICARBONATE 650 MG: 650 TABLET ORAL at 19:05

## 2019-12-10 RX ADMIN — CALCITRIOL 0.25 MCG: 0.25 CAPSULE ORAL at 15:41

## 2019-12-10 RX ADMIN — ATORVASTATIN CALCIUM 20 MG: 20 TABLET, FILM COATED ORAL at 20:45

## 2019-12-10 RX ADMIN — ACETAMINOPHEN 650 MG: 325 TABLET ORAL at 22:30

## 2019-12-10 RX ADMIN — IRON SUCROSE 200 MG: 20 INJECTION, SOLUTION INTRAVENOUS at 15:42

## 2019-12-10 RX ADMIN — HEPARIN SODIUM 5000 UNITS: 5000 INJECTION INTRAVENOUS; SUBCUTANEOUS at 20:45

## 2019-12-10 RX ADMIN — Medication 10 ML: at 22:30

## 2019-12-10 RX ADMIN — Medication 10 ML: at 08:55

## 2019-12-10 RX ADMIN — OXYCODONE HYDROCHLORIDE 5 MG: 5 TABLET ORAL at 20:45

## 2019-12-10 RX ADMIN — REGADENOSON 0.4 MG: 0.08 INJECTION, SOLUTION INTRAVENOUS at 08:55

## 2019-12-10 RX ADMIN — METOPROLOL TARTRATE 25 MG: 25 TABLET ORAL at 19:05

## 2019-12-10 RX ADMIN — ASPIRIN 81 MG 81 MG: 81 TABLET ORAL at 15:41

## 2019-12-10 RX ADMIN — INSULIN GLARGINE 20 UNITS: 100 INJECTION, SOLUTION SUBCUTANEOUS at 15:41

## 2019-12-10 RX ADMIN — ACETAMINOPHEN 650 MG: 325 TABLET ORAL at 05:22

## 2019-12-10 RX ADMIN — HEPARIN SODIUM 5000 UNITS: 5000 INJECTION INTRAVENOUS; SUBCUTANEOUS at 05:22

## 2019-12-10 RX ADMIN — SODIUM CHLORIDE: 450 INJECTION, SOLUTION INTRAVENOUS at 01:22

## 2019-12-10 RX ADMIN — INSULIN LISPRO 3 UNITS: 100 INJECTION, SOLUTION INTRAVENOUS; SUBCUTANEOUS at 22:30

## 2019-12-10 RX ADMIN — INSULIN LISPRO 7 UNITS: 100 INJECTION, SOLUTION INTRAVENOUS; SUBCUTANEOUS at 17:08

## 2019-12-10 RX ADMIN — ACETAMINOPHEN 650 MG: 325 TABLET ORAL at 15:41

## 2019-12-10 RX ADMIN — CLONIDINE HYDROCHLORIDE 0.1 MG: 0.1 TABLET ORAL at 19:05

## 2019-12-10 RX ADMIN — Medication 10 ML: at 05:22

## 2019-12-10 NOTE — PROGRESS NOTES
Nephrology Progress Note     Codey Calderon     [email or web address]                  Phone - [phone]   Patient: Jyoti Given   Date- 12/10/2019        Admit Date: 12/8/2019  YOB: 1965             CC: Follow up for  EZEKIEL on ckd          Subjective: Interval History:   -  Cr. , bun slightly better  Na low - stable  No c/o sob,   No c/o chest pain,   No c/o nausea or vomiting  No c/o  fever. NO UREMIC symptoms  She is getting stress test  ROS:- as above   Assessment & Plan:     EZEKIEL - Likely pre renal with vomiting    no more ivf  No RRT indicated  Check bmp in am  She would like to follow up with DR. Domingo Ibrahim as out pt before starting dialysis     Anemia of ckd  Give venofer and epogen today    Hyponatremia -  Follow bmp     Chest pain  Cards following     Metabolic acidosis  Improved. Continue po  bicarb     Hypertension  Continue current bp meds     Sec. hyperpara  Continue calcitriol  IPTH high     Dm 2     ckd 5- f/b DR. BECKER     S/p transposition of avf by DR. Esqueda                Physical exam:   GEN: NAD  NECK- Supple, no mass  RESP: Clear b/l, no wheezing, No accessory muscle use  CVS: RRR,S1,S2    ABDO: soft , non tender, No mass  EXT: No Edema   NEURO: normal speech, non focal  UPPER ARM AVF dressing +    Care Plan discussed with: patient  Objective:   Visit Vitals  /56   Pulse 76   Temp 97.9 °F (36.6 °C)   Resp 17   Ht 5' 5\" (1.651 m)   Wt 59 kg (130 lb 1.1 oz)   SpO2 99%   BMI 21.64 kg/m²     Last 3 Recorded Weights in this Encounter    12/08/19 1351 12/09/19 1010   Weight: 59 kg (130 lb) 59 kg (130 lb 1.1 oz)     12/08 1901 - 12/10 0700  In: 3401.7 [P.O.:1070; I.V.:2331.7]  Out: 840 [Urine:800; Drains:40]    Intake/Output Summary (Last 24 hours) at 12/10/2019 1016  Last data filed at 12/10/2019 0542  Gross per 24 hour   Intake 2551.67 ml   Output 810 ml   Net 1741.67 ml      Chart reviewed. Pertinent Notes reviewed.        Medication list  reviewed Current Facility-Administered Medications   Medication    polyethylene glycol (MIRALAX) packet 17 g    0.45% sodium chloride 1,000 mL with sodium bicarbonate (8.4%) 75 mEq infusion    epoetin kourtney-epbx (RETACRIT) 12,000 Units combo injection    bisacodyl (DULCOLAX) suppository 10 mg    bisacodyl (DULCOLAX) tablet 5 mg    nitroglycerin (NITROBID) 2 % ointment 1 Inch    oxyCODONE IR (ROXICODONE) tablet 5 mg    aspirin chewable tablet 81 mg    atorvastatin (LIPITOR) tablet 20 mg    calcitRIOL (ROCALTROL) capsule 0.25 mcg    cloNIDine HCl (CATAPRES) tablet 0.1 mg    insulin glargine (LANTUS) injection 20 Units    metoprolol tartrate (LOPRESSOR) tablet 50 mg    sodium bicarbonate tablet 650 mg    insulin lispro (HUMALOG) injection    glucose chewable tablet 16 g    dextrose (D50) infusion 12.5-25 g    glucagon (GLUCAGEN) injection 1 mg    sodium chloride (NS) flush 5-40 mL    sodium chloride (NS) flush 5-40 mL    acetaminophen (TYLENOL) tablet 650 mg    naloxone (NARCAN) injection 0.4 mg    ondansetron (ZOFRAN) injection 4 mg    heparin (porcine) injection 5,000 Units    acetaminophen (TYLENOL) tablet 650 mg     Facility-Administered Medications Ordered in Other Encounters   Medication    sodium chloride (NS) flush 10 mL           Data Review :  Recent Labs     12/10/19  0345 12/09/19  0405 12/08/19  1555   * 134* 127*   K 3.7 3.7 4.0   CL 98 101 95*   CO2 25 20* 21   BUN 89* 93* 97*   CREA 6.30* 6.54* 6.47*   * 263* 592*   CA 8.2*  8.0* 8.6 9.7   PHOS 5.4*  --   --      Recent Labs     12/10/19  0345 12/09/19  0403 12/08/19  1555   WBC 6.2 7.6 6.0   HGB 8.1* 9.3* 11.3*   HCT 24.7* 28.1* 34.3*    249 189     No results for input(s): FE, TIBC, PSAT, FERR in the last 72 hours.    Recent Labs     12/09/19 0405 12/08/19  1600 12/08/19  1555   CPK  --   --  231*   TROIQ 0.34* 0.62*  --      Lab Results   Component Value Date/Time    Color YELLOW/STRAW 08/28/2019 06:16 PM Appearance CLEAR 08/28/2019 06:16 PM    Specific gravity 1.012 08/28/2019 06:16 PM    Specific gravity 1.012 05/11/2018 09:49 AM    pH (UA) 7.0 08/28/2019 06:16 PM    Protein 100 (A) 08/28/2019 06:16 PM    Glucose >1,000 (A) 08/28/2019 06:16 PM    Ketone NEGATIVE  08/28/2019 06:16 PM    Bilirubin NEGATIVE  08/28/2019 06:16 PM    Urobilinogen 0.2 08/28/2019 06:16 PM    Nitrites NEGATIVE  08/28/2019 06:16 PM    Leukocyte Esterase NEGATIVE  08/28/2019 06:16 PM    Epithelial cells FEW 08/28/2019 06:16 PM    Bacteria NEGATIVE  08/28/2019 06:16 PM    WBC 0-4 08/28/2019 06:16 PM    RBC 5-10 08/28/2019 06:16 PM     Lab Results   Component Value Date/Time    Culture result: (A) 12/06/2019 06:31 AM     MRSA PRESENT CALLED TO AND READ BACK BY KENNETH LOVE,AT 5278 ON 12/7/19. RC    Culture result:  12/06/2019 06:31 AM         Screening of patient nares for MRSA is for surveillance purposes and, if positive, to facilitate isolation considerations in high risk settings. It is not intended for automatic decolonization interventions per se as regimens are not sufficiently effective to warrant routine use. Culture result: MIXED UROGENITAL SAMAN ISOLATED 08/21/2019 04:08 AM     Lab Results   Component Value Date/Time    Specimen Description: NARES 11/18/2013 10:02 PM    Specimen Description: BLOOD 11/18/2013 09:18 PM    Specimen Description: URINE 11/18/2013 09:18 PM     Lab Results   Component Value Date/Time    Sodium,urine random 92 05/28/2018 12:13 PM    Creatinine, urine 29.30 05/28/2018 12:13 PM       Results from Hospital Encounter encounter on 12/08/19   XR CHEST PORT    Narrative INDICATION: . chest pain  Additional history: Pain radiating into the left arm  COMPARISON: Previous chest xray, 8/21/2019. LIMITATIONS: Portable technique. Pasha Angry FINDINGS: Single frontal view of the chest.   .  Lines/tubes/surgical: Cardiac monitor leads overly the patient. Heart/mediastinum: Unremarkable.    Lungs/pleura:  No focal consolidation or mass. No visualized pleural effusion or  pneumothorax. Additional Comments: Cutaneous staples in the left upper extremity. .    Impression IMPRESSION:  1. No radiographic evidence of acute cardiopulmonary disease. Kiesha Goode MD  [facility]   [email or web address]  [facility]  [address], [address]  [place], [address]  Phone - [phone]         Fax - [phone]

## 2019-12-10 NOTE — PROGRESS NOTES
Physical Therapy Screening:  Services are not indicated at this time. [facility] based on results obtained during the nursing admission assessment. The patients chart was reviewed and the patient is not appropriate for a skilled therapy evaluation at this time. Please consult physical therapy if any therapy needs arise. Thank you.     Malena Aguero, PT, DPT

## 2019-12-10 NOTE — PHYSICIAN ADVISORY
Letter of Status Determination:  
Recommend hospitalization status upgraded from OBSERVATION  to INPATIENT  Status Pt Name:  Francis Alexander MR#  
IRMA # [id number] / 
[id number] Payor: Rony Hummel / Plan: [address] / Product Type: Medicare /   
[facility]#  [id number] Room and Hospital  2184/01  @ [facility] Hospitalization date  12/8/2019  3:04 PM  
Current Attending Physician  Katharine Powers MD  
Principal diagnosis  Chest pain [R07.9] Clinicals  47 y.o. y.o  female hospitalized with above diagnosis This pt has been receiving complex care including evaluation and Rx for chest pain, CKD, HTN, hyperlipidemia. Etc. Due to appropriate and necessary medical care, this pt's hospitalization has now exceeded two midnights . [facility] criteria Does  NOT apply STATUS DETERMINATION  This patient is at above high risk of deterioration based on documented presenting clinical data, comorbid conditions, high risk of adverse events and current acute care course. Ms. Francis Alexander now meets Inpatient Admission status criteria in accordance with CMS regulation Section 43 .3. Specifically, due to medical necessity the patient's stay now exceeds Two Midnights. It is our recommendation that this patient's hospitalization status should be upgraded from  OBSERVATION to INPATIENT status. The final decision of the patient's hospitalization status depends on the attending physician's judgment Additional comments Payor: [place] MEDICARE / Plan: [address] / Product Type: Medicare / The information in this document is a recommendation to be used for utilization review and utilization management purposes only. This recommendation is not an order.   
The recommendation is made based on the information reviewed at the time of the referral, is pursuant to the [facility] Conditions of Participation (42 CFR Part 482), and is neither a judgment nor an assessment with regard to the appropriateness or quality of clinical care. For all Managed Care patients: The Criteria are intended solely for use as screening guidelines with respect to the medical appropriateness of healthcare services and not for final clinical or payment determinations concerning the type or level of medical care provided, or proposed to be provided, to a patient. They help the reviewers determine whether a patient is appropriate for observation or inpatient admission at the time a decision to admit the patient is being made. All efforts are made to apply the pertinent payor criteria ([employer] or [employer]) as well as the clinical judgements based on the information reviewed at the time of the referral.\" Nothing in this document may be used to limit, alter, or affect clinical services provided to the patient named below. Piyush Brown MD MPH FACP Cell: [phone] Physician Advisor [address]  
   
Cell  [phone]   
 
 
[phone] Lisa Ask No

## 2019-12-10 NOTE — PROGRESS NOTES
Numbness in left hand and isn't going away equal strength in both hands pt had av graft placed 12/6 came in yesterday for chest pain with N/V  Started at about 2 hours back  Elevate arm  Monitor  Equal strength per RN  Pt on ASA and statins

## 2019-12-10 NOTE — PROGRESS NOTES
contacted telehospitalist due to pt complaint of numbness to right hand that wont go away pt is able to use hand and has equal strength in the hand   Spoke with on call MD and was told to keep arm elevated.

## 2019-12-10 NOTE — ROUTINE PROCESS
PCP CLAUDETTE apt scheduled with CHINEDU Ordonez on 12/23/19(earliest avail apt) at 2:15PM.  Apt added to AVS

## 2019-12-10 NOTE — PROGRESS NOTES
[address] [place] [address]  [phone]      Cardiology Progress Note      12/10/2019 1155AM    Admit Date: 12/8/2019    Admit Diagnosis:   Chest pain [R07.9]    Interval History/Subjective:     Rupesh Orlando is a 47 y.o. female with PMH HTN, HLD, CKD, DM, migraines who was admitted for Chest pain [R07.9]. -hypotension last night  -anemic;   -Ms. Owens Resides states she's feeling better. She denies further chest pain. No SOB. Denies dizziness when her BP was low.   She's c/o being hungry    Visit Vitals  /64   Pulse 78   Temp 97.9 °F (36.6 °C)   Resp 17   Ht 5' 5\" (1.651 m)   Wt 59 kg (130 lb 1.1 oz)   LMP 09/15/2013   SpO2 98%   BMI 21.64 kg/m²       Current Facility-Administered Medications   Medication Dose Route Frequency    iron sucrose (VENOFER) injection 200 mg  200 mg IntraVENous ONCE    metoprolol tartrate (LOPRESSOR) tablet 25 mg  25 mg Oral BID    polyethylene glycol (MIRALAX) packet 17 g  17 g Oral DAILY    epoetin kourtney-epbx (RETACRIT) 12,000 Units combo injection  12,000 Units SubCUTAneous Q MON, WED & FRI    bisacodyl (DULCOLAX) suppository 10 mg  10 mg Rectal DAILY PRN    bisacodyl (DULCOLAX) tablet 5 mg  5 mg Oral DAILY PRN    oxyCODONE IR (ROXICODONE) tablet 5 mg  5 mg Oral Q4H PRN    aspirin chewable tablet 81 mg  81 mg Oral DAILY    atorvastatin (LIPITOR) tablet 20 mg  20 mg Oral QHS    calcitRIOL (ROCALTROL) capsule 0.25 mcg  0.25 mcg Oral DAILY    cloNIDine HCl (CATAPRES) tablet 0.1 mg  0.1 mg Oral BID    insulin glargine (LANTUS) injection 20 Units  20 Units SubCUTAneous DAILY    sodium bicarbonate tablet 650 mg  650 mg Oral BID    insulin lispro (HUMALOG) injection   SubCUTAneous AC&HS    glucose chewable tablet 16 g  4 Tab Oral PRN    dextrose (D50) infusion 12.5-25 g  12.5-25 g IntraVENous PRN    glucagon (GLUCAGEN) injection 1 mg  1 mg IntraMUSCular PRN    sodium chloride (NS) flush 5-40 mL  5-40 mL IntraVENous Q8H    sodium chloride (NS) flush 5-40 mL  5-40 mL IntraVENous PRN    acetaminophen (TYLENOL) tablet 650 mg  650 mg Oral Q4H PRN    naloxone (NARCAN) injection 0.4 mg  0.4 mg IntraVENous PRN    ondansetron (ZOFRAN) injection 4 mg  4 mg IntraVENous Q4H PRN    heparin (porcine) injection 5,000 Units  5,000 Units SubCUTAneous Q8H    acetaminophen (TYLENOL) tablet 650 mg  650 mg Oral Q6H     Facility-Administered Medications Ordered in Other Encounters   Medication Dose Route Frequency    sodium chloride (NS) flush 10 mL  10 mL IntraVENous PRN       Objective:      Physical Exam:  General: awake AAF resting in bed in NAD  Heart: RRR, no m/S3/JVD  Lungs: clear   Abdomen: Soft, +BS, NTND   Extremities: LE ghazala +DP/PT, no edema   Neurologic: Grossly normal  Skin:  Warm and dry.      Data Review:   Recent Labs     12/10/19  0345 12/09/19  0403 12/08/19  1555   WBC 6.2 7.6 6.0   HGB 8.1* 9.3* 11.3*   HCT 24.7* 28.1* 34.3*    249 189     Recent Labs     12/10/19  0345 12/09/19  0405 12/08/19  1555   * 134* 127*   K 3.7 3.7 4.0   CL 98 101 95*   CO2 25 20* 21   * 263* 592*   BUN 89* 93* 97*   CREA 6.30* 6.54* 6.47*   CA 8.2*  8.0* 8.6 9.7   PHOS 5.4*  --   --    ALB 2.5*  --  3.9   TBILI  --   --  0.4   SGOT  --   --  28   ALT  --   --  15   INR 1.0  --   --        Recent Labs     12/09/19  0405 12/08/19  1600 12/08/19  1555   TROIQ 0.34* 0.62*  --    CPK  --   --  231*         Intake/Output Summary (Last 24 hours) at 12/10/2019 1249  Last data filed at 12/10/2019 0542  Gross per 24 hour   Intake 2551.67 ml   Output 810 ml   Net 1741.67 ml        Telemetry: SR  ECG: ST  Echocardiogram: EF 66-70%; NWMA; trace MR  CXRAY: no acute process    Assessment:     Principal Problem:    Chest pain (5/10/2018)    Active Problems:    HTN (hypertension) (12/20/2012)      CKD (chronic kidney disease) stage 5, GFR less than 15 ml/min (HCC) (11/23/2018)      Uncontrolled type II diabetes mellitus ([facility] (12/9/2019)      Elevated troponin (12/10/2019)      S/P arteriovenous (AV) fistula creation (12/10/2019)      Hyperparathyroidism ([facility] (12/10/2019)      Anemia due to chronic kidney disease, on chronic dialysis ([facility] (12/10/2019)        Plan:     Chest pain:  Resolved. With troponin 0.62/0.34 and CKD, severe hyperglycemia  · ECHO with EF 66-70% and no new concerns  · Stress test with small area of reversibility that may be diaphragm attenuation. Will defer interpretation to Dr. Jesus Manuel Smith  · D/c'd nitrobid due to hypotension last night  · Continue ASA, statin, BB    HTN/hypotension:  Hypotensive overnight. · Unclear why such variability in her BP's. Possibly doesn't take her medications at home  ·  d/c'd nitrobid, as above  · Decreased BB with hold parameters  · Consider decreasing clonidine to once a day. Would not want to abruptly stop    Uncontrolled diabetes:  Improving  · Per hospitalist    CKD:    · Per nephrology    Librado Benz NP  DNP, RN, [facility]      Patient seen and examined by me with nurse practitioner. I personally performed all components of the history, physical, and medical decision making and agree with the assessment and plan as noted. Pt asymptomatic. Stress test noted. No further work up for now. Ok to dc from cardiac standpoint and f/u as out pt.      Zaynab Sandoval MD

## 2019-12-10 NOTE — PROGRESS NOTES
I reviewed pertinent labs and imaging, and discussed /agreed on the plan of care with Dr. Linda Montano. Hospitalist Progress Note    NAME: Natalie Iverson   :  1965   MRN:  [id number]     History of Present Illness:  Carolina Skinner is a 47 y.o.  female who presents with complaints of CP and LUE pain s/p fistula placement. Pt had AV fistula placed on Friday by Dr Juanita Oswald. She was DC home yesterday. She came back to ED today c/o LUE pain with radiation to the back. While in ED she also started with CP. CP is mid sternal, intermittent, 5/10, non radiating. She admits to some nausea, no vomiting. No diaphoresis. Pt appears drowsy now s/p dilaudid in ED. She was able to answer my questions appropriately though. No h/o CAD in the past. Cardiology consulted from ED, recommended admission to cycle troponin, start ASA, no AC for now. Assessment / Plan:  Chest pain, POA; r/o ACS  Elevated troponin  HTN  · Troponin 0.62->0.34  · CXR no acute cardiopulmonary process   · ECHO:  Left Ventricle: Normal cavity size, wall thickness and systolic function (ejection fraction normal). Estimated left ventricular ejection fraction is 66 - 70%. Calculated left ventricular ejection fraction is 70%. Biplane method used to measure ejection fraction. No regional wall motion abnormality noted. Normal left ventricular strain. Mitral Valve: Mitral annular calcification. Trace mitral valve regurgitation is present.   · Appreciate cardiology input; stress test today   · Continue ASA and statin  · Continue BB and nitro    S/p AV fistula placement (2019)  · Appreciate Dr. Angel Corrales input  · Pain management PRN  · Bowel regimen     Insulin dependent type 2 diabetes with hyperglycemia   · Hgb A1c 8.8  · BS AC&HS  · SSI  · Diabetic educator consulted     EZEKIEL on CKD stage V  Hyponatremia   · Cr 6.30; improved from 6.54 yesterday  ·   · Continue IVF   · Continue IV bicarb and PO   · Appreciate nephrology input Hyperparathyroidism  · Continue Calcitrol     Anemia of chronic kidney disease  · Hgb 8.1  · Monitor  · No signs of active bleeding     18.5 - 24.9 Normal weight / Body mass index is 21.64 kg/m². Code status: Full  Prophylaxis: Hep SQ  Recommended Disposition: Home w/Family     Subjective:     Chief Complaint / Reason for Physician Visit  \"My hand is still numb. \"  Discussed with RN events overnight. Review of Systems:  Symptom Y/N Comments  Symptom Y/N Comments   Fever/Chills n   Chest Pain n    Poor Appetite n   Edema     Cough    Abdominal Pain     Sputum    Joint Pain     SOB/GRIFFITHS n   Pruritis/Rash     Nausea/vomit    Tolerating PT/OT     Diarrhea    Tolerating Diet y    Constipation    Other  Hand numbness     Could NOT obtain due to:      Objective:     VITALS:   Last 24hrs VS reviewed since prior progress note. Most recent are:  Patient Vitals for the past 24 hrs:   Temp Pulse Resp BP SpO2   12/10/19 0744 97.9 °F (36.6 °C) 76 17 110/56 99 %   12/10/19 0400  74      12/10/19 0347 97.6 °F (36.4 °C) 73 16 100/55 100 %   12/09/19 2359  72      12/09/19 2314 97.4 °F (36.3 °C) 70 16 (!) 82/51 99 %   12/09/19 2011 97.4 °F (36.3 °C) 70 18 (!) 87/56 99 %   12/09/19 2000  70      12/09/19 1544 97.9 °F (36.6 °C) 78 16 96/44 96 %   12/09/19 1257 97.6 °F (36.4 °C) 80 16 107/66 100 %   12/09/19 1010    99/57        Intake/Output Summary (Last 24 hours) at 12/10/2019 0921  Last data filed at 12/10/2019 0542  Gross per 24 hour   Intake 2551.67 ml   Output 810 ml   Net 1741.67 ml        PHYSICAL EXAM:  General: Pleasant AA female. NAD   EENT:  EOMI. Anicteric sclerae. MMM  Resp:  CTA bilaterally, no wheezing or rales. No accessory muscle use  CV:  Regular rate rhythm,  No edema  GI:  Soft, Non distended, Non tender.  +Bowel sounds  Neurologic:  Alert and oriented X 3, normal speech,   Psych:   Good insight. Not anxious nor agitated  Skin:  No rashes. No jaundice.   LUE staples in place, dressing dry and intact, RITCHIE drain. No s/s of infection     Reviewed most current lab test results and cultures  YES  Reviewed most current radiology test results   YES  Review and summation of old records today    NO  Reviewed patient's current orders and MAR    YES  PMH/SH reviewed - no change compared to H&P  ________________________________________________________________________  Care Plan discussed with:    Comments   Patient x    Family      RN x    Care Manager     Consultant                        Multidiciplinary team rounds were held today with , nursing, pharmacist and clinical coordinator. Patient's plan of care was discussed; medications were reviewed and discharge planning was addressed. ________________________________________________________________________  Total NON critical care TIME:  25   Minutes    Total CRITICAL CARE TIME Spent:   Minutes non procedure based      Comments   >50% of visit spent in counseling and coordination of care     ________________________________________________________________________  Anish Aj NP     Procedures: see electronic medical records for all procedures/Xrays and details which were not copied into this note but were reviewed prior to creation of Plan. LABS:  I reviewed today's most current labs and imaging studies.   Pertinent labs include:  Recent Labs     12/10/19  0345 12/09/19  0403 12/08/19  1555   WBC 6.2 7.6 6.0   HGB 8.1* 9.3* 11.3*   HCT 24.7* 28.1* 34.3*    249 189     Recent Labs     12/10/19  0345 12/09/19  0405 12/08/19  1555   * 134* 127*   K 3.7 3.7 4.0   CL 98 101 95*   CO2 25 20* 21   * 263* 592*   BUN 89* 93* 97*   CREA 6.30* 6.54* 6.47*   CA 8.2*  8.0* 8.6 9.7   PHOS 5.4*  --   --    ALB 2.5*  --  3.9   TBILI  --   --  0.4   SGOT  --   --  28   ALT  --   --  15   INR 1.0  --   --        Signed: Anish Aj, NP

## 2019-12-10 NOTE — PROGRESS NOTES
Plan:  -Home with family  -PCP f/u appt  -[employer] transport       4:11PM  CM met with pt to complete assessment and discuss d/c planning. Pt is a 46 yo female admitted for chest pain. Pt has management pain which CM reviewed. Pt states that she lives with her , dtr, and grandchildren in a 2 story home. She reports being independent at baseline, using a cane for ambulation. Pt has been up ad aziza in hospital room. Pt does not drive and does not have family to assist. She relies on Medicaid transportation. Plan for d/c tomorrow morning. Pt will require [employer]. She states that she is able to get in and out of a car and has keys to her home. CM to arrange in the am. Plan for d/c at [address] [place] [address].      Pt in obs status. State and [address] letters provided, explained, signed, and placed on chart. CM will continue to follow and assist with d/c planning. Care Management Interventions  PCP Verified by CM: Yes(BARBIE Rico )  Mode of Transport at Discharge:  Other (see comment)([employer] )  Transition of Care Consult (CM Consult): Discharge Planning  Discharge Durable Medical Equipment: No  Physical Therapy Consult: No  Occupational Therapy Consult: No  Speech Therapy Consult: No  Current Support Network: Lives with Spouse  Confirm Follow Up Transport: Other (see comment)(Medicaid transportation )  Plan discussed with Pt/Family/Caregiver: Yes  Discharge Location  Discharge Placement: Home with family assistance      ALINE Sevilla  Care Manager

## 2019-12-10 NOTE — PROGRESS NOTES
NUC MED: 2 Day Lexiscan Cardiac completed @ 10:30am. Please check with ordering Physician regarding pt diet.

## 2019-12-10 NOTE — PROGRESS NOTES
Surgery    Reports numbness in left hand. No pain in hand. Patient noted to have been relatively hypotensive yesterday, often systolic below 513. Alert, no distress. /56 7:44 this AM.    Left hand warm. Reduced subjective sensation in left hand, but able to feel light touch in all digits, movement normal.  Left radial pulse trace palpable. No arm edema. Good thril rachid AVF. Incisions look, good. RITCHIE drain output small. RITCHIE drain removed. Imp - Mild steal phenomenon left hand, exacerbated by relatively low BP. I had this AM dose of clonidine held. Plan - I will discuss with hospitalist.  May need to stop clonidine.     Marlee Johnson MD

## 2019-12-10 NOTE — DIABETES MGMT
[facility]    [facility] Consult Note    Recommendations/ Comments: Blood glucose currently in target. A1C > 3 months old, however, patient is anemic and result would not be accurate    Current hospital DM medication: Lantus 20 units,and Lispro for correction, normal sensitivity scale    Consult received for:  [x]             Assessment of home management                [x]      Medication Recommendations    Chart reviewed and initial evaluation complete on [address]. Patient is a 47 y.o. female with known diabetes on insulin injections: Tresiba 25 units, Novolog 5 units tid ac at home. BG monitoring at home 4 times per day. Patient reports BG levels at home trend: fluctuating a bit. Patient states she forgets her insulin at times, but she does follow a meal plan most of the time. Assessed and instructed patient on the following:   ·  interpretation of lab results, blood sugar goals, complications of diabetes mellitus, hypoglycemia prevention and treatment, insulin adjustments and nutrition    Provided patient with the following: [x]             Diabetes Self Care Guide                 [x]             Outpatient DTC contact number    Discussed with patient and/or family need for follow up appointment for diabetes management after discharge. A1c:   Lab Results   Component Value Date/Time    Hemoglobin A1c 8.8 (H) 08/21/2019 02:55 AM       Recent Glucose Results:   Lab Results   Component Value Date/Time     (H) 12/10/2019 03:45 AM    GLUCPOC 169 (H) 12/10/2019 11:37 AM    GLUCPOC 168 (H) 12/10/2019 08:26 AM    GLUCPOC 139 (H) 12/09/2019 08:27 PM        Lab Results   Component Value Date/Time    Creatinine 6.30 (H) 12/10/2019 03:45 AM     Estimated Creatinine Clearance: 9.2 mL/min (A) (based on SCr of 6.3 mg/dL (H)).     Active Orders   Diet    DIET DIABETIC CONSISTENT CARB Regular        PO intake:   Patient Vitals for the past 72 hrs:   % Diet Eaten   12/09/19 1905 10 %   12/09/19 1451 50 %       Will continue to follow as needed. Thank you.   Christina Yeager, MS, RN, CDE

## 2019-12-11 VITALS
HEIGHT: 65 IN | BODY MASS INDEX: 21.67 KG/M2 | OXYGEN SATURATION: 98 % | TEMPERATURE: 98.2 F | SYSTOLIC BLOOD PRESSURE: 157 MMHG | WEIGHT: 130.07 LBS | RESPIRATION RATE: 18 BRPM | HEART RATE: 89 BPM | DIASTOLIC BLOOD PRESSURE: 91 MMHG

## 2019-12-11 LAB
ALBUMIN SERPL-MCNC: 2.4 G/DL (ref 3.5–5)
ANION GAP SERPL CALC-SCNC: 8 MMOL/L (ref 5–15)
BUN SERPL-MCNC: 92 MG/DL (ref 6–20)
BUN/CREAT SERPL: 15 (ref 12–20)
CALCIUM SERPL-MCNC: 8.4 MG/DL (ref 8.5–10.1)
CHLORIDE SERPL-SCNC: 102 MMOL/L (ref 97–108)
CO2 SERPL-SCNC: 26 MMOL/L (ref 21–32)
CREAT SERPL-MCNC: 6.3 MG/DL (ref 0.55–1.02)
ERYTHROCYTE [DISTWIDTH] IN BLOOD BY AUTOMATED COUNT: 13.6 % (ref 11.5–14.5)
GLUCOSE BLD STRIP.AUTO-MCNC: 106 MG/DL (ref 65–100)
GLUCOSE BLD STRIP.AUTO-MCNC: 201 MG/DL (ref 65–100)
GLUCOSE SERPL-MCNC: 149 MG/DL (ref 65–100)
HCT VFR BLD AUTO: 25.7 % (ref 35–47)
HGB BLD-MCNC: 8.4 G/DL (ref 11.5–16)
MCH RBC QN AUTO: 29.5 PG (ref 26–34)
MCHC RBC AUTO-ENTMCNC: 32.7 G/DL (ref 30–36.5)
MCV RBC AUTO: 90.2 FL (ref 80–99)
NRBC # BLD: 0.02 K/UL (ref 0–0.01)
NRBC BLD-RTO: 0.3 PER 100 WBC
PHOSPHATE SERPL-MCNC: 5.8 MG/DL (ref 2.6–4.7)
PLATELET # BLD AUTO: 218 K/UL (ref 150–400)
PMV BLD AUTO: 9.3 FL (ref 8.9–12.9)
POTASSIUM SERPL-SCNC: 4.3 MMOL/L (ref 3.5–5.1)
RBC # BLD AUTO: 2.85 M/UL (ref 3.8–5.2)
SERVICE CMNT-IMP: ABNORMAL
SERVICE CMNT-IMP: ABNORMAL
SODIUM SERPL-SCNC: 136 MMOL/L (ref 136–145)
WBC # BLD AUTO: 6.6 K/UL (ref 3.6–11)

## 2019-12-11 PROCEDURE — 96372 THER/PROPH/DIAG INJ SC/IM: CPT

## 2019-12-11 PROCEDURE — 74011250636 HC RX REV CODE- 250/636: Performed by: HOSPITALIST

## 2019-12-11 PROCEDURE — 74011636637 HC RX REV CODE- 636/637: Performed by: HOSPITALIST

## 2019-12-11 PROCEDURE — 65660000000 HC RM CCU STEPDOWN

## 2019-12-11 PROCEDURE — 74011250637 HC RX REV CODE- 250/637: Performed by: HOSPITALIST

## 2019-12-11 PROCEDURE — 99218 HC RM OBSERVATION: CPT

## 2019-12-11 PROCEDURE — 85027 COMPLETE CBC AUTOMATED: CPT

## 2019-12-11 PROCEDURE — 74011250637 HC RX REV CODE- 250/637: Performed by: NURSE PRACTITIONER

## 2019-12-11 PROCEDURE — 82962 GLUCOSE BLOOD TEST: CPT

## 2019-12-11 PROCEDURE — 80069 RENAL FUNCTION PANEL: CPT

## 2019-12-11 PROCEDURE — 36415 COLL VENOUS BLD VENIPUNCTURE: CPT

## 2019-12-11 PROCEDURE — 94760 N-INVAS EAR/PLS OXIMETRY 1: CPT

## 2019-12-11 RX ORDER — METOPROLOL TARTRATE 25 MG/1
25 TABLET, FILM COATED ORAL 2 TIMES DAILY
Qty: 60 TAB | Refills: 0 | Status: SHIPPED | OUTPATIENT
Start: 2019-12-11

## 2019-12-11 RX ORDER — OXYCODONE HYDROCHLORIDE 5 MG/1
5 TABLET ORAL
Qty: 20 TAB | Refills: 0 | Status: SHIPPED | OUTPATIENT
Start: 2019-12-11 | End: 2019-12-16

## 2019-12-11 RX ADMIN — SODIUM BICARBONATE 650 MG: 650 TABLET ORAL at 11:19

## 2019-12-11 RX ADMIN — CLONIDINE HYDROCHLORIDE 0.1 MG: 0.1 TABLET ORAL at 11:19

## 2019-12-11 RX ADMIN — Medication 10 ML: at 05:35

## 2019-12-11 RX ADMIN — ACETAMINOPHEN 650 MG: 325 TABLET ORAL at 03:28

## 2019-12-11 RX ADMIN — HEPARIN SODIUM 5000 UNITS: 5000 INJECTION INTRAVENOUS; SUBCUTANEOUS at 04:09

## 2019-12-11 RX ADMIN — METOPROLOL TARTRATE 25 MG: 25 TABLET ORAL at 11:18

## 2019-12-11 RX ADMIN — ACETAMINOPHEN 650 MG: 325 TABLET ORAL at 11:19

## 2019-12-11 RX ADMIN — CALCITRIOL 0.25 MCG: 0.25 CAPSULE ORAL at 11:19

## 2019-12-11 RX ADMIN — ASPIRIN 81 MG 81 MG: 81 TABLET ORAL at 11:19

## 2019-12-11 RX ADMIN — INSULIN GLARGINE 20 UNITS: 100 INJECTION, SOLUTION SUBCUTANEOUS at 11:19

## 2019-12-11 NOTE — PROGRESS NOTES
General Surgery End of Shift Nursing Note    Bedside shift change report given to Dulce Hall RN (oncoming nurse) by Yasmany Alexander RN (offgoing nurse). Report included the following information SBAR, Kardex, Procedure Summary, Intake/Output, MAR and Recent Results. Shift worked:   7p-7a   Summary of shift:    Patient slept overnight with some pain, treated appropriately. Issues for physician to address:   None at this time     Number times ambulated in hallway past shift: 0    Number of times OOB to chair past shift: 0    Pain Management:  Current medication: See MAR  Patient states pain is manageable on current pain medication: YES    GI:    Current diet:  DIET ONE TIME MESSAGE  DIET DIABETIC CONSISTENT CARB Regular    Tolerating current diet: YES    Patient Safety:    Bed Alarm On? No  Sitter?  No    Sherryle Screen

## 2019-12-11 NOTE — PROGRESS NOTES
Surgery    Reports some partial numbness left hand. Some incisional paiin. BRUNA running in low 100's. Left arm without edema. Incisions look good. Good thrill in AVF. Trace pal;pable left radial pulse. Left hand is warm. Full motion in fingers. Able to feel light touch in all digits. Mid steal phenomenon. She would likely benefit in terms of steal symptoms from reduction in antihypertensives to allow -912 systolic. I can see the patient in the office next week, Wed or Thurs. I have printed Rx for oxycodone 5 mg # 20.     Marlee Johnson MD

## 2019-12-11 NOTE — PROGRESS NOTES
Nephrology Progress Note     Codey Calderon     [email or web address]                  Phone - [phone]   Patient: Mic Becerra   Date- 12/11/2019        Admit Date: 12/8/2019  YOB: 1965             CC: Follow up for  EZEKIEL on ckd          Subjective: Interval History:     The patient says she is feeling fine. She denies any N/V, SOB, chest pain, or abd pain. Admits to slight left arm discomfort but no arthritic complaints. No skin rashes     ROS:- as above   Assessment & Plan:     EZEKIEL/ CRI stage V  -followed by Dr. Nilay Mills. No acute need for dialysis, although the bun/creat remains high at 92/6.3 on 12/11. She would like to follow up with DR. Jesus Rawls as out pt before starting dialysis     Anemia of ckd  Pt has been on EPO and Venofer. S/p transposition of left AVF  by DR. Esqueda      Hyponatremia -  Follow [facility]     Chest pain  Cards following     Metabolic acidosis  Improved.  Continue po  bicarb     Hypertension  Continue current bp meds     Sec. hyperpara  Continue calcitriol  IPTH high     Dm 2                 Physical exam:   GEN: middle-aged woman in no distress  NECK- Supple, no mass  RESP: lungs clear; no resp distress  CVS: RRR; no gallop or rub  ABDO: soft , non tender, No mass  EXT: No Edema   NEURO: normal speech, non focal  UPPER ARM AVF dressing +; excellent thrill over the AVF    Care Plan discussed with: patient  Objective:   Visit Vitals  BP (!) 157/91   Pulse 89   Temp 98.2 °F (36.8 °C)   Resp 18   Ht 5' 5\" (1.651 m)   Wt 59 kg (130 lb 1.1 oz)   SpO2 98%   BMI 21.64 kg/m²     Last 3 Recorded Weights in this Encounter    12/08/19 1351 12/09/19 1010   Weight: 59 kg (130 lb) 59 kg (130 lb 1.1 oz)     12/09 1901 - 12/11 0700  In: 2051.7 [P.O.:580; I.V.:1471.7]  Out: 1600 [Urine:1600]    Intake/Output Summary (Last 24 hours) at 12/11/2019 1145  Last data filed at 12/11/2019 0328  Gross per 24 hour   Intake 220 ml   Output 1000 ml   Net -780 ml      Chart reviewed. Pertinent Notes reviewed. Medication list  reviewed   Current Facility-Administered Medications   Medication    metoprolol tartrate (LOPRESSOR) tablet 25 mg    polyethylene glycol (MIRALAX) packet 17 g    epoetin kourtney-epbx (RETACRIT) 12,000 Units combo injection    bisacodyl (DULCOLAX) suppository 10 mg    bisacodyl (DULCOLAX) tablet 5 mg    oxyCODONE IR (ROXICODONE) tablet 5 mg    aspirin chewable tablet 81 mg    atorvastatin (LIPITOR) tablet 20 mg    calcitRIOL (ROCALTROL) capsule 0.25 mcg    cloNIDine HCl (CATAPRES) tablet 0.1 mg    insulin glargine (LANTUS) injection 20 Units    sodium bicarbonate tablet 650 mg    insulin lispro (HUMALOG) injection    glucose chewable tablet 16 g    dextrose (D50) infusion 12.5-25 g    glucagon (GLUCAGEN) injection 1 mg    sodium chloride (NS) flush 5-40 mL    sodium chloride (NS) flush 5-40 mL    acetaminophen (TYLENOL) tablet 650 mg    naloxone (NARCAN) injection 0.4 mg    ondansetron (ZOFRAN) injection 4 mg    heparin (porcine) injection 5,000 Units    acetaminophen (TYLENOL) tablet 650 mg     Facility-Administered Medications Ordered in Other Encounters   Medication    sodium chloride (NS) flush 10 mL           Data Review :  Recent Labs     12/11/19  0101 12/10/19  0345 12/09/19  0405    133* 134*   K 4.3 3.7 3.7    98 101   CO2 26 25 20*   BUN 92* 89* 93*   CREA 6.30* 6.30* 6.54*   * 156* 263*   CA 8.4* 8.2*  8.0* 8.6   PHOS 5.8* 5.4*  --      Recent Labs     12/11/19  0101 12/10/19  0345 12/09/19  0403   WBC 6.6 6.2 7.6   HGB 8.4* 8.1* 9.3*   HCT 25.7* 24.7* 28.1*    226 249     No results for input(s): FE, TIBC, PSAT, FERR in the last 72 hours.    Recent Labs     12/09/19  0405 12/08/19  1600 12/08/19  1555   CPK  --   --  231*   TROIQ 0.34* 0.62*  --      Lab Results   Component Value Date/Time    Color YELLOW/STRAW 08/28/2019 06:16 PM    Appearance CLEAR 08/28/2019 06:16 PM    Specific gravity 1.012 08/28/2019 06:16 PM    Specific gravity 1.012 05/11/2018 09:49 AM    pH (UA) 7.0 08/28/2019 06:16 PM    Protein 100 (A) 08/28/2019 06:16 PM    Glucose >1,000 (A) 08/28/2019 06:16 PM    Ketone NEGATIVE  08/28/2019 06:16 PM    Bilirubin NEGATIVE  08/28/2019 06:16 PM    Urobilinogen 0.2 08/28/2019 06:16 PM    Nitrites NEGATIVE  08/28/2019 06:16 PM    Leukocyte Esterase NEGATIVE  08/28/2019 06:16 PM    Epithelial cells FEW 08/28/2019 06:16 PM    Bacteria NEGATIVE  08/28/2019 06:16 PM    WBC 0-4 08/28/2019 06:16 PM    RBC 5-10 08/28/2019 06:16 PM     Lab Results   Component Value Date/Time    Culture result: (A) 12/06/2019 06:31 AM     MRSA PRESENT CALLED TO AND READ BACK BY KENNETH LOVE,AT 3149 ON 12/7/19. RC    Culture result:  12/06/2019 06:31 AM         Screening of patient nares for MRSA is for surveillance purposes and, if positive, to facilitate isolation considerations in high risk settings. It is not intended for automatic decolonization interventions per se as regimens are not sufficiently effective to warrant routine use. Culture result: MIXED UROGENITAL SAMAN ISOLATED 08/21/2019 04:08 AM     Lab Results   Component Value Date/Time    Specimen Description: NARES 11/18/2013 10:02 PM    Specimen Description: BLOOD 11/18/2013 09:18 PM    Specimen Description: URINE 11/18/2013 09:18 PM     Lab Results   Component Value Date/Time    Sodium,urine random 92 05/28/2018 12:13 PM    Creatinine, urine 29.30 05/28/2018 12:13 PM       Results from Hospital Encounter encounter on 12/08/19   XR CHEST PORT    Narrative INDICATION: . chest pain  Additional history: Pain radiating into the left arm  COMPARISON: Previous chest xray, 8/21/2019. LIMITATIONS: Portable technique. Rigoberto Walton FINDINGS: Single frontal view of the chest.   .  Lines/tubes/surgical: Cardiac monitor leads overly the patient. Heart/mediastinum: Unremarkable. Lungs/pleura:  No focal consolidation or mass. No visualized pleural effusion or  pneumothorax. Additional Comments: Cutaneous staples in the left upper extremity. .    Impression IMPRESSION:  1. No radiographic evidence of acute cardiopulmonary disease. Tressa Pandya MD  [address] [facility]   [email or web address]  [facility]  [address], [address]  [place], [address]  Phone - [phone]         Fax - [phone]

## 2019-12-11 NOTE — DISCHARGE INSTRUCTIONS
Discharge Instructions Following Surgery for Transposed AV Fistula          Remove gauze dressing on 2019, leave open to air. No lifting over ten pounds with operated arm for two weeks. See Dr. Raul Sánchez in the office next week Wednesday or Thursday. Call for appointment  -  [phone]. .    Use pain medicines as needed as prescribed or use Tylenol. Do not drive while taking narcotic pain medication    If you have any problems, call Dr. Raul Sánchez at [phone] or [phone] (after hours)      WAQAR Henrandez MD          HOSPITALIST DISCHARGE INSTRUCTIONS    NAME: Rupesh Bachelor   :  1965   MRN:  [id number]     Date/Time:  2019 10:08 AM    ADMIT DATE: 2019   DISCHARGE DATE: 2019     Attending Physician: Favio Snachez NP    DISCHARGE DIAGNOSIS:  C/Shiraz [address] Problems    Diagnosis Date Noted    Elevated troponin 12/10/2019    S/P arteriovenous (AV) fistula creation 12/10/2019    Hyperparathyroidism ([address] 12/10/2019    Anemia due to chronic kidney disease, on chronic dialysis ([address] 12/10/2019    Uncontrolled type II diabetes mellitus ([address] 2019    CKD (chronic kidney disease) stage 5, GFR less than 15 ml/min ([address] 2018    Chest pain 05/10/2018    HTN (hypertension) 2012       Medications: Per above medication reconciliation. Pain Management: per above medications    Recommended diet: Renal Diet    Recommended activity: Activity as tolerated    Wound care: See surgical/procedure care instructions    Indwelling devices:  Fistula    Supplemental Oxygen: None    Code status: Full        Outside physician follow up: Follow-up Information     Follow up With Specialties Details Why Evgeny Acevedo MD Nephrology Go on 2020 Nephrology appointment at 11:15AM. Please call the office if any issues arise.   [address] [place] [address]      Faith Mojica NP Nurse Practitioner Go on 2019 For hospital follow up appointment at 2:15PM  [address] [facility]  [phone]      Kimberlyn White MD General Surgery, Vascular Surgery Go on 12/19/2019 Surgery follow-up appointment at 3:00PM [address] [place] [address]  [phone]            Information obtained by :  I understand that if any problems occur once I am at home I am to contact my physician. I understand and acknowledge receipt of the instructions indicated above.                                                                                                                                            Physician's or R.N.'s Signature                                                                  Date/Time                                                                                                                                              Patient or Repres

## 2019-12-11 NOTE — PROGRESS NOTES
Plan:  -Home with family   -F/u appts  -[employer] transport       12:01PM  D/c order acknowledged by CM. Pt changed to IP status. 2nd IM notice provided and explained. No signature due to contact precautions. Pt requiring [employer] ride. Transport arranged for 12:30PM. Nursing notified to have pt at door prior to p/u time so ride will not be missed. Pt ready for d/c from CM perspective. CM available for any additional needs. 8:44AM  Plan for d/c today after pt is cleared by nephrology. PCP appt scheduled for first available. Surgery appt scheduled per progress note. Per nephrology, pt to f/u with nephrologist at d/c. CM PC to pt's nephro office. First available is January 7th but office is very familiar with pt and her needs. Pt will require [employer] ride home. Care Management Interventions  PCP Verified by CM: Yes(NP Victor M Norman )  Mode of Transport at Discharge:  Other (see comment)([employer] )  Transition of Care Consult (CM Consult): Discharge Planning  Discharge Durable Medical Equipment: No  Physical Therapy Consult: No  Occupational Therapy Consult: No  Speech Therapy Consult: No  Current Support Network: Lives with Spouse  Confirm Follow Up Transport: Other (see comment)(Medicaid transportation )  Plan discussed with Pt/Family/Caregiver: Yes  Discharge Location  Discharge Placement: Home with family assistance      ALINE Mayfield  Care Manager

## 2019-12-11 NOTE — DISCHARGE SUMMARY
Hospitalist Discharge Summary     Patient ID:  Kirstin Rodriguez  [id number]  47 y.o.  1965 12/8/2019    PCP on record: Olaf Vásquez NP    Admit date: 12/8/2019  Discharge date and time: 12/11/2019    DISCHARGE DIAGNOSIS:    Active Hospital Problems    Diagnosis Date Noted    Elevated troponin 12/10/2019    S/P arteriovenous (AV) fistula creation 12/10/2019    Hyperpar[facility]roidism ([facility] 12/10/2019    Anemia due to chronic kidney disease, on chronic dialysis ([facility] 12/10/2019    Uncontrolled type II diabetes mellitus ([facility] 12/09/2019    CKD (chronic kidney disease) stage 5, GFR less than 15 ml/min ([facility] 11/23/2018    Chest pain 05/10/2018    HTN (hypertension) 12/20/2012     CONSULTATIONS:  IP CONSULT TO CARDIOLOGY  IP CONSULT TO NEPHROLOGY  IP CONSULT TO GENERAL SURGERY    Excerpted HPI from H&P of Gisele Martinez MD:  Stephan Shaver is a 47 y.o.  female who presents with above complaint. Pt had AV fistula placed on Friday by Dr Davey Pineda. She was DC home yesterday. She came back to ED today c/o LUE pain with radiation to the back. While in ED she also started with CP. Cp is mid sternal, intermittent, 5/10, non radiating. She admits to some nausea, no vomiting. No diaphoresis. Pt appears drowsy now s/p dilaudid in ED. She was able to answer my questions appropriately thou. No h/o CAD in the past. Cardiology consulted from ED, recommended admission to cycle troponin, start ASA, no AC for now. \"  ______________________________________________________________________  DISCHARGE SUMMARY/HOSPITAL COURSE:  for full details see H&P, daily progress notes, labs, consult notes.    Colonel Mcdaniels y.o. female was admitted to [facility] on 12/8/2019 and treated for the following medical complaints:     Chest pain, POA; r/o ACS  Elevated troponin  HTN  · Troponin 0.62->0.34  · CXR no acute cardiopulmonary process   · ECHO:  Left Ventricle: Normal cavity size, wall thickness and systolic function (ejection fraction normal). Estimated left ventricular ejection fraction is 66 - 70%. Calculated left ventricular ejection fraction is 70%. Biplane method used to measure ejection fraction. No regional wall motion abnormality noted. Normal left ventricular strain. Mitral Valve: Mitral annular calcification. Trace mitral valve regurgitation is present. · Appreciate cardiology input  · Stress test negative for infarct   · Continue ASA and statin  · Continue BB and nitro     S/p AV fistula placement (12/06/2019)  · Appreciate Dr. Stew Scruggs input  · Pain management PRN  · Bowel regimen   · Follow-up OP     Insulin dependent type 2 diabetes with hyperglycemia   · Hgb A1c 8.8  · BS AC&HS  · SSI  · Diabetic educator consulted      EZEKIEL on CKD stage V  Hyponatremia- resolved  · Cr 6.30; improved from 6.54 yesterday  ·   · Recieved IVF   · Received IV bicarb and PO   · Appreciate nephrology input   · Follow-up with Nephrology OP     Hyperparathyroidism  · Continue Calcitrol      Anemia of chronic kidney disease  · Hgb 8.4; improved from 8.1 yesterday   · Monitor  · No signs of active bleeding     Patient's plan of care has been reviewed with them. Patient and/or family have verbally conveyed their understanding and agreement of the patient's signs, symptoms, diagnosis, treatment and prognosis and additionally agree to follow up as recommended or return to [facility] should their condition change prior to follow-up. Discharge instructions have also been provided to the patient with some educational information regarding their diagnosis as well a list of reasons why they would want to return to the office prior to their follow-up appointment should their condition change. [facility] to avoid frequent ED visits. [facility] can treat; pains, sprains, cuts, wounds, high fevers, upper respiratory infections and much more.   There medical team is equipped with all the tools necessary to provide advanced medical care in the comfort of you home, workplace, or location of need. The medical team consists of doctors, nurse practitioners, and EMTs. [facility] is available 7 days per week 9 am to 9 pm.   Request care by calling [phone] or by going online at [phone] [employer]  _______________________________________________________________________  Patient seen and examined by me on discharge day. Pertinent Findings:  Gen:    Not in distress  Chest: Clear lungs  CVS:   Regular rhythm. No edema  Abd:  Soft, not distended, not tender  Neuro:  Alert, oriented  _______________________________________________________________________  DISCHARGE MEDICATIONS:   Current Discharge Medication List      START taking these medications    Details   oxyCODONE IR (ROXICODONE) 5 mg immediate release tablet Take 1 Tab by mouth every four (4) hours as needed for Pain for up to 5 days. Max Daily Amount: 30 mg.  Qty: 20 Tab, Refills: 0    Associated Diagnoses: Pain at surgical incision         CONTINUE these medications which have CHANGED    Details   metoprolol tartrate (LOPRESSOR) 25 mg tablet Take 1 Tab by mouth two (2) times a day. Qty: 60 Tab, Refills: 0         CONTINUE these medications which have NOT CHANGED    Details   calcitRIOL (ROCALTROL) 0.25 mcg capsule Take 0.25 mcg by mouth daily. acetaminophen (TYLENOL) 500 mg tablet acetaminophen 500 mg      insulin degludec (TRESIBA U-100 INSULIN SC) 25 Units by SubCUTAneous route daily. ondansetron (ZOFRAN ODT) 4 mg disintegrating tablet Take 1 Tab by mouth every eight (8) hours as needed for Nausea. Qty: 20 Tab, Refills: 0      insulin aspart U-100 (NOVOLOG) 100 unit/mL injection 5 Units by SubCUTAneous route Before breakfast, lunch, and dinner. aspirin 81 mg chewable tablet Take 1 Tab by mouth daily. Qty: 30 Tab, Refills: 1      cloNIDine HCl (CATAPRES) 0.1 mg tablet Take 1 Tab by mouth two (2) times a day.   Qty: 60 Tab, Refills: 1      sodium bicarbonate 650 mg tablet Take 650 mg by mouth two (2) times a day. atorvastatin (LIPITOR) 20 mg tablet Take 20 mg by mouth nightly. Patient Follow Up Instructions: Activity: Activity as tolerated  Diet: Renal Diet  Wound Care: As directed    Follow-up with PCP in 1 week. Follow-up Information     Follow up With Specialties Details Why Carolyn Bowen MD Nephrology Go on 1/7/2020 Nephrology appointment at 11:15AM. Please call the office if any issues arise.   [address] [place] [address]      Mandy Caban NP Nurse Practitioner Go on 12/23/2019 For hospital follow up appointment at 2:15PM  [address] [place] [address]  [phone]      Sy Reyes MD General Surgery, Vascular Surgery Go on 12/19/2019 Surgery follow-up appointment at 3:00PM [address] [place] [address]  [phone]          ________________________________________________________________    Risk of deterioration: Moderate    Condition at Discharge:  Stable  __________________________________________________________________    Disposition  Home with family, no needs    ____________________________________________________________________    Code Status: Full Code  ___________________________________________________________________      Total time in minutes spent coordinating this discharge (includes going over instructions, follow-up, prescriptions, and preparing report for sign off to her PCP) :  31 minutes    Signed:  Noel Espinoza NP

## 2019-12-11 NOTE — PROGRESS NOTES
Discharge teaching done at bedside. Medication teaching discussed. This writer went over follow up appointments with patient. Patient verbalized understanding. Patient has no further questions. PIV removed, cath tip intact. Patient ready for discharge.  Lyft ride to be in the front lobby @ 12:30

## 2019-12-14 ENCOUNTER — APPOINTMENT (OUTPATIENT)
Dept: CT IMAGING | Age: 54
End: 2019-12-14
Attending: EMERGENCY MEDICINE
Payer: MEDICARE

## 2019-12-14 ENCOUNTER — HOSPITAL ENCOUNTER (EMERGENCY)
Age: 54
Discharge: HOME | End: 2019-12-14
Attending: EMERGENCY MEDICINE
Payer: MEDICARE

## 2019-12-14 VITALS
RESPIRATION RATE: 18 BRPM | WEIGHT: 127 LBS | BODY MASS INDEX: 21.16 KG/M2 | HEART RATE: 87 BPM | TEMPERATURE: 97.9 F | HEIGHT: 65 IN | DIASTOLIC BLOOD PRESSURE: 73 MMHG | SYSTOLIC BLOOD PRESSURE: 120 MMHG | OXYGEN SATURATION: 95 %

## 2019-12-14 DIAGNOSIS — H54.7 VISION DECREASED: Primary | ICD-10-CM

## 2019-12-14 LAB
ALBUMIN SERPL-MCNC: 3.2 G/DL (ref 3.5–5)
ALBUMIN/GLOB SERPL: 0.8 {RATIO} (ref 1.1–2.2)
ALP SERPL-CCNC: 139 U/L (ref 45–117)
ALT SERPL-CCNC: 18 U/L (ref 12–78)
AMPHET UR QL SCN: NEGATIVE
ANION GAP SERPL CALC-SCNC: 7 MMOL/L (ref 5–15)
AST SERPL-CCNC: 35 U/L (ref 15–37)
BARBITURATES UR QL SCN: NEGATIVE
BASOPHILS # BLD: 0 K/UL (ref 0–0.1)
BASOPHILS NFR BLD: 0 % (ref 0–1)
BENZODIAZ UR QL: NEGATIVE
BILIRUB SERPL-MCNC: 0.5 MG/DL (ref 0.2–1)
BUN SERPL-MCNC: 79 MG/DL (ref 6–20)
BUN/CREAT SERPL: 13 (ref 12–20)
CALCIUM SERPL-MCNC: 9.6 MG/DL (ref 8.5–10.1)
CANNABINOIDS UR QL SCN: NEGATIVE
CHLORIDE SERPL-SCNC: 101 MMOL/L (ref 97–108)
CO2 SERPL-SCNC: 28 MMOL/L (ref 21–32)
COCAINE UR QL SCN: NEGATIVE
CREAT SERPL-MCNC: 6.02 MG/DL (ref 0.55–1.02)
DIFFERENTIAL METHOD BLD: ABNORMAL
DRUG SCRN COMMENT,DRGCM: NORMAL
EOSINOPHIL # BLD: 0.2 K/UL (ref 0–0.4)
EOSINOPHIL NFR BLD: 3 % (ref 0–7)
ERYTHROCYTE [DISTWIDTH] IN BLOOD BY AUTOMATED COUNT: 14.5 % (ref 11.5–14.5)
ERYTHROCYTE [SEDIMENTATION RATE] IN BLOOD: 41 MM/HR (ref 0–30)
GLOBULIN SER CALC-MCNC: 4 G/DL (ref 2–4)
GLUCOSE SERPL-MCNC: 234 MG/DL (ref 65–100)
HCT VFR BLD AUTO: 31.6 % (ref 35–47)
HGB BLD-MCNC: 10 G/DL (ref 11.5–16)
IMM GRANULOCYTES # BLD AUTO: 0.1 K/UL (ref 0–0.04)
IMM GRANULOCYTES NFR BLD AUTO: 1 % (ref 0–0.5)
INR PPP: 1 (ref 0.9–1.1)
LYMPHOCYTES # BLD: 1.9 K/UL (ref 0.8–3.5)
LYMPHOCYTES NFR BLD: 27 % (ref 12–49)
MCH RBC QN AUTO: 29.5 PG (ref 26–34)
MCHC RBC AUTO-ENTMCNC: 31.6 G/DL (ref 30–36.5)
MCV RBC AUTO: 93.2 FL (ref 80–99)
METHADONE UR QL: NEGATIVE
MONOCYTES # BLD: 0.8 K/UL (ref 0–1)
MONOCYTES NFR BLD: 12 % (ref 5–13)
NEUTS SEG # BLD: 4 K/UL (ref 1.8–8)
NEUTS SEG NFR BLD: 57 % (ref 32–75)
NRBC # BLD: 0.02 K/UL (ref 0–0.01)
NRBC BLD-RTO: 0.3 PER 100 WBC
OPIATES UR QL: NEGATIVE
PCP UR QL: NEGATIVE
PLATELET # BLD AUTO: 251 K/UL (ref 150–400)
PMV BLD AUTO: 9.1 FL (ref 8.9–12.9)
POTASSIUM SERPL-SCNC: 4.4 MMOL/L (ref 3.5–5.1)
PROT SERPL-MCNC: 7.2 G/DL (ref 6.4–8.2)
PROTHROMBIN TIME: 10.1 SEC (ref 9–11.1)
RBC # BLD AUTO: 3.39 M/UL (ref 3.8–5.2)
SODIUM SERPL-SCNC: 136 MMOL/L (ref 136–145)
WBC # BLD AUTO: 7 K/UL (ref 3.6–11)

## 2019-12-14 PROCEDURE — 70450 CT HEAD/BRAIN W/O DYE: CPT

## 2019-12-14 PROCEDURE — 85025 COMPLETE CBC W/AUTO DIFF WBC: CPT

## 2019-12-14 PROCEDURE — 85610 PROTHROMBIN TIME: CPT

## 2019-12-14 PROCEDURE — 85652 RBC SED RATE AUTOMATED: CPT

## 2019-12-14 PROCEDURE — 36415 COLL VENOUS BLD VENIPUNCTURE: CPT

## 2019-12-14 PROCEDURE — 80053 COMPREHEN METABOLIC PANEL: CPT

## 2019-12-14 PROCEDURE — 93005 ELECTROCARDIOGRAM TRACING: CPT

## 2019-12-14 PROCEDURE — 99284 EMERGENCY DEPT VISIT MOD MDM: CPT

## 2019-12-14 PROCEDURE — 74011250637 HC RX REV CODE- 250/637: Performed by: EMERGENCY MEDICINE

## 2019-12-14 PROCEDURE — 80307 DRUG TEST PRSMV CHEM ANLYZR: CPT

## 2019-12-14 RX ORDER — METOPROLOL TARTRATE 25 MG/1
25 TABLET, FILM COATED ORAL
Status: COMPLETED | OUTPATIENT
Start: 2019-12-14 | End: 2019-12-14

## 2019-12-14 RX ORDER — CLONIDINE HYDROCHLORIDE 0.1 MG/1
0.1 TABLET ORAL
Status: COMPLETED | OUTPATIENT
Start: 2019-12-14 | End: 2019-12-14

## 2019-12-14 RX ADMIN — CLONIDINE HYDROCHLORIDE 0.1 MG: 0.1 TABLET ORAL at 13:20

## 2019-12-14 RX ADMIN — METOPROLOL TARTRATE 25 MG: 25 TABLET ORAL at 13:20

## 2019-12-14 NOTE — ED PROVIDER NOTES
EMERGENCY DEPARTMENT HISTORY AND PHYSICAL EXAM          Date: 12/14/2019  Patient Name: Rose Thao    History of Presenting Illness     Chief Complaint   Patient presents with    Vision Change     The patient presents to the ED with complaints of \"floaters\" in the right eye for the past two days. History Provided By: Patient    HPI: Rose Thao is a 47 y.o. female, pmhx hyperlipidemia, alcoholic pancreatitis, diabetes, malignant hypertension, CKD stage V, gastroparesis, who presents ambulatory to the ED c/o vision changes    Patient explains that she is been doing okay but has had some left arm numbness since placing of her fistula last week. She notes for the past 2 days she has been having some spots in her vision which are decreasing the vision from her right eye. She denies any headache or dizziness associated with this or other numbness or weakness. She further denies any chest pain, shortness of breath, nausea, vomiting, diarrhea, fevers or chills. She notes that she did not take her medications today she just woke up and came to the hospital.  She has been checking her blood sugars recently and they have been around 100. PCP: Jeff Slater NP    Allergies: No known medication allergies  Social Hx: -tobacco, + past abuse history EtOH, -Illicit Drugs    There are no other complaints, changes, or physical findings at this time. Current Outpatient Medications   Medication Sig Dispense Refill    metoprolol tartrate (LOPRESSOR) 25 mg tablet Take 1 Tab by mouth two (2) times a day. 60 Tab 0    calcitRIOL (ROCALTROL) 0.25 mcg capsule Take 0.25 mcg by mouth daily.  acetaminophen (TYLENOL) 500 mg tablet acetaminophen 500 mg      insulin degludec (TRESIBA U-100 INSULIN SC) 25 Units by SubCUTAneous route daily.  ondansetron (ZOFRAN ODT) 4 mg disintegrating tablet Take 1 Tab by mouth every eight (8) hours as needed for Nausea.  20 Tab 0    insulin aspart U-100 (NOVOLOG) 100 unit/mL injection 5 Units by SubCUTAneous route Before breakfast, lunch, and dinner.  aspirin 81 mg chewable tablet Take 1 Tab by mouth daily. 30 Tab 1    cloNIDine HCl (CATAPRES) 0.1 mg tablet Take 1 Tab by mouth two (2) times a day. 60 Tab 1    sodium bicarbonate 650 mg tablet Take 650 mg by mouth two (2) times a day.  atorvastatin (LIPITOR) 20 mg tablet Take 20 mg by mouth nightly.          Past History     Past Medical History:  Past Medical History:   Diagnosis Date    Abdominal pain 11/18/2013    Abdominal pain, LUQ (left upper quadrant) 6/7/2012    Back pain, lumbosacral 6/24/2012    Acute on chronic      C. difficile colitis 6/2012    Chronic kidney disease     Stage V- no dialysis yet    Chronic low back pain     Chronic pain     back & left leg    Constipation     Diabetes ([facility]     A1c 8.2 3/2012    DKA (diabetic ketoacidoses) ([facility] 7/10/2018    Flank pain 4/14/2015    Gastroparesis 6/7/2012    Hep C w/o coma, chronic (HCC)     Hyperlipemia     Hypertension     Hyponatremia 11/18/2013    Intractable abdominal pain 4/21/2012    Lower urinary tract infectious disease 11/18/2013    Lumbar disc disease     Migraines     Nausea & vomiting 3/16/2012    Pancreatitis 8710    alcoholic    UTI (lower urinary tract infection) 6/20012       Past Surgical History:  Past Surgical History:   Procedure Laterality Date    HX LUMBAR DISKECTOMY  1980's    HX ORTHOPAEDIC      lumbar sprain; back surgery    HX UROLOGICAL  2014    kidney stone removed    IN COLONOSCOPY FLX DX W/COLLJ SPEC WHEN PFRMD  11/12/2012         VASCULAR SURGERY PROCEDURE UNLIST  08/02/2019    insertion of left AVF       Family History:  Family History   Problem Relation Age of Onset    Diabetes Mother     Kidney Disease Mother     Diabetes Sister     Diabetes Father     Diabetes Brother        Social History:  Social History     Tobacco Use    Smoking status: Never Smoker    Smokeless tobacco: Never Used Substance Use Topics    Alcohol use: No     Comment: Quit few months ago, hx of abuse    Drug use: Yes     Types: Prescription, OTC       Allergies:  No Known Allergies      Review of Systems   Review of Systems   Constitutional: Negative for activity change, appetite change, chills, fever and unexpected weight change. HENT: Negative for congestion. Eyes: Positive for visual disturbance. Negative for photophobia, pain, discharge, redness and itching. Respiratory: Negative for cough and shortness of breath. Cardiovascular: Negative for chest pain. Gastrointestinal: Negative for abdominal pain, diarrhea, nausea and vomiting. Genitourinary: Negative for dysuria. Musculoskeletal: Negative for back pain. Skin: Negative for rash. Neurological: Positive for numbness (Left arm after AV graft/fistula placed). Negative for dizziness, tremors, seizures, syncope, facial asymmetry, speech difficulty, weakness, light-headedness and headaches. Physical Exam   Physical Exam  Vitals signs and nursing note reviewed. Constitutional:       Appearance: She is well-developed. She is not diaphoretic. Comments: Middle-aged female with a flattened affect who appears much older than stated age currently in minimal distress with elevated blood pressure. HENT:      Head: Normocephalic and atraumatic. Eyes:      General:         Right eye: No discharge. Left eye: No discharge. Extraocular Movements: Extraocular movements intact. Conjunctiva/sclera: Conjunctivae normal.      Comments: Pupils pinpoint currently with a normal funduscopy of the left eye, however, unable to visualize the posterior chamber of the right eye   Neck:      Musculoskeletal: Normal range of motion and neck supple. Cardiovascular:      Rate and Rhythm: Normal rate and regular rhythm. Heart sounds: Murmur present. No friction rub. No gallop.        Comments: Good thrill left upper extremity fistula  Pulmonary: Effort: Pulmonary effort is normal. No respiratory distress. Breath sounds: Normal breath sounds. No wheezing or rales. Abdominal:      General: Bowel sounds are normal. There is no distension. Palpations: Abdomen is soft. Tenderness: There is no tenderness. Musculoskeletal: Normal range of motion. Skin:     General: Skin is warm and dry. Findings: No rash. Comments: Left upper extremity incision sites with staples in place appeared to be well-healing   Neurological:      Mental Status: She is alert and oriented to person, place, and time. Cranial Nerves: No cranial nerve deficit. Motor: No abnormal muscle tone. Diagnostic Study Results     Labs -   No results found for this or any previous visit (from the past 12 hour(s)). Radiologic Studies -   CT HEAD WO CONT   Final Result   IMPRESSION:   1. No evidence of acute intracranial abnormality. CT Results  (Last 48 hours)    None        CXR Results  (Last 48 hours)    None            Medical Decision Making   I am the first provider for this patient. I reviewed the vital signs, available nursing notes, past medical history, past surgical history, family history and social history. Vital Signs-Reviewed the patient's vital signs. No data found. Pulse Oximetry Analysis - 100% on RA    Cardiac Monitor:   Rate: 90bpm  Rhythm: Normal Sinus Rhythm      Records Reviewed: Nursing Notes, Old Medical Records, Previous Radiology Studies, Previous Laboratory Studies and Management plan    Provider Notes (Medical Decision Making):   MDM: Middle-aged female with significant past medical history presenting with concern for possible retinal hemorrhage, posterior vitreous hemorrhage, subacute stroke. Given consistent symptoms for 48 hours TIA order set has been used. ED Course:   Initial assessment performed.  The patients presenting problems have been discussed, and they are in agreement with the care plan formulated and outlined with them. I have encouraged them to ask questions as they arise throughout their visit. EKG interpretation: (Preliminary)  Rhythm: Normal sinus rhythm with a regular rate at 91 bpm; normal axis; normal KS; normal QRS; normal EKG. PROGRESS NOTE:  3:30 PM  Pt sleeping with stable vital signs. Discussed results of her CT and lab findings and recommend she follow-up with ophthalmology. Discharge note:  Pt re-evaluated and noted to be feeling better, ready for discharge. Updated pt on all final lab findings. Will follow up as instructed. All questions have been answered, pt voiced understanding and agreement with plan. Specific return precautions provided as well as instructions to return to the ED should sx worsen at any time. Vital signs stable for discharge. Critical Care Time:   0      Diagnosis     Clinical Impression:   1. Vision decreased        PLAN:  1. Discharge Medication List as of 12/14/2019  4:04 PM        2. Follow-up Information     Follow up With Specialties Details Why Contact Info    [address] [place] [address] Ophthalmology Schedule an appointment as soon as possible for a visit  [address] [facility] [address]  [phone]    [facility] EMERGENCY DEPT Emergency Medicine  If symptoms worsen [address]  [address] [facility]  [phone]        Return to ED if worse     Disposition:  home    Please note, this dictation was completed with ReplySend, the computer voice recognition software. Quite often unanticipated grammatical, syntax, homophones, and other interpretive errors are inadvertently transcribed by the computer software. Please disregard these errors. Please excuse any errors that have escaped final proof reading.

## 2019-12-14 NOTE — ED NOTES
I have reviewed discharge instructions with the patient. The patient verbalized understanding.  Pt wheeled to lobby with so, no distress noted, no needs at time

## 2019-12-14 NOTE — DISCHARGE INSTRUCTIONS
You were seen in the ER for right-sided decreased vision for the past 2 days. Call an ophthalmologist Monday morning to get an urgent evaluation so that they can look at the back of your eye as this may be related to your diabetes. It does not appear that you have had a stroke currently. If your symptoms worsen, return to the emergency room for further management. Patient Education        Reduced Vision: Care Instructions  Your Care Instructions    Reduced vision can be caused by many things. These include macular degeneration and glaucoma. When you can't see as well, daily life can be more challenging. But you can do some things to stay independent and keep doing the activities you enjoy. Follow-up care is a key part of your treatment and safety. Be sure to make and go to all appointments, and call your doctor if you are having problems. It's also a good idea to know your test results and keep a list of the medicines you take. How can you care for yourself at home? Use lighting  · Point lighting at what you want to see. Don't point it at your eyes. · Add lamps where you need extra lighting. · Use curtains or shades to adjust how much natural light there is. · Use good lighting in places where you could easily fall. These include entries and stairways. Use labels  · Label things that are hard to recognize or that could be confusing. This might include medicines, spices, and foods. Use black letters on a white background. Or you can color-code the items. · Birda Moll the positions of the temperature settings you use the most on your stove and oven. Also edgar the \"on\" and \"off\" positions. · Edgar the water temperatures you use on faucets in the kitchen and bathroom. To prevent overfilling a sink or bathtub, use waterproof markers or tape to edgar the water level you want. Avoid falls in your home  · Replace or remove any worn carpeting. Tape down or remove area rugs. · Do not wax your floors.  Use nonskid, nonglare  on smooth floors. · Remove electrical cords from areas where you need to walk. Or tape them down so you won't trip on them. · Make sure furniture doesn't stick out into areas where you walk. Keep chairs pushed in under tables and desks. Keep all drawers closed. · Keep doors fully opened or fully closed. Don't leave them group home open or shut. · Use handrails on stairways and ramps. Make sure that they go beyond the top and bottom steps. Then you won't stumble if you miss a step. Use helpful technology  · Use a magnifying lens. You can buy ones that you hold. Or you can buy ones that attach to glasses. Some have lights built in.  · If your budget allows, you may want to think about a video magnifier system. These systems can make print, pictures, or other items bigger on a screen. · If you have a computer:  ? Try to adjust the display. You can often change how big the text and pictures appear. Then they will be easier to see and read. ? You may want to try special software. Some software can recognize spoken commands or change dictated speech into text. Other software allows computers to speak text and read documents. · Use large-print items. These include books, newspapers, magazines, and medicine labels. You can also listen to recordings of books. · Think about using devices made for people with low vision. Examples are clocks and watches that announce the time. There are also clocks, telephones, and calculators with extra-large buttons. Be safe while you stay active  · Ask your doctor what physical activities are safe for you. If you bend, lift things, or move fast, it may affect your health or vision. · Ask a friend to read you the instructions for a new exercise and to check your technique. · Walk with someone who can help look for things that may be a danger. · If you swim laps, use a pool that has ropes between the lanes. When should you call for help?   Watch closely for changes in your health, and be sure to contact your doctor if:    · You have vision changes. Where can you learn more? Go to http://nery-peña.info/. Enter J991 in the search box to learn more about \"Reduced Vision: Care Instructions. \"  Current as of: May 5, 2019  Content Version: 12.2  © 5688-8670 [employer]. Care instructions adapted under license by [employer] (which disclaims liability or warranty for this information). If you have questions about a medical condition or this instruction, always ask your healthcare professional. [address] any warranty or liability for your use of this information.

## 2019-12-15 LAB
ATRIAL RATE: 91 BPM
CALCULATED P AXIS, ECG09: 56 DEGREES
CALCULATED R AXIS, ECG10: 28 DEGREES
CALCULATED T AXIS, ECG11: 61 DEGREES
DIAGNOSIS, 93000: NORMAL
P-R INTERVAL, ECG05: 158 MS
Q-T INTERVAL, ECG07: 352 MS
QRS DURATION, ECG06: 68 MS
QTC CALCULATION (BEZET), ECG08: 432 MS
VENTRICULAR RATE, ECG03: 91 BPM

## 2019-12-19 ENCOUNTER — OFFICE VISIT (OUTPATIENT)
Dept: SURGERY | Age: 54
End: 2019-12-19

## 2019-12-19 VITALS
OXYGEN SATURATION: 100 % | RESPIRATION RATE: 20 BRPM | HEART RATE: 82 BPM | BODY MASS INDEX: 21.66 KG/M2 | WEIGHT: 130 LBS | HEIGHT: 65 IN | SYSTOLIC BLOOD PRESSURE: 104 MMHG | DIASTOLIC BLOOD PRESSURE: 69 MMHG | TEMPERATURE: 98 F

## 2019-12-19 DIAGNOSIS — Z09 POSTOPERATIVE EXAMINATION: Primary | ICD-10-CM

## 2019-12-19 RX ORDER — OXYCODONE HYDROCHLORIDE 5 MG/1
5 TABLET ORAL
COMMUNITY
End: 2021-03-02

## 2019-12-19 NOTE — PROGRESS NOTES
Surgery    The patient is s/p creation of transposed AV fistula left arm on 12/6/2019. No complaints are voiced. She had some left hand tingling in the hospital post op and BP noted to be running lower than normal.  BP meds decreased. On examination, there is a palpable thrill in the AV fistula. There is a trace radial pulse at the wrist.  Arm edema is minimal.    Every other staple was removed. BP systolic 331 today in the office. She is still on clonidine bid. I have discussed with Dr Simran Gilbert  -  She will review BP meds at her next appointment. The patient is not presently on dialysis. The patient will follow up in 10 days.     Lindsey Marino MD

## 2019-12-19 NOTE — PROGRESS NOTES
Chief Complaint   Patient presents with    Post OP Follow Up     Creation of transposed AV fistula left arm 12/6/19

## 2019-12-24 ENCOUNTER — HOSPITAL ENCOUNTER (EMERGENCY)
Age: 54
Discharge: HOME | End: 2019-12-24
Attending: EMERGENCY MEDICINE | Admitting: EMERGENCY MEDICINE
Payer: MEDICARE

## 2019-12-24 VITALS
HEIGHT: 65 IN | SYSTOLIC BLOOD PRESSURE: 150 MMHG | HEART RATE: 93 BPM | WEIGHT: 127 LBS | BODY MASS INDEX: 21.16 KG/M2 | OXYGEN SATURATION: 100 % | TEMPERATURE: 98.3 F | DIASTOLIC BLOOD PRESSURE: 77 MMHG | RESPIRATION RATE: 18 BRPM

## 2019-12-24 DIAGNOSIS — M79.604 BILATERAL LEG PAIN: Primary | ICD-10-CM

## 2019-12-24 DIAGNOSIS — R10.9 RIGHT FLANK PAIN: ICD-10-CM

## 2019-12-24 DIAGNOSIS — Z87.448 HISTORY OF CHRONIC KIDNEY DISEASE: ICD-10-CM

## 2019-12-24 DIAGNOSIS — M79.605 BILATERAL LEG PAIN: Primary | ICD-10-CM

## 2019-12-24 LAB
ALBUMIN SERPL-MCNC: 3.6 G/DL (ref 3.5–5)
ALBUMIN/GLOB SERPL: 0.8 {RATIO} (ref 1.1–2.2)
ALP SERPL-CCNC: 157 U/L (ref 45–117)
ALT SERPL-CCNC: 24 U/L (ref 12–78)
ANION GAP SERPL CALC-SCNC: 8 MMOL/L (ref 5–15)
APPEARANCE UR: CLEAR
AST SERPL-CCNC: 26 U/L (ref 15–37)
BACTERIA URNS QL MICRO: NEGATIVE /HPF
BASOPHILS # BLD: 0 K/UL (ref 0–0.1)
BASOPHILS NFR BLD: 0 % (ref 0–1)
BILIRUB SERPL-MCNC: 0.4 MG/DL (ref 0.2–1)
BILIRUB UR QL: NEGATIVE
BUN SERPL-MCNC: 63 MG/DL (ref 6–20)
BUN/CREAT SERPL: 11 (ref 12–20)
CALCIUM SERPL-MCNC: 10 MG/DL (ref 8.5–10.1)
CHLORIDE SERPL-SCNC: 99 MMOL/L (ref 97–108)
CO2 SERPL-SCNC: 28 MMOL/L (ref 21–32)
COLOR UR: ABNORMAL
CREAT SERPL-MCNC: 5.72 MG/DL (ref 0.55–1.02)
DIFFERENTIAL METHOD BLD: ABNORMAL
EOSINOPHIL # BLD: 0.3 K/UL (ref 0–0.4)
EOSINOPHIL NFR BLD: 4 % (ref 0–7)
EPITH CASTS URNS QL MICRO: ABNORMAL /LPF
ERYTHROCYTE [DISTWIDTH] IN BLOOD BY AUTOMATED COUNT: 14.8 % (ref 11.5–14.5)
GLOBULIN SER CALC-MCNC: 4.5 G/DL (ref 2–4)
GLUCOSE SERPL-MCNC: 122 MG/DL (ref 65–100)
GLUCOSE UR STRIP.AUTO-MCNC: 250 MG/DL
HCT VFR BLD AUTO: 35.1 % (ref 35–47)
HGB BLD-MCNC: 10.9 G/DL (ref 11.5–16)
HGB UR QL STRIP: ABNORMAL
HYALINE CASTS URNS QL MICRO: ABNORMAL /LPF (ref 0–5)
IMM GRANULOCYTES # BLD AUTO: 0 K/UL (ref 0–0.04)
IMM GRANULOCYTES NFR BLD AUTO: 0 % (ref 0–0.5)
KETONES UR QL STRIP.AUTO: NEGATIVE MG/DL
LEUKOCYTE ESTERASE UR QL STRIP.AUTO: NEGATIVE
LIPASE SERPL-CCNC: 126 U/L (ref 73–393)
LYMPHOCYTES # BLD: 2.8 K/UL (ref 0.8–3.5)
LYMPHOCYTES NFR BLD: 37 % (ref 12–49)
MAGNESIUM SERPL-MCNC: 2.1 MG/DL (ref 1.6–2.4)
MCH RBC QN AUTO: 29.1 PG (ref 26–34)
MCHC RBC AUTO-ENTMCNC: 31.1 G/DL (ref 30–36.5)
MCV RBC AUTO: 93.6 FL (ref 80–99)
MONOCYTES # BLD: 0.6 K/UL (ref 0–1)
MONOCYTES NFR BLD: 9 % (ref 5–13)
NEUTS SEG # BLD: 3.8 K/UL (ref 1.8–8)
NEUTS SEG NFR BLD: 50 % (ref 32–75)
NITRITE UR QL STRIP.AUTO: NEGATIVE
NRBC # BLD: 0 K/UL (ref 0–0.01)
NRBC BLD-RTO: 0 PER 100 WBC
PH UR STRIP: 6.5 [PH] (ref 5–8)
PLATELET # BLD AUTO: 309 K/UL (ref 150–400)
PMV BLD AUTO: 9.5 FL (ref 8.9–12.9)
POTASSIUM SERPL-SCNC: 3.8 MMOL/L (ref 3.5–5.1)
PROT SERPL-MCNC: 8.1 G/DL (ref 6.4–8.2)
PROT UR STRIP-MCNC: 100 MG/DL
RBC # BLD AUTO: 3.75 M/UL (ref 3.8–5.2)
RBC #/AREA URNS HPF: ABNORMAL /HPF (ref 0–5)
SODIUM SERPL-SCNC: 135 MMOL/L (ref 136–145)
SP GR UR REFRACTOMETRY: 1.01 (ref 1–1.03)
UROBILINOGEN UR QL STRIP.AUTO: 0.2 EU/DL (ref 0.2–1)
WBC # BLD AUTO: 7.5 K/UL (ref 3.6–11)
WBC URNS QL MICRO: ABNORMAL /HPF (ref 0–4)

## 2019-12-24 PROCEDURE — 74011000250 HC RX REV CODE- 250: Performed by: EMERGENCY MEDICINE

## 2019-12-24 PROCEDURE — 96374 THER/PROPH/DIAG INJ IV PUSH: CPT

## 2019-12-24 PROCEDURE — 81001 URINALYSIS AUTO W/SCOPE: CPT

## 2019-12-24 PROCEDURE — 99284 EMERGENCY DEPT VISIT MOD MDM: CPT

## 2019-12-24 PROCEDURE — 80053 COMPREHEN METABOLIC PANEL: CPT

## 2019-12-24 PROCEDURE — 83690 ASSAY OF LIPASE: CPT

## 2019-12-24 PROCEDURE — 74011250636 HC RX REV CODE- 250/636: Performed by: EMERGENCY MEDICINE

## 2019-12-24 PROCEDURE — 36415 COLL VENOUS BLD VENIPUNCTURE: CPT

## 2019-12-24 PROCEDURE — 83735 ASSAY OF MAGNESIUM: CPT

## 2019-12-24 PROCEDURE — 85025 COMPLETE CBC W/AUTO DIFF WBC: CPT

## 2019-12-24 RX ORDER — LIDOCAINE 4 G/100G
1 PATCH TOPICAL EVERY 24 HOURS
Status: DISCONTINUED | OUTPATIENT
Start: 2019-12-24 | End: 2019-12-24 | Stop reason: HOSPADM

## 2019-12-24 RX ORDER — ONDANSETRON 2 MG/ML
4 INJECTION INTRAMUSCULAR; INTRAVENOUS
Status: COMPLETED | OUTPATIENT
Start: 2019-12-24 | End: 2019-12-24

## 2019-12-24 RX ADMIN — SODIUM CHLORIDE 500 ML: 900 INJECTION, SOLUTION INTRAVENOUS at 06:39

## 2019-12-24 RX ADMIN — ONDANSETRON 4 MG: 2 INJECTION INTRAMUSCULAR; INTRAVENOUS at 06:39
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
## 2019-12-24 NOTE — ED NOTES
Report given to Jag Ziegler RN. They were informed of patient chief complaint, current status, orders completed (to include IV access/medications/radiology testing), outstanding orders that still need to be completed, and the treatment plan. Ensured no questions or concerns regarding the patient prior to departure.

## 2019-12-24 NOTE — ED TRIAGE NOTES
Patient presents to ED via ems from home with c/o Right flank pain and chronic leg pain. Patient states that the pain comes and goes. Patient denies any urinary symptoms. Patient denies cough, chest pain, SOB. Patient states the pain started tonight around 0500.  Patient denies pain radiating anywhere else besides right flank pain

## 2019-12-30 ENCOUNTER — OFFICE VISIT (OUTPATIENT)
Dept: SURGERY | Age: 54
End: 2019-12-30

## 2019-12-30 VITALS
WEIGHT: 129 LBS | OXYGEN SATURATION: 98 % | RESPIRATION RATE: 20 BRPM | SYSTOLIC BLOOD PRESSURE: 145 MMHG | TEMPERATURE: 98.7 F | HEIGHT: 65 IN | BODY MASS INDEX: 21.49 KG/M2 | DIASTOLIC BLOOD PRESSURE: 80 MMHG | HEART RATE: 98 BPM

## 2019-12-30 DIAGNOSIS — Z09 POSTOPERATIVE EXAMINATION: Primary | ICD-10-CM

## 2019-12-30 NOTE — PROGRESS NOTES
The patient is status post creation of transposed AV fistula left arm on 12/6/2019. The patient  has no complaints. No left hand symptoms.     /80. On examination there is a good thrill present. The outflow vein is readily palpable. The hand is warm.   There is a trace left radial pulse.     The last of the staples were removed.      The patient is not on dialysis.     The patient will see me in follow up in one month.     Final Diagnosis: Status post creation transposed AV fistula, post-operative visit.     CC:  Dr Martha Eaton

## 2019-12-30 NOTE — PROGRESS NOTES
Chief Complaint   Patient presents with    Surgical Follow-up     Creation of transposed AV fistula left arm 12/26/19     1. Have you been to the ER, urgent care clinic since your last visit? Hospitalized since your last visit? Yes When: 12/24/19 [facility] ER abd pain    2. Have you seen or consulted any other health care providers outside of the [address] [place] [address] since your last visit? Include any pap smears or colon screening.  NO

## 2020-01-02 ENCOUNTER — HOSPITAL ENCOUNTER (OUTPATIENT)
Dept: INFUSION THERAPY | Age: 55
Discharge: HOME | End: 2020-01-02
Payer: MEDICARE

## 2020-01-02 VITALS
HEART RATE: 83 BPM | OXYGEN SATURATION: 100 % | DIASTOLIC BLOOD PRESSURE: 80 MMHG | RESPIRATION RATE: 18 BRPM | SYSTOLIC BLOOD PRESSURE: 133 MMHG | WEIGHT: 130 LBS | TEMPERATURE: 97.6 F | BODY MASS INDEX: 21.63 KG/M2

## 2020-01-02 LAB
ALBUMIN SERPL-MCNC: 3.5 G/DL (ref 3.5–5)
ANION GAP SERPL CALC-SCNC: 12 MMOL/L (ref 5–15)
BASOPHILS # BLD: 0.1 K/UL (ref 0–0.1)
BASOPHILS NFR BLD: 1 % (ref 0–1)
BUN SERPL-MCNC: 81 MG/DL (ref 6–20)
BUN/CREAT SERPL: 15 (ref 12–20)
CALCIUM SERPL-MCNC: 10.4 MG/DL (ref 8.5–10.1)
CHLORIDE SERPL-SCNC: 98 MMOL/L (ref 97–108)
CO2 SERPL-SCNC: 24 MMOL/L (ref 21–32)
CREAT SERPL-MCNC: 5.41 MG/DL (ref 0.55–1.02)
DIFFERENTIAL METHOD BLD: ABNORMAL
EOSINOPHIL # BLD: 0.2 K/UL (ref 0–0.4)
EOSINOPHIL NFR BLD: 4 % (ref 0–7)
ERYTHROCYTE [DISTWIDTH] IN BLOOD BY AUTOMATED COUNT: 14.2 % (ref 11.5–14.5)
GLUCOSE SERPL-MCNC: 220 MG/DL (ref 65–100)
HCT VFR BLD AUTO: 33.9 % (ref 35–47)
HGB BLD-MCNC: 10.7 G/DL (ref 11.5–16)
IMM GRANULOCYTES # BLD AUTO: 0 K/UL (ref 0–0.04)
IMM GRANULOCYTES NFR BLD AUTO: 0 % (ref 0–0.5)
IRON SATN MFR SERPL: 38 % (ref 20–50)
IRON SERPL-MCNC: 112 UG/DL (ref 35–150)
LYMPHOCYTES # BLD: 2 K/UL (ref 0.8–3.5)
LYMPHOCYTES NFR BLD: 32 % (ref 12–49)
MCH RBC QN AUTO: 29.1 PG (ref 26–34)
MCHC RBC AUTO-ENTMCNC: 31.6 G/DL (ref 30–36.5)
MCV RBC AUTO: 92.1 FL (ref 80–99)
MONOCYTES # BLD: 0.5 K/UL (ref 0–1)
MONOCYTES NFR BLD: 8 % (ref 5–13)
NEUTS SEG # BLD: 3.6 K/UL (ref 1.8–8)
NEUTS SEG NFR BLD: 56 % (ref 32–75)
NRBC # BLD: 0 K/UL (ref 0–0.01)
NRBC BLD-RTO: 0 PER 100 WBC
PHOSPHATE SERPL-MCNC: 6.2 MG/DL (ref 2.6–4.7)
PLATELET # BLD AUTO: 305 K/UL (ref 150–400)
PMV BLD AUTO: 9.3 FL (ref 8.9–12.9)
POTASSIUM SERPL-SCNC: 3.9 MMOL/L (ref 3.5–5.1)
RBC # BLD AUTO: 3.68 M/UL (ref 3.8–5.2)
SODIUM SERPL-SCNC: 134 MMOL/L (ref 136–145)
TIBC SERPL-MCNC: 297 UG/DL (ref 250–450)
WBC # BLD AUTO: 6.5 K/UL (ref 3.6–11)

## 2020-01-02 PROCEDURE — 85025 COMPLETE CBC W/AUTO DIFF WBC: CPT

## 2020-01-02 PROCEDURE — 83540 ASSAY OF IRON: CPT

## 2020-01-02 PROCEDURE — 36415 COLL VENOUS BLD VENIPUNCTURE: CPT

## 2020-01-02 PROCEDURE — 80069 RENAL FUNCTION PANEL: CPT

## 2020-01-02 NOTE — PROGRESS NOTES
Pt arrived to [facility] ambulatory in no acute distress at 1145 for Retacrit.  Assessment unremarkable except. Patient Vitals for the past 12 hrs:   Temp Pulse Resp BP SpO2   01/02/20 1305 97.6 °F (36.4 °C) 83 18 133/80 100 %     Labs obtained, CBC, Renal panel, Iron profile. Recent Results (from the past 12 hour(s))   CBC WITH AUTOMATED DIFF    Collection Time: 01/02/20  1:08 PM   Result Value Ref Range    WBC 6.5 3.6 - 11.0 K/uL    RBC 3.68 (L) 3.80 - 5.20 M/uL    HGB 10.7 (L) 11.5 - 16.0 g/dL    HCT 33.9 (L) 35.0 - 47.0 %    MCV 92.1 80.0 - 99.0 FL    MCH 29.1 26.0 - 34.0 PG    MCHC 31.6 30.0 - 36.5 g/dL    RDW 14.2 11.5 - 14.5 %    PLATELET 970 670 - 410 K/uL    MPV 9.3 8.9 - 12.9 FL    NRBC 0.0 0  WBC    ABSOLUTE NRBC 0.00 0.00 - 0.01 K/uL    NEUTROPHILS 56 32 - 75 %    LYMPHOCYTES 32 12 - 49 %    MONOCYTES 8 5 - 13 %    EOSINOPHILS 4 0 - 7 %    BASOPHILS 1 0 - 1 %    IMMATURE GRANULOCYTES 0 0.0 - 0.5 %    ABS. NEUTROPHILS 3.6 1.8 - 8.0 K/UL    ABS. LYMPHOCYTES 2.0 0.8 - 3.5 K/UL    ABS. MONOCYTES 0.5 0.0 - 1.0 K/UL    ABS. EOSINOPHILS 0.2 0.0 - 0.4 K/UL    ABS. BASOPHILS 0.1 0.0 - 0.1 K/UL    ABS. IMM. GRANS. 0.0 0.00 - 0.04 K/UL    DF AUTOMATED     RENAL FUNCTION PANEL    Collection Time: 01/02/20  1:08 PM   Result Value Ref Range    Sodium 134 (L) 136 - 145 mmol/L    Potassium 3.9 3.5 - 5.1 mmol/L    Chloride 98 97 - 108 mmol/L    CO2 24 21 - 32 mmol/L    Anion gap 12 5 - 15 mmol/L    Glucose 220 (H) 65 - 100 mg/dL    BUN 81 (H) 6 - 20 MG/DL    Creatinine 5.41 (H) 0.55 - 1.02 MG/DL    BUN/Creatinine ratio 15 12 - 20      GFR est AA 10 (L) >60 ml/min/1.73m2    GFR est non-AA 8 (L) >60 ml/min/1.73m2    Calcium 10.4 (H) 8.5 - 10.1 MG/DL    Phosphorus 6.2 (H) 2.6 - 4.7 MG/DL    Albumin 3.5 3.5 - 5.0 g/dL     Hemoglobin result = 10.6, Retacrit held per order. Pt discharged ambulatory in no acute distress at 1300.     Next appointments:    Future Appointments   Date Time Provider Dianna Landaverde 1/20/2020 11:00 AM Maury Nicole MD [address] [facility]   1/27/2020  3:40 PM Graciela Whitaker MD [address] [facility]   1/30/2020  1:00 PM SHONNA FT CHAIR 2 [address] REG   2/27/2020  1:00 PM SHONNA FT CHAIR 2 [address] [facility]

## 2020-01-09 ENCOUNTER — HOSPITAL ENCOUNTER (EMERGENCY)
Age: 55
Discharge: HOME | End: 2020-01-09
Attending: EMERGENCY MEDICINE
Payer: MEDICARE

## 2020-01-09 VITALS
HEART RATE: 98 BPM | RESPIRATION RATE: 9 BRPM | DIASTOLIC BLOOD PRESSURE: 71 MMHG | WEIGHT: 130 LBS | BODY MASS INDEX: 21.66 KG/M2 | TEMPERATURE: 98.2 F | OXYGEN SATURATION: 99 % | HEIGHT: 65 IN | SYSTOLIC BLOOD PRESSURE: 157 MMHG

## 2020-01-09 DIAGNOSIS — R10.32 PAIN, ABDOMINAL, LLQ: Primary | ICD-10-CM

## 2020-01-09 LAB
ALBUMIN SERPL-MCNC: 3.7 G/DL (ref 3.5–5)
ALBUMIN/GLOB SERPL: 0.8 {RATIO} (ref 1.1–2.2)
ALP SERPL-CCNC: 163 U/L (ref 45–117)
ALT SERPL-CCNC: 28 U/L (ref 12–78)
ANION GAP SERPL CALC-SCNC: 10 MMOL/L (ref 5–15)
APPEARANCE UR: CLEAR
AST SERPL-CCNC: 21 U/L (ref 15–37)
BACTERIA URNS QL MICRO: NEGATIVE /HPF
BASOPHILS # BLD: 0 K/UL (ref 0–0.1)
BASOPHILS NFR BLD: 0 % (ref 0–1)
BILIRUB SERPL-MCNC: 0.4 MG/DL (ref 0.2–1)
BILIRUB UR QL: NEGATIVE
BUN SERPL-MCNC: 77 MG/DL (ref 6–20)
BUN/CREAT SERPL: 14 (ref 12–20)
CALCIUM SERPL-MCNC: 10.1 MG/DL (ref 8.5–10.1)
CHLORIDE SERPL-SCNC: 100 MMOL/L (ref 97–108)
CO2 SERPL-SCNC: 24 MMOL/L (ref 21–32)
COLOR UR: ABNORMAL
CREAT SERPL-MCNC: 5.54 MG/DL (ref 0.55–1.02)
DIFFERENTIAL METHOD BLD: ABNORMAL
EOSINOPHIL # BLD: 0.2 K/UL (ref 0–0.4)
EOSINOPHIL NFR BLD: 2 % (ref 0–7)
EPITH CASTS URNS QL MICRO: ABNORMAL /LPF
ERYTHROCYTE [DISTWIDTH] IN BLOOD BY AUTOMATED COUNT: 13.8 % (ref 11.5–14.5)
GLOBULIN SER CALC-MCNC: 4.6 G/DL (ref 2–4)
GLUCOSE SERPL-MCNC: 301 MG/DL (ref 65–100)
GLUCOSE UR STRIP.AUTO-MCNC: >1000 MG/DL
HCT VFR BLD AUTO: 34 % (ref 35–47)
HGB BLD-MCNC: 11.4 G/DL (ref 11.5–16)
HGB UR QL STRIP: ABNORMAL
IMM GRANULOCYTES # BLD AUTO: 0.1 K/UL (ref 0–0.04)
IMM GRANULOCYTES NFR BLD AUTO: 1 % (ref 0–0.5)
KETONES UR QL STRIP.AUTO: NEGATIVE MG/DL
LACTATE SERPL-SCNC: 1.6 MMOL/L (ref 0.4–2)
LEUKOCYTE ESTERASE UR QL STRIP.AUTO: NEGATIVE
LIPASE SERPL-CCNC: 301 U/L (ref 73–393)
LYMPHOCYTES # BLD: 1.8 K/UL (ref 0.8–3.5)
LYMPHOCYTES NFR BLD: 20 % (ref 12–49)
MCH RBC QN AUTO: 29.7 PG (ref 26–34)
MCHC RBC AUTO-ENTMCNC: 33.5 G/DL (ref 30–36.5)
MCV RBC AUTO: 88.5 FL (ref 80–99)
MONOCYTES # BLD: 0.5 K/UL (ref 0–1)
MONOCYTES NFR BLD: 6 % (ref 5–13)
NEUTS SEG # BLD: 6.3 K/UL (ref 1.8–8)
NEUTS SEG NFR BLD: 71 % (ref 32–75)
NITRITE UR QL STRIP.AUTO: NEGATIVE
NRBC # BLD: 0 K/UL (ref 0–0.01)
NRBC BLD-RTO: 0 PER 100 WBC
PH UR STRIP: 7 [PH] (ref 5–8)
PLATELET # BLD AUTO: 253 K/UL (ref 150–400)
PMV BLD AUTO: 9.1 FL (ref 8.9–12.9)
POTASSIUM SERPL-SCNC: 3.9 MMOL/L (ref 3.5–5.1)
PROT SERPL-MCNC: 8.3 G/DL (ref 6.4–8.2)
PROT UR STRIP-MCNC: 100 MG/DL
RBC # BLD AUTO: 3.84 M/UL (ref 3.8–5.2)
RBC #/AREA URNS HPF: ABNORMAL /HPF (ref 0–5)
SODIUM SERPL-SCNC: 134 MMOL/L (ref 136–145)
SP GR UR REFRACTOMETRY: 1.01 (ref 1–1.03)
UROBILINOGEN UR QL STRIP.AUTO: 0.2 EU/DL (ref 0.2–1)
WBC # BLD AUTO: 8.9 K/UL (ref 3.6–11)
WBC URNS QL MICRO: ABNORMAL /HPF (ref 0–4)

## 2020-01-09 PROCEDURE — 81001 URINALYSIS AUTO W/SCOPE: CPT

## 2020-01-09 PROCEDURE — 96375 TX/PRO/DX INJ NEW DRUG ADDON: CPT

## 2020-01-09 PROCEDURE — 74011250636 HC RX REV CODE- 250/636: Performed by: STUDENT IN AN ORGANIZED HEALTH CARE EDUCATION/TRAINING PROGRAM

## 2020-01-09 PROCEDURE — 80053 COMPREHEN METABOLIC PANEL: CPT

## 2020-01-09 PROCEDURE — 99284 EMERGENCY DEPT VISIT MOD MDM: CPT

## 2020-01-09 PROCEDURE — 74011250636 HC RX REV CODE- 250/636: Performed by: EMERGENCY MEDICINE

## 2020-01-09 PROCEDURE — 74011250637 HC RX REV CODE- 250/637: Performed by: STUDENT IN AN ORGANIZED HEALTH CARE EDUCATION/TRAINING PROGRAM

## 2020-01-09 PROCEDURE — 96374 THER/PROPH/DIAG INJ IV PUSH: CPT

## 2020-01-09 PROCEDURE — 99285 EMERGENCY DEPT VISIT HI MDM: CPT

## 2020-01-09 PROCEDURE — 83690 ASSAY OF LIPASE: CPT

## 2020-01-09 PROCEDURE — 36415 COLL VENOUS BLD VENIPUNCTURE: CPT

## 2020-01-09 PROCEDURE — 85025 COMPLETE CBC W/AUTO DIFF WBC: CPT

## 2020-01-09 PROCEDURE — 83605 ASSAY OF LACTIC ACID: CPT

## 2020-01-09 RX ORDER — ONDANSETRON 2 MG/ML
4 INJECTION INTRAMUSCULAR; INTRAVENOUS
Status: COMPLETED | OUTPATIENT
Start: 2020-01-09 | End: 2020-01-09

## 2020-01-09 RX ORDER — HALOPERIDOL 5 MG/ML
2.5 INJECTION INTRAMUSCULAR
Status: COMPLETED | OUTPATIENT
Start: 2020-01-09 | End: 2020-01-09

## 2020-01-09 RX ORDER — HALOPERIDOL 5 MG/ML
2.5 INJECTION INTRAMUSCULAR
Status: DISCONTINUED | OUTPATIENT
Start: 2020-01-09 | End: 2020-01-09

## 2020-01-09 RX ORDER — ACETAMINOPHEN 500 MG
1000 TABLET ORAL ONCE
Status: COMPLETED | OUTPATIENT
Start: 2020-01-09 | End: 2020-01-09

## 2020-01-09 RX ADMIN — ACETAMINOPHEN 1000 MG: 500 TABLET ORAL at 06:27

## 2020-01-09 RX ADMIN — HALOPERIDOL LACTATE 2.5 MG: 5 INJECTION INTRAMUSCULAR at 06:29

## 2020-01-09 RX ADMIN — ONDANSETRON 4 MG: 2 INJECTION INTRAMUSCULAR; INTRAVENOUS at 06:27

## 2020-01-09 NOTE — DISCHARGE INSTRUCTIONS

## 2020-01-09 NOTE — ED NOTES
Patient presents with EMS for L flank pain, onset 0100. Patient rates the pain a 10/10. Patient reports vomiting twice, denies any diarrhea or fever. Farnaz.Morganza- Resident to bedside for assessment at this time.

## 2020-01-09 NOTE — ED NOTES
Assumed care of pt at this time. Introduced self to pt. Pt resting comfortably in ER stretcher. Pt states her belly feels much better. Pt has provided a urine sample at bedside and has been sent to the lab. Lights dimmed for pt comfort & pt resting comfortably. Call light within reach.
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
## 2020-01-09 NOTE — ED NOTES
Bedside and Verbal shift change report given to [id number] Kala Welsh (oncoming nurse) by Amanda Titus (offgoing nurse). Report included the following information SBAR, ED Summary, MAR and Recent Results.

## 2020-01-09 NOTE — ED NOTES
Pt discharged by Davon López MD. Pt provided with discharge instructions Rx and instructions on follow up care. Pt out of ED ambulatory. Pt awaiting sister to pick her up from the ER waiting room.

## 2020-01-14 ENCOUNTER — APPOINTMENT (OUTPATIENT)
Dept: GENERAL RADIOLOGY | Age: 55
DRG: 698 | End: 2020-01-14
Attending: EMERGENCY MEDICINE
Payer: MEDICARE

## 2020-01-14 ENCOUNTER — HOSPITAL ENCOUNTER (EMERGENCY)
Age: 55
Discharge: HOME | DRG: 698 | End: 2020-01-14
Attending: EMERGENCY MEDICINE
Payer: MEDICARE

## 2020-01-14 VITALS
SYSTOLIC BLOOD PRESSURE: 154 MMHG | OXYGEN SATURATION: 97 % | BODY MASS INDEX: 21.66 KG/M2 | HEART RATE: 97 BPM | HEIGHT: 65 IN | RESPIRATION RATE: 20 BRPM | DIASTOLIC BLOOD PRESSURE: 80 MMHG | WEIGHT: 130 LBS | TEMPERATURE: 98.7 F

## 2020-01-14 DIAGNOSIS — R07.89 CHEST WALL PAIN: ICD-10-CM

## 2020-01-14 DIAGNOSIS — R11.2 NAUSEA AND VOMITING, INTRACTABILITY OF VOMITING NOT SPECIFIED, UNSPECIFIED VOMITING TYPE: Primary | ICD-10-CM

## 2020-01-14 LAB
ALBUMIN SERPL-MCNC: 3.7 G/DL (ref 3.5–5)
ALBUMIN/GLOB SERPL: 0.8 {RATIO} (ref 1.1–2.2)
ALP SERPL-CCNC: 142 U/L (ref 45–117)
ALT SERPL-CCNC: 28 U/L (ref 12–78)
ANION GAP SERPL CALC-SCNC: 9 MMOL/L (ref 5–15)
AST SERPL-CCNC: 26 U/L (ref 15–37)
ATRIAL RATE: 108 BPM
BASOPHILS # BLD: 0 K/UL (ref 0–0.1)
BASOPHILS NFR BLD: 0 % (ref 0–1)
BILIRUB SERPL-MCNC: 0.3 MG/DL (ref 0.2–1)
BUN SERPL-MCNC: 84 MG/DL (ref 6–20)
BUN/CREAT SERPL: 14 (ref 12–20)
CALCIUM SERPL-MCNC: 10.1 MG/DL (ref 8.5–10.1)
CALCULATED P AXIS, ECG09: 66 DEGREES
CALCULATED R AXIS, ECG10: 21 DEGREES
CALCULATED T AXIS, ECG11: 77 DEGREES
CHLORIDE SERPL-SCNC: 98 MMOL/L (ref 97–108)
CK SERPL-CCNC: 123 U/L (ref 26–192)
CO2 SERPL-SCNC: 25 MMOL/L (ref 21–32)
CREAT SERPL-MCNC: 5.87 MG/DL (ref 0.55–1.02)
DIAGNOSIS, 93000: NORMAL
DIFFERENTIAL METHOD BLD: ABNORMAL
EOSINOPHIL # BLD: 0.2 K/UL (ref 0–0.4)
EOSINOPHIL NFR BLD: 2 % (ref 0–7)
ERYTHROCYTE [DISTWIDTH] IN BLOOD BY AUTOMATED COUNT: 13.9 % (ref 11.5–14.5)
GLOBULIN SER CALC-MCNC: 4.8 G/DL (ref 2–4)
GLUCOSE SERPL-MCNC: 339 MG/DL (ref 65–100)
HCT VFR BLD AUTO: 31.8 % (ref 35–47)
HGB BLD-MCNC: 10.6 G/DL (ref 11.5–16)
IMM GRANULOCYTES # BLD AUTO: 0 K/UL (ref 0–0.04)
IMM GRANULOCYTES NFR BLD AUTO: 0 % (ref 0–0.5)
LYMPHOCYTES # BLD: 2.1 K/UL (ref 0.8–3.5)
LYMPHOCYTES NFR BLD: 20 % (ref 12–49)
MCH RBC QN AUTO: 29.6 PG (ref 26–34)
MCHC RBC AUTO-ENTMCNC: 33.3 G/DL (ref 30–36.5)
MCV RBC AUTO: 88.8 FL (ref 80–99)
MONOCYTES # BLD: 0.6 K/UL (ref 0–1)
MONOCYTES NFR BLD: 6 % (ref 5–13)
NEUTS SEG # BLD: 7.3 K/UL (ref 1.8–8)
NEUTS SEG NFR BLD: 71 % (ref 32–75)
NRBC # BLD: 0 K/UL (ref 0–0.01)
NRBC BLD-RTO: 0 PER 100 WBC
P-R INTERVAL, ECG05: 152 MS
PLATELET # BLD AUTO: 195 K/UL (ref 150–400)
POTASSIUM SERPL-SCNC: 3.9 MMOL/L (ref 3.5–5.1)
PROT SERPL-MCNC: 8.5 G/DL (ref 6.4–8.2)
Q-T INTERVAL, ECG07: 346 MS
QRS DURATION, ECG06: 70 MS
QTC CALCULATION (BEZET), ECG08: 463 MS
RBC # BLD AUTO: 3.58 M/UL (ref 3.8–5.2)
SODIUM SERPL-SCNC: 132 MMOL/L (ref 136–145)
VENTRICULAR RATE, ECG03: 108 BPM
WBC # BLD AUTO: 10.2 K/UL (ref 3.6–11)

## 2020-01-14 PROCEDURE — 96376 TX/PRO/DX INJ SAME DRUG ADON: CPT

## 2020-01-14 PROCEDURE — 85025 COMPLETE CBC W/AUTO DIFF WBC: CPT

## 2020-01-14 PROCEDURE — 82550 ASSAY OF CK (CPK): CPT

## 2020-01-14 PROCEDURE — 99285 EMERGENCY DEPT VISIT HI MDM: CPT

## 2020-01-14 PROCEDURE — 74011250636 HC RX REV CODE- 250/636: Performed by: EMERGENCY MEDICINE

## 2020-01-14 PROCEDURE — 80053 COMPREHEN METABOLIC PANEL: CPT

## 2020-01-14 PROCEDURE — 71045 X-RAY EXAM CHEST 1 VIEW: CPT

## 2020-01-14 PROCEDURE — 36415 COLL VENOUS BLD VENIPUNCTURE: CPT

## 2020-01-14 PROCEDURE — 96375 TX/PRO/DX INJ NEW DRUG ADDON: CPT

## 2020-01-14 PROCEDURE — 74011250637 HC RX REV CODE- 250/637: Performed by: EMERGENCY MEDICINE

## 2020-01-14 PROCEDURE — 93005 ELECTROCARDIOGRAM TRACING: CPT

## 2020-01-14 PROCEDURE — 96374 THER/PROPH/DIAG INJ IV PUSH: CPT

## 2020-01-14 RX ORDER — DIAZEPAM 5 MG/1
5 TABLET ORAL
Status: COMPLETED | OUTPATIENT
Start: 2020-01-14 | End: 2020-01-14

## 2020-01-14 RX ORDER — LORAZEPAM 2 MG/ML
1 INJECTION INTRAMUSCULAR
Status: COMPLETED | OUTPATIENT
Start: 2020-01-14 | End: 2020-01-14

## 2020-01-14 RX ORDER — PROMETHAZINE HYDROCHLORIDE 25 MG/1
25 SUPPOSITORY RECTAL
Qty: 6 SUPPOSITORY | Refills: 0 | Status: SHIPPED | OUTPATIENT
Start: 2020-01-14 | End: 2020-01-21

## 2020-01-14 RX ORDER — ONDANSETRON 4 MG/1
4 TABLET, ORALLY DISINTEGRATING ORAL
Qty: 10 TAB | Refills: 0 | Status: SHIPPED | OUTPATIENT
Start: 2020-01-14 | End: 2022-01-01 | Stop reason: SDUPTHER

## 2020-01-14 RX ORDER — ONDANSETRON 2 MG/ML
4 INJECTION INTRAMUSCULAR; INTRAVENOUS
Status: COMPLETED | OUTPATIENT
Start: 2020-01-14 | End: 2020-01-14

## 2020-01-14 RX ADMIN — LORAZEPAM 1 MG: 2 INJECTION INTRAMUSCULAR; INTRAVENOUS at 19:13

## 2020-01-14 RX ADMIN — ONDANSETRON 4 MG: 2 INJECTION INTRAMUSCULAR; INTRAVENOUS at 16:24

## 2020-01-14 RX ADMIN — LORAZEPAM 1 MG: 2 INJECTION INTRAMUSCULAR; INTRAVENOUS at 16:40

## 2020-01-14 RX ADMIN — SODIUM CHLORIDE 500 ML: 900 INJECTION, SOLUTION INTRAVENOUS at 18:34

## 2020-01-14 RX ADMIN — DIAZEPAM 5 MG: 5 TABLET ORAL at 16:23
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
## 2020-01-15 ENCOUNTER — HOSPITAL ENCOUNTER (INPATIENT)
Age: 55
LOS: 7 days | Discharge: HOME | DRG: 698 | End: 2020-01-22
Attending: EMERGENCY MEDICINE | Admitting: INTERNAL MEDICINE
Payer: MEDICARE

## 2020-01-15 ENCOUNTER — APPOINTMENT (OUTPATIENT)
Dept: CT IMAGING | Age: 55
DRG: 698 | End: 2020-01-15
Attending: EMERGENCY MEDICINE
Payer: MEDICARE

## 2020-01-15 ENCOUNTER — APPOINTMENT (OUTPATIENT)
Dept: ULTRASOUND IMAGING | Age: 55
DRG: 698 | End: 2020-01-15
Attending: INTERNAL MEDICINE
Payer: MEDICARE

## 2020-01-15 ENCOUNTER — APPOINTMENT (OUTPATIENT)
Dept: GENERAL RADIOLOGY | Age: 55
DRG: 698 | End: 2020-01-15
Attending: EMERGENCY MEDICINE
Payer: MEDICARE

## 2020-01-15 DIAGNOSIS — R07.9 CHEST PAIN, UNSPECIFIED TYPE: ICD-10-CM

## 2020-01-15 DIAGNOSIS — R41.82 ALTERED MENTAL STATUS, UNSPECIFIED ALTERED MENTAL STATUS TYPE: Primary | ICD-10-CM

## 2020-01-15 PROBLEM — G93.40 ACUTE ENCEPHALOPATHY: Status: ACTIVE | Noted: 2020-01-15

## 2020-01-15 LAB
ABO + RH BLD: NORMAL
ALBUMIN SERPL-MCNC: 3.7 G/DL (ref 3.5–5)
ALBUMIN/GLOB SERPL: 0.8 {RATIO} (ref 1.1–2.2)
ALP SERPL-CCNC: 131 U/L (ref 45–117)
ALT SERPL-CCNC: 26 U/L (ref 12–78)
AMMONIA PLAS-SCNC: <10 UMOL/L
AMPHET UR QL SCN: NEGATIVE
ANION GAP SERPL CALC-SCNC: 10 MMOL/L (ref 5–15)
APPEARANCE UR: CLEAR
APTT PPP: 25.4 SEC (ref 22.1–32)
ARTERIAL PATENCY WRIST A: ABNORMAL
AST SERPL-CCNC: 21 U/L (ref 15–37)
ATRIAL RATE: 122 BPM
BACTERIA URNS QL MICRO: NEGATIVE /HPF
BARBITURATES UR QL SCN: NEGATIVE
BASE EXCESS BLDV CALC-SCNC: 0 MMOL/L
BASOPHILS # BLD: 0 K/UL (ref 0–0.1)
BASOPHILS NFR BLD: 0 % (ref 0–1)
BDY SITE: ABNORMAL
BENZODIAZ UR QL: NEGATIVE
BILIRUB SERPL-MCNC: 0.5 MG/DL (ref 0.2–1)
BILIRUB UR QL: NEGATIVE
BLOOD GROUP ANTIBODIES SERPL: NORMAL
BUN SERPL-MCNC: 85 MG/DL (ref 6–20)
BUN/CREAT SERPL: 15 (ref 12–20)
CALCIUM SERPL-MCNC: 10 MG/DL (ref 8.5–10.1)
CALCULATED P AXIS, ECG09: 70 DEGREES
CALCULATED R AXIS, ECG10: 60 DEGREES
CALCULATED T AXIS, ECG11: 79 DEGREES
CANNABINOIDS UR QL SCN: NEGATIVE
CHLORIDE SERPL-SCNC: 99 MMOL/L (ref 97–108)
CK SERPL-CCNC: 120 U/L (ref 26–192)
CO2 SERPL-SCNC: 24 MMOL/L (ref 21–32)
COCAINE UR QL SCN: NEGATIVE
COLOR UR: ABNORMAL
CREAT SERPL-MCNC: 5.67 MG/DL (ref 0.55–1.02)
DIAGNOSIS, 93000: NORMAL
DIFFERENTIAL METHOD BLD: ABNORMAL
DRUG SCRN COMMENT,DRGCM: NORMAL
EOSINOPHIL # BLD: 0 K/UL (ref 0–0.4)
EOSINOPHIL NFR BLD: 0 % (ref 0–7)
EPITH CASTS URNS QL MICRO: ABNORMAL /LPF
ERYTHROCYTE [DISTWIDTH] IN BLOOD BY AUTOMATED COUNT: 13.8 % (ref 11.5–14.5)
ETHANOL SERPL-MCNC: <10 MG/DL
GAS FLOW.O2 O2 DELIVERY SYS: ABNORMAL L/MIN
GLOBULIN SER CALC-MCNC: 4.6 G/DL (ref 2–4)
GLUCOSE BLD STRIP.AUTO-MCNC: 116 MG/DL (ref 65–100)
GLUCOSE BLD STRIP.AUTO-MCNC: 277 MG/DL (ref 65–100)
GLUCOSE BLD STRIP.AUTO-MCNC: 302 MG/DL (ref 65–100)
GLUCOSE BLD STRIP.AUTO-MCNC: 53 MG/DL (ref 65–100)
GLUCOSE BLD STRIP.AUTO-MCNC: 74 MG/DL (ref 65–100)
GLUCOSE BLD STRIP.AUTO-MCNC: 87 MG/DL (ref 65–100)
GLUCOSE SERPL-MCNC: 319 MG/DL (ref 65–100)
GLUCOSE UR STRIP.AUTO-MCNC: >1000 MG/DL
HCO3 BLDV-SCNC: 25.2 MMOL/L (ref 23–28)
HCT VFR BLD AUTO: 31.7 % (ref 35–47)
HGB BLD-MCNC: 10.6 G/DL (ref 11.5–16)
HGB UR QL STRIP: ABNORMAL
HYALINE CASTS URNS QL MICRO: ABNORMAL /LPF (ref 0–5)
IMM GRANULOCYTES # BLD AUTO: 0.1 K/UL (ref 0–0.04)
IMM GRANULOCYTES NFR BLD AUTO: 1 % (ref 0–0.5)
INR PPP: 1 (ref 0.9–1.1)
KETONES UR QL STRIP.AUTO: NEGATIVE MG/DL
LACTATE BLD-SCNC: 1.34 MMOL/L (ref 0.4–2)
LEUKOCYTE ESTERASE UR QL STRIP.AUTO: NEGATIVE
LIPASE SERPL-CCNC: 286 U/L (ref 73–393)
LYMPHOCYTES # BLD: 0.9 K/UL (ref 0.8–3.5)
LYMPHOCYTES NFR BLD: 9 % (ref 12–49)
MCH RBC QN AUTO: 30.4 PG (ref 26–34)
MCHC RBC AUTO-ENTMCNC: 33.4 G/DL (ref 30–36.5)
MCV RBC AUTO: 90.8 FL (ref 80–99)
METHADONE UR QL: NEGATIVE
MONOCYTES # BLD: 0.4 K/UL (ref 0–1)
MONOCYTES NFR BLD: 3 % (ref 5–13)
NEUTS SEG # BLD: 9.2 K/UL (ref 1.8–8)
NEUTS SEG NFR BLD: 87 % (ref 32–75)
NITRITE UR QL STRIP.AUTO: NEGATIVE
NRBC # BLD: 0 K/UL (ref 0–0.01)
NRBC BLD-RTO: 0 PER 100 WBC
O2/TOTAL GAS SETTING VFR VENT: 21 %
OPIATES UR QL: NEGATIVE
P-R INTERVAL, ECG05: 150 MS
PCO2 BLDV: 45.3 MMHG (ref 41–51)
PCP UR QL: NEGATIVE
PH BLDV: 7.35 [PH] (ref 7.32–7.42)
PH UR STRIP: 7 [PH] (ref 5–8)
PLATELET # BLD AUTO: 240 K/UL (ref 150–400)
PMV BLD AUTO: 9.2 FL (ref 8.9–12.9)
PO2 BLDV: 41 MMHG (ref 25–40)
POTASSIUM SERPL-SCNC: 4.1 MMOL/L (ref 3.5–5.1)
PROT SERPL-MCNC: 8.3 G/DL (ref 6.4–8.2)
PROT UR STRIP-MCNC: 100 MG/DL
PROTHROMBIN TIME: 10.1 SEC (ref 9–11.1)
Q-T INTERVAL, ECG07: 330 MS
QRS DURATION, ECG06: 74 MS
QTC CALCULATION (BEZET), ECG08: 470 MS
RBC # BLD AUTO: 3.49 M/UL (ref 3.8–5.2)
RBC #/AREA URNS HPF: ABNORMAL /HPF (ref 0–5)
SAO2 % BLDV: 74 % (ref 65–88)
SERVICE CMNT-IMP: ABNORMAL
SERVICE CMNT-IMP: NORMAL
SERVICE CMNT-IMP: NORMAL
SODIUM SERPL-SCNC: 133 MMOL/L (ref 136–145)
SP GR UR REFRACTOMETRY: 1.01 (ref 1–1.03)
SPECIMEN EXP DATE BLD: NORMAL
SPECIMEN TYPE: ABNORMAL
THERAPEUTIC RANGE,PTTT: NORMAL SECS (ref 58–77)
TOTAL RESP. RATE, ITRR: 18
TROPONIN I SERPL-MCNC: <0.05 NG/ML
UA: UC IF INDICATED,UAUC: ABNORMAL
UROBILINOGEN UR QL STRIP.AUTO: 0.2 EU/DL (ref 0.2–1)
VENTRICULAR RATE, ECG03: 122 BPM
WBC # BLD AUTO: 10.5 K/UL (ref 3.6–11)
WBC URNS QL MICRO: ABNORMAL /HPF (ref 0–4)

## 2020-01-15 PROCEDURE — 70450 CT HEAD/BRAIN W/O DYE: CPT

## 2020-01-15 PROCEDURE — 82140 ASSAY OF AMMONIA: CPT

## 2020-01-15 PROCEDURE — 99285 EMERGENCY DEPT VISIT HI MDM: CPT

## 2020-01-15 PROCEDURE — 74011636637 HC RX REV CODE- 636/637: Performed by: INTERNAL MEDICINE

## 2020-01-15 PROCEDURE — 83605 ASSAY OF LACTIC ACID: CPT

## 2020-01-15 PROCEDURE — 93005 ELECTROCARDIOGRAM TRACING: CPT

## 2020-01-15 PROCEDURE — 77030005513 HC CATH URETH FOL11 MDII -B

## 2020-01-15 PROCEDURE — 76770 US EXAM ABDO BACK WALL COMP: CPT

## 2020-01-15 PROCEDURE — 80307 DRUG TEST PRSMV CHEM ANLYZR: CPT

## 2020-01-15 PROCEDURE — 82803 BLOOD GASES ANY COMBINATION: CPT

## 2020-01-15 PROCEDURE — 82550 ASSAY OF CK (CPK): CPT

## 2020-01-15 PROCEDURE — 82962 GLUCOSE BLOOD TEST: CPT

## 2020-01-15 PROCEDURE — 74176 CT ABD & PELVIS W/O CONTRAST: CPT

## 2020-01-15 PROCEDURE — 85025 COMPLETE CBC W/AUTO DIFF WBC: CPT

## 2020-01-15 PROCEDURE — 74011250637 HC RX REV CODE- 250/637: Performed by: INTERNAL MEDICINE

## 2020-01-15 PROCEDURE — 84484 ASSAY OF TROPONIN QUANT: CPT

## 2020-01-15 PROCEDURE — 81001 URINALYSIS AUTO W/SCOPE: CPT

## 2020-01-15 PROCEDURE — 51798 US URINE CAPACITY MEASURE: CPT

## 2020-01-15 PROCEDURE — 74011250636 HC RX REV CODE- 250/636: Performed by: INTERNAL MEDICINE

## 2020-01-15 PROCEDURE — 65660000000 HC RM CCU STEPDOWN

## 2020-01-15 PROCEDURE — 83690 ASSAY OF LIPASE: CPT

## 2020-01-15 PROCEDURE — 85730 THROMBOPLASTIN TIME PARTIAL: CPT

## 2020-01-15 PROCEDURE — 74011250637 HC RX REV CODE- 250/637: Performed by: NURSE PRACTITIONER

## 2020-01-15 PROCEDURE — 80053 COMPREHEN METABOLIC PANEL: CPT

## 2020-01-15 PROCEDURE — 85610 PROTHROMBIN TIME: CPT

## 2020-01-15 PROCEDURE — 36415 COLL VENOUS BLD VENIPUNCTURE: CPT

## 2020-01-15 PROCEDURE — 71045 X-RAY EXAM CHEST 1 VIEW: CPT

## 2020-01-15 PROCEDURE — 51702 INSERT TEMP BLADDER CATH: CPT

## 2020-01-15 PROCEDURE — 94760 N-INVAS EAR/PLS OXIMETRY 1: CPT

## 2020-01-15 PROCEDURE — 86900 BLOOD TYPING SEROLOGIC ABO: CPT

## 2020-01-15 RX ORDER — NALOXONE HYDROCHLORIDE 0.4 MG/ML
0.4 INJECTION, SOLUTION INTRAMUSCULAR; INTRAVENOUS; SUBCUTANEOUS AS NEEDED
Status: DISCONTINUED | OUTPATIENT
Start: 2020-01-15 | End: 2020-01-23 | Stop reason: HOSPADM

## 2020-01-15 RX ORDER — SODIUM CHLORIDE 0.9 % (FLUSH) 0.9 %
5-40 SYRINGE (ML) INJECTION AS NEEDED
Status: DISCONTINUED | OUTPATIENT
Start: 2020-01-15 | End: 2020-01-23 | Stop reason: HOSPADM

## 2020-01-15 RX ORDER — ADHESIVE BANDAGE
30 BANDAGE TOPICAL ONCE
Status: ACTIVE | OUTPATIENT
Start: 2020-01-15 | End: 2020-01-15

## 2020-01-15 RX ORDER — OXYCODONE AND ACETAMINOPHEN 5; 325 MG/1; MG/1
1 TABLET ORAL
Status: DISCONTINUED | OUTPATIENT
Start: 2020-01-15 | End: 2020-01-16

## 2020-01-15 RX ORDER — CLONIDINE HYDROCHLORIDE 0.1 MG/1
0.1 TABLET ORAL 2 TIMES DAILY
Status: DISCONTINUED | OUTPATIENT
Start: 2020-01-15 | End: 2020-01-16

## 2020-01-15 RX ORDER — DEXTROSE MONOHYDRATE 100 MG/ML
0-250 INJECTION, SOLUTION INTRAVENOUS AS NEEDED
Status: DISCONTINUED | OUTPATIENT
Start: 2020-01-15 | End: 2020-01-23 | Stop reason: HOSPADM

## 2020-01-15 RX ORDER — ONDANSETRON 2 MG/ML
4 INJECTION INTRAMUSCULAR; INTRAVENOUS
Status: DISCONTINUED | OUTPATIENT
Start: 2020-01-15 | End: 2020-01-23 | Stop reason: HOSPADM

## 2020-01-15 RX ORDER — HYDROMORPHONE HYDROCHLORIDE 2 MG/ML
0.5 INJECTION, SOLUTION INTRAMUSCULAR; INTRAVENOUS; SUBCUTANEOUS
Status: DISCONTINUED | OUTPATIENT
Start: 2020-01-15 | End: 2020-01-16

## 2020-01-15 RX ORDER — SODIUM BICARBONATE 650 MG/1
650 TABLET ORAL 2 TIMES DAILY
Status: DISCONTINUED | OUTPATIENT
Start: 2020-01-15 | End: 2020-01-17

## 2020-01-15 RX ORDER — HEPARIN SODIUM 5000 [USP'U]/ML
5000 INJECTION, SOLUTION INTRAVENOUS; SUBCUTANEOUS EVERY 12 HOURS
Status: DISCONTINUED | OUTPATIENT
Start: 2020-01-15 | End: 2020-01-16

## 2020-01-15 RX ORDER — SODIUM CHLORIDE 0.9 % (FLUSH) 0.9 %
5-40 SYRINGE (ML) INJECTION EVERY 8 HOURS
Status: DISCONTINUED | OUTPATIENT
Start: 2020-01-15 | End: 2020-01-23 | Stop reason: HOSPADM

## 2020-01-15 RX ORDER — ATORVASTATIN CALCIUM 20 MG/1
20 TABLET, FILM COATED ORAL
Status: DISCONTINUED | OUTPATIENT
Start: 2020-01-15 | End: 2020-01-23 | Stop reason: HOSPADM

## 2020-01-15 RX ORDER — INSULIN GLARGINE 100 [IU]/ML
25 INJECTION, SOLUTION SUBCUTANEOUS DAILY
Status: DISCONTINUED | OUTPATIENT
Start: 2020-01-15 | End: 2020-01-16

## 2020-01-15 RX ORDER — INSULIN LISPRO 100 [IU]/ML
INJECTION, SOLUTION INTRAVENOUS; SUBCUTANEOUS
Status: DISCONTINUED | OUTPATIENT
Start: 2020-01-15 | End: 2020-01-23 | Stop reason: HOSPADM

## 2020-01-15 RX ORDER — POLYETHYLENE GLYCOL 3350 17 G/17G
17 POWDER, FOR SOLUTION ORAL 2 TIMES DAILY
Status: DISCONTINUED | OUTPATIENT
Start: 2020-01-15 | End: 2020-01-17

## 2020-01-15 RX ORDER — CALCITRIOL 0.25 UG/1
0.25 CAPSULE ORAL DAILY
Status: DISCONTINUED | OUTPATIENT
Start: 2020-01-15 | End: 2020-01-17

## 2020-01-15 RX ORDER — ONDANSETRON 4 MG/1
4 TABLET, ORALLY DISINTEGRATING ORAL
Status: DISCONTINUED | OUTPATIENT
Start: 2020-01-15 | End: 2020-01-15

## 2020-01-15 RX ORDER — INSULIN LISPRO 100 [IU]/ML
5 INJECTION, SOLUTION INTRAVENOUS; SUBCUTANEOUS
Status: DISCONTINUED | OUTPATIENT
Start: 2020-01-15 | End: 2020-01-16

## 2020-01-15 RX ORDER — MAGNESIUM SULFATE 100 %
4 CRYSTALS MISCELLANEOUS AS NEEDED
Status: DISCONTINUED | OUTPATIENT
Start: 2020-01-15 | End: 2020-01-23 | Stop reason: HOSPADM

## 2020-01-15 RX ORDER — BISACODYL 5 MG
10 TABLET, DELAYED RELEASE (ENTERIC COATED) ORAL DAILY
Status: DISCONTINUED | OUTPATIENT
Start: 2020-01-16 | End: 2020-01-17

## 2020-01-15 RX ORDER — HYDRALAZINE HYDROCHLORIDE 20 MG/ML
10 INJECTION INTRAMUSCULAR; INTRAVENOUS
Status: DISCONTINUED | OUTPATIENT
Start: 2020-01-15 | End: 2020-01-23 | Stop reason: HOSPADM

## 2020-01-15 RX ORDER — GUAIFENESIN 100 MG/5ML
81 LIQUID (ML) ORAL DAILY
Status: DISCONTINUED | OUTPATIENT
Start: 2020-01-15 | End: 2020-01-16

## 2020-01-15 RX ORDER — METOPROLOL TARTRATE 25 MG/1
25 TABLET, FILM COATED ORAL 2 TIMES DAILY
Status: DISCONTINUED | OUTPATIENT
Start: 2020-01-15 | End: 2020-01-23 | Stop reason: HOSPADM

## 2020-01-15 RX ADMIN — HYDROMORPHONE HYDROCHLORIDE 0.5 MG: 2 INJECTION, SOLUTION INTRAMUSCULAR; INTRAVENOUS; SUBCUTANEOUS at 15:49

## 2020-01-15 RX ADMIN — METOPROLOL TARTRATE 25 MG: 25 TABLET ORAL at 17:16

## 2020-01-15 RX ADMIN — Medication 10 ML: at 22:11

## 2020-01-15 RX ADMIN — ATORVASTATIN CALCIUM 20 MG: 20 TABLET, FILM COATED ORAL at 22:10

## 2020-01-15 RX ADMIN — INSULIN LISPRO 5 UNITS: 100 INJECTION, SOLUTION INTRAVENOUS; SUBCUTANEOUS at 12:08

## 2020-01-15 RX ADMIN — HEPARIN SODIUM 5000 UNITS: 5000 INJECTION INTRAVENOUS; SUBCUTANEOUS at 22:11

## 2020-01-15 RX ADMIN — INSULIN LISPRO 5 UNITS: 100 INJECTION, SOLUTION INTRAVENOUS; SUBCUTANEOUS at 12:09

## 2020-01-15 RX ADMIN — Medication 10 ML: at 17:15

## 2020-01-15 RX ADMIN — SODIUM BICARBONATE 650 MG: 650 TABLET ORAL at 17:16

## 2020-01-15 RX ADMIN — POLYETHYLENE GLYCOL 3350 17 G: 17 POWDER, FOR SOLUTION ORAL at 17:16

## 2020-01-15 RX ADMIN — ONDANSETRON 4 MG: 2 INJECTION INTRAMUSCULAR; INTRAVENOUS at 09:20

## 2020-01-15 RX ADMIN — Medication 10 ML: at 09:20

## 2020-01-15 RX ADMIN — INSULIN LISPRO 5 UNITS: 100 INJECTION, SOLUTION INTRAVENOUS; SUBCUTANEOUS at 17:15

## 2020-01-15 RX ADMIN — Medication 10 ML: at 12:09

## 2020-01-15 RX ADMIN — ONDANSETRON 4 MG: 2 INJECTION INTRAMUSCULAR; INTRAVENOUS at 15:44

## 2020-01-15 RX ADMIN — INSULIN GLARGINE 25 UNITS: 100 INJECTION, SOLUTION SUBCUTANEOUS at 12:08

## 2020-01-15 RX ADMIN — HYDROMORPHONE HYDROCHLORIDE 0.5 MG: 2 INJECTION, SOLUTION INTRAMUSCULAR; INTRAVENOUS; SUBCUTANEOUS at 09:22

## 2020-01-15 RX ADMIN — CLONIDINE HYDROCHLORIDE 0.1 MG: 0.1 TABLET ORAL at 17:16

## 2020-01-15 NOTE — ED NOTES
Pt was quiet but complaining of abd pain but when a friend/family member walked int the room, she began to moan loudly and sit up on the stretcher and then climb off of the stretcher.

## 2020-01-15 NOTE — ROUTINE PROCESS
Bedside and Verbal shift change report given to Jeffrey Mcgrath (oncoming nurse) by Daljit Batista (offgoing nurse). Report included the following information SBAR, Kardex, ED Summary, MAR, Recent Results and Cardiac Rhythm NSR. Zone Phone:   [phone] Significant changes during shift:  Admit to floor;  Patient vomited dark red, brown emesis. Taken down to ultrasound with nurse and transport. Seen by GI. Dilaudid and zofran given x2 during shift. Patient Information Kaley Guerra 47 y.o. 
1/15/2020  4:28 AM by Colton Galvan MD. Kaley Guerra was admitted from Home 
 
Problem List 
 
Patient Active Problem List  
 Diagnosis Date Noted  Acute encephalopathy 01/15/2020  Elevated troponin 12/10/2019  S/P arteriovenous (AV) fistula creation 12/10/2019  Hyperparathyroidism ([address] 12/10/2019  Anemia due to chronic kidney disease, on chronic dialysis ([address] 12/10/2019  Uncontrolled type II diabetes mellitus ([address] 12/09/2019  ESRD (end stage renal disease) ([address] 12/06/2019  CKD (chronic kidney disease) stage 5, GFR less than 15 ml/min ([facility]) 11/23/2018  Chest pain 05/10/2018  
 HTN (hypertension) 12/20/2012  Chronic lumbar radiculopathy 12/06/2012  Chronic pain syndrome 12/06/2012  Depression 04/22/2012 Past Medical History:  
Diagnosis Date  Abdominal pain 11/18/2013  Abdominal pain, LUQ (left upper quadrant) 6/7/2012  Back pain, lumbosacral 6/24/2012 Acute on chronic    
 C. difficile colitis 6/2012  Chronic kidney disease Stage V- no dialysis yet  Chronic low back pain  Chronic pain   
 back & left leg  Constipation  Diabetes ([address] A1c 8.2 3/2012  DKA (diabetic ketoacidoses) ([address] 7/10/2018  Flank pain 4/14/2015  Gastroparesis 6/7/2012  Hep C w/o coma, chronic ([facility])  Hyperlipemia  Hypertension  Hyponatremia 11/18/2013  Intractable abdominal pain 4/21/2012  Lower urinary tract infectious disease 11/18/2013  Lumbar disc disease  Migraines  Nausea & vomiting 3/16/2012  Pancreatitis 6836  
 alcoholic  UTI (lower urinary tract infection) 6/20012 Core Measures: CVA: No No 
CHF:No No 
PNA:No No 
 
Activity Status: OOB to Chair No 
Ambulated this shift No  
Bed Rest Yes LINES AND DRAINS: 
 
PIV 
 
DVT prophylaxis: DVT prophylaxis Med- Yes DVT prophylaxis SCD or DEANDRE- No  
 
 
Patient Safety: 
 
Falls Score Total Score: 3 Safety Level_______ Bed Alarm On? Yes Sitter? No 
 
Plan for upcoming shift: monitor patient for pain, nausea Discharge Plan: No  
 
Active Consults: 
IP CONSULT TO GASTROENTEROLOGY
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
## 2020-01-15 NOTE — DISCHARGE INSTRUCTIONS
Patient Education        Nausea and Vomiting: Care Instructions  Your Care Instructions    When you are nauseated, you may feel weak and sweaty and notice a lot of saliva in your mouth. Nausea often leads to vomiting. Most of the time you do not need to worry about nausea and vomiting, but they can be signs of other illnesses. Two common causes of nausea and vomiting are stomach flu and food poisoning. Nausea and vomiting from viral stomach flu will usually start to improve within 24 hours. Nausea and vomiting from food poisoning may last from 12 to 48 hours. The doctor has checked you carefully, but problems can develop later. If you notice any problems or new symptoms, get medical treatment right away. Follow-up care is a key part of your treatment and safety. Be sure to make and go to all appointments, and call your doctor if you are having problems. It's also a good idea to know your test results and keep a list of the medicines you take. How can you care for yourself at home? · To prevent dehydration, drink plenty of fluids, enough so that your urine is light yellow or clear like water. Choose water and other caffeine-free clear liquids until you feel better. If you have kidney, heart, or liver disease and have to limit fluids, talk with your doctor before you increase the amount of fluids you drink. · Rest in bed until you feel better. · When you are able to eat, try clear soups, mild foods, and liquids until all symptoms are gone for 12 to 48 hours. Other good choices include dry toast, crackers, cooked cereal, and gelatin dessert, such as Jell-O. When should you call for help? Call 911 anytime you think you may need emergency care. For example, call if:    · You passed out (lost consciousness).    Call your doctor now or seek immediate medical care if:    · You have symptoms of dehydration, such as:  ? Dry eyes and a dry mouth. ? Passing only a little dark urine. ?  Feeling thirstier than usual.   · You have new or worsening belly pain.     · You have a new or higher fever.     · You vomit blood or what looks like coffee grounds.    Watch closely for changes in your health, and be sure to contact your doctor if:    · You have ongoing nausea and vomiting.     · Your vomiting is getting worse.     · Your vomiting lasts longer than 2 days.     · You are not getting better as expected. Where can you learn more? Go to http://nery-peña.info/. Enter 25 [id number] in the search box to learn more about \"Nausea and Vomiting: Care Instructions. \"  Current as of: June 26, 2019  Content Version: 12.2  © 9288-4287 [employer]. Care instructions adapted under license by [employer] (which disclaims liability or warranty for this information). If you have questions about a medical condition or this instruction, always ask your healthcare professional. [place] [address] any warranty or liability for your use of this information. Patient Education        Musculoskeletal Chest Pain: Care Instructions  Your Care Instructions    Chest pain is not always a sign that something is wrong with your heart or that you have another serious problem. The doctor thinks your chest pain is caused by strained muscles or ligaments, inflamed chest cartilage, or another problem in your chest, rather than by your heart. You may need more tests to find the cause of your chest pain. Follow-up care is a key part of your treatment and safety. Be sure to make and go to all appointments, and call your doctor if you are having problems. It's also a good idea to know your test results and keep a list of the medicines you take. How can you care for yourself at home? · Take pain medicines exactly as directed. ? If the doctor gave you a prescription medicine for pain, take it as prescribed.   ? If you are not taking a prescription pain medicine, ask your doctor if you can take an over-the-counter medicine. · Rest and protect the sore area. · Stop, change, or take a break from any activity that may be causing your pain or soreness. · Put ice or a cold pack on the sore area for 10 to 20 minutes at a time. Try to do this every 1 to 2 hours for the next 3 days (when you are awake) or until the swelling goes down. Put a thin cloth between the ice and your skin. · After 2 or 3 days, apply a heating pad set on low or a warm cloth to the area that hurts. Some doctors suggest that you go back and forth between hot and cold. · Do not wrap or tape your ribs for support. This may cause you to take smaller breaths, which could increase your risk of lung problems. · Mentholated creams such as Bengay or Icy Hot may soothe sore muscles. Follow the instructions on the package. · Follow your doctor's instructions for exercising. · Gentle stretching and massage may help you get better faster. Stretch slowly to the point just before pain begins, and hold the stretch for at least 15 to 30 seconds. Do this 3 or 4 times a day. Stretch just after you have applied heat. · As your pain gets better, slowly return to your normal activities. Any increased pain may be a sign that you need to rest a while longer. When should you call for help? Call 911 anytime you think you may need emergency care. For example, call if:    · You have chest pain or pressure. This may occur with:  ? Sweating. ? Shortness of breath. ? Nausea or vomiting. ? Pain that spreads from the chest to the neck, jaw, or one or both shoulders or arms. ? Dizziness or lightheadedness. ? A fast or uneven pulse. After calling 911, chew 1 adult-strength aspirin. Wait for an ambulance.  Do not try to drive yourself.     · You have sudden chest pain and shortness of breath, or you cough up blood.    Call your doctor now or seek immediate medical care if:    · You have any trouble breathing.     · Your chest pain gets worse.     · Your chest pain occurs consistently with exercise and is relieved by rest.    Watch closely for changes in your health, and be sure to contact your doctor if:    · Your chest pain does not get better after 1 week. Where can you learn more? Go to http://nery-peña.info/. Enter V293 in the search box to learn more about \"Musculoskeletal Chest Pain: Care Instructions. \"  Current as of: June 26, 2019  Content Version: 12.2  © 1369-5741 [employer]. Care instructions adapted under license by [employer] (which disclaims liability or warranty for this information). If you have questions about a medical condition or this instruction, always ask your healthcare professional. [place] [address] any warranty or liability for your use of this information.

## 2020-01-15 NOTE — ED NOTES
TRANSFER - OUT REPORT:    Verbal report given to [facility] (name) on Cookie Chamber  being transferred to Neuro (unit) for routine progression of care       Report consisted of patients Situation, Background, Assessment and   Recommendations(SBAR). Information from the following report(s) SBAR, ED Summary, [facility] and Recent Results was reviewed with the receiving nurse. Lines:   Peripheral IV 01/15/20 Right Antecubital (Active)   Site Assessment Clean, dry, & intact 1/15/2020  7:50 AM   Phlebitis Assessment 0 1/15/2020  7:50 AM   Infiltration Assessment 0 1/15/2020  7:50 AM   Dressing Status Clean, dry, & intact 1/15/2020  7:50 AM   Dressing Type Transparent 1/15/2020  7:50 AM   Hub Color/Line Status Pink;Flushed 1/15/2020  7:50 AM   Action Taken Blood drawn 1/15/2020  7:50 AM        Opportunity for questions and clarification was provided.

## 2020-01-15 NOTE — ED NOTES
Bedside and Verbal shift change report given to Kendell Lara (oncoming nurse) by Rosa Elena Rowan (offgoing nurse). Report included the following information SBAR, Kardex, MAR and Recent Results.

## 2020-01-15 NOTE — ED NOTES
Patient pending ambulance ride home at this time. Resting with eyes closed at this time. Respirations not labored.

## 2020-01-15 NOTE — Clinical Note
TRANSFER - OUT REPORT:     Verbal report given to: Earline Seals RN. Report consisted of patient's Situation, Background, Assessment and   Recommendations(SBAR). Opportunity for questions and clarification was provided. Patient transported to: [facility].

## 2020-01-15 NOTE — ED NOTES
Patients post void bladder scan was 354mL; discussed with Dr. Allen Christiansen who wants mckeon placed.

## 2020-01-15 NOTE — PROGRESS NOTES
Pt weighs < 60kg. Per protocol, heparin subcutaneous 5000 units q8h was changed to heparin subcutaneous 5000 units q12h.

## 2020-01-15 NOTE — Clinical Note
TRANSFER - OUT REPORT:     Verbal report given to: Criselda De La Torre. Report consisted of patient's Situation, Background, Assessment and   Recommendations(SBAR). Opportunity for questions and clarification was provided. Patient transported with a Registered Nurse and [address] [place] [address] / [facility]. Patient transported to: [facility].
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
## 2020-01-15 NOTE — ED NOTES
Patient becomes tearful when awaken but falls back to sleep within 5 minutes of being alone. Pt difficult to understand due to moaning while awake.

## 2020-01-15 NOTE — ED TRIAGE NOTES
Pt to ed via medics with c/o vomiting and back pain. Per medics, they were called for someone vomiting coffee ground material but no vomit noted on the scene. Medics also states pt was seen earlier today for back pain.

## 2020-01-16 ENCOUNTER — APPOINTMENT (OUTPATIENT)
Dept: NON INVASIVE DIAGNOSTICS | Age: 55
DRG: 698 | End: 2020-01-16
Attending: INTERNAL MEDICINE
Payer: MEDICARE

## 2020-01-16 PROBLEM — I47.20 VENTRICULAR TACHYCARDIA: Status: ACTIVE | Noted: 2020-01-16

## 2020-01-16 LAB
ANION GAP SERPL CALC-SCNC: 8 MMOL/L (ref 5–15)
AV VELOCITY RATIO: 0.81
BASOPHILS # BLD: 0 K/UL (ref 0–0.1)
BASOPHILS NFR BLD: 0 % (ref 0–1)
BUN SERPL-MCNC: 91 MG/DL (ref 6–20)
BUN/CREAT SERPL: 16 (ref 12–20)
CALCIUM SERPL-MCNC: 9.5 MG/DL (ref 8.5–10.1)
CHLORIDE SERPL-SCNC: 99 MMOL/L (ref 97–108)
CO2 SERPL-SCNC: 26 MMOL/L (ref 21–32)
CREAT SERPL-MCNC: 5.78 MG/DL (ref 0.55–1.02)
DIFFERENTIAL METHOD BLD: ABNORMAL
ECHO AV AREA PEAK VELOCITY: 1.4 CM2
ECHO AV AREA/BSA PEAK VELOCITY: 0.9 CM2/M2
ECHO AV PEAK GRADIENT: 8.6 MMHG
ECHO AV PEAK VELOCITY: 146.43 CM/S
ECHO EST RA PRESSURE: 10 MMHG
ECHO LA AREA 4C: 15.2 CM2
ECHO LA MAJOR AXIS: 3.02 CM
ECHO LA VOL 2C: 31.28 ML (ref 22–52)
ECHO LA VOL 4C: 39.25 ML (ref 22–52)
ECHO LA VOL BP: 40 ML (ref 22–52)
ECHO LA VOL/BSA BIPLANE: 24.28 ML/M2 (ref 16–28)
ECHO LA VOLUME INDEX A2C: 18.99 ML/M2 (ref 16–28)
ECHO LA VOLUME INDEX A4C: 23.83 ML/M2 (ref 16–28)
ECHO LV E' LATERAL VELOCITY: 8.77 CM/S
ECHO LV E' SEPTAL VELOCITY: 4.65 CM/S
ECHO LV EDV TEICHHOLZ: 0.35 ML
ECHO LV ESV TEICHHOLZ: 0.1 ML
ECHO LV INTERNAL DIMENSION DIASTOLIC: 3.7 CM (ref 3.9–5.3)
ECHO LV INTERNAL DIMENSION SYSTOLIC: 2.21 CM
ECHO LV IVSD: 0.93 CM (ref 0.6–0.9)
ECHO LV MASS 2D: 114.5 G (ref 67–162)
ECHO LV MASS INDEX 2D: 69.5 G/M2 (ref 43–95)
ECHO LV POSTERIOR WALL DIASTOLIC: 0.96 CM (ref 0.6–0.9)
ECHO LVOT DIAM: 1.5 CM
ECHO LVOT PEAK GRADIENT: 5.6 MMHG
ECHO LVOT PEAK VELOCITY: 118.35 CM/S
ECHO MV A VELOCITY: 119.01 CM/S
ECHO MV E DECELERATION TIME (DT): 229.5 MS
ECHO MV E VELOCITY: 90.82 CM/S
ECHO MV E/A RATIO: 0.76
ECHO MV E/E' LATERAL: 10.36
ECHO MV E/E' RATIO (AVERAGED): 14.94
ECHO MV E/E' SEPTAL: 19.53
ECHO MV REGURGITANT PEAK GRADIENT: 21.5 MMHG
ECHO MV REGURGITANT PEAK VELOCITY: 231.76 CM/S
ECHO PULMONARY ARTERY SYSTOLIC PRESSURE (PASP): 15.4 MMHG
ECHO RA AREA 4C: 11.17 CM2
ECHO RIGHT VENTRICULAR SYSTOLIC PRESSURE (RVSP): 15.4 MMHG
ECHO TV REGURGITANT MAX VELOCITY: 116.01 CM/S
ECHO TV REGURGITANT PEAK GRADIENT: 5.4 MMHG
EOSINOPHIL # BLD: 0.1 K/UL (ref 0–0.4)
EOSINOPHIL NFR BLD: 1 % (ref 0–7)
ERYTHROCYTE [DISTWIDTH] IN BLOOD BY AUTOMATED COUNT: 14 % (ref 11.5–14.5)
GLUCOSE BLD STRIP.AUTO-MCNC: 138 MG/DL (ref 65–100)
GLUCOSE BLD STRIP.AUTO-MCNC: 141 MG/DL (ref 65–100)
GLUCOSE BLD STRIP.AUTO-MCNC: 147 MG/DL (ref 65–100)
GLUCOSE BLD STRIP.AUTO-MCNC: 222 MG/DL (ref 65–100)
GLUCOSE BLD STRIP.AUTO-MCNC: 55 MG/DL (ref 65–100)
GLUCOSE BLD STRIP.AUTO-MCNC: 64 MG/DL (ref 65–100)
GLUCOSE BLD STRIP.AUTO-MCNC: 67 MG/DL (ref 65–100)
GLUCOSE SERPL-MCNC: 59 MG/DL (ref 65–100)
HBV CORE IGM SER QL: NONREACTIVE
HBV SURFACE AB SER QL: NONREACTIVE
HBV SURFACE AB SER-ACNC: <3.1 MIU/ML
HBV SURFACE AG SER QL: <0.1 INDEX
HBV SURFACE AG SER QL: NEGATIVE
HCT VFR BLD AUTO: 28.4 % (ref 35–47)
HCV AB SER IA-ACNC: >11 INDEX
HCV AB SERPL QL IA: REACTIVE
HCV COMMENT,HCGAC: ABNORMAL
HEMOCCULT STL QL: NEGATIVE
HGB BLD-MCNC: 9.1 G/DL (ref 11.5–16)
IMM GRANULOCYTES # BLD AUTO: 0 K/UL (ref 0–0.04)
IMM GRANULOCYTES NFR BLD AUTO: 0 % (ref 0–0.5)
LVFS 2D: 40.29 %
LVSV (TEICH): 25.37 ML
LYMPHOCYTES # BLD: 3 K/UL (ref 0.8–3.5)
LYMPHOCYTES NFR BLD: 30 % (ref 12–49)
MAGNESIUM SERPL-MCNC: 2.2 MG/DL (ref 1.6–2.4)
MCH RBC QN AUTO: 29.4 PG (ref 26–34)
MCHC RBC AUTO-ENTMCNC: 32 G/DL (ref 30–36.5)
MCV RBC AUTO: 91.9 FL (ref 80–99)
MONOCYTES # BLD: 0.7 K/UL (ref 0–1)
MONOCYTES NFR BLD: 7 % (ref 5–13)
MV DEC SLOPE: 3.96
NEUTS SEG # BLD: 6.2 K/UL (ref 1.8–8)
NEUTS SEG NFR BLD: 62 % (ref 32–75)
NRBC # BLD: 0 K/UL (ref 0–0.01)
NRBC BLD-RTO: 0 PER 100 WBC
PLATELET # BLD AUTO: 232 K/UL (ref 150–400)
PMV BLD AUTO: 8.8 FL (ref 8.9–12.9)
POTASSIUM SERPL-SCNC: 3.8 MMOL/L (ref 3.5–5.1)
RBC # BLD AUTO: 3.09 M/UL (ref 3.8–5.2)
SERVICE CMNT-IMP: ABNORMAL
SERVICE CMNT-IMP: NORMAL
SODIUM SERPL-SCNC: 133 MMOL/L (ref 136–145)
TROPONIN I SERPL-MCNC: <0.05 NG/ML
WBC # BLD AUTO: 10.1 K/UL (ref 3.6–11)

## 2020-01-16 PROCEDURE — 74011250636 HC RX REV CODE- 250/636: Performed by: INTERNAL MEDICINE

## 2020-01-16 PROCEDURE — 82962 GLUCOSE BLOOD TEST: CPT

## 2020-01-16 PROCEDURE — 90935 HEMODIALYSIS ONE EVALUATION: CPT

## 2020-01-16 PROCEDURE — 65660000000 HC RM CCU STEPDOWN

## 2020-01-16 PROCEDURE — 82272 OCCULT BLD FECES 1-3 TESTS: CPT

## 2020-01-16 PROCEDURE — 74011250637 HC RX REV CODE- 250/637: Performed by: INTERNAL MEDICINE

## 2020-01-16 PROCEDURE — 80048 BASIC METABOLIC PNL TOTAL CA: CPT

## 2020-01-16 PROCEDURE — 84484 ASSAY OF TROPONIN QUANT: CPT

## 2020-01-16 PROCEDURE — 74011250637 HC RX REV CODE- 250/637: Performed by: NURSE PRACTITIONER

## 2020-01-16 PROCEDURE — 74011636637 HC RX REV CODE- 636/637: Performed by: INTERNAL MEDICINE

## 2020-01-16 PROCEDURE — 5A1D70Z PERFORMANCE OF URINARY FILTRATION, INTERMITTENT, LESS THAN 6 HOURS PER DAY: ICD-10-PCS | Performed by: INTERNAL MEDICINE

## 2020-01-16 PROCEDURE — 83735 ASSAY OF MAGNESIUM: CPT

## 2020-01-16 PROCEDURE — 86705 HEP B CORE ANTIBODY IGM: CPT

## 2020-01-16 PROCEDURE — 93306 TTE W/DOPPLER COMPLETE: CPT

## 2020-01-16 PROCEDURE — 86803 HEPATITIS C AB TEST: CPT

## 2020-01-16 PROCEDURE — 36415 COLL VENOUS BLD VENIPUNCTURE: CPT

## 2020-01-16 PROCEDURE — 94760 N-INVAS EAR/PLS OXIMETRY 1: CPT

## 2020-01-16 PROCEDURE — 85025 COMPLETE CBC W/AUTO DIFF WBC: CPT

## 2020-01-16 PROCEDURE — 87340 HEPATITIS B SURFACE AG IA: CPT

## 2020-01-16 PROCEDURE — 86706 HEP B SURFACE ANTIBODY: CPT

## 2020-01-16 RX ORDER — ACETAMINOPHEN 325 MG/1
650 TABLET ORAL
Status: DISCONTINUED | OUTPATIENT
Start: 2020-01-16 | End: 2020-01-23 | Stop reason: HOSPADM

## 2020-01-16 RX ORDER — INSULIN GLARGINE 100 [IU]/ML
5 INJECTION, SOLUTION SUBCUTANEOUS DAILY
Status: DISCONTINUED | OUTPATIENT
Start: 2020-01-16 | End: 2020-01-17

## 2020-01-16 RX ORDER — SORBITOL SOLUTION 70 %
30 SOLUTION, ORAL MISCELLANEOUS ONCE
Status: COMPLETED | OUTPATIENT
Start: 2020-01-16 | End: 2020-01-16

## 2020-01-16 RX ORDER — SODIUM CHLORIDE 9 MG/ML
100 INJECTION, SOLUTION INTRAVENOUS CONTINUOUS
Status: CANCELLED | OUTPATIENT
Start: 2020-01-16 | End: 2020-01-16

## 2020-01-16 RX ORDER — SODIUM CHLORIDE 0.9 % (FLUSH) 0.9 %
5-40 SYRINGE (ML) INJECTION AS NEEDED
Status: CANCELLED | OUTPATIENT
Start: 2020-01-16

## 2020-01-16 RX ORDER — CLONIDINE HYDROCHLORIDE 0.1 MG/1
0.1 TABLET ORAL
Status: DISCONTINUED | OUTPATIENT
Start: 2020-01-16 | End: 2020-01-23 | Stop reason: HOSPADM

## 2020-01-16 RX ORDER — SORBITOL SOLUTION 70 %
30 SOLUTION, ORAL MISCELLANEOUS ONCE
Status: DISPENSED | OUTPATIENT
Start: 2020-01-16 | End: 2020-01-16

## 2020-01-16 RX ORDER — SODIUM CHLORIDE 9 MG/ML
75 INJECTION, SOLUTION INTRAVENOUS CONTINUOUS
Status: DISCONTINUED | OUTPATIENT
Start: 2020-01-16 | End: 2020-01-17

## 2020-01-16 RX ORDER — SODIUM CHLORIDE 0.9 % (FLUSH) 0.9 %
5-40 SYRINGE (ML) INJECTION EVERY 8 HOURS
Status: CANCELLED | OUTPATIENT
Start: 2020-01-16

## 2020-01-16 RX ORDER — MIDAZOLAM HYDROCHLORIDE 1 MG/ML
.25-5 INJECTION, SOLUTION INTRAMUSCULAR; INTRAVENOUS
Status: CANCELLED | OUTPATIENT
Start: 2020-01-16 | End: 2020-01-16

## 2020-01-16 RX ORDER — ATROPINE SULFATE 0.1 MG/ML
0.5 INJECTION INTRAVENOUS
Status: CANCELLED | OUTPATIENT
Start: 2020-01-16 | End: 2020-01-17

## 2020-01-16 RX ORDER — NALOXONE HYDROCHLORIDE 0.4 MG/ML
0.4 INJECTION, SOLUTION INTRAMUSCULAR; INTRAVENOUS; SUBCUTANEOUS
Status: CANCELLED | OUTPATIENT
Start: 2020-01-16 | End: 2020-01-16

## 2020-01-16 RX ORDER — FLUMAZENIL 0.1 MG/ML
0.2 INJECTION INTRAVENOUS
Status: CANCELLED | OUTPATIENT
Start: 2020-01-16 | End: 2020-01-16

## 2020-01-16 RX ORDER — DEXTROMETHORPHAN/PSEUDOEPHED 2.5-7.5/.8
1.2 DROPS ORAL
Status: CANCELLED | OUTPATIENT
Start: 2020-01-16

## 2020-01-16 RX ADMIN — METOPROLOL TARTRATE 25 MG: 25 TABLET ORAL at 18:45

## 2020-01-16 RX ADMIN — SODIUM CHLORIDE 75 ML/HR: 900 INJECTION, SOLUTION INTRAVENOUS at 09:23

## 2020-01-16 RX ADMIN — ACETAMINOPHEN 650 MG: 325 TABLET ORAL at 16:07

## 2020-01-16 RX ADMIN — SODIUM BICARBONATE 650 MG: 650 TABLET ORAL at 18:45

## 2020-01-16 RX ADMIN — Medication 10 ML: at 13:31

## 2020-01-16 RX ADMIN — Medication 10 ML: at 04:36

## 2020-01-16 RX ADMIN — BISACODYL 10 MG: 5 TABLET, COATED ORAL at 09:04

## 2020-01-16 RX ADMIN — CALCITRIOL 0.25 MCG: 0.25 CAPSULE ORAL at 09:08

## 2020-01-16 RX ADMIN — EPOETIN ALFA-EPBX 12000 UNITS: 10000 INJECTION, SOLUTION INTRAVENOUS; SUBCUTANEOUS at 21:33

## 2020-01-16 RX ADMIN — Medication 16 G: at 06:57

## 2020-01-16 RX ADMIN — Medication 10 ML: at 21:36

## 2020-01-16 RX ADMIN — METOPROLOL TARTRATE 25 MG: 25 TABLET ORAL at 09:04

## 2020-01-16 RX ADMIN — INSULIN LISPRO 3 UNITS: 100 INJECTION, SOLUTION INTRAVENOUS; SUBCUTANEOUS at 13:31

## 2020-01-16 RX ADMIN — ATORVASTATIN CALCIUM 20 MG: 20 TABLET, FILM COATED ORAL at 21:32

## 2020-01-16 RX ADMIN — SODIUM BICARBONATE 650 MG: 650 TABLET ORAL at 09:04

## 2020-01-16 RX ADMIN — SORBITOL SOLUTION (BULK) 30 ML: 70 SOLUTION at 18:45

## 2020-01-16 NOTE — PROGRESS NOTES
Hospitalist Progress Note    NAME: Bishnu Bobo   :  1965   MRN:  [id number]     Assessment / Plan:  Acute encephalopathy POA  Resolved  Suspect Uremia with CKD5, may need HD  Nephrology consult  EtOH negative, hypercapnea ruled out, ammonia within normal limits, CT head negative, electrolytes relatively normal, UDS negative and UA with no UTI  No fever, headache, or meningismus to suggest meningitis  ? BP related  Check TSH    Chest Pain  Repeat troponin  Check another troponin and ask for cardiology consult as high risk with DM  Holding ASA due to ? GI bleed    Acute blood loss anemia vs Anemia due to CKD5  ? Upper GI bleed yesterday  Pt having chest pain but holding ASA with concerning GI bleed  Likely need EGD to rule out ulcer to see if ASA can be used  CLD for now  GI is on the case  H&H trending down, continue to monitor    Abdominal Pain  Seems related to severe constipation  No bowel movement so far, try sorbitol    Sinus Tachycardia  Suspect this is reactive due to pain  Better  Continue BB    Hypoglycemia earlier today  DM2  Reduce Long acting insulin to 5 units  Hold Premeal insulin  SSI     Essential hypertension  BP trending low  Change clonidine to PRN and continue BB  Monitor  IV hydralazine PRN     Code Status: Full  Surrogate Decision Maker: Violet Hastings,      DVT Prophylaxis: SCDs due to ? GI bleed  GI Prophylaxis: not indicated     Baseline: Ambulates with rollator, frequent ED visits                 Subjective: Pt seen and examined at bedside. NAD. Remain constipated. hypoglycemic today.  Overnight events d/w RN   CHIEF COMPLAINT: f/u \"chest pain and abdominal pain\"       Review of Systems:  Symptom Y/N Comments  Symptom Y/N Comments   Fever/Chills n   Chest Pain y yesterday   Poor Appetite    Edema     Cough n   Abdominal Pain y yesterday   Sputum    Joint Pain     SOB/GRIFFITHS n   Pruritis/Rash     Nausea/vomit    Tolerating PT/OT     Diarrhea    Tolerating Diet y CLD Constipation    Other       Could NOT obtain due to:      Objective:     VITALS:   Last 24hrs VS reviewed since prior progress note. Most recent are:  Patient Vitals for the past 24 hrs:   Temp Pulse Resp BP SpO2   01/16/20 0317 97.9 °F (36.6 °C) 82 20 104/65 100 %   01/15/20 2331 98 °F (36.7 °C) 83 20 100/58 100 %   01/15/20 1922    97/66    01/15/20 1921 97.4 °F (36.3 °C) 76 20 (!) 89/57 100 %   01/15/20 1537 98.4 °F (36.9 °C) (!) 103 20 182/90 97 %   01/15/20 1124 97.8 °F (36.6 °C) 94 20 119/71 100 %   01/15/20 0909 97.6 °F (36.4 °C) (!) 131 18 (!) 184/110 100 %   01/15/20 0823 98.1 °F (36.7 °C) (!) 115 15 (!) 194/100 95 %       Intake/Output Summary (Last 24 hours) at 1/16/2020 0803  Last data filed at 1/15/2020 0911  Gross per 24 hour   Intake    Output 350 ml   Net -350 ml        PHYSICAL EXAM:  General: WD, WN. Alert, cooperative, no acute distress    EENT:  EOMI. Anicteric sclerae. MMM  Resp:  CTA bilaterally, no wheezing or rales. No accessory muscle use  CV:  Regular  rhythm,  No edema  GI:  Soft, Non distended, Non tender.  +Bowel sounds  Neurologic:  Alert and oriented X 3, normal speech,   Psych:   Good insight. Not anxious nor agitated  Skin:  No rashes. No jaundice    Reviewed most current lab test results and cultures  YES  Reviewed most current radiology test results   YES  Review and summation of old records today    NO  Reviewed patient's current orders and MAR    YES  PMH/SH reviewed - no change compared to H&P  ________________________________________________________________________  Care Plan discussed with:    Comments   Patient y    Family      RN y    Care Manager     Consultant                        Multidiciplinary team rounds were held today with , nursing, pharmacist and clinical coordinator. Patient's plan of care was discussed; medications were reviewed and discharge planning was addressed. ________________________________________________________________________  Total NON critical care TIME:  35  Minutes    Total CRITICAL CARE TIME Spent:   Minutes non procedure based      Comments   >50% of visit spent in counseling and coordination of care     ________________________________________________________________________  Catrina Guillen MD     Procedures: see electronic medical records for all procedures/Xrays and details which were not copied into this note but were reviewed prior to creation of Plan. LABS:  I reviewed today's most current labs and imaging studies.   Pertinent labs include:  Recent Labs     01/16/20  0436 01/15/20  0650 01/14/20  1613   WBC 10.1 10.5 10.2   HGB 9.1* 10.6* 10.6*   HCT 28.4* 31.7* 31.8*    240 195     Recent Labs     01/16/20  0436 01/15/20  0650 01/15/20  0608 01/14/20  1613   * 133*  --  132*   K 3.8 4.1  --  3.9   CL 99 99  --  98   CO2 26 24  --  25   GLU 59* 319*  --  339*   BUN 91* 85*  --  84*   CREA 5.78* 5.67*  --  5.87*   CA 9.5 10.0  --  10.1   ALB  --  3.7  --  3.7   TBILI  --  0.5  --  0.3   SGOT  --  21  --  26   ALT  --  26  --  28   INR  --   --  1.0  --        Signed: Catrina Guillen MD

## 2020-01-16 NOTE — PROGRESS NOTES
Problem: Falls - Risk of  Goal: *Absence of Falls  Description  Document Prashant Reas Fall Risk and appropriate interventions in the flowsheet. Outcome: Progressing Towards Goal  Note: Fall Risk Interventions:  Mobility Interventions: Bed/chair exit alarm    Mentation Interventions: Adequate sleep, hydration, pain control    Medication Interventions: Bed/chair exit alarm    Elimination Interventions: Bed/chair exit alarm              Problem: Pressure Injury - Risk of  Goal: *Prevention of pressure injury  Description  Document Julius Scale and appropriate interventions in the flowsheet. Outcome: Progressing Towards Goal  Note: Pressure Injury Interventions:  Sensory Interventions: Assess changes in LOC    Moisture Interventions: Absorbent underpads    Activity Interventions: Assess need for specialty bed    Mobility Interventions: Assess need for specialty bed    Nutrition Interventions: Document food/fluid/supplement intake    Friction and Shear Interventions: Apply protective barrier, creams and emollients                Problem: Risk for Spread of Infection  Goal: Prevent transmission of infectious organism to others  Description  Prevent the transmission of infectious organisms to other patients, staff members, and visitors.   Outcome: Progressing Towards Goal

## 2020-01-16 NOTE — PROGRESS NOTES
Codey Yumiko         NAME:Madelyn Mark  WWI:[id number]   :1965     D/w   avf ready to use  Consent for hd obtained from patient  Get hepatitis panel  She will go to [facility] . Hd unit close to home. Dimple Fierro, [address] [facility]  [facility]  Ziggy Yao [address] janina [address], [address]  Phone - [phone]         Fax - [phone] [facility] Office  [address] [place] [address]  Phone - [phone]        Fax - [phone]     www. [facility]

## 2020-01-16 NOTE — PERIOP NOTES
Due to patient's recent v tach episode, Dr. Antoine Morelos and Dr. Hannah Irene decided to postpone the egd for today.   Taking patient back to  room
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
## 2020-01-16 NOTE — ROUTINE PROCESS
Bedside and Verbal shift change report given to Deepti Stack RN (oncoming nurse) by Anant Pitt RN (offgoing nurse). Report included the following information SBAR, Kardex, ED Summary, MAR, Recent Results and Cardiac Rhythm NSR. Zone Phone:   [phone] Significant changes during shift:  Patient was not able to get an EGD done today- patient will be NPO at midnight tonight in order to possibly complete test tomorrow. Fistula was accessed and patient received dialysis today. Echo was completed. Diet was progressed to GI lite. Occult stool was sent. Patient Information Cookie Cabral 47 y.o. 
1/15/2020  4:28 AM by Sary Romero MD. Cookie Cabral was admitted from Home 
 
Problem List 
 
Patient Active Problem List  
 Diagnosis Date Noted  Ventricular tachycardia ([address] 01/16/2020  Acute encephalopathy 01/15/2020  Elevated troponin 12/10/2019  S/P arteriovenous (AV) fistula creation 12/10/2019  Hyperparathyroidism ([address] 12/10/2019  Anemia due to chronic kidney disease, on chronic dialysis ([address] 12/10/2019  Uncontrolled type II diabetes mellitus ([address] 12/09/2019  ESRD (end stage renal disease) ([address] 12/06/2019  CKD (chronic kidney disease) stage 5, GFR less than 15 ml/min ([facility]) 11/23/2018  Chest pain 05/10/2018  
 HTN (hypertension) 12/20/2012  Chronic lumbar radiculopathy 12/06/2012  Chronic pain syndrome 12/06/2012  Depression 04/22/2012  Nausea and vomiting 03/16/2012 Past Medical History:  
Diagnosis Date  Abdominal pain 11/18/2013  Abdominal pain, LUQ (left upper quadrant) 6/7/2012  Back pain, lumbosacral 6/24/2012 Acute on chronic    
 C. difficile colitis 6/2012  Chronic kidney disease Stage V- no dialysis yet  Chronic low back pain  Chronic pain   
 back & left leg  Constipation  Diabetes ([address] A1c 8.2 3/2012  DKA (diabetic ketoacidoses) ([address] 7/10/2018  Flank pain 4/14/2015  Gastroparesis 6/7/2012  Hep C w/o coma, chronic (HCC)  Hyperlipemia  Hypertension  Hyponatremia 11/18/2013  Intractable abdominal pain 4/21/2012  Lower urinary tract infectious disease 11/18/2013  Lumbar disc disease  Migraines  Nausea & vomiting 3/16/2012  Pancreatitis 2215  
 alcoholic  UTI (lower urinary tract infection) 6/20012 Core Measures: CVA: No No 
CHF:No No 
PNA:No No 
 
Activity Status: OOB to Chair No 
Ambulated this shift Yes Bed Rest No 
 
LINES AND DRAINS: 
 
PIV 
 
DVT prophylaxis: DVT prophylaxis Med- Yes DVT prophylaxis SCD or DEANDRE- No  
 
Patient Safety: 
 
Falls Score Total Score: 3 Safety Level_______ Bed Alarm On? Yes Sitter? No 
 
Plan for upcoming shift: monitor patient for pain, possible egd tomorrow Discharge Plan: No  
 
Active Consults: 
IP CONSULT TO GASTROENTEROLOGY 
IP CONSULT TO NEPHROLOGY 
IP CONSULT TO CARDIOLOGY 
IP CONSULT TO VASCULAR SURGERY
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
## 2020-01-16 NOTE — PROGRESS NOTES
Problem: Falls - Risk of  Goal: *Absence of Falls  Description  Document Fara Medina Fall Risk and appropriate interventions in the flowsheet. Outcome: Progressing Towards Goal  Note: Fall Risk Interventions:  Mobility Interventions: Bed/chair exit alarm, OT consult for ADLs, Patient to call before getting OOB, PT Consult for assist device competence, PT Consult for mobility concerns    Mentation Interventions: Adequate sleep, hydration, pain control    Medication Interventions: Assess postural VS orthostatic hypotension, Bed/chair exit alarm, Evaluate medications/consider consulting pharmacy, Utilize gait belt for transfers/ambulation, Teach patient to arise slowly, Patient to call before getting OOB    Elimination Interventions: Bed/chair exit alarm, Call light in reach, Patient to call for help with toileting needs, Stay With Me (per policy), Toilet paper/wipes in reach, Toileting schedule/hourly rounds              Problem: Patient Education: Go to Patient Education Activity  Goal: Patient/Family Education  Outcome: Progressing Towards Goal     Problem: Pressure Injury - Risk of  Goal: *Prevention of pressure injury  Description  Document Julius Scale and appropriate interventions in the flowsheet. Outcome: Progressing Towards Goal  Note: Pressure Injury Interventions:  Sensory Interventions: Assess changes in LOC, Assess need for specialty bed, Avoid rigorous massage over bony prominences, Chair cushion, Check visual cues for pain, Discuss PT/OT consult with provider, Float heels, Keep linens dry and wrinkle-free, Maintain/enhance activity level, Minimize linen layers, Turn and reposition approx.  every two hours (pillows and wedges if needed)    Moisture Interventions: Absorbent underpads, Apply protective barrier, creams and emollients, Assess need for specialty bed, Check for incontinence Q2 hours and as needed, Limit adult briefs, Moisture barrier, Minimize layers, Maintain skin hydration (lotion/cream)    Activity Interventions: Increase time out of bed, PT/OT evaluation    Mobility Interventions: Assess need for specialty bed, Chair cushion, Float heels, HOB 30 degrees or less, PT/OT evaluation, Turn and reposition approx. every two hours(pillow and wedges)    Nutrition Interventions: Document food/fluid/supplement intake    Friction and Shear Interventions: Minimize layers, Lift sheet                Problem: Patient Education: Go to Patient Education Activity  Goal: Patient/Family Education  Outcome: Progressing Towards Goal     Problem: Risk for Spread of Infection  Goal: Prevent transmission of infectious organism to others  Description  Prevent the transmission of infectious organisms to other patients, staff members, and visitors.   Outcome: Progressing Towards Goal     Problem: Patient Education:  Go to Education Activity  Goal: Patient/Family Education  Outcome: Progressing Towards Goal

## 2020-01-16 NOTE — ROUTINE PROCESS
Bedside and Verbal shift change report given to Johnny Musa RN (oncoming nurse) by Wesley Zuniga RN (offgoing nurse). Report included the following information SBAR, Kardex, ED Summary, MAR, Recent Results and Cardiac Rhythm NSR. Zone Phone:   [phone] Significant changes during shift:  Pt rested quietly during the night. She remained alert and oriented X4 and has had no complaints of abdominal pain or discomfort. Patient Information Swati Mclaughlin 47 y.o. 
1/15/2020  4:28 AM by Krzysztof Cabrera MD. Swati Mclaughlin was admitted from Home 
 
Problem List 
 
Patient Active Problem List  
 Diagnosis Date Noted  Acute encephalopathy 01/15/2020  Elevated troponin 12/10/2019  S/P arteriovenous (AV) fistula creation 12/10/2019  Hyperparathyroidism ([address] 12/10/2019  Anemia due to chronic kidney disease, on chronic dialysis ([address] 12/10/2019  Uncontrolled type II diabetes mellitus ([address] 12/09/2019  ESRD (end stage renal disease) ([address] 12/06/2019  CKD (chronic kidney disease) stage 5, GFR less than 15 ml/min (formerly [facility]) 11/23/2018  Chest pain 05/10/2018  
 HTN (hypertension) 12/20/2012  Chronic lumbar radiculopathy 12/06/2012  Chronic pain syndrome 12/06/2012  Depression 04/22/2012 Past Medical History:  
Diagnosis Date  Abdominal pain 11/18/2013  Abdominal pain, LUQ (left upper quadrant) 6/7/2012  Back pain, lumbosacral 6/24/2012 Acute on chronic    
 C. difficile colitis 6/2012  Chronic kidney disease Stage V- no dialysis yet  Chronic low back pain  Chronic pain   
 back & left leg  Constipation  Diabetes ([address] A1c 8.2 3/2012  DKA (diabetic ketoacidoses) ([address] 7/10/2018  Flank pain 4/14/2015  Gastroparesis 6/7/2012  Hep C w/o coma, chronic (formerly [facility])  Hyperlipemia  Hypertension  Hyponatremia 11/18/2013  Intractable abdominal pain 4/21/2012  Lower urinary tract infectious disease 11/18/2013  Lumbar disc disease  Migraines  Nausea & vomiting 3/16/2012  Pancreatitis 4297  
 alcoholic  UTI (lower urinary tract infection) 6/20012 Core Measures: CVA: No No 
CHF:No No 
PNA:No No 
 
Activity Status: OOB to Chair No 
Ambulated this shift No  
Bed Rest Yes LINES AND DRAINS: 
 
PIV 
 
DVT prophylaxis: DVT prophylaxis Med- Yes DVT prophylaxis SCD or DEANDRE- No  
 
 
Patient Safety: 
 
Falls Score Total Score: 4 Safety Level_______ Bed Alarm On? Yes Sitter? No 
 
Plan for upcoming shift: monitor patient for pain, nausea Discharge Plan: No  
 
Active Consults: 
IP CONSULT TO GASTROENTEROLOGY

## 2020-01-16 NOTE — PROGRESS NOTES
GI PROGRESS NOTE  Sherren BARBIE Gil  [phone] NP in-hospital cell phone M-F until 4:30  After 5pm or on weekends, please call  for physician on call    NAME:Madelyn Seals :1965 SNB:[id number]   ATTG: Dr Juan Luis Olivares  PCP: Asad Chaudhari NP  Date/Time:  2020 9:10 AM     Primary GI: Dr. Juan Luis Olivares    Reason for following: Hematemesis and constipation    Assessment:   Hematemesis x 1 yesterday - none today  Abdominal pain - resolved  Constipation - BM yesterday  Mentation is much improved today - able to have clear conversation.   - No abdominal pain, no grimacing to palpation   - CT scan noted moderate constipation, history of constipation unknown when last bowel movement  - Aspirin 81 mg daily  - Hemoglobin dropped to 9.1 today     Acute encephalopathy - mentation improved  CKD stage V  Diabetes type 2-uncontrolled  Hypertension     Plan:   Hematemesis:  1. EGD today with Dr Hannah Irene. 2. NPO today - had CLD this AM but only ate 3 bites of jello - last PO intake at 0830  3. Hold ASA if possible and other NSAIDs  4. Serial H/H - transfuse for hgb < 7.0  5. BID PPI  6. Antiemetics PRN     Constipation:   1. Daily bisacodyl   3. BID miralax  4. Hold opioids - apparently hasn't been taking them at home    Plan discussed with Dr Hannah Irene and Dr Juan Luis Olivares. Subjective:   Discussed with RN events overnight. Resting in bed. Appears much more comfortable today. Much more alert. Complaint Y/N Description   Abdominal Pain n    Hematemesis  1 episode yesterday, non overnight   Hematochezia n    Melena n    Constipation n    Diarrhea n    Dyspepsia n    Dysphagia n    Jaundiced n    Nausea/vomiting n      Review of Systems:  Symptom Y/N Comments  Symptom Y/N Comments   Fever/Chills    Chest Pain     Cough    Headaches     Sputum    Joint Pain     SOB/GRIFFITHS    Pruritis/Rash     Tolerating Diet    Other       Could NOT obtain due to:      Objective:   VITALS:   Last 24hrs VS reviewed since prior progress note. Most recent are:  Visit Vitals  BP 98/59   Pulse 82   Temp 98.1 °F (36.7 °C)   Resp 18   Ht 5' 5\" (1.651 m)   Wt 59 kg (130 lb)   SpO2 100%   BMI 21.63 kg/m²       Intake/Output Summary (Last 24 hours) at 1/16/2020 0910  Last data filed at 1/15/2020 0911  Gross per 24 hour   Intake    Output 350 ml   Net -350 ml     PHYSICAL EXAM:  General: WD, WN. Alert, cooperative, no acute distress    HEENT: NC, Atraumatic. Anicteric sclerae. Lungs:  CTA Bilaterally. No Wheezing/Rhonchi/Rales. Heart:  Regular  rhythm,  No murmur (-), No Rubs, No Gallops  Abdomen: Soft, Non distended, Non tender. +Bowel sounds, no HSM  Extremities: No c/c/e  Neurologic:  Alert and oriented X 3. No acute neurological distress   Psych:   Good insight. Not anxious nor agitated. Lab and Radiology Data Reviewed: (see below)    Medications Reviewed: (see below)  PMH/SH reviewed - no change compared to H&P  ________________________________________________________________________  Total time spent with patient: 20 minutes ________________________________________________________________________  Care Plan discussed with:  Patient y   Family     RN katya Han              Consultant:  katya Zuleta NP     Procedures: see electronic medical records for all procedures/Xrays and details which were not copied into this note but were reviewed prior to creation of Plan.       LABS:  Recent Labs     01/16/20  0436 01/15/20  0650   WBC 10.1 10.5   HGB 9.1* 10.6*   HCT 28.4* 31.7*    240     Recent Labs     01/16/20  0436 01/15/20  0650 01/14/20  1613   * 133* 132*   K 3.8 4.1 3.9   CL 99 99 98   CO2 26 24 25   BUN 91* 85* 84*   CREA 5.78* 5.67* 5.87*   GLU 59* 319* 339*   CA 9.5 10.0 10.1     Recent Labs     01/15/20  0650 01/14/20  1613   SGOT 21 26   * 142*   TP 8.3* 8.5*   ALB 3.7 3.7   GLOB 4.6* 4.8*   LPSE 286  --      Recent Labs     01/15/20  0608   INR 1.0   PTP 10.1   APTT 25.4      No results for input(s): FE, TIBC, PSAT, FERR in the last 72 hours. Lab Results   Component Value Date/Time    Folate 13.4 05/11/2018 03:49 AM     No results for input(s): PH, PCO2, PO2 in the last 72 hours.   Recent Labs     01/15/20  0650        Lab Results   Component Value Date/Time    Color YELLOW/STRAW 01/15/2020 07:11 AM    Appearance CLEAR 01/15/2020 07:11 AM    Specific gravity 1.015 01/15/2020 07:11 AM    Specific gravity 1.012 05/11/2018 09:49 AM    pH (UA) 7.0 01/15/2020 07:11 AM    Protein 100 (A) 01/15/2020 07:11 AM    Glucose >1,000 (A) 01/15/2020 07:11 AM    Ketone NEGATIVE  01/15/2020 07:11 AM    Bilirubin NEGATIVE  01/15/2020 07:11 AM    Urobilinogen 0.2 01/15/2020 07:11 AM    Nitrites NEGATIVE  01/15/2020 07:11 AM    Leukocyte Esterase NEGATIVE  01/15/2020 07:11 AM    Epithelial cells FEW 01/15/2020 07:11 AM    Bacteria NEGATIVE  01/15/2020 07:11 AM    WBC 0-4 01/15/2020 07:11 AM    RBC 5-10 01/15/2020 07:11 AM       MEDICATIONS:  Current Facility-Administered Medications   Medication Dose Route Frequency    cloNIDine HCL (CATAPRES) tablet 0.1 mg  0.1 mg Oral Q12H PRN    insulin glargine (LANTUS) injection 5 Units  5 Units SubCUTAneous DAILY    acetaminophen (TYLENOL) tablet 650 mg  650 mg Oral Q6H PRN    sorbitoL 70 % solution 30 mL  30 mL Oral ONCE    0.9% sodium chloride infusion  75 mL/hr IntraVENous CONTINUOUS    sodium chloride (NS) flush 5-40 mL  5-40 mL IntraVENous Q8H    sodium chloride (NS) flush 5-40 mL  5-40 mL IntraVENous PRN    sodium chloride (NS) flush 5-40 mL  5-40 mL IntraVENous Q8H    sodium chloride (NS) flush 5-40 mL  5-40 mL IntraVENous PRN    naloxone (NARCAN) injection 0.4 mg  0.4 mg IntraVENous PRN    ondansetron (ZOFRAN) injection 4 mg  4 mg IntraVENous Q4H PRN    atorvastatin (LIPITOR) tablet 20 mg  20 mg Oral QHS    calcitRIOL (ROCALTROL) capsule 0.25 mcg  0.25 mcg Oral DAILY    metoprolol tartrate (LOPRESSOR) tablet 25 mg  25 mg Oral BID    sodium bicarbonate tablet 650 mg 650 mg Oral BID    glucose chewable tablet 16 g  4 Tab Oral PRN    glucagon (GLUCAGEN) injection 1 mg  1 mg IntraMUSCular PRN    dextrose 10% infusion 0-250 mL  0-250 mL IntraVENous PRN    insulin lispro (HUMALOG) injection   SubCUTAneous AC&HS    hydrALAZINE (APRESOLINE) 20 mg/mL injection 10 mg  10 mg IntraVENous Q6H PRN    polyethylene glycol (MIRALAX) packet 17 g  17 g Oral BID    bisacodyl (DULCOLAX) tablet 10 mg  10 mg Oral DAILY

## 2020-01-17 ENCOUNTER — APPOINTMENT (OUTPATIENT)
Dept: GENERAL RADIOLOGY | Age: 55
DRG: 698 | End: 2020-01-17
Attending: INTERNAL MEDICINE
Payer: MEDICARE

## 2020-01-17 LAB
ALBUMIN SERPL-MCNC: 2.7 G/DL (ref 3.5–5)
ANION GAP SERPL CALC-SCNC: 6 MMOL/L (ref 5–15)
BASOPHILS # BLD: 0 K/UL (ref 0–0.1)
BASOPHILS NFR BLD: 0 % (ref 0–1)
BUN SERPL-MCNC: 50 MG/DL (ref 6–20)
BUN/CREAT SERPL: 12 (ref 12–20)
CALCIUM SERPL-MCNC: 8.4 MG/DL (ref 8.5–10.1)
CHLORIDE SERPL-SCNC: 101 MMOL/L (ref 97–108)
CO2 SERPL-SCNC: 29 MMOL/L (ref 21–32)
CREAT SERPL-MCNC: 4.09 MG/DL (ref 0.55–1.02)
DIFFERENTIAL METHOD BLD: ABNORMAL
EOSINOPHIL # BLD: 0.1 K/UL (ref 0–0.4)
EOSINOPHIL NFR BLD: 2 % (ref 0–7)
ERYTHROCYTE [DISTWIDTH] IN BLOOD BY AUTOMATED COUNT: 13.6 % (ref 11.5–14.5)
GLUCOSE BLD STRIP.AUTO-MCNC: 113 MG/DL (ref 65–100)
GLUCOSE BLD STRIP.AUTO-MCNC: 200 MG/DL (ref 65–100)
GLUCOSE BLD STRIP.AUTO-MCNC: 211 MG/DL (ref 65–100)
GLUCOSE BLD STRIP.AUTO-MCNC: 269 MG/DL (ref 65–100)
GLUCOSE BLD STRIP.AUTO-MCNC: 65 MG/DL (ref 65–100)
GLUCOSE BLD STRIP.AUTO-MCNC: 67 MG/DL (ref 65–100)
GLUCOSE SERPL-MCNC: 63 MG/DL (ref 65–100)
HCT VFR BLD AUTO: 28 % (ref 35–47)
HGB BLD-MCNC: 9.2 G/DL (ref 11.5–16)
IMM GRANULOCYTES # BLD AUTO: 0 K/UL (ref 0–0.04)
IMM GRANULOCYTES NFR BLD AUTO: 0 % (ref 0–0.5)
LYMPHOCYTES # BLD: 2.9 K/UL (ref 0.8–3.5)
LYMPHOCYTES NFR BLD: 42 % (ref 12–49)
MCH RBC QN AUTO: 30.2 PG (ref 26–34)
MCHC RBC AUTO-ENTMCNC: 32.9 G/DL (ref 30–36.5)
MCV RBC AUTO: 91.8 FL (ref 80–99)
MONOCYTES # BLD: 0.6 K/UL (ref 0–1)
MONOCYTES NFR BLD: 9 % (ref 5–13)
NEUTS SEG # BLD: 3.2 K/UL (ref 1.8–8)
NEUTS SEG NFR BLD: 47 % (ref 32–75)
NRBC # BLD: 0 K/UL (ref 0–0.01)
NRBC BLD-RTO: 0 PER 100 WBC
PHOSPHATE SERPL-MCNC: 4.1 MG/DL (ref 2.6–4.7)
PLATELET # BLD AUTO: 210 K/UL (ref 150–400)
PMV BLD AUTO: 8.9 FL (ref 8.9–12.9)
POTASSIUM SERPL-SCNC: 3.4 MMOL/L (ref 3.5–5.1)
RBC # BLD AUTO: 3.05 M/UL (ref 3.8–5.2)
SERVICE CMNT-IMP: ABNORMAL
SERVICE CMNT-IMP: NORMAL
SERVICE CMNT-IMP: NORMAL
SODIUM SERPL-SCNC: 136 MMOL/L (ref 136–145)
TSH SERPL DL<=0.05 MIU/L-ACNC: 1.01 UIU/ML (ref 0.36–3.74)
WBC # BLD AUTO: 6.8 K/UL (ref 3.6–11)

## 2020-01-17 PROCEDURE — 74011250636 HC RX REV CODE- 250/636: Performed by: INTERNAL MEDICINE

## 2020-01-17 PROCEDURE — 74011250637 HC RX REV CODE- 250/637: Performed by: INTERNAL MEDICINE

## 2020-01-17 PROCEDURE — 85025 COMPLETE CBC W/AUTO DIFF WBC: CPT

## 2020-01-17 PROCEDURE — 74011000258 HC RX REV CODE- 258: Performed by: INTERNAL MEDICINE

## 2020-01-17 PROCEDURE — 84443 ASSAY THYROID STIM HORMONE: CPT

## 2020-01-17 PROCEDURE — 82962 GLUCOSE BLOOD TEST: CPT

## 2020-01-17 PROCEDURE — 36415 COLL VENOUS BLD VENIPUNCTURE: CPT

## 2020-01-17 PROCEDURE — 65660000000 HC RM CCU STEPDOWN

## 2020-01-17 PROCEDURE — 74011250637 HC RX REV CODE- 250/637: Performed by: FAMILY MEDICINE

## 2020-01-17 PROCEDURE — 74011636637 HC RX REV CODE- 636/637: Performed by: INTERNAL MEDICINE

## 2020-01-17 PROCEDURE — 90935 HEMODIALYSIS ONE EVALUATION: CPT

## 2020-01-17 PROCEDURE — 80069 RENAL FUNCTION PANEL: CPT

## 2020-01-17 PROCEDURE — 94760 N-INVAS EAR/PLS OXIMETRY 1: CPT

## 2020-01-17 PROCEDURE — 72100 X-RAY EXAM L-S SPINE 2/3 VWS: CPT

## 2020-01-17 RX ORDER — POTASSIUM CHLORIDE 750 MG/1
40 TABLET, FILM COATED, EXTENDED RELEASE ORAL
Status: COMPLETED | OUTPATIENT
Start: 2020-01-17 | End: 2020-01-17

## 2020-01-17 RX ORDER — CODEINE PHOSPHATE AND GUAIFENESIN 10; 100 MG/5ML; MG/5ML
5 SOLUTION ORAL
Status: DISCONTINUED | OUTPATIENT
Start: 2020-01-17 | End: 2020-01-23 | Stop reason: HOSPADM

## 2020-01-17 RX ORDER — DOXERCALCIFEROL 4 UG/2ML
4 INJECTION INTRAVENOUS
Status: DISCONTINUED | OUTPATIENT
Start: 2020-01-17 | End: 2020-01-20

## 2020-01-17 RX ORDER — POLYETHYLENE GLYCOL 3350 17 G/17G
17 POWDER, FOR SOLUTION ORAL DAILY
Status: DISCONTINUED | OUTPATIENT
Start: 2020-01-18 | End: 2020-01-23 | Stop reason: HOSPADM

## 2020-01-17 RX ORDER — BISACODYL 5 MG
5 TABLET, DELAYED RELEASE (ENTERIC COATED) ORAL DAILY
Status: DISCONTINUED | OUTPATIENT
Start: 2020-01-18 | End: 2020-01-23 | Stop reason: HOSPADM

## 2020-01-17 RX ADMIN — SODIUM CHLORIDE 75 ML/HR: 900 INJECTION, SOLUTION INTRAVENOUS at 04:02

## 2020-01-17 RX ADMIN — Medication 10 ML: at 14:00

## 2020-01-17 RX ADMIN — Medication 10 ML: at 22:34

## 2020-01-17 RX ADMIN — ACETAMINOPHEN 650 MG: 325 TABLET ORAL at 20:11

## 2020-01-17 RX ADMIN — DOXERCALCIFEROL 4 MCG: 4 INJECTION, SOLUTION INTRAVENOUS at 22:33

## 2020-01-17 RX ADMIN — Medication 10 ML: at 03:56

## 2020-01-17 RX ADMIN — GUAIFENESIN AND CODEINE PHOSPHATE 5 ML: 100; 10 SOLUTION ORAL at 22:39

## 2020-01-17 RX ADMIN — POTASSIUM CHLORIDE 40 MEQ: 750 TABLET, FILM COATED, EXTENDED RELEASE ORAL at 16:48

## 2020-01-17 RX ADMIN — DEXTROSE MONOHYDRATE 250 ML: 100 INJECTION, SOLUTION INTRAVENOUS at 06:42

## 2020-01-17 RX ADMIN — METOPROLOL TARTRATE 25 MG: 25 TABLET ORAL at 17:55

## 2020-01-17 RX ADMIN — INSULIN LISPRO 3 UNITS: 100 INJECTION, SOLUTION INTRAVENOUS; SUBCUTANEOUS at 22:34

## 2020-01-17 RX ADMIN — ATORVASTATIN CALCIUM 20 MG: 20 TABLET, FILM COATED ORAL at 22:34

## 2020-01-17 NOTE — PROGRESS NOTES
HD TRANSFER - OUT REPORT:    Verbal report given to Denilson Hua RN  (Name) on Darrius Bennett being transferred to Neuro tele  (Unit) for routine progression of care       Report consisted of patient's Situation, Background, Assessment and   Recommendations(SBAR). Information from the following report(s) Procedure Summary was reviewed with the receiving nurse. Method:  $$ Method: Hemodialysis (01/17/20 0804)    Fluid Removed  NET Fluid Removed (mL): 0 ml (01/17/20 1050)     Patient response to treatment:  Stable    End Time  Hemodialysis End Time: 3556 (01/16/20 1605)  If not documented, dialysis nurse to update post-dialysis row in HD/Filtration flowsheet     Medications /Volume expansion agents or Fluid boluses administered during treatment? no    Post-dialysis medication administration due?  no  Remind nurse to administer post-HD medication upon return to unit. Fistula hemostasis? yes    Line heparinization? no    Lines: JONATHAN AVF    Opportunity for questions and clarification was provided.

## 2020-01-17 NOTE — PROGRESS NOTES
Bedside report given to Matteo Cuenca by Nguyễn Holland RN. Report included the following information SBAR, Kardex, Intake/Output, Accordion, Recent Results and Cardiac Rhythm NSR.

## 2020-01-17 NOTE — PROGRESS NOTES
Bedside and Verbal shift change report given to [address] (oncoming nurse) by Minh Navarro RN (offgoing nurse). Report included the following information SBAR, Kardex, ED Summary, MAR, Recent Results and Cardiac Rhythm NSR.     Zone Phone:   [phone]     Significant changes during shift:  Still struggling with low blood sugars. Pt placed back on GI lite diet and will get cardiac catheterization Monday. Patient received dialysis today.      Patient Information     Aaron Pimentel  47 y.o.  1/15/2020  4:28 AM by Lynell Alpers, MD. Aaron Pimentel was admitted from Home     Problem List          Patient Active Problem List     Diagnosis Date Noted    Ventricular tachycardia ([address] 01/16/2020    Acute encephalopathy 01/15/2020    Elevated troponin 12/10/2019    S/P arteriovenous (AV) fistula creation 12/10/2019    Hyperparathyroidism ([address] 12/10/2019    Anemia due to chronic kidney disease, on chronic dialysis ([address] 12/10/2019    Uncontrolled type II diabetes mellitus ([address] 12/09/2019    ESRD (end stage renal disease) ([address] 12/06/2019    CKD (chronic kidney disease) stage 5, GFR less than 15 ml/min ([facility] 11/23/2018    Chest pain 05/10/2018    HTN (hypertension) 12/20/2012    Chronic lumbar radiculopathy 12/06/2012    Chronic pain syndrome 12/06/2012    Depression 04/22/2012    Nausea and vomiting 03/16/2012           Past Medical History:   Diagnosis Date    Abdominal pain 11/18/2013    Abdominal pain, LUQ (left upper quadrant) 6/7/2012    Back pain, lumbosacral 6/24/2012     Acute on chronic      C. difficile colitis 6/2012    Chronic kidney disease       Stage V- no dialysis yet    Chronic low back pain      Chronic pain       back & left leg    Constipation      Diabetes ([address]       A1c 8.2 3/2012    DKA (diabetic ketoacidoses) ([address] 7/10/2018    Flank pain 4/14/2015    Gastroparesis 6/7/2012    Hep C w/o coma, chronic (HCC)      Hyperlipemia      Hypertension      Hyponatremia 11/18/2013    Intractable abdominal pain 4/21/2012    Lower urinary tract infectious disease 11/18/2013    Lumbar disc disease      Migraines      Nausea & vomiting 3/16/2012    Pancreatitis 5286     alcoholic    UTI (lower urinary tract infection) 6/20012      Core Measures:     CVA: No No  CHF:No No  PNA:No No     Activity Status:     OOB to Chair No  Ambulated this shift Yes   Bed Rest No     LINES AND DRAINS:     PIV     DVT prophylaxis:     DVT prophylaxis Med- Yes  DVT prophylaxis SCD or DEANDRE- No      Patient Safety:     Falls Score Total Score: 3  Safety Level_______  Bed Alarm On? Yes  Sitter?  No     Plan for upcoming shift: monitor patient for pain     Discharge Plan: No      Active Consults:  IP CONSULT TO GASTROENTEROLOGY  IP CONSULT TO NEPHROLOGY  IP CONSULT TO CARDIOLOGY  IP CONSULT TO VASCULAR SURGERY

## 2020-01-17 NOTE — PROGRESS NOTES
Pt tearful due to multiple loose, watery, BM's during shift. Will speak with day nurse about possibly holding off on any more stool softeners. Pt cleansed with CHG wipes, gown and linens changed. Pt made comfortable. All food and beverages removed from pt's room as pt is to be NPO at midnight for EGD.

## 2020-01-17 NOTE — PROGRESS NOTES
Problem: Falls - Risk of  Goal: *Absence of Falls  Description  Document Chandni Huffman Fall Risk and appropriate interventions in the flowsheet. Outcome: Progressing Towards Goal  Note: Fall Risk Interventions:  Mobility Interventions: Bed/chair exit alarm, OT consult for ADLs, Patient to call before getting OOB, PT Consult for mobility concerns, PT Consult for assist device competence    Mentation Interventions: Door open when patient unattended, Bed/chair exit alarm    Medication Interventions: Assess postural VS orthostatic hypotension, Bed/chair exit alarm, Evaluate medications/consider consulting pharmacy, Patient to call before getting OOB, Teach patient to arise slowly, Utilize gait belt for transfers/ambulation    Elimination Interventions: Bed/chair exit alarm, Call light in reach, Patient to call for help with toileting needs, Stay With Me (per policy), Toilet paper/wipes in reach, Toileting schedule/hourly rounds              Problem: Patient Education: Go to Patient Education Activity  Goal: Patient/Family Education  Outcome: Progressing Towards Goal     Problem: Pressure Injury - Risk of  Goal: *Prevention of pressure injury  Description  Document Julius Scale and appropriate interventions in the flowsheet. Outcome: Progressing Towards Goal  Note: Pressure Injury Interventions:  Sensory Interventions: Assess changes in LOC    Moisture Interventions: Absorbent underpads, Apply protective barrier, creams and emollients, Assess need for specialty bed, Check for incontinence Q2 hours and as needed, Limit adult briefs, Maintain skin hydration (lotion/cream), Minimize layers, Moisture barrier    Activity Interventions: Increase time out of bed, PT/OT evaluation    Mobility Interventions: Assess need for specialty bed, Chair cushion, Float heels, HOB 30 degrees or less, PT/OT evaluation, Turn and reposition approx.  every two hours(pillow and wedges)    Nutrition Interventions: Document food/fluid/supplement intake    Friction and Shear Interventions: Apply protective barrier, creams and emollients, Feet elevated on foot rest, Minimize layers, Lift sheet                Problem: Patient Education: Go to Patient Education Activity  Goal: Patient/Family Education  Outcome: Progressing Towards Goal     Problem: Risk for Spread of Infection  Goal: Prevent transmission of infectious organism to others  Description  Prevent the transmission of infectious organisms to other patients, staff members, and visitors.   Outcome: Progressing Towards Goal     Problem: Patient Education:  Go to Education Activity  Goal: Patient/Family Education  Outcome: Progressing Towards Goal

## 2020-01-17 NOTE — PROCEDURES
[facility]  [phone]    Vitals   Pre   Post   Assessment   Pre   Post     Temp  Temp: 97.9 °F (36.6 °C) (01/17/20 0754)  98.4 F LOC  A&Ox4-resting with eyes closed. Easy to arouse. Same   HR   Pulse (Heart Rate): 86 (01/17/20 0804) 98 Lungs   Clear on RA. Same   B/P   BP: 165/74 (01/17/20 0804) 140/54  Cardiac   RRR  Same   Resp   Resp Rate: 18 (01/17/20 0804) 18 Skin   Intact/ Surgical scars at AVF site. Same   Pain level  Pain Intensity 1: 8 (01/17/20 0754) 8-No change. Nurse aware. Edema  None     Same   Orders:    Duration:   Start:    0804 End:    1035 Total:   2.5 hours   Dialyzer:   Dialyzer/Set Up Inspection: Keshav Friendly (01/17/20 0804)   K Bath:   Dialysate K (mEq/L): 3 (01/17/20 0804)   Ca Bath:   Dialysate CA (mEq/L): 3.0 (01/17/20 0804)   Na/Bicarb:   Dialysate NA (mEq/L): 140 (01/17/20 0804)   Target Fluid Removal:   Goal/Amount of Fluid to Remove (mL): 0 mL (01/16/20 1405)   Access     Type & Location:   JONATHAN AVF +b/t. Cannulated with 17g needles x2. Running well at 200 BFR.     Labs     Obtained/Reviewed   Critical Results Called   Date when labs were drawn-  Hgb-    HGB   Date Value Ref Range Status   01/17/2020 9.2 (L) 11.5 - 16.0 g/dL Final     K-    Potassium   Date Value Ref Range Status   01/17/2020 3.4 (L) 3.5 - 5.1 mmol/L Final     Ca-   Calcium   Date Value Ref Range Status   01/17/2020 8.4 (L) 8.5 - 10.1 MG/DL Final     Bun-   BUN   Date Value Ref Range Status   01/17/2020 50 (H) 6 - 20 MG/DL Final     Comment:     INVESTIGATED PER DELTA CHECK PROTOCOL     Creat-   Creatinine   Date Value Ref Range Status   01/17/2020 4.09 (H) 0.55 - 1.02 MG/DL Final     Comment:     INVESTIGATED PER DELTA CHECK PROTOCOL        Medications/ Blood Products Given     Name   Dose   Route and Time     none                Blood Volume Processed (BVP):    28.7 L Net Fluid   Removed:  0   Comments   Time Out Done: 0800  Primary Nurse Rpt Pre: Daryn Regalado RN  Primary Nurse Rpt Post: Margi Li RN  Pt Education: Procedural  Care Plan: Continue with MD orders   Tx Summary:    Pt A&Ox4. Consent verified and on file. SBAR received from Primary RN. 0320: Pt cannulated with 00Q needles per policy & without issue. VSS. Dialysis Tx initiated. Pt is having left leg pain prior to initiation of tx. Primary nurse aware    0845: Pt resting with eye closed. Easy to arouse. 0850: BP 94/57. 100 ml of NS given. Dr. Trista Li aware. 0900: Repeat BP: 155/59.    0915: Pt c/o left leg pain. Primary nurse is aware and has offered rectal tylenol. Pt declines. 1035: Tx ended. VSS. All possible blood returned to patient. Hemostasis achieved without issue. Bed locked and in the lowest position, call bell and belongings in reach. SBAR given to Primary, RN. Patient is stable at time of my departure. All Dialysis related medications have been reviewed.      Admiting Diagnosis: Ac Encephalopathy   [facility]'s previous [facility]  Consent signed -Verified  [facility] Consent -Verified   Hepatitis Status- Heb B ag Neg 1/16/20  Machine #- Machine Number: [id number] (01/17/20 8852)  Telemetry status- Remote

## 2020-01-17 NOTE — PROGRESS NOTES
Hospitalist Progress Note    NAME: Chloe Waller   :  1965   MRN:  [id number]     Assessment / Plan:  Acute encephalopathy POA  Resolved  Suspect Uremia with CKD5, suspect ESRD now  Started on HD this admission  Her fistula seems to have matured  Nephrology consult appreciated  EtOH negative, hypercapnea ruled out, ammonia within normal limits, CT head negative, electrolytes relatively normal, UDS negative and UA with no UTI  No fever, headache, or meningismus to suggest meningitis  ? BP related  TSH WNL    Chest Pain  NSVT 22 beats  Continue BB  Mg ok  Plan for cardiac cath today  Cardiology consult appreciated    Acute blood loss anemia vs Anemia due to CKD5  ? Upper GI bleed yesterday  Was plan for EGD yesterday but current held due to NSVT pending cardiac cath  Continue PPIs  GI is on the case    Abdominal Pain  Seems related to severe constipation  Continue Miralax  Resolved with Sorbitol    Sinus Tachycardia  Suspect this is reactive due to pain  Better  Continue BB    Hypoglycemia yesterday  DM2  Reduced Long acting insulin to 5 units  Holding Premeal insulin  SSI     Essential hypertension  Changed clonidine to PRN and continue BB  Monitor  IV hydralazine PRN     Code Status: Full  Surrogate Decision Maker: Neelam Betts,      DVT Prophylaxis: SCDs due to ? GI bleed  GI Prophylaxis: not indicated     Baseline: Ambulates with rollator, frequent ED visits                 Subjective: Pt seen and examined at bedside. NAD. Had bowel movement yesterday. Now more abdominal pain.  Overnight events d/w RN   CHIEF COMPLAINT: f/u \"chest pain and abdominal pain\"       Review of Systems:  Symptom Y/N Comments  Symptom Y/N Comments   Fever/Chills n   Chest Pain n    Poor Appetite    Edema     Cough n   Abdominal Pain n    Sputum    Joint Pain     SOB/GRIFFITHS n   Pruritis/Rash     Nausea/vomit    Tolerating PT/OT     Diarrhea    Tolerating Diet y CLD   Constipation    Other       Could NOT obtain due to: Objective:     VITALS:   Last 24hrs VS reviewed since prior progress note. Most recent are:  Patient Vitals for the past 24 hrs:   Temp Pulse Resp BP SpO2   01/17/20 0804  86 18 165/74    01/17/20 0754 97.9 °F (36.6 °C) 81 18 162/73 100 %   01/17/20 0356 98 °F (36.7 °C) 81 16 94/62 100 %   01/16/20 2317 98.1 °F (36.7 °C) 79 16 107/58 100 %   01/16/20 1944 98.2 °F (36.8 °C) 93 16 135/87 100 %   01/16/20 1754    135/71    01/16/20 1605 98 °F (36.7 °C) 88 16 135/71 99 %   01/16/20 1600  92 16 98/56    01/16/20 1545  82 16 116/62    01/16/20 1530  86 16 142/61    01/16/20 1515  85 16 131/62    01/16/20 1500  80 16 122/60    01/16/20 1445  82 16 135/69    01/16/20 1430  81 16 130/65    01/16/20 1415  82 16 145/68    01/16/20 1405 98 °F (36.7 °C) 83 16 124/68 100 %   01/16/20 1218  82  117/58 95 %       Intake/Output Summary (Last 24 hours) at 1/17/2020 0825  Last data filed at 1/16/2020 1605  Gross per 24 hour   Intake    Output 0 ml   Net 0 ml        PHYSICAL EXAM:  General: WD, WN. Alert, cooperative, no acute distress    EENT:  EOMI. Anicteric sclerae. MMM  Resp:  CTA bilaterally, no wheezing or rales. No accessory muscle use  CV:  Regular  rhythm,  No edema  GI:  Soft, Non distended, Non tender.  +Bowel sounds  Neurologic:  Alert and oriented X 3, normal speech,   Psych:   Good insight. Not anxious nor agitated  Skin:  No rashes.   No jaundice    Reviewed most current lab test results and cultures  YES  Reviewed most current radiology test results   YES  Review and summation of old records today    NO  Reviewed patient's current orders and MAR    YES  PMH/SH reviewed - no change compared to H&P  ________________________________________________________________________  Care Plan discussed with:    Comments   Patient y    Family      RN y    Care Manager     Consultant                        Multidiciplinary team rounds were held today with , nursing, pharmacist and clinical coordinator. Patient's plan of care was discussed; medications were reviewed and discharge planning was addressed. ________________________________________________________________________  Total NON critical care TIME:  30 Minutes    Total CRITICAL CARE TIME Spent:   Minutes non procedure based      Comments   >50% of visit spent in counseling and coordination of care     ________________________________________________________________________  Tarun Robledo MD     Procedures: see electronic medical records for all procedures/Xrays and details which were not copied into this note but were reviewed prior to creation of Plan. LABS:  I reviewed today's most current labs and imaging studies.   Pertinent labs include:  Recent Labs     01/17/20  0359 01/16/20  0436 01/15/20  0650   WBC 6.8 10.1 10.5   HGB 9.2* 9.1* 10.6*   HCT 28.0* 28.4* 31.7*    232 240     Recent Labs     01/17/20  0359 01/16/20  0854 01/16/20  0436 01/15/20  0650 01/15/20  0608 01/14/20  1613     --  133* 133*  --  132*   K 3.4*  --  3.8 4.1  --  3.9     --  99 99  --  98   CO2 29  --  26 24  --  25   GLU 63*  --  59* 319*  --  339*   BUN 50*  --  91* 85*  --  84*   CREA 4.09*  --  5.78* 5.67*  --  5.87*   CA 8.4*  --  9.5 10.0  --  10.1   MG  --  2.2  --   --   --   --    PHOS 4.1  --   --   --   --   --    ALB 2.7*  --   --  3.7  --  3.7   TBILI  --   --   --  0.5  --  0.3   SGOT  --   --   --  21  --  26   ALT  --   --   --  26  --  28   INR  --   --   --   --  1.0  --        Signed: Tarun Robledo MD

## 2020-01-17 NOTE — PROGRESS NOTES
Nephrology Progress Note     Codey Calderon     [facility]. [facility]                  Phone - [phone]   Patient: Nunu Moreno   Date- 1/17/2020        Admit Date: 1/15/2020  YOB: 1965             CC: Follow up for new onset ESRD         Subjective: Interval History:   -Seen on dialysis today  She had her first  dialysis yesterday via left arm AV fistula without any difficulty  No c/o sob,   No c/o chest pain,   No c/o nausea or vomiting  No c/o  fever. ROS:- as above   Assessment & Plan:     New onset end-stage renal disease  Seen on dialysis today  Plan for dialysis again on Saturday, she will be on Tuesday Thursday Saturday schedule  She will go to [facility] unit    Hypokalemia  KCl 40 M EQ p.o. CKD 5 DUE TO DM nephropathy     Hyponatremia-improved     Vomiting     Anemia of ckd  Give epogen     Sec. hyperpara  Stop calcitriol   start HectorolHypotension       H/o hypertension  Continue metoprolol  Okay to add ACE meters on ARB     abdomianl pain     Encephalopathy     Dm 2                     Physical exam:   GEN: NAD  NECK- Supple, no mass  RESP: Clear b/l, no wheezing, No accessory muscle use  CVS: RRR,S1,S2    ABDO: soft , non tender, No mass  EXT: No Edema   NEURO: normal speech, non focal  Left arm avf +    Care Plan discussed with: patient and hd nurse  Objective:   Visit Vitals  /70   Pulse 93   Temp 97.9 °F (36.6 °C) (Oral)   Resp 16   Ht 5' 5\" (1.651 m)   Wt 59 kg (130 lb)   SpO2 100%   Breastfeeding No   BMI 21.63 kg/m²     Last 3 Recorded Weights in this Encounter    01/15/20 0435 01/16/20 1754   Weight: 59 kg (130 lb) 59 kg (130 lb)     No intake/output data recorded. Intake/Output Summary (Last 24 hours) at 1/17/2020 1022  Last data filed at 1/16/2020 1605  Gross per 24 hour   Intake    Output 0 ml   Net 0 ml      Chart reviewed. Pertinent Notes reviewed.        Medication list  reviewed   Current Facility-Administered Medications   Medication    potassium chloride SR (KLOR-CON 10) tablet 40 mEq    [START ON 1/18/2020] doxercalciferoL (HECTOROL) 4 mcg/2 mL injection 4 mcg    cloNIDine HCL (CATAPRES) tablet 0.1 mg    insulin glargine (LANTUS) injection 5 Units    acetaminophen (TYLENOL) tablet 650 mg    epoetin kourtney-epbx (RETACRIT) 12,000 Units combo injection    sodium chloride (NS) flush 5-40 mL    sodium chloride (NS) flush 5-40 mL    sodium chloride (NS) flush 5-40 mL    sodium chloride (NS) flush 5-40 mL    naloxone (NARCAN) injection 0.4 mg    ondansetron (ZOFRAN) injection 4 mg    atorvastatin (LIPITOR) tablet 20 mg    metoprolol tartrate (LOPRESSOR) tablet 25 mg    glucose chewable tablet 16 g    glucagon (GLUCAGEN) injection 1 mg    dextrose 10% infusion 0-250 mL    insulin lispro (HUMALOG) injection    hydrALAZINE (APRESOLINE) 20 mg/mL injection 10 mg    polyethylene glycol (MIRALAX) packet 17 g    bisacodyl (DULCOLAX) tablet 10 mg           Data Review :    Results for Kevon Martínez (MRN [id number]) as of 1/17/2020 10:25   Ref. Range 6/8/2012 20:05 12/13/2012 09:22 1/16/2020 13:33   Hepatitis B surface Ag Latest Units: Index <0.10  <0.10   Hep B surface Ag Interp. Latest Ref Range: NEG   NEGATIVE  NEGATIVE   Hepatitis B surface Ab Latest Units: mIU/mL <2.80  <3.10   Hep B surface Ab Interp. Latest Ref Range: NR   NONREACTIVE  NONREACTIVE   Hepatitis B core, IgM Latest Ref Range: NR     NONREACTIVE   Hepatitis C virus Ab Latest Units: Index >11.00  >11.00   Hep C  virus Ab Interp. Latest Ref Range: NR   High Pos  REACTIVE (A)   Hep C  virus Ab comment Latest Units:   Method used is [facility]. Method used is [facility]   HIV 1/2 Interpretation Unknown NONREACTIVE     VDRL SERUM W/REFLEX TITER Unknown  Rpt      CXR  Chest single view dated 1/15/2020     Comparison chest dated 1/14/2020     History is abdominal pain     A single frontal view of the chest was obtained.  The cardiac silhouette is  normal in size. There is no evidence of active lung disease. If there is  clinical suspicion of an intra-abdominal abnormality, a radiographic examination  of the abdomen may render additional information.     IMPRESSION  IMPRESSION: No evidence of active lung disease. Recent Labs     01/17/20 0359 01/16/20  0854 01/16/20  0436 01/15/20  0650     --  133* 133*   K 3.4*  --  3.8 4.1     --  99 99   CO2 29  --  26 24   BUN 50*  --  91* 85*   CREA 4.09*  --  5.78* 5.67*   GLU 63*  --  59* 319*   CA 8.4*  --  9.5 10.0   MG  --  2.2  --   --    PHOS 4.1  --   --   --      Recent Labs     01/17/20 0359 01/16/20  0436 01/15/20  0650   WBC 6.8 10.1 10.5   HGB 9.2* 9.1* 10.6*   HCT 28.0* 28.4* 31.7*    232 240     No results for input(s): FE, TIBC, PSAT, FERR in the last 72 hours. Recent Labs     01/16/20  0854 01/15/20  0650   CPK  --  120   TROIQ <0.05 <0.05     Lab Results   Component Value Date/Time    Color YELLOW/STRAW 01/15/2020 07:11 AM    Appearance CLEAR 01/15/2020 07:11 AM    Specific gravity 1.015 01/15/2020 07:11 AM    Specific gravity 1.012 05/11/2018 09:49 AM    pH (UA) 7.0 01/15/2020 07:11 AM    Protein 100 (A) 01/15/2020 07:11 AM    Glucose >1,000 (A) 01/15/2020 07:11 AM    Ketone NEGATIVE  01/15/2020 07:11 AM    Bilirubin NEGATIVE  01/15/2020 07:11 AM    Urobilinogen 0.2 01/15/2020 07:11 AM    Nitrites NEGATIVE  01/15/2020 07:11 AM    Leukocyte Esterase NEGATIVE  01/15/2020 07:11 AM    Epithelial cells FEW 01/15/2020 07:11 AM    Bacteria NEGATIVE  01/15/2020 07:11 AM    WBC 0-4 01/15/2020 07:11 AM    RBC 5-10 01/15/2020 07:11 AM     Lab Results   Component Value Date/Time    Culture result: (A) 12/06/2019 06:31 AM     MRSA PRESENT CALLED TO AND READ BACK BY KENNETH LOVE,AT 8299 ON 12/7/19. RC    Culture result:  12/06/2019 06:31 AM         Screening of patient nares for MRSA is for surveillance purposes and, if positive, to facilitate isolation considerations in high risk settings. It is not intended for automatic decolonization interventions per se as regimens are not sufficiently effective to warrant routine use. Culture result: MIXED UROGENITAL SAMAN ISOLATED 08/21/2019 04:08 AM     Lab Results   Component Value Date/Time    Specimen Description: NARES 11/18/2013 10:02 PM    Specimen Description: BLOOD 11/18/2013 09:18 PM    Specimen Description: URINE 11/18/2013 09:18 PM     Lab Results   Component Value Date/Time    Sodium,urine random 92 05/28/2018 12:13 PM    Creatinine, urine 29.30 05/28/2018 12:13 PM       Results from Hospital Encounter encounter on 01/15/20   XR CHEST SNGL V    Narrative Chest single view dated 1/15/2020    Comparison chest dated 1/14/2020    History is abdominal pain    A single frontal view of the chest was obtained. The cardiac silhouette is  normal in size. There is no evidence of active lung disease. If there is  clinical suspicion of an intra-abdominal abnormality, a radiographic examination  of the abdomen may render additional information. Impression IMPRESSION: No evidence of active lung disease. Inge Lema MD  [address] [facility]   [email or web address]  [facility]  [address], [address]  [place], [address]  Phone - [phone]         Fax - [phone]

## 2020-01-17 NOTE — PROGRESS NOTES
Problem: Falls - Risk of  Goal: *Absence of Falls  Description  Document Jeannine Crenshaw Fall Risk and appropriate interventions in the flowsheet. Outcome: Progressing Towards Goal  Note: Fall Risk Interventions:  Mobility Interventions: Bed/chair exit alarm    Mentation Interventions: Door open when patient unattended, Bed/chair exit alarm    Medication Interventions: Bed/chair exit alarm, Evaluate medications/consider consulting pharmacy, Patient to call before getting OOB    Elimination Interventions: Bed/chair exit alarm              Problem: Patient Education: Go to Patient Education Activity  Goal: Patient/Family Education  Outcome: Progressing Towards Goal     Problem: Pressure Injury - Risk of  Goal: *Prevention of pressure injury  Description  Document Julius Scale and appropriate interventions in the flowsheet. Outcome: Progressing Towards Goal  Note: Pressure Injury Interventions:  Sensory Interventions: Assess changes in LOC    Moisture Interventions: Internal/External fecal devices    Activity Interventions: Chair cushion, Increase time out of bed, PT/OT evaluation    Mobility Interventions: HOB 30 degrees or less, PT/OT evaluation, Turn and reposition approx.  every two hours(pillow and wedges)    Nutrition Interventions: Document food/fluid/supplement intake    Friction and Shear Interventions: Apply protective barrier, creams and emollients

## 2020-01-17 NOTE — PROGRESS NOTES
Pt given juice for hypoglycemia. (didn't get lantus today). Hold Lantus completely for now. Updated cardiology and GI about it.  Cath rescheduled for Monday as per discussion with cardiology

## 2020-01-17 NOTE — PROGRESS NOTES
TRANSFER - IN REPORT:    Verbal report received from Johnny Colindres, [facility] (name) on Chloe Waller  being received from Neuro room 119 (unit) for ordered procedure      Report consisted of patients Situation, Background, Assessment and   Recommendations(SBAR). Information from the following report(s) SBAR, Procedure Summary, Intake/Output, MAR and Recent Results was reviewed with the receiving nurse. Opportunity for questions and clarification was provided. Will give report to primary Endoscopy nurse upon arrival to unit.

## 2020-01-17 NOTE — PROGRESS NOTES
1/17/2020 8:39 AM    Admit Date: 1/15/2020    Admit Diagnosis: Acute encephalopathy [G93.40]    Subjective:     Enid Ratel   denies chest pain, chest pressure/discomfort, dyspnea, palpitations, irregular heart beats, near-syncope, syncope, fatigue, orthopnea, paroxysmal nocturnal dyspnea.     Visit Vitals  /74   Pulse 86   Temp 97.9 °F (36.6 °C) (Oral)   Resp 18   Ht 5' 5\" (1.651 m)   Wt 130 lb (59 kg)   LMP 09/15/2013   SpO2 100%   Breastfeeding No   BMI 21.63 kg/m²     Current Facility-Administered Medications   Medication Dose Route Frequency    cloNIDine HCL (CATAPRES) tablet 0.1 mg  0.1 mg Oral Q12H PRN    insulin glargine (LANTUS) injection 5 Units  5 Units SubCUTAneous DAILY    acetaminophen (TYLENOL) tablet 650 mg  650 mg Oral Q6H PRN    0.9% sodium chloride infusion  75 mL/hr IntraVENous CONTINUOUS    epoetin kourtney-epbx (RETACRIT) 12,000 Units combo injection  12,000 Units SubCUTAneous Q TUE, THU & SAT    sodium chloride (NS) flush 5-40 mL  5-40 mL IntraVENous Q8H    sodium chloride (NS) flush 5-40 mL  5-40 mL IntraVENous PRN    sodium chloride (NS) flush 5-40 mL  5-40 mL IntraVENous Q8H    sodium chloride (NS) flush 5-40 mL  5-40 mL IntraVENous PRN    naloxone (NARCAN) injection 0.4 mg  0.4 mg IntraVENous PRN    ondansetron (ZOFRAN) injection 4 mg  4 mg IntraVENous Q4H PRN    atorvastatin (LIPITOR) tablet 20 mg  20 mg Oral QHS    calcitRIOL (ROCALTROL) capsule 0.25 mcg  0.25 mcg Oral DAILY    metoprolol tartrate (LOPRESSOR) tablet 25 mg  25 mg Oral BID    sodium bicarbonate tablet 650 mg  650 mg Oral BID    glucose chewable tablet 16 g  4 Tab Oral PRN    glucagon (GLUCAGEN) injection 1 mg  1 mg IntraMUSCular PRN    dextrose 10% infusion 0-250 mL  0-250 mL IntraVENous PRN    insulin lispro (HUMALOG) injection   SubCUTAneous AC&HS    hydrALAZINE (APRESOLINE) 20 mg/mL injection 10 mg  10 mg IntraVENous Q6H PRN    polyethylene glycol (MIRALAX) packet 17 g  17 g Oral BID    bisacodyl (DULCOLAX) tablet 10 mg  10 mg Oral DAILY         Objective:      Visit Vitals  /74   Pulse 86   Temp 97.9 °F (36.6 °C) (Oral)   Resp 18   Ht 5' 5\" (1.651 m)   Wt 130 lb (59 kg)   SpO2 100%   Breastfeeding No   BMI 21.63 kg/m²       Physical Exam:  Abdomen: soft, non-tender. Bowel sounds normal.   Extremities: no cyanosis or edema  Heart: regular rate and rhythm, S1, S2 normal, no murmur, click, rub or gallop  Lungs: clear to auscultation bilaterally  Neurologic: Grossly normal    Data Review:   Labs:    Recent Results (from the past 24 hour(s))   TROPONIN I    Collection Time: 01/16/20  8:54 AM   Result Value Ref Range    Troponin-I, Qt. <0.05 <0.05 ng/mL   MAGNESIUM    Collection Time: 01/16/20  8:54 AM   Result Value Ref Range    Magnesium 2.2 1.6 - 2.4 mg/dL   OCCULT BLOOD, STOOL    Collection Time: 01/16/20  9:58 AM   Result Value Ref Range    Occult blood, stool NEGATIVE  NEG     GLUCOSE, POC    Collection Time: 01/16/20 11:22 AM   Result Value Ref Range    Glucose (POC) 222 (H) 65 - 100 mg/dL    Performed by Rachel Lamar (PCT)    HEP B SURFACE AB    Collection Time: 01/16/20  1:33 PM   Result Value Ref Range    Hepatitis B surface Ab <3.10 mIU/mL    Hep B surface Ab Interp. NONREACTIVE NR     HEP B SURFACE AG    Collection Time: 01/16/20  1:33 PM   Result Value Ref Range    Hepatitis B surface Ag <0.10 Index    Hep B surface Ag Interp. NEGATIVE  NEG     HEPATITIS B CORE AB, IGM    Collection Time: 01/16/20  1:33 PM   Result Value Ref Range    Hepatitis B core, IgM NONREACTIVE NR     HEPATITIS C AB    Collection Time: 01/16/20  1:33 PM   Result Value Ref Range    Hepatitis C virus Ab >11.00 Index    Hep C  virus Ab Interp.  REACTIVE (A) NR      Hep C  virus Ab comment Method used is [employer]     GLUCOSE, POC    Collection Time: 01/16/20  4:26 PM   Result Value Ref Range    Glucose (POC) 55 (L) 65 - 100 mg/dL    Performed by Rachel Lamar (PCT)    ECHO ADULT COMPLETE Collection Time: 01/16/20  5:56 PM   Result Value Ref Range    LA Volume 40.00 22 - 52 mL    Right Atrial Area 4C 11.17 cm2    LV E' Lateral Velocity 8.77 cm/s    LV E' Septal Velocity 4.65 cm/s    Aortic Valve Systolic Peak Velocity 855.51 cm/s    Aortic Valve Area by Continuity of Peak Velocity 1.4 cm2    AoV PG 8.6 mmHg    LVIDd 3.70 (A) 3.9 - 5.3 cm    LVPWd 0.96 (A) 0.6 - 0.9 cm    LVIDs 2.21 cm    IVSd 0.93 (A) 0.6 - 0.9 cm    LVOT d 1.50 cm    LVOT Peak Velocity 118.35 cm/s    LVOT Peak Gradient 5.6 mmHg    MV A Fredi 119.01 cm/s    MV E Fredi 90.82 cm/s    MV E/A 0.76     LA Vol 4C 39.25 22 - 52 mL    LA Vol 2C 31.28 22 - 52 mL    LA Area 4C 15.2 cm2    LV Mass .5 67 - 162 g    LV Mass AL Index 69.5 43 - 95 g/m2    E/E' lateral 10.36     E/E' septal 19.53     RVSP 15.4 mmHg    E/E' ratio (averaged) 14.94     Est. RA Pressure 10.0 mmHg    Mitral Regurgitant Peak Velocity 231.76 cm/s    Mitral Valve E Wave Deceleration Time 229.5 ms    Left Atrium Major Axis 3.02 cm    Triscuspid Valve Regurgitation Peak Gradient 5.4 mmHg    TR Max Velocity 116.01 cm/s    LA Vol Index 24.28 16 - 28 ml/m2    PASP 15.4 mmHg    LA Vol Index 18.99 16 - 28 ml/m2    LA Vol Index 23.83 16 - 28 ml/m2    MR Peak Gradient 21.5 mmHg    ABIGAIL/BSA Pk Fredi 0.9 cm2/m2    Left Ventricular Fractional Shortening by 2D 18.503049097 %    Mitral Valve Deceleration Banks [id number]     AV Velocity Ratio 0.81     Left Ventricular End Diastolic Volume by Teichholz Method 1.17099257585 mL    Left Ventricular End Systolic Volume by Teichholz Method 8.420454392466 mL    Left Ventricular Stroke Volume by August Sam Method 17.139271922 mL   GLUCOSE, POC    Collection Time: 01/16/20  6:48 PM   Result Value Ref Range    Glucose (POC) 147 (H) 65 - 100 mg/dL    Performed by Hung Thomas (PCT)    GLUCOSE, POC    Collection Time: 01/16/20  8:55 PM   Result Value Ref Range    Glucose (POC) 141 (H) 65 - 100 mg/dL    Performed by Jose D MAY FUNCTION PANEL    Collection Time: 01/17/20  3:59 AM   Result Value Ref Range    Sodium 136 136 - 145 mmol/L    Potassium 3.4 (L) 3.5 - 5.1 mmol/L    Chloride 101 97 - 108 mmol/L    CO2 29 21 - 32 mmol/L    Anion gap 6 5 - 15 mmol/L    Glucose 63 (L) 65 - 100 mg/dL    BUN 50 (H) 6 - 20 MG/DL    Creatinine 4.09 (H) 0.55 - 1.02 MG/DL    BUN/Creatinine ratio 12 12 - 20      GFR est AA 14 (L) >60 ml/min/1.73m2    GFR est non-AA 11 (L) >60 ml/min/1.73m2    Calcium 8.4 (L) 8.5 - 10.1 MG/DL    Phosphorus 4.1 2.6 - 4.7 MG/DL    Albumin 2.7 (L) 3.5 - 5.0 g/dL   CBC WITH AUTOMATED DIFF    Collection Time: 01/17/20  3:59 AM   Result Value Ref Range    WBC 6.8 3.6 - 11.0 K/uL    RBC 3.05 (L) 3.80 - 5.20 M/uL    HGB 9.2 (L) 11.5 - 16.0 g/dL    HCT 28.0 (L) 35.0 - 47.0 %    MCV 91.8 80.0 - 99.0 FL    MCH 30.2 26.0 - 34.0 PG    MCHC 32.9 30.0 - 36.5 g/dL    RDW 13.6 11.5 - 14.5 %    PLATELET 748 646 - 281 K/uL    MPV 8.9 8.9 - 12.9 FL    NRBC 0.0 0  WBC    ABSOLUTE NRBC 0.00 0.00 - 0.01 K/uL    NEUTROPHILS 47 32 - 75 %    LYMPHOCYTES 42 12 - 49 %    MONOCYTES 9 5 - 13 %    EOSINOPHILS 2 0 - 7 %    BASOPHILS 0 0 - 1 %    IMMATURE GRANULOCYTES 0 0.0 - 0.5 %    ABS. NEUTROPHILS 3.2 1.8 - 8.0 K/UL    ABS. LYMPHOCYTES 2.9 0.8 - 3.5 K/UL    ABS. MONOCYTES 0.6 0.0 - 1.0 K/UL    ABS. EOSINOPHILS 0.1 0.0 - 0.4 K/UL    ABS. BASOPHILS 0.0 0.0 - 0.1 K/UL    ABS. IMM.  GRANS. 0.0 0.00 - 0.04 K/UL    DF AUTOMATED     TSH 3RD GENERATION    Collection Time: 01/17/20  3:59 AM   Result Value Ref Range    TSH 1.01 0.36 - 3.74 uIU/mL   GLUCOSE, POC    Collection Time: 01/17/20  6:36 AM   Result Value Ref Range    Glucose (POC) 65 65 - 100 mg/dL    Performed by Sammie Parada (PCT)    GLUCOSE, POC    Collection Time: 01/17/20  7:06 AM   Result Value Ref Range    Glucose (POC) 211 (H) 65 - 100 mg/dL    Performed by Chava lEi        Telemetry: normal sinus rhythm      Assessment:     Active Problems:    Nausea and vomiting (3/16/2012) Chest pain (5/10/2018)      Acute encephalopathy (1/15/2020)      Ventricular tachycardia ([facility] [address] (1/16/2020)        Plan:     1. NSVT, chest pain: for cardiac cath today. I discussed the risks/benefits/alternatives of the procedure with the patient. Risks include (but are not limited to) bleeding, infection, cva/mi/tamponade/death. The patient understands and agrees to proceed. 2. Echo noted. Will proceed with LENNY for further evaluation of MV density. 3. ESRD: on HD. 4. Hemaetemsis: GI work up after cath.

## 2020-01-17 NOTE — PROGRESS NOTES
Bedside and Verbal shift change report given to Elaina Almaguer (oncoming nurse) by Chace Barrientos RN (offgoing nurse).  Report included the following information SBAR, Kardex, ED Summary, MAR, Recent Results and Cardiac Rhythm NSR.     Zone Phone:   [phone]     Significant changes during shift:  Please see notes, Pt made NPO for EGD   Patient Information     Trino Augustin  47 y.o.  1/15/2020  4:28 AM by Roshan Hinds MD. Trino Augustin was admitted from Home     Problem List          Patient Active Problem List     Diagnosis Date Noted    Ventricular tachycardia ([address] 01/16/2020    Acute encephalopathy 01/15/2020    Elevated troponin 12/10/2019    S/P arteriovenous (AV) fistula creation 12/10/2019    Hyperparathyroidism ([address] 12/10/2019    Anemia due to chronic kidney disease, on chronic dialysis ([address] 12/10/2019    Uncontrolled type II diabetes mellitus ([address] 12/09/2019    ESRD (end stage renal disease) ([address] 12/06/2019    CKD (chronic kidney disease) stage 5, GFR less than 15 ml/min ([address] 11/23/2018    Chest pain 05/10/2018    HTN (hypertension) 12/20/2012    Chronic lumbar radiculopathy 12/06/2012    Chronic pain syndrome 12/06/2012    Depression 04/22/2012    Nausea and vomiting 03/16/2012           Past Medical History:   Diagnosis Date    Abdominal pain 11/18/2013    Abdominal pain, LUQ (left upper quadrant) 6/7/2012    Back pain, lumbosacral 6/24/2012     Acute on chronic      C. difficile colitis 6/2012    Chronic kidney disease       Stage V- no dialysis yet    Chronic low back pain      Chronic pain       back & left leg    Constipation      Diabetes ([address]       A1c 8.2 3/2012    DKA (diabetic ketoacidoses) ([address] 7/10/2018    Flank pain 4/14/2015    Gastroparesis 6/7/2012    Hep C w/o coma, chronic (HCC)      Hyperlipemia      Hypertension      Hyponatremia 11/18/2013    Intractable abdominal pain 4/21/2012    Lower urinary tract infectious disease 11/18/2013    Lumbar disc disease      Migraines      Nausea & vomiting 3/16/2012    Pancreatitis 4923     alcoholic    UTI (lower urinary tract infection) 6/20012      Core Measures:     CVA: No No  CHF:No No  PNA:No No     Activity Status:     OOB to Chair No  Ambulated this shift Yes   Bed Rest No     LINES AND DRAINS:     PIV     DVT prophylaxis:     DVT prophylaxis Med- Yes  DVT prophylaxis SCD or DEANDRE- No      Patient Safety:     Falls Score Total Score: 3  Safety Level_______  Bed Alarm On? Yes  Sitter?  No     Plan for upcoming shift: monitor patient for pain, possible egd tomorrow     Discharge Plan: No      Active Consults:  IP CONSULT TO GASTROENTEROLOGY  IP CONSULT TO NEPHROLOGY  IP CONSULT TO CARDIOLOGY  IP CONSULT TO VASCULAR SURGERY

## 2020-01-17 NOTE — PROGRESS NOTES
CLAUDETTE:    Outpatient Dialysis Arranged    UPDATE: 12:34PM    CM contact Patrick Valentine ([phone]), to arrange a new outpatient dialysis chair. CM was instructed to send clinicals to [phone]. Clinicals currently pending auth. CM attempted room visit with pt, however, pt currently receiving dialysis and sleeping. CM attempt contact with pt's spouse (number listed on hugh. Contact), however, number listed is non-workering. CM will arrange outpatient dialysis for pt at [address] [place] [address] ([facility]). CM will continue to follow.     Yasmeen Vegas MSW, [address] [place] [address]

## 2020-01-17 NOTE — PROGRESS NOTES
GI PROGRESS NOTE  Gt Wilson NP  [phone] NP in-hospital cell phone M-F until 4:30  After 5pm or on weekends, please call  for physician on call    NAME:Madelyn Perez :1965 DNA:[id number]   ATTG: Dr Constance Flood  PCP: Aaliyah Skinner NP  Date/Time:  2020 1311 PM     Primary GI: Dr. Constance Flood    Reason for following: Hematemesis and constipation    Assessment:   Hematemesis x 1 - none in over 48 hours  Abdominal pain - resolved  Constipation - Multiple large BMs yesterday  AMS - resolved  - No abdominal pain, no grimacing to palpation   - CT scan noted moderate constipation, history of constipation unknown when last bowel movement  - Aspirin 81 mg daily  - Hemoglobin stable today at 9.2.   -FOBT negative    Planned to have EGD today but then patient was going to have cardiac catheterization. Unfortunately patient had juice thus cancelling her cardiac catheterization. It was too late in the day and we were unable to complete EGD. And EGD yesterday was cancelled due to Vtach.      Acute encephalopathy - mentation normalized  CKD stage V  Diabetes type 2-uncontrolled  Hypertension     Plan:   Hematemesis:  1. Patient may not need EGD anymore with resolved vomiting and FOBT negative and Hgb stable. Concern is that if patient has cath and needs to start on anticoagulation without previous EGD. Will re-evalaute Monday if any further drop in hgb, will try to get it done but if stable hgb, will likely completely cancel procedure. 2. Added back to GI lite diet. 3. Okay to resume ASA. 4. Serial H/H - transfuse for hgb < 7.0  5. Continue BID PPI  6. Antiemetics PRN     Constipation:   1. Daily bisacodyl - decreased to 5mg daily instead of 10mg daily  2. Miralax daily  3. Pt received sorbitol yesterady 60mg and had very large BM. Will follow back on Monday but will not have  Laxmi Albarado partner see over the weekend. Call for any concerns. Plan discussed with Dr Francine Meyers and Dr Constance Flood. Subjective:   Discussed with RN events overnight. Pt is very tearful that she is so hungry and no one is giver her any food. No concerns otherwise. No abdomianl pain, nausea, or vomiting. Complaint Y/N Description   Abdominal Pain n    Hematemesis n    Hematochezia n    Melena n    Constipation n    Diarrhea n    Dyspepsia n    Dysphagia n    Jaundiced n    Nausea/vomiting n      Review of Systems:  Symptom Y/N Comments  Symptom Y/N Comments   Fever/Chills    Chest Pain     Cough    Headaches     Sputum    Joint Pain     SOB/GRIFFITHS    Pruritis/Rash     Tolerating Diet  Has been NPO  Other       Could NOT obtain due to:      Objective:   VITALS:   Last 24hrs VS reviewed since prior progress note. Most recent are:  Visit Vitals  /81   Pulse 91   Temp 98.1 °F (36.7 °C)   Resp 16   Ht 5' 5\" (1.651 m)   Wt 59 kg (130 lb)   SpO2 100%   Breastfeeding No   BMI 21.63 kg/m²       Intake/Output Summary (Last 24 hours) at 1/17/2020 1311  Last data filed at 1/17/2020 1050  Gross per 24 hour   Intake    Output 0 ml   Net 0 ml     PHYSICAL EXAM:  General: WD, WN. Alert, cooperative, no acute distress    HEENT: NC, Atraumatic. Anicteric sclerae. Lungs:  CTA Bilaterally. No Wheezing/Rhonchi/Rales. Heart:  Regular  rhythm,  No murmur (-), No Rubs, No Gallops  Abdomen: Soft, Non distended, Non tender. +Bowel sounds, no HSM  Extremities: No c/c/e  Neurologic:  Alert and oriented X 3. No acute neurological distress   Psych:   Good insight. Not anxious nor agitated.     Lab and Radiology Data Reviewed: (see below)    Medications Reviewed: (see below)  PMH/SH reviewed - no change compared to H&P  ________________________________________________________________________  Total time spent with patient: 20 minutes ________________________________________________________________________  Care Plan discussed with:  Patient y   Family     KENNETH Han              Consultant:  katya Meraz NP     Procedures: see electronic medical records for all procedures/Xrays and details which were not copied into this note but were reviewed prior to creation of Plan. LABS:  Recent Labs     01/17/20  0359 01/16/20  0436   WBC 6.8 10.1   HGB 9.2* 9.1*   HCT 28.0* 28.4*    232     Recent Labs     01/17/20  0359 01/16/20  0854 01/16/20  0436 01/15/20  0650     --  133* 133*   K 3.4*  --  3.8 4.1     --  99 99   CO2 29  --  26 24   BUN 50*  --  91* 85*   CREA 4.09*  --  5.78* 5.67*   GLU 63*  --  59* 319*   CA 8.4*  --  9.5 10.0   MG  --  2.2  --   --    PHOS 4.1  --   --   --      Recent Labs     01/17/20  0359 01/15/20  0650 01/14/20  1613   SGOT  --  21 26   AP  --  131* 142*   TP  --  8.3* 8.5*   ALB 2.7* 3.7 3.7   GLOB  --  4.6* 4.8*   LPSE  --  286  --      Recent Labs     01/15/20  0608   INR 1.0   PTP 10.1   APTT 25.4      No results for input(s): FE, TIBC, PSAT, FERR in the last 72 hours. Lab Results   Component Value Date/Time    Folate 13.4 05/11/2018 03:49 AM     No results for input(s): PH, PCO2, PO2 in the last 72 hours.   Recent Labs     01/15/20  0650        Lab Results   Component Value Date/Time    Color YELLOW/STRAW 01/15/2020 07:11 AM    Appearance CLEAR 01/15/2020 07:11 AM    Specific gravity 1.015 01/15/2020 07:11 AM    Specific gravity 1.012 05/11/2018 09:49 AM    pH (UA) 7.0 01/15/2020 07:11 AM    Protein 100 (A) 01/15/2020 07:11 AM    Glucose >1,000 (A) 01/15/2020 07:11 AM    Ketone NEGATIVE  01/15/2020 07:11 AM    Bilirubin NEGATIVE  01/15/2020 07:11 AM    Urobilinogen 0.2 01/15/2020 07:11 AM    Nitrites NEGATIVE  01/15/2020 07:11 AM    Leukocyte Esterase NEGATIVE  01/15/2020 07:11 AM    Epithelial cells FEW 01/15/2020 07:11 AM    Bacteria NEGATIVE  01/15/2020 07:11 AM    WBC 0-4 01/15/2020 07:11 AM    RBC 5-10 01/15/2020 07:11 AM       MEDICATIONS:  Current Facility-Administered Medications   Medication Dose Route Frequency    potassium chloride SR (KLOR-CON 10) tablet 40 mEq  40 mEq Oral NOW    doxercalciferoL (HECTOROL) 4 mcg/2 mL injection 4 mcg  4 mcg IntraVENous DIALYSIS MON, WED & FRI    [START ON 1/18/2020] bisacodyL (DULCOLAX) tablet 5 mg  5 mg Oral DAILY    [START ON 1/18/2020] polyethylene glycol (MIRALAX) packet 17 g  17 g Oral DAILY    cloNIDine HCL (CATAPRES) tablet 0.1 mg  0.1 mg Oral Q12H PRN    acetaminophen (TYLENOL) tablet 650 mg  650 mg Oral Q6H PRN    epoetin kourtney-epbx (RETACRIT) 12,000 Units combo injection  12,000 Units SubCUTAneous Q TUE, THU & SAT    sodium chloride (NS) flush 5-40 mL  5-40 mL IntraVENous Q8H    sodium chloride (NS) flush 5-40 mL  5-40 mL IntraVENous PRN    sodium chloride (NS) flush 5-40 mL  5-40 mL IntraVENous Q8H    sodium chloride (NS) flush 5-40 mL  5-40 mL IntraVENous PRN    naloxone (NARCAN) injection 0.4 mg  0.4 mg IntraVENous PRN    ondansetron (ZOFRAN) injection 4 mg  4 mg IntraVENous Q4H PRN    atorvastatin (LIPITOR) tablet 20 mg  20 mg Oral QHS    metoprolol tartrate (LOPRESSOR) tablet 25 mg  25 mg Oral BID    glucose chewable tablet 16 g  4 Tab Oral PRN    glucagon (GLUCAGEN) injection 1 mg  1 mg IntraMUSCular PRN    dextrose 10% infusion 0-250 mL  0-250 mL IntraVENous PRN    insulin lispro (HUMALOG) injection   SubCUTAneous AC&HS    hydrALAZINE (APRESOLINE) 20 mg/mL injection 10 mg  10 mg IntraVENous Q6H PRN

## 2020-01-18 LAB
ALBUMIN SERPL-MCNC: 2.8 G/DL (ref 3.5–5)
ANION GAP SERPL CALC-SCNC: 4 MMOL/L (ref 5–15)
BASOPHILS # BLD: 0 K/UL (ref 0–0.1)
BASOPHILS NFR BLD: 0 % (ref 0–1)
BUN SERPL-MCNC: 28 MG/DL (ref 6–20)
BUN/CREAT SERPL: 9 (ref 12–20)
CALCIUM SERPL-MCNC: 9 MG/DL (ref 8.5–10.1)
CHLORIDE SERPL-SCNC: 101 MMOL/L (ref 97–108)
CO2 SERPL-SCNC: 32 MMOL/L (ref 21–32)
CREAT SERPL-MCNC: 3.2 MG/DL (ref 0.55–1.02)
DIFFERENTIAL METHOD BLD: ABNORMAL
EOSINOPHIL # BLD: 0.2 K/UL (ref 0–0.4)
EOSINOPHIL NFR BLD: 2 % (ref 0–7)
ERYTHROCYTE [DISTWIDTH] IN BLOOD BY AUTOMATED COUNT: 13.8 % (ref 11.5–14.5)
GLUCOSE BLD STRIP.AUTO-MCNC: 185 MG/DL (ref 65–100)
GLUCOSE BLD STRIP.AUTO-MCNC: 261 MG/DL (ref 65–100)
GLUCOSE BLD STRIP.AUTO-MCNC: 81 MG/DL (ref 65–100)
GLUCOSE SERPL-MCNC: 155 MG/DL (ref 65–100)
HCT VFR BLD AUTO: 28.9 % (ref 35–47)
HGB BLD-MCNC: 9.5 G/DL (ref 11.5–16)
IMM GRANULOCYTES # BLD AUTO: 0 K/UL (ref 0–0.04)
IMM GRANULOCYTES NFR BLD AUTO: 0 % (ref 0–0.5)
LYMPHOCYTES # BLD: 2.5 K/UL (ref 0.8–3.5)
LYMPHOCYTES NFR BLD: 34 % (ref 12–49)
MCH RBC QN AUTO: 30.4 PG (ref 26–34)
MCHC RBC AUTO-ENTMCNC: 32.9 G/DL (ref 30–36.5)
MCV RBC AUTO: 92.3 FL (ref 80–99)
MONOCYTES # BLD: 0.9 K/UL (ref 0–1)
MONOCYTES NFR BLD: 12 % (ref 5–13)
NEUTS SEG # BLD: 3.7 K/UL (ref 1.8–8)
NEUTS SEG NFR BLD: 52 % (ref 32–75)
NRBC # BLD: 0 K/UL (ref 0–0.01)
NRBC BLD-RTO: 0 PER 100 WBC
PHOSPHATE SERPL-MCNC: 2.6 MG/DL (ref 2.6–4.7)
PLATELET # BLD AUTO: 181 K/UL (ref 150–400)
PMV BLD AUTO: 9.2 FL (ref 8.9–12.9)
POTASSIUM SERPL-SCNC: 3.9 MMOL/L (ref 3.5–5.1)
RBC # BLD AUTO: 3.13 M/UL (ref 3.8–5.2)
SERVICE CMNT-IMP: ABNORMAL
SERVICE CMNT-IMP: ABNORMAL
SERVICE CMNT-IMP: NORMAL
SODIUM SERPL-SCNC: 137 MMOL/L (ref 136–145)
WBC # BLD AUTO: 7.3 K/UL (ref 3.6–11)

## 2020-01-18 PROCEDURE — 65660000000 HC RM CCU STEPDOWN

## 2020-01-18 PROCEDURE — 74011250637 HC RX REV CODE- 250/637: Performed by: INTERNAL MEDICINE

## 2020-01-18 PROCEDURE — 80069 RENAL FUNCTION PANEL: CPT

## 2020-01-18 PROCEDURE — 74011250637 HC RX REV CODE- 250/637: Performed by: NURSE PRACTITIONER

## 2020-01-18 PROCEDURE — 74011000250 HC RX REV CODE- 250: Performed by: INTERNAL MEDICINE

## 2020-01-18 PROCEDURE — 74011250637 HC RX REV CODE- 250/637: Performed by: FAMILY MEDICINE

## 2020-01-18 PROCEDURE — 85025 COMPLETE CBC W/AUTO DIFF WBC: CPT

## 2020-01-18 PROCEDURE — 90935 HEMODIALYSIS ONE EVALUATION: CPT

## 2020-01-18 PROCEDURE — 74011636637 HC RX REV CODE- 636/637: Performed by: INTERNAL MEDICINE

## 2020-01-18 PROCEDURE — 82962 GLUCOSE BLOOD TEST: CPT

## 2020-01-18 PROCEDURE — 36415 COLL VENOUS BLD VENIPUNCTURE: CPT

## 2020-01-18 PROCEDURE — 74011250636 HC RX REV CODE- 250/636: Performed by: INTERNAL MEDICINE

## 2020-01-18 PROCEDURE — 94760 N-INVAS EAR/PLS OXIMETRY 1: CPT

## 2020-01-18 RX ORDER — PREDNISOLONE ACETATE 10 MG/ML
1 SUSPENSION/ DROPS OPHTHALMIC 2 TIMES DAILY
Status: DISCONTINUED | OUTPATIENT
Start: 2020-01-18 | End: 2020-01-23 | Stop reason: HOSPADM

## 2020-01-18 RX ORDER — CYCLOBENZAPRINE HCL 10 MG
5 TABLET ORAL
Status: DISCONTINUED | OUTPATIENT
Start: 2020-01-18 | End: 2020-01-23 | Stop reason: HOSPADM

## 2020-01-18 RX ORDER — HYDROXYZINE 25 MG/1
25 TABLET, FILM COATED ORAL 3 TIMES DAILY
Status: DISCONTINUED | OUTPATIENT
Start: 2020-01-18 | End: 2020-01-23 | Stop reason: HOSPADM

## 2020-01-18 RX ORDER — ALPRAZOLAM 0.25 MG/1
0.25 TABLET ORAL
Status: DISCONTINUED | OUTPATIENT
Start: 2020-01-18 | End: 2020-01-23 | Stop reason: HOSPADM

## 2020-01-18 RX ORDER — HYDROCODONE BITARTRATE AND ACETAMINOPHEN 5; 325 MG/1; MG/1
1 TABLET ORAL
Status: DISCONTINUED | OUTPATIENT
Start: 2020-01-18 | End: 2020-01-23 | Stop reason: HOSPADM

## 2020-01-18 RX ADMIN — Medication 10 ML: at 17:02

## 2020-01-18 RX ADMIN — ATORVASTATIN CALCIUM 20 MG: 20 TABLET, FILM COATED ORAL at 23:04

## 2020-01-18 RX ADMIN — PREDNISOLONE ACETATE 1 DROP: 10 SUSPENSION/ DROPS OPHTHALMIC at 17:01

## 2020-01-18 RX ADMIN — HYDROXYZINE HYDROCHLORIDE 25 MG: 25 TABLET, FILM COATED ORAL at 17:01

## 2020-01-18 RX ADMIN — HYDROXYZINE HYDROCHLORIDE 25 MG: 25 TABLET, FILM COATED ORAL at 23:07

## 2020-01-18 RX ADMIN — EPOETIN ALFA-EPBX 12000 UNITS: 10000 INJECTION, SOLUTION INTRAVENOUS; SUBCUTANEOUS at 23:05

## 2020-01-18 RX ADMIN — INSULIN LISPRO 5 UNITS: 100 INJECTION, SOLUTION INTRAVENOUS; SUBCUTANEOUS at 17:01

## 2020-01-18 RX ADMIN — METOPROLOL TARTRATE 25 MG: 25 TABLET ORAL at 12:07

## 2020-01-18 RX ADMIN — METOPROLOL TARTRATE 25 MG: 25 TABLET ORAL at 17:01

## 2020-01-18 RX ADMIN — ACETAMINOPHEN 650 MG: 325 TABLET ORAL at 09:03

## 2020-01-18 RX ADMIN — Medication 10 ML: at 05:58

## 2020-01-18 RX ADMIN — CYCLOBENZAPRINE 5 MG: 10 TABLET, FILM COATED ORAL at 12:39

## 2020-01-18 RX ADMIN — GUAIFENESIN AND CODEINE PHOSPHATE 5 ML: 100; 10 SOLUTION ORAL at 05:58

## 2020-01-18 RX ADMIN — HYDROCODONE BITARTRATE AND ACETAMINOPHEN 1 TABLET: 5; 325 TABLET ORAL at 23:04

## 2020-01-18 RX ADMIN — Medication 10 ML: at 23:07

## 2020-01-18 RX ADMIN — POLYETHYLENE GLYCOL (3350) 17 G: 17 POWDER, FOR SOLUTION ORAL at 12:07

## 2020-01-18 RX ADMIN — HYDROXYZINE HYDROCHLORIDE 25 MG: 25 TABLET, FILM COATED ORAL at 12:07

## 2020-01-18 RX ADMIN — BISACODYL 5 MG: 5 TABLET, COATED ORAL at 12:07

## 2020-01-18 NOTE — PROGRESS NOTES
HD TRANSFER - OUT REPORT:    Verbal report given to [facility] on Tara Ruff being transferred to [facility]  (Unit) for ordered procedure       Report consisted of patient's Situation, Background, Assessment and   Recommendations(SBAR). Information from the following report(s) Kardex was reviewed with the receiving nurse. Method:  $$ Method: Hemodialysis (01/18/20 1115)    Fluid Removed  NET Fluid Removed (mL): 0 ml (01/18/20 1115)     Patient response to treatment:  Stable    End Time  Hemodialysis End Time: 1115 (01/18/20 1115)  If not documented, dialysis nurse to update post-dialysis row in HD/Filtration flowsheet     Medications /Volume expansion agents or Fluid boluses administered during treatment? yes    Post-dialysis medication administration due?  no  Remind nurse to administer post-HD medication upon return to unit. Fistula hemostasis? yes    Line heparinization? no    Lines: secure    Opportunity for questions and clarification was provided.       Patient transported with: Zilta

## 2020-01-18 NOTE — PROGRESS NOTES
Bedside and Verbal shift change report given to Tish Palmer RN (oncoming nurse) by Vic Duncan RN (offgoing nurse). Report included the following information SBAR, Kardex, ED Summary, MAR, Recent Results and Cardiac Rhythm NSR.     Zone Phone:   [phone]     Significant changes during shift:  Continued on GI Lite diet. Patient tearful and wanting to go home (see overnight notes per self, charge RN, and physician). Patient c/o cough. Robitussin AC order for every 6 hours. Cardiac cath and LENNY Monday.     Patient Information     Diogo Ndiaye  47 y.o.  1/15/2020  4:28 AM by Kurt Florez MD. Diogo Ndiaye was admitted from Home     Problem List          Patient Active Problem List     Diagnosis Date Noted    Ventricular tachycardia ([address] 01/16/2020    Acute encephalopathy 01/15/2020    Elevated troponin 12/10/2019    S/P arteriovenous (AV) fistula creation 12/10/2019    Hyperparathyroidism ([address] 12/10/2019    Anemia due to chronic kidney disease, on chronic dialysis ([address] 12/10/2019    Uncontrolled type II diabetes mellitus ([address] 12/09/2019    ESRD (end stage renal disease) ([address] 12/06/2019    CKD (chronic kidney disease) stage 5, GFR less than 15 ml/min ([facility]) 11/23/2018    Chest pain 05/10/2018    HTN (hypertension) 12/20/2012    Chronic lumbar radiculopathy 12/06/2012    Chronic pain syndrome 12/06/2012    Depression 04/22/2012    Nausea and vomiting 03/16/2012           Past Medical History:   Diagnosis Date    Abdominal pain 11/18/2013    Abdominal pain, LUQ (left upper quadrant) 6/7/2012    Back pain, lumbosacral 6/24/2012     Acute on chronic      C. difficile colitis 6/2012    Chronic kidney disease       Stage V- no dialysis yet    Chronic low back pain      Chronic pain       back & left leg    Constipation      Diabetes (HCC)       A1c 8.2 3/2012    DKA (diabetic ketoacidoses) ([address] 7/10/2018    Flank pain 4/14/2015    Gastroparesis 6/7/2012    Hep C w/o coma, chronic ([address]      Hyperlipemia      Hypertension      Hyponatremia 11/18/2013    Intractable abdominal pain 4/21/2012    Lower urinary tract infectious disease 11/18/2013    Lumbar disc disease      Migraines      Nausea & vomiting 3/16/2012    Pancreatitis 6540     alcoholic    UTI (lower urinary tract infection) 6/20012      Core Measures:     CVA: No No  CHF:No No  PNA:No No     Activity Status:     OOB to Chair No  Ambulated this shift Yes   Bed Rest No     LINES AND DRAINS:     PIV     DVT prophylaxis:     DVT prophylaxis Med- Yes  DVT prophylaxis SCD or DEANDRE- No      Patient Safety:     Falls Score Total Score: 3  Safety Level_______  Bed Alarm On? Yes  Sitter?  No     Plan for upcoming shift: Dialysis, Pain      Discharge Plan: No      Active Consults:  IP CONSULT TO GASTROENTEROLOGY  IP CONSULT TO NEPHROLOGY  IP CONSULT TO CARDIOLOGY  IP CONSULT TO VASCULAR SURGERY

## 2020-01-18 NOTE — PROGRESS NOTES
Patient in room crying and stating that she wants to go home. Patient states that she doesn't understand why she has to stay the weekend when she has to be back for the cardiac testing scheduled for Monday. Patient tearful and states that she misses her family and that she has kids to take care of at home. Dominick Washburn, charge RN and myself explained to the patient that physicians did not feel that she was stable enough to go home, and that she was still fairly weak. Notified physician that patient was wanting to leave. Physician stated that if patient was alert and oriented, we could not keep her and if she wanted to leave, she needed to sign and AMA. Reached out to patient daughter, Graham Salazar. Patient's daughter agreed that patient was not well enough to go home. Daughter talked to patient and reiterated that patient needed to stay. Patient calmer after talking to family and decided to stay.

## 2020-01-18 NOTE — PROGRESS NOTES
Pt currently resting calmly. Was anxious and agitated earlier, tearful. Pt wished to leave to be near and to take care of her family. Primary RN requested that I speak with her--ultimately she discussed feeling isolated from her family and unable to address matters at home. Pt's daughter called (daughter, Louis Lynch,  lives in [place] ). Reviewed with pt and daughter the benefits of staying vs the potential liability and disruption of care 2/2 leaving against medical advice. Daughter didn't want pt to leave and made a point to pay pt's phone bill to facilitate family interaction. I provided my personal cell phone to facilitate long distance discussion between pt and her daughter. Pt was able to call home and speak with her family. Pt in hospital awaiting EGD and arrangements to be made to facilitate outpatient hemodialysis. Dx: Acute encephalopathy (possibly 2/2 hypertension vs uremia r/t ESRD); anemia (possibly 2/2 GI bleed aeb hematemesis); new onset end stage renal disease with newly started hemodialysis. On call hospitalist and nursing supervisor notified of pt's concerns.

## 2020-01-18 NOTE — PROGRESS NOTES
Hospitalist Progress Note    NAME: Bishnu Bobo   :  1965   MRN:  [id number]     Assessment / Plan:  Acute encephalopathy POA  Resolved  Suspect Uremia with CKD5, suspect ESRD now- newly started on HD TTS now  Her fistula seems to have matured  EtOH negative, hypercapnea ruled out, ammonia within normal limits, CT head negative, electrolytes relatively normal, UDS negative and UA with no UTI  No fever, headache, or meningismus to suggest meningitis  ? BP related  TSH WNL    IP Nephrology consult appreciated    Chest Pain  NSVT 22 beats  Continue BB  Mg ok    Plan for cardiac cath along with LENNY on monday now  Cardiology consult appreciated- following    Acute blood loss anemia vs Anemia due to CKD5  ? Upper GI bleed yesterday- no more episodes, Hb stable  Plan of EGD on hold for now, if Hb remains stable & doesn't have recurrence - NO EGD as per GI  Continue PPIs  GI following    Anxiety state  Back pain    Pt convinced to not leave AMA for the cardiac workup & GI workup pending at this time. Will start pt on Xanax TID prn (low dose) along with Flexeril TID prn for back pain for supportive care for now    Abdominal Pain  Seems related to severe constipation  Continue Miralax  Resolved with Sorbitol    Sinus Tachycardia  Suspect this is reactive due to pain  Better  Continue BB    Hypoglycemia in the setting of DM2 POA- observe for now  Reduced Long acting insulin to 5 units  Holding Premeal insulin  SSI     Essential hypertension  Changed clonidine to PRN and continue BB  Monitor  IV hydralazine PRN       Code Status: Full  Surrogate Decision Maker: Violet Hastings,      DVT Prophylaxis: SCDs due to ? GI bleed  GI Prophylaxis: not indicated     Baseline: Ambulates with rollator, frequent ED visits                 Subjective: Pt seen and examined at bedside. NAD. Had bowel movement yesterday. Now more abdominal pain.  Overnight events d/w RN   CHIEF COMPLAINT: f/u \"chest pain and abdominal pain\"       Review of Systems:  Symptom Y/N Comments  Symptom Y/N Comments   Fever/Chills n   Chest Pain n    Poor Appetite    Edema     Cough n   Abdominal Pain n    Sputum    Joint Pain     SOB/GRIFFITHS n   Pruritis/Rash     Nausea/vomit    Tolerating PT/OT     Diarrhea    Tolerating Diet y GI lite diet   Constipation    Other       Could NOT obtain due to:      Objective:     VITALS:   Last 24hrs VS reviewed since prior progress note. Most recent are:  Patient Vitals for the past 24 hrs:   Temp Pulse Resp BP SpO2   01/18/20 1201  99 20 134/77    01/18/20 1115 98.2 °F (36.8 °C) 96 18 124/76    01/18/20 1100  (!) 117 18 149/63    01/18/20 1045  (!) 102 18 163/89    01/18/20 1030  (!) 110 18 163/84    01/18/20 1015  100 18 160/87    01/18/20 1000  93 18 171/90    01/18/20 0945  95 16 162/86    01/18/20 0930  92 16 160/89    01/18/20 0915  94 16 165/90    01/18/20 0900  95 16 148/59    01/18/20 0845  98 16 123/69    01/18/20 0830  85 16 97/69    01/18/20 0815 98.4 °F (36.9 °C) 88 16 117/75    01/18/20 0730 97.9 °F (36.6 °C) 91 16 112/50 93 %   01/18/20 0321 98.8 °F (37.1 °C) 93 18 115/57 94 %   01/17/20 2357 97.5 °F (36.4 °C) 88 18 125/56 99 %   01/17/20 1844 98.6 °F (37 °C) 91 18 148/80 100 %   01/17/20 1653 98.4 °F (36.9 °C) 89 18 158/84 100 %       Intake/Output Summary (Last 24 hours) at 1/18/2020 1304  Last data filed at 1/18/2020 1115  Gross per 24 hour   Intake 120 ml   Output 0 ml   Net 120 ml        PHYSICAL EXAM:  General: WD, WN. Alert, cooperative, no acute distress    EENT:  EOMI. Anicteric sclerae. MMM  Resp:  CTA bilaterally, no wheezing or rales. No accessory muscle use  CV:  Regular  rhythm,  No edema  GI:  Soft, Non distended, Non tender.  +Bowel sounds  Neurologic:  Alert and oriented X 3, normal speech,   Psych:   Good insight. Not anxious nor agitated  Skin:  No rashes.   No jaundice    Reviewed most current lab test results and cultures  YES  Reviewed most current radiology test results   YES  Review and summation of old records today    NO  Reviewed patient's current orders and MAR    YES  PMH/SH reviewed - no change compared to H&P  ________________________________________________________________________  Care Plan discussed with:    Comments   Patient x    Family      RN x    Care Manager     Consultant  x Dr Rose White (Cardio)                     Multidiciplinary team rounds were held today with , nursing, pharmacist and clinical coordinator. Patient's plan of care was discussed; medications were reviewed and discharge planning was addressed. ________________________________________________________________________  Total NON critical care TIME:  26 Minutes    Total CRITICAL CARE TIME Spent:   Minutes non procedure based      Comments   >50% of visit spent in counseling and coordination of care     ________________________________________________________________________  Samra Sánchez MD     Procedures: see electronic medical records for all procedures/Xrays and details which were not copied into this note but were reviewed prior to creation of Plan. LABS:  I reviewed today's most current labs and imaging studies.   Pertinent labs include:  Recent Labs     01/18/20  0325 01/17/20  0359 01/16/20  0436   WBC 7.3 6.8 10.1   HGB 9.5* 9.2* 9.1*   HCT 28.9* 28.0* 28.4*    210 232     Recent Labs     01/18/20  0325 01/17/20  0359 01/16/20  0854 01/16/20  0436    136  --  133*   K 3.9 3.4*  --  3.8    101  --  99   CO2 32 29  --  26   * 63*  --  59*   BUN 28* 50*  --  91*   CREA 3.20* 4.09*  --  5.78*   CA 9.0 8.4*  --  9.5   MG  --   --  2.2  --    PHOS 2.6 4.1  --   --    ALB 2.8* 2.7*  --   --        Signed: Samra Sánchez MD

## 2020-01-18 NOTE — PROGRESS NOTES
Bedside and Verbal shift change report given to [address] (oncoming nurse) by Julianna Walker RN (offgoing nurse).  Report included the following information SBAR, Kardex, ED Summary, MAR, Recent Results and Cardiac Rhythm NSR.     Zone Phone:   [phone]     Significant changes during shift:  x rays spine done and was negative  Patient Information     Kaley Guerra  47 y.o.  1/15/2020  4:28 AM by Colton Galvan MD. Kaley Guerra was admitted from Home     Problem List          Patient Active Problem List     Diagnosis Date Noted    Ventricular tachycardia ([address] 01/16/2020    Acute encephalopathy 01/15/2020    Elevated troponin 12/10/2019    S/P arteriovenous (AV) fistula creation 12/10/2019    Hyperparathyroidism ([address] 12/10/2019    Anemia due to chronic kidney disease, on chronic dialysis ([address] 12/10/2019    Uncontrolled type II diabetes mellitus ([address] 12/09/2019    ESRD (end stage renal disease) ([address] 12/06/2019    CKD (chronic kidney disease) stage 5, GFR less than 15 ml/min ([facility]) 11/23/2018    Chest pain 05/10/2018    HTN (hypertension) 12/20/2012    Chronic lumbar radiculopathy 12/06/2012    Chronic pain syndrome 12/06/2012    Depression 04/22/2012    Nausea and vomiting 03/16/2012           Past Medical History:   Diagnosis Date    Abdominal pain 11/18/2013    Abdominal pain, LUQ (left upper quadrant) 6/7/2012    Back pain, lumbosacral 6/24/2012     Acute on chronic      C. difficile colitis 6/2012    Chronic kidney disease       Stage V- no dialysis yet    Chronic low back pain      Chronic pain       back & left leg    Constipation      Diabetes ([address]       A1c 8.2 3/2012    DKA (diabetic ketoacidoses) ([address] 7/10/2018    Flank pain 4/14/2015    Gastroparesis 6/7/2012    Hep C w/o coma, chronic ([facility])      Hyperlipemia      Hypertension      Hyponatremia 11/18/2013    Intractable abdominal pain 4/21/2012    Lower urinary tract infectious disease 11/18/2013    Lumbar disc disease      Migraines      Nausea & vomiting 3/16/2012    Pancreatitis 4605     alcoholic    UTI (lower urinary tract infection) 6/20012      Core Measures:     CVA: No No  CHF:No No  PNA:No No     Activity Status:     OOB to Chair No  Ambulated this shift Yes   Bed Rest No     LINES AND DRAINS:     PIV     DVT prophylaxis:     DVT prophylaxis Med- Yes  DVT prophylaxis SCD or DEANDRE- No      Patient Safety:     Falls Score Total Score: 3  Safety Level_______  Bed Alarm On? Yes  Sitter?  No     Plan for upcoming shift: monitor patient for pain, NPO past MN on sunday     Discharge Plan: yes TBD      Active Consults:  IP CONSULT TO GASTROENTEROLOGY  IP CONSULT TO NEPHROLOGY  IP CONSULT TO CARDIOLOGY  IP CONSULT TO VASCULAR SURGERY

## 2020-01-18 NOTE — PROGRESS NOTES
Patient PER RN, wants to leave AMA, due to family concerns. Patient is AAOx4. Discussed with RN, patient taking risk leaving AMA. Recommendations given.

## 2020-01-18 NOTE — PROCEDURES
[facility]  [phone]    Vitals   Pre   Post   Assessment   Pre   Post     Temp  Temp: 98.4 °F (36.9 °C) (01/18/20 0815)  98.2  LOC  A & O x 4 A & O x 4   HR   Pulse (Heart Rate): 85 (01/18/20 0830) 96 Lungs   Clear  clear   B/P   BP: 97/69 (01/18/20 0830) 124/76 Cardiac   HRR  HRR   Resp   Resp Rate: 16 (01/18/20 0830) 18 Skin   Warm,dry, intact  Warm,Dry, intact   Pain level  Pain Intensity 1: 0 (01/18/20 0730) 9 Edema  none     none   Orders:    Duration:   Start:    0815 End:    1115 Total:   3.0   Dialyzer:   Dialyzer/Set Up Inspection: Revaclear (01/18/20 0815)   K Bath:   Dialysate K (mEq/L): 4 (01/18/20 0815)   Ca Bath:   Dialysate CA (mEq/L): 2.5 (01/18/20 0815)   Na/Bicarb:   Dialysate NA (mEq/L): 138 (01/18/20 0815)   Target Fluid Removal:   Goal/Amount of Fluid to Remove (mL): 0 mL (01/18/20 0815)   Access  AVF   Type & Location:   Left upper AVF   Labs     Obtained/Reviewed   Critical Results Called   Date when labs were drawn-  Hgb-    HGB   Date Value Ref Range Status   01/18/2020 9.5 (L) 11.5 - 16.0 g/dL Final     K-    Potassium   Date Value Ref Range Status   01/18/2020 3.9 3.5 - 5.1 mmol/L Final     Ca-   Calcium   Date Value Ref Range Status   01/18/2020 9.0 8.5 - 10.1 MG/DL Final     Bun-   BUN   Date Value Ref Range Status   01/18/2020 28 (H) 6 - 20 MG/DL Final     Comment:     INVESTIGATED PER DELTA CHECK PROTOCOL     Creat-   Creatinine   Date Value Ref Range Status   01/18/2020 3.20 (H) 0.55 - 1.02 MG/DL Final     Comment:     INVESTIGATED PER DELTA CHECK PROTOCOL        Medications/ Blood Products Given     Name   Dose   Route and Time     Tylenol  650mg  PO 0900             Blood Volume Processed (BVP):    62.4 Net Fluid   Removed:  0   Comments RN reviewed LPN assessment and completed RN assessment. RN completed patient assessment. RN reviewed technicians vital signs and procedure note. Tx completed.  Reviewed by RN Arlee Riedel  Time Out Done: 8186  Primary Nurse Rpt Pre: Héctor Conway RN  Primary Nurse Rpt Post:  Héctor Conway RN  Pt Education:Access care   Care Plan:Continue HD  Tx Summary:JONATHAN AVF: skin CDI. No s/s of infection. No issues with cannulation or hemostasis. Running well at . Pt arrived to HD suite A&Ox4. Consent signed & on file. SBAR received from Primary RN. 0815: Pt cannulated with 30L needles per policy & without issue. Labs drawn per request/ order. VSS. Dialysis Tx initiated. 0845: Pt resting quietly. lines secure and visible  0915: Pt. C/o back pain 8 out of 10. Tylenol 650mg PO given  0945: Pt. Stable. Lines secure and visible  1015: Pt. C/o lower back pain. 8 out of 10  1045: Pt. Stable, lines secure and visible      1115: Tx ended. VSS. All possible blood returned to patient. Hemostasis achieved without issue. Bed locked and in the lowest position, call bell and belongings in reach. SBAR given to Primary, RN. Patient is stable at time of their/ my departure. Bleeding time Arterial: 7min  Bleeding time Venous:4 min  All Dialysis related medications have been reviewed. Admiting Diagnosis:ESRD  Pt's [facility]- [facility]. Consent signed - Informed Consent Verified: Yes (01/18/20 0815)  [facility]ince Consent - Yes   Hepatitis Status- Negative 1-  Machine #- Machine Number: [id number] (01/18/20 0815)  Telemetry status- YEs  Pre-dialysis wt. -

## 2020-01-18 NOTE — PROGRESS NOTES
Problem: Falls - Risk of  Goal: *Absence of Falls  Description  Document Prashant Reas Fall Risk and appropriate interventions in the flowsheet. Outcome: Progressing Towards Goal  Note: Fall Risk Interventions:  Mobility Interventions: Bed/chair exit alarm, Patient to call before getting OOB, PT Consult for mobility concerns, PT Consult for assist device competence    Mentation Interventions: Door open when patient unattended, Increase mobility, More frequent rounding    Medication Interventions: Bed/chair exit alarm, Evaluate medications/consider consulting pharmacy, Patient to call before getting OOB    Elimination Interventions: Bed/chair exit alarm, Call light in reach, Patient to call for help with toileting needs, Stay With Me (per policy)              Problem: Patient Education: Go to Patient Education Activity  Goal: Patient/Family Education  Outcome: Progressing Towards Goal     Problem: Pressure Injury - Risk of  Goal: *Prevention of pressure injury  Description  Document Julius Scale and appropriate interventions in the flowsheet.   Outcome: Progressing Towards Goal  Note: Pressure Injury Interventions:  Sensory Interventions: Assess changes in LOC    Moisture Interventions: Absorbent underpads, Check for incontinence Q2 hours and as needed, Limit adult briefs, Maintain skin hydration (lotion/cream), Minimize layers    Activity Interventions: Increase time out of bed, Pressure redistribution bed/mattress(bed type), PT/OT evaluation    Mobility Interventions: Assess need for specialty bed, Pressure redistribution bed/mattress (bed type), PT/OT evaluation    Nutrition Interventions: Document food/fluid/supplement intake    Friction and Shear Interventions: Lift sheet, Minimize layers                Problem: Patient Education: Go to Patient Education Activity  Goal: Patient/Family Education  Outcome: Progressing Towards Goal     Problem: Risk for Spread of Infection  Goal: Prevent transmission of infectious organism to others  Description  Prevent the transmission of infectious organisms to other patients, staff members, and visitors.   Outcome: Progressing Towards Goal     Problem: Patient Education:  Go to Education Activity  Goal: Patient/Family Education  Outcome: Progressing Towards Goal

## 2020-01-18 NOTE — PROGRESS NOTES
TRANSFER - IN REPORT:    Verbal report received from [facility]  on Mary Lou Montebello  being received from [facility](unit) for ordered procedure      Report consisted of patients Situation, Background, Assessment and   Recommendations(SBAR). Information from the following report(s) Kardex was reviewed with the receiving nurse. Opportunity for questions and clarification was provided. Assessment completed upon patients arrival to unit and care assumed.

## 2020-01-18 NOTE — PROGRESS NOTES
Cardiology Progress Note            [address], [address] [facility], [address] [facility]  [phone]    1/18/2020 9:45 AM    Admit Date: 1/15/2020    Admit Diagnosis: Acute encephalopathy [G93.40]    Subjective:     Norma Kennedy  Tired - seen at HD    Visit Vitals  /90   Pulse 94   Temp 98.4 °F (36.9 °C) (Oral)   Resp 16   Ht 5' 5\" (1.651 m)   Wt 135 lb 9.6 oz (61.5 kg)   LMP 09/15/2013   SpO2 93%   Breastfeeding No   BMI 22.57 kg/m²     Current Facility-Administered Medications   Medication Dose Route Frequency    doxercalciferoL (HECTOROL) 4 mcg/2 mL injection 4 mcg  4 mcg IntraVENous DIALYSIS MON, WED & FRI    bisacodyL (DULCOLAX) tablet 5 mg  5 mg Oral DAILY    polyethylene glycol (MIRALAX) packet 17 g  17 g Oral DAILY    guaiFENesin-codeine (ROBITUSSIN AC) 100-10 mg/5 mL solution 5 mL  5 mL Oral Q6H PRN    cloNIDine HCL (CATAPRES) tablet 0.1 mg  0.1 mg Oral Q12H PRN    acetaminophen (TYLENOL) tablet 650 mg  650 mg Oral Q6H PRN    epoetin kourtney-epbx (RETACRIT) 12,000 Units combo injection  12,000 Units SubCUTAneous Q TUE, THU & SAT    sodium chloride (NS) flush 5-40 mL  5-40 mL IntraVENous Q8H    sodium chloride (NS) flush 5-40 mL  5-40 mL IntraVENous PRN    sodium chloride (NS) flush 5-40 mL  5-40 mL IntraVENous Q8H    sodium chloride (NS) flush 5-40 mL  5-40 mL IntraVENous PRN    naloxone (NARCAN) injection 0.4 mg  0.4 mg IntraVENous PRN    ondansetron (ZOFRAN) injection 4 mg  4 mg IntraVENous Q4H PRN    atorvastatin (LIPITOR) tablet 20 mg  20 mg Oral QHS    metoprolol tartrate (LOPRESSOR) tablet 25 mg  25 mg Oral BID    glucose chewable tablet 16 g  4 Tab Oral PRN    glucagon (GLUCAGEN) injection 1 mg  1 mg IntraMUSCular PRN    dextrose 10% infusion 0-250 mL  0-250 mL IntraVENous PRN    insulin lispro (HUMALOG) injection   SubCUTAneous AC&HS    hydrALAZINE (APRESOLINE) 20 mg/mL injection 10 mg  10 mg IntraVENous Q6H PRN         Objective:      Visit Vitals  /90   Pulse 94   Temp 98.4 °F (36.9 °C) (Oral)   Resp 16   Ht 5' 5\" (1.651 m)   Wt 135 lb 9.6 oz (61.5 kg)   SpO2 93%   Breastfeeding No   BMI 22.57 kg/m²       Physical Exam:  Abdomen: soft, non-tender  Extremities: extremities normal  Heart: regular rate and rhythm  Lungs: clear to auscultation bilaterally  Pulses: 2+ and symmetric    Data Review:   Labs:    Recent Labs     01/18/20  0325 01/17/20  0359 01/16/20  0436   WBC 7.3 6.8 10.1   HGB 9.5* 9.2* 9.1*   HCT 28.9* 28.0* 28.4*    210 232     Recent Labs     01/18/20  0325 01/17/20  0359 01/16/20  0854 01/16/20  0436    136  --  133*   K 3.9 3.4*  --  3.8    101  --  99   CO2 32 29  --  26   * 63*  --  59*   BUN 28* 50*  --  91*   CREA 3.20* 4.09*  --  5.78*   CA 9.0 8.4*  --  9.5   MG  --   --  2.2  --    PHOS 2.6 4.1  --   --    ALB 2.8* 2.7*  --   --        Recent Labs     01/16/20  0854   TROIQ <0.05         Intake/Output Summary (Last 24 hours) at 1/18/2020 0945  Last data filed at 1/17/2020 1756  Gross per 24 hour   Intake 120 ml   Output 0 ml   Net 120 ml        Telemetry: nsr    Assessment:     Active Problems:    Nausea and vomiting (3/16/2012)      Chest pain (5/10/2018)      Acute encephalopathy (1/15/2020)      Ventricular tachycardia ([facility] (1/16/2020)        Plan:     Nunu Moreno is for parker Monday to eval MV for lesion and for cath to r/o cad as cause of VT.  Cont supportive care    Lisa Kelly MD, [facility]    1/18/2020

## 2020-01-18 NOTE — PROGRESS NOTES
Progress Note    Assessment:   Active Problems:    Nausea and vomiting (3/16/2012)      Chest pain (5/10/2018)      Acute encephalopathy (1/15/2020)      Ventricular tachycardia ([address] (1/16/2020)        New onset end-stage renal disease  Seen on dialysis today - en routeto suite. No nv or sob overnight     he will be on Tuesday Thursday Saturday schedule  She will [facility] to [facility] unit     Hypokalemia  .     CKD 5 DUE TO DM nephropathy     Hyponatremia     Vomiting     Anemia of ckd  Give epogen     Sec. hyperpara          H/o hypertension  Continue metoprolol  Okay to add ACE meters on ARB     abdomianl pain     Encephalopathy     Dm 2               Plan: For hd today   Back Monday       Time spent with patient during dialysis no      Subjective:       Complaint:  no nv or sob.  seen en route to hd      Current Facility-Administered Medications   Medication Dose Route Frequency    doxercalciferoL (HECTOROL) 4 mcg/2 mL injection 4 mcg  4 mcg IntraVENous DIALYSIS MON, WED & FRI    bisacodyL (DULCOLAX) tablet 5 mg  5 mg Oral DAILY    polyethylene glycol (MIRALAX) packet 17 g  17 g Oral DAILY    guaiFENesin-codeine (ROBITUSSIN AC) 100-10 mg/5 mL solution 5 mL  5 mL Oral Q6H PRN    cloNIDine HCL (CATAPRES) tablet 0.1 mg  0.1 mg Oral Q12H PRN    acetaminophen (TYLENOL) tablet 650 mg  650 mg Oral Q6H PRN    epoetin kourtney-epbx (RETACRIT) 12,000 Units combo injection  12,000 Units SubCUTAneous Q TUE, THU & SAT    sodium chloride (NS) flush 5-40 mL  5-40 mL IntraVENous Q8H    sodium chloride (NS) flush 5-40 mL  5-40 mL IntraVENous PRN    sodium chloride (NS) flush 5-40 mL  5-40 mL IntraVENous Q8H    sodium chloride (NS) flush 5-40 mL  5-40 mL IntraVENous PRN    naloxone (NARCAN) injection 0.4 mg  0.4 mg IntraVENous PRN    ondansetron (ZOFRAN) injection 4 mg  4 mg IntraVENous Q4H PRN    atorvastatin (LIPITOR) tablet 20 mg  20 mg Oral QHS    metoprolol tartrate (LOPRESSOR) tablet 25 mg  25 mg Oral BID  glucose chewable tablet 16 g  4 Tab Oral PRN    glucagon (GLUCAGEN) injection 1 mg  1 mg IntraMUSCular PRN    dextrose 10% infusion 0-250 mL  0-250 mL IntraVENous PRN    insulin lispro (HUMALOG) injection   SubCUTAneous AC&HS    hydrALAZINE (APRESOLINE) 20 mg/mL injection 10 mg  10 mg IntraVENous Q6H PRN       Review of Systems  Pertinent items are noted in HPI. Objective:     Visit Vitals  /50 (BP 1 Location: Right arm, BP Patient Position: At rest)   Pulse 91   Temp 97.9 °F (36.6 °C)   Resp 16   Ht 5' 5\" (1.651 m)   Wt 61.5 kg (135 lb 9.6 oz)   LMP 09/15/2013   SpO2 93%   Breastfeeding No   BMI 22.57 kg/m²     Temp (24hrs), Av.2 °F (36.8 °C), Min:97.5 °F (36.4 °C), Max:98.8 °F (37.1 °C)      No intake/output data recorded.     Physical Exam:    General [ x   ] healthy     [] acutely ill [    ] chronically ill   [    ] critical      Skin  [x] Nl color/turgor [    ]   Eyes  [x] EOMI [    ]    ENT  [x] clear/moist [    ]     Neck  [x] supple  [    ]    Pulm  [x] clear to A/P [    ]    CV  [x] RRR  [    ]   ABD  [] Soft  [    ]      [] Parra  [    ]    MS  [no le] Edema  [    ]     Neuro             [x] nonfocal  [    ]    Psyche           [x] Nl   [    ]       Data Review:     LABS:   Recent Results (from the past 24 hour(s))   GLUCOSE, POC    Collection Time: 20 11:48 AM   Result Value Ref Range    Glucose (POC) 67 65 - 100 mg/dL    Performed by Rachel Lamar (PCT)    GLUCOSE, POC    Collection Time: 20 12:36 PM   Result Value Ref Range    Glucose (POC) 113 (H) 65 - 100 mg/dL    Performed by Rachel Lamar (PCT)    GLUCOSE, POC    Collection Time: 20  5:11 PM   Result Value Ref Range    Glucose (POC) 200 (H) 65 - 100 mg/dL    Performed by Vern JACKSON    GLUCOSE, POC    Collection Time: 20  9:09 PM   Result Value Ref Range    Glucose (POC) 269 (H) 65 - 100 mg/dL    Performed by Fox Chakraborty    RENAL FUNCTION PANEL    Collection Time: 20  3:25 AM   Result Value Ref Range    Sodium 137 136 - 145 mmol/L    Potassium 3.9 3.5 - 5.1 mmol/L    Chloride 101 97 - 108 mmol/L    CO2 32 21 - 32 mmol/L    Anion gap 4 (L) 5 - 15 mmol/L    Glucose 155 (H) 65 - 100 mg/dL    BUN 28 (H) 6 - 20 MG/DL    Creatinine 3.20 (H) 0.55 - 1.02 MG/DL    BUN/Creatinine ratio 9 (L) 12 - 20      GFR est AA 18 (L) >60 ml/min/1.73m2    GFR est non-AA 15 (L) >60 ml/min/1.73m2    Calcium 9.0 8.5 - 10.1 MG/DL    Phosphorus 2.6 2.6 - 4.7 MG/DL    Albumin 2.8 (L) 3.5 - 5.0 g/dL   CBC WITH AUTOMATED DIFF    Collection Time: 01/18/20  3:25 AM   Result Value Ref Range    WBC 7.3 3.6 - 11.0 K/uL    RBC 3.13 (L) 3.80 - 5.20 M/uL    HGB 9.5 (L) 11.5 - 16.0 g/dL    HCT 28.9 (L) 35.0 - 47.0 %    MCV 92.3 80.0 - 99.0 FL    MCH 30.4 26.0 - 34.0 PG    MCHC 32.9 30.0 - 36.5 g/dL    RDW 13.8 11.5 - 14.5 %    PLATELET 507 107 - 076 K/uL    MPV 9.2 8.9 - 12.9 FL    NRBC 0.0 0  WBC    ABSOLUTE NRBC 0.00 0.00 - 0.01 K/uL    NEUTROPHILS 52 32 - 75 %    LYMPHOCYTES 34 12 - 49 %    MONOCYTES 12 5 - 13 %    EOSINOPHILS 2 0 - 7 %    BASOPHILS 0 0 - 1 %    IMMATURE GRANULOCYTES 0 0.0 - 0.5 %    ABS. NEUTROPHILS 3.7 1.8 - 8.0 K/UL    ABS. LYMPHOCYTES 2.5 0.8 - 3.5 K/UL    ABS. MONOCYTES 0.9 0.0 - 1.0 K/UL    ABS. EOSINOPHILS 0.2 0.0 - 0.4 K/UL    ABS. BASOPHILS 0.0 0.0 - 0.1 K/UL    ABS. IMM. GRANS. 0.0 0.00 - 0.04 K/UL    DF AUTOMATED                   Signed By: Jun Bazzi MD, JENNIFERN                      January 18, 2020                      [email or web address]

## 2020-01-19 LAB
ALBUMIN SERPL-MCNC: 2.8 G/DL (ref 3.5–5)
ANION GAP SERPL CALC-SCNC: 3 MMOL/L (ref 5–15)
BUN SERPL-MCNC: 10 MG/DL (ref 6–20)
BUN/CREAT SERPL: 4 (ref 12–20)
CALCIUM SERPL-MCNC: 9.2 MG/DL (ref 8.5–10.1)
CHLORIDE SERPL-SCNC: 98 MMOL/L (ref 97–108)
CO2 SERPL-SCNC: 35 MMOL/L (ref 21–32)
CREAT SERPL-MCNC: 2.33 MG/DL (ref 0.55–1.02)
GLUCOSE BLD STRIP.AUTO-MCNC: 195 MG/DL (ref 65–100)
GLUCOSE BLD STRIP.AUTO-MCNC: 200 MG/DL (ref 65–100)
GLUCOSE BLD STRIP.AUTO-MCNC: 244 MG/DL (ref 65–100)
GLUCOSE BLD STRIP.AUTO-MCNC: 320 MG/DL (ref 65–100)
GLUCOSE SERPL-MCNC: 240 MG/DL (ref 65–100)
PHOSPHATE SERPL-MCNC: 3.2 MG/DL (ref 2.6–4.7)
POTASSIUM SERPL-SCNC: 4.3 MMOL/L (ref 3.5–5.1)
SERVICE CMNT-IMP: ABNORMAL
SODIUM SERPL-SCNC: 136 MMOL/L (ref 136–145)

## 2020-01-19 PROCEDURE — 74011636637 HC RX REV CODE- 636/637: Performed by: INTERNAL MEDICINE

## 2020-01-19 PROCEDURE — 74011250637 HC RX REV CODE- 250/637: Performed by: INTERNAL MEDICINE

## 2020-01-19 PROCEDURE — 74011250637 HC RX REV CODE- 250/637: Performed by: NURSE PRACTITIONER

## 2020-01-19 PROCEDURE — 74011000250 HC RX REV CODE- 250: Performed by: INTERNAL MEDICINE

## 2020-01-19 PROCEDURE — 80069 RENAL FUNCTION PANEL: CPT

## 2020-01-19 PROCEDURE — 82962 GLUCOSE BLOOD TEST: CPT

## 2020-01-19 PROCEDURE — 65660000000 HC RM CCU STEPDOWN

## 2020-01-19 PROCEDURE — 36415 COLL VENOUS BLD VENIPUNCTURE: CPT

## 2020-01-19 RX ADMIN — METOPROLOL TARTRATE 25 MG: 25 TABLET ORAL at 08:38

## 2020-01-19 RX ADMIN — INSULIN LISPRO 7 UNITS: 100 INJECTION, SOLUTION INTRAVENOUS; SUBCUTANEOUS at 16:41

## 2020-01-19 RX ADMIN — METOPROLOL TARTRATE 25 MG: 25 TABLET ORAL at 16:48

## 2020-01-19 RX ADMIN — Medication 10 ML: at 16:36

## 2020-01-19 RX ADMIN — HYDROCODONE BITARTRATE AND ACETAMINOPHEN 1 TABLET: 5; 325 TABLET ORAL at 16:32

## 2020-01-19 RX ADMIN — Medication 10 ML: at 22:11

## 2020-01-19 RX ADMIN — ATORVASTATIN CALCIUM 20 MG: 20 TABLET, FILM COATED ORAL at 22:10

## 2020-01-19 RX ADMIN — BISACODYL 5 MG: 5 TABLET, COATED ORAL at 08:38

## 2020-01-19 RX ADMIN — PREDNISOLONE ACETATE 1 DROP: 10 SUSPENSION/ DROPS OPHTHALMIC at 11:00

## 2020-01-19 RX ADMIN — HYDROXYZINE HYDROCHLORIDE 25 MG: 25 TABLET, FILM COATED ORAL at 08:43

## 2020-01-19 RX ADMIN — INSULIN LISPRO 3 UNITS: 100 INJECTION, SOLUTION INTRAVENOUS; SUBCUTANEOUS at 08:38

## 2020-01-19 RX ADMIN — Medication 10 ML: at 06:00

## 2020-01-19 RX ADMIN — PREDNISOLONE ACETATE 1 DROP: 10 SUSPENSION/ DROPS OPHTHALMIC at 16:47

## 2020-01-19 RX ADMIN — INSULIN LISPRO 2 UNITS: 100 INJECTION, SOLUTION INTRAVENOUS; SUBCUTANEOUS at 22:10

## 2020-01-19 NOTE — PROGRESS NOTES
1935--bedside report received from manasa Dunne, pt dose currently have functioning iv, per manasa flaherty will replace iv piror to transferring    2115--pt arrived to room 2162 alert oriented x 4, c/o pain Bilateral legs, no other complaints at this time, assessment as noted    2215--cross cover hospitalist paged for stronger pain meds and pain patch for legs, pt very upset about pain legs stating \"I can hardly walk\" awaiting response    2245-no response second page sent to hospitalist    2300--norco orders placed (MAR)    0200--pt talking on phone, eating ice cream, am labs drawn    0430--pt resting comfortably, has no complaints at this time    0700--bedside report given to manasa charles who is assuming care of pt

## 2020-01-19 NOTE — PROGRESS NOTES
Cardiology Progress Note            [address]  [phone]    1/19/2020 8:35 AM    Admit Date: 1/15/2020    Admit Diagnosis: Acute encephalopathy [G93.40]    Subjective:     Kaley Guerra   denies chest pain.     Visit Vitals  /70 (BP Patient Position: Post activity)   Pulse 90   Temp 98.5 °F (36.9 °C)   Resp 18   Ht 5' 5\" (1.651 m)   Wt 135 lb 9.6 oz (61.5 kg)   LMP 09/15/2013   SpO2 97%   Breastfeeding No   BMI 22.57 kg/m²     Current Facility-Administered Medications   Medication Dose Route Frequency    ALPRAZolam (XANAX) tablet 0.25 mg  0.25 mg Oral TID PRN    hydroxyzine HCL (ATARAX) tablet 25 mg  25 mg Oral TID    cyclobenzaprine (FLEXERIL) tablet 5 mg  5 mg Oral TID PRN    prednisoLONE acetate (PRED FORTE) 1 % ophthalmic suspension 1 Drop  1 Drop Both Eyes BID    HYDROcodone-acetaminophen (NORCO) 5-325 mg per tablet 1 Tab  1 Tab Oral Q6H PRN    doxercalciferoL (HECTOROL) 4 mcg/2 mL injection 4 mcg  4 mcg IntraVENous DIALYSIS MON, WED & FRI    bisacodyL (DULCOLAX) tablet 5 mg  5 mg Oral DAILY    polyethylene glycol (MIRALAX) packet 17 g  17 g Oral DAILY    guaiFENesin-codeine (ROBITUSSIN AC) 100-10 mg/5 mL solution 5 mL  5 mL Oral Q6H PRN    cloNIDine HCL (CATAPRES) tablet 0.1 mg  0.1 mg Oral Q12H PRN    acetaminophen (TYLENOL) tablet 650 mg  650 mg Oral Q6H PRN    epoetin kourtney-epbx (RETACRIT) 12,000 Units combo injection  12,000 Units SubCUTAneous Q TUE, THU & SAT    sodium chloride (NS) flush 5-40 mL  5-40 mL IntraVENous Q8H    sodium chloride (NS) flush 5-40 mL  5-40 mL IntraVENous PRN    sodium chloride (NS) flush 5-40 mL  5-40 mL IntraVENous Q8H    sodium chloride (NS) flush 5-40 mL  5-40 mL IntraVENous PRN    naloxone (NARCAN) injection 0.4 mg  0.4 mg IntraVENous PRN    ondansetron (ZOFRAN) injection 4 mg  4 mg IntraVENous Q4H PRN    atorvastatin (LIPITOR) tablet 20 mg  20 mg Oral QHS    metoprolol tartrate (LOPRESSOR) tablet 25 mg  25 mg Oral BID    glucose chewable tablet 16 g  4 Tab Oral PRN    glucagon (GLUCAGEN) injection 1 mg  1 mg IntraMUSCular PRN    dextrose 10% infusion 0-250 mL  0-250 mL IntraVENous PRN    insulin lispro (HUMALOG) injection   SubCUTAneous AC&HS    hydrALAZINE (APRESOLINE) 20 mg/mL injection 10 mg  10 mg IntraVENous Q6H PRN         Objective:      Visit Vitals  /70 (BP Patient Position: Post activity)   Pulse 90   Temp 98.5 °F (36.9 °C)   Resp 18   Ht 5' 5\" (1.651 m)   Wt 135 lb 9.6 oz (61.5 kg)   SpO2 97%   Breastfeeding No   BMI 22.57 kg/m²       Physical Exam:  Abdomen: soft, non-tender  Extremities: extremities normal  Heart: regular rate and rhythm  Lungs: clear to auscultation bilaterally  Pulses: 2+ and symmetric    Data Review:   Labs:    Recent Labs     01/18/20  0325 01/17/20  0359   WBC 7.3 6.8   HGB 9.5* 9.2*   HCT 28.9* 28.0*    210     Recent Labs     01/19/20  0112 01/18/20  0325 01/17/20  0359 01/16/20  0854    137 136  --    K 4.3 3.9 3.4*  --    CL 98 101 101  --    CO2 35* 32 29  --    * 155* 63*  --    BUN 10 28* 50*  --    CREA 2.33* 3.20* 4.09*  --    CA 9.2 9.0 8.4*  --    MG  --   --   --  2.2   PHOS 3.2 2.6 4.1  --    ALB 2.8* 2.8* 2.7*  --        Recent Labs     01/16/20  0854   TROIQ <0.05         Intake/Output Summary (Last 24 hours) at 1/19/2020 0835  Last data filed at 1/18/2020 2127  Gross per 24 hour   Intake 100 ml   Output 0 ml   Net 100 ml        Telemetry: nsr    Assessment:     Active Problems:    Nausea and vomiting (3/16/2012)      Chest pain (5/10/2018)      Acute encephalopathy (1/15/2020)      Ventricular tachycardia ([address] (1/16/2020)        Plan:     Bernabe Metlorelei is for cardiac cath tmrw for VT and chest pain. Also for parker to eval MV mass. Npo p mn. I discussed the risks/benefits/alternatives of the procedure with the patient. Risks include (but are not limited to) bleeding, heart block, infection, cva/mi/tamponade/death.  The patient understands and agrees to proceed.       Ana Pham MD, [facility]    1/19/2020

## 2020-01-19 NOTE — PROGRESS NOTES
Hospitalist Progress Note    NAME: Taurus Schmitt   :  1965   MRN:  [id number]     Assessment / Plan:  Acute encephalopathy POA  Resolved  Suspect Uremia with CKD5, suspect ESRD now- newly started on HD TTS now  Her fistula seems to have matured  EtOH negative, hypercapnea ruled out, ammonia within normal limits, CT head negative, electrolytes relatively normal, UDS negative and UA with no UTI  No fever, headache, or meningismus to suggest meningitis  ? BP related  TSH WNL    IP Nephrology consult appreciated    Chest Pain  NSVT 22 beats  Continue BB  Mg ok    Plan for cardiac cath along with LENNY on monday now- npo p MN noted  Cardiology consult appreciated- following    Acute blood loss anemia vs Anemia due to CKD5  ? Upper GI bleed yesterday- no more episodes, Hb stable  Plan of EGD on hold for now, if Hb remains stable & doesn't have recurrence - NO EGD as per GI  Continue PPIs  GI following    Anxiety state  Back pain  Xray L spine= neg acute noted    Pt convinced to not leave AMA for the cardiac workup & GI workup pending at this time. Cont Xanax TID prn (low dose) along with Flexeril TID prn for back pain for supportive care for now  Norco prn started by overnight On call Telehospitalist- will avoid further escalations    Abdominal Pain  Seems related to severe constipation  Continue Miralax  Resolved with Sorbitol    Sinus Tachycardia  Suspect this is reactive due to pain  Better  Continue BB    Hypoglycemia in the setting of DM2 POA- observe for now  Reduced Long acting insulin to 5 units  Holding Premeal insulin  SSI     Essential hypertension  Changed clonidine to PRN and continue BB  Monitor  IV hydralazine PRN       Code Status: Full  Surrogate Decision Maker: Alek See,      DVT Prophylaxis: SCDs due to ? GI bleed  GI Prophylaxis: not indicated     Baseline: Ambulates with rollator, frequent ED visits                 Subjective: Pt seen and examined at bedside. NAD.  Had bowel movement yesterday. Now more abdominal pain. Overnight events d/w RN   CHIEF COMPLAINT: f/u \"chest pain and abdominal pain\"       Review of Systems:  Symptom Y/N Comments  Symptom Y/N Comments   Fever/Chills n   Chest Pain n    Poor Appetite    Edema     Cough n   Abdominal Pain n    Sputum    Joint Pain     SOB/GRIFFITHS n   Pruritis/Rash     Nausea/vomit    Tolerating PT/OT     Diarrhea    Tolerating Diet y GI lite diet   Constipation    Other       Could NOT obtain due to:      Objective:     VITALS:   Last 24hrs VS reviewed since prior progress note. Most recent are:  Patient Vitals for the past 24 hrs:   Temp Pulse Resp BP SpO2   01/19/20 0032 98.5 °F (36.9 °C) 90 18 133/70 97 %   01/18/20 2020 99.1 °F (37.3 °C)  18 105/61 97 %   01/18/20 1533 97.6 °F (36.4 °C)  18 138/82 97 %   01/18/20 1201  99 20 134/77    01/18/20 1115 98.2 °F (36.8 °C) 96 18 124/76    01/18/20 1100  (!) 117 18 149/63    01/18/20 1045  (!) 102 18 163/89    01/18/20 1030  (!) 110 18 163/84    01/18/20 1015  100 18 160/87    01/18/20 1000  93 18 171/90    01/18/20 0945  95 16 162/86    01/18/20 0930  92 16 160/89    01/18/20 0915  94 16 165/90    01/18/20 0900  95 16 148/59    01/18/20 0845  98 16 123/69    01/18/20 0830  85 16 97/69    01/18/20 0815 98.4 °F (36.9 °C) 88 16 117/75    01/18/20 0730 97.9 °F (36.6 °C) 91 16 112/50 93 %       Intake/Output Summary (Last 24 hours) at 1/19/2020 0728  Last data filed at 1/18/2020 2127  Gross per 24 hour   Intake 100 ml   Output 0 ml   Net 100 ml        PHYSICAL EXAM:  General: WD, WN. Alert, cooperative, no acute distress    EENT:  EOMI. Anicteric sclerae. MMM  Resp:  CTA bilaterally, no wheezing or rales. No accessory muscle use  CV:  Regular  rhythm,  No edema  GI:  Soft, Non distended, Non tender.  +Bowel sounds  Neurologic:  Alert and oriented X 3, normal speech,   Psych:   Good insight. Not anxious nor agitated  Skin:  No rashes.   No jaundice    Reviewed most current lab test results and cultures  YES  Reviewed most current radiology test results   YES  Review and summation of old records today    NO  Reviewed patient's current orders and MAR    YES  PMH/SH reviewed - no change compared to H&P  ________________________________________________________________________  Care Plan discussed with:    Comments   Patient x    Family      RN x    Care Manager     Consultant  x Dr Miladys Coburn ([email or web address]) yesterday                     Multidiciplinary team rounds were held today with , nursing, pharmacist and clinical coordinator. Patient's plan of care was discussed; medications were reviewed and discharge planning was addressed. ________________________________________________________________________  Total NON critical care TIME:  26 Minutes    Total CRITICAL CARE TIME Spent:   Minutes non procedure based      Comments   >50% of visit spent in counseling and coordination of care     ________________________________________________________________________  Daryle Crosser, MD     Procedures: see electronic medical records for all procedures/Xrays and details which were not copied into this note but were reviewed prior to creation of Plan. LABS:  I reviewed today's most current labs and imaging studies.   Pertinent labs include:  Recent Labs     01/18/20  0325 01/17/20  0359   WBC 7.3 6.8   HGB 9.5* 9.2*   HCT 28.9* 28.0*    210     Recent Labs     01/19/20  0112 01/18/20  0325 01/17/20  0359 01/16/20  0854    137 136  --    K 4.3 3.9 3.4*  --    CL 98 101 101  --    CO2 35* 32 29  --    * 155* 63*  --    BUN 10 28* 50*  --    CREA 2.33* 3.20* 4.09*  --    CA 9.2 9.0 8.4*  --    MG  --   --   --  2.2   PHOS 3.2 2.6 4.1  --    ALB 2.8* 2.8* 2.7*  --        Signed: Daryle Crosser, MD

## 2020-01-19 NOTE — PROGRESS NOTES
Problem: Falls - Risk of  Goal: *Absence of Falls  Description  Document Jamaal Nur Fall Risk and appropriate interventions in the flowsheet. Outcome: Progressing Towards Goal  Note: Fall Risk Interventions:  Mobility Interventions: Bed/chair exit alarm    Mentation Interventions: Door open when patient unattended, Increase mobility, More frequent rounding    Medication Interventions: Evaluate medications/consider consulting pharmacy    Elimination Interventions: Call light in reach, Patient to call for help with toileting needs              Problem: Patient Education: Go to Patient Education Activity  Goal: Patient/Family Education  Outcome: Progressing Towards Goal     Problem: Pressure Injury - Risk of  Goal: *Prevention of pressure injury  Description  Document Julius Scale and appropriate interventions in the flowsheet. Outcome: Progressing Towards Goal  Note: Pressure Injury Interventions:  Sensory Interventions: Assess changes in LOC    Moisture Interventions: Absorbent underpads    Activity Interventions: Assess need for specialty bed    Mobility Interventions: HOB 30 degrees or less    Nutrition Interventions: Document food/fluid/supplement intake    Friction and Shear Interventions: Apply protective barrier, creams and emollients                Problem: Patient Education: Go to Patient Education Activity  Goal: Patient/Family Education  Outcome: Progressing Towards Goal     Problem: Risk for Spread of Infection  Goal: Prevent transmission of infectious organism to others  Description  Prevent the transmission of infectious organisms to other patients, staff members, and visitors.   Outcome: Progressing Towards Goal     Problem: Patient Education:  Go to Education Activity  Goal: Patient/Family Education  Outcome: Progressing Towards Goal

## 2020-01-19 NOTE — PROGRESS NOTES
Problem: Falls - Risk of  Goal: *Absence of Falls  Description  Document Kolton Nik Fall Risk and appropriate interventions in the flowsheet.   Outcome: Progressing Towards Goal  Note: Fall Risk Interventions:  Mobility Interventions: Patient to call before getting OOB    Mentation Interventions: Adequate sleep, hydration, pain control    Medication Interventions: Patient to call before getting OOB    Elimination Interventions: Call light in reach

## 2020-01-19 NOTE — ROUTINE PROCESS
Patient being transferred to [facility]. Report given to Tomas. All questions answered. Patient doesn't have an IV. Will attempt to get an IV before patient goes downstairs.

## 2020-01-20 LAB
ALBUMIN SERPL-MCNC: 2.7 G/DL (ref 3.5–5)
ANION GAP SERPL CALC-SCNC: 4 MMOL/L (ref 5–15)
BUN SERPL-MCNC: 20 MG/DL (ref 6–20)
BUN/CREAT SERPL: 5 (ref 12–20)
CALCIUM SERPL-MCNC: 9 MG/DL (ref 8.5–10.1)
CHLORIDE SERPL-SCNC: 100 MMOL/L (ref 97–108)
CO2 SERPL-SCNC: 32 MMOL/L (ref 21–32)
CREAT SERPL-MCNC: 3.67 MG/DL (ref 0.55–1.02)
GLUCOSE BLD STRIP.AUTO-MCNC: 114 MG/DL (ref 65–100)
GLUCOSE BLD STRIP.AUTO-MCNC: 168 MG/DL (ref 65–100)
GLUCOSE BLD STRIP.AUTO-MCNC: 169 MG/DL (ref 65–100)
GLUCOSE BLD STRIP.AUTO-MCNC: 199 MG/DL (ref 65–100)
GLUCOSE BLD STRIP.AUTO-MCNC: 220 MG/DL (ref 65–100)
GLUCOSE SERPL-MCNC: 172 MG/DL (ref 65–100)
PHOSPHATE SERPL-MCNC: 3.5 MG/DL (ref 2.6–4.7)
POTASSIUM SERPL-SCNC: 4.3 MMOL/L (ref 3.5–5.1)
SERVICE CMNT-IMP: ABNORMAL
SODIUM SERPL-SCNC: 136 MMOL/L (ref 136–145)

## 2020-01-20 PROCEDURE — 74011250636 HC RX REV CODE- 250/636: Performed by: INTERNAL MEDICINE

## 2020-01-20 PROCEDURE — 99153 MOD SED SAME PHYS/QHP EA: CPT | Performed by: INTERNAL MEDICINE

## 2020-01-20 PROCEDURE — C1769 GUIDE WIRE: HCPCS | Performed by: INTERNAL MEDICINE

## 2020-01-20 PROCEDURE — 77030019569 HC BND COMPR RAD TERU -B: Performed by: INTERNAL MEDICINE

## 2020-01-20 PROCEDURE — 80069 RENAL FUNCTION PANEL: CPT

## 2020-01-20 PROCEDURE — 74011250637 HC RX REV CODE- 250/637: Performed by: INTERNAL MEDICINE

## 2020-01-20 PROCEDURE — 74011636637 HC RX REV CODE- 636/637: Performed by: INTERNAL MEDICINE

## 2020-01-20 PROCEDURE — C1894 INTRO/SHEATH, NON-LASER: HCPCS | Performed by: INTERNAL MEDICINE

## 2020-01-20 PROCEDURE — 82962 GLUCOSE BLOOD TEST: CPT

## 2020-01-20 PROCEDURE — 65660000000 HC RM CCU STEPDOWN

## 2020-01-20 PROCEDURE — 77030019698 HC SYR ANGI MDLON MRTM -A: Performed by: INTERNAL MEDICINE

## 2020-01-20 PROCEDURE — 36415 COLL VENOUS BLD VENIPUNCTURE: CPT

## 2020-01-20 PROCEDURE — 77030004549 HC CATH ANGI DX PRF MRTM -A: Performed by: INTERNAL MEDICINE

## 2020-01-20 PROCEDURE — 77030010221 HC SPLNT WR POS TELE -B: Performed by: INTERNAL MEDICINE

## 2020-01-20 PROCEDURE — 77030008543 HC TBNG MON PRSS MRTM -A: Performed by: INTERNAL MEDICINE

## 2020-01-20 PROCEDURE — 99152 MOD SED SAME PHYS/QHP 5/>YRS: CPT | Performed by: INTERNAL MEDICINE

## 2020-01-20 PROCEDURE — 90935 HEMODIALYSIS ONE EVALUATION: CPT

## 2020-01-20 PROCEDURE — 74011636320 HC RX REV CODE- 636/320: Performed by: INTERNAL MEDICINE

## 2020-01-20 PROCEDURE — 74011000250 HC RX REV CODE- 250: Performed by: INTERNAL MEDICINE

## 2020-01-20 PROCEDURE — 74011250637 HC RX REV CODE- 250/637: Performed by: NURSE PRACTITIONER

## 2020-01-20 PROCEDURE — 93458 L HRT ARTERY/VENTRICLE ANGIO: CPT | Performed by: INTERNAL MEDICINE

## 2020-01-20 PROCEDURE — 77030015766: Performed by: INTERNAL MEDICINE

## 2020-01-20 PROCEDURE — 77030028837 HC SYR ANGI PWR INJ COEU -A: Performed by: INTERNAL MEDICINE

## 2020-01-20 RX ORDER — FENTANYL CITRATE 50 UG/ML
INJECTION, SOLUTION INTRAMUSCULAR; INTRAVENOUS AS NEEDED
Status: DISCONTINUED | OUTPATIENT
Start: 2020-01-20 | End: 2020-01-20 | Stop reason: HOSPADM

## 2020-01-20 RX ORDER — MIDAZOLAM HYDROCHLORIDE 1 MG/ML
.5-1 INJECTION, SOLUTION INTRAMUSCULAR; INTRAVENOUS
Status: DISCONTINUED | OUTPATIENT
Start: 2020-01-20 | End: 2020-01-20

## 2020-01-20 RX ORDER — HEPARIN SODIUM 200 [USP'U]/100ML
INJECTION, SOLUTION INTRAVENOUS
Status: COMPLETED | OUTPATIENT
Start: 2020-01-20 | End: 2020-01-20

## 2020-01-20 RX ORDER — FENTANYL CITRATE 50 UG/ML
25-50 INJECTION, SOLUTION INTRAMUSCULAR; INTRAVENOUS
Status: DISCONTINUED | OUTPATIENT
Start: 2020-01-20 | End: 2020-01-20

## 2020-01-20 RX ORDER — PANTOPRAZOLE SODIUM 40 MG/1
40 TABLET, DELAYED RELEASE ORAL
Status: DISCONTINUED | OUTPATIENT
Start: 2020-01-20 | End: 2020-01-23 | Stop reason: HOSPADM

## 2020-01-20 RX ORDER — LIDOCAINE HYDROCHLORIDE 20 MG/ML
15 SOLUTION OROPHARYNGEAL ONCE
Status: DISCONTINUED | OUTPATIENT
Start: 2020-01-20 | End: 2020-01-20

## 2020-01-20 RX ORDER — LIDOCAINE HYDROCHLORIDE 10 MG/ML
INJECTION, SOLUTION EPIDURAL; INFILTRATION; INTRACAUDAL; PERINEURAL AS NEEDED
Status: DISCONTINUED | OUTPATIENT
Start: 2020-01-20 | End: 2020-01-20 | Stop reason: HOSPADM

## 2020-01-20 RX ORDER — DOXERCALCIFEROL 4 UG/2ML
4 INJECTION INTRAVENOUS
Status: DISCONTINUED | OUTPATIENT
Start: 2020-01-21 | End: 2020-01-23 | Stop reason: HOSPADM

## 2020-01-20 RX ORDER — MIDAZOLAM HYDROCHLORIDE 1 MG/ML
INJECTION, SOLUTION INTRAMUSCULAR; INTRAVENOUS AS NEEDED
Status: DISCONTINUED | OUTPATIENT
Start: 2020-01-20 | End: 2020-01-20 | Stop reason: HOSPADM

## 2020-01-20 RX ADMIN — ACETAMINOPHEN 650 MG: 325 TABLET ORAL at 17:13

## 2020-01-20 RX ADMIN — ONDANSETRON 4 MG: 2 INJECTION INTRAMUSCULAR; INTRAVENOUS at 18:30

## 2020-01-20 RX ADMIN — CYCLOBENZAPRINE 5 MG: 10 TABLET, FILM COATED ORAL at 13:12

## 2020-01-20 RX ADMIN — METOPROLOL TARTRATE 25 MG: 25 TABLET ORAL at 17:13

## 2020-01-20 RX ADMIN — PREDNISOLONE ACETATE 1 DROP: 10 SUSPENSION/ DROPS OPHTHALMIC at 17:15

## 2020-01-20 RX ADMIN — Medication 10 ML: at 22:09

## 2020-01-20 RX ADMIN — ALPRAZOLAM 0.25 MG: 0.25 TABLET ORAL at 17:13

## 2020-01-20 RX ADMIN — PANTOPRAZOLE SODIUM 40 MG: 40 TABLET, DELAYED RELEASE ORAL at 17:13

## 2020-01-20 RX ADMIN — INSULIN LISPRO 3 UNITS: 100 INJECTION, SOLUTION INTRAVENOUS; SUBCUTANEOUS at 11:55

## 2020-01-20 RX ADMIN — ATORVASTATIN CALCIUM 20 MG: 20 TABLET, FILM COATED ORAL at 22:09

## 2020-01-20 RX ADMIN — METOPROLOL TARTRATE 25 MG: 25 TABLET ORAL at 07:19

## 2020-01-20 RX ADMIN — INSULIN LISPRO 2 UNITS: 100 INJECTION, SOLUTION INTRAVENOUS; SUBCUTANEOUS at 16:30

## 2020-01-20 RX ADMIN — HYDROCODONE BITARTRATE AND ACETAMINOPHEN 1 TABLET: 5; 325 TABLET ORAL at 18:31

## 2020-01-20 RX ADMIN — BISACODYL 5 MG: 5 TABLET, COATED ORAL at 11:54

## 2020-01-20 RX ADMIN — PREDNISOLONE ACETATE 1 DROP: 10 SUSPENSION/ DROPS OPHTHALMIC at 11:56

## 2020-01-20 NOTE — PROGRESS NOTES
Nephrology Progress Note     Codey Calderon     [facility]. [email or web address]                  Phone - [phone]   Patient: Enid Pollack   Date- 1/20/2020        Admit Date: 1/15/2020  YOB: 1965             CC: Follow up for new onset ESRD         Subjective: Interval History:   -s/p hd ON Saturday  She had cardiac cath done today  No c/o sob,   No c/o chest pain,   No c/o nausea or vomiting  No c/o  fever. ROS:- as above   Assessment & Plan:     New onset ESRD   Dialysis today  She will go to [facility] unit    CKD 5 DUE TO DM nephropathy-  F/b DR. BECKER     CAD- s/p cardiac cath    Hyponatremia-improved     Vomiting, hemetemesis, abdo. Pain  GI following     Anemia of ckd  CONTINUE EPOGEN     Sec. hyperpara  Stop calcitriol   Continue Hectorol       H/o hypertension  Continue metoprolol  Okay to add ACEi or ARB       Encephalopathy     Dm 2                     Physical exam:   GEN: NAD  NECK- Supple, no mass  RESP: clear b/l, no wheezing, No accessory muscle use  CVS: RRR,S1,S2    ABDO: soft , non tender  EXT: No Edema   NEURO: normal speech,non focal  Left arm avf +    Care Plan discussed with: patient   Objective:   Visit Vitals  /58   Pulse 86   Temp 98.4 °F (36.9 °C)   Resp 12   Ht 5' 5\" (1.651 m)   Wt 61.5 kg (135 lb 9.6 oz)   SpO2 100%   Breastfeeding No   BMI 22.57 kg/m²     Last 3 Recorded Weights in this Encounter    01/15/20 0435 01/16/20 1754 01/18/20 0321   Weight: 59 kg (130 lb) 59 kg (130 lb) 61.5 kg (135 lb 9.6 oz)     01/18 1901 - 01/20 0700  In: 580 [P.O.:580]  Out: -     Intake/Output Summary (Last 24 hours) at 1/20/2020 1009  Last data filed at 1/19/2020 1204  Gross per 24 hour   Intake 240 ml   Output    Net 240 ml      Chart reviewed. Pertinent Notes reviewed.        Medication list  reviewed   Current Facility-Administered Medications   Medication    ALPRAZolam (XANAX) tablet 0.25 mg    hydroxyzine HCL (ATARAX) tablet 25 mg    cyclobenzaprine (FLEXERIL) tablet 5 mg    prednisoLONE acetate (PRED FORTE) 1 % ophthalmic suspension 1 Drop    HYDROcodone-acetaminophen (NORCO) 5-325 mg per tablet 1 Tab    doxercalciferoL (HECTOROL) 4 mcg/2 mL injection 4 mcg    bisacodyL (DULCOLAX) tablet 5 mg    polyethylene glycol (MIRALAX) packet 17 g    guaiFENesin-codeine (ROBITUSSIN AC) 100-10 mg/5 mL solution 5 mL    cloNIDine HCL (CATAPRES) tablet 0.1 mg    acetaminophen (TYLENOL) tablet 650 mg    epoetin kourtney-epbx (RETACRIT) 12,000 Units combo injection    sodium chloride (NS) flush 5-40 mL    sodium chloride (NS) flush 5-40 mL    sodium chloride (NS) flush 5-40 mL    sodium chloride (NS) flush 5-40 mL    naloxone (NARCAN) injection 0.4 mg    ondansetron (ZOFRAN) injection 4 mg    atorvastatin (LIPITOR) tablet 20 mg    metoprolol tartrate (LOPRESSOR) tablet 25 mg    glucose chewable tablet 16 g    glucagon (GLUCAGEN) injection 1 mg    dextrose 10% infusion 0-250 mL    insulin lispro (HUMALOG) injection    hydrALAZINE (APRESOLINE) 20 mg/mL injection 10 mg           Data Review :    Results for Gerson Kuo (MRN [id number]) as of 1/17/2020 10:25   Ref. Range 6/8/2012 20:05 12/13/2012 09:22 1/16/2020 13:33   Hepatitis B surface Ag Latest Units: Index <0.10  <0.10   Hep B surface Ag Interp. Latest Ref Range: NEG   NEGATIVE  NEGATIVE   Hepatitis B surface Ab Latest Units: mIU/mL <2.80  <3.10   Hep B surface Ab Interp. Latest Ref Range: NR   NONREACTIVE  NONREACTIVE   Hepatitis B core, IgM Latest Ref Range: NR     NONREACTIVE   Hepatitis C virus Ab Latest Units: Index >11.00  >11.00   Hep C  virus Ab Interp. Latest Ref Range: NR   High Pos  REACTIVE (A)   Hep C  virus Ab comment Latest Units:   Method used is [facility].   Method used is [facility]   HIV 1/2 Interpretation Unknown NONREACTIVE     VDRL SERUM W/REFLEX TITER Unknown  Rpt      CXR  Chest single view dated 1/15/2020     Comparison chest dated 1/14/2020     History is abdominal pain     A single frontal view of the chest was obtained. The cardiac silhouette is  normal in size. There is no evidence of active lung disease. If there is  clinical suspicion of an intra-abdominal abnormality, a radiographic examination  of the abdomen may render additional information.     IMPRESSION  IMPRESSION: No evidence of active lung disease. Recent Labs     01/20/20  0352 01/19/20  0112 01/18/20  0325    136 137   K 4.3 4.3 3.9    98 101   CO2 32 35* 32   BUN 20 10 28*   CREA 3.67* 2.33* 3.20*   * 240* 155*   CA 9.0 9.2 9.0   PHOS 3.5 3.2 2.6     Recent Labs     01/18/20  0325   WBC 7.3   HGB 9.5*   HCT 28.9*        No results for input(s): FE, TIBC, PSAT, FERR in the last 72 hours. No results for input(s): CPK, CKNDX, TROIQ in the last 72 hours. No lab exists for component: CPKMB  Lab Results   Component Value Date/Time    Color YELLOW/STRAW 01/15/2020 07:11 AM    Appearance CLEAR 01/15/2020 07:11 AM    Specific gravity 1.015 01/15/2020 07:11 AM    Specific gravity 1.012 05/11/2018 09:49 AM    pH (UA) 7.0 01/15/2020 07:11 AM    Protein 100 (A) 01/15/2020 07:11 AM    Glucose >1,000 (A) 01/15/2020 07:11 AM    Ketone NEGATIVE  01/15/2020 07:11 AM    Bilirubin NEGATIVE  01/15/2020 07:11 AM    Urobilinogen 0.2 01/15/2020 07:11 AM    Nitrites NEGATIVE  01/15/2020 07:11 AM    Leukocyte Esterase NEGATIVE  01/15/2020 07:11 AM    Epithelial cells FEW 01/15/2020 07:11 AM    Bacteria NEGATIVE  01/15/2020 07:11 AM    WBC 0-4 01/15/2020 07:11 AM    RBC 5-10 01/15/2020 07:11 AM     Lab Results   Component Value Date/Time    Culture result: (A) 12/06/2019 06:31 AM     MRSA PRESENT CALLED TO AND READ BACK BY KENNETH LOVE,AT 6919 ON 12/7/19. RC    Culture result:  12/06/2019 06:31 AM         Screening of patient nares for MRSA is for surveillance purposes and, if positive, to facilitate isolation considerations in high risk settings.  It is not intended for automatic decolonization interventions per se as regimens are not sufficiently effective to warrant routine use. Culture result: MIXED UROGENITAL SAMAN ISOLATED 08/21/2019 04:08 AM     Lab Results   Component Value Date/Time    Specimen Description: NARES 11/18/2013 10:02 PM    Specimen Description: BLOOD 11/18/2013 09:18 PM    Specimen Description: URINE 11/18/2013 09:18 PM     Lab Results   Component Value Date/Time    Sodium,urine random 92 05/28/2018 12:13 PM    Creatinine, urine 29.30 05/28/2018 12:13 PM       Results from Hospital Encounter encounter on 01/15/20   XR SPINE LUMB 2 OR 3 V    Narrative Clinical indication: Back pain. Frontal and lateral view of the lumbar sacral spine are obtained. The pedicles  are visualized. The alignment is satisfactory. No fracture. Impression impression: No acute changes. Gerardo Prajapati MD  [facility]   www. [email or web address]  [facility]  [address], [address] [place], [address]  Phone - [phone]         Fax - [phone]

## 2020-01-20 NOTE — PROGRESS NOTES
HD TRANSFER - OUT REPORT:    Verbal report given to Idalia Mclean RN on Josh Weston being transferred to [facility] for routine progression of care       Report consisted of patient's Situation, Background, Assessment and   Recommendations(SBAR). Information from the following report(s) SBAR, Procedure Summary, Intake/Output, MAR and Recent Results was reviewed with the receiving nurse. Method:  $$ Method: Hemodialysis (01/20/20 1238)    Fluid Removed  NET Fluid Removed (mL): 0 ml (01/18/20 1115)     Patient response to treatment:  Stable    End Time  Hemodialysis End Time: 1501 (01/20/20 1501)  If not documented, dialysis nurse to update post-dialysis row in HD/Filtration flowsheet     Medications /Volume expansion agents or Fluid boluses administered during treatment? no    Post-dialysis medication administration due?  yes, Hectoral unavailable prior to patient ending treatment  Remind nurse to administer post-HD medication upon return to unit. Fistula hemostasis? yes    Line heparinization? no    Lines: JONATHAN AVF, 16G needles (patient dislodged Venous needle 2hr in to treatment)    Opportunity for questions and clarification was provided.       Patient transported with: DocDoc

## 2020-01-20 NOTE — PROGRESS NOTES
1900: Received report from day shift nurse Mattie. Reviewed SBAR, Kardex, I/O, MAR, Procedural information and Recent results. VSS, NSR (rhythm), RA (oxygenation). A/O X 4  Pt resting in bed/sitting up in the bed. Pt reporting no CP/SOB. 2200: Pt ambulated x 1 assist with the walker. Unsteady gait. Pt returned to the bed.     0400: Labs drawn and set to the lab. VSS, Pt ambulated from the bed to the bathroom x 1 assist.     0700: Bedside and Verbal shift change report given to day shift nurse [address] [place] [address] (oncoming nurse) by Aida Mehta RN (offgoing nurse). Report included the following information: SBAR, Kardex, Intake/Output, MAR, Procedural information, and Recent Results.

## 2020-01-20 NOTE — PROCEDURES
[facility]  [phone]    Vitals   Pre   Post   Assessment   Pre   Post     Temp  Temp: 98.5 °F (36.9 °C) (01/20/20 1238)  98.2 LOC  A&Ox4 A&Ox4   HR   Pulse (Heart Rate): 95 (01/20/20 1238) 91 Lungs   clear clear   B/P   BP: 106/56 (01/20/20 1238) 113/68 Cardiac   RRR RRR   Resp   Resp Rate: 18 (01/20/20 1238) 18 Skin   R radial R groin cath sites R radial R groin cath sites   Pain level  10 7 Edema  None noted   None noted   Orders:    Duration:   Start:    2740 End:    1547 Total:   2 hours   Dialyzer:   Dialyzer/Set Up Inspection: Revaclear (01/20/20 1238)   K Bath:   Dialysate K (mEq/L): 4 (01/20/20 1238)   Ca Bath:   Dialysate CA (mEq/L): 3.0 (01/20/20 1238)   Na/Bicarb:   Dialysate NA (mEq/L): 140 (01/20/20 1238)   Target Fluid Removal:   Goal/Amount of Fluid to Remove (mL): 0 mL (01/20/20 1238)   Access     Type & Location:   JONATHAN AVF: skin CDI. No s/s of infection. No issues with cannulation or hemostasis. Running well at . Pt arrived to HD suite A&Ox4. Consent signed & on file. SBAR received from Primary RN. Pt cannulated with 14F needles per policy & without issue. VSS. Dialysis Tx initiated.    Labs     Obtained/Reviewed   Critical Results Called   Date when labs were drawn-  Hgb-    HGB   Date Value Ref Range Status   01/18/2020 9.5 (L) 11.5 - 16.0 g/dL Final     K-    Potassium   Date Value Ref Range Status   01/20/2020 4.3 3.5 - 5.1 mmol/L Final     Ca-   Calcium   Date Value Ref Range Status   01/20/2020 9.0 8.5 - 10.1 MG/DL Final     Bun-   BUN   Date Value Ref Range Status   01/20/2020 20 6 - 20 MG/DL Final     Creat-   Creatinine   Date Value Ref Range Status   01/20/2020 3.67 (H) 0.55 - 1.02 MG/DL Final     Comment:     INVESTIGATED PER DELTA CHECK PROTOCOL        Medications/ Blood Products Given     Name   Dose   Route and Time     Hectoral 4mcg Medication not available from pharmacy; pt needle dislodged/tx terminated prior to medication administration             Blood Volume Processed (BVP):    30.7 Net Fluid   Removed:  0   Comments   All dialysis related medications have been reviewed. Assessment performed by RN. Procedure and documentation observed and reviewed by Yariel Brown RN  Time Out Done: 2138  Primary Nurse Rpt Pre:  Chalmers Merlin, RN  Primary Nurse Rpt Post:  Eber Conrad RN  Pt Education:  procedural  Care Plan:  On going  Tx Summary:  5585:  JONATHAN AVF: skin CDI. No s/s of infection. No issues with cannulation or hemostasis. Running well at . Pt arrived to HD suite A&Ox4. Consent signed & on file. SBAR received from Primary RN. Pt cannulated with 70L needles per policy & without issue. VSS. Dialysis Tx initiated. 1240:  Primary RN called for pain meds  1300:  Pt resting; BFR reduced to 200 d/t AP  1315:  Pt resting; pt Primary at bedside with pain meds; BFR up to 250  1330:  BFR up to 300; Pt resting  1345:  Pt resting  1400:  Pt resting  1415:  Pt resting  1430:  Pt resting  1445:  Pt adjusted in bed, venous needle dislodged. Blood pump immediately stopped and manual pressure held. Dr Rishabh Villarreal notified, advised to terminate tx for the day. VSS. All possible blood returned to patient. Hemostasis achieved without issue. Bed locked and in the lowest position, call bell and belongings in reach. SBAR given to Primary, RN. Patient is stable at time of their/ my departure. Admiting Diagnosis:  V-Tach/Acute Encphalopathy  Pt's previous clinic- n/a  Consent signed - Informed Consent Verified: Yes (01/20/20 1238)  [facility] Consent - signed and on file  Hepatitis Status- neg/raza 1/16/20  Machine #- Machine Number: [id number] (01/20/20 1238)  Telemetry status-  Pre-dialysis wt. -

## 2020-01-20 NOTE — PROGRESS NOTES
Problem: Falls - Risk of  Goal: *Absence of Falls  Description  Document Samanta Lizama Fall Risk and appropriate interventions in the flowsheet.   Outcome: Progressing Towards Goal  Note: Fall Risk Interventions:  Mobility Interventions: Assess mobility with egress test, Patient to call before getting OOB    Mentation Interventions: Adequate sleep, hydration, pain control, More frequent rounding    Medication Interventions: Teach patient to arise slowly, Patient to call before getting OOB    Elimination Interventions: Call light in reach

## 2020-01-20 NOTE — PROGRESS NOTES
Problem: Falls - Risk of  Goal: *Absence of Falls  Description  Document Herson Hoang Fall Risk and appropriate interventions in the flowsheet.   Outcome: Progressing Towards Goal  Note: Fall Risk Interventions:  Mobility Interventions: Assess mobility with egress test, Patient to call before getting OOB    Mentation Interventions: Adequate sleep, hydration, pain control, More frequent rounding    Medication Interventions: Teach patient to arise slowly, Patient to call before getting OOB    Elimination Interventions: Call light in reach

## 2020-01-20 NOTE — PROGRESS NOTES
1/20/2020 8:39 AM    Admit Date: 1/15/2020    Admit Diagnosis: Acute encephalopathy [G93.40]    Subjective:     Mumtaz Ivy   denies chest pain, chest pressure/discomfort, dyspnea, palpitations, irregular heart beats, near-syncope, syncope, fatigue, orthopnea, paroxysmal nocturnal dyspnea, exertional chest pressure/discomfort.     Visit Vitals  /73   Pulse (!) 110   Temp 98.1 °F (36.7 °C)   Resp 16   Ht 5' 5\" (1.651 m)   Wt 135 lb 9.6 oz (61.5 kg)   LMP 09/15/2013   SpO2 100%   Breastfeeding No   BMI 22.57 kg/m²     Current Facility-Administered Medications   Medication Dose Route Frequency    fentaNYL citrate (PF) injection    PRN    midazolam (VERSED) injection    PRN    lidocaine (PF) (XYLOCAINE) 10 mg/mL (1 %) injection    PRN    heparinized saline 2 units/mL infusion    CONTINUOUS    heparinized saline 2 units/mL infusion    CONTINUOUS    heparinized saline 2 units/mL infusion    CONTINUOUS    iopamidoL (ISOVUE-370) 76 % injection    PRN    ALPRAZolam (XANAX) tablet 0.25 mg  0.25 mg Oral TID PRN    hydroxyzine HCL (ATARAX) tablet 25 mg  25 mg Oral TID    cyclobenzaprine (FLEXERIL) tablet 5 mg  5 mg Oral TID PRN    prednisoLONE acetate (PRED FORTE) 1 % ophthalmic suspension 1 Drop  1 Drop Both Eyes BID    HYDROcodone-acetaminophen (NORCO) 5-325 mg per tablet 1 Tab  1 Tab Oral Q6H PRN    doxercalciferoL (HECTOROL) 4 mcg/2 mL injection 4 mcg  4 mcg IntraVENous DIALYSIS MON, WED & FRI    bisacodyL (DULCOLAX) tablet 5 mg  5 mg Oral DAILY    polyethylene glycol (MIRALAX) packet 17 g  17 g Oral DAILY    guaiFENesin-codeine (ROBITUSSIN AC) 100-10 mg/5 mL solution 5 mL  5 mL Oral Q6H PRN    cloNIDine HCL (CATAPRES) tablet 0.1 mg  0.1 mg Oral Q12H PRN    acetaminophen (TYLENOL) tablet 650 mg  650 mg Oral Q6H PRN    epoetin kourtney-epbx (RETACRIT) 12,000 Units combo injection  12,000 Units SubCUTAneous Q TUE, THU & SAT    sodium chloride (NS) flush 5-40 mL  5-40 mL IntraVENous Q8H  sodium chloride (NS) flush 5-40 mL  5-40 mL IntraVENous PRN    sodium chloride (NS) flush 5-40 mL  5-40 mL IntraVENous Q8H    sodium chloride (NS) flush 5-40 mL  5-40 mL IntraVENous PRN    naloxone (NARCAN) injection 0.4 mg  0.4 mg IntraVENous PRN    ondansetron (ZOFRAN) injection 4 mg  4 mg IntraVENous Q4H PRN    atorvastatin (LIPITOR) tablet 20 mg  20 mg Oral QHS    metoprolol tartrate (LOPRESSOR) tablet 25 mg  25 mg Oral BID    glucose chewable tablet 16 g  4 Tab Oral PRN    glucagon (GLUCAGEN) injection 1 mg  1 mg IntraMUSCular PRN    dextrose 10% infusion 0-250 mL  0-250 mL IntraVENous PRN    insulin lispro (HUMALOG) injection   SubCUTAneous AC&HS    hydrALAZINE (APRESOLINE) 20 mg/mL injection 10 mg  10 mg IntraVENous Q6H PRN         Objective:      Visit Vitals  /73   Pulse (!) 110   Temp 98.1 °F (36.7 °C)   Resp 16   Ht 5' 5\" (1.651 m)   Wt 135 lb 9.6 oz (61.5 kg)   SpO2 100%   Breastfeeding No   BMI 22.57 kg/m²       Physical Exam:  Abdomen: soft, non-tender.  Bowel sounds normal.   Extremities: no cyanosis or edema  Heart: regular rate and rhythm, S1, S2 normal, no murmur, click, rub or gallop  Lungs: clear to auscultation bilaterally  Neurologic: Grossly normal    Data Review:   Labs:    Recent Results (from the past 24 hour(s))   GLUCOSE, POC    Collection Time: 01/19/20 11:15 AM   Result Value Ref Range    Glucose (POC) 195 (H) 65 - 100 mg/dL    Performed by [employer] [address], POC    Collection Time: 01/19/20  4:31 PM   Result Value Ref Range    Glucose (POC) 320 (H) 65 - 100 mg/dL    Performed by [employer] [address], POC    Collection Time: 01/19/20  9:06 PM   Result Value Ref Range    Glucose (POC) 244 (H) 65 - 100 mg/dL    Performed by [facility] AYAN BILLY(RN)    RENAL FUNCTION PANEL    Collection Time: 01/20/20  3:52 AM   Result Value Ref Range    Sodium 136 136 - 145 mmol/L    Potassium 4.3 3.5 - 5.1 mmol/L    Chloride 100 97 - 108 mmol/L    CO2 32 21 - 32 mmol/L    Anion gap 4 (L) 5 - 15 mmol/L    Glucose 172 (H) 65 - 100 mg/dL    BUN 20 6 - 20 MG/DL    Creatinine 3.67 (H) 0.55 - 1.02 MG/DL    BUN/Creatinine ratio 5 (L) 12 - 20      GFR est AA 16 (L) >60 ml/min/1.73m2    GFR est non-AA 13 (L) >60 ml/min/1.73m2    Calcium 9.0 8.5 - 10.1 MG/DL    Phosphorus 3.5 2.6 - 4.7 MG/DL    Albumin 2.7 (L) 3.5 - 5.0 g/dL   GLUCOSE, POC    Collection Time: 01/20/20  7:34 AM   Result Value Ref Range    Glucose (POC) 168 (H) 65 - 100 mg/dL    Performed by Brunilda Viveros        Telemetry: SR.        Assessment:     Active Problems:    Nausea and vomiting (3/16/2012)      Chest pain (5/10/2018)      Acute encephalopathy (1/15/2020)      Ventricular tachycardia ([address] (1/16/2020)        Plan:     1. NSVT, chest pain: on cath distal LAD significant disease with 50% mid Cx and ostial RCA stenosis. Will await GI findings prior to considering any revascularization. Can proceed with endoscopy later today. Will proceed with LENNY tomorrow. Final recommendations afterwards. 2. ESRD: on HD. 3. Hemaetemsis: GI work up after cath.

## 2020-01-20 NOTE — PROGRESS NOTES
Pt up and ambulated to BR. C/o of back pain offered flexeril, xanax and atatrax that is scheduled and pt refused all medications.

## 2020-01-20 NOTE — PROGRESS NOTES
Report given to [facility].  Patient going back to [facility]

## 2020-01-20 NOTE — PROGRESS NOTES
GI NOTE    Just received request for reconsult. I have left [facility] for the day.     Will see her in am.

## 2020-01-20 NOTE — PROGRESS NOTES
Hospitalist Progress Note    NAME: Tj Pac   :  1965   MRN:  [id number]       Assessment / Plan:    Acute encephalopathy POA  Resolved  Suspect Uremia with CKD5, suspect ESRD now- newly started on HD TTS now  Her fistula seems to have matured  EtOH negative, hypercapnea ruled out, ammonia within normal limits, CT head negative, electrolytes relatively normal, UDS negative and UA with no UTI  No fever, headache, or meningismus to suggest meningitis  TSH WNL  IP Nephrology consult appreciated     Chest Pain  NSVT 22 beats  Continue BB and statin  S/P LHC noticed to have significant LAD and moderate LCX disease  Plan for revascularization after GI evaluation  Mg ok   No ASA for now due to suspected GI bleed     Acute blood loss anemia vs Anemia due to CKD5  ? Upper GI bleed yesterday- no more episodes, Hb stable  Plan of EGD on hold for now, if Hb remains stable & doesn't have recurrence - NO EGD as per GI  Continue PPIs  GI following, will re consult as Cardiology revascularization after GI evaluation recommended     Anxiety state  Back pain  Xray L spine= neg acute noted   .  Cont Xanax TID prn (low dose) along with Flexeril TID prn for back pain for supportive care for now       Abdominal Pain  Seems related to severe constipation  Continue Miralax  Resolved with Sorbitol     Sinus Tachycardia  Suspect this is reactive due to pain  Better  Continue BB     Hypoglycemia in the setting of DM2 POA- observe for now  c/w Long acting insulinat reduced dose of 5 units  Holding Premeal insulin  SSI     Essential hypertension   clonidinePRN and continue BB  Monitor  IV hydralazine PRN        Code Status: Full  Surrogate Decision Maker: Wayne Barrett,      DVT Prophylaxis: SCDs due to ?  GI bleed  GI Prophylaxis: not indicated     Baseline: Ambulates with rollator, frequent ED visits     Subjective:     Chief Complaint / Reason for Physician Visit  \"Patient seen in dialysis, denies chest pain, S/P LHC significant LAD and moderate LCx disease. Plan for revascularization after GI Evaluation \". Discussed with RN events overnight. Review of Systems:  Symptom Y/N Comments  Symptom Y/N Comments   Fever/Chills n   Chest Pain n    Poor Appetite n   Edema n    Cough n   Abdominal Pain n    Sputum n   Joint Pain n    SOB/GRIFFITHS n   Pruritis/Rash     Nausea/vomit n   Tolerating PT/OT     Diarrhea n   Tolerating Diet     Constipation n   Other       Could NOT obtain due to:      Objective:     VITALS:   Last 24hrs VS reviewed since prior progress note.  Most recent are:  Patient Vitals for the past 24 hrs:   Temp Pulse Resp BP SpO2   01/20/20 1430  (!) 103 18 100/74    01/20/20 1415  96 18 144/56    01/20/20 1400  90 18 (!) 136/91    01/20/20 1345  93 18 162/63    01/20/20 1330  86 18 136/55    01/20/20 1315  (!) 102 18 140/71    01/20/20 1300  93 18 150/75    01/20/20 1238 98.5 °F (36.9 °C) 95 18 106/56    01/20/20 1130  89  122/40 99 %   01/20/20 1115  90  117/45 99 %   01/20/20 1100  88  117/55 99 %   01/20/20 1055  86  124/46 98 %   01/20/20 1050  86  122/67 100 %   01/20/20 1040  91  132/78 100 %   01/20/20 1035  90  117/77 100 %   01/20/20 1030  93  115/82    01/20/20 1015  88  112/43    01/20/20 1000  86  127/58 100 %   01/20/20 0945  100  119/66 100 %   01/20/20 0930  79  136/50 94 %   01/20/20 0923 98.4 °F (36.9 °C) 82 12 122/65 93 %   01/20/20 0915  82 10 112/46 96 %   01/20/20 0910  82 15 132/72 94 %   01/20/20 0905  84 19 138/59 93 %   01/20/20 0900  81 14 134/55 93 %   01/20/20 0855  82  137/54 93 %   01/20/20 0850  80  138/61 94 %   01/20/20 0843 98.3 °F (36.8 °C) 87 16 156/66 93 %   01/20/20 0726 98.1 °F (36.7 °C) (!) 110 16 129/73 100 %   01/20/20 0300 98.6 °F (37 °C) 88 16 138/71 97 %   01/19/20 2300 98.9 °F (37.2 °C) 88 18 129/63 96 %   01/19/20 1900 98.8 °F (37.1 °C) 86 18 113/63 99 %   01/19/20 1537 98.8 °F (37.1 °C) 91 18 104/49 97 %       Intake/Output Summary (Last 24 hours) at 1/20/2020 1446  Last data filed at 1/20/2020 1142  Gross per 24 hour   Intake    Output 150 ml   Net -150 ml        PHYSICAL EXAM:  General: WD, WN. Alert, cooperative, no acute distress    EENT:  EOMI. Anicteric sclerae. MMM  Resp:  CTA bilaterally, no wheezing or rales. No accessory muscle use  CV:  Regular  rhythm,  No edema  GI:  Soft, Non distended, Non tender.  +Bowel sounds  Neurologic:  Alert and oriented X 3, normal speech,   Psych:   Tearful  Skin:  No rashes. No jaundice    Reviewed most current lab test results and cultures  YES  Reviewed most current radiology test results   YES  Review and summation of old records today    NO  Reviewed patient's current orders and MAR    YES  PMH/ reviewed - no change compared to H&P  ________________________________________________________________________  Care Plan discussed with:    Comments   Patient x    Family      RN     Care Manager     Consultant                        Multidiciplinary team rounds were held today with , nursing, pharmacist and clinical coordinator. Patient's plan of care was discussed; medications were reviewed and discharge planning was addressed. ________________________________________________________________________  Total NON critical care TIME:  32  Minutes    Total CRITICAL CARE TIME Spent:   Minutes non procedure based      Comments   >50% of visit spent in counseling and coordination of care     ________________________________________________________________________  Linh Stewart MD     Procedures: see electronic medical records for all procedures/Xrays and details which were not copied into this note but were reviewed prior to creation of Plan. LABS:  I reviewed today's most current labs and imaging studies.   Pertinent labs include:  Recent Labs     01/18/20  0325   WBC 7.3   HGB 9.5*   HCT 28.9*        Recent Labs     01/20/20  0352 01/19/20  0112 01/18/20  0325    136 137   K 4.3 4.3 3.9    98 101   CO2 32 35* 32   * 240* 155*   BUN 20 10 28*   CREA 3.67* 2.33* 3.20*   CA 9.0 9.2 9.0   PHOS 3.5 3.2 2.6   ALB 2.7* 2.8* 2.8*       Signed: Naima Garcia MD

## 2020-01-21 ENCOUNTER — ANESTHESIA EVENT (OUTPATIENT)
Dept: ENDOSCOPY | Age: 55
DRG: 698 | End: 2020-01-21
Payer: MEDICARE

## 2020-01-21 ENCOUNTER — APPOINTMENT (OUTPATIENT)
Dept: NON INVASIVE DIAGNOSTICS | Age: 55
DRG: 698 | End: 2020-01-21
Attending: INTERNAL MEDICINE
Payer: MEDICARE

## 2020-01-21 ENCOUNTER — ANESTHESIA (OUTPATIENT)
Dept: ENDOSCOPY | Age: 55
DRG: 698 | End: 2020-01-21
Payer: MEDICARE

## 2020-01-21 LAB
ALBUMIN SERPL-MCNC: 2.7 G/DL (ref 3.5–5)
ANION GAP SERPL CALC-SCNC: 2 MMOL/L (ref 5–15)
BUN SERPL-MCNC: 15 MG/DL (ref 6–20)
BUN/CREAT SERPL: 5 (ref 12–20)
CALCIUM SERPL-MCNC: 8.8 MG/DL (ref 8.5–10.1)
CHLORIDE SERPL-SCNC: 99 MMOL/L (ref 97–108)
CO2 SERPL-SCNC: 32 MMOL/L (ref 21–32)
CREAT SERPL-MCNC: 3.22 MG/DL (ref 0.55–1.02)
ERYTHROCYTE [DISTWIDTH] IN BLOOD BY AUTOMATED COUNT: 13.8 % (ref 11.5–14.5)
GLUCOSE BLD STRIP.AUTO-MCNC: 173 MG/DL (ref 65–100)
GLUCOSE BLD STRIP.AUTO-MCNC: 258 MG/DL (ref 65–100)
GLUCOSE BLD STRIP.AUTO-MCNC: 387 MG/DL (ref 65–100)
GLUCOSE BLD STRIP.AUTO-MCNC: 98 MG/DL (ref 65–100)
GLUCOSE SERPL-MCNC: 203 MG/DL (ref 65–100)
HCT VFR BLD AUTO: 26.8 % (ref 35–47)
HGB BLD-MCNC: 8.4 G/DL (ref 11.5–16)
MCH RBC QN AUTO: 29.8 PG (ref 26–34)
MCHC RBC AUTO-ENTMCNC: 31.3 G/DL (ref 30–36.5)
MCV RBC AUTO: 95 FL (ref 80–99)
NRBC # BLD: 0.02 K/UL (ref 0–0.01)
NRBC BLD-RTO: 0.2 PER 100 WBC
PHOSPHATE SERPL-MCNC: 3.2 MG/DL (ref 2.6–4.7)
PLATELET # BLD AUTO: 202 K/UL (ref 150–400)
PMV BLD AUTO: 9 FL (ref 8.9–12.9)
POTASSIUM SERPL-SCNC: 4.4 MMOL/L (ref 3.5–5.1)
RBC # BLD AUTO: 2.82 M/UL (ref 3.8–5.2)
SERVICE CMNT-IMP: ABNORMAL
SERVICE CMNT-IMP: NORMAL
SODIUM SERPL-SCNC: 133 MMOL/L (ref 136–145)
WBC # BLD AUTO: 8.5 K/UL (ref 3.6–11)

## 2020-01-21 PROCEDURE — 74011250637 HC RX REV CODE- 250/637: Performed by: NURSE PRACTITIONER

## 2020-01-21 PROCEDURE — 76060000031 HC ANESTHESIA FIRST 0.5 HR: Performed by: INTERNAL MEDICINE

## 2020-01-21 PROCEDURE — 74011250636 HC RX REV CODE- 250/636: Performed by: INTERNAL MEDICINE

## 2020-01-21 PROCEDURE — 88305 TISSUE EXAM BY PATHOLOGIST: CPT

## 2020-01-21 PROCEDURE — 85027 COMPLETE CBC AUTOMATED: CPT

## 2020-01-21 PROCEDURE — 0DB48ZX EXCISION OF ESOPHAGOGASTRIC JUNCTION, VIA NATURAL OR ARTIFICIAL OPENING ENDOSCOPIC, DIAGNOSTIC: ICD-10-PCS | Performed by: INTERNAL MEDICINE

## 2020-01-21 PROCEDURE — 94760 N-INVAS EAR/PLS OXIMETRY 1: CPT

## 2020-01-21 PROCEDURE — 99152 MOD SED SAME PHYS/QHP 5/>YRS: CPT

## 2020-01-21 PROCEDURE — 80069 RENAL FUNCTION PANEL: CPT

## 2020-01-21 PROCEDURE — 82962 GLUCOSE BLOOD TEST: CPT

## 2020-01-21 PROCEDURE — 65660000000 HC RM CCU STEPDOWN

## 2020-01-21 PROCEDURE — 74011250637 HC RX REV CODE- 250/637: Performed by: INTERNAL MEDICINE

## 2020-01-21 PROCEDURE — 36415 COLL VENOUS BLD VENIPUNCTURE: CPT

## 2020-01-21 PROCEDURE — 74011000250 HC RX REV CODE- 250: Performed by: ANESTHESIOLOGY

## 2020-01-21 PROCEDURE — 93312 ECHO TRANSESOPHAGEAL: CPT

## 2020-01-21 PROCEDURE — 0DB78ZX EXCISION OF STOMACH, PYLORUS, VIA NATURAL OR ARTIFICIAL OPENING ENDOSCOPIC, DIAGNOSTIC: ICD-10-PCS | Performed by: INTERNAL MEDICINE

## 2020-01-21 PROCEDURE — 74011636637 HC RX REV CODE- 636/637: Performed by: HOSPITALIST

## 2020-01-21 PROCEDURE — 74011000250 HC RX REV CODE- 250: Performed by: INTERNAL MEDICINE

## 2020-01-21 PROCEDURE — 76040000019: Performed by: INTERNAL MEDICINE

## 2020-01-21 PROCEDURE — 77030019988 HC FCPS ENDOSC DISP BSC -B: Performed by: INTERNAL MEDICINE

## 2020-01-21 PROCEDURE — 74011250636 HC RX REV CODE- 250/636: Performed by: ANESTHESIOLOGY

## 2020-01-21 PROCEDURE — 74011636637 HC RX REV CODE- 636/637: Performed by: INTERNAL MEDICINE

## 2020-01-21 RX ORDER — LIDOCAINE HYDROCHLORIDE 20 MG/ML
INJECTION, SOLUTION EPIDURAL; INFILTRATION; INTRACAUDAL; PERINEURAL AS NEEDED
Status: DISCONTINUED | OUTPATIENT
Start: 2020-01-21 | End: 2020-01-21 | Stop reason: HOSPADM

## 2020-01-21 RX ORDER — PROPOFOL 10 MG/ML
INJECTION, EMULSION INTRAVENOUS AS NEEDED
Status: DISCONTINUED | OUTPATIENT
Start: 2020-01-21 | End: 2020-01-21 | Stop reason: HOSPADM

## 2020-01-21 RX ORDER — SODIUM CHLORIDE 0.9 % (FLUSH) 0.9 %
5-40 SYRINGE (ML) INJECTION AS NEEDED
Status: DISCONTINUED | OUTPATIENT
Start: 2020-01-21 | End: 2020-01-23 | Stop reason: HOSPADM

## 2020-01-21 RX ORDER — ATROPINE SULFATE 0.1 MG/ML
0.5 INJECTION INTRAVENOUS
Status: DISCONTINUED | OUTPATIENT
Start: 2020-01-21 | End: 2020-01-21 | Stop reason: HOSPADM

## 2020-01-21 RX ORDER — SODIUM CHLORIDE 9 MG/ML
100 INJECTION, SOLUTION INTRAVENOUS CONTINUOUS
Status: DISPENSED | OUTPATIENT
Start: 2020-01-21 | End: 2020-01-21

## 2020-01-21 RX ORDER — FENTANYL CITRATE 50 UG/ML
25-50 INJECTION, SOLUTION INTRAMUSCULAR; INTRAVENOUS
Status: DISCONTINUED | OUTPATIENT
Start: 2020-01-21 | End: 2020-01-21

## 2020-01-21 RX ORDER — MIDAZOLAM HYDROCHLORIDE 1 MG/ML
.5-1 INJECTION, SOLUTION INTRAMUSCULAR; INTRAVENOUS
Status: DISCONTINUED | OUTPATIENT
Start: 2020-01-21 | End: 2020-01-21

## 2020-01-21 RX ORDER — SODIUM CHLORIDE 0.9 % (FLUSH) 0.9 %
5-40 SYRINGE (ML) INJECTION EVERY 8 HOURS
Status: DISCONTINUED | OUTPATIENT
Start: 2020-01-21 | End: 2020-01-23 | Stop reason: HOSPADM

## 2020-01-21 RX ORDER — DEXTROMETHORPHAN/PSEUDOEPHED 2.5-7.5/.8
1.2 DROPS ORAL
Status: DISCONTINUED | OUTPATIENT
Start: 2020-01-21 | End: 2020-01-21 | Stop reason: HOSPADM

## 2020-01-21 RX ORDER — INSULIN LISPRO 100 [IU]/ML
8 INJECTION, SOLUTION INTRAVENOUS; SUBCUTANEOUS ONCE
Status: COMPLETED | OUTPATIENT
Start: 2020-01-21 | End: 2020-01-21

## 2020-01-21 RX ORDER — FLUMAZENIL 0.1 MG/ML
0.2 INJECTION INTRAVENOUS
Status: DISCONTINUED | OUTPATIENT
Start: 2020-01-21 | End: 2020-01-21 | Stop reason: HOSPADM

## 2020-01-21 RX ORDER — NALOXONE HYDROCHLORIDE 0.4 MG/ML
0.4 INJECTION, SOLUTION INTRAMUSCULAR; INTRAVENOUS; SUBCUTANEOUS
Status: DISCONTINUED | OUTPATIENT
Start: 2020-01-21 | End: 2020-01-21 | Stop reason: HOSPADM

## 2020-01-21 RX ORDER — LIDOCAINE HYDROCHLORIDE 20 MG/ML
15 SOLUTION OROPHARYNGEAL ONCE
Status: COMPLETED | OUTPATIENT
Start: 2020-01-21 | End: 2020-01-21

## 2020-01-21 RX ADMIN — PREDNISOLONE ACETATE 1 DROP: 10 SUSPENSION/ DROPS OPHTHALMIC at 17:28

## 2020-01-21 RX ADMIN — CYCLOBENZAPRINE 5 MG: 10 TABLET, FILM COATED ORAL at 17:19

## 2020-01-21 RX ADMIN — ATORVASTATIN CALCIUM 20 MG: 20 TABLET, FILM COATED ORAL at 22:54

## 2020-01-21 RX ADMIN — SODIUM CHLORIDE 100 ML/HR: 900 INJECTION, SOLUTION INTRAVENOUS at 15:11

## 2020-01-21 RX ADMIN — INSULIN LISPRO 2 UNITS: 100 INJECTION, SOLUTION INTRAVENOUS; SUBCUTANEOUS at 09:19

## 2020-01-21 RX ADMIN — PREDNISOLONE ACETATE 1 DROP: 10 SUSPENSION/ DROPS OPHTHALMIC at 11:15

## 2020-01-21 RX ADMIN — PANTOPRAZOLE SODIUM 40 MG: 40 TABLET, DELAYED RELEASE ORAL at 17:24

## 2020-01-21 RX ADMIN — FENTANYL CITRATE 25 MCG: 50 INJECTION, SOLUTION INTRAMUSCULAR; INTRAVENOUS at 12:37

## 2020-01-21 RX ADMIN — EPOETIN ALFA-EPBX 12000 UNITS: 10000 INJECTION, SOLUTION INTRAVENOUS; SUBCUTANEOUS at 17:23

## 2020-01-21 RX ADMIN — METOPROLOL TARTRATE 25 MG: 25 TABLET ORAL at 09:20

## 2020-01-21 RX ADMIN — Medication 10 ML: at 06:46

## 2020-01-21 RX ADMIN — BISACODYL 5 MG: 5 TABLET, COATED ORAL at 09:20

## 2020-01-21 RX ADMIN — INSULIN LISPRO 5 UNITS: 100 INJECTION, SOLUTION INTRAVENOUS; SUBCUTANEOUS at 11:14

## 2020-01-21 RX ADMIN — INSULIN LISPRO 8 UNITS: 100 INJECTION, SOLUTION INTRAVENOUS; SUBCUTANEOUS at 22:54

## 2020-01-21 RX ADMIN — LIDOCAINE HYDROCHLORIDE 15 ML: 20 SOLUTION ORAL; TOPICAL at 12:34

## 2020-01-21 RX ADMIN — LIDOCAINE HYDROCHLORIDE 80 MG: 20 INJECTION, SOLUTION EPIDURAL; INFILTRATION; INTRACAUDAL; PERINEURAL at 15:31

## 2020-01-21 RX ADMIN — Medication 10 ML: at 22:55

## 2020-01-21 RX ADMIN — PROPOFOL 120 MG: 10 INJECTION, EMULSION INTRAVENOUS at 15:43

## 2020-01-21 RX ADMIN — METOPROLOL TARTRATE 25 MG: 25 TABLET ORAL at 17:19

## 2020-01-21 RX ADMIN — BENZOCAINE, BUTAMBEN, AND TETRACAINE HYDROCHLORIDE 1 SPRAY: .028; .004; .004 AEROSOL, SPRAY TOPICAL at 12:34

## 2020-01-21 RX ADMIN — HYDROCODONE BITARTRATE AND ACETAMINOPHEN 1 TABLET: 5; 325 TABLET ORAL at 17:20

## 2020-01-21 RX ADMIN — PANTOPRAZOLE SODIUM 40 MG: 40 TABLET, DELAYED RELEASE ORAL at 09:19

## 2020-01-21 RX ADMIN — ALPRAZOLAM 0.25 MG: 0.25 TABLET ORAL at 17:20

## 2020-01-21 RX ADMIN — MIDAZOLAM 1 MG: 1 INJECTION INTRAMUSCULAR; INTRAVENOUS at 12:37

## 2020-01-21 RX ADMIN — Medication 10 ML: at 11:17

## 2020-01-21 RX ADMIN — POLYETHYLENE GLYCOL (3350) 17 G: 17 POWDER, FOR SOLUTION ORAL at 09:20

## 2020-01-21 NOTE — PROGRESS NOTES
Endoscope was pre-cleaned at the bedside immediately following procedure by [facility]

## 2020-01-21 NOTE — PROGRESS NOTES
GI PROGRESS NOTE  Komal Mace, BARBIE  [phone] NP in-hospital cell phone M-F until 4:30  After 5pm or on weekends, please call  for physician on call    NAME:Madelyn Mcdonough :1965 DRL:[id number]   ATTG: Dr Jesusita Astorga  PCP: Moiz Cornejo NP  Date/Time:  2020 9:30 AM     Primary GI: Dr. hWite Ahr    Reason for following: Hematemesis    Assessment:   Hematemesis x 1 - since 1/15  - No abdominal pain, no grimacing to palpation   - Aspirin 81 mg daily  - Hemoglobin trending down a bit to 8.4  - FOBT negative     Acute encephalopathy - mentation normalized  CKD stage V  Diabetes type 2-uncontrolled  Hypertension     Plan:   · EGD today at 1600 with Dr White Ahr  · LENNY today at 1200  · Pt had full liquids at 0900 today but then will remain NPO for procedures  · Serial H/H  · Continue BID PPI  · Miralax daily     Subjective:   Discussed with RN events overnight. Doing well just really wants to go home and get \"all of this done, and its cold in here. \"    Complaint Y/N Description   Abdominal Pain n    Hematemesis n    Hematochezia n    Melena n    Constipation n    Diarrhea n    Dyspepsia n    Dysphagia n    Jaundiced n    Nausea/vomiting n      Review of Systems:  Symptom Y/N Comments  Symptom Y/N Comments   Fever/Chills    Chest Pain     Cough    Headaches     Sputum    Joint Pain     SOB/GRIFFITHS    Pruritis/Rash     Tolerating Diet y  full liquids this AM  Other       Could NOT obtain due to:      Objective:   VITALS:   Last 24hrs VS reviewed since prior progress note. Most recent are:  Visit Vitals  /58 (BP 1 Location: Right arm)   Pulse 80   Temp 98.6 °F (37 °C)   Resp 16   Ht 5' 5\" (1.651 m)   Wt 58.3 kg (128 lb 8 oz)   SpO2 98%   Breastfeeding No   BMI 21.38 kg/m²       Intake/Output Summary (Last 24 hours) at 2020 0930  Last data filed at 2020 1142  Gross per 24 hour   Intake    Output 150 ml   Net -150 ml     PHYSICAL EXAM:  General:          WD, WN.  Alert, cooperative, no acute distress    HEENT:           NC, Atraumatic. Anicteric sclerae. Lungs:             CTA Bilaterally. No Wheezing/Rhonchi/Rales. Heart:              Regular  rhythm,  No murmur (-), No Rubs, No Gallops  Abdomen:        Soft, Non distended, Non tender. +Bowel sounds, no HSM  Extremities:     No c/c/e  Neurologic:      Alert and oriented X 3. No acute neurological distress   Psych:             Good insight. Not anxious nor agitated. Lab and Radiology Data Reviewed: (see below)    Medications Reviewed: (see below)  PMH/SH reviewed - no change compared to H&P  ________________________________________________________________________  Total time spent with patient: 20 minutes ________________________________________________________________________  Care Plan discussed with:  Patient y   Family     RN yAddis Mccormick              Consultant: Rick Sanchez NP     Procedures: see electronic medical records for all procedures/Xrays and details which were not copied into this note but were reviewed prior to creation of Plan. LABS:  Recent Labs     01/21/20 0311   WBC 8.5   HGB 8.4*   HCT 26.8*        Recent Labs     01/21/20  0311 01/20/20  0352 01/19/20  0112   * 136 136   K 4.4 4.3 4.3   CL 99 100 98   CO2 32 32 35*   BUN 15 20 10   CREA 3.22* 3.67* 2.33*   * 172* 240*   CA 8.8 9.0 9.2   PHOS 3.2 3.5 3.2     Recent Labs     01/21/20  0311 01/20/20  0352 01/19/20  0112   ALB 2.7* 2.7* 2.8*     No results for input(s): INR, PTP, APTT, INREXT in the last 72 hours. No results for input(s): FE, TIBC, PSAT, FERR in the last 72 hours. Lab Results   Component Value Date/Time    Folate 13.4 05/11/2018 03:49 AM     No results for input(s): PH, PCO2, PO2 in the last 72 hours. No results for input(s): CPK, CKMB in the last 72 hours.     No lab exists for component: TROPONINI  Lab Results   Component Value Date/Time    Color YELLOW/STRAW 01/15/2020 07:11 AM    Appearance CLEAR 01/15/2020 07:11 AM Specific gravity 1.015 01/15/2020 07:11 AM    Specific gravity 1.012 05/11/2018 09:49 AM    pH (UA) 7.0 01/15/2020 07:11 AM    Protein 100 (A) 01/15/2020 07:11 AM    Glucose >1,000 (A) 01/15/2020 07:11 AM    Ketone NEGATIVE  01/15/2020 07:11 AM    Bilirubin NEGATIVE  01/15/2020 07:11 AM    Urobilinogen 0.2 01/15/2020 07:11 AM    Nitrites NEGATIVE  01/15/2020 07:11 AM    Leukocyte Esterase NEGATIVE  01/15/2020 07:11 AM    Epithelial cells FEW 01/15/2020 07:11 AM    Bacteria NEGATIVE  01/15/2020 07:11 AM    WBC 0-4 01/15/2020 07:11 AM    RBC 5-10 01/15/2020 07:11 AM       MEDICATIONS:  Current Facility-Administered Medications   Medication Dose Route Frequency    pantoprazole (PROTONIX) tablet 40 mg  40 mg Oral ACB&D    doxercalciferoL (HECTOROL) 4 mcg/2 mL injection 4 mcg  4 mcg IntraVENous DIALYSIS TUE, THU & SAT    ALPRAZolam (XANAX) tablet 0.25 mg  0.25 mg Oral TID PRN    hydroxyzine HCL (ATARAX) tablet 25 mg  25 mg Oral TID    cyclobenzaprine (FLEXERIL) tablet 5 mg  5 mg Oral TID PRN    prednisoLONE acetate (PRED FORTE) 1 % ophthalmic suspension 1 Drop  1 Drop Both Eyes BID    HYDROcodone-acetaminophen (NORCO) 5-325 mg per tablet 1 Tab  1 Tab Oral Q6H PRN    bisacodyL (DULCOLAX) tablet 5 mg  5 mg Oral DAILY    polyethylene glycol (MIRALAX) packet 17 g  17 g Oral DAILY    guaiFENesin-codeine (ROBITUSSIN AC) 100-10 mg/5 mL solution 5 mL  5 mL Oral Q6H PRN    cloNIDine HCL (CATAPRES) tablet 0.1 mg  0.1 mg Oral Q12H PRN    acetaminophen (TYLENOL) tablet 650 mg  650 mg Oral Q6H PRN    epoetin kourtney-epbx (RETACRIT) 12,000 Units combo injection  12,000 Units SubCUTAneous Q TUE, THU & SAT    sodium chloride (NS) flush 5-40 mL  5-40 mL IntraVENous Q8H    sodium chloride (NS) flush 5-40 mL  5-40 mL IntraVENous PRN    sodium chloride (NS) flush 5-40 mL  5-40 mL IntraVENous Q8H    sodium chloride (NS) flush 5-40 mL  5-40 mL IntraVENous PRN    naloxone (NARCAN) injection 0.4 mg  0.4 mg IntraVENous PRN    ondansetron (ZOFRAN) injection 4 mg  4 mg IntraVENous Q4H PRN    atorvastatin (LIPITOR) tablet 20 mg  20 mg Oral QHS    metoprolol tartrate (LOPRESSOR) tablet 25 mg  25 mg Oral BID    glucose chewable tablet 16 g  4 Tab Oral PRN    glucagon (GLUCAGEN) injection 1 mg  1 mg IntraMUSCular PRN    dextrose 10% infusion 0-250 mL  0-250 mL IntraVENous PRN    insulin lispro (HUMALOG) injection   SubCUTAneous AC&HS    hydrALAZINE (APRESOLINE) 20 mg/mL injection 10 mg  10 mg IntraVENous Q6H PRN

## 2020-01-21 NOTE — PROGRESS NOTES
TRANSFER - OUT REPORT:    Verbal report given to Anny RN(name) on Hulon Fast  being transferred to IVCU/pt room(unit) for routine progression of care   (post LENNY)    Report consisted of patients Situation, Background, Assessment and   Recommendations(SBAR). Information from the following report(s) SBAR, Procedure Summary, MAR and Cardiac Rhythm NSR was reviewed with the receiving nurse. Lines:   Peripheral IV 01/20/20 Right Antecubital (Active)   Site Assessment Clean, dry, & intact 1/21/2020  8:00 AM   Phlebitis Assessment 0 1/21/2020  8:00 AM   Infiltration Assessment 0 1/21/2020  8:00 AM   Dressing Status Clean, dry, & intact 1/21/2020  8:00 AM   Dressing Type Transparent 1/21/2020  8:00 AM   Hub Color/Line Status Pink;Capped 1/21/2020  8:00 AM        Opportunity for questions and clarification was provided.       Patient transported with:  Tele-  Monitor

## 2020-01-21 NOTE — ANESTHESIA POSTPROCEDURE EVALUATION
Procedure(s):  ESOPHAGOGASTRODUODENOSCOPY (EGD)  ESOPHAGOGASTRODUODENAL (EGD) BIOPSY. total IV anesthesia, general    Anesthesia Post Evaluation        Patient location during evaluation: PACU  Note status: Adequate. Level of consciousness: responsive to verbal stimuli and sleepy but conscious  Pain management: satisfactory to patient  Airway patency: patent  Anesthetic complications: no  Cardiovascular status: acceptable  Respiratory status: acceptable  Hydration status: acceptable  Comments: +Post-Anesthesia Evaluation and Assessment    Patient: Darrius Bennett MRN: [id number]  SSN: [id number]   YOB: 1965  Age: 47 y.o. Sex: female      Cardiovascular Function/Vital Signs    /62   Pulse 94   Temp 36.5 °C (97.7 °F)   Resp 14   Ht 5' 5\" (1.651 m)   Wt 58.3 kg (128 lb 8 oz)   SpO2 98%   Breastfeeding No   BMI 21.38 kg/m²     Patient is status post Procedure(s):  ESOPHAGOGASTRODUODENOSCOPY (EGD)  ESOPHAGOGASTRODUODENAL (EGD) BIOPSY. Nausea/Vomiting: Controlled. Postoperative hydration reviewed and adequate. Pain:  Pain Scale 1: Numeric (0 - 10) (01/21/20 0831)  Pain Intensity 1: 0 (01/21/20 0831)   Managed. Neurological Status: At baseline. Mental Status and Level of Consciousness: Arousable. Pulmonary Status:   O2 Device: Room air (01/21/20 1610)   Adequate oxygenation and airway patent. Complications related to anesthesia: None    Post-anesthesia assessment completed. No concerns.     Signed By: Charise Lombard, DO    1/21/2020  Post anesthesia nausea and vomiting:  controlled      Vitals Value Taken Time   /62 1/21/2020  4:10 PM   Temp     Pulse 94 1/21/2020  4:10 PM   Resp 14 1/21/2020  4:10 PM   SpO2 98 % 1/21/2020  4:10 PM

## 2020-01-21 NOTE — PROGRESS NOTES
07:10 - Bedside report rec'd from KENNETH Serra.    08:15 - Verbal order rec'd to give patient light breakfast as patient is scheduled for LENNY around noon and has had hypoglycemic event previously. 09:20 - Patient refused Atarax this AM.  She was advised that she is not to have anything to eat or drink until after testing. She is going for EGD after LENNY today as well. When told she would probably not be going home today, she began to get tearful. 11:20 - Patient to LENNY.    13:25 - Back to room. Remains NPO for EGD. 14:43 - To EGD. 16:00 - SBAR report rec'd from KENNETH Chavez in ENDO, post EGD today. 16:20 - Back to room. 16:30 - Patient states she was told she could go home after her procedures today if all went well. Upset/crying when I mentioned she might not be going home today. Patient requesting to notify MD that she would like to be discharged. Message sent to MD.    18:17 - Paged MD through service. 18:20 - MD call transferred to patient. 19:00 - Bedside report given to KENNETH Serra.

## 2020-01-21 NOTE — PROGRESS NOTES
1900: Received report from day shift nurse Spring. Reviewed SBAR, Kardex, I/O, MAR, Procedural information and Recent results. VSS, NSR (rhythm), RA (oxygenation). A/O X 4  Pt resting in bed/sitting up in the bed. Cath lab site: Right radial and right groin CDI, no bleeding/hematoma. Pt reporting no CP/SOB. 2300: VSS, NSR, RA, no complaints. Pt reports no CP/SOB. 0000: Pt NPO at MN.     0300: Labs drawn and sent to lab. VSS, NSR, RA, no complaints. Pt reports no CP/SOB.

## 2020-01-21 NOTE — PROCEDURES
[facility]                   Endoscopic Gastroduodenoscopy Procedure Note      1/21/2020  Mary Lou Cotrez  1965  [id number]    Procedure: Endoscopic Gastroduodenoscopy with biopsy    Indication: Recent hematemesis     Pre-operative Diagnosis: see indication above    Post-operative Diagnosis: see findings below    : OTTONIEL Estrada MD    Referring Provider:  Luis Olivarez NP      Anesthesia/Sedation:  MAC anesthesia Propofol    Airway assessment: No airway problems anticipated    Pre-Procedural Exam:      Airway: clear, no airway problems anticipated  Heart: RRR, without gallops or rubs  Lungs: clear bilaterally without wheezes, crackles, or rhonchi  Abdomen: soft, nontender, nondistended, bowel sounds present  Mental Status: awake, alert and oriented to person, place and time       Procedure Details     After infomed consent was obtained for the procedure, with all risks and benefits of procedure explained the patient was taken to the endoscopy suite and placed in the left lateral decubitus position. Following sequential administration of sedation as per above, the endoscope was inserted into the mouth and advanced under direct vision to second portion of the duodenum. A careful inspection was made as the gastroscope was withdrawn, including a retroflexed view of the proximal stomach; findings and interventions are described below. Findings:   Esophagus:  Erosive esophagitis at the GE junction with one small area that appears to be healed ulcer  Stomach: Mild gastritis, biopsied  Duodenum: normal    Therapies:  biopsy of esophagus  biopsy of stomach antrum    Specimens: 1. Biopsies of gastric antrum  2. Biopsies of ge junction           Complications:   None; patient tolerated the procedure well. EBL:  None. Impression:      Erosive esophagitis, healing  Mild gastritis    Recommendations:  -Acid suppression with a proton pump inhibitor. , -Await pathology. , -You can proceed with any anticoagulation that is needed.     Afshan Selby., [id number]

## 2020-01-21 NOTE — PROGRESS NOTES
Nephrology Progress Note     Codey Calderon     [email or web address]                  Phone - [phone]   Patient: Steffen Alvarado   Date- 1/21/2020        Admit Date: 1/15/2020  YOB: 1965             CC: Follow up for new onset ESRD        Subjective: Interval History:   S/p hd yesterday  She had cardiac cath done yesterday  No c/o sob,   No c/o chest pain,   No c/o nausea or vomiting  No c/o  fever. ROS:- as above   Assessment & Plan:     New onset  ESRD   No dialysis today  She will [facility] unit    CKD 5 DUE TO DM nephropathy-  F/b DR. BECKER     CAD- s/p cardiac cath    Hyponatremia   Follow bmp     Vomiting, hemetemesis, abdo. Pain  GI following     Anemia of ckd  CONTINUE EPOGEN     Sec. hyperpara  Continue Hectorol       H/o hypertension  Continue metoprolol  Okay to add ACEi or ARB       Encephalopathy     Dm 2                     Physical exam:   GENERAL ASSESSMENT: NAD  CHEST: CTA b/l, no wheezing  HEART: S1,S2,RRR  ABDOMEN: Soft,Non tender  NEURO: Non focal, normal speech  EXTREMITY: no EDEMA       Left arm avf bruit +    Care Plan discussed with: patient   Objective:   Visit Vitals  /58 (BP 1 Location: Right arm)   Pulse 80   Temp 98.6 °F (37 °C)   Resp 16   Ht 5' 5\" (1.651 m)   Wt 58.3 kg (128 lb 8 oz)   SpO2 98%   Breastfeeding No   BMI 21.38 kg/m²     Last 3 Recorded Weights in this Encounter    01/16/20 1754 01/18/20 0321 01/21/20 0311   Weight: 59 kg (130 lb) 61.5 kg (135 lb 9.6 oz) 58.3 kg (128 lb 8 oz)     01/19 1901 - 01/21 0700  In: -   Out: 150 [Urine:150]    Intake/Output Summary (Last 24 hours) at 1/21/2020 0907  Last data filed at 1/20/2020 1142  Gross per 24 hour   Intake    Output 150 ml   Net -150 ml      Chart reviewed. Pertinent Notes reviewed.        Medication list  reviewed   Current Facility-Administered Medications   Medication    pantoprazole (PROTONIX) tablet 40 mg    doxercalciferoL (HECTOROL) 4 mcg/2 mL injection 4 mcg    ALPRAZolam (XANAX) tablet 0.25 mg    hydroxyzine HCL (ATARAX) tablet 25 mg    cyclobenzaprine (FLEXERIL) tablet 5 mg    prednisoLONE acetate (PRED FORTE) 1 % ophthalmic suspension 1 Drop    HYDROcodone-acetaminophen (NORCO) 5-325 mg per tablet 1 Tab    bisacodyL (DULCOLAX) tablet 5 mg    polyethylene glycol (MIRALAX) packet 17 g    guaiFENesin-codeine (ROBITUSSIN AC) 100-10 mg/5 mL solution 5 mL    cloNIDine HCL (CATAPRES) tablet 0.1 mg    acetaminophen (TYLENOL) tablet 650 mg    epoetin kourtney-epbx (RETACRIT) 12,000 Units combo injection    sodium chloride (NS) flush 5-40 mL    sodium chloride (NS) flush 5-40 mL    sodium chloride (NS) flush 5-40 mL    sodium chloride (NS) flush 5-40 mL    naloxone (NARCAN) injection 0.4 mg    ondansetron (ZOFRAN) injection 4 mg    atorvastatin (LIPITOR) tablet 20 mg    metoprolol tartrate (LOPRESSOR) tablet 25 mg    glucose chewable tablet 16 g    glucagon (GLUCAGEN) injection 1 mg    dextrose 10% infusion 0-250 mL    insulin lispro (HUMALOG) injection    hydrALAZINE (APRESOLINE) 20 mg/mL injection 10 mg           Data Review :    Results for Dennis Miller (MRN [id number]) as of 1/17/2020 10:25   Ref. Range 6/8/2012 20:05 12/13/2012 09:22 1/16/2020 13:33   Hepatitis B surface Ag Latest Units: Index <0.10  <0.10   Hep B surface Ag Interp. Latest Ref Range: NEG   NEGATIVE  NEGATIVE   Hepatitis B surface Ab Latest Units: mIU/mL <2.80  <3.10   Hep B surface Ab Interp. Latest Ref Range: NR   NONREACTIVE  NONREACTIVE   Hepatitis B core, IgM Latest Ref Range: NR     NONREACTIVE   Hepatitis C virus Ab Latest Units: Index >11.00  >11.00   Hep C  virus Ab Interp. Latest Ref Range: NR   High Pos  REACTIVE (A)   Hep C  virus Ab comment Latest Units:   Method used is Ensighten.   Method used is Siemens Advia TestFreaksaur   HIV 1/2 Interpretation Unknown NONREACTIVE     VDRL SERUM W/REFLEX TITER Unknown  Rpt      CXR  Chest single view dated 1/15/2020     Comparison chest dated 1/14/2020     History is abdominal pain     A single frontal view of the chest was obtained. The cardiac silhouette is  normal in size. There is no evidence of active lung disease. If there is  clinical suspicion of an intra-abdominal abnormality, a radiographic examination  of the abdomen may render additional information.     IMPRESSION  IMPRESSION: No evidence of active lung disease. Recent Labs     01/21/20  0311 01/20/20  0352 01/19/20  0112   * 136 136   K 4.4 4.3 4.3   CL 99 100 98   CO2 32 32 35*   BUN 15 20 10   CREA 3.22* 3.67* 2.33*   * 172* 240*   CA 8.8 9.0 9.2   PHOS 3.2 3.5 3.2     Recent Labs     01/21/20  0311   WBC 8.5   HGB 8.4*   HCT 26.8*        No results for input(s): FE, TIBC, PSAT, FERR in the last 72 hours. No results for input(s): CPK, CKNDX, TROIQ in the last 72 hours. No lab exists for component: CPKMB  Lab Results   Component Value Date/Time    Color YELLOW/STRAW 01/15/2020 07:11 AM    Appearance CLEAR 01/15/2020 07:11 AM    Specific gravity 1.015 01/15/2020 07:11 AM    Specific gravity 1.012 05/11/2018 09:49 AM    pH (UA) 7.0 01/15/2020 07:11 AM    Protein 100 (A) 01/15/2020 07:11 AM    Glucose >1,000 (A) 01/15/2020 07:11 AM    Ketone NEGATIVE  01/15/2020 07:11 AM    Bilirubin NEGATIVE  01/15/2020 07:11 AM    Urobilinogen 0.2 01/15/2020 07:11 AM    Nitrites NEGATIVE  01/15/2020 07:11 AM    Leukocyte Esterase NEGATIVE  01/15/2020 07:11 AM    Epithelial cells FEW 01/15/2020 07:11 AM    Bacteria NEGATIVE  01/15/2020 07:11 AM    WBC 0-4 01/15/2020 07:11 AM    RBC 5-10 01/15/2020 07:11 AM     Lab Results   Component Value Date/Time    Culture result: (A) 12/06/2019 06:31 AM     MRSA PRESENT CALLED TO AND READ BACK BY KENNETH LOVE,AT 1973 ON 12/7/19. RC    Culture result:  12/06/2019 06:31 AM         Screening of patient nares for MRSA is for surveillance purposes and, if positive, to facilitate isolation considerations in high risk settings. It is not intended for automatic decolonization interventions per se as regimens are not sufficiently effective to warrant routine use. Culture result: MIXED UROGENITAL SAMAN ISOLATED 08/21/2019 04:08 AM     Lab Results   Component Value Date/Time    Specimen Description: NARES 11/18/2013 10:02 PM    Specimen Description: BLOOD 11/18/2013 09:18 PM    Specimen Description: URINE 11/18/2013 09:18 PM     Lab Results   Component Value Date/Time    Sodium,urine random 92 05/28/2018 12:13 PM    Creatinine, urine 29.30 05/28/2018 12:13 PM       Results from Hospital Encounter encounter on 01/15/20   XR SPINE LUMB 2 OR 3 V    Narrative Clinical indication: Back pain. Frontal and lateral view of the lumbar sacral spine are obtained. The pedicles  are visualized. The alignment is satisfactory. No fracture. Impression impression: No acute changes. Rosemarie Zambrano MD  [address] [facility]   [email or web address]  [facility]  [address], [address]  [place], [address]  Phone - [phone]         Fax - [phone]

## 2020-01-21 NOTE — PROGRESS NOTES
Hospitalist Progress Note    NAME: Nunu Moreno   :  1965   MRN:  [id number]       Assessment / Plan:  Acute encephalopathy POA  Resolved  Suspect Uremia with CKD5, suspect ESRD now- newly started on HD TTS now  Her fistula seems to have matured  EtOH negative, hypercapnea ruled out, ammonia within normal limits, CT head negative, electrolytes relatively normal, UDS negative and UA with no UTI  No fever, headache, or meningismus to suggest meningitis  TSH WNL  IP Nephrology consult appreciated     Chest Pain  NSVT 22 beats  Continue BB and statin  S/P LHC noticed to have significant LAD and moderate LCX disease  Plan for revascularization after GI evaluation  Mg ok   No ASA for now due to suspected GI bleed  Chest pain resolved  LENNY showed calcified Mitral valve     Acute blood loss anemia vs Anemia due to CKD5  ? Upper GI bleed yesterday- no more episodes, Hb stable  Plan of EGD today  C/w PPI     Anxiety state  Back pain  Xray L spine= neg acute noted   .  Cont Xanax TID prn (low dose) along with Flexeril TID prn for back pain for supportive care for now        Abdominal Pain  Seems related to severe constipation  Continue Miralax  Resolved with Sorbitol        Hypoglycemia in the setting of DM2 POA-   Resolved  c/w Long acting insulinat reduced dose of 5 units  Holding Premeal insulin  SSI     Essential hypertension  clonidinePRN and continue BB  Monitor  IV hydralazine PRN        Code Status: Full  Surrogate Decision Maker: Wayen Bruno,      DVT Prophylaxis: SCDs due to ? GI bleed  GI Prophylaxis: not indicated     Baseline: Ambulates with rollator, frequent ED visits         Subjective:     Chief Complaint / Reason for Physician Visit  \"denies active complaints, no nausea , abdominal pain, chest pain or melena\". Discussed with RN events overnight.      Review of Systems:  Symptom Y/N Comments  Symptom Y/N Comments   Fever/Chills n   Chest Pain n    Poor Appetite n   Edema n    Cough n Abdominal Pain n    Sputum n   Joint Pain n    SOB/GRIFFITHS n   Pruritis/Rash     Nausea/vomit n   Tolerating PT/OT     Diarrhea n   Tolerating Diet     Constipation n   Other       Could NOT obtain due to:      Objective:     VITALS:   Last 24hrs VS reviewed since prior progress note. Most recent are:  Patient Vitals for the past 24 hrs:   Temp Pulse Resp BP SpO2   01/21/20 1320  78 18 99/55 97 %   01/21/20 1315  78 17 93/60 94 %   01/21/20 1310  77 15 96/54 96 %   01/21/20 1305  78 19 99/57 97 %   01/21/20 1300  78 20 100/57 96 %   01/21/20 1255  90 18 104/56 100 %   01/21/20 1250  80 13 122/59 100 %   01/21/20 1245  (!) 102 28 95/62 100 %   01/21/20 1240  83 16 161/80 100 %   01/21/20 1235  99 15 149/74 100 %   01/21/20 1230  79 17 131/80 100 %   01/21/20 1215  90 13 106/62 100 %   01/21/20 1200  76 15 114/60 99 %   01/21/20 1155  78 13 122/61 100 %   01/21/20 1145  84 24 95/60 97 %   01/21/20 1135  79 17 125/64 94 %   01/21/20 1113 98.2 °F (36.8 °C) 81 18 108/68 98 %   01/21/20 0831 98.6 °F (37 °C) 80 16 130/58 98 %   01/21/20 0300 98.5 °F (36.9 °C) 82 16 136/72 100 %   01/20/20 2300 98.3 °F (36.8 °C) 91 18 105/56 98 %   01/20/20 1900 98.5 °F (36.9 °C) 84 18 117/46 98 %   01/20/20 1501 98.2 °F (36.8 °C) 91 18 113/68 99 %   01/20/20 1445  78 18 92/62    01/20/20 1430  (!) 103 18 100/74    01/20/20 1415  96 18 144/56      No intake or output data in the 24 hours ending 01/21/20 1410     PHYSICAL EXAM:  General: WD, WN. Alert, cooperative, no acute distress    EENT:  EOMI. Anicteric sclerae. MMM  Resp:  CTA bilaterally, no wheezing or rales. No accessory muscle use  CV:  Regular  rhythm,  No edema  GI:  Soft, Non distended, Non tender.  +Bowel sounds  Neurologic:  Alert and oriented X 3, normal speech,   Psych:  Good insight. Not anxious nor agitated  Skin:  No rashes.   No jaundice    Reviewed most current lab test results and cultures  YES  Reviewed most current radiology test results YES  Review and summation of old records today    NO  Reviewed patient's current orders and MAR    YES  PMH/SH reviewed - no change compared to H&P  ________________________________________________________________________  Care Plan discussed with:    Comments   Patient x    Family      RN x    Care Manager     Consultant                        Multidiciplinary team rounds were held today with , nursing, pharmacist and clinical coordinator. Patient's plan of care was discussed; medications were reviewed and discharge planning was addressed. ________________________________________________________________________  Total NON critical care TIME: 32  Minutes    Total CRITICAL CARE TIME Spent:   Minutes non procedure based      Comments   >50% of visit spent in counseling and coordination of care     ________________________________________________________________________  Sarthak Hazel MD     Procedures: see electronic medical records for all procedures/Xrays and details which were not copied into this note but were reviewed prior to creation of Plan. LABS:  I reviewed today's most current labs and imaging studies.   Pertinent labs include:  Recent Labs     01/21/20 0311   WBC 8.5   HGB 8.4*   HCT 26.8*        Recent Labs     01/21/20  0311 01/20/20  0352 01/19/20  0112   * 136 136   K 4.4 4.3 4.3   CL 99 100 98   CO2 32 32 35*   * 172* 240*   BUN 15 20 10   CREA 3.22* 3.67* 2.33*   CA 8.8 9.0 9.2   PHOS 3.2 3.5 3.2   ALB 2.7* 2.7* 2.8*       Signed: Sarthak Hazle MD

## 2020-01-21 NOTE — PROGRESS NOTES
Chart review with Dr. Schuyler Barksdale. Waiting approval of HD OP Dialysis. Dr. Laura Jacinto is anticipating [facility] dialysis. CM to confirm with Madelin at [facility]. Left message for return call to discuss discharge plan. Patient off unit for LENNY. CM will continue to follow for discharge planning.     Rosa Hamm RN CM  Ext [phone]

## 2020-01-21 NOTE — PROGRESS NOTES
TRANSFER - OUT REPORT:    Verbal report given to Beni Koo (name) on [facility]  being transferred to room 2162(unit) for ordered procedure       Report consisted of patients Situation, Background, Assessment and   Recommendations(SBAR). Information from the following report(s) SBAR, Kardex, Procedure Summary and Recent Results was reviewed with the receiving nurse. Lines:   Peripheral IV 01/20/20 Right Antecubital (Active)   Site Assessment Clean, dry, & intact 1/21/2020  2:39 PM   Phlebitis Assessment 0 1/21/2020  2:39 PM   Infiltration Assessment 0 1/21/2020  2:39 PM   Dressing Status Clean, dry, & intact 1/21/2020  2:39 PM   Dressing Type Transparent 1/21/2020  2:39 PM   Hub Color/Line Status Pink;Capped 1/21/2020  2:39 PM        Opportunity for questions and clarification was provided.

## 2020-01-21 NOTE — PROGRESS NOTES
Patient arrived to Non-Invasive Cardiology Lab for Inpatient LENNY Procedure. Staff introduced to patient. Patient identifiers verified with Name and Date of Birth. Procedure verified with patient. Consent forms reviewed and signed by patient or authorized representative and verified. Allergies verified. Patient informed of procedure and plan of care. Questions answered with review. Patient on cardiac monitor, non-invasive blood pressure, SPO2 monitor. On room air. Patient is A&Ox3. Patient reports no complaints. Patient on stretcher, in low position, with side rails up. Patient instructed to call for assistance as needed.

## 2020-01-21 NOTE — PROGRESS NOTES
Anesthesia reports 120mg Propofol, 80mg Lidocaine and 100mL NS given during procedure. Received report from anesthesia staff on vital signs and status of patient.

## 2020-01-21 NOTE — PROGRESS NOTES
1/21/2020 8:29 AM    Admit Date: 1/15/2020    Admit Diagnosis: Acute encephalopathy [G93.40]    Subjective:     Tu Dubois   denies chest pain, chest pressure/discomfort, dyspnea, palpitations, irregular heart beats, near-syncope, syncope, fatigue, orthopnea, paroxysmal nocturnal dyspnea, exertional chest pressure/discomfort, claudication.     Visit Vitals  /72 (BP 1 Location: Left arm, BP Patient Position: At rest)   Pulse 82   Temp 98.5 °F (36.9 °C)   Resp 16   Ht 5' 5\" (1.651 m)   Wt 128 lb 8 oz (58.3 kg)   LMP 09/15/2013   SpO2 100%   Breastfeeding No   BMI 21.38 kg/m²     Current Facility-Administered Medications   Medication Dose Route Frequency    pantoprazole (PROTONIX) tablet 40 mg  40 mg Oral ACB&D    doxercalciferoL (HECTOROL) 4 mcg/2 mL injection 4 mcg  4 mcg IntraVENous DIALYSIS TUE, THU & SAT    ALPRAZolam (XANAX) tablet 0.25 mg  0.25 mg Oral TID PRN    hydroxyzine HCL (ATARAX) tablet 25 mg  25 mg Oral TID    cyclobenzaprine (FLEXERIL) tablet 5 mg  5 mg Oral TID PRN    prednisoLONE acetate (PRED FORTE) 1 % ophthalmic suspension 1 Drop  1 Drop Both Eyes BID    HYDROcodone-acetaminophen (NORCO) 5-325 mg per tablet 1 Tab  1 Tab Oral Q6H PRN    bisacodyL (DULCOLAX) tablet 5 mg  5 mg Oral DAILY    polyethylene glycol (MIRALAX) packet 17 g  17 g Oral DAILY    guaiFENesin-codeine (ROBITUSSIN AC) 100-10 mg/5 mL solution 5 mL  5 mL Oral Q6H PRN    cloNIDine HCL (CATAPRES) tablet 0.1 mg  0.1 mg Oral Q12H PRN    acetaminophen (TYLENOL) tablet 650 mg  650 mg Oral Q6H PRN    epoetin kourtney-epbx (RETACRIT) 12,000 Units combo injection  12,000 Units SubCUTAneous Q TUE, THU & SAT    sodium chloride (NS) flush 5-40 mL  5-40 mL IntraVENous Q8H    sodium chloride (NS) flush 5-40 mL  5-40 mL IntraVENous PRN    sodium chloride (NS) flush 5-40 mL  5-40 mL IntraVENous Q8H    sodium chloride (NS) flush 5-40 mL  5-40 mL IntraVENous PRN    naloxone (NARCAN) injection 0.4 mg  0.4 mg IntraVENous PRN    ondansetron (ZOFRAN) injection 4 mg  4 mg IntraVENous Q4H PRN    atorvastatin (LIPITOR) tablet 20 mg  20 mg Oral QHS    metoprolol tartrate (LOPRESSOR) tablet 25 mg  25 mg Oral BID    glucose chewable tablet 16 g  4 Tab Oral PRN    glucagon (GLUCAGEN) injection 1 mg  1 mg IntraMUSCular PRN    dextrose 10% infusion 0-250 mL  0-250 mL IntraVENous PRN    insulin lispro (HUMALOG) injection   SubCUTAneous AC&HS    hydrALAZINE (APRESOLINE) 20 mg/mL injection 10 mg  10 mg IntraVENous Q6H PRN         Objective:      Visit Vitals  /72 (BP 1 Location: Left arm, BP Patient Position: At rest)   Pulse 82   Temp 98.5 °F (36.9 °C)   Resp 16   Ht 5' 5\" (1.651 m)   Wt 128 lb 8 oz (58.3 kg)   SpO2 100%   Breastfeeding No   BMI 21.38 kg/m²       Physical Exam:  Abdomen: soft, non-tender.  Bowel sounds normal.   Extremities: no cyanosis or edema  Heart: regular rate and rhythm, S1, S2 normal, no murmur, click, rub or gallop  Lungs: clear to auscultation bilaterally  Neurologic: Grossly normal    Data Review:   Labs:    Recent Results (from the past 24 hour(s))   GLUCOSE, POC    Collection Time: 01/20/20  9:09 AM   Result Value Ref Range    Glucose (POC) 169 (H) 65 - 100 mg/dL    Performed by [address] [place] [address], POC    Collection Time: 01/20/20 11:36 AM   Result Value Ref Range    Glucose (POC) 220 (H) 65 - 100 mg/dL    Performed by Theron, POC    Collection Time: 01/20/20  4:47 PM   Result Value Ref Range    Glucose (POC) 199 (H) 65 - 100 mg/dL    Performed by Theron, POC    Collection Time: 01/20/20  8:53 PM   Result Value Ref Range    Glucose (POC) 114 (H) 65 - 100 mg/dL    Performed by [facility] AYAN BILLY(RN)    RENAL FUNCTION PANEL    Collection Time: 01/21/20  3:11 AM   Result Value Ref Range    Sodium 133 (L) 136 - 145 mmol/L    Potassium 4.4 3.5 - 5.1 mmol/L    Chloride 99 97 - 108 mmol/L    CO2 32 21 - 32 mmol/L    Anion gap 2 (L) 5 - 15 mmol/L Glucose 203 (H) 65 - 100 mg/dL    BUN 15 6 - 20 MG/DL    Creatinine 3.22 (H) 0.55 - 1.02 MG/DL    BUN/Creatinine ratio 5 (L) 12 - 20      GFR est AA 18 (L) >60 ml/min/1.73m2    GFR est non-AA 15 (L) >60 ml/min/1.73m2    Calcium 8.8 8.5 - 10.1 MG/DL    Phosphorus 3.2 2.6 - 4.7 MG/DL    Albumin 2.7 (L) 3.5 - 5.0 g/dL   CBC W/O DIFF    Collection Time: 01/21/20  3:11 AM   Result Value Ref Range    WBC 8.5 3.6 - 11.0 K/uL    RBC 2.82 (L) 3.80 - 5.20 M/uL    HGB 8.4 (L) 11.5 - 16.0 g/dL    HCT 26.8 (L) 35.0 - 47.0 %    MCV 95.0 80.0 - 99.0 FL    MCH 29.8 26.0 - 34.0 PG    MCHC 31.3 30.0 - 36.5 g/dL    RDW 13.8 11.5 - 14.5 %    PLATELET 649 036 - 637 K/uL    MPV 9.0 8.9 - 12.9 FL    NRBC 0.2 (H) 0  WBC    ABSOLUTE NRBC 0.02 (H) 0.00 - 0.01 K/uL   GLUCOSE, POC    Collection Time: 01/21/20  8:13 AM   Result Value Ref Range    Glucose (POC) 173 (H) 65 - 100 mg/dL    Performed by Abby Tan ([employer])        Telemetry: normal sinus rhythm      Assessment:     Active Problems:    Nausea and vomiting (3/16/2012)      Chest pain (5/10/2018)      Acute encephalopathy (1/15/2020)      Ventricular tachycardia ([facility] [address] (1/16/2020)        Plan:     1. NSVT, CAD: cath finding noted. For LENNY to assess MV today. Final plans afterwards. 2. ESRD: on HD.    3. Hemaetemsis: GI to see today.

## 2020-01-21 NOTE — PROGRESS NOTES
Discharge instructions discussed and reviewed. Questions addressed and answered. Pt verbalized understanding of FU care. Pt ambulated to exit with steady gait.        Problem: Falls - Risk of  Goal: *Absence of Falls  Description  Document Samanta Lizama Fall Risk and appropriate interventions in the flowsheet. Outcome: Progressing Towards Goal  Note: Fall Risk Interventions:  Mobility Interventions: Assess mobility with egress test, Patient to call before getting OOB    Mentation Interventions: Adequate sleep, hydration, pain control    Medication Interventions: Teach patient to arise slowly, Patient to call before getting OOB    Elimination Interventions: Toilet paper/wipes in reach              Problem: Pressure Injury - Risk of  Goal: *Prevention of pressure injury  Description  Document Julius Scale and appropriate interventions in the flowsheet. Outcome: Progressing Towards Goal  Note: Pressure Injury Interventions:  Sensory Interventions: Assess changes in LOC    Moisture Interventions: Absorbent underpads    Activity Interventions: Increase time out of bed    Mobility Interventions: HOB 30 degrees or less, Turn and reposition approx. every two hours(pillow and wedges)    Nutrition Interventions: Document food/fluid/supplement intake    Friction and Shear Interventions: Minimize layers                Problem: Risk for Spread of Infection  Goal: Prevent transmission of infectious organism to others  Description  Prevent the transmission of infectious organisms to other patients, staff members, and visitors.   Outcome: Progressing Towards Goal

## 2020-01-22 VITALS
HEIGHT: 65 IN | WEIGHT: 128.2 LBS | DIASTOLIC BLOOD PRESSURE: 61 MMHG | RESPIRATION RATE: 16 BRPM | TEMPERATURE: 97.9 F | OXYGEN SATURATION: 98 % | SYSTOLIC BLOOD PRESSURE: 116 MMHG | HEART RATE: 100 BPM | BODY MASS INDEX: 21.36 KG/M2

## 2020-01-22 LAB
ACT BLD: 191 SECS (ref 79–138)
ACT BLD: 235 SECS (ref 79–138)
ALBUMIN SERPL-MCNC: 2.7 G/DL (ref 3.5–5)
ANION GAP SERPL CALC-SCNC: 6 MMOL/L (ref 5–15)
BUN SERPL-MCNC: 21 MG/DL (ref 6–20)
BUN/CREAT SERPL: 5 (ref 12–20)
CALCIUM SERPL-MCNC: 8.9 MG/DL (ref 8.5–10.1)
CHLORIDE SERPL-SCNC: 103 MMOL/L (ref 97–108)
CO2 SERPL-SCNC: 29 MMOL/L (ref 21–32)
CREAT SERPL-MCNC: 4.22 MG/DL (ref 0.55–1.02)
ERYTHROCYTE [DISTWIDTH] IN BLOOD BY AUTOMATED COUNT: 14.5 % (ref 11.5–14.5)
GLUCOSE BLD STRIP.AUTO-MCNC: 110 MG/DL (ref 65–100)
GLUCOSE BLD STRIP.AUTO-MCNC: 113 MG/DL (ref 65–100)
GLUCOSE BLD STRIP.AUTO-MCNC: 121 MG/DL (ref 65–100)
GLUCOSE SERPL-MCNC: 54 MG/DL (ref 65–100)
HCT VFR BLD AUTO: 26.3 % (ref 35–47)
HGB BLD-MCNC: 8.5 G/DL (ref 11.5–16)
MCH RBC QN AUTO: 30.5 PG (ref 26–34)
MCHC RBC AUTO-ENTMCNC: 32.3 G/DL (ref 30–36.5)
MCV RBC AUTO: 94.3 FL (ref 80–99)
NRBC # BLD: 0.04 K/UL (ref 0–0.01)
NRBC BLD-RTO: 0.5 PER 100 WBC
PHOSPHATE SERPL-MCNC: 3.3 MG/DL (ref 2.6–4.7)
PLATELET # BLD AUTO: 226 K/UL (ref 150–400)
PMV BLD AUTO: 9.5 FL (ref 8.9–12.9)
POTASSIUM SERPL-SCNC: 4 MMOL/L (ref 3.5–5.1)
RBC # BLD AUTO: 2.79 M/UL (ref 3.8–5.2)
SERVICE CMNT-IMP: ABNORMAL
SODIUM SERPL-SCNC: 138 MMOL/L (ref 136–145)
WBC # BLD AUTO: 7.6 K/UL (ref 3.6–11)

## 2020-01-22 PROCEDURE — 74011000250 HC RX REV CODE- 250: Performed by: INTERNAL MEDICINE

## 2020-01-22 PROCEDURE — 93571 IV DOP VEL&/PRESS C FLO 1ST: CPT | Performed by: INTERNAL MEDICINE

## 2020-01-22 PROCEDURE — 85027 COMPLETE CBC AUTOMATED: CPT

## 2020-01-22 PROCEDURE — 77030028837 HC SYR ANGI PWR INJ COEU -A: Performed by: INTERNAL MEDICINE

## 2020-01-22 PROCEDURE — C1769 GUIDE WIRE: HCPCS | Performed by: INTERNAL MEDICINE

## 2020-01-22 PROCEDURE — 4A023N7 MEASUREMENT OF CARDIAC SAMPLING AND PRESSURE, LEFT HEART, PERCUTANEOUS APPROACH: ICD-10-PCS | Performed by: INTERNAL MEDICINE

## 2020-01-22 PROCEDURE — 74011250637 HC RX REV CODE- 250/637: Performed by: NURSE PRACTITIONER

## 2020-01-22 PROCEDURE — 82962 GLUCOSE BLOOD TEST: CPT

## 2020-01-22 PROCEDURE — 4A033BC MEASUREMENT OF ARTERIAL PRESSURE, CORONARY, PERCUTANEOUS APPROACH: ICD-10-PCS | Performed by: INTERNAL MEDICINE

## 2020-01-22 PROCEDURE — B2101ZZ FLUOROSCOPY OF SINGLE CORONARY ARTERY USING LOW OSMOLAR CONTRAST: ICD-10-PCS | Performed by: INTERNAL MEDICINE

## 2020-01-22 PROCEDURE — 74011250637 HC RX REV CODE- 250/637: Performed by: INTERNAL MEDICINE

## 2020-01-22 PROCEDURE — C1894 INTRO/SHEATH, NON-LASER: HCPCS | Performed by: INTERNAL MEDICINE

## 2020-01-22 PROCEDURE — C1887 CATHETER, GUIDING: HCPCS | Performed by: INTERNAL MEDICINE

## 2020-01-22 PROCEDURE — 65660000000 HC RM CCU STEPDOWN

## 2020-01-22 PROCEDURE — 99152 MOD SED SAME PHYS/QHP 5/>YRS: CPT | Performed by: INTERNAL MEDICINE

## 2020-01-22 PROCEDURE — 85347 COAGULATION TIME ACTIVATED: CPT

## 2020-01-22 PROCEDURE — 76937 US GUIDE VASCULAR ACCESS: CPT | Performed by: INTERNAL MEDICINE

## 2020-01-22 PROCEDURE — 36415 COLL VENOUS BLD VENIPUNCTURE: CPT

## 2020-01-22 PROCEDURE — 74011000258 HC RX REV CODE- 258: Performed by: INTERNAL MEDICINE

## 2020-01-22 PROCEDURE — 99153 MOD SED SAME PHYS/QHP EA: CPT | Performed by: INTERNAL MEDICINE

## 2020-01-22 PROCEDURE — 93454 CORONARY ARTERY ANGIO S&I: CPT | Performed by: INTERNAL MEDICINE

## 2020-01-22 PROCEDURE — 74011636320 HC RX REV CODE- 636/320: Performed by: INTERNAL MEDICINE

## 2020-01-22 PROCEDURE — 74011250636 HC RX REV CODE- 250/636: Performed by: INTERNAL MEDICINE

## 2020-01-22 PROCEDURE — 80069 RENAL FUNCTION PANEL: CPT

## 2020-01-22 RX ORDER — FENTANYL CITRATE 50 UG/ML
INJECTION, SOLUTION INTRAMUSCULAR; INTRAVENOUS AS NEEDED
Status: DISCONTINUED | OUTPATIENT
Start: 2020-01-22 | End: 2020-01-22 | Stop reason: HOSPADM

## 2020-01-22 RX ORDER — LIDOCAINE HYDROCHLORIDE 10 MG/ML
INJECTION INFILTRATION; PERINEURAL AS NEEDED
Status: DISCONTINUED | OUTPATIENT
Start: 2020-01-22 | End: 2020-01-22 | Stop reason: HOSPADM

## 2020-01-22 RX ORDER — PANTOPRAZOLE SODIUM 40 MG/1
40 TABLET, DELAYED RELEASE ORAL
Qty: 30 TAB | Refills: 2 | Status: SHIPPED | OUTPATIENT
Start: 2020-01-22 | End: 2022-01-01 | Stop reason: SDUPTHER

## 2020-01-22 RX ORDER — HEPARIN SODIUM 1000 [USP'U]/ML
INJECTION, SOLUTION INTRAVENOUS; SUBCUTANEOUS AS NEEDED
Status: DISCONTINUED | OUTPATIENT
Start: 2020-01-22 | End: 2020-01-22 | Stop reason: HOSPADM

## 2020-01-22 RX ORDER — HEPARIN SODIUM 200 [USP'U]/100ML
INJECTION, SOLUTION INTRAVENOUS
Status: COMPLETED | OUTPATIENT
Start: 2020-01-22 | End: 2020-01-22

## 2020-01-22 RX ORDER — MIDAZOLAM HYDROCHLORIDE 1 MG/ML
INJECTION, SOLUTION INTRAMUSCULAR; INTRAVENOUS AS NEEDED
Status: DISCONTINUED | OUTPATIENT
Start: 2020-01-22 | End: 2020-01-22 | Stop reason: HOSPADM

## 2020-01-22 RX ORDER — GUAIFENESIN 100 MG/5ML
81 LIQUID (ML) ORAL DAILY
Status: DISCONTINUED | OUTPATIENT
Start: 2020-01-22 | End: 2020-01-23 | Stop reason: HOSPADM

## 2020-01-22 RX ADMIN — METOPROLOL TARTRATE 25 MG: 25 TABLET ORAL at 17:38

## 2020-01-22 RX ADMIN — METOPROLOL TARTRATE 25 MG: 25 TABLET ORAL at 09:12

## 2020-01-22 RX ADMIN — Medication 10 ML: at 13:57

## 2020-01-22 RX ADMIN — HYDROCODONE BITARTRATE AND ACETAMINOPHEN 1 TABLET: 5; 325 TABLET ORAL at 13:56

## 2020-01-22 RX ADMIN — BISACODYL 5 MG: 5 TABLET, COATED ORAL at 09:12

## 2020-01-22 RX ADMIN — CYCLOBENZAPRINE 5 MG: 10 TABLET, FILM COATED ORAL at 16:38

## 2020-01-22 RX ADMIN — ASPIRIN 81 MG 81 MG: 81 TABLET ORAL at 09:12

## 2020-01-22 RX ADMIN — PANTOPRAZOLE SODIUM 40 MG: 40 TABLET, DELAYED RELEASE ORAL at 16:38

## 2020-01-22 RX ADMIN — PREDNISOLONE ACETATE 1 DROP: 10 SUSPENSION/ DROPS OPHTHALMIC at 09:12

## 2020-01-22 RX ADMIN — PANTOPRAZOLE SODIUM 40 MG: 40 TABLET, DELAYED RELEASE ORAL at 09:11

## 2020-01-22 RX ADMIN — ATORVASTATIN CALCIUM 20 MG: 20 TABLET, FILM COATED ORAL at 22:34

## 2020-01-22 NOTE — PROGRESS NOTES
GI PROGRESS NOTE  Nguyen Cervantes NP  [phone] NP in-hospital cell phone M-F until 4:30  After 5pm or on weekends, please call  for physician on call    NAME:Madelyn Seay :1965 MPO:[id number]   ATTG: Dr Asif Staton  PCP: Alvin Conway NP  Date/Time:  2020 9:16 AM     Primary GI: Dr. Jonh Bloom    Reason for following: Hematemesis    Assessment:   Hematemesis x 1 - none since 1/15  - Aspirin 81 mg daily  - Hemoglobin stable  EGD 2020  Esophagus:  Erosive esophagitis at the GE junction with one small area that appears to be healed ulcer  Stomach: Mild gastritis, biopsied  Duodenum: normal     Cardiac cath today. Acute encephalopathy - mentation normalized  CKD stage V  Diabetes type 2-uncontrolled  Hypertension     Plan:   · Await pathology  · Pt can be started on anticoagulation therapy if needed  · Diet per primary team - NPO now for possible cardiac cath earlier today - okay to eat from Gi standpoint  · Decrease PPI to daily  · Miralax daily for bowel regimen    Nothing further to add during inpatient stable. Will sign off. Plan discussed with Dr Jonh Bloom. Subjective:   Discussed with RN events overnight. Doing well just tired and hungry. Complaint Y/N Description   Abdominal Pain n    Hematemesis n    Hematochezia n    Melena n    Constipation n    Diarrhea n    Dyspepsia n    Dysphagia n    Jaundiced n    Nausea/vomiting n      Review of Systems:  Symptom Y/N Comments  Symptom Y/N Comments   Fever/Chills    Chest Pain     Cough    Headaches     Sputum    Joint Pain     SOB/GRIFFITHS    Pruritis/Rash     Tolerating Diet   npo today from cath  Other       Could NOT obtain due to:      Objective:   VITALS:   Last 24hrs VS reviewed since prior progress note.  Most recent are:  Visit Vitals  /75 (BP 1 Location: Right arm, BP Patient Position: Supine)   Pulse 89   Temp 98.4 °F (36.9 °C)   Resp 18   Ht 5' 5\" (1.651 m)   Wt 58.2 kg (128 lb 3.2 oz)   SpO2 100%   Breastfeeding No BMI 21.33 kg/m²       Intake/Output Summary (Last 24 hours) at 1/22/2020 0916  Last data filed at 1/21/2020 1545  Gross per 24 hour   Intake 100 ml   Output    Net 100 ml     PHYSICAL EXAM:  General:          WD, WN. Alert, cooperative, no acute distress    HEENT:           NC, Atraumatic. Anicteric sclerae. Lungs:             CTA Bilaterally. No Wheezing/Rhonchi/Rales. Heart:              Regular  rhythm,  No murmur (-), No Rubs, No Gallops  Abdomen:        Soft, Non distended, Non tender. +Bowel sounds, no HSM  Extremities:     No c/c/e  Neurologic:      Alert and oriented X 3. No acute neurological distress   Psych:             Good insight. Not anxious nor agitated. Lab and Radiology Data Reviewed: (see below)    Medications Reviewed: (see below)  PMH/SH reviewed - no change compared to H&P  ________________________________________________________________________  Total time spent with patient: 20 minutes ________________________________________________________________________  Care Plan discussed with:  Patient y   Family     RN katya Mccormick              Consultant: Kiran Pickard NP     Procedures: see electronic medical records for all procedures/Xrays and details which were not copied into this note but were reviewed prior to creation of Plan. LABS:  Recent Labs     01/22/20  0409 01/21/20  0311   WBC 7.6 8.5   HGB 8.5* 8.4*   HCT 26.3* 26.8*    202     Recent Labs     01/22/20  0409 01/21/20  0311 01/20/20  0352    133* 136   K 4.0 4.4 4.3    99 100   CO2 29 32 32   BUN 21* 15 20   CREA 4.22* 3.22* 3.67*   GLU 54* 203* 172*   CA 8.9 8.8 9.0   PHOS 3.3 3.2 3.5     Recent Labs     01/22/20  0409 01/21/20  0311 01/20/20  0352   ALB 2.7* 2.7* 2.7*     No results for input(s): INR, PTP, APTT, INREXT, INREXT in the last 72 hours. No results for input(s): FE, TIBC, PSAT, FERR in the last 72 hours.    Lab Results   Component Value Date/Time    Folate 13.4 05/11/2018 03:49 AM No results for input(s): PH, PCO2, PO2 in the last 72 hours. No results for input(s): CPK, CKMB in the last 72 hours.     No lab exists for component: TROPONINI  Lab Results   Component Value Date/Time    Color YELLOW/STRAW 01/15/2020 07:11 AM    Appearance CLEAR 01/15/2020 07:11 AM    Specific gravity 1.015 01/15/2020 07:11 AM    Specific gravity 1.012 05/11/2018 09:49 AM    pH (UA) 7.0 01/15/2020 07:11 AM    Protein 100 (A) 01/15/2020 07:11 AM    Glucose >1,000 (A) 01/15/2020 07:11 AM    Ketone NEGATIVE  01/15/2020 07:11 AM    Bilirubin NEGATIVE  01/15/2020 07:11 AM    Urobilinogen 0.2 01/15/2020 07:11 AM    Nitrites NEGATIVE  01/15/2020 07:11 AM    Leukocyte Esterase NEGATIVE  01/15/2020 07:11 AM    Epithelial cells FEW 01/15/2020 07:11 AM    Bacteria NEGATIVE  01/15/2020 07:11 AM    WBC 0-4 01/15/2020 07:11 AM    RBC 5-10 01/15/2020 07:11 AM       MEDICATIONS:  Current Facility-Administered Medications   Medication Dose Route Frequency    aspirin chewable tablet 81 mg  81 mg Oral DAILY    sodium chloride (NS) flush 5-40 mL  5-40 mL IntraVENous Q8H    sodium chloride (NS) flush 5-40 mL  5-40 mL IntraVENous PRN    pantoprazole (PROTONIX) tablet 40 mg  40 mg Oral ACB&D    doxercalciferoL (HECTOROL) 4 mcg/2 mL injection 4 mcg  4 mcg IntraVENous DIALYSIS TUE, THU & SAT    ALPRAZolam (XANAX) tablet 0.25 mg  0.25 mg Oral TID PRN    hydroxyzine HCL (ATARAX) tablet 25 mg  25 mg Oral TID    cyclobenzaprine (FLEXERIL) tablet 5 mg  5 mg Oral TID PRN    prednisoLONE acetate (PRED FORTE) 1 % ophthalmic suspension 1 Drop  1 Drop Both Eyes BID    HYDROcodone-acetaminophen (NORCO) 5-325 mg per tablet 1 Tab  1 Tab Oral Q6H PRN    bisacodyL (DULCOLAX) tablet 5 mg  5 mg Oral DAILY    polyethylene glycol (MIRALAX) packet 17 g  17 g Oral DAILY    guaiFENesin-codeine (ROBITUSSIN AC) 100-10 mg/5 mL solution 5 mL  5 mL Oral Q6H PRN    cloNIDine HCL (CATAPRES) tablet 0.1 mg  0.1 mg Oral Q12H PRN    acetaminophen (TYLENOL) tablet 650 mg  650 mg Oral Q6H PRN    epoetin kourtney-epbx (RETACRIT) 12,000 Units combo injection  12,000 Units SubCUTAneous Q TUE, THU & SAT    sodium chloride (NS) flush 5-40 mL  5-40 mL IntraVENous Q8H    sodium chloride (NS) flush 5-40 mL  5-40 mL IntraVENous PRN    sodium chloride (NS) flush 5-40 mL  5-40 mL IntraVENous Q8H    sodium chloride (NS) flush 5-40 mL  5-40 mL IntraVENous PRN    naloxone (NARCAN) injection 0.4 mg  0.4 mg IntraVENous PRN    ondansetron (ZOFRAN) injection 4 mg  4 mg IntraVENous Q4H PRN    atorvastatin (LIPITOR) tablet 20 mg  20 mg Oral QHS    metoprolol tartrate (LOPRESSOR) tablet 25 mg  25 mg Oral BID    glucose chewable tablet 16 g  4 Tab Oral PRN    glucagon (GLUCAGEN) injection 1 mg  1 mg IntraMUSCular PRN    dextrose 10% infusion 0-250 mL  0-250 mL IntraVENous PRN    insulin lispro (HUMALOG) injection   SubCUTAneous AC&HS    hydrALAZINE (APRESOLINE) 20 mg/mL injection 10 mg  10 mg IntraVENous Q6H PRN

## 2020-01-22 NOTE — PROGRESS NOTES
07:15 - Bedside report rec'd from [facility]. Patient on cath schedule today but no orders for cath as of now. Also, dialysis unit called night nurse to inform that patient is on their schedule for today. Patient NPO for now. 09:00 - Spoke with Keerthi hSerwood NP. Ok to give patient aspirin now prior to cath today. 09:40 - Cath time moved up from 12:30. Patient consented, to [facility]. 11:14 - Report rec'd from [address] [place] [address]. Sheath in R groin.  per cath lab.    11:26 - Rec'd patient from [facility]. R groin with 6Fr sheath CDI to pressure bag.      11:45 - Unable to tolerate BP cuff on R arm, yelling out and crying. Moved cuff to leg as she has fistula in her L arm. Arterial BPs on monitor from groin sheath as well. States something is sticking in her arm at IV site and crying. After checking it, her PIV is intact and working. 12:54 - [id number] Ashlee Manuel for patient to have regular HD tomorrow if she goes home later today per Dr Manjula Maier. Patient has no further complaints regarding her PIV. .    15:03 - .    15:50 - Patient noted to have put HOB up about 25 degrees. Reinforced need for HOB flat while sheath is in.      16:15 - Patient observed sitting on side of bed. She states she needs to get up and she's hungry. Placed back in bed supine. 16:30 - . Cath lab RN will pull sheath. 16:46 - Sheath being pulled. 18:00 - Patient observed sitting up on side of bed. R groin site CDI, soft. She is yelling at staff and is adamant that she is leaving AMA. Explained risks of getting up too soon and what could happen. She swears that she was told by \"someone\" earlier today that she could leave at 7pm.  She states she has a ride but would not tell me how she is getting home. She took her BP cuff off.      18:18 - BARBIE Haque tried to talk with patient and explain situation again, but she does not want to stay.   She is somewhat agreeable to stay until 8pm.    18:28 - Paged cardiology to make them aware of issue. 18:44 -Dr Leola Torres talking to patient. She was made aware that the hospital will not pay for her ride if she leaves AMA. She stated that she would find her own way. 19:00 - Patient agreeable to stay until 9pm.      19:15 - SBAR report given to Kelli Oneill RN.

## 2020-01-22 NOTE — PROGRESS NOTES
Transition of Care Plan:       -Plan is home this evening.    -Roundtrip Lyft ride will need to be arranged. RN is not sure what time Pt will be ready for DC after cath procedure. RN will call RN supervisor to set this up as it will probably be after CM is off for the day. -New HD set up at [facility] T/Th/S start at 6:30 AM.  Ziggy Rainey [id number] setting up Medicaid Transport to pick her up at 5:30 AM to transport her to HD center for 6:15 AM start time. CM let Pt know. - Second IM letter delivered. Medicare pt has received, reviewed, and signed 2nd IM letter informing them of their right to appeal the discharge. Signed copy has been placed on pt bedside chart. -CM Specialist made PCP appt. -Offer resources on follow up section that were suggested on the Management Plan. Information placed on AVS. ([facility], [address] [place] [address] at [facility])      2:23 PM   LEX talked to Dr. Allison Knight and he indicated to only offer T/Th/S times and locations to Pt. He let me know that Pt is medically stable for DC and will be released today. LEX talked to RN and she indicated that Pt has to lay flat until after cleared from procedure follow up. She indicated that it may not be until after 5 PM today. RN supervisor is able to set up [address] transportation for Pt to get home. LEX talked to Pt and checked to see if she had any friends or family members that would be able to transport her home today and she stated NO. Pt indicated that she had a key to get into the home. CM verified that she last saw BARBIE Ordonez like two weeks ago. She could not tell me the names of any other providers. She stated that she has a eye doctor, anemia doctor, [facility]. She does not have endocrinologist MD.  Pt stated that BARBIE Ordonez manages her DM. She did not want to follow up with endocrinologist at this time.       CM offered PT the choice of two different HD centers and Pt selected [facility] at 6:30 AM.  She needs Medicaid Transportation to be arranged to get to HD. CM called Carla Daniels and let her know Pt preference. Pt will start 1/23/20. [facility] will arrange transportation for Pt through her Medicaid starting tomorrow. They will pick her up at 5:30 AM 1/23/20 and set her up for ongoing transportation to and from HD. CM specialist made PCP appt and placed on AVS.     Second IM letter was received. 12:14 PM   Madelin with [facility] called me and indicated that they have the following options for HD for this Pt. [facility]: T/Th/S at 6:30 AM: Dr. Bahman Florez has priviledges there but is not there all the time. [address] [facility] [place] [facility]: M/W/F at 11:30 AM and T/TH/S at 10:45 AM    Pt just got back from Cardiac Cath. CM will meet with Pt this afternoon and offer options and see what would be her preference. CM will need to call Carla Daniels at [phone] and [address] with [employer]: [phone] to let them know plan. 11 AM   CM noted consult:  CM working with [facility] to set up. Carla Daniels with [facility] is checking with two different HD centers to see what is available to offer to the Pt at the [facility] and [facility] [place] [facility] locations. Nephrologist is Renetta Medrano and she is the medical director at [facility] but also goes to the [facility] location as well. Carla Daniels is going to call me this afternoon and let me know options. Reason for Admission:   Pt was admitted on 1/15/2020 d/t Acute Encephalopathy. Tachycardia. Back and ABD pain. CKD Stage V. DMT2. Essential HTN. Management Plan in place. CM reviewed. RRAT Score:     36             Resources/supports as identified by patient/family:   [facility]. Top Challenges facing patient (as identified by patient/family and CM): Finances/Medication cost? Pt has prescription drug coverage.   Pharmacy    [facility] on [address]                Transportation? Pt does not drive. Pt utilizes [facility] for Medical appt. Support system or lack thereof?  is her main support. DTR lives in [place]. Living arrangements? Lives with her , DTR, and grandchildren in two story home with 4 YASH. Bedroom is on the first floor. Self-care/ADLs/Cognition? Alert and oriented. ADLs:   IADLs:   DME: [employer]          Current Advanced Directive/Advance Care Plan:  Full Code. No AMD on file. CM offered to have pastoral care come and do AMD with her and she declined. Plan for utilizing home health:    Pt is currently not open to [place] services. MD has not ordered [place] at this point. Care Management Interventions  PCP Verified by CM: Yes  Palliative Care Criteria Met (RRAT>21 & CHF Dx)?: No  Mode of Transport at Discharge:  Other (see comment)  Transition of Care Consult (CM Consult): Discharge Planning  MyChart Signup: No  Discharge Durable Medical Equipment: No  Physical Therapy Consult: No  Occupational Therapy Consult: No  Speech Therapy Consult: No  Current Support Network: Lives with Spouse, Own Home  Confirm Follow Up Transport: Cab  Discharge Location  Discharge Placement: Home with family assistance     Jessica Ramos. 52.

## 2020-01-22 NOTE — PROGRESS NOTES
1/22/2020 11:11 AM    Admit Date: 1/15/2020    Admit Diagnosis: Acute encephalopathy [G93.40]    Subjective:     Rosanne Rodas   denies chest pain, chest pressure/discomfort, dyspnea, palpitations, irregular heart beats, near-syncope, syncope, fatigue, orthopnea, paroxysmal nocturnal dyspnea, exertional chest pressure/discomfort.     Visit Vitals  /75 (BP 1 Location: Right arm, BP Patient Position: Supine)   Pulse 89   Temp 98.4 °F (36.9 °C)   Resp 18   Ht 5' 5\" (1.651 m)   Wt 128 lb 3.2 oz (58.2 kg)   LMP 09/15/2013   SpO2 100%   Breastfeeding No   BMI 21.33 kg/m²     Current Facility-Administered Medications   Medication Dose Route Frequency    aspirin chewable tablet 81 mg  81 mg Oral DAILY    fentaNYL citrate (PF) injection    PRN    midazolam (VERSED) injection    PRN    heparinized saline 2 units/mL infusion    CONTINUOUS    heparinized saline 2 units/mL infusion    CONTINUOUS    heparinized saline 2 units/mL infusion    CONTINUOUS    lidocaine (XYLOCAINE) 10 mg/mL (1 %) injection    PRN    sodium chloride 0.9 % bolus infusion    CONTINUOUS    heparin (porcine) 1,000 unit/mL injection    PRN    iopamidoL (ISOVUE-370) 76 % injection    PRN    nitroglycerin 1 mg in D5W 10 mL (nitroglycerin 100 mcg/ml) compounded injection    PRN    adenosine (ADENOSCAN) 90 mg in 0.9% sodium chloride 90 mL infusion    CONTINUOUS    sodium chloride (NS) flush 5-40 mL  5-40 mL IntraVENous Q8H    sodium chloride (NS) flush 5-40 mL  5-40 mL IntraVENous PRN    pantoprazole (PROTONIX) tablet 40 mg  40 mg Oral ACB&D    doxercalciferoL (HECTOROL) 4 mcg/2 mL injection 4 mcg  4 mcg IntraVENous DIALYSIS TUE, THU & SAT    ALPRAZolam (XANAX) tablet 0.25 mg  0.25 mg Oral TID PRN    hydroxyzine HCL (ATARAX) tablet 25 mg  25 mg Oral TID    cyclobenzaprine (FLEXERIL) tablet 5 mg  5 mg Oral TID PRN    prednisoLONE acetate (PRED FORTE) 1 % ophthalmic suspension 1 Drop  1 Drop Both Eyes BID    HYDROcodone-acetaminophen (NORCO) 5-325 mg per tablet 1 Tab  1 Tab Oral Q6H PRN    bisacodyL (DULCOLAX) tablet 5 mg  5 mg Oral DAILY    polyethylene glycol (MIRALAX) packet 17 g  17 g Oral DAILY    guaiFENesin-codeine (ROBITUSSIN AC) 100-10 mg/5 mL solution 5 mL  5 mL Oral Q6H PRN    cloNIDine HCL (CATAPRES) tablet 0.1 mg  0.1 mg Oral Q12H PRN    acetaminophen (TYLENOL) tablet 650 mg  650 mg Oral Q6H PRN    epoetin kourtney-epbx (RETACRIT) 12,000 Units combo injection  12,000 Units SubCUTAneous Q TUE, THU & SAT    sodium chloride (NS) flush 5-40 mL  5-40 mL IntraVENous Q8H    sodium chloride (NS) flush 5-40 mL  5-40 mL IntraVENous PRN    sodium chloride (NS) flush 5-40 mL  5-40 mL IntraVENous Q8H    sodium chloride (NS) flush 5-40 mL  5-40 mL IntraVENous PRN    naloxone (NARCAN) injection 0.4 mg  0.4 mg IntraVENous PRN    ondansetron (ZOFRAN) injection 4 mg  4 mg IntraVENous Q4H PRN    atorvastatin (LIPITOR) tablet 20 mg  20 mg Oral QHS    metoprolol tartrate (LOPRESSOR) tablet 25 mg  25 mg Oral BID    glucose chewable tablet 16 g  4 Tab Oral PRN    glucagon (GLUCAGEN) injection 1 mg  1 mg IntraMUSCular PRN    dextrose 10% infusion 0-250 mL  0-250 mL IntraVENous PRN    insulin lispro (HUMALOG) injection   SubCUTAneous AC&HS    hydrALAZINE (APRESOLINE) 20 mg/mL injection 10 mg  10 mg IntraVENous Q6H PRN         Objective:      Visit Vitals  /75 (BP 1 Location: Right arm, BP Patient Position: Supine)   Pulse 89   Temp 98.4 °F (36.9 °C)   Resp 18   Ht 5' 5\" (1.651 m)   Wt 128 lb 3.2 oz (58.2 kg)   SpO2 100%   Breastfeeding No   BMI 21.33 kg/m²       Physical Exam:  Abdomen: soft, non-tender.  Bowel sounds normal.  Extremities: no cyanosis or edema  Heart: regular rate and rhythm, S1, S2 normal, no murmur, click, rub or gallop  Lungs: clear to auscultation bilaterally  Neurologic: Grossly normal    Data Review:   Labs:    Recent Results (from the past 24 hour(s))   GLUCOSE, POC    Collection Time: 01/21/20  3:09 PM   Result Value Ref Range    Glucose (POC) 98 65 - 100 mg/dL    Performed by Cleo Contreras RN    GLUCOSE, POC    Collection Time: 01/21/20  9:44 PM   Result Value Ref Range    Glucose (POC) 387 (H) 65 - 100 mg/dL    Performed by [facility] LAWRENCE[place]G WENCESLAO(RN)    RENAL FUNCTION PANEL    Collection Time: 01/22/20  4:09 AM   Result Value Ref Range    Sodium 138 136 - 145 mmol/L    Potassium 4.0 3.5 - 5.1 mmol/L    Chloride 103 97 - 108 mmol/L    CO2 29 21 - 32 mmol/L    Anion gap 6 5 - 15 mmol/L    Glucose 54 (L) 65 - 100 mg/dL    BUN 21 (H) 6 - 20 MG/DL    Creatinine 4.22 (H) 0.55 - 1.02 MG/DL    BUN/Creatinine ratio 5 (L) 12 - 20      GFR est AA 13 (L) >60 ml/min/1.73m2    GFR est non-AA 11 (L) >60 ml/min/1.73m2    Calcium 8.9 8.5 - 10.1 MG/DL    Phosphorus 3.3 2.6 - 4.7 MG/DL    Albumin 2.7 (L) 3.5 - 5.0 g/dL   CBC W/O DIFF    Collection Time: 01/22/20  4:09 AM   Result Value Ref Range    WBC 7.6 3.6 - 11.0 K/uL    RBC 2.79 (L) 3.80 - 5.20 M/uL    HGB 8.5 (L) 11.5 - 16.0 g/dL    HCT 26.3 (L) 35.0 - 47.0 %    MCV 94.3 80.0 - 99.0 FL    MCH 30.5 26.0 - 34.0 PG    MCHC 32.3 30.0 - 36.5 g/dL    RDW 14.5 11.5 - 14.5 %    PLATELET 910 855 - 281 K/uL    MPV 9.5 8.9 - 12.9 FL    NRBC 0.5 (H) 0  WBC    ABSOLUTE NRBC 0.04 (H) 0.00 - 0.01 K/uL   GLUCOSE, POC    Collection Time: 01/22/20  8:14 AM   Result Value Ref Range    Glucose (POC) 110 (H) 65 - 100 mg/dL    Performed by Barbaraann Dolphin  (PCT)        Telemetry: normal sinus rhythm      Assessment:     Active Problems:    Nausea and vomiting (3/16/2012)      Chest pain (5/10/2018)      Acute encephalopathy (1/15/2020)      Ventricular tachycardia ([address] (1/16/2020)        Plan:     1. NSVT, CAD: on cath non significant FFR of ostial RCA. Distal Cx is diffusely disease and no need for FFR. On LENNY no sever post leaflet calcification is noted. No significant MS or MR. No further work up is needed. Ok to dc from cardiac standpoint. Will f/u as out pt.    2. ESRD: on HD.   3. Hemaetemsis: GI evaluation noted.

## 2020-01-22 NOTE — DISCHARGE SUMMARY
Hospitalist Discharge Summary     Patient ID:  Darrius Bennett  [id number]  47 y.o.  1965  1/15/2020    PCP on record: Kurt Hernandez NP    Admit date: 1/15/2020  Discharge date and time: 1/22/2020    DISCHARGE DIAGNOSIS:    Acute Metabolic Encephalopathy  Chest pain/Non sustaine VT/ Moderate CAD  Upper GI Bleed  Insulin dependent diabetes  ESRD  HTN  Chronic ANxiety    CONSULTATIONS:  IP CONSULT TO GASTROENTEROLOGY  IP CONSULT TO NEPHROLOGY  IP CONSULT TO GASTROENTEROLOGY  IP CONSULT TO CARDIOLOGY  IP CONSULT TO VASCULAR SURGERY    Excerpted HPI from H&P of Ab Love MD:  Ruthann Garcia is a 47 y.o.  female with history of CKD5, diabetes mellitus, chronic pain, who presented to the emergency department after calling EMS for back pain and vomiting. She is known to the ED staff as she has presented with some frequency for similar complaints in the past. In the emergency department, she was found to be hypertensive any tachycardic. Workup for her presenting complaints included labs, including CBC, CMP, lipase, which were unrevealing, as well as CT abdomen, which showed constipation, bladder distention, and uterine fibroids, but no acute pathology otherwise. Of concern to the ED staff, however, was that the patient did not appear to be at her mental status baseline. Patient seemed to be described to me as nearly obtunded; on my assessment, she is awake and crying, oriented, but difficult to understand as she is tearful and crying out in pain; when not stimulated, however, she appears to fall asleep quickly. She complains of pain in her back and reports that the pain is from her \"kidneys;\" it was unrelieved by Parra catheter placement for urinary retention. She reports intermittent numbness in her feet, which is not new. She also complains of pain beneath her breasts that radiates to her back.  It is difficult to get a focused history from her as she is nearly constantly crying when awake. We were asked to admit for work up and evaluation of the above problems.        ______________________________________________________________________  DISCHARGE SUMMARY/HOSPITAL COURSE:  for full details see H&P, daily progress notes, labs, consult notes. Acute encephalopathy POA  Resolved  Suspect Uremia with CKD5, suspect ESRD now- newly started on HD TTS now  Her fistula seems to have matured  EtOH negative, hypercapnea ruled out, ammonia within normal limits, CT head negative, electrolytes relatively normal, UDS negative and UA with no UTI  No fever, headache, or meningismus to suggest meningitis  TSH WNL  IP Nephrology consult appreciated     Chest Pain  NSVT 22 beats  Continue BB and statin  S/P LHC noticed to have significant LAD and moderate LCX disease  NSVT, CAD: on cath non significant FFR of ostial RCA. Distal Cx is diffusely disease and no need for FFR. On LENNY no sever post leaflet calcification is noted. No significant MS or MR. No further work up is needed. Ok to dc from cardiac standpoint. Will f/u as out pt.    .      Acute blood loss anemia vs Anemia due to CKD5  ? Upper GI bleed yesterday- no more episodes, Hb stable  EGD showed healed Gastric ulcer  C/w PPI on dISCHARGE     Anxiety state  Back pain  STABLE     Abdominal Pain  Seems related to severe constipation  RESOLVED        Hypoglycemia in the setting of DM2 POA-   Resolved  Resumed home dose of INsulin     Essential hypertension  Resumed BB and clonidine         _______________________________________________________________________  Patient seen and examined by me on discharge day. Pertinent Findings:  Gen:    Not in distress  Chest: Clear lungs  CVS:   Regular rhythm.   No edema  Abd:  Soft, not distended, not tender  Neuro:  Alert, oriented x4  _______________________________________________________________________  DISCHARGE MEDICATIONS:   Current Discharge Medication List      START taking these medications Details   pantoprazole (PROTONIX) 40 mg tablet Take 1 Tab by mouth Before breakfast and dinner. Qty: 30 Tab, Refills: 2         CONTINUE these medications which have NOT CHANGED    Details   ondansetron (ZOFRAN ODT) 4 mg disintegrating tablet Take 1 Tab by mouth every eight (8) hours as needed for Nausea. Qty: 10 Tab, Refills: 0      metoprolol tartrate (LOPRESSOR) 25 mg tablet Take 1 Tab by mouth two (2) times a day. Qty: 60 Tab, Refills: 0      calcitRIOL (ROCALTROL) 0.25 mcg capsule Take 0.25 mcg by mouth daily. acetaminophen (TYLENOL) 500 mg tablet acetaminophen 500 mg      insulin degludec (TRESIBA U-100 INSULIN SC) 25 Units by SubCUTAneous route daily. insulin aspart U-100 (NOVOLOG) 100 unit/mL injection 5 Units by SubCUTAneous route Before breakfast, lunch, and dinner. aspirin 81 mg chewable tablet Take 1 Tab by mouth daily. Qty: 30 Tab, Refills: 1      sodium bicarbonate 650 mg tablet Take 650 mg by mouth two (2) times a day. atorvastatin (LIPITOR) 20 mg tablet Take 20 mg by mouth nightly. oxyCODONE IR (ROXICODONE) 5 mg immediate release tablet Take 5 mg by mouth every four (4) hours as needed for Pain. cloNIDine HCl (CATAPRES) 0.1 mg tablet Take 1 Tab by mouth two (2) times a day. Qty: 60 Tab, Refills: 1               Patient Follow Up Instructions: Activity: Activity as tolerated  Diet: Diabetic Diet  Wound Care: None needed    Follow-up with PCP , GI and Cardiology in 2-4 weeks . Follow-up tests/labs none  Follow-up Information     Follow up With Specialties Details Why Contact Jacob Chun NP Nurse Practitioner Schedule an appointment as soon as possible for a visit in 1 week for PCP post hospital follow up appt  [address] [facility]  [phone]      [facility]  Please call them if you need assistance at home prior to coming to ER.  [facility] Your Home [email or web address]  [facility]   This center provides outpatient counseling and psych services on a walk in basis if you need assistance. [address] [phone]    [facility]   Please follow up with them after your hospitalization as needed.  [place], [address]  [address] [facility]  [phone]        ________________________________________________________________    Risk of deterioration: Low    Condition at Discharge:  Stable  __________________________________________________________________    Disposition  Home with family, no needs    ____________________________________________________________________    Code Status: Full Code  ___________________________________________________________________      Total time in minutes spent coordinating this discharge (includes going over instructions, follow-up, prescriptions, and preparing report for sign off to her PCP) :  40 minutes    Signed:  Nestor Zambrano MD

## 2020-01-22 NOTE — PROGRESS NOTES
PCP CLAUDETTE appt scheduled with BARBIE Ordonez on 1/27/2020 at 10:15am. Appt added to AVS. ERA Huffman CM Specialist

## 2020-01-22 NOTE — PROGRESS NOTES
1900: Received report from day shift nurse Phoebe. Reviewed SBAR, Kardex, I/O, MAR, Procedural information and Recent results. VSS, BURAK (rhythm), RA (oxygenation). A/O X 4  Pt resting in bed/sitting up in the bed. Family at bedside. Cath lab site: RG CDI, no bleeding/hematoma. Pt reporting no CP/SOB. Pt ambulated from the bed to the bathroom. Pt returned to bed.     9:50 PM Hospitalist paged for BG of 387.     2255: 8units insulin administered for BG of 387. Evening meds given. VSS, NSR, RA.    0400: Labs drawn, sent to the lab.     0700: Bedside and Verbal shift change report given to day shift nurse Phoebe (oncoming nurse) by Mechelle Cabrales RN (offgoing nurse). Report included the following information: SBAR, Kardex, Intake/Output, MAR, Procedural information, and Recent Results.

## 2020-01-22 NOTE — PROGRESS NOTES
Nephrology Progress Note     Codey Calderon     [email or web address]                  Phone - [phone]   Patient: Darrius Bennett   Date- 1/22/2020        Admit Date: 1/15/2020  YOB: 1965             CC: Follow up for new onset ESRD        Subjective: Interval History:   S/p hd yesterday  S/p cath today  No c/o sob,   No c/o chest pain,   No c/o nausea or vomiting  No c/o  fever. ROS:- as above   Assessment & Plan:     New onset  ESRD   No dialysis today  She will go to [facility] or [address]. Next hd tomorrow as out pt. CKD 5 DUE TO DM nephropathy-  F/b DR. BECKER     CAD- s/p cardiac cath    Hyponatremia   Follow bmp     Vomiting, hemetemesis, abdo. Pain  GI following     Anemia of ckd  CONTINUE EPOGEN     Sec. hyperpara  Continue Hectorol       H/o hypertension  Continue metoprolol  Okay to add ACEi or ARB       Encephalopathy     Dm 2                     Physical exam:   GEN:  NAD  NECK:  Supple, no thyromegaly  RESP: CTA  b/l, no  wheezing,   CVS: RRR,S1,S2   ABDO:  soft , non tender, No mass  NEURO: non focal, normal speech  EXT: no Edema +nt     Left arm avf +      Care Plan discussed with: patient   Objective:   Visit Vitals  /51   Pulse 89   Temp 98.3 °F (36.8 °C)   Resp 15   Ht 5' 5\" (1.651 m)   Wt 58.2 kg (128 lb 3.2 oz)   SpO2 98%   Breastfeeding No   BMI 21.33 kg/m²     Last 3 Recorded Weights in this Encounter    01/18/20 0321 01/21/20 0311 01/22/20 0626   Weight: 61.5 kg (135 lb 9.6 oz) 58.3 kg (128 lb 8 oz) 58.2 kg (128 lb 3.2 oz)     01/20 1901 - 01/22 0700  In: 100 [I.V.:100]  Out: -     Intake/Output Summary (Last 24 hours) at 1/22/2020 1313  Last data filed at 1/21/2020 1545  Gross per 24 hour   Intake 100 ml   Output    Net 100 ml      Chart reviewed. Pertinent Notes reviewed.        Medication list  reviewed   Current Facility-Administered Medications   Medication    aspirin chewable tablet 81 mg    sodium chloride (NS) flush 5-40 mL    sodium chloride (NS) flush 5-40 mL    pantoprazole (PROTONIX) tablet 40 mg    doxercalciferoL (HECTOROL) 4 mcg/2 mL injection 4 mcg    ALPRAZolam (XANAX) tablet 0.25 mg    hydroxyzine HCL (ATARAX) tablet 25 mg    cyclobenzaprine (FLEXERIL) tablet 5 mg    prednisoLONE acetate (PRED FORTE) 1 % ophthalmic suspension 1 Drop    HYDROcodone-acetaminophen (NORCO) 5-325 mg per tablet 1 Tab    bisacodyL (DULCOLAX) tablet 5 mg    polyethylene glycol (MIRALAX) packet 17 g    guaiFENesin-codeine (ROBITUSSIN AC) 100-10 mg/5 mL solution 5 mL    cloNIDine HCL (CATAPRES) tablet 0.1 mg    acetaminophen (TYLENOL) tablet 650 mg    epoetin kourtney-epbx (RETACRIT) 12,000 Units combo injection    sodium chloride (NS) flush 5-40 mL    sodium chloride (NS) flush 5-40 mL    sodium chloride (NS) flush 5-40 mL    sodium chloride (NS) flush 5-40 mL    naloxone (NARCAN) injection 0.4 mg    ondansetron (ZOFRAN) injection 4 mg    atorvastatin (LIPITOR) tablet 20 mg    metoprolol tartrate (LOPRESSOR) tablet 25 mg    glucose chewable tablet 16 g    glucagon (GLUCAGEN) injection 1 mg    dextrose 10% infusion 0-250 mL    insulin lispro (HUMALOG) injection    hydrALAZINE (APRESOLINE) 20 mg/mL injection 10 mg           Data Review :    Results for Severiano Rao (MRN [id number]) as of 1/17/2020 10:25   Ref. Range 6/8/2012 20:05 12/13/2012 09:22 1/16/2020 13:33   Hepatitis B surface Ag Latest Units: Index <0.10  <0.10   Hep B surface Ag Interp. Latest Ref Range: NEG   NEGATIVE  NEGATIVE   Hepatitis B surface Ab Latest Units: mIU/mL <2.80  <3.10   Hep B surface Ab Interp. Latest Ref Range: NR   NONREACTIVE  NONREACTIVE   Hepatitis B core, IgM Latest Ref Range: NR     NONREACTIVE   Hepatitis C virus Ab Latest Units: Index >11.00  >11.00   Hep C  virus Ab Interp. Latest Ref Range: NR   High Pos  REACTIVE (A)   Hep C  virus Ab comment Latest Units:   Method used is [facility].   Method used is Siemens Advia Centaur   HIV 1/2 Interpretation Unknown NONREACTIVE     VDRL SERUM W/REFLEX TITER Unknown  Rpt      CXR  Chest single view dated 1/15/2020     Comparison chest dated 1/14/2020     History is abdominal pain     A single frontal view of the chest was obtained. The cardiac silhouette is  normal in size. There is no evidence of active lung disease. If there is  clinical suspicion of an intra-abdominal abnormality, a radiographic examination  of the abdomen may render additional information.     IMPRESSION  IMPRESSION: No evidence of active lung disease. Recent Labs     01/22/20  0409 01/21/20  0311 01/20/20  0352    133* 136   K 4.0 4.4 4.3    99 100   CO2 29 32 32   BUN 21* 15 20   CREA 4.22* 3.22* 3.67*   GLU 54* 203* 172*   CA 8.9 8.8 9.0   PHOS 3.3 3.2 3.5     Recent Labs     01/22/20  0409 01/21/20  0311   WBC 7.6 8.5   HGB 8.5* 8.4*   HCT 26.3* 26.8*    202     No results for input(s): FE, TIBC, PSAT, FERR in the last 72 hours. No results for input(s): CPK, CKNDX, TROIQ in the last 72 hours. No lab exists for component: CPKMB  Lab Results   Component Value Date/Time    Color YELLOW/STRAW 01/15/2020 07:11 AM    Appearance CLEAR 01/15/2020 07:11 AM    Specific gravity 1.015 01/15/2020 07:11 AM    Specific gravity 1.012 05/11/2018 09:49 AM    pH (UA) 7.0 01/15/2020 07:11 AM    Protein 100 (A) 01/15/2020 07:11 AM    Glucose >1,000 (A) 01/15/2020 07:11 AM    Ketone NEGATIVE  01/15/2020 07:11 AM    Bilirubin NEGATIVE  01/15/2020 07:11 AM    Urobilinogen 0.2 01/15/2020 07:11 AM    Nitrites NEGATIVE  01/15/2020 07:11 AM    Leukocyte Esterase NEGATIVE  01/15/2020 07:11 AM    Epithelial cells FEW 01/15/2020 07:11 AM    Bacteria NEGATIVE  01/15/2020 07:11 AM    WBC 0-4 01/15/2020 07:11 AM    RBC 5-10 01/15/2020 07:11 AM     Lab Results   Component Value Date/Time    Culture result: (A) 12/06/2019 06:31 AM     MRSA PRESENT CALLED TO AND READ BACK BY KENNETH LOVE,AT 4055 ON 12/7/19. EMIL Culture result:  12/06/2019 06:31 AM         Screening of patient nares for MRSA is for surveillance purposes and, if positive, to facilitate isolation considerations in high risk settings. It is not intended for automatic decolonization interventions per se as regimens are not sufficiently effective to warrant routine use. Culture result: MIXED UROGENITAL SAMAN ISOLATED 08/21/2019 04:08 AM     Lab Results   Component Value Date/Time    Specimen Description: NARES 11/18/2013 10:02 PM    Specimen Description: BLOOD 11/18/2013 09:18 PM    Specimen Description: URINE 11/18/2013 09:18 PM     Lab Results   Component Value Date/Time    Sodium,urine random 92 05/28/2018 12:13 PM    Creatinine, urine 29.30 05/28/2018 12:13 PM       Results from Hospital Encounter encounter on 01/15/20   XR SPINE LUMB 2 OR 3 V    Narrative Clinical indication: Back pain. Frontal and lateral view of the lumbar sacral spine are obtained. The pedicles  are visualized. The alignment is satisfactory. No fracture. Impression impression: No acute changes. Claudell Medina, MD  [facility]   [email or web address]  [facility]  [address], [address]  [place], [address]  Phone - [phone]         Fax - [phone]

## 2020-01-22 NOTE — PROGRESS NOTES
Problem: Falls - Risk of  Goal: *Absence of Falls  Description  Document Prashant Reas Fall Risk and appropriate interventions in the flowsheet. Outcome: Progressing Towards Goal  Note: Fall Risk Interventions:  Mobility Interventions: Communicate number of staff needed for ambulation/transfer, Assess mobility with egress test    Mentation Interventions: Adequate sleep, hydration, pain control, More frequent rounding    Medication Interventions: Evaluate medications/consider consulting pharmacy, Patient to call before getting OOB, Teach patient to arise slowly    Elimination Interventions: Call light in reach              Problem: Pressure Injury - Risk of  Goal: *Prevention of pressure injury  Description  Document Julius Scale and appropriate interventions in the flowsheet. Outcome: Progressing Towards Goal  Note: Pressure Injury Interventions:  Sensory Interventions: Assess changes in LOC    Moisture Interventions: Absorbent underpads    Activity Interventions: Increase time out of bed    Mobility Interventions: HOB 30 degrees or less, Turn and reposition approx.  every two hours(pillow and wedges)    Nutrition Interventions: Document food/fluid/supplement intake    Friction and Shear Interventions: Minimize layers

## 2020-01-23 NOTE — PROGRESS NOTES
2040 -  I have reviewed discharge instructions with the patient. The patient verbalized understanding. Discharge medications reviewed with patient and appropriate educational materials and side effects teaching were provided. All belongings gathered, IV and tele discontinue. 2045 - Transported patient via wheelchair to main entrance per confirmed ride for 9 pm that  had arranged patient. 2110 - Unable to locate ride, RN contacted nursing supervisor Jose Velazquez) to check on patient's ride ([employer]) status. Nursing supervisor notified RN that Lyft request was not completed and no ride set up at this time. 2120 - Nursing supervisor completed Lyft request and confirmed  time at 10:30 pm.  RN notified patient. 2250 - No ride to  patient, RN contacted nursing supervisor and received contact number for ride ([phone])    [phone] - RN contacted [facility] and was notified that the confirmed ride for 2130 was cancel (without notify RN) and they will try to find someone to get patient. They are unable to fine anyone for patient at this time. RN notified nursing supervisor of the situation. 18 - RN check with patient for possibility of family picking up per nursing supervisor suggestion. Patient notified RN that her  had stroke in the past and unable to drive. Patient contacted her daughter who lives out of town and she will get ride to  her mom. 2337 - Received a call from patient's daughter and she notified RN that patient's ride will arrive to ED around [phone] - RN transported patient via wheelchair to ED entrance and into her ride.

## 2020-01-23 NOTE — DISCHARGE INSTRUCTIONS
HOSPITALIST DISCHARGE INSTRUCTIONS    NAME: Rupa Khan   :  1965   MRN:  [id number]     Date/Time:  2020 1:24 PM    ADMIT DATE: 1/15/2020     DISCHARGE DATE: 2020     DISCHARGE DIAGNOSIS:  Metabolic encephalopathy  Upper GI bleed  Chest Pain  Non sustained VT  Moderate CAD  ESRD  Insulin dependent Diabetes    MEDICATIONS:  · It is important that you take the medication exactly as they are prescribed. · Keep your medication in the bottles provided by the pharmacist and keep a list of the medication names, dosages, and times to be taken in your wallet. · Do not take other medications without consulting your doctor. Pain Management: per above medications    What to do at Home    Recommended diet:  Renal Diet    Recommended activity: Activity as tolerated    If you have questions regarding the hospital related prescriptions or hospital related issues [facility] [phone]. If you experience any of the following symptoms then please call your primary care physician or return to the emergency room if you cannot get hold of your doctor:  Fever, chills, nausea, vomiting, diarrhea, change in mentation, falling, bleeding, shortness of breath,     Follow Up:  Dr. Asad Chaudhari, NP  you are to call and set up an appointment to see them in 7-10 days. Information obtained by :  I understand that if any problems occur once I am at home I am to contact my physician. I understand and acknowledge receipt of the instructions indicated above.                                                                                                                                            Physician's or R.N.'s Signature                                                                  Date/Time                                                                                                                                              Patient or Representative Signature                                                          Date/Time

## 2020-01-23 NOTE — PROGRESS NOTES
Bedside shift change report given to Marcelino Fine RN (oncoming nurse) by Ginna Montejo RN (offgoing nurse). Report included the following information SBAR, Kardex, ED Summary, Procedure Summary, Intake/Output, MAR, Accordion, Recent Results, Med Rec Status, Cardiac Rhythm NSR, Alarm Parameters , Pre Procedure Checklist, Procedure Verification, Quality Measures and Dual Neuro Assessment.

## 2020-01-23 NOTE — PROGRESS NOTES
Problem: Falls - Risk of  Goal: *Absence of Falls  Description  Document Kerwin Powers Fall Risk and appropriate interventions in the flowsheet. Outcome: Progressing Towards Goal  Note: Fall Risk Interventions:  Mobility Interventions: Assess mobility with egress test, Communicate number of staff needed for ambulation/transfer, Patient to call before getting OOB, PT Consult for mobility concerns, PT Consult for assist device competence, Strengthening exercises (ROM-active/passive), Utilize walker, cane, or other assistive device    Mentation Interventions: Adequate sleep, hydration, pain control, More frequent rounding    Medication Interventions: Assess postural VS orthostatic hypotension, Evaluate medications/consider consulting pharmacy, Patient to call before getting OOB, Teach patient to arise slowly    Elimination Interventions: Call light in reach              Problem: Patient Education: Go to Patient Education Activity  Goal: Patient/Family Education  Outcome: Progressing Towards Goal     Problem: Pressure Injury - Risk of  Goal: *Prevention of pressure injury  Description  Document Julius Scale and appropriate interventions in the flowsheet. Outcome: Progressing Towards Goal  Note: Pressure Injury Interventions:  Sensory Interventions: Assess changes in LOC    Moisture Interventions: Absorbent underpads    Activity Interventions: Increase time out of bed, Pressure redistribution bed/mattress(bed type)    Mobility Interventions: HOB 30 degrees or less, Turn and reposition approx.  every two hours(pillow and wedges)    Nutrition Interventions: Document food/fluid/supplement intake    Friction and Shear Interventions: Minimize layers                Problem: Patient Education: Go to Patient Education Activity  Goal: Patient/Family Education  Outcome: Progressing Towards Goal     Problem: Risk for Spread of Infection  Goal: Prevent transmission of infectious organism to others  Description  Prevent the transmission of infectious organisms to other patients, staff members, and visitors. Outcome: Progressing Towards Goal     Problem: Patient Education:  Go to Education Activity  Goal: Patient/Family Education  Outcome: Progressing Towards Goal     Problem: Pain  Goal: *Control of Pain  Outcome: Progressing Towards Goal  Goal: *PALLIATIVE CARE:  Alleviation of Pain  Outcome: Progressing Towards Goal     Problem: Patient Education: Go to Patient Education Activity  Goal: Patient/Family Education  Outcome: Progressing Towards Goal     Problem: Diabetes Maintenance:Admission  Goal: Activity/Safety  Outcome: Progressing Towards Goal  Goal: Diagnostic Tests/Procedures  Outcome: Progressing Towards Goal  Goal: Nutrition  Outcome: Progressing Towards Goal  Goal: Medications  Outcome: Progressing Towards Goal  Goal: Treatments/Interventions/Procedures  Outcome: Progressing Towards Goal     Problem: Diabetes Maintenance:Ongoing  Goal: Activity/Safety  Outcome: Progressing Towards Goal  Goal: Nutrition  Outcome: Progressing Towards Goal  Goal: Medications  Outcome: Progressing Towards Goal  Goal: Treatments/Interventsions/Procedures  Outcome: Progressing Towards Goal  Goal: *Blood Glucose 80 to 180 md/dl  Outcome: Progressing Towards Goal     Problem: Diabetes Maintenance:Discharge Outcomes  Goal: *Describes follow-up/return visits to physicians  Outcome: Progressing Towards Goal  Goal: *Blood glucose at patient's target range or approaching  Outcome: Progressing Towards Goal  Goal: *Aware of nutrition guidelines  Outcome: Progressing Towards Goal  Goal: *Verbalizes information about medication  Description  Verbalizes name, dosage, time, side effects, and number of days to  continue medications.   Outcome: Progressing Towards Goal  Goal: *Describes goals, rules, symptoms, and treatments  Description  Describes blood glucose goals, monitoring, sick day rules,  hypo/hyperglycemia prevention, symptoms, and treatment  Outcome: Progressing Towards Goal  Goal: *Describes available outpatient diabetes resources and support systems  Outcome: Progressing Towards Goal     Problem: Arrhythmia Pathway (Adult)  Goal: *Absence of arrhythmia  Outcome: Progressing Towards Goal     Problem: Patient Education: Go to Patient Education Activity  Goal: Patient/Family Education  Outcome: Progressing Towards Goal

## 2020-01-27 LAB
ACT BLD: 153 SECS (ref 79–138)
ACT BLD: 164 SECS (ref 79–138)

## 2020-01-30 ENCOUNTER — HOSPITAL ENCOUNTER (OUTPATIENT)
Dept: INFUSION THERAPY | Age: 55
End: 2020-01-30
Payer: MEDICARE

## 2020-02-13 ENCOUNTER — APPOINTMENT (OUTPATIENT)
Dept: GENERAL RADIOLOGY | Age: 55
End: 2020-02-13
Attending: EMERGENCY MEDICINE
Payer: MEDICARE

## 2020-02-13 ENCOUNTER — HOSPITAL ENCOUNTER (EMERGENCY)
Age: 55
Discharge: HOME | End: 2020-02-13
Attending: EMERGENCY MEDICINE
Payer: MEDICARE

## 2020-02-13 VITALS
OXYGEN SATURATION: 98 % | BODY MASS INDEX: 21.66 KG/M2 | WEIGHT: 130 LBS | RESPIRATION RATE: 16 BRPM | HEIGHT: 65 IN | DIASTOLIC BLOOD PRESSURE: 92 MMHG | TEMPERATURE: 98.2 F | SYSTOLIC BLOOD PRESSURE: 188 MMHG | HEART RATE: 87 BPM

## 2020-02-13 DIAGNOSIS — M54.41 ACUTE BILATERAL LOW BACK PAIN WITH BILATERAL SCIATICA: Primary | ICD-10-CM

## 2020-02-13 DIAGNOSIS — M54.42 ACUTE BILATERAL LOW BACK PAIN WITH BILATERAL SCIATICA: Primary | ICD-10-CM

## 2020-02-13 PROCEDURE — 99284 EMERGENCY DEPT VISIT MOD MDM: CPT

## 2020-02-13 PROCEDURE — 74011250637 HC RX REV CODE- 250/637: Performed by: EMERGENCY MEDICINE

## 2020-02-13 PROCEDURE — 72100 X-RAY EXAM L-S SPINE 2/3 VWS: CPT

## 2020-02-13 RX ORDER — OXYCODONE AND ACETAMINOPHEN 5; 325 MG/1; MG/1
1 TABLET ORAL ONCE
Status: COMPLETED | OUTPATIENT
Start: 2020-02-13 | End: 2020-02-13

## 2020-02-13 RX ORDER — TIZANIDINE 4 MG/1
4 TABLET ORAL
Qty: 12 TAB | Refills: 0 | OUTPATIENT
Start: 2020-02-13 | End: 2021-01-01

## 2020-02-13 RX ADMIN — OXYCODONE HYDROCHLORIDE AND ACETAMINOPHEN 1 TABLET: 5; 325 TABLET ORAL at 19:07

## 2020-02-13 NOTE — ED TRIAGE NOTES
Pt c/o of increased ghazala leg pain from normal, no relief with tylenol. Last tylenol at 0900.  Pt had hd today
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
## 2020-02-14 NOTE — ED NOTES
I have reviewed discharge instructions with the patient. The patient verbalized understanding.  Pt ambulated to [facility], no distress noted, no needs at time

## 2020-02-27 ENCOUNTER — APPOINTMENT (OUTPATIENT)
Dept: INFUSION THERAPY | Age: 55
End: 2020-02-27

## 2020-03-20 ENCOUNTER — HOSPITAL ENCOUNTER (EMERGENCY)
Age: 55
Discharge: HOME | End: 2020-03-20
Attending: EMERGENCY MEDICINE
Payer: MEDICARE

## 2020-03-20 ENCOUNTER — APPOINTMENT (OUTPATIENT)
Dept: GENERAL RADIOLOGY | Age: 55
End: 2020-03-20
Attending: EMERGENCY MEDICINE
Payer: MEDICARE

## 2020-03-20 VITALS
BODY MASS INDEX: 23.32 KG/M2 | TEMPERATURE: 98.1 F | OXYGEN SATURATION: 95 % | HEART RATE: 100 BPM | HEIGHT: 65 IN | RESPIRATION RATE: 25 BRPM | SYSTOLIC BLOOD PRESSURE: 194 MMHG | DIASTOLIC BLOOD PRESSURE: 89 MMHG | WEIGHT: 140 LBS

## 2020-03-20 DIAGNOSIS — G89.4 CHRONIC PAIN SYNDROME: ICD-10-CM

## 2020-03-20 DIAGNOSIS — N18.9 CHRONIC RENAL FAILURE, UNSPECIFIED CKD STAGE: ICD-10-CM

## 2020-03-20 DIAGNOSIS — I10 ESSENTIAL HYPERTENSION: Primary | ICD-10-CM

## 2020-03-20 LAB
ALBUMIN SERPL-MCNC: 3.5 G/DL (ref 3.5–5)
ALBUMIN/GLOB SERPL: 0.7 {RATIO} (ref 1.1–2.2)
ALP SERPL-CCNC: 170 U/L (ref 45–117)
ALT SERPL-CCNC: 30 U/L (ref 12–78)
ANION GAP SERPL CALC-SCNC: 10 MMOL/L (ref 5–15)
APPEARANCE UR: ABNORMAL
AST SERPL-CCNC: 28 U/L (ref 15–37)
BACTERIA URNS QL MICRO: NEGATIVE /HPF
BASOPHILS # BLD: 0 K/UL (ref 0–0.1)
BASOPHILS NFR BLD: 0 % (ref 0–1)
BILIRUB SERPL-MCNC: 0.4 MG/DL (ref 0.2–1)
BILIRUB UR QL: NEGATIVE
BUN SERPL-MCNC: 27 MG/DL (ref 6–20)
BUN/CREAT SERPL: 9 (ref 12–20)
CALCIUM SERPL-MCNC: 10.2 MG/DL (ref 8.5–10.1)
CHLORIDE SERPL-SCNC: 95 MMOL/L (ref 97–108)
CO2 SERPL-SCNC: 28 MMOL/L (ref 21–32)
COLOR UR: ABNORMAL
CREAT SERPL-MCNC: 2.94 MG/DL (ref 0.55–1.02)
DIFFERENTIAL METHOD BLD: ABNORMAL
EOSINOPHIL # BLD: 0 K/UL (ref 0–0.4)
EOSINOPHIL NFR BLD: 0 % (ref 0–7)
EPITH CASTS URNS QL MICRO: ABNORMAL /LPF
ERYTHROCYTE [DISTWIDTH] IN BLOOD BY AUTOMATED COUNT: 13.1 % (ref 11.5–14.5)
GLOBULIN SER CALC-MCNC: 4.9 G/DL (ref 2–4)
GLUCOSE SERPL-MCNC: 212 MG/DL (ref 65–100)
GLUCOSE UR STRIP.AUTO-MCNC: 500 MG/DL
HCT VFR BLD AUTO: 40.6 % (ref 35–47)
HGB BLD-MCNC: 14 G/DL (ref 11.5–16)
HGB UR QL STRIP: ABNORMAL
IMM GRANULOCYTES # BLD AUTO: 0 K/UL (ref 0–0.04)
IMM GRANULOCYTES NFR BLD AUTO: 1 % (ref 0–0.5)
KETONES UR QL STRIP.AUTO: NEGATIVE MG/DL
LEUKOCYTE ESTERASE UR QL STRIP.AUTO: ABNORMAL
LYMPHOCYTES # BLD: 1 K/UL (ref 0.8–3.5)
LYMPHOCYTES NFR BLD: 13 % (ref 12–49)
MCH RBC QN AUTO: 30.2 PG (ref 26–34)
MCHC RBC AUTO-ENTMCNC: 34.5 G/DL (ref 30–36.5)
MCV RBC AUTO: 87.5 FL (ref 80–99)
MONOCYTES # BLD: 0.3 K/UL (ref 0–1)
MONOCYTES NFR BLD: 4 % (ref 5–13)
NEUTS SEG # BLD: 6.2 K/UL (ref 1.8–8)
NEUTS SEG NFR BLD: 82 % (ref 32–75)
NITRITE UR QL STRIP.AUTO: NEGATIVE
NRBC # BLD: 0 K/UL (ref 0–0.01)
NRBC BLD-RTO: 0 PER 100 WBC
PH UR STRIP: 7.5 [PH] (ref 5–8)
PLATELET # BLD AUTO: 232 K/UL (ref 150–400)
PMV BLD AUTO: 9.3 FL (ref 8.9–12.9)
POTASSIUM SERPL-SCNC: 3.5 MMOL/L (ref 3.5–5.1)
PROT SERPL-MCNC: 8.4 G/DL (ref 6.4–8.2)
PROT UR STRIP-MCNC: 300 MG/DL
RBC # BLD AUTO: 4.64 M/UL (ref 3.8–5.2)
RBC #/AREA URNS HPF: ABNORMAL /HPF (ref 0–5)
SODIUM SERPL-SCNC: 133 MMOL/L (ref 136–145)
SP GR UR REFRACTOMETRY: 1.02 (ref 1–1.03)
UA: UC IF INDICATED,UAUC: ABNORMAL
UROBILINOGEN UR QL STRIP.AUTO: 0.2 EU/DL (ref 0.2–1)
WBC # BLD AUTO: 7.5 K/UL (ref 3.6–11)
WBC URNS QL MICRO: >100 /HPF (ref 0–4)

## 2020-03-20 PROCEDURE — 36415 COLL VENOUS BLD VENIPUNCTURE: CPT

## 2020-03-20 PROCEDURE — 87086 URINE CULTURE/COLONY COUNT: CPT

## 2020-03-20 PROCEDURE — 85025 COMPLETE CBC W/AUTO DIFF WBC: CPT

## 2020-03-20 PROCEDURE — 96375 TX/PRO/DX INJ NEW DRUG ADDON: CPT

## 2020-03-20 PROCEDURE — 99285 EMERGENCY DEPT VISIT HI MDM: CPT

## 2020-03-20 PROCEDURE — 81001 URINALYSIS AUTO W/SCOPE: CPT

## 2020-03-20 PROCEDURE — 74011250637 HC RX REV CODE- 250/637: Performed by: EMERGENCY MEDICINE

## 2020-03-20 PROCEDURE — 93005 ELECTROCARDIOGRAM TRACING: CPT

## 2020-03-20 PROCEDURE — 74011000250 HC RX REV CODE- 250: Performed by: EMERGENCY MEDICINE

## 2020-03-20 PROCEDURE — 96374 THER/PROPH/DIAG INJ IV PUSH: CPT

## 2020-03-20 PROCEDURE — 74011250636 HC RX REV CODE- 250/636: Performed by: EMERGENCY MEDICINE

## 2020-03-20 PROCEDURE — 71045 X-RAY EXAM CHEST 1 VIEW: CPT

## 2020-03-20 PROCEDURE — 80053 COMPREHEN METABOLIC PANEL: CPT

## 2020-03-20 RX ORDER — ONDANSETRON 2 MG/ML
8 INJECTION INTRAMUSCULAR; INTRAVENOUS
Status: COMPLETED | OUTPATIENT
Start: 2020-03-20 | End: 2020-03-20

## 2020-03-20 RX ORDER — DIPHENHYDRAMINE HYDROCHLORIDE 50 MG/ML
50 INJECTION, SOLUTION INTRAMUSCULAR; INTRAVENOUS
Status: COMPLETED | OUTPATIENT
Start: 2020-03-20 | End: 2020-03-20

## 2020-03-20 RX ORDER — ACETAMINOPHEN 500 MG
1000 TABLET ORAL
Status: COMPLETED | OUTPATIENT
Start: 2020-03-20 | End: 2020-03-20

## 2020-03-20 RX ORDER — LABETALOL HYDROCHLORIDE 5 MG/ML
20 INJECTION, SOLUTION INTRAVENOUS
Status: COMPLETED | OUTPATIENT
Start: 2020-03-20 | End: 2020-03-20

## 2020-03-20 RX ADMIN — ACETAMINOPHEN 1000 MG: 500 TABLET ORAL at 16:27

## 2020-03-20 RX ADMIN — DIPHENHYDRAMINE HYDROCHLORIDE 50 MG: 50 INJECTION, SOLUTION INTRAMUSCULAR; INTRAVENOUS at 15:47

## 2020-03-20 RX ADMIN — LABETALOL HYDROCHLORIDE 20 MG: 5 INJECTION INTRAVENOUS at 17:13

## 2020-03-20 RX ADMIN — ONDANSETRON 8 MG: 2 INJECTION INTRAMUSCULAR; INTRAVENOUS at 15:48
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
## 2020-03-20 NOTE — DISCHARGE INSTRUCTIONS
Patient Education        High Blood Pressure: Care Instructions  Overview    It's normal for blood pressure to go up and down throughout the day. But if it stays up, you have high blood pressure. Another name for high blood pressure is hypertension. Despite what a lot of people think, high blood pressure usually doesn't cause headaches or make you feel dizzy or lightheaded. It usually has no symptoms. But it does increase your risk of stroke, heart attack, and other problems. You and your doctor will talk about your risks of these problems based on your blood pressure. Your doctor will give you a goal for your blood pressure. Your goal will be based on your health and your age. Lifestyle changes, such as eating healthy and being active, are always important to help lower blood pressure. You might also take medicine to reach your blood pressure goal.  Follow-up care is a key part of your treatment and safety. Be sure to make and go to all appointments, and call your doctor if you are having problems. It's also a good idea to know your test results and keep a list of the medicines you take. How can you care for yourself at home? Medical treatment  · If you stop taking your medicine, your blood pressure will go back up. You may take one or more types of medicine to lower your blood pressure. Be safe with medicines. Take your medicine exactly as prescribed. Call your doctor if you think you are having a problem with your medicine. · Talk to your doctor before you start taking aspirin every day. Aspirin can help certain people lower their risk of a heart attack or stroke. But taking aspirin isn't right for everyone, because it can cause serious bleeding. · See your doctor regularly. You may need to see the doctor more often at first or until your blood pressure comes down.   · If you are taking blood pressure medicine, talk to your doctor before you take decongestants or anti-inflammatory medicine, such as ibuprofen. Some of these medicines can raise blood pressure. · Learn how to check your blood pressure at home. Lifestyle changes  · Stay at a healthy weight. This is especially important if you put on weight around the waist. Losing even 10 pounds can help you lower your blood pressure. · If your doctor recommends it, get more exercise. Walking is a good choice. Bit by bit, increase the amount you walk every day. Try for at least 30 minutes on most days of the week. You also may want to swim, bike, or do other activities. · Avoid or limit alcohol. Talk to your doctor about whether you can drink any alcohol. · Try to limit how much sodium you eat to less than 2,300 milligrams (mg) a day. Your doctor may ask you to try to eat less than 1,500 mg a day. · Eat plenty of fruits (such as bananas and oranges), vegetables, legumes, whole grains, and low-fat dairy products. · Lower the amount of saturated fat in your diet. Saturated fat is found in animal products such as milk, cheese, and meat. Limiting these foods may help you lose weight and also lower your risk for heart disease. · Do not smoke. Smoking increases your risk for heart attack and stroke. If you need help quitting, talk to your doctor about stop-smoking programs and medicines. These can increase your chances of quitting for good. When should you call for help? Call  911 anytime you think you may need emergency care. This may mean having symptoms that suggest that your blood pressure is causing a serious heart or blood vessel problem. Your blood pressure may be over 180/120.   For example, call  911 if:    · You have symptoms of a heart attack. These may include:  ? Chest pain or pressure, or a strange feeling in the chest.  ? Sweating. ? Shortness of breath. ? Nausea or vomiting. ? Pain, pressure, or a strange feeling in the back, neck, jaw, or upper belly or in one or both shoulders or arms. ? Lightheadedness or sudden weakness.   ? A fast or irregular heartbeat.     · You have symptoms of a stroke. These may include:  ? Sudden numbness, tingling, weakness, or loss of movement in your face, arm, or leg, especially on only one side of your body. ? Sudden vision changes. ? Sudden trouble speaking. ? Sudden confusion or trouble understanding simple statements. ? Sudden problems with walking or balance. ? A sudden, severe headache that is different from past headaches.     · You have severe back or belly pain.    Do not wait until your blood pressure comes down on its own. Get help right away.   Call your doctor now or seek immediate care if:    · Your blood pressure is much higher than normal (such as 180/120 or higher), but you don't have symptoms.     · You think high blood pressure is causing symptoms, such as:  ? Severe headache.  ? Blurry vision.    Watch closely for changes in your health, and be sure to contact your doctor if:    · Your blood pressure measures higher than your doctor recommends at least 2 times. That means the top number is higher or the bottom number is higher, or both.     · You think you may be having side effects from your blood pressure medicine. Where can you learn more? Go to http://nery-peña.info/  Enter [id number] in the search box to learn more about \"High Blood Pressure: Care Instructions. \"  Current as of: December 15, 2019Content Version: 12.4  © 9804-6809 Healthwise, Incorporated. Care instructions adapted under license by [employer] (which disclaims liability or warranty for this information). If you have questions about a medical condition or this instruction, always ask your healthcare professional. [place] [address] any warranty or liability for your use of this information.          Patient Education        Medicines to Avoid With Kidney Disease: Care Instructions  Your Care Instructions    Kidney disease means that your kidneys are not able to get rid of waste from the blood. So they can't keep your body's fluids and chemicals in balance. Usually, the kidneys get rid of waste from the blood through the urine. And they balance the fluids in the body. When your kidneys don't work as they should, you have to be careful about some medicines. They may harm your kidneys. Your doctor may tell you not to take them. Or he or she may change the dose. Medicines for pain and swelling, such as ibuprofen (Advil or Motrin) or naproxen (Aleve), can cause harm. So can some antibiotics and antacids. And you need to be careful about some drugs that treat cancer, lower blood pressure, or get rid of water from the body. Some herbal products could cause harm too. Follow-up care is a key part of your treatment and safety. Be sure to make and go to all appointments, and call your doctor if you are having problems. It's also a good idea to know your test results and keep a list of the medicines you take. How can you care for yourself at home? · Tell your doctor all the prescription, herbal, or over-the-counter medicines you take. Do not take any new ones unless you talk to your doctor first.  · Do not take anti-inflammatory medicines. These include ibuprofen (Advil, Motrin) and naproxen (Aleve). You can use acetaminophen (Tylenol) for pain. · Do not take two or more pain medicines at the same time unless the doctor told you to. Many pain medicines have acetaminophen, which is Tylenol. Too much acetaminophen (Tylenol) can be harmful. · Tell all doctors and others who work with your health care that you have kidney disease. · Wear medical alert jewelry that lists your health problem. You can buy this at most drugstores. Where can you learn more? Go to http://nery-peña.info/  Enter S283 in the search box to learn more about \"Medicines to Avoid With Kidney Disease: Care Instructions. \"  Current as of: August 11, 2019Content Version: 12.4  © 1186-0402 Healthwise, Incorporated. Care instructions adapted under license by [employer] (which disclaims liability or warranty for this information). If you have questions about a medical condition or this instruction, always ask your healthcare professional. [facility] [address] any warranty or liability for your use of this information. Patient Education        Kidney Disease and High Blood Pressure: Care Instructions  Your Care Instructions    Long-term (chronic) kidney disease happens when the kidneys cannot remove waste and keep your body's fluids and chemicals in balance. Usually, the kidneys remove waste from the blood through the urine. When the kidneys are not working well, waste can build up so much that it poisons the body. Kidney disease can make you very tired. It also can cause swelling, or edema, in your legs or other areas of your body. High blood pressure is one of the major causes of chronic kidney disease. And kidney disease can also cause high blood pressure. No matter which came first, having high blood pressure damages the tiny blood vessels in the kidneys. If you have high blood pressure, it is important to lower it. There are many things you can do to lower your blood pressure, which may help slow or stop the damage to your kidneys. Follow-up care is a key part of your treatment and safety. Be sure to make and go to all appointments, and call your doctor if you are having problems. It's also a good idea to know your test results and keep a list of the medicines you take. How can you care for yourself at home? · Be safe with medicines. Take your medicines exactly as prescribed. Call your doctor if you have any problems with your medicine. You will probably need more than one medicine to lower your blood pressure. You will get more details on the specific medicines your doctor prescribes.   · Work with your doctor and a dietitian to plan meals that have the right amount of nutrients for you. You will probably have to limit salt, fluids, and protein. · Stay at a healthy weight. This is very important if you put on weight around the waist. Losing even 10 pounds can help you lower your blood pressure. · Manage other health problems such as diabetes and high cholesterol. You can help lower your risk for heart disease and blood vessel problems with a healthy lifestyle along with medicines. · Do not take ibuprofen (Advil, Motrin) or naproxen (Aleve), or similar medicines, unless your doctor tells you to. They may make chronic kidney disease worse. It is okay to take acetaminophen (Tylenol). · If your doctor recommends it, get more exercise. Walking is a good choice. Bit by bit, increase the amount you walk every day. Try for at least 30 minutes on most days of the week. You also may want to swim, bike, or do other activities. · Limit or avoid alcohol. Talk to your doctor about whether you can drink any alcohol. · Do not smoke or allow others to smoke around you. If you need help quitting, talk to your doctor about stop-smoking programs and medicines. These can increase your chances of quitting for good. When should you call for help? Call 911 anytime you think you may need emergency care. For example, call if:    · You passed out (lost consciousness).    Call your doctor now or seek immediate medical care if:    · You have new or worse nausea and vomiting.     · You have much less urine than normal, or you have no urine.     · You are feeling confused or cannot think clearly.     · You have new or more blood in your urine.     · You have new swelling.     · You are dizzy or lightheaded, or you feel like you may faint.    Watch closely for changes in your health, and be sure to contact your doctor if:    · You do not get better as expected. Where can you learn more?   Go to http://nery-peña.info/  Enter C396 in the search box to learn more about \"Kidney Disease and High Blood Pressure: Care Instructions. \"  Current as of: August 11, 2019Content Version: 12.4  © 0188-8306 Healthwise, Incorporated. Care instructions adapted under license by [employer] (which disclaims liability or warranty for this information). If you have questions about a medical condition or this instruction, always ask your healthcare professional. [address] any warranty or liability for your use of this information.

## 2020-03-20 NOTE — ED NOTES
Report received from [facility]. Assumed care of pt at this time. Pt dry heaving and complaining of back pain. Dr. Haydee Ramsay notified.

## 2020-03-20 NOTE — ED NOTES
Discharge summary reviewed with patient. Verbalized understanding. All questions welcomed and answered. Assisted off unit in wheelchair.

## 2020-03-21 LAB
ATRIAL RATE: 115 BPM
BACTERIA SPEC CULT: NORMAL
CALCULATED P AXIS, ECG09: 63 DEGREES
CALCULATED R AXIS, ECG10: 29 DEGREES
CALCULATED T AXIS, ECG11: 75 DEGREES
CC UR VC: NORMAL
DIAGNOSIS, 93000: NORMAL
P-R INTERVAL, ECG05: 128 MS
Q-T INTERVAL, ECG07: 346 MS
QRS DURATION, ECG06: 70 MS
QTC CALCULATION (BEZET), ECG08: 478 MS
SERVICE CMNT-IMP: NORMAL
VENTRICULAR RATE, ECG03: 115 BPM

## 2020-03-23 ENCOUNTER — PATIENT OUTREACH (OUTPATIENT)
Dept: CARDIOLOGY CLINIC | Age: 55
End: 2020-03-23

## 2020-03-23 NOTE — PROGRESS NOTES
Patient contacted regarding recent discharge and COVID-19 risk   Care Transition Nurse/ Ambulatory Care Manager contacted the patient by telephone to perform post discharge assessment. Verified name and  with patient as identifiers. Patient has following risk factors of: immunocompromised. CTN/ACM reviewed discharge instructions, medical action plan and red flags related to discharge diagnosis. Reviewed and educated them on any new and changed medications related to discharge diagnosis. Advised obtaining a 90-day supply of all daily and as-needed medications. Education provided regarding infection prevention, and signs and symptoms of COVID-19 and when to seek medical attention with patient who verbalized understanding. Discussed exposure protocols and quarantine from [address] you at higher risk for severe illness  and given an opportunity for questions and concerns. The patient agrees to contact the COVID-19 hotline [phone] or PCP office for questions related to their healthcare. CTN/ACM provided contact information for future reference. From CDC: Are you at higher risk for severe illness?  Wash your hands often.  Avoid close contact (6 feet, which is about two arm lengths) with people who are sick.  Put distance between yourself and other people if COVID-19 is spreading in your community.  Clean and disinfect frequently touched surfaces.  Avoid all cruise travel and non-essential air travel.  Call your healthcare professional if you have concerns about COVID-19 and your underlying condition or if you are sick.     For more information on steps you can take to protect yourself, see CDC's How to Protect Yourself

## 2020-03-26 ENCOUNTER — APPOINTMENT (OUTPATIENT)
Dept: INFUSION THERAPY | Age: 55
End: 2020-03-26

## 2020-04-07 ENCOUNTER — PATIENT OUTREACH (OUTPATIENT)
Dept: CARDIOLOGY CLINIC | Age: 55
End: 2020-04-07

## 2020-04-07 NOTE — PROGRESS NOTES
Patient resolved from Transition of Care episode on 4/7/20  Patient/family has been provided the following resources and education related to COVID-19:                         Signs, symptoms and red flags related to COVID-19            CDC exposure and quarantine guidelines            [facility] exposure contact - [phone]            Contact for their local Department of Health                 Patient currently reports that the following symptoms have improved:  no new/worsening symptoms     No further outreach scheduled with this CTN/ACM. Episode of Care resolved. Patient has this CTN/ACM contact information if future needs arise.

## 2020-10-08 ENCOUNTER — HOSPITAL ENCOUNTER (EMERGENCY)
Age: 55
Discharge: HOME | End: 2020-10-08
Attending: EMERGENCY MEDICINE
Payer: MEDICARE

## 2020-10-08 VITALS
HEIGHT: 65 IN | SYSTOLIC BLOOD PRESSURE: 130 MMHG | DIASTOLIC BLOOD PRESSURE: 67 MMHG | HEART RATE: 110 BPM | RESPIRATION RATE: 12 BRPM | OXYGEN SATURATION: 96 % | BODY MASS INDEX: 22.49 KG/M2 | WEIGHT: 135 LBS | TEMPERATURE: 98.7 F

## 2020-10-08 DIAGNOSIS — H60.502 ACUTE OTITIS EXTERNA OF LEFT EAR, UNSPECIFIED TYPE: Primary | ICD-10-CM

## 2020-10-08 DIAGNOSIS — M79.605 BILATERAL LEG PAIN: ICD-10-CM

## 2020-10-08 DIAGNOSIS — M79.604 BILATERAL LEG PAIN: ICD-10-CM

## 2020-10-08 PROCEDURE — 74011250637 HC RX REV CODE- 250/637: Performed by: PHYSICIAN ASSISTANT

## 2020-10-08 PROCEDURE — 99283 EMERGENCY DEPT VISIT LOW MDM: CPT

## 2020-10-08 RX ORDER — OXYCODONE HYDROCHLORIDE 5 MG/1
5 TABLET ORAL
Status: COMPLETED | OUTPATIENT
Start: 2020-10-08 | End: 2020-10-08

## 2020-10-08 RX ORDER — NEOMYCIN SULFATE, POLYMYXIN B SULFATE AND HYDROCORTISONE 10; 3.5; 1 MG/ML; MG/ML; [USP'U]/ML
3 SUSPENSION/ DROPS AURICULAR (OTIC) 4 TIMES DAILY
Qty: 10 ML | Refills: 0 | Status: SHIPPED | OUTPATIENT
Start: 2020-10-08 | End: 2020-10-15

## 2020-10-08 RX ORDER — OXYCODONE HYDROCHLORIDE 5 MG/1
5 TABLET ORAL
Qty: 9 TAB | Refills: 0 | Status: SHIPPED | OUTPATIENT
Start: 2020-10-08 | End: 2020-10-11

## 2020-10-08 RX ADMIN — OXYCODONE 5 MG: 5 TABLET ORAL at 16:19

## 2020-10-08 NOTE — ED NOTES
Patient medicated for pain-patient's daughter will drive her home. Verbal and written instructions given without any questions. Dicharged via wheelchair.
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
## 2020-10-18 ENCOUNTER — HOSPITAL ENCOUNTER (EMERGENCY)
Age: 55
Discharge: HOME | End: 2020-10-18
Attending: EMERGENCY MEDICINE
Payer: MEDICARE

## 2020-10-18 ENCOUNTER — APPOINTMENT (OUTPATIENT)
Dept: GENERAL RADIOLOGY | Age: 55
End: 2020-10-18
Attending: EMERGENCY MEDICINE
Payer: MEDICARE

## 2020-10-18 VITALS
TEMPERATURE: 98.5 F | HEART RATE: 106 BPM | WEIGHT: 134.92 LBS | OXYGEN SATURATION: 100 % | BODY MASS INDEX: 22.48 KG/M2 | RESPIRATION RATE: 14 BRPM | HEIGHT: 65 IN | SYSTOLIC BLOOD PRESSURE: 153 MMHG | DIASTOLIC BLOOD PRESSURE: 88 MMHG

## 2020-10-18 DIAGNOSIS — M54.50 ACUTE BILATERAL LOW BACK PAIN WITHOUT SCIATICA: ICD-10-CM

## 2020-10-18 DIAGNOSIS — R10.84 ABDOMINAL PAIN, GENERALIZED: ICD-10-CM

## 2020-10-18 DIAGNOSIS — R73.9 HYPERGLYCEMIA: Primary | ICD-10-CM

## 2020-10-18 LAB
ALBUMIN SERPL-MCNC: 3.6 G/DL (ref 3.5–5)
ALBUMIN/GLOB SERPL: 0.8 {RATIO} (ref 1.1–2.2)
ALP SERPL-CCNC: 179 U/L (ref 45–117)
ALT SERPL-CCNC: 36 U/L (ref 12–78)
AMPHET UR QL SCN: NEGATIVE
ANION GAP SERPL CALC-SCNC: 11 MMOL/L (ref 5–15)
APPEARANCE UR: CLEAR
AST SERPL-CCNC: 35 U/L (ref 15–37)
BACTERIA URNS QL MICRO: ABNORMAL /HPF
BARBITURATES UR QL SCN: NEGATIVE
BENZODIAZ UR QL: NEGATIVE
BILIRUB SERPL-MCNC: 0.6 MG/DL (ref 0.2–1)
BILIRUB UR QL: NEGATIVE
BUN SERPL-MCNC: 30 MG/DL (ref 6–20)
BUN/CREAT SERPL: 8 (ref 12–20)
CALCIUM SERPL-MCNC: 10.9 MG/DL (ref 8.5–10.1)
CANNABINOIDS UR QL SCN: NEGATIVE
CHLORIDE SERPL-SCNC: 93 MMOL/L (ref 97–108)
CO2 SERPL-SCNC: 25 MMOL/L (ref 21–32)
COCAINE UR QL SCN: NEGATIVE
COLOR UR: ABNORMAL
CREAT SERPL-MCNC: 3.77 MG/DL (ref 0.55–1.02)
DRUG SCRN COMMENT,DRGCM: NORMAL
EPITH CASTS URNS QL MICRO: ABNORMAL /LPF
ERYTHROCYTE [DISTWIDTH] IN BLOOD BY AUTOMATED COUNT: 13.2 % (ref 11.5–14.5)
GLOBULIN SER CALC-MCNC: 4.7 G/DL (ref 2–4)
GLUCOSE BLD STRIP.AUTO-MCNC: 300 MG/DL (ref 65–100)
GLUCOSE BLD STRIP.AUTO-MCNC: 412 MG/DL (ref 65–100)
GLUCOSE BLD STRIP.AUTO-MCNC: 461 MG/DL (ref 65–100)
GLUCOSE BLD STRIP.AUTO-MCNC: 495 MG/DL (ref 65–100)
GLUCOSE SERPL-MCNC: 499 MG/DL (ref 65–100)
GLUCOSE UR STRIP.AUTO-MCNC: 500 MG/DL
HCT VFR BLD AUTO: 37 % (ref 35–47)
HGB BLD-MCNC: 12.6 G/DL (ref 11.5–16)
HGB UR QL STRIP: ABNORMAL
KETONES UR QL STRIP.AUTO: NEGATIVE MG/DL
LEUKOCYTE ESTERASE UR QL STRIP.AUTO: NEGATIVE
LIPASE SERPL-CCNC: 114 U/L (ref 73–393)
MCH RBC QN AUTO: 31 PG (ref 26–34)
MCHC RBC AUTO-ENTMCNC: 34.1 G/DL (ref 30–36.5)
MCV RBC AUTO: 90.9 FL (ref 80–99)
METHADONE UR QL: NEGATIVE
NITRITE UR QL STRIP.AUTO: NEGATIVE
NRBC # BLD: 0 K/UL (ref 0–0.01)
NRBC BLD-RTO: 0 PER 100 WBC
OPIATES UR QL: NEGATIVE
PCP UR QL: NEGATIVE
PH UR STRIP: 7.5 [PH] (ref 5–8)
PLATELET # BLD AUTO: 229 K/UL (ref 150–400)
PMV BLD AUTO: 9.6 FL (ref 8.9–12.9)
POTASSIUM SERPL-SCNC: 4 MMOL/L (ref 3.5–5.1)
PROT SERPL-MCNC: 8.3 G/DL (ref 6.4–8.2)
PROT UR STRIP-MCNC: 100 MG/DL
RBC # BLD AUTO: 4.07 M/UL (ref 3.8–5.2)
RBC #/AREA URNS HPF: ABNORMAL /HPF (ref 0–5)
SERVICE CMNT-IMP: ABNORMAL
SODIUM SERPL-SCNC: 129 MMOL/L (ref 136–145)
SP GR UR REFRACTOMETRY: 1.01 (ref 1–1.03)
UROBILINOGEN UR QL STRIP.AUTO: 0.2 EU/DL (ref 0.2–1)
WBC # BLD AUTO: 8.3 K/UL (ref 3.6–11)
WBC URNS QL MICRO: ABNORMAL /HPF (ref 0–4)

## 2020-10-18 PROCEDURE — 36415 COLL VENOUS BLD VENIPUNCTURE: CPT

## 2020-10-18 PROCEDURE — 80053 COMPREHEN METABOLIC PANEL: CPT

## 2020-10-18 PROCEDURE — 96375 TX/PRO/DX INJ NEW DRUG ADDON: CPT

## 2020-10-18 PROCEDURE — 81001 URINALYSIS AUTO W/SCOPE: CPT

## 2020-10-18 PROCEDURE — 82962 GLUCOSE BLOOD TEST: CPT

## 2020-10-18 PROCEDURE — 96361 HYDRATE IV INFUSION ADD-ON: CPT

## 2020-10-18 PROCEDURE — 83690 ASSAY OF LIPASE: CPT

## 2020-10-18 PROCEDURE — 74018 RADEX ABDOMEN 1 VIEW: CPT

## 2020-10-18 PROCEDURE — 99285 EMERGENCY DEPT VISIT HI MDM: CPT

## 2020-10-18 PROCEDURE — 74011250637 HC RX REV CODE- 250/637: Performed by: EMERGENCY MEDICINE

## 2020-10-18 PROCEDURE — 80307 DRUG TEST PRSMV CHEM ANLYZR: CPT

## 2020-10-18 PROCEDURE — 96374 THER/PROPH/DIAG INJ IV PUSH: CPT

## 2020-10-18 PROCEDURE — 74011250636 HC RX REV CODE- 250/636: Performed by: EMERGENCY MEDICINE

## 2020-10-18 PROCEDURE — 74011636637 HC RX REV CODE- 636/637: Performed by: EMERGENCY MEDICINE

## 2020-10-18 PROCEDURE — 85027 COMPLETE CBC AUTOMATED: CPT

## 2020-10-18 RX ORDER — FENTANYL CITRATE 50 UG/ML
100 INJECTION, SOLUTION INTRAMUSCULAR; INTRAVENOUS
Status: COMPLETED | OUTPATIENT
Start: 2020-10-18 | End: 2020-10-18

## 2020-10-18 RX ORDER — SODIUM CHLORIDE 0.9 % (FLUSH) 0.9 %
5-40 SYRINGE (ML) INJECTION EVERY 8 HOURS
Status: DISCONTINUED | OUTPATIENT
Start: 2020-10-18 | End: 2020-10-18 | Stop reason: HOSPADM

## 2020-10-18 RX ORDER — METOPROLOL SUCCINATE 50 MG/1
50 TABLET, EXTENDED RELEASE ORAL
Status: COMPLETED | OUTPATIENT
Start: 2020-10-18 | End: 2020-10-18

## 2020-10-18 RX ORDER — SODIUM CHLORIDE 0.9 % (FLUSH) 0.9 %
5-40 SYRINGE (ML) INJECTION AS NEEDED
Status: DISCONTINUED | OUTPATIENT
Start: 2020-10-18 | End: 2020-10-18 | Stop reason: HOSPADM

## 2020-10-18 RX ORDER — HALOPERIDOL 5 MG/ML
5 INJECTION INTRAMUSCULAR
Status: COMPLETED | OUTPATIENT
Start: 2020-10-18 | End: 2020-10-18

## 2020-10-18 RX ADMIN — HUMAN INSULIN 10 UNITS: 100 INJECTION, SOLUTION SUBCUTANEOUS at 16:20

## 2020-10-18 RX ADMIN — HALOPERIDOL LACTATE 5 MG: 5 INJECTION, SOLUTION INTRAMUSCULAR at 11:18

## 2020-10-18 RX ADMIN — SODIUM CHLORIDE 500 ML: 900 INJECTION, SOLUTION INTRAVENOUS at 17:13

## 2020-10-18 RX ADMIN — SODIUM CHLORIDE 1000 ML: 900 INJECTION, SOLUTION INTRAVENOUS at 11:18

## 2020-10-18 RX ADMIN — FENTANYL CITRATE 100 MCG: 50 INJECTION, SOLUTION INTRAMUSCULAR; INTRAVENOUS at 12:52

## 2020-10-18 RX ADMIN — METOPROLOL SUCCINATE 50 MG: 50 TABLET, EXTENDED RELEASE ORAL at 17:13
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
## 2020-10-18 NOTE — DISCHARGE INSTRUCTIONS

## 2020-10-18 NOTE — ED NOTES
Salina Rodriguez RN reviewed discharge instructions with the patient. The patient verbalized understanding. All questions and concerns were addressed. The patient is discharged via wheelchair in the care of family members with instructions and prescriptions in hand. Pt is alert and oriented x 4. Respirations are clear and unlabored.

## 2020-10-18 NOTE — ED NOTES
Pt presents to ED for back, stomach, head and leg pain starting this morning. Pt is tearful in stretcher. Endorses nausea and vomiting x 1 and constipation. Took a enema this morning. Denies chest pain, dyspnea, fevers.

## 2020-10-18 NOTE — ED TRIAGE NOTES
Pt arrives via EMS. Pt reports she has been constipated x 3 days and did an enema at 0400 today. Pt reports having a BM and sever pain started after. Pt crying and thrusting in triage.

## 2020-10-18 NOTE — ED NOTES
Placed pt on 2 liters nasal cannula post fentanyl for comfort. Pt had 2 large BM. States pain is slightly better.

## 2021-01-01 ENCOUNTER — HOSPITAL ENCOUNTER (EMERGENCY)
Age: 56
Discharge: HOME | End: 2021-10-18
Attending: EMERGENCY MEDICINE
Payer: MEDICARE

## 2021-01-01 ENCOUNTER — HOSPITAL ENCOUNTER (EMERGENCY)
Age: 56
Discharge: HOME | End: 2021-12-24
Attending: EMERGENCY MEDICINE
Payer: MEDICARE

## 2021-01-01 ENCOUNTER — APPOINTMENT (OUTPATIENT)
Dept: CT IMAGING | Age: 56
End: 2021-01-01
Attending: EMERGENCY MEDICINE
Payer: MEDICARE

## 2021-01-01 ENCOUNTER — APPOINTMENT (OUTPATIENT)
Dept: MRI IMAGING | Age: 56
End: 2021-01-01
Attending: EMERGENCY MEDICINE
Payer: MEDICARE

## 2021-01-01 ENCOUNTER — HOSPITAL ENCOUNTER (EMERGENCY)
Age: 56
Discharge: ANOTHER HOSPITAL | End: 2021-10-24
Attending: EMERGENCY MEDICINE
Payer: MEDICARE

## 2021-01-01 VITALS
RESPIRATION RATE: 20 BRPM | HEIGHT: 65 IN | BODY MASS INDEX: 19.06 KG/M2 | WEIGHT: 114.42 LBS | OXYGEN SATURATION: 95 % | TEMPERATURE: 97.8 F | HEART RATE: 120 BPM | DIASTOLIC BLOOD PRESSURE: 90 MMHG | SYSTOLIC BLOOD PRESSURE: 145 MMHG

## 2021-01-01 VITALS
BODY MASS INDEX: 23.32 KG/M2 | HEIGHT: 65 IN | WEIGHT: 140 LBS | TEMPERATURE: 98.4 F | OXYGEN SATURATION: 100 % | HEART RATE: 93 BPM | RESPIRATION RATE: 20 BRPM | SYSTOLIC BLOOD PRESSURE: 163 MMHG | DIASTOLIC BLOOD PRESSURE: 83 MMHG

## 2021-01-01 VITALS
TEMPERATURE: 97.3 F | RESPIRATION RATE: 18 BRPM | HEIGHT: 65 IN | HEART RATE: 85 BPM | DIASTOLIC BLOOD PRESSURE: 88 MMHG | BODY MASS INDEX: 23.32 KG/M2 | OXYGEN SATURATION: 96 % | SYSTOLIC BLOOD PRESSURE: 145 MMHG | WEIGHT: 140 LBS

## 2021-01-01 DIAGNOSIS — G89.18 POSTOPERATIVE PAIN: Primary | ICD-10-CM

## 2021-01-01 DIAGNOSIS — M54.50 ACUTE EXACERBATION OF CHRONIC LOW BACK PAIN: Primary | ICD-10-CM

## 2021-01-01 DIAGNOSIS — M54.9 INTRACTABLE BACK PAIN: ICD-10-CM

## 2021-01-01 DIAGNOSIS — M46.46 DISCITIS OF LUMBAR REGION: Primary | ICD-10-CM

## 2021-01-01 DIAGNOSIS — M54.50 LUMBAR PAIN: ICD-10-CM

## 2021-01-01 DIAGNOSIS — G89.29 ACUTE EXACERBATION OF CHRONIC LOW BACK PAIN: Primary | ICD-10-CM

## 2021-01-01 LAB
ALBUMIN SERPL-MCNC: 3.3 G/DL (ref 3.5–5)
ALBUMIN/GLOB SERPL: 0.7 {RATIO} (ref 1.1–2.2)
ALP SERPL-CCNC: 164 U/L (ref 45–117)
ALT SERPL-CCNC: 23 U/L (ref 12–78)
AMPHET UR QL SCN: NEGATIVE
ANION GAP SERPL CALC-SCNC: 10 MMOL/L (ref 5–15)
APPEARANCE UR: ABNORMAL
AST SERPL-CCNC: 24 U/L (ref 15–37)
ATRIAL RATE: 115 BPM
BACTERIA SPEC CULT: NORMAL
BACTERIA URNS QL MICRO: ABNORMAL /HPF
BARBITURATES UR QL SCN: NEGATIVE
BENZODIAZ UR QL: NEGATIVE
BILIRUB SERPL-MCNC: 1 MG/DL (ref 0.2–1)
BILIRUB UR QL: NEGATIVE
BUN SERPL-MCNC: 19 MG/DL (ref 6–20)
BUN/CREAT SERPL: 5 (ref 12–20)
CALCIUM SERPL-MCNC: 11.8 MG/DL (ref 8.5–10.1)
CALCULATED P AXIS, ECG09: 67 DEGREES
CALCULATED R AXIS, ECG10: 26 DEGREES
CALCULATED T AXIS, ECG11: 75 DEGREES
CANNABINOIDS UR QL SCN: NEGATIVE
CHLORIDE SERPL-SCNC: 100 MMOL/L (ref 97–108)
CO2 SERPL-SCNC: 27 MMOL/L (ref 21–32)
COCAINE UR QL SCN: NEGATIVE
COLOR UR: ABNORMAL
COVID-19 RAPID TEST, COVR: NOT DETECTED
CREAT SERPL-MCNC: 3.66 MG/DL (ref 0.55–1.02)
CRP SERPL-MCNC: <0.29 MG/DL (ref 0–0.6)
DIAGNOSIS, 93000: NORMAL
DRUG SCRN COMMENT,DRGCM: NORMAL
EPITH CASTS URNS QL MICRO: ABNORMAL /LPF
ERYTHROCYTE [DISTWIDTH] IN BLOOD BY AUTOMATED COUNT: 12.7 % (ref 11.5–14.5)
ERYTHROCYTE [SEDIMENTATION RATE] IN BLOOD: 41 MM/HR (ref 0–30)
GLOBULIN SER CALC-MCNC: 5 G/DL (ref 2–4)
GLUCOSE SERPL-MCNC: 124 MG/DL (ref 65–100)
GLUCOSE UR STRIP.AUTO-MCNC: NEGATIVE MG/DL
HCT VFR BLD AUTO: 38.3 % (ref 35–47)
HGB BLD-MCNC: 12.9 G/DL (ref 11.5–16)
HGB UR QL STRIP: ABNORMAL
HYALINE CASTS URNS QL MICRO: ABNORMAL /LPF (ref 0–5)
KETONES UR QL STRIP.AUTO: NEGATIVE MG/DL
LACTATE BLD-SCNC: 1.56 MMOL/L (ref 0.4–2)
LEUKOCYTE ESTERASE UR QL STRIP.AUTO: ABNORMAL
LIPASE SERPL-CCNC: 54 U/L (ref 73–393)
MCH RBC QN AUTO: 31.4 PG (ref 26–34)
MCHC RBC AUTO-ENTMCNC: 33.7 G/DL (ref 30–36.5)
MCV RBC AUTO: 93.2 FL (ref 80–99)
METHADONE UR QL: NEGATIVE
NITRITE UR QL STRIP.AUTO: NEGATIVE
NRBC # BLD: 0 K/UL (ref 0–0.01)
NRBC BLD-RTO: 0 PER 100 WBC
OPIATES UR QL: NEGATIVE
P-R INTERVAL, ECG05: 156 MS
PCP UR QL: NEGATIVE
PH UR STRIP: 7.5 [PH] (ref 5–8)
PLATELET # BLD AUTO: 258 K/UL (ref 150–400)
PMV BLD AUTO: 8.5 FL (ref 8.9–12.9)
POTASSIUM SERPL-SCNC: 2.8 MMOL/L (ref 3.5–5.1)
PROT SERPL-MCNC: 8.3 G/DL (ref 6.4–8.2)
PROT UR STRIP-MCNC: >300 MG/DL
Q-T INTERVAL, ECG07: 360 MS
QRS DURATION, ECG06: 72 MS
QTC CALCULATION (BEZET), ECG08: 498 MS
RBC # BLD AUTO: 4.11 M/UL (ref 3.8–5.2)
RBC #/AREA URNS HPF: >100 /HPF (ref 0–5)
SERVICE CMNT-IMP: NORMAL
SODIUM SERPL-SCNC: 137 MMOL/L (ref 136–145)
SOURCE, COVRS: NORMAL
SP GR UR REFRACTOMETRY: 1.01 (ref 1–1.03)
UROBILINOGEN UR QL STRIP.AUTO: 0.2 EU/DL (ref 0.2–1)
VENTRICULAR RATE, ECG03: 115 BPM
WBC # BLD AUTO: 8.9 K/UL (ref 3.6–11)
WBC URNS QL MICRO: >100 /HPF (ref 0–4)

## 2021-01-01 PROCEDURE — 96372 THER/PROPH/DIAG INJ SC/IM: CPT

## 2021-01-01 PROCEDURE — 74011250636 HC RX REV CODE- 250/636: Performed by: PHYSICIAN ASSISTANT

## 2021-01-01 PROCEDURE — 74011250637 HC RX REV CODE- 250/637: Performed by: EMERGENCY MEDICINE

## 2021-01-01 PROCEDURE — 96366 THER/PROPH/DIAG IV INF ADDON: CPT

## 2021-01-01 PROCEDURE — 99284 EMERGENCY DEPT VISIT MOD MDM: CPT

## 2021-01-01 PROCEDURE — 96365 THER/PROPH/DIAG IV INF INIT: CPT

## 2021-01-01 PROCEDURE — 74011000258 HC RX REV CODE- 258: Performed by: EMERGENCY MEDICINE

## 2021-01-01 PROCEDURE — 85027 COMPLETE CBC AUTOMATED: CPT

## 2021-01-01 PROCEDURE — 74011250636 HC RX REV CODE- 250/636: Performed by: EMERGENCY MEDICINE

## 2021-01-01 PROCEDURE — 74176 CT ABD & PELVIS W/O CONTRAST: CPT

## 2021-01-01 PROCEDURE — 93005 ELECTROCARDIOGRAM TRACING: CPT

## 2021-01-01 PROCEDURE — 85652 RBC SED RATE AUTOMATED: CPT

## 2021-01-01 PROCEDURE — 83605 ASSAY OF LACTIC ACID: CPT

## 2021-01-01 PROCEDURE — 87635 SARS-COV-2 COVID-19 AMP PRB: CPT

## 2021-01-01 PROCEDURE — 80307 DRUG TEST PRSMV CHEM ANLYZR: CPT

## 2021-01-01 PROCEDURE — 80053 COMPREHEN METABOLIC PANEL: CPT

## 2021-01-01 PROCEDURE — 96375 TX/PRO/DX INJ NEW DRUG ADDON: CPT

## 2021-01-01 PROCEDURE — 96367 TX/PROPH/DG ADDL SEQ IV INF: CPT

## 2021-01-01 PROCEDURE — 99283 EMERGENCY DEPT VISIT LOW MDM: CPT

## 2021-01-01 PROCEDURE — 74011000250 HC RX REV CODE- 250: Performed by: EMERGENCY MEDICINE

## 2021-01-01 PROCEDURE — 81001 URINALYSIS AUTO W/SCOPE: CPT

## 2021-01-01 PROCEDURE — 99285 EMERGENCY DEPT VISIT HI MDM: CPT

## 2021-01-01 PROCEDURE — 86140 C-REACTIVE PROTEIN: CPT

## 2021-01-01 PROCEDURE — 36415 COLL VENOUS BLD VENIPUNCTURE: CPT

## 2021-01-01 PROCEDURE — 87040 BLOOD CULTURE FOR BACTERIA: CPT

## 2021-01-01 PROCEDURE — 83690 ASSAY OF LIPASE: CPT

## 2021-01-01 RX ORDER — METHOCARBAMOL 750 MG/1
750 TABLET, FILM COATED ORAL
Qty: 20 TABLET | Refills: 0 | Status: SHIPPED | OUTPATIENT
Start: 2021-01-01 | End: 2021-01-01 | Stop reason: SDUPTHER

## 2021-01-01 RX ORDER — HYDROMORPHONE HYDROCHLORIDE 1 MG/ML
0.5 INJECTION, SOLUTION INTRAMUSCULAR; INTRAVENOUS; SUBCUTANEOUS
Status: COMPLETED | OUTPATIENT
Start: 2021-01-01 | End: 2021-01-01

## 2021-01-01 RX ORDER — OXYCODONE HYDROCHLORIDE 5 MG/1
5 TABLET ORAL
Status: COMPLETED | OUTPATIENT
Start: 2021-01-01 | End: 2021-01-01

## 2021-01-01 RX ORDER — LIDOCAINE 4 G/100G
1 PATCH TOPICAL EVERY 12 HOURS
Qty: 10 PATCH | Refills: 0 | Status: SHIPPED | OUTPATIENT
Start: 2021-01-01 | End: 2021-01-01 | Stop reason: SDUPTHER

## 2021-01-01 RX ORDER — POTASSIUM CHLORIDE 20 MEQ/1
40 TABLET, EXTENDED RELEASE ORAL
Status: COMPLETED | OUTPATIENT
Start: 2021-01-01 | End: 2021-01-01

## 2021-01-01 RX ORDER — DIAZEPAM 10 MG/2ML
5 INJECTION INTRAMUSCULAR
Status: COMPLETED | OUTPATIENT
Start: 2021-01-01 | End: 2021-01-01

## 2021-01-01 RX ORDER — HYDROMORPHONE HYDROCHLORIDE 1 MG/ML
1 INJECTION, SOLUTION INTRAMUSCULAR; INTRAVENOUS; SUBCUTANEOUS ONCE
Status: COMPLETED | OUTPATIENT
Start: 2021-01-01 | End: 2021-01-01

## 2021-01-01 RX ORDER — LIDOCAINE 4 G/100G
1 PATCH TOPICAL
Status: DISCONTINUED | OUTPATIENT
Start: 2021-01-01 | End: 2021-01-01 | Stop reason: HOSPADM

## 2021-01-01 RX ORDER — METHOCARBAMOL 750 MG/1
750 TABLET, FILM COATED ORAL
Qty: 20 TABLET | Refills: 0 | Status: SHIPPED | OUTPATIENT
Start: 2021-01-01

## 2021-01-01 RX ORDER — PROCHLORPERAZINE MALEATE 10 MG
10 TABLET ORAL
Status: COMPLETED | OUTPATIENT
Start: 2021-01-01 | End: 2021-01-01

## 2021-01-01 RX ORDER — CLONIDINE HYDROCHLORIDE 0.1 MG/1
0.2 TABLET ORAL
Status: COMPLETED | OUTPATIENT
Start: 2021-01-01 | End: 2021-01-01

## 2021-01-01 RX ORDER — MORPHINE SULFATE 2 MG/ML
4 INJECTION, SOLUTION INTRAMUSCULAR; INTRAVENOUS
Status: COMPLETED | OUTPATIENT
Start: 2021-01-01 | End: 2021-01-01

## 2021-01-01 RX ORDER — MORPHINE SULFATE 4 MG/ML
10 INJECTION INTRAVENOUS
Status: COMPLETED | OUTPATIENT
Start: 2021-01-01 | End: 2021-01-01

## 2021-01-01 RX ORDER — LIDOCAINE 4 G/100G
1 PATCH TOPICAL EVERY 12 HOURS
Qty: 10 PATCH | Refills: 0 | Status: SHIPPED | OUTPATIENT
Start: 2021-01-01

## 2021-01-01 RX ADMIN — DIAZEPAM 5 MG: 5 INJECTION, SOLUTION INTRAMUSCULAR; INTRAVENOUS at 09:15

## 2021-01-01 RX ADMIN — CEFTRIAXONE 1 G: 1 INJECTION, POWDER, FOR SOLUTION INTRAMUSCULAR; INTRAVENOUS at 10:55

## 2021-01-01 RX ADMIN — MORPHINE SULFATE 10 MG: 4 INJECTION INTRAVENOUS at 22:21

## 2021-01-01 RX ADMIN — OXYCODONE 5 MG: 5 TABLET ORAL at 09:15

## 2021-01-01 RX ADMIN — CLONIDINE HYDROCHLORIDE 0.2 MG: 0.1 TABLET ORAL at 09:15

## 2021-01-01 RX ADMIN — SODIUM CHLORIDE 5 MG/HR: 9 INJECTION, SOLUTION INTRAVENOUS at 12:07

## 2021-01-01 RX ADMIN — SODIUM CHLORIDE 1000 ML: 9 INJECTION, SOLUTION INTRAVENOUS at 10:11

## 2021-01-01 RX ADMIN — PROCHLORPERAZINE MALEATE 10 MG: 10 TABLET ORAL at 22:06

## 2021-01-01 RX ADMIN — HYDROMORPHONE HYDROCHLORIDE 1 MG: 1 INJECTION, SOLUTION INTRAMUSCULAR; INTRAVENOUS; SUBCUTANEOUS at 11:45

## 2021-01-01 RX ADMIN — MORPHINE SULFATE 4 MG: 2 INJECTION, SOLUTION INTRAMUSCULAR; INTRAVENOUS at 10:54

## 2021-01-01 RX ADMIN — POTASSIUM CHLORIDE 40 MEQ: 20 TABLET, EXTENDED RELEASE ORAL at 10:10

## 2021-01-01 RX ADMIN — HYDROMORPHONE HYDROCHLORIDE 0.5 MG: 1 INJECTION, SOLUTION INTRAMUSCULAR; INTRAVENOUS; SUBCUTANEOUS at 21:47

## 2021-01-19 NOTE — DISCHARGE INSTRUCTIONS
We hope that we have addressed all of your medical concerns. The examination and treatment you received in the Emergency Department were for an emergent problem and were not intended as complete care. It is important that you follow up with your healthcare provider(s) for ongoing care. If your symptoms worsen or do not improve as expected, and you are unable to reach your usual health care provider(s), you should return to the Emergency Department. Today's healthcare is undergoing tremendous change, and patient satisfaction surveys are one of the many tools to assess the quality of medical care. You may receive a survey from the [employer] regarding your experience in the Emergency Department. I hope that your experience has been completely positive, particularly the medical care that I provided. As such, please participate in the survey; anything less than excellent does not meet my expectations or intentions. [facility] and [address] [place] [address] participate in nationally recognized quality of care measures. If your blood pressure is greater than 120/80, as reported below, we urge that you seek medical care to address the potential of high blood pressure, commonly known as hypertension. Hypertension can be hereditary or can be caused by certain medical conditions, pain, stress, or \"white coat syndrome. \"       Please make an appointment with your health care provider(s) for follow up of your Emergency Department visit. VITALS:   Patient Vitals for the past 8 hrs:   Temp Pulse Resp BP SpO2   10/03/17 1513 98 °F (36.7 °C) 99 16 153/80 98 %          Thank you for allowing us to provide you with medical care today. We realize that you have many choices for your emergency care needs. Please choose us in the future for any continued health care needs. Ramo Garza, [address]   Office: [phone]            Recent Results (from the past 24 hour(s))   METABOLIC PANEL, BASIC    Collection Time: 10/02/17 11:28 PM   Result Value Ref Range    Sodium 133 (L) 136 - 145 mmol/L    Potassium 3.7 3.5 - 5.1 mmol/L    Chloride 95 (L) 97 - 108 mmol/L    CO2 22 21 - 32 mmol/L    Anion gap 16 (H) 5 - 15 mmol/L    Glucose 390 (H) 65 - 100 mg/dL    BUN 74 (H) 6 - 20 MG/DL    Creatinine 5.74 (H) 0.55 - 1.02 MG/DL    BUN/Creatinine ratio 13 12 - 20      GFR est AA 9 (L) >60 ml/min/1.73m2    GFR est non-AA 8 (L) >60 ml/min/1.73m2    Calcium 7.8 (L) 8.5 - 10.1 MG/DL   CBC WITH AUTOMATED DIFF    Collection Time: 10/02/17 11:28 PM   Result Value Ref Range    WBC 6.0 3.6 - 11.0 K/uL    RBC 3.65 (L) 3.80 - 5.20 M/uL    HGB 10.8 (L) 11.5 - 16.0 g/dL    HCT 31.7 (L) 35.0 - 47.0 %    MCV 86.8 80.0 - 99.0 FL    MCH 29.6 26.0 - 34.0 PG    MCHC 34.1 30.0 - 36.5 g/dL    RDW 12.8 11.5 - 14.5 %    PLATELET 283 843 - 913 K/uL    NEUTROPHILS 89 (H) 32 - 75 %    LYMPHOCYTES 9 (L) 12 - 49 %    MONOCYTES 2 (L) 5 - 13 %    EOSINOPHILS 0 0 - 7 %    BASOPHILS 0 0 - 1 %    ABS. NEUTROPHILS 5.4 1.8 - 8.0 K/UL    ABS. LYMPHOCYTES 0.5 (L) 0.8 - 3.5 K/UL    ABS. MONOCYTES 0.1 0.0 - 1.0 K/UL    ABS. EOSINOPHILS 0.0 0.0 - 0.4 K/UL    ABS.  BASOPHILS 0.0 0.0 - 0.1 K/UL    DF SMEAR SCANNED      PLATELET COMMENTS LARGE PLATELETS      RBC COMMENTS NORMOCYTIC, NORMOCHROMIC     PROTHROMBIN TIME + INR    Collection Time: 10/02/17 11:28 PM   Result Value Ref Range    INR 1.1 0.9 - 1.1      Prothrombin time 10.8 9.0 - 11.1 sec   CBC WITH AUTOMATED DIFF    Collection Time: 10/03/17  4:11 PM   Result Value Ref Range    WBC 10.9 3.6 - 11.0 K/uL    RBC 3.66 (L) 3.80 - 5.20 M/uL    HGB 10.8 (L) 11.5 - 16.0 g/dL    HCT 31.3 (L) 35.0 - 47.0 %    MCV 85.5 80.0 - 99.0 FL    MCH 29.5 26.0 - 34.0 PG    MCHC 34.5 30.0 - 36.5 g/dL    RDW 12.5 11.5 - 14.5 %    PLATELET 363 759 - 535 K/uL    NEUTROPHILS 81 (H) 32 - 75 %    LYMPHOCYTES 12 12 - 49 %    MONOCYTES 7 5 - 13 %    EOSINOPHILS 0 0 - 7 %    BASOPHILS 0 0 - 1 %    ABS. NEUTROPHILS 8.8 (H) 1.8 - 8.0 K/UL    ABS. LYMPHOCYTES 1.4 0.8 - 3.5 K/UL    ABS. MONOCYTES 0.7 0.0 - 1.0 K/UL    ABS. EOSINOPHILS 0.0 0.0 - 0.4 K/UL    ABS. BASOPHILS 0.0 0.0 - 0.1 K/UL   POC CHEM8    Collection Time: 10/03/17  4:16 PM   Result Value Ref Range    Calcium, ionized (POC) 0.94 (L) 1.12 - 1.32 MMOL/L    Sodium (POC) 131 (L) 136 - 145 MMOL/L    Potassium (POC) 3.7 3.5 - 5.1 MMOL/L    Chloride (POC) 96 (L) 98 - 107 MMOL/L    CO2 (POC) 21 21 - 32 MMOL/L    Anion gap (POC) 19 (H) 5 - 15 mmol/L    Glucose (POC) 302 (H) 65 - 100 MG/DL    BUN (POC) 84 (H) 9 - 20 MG/DL    Creatinine (POC) 5.7 (H) 0.6 - 1.3 MG/DL    GFRAA, POC 10 (L) >60 ml/min/1.73m2    GFRNA, POC 8 (L) >60 ml/min/1.73m2    Hemoglobin (POC) 11.9 11.5 - 16.0 GM/DL    Hematocrit (POC) 35 35.0 - 47.0 %    Comment Comment Not Indicated. URINALYSIS W/ REFLEX CULTURE    Collection Time: 10/03/17  5:22 PM   Result Value Ref Range    Color YELLOW/STRAW      Appearance CLOUDY (A) CLEAR      Specific gravity 1.017 1.003 - 1.030      pH (UA) 7.5 5.0 - 8.0      Protein 300 (A) NEG mg/dL    Glucose 500 (A) NEG mg/dL    Ketone NEGATIVE  NEG mg/dL    Bilirubin NEGATIVE  NEG      Blood MODERATE (A) NEG      Urobilinogen 0.2 0.2 - 1.0 EU/dL    Nitrites NEGATIVE  NEG      Leukocyte Esterase SMALL (A) NEG      WBC >100 (H) 0 - 4 /hpf    RBC 5-10 0 - 5 /hpf    Epithelial cells FEW FEW /lpf    Bacteria NEGATIVE  NEG /hpf    UA:UC IF INDICATED URINE CULTURE ORDERED (A) CNI      Hyaline cast 0-2 0 - 5 /lpf       Ct Abd Pelv Wo Cont    Result Date: 10/2/2017  INDICATION: Right flank pain. COMPARISON: None. TECHNIQUE: Thin axial images were obtained through the abdomen and pelvis. Coronal and sagittal reconstructions were generated. Oral contrast was not administered.  CT dose reduction was achieved through use of a standardized protocol tailored for this examination and automatic exposure control for dose modulation. The absence of intravenous contrast material reduces the sensitivity for evaluation of the solid parenchymal organs of the abdomen. FINDINGS: LUNG BASES: Clear. INCIDENTALLY IMAGED HEART AND MEDIASTINUM: Unremarkable. LIVER: Unremarkable GALLBLADDER: Unremarkable. SPLEEN: Normal in size. PANCREAS: Unremarkable. ADRENALS: Unremarkable. KIDNEYS: Renal arterial calcification. No hydronephrosis. STOMACH: Unremarkable. SMALL BOWEL: Normal in caliber. COLON: Fecal stasis. APPENDIX: Normal caliber visualized appendix. PERITONEUM: No ascites or pneumoperitoneum. RETROPERITONEUM: Normal caliber abdominal aorta with atherosclerotic mural calcification. No lymphadenopathy. REPRODUCTIVE ORGANS: Prominent uterus with calcified fibroids and dystrophic calcification. Probable small right ovarian cyst. URINARY BLADDER: Unremarkable. BONES: Diffuse sclerosis suggesting renal osteodystrophy. IMPRESSION: Renal arterial calcification; no hydronephrosis. Prominent uterus with small calcified fibroids. sq lovenox

## 2021-03-01 ENCOUNTER — HOSPITAL ENCOUNTER (EMERGENCY)
Age: 56
Discharge: HOME | End: 2021-03-02
Attending: EMERGENCY MEDICINE
Payer: MEDICARE

## 2021-03-01 DIAGNOSIS — N18.6 ESRD ON HEMODIALYSIS (HCC): ICD-10-CM

## 2021-03-01 DIAGNOSIS — M54.41 CHRONIC MIDLINE LOW BACK PAIN WITH BILATERAL SCIATICA: Primary | ICD-10-CM

## 2021-03-01 DIAGNOSIS — M54.42 CHRONIC MIDLINE LOW BACK PAIN WITH BILATERAL SCIATICA: Primary | ICD-10-CM

## 2021-03-01 DIAGNOSIS — Z99.2 ESRD ON HEMODIALYSIS (HCC): ICD-10-CM

## 2021-03-01 DIAGNOSIS — G89.29 CHRONIC MIDLINE LOW BACK PAIN WITH BILATERAL SCIATICA: Primary | ICD-10-CM

## 2021-03-01 PROCEDURE — 99285 EMERGENCY DEPT VISIT HI MDM: CPT

## 2021-03-01 PROCEDURE — 80047 BASIC METABLC PNL IONIZED CA: CPT

## 2021-03-01 RX ORDER — HYDROCODONE BITARTRATE AND ACETAMINOPHEN 5; 325 MG/1; MG/1
2 TABLET ORAL
Status: COMPLETED | OUTPATIENT
Start: 2021-03-01 | End: 2021-03-02

## 2021-03-02 VITALS
OXYGEN SATURATION: 97 % | DIASTOLIC BLOOD PRESSURE: 84 MMHG | HEIGHT: 65 IN | TEMPERATURE: 98.2 F | HEART RATE: 90 BPM | RESPIRATION RATE: 18 BRPM | WEIGHT: 130.07 LBS | SYSTOLIC BLOOD PRESSURE: 173 MMHG | BODY MASS INDEX: 21.67 KG/M2

## 2021-03-02 LAB
ANION GAP BLD CALC-SCNC: 12 MMOL/L (ref 10–20)
BUN BLD-MCNC: 41 MG/DL (ref 9–20)
CA-I BLD-MCNC: 1.35 MMOL/L (ref 1.12–1.32)
CHLORIDE BLD-SCNC: 94 MMOL/L (ref 98–107)
CO2 BLD-SCNC: 31 MMOL/L (ref 21–32)
CREAT BLD-MCNC: 5.6 MG/DL (ref 0.6–1.3)
GLUCOSE BLD-MCNC: 322 MG/DL (ref 65–100)
HCT VFR BLD CALC: 37 % (ref 35–47)
POTASSIUM BLD-SCNC: 3.8 MMOL/L (ref 3.5–5.1)
SERVICE CMNT-IMP: ABNORMAL
SODIUM BLD-SCNC: 131 MMOL/L (ref 136–145)

## 2021-03-02 PROCEDURE — 74011250637 HC RX REV CODE- 250/637: Performed by: EMERGENCY MEDICINE

## 2021-03-02 RX ORDER — HYDROCODONE BITARTRATE AND ACETAMINOPHEN 5; 325 MG/1; MG/1
2 TABLET ORAL
Qty: 15 TAB | Refills: 0 | Status: SHIPPED | OUTPATIENT
Start: 2021-03-02 | End: 2021-03-05

## 2021-03-02 RX ADMIN — HYDROCODONE BITARTRATE AND ACETAMINOPHEN 2 TABLET: 5; 325 TABLET ORAL at 00:00
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
## 2021-03-02 NOTE — ED NOTES
Pt presents to ed due to lower back pain and bilat leg pain that began a week ago. Pt reports she is having difficulty walking. Pt has hx of dialysis (fistula to left arm) on tues, thurs, sat and also has hx of htn and degenerative disk disease. Pt denies cp, sob, ha, blurry vision, numbness/tingling, urinary sx, change in bm/         1:31 AM: I have reviewed discharge instructions with the patient. The patient verbalized understanding. Round trip ride set up for pt and pt awaiting ride in waiting room.

## 2021-03-02 NOTE — DISCHARGE INSTRUCTIONS
It was a pleasure taking care of you in our Emergency Department today. We know that when you come to [facility], you are entrusting us with your health, comfort, and safety. Our physicians and nurses honor that trust, and truly appreciate the opportunity to care for you and your loved ones. We also value your feedback. If you receive a survey about your Emergency Department experience today, please fill it out. We care about our patients' feedback, and we listen to what you have to say. Thank you!

## 2021-07-17 NOTE — BRIEF OP NOTE
BRIEF OPERATIVE NOTE    Date of Procedure: 8/2/2019   Preoperative Diagnosis: ENDSTAGE RENAL  Postoperative Diagnosis: ENDSTAGE RENAL    Procedure(s):  CREATION OF ARTERIO VENOUS FISTULA LEFT ARM (brachio-basilic)  Surgeon(s) and Role:     Karlos Alexander MD - Primary         Surgical Assistant: staff    Surgical Staff:  Circ-1: Tata Akers RN  Circ-Relief: Pau Peguero RN  Scrub Tech-1: Mark Nolasco  Scrub Tech-2: Eulogio GIRARD  Scrub Tech-Relief: Mary Jo Patino  Surg Asst-1: Emerald Mention  Event Time In Time Out   Incision Start 1407    Incision Close 1610      Anesthesia: General   Estimated Blood Loss: 25 ml  Specimens: * No specimens in log *   Findings: Palpable thrill in AV fistula and palpable pulse in radial artery at the wrist post op.    Complications: none  Implants: * No implants in log *
No

## 2021-09-19 NOTE — ROUTINE PROCESS
Bedside and Verbal shift change report given to Lesli 112 (oncoming nurse) by Ania Kearney RN (offgoing nurse). Report included the following information SBAR, Kardex, ED Summary, MAR, Recent Results and Cardiac Rhythm NSR. 
  
Zone Phone:   [phone] 
  
Significant changes during shift:  Continued on GI Lite diet. Dialysis today. Seen by cardiology, nephrology. Cardiac cath and LENNY schedule for Monday 
  
Patient Information 
  
Darrius Bennett 47 y.o. 
1/15/2020  4:28 AM by Ab Love MD. Madelyn Hwang was admitted from Home 
  
Problem List 
  
       
Patient Active Problem List  
  Diagnosis Date Noted  Ventricular tachycardia ([address] 01/16/2020  Acute encephalopathy 01/15/2020  Elevated troponin 12/10/2019  S/P arteriovenous (AV) fistula creation 12/10/2019  Hyperparathyroidism ([address] 12/10/2019  Anemia due to chronic kidney disease, on chronic dialysis ([address] 12/10/2019  Uncontrolled type II diabetes mellitus ([address] 12/09/2019  ESRD (end stage renal disease) ([address] 12/06/2019  CKD (chronic kidney disease) stage 5, GFR less than 15 ml/min ([facility]) 11/23/2018  Chest pain 05/10/2018  
 HTN (hypertension) 12/20/2012  Chronic lumbar radiculopathy 12/06/2012  Chronic pain syndrome 12/06/2012  Depression 04/22/2012  Nausea and vomiting 03/16/2012  
  
       
Past Medical History:  
Diagnosis Date  Abdominal pain 11/18/2013  Abdominal pain, LUQ (left upper quadrant) 6/7/2012  Back pain, lumbosacral 6/24/2012  
  Acute on chronic    
 C. difficile colitis 6/2012  Chronic kidney disease    
  Stage V- no dialysis yet  Chronic low back pain    
 Chronic pain    
  back & left leg  Constipation    
 Diabetes ([address]    
  A1c 8.2 3/2012  DKA (diabetic ketoacidoses) ([address] 7/10/2018  Flank pain 4/14/2015  Gastroparesis 6/7/2012  Hep C w/o coma, chronic ([facility])    
 Hyperlipemia    
 Hypertension    
 Hyponatremia 11/18/2013  Intractable abdominal pain 4/21/2012  Lower urinary tract infectious disease 11/18/2013  Lumbar disc disease    
 Migraines    
 Nausea & vomiting 3/16/2012  Pancreatitis 2271  
  alcoholic  UTI (lower urinary tract infection) 6/20012  
  
Core Measures: 
  
CVA: No No 
CHF:No No 
PNA:No No 
  
Activity Status: 
  
OOB to Chair No 
Ambulated this shift Yes  
Bed Rest No 
  
LINES AND DRAINS: 
  
PIV 
  
DVT prophylaxis: 
  
DVT prophylaxis Med- Yes DVT prophylaxis SCD or DEANDRE- No  
  
Patient Safety: 
  
Falls Score Total Score: 3 Safety Level_______ Bed Alarm On? Yes Sitter?  No 
  
Plan for upcoming shift: Dialysis, Pain, NPO on [facility] for cardiac cath and LENNY. 
  
Discharge Plan: No  
  
Active Consults: 
IP CONSULT TO GASTROENTEROLOGY 
IP CONSULT TO NEPHROLOGY 
IP CONSULT TO CARDIOLOGY 
IP CONSULT TO VASCULAR SURGERY 
   
  
  
   
  
 
 
 supervision

## 2021-10-19 NOTE — ED NOTES
Pt is alert and oriented x 4, RR even and unlabored, skin is warm and dry. Assessment completed and pt updated on plan of care. Call bell in reach. Emergency Department Nursing Plan of Care       The Nursing Plan of Care is developed from the Nursing assessment and Emergency Department Attending provider initial evaluation. The plan of care may be reviewed in the ED Provider note.     The Plan of Care was developed with the following considerations:   Patient / Family readiness to learn indicated by:verbalized understanding  Persons(s) to be included in education: patient  Barriers to Learning/Limitations:No    Signed     Lisset Brown RN    10/18/2021   10:06 PM

## 2021-10-19 NOTE — ED TRIAGE NOTES
Pt arrives via EMS with co lower back pain x yesterday. Pt states she was walking down the steps at her daughters house and she missed the last step. Pt states she caught herself and did not fall onto her back. Pt reports having a back surgery 6 weeks ago.

## 2021-10-19 NOTE — ED NOTES
Discharge instructions were given to the patient by Malcolm tSack RN. The patient left the Emergency Department ambulatory, alert and oriented and in no acute distress with 0 prescriptions. The patient was encouraged to call or return to the ED for worsening issues or problems and was encouraged to schedule a follow up appointment for continuing care. The patient verbalized understanding of discharge instructions and prescriptions, all questions were answered. The patient has no further concerns at this time.

## 2021-10-19 NOTE — ED NOTES
Bedside and Verbal shift change report given to Jocelynn Draper RN  (oncoming nurse) by me (offgoing nurse). Report included the following information SBAR, Kardex and ED Summary.
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
## 2021-10-24 NOTE — ED TRIAGE NOTES
Assumed care of pt via EMS stretcher. Pt is A&O x 4 and reports CC of abdominal pain and lower back pain that began this morning. Pt reports that pain radiates down to her legs. Pt is not a good historian at this time as pt is crying and not cooperating with answering questions. Pt is a dialysis pt TTS and states that she did have her dialysis done yesterday. Pt also had back surgery done six weeks ago per EMS. Pt reports nausea and also reports that she has not had a BM in three days. Pt resting on stretcher in POC with call bell in reach. Pt placed on monitor x 3. Will continue to monitor. 1307: [facility] transport to be set for 1345.    1319: TRANSFER - OUT REPORT:    Verbal report given to Embedded Chat) on Francine Solorio  being transferred to [facility] (unit) for routine progression of care       Report consisted of patients Situation, Background, Assessment and   Recommendations(SBAR). Information from the following report(s) SBAR, ED Summary and Recent Results was reviewed with the receiving nurse. Lines:   Peripheral IV 10/24/21 Right Antecubital (Active)        Opportunity for questions and clarification was provided. Patient transported with:  [facility]    1401: [facility] here to transport pt. EMTALA completed and pt changed into a new brief by this RN.

## 2021-10-24 NOTE — PROGRESS NOTES
-Please complete MRI History and Safety Screening Form   - Patient cannot be scanned until this form is completed and reviewed in MRI to ensure patient is SAFE and eligible for MRI. - CALL MRI when this has been successfully completed at [phone].
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
## 2022-01-01 ENCOUNTER — HOSPITAL ENCOUNTER (EMERGENCY)
Age: 57
Discharge: HOME | End: 2022-04-18
Attending: EMERGENCY MEDICINE
Payer: MEDICARE

## 2022-01-01 ENCOUNTER — HOSPITAL ENCOUNTER (EMERGENCY)
Age: 57
Discharge: HOME | End: 2022-04-09
Attending: EMERGENCY MEDICINE
Payer: MEDICARE

## 2022-01-01 ENCOUNTER — APPOINTMENT (OUTPATIENT)
Dept: GENERAL RADIOLOGY | Age: 57
DRG: 003 | End: 2022-01-01
Attending: RADIOLOGY
Payer: MEDICARE

## 2022-01-01 ENCOUNTER — APPOINTMENT (OUTPATIENT)
Dept: NON INVASIVE DIAGNOSTICS | Age: 57
DRG: 003 | End: 2022-01-01
Attending: INTERNAL MEDICINE
Payer: MEDICARE

## 2022-01-01 ENCOUNTER — APPOINTMENT (OUTPATIENT)
Dept: ULTRASOUND IMAGING | Age: 57
End: 2022-01-01
Attending: EMERGENCY MEDICINE
Payer: MEDICARE

## 2022-01-01 ENCOUNTER — APPOINTMENT (OUTPATIENT)
Dept: GENERAL RADIOLOGY | Age: 57
DRG: 003 | End: 2022-01-01
Attending: INTERNAL MEDICINE
Payer: MEDICARE

## 2022-01-01 ENCOUNTER — APPOINTMENT (OUTPATIENT)
Dept: GENERAL RADIOLOGY | Age: 57
DRG: 003 | End: 2022-01-01
Attending: PHYSICIAN ASSISTANT
Payer: MEDICARE

## 2022-01-01 ENCOUNTER — APPOINTMENT (OUTPATIENT)
Dept: CT IMAGING | Age: 57
End: 2022-01-01
Attending: EMERGENCY MEDICINE
Payer: MEDICARE

## 2022-01-01 ENCOUNTER — ANESTHESIA EVENT (OUTPATIENT)
Dept: SURGERY | Age: 57
DRG: 003 | End: 2022-01-01
Payer: MEDICARE

## 2022-01-01 ENCOUNTER — APPOINTMENT (OUTPATIENT)
Dept: GENERAL RADIOLOGY | Age: 57
End: 2022-01-01
Attending: EMERGENCY MEDICINE
Payer: MEDICARE

## 2022-01-01 ENCOUNTER — APPOINTMENT (OUTPATIENT)
Dept: GENERAL RADIOLOGY | Age: 57
DRG: 003 | End: 2022-01-01
Attending: HOSPITALIST
Payer: MEDICARE

## 2022-01-01 ENCOUNTER — HOSPITAL ENCOUNTER (EMERGENCY)
Age: 57
Discharge: HOME | End: 2022-05-30
Attending: EMERGENCY MEDICINE
Payer: MEDICARE

## 2022-01-01 ENCOUNTER — APPOINTMENT (OUTPATIENT)
Dept: NON INVASIVE DIAGNOSTICS | Age: 57
DRG: 003 | End: 2022-01-01
Attending: HOSPITALIST
Payer: MEDICARE

## 2022-01-01 ENCOUNTER — HOSPITAL ENCOUNTER (OUTPATIENT)
Dept: NON INVASIVE DIAGNOSTICS | Age: 57
Discharge: HOME | End: 2022-06-07
Attending: THORACIC SURGERY (CARDIOTHORACIC VASCULAR SURGERY)

## 2022-01-01 ENCOUNTER — APPOINTMENT (OUTPATIENT)
Dept: GENERAL RADIOLOGY | Age: 57
DRG: 003 | End: 2022-01-01
Attending: EMERGENCY MEDICINE
Payer: MEDICARE

## 2022-01-01 ENCOUNTER — HOSPITAL ENCOUNTER (INPATIENT)
Dept: INTERVENTIONAL RADIOLOGY/VASCULAR | Age: 57
Discharge: HOME | DRG: 003 | End: 2022-06-07
Attending: INTERNAL MEDICINE
Payer: MEDICARE

## 2022-01-01 ENCOUNTER — HOSPITAL ENCOUNTER (EMERGENCY)
Age: 57
Discharge: HOME | End: 2022-03-07
Attending: EMERGENCY MEDICINE
Payer: MEDICARE

## 2022-01-01 ENCOUNTER — HOSPITAL ENCOUNTER (EMERGENCY)
Age: 57
Discharge: HOME | End: 2022-05-06
Attending: EMERGENCY MEDICINE
Payer: MEDICARE

## 2022-01-01 ENCOUNTER — APPOINTMENT (OUTPATIENT)
Dept: NON INVASIVE DIAGNOSTICS | Age: 57
DRG: 003 | End: 2022-01-01
Attending: NURSE PRACTITIONER
Payer: MEDICARE

## 2022-01-01 ENCOUNTER — HOSPITAL ENCOUNTER (INPATIENT)
Age: 57
LOS: 10 days | DRG: 003 | End: 2022-06-10
Attending: EMERGENCY MEDICINE | Admitting: HOSPITALIST
Payer: MEDICARE

## 2022-01-01 ENCOUNTER — HOSPITAL ENCOUNTER (EMERGENCY)
Age: 57
Discharge: HOME | End: 2022-05-27
Attending: EMERGENCY MEDICINE
Payer: MEDICARE

## 2022-01-01 ENCOUNTER — HOSPITAL ENCOUNTER (EMERGENCY)
Age: 57
Discharge: HOME | End: 2022-04-17
Attending: EMERGENCY MEDICINE
Payer: MEDICARE

## 2022-01-01 ENCOUNTER — APPOINTMENT (OUTPATIENT)
Dept: ULTRASOUND IMAGING | Age: 57
DRG: 003 | End: 2022-01-01
Attending: INTERNAL MEDICINE
Payer: MEDICARE

## 2022-01-01 ENCOUNTER — HOSPITAL ENCOUNTER (EMERGENCY)
Age: 57
Discharge: HOME | End: 2022-03-12
Attending: EMERGENCY MEDICINE
Payer: MEDICARE

## 2022-01-01 ENCOUNTER — HOSPITAL ENCOUNTER (EMERGENCY)
Age: 57
Discharge: HOME | End: 2022-05-14
Attending: EMERGENCY MEDICINE
Payer: MEDICARE

## 2022-01-01 ENCOUNTER — HOSPITAL ENCOUNTER (EMERGENCY)
Age: 57
Discharge: STILL ADMITTED OR ADMINISTRATIVE | End: 2022-03-12
Payer: MEDICARE

## 2022-01-01 ENCOUNTER — ANESTHESIA (OUTPATIENT)
Dept: SURGERY | Age: 57
DRG: 003 | End: 2022-01-01
Payer: MEDICARE

## 2022-01-01 ENCOUNTER — HOSPITAL ENCOUNTER (EMERGENCY)
Age: 57
Discharge: HOME | End: 2022-05-13
Attending: EMERGENCY MEDICINE
Payer: MEDICARE

## 2022-01-01 VITALS
WEIGHT: 140 LBS | HEIGHT: 65 IN | HEART RATE: 93 BPM | TEMPERATURE: 97.9 F | OXYGEN SATURATION: 99 % | DIASTOLIC BLOOD PRESSURE: 90 MMHG | SYSTOLIC BLOOD PRESSURE: 191 MMHG | BODY MASS INDEX: 23.32 KG/M2 | RESPIRATION RATE: 14 BRPM

## 2022-01-01 VITALS
RESPIRATION RATE: 14 BRPM | HEIGHT: 65 IN | OXYGEN SATURATION: 97 % | SYSTOLIC BLOOD PRESSURE: 62 MMHG | BODY MASS INDEX: 21.12 KG/M2 | DIASTOLIC BLOOD PRESSURE: 46 MMHG | TEMPERATURE: 99.5 F | WEIGHT: 126.76 LBS | HEART RATE: 63 BPM

## 2022-01-01 VITALS
RESPIRATION RATE: 16 BRPM | HEART RATE: 94 BPM | WEIGHT: 140 LBS | DIASTOLIC BLOOD PRESSURE: 83 MMHG | BODY MASS INDEX: 23.32 KG/M2 | SYSTOLIC BLOOD PRESSURE: 155 MMHG | OXYGEN SATURATION: 99 % | TEMPERATURE: 98.1 F | HEIGHT: 65 IN

## 2022-01-01 VITALS
HEIGHT: 65 IN | TEMPERATURE: 97.7 F | BODY MASS INDEX: 23.32 KG/M2 | DIASTOLIC BLOOD PRESSURE: 98 MMHG | HEART RATE: 128 BPM | WEIGHT: 140 LBS | RESPIRATION RATE: 34 BRPM | OXYGEN SATURATION: 99 % | SYSTOLIC BLOOD PRESSURE: 130 MMHG

## 2022-01-01 VITALS
OXYGEN SATURATION: 99 % | SYSTOLIC BLOOD PRESSURE: 177 MMHG | HEART RATE: 107 BPM | HEIGHT: 65 IN | TEMPERATURE: 97 F | DIASTOLIC BLOOD PRESSURE: 88 MMHG | BODY MASS INDEX: 23.32 KG/M2 | WEIGHT: 140 LBS | RESPIRATION RATE: 16 BRPM

## 2022-01-01 VITALS
BODY MASS INDEX: 23.32 KG/M2 | HEIGHT: 65 IN | HEART RATE: 107 BPM | DIASTOLIC BLOOD PRESSURE: 82 MMHG | WEIGHT: 140 LBS | OXYGEN SATURATION: 98 % | SYSTOLIC BLOOD PRESSURE: 163 MMHG | RESPIRATION RATE: 20 BRPM | TEMPERATURE: 98.2 F

## 2022-01-01 VITALS
DIASTOLIC BLOOD PRESSURE: 102 MMHG | WEIGHT: 140 LBS | SYSTOLIC BLOOD PRESSURE: 209 MMHG | RESPIRATION RATE: 22 BRPM | HEIGHT: 65 IN | BODY MASS INDEX: 23.32 KG/M2 | OXYGEN SATURATION: 98 % | HEART RATE: 109 BPM | TEMPERATURE: 98.3 F

## 2022-01-01 VITALS
OXYGEN SATURATION: 98 % | HEIGHT: 65 IN | RESPIRATION RATE: 20 BRPM | SYSTOLIC BLOOD PRESSURE: 167 MMHG | WEIGHT: 145 LBS | BODY MASS INDEX: 24.16 KG/M2 | HEART RATE: 98 BPM | DIASTOLIC BLOOD PRESSURE: 87 MMHG | TEMPERATURE: 98.5 F

## 2022-01-01 VITALS
WEIGHT: 145 LBS | HEART RATE: 98 BPM | TEMPERATURE: 98 F | RESPIRATION RATE: 18 BRPM | OXYGEN SATURATION: 100 % | DIASTOLIC BLOOD PRESSURE: 110 MMHG | SYSTOLIC BLOOD PRESSURE: 195 MMHG | HEIGHT: 65 IN | BODY MASS INDEX: 24.16 KG/M2

## 2022-01-01 VITALS
HEART RATE: 99 BPM | OXYGEN SATURATION: 94 % | SYSTOLIC BLOOD PRESSURE: 195 MMHG | BODY MASS INDEX: 23.32 KG/M2 | WEIGHT: 139.99 LBS | TEMPERATURE: 97.8 F | HEIGHT: 65 IN | DIASTOLIC BLOOD PRESSURE: 104 MMHG | RESPIRATION RATE: 19 BRPM

## 2022-01-01 VITALS
TEMPERATURE: 98.2 F | DIASTOLIC BLOOD PRESSURE: 80 MMHG | BODY MASS INDEX: 23.32 KG/M2 | SYSTOLIC BLOOD PRESSURE: 150 MMHG | HEART RATE: 90 BPM | WEIGHT: 140 LBS | RESPIRATION RATE: 16 BRPM | HEIGHT: 65 IN | OXYGEN SATURATION: 100 %

## 2022-01-01 VITALS — HEART RATE: 114 BPM | RESPIRATION RATE: 14 BRPM | DIASTOLIC BLOOD PRESSURE: 92 MMHG | SYSTOLIC BLOOD PRESSURE: 126 MMHG

## 2022-01-01 DIAGNOSIS — M54.50 CHRONIC MIDLINE LOW BACK PAIN WITHOUT SCIATICA: Primary | ICD-10-CM

## 2022-01-01 DIAGNOSIS — I10 HYPERTENSION, UNSPECIFIED TYPE: ICD-10-CM

## 2022-01-01 DIAGNOSIS — N39.0 URINARY TRACT INFECTION WITHOUT HEMATURIA, SITE UNSPECIFIED: ICD-10-CM

## 2022-01-01 DIAGNOSIS — G89.29 CHRONIC PAIN OF LEFT LOWER EXTREMITY: Primary | ICD-10-CM

## 2022-01-01 DIAGNOSIS — N18.6 ESRD (END STAGE RENAL DISEASE) ON DIALYSIS (HCC): ICD-10-CM

## 2022-01-01 DIAGNOSIS — M54.42 ACUTE MIDLINE LOW BACK PAIN WITH LEFT-SIDED SCIATICA: Primary | ICD-10-CM

## 2022-01-01 DIAGNOSIS — Z99.2 END-STAGE RENAL DISEASE ON HEMODIALYSIS (HCC): ICD-10-CM

## 2022-01-01 DIAGNOSIS — I50.82 BIVENTRICULAR HEART FAILURE (HCC): ICD-10-CM

## 2022-01-01 DIAGNOSIS — A41.9 SEPSIS, DUE TO UNSPECIFIED ORGANISM, UNSPECIFIED WHETHER ACUTE ORGAN DYSFUNCTION PRESENT (HCC): ICD-10-CM

## 2022-01-01 DIAGNOSIS — M25.559 HIP PAIN: ICD-10-CM

## 2022-01-01 DIAGNOSIS — G89.29 CHRONIC MIDLINE LOW BACK PAIN WITHOUT SCIATICA: Primary | ICD-10-CM

## 2022-01-01 DIAGNOSIS — K31.1 PYLORIC STRICTURE: Primary | ICD-10-CM

## 2022-01-01 DIAGNOSIS — R30.0 DYSURIA: Primary | ICD-10-CM

## 2022-01-01 DIAGNOSIS — R07.9 CHEST PAIN, UNSPECIFIED TYPE: Primary | ICD-10-CM

## 2022-01-01 DIAGNOSIS — G89.29 ACUTE EXACERBATION OF CHRONIC LOW BACK PAIN: Primary | ICD-10-CM

## 2022-01-01 DIAGNOSIS — N18.6 ESRD ON DIALYSIS (HCC): ICD-10-CM

## 2022-01-01 DIAGNOSIS — I21.4 NSTEMI (NON-ST ELEVATED MYOCARDIAL INFARCTION) (HCC): Primary | ICD-10-CM

## 2022-01-01 DIAGNOSIS — N18.6 END-STAGE RENAL DISEASE ON HEMODIALYSIS (HCC): ICD-10-CM

## 2022-01-01 DIAGNOSIS — K21.00 GASTROESOPHAGEAL REFLUX DISEASE WITH ESOPHAGITIS WITHOUT HEMORRHAGE: ICD-10-CM

## 2022-01-01 DIAGNOSIS — M79.605 CHRONIC PAIN OF LEFT LOWER EXTREMITY: Primary | ICD-10-CM

## 2022-01-01 DIAGNOSIS — G89.4 CHRONIC PAIN SYNDROME: ICD-10-CM

## 2022-01-01 DIAGNOSIS — N18.5 CKD (CHRONIC KIDNEY DISEASE) STAGE 5, GFR LESS THAN 15 ML/MIN (HCC): Chronic | ICD-10-CM

## 2022-01-01 DIAGNOSIS — K31.89 GASTRIC DISTENTION: ICD-10-CM

## 2022-01-01 DIAGNOSIS — R57.0 CARDIOGENIC SHOCK (HCC): ICD-10-CM

## 2022-01-01 DIAGNOSIS — M54.50 ACUTE LEFT-SIDED LOW BACK PAIN WITHOUT SCIATICA: Primary | ICD-10-CM

## 2022-01-01 DIAGNOSIS — M54.50 CHRONIC LOW BACK PAIN, UNSPECIFIED BACK PAIN LATERALITY, UNSPECIFIED WHETHER SCIATICA PRESENT: Primary | ICD-10-CM

## 2022-01-01 DIAGNOSIS — G89.29 CHRONIC LOW BACK PAIN, UNSPECIFIED BACK PAIN LATERALITY, UNSPECIFIED WHETHER SCIATICA PRESENT: Primary | ICD-10-CM

## 2022-01-01 DIAGNOSIS — G93.40 ACUTE ENCEPHALOPATHY: ICD-10-CM

## 2022-01-01 DIAGNOSIS — M54.50 ACUTE EXACERBATION OF CHRONIC LOW BACK PAIN: Primary | ICD-10-CM

## 2022-01-01 DIAGNOSIS — Z51.5 PALLIATIVE CARE BY SPECIALIST: ICD-10-CM

## 2022-01-01 DIAGNOSIS — Z99.2 ESRD ON DIALYSIS (HCC): ICD-10-CM

## 2022-01-01 DIAGNOSIS — M54.50 ACUTE MIDLINE LOW BACK PAIN, UNSPECIFIED WHETHER SCIATICA PRESENT: Primary | ICD-10-CM

## 2022-01-01 DIAGNOSIS — E11.649 UNCONTROLLED TYPE 2 DIABETES MELLITUS WITH HYPOGLYCEMIA, UNSPECIFIED HYPOGLYCEMIA COMA STATUS (HCC): ICD-10-CM

## 2022-01-01 DIAGNOSIS — Z95.811 LEFT VENTRICULAR ASSIST DEVICE PRESENT (HCC): ICD-10-CM

## 2022-01-01 DIAGNOSIS — E87.5 ACUTE HYPERKALEMIA: ICD-10-CM

## 2022-01-01 DIAGNOSIS — Z99.2 ESRD (END STAGE RENAL DISEASE) ON DIALYSIS (HCC): ICD-10-CM

## 2022-01-01 DIAGNOSIS — J81.0 ACUTE PULMONARY EDEMA (HCC): ICD-10-CM

## 2022-01-01 DIAGNOSIS — Z86.69 HISTORY OF NEUROPATHY: ICD-10-CM

## 2022-01-01 DIAGNOSIS — K31.84 GASTROPARESIS: ICD-10-CM

## 2022-01-01 LAB
ABO + RH BLD: NORMAL
ABO + RH BLD: NORMAL
ACT BLD: 150 SECS (ref 79–138)
ACT BLD: 161 SECS (ref 79–138)
ACT BLD: 196 SECS (ref 79–138)
ACT BLD: 213 SECS (ref 79–138)
ACT BLD: 213 SECS (ref 79–138)
ACT BLD: 237 SECS (ref 79–138)
ACT BLD: 248 SECS (ref 79–138)
ACT BLD: 248 SECS (ref 79–138)
ALBUMIN SERPL-MCNC: 1.5 G/DL (ref 3.5–5)
ALBUMIN SERPL-MCNC: 2 G/DL (ref 3.5–5)
ALBUMIN SERPL-MCNC: 2.4 G/DL (ref 3.5–5)
ALBUMIN SERPL-MCNC: 2.5 G/DL (ref 3.5–5)
ALBUMIN SERPL-MCNC: 2.6 G/DL (ref 3.5–5)
ALBUMIN SERPL-MCNC: 2.7 G/DL (ref 3.5–5)
ALBUMIN SERPL-MCNC: 2.8 G/DL (ref 3.5–5)
ALBUMIN SERPL-MCNC: 2.9 G/DL (ref 3.5–5)
ALBUMIN SERPL-MCNC: 3 G/DL (ref 3.5–5)
ALBUMIN SERPL-MCNC: 3.2 G/DL (ref 3.5–5)
ALBUMIN SERPL-MCNC: 3.3 G/DL (ref 3.5–5)
ALBUMIN SERPL-MCNC: 3.4 G/DL (ref 3.5–5)
ALBUMIN SERPL-MCNC: 3.4 G/DL (ref 3.5–5)
ALBUMIN SERPL-MCNC: 3.6 G/DL (ref 3.5–5)
ALBUMIN SERPL-MCNC: 3.7 G/DL (ref 3.5–5)
ALBUMIN/GLOB SERPL: 0.5 {RATIO} (ref 1.1–2.2)
ALBUMIN/GLOB SERPL: 0.6 {RATIO} (ref 1.1–2.2)
ALBUMIN/GLOB SERPL: 0.7 {RATIO} (ref 1.1–2.2)
ALBUMIN/GLOB SERPL: 0.8 {RATIO} (ref 1.1–2.2)
ALBUMIN/GLOB SERPL: 0.9 {RATIO} (ref 1.1–2.2)
ALBUMIN/GLOB SERPL: 1 {RATIO} (ref 1.1–2.2)
ALBUMIN/GLOB SERPL: 1.1 {RATIO} (ref 1.1–2.2)
ALBUMIN/GLOB SERPL: 1.2 {RATIO} (ref 1.1–2.2)
ALBUMIN/GLOB SERPL: 1.3 {RATIO} (ref 1.1–2.2)
ALBUMIN/GLOB SERPL: 1.4 {RATIO} (ref 1.1–2.2)
ALBUMIN/GLOB SERPL: 1.5 {RATIO} (ref 1.1–2.2)
ALBUMIN/GLOB SERPL: 1.6 {RATIO} (ref 1.1–2.2)
ALBUMIN/GLOB SERPL: 1.7 {RATIO} (ref 1.1–2.2)
ALBUMIN/GLOB SERPL: 2.2 {RATIO} (ref 1.1–2.2)
ALP SERPL-CCNC: 102 U/L (ref 45–117)
ALP SERPL-CCNC: 108 U/L (ref 45–117)
ALP SERPL-CCNC: 111 U/L (ref 45–117)
ALP SERPL-CCNC: 114 U/L (ref 45–117)
ALP SERPL-CCNC: 120 U/L (ref 45–117)
ALP SERPL-CCNC: 170 U/L (ref 45–117)
ALP SERPL-CCNC: 174 U/L (ref 45–117)
ALP SERPL-CCNC: 187 U/L (ref 45–117)
ALP SERPL-CCNC: 190 U/L (ref 45–117)
ALP SERPL-CCNC: 195 U/L (ref 45–117)
ALP SERPL-CCNC: 202 U/L (ref 45–117)
ALP SERPL-CCNC: 210 U/L (ref 45–117)
ALP SERPL-CCNC: 46 U/L (ref 45–117)
ALP SERPL-CCNC: 53 U/L (ref 45–117)
ALP SERPL-CCNC: 53 U/L (ref 45–117)
ALP SERPL-CCNC: 54 U/L (ref 45–117)
ALP SERPL-CCNC: 56 U/L (ref 45–117)
ALP SERPL-CCNC: 57 U/L (ref 45–117)
ALP SERPL-CCNC: 59 U/L (ref 45–117)
ALP SERPL-CCNC: 61 U/L (ref 45–117)
ALP SERPL-CCNC: 69 U/L (ref 45–117)
ALP SERPL-CCNC: 77 U/L (ref 45–117)
ALP SERPL-CCNC: 85 U/L (ref 45–117)
ALP SERPL-CCNC: 87 U/L (ref 45–117)
ALP SERPL-CCNC: 87 U/L (ref 45–117)
ALP SERPL-CCNC: 92 U/L (ref 45–117)
ALP SERPL-CCNC: 96 U/L (ref 45–117)
ALP SERPL-CCNC: 97 U/L (ref 45–117)
ALT SERPL-CCNC: 100 U/L (ref 12–78)
ALT SERPL-CCNC: 12 U/L (ref 12–78)
ALT SERPL-CCNC: 14 U/L (ref 12–78)
ALT SERPL-CCNC: 14 U/L (ref 12–78)
ALT SERPL-CCNC: 15 U/L (ref 12–78)
ALT SERPL-CCNC: 16 U/L (ref 12–78)
ALT SERPL-CCNC: 17 U/L (ref 12–78)
ALT SERPL-CCNC: 20 U/L (ref 12–78)
ALT SERPL-CCNC: 22 U/L (ref 12–78)
ALT SERPL-CCNC: 22 U/L (ref 12–78)
ALT SERPL-CCNC: 25 U/L (ref 12–78)
ALT SERPL-CCNC: 27 U/L (ref 12–78)
ALT SERPL-CCNC: 29 U/L (ref 12–78)
ALT SERPL-CCNC: 32 U/L (ref 12–78)
ALT SERPL-CCNC: 32 U/L (ref 12–78)
ALT SERPL-CCNC: 33 U/L (ref 12–78)
ALT SERPL-CCNC: 34 U/L (ref 12–78)
ALT SERPL-CCNC: 34 U/L (ref 12–78)
ALT SERPL-CCNC: 35 U/L (ref 12–78)
ALT SERPL-CCNC: 39 U/L (ref 12–78)
ALT SERPL-CCNC: 40 U/L (ref 12–78)
ALT SERPL-CCNC: 45 U/L (ref 12–78)
ALT SERPL-CCNC: 45 U/L (ref 12–78)
ALT SERPL-CCNC: 48 U/L (ref 12–78)
ALT SERPL-CCNC: 49 U/L (ref 12–78)
ALT SERPL-CCNC: 49 U/L (ref 12–78)
ALT SERPL-CCNC: 50 U/L (ref 12–78)
ALT SERPL-CCNC: 95 U/L (ref 12–78)
AMYLASE SERPL-CCNC: 34 U/L (ref 25–115)
ANION GAP SERPL CALC-SCNC: 10 MMOL/L (ref 5–15)
ANION GAP SERPL CALC-SCNC: 11 MMOL/L (ref 5–15)
ANION GAP SERPL CALC-SCNC: 12 MMOL/L (ref 5–15)
ANION GAP SERPL CALC-SCNC: 13 MMOL/L (ref 5–15)
ANION GAP SERPL CALC-SCNC: 14 MMOL/L (ref 5–15)
ANION GAP SERPL CALC-SCNC: 15 MMOL/L (ref 5–15)
ANION GAP SERPL CALC-SCNC: 15 MMOL/L (ref 5–15)
ANION GAP SERPL CALC-SCNC: 16 MMOL/L (ref 5–15)
ANION GAP SERPL CALC-SCNC: 16 MMOL/L (ref 5–15)
ANION GAP SERPL CALC-SCNC: 18 MMOL/L (ref 5–15)
ANION GAP SERPL CALC-SCNC: 9 MMOL/L (ref 5–15)
APPEARANCE UR: ABNORMAL
APPEARANCE UR: ABNORMAL
APPEARANCE UR: CLEAR
APTT PPP: 108 SEC (ref 22.1–31)
APTT PPP: 119.9 SEC (ref 22.1–31)
APTT PPP: 123.7 SEC (ref 22.1–31)
APTT PPP: 127.9 SEC (ref 22.1–31)
APTT PPP: 31.6 SEC (ref 22.1–31)
APTT PPP: 35.7 SEC (ref 22.1–31)
APTT PPP: 40.4 SEC (ref 22.1–31)
APTT PPP: 40.8 SEC (ref 22.1–31)
APTT PPP: 44.4 SEC (ref 22.1–31)
APTT PPP: 44.4 SEC (ref 22.1–31)
APTT PPP: 46.5 SEC (ref 22.1–31)
APTT PPP: 47.3 SEC (ref 22.1–31)
APTT PPP: 47.7 SEC (ref 22.1–31)
APTT PPP: 49.3 SEC (ref 22.1–31)
APTT PPP: 50 SEC (ref 22.1–31)
APTT PPP: 50.5 SEC (ref 22.1–31)
APTT PPP: 50.7 SEC (ref 22.1–31)
APTT PPP: 51.1 SEC (ref 22.1–31)
APTT PPP: 51.4 SEC (ref 22.1–31)
APTT PPP: 53.6 SEC (ref 22.1–31)
APTT PPP: 54.8 SEC (ref 22.1–31)
APTT PPP: 57.9 SEC (ref 22.1–31)
APTT PPP: 58.1 SEC (ref 22.1–31)
APTT PPP: 58.4 SEC (ref 22.1–31)
APTT PPP: 60.4 SEC (ref 22.1–31)
APTT PPP: 60.8 SEC (ref 22.1–31)
APTT PPP: 61.1 SEC (ref 22.1–31)
APTT PPP: 61.6 SEC (ref 22.1–31)
APTT PPP: 61.7 SEC (ref 22.1–31)
APTT PPP: 65.8 SEC (ref 22.1–31)
APTT PPP: 69.1 SEC (ref 22.1–31)
APTT PPP: 70.1 SEC (ref 22.1–31)
APTT PPP: 70.3 SEC (ref 22.1–31)
APTT PPP: 71.2 SEC (ref 22.1–31)
APTT PPP: 72.3 SEC (ref 22.1–31)
APTT PPP: 73.3 SEC (ref 22.1–31)
APTT PPP: 74.1 SEC (ref 22.1–31)
APTT PPP: 76 SEC (ref 22.1–31)
APTT PPP: 81.2 SEC (ref 22.1–31)
APTT PPP: 82.2 SEC (ref 22.1–31)
APTT PPP: 82.7 SEC (ref 22.1–31)
APTT PPP: >130 SEC (ref 22.1–31)
APTT PPP: >130 SEC (ref 22.1–31)
ARTERIAL PATENCY WRIST A: ABNORMAL
ARTERIAL PATENCY WRIST A: YES
AST SERPL-CCNC: 100 U/L (ref 15–37)
AST SERPL-CCNC: 110 U/L (ref 15–37)
AST SERPL-CCNC: 112 U/L (ref 15–37)
AST SERPL-CCNC: 126 U/L (ref 15–37)
AST SERPL-CCNC: 133 U/L (ref 15–37)
AST SERPL-CCNC: 134 U/L (ref 15–37)
AST SERPL-CCNC: 146 U/L (ref 15–37)
AST SERPL-CCNC: 164 U/L (ref 15–37)
AST SERPL-CCNC: 169 U/L (ref 15–37)
AST SERPL-CCNC: 18 U/L (ref 15–37)
AST SERPL-CCNC: 203 U/L (ref 15–37)
AST SERPL-CCNC: 206 U/L (ref 15–37)
AST SERPL-CCNC: 21 U/L (ref 15–37)
AST SERPL-CCNC: 230 U/L (ref 15–37)
AST SERPL-CCNC: 243 U/L (ref 15–37)
AST SERPL-CCNC: 25 U/L (ref 15–37)
AST SERPL-CCNC: 280 U/L (ref 15–37)
AST SERPL-CCNC: 316 U/L (ref 15–37)
AST SERPL-CCNC: 328 U/L (ref 15–37)
AST SERPL-CCNC: 34 U/L (ref 15–37)
AST SERPL-CCNC: 350 U/L (ref 15–37)
AST SERPL-CCNC: 360 U/L (ref 15–37)
AST SERPL-CCNC: 365 U/L (ref 15–37)
AST SERPL-CCNC: 365 U/L (ref 15–37)
AST SERPL-CCNC: 38 U/L (ref 15–37)
AST SERPL-CCNC: 42 U/L (ref 15–37)
AST SERPL-CCNC: 489 U/L (ref 15–37)
AST SERPL-CCNC: 534 U/L (ref 15–37)
AST SERPL-CCNC: 61 U/L (ref 15–37)
AST SERPL-CCNC: 65 U/L (ref 15–37)
ATRIAL RATE: 100 BPM
ATRIAL RATE: 112 BPM
ATRIAL RATE: 117 BPM
ATRIAL RATE: 119 BPM
ATRIAL RATE: 121 BPM
ATRIAL RATE: 127 BPM
ATRIAL RATE: 68 BPM
ATRIAL RATE: 79 BPM
ATRIAL RATE: 85 BPM
ATRIAL RATE: 85 BPM
BACTERIA SPEC CULT: NORMAL
BACTERIA URNS QL MICRO: ABNORMAL /HPF
BASE DEFICIT BLD-SCNC: 1.2 MMOL/L
BASE DEFICIT BLD-SCNC: 1.7 MMOL/L
BASE DEFICIT BLD-SCNC: 3.7 MMOL/L
BASE DEFICIT BLDA-SCNC: 0.2 MMOL/L
BASE DEFICIT BLDA-SCNC: 0.5 MMOL/L
BASE DEFICIT BLDA-SCNC: 0.7 MMOL/L
BASE DEFICIT BLDA-SCNC: 0.7 MMOL/L
BASE DEFICIT BLDA-SCNC: 0.9 MMOL/L
BASE DEFICIT BLDA-SCNC: 0.9 MMOL/L
BASE DEFICIT BLDA-SCNC: 1.2 MMOL/L
BASE DEFICIT BLDA-SCNC: 1.2 MMOL/L
BASE DEFICIT BLDA-SCNC: 1.4 MMOL/L
BASE DEFICIT BLDA-SCNC: 1.4 MMOL/L
BASE DEFICIT BLDA-SCNC: 1.5 MMOL/L
BASE DEFICIT BLDA-SCNC: 1.5 MMOL/L
BASE DEFICIT BLDA-SCNC: 1.6 MMOL/L
BASE DEFICIT BLDA-SCNC: 1.6 MMOL/L
BASE DEFICIT BLDA-SCNC: 14.8 MMOL/L
BASE DEFICIT BLDA-SCNC: 2.1 MMOL/L
BASE DEFICIT BLDA-SCNC: 2.2 MMOL/L
BASE DEFICIT BLDA-SCNC: 2.2 MMOL/L
BASE DEFICIT BLDA-SCNC: 2.3 MMOL/L
BASE DEFICIT BLDA-SCNC: 2.3 MMOL/L
BASE DEFICIT BLDA-SCNC: 2.4 MMOL/L
BASE DEFICIT BLDA-SCNC: 2.5 MMOL/L
BASE DEFICIT BLDA-SCNC: 2.5 MMOL/L
BASE DEFICIT BLDA-SCNC: 2.7 MMOL/L
BASE DEFICIT BLDA-SCNC: 2.7 MMOL/L
BASE DEFICIT BLDA-SCNC: 2.8 MMOL/L
BASE DEFICIT BLDA-SCNC: 2.8 MMOL/L
BASE DEFICIT BLDA-SCNC: 2.9 MMOL/L
BASE DEFICIT BLDA-SCNC: 3.3 MMOL/L
BASE DEFICIT BLDA-SCNC: 3.4 MMOL/L
BASE DEFICIT BLDA-SCNC: 3.5 MMOL/L
BASE DEFICIT BLDA-SCNC: 3.5 MMOL/L
BASE DEFICIT BLDA-SCNC: 4 MMOL/L
BASE DEFICIT BLDA-SCNC: 4.2 MMOL/L
BASE DEFICIT BLDA-SCNC: 4.3 MMOL/L
BASE DEFICIT BLDA-SCNC: 4.7 MMOL/L
BASE DEFICIT BLDA-SCNC: 4.9 MMOL/L
BASE DEFICIT BLDA-SCNC: 5.1 MMOL/L
BASE DEFICIT BLDA-SCNC: 5.8 MMOL/L
BASE DEFICIT BLDA-SCNC: 7 MMOL/L
BASE DEFICIT BLDA-SCNC: 7.3 MMOL/L
BASE DEFICIT BLDA-SCNC: 8.9 MMOL/L
BASE DEFICIT BLDV-SCNC: 1.4 MMOL/L
BASE DEFICIT BLDV-SCNC: 2.2 MMOL/L
BASE DEFICIT BLDV-SCNC: 2.8 MMOL/L
BASE DEFICIT BLDV-SCNC: 3.6 MMOL/L
BASE DEFICIT BLDV-SCNC: 5.1 MMOL/L
BASE DEFICIT BLDV-SCNC: 5.5 MMOL/L
BASE EXCESS BLD CALC-SCNC: 0.1 MMOL/L
BASE EXCESS BLD CALC-SCNC: 2.3 MMOL/L
BASE EXCESS BLDA CALC-SCNC: 0.2 MMOL/L
BASE EXCESS BLDA CALC-SCNC: 0.2 MMOL/L
BASE EXCESS BLDA CALC-SCNC: 0.3 MMOL/L
BASE EXCESS BLDA CALC-SCNC: 0.6 MMOL/L
BASE EXCESS BLDA CALC-SCNC: 0.6 MMOL/L
BASE EXCESS BLDA CALC-SCNC: 0.7 MMOL/L
BASE EXCESS BLDA CALC-SCNC: 0.8 MMOL/L
BASE EXCESS BLDA CALC-SCNC: 1 MMOL/L
BASE EXCESS BLDA CALC-SCNC: 1.4 MMOL/L
BASE EXCESS BLDA CALC-SCNC: 1.5 MMOL/L
BASE EXCESS BLDA CALC-SCNC: 1.5 MMOL/L
BASE EXCESS BLDA CALC-SCNC: 1.6 MMOL/L
BASE EXCESS BLDA CALC-SCNC: 1.8 MMOL/L
BASE EXCESS BLDA CALC-SCNC: 1.8 MMOL/L
BASE EXCESS BLDA CALC-SCNC: 1.9 MMOL/L
BASE EXCESS BLDA CALC-SCNC: 3.1 MMOL/L
BASE EXCESS BLDA CALC-SCNC: 3.1 MMOL/L
BASE EXCESS BLDA CALC-SCNC: 3.2 MMOL/L
BASE EXCESS BLDA CALC-SCNC: 3.6 MMOL/L
BASE EXCESS BLDA CALC-SCNC: 4 MMOL/L
BASE EXCESS BLDA CALC-SCNC: 5.8 MMOL/L
BASE EXCESS BLDA CALC-SCNC: 5.9 MMOL/L
BASE EXCESS BLDV CALC-SCNC: 0 MMOL/L
BASE EXCESS BLDV CALC-SCNC: 2 MMOL/L
BASE EXCESS BLDV CALC-SCNC: 2.7 MMOL/L
BASOPHILS # BLD: 0 K/UL (ref 0–0.1)
BASOPHILS NFR BLD: 0 % (ref 0–1)
BASOPHILS NFR BLD: 1 % (ref 0–1)
BDY SITE: ABNORMAL
BILIRUB DIRECT SERPL-MCNC: 0.4 MG/DL (ref 0–0.2)
BILIRUB DIRECT SERPL-MCNC: 0.4 MG/DL (ref 0–0.2)
BILIRUB DIRECT SERPL-MCNC: 0.6 MG/DL (ref 0–0.2)
BILIRUB DIRECT SERPL-MCNC: 0.7 MG/DL (ref 0–0.2)
BILIRUB DIRECT SERPL-MCNC: 1.6 MG/DL (ref 0–0.2)
BILIRUB SERPL-MCNC: 0.3 MG/DL (ref 0.2–1)
BILIRUB SERPL-MCNC: 0.4 MG/DL (ref 0.2–1)
BILIRUB SERPL-MCNC: 0.5 MG/DL (ref 0.2–1)
BILIRUB SERPL-MCNC: 0.6 MG/DL (ref 0.2–1)
BILIRUB SERPL-MCNC: 0.7 MG/DL (ref 0.2–1)
BILIRUB SERPL-MCNC: 0.7 MG/DL (ref 0.2–1)
BILIRUB SERPL-MCNC: 0.8 MG/DL (ref 0.2–1)
BILIRUB SERPL-MCNC: 0.9 MG/DL (ref 0.2–1)
BILIRUB SERPL-MCNC: 1.1 MG/DL (ref 0.2–1)
BILIRUB SERPL-MCNC: 1.1 MG/DL (ref 0.2–1)
BILIRUB SERPL-MCNC: 1.2 MG/DL (ref 0.2–1)
BILIRUB SERPL-MCNC: 1.3 MG/DL (ref 0.2–1)
BILIRUB SERPL-MCNC: 1.4 MG/DL (ref 0.2–1)
BILIRUB SERPL-MCNC: 1.7 MG/DL (ref 0.2–1)
BILIRUB SERPL-MCNC: 1.8 MG/DL (ref 0.2–1)
BILIRUB SERPL-MCNC: 1.9 MG/DL (ref 0.2–1)
BILIRUB SERPL-MCNC: 2.1 MG/DL (ref 0.2–1)
BILIRUB SERPL-MCNC: 2.2 MG/DL (ref 0.2–1)
BILIRUB UR QL: NEGATIVE
BLD PROD TYP BPU: NORMAL
BLOOD GROUP ANTIBODIES SERPL: NORMAL
BLOOD GROUP ANTIBODIES SERPL: NORMAL
BNP SERPL-MCNC: 8316 PG/ML
BNP SERPL-MCNC: ABNORMAL PG/ML
BPU ID: NORMAL
BUN SERPL-MCNC: 10 MG/DL (ref 6–20)
BUN SERPL-MCNC: 11 MG/DL (ref 6–20)
BUN SERPL-MCNC: 12 MG/DL (ref 6–20)
BUN SERPL-MCNC: 13 MG/DL (ref 6–20)
BUN SERPL-MCNC: 14 MG/DL (ref 6–20)
BUN SERPL-MCNC: 14 MG/DL (ref 6–20)
BUN SERPL-MCNC: 16 MG/DL (ref 6–20)
BUN SERPL-MCNC: 16 MG/DL (ref 6–20)
BUN SERPL-MCNC: 17 MG/DL (ref 6–20)
BUN SERPL-MCNC: 19 MG/DL (ref 6–20)
BUN SERPL-MCNC: 19 MG/DL (ref 6–20)
BUN SERPL-MCNC: 20 MG/DL (ref 6–20)
BUN SERPL-MCNC: 22 MG/DL (ref 6–20)
BUN SERPL-MCNC: 23 MG/DL (ref 6–20)
BUN SERPL-MCNC: 24 MG/DL (ref 6–20)
BUN SERPL-MCNC: 25 MG/DL (ref 6–20)
BUN SERPL-MCNC: 26 MG/DL (ref 6–20)
BUN SERPL-MCNC: 26 MG/DL (ref 6–20)
BUN SERPL-MCNC: 28 MG/DL (ref 6–20)
BUN SERPL-MCNC: 28 MG/DL (ref 6–20)
BUN SERPL-MCNC: 29 MG/DL (ref 6–20)
BUN SERPL-MCNC: 30 MG/DL (ref 6–20)
BUN SERPL-MCNC: 31 MG/DL (ref 6–20)
BUN SERPL-MCNC: 31 MG/DL (ref 6–20)
BUN SERPL-MCNC: 32 MG/DL (ref 6–20)
BUN SERPL-MCNC: 36 MG/DL (ref 6–20)
BUN SERPL-MCNC: 37 MG/DL (ref 6–20)
BUN SERPL-MCNC: 51 MG/DL (ref 6–20)
BUN SERPL-MCNC: 51 MG/DL (ref 6–20)
BUN SERPL-MCNC: 52 MG/DL (ref 6–20)
BUN SERPL-MCNC: 53 MG/DL (ref 6–20)
BUN SERPL-MCNC: 9 MG/DL (ref 6–20)
BUN SERPL-MCNC: 9 MG/DL (ref 6–20)
BUN/CREAT SERPL: 10 (ref 12–20)
BUN/CREAT SERPL: 5 (ref 12–20)
BUN/CREAT SERPL: 6 (ref 12–20)
BUN/CREAT SERPL: 7 (ref 12–20)
BUN/CREAT SERPL: 8 (ref 12–20)
BUN/CREAT SERPL: 9 (ref 12–20)
CA-I BLD-MCNC: 0.78 MMOL/L (ref 1.12–1.32)
CA-I BLD-MCNC: 0.81 MMOL/L (ref 1.12–1.32)
CA-I BLD-MCNC: 0.85 MMOL/L (ref 1.12–1.32)
CA-I BLD-MCNC: 0.96 MMOL/L (ref 1.12–1.32)
CA-I BLD-SCNC: 1.05 MMOL/L (ref 1.13–1.32)
CA-I BLD-SCNC: 1.05 MMOL/L (ref 1.13–1.32)
CA-I BLD-SCNC: 1.07 MMOL/L (ref 1.13–1.32)
CA-I BLD-SCNC: 1.08 MMOL/L (ref 1.13–1.32)
CA-I BLD-SCNC: 1.09 MMOL/L (ref 1.13–1.32)
CA-I BLD-SCNC: 1.11 MMOL/L (ref 1.12–1.32)
CA-I BLD-SCNC: 1.11 MMOL/L (ref 1.13–1.32)
CA-I BLD-SCNC: 1.11 MMOL/L (ref 1.13–1.32)
CA-I BLD-SCNC: 1.12 MMOL/L (ref 1.13–1.32)
CA-I BLD-SCNC: 1.12 MMOL/L (ref 1.13–1.32)
CA-I BLD-SCNC: 1.13 MMOL/L (ref 1.13–1.32)
CA-I BLD-SCNC: 1.14 MMOL/L (ref 1.13–1.32)
CA-I BLD-SCNC: 1.15 MMOL/L (ref 1.13–1.32)
CA-I BLD-SCNC: 1.16 MMOL/L (ref 1.13–1.32)
CA-I BLD-SCNC: 1.17 MMOL/L (ref 1.13–1.32)
CA-I BLD-SCNC: 1.17 MMOL/L (ref 1.13–1.32)
CA-I BLD-SCNC: 1.18 MMOL/L (ref 1.13–1.32)
CA-I BLD-SCNC: 1.19 MMOL/L (ref 1.13–1.32)
CA-I BLD-SCNC: 1.2 MMOL/L (ref 1.13–1.32)
CA-I BLD-SCNC: 1.21 MMOL/L (ref 1.13–1.32)
CA-I BLD-SCNC: 1.22 MMOL/L (ref 1.13–1.32)
CA-I BLD-SCNC: 1.22 MMOL/L (ref 1.13–1.32)
CA-I BLD-SCNC: 1.23 MMOL/L (ref 1.13–1.32)
CALCIUM SERPL-MCNC: 10.1 MG/DL (ref 8.5–10.1)
CALCIUM SERPL-MCNC: 10.1 MG/DL (ref 8.5–10.1)
CALCIUM SERPL-MCNC: 10.2 MG/DL (ref 8.5–10.1)
CALCIUM SERPL-MCNC: 10.4 MG/DL (ref 8.5–10.1)
CALCIUM SERPL-MCNC: 12.1 MG/DL (ref 8.5–10.1)
CALCIUM SERPL-MCNC: 6.7 MG/DL (ref 8.5–10.1)
CALCIUM SERPL-MCNC: 7 MG/DL (ref 8.5–10.1)
CALCIUM SERPL-MCNC: 7.8 MG/DL (ref 8.5–10.1)
CALCIUM SERPL-MCNC: 8 MG/DL (ref 8.5–10.1)
CALCIUM SERPL-MCNC: 8.1 MG/DL (ref 8.5–10.1)
CALCIUM SERPL-MCNC: 8.2 MG/DL (ref 8.5–10.1)
CALCIUM SERPL-MCNC: 8.4 MG/DL (ref 8.5–10.1)
CALCIUM SERPL-MCNC: 8.6 MG/DL (ref 8.5–10.1)
CALCIUM SERPL-MCNC: 8.7 MG/DL (ref 8.5–10.1)
CALCIUM SERPL-MCNC: 8.8 MG/DL (ref 8.5–10.1)
CALCIUM SERPL-MCNC: 8.9 MG/DL (ref 8.5–10.1)
CALCIUM SERPL-MCNC: 9 MG/DL (ref 8.5–10.1)
CALCIUM SERPL-MCNC: 9.1 MG/DL (ref 8.5–10.1)
CALCIUM SERPL-MCNC: 9.3 MG/DL (ref 8.5–10.1)
CALCIUM SERPL-MCNC: 9.4 MG/DL (ref 8.5–10.1)
CALCIUM SERPL-MCNC: 9.5 MG/DL (ref 8.5–10.1)
CALCULATED P AXIS, ECG09: -96 DEGREES
CALCULATED P AXIS, ECG09: 32 DEGREES
CALCULATED P AXIS, ECG09: 52 DEGREES
CALCULATED P AXIS, ECG09: 53 DEGREES
CALCULATED P AXIS, ECG09: 58 DEGREES
CALCULATED P AXIS, ECG09: 60 DEGREES
CALCULATED P AXIS, ECG09: 65 DEGREES
CALCULATED P AXIS, ECG09: 68 DEGREES
CALCULATED P AXIS, ECG09: 77 DEGREES
CALCULATED R AXIS, ECG10: 28 DEGREES
CALCULATED R AXIS, ECG10: 31 DEGREES
CALCULATED R AXIS, ECG10: 32 DEGREES
CALCULATED R AXIS, ECG10: 43 DEGREES
CALCULATED R AXIS, ECG10: 52 DEGREES
CALCULATED R AXIS, ECG10: 52 DEGREES
CALCULATED R AXIS, ECG10: 63 DEGREES
CALCULATED R AXIS, ECG10: 65 DEGREES
CALCULATED R AXIS, ECG10: 70 DEGREES
CALCULATED R AXIS, ECG10: 75 DEGREES
CALCULATED T AXIS, ECG11: -148 DEGREES
CALCULATED T AXIS, ECG11: -57 DEGREES
CALCULATED T AXIS, ECG11: 10 DEGREES
CALCULATED T AXIS, ECG11: 26 DEGREES
CALCULATED T AXIS, ECG11: 44 DEGREES
CALCULATED T AXIS, ECG11: 57 DEGREES
CALCULATED T AXIS, ECG11: 60 DEGREES
CALCULATED T AXIS, ECG11: 75 DEGREES
CALCULATED T AXIS, ECG11: 84 DEGREES
CALCULATED T AXIS, ECG11: 90 DEGREES
CC UR VC: NORMAL
CHLORIDE BLD-SCNC: 102 MMOL/L (ref 100–108)
CHLORIDE BLD-SCNC: 103 MMOL/L (ref 100–108)
CHLORIDE BLD-SCNC: 107 MMOL/L (ref 100–108)
CHLORIDE BLD-SCNC: 99 MMOL/L (ref 100–108)
CHLORIDE SERPL-SCNC: 100 MMOL/L (ref 97–108)
CHLORIDE SERPL-SCNC: 100 MMOL/L (ref 97–108)
CHLORIDE SERPL-SCNC: 101 MMOL/L (ref 97–108)
CHLORIDE SERPL-SCNC: 102 MMOL/L (ref 97–108)
CHLORIDE SERPL-SCNC: 103 MMOL/L (ref 97–108)
CHLORIDE SERPL-SCNC: 104 MMOL/L (ref 97–108)
CHLORIDE SERPL-SCNC: 105 MMOL/L (ref 97–108)
CHLORIDE SERPL-SCNC: 106 MMOL/L (ref 97–108)
CHLORIDE SERPL-SCNC: 106 MMOL/L (ref 97–108)
CHLORIDE SERPL-SCNC: 108 MMOL/L (ref 97–108)
CHLORIDE SERPL-SCNC: 94 MMOL/L (ref 97–108)
CHLORIDE SERPL-SCNC: 95 MMOL/L (ref 97–108)
CHLORIDE SERPL-SCNC: 96 MMOL/L (ref 97–108)
CHLORIDE SERPL-SCNC: 97 MMOL/L (ref 97–108)
CHLORIDE SERPL-SCNC: 97 MMOL/L (ref 97–108)
CHLORIDE SERPL-SCNC: 98 MMOL/L (ref 97–108)
CHLORIDE SERPL-SCNC: 98 MMOL/L (ref 97–108)
CHLORIDE SERPL-SCNC: 99 MMOL/L (ref 97–108)
CK SERPL-CCNC: 1034 U/L (ref 26–192)
CK SERPL-CCNC: 1249 U/L (ref 26–192)
CK SERPL-CCNC: 1652 U/L (ref 26–192)
CK SERPL-CCNC: 1802 U/L (ref 26–192)
CK SERPL-CCNC: 1875 U/L (ref 26–192)
CK SERPL-CCNC: 2039 U/L (ref 26–192)
CK SERPL-CCNC: 2135 U/L (ref 26–192)
CK SERPL-CCNC: 2305 U/L (ref 26–192)
CK SERPL-CCNC: 239 U/L (ref 26–192)
CK SERPL-CCNC: 2507 U/L (ref 26–192)
CK SERPL-CCNC: 2746 U/L (ref 26–192)
CK SERPL-CCNC: 314 U/L (ref 26–192)
CK SERPL-CCNC: 357 U/L (ref 26–192)
CK SERPL-CCNC: 387 U/L (ref 26–192)
CK SERPL-CCNC: 708 U/L (ref 26–192)
CK SERPL-CCNC: 904 U/L (ref 26–192)
CO2 BLD-SCNC: 21 MMOL/L (ref 19–24)
CO2 BLD-SCNC: 22 MMOL/L (ref 19–24)
CO2 BLD-SCNC: 23 MMOL/L (ref 19–24)
CO2 BLD-SCNC: 25 MMOL/L (ref 19–24)
CO2 SERPL-SCNC: 20 MMOL/L (ref 21–32)
CO2 SERPL-SCNC: 21 MMOL/L (ref 21–32)
CO2 SERPL-SCNC: 22 MMOL/L (ref 21–32)
CO2 SERPL-SCNC: 23 MMOL/L (ref 21–32)
CO2 SERPL-SCNC: 24 MMOL/L (ref 21–32)
CO2 SERPL-SCNC: 25 MMOL/L (ref 21–32)
CO2 SERPL-SCNC: 26 MMOL/L (ref 21–32)
CO2 SERPL-SCNC: 27 MMOL/L (ref 21–32)
CO2 SERPL-SCNC: 28 MMOL/L (ref 21–32)
CO2 SERPL-SCNC: 29 MMOL/L (ref 21–32)
CO2 SERPL-SCNC: 31 MMOL/L (ref 21–32)
COLOR UR: ABNORMAL
COMMENT, HOLDF: NORMAL
CORTIS SERPL-MCNC: 14.2 UG/DL
COVID-19 RAPID TEST, COVR: NOT DETECTED
COXSACKIE A7 IGM, 163291: NEGATIVE TITER
CREAT SERPL-MCNC: 1.09 MG/DL (ref 0.55–1.02)
CREAT SERPL-MCNC: 1.13 MG/DL (ref 0.55–1.02)
CREAT SERPL-MCNC: 1.13 MG/DL (ref 0.55–1.02)
CREAT SERPL-MCNC: 1.2 MG/DL (ref 0.55–1.02)
CREAT SERPL-MCNC: 1.2 MG/DL (ref 0.55–1.02)
CREAT SERPL-MCNC: 1.41 MG/DL (ref 0.55–1.02)
CREAT SERPL-MCNC: 1.46 MG/DL (ref 0.55–1.02)
CREAT SERPL-MCNC: 1.47 MG/DL (ref 0.55–1.02)
CREAT SERPL-MCNC: 1.51 MG/DL (ref 0.55–1.02)
CREAT SERPL-MCNC: 1.98 MG/DL (ref 0.55–1.02)
CREAT SERPL-MCNC: 2.11 MG/DL (ref 0.55–1.02)
CREAT SERPL-MCNC: 2.37 MG/DL (ref 0.55–1.02)
CREAT SERPL-MCNC: 2.49 MG/DL (ref 0.55–1.02)
CREAT SERPL-MCNC: 2.64 MG/DL (ref 0.55–1.02)
CREAT SERPL-MCNC: 2.73 MG/DL (ref 0.55–1.02)
CREAT SERPL-MCNC: 2.83 MG/DL (ref 0.55–1.02)
CREAT SERPL-MCNC: 2.89 MG/DL (ref 0.55–1.02)
CREAT SERPL-MCNC: 3.01 MG/DL (ref 0.55–1.02)
CREAT SERPL-MCNC: 3.07 MG/DL (ref 0.55–1.02)
CREAT SERPL-MCNC: 3.09 MG/DL (ref 0.55–1.02)
CREAT SERPL-MCNC: 3.18 MG/DL (ref 0.55–1.02)
CREAT SERPL-MCNC: 3.23 MG/DL (ref 0.55–1.02)
CREAT SERPL-MCNC: 3.27 MG/DL (ref 0.55–1.02)
CREAT SERPL-MCNC: 3.3 MG/DL (ref 0.55–1.02)
CREAT SERPL-MCNC: 3.3 MG/DL (ref 0.55–1.02)
CREAT SERPL-MCNC: 3.42 MG/DL (ref 0.55–1.02)
CREAT SERPL-MCNC: 3.47 MG/DL (ref 0.55–1.02)
CREAT SERPL-MCNC: 3.5 MG/DL (ref 0.55–1.02)
CREAT SERPL-MCNC: 3.53 MG/DL (ref 0.55–1.02)
CREAT SERPL-MCNC: 3.57 MG/DL (ref 0.55–1.02)
CREAT SERPL-MCNC: 3.6 MG/DL (ref 0.55–1.02)
CREAT SERPL-MCNC: 3.67 MG/DL (ref 0.55–1.02)
CREAT SERPL-MCNC: 3.69 MG/DL (ref 0.55–1.02)
CREAT SERPL-MCNC: 3.71 MG/DL (ref 0.55–1.02)
CREAT SERPL-MCNC: 3.75 MG/DL (ref 0.55–1.02)
CREAT SERPL-MCNC: 3.75 MG/DL (ref 0.55–1.02)
CREAT SERPL-MCNC: 3.82 MG/DL (ref 0.55–1.02)
CREAT SERPL-MCNC: 3.99 MG/DL (ref 0.55–1.02)
CREAT SERPL-MCNC: 4.45 MG/DL (ref 0.55–1.02)
CREAT SERPL-MCNC: 5.06 MG/DL (ref 0.55–1.02)
CREAT SERPL-MCNC: 5.1 MG/DL (ref 0.55–1.02)
CREAT SERPL-MCNC: 5.93 MG/DL (ref 0.55–1.02)
CREAT SERPL-MCNC: 6.19 MG/DL (ref 0.55–1.02)
CREAT SERPL-MCNC: 6.35 MG/DL (ref 0.55–1.02)
CREAT SERPL-MCNC: 6.72 MG/DL (ref 0.55–1.02)
CREAT UR-MCNC: 2.7 MG/DL (ref 0.6–1.3)
CREAT UR-MCNC: 2.8 MG/DL (ref 0.6–1.3)
CREAT UR-MCNC: 3.1 MG/DL (ref 0.6–1.3)
CREAT UR-MCNC: 3.3 MG/DL (ref 0.6–1.3)
CROSSMATCH RESULT,%XM: NORMAL
CV A16 IGG TITR SER IF: NEGATIVE TITER
CV A16 IGM TITR SER IF: NEGATIVE TITER
CV A24 IGG TITR SER IF: ABNORMAL TITER
CV A24 IGM TITR SER IF: NEGATIVE TITER
CV A7 IGG TITR SER IF: NEGATIVE TITER
CV A9 IGG TITR SER IF: NEGATIVE TITER
CV A9 IGM TITR SER IF: NEGATIVE TITER
CV B1 AB TITR SER CF: NEGATIVE {TITER}
CV B2 AB TITR SER CF: NEGATIVE {TITER}
CV B3 AB TITR SER CF: NEGATIVE {TITER}
CV B4 AB TITR SER CF: NEGATIVE {TITER}
CV B5 AB TITR SER CF: ABNORMAL {TITER}
CV B6 AB TITR SER CF: NEGATIVE {TITER}
DIAGNOSIS, 93000: NORMAL
DIFFERENTIAL METHOD BLD: ABNORMAL
ECHO AO ASC DIAM: 2.3 CM
ECHO AO ASCENDING AORTA INDEX: 1.48 CM/M2
ECHO AO ROOT DIAM: 2.4 CM
ECHO AO ROOT INDEX: 1.55 CM/M2
ECHO LA DIAMETER INDEX: 1.94 CM/M2
ECHO LA DIAMETER: 3 CM
ECHO LA TO AORTIC ROOT RATIO: 1.25
ECHO LA VOL 2C: 39 ML (ref 22–52)
ECHO LA VOL 4C: 30 ML (ref 22–52)
ECHO LA VOL BP: 36 ML (ref 22–52)
ECHO LA VOL/BSA BIPLANE: 23 ML/M2 (ref 16–34)
ECHO LA VOLUME AREA LENGTH: 42 ML
ECHO LA VOLUME INDEX A2C: 25 ML/M2 (ref 16–34)
ECHO LA VOLUME INDEX A4C: 19 ML/M2 (ref 16–34)
ECHO LA VOLUME INDEX AREA LENGTH: 27 ML/M2 (ref 16–34)
ECHO LV EDV A2C: 120 ML
ECHO LV EDV A2C: 120 ML
ECHO LV EDV A4C: 70 ML
ECHO LV EDV A4C: 87 ML
ECHO LV EDV BP: 105 ML (ref 56–104)
ECHO LV EDV BP: 96 ML (ref 56–104)
ECHO LV EDV INDEX A4C: 45 ML/M2
ECHO LV EDV INDEX A4C: 53 ML/M2
ECHO LV EDV INDEX BP: 62 ML/M2
ECHO LV EDV INDEX BP: 64 ML/M2
ECHO LV EDV NDEX A2C: 74 ML/M2
ECHO LV EDV NDEX A2C: 77 ML/M2
ECHO LV EJECTION FRACTION A2C: 37 %
ECHO LV EJECTION FRACTION A2C: 43 %
ECHO LV EJECTION FRACTION A4C: 18 %
ECHO LV EJECTION FRACTION A4C: 37 %
ECHO LV EJECTION FRACTION BIPLANE: 24 % (ref 55–100)
ECHO LV EJECTION FRACTION BIPLANE: 41 % (ref 55–100)
ECHO LV ESV A2C: 68 ML
ECHO LV ESV A2C: 75 ML
ECHO LV ESV A4C: 44 ML
ECHO LV ESV A4C: 72 ML
ECHO LV ESV BP: 57 ML (ref 19–49)
ECHO LV ESV BP: 80 ML (ref 19–49)
ECHO LV ESV INDEX A2C: 44 ML/M2
ECHO LV ESV INDEX A2C: 46 ML/M2
ECHO LV ESV INDEX A4C: 28 ML/M2
ECHO LV ESV INDEX A4C: 44 ML/M2
ECHO LV ESV INDEX BP: 37 ML/M2
ECHO LV ESV INDEX BP: 49 ML/M2
ECHO LV FRACTIONAL SHORTENING: 0 % (ref 28–44)
ECHO LV FRACTIONAL SHORTENING: 27 % (ref 28–44)
ECHO LV INTERNAL DIMENSION DIASTOLE INDEX: 2.84 CM/M2
ECHO LV INTERNAL DIMENSION DIASTOLE INDEX: 3.01 CM/M2
ECHO LV INTERNAL DIMENSION DIASTOLIC: 4.4 CM (ref 3.9–5.3)
ECHO LV INTERNAL DIMENSION DIASTOLIC: 4.9 CM (ref 3.9–5.3)
ECHO LV INTERNAL DIMENSION SYSTOLIC INDEX: 2.06 CM/M2
ECHO LV INTERNAL DIMENSION SYSTOLIC INDEX: 3.01 CM/M2
ECHO LV INTERNAL DIMENSION SYSTOLIC: 3.2 CM
ECHO LV INTERNAL DIMENSION SYSTOLIC: 4.9 CM
ECHO LV IVSD: 0.8 CM (ref 0.6–0.9)
ECHO LV IVSD: 1 CM (ref 0.6–0.9)
ECHO LV MASS 2D: 128 G (ref 67–162)
ECHO LV MASS 2D: 153 G (ref 67–162)
ECHO LV MASS INDEX 2D: 82.6 G/M2 (ref 43–95)
ECHO LV MASS INDEX 2D: 93.8 G/M2 (ref 43–95)
ECHO LV POSTERIOR WALL DIASTOLIC: 0.8 CM (ref 0.6–0.9)
ECHO LV POSTERIOR WALL DIASTOLIC: 1 CM (ref 0.6–0.9)
ECHO LV RELATIVE WALL THICKNESS RATIO: 0.36
ECHO LV RELATIVE WALL THICKNESS RATIO: 0.41
ECHO LVOT AREA: 1.8 CM2
ECHO LVOT DIAM: 1.5 CM
ECHO MV A VELOCITY: 0.87 M/S
ECHO MV E DECELERATION TIME (DT): 171.9 MS
ECHO MV E VELOCITY: 1.05 M/S
ECHO MV E/A RATIO: 1.21
ECHO MV MAX VELOCITY: 1.1 M/S
ECHO MV MEAN GRADIENT: 2 MMHG
ECHO MV MEAN VELOCITY: 0.8 M/S
ECHO MV PEAK GRADIENT: 5 MMHG
ECHO MV REGURGITANT PEAK GRADIENT: 12 MMHG
ECHO MV REGURGITANT PEAK VELOCITY: 1.7 M/S
ECHO MV VTI: 28.8 CM
ECHO PULMONARY ARTERY END DIASTOLIC PRESSURE: 6 MMHG
ECHO PV MAX VELOCITY: 0.8 M/S
ECHO PV PEAK GRADIENT: 3 MMHG
ECHO PV REGURGITANT MAX VELOCITY: 1.2 M/S
ECHO RV INTERNAL DIMENSION: 3.2 CM
ECHO RV TAPSE: 1.2 CM (ref 1.7–?)
ECHO TV REGURGITANT MAX VELOCITY: 2.6 M/S
ECHO TV REGURGITANT PEAK GRADIENT: 27 MMHG
EOSINOPHIL # BLD: 0 K/UL (ref 0–0.4)
EOSINOPHIL # BLD: 0.1 K/UL (ref 0–0.4)
EOSINOPHIL # BLD: 0.2 K/UL (ref 0–0.4)
EOSINOPHIL NFR BLD: 0 % (ref 0–7)
EOSINOPHIL NFR BLD: 1 % (ref 0–7)
EOSINOPHIL NFR BLD: 2 % (ref 0–7)
EPITH CASTS URNS QL MICRO: ABNORMAL /LPF
ERYTHROCYTE [DISTWIDTH] IN BLOOD BY AUTOMATED COUNT: 12.6 % (ref 11.5–14.5)
ERYTHROCYTE [DISTWIDTH] IN BLOOD BY AUTOMATED COUNT: 13.2 % (ref 11.5–14.5)
ERYTHROCYTE [DISTWIDTH] IN BLOOD BY AUTOMATED COUNT: 13.5 % (ref 11.5–14.5)
ERYTHROCYTE [DISTWIDTH] IN BLOOD BY AUTOMATED COUNT: 13.5 % (ref 11.5–14.5)
ERYTHROCYTE [DISTWIDTH] IN BLOOD BY AUTOMATED COUNT: 14.1 % (ref 11.5–14.5)
ERYTHROCYTE [DISTWIDTH] IN BLOOD BY AUTOMATED COUNT: 15 % (ref 11.5–14.5)
ERYTHROCYTE [DISTWIDTH] IN BLOOD BY AUTOMATED COUNT: 15.1 % (ref 11.5–14.5)
ERYTHROCYTE [DISTWIDTH] IN BLOOD BY AUTOMATED COUNT: 15.1 % (ref 11.5–14.5)
ERYTHROCYTE [DISTWIDTH] IN BLOOD BY AUTOMATED COUNT: 15.2 % (ref 11.5–14.5)
ERYTHROCYTE [DISTWIDTH] IN BLOOD BY AUTOMATED COUNT: 15.4 % (ref 11.5–14.5)
ERYTHROCYTE [DISTWIDTH] IN BLOOD BY AUTOMATED COUNT: 15.8 % (ref 11.5–14.5)
ERYTHROCYTE [DISTWIDTH] IN BLOOD BY AUTOMATED COUNT: 16.3 % (ref 11.5–14.5)
ERYTHROCYTE [DISTWIDTH] IN BLOOD BY AUTOMATED COUNT: 16.4 % (ref 11.5–14.5)
ERYTHROCYTE [DISTWIDTH] IN BLOOD BY AUTOMATED COUNT: 16.6 % (ref 11.5–14.5)
ERYTHROCYTE [DISTWIDTH] IN BLOOD BY AUTOMATED COUNT: 16.6 % (ref 11.5–14.5)
ERYTHROCYTE [DISTWIDTH] IN BLOOD BY AUTOMATED COUNT: 17 % (ref 11.5–14.5)
ERYTHROCYTE [DISTWIDTH] IN BLOOD BY AUTOMATED COUNT: 17 % (ref 11.5–14.5)
ERYTHROCYTE [DISTWIDTH] IN BLOOD BY AUTOMATED COUNT: 17.2 % (ref 11.5–14.5)
ERYTHROCYTE [DISTWIDTH] IN BLOOD BY AUTOMATED COUNT: 17.4 % (ref 11.5–14.5)
ERYTHROCYTE [DISTWIDTH] IN BLOOD BY AUTOMATED COUNT: 17.4 % (ref 11.5–14.5)
ERYTHROCYTE [DISTWIDTH] IN BLOOD BY AUTOMATED COUNT: 17.5 % (ref 11.5–14.5)
ERYTHROCYTE [DISTWIDTH] IN BLOOD BY AUTOMATED COUNT: 17.6 % (ref 11.5–14.5)
ERYTHROCYTE [DISTWIDTH] IN BLOOD BY AUTOMATED COUNT: 17.7 % (ref 11.5–14.5)
ERYTHROCYTE [DISTWIDTH] IN BLOOD BY AUTOMATED COUNT: 17.8 % (ref 11.5–14.5)
ERYTHROCYTE [DISTWIDTH] IN BLOOD BY AUTOMATED COUNT: 18 % (ref 11.5–14.5)
ERYTHROCYTE [DISTWIDTH] IN BLOOD BY AUTOMATED COUNT: 18 % (ref 11.5–14.5)
ERYTHROCYTE [DISTWIDTH] IN BLOOD BY AUTOMATED COUNT: 18.2 % (ref 11.5–14.5)
ERYTHROCYTE [DISTWIDTH] IN BLOOD BY AUTOMATED COUNT: 18.4 % (ref 11.5–14.5)
ERYTHROCYTE [DISTWIDTH] IN BLOOD BY AUTOMATED COUNT: 18.6 % (ref 11.5–14.5)
ERYTHROCYTE [DISTWIDTH] IN BLOOD BY AUTOMATED COUNT: 18.6 % (ref 11.5–14.5)
ERYTHROCYTE [DISTWIDTH] IN BLOOD BY AUTOMATED COUNT: 18.9 % (ref 11.5–14.5)
ERYTHROCYTE [DISTWIDTH] IN BLOOD BY AUTOMATED COUNT: 19 % (ref 11.5–14.5)
ERYTHROCYTE [DISTWIDTH] IN BLOOD BY AUTOMATED COUNT: 19.2 % (ref 11.5–14.5)
ERYTHROCYTE [DISTWIDTH] IN BLOOD BY AUTOMATED COUNT: 19.6 % (ref 11.5–14.5)
FERRITIN SERPL-MCNC: 2935 NG/ML (ref 8–252)
FIBRINOGEN PPP-MCNC: 253 MG/DL (ref 200–475)
FIBRINOGEN PPP-MCNC: 266 MG/DL (ref 200–475)
FIO2 ON VENT: 100 %
FIO2 ON VENT: 100 %
FIO2 ON VENT: 40 %
FIO2 ON VENT: 50 %
FIO2 ON VENT: 70 %
GAS FLOW.O2 O2 DELIVERY SYS: ABNORMAL L/MIN
GAS FLOW.O2 SETTING OXYMISER: 10 L/MIN
GAS FLOW.O2 SETTING OXYMISER: 12 L/MIN
GAS FLOW.O2 SETTING OXYMISER: 12 L/MIN
GAS FLOW.O2 SETTING OXYMISER: 14 BPM
GAS FLOW.O2 SETTING OXYMISER: 14 L/MIN
GLOBULIN SER CALC-MCNC: 1.7 G/DL (ref 2–4)
GLOBULIN SER CALC-MCNC: 1.7 G/DL (ref 2–4)
GLOBULIN SER CALC-MCNC: 1.9 G/DL (ref 2–4)
GLOBULIN SER CALC-MCNC: 1.9 G/DL (ref 2–4)
GLOBULIN SER CALC-MCNC: 2 G/DL (ref 2–4)
GLOBULIN SER CALC-MCNC: 2 G/DL (ref 2–4)
GLOBULIN SER CALC-MCNC: 2.1 G/DL (ref 2–4)
GLOBULIN SER CALC-MCNC: 2.1 G/DL (ref 2–4)
GLOBULIN SER CALC-MCNC: 2.2 G/DL (ref 2–4)
GLOBULIN SER CALC-MCNC: 2.3 G/DL (ref 2–4)
GLOBULIN SER CALC-MCNC: 2.4 G/DL (ref 2–4)
GLOBULIN SER CALC-MCNC: 2.4 G/DL (ref 2–4)
GLOBULIN SER CALC-MCNC: 2.5 G/DL (ref 2–4)
GLOBULIN SER CALC-MCNC: 2.5 G/DL (ref 2–4)
GLOBULIN SER CALC-MCNC: 2.6 G/DL (ref 2–4)
GLOBULIN SER CALC-MCNC: 2.6 G/DL (ref 2–4)
GLOBULIN SER CALC-MCNC: 2.8 G/DL (ref 2–4)
GLOBULIN SER CALC-MCNC: 2.9 G/DL (ref 2–4)
GLOBULIN SER CALC-MCNC: 3 G/DL (ref 2–4)
GLOBULIN SER CALC-MCNC: 4.5 G/DL (ref 2–4)
GLOBULIN SER CALC-MCNC: 4.7 G/DL (ref 2–4)
GLOBULIN SER CALC-MCNC: 4.7 G/DL (ref 2–4)
GLOBULIN SER CALC-MCNC: 4.8 G/DL (ref 2–4)
GLOBULIN SER CALC-MCNC: 5.5 G/DL (ref 2–4)
GLOBULIN SER CALC-MCNC: 5.6 G/DL (ref 2–4)
GLOBULIN SER CALC-MCNC: 5.6 G/DL (ref 2–4)
GLUCOSE BLD STRIP.AUTO-MCNC: 100 MG/DL (ref 65–117)
GLUCOSE BLD STRIP.AUTO-MCNC: 102 MG/DL (ref 74–106)
GLUCOSE BLD STRIP.AUTO-MCNC: 103 MG/DL (ref 65–117)
GLUCOSE BLD STRIP.AUTO-MCNC: 104 MG/DL (ref 65–117)
GLUCOSE BLD STRIP.AUTO-MCNC: 105 MG/DL (ref 65–117)
GLUCOSE BLD STRIP.AUTO-MCNC: 113 MG/DL (ref 65–117)
GLUCOSE BLD STRIP.AUTO-MCNC: 118 MG/DL (ref 65–117)
GLUCOSE BLD STRIP.AUTO-MCNC: 119 MG/DL (ref 65–117)
GLUCOSE BLD STRIP.AUTO-MCNC: 123 MG/DL (ref 65–117)
GLUCOSE BLD STRIP.AUTO-MCNC: 123 MG/DL (ref 74–106)
GLUCOSE BLD STRIP.AUTO-MCNC: 127 MG/DL (ref 65–117)
GLUCOSE BLD STRIP.AUTO-MCNC: 137 MG/DL (ref 65–117)
GLUCOSE BLD STRIP.AUTO-MCNC: 143 MG/DL (ref 65–117)
GLUCOSE BLD STRIP.AUTO-MCNC: 147 MG/DL (ref 65–117)
GLUCOSE BLD STRIP.AUTO-MCNC: 147 MG/DL (ref 65–117)
GLUCOSE BLD STRIP.AUTO-MCNC: 154 MG/DL (ref 74–106)
GLUCOSE BLD STRIP.AUTO-MCNC: 156 MG/DL (ref 74–106)
GLUCOSE BLD STRIP.AUTO-MCNC: 160 MG/DL (ref 65–117)
GLUCOSE BLD STRIP.AUTO-MCNC: 161 MG/DL (ref 65–117)
GLUCOSE BLD STRIP.AUTO-MCNC: 164 MG/DL (ref 65–117)
GLUCOSE BLD STRIP.AUTO-MCNC: 172 MG/DL (ref 65–117)
GLUCOSE BLD STRIP.AUTO-MCNC: 173 MG/DL (ref 65–117)
GLUCOSE BLD STRIP.AUTO-MCNC: 178 MG/DL (ref 65–117)
GLUCOSE BLD STRIP.AUTO-MCNC: 182 MG/DL (ref 65–117)
GLUCOSE BLD STRIP.AUTO-MCNC: 182 MG/DL (ref 65–117)
GLUCOSE BLD STRIP.AUTO-MCNC: 183 MG/DL (ref 65–117)
GLUCOSE BLD STRIP.AUTO-MCNC: 183 MG/DL (ref 65–117)
GLUCOSE BLD STRIP.AUTO-MCNC: 188 MG/DL (ref 65–117)
GLUCOSE BLD STRIP.AUTO-MCNC: 188 MG/DL (ref 65–117)
GLUCOSE BLD STRIP.AUTO-MCNC: 190 MG/DL (ref 65–117)
GLUCOSE BLD STRIP.AUTO-MCNC: 190 MG/DL (ref 65–117)
GLUCOSE BLD STRIP.AUTO-MCNC: 199 MG/DL (ref 65–117)
GLUCOSE BLD STRIP.AUTO-MCNC: 208 MG/DL (ref 65–117)
GLUCOSE BLD STRIP.AUTO-MCNC: 209 MG/DL (ref 65–117)
GLUCOSE BLD STRIP.AUTO-MCNC: 213 MG/DL (ref 65–117)
GLUCOSE BLD STRIP.AUTO-MCNC: 214 MG/DL (ref 65–117)
GLUCOSE BLD STRIP.AUTO-MCNC: 228 MG/DL (ref 65–117)
GLUCOSE BLD STRIP.AUTO-MCNC: 244 MG/DL (ref 65–117)
GLUCOSE BLD STRIP.AUTO-MCNC: 248 MG/DL (ref 65–117)
GLUCOSE BLD STRIP.AUTO-MCNC: 461 MG/DL (ref 65–117)
GLUCOSE BLD STRIP.AUTO-MCNC: 88 MG/DL (ref 65–117)
GLUCOSE BLD STRIP.AUTO-MCNC: 99 MG/DL (ref 65–117)
GLUCOSE BLD STRIP.AUTO-MCNC: NORMAL MG/DL (ref 65–117)
GLUCOSE BLD-MCNC: 47 MG/DL (ref 65–100)
GLUCOSE BLD-MCNC: 47 MG/DL (ref 65–100)
GLUCOSE SERPL-MCNC: 101 MG/DL (ref 65–100)
GLUCOSE SERPL-MCNC: 103 MG/DL (ref 65–100)
GLUCOSE SERPL-MCNC: 104 MG/DL (ref 65–100)
GLUCOSE SERPL-MCNC: 105 MG/DL (ref 65–100)
GLUCOSE SERPL-MCNC: 106 MG/DL (ref 65–100)
GLUCOSE SERPL-MCNC: 113 MG/DL (ref 65–100)
GLUCOSE SERPL-MCNC: 113 MG/DL (ref 65–100)
GLUCOSE SERPL-MCNC: 114 MG/DL (ref 65–100)
GLUCOSE SERPL-MCNC: 117 MG/DL (ref 65–100)
GLUCOSE SERPL-MCNC: 136 MG/DL (ref 65–100)
GLUCOSE SERPL-MCNC: 136 MG/DL (ref 65–100)
GLUCOSE SERPL-MCNC: 137 MG/DL (ref 65–100)
GLUCOSE SERPL-MCNC: 144 MG/DL (ref 65–100)
GLUCOSE SERPL-MCNC: 156 MG/DL (ref 65–100)
GLUCOSE SERPL-MCNC: 156 MG/DL (ref 65–100)
GLUCOSE SERPL-MCNC: 160 MG/DL (ref 65–100)
GLUCOSE SERPL-MCNC: 165 MG/DL (ref 65–100)
GLUCOSE SERPL-MCNC: 166 MG/DL (ref 65–100)
GLUCOSE SERPL-MCNC: 168 MG/DL (ref 65–100)
GLUCOSE SERPL-MCNC: 173 MG/DL (ref 65–100)
GLUCOSE SERPL-MCNC: 177 MG/DL (ref 65–100)
GLUCOSE SERPL-MCNC: 188 MG/DL (ref 65–100)
GLUCOSE SERPL-MCNC: 190 MG/DL (ref 65–100)
GLUCOSE SERPL-MCNC: 191 MG/DL (ref 65–100)
GLUCOSE SERPL-MCNC: 193 MG/DL (ref 65–100)
GLUCOSE SERPL-MCNC: 195 MG/DL (ref 65–100)
GLUCOSE SERPL-MCNC: 196 MG/DL (ref 65–100)
GLUCOSE SERPL-MCNC: 198 MG/DL (ref 65–100)
GLUCOSE SERPL-MCNC: 198 MG/DL (ref 65–100)
GLUCOSE SERPL-MCNC: 199 MG/DL (ref 65–100)
GLUCOSE SERPL-MCNC: 201 MG/DL (ref 65–100)
GLUCOSE SERPL-MCNC: 205 MG/DL (ref 65–100)
GLUCOSE SERPL-MCNC: 215 MG/DL (ref 65–100)
GLUCOSE SERPL-MCNC: 220 MG/DL (ref 65–100)
GLUCOSE SERPL-MCNC: 228 MG/DL (ref 65–100)
GLUCOSE SERPL-MCNC: 231 MG/DL (ref 65–100)
GLUCOSE SERPL-MCNC: 235 MG/DL (ref 65–100)
GLUCOSE SERPL-MCNC: 251 MG/DL (ref 65–100)
GLUCOSE SERPL-MCNC: 254 MG/DL (ref 65–100)
GLUCOSE SERPL-MCNC: 415 MG/DL (ref 65–100)
GLUCOSE SERPL-MCNC: 547 MG/DL (ref 65–100)
GLUCOSE SERPL-MCNC: 77 MG/DL (ref 65–100)
GLUCOSE SERPL-MCNC: 78 MG/DL (ref 65–100)
GLUCOSE SERPL-MCNC: 93 MG/DL (ref 65–100)
GLUCOSE SERPL-MCNC: 94 MG/DL (ref 65–100)
GLUCOSE UR STRIP.AUTO-MCNC: 500 MG/DL
GLUCOSE UR STRIP.AUTO-MCNC: 500 MG/DL
GLUCOSE UR STRIP.AUTO-MCNC: NEGATIVE MG/DL
GRAM STN SPEC: NORMAL
GRAM STN SPEC: NORMAL
HBV SURFACE AB SER QL: NONREACTIVE
HBV SURFACE AB SER QL: NONREACTIVE
HBV SURFACE AB SER-ACNC: 3.96 MIU/ML
HBV SURFACE AB SER-ACNC: 4.05 MIU/ML
HBV SURFACE AG SER QL: <0.1 INDEX
HBV SURFACE AG SER QL: <0.1 INDEX
HBV SURFACE AG SER QL: NEGATIVE
HBV SURFACE AG SER QL: NEGATIVE
HCO3 BLD-SCNC: 19.9 MMOL/L (ref 22–26)
HCO3 BLDA-SCNC: 11 MMOL/L (ref 22–26)
HCO3 BLDA-SCNC: 15 MMOL/L (ref 22–26)
HCO3 BLDA-SCNC: 16 MMOL/L (ref 22–26)
HCO3 BLDA-SCNC: 17 MMOL/L (ref 22–26)
HCO3 BLDA-SCNC: 18 MMOL/L (ref 22–26)
HCO3 BLDA-SCNC: 19 MMOL/L (ref 22–26)
HCO3 BLDA-SCNC: 20 MMOL/L (ref 22–26)
HCO3 BLDA-SCNC: 21 MMOL/L
HCO3 BLDA-SCNC: 21 MMOL/L (ref 22–26)
HCO3 BLDA-SCNC: 22 MMOL/L
HCO3 BLDA-SCNC: 22 MMOL/L (ref 22–26)
HCO3 BLDA-SCNC: 23 MMOL/L
HCO3 BLDA-SCNC: 23 MMOL/L (ref 22–26)
HCO3 BLDA-SCNC: 24 MMOL/L (ref 22–26)
HCO3 BLDA-SCNC: 25 MMOL/L
HCO3 BLDA-SCNC: 25 MMOL/L (ref 22–26)
HCO3 BLDA-SCNC: 26 MMOL/L (ref 22–26)
HCO3 BLDA-SCNC: 27 MMOL/L (ref 22–26)
HCO3 BLDA-SCNC: 29 MMOL/L (ref 22–26)
HCO3 BLDA-SCNC: 30 MMOL/L (ref 22–26)
HCO3 BLDV-SCNC: 18 MMOL/L (ref 23–28)
HCO3 BLDV-SCNC: 19 MMOL/L (ref 23–28)
HCO3 BLDV-SCNC: 20 MMOL/L (ref 23–28)
HCO3 BLDV-SCNC: 21 MMOL/L (ref 23–28)
HCO3 BLDV-SCNC: 22 MMOL/L (ref 23–28)
HCO3 BLDV-SCNC: 22 MMOL/L (ref 23–28)
HCO3 BLDV-SCNC: 23 MMOL/L (ref 23–28)
HCO3 BLDV-SCNC: 23 MMOL/L (ref 23–28)
HCO3 BLDV-SCNC: 25 MMOL/L (ref 23–28)
HCT VFR BLD AUTO: 19.1 % (ref 35–47)
HCT VFR BLD AUTO: 19.1 % (ref 35–47)
HCT VFR BLD AUTO: 19.9 % (ref 35–47)
HCT VFR BLD AUTO: 20 % (ref 35–47)
HCT VFR BLD AUTO: 22 % (ref 35–47)
HCT VFR BLD AUTO: 22.1 % (ref 35–47)
HCT VFR BLD AUTO: 22.7 % (ref 35–47)
HCT VFR BLD AUTO: 23.4 % (ref 35–47)
HCT VFR BLD AUTO: 23.5 % (ref 35–47)
HCT VFR BLD AUTO: 24.7 % (ref 35–47)
HCT VFR BLD AUTO: 24.7 % (ref 35–47)
HCT VFR BLD AUTO: 25 % (ref 35–47)
HCT VFR BLD AUTO: 25.1 % (ref 35–47)
HCT VFR BLD AUTO: 25.6 % (ref 35–47)
HCT VFR BLD AUTO: 25.7 % (ref 35–47)
HCT VFR BLD AUTO: 25.8 % (ref 35–47)
HCT VFR BLD AUTO: 26.2 % (ref 35–47)
HCT VFR BLD AUTO: 26.3 % (ref 35–47)
HCT VFR BLD AUTO: 26.6 % (ref 35–47)
HCT VFR BLD AUTO: 26.7 % (ref 35–47)
HCT VFR BLD AUTO: 27.3 % (ref 35–47)
HCT VFR BLD AUTO: 27.3 % (ref 35–47)
HCT VFR BLD AUTO: 27.5 % (ref 35–47)
HCT VFR BLD AUTO: 27.8 % (ref 35–47)
HCT VFR BLD AUTO: 27.9 % (ref 35–47)
HCT VFR BLD AUTO: 28.1 % (ref 35–47)
HCT VFR BLD AUTO: 28.3 % (ref 35–47)
HCT VFR BLD AUTO: 28.6 % (ref 35–47)
HCT VFR BLD AUTO: 28.8 % (ref 35–47)
HCT VFR BLD AUTO: 28.9 % (ref 35–47)
HCT VFR BLD AUTO: 29.1 % (ref 35–47)
HCT VFR BLD AUTO: 30.8 % (ref 35–47)
HCT VFR BLD AUTO: 31.4 % (ref 35–47)
HCT VFR BLD AUTO: 32.2 % (ref 35–47)
HCT VFR BLD AUTO: 33.2 % (ref 35–47)
HCT VFR BLD AUTO: 33.3 % (ref 35–47)
HCT VFR BLD AUTO: 35.5 % (ref 35–47)
HCT VFR BLD AUTO: 35.7 % (ref 35–47)
HCT VFR BLD AUTO: 39.2 % (ref 35–47)
HGB BLD-MCNC: 10.1 G/DL (ref 11.5–16)
HGB BLD-MCNC: 10.2 G/DL (ref 11.5–16)
HGB BLD-MCNC: 10.2 G/DL (ref 11.5–16)
HGB BLD-MCNC: 10.4 G/DL (ref 11.5–16)
HGB BLD-MCNC: 10.6 G/DL (ref 11.5–16)
HGB BLD-MCNC: 10.8 G/DL (ref 11.5–16)
HGB BLD-MCNC: 11 G/DL (ref 11.5–16)
HGB BLD-MCNC: 11 G/DL (ref 11.5–16)
HGB BLD-MCNC: 12.4 G/DL (ref 11.5–16)
HGB BLD-MCNC: 13 G/DL (ref 11.5–16)
HGB BLD-MCNC: 6.4 G/DL (ref 11.5–16)
HGB BLD-MCNC: 6.6 G/DL (ref 11.5–16)
HGB BLD-MCNC: 6.7 G/DL (ref 11.5–16)
HGB BLD-MCNC: 6.8 G/DL (ref 11.5–16)
HGB BLD-MCNC: 7.5 G/DL (ref 11.5–16)
HGB BLD-MCNC: 7.6 G/DL (ref 11.5–16)
HGB BLD-MCNC: 7.7 G/DL (ref 11.5–16)
HGB BLD-MCNC: 7.9 G/DL (ref 11.5–16)
HGB BLD-MCNC: 8 G/DL (ref 11.5–16)
HGB BLD-MCNC: 8.4 G/DL (ref 11.5–16)
HGB BLD-MCNC: 8.4 G/DL (ref 11.5–16)
HGB BLD-MCNC: 8.5 G/DL (ref 11.5–16)
HGB BLD-MCNC: 8.7 G/DL (ref 11.5–16)
HGB BLD-MCNC: 8.7 G/DL (ref 11.5–16)
HGB BLD-MCNC: 8.8 G/DL (ref 11.5–16)
HGB BLD-MCNC: 8.9 G/DL (ref 11.5–16)
HGB BLD-MCNC: 9 G/DL (ref 11.5–16)
HGB BLD-MCNC: 9.2 G/DL (ref 11.5–16)
HGB BLD-MCNC: 9.2 G/DL (ref 11.5–16)
HGB BLD-MCNC: 9.3 G/DL (ref 11.5–16)
HGB BLD-MCNC: 9.4 G/DL (ref 11.5–16)
HGB BLD-MCNC: 9.4 G/DL (ref 11.5–16)
HGB BLD-MCNC: 9.5 G/DL (ref 11.5–16)
HGB BLD-MCNC: 9.5 G/DL (ref 11.5–16)
HGB BLD-MCNC: 9.6 G/DL (ref 11.5–16)
HGB BLD-MCNC: 9.7 G/DL (ref 11.5–16)
HGB BLD-MCNC: 9.7 G/DL (ref 11.5–16)
HGB BLD-MCNC: 9.9 G/DL (ref 11.5–16)
HGB UR QL STRIP: ABNORMAL
HISTORY CHECKED?,CKHIST: NORMAL
IMM GRANULOCYTES # BLD AUTO: 0 K/UL (ref 0–0.04)
IMM GRANULOCYTES # BLD AUTO: 0.1 K/UL (ref 0–0.04)
IMM GRANULOCYTES # BLD AUTO: 0.2 K/UL (ref 0–0.04)
IMM GRANULOCYTES # BLD AUTO: 0.3 K/UL (ref 0–0.04)
IMM GRANULOCYTES NFR BLD AUTO: 0 % (ref 0–0.5)
IMM GRANULOCYTES NFR BLD AUTO: 1 % (ref 0–0.5)
IMM GRANULOCYTES NFR BLD AUTO: 2 % (ref 0–0.5)
INR PPP: 1.1 (ref 0.9–1.1)
INR PPP: 1.3 (ref 0.9–1.1)
INR PPP: 1.4 (ref 0.9–1.1)
IRON SATN MFR SERPL: 22 % (ref 20–50)
IRON SERPL-MCNC: 16 UG/DL (ref 35–150)
KETONES UR QL STRIP.AUTO: ABNORMAL MG/DL
KETONES UR QL STRIP.AUTO: NEGATIVE MG/DL
KETONES UR QL STRIP.AUTO: NEGATIVE MG/DL
LACTATE BLD-SCNC: 0.85 MMOL/L (ref 0.4–2)
LACTATE BLD-SCNC: 1.99 MMOL/L (ref 0.4–2)
LACTATE BLD-SCNC: 3.64 MMOL/L (ref 0.4–2)
LACTATE SERPL-SCNC: 0.5 MMOL/L (ref 0.4–2)
LACTATE SERPL-SCNC: 0.6 MMOL/L (ref 0.4–2)
LACTATE SERPL-SCNC: 0.6 MMOL/L (ref 0.4–2)
LACTATE SERPL-SCNC: 0.9 MMOL/L (ref 0.4–2)
LACTATE SERPL-SCNC: 0.9 MMOL/L (ref 0.4–2)
LACTATE SERPL-SCNC: 1 MMOL/L (ref 0.4–2)
LACTATE SERPL-SCNC: 1.2 MMOL/L (ref 0.4–2)
LACTATE SERPL-SCNC: 1.4 MMOL/L (ref 0.4–2)
LACTATE SERPL-SCNC: 10.6 MMOL/L (ref 0.4–2)
LACTATE SERPL-SCNC: 11.5 MMOL/L (ref 0.4–2)
LACTATE SERPL-SCNC: 13 MMOL/L (ref 0.4–2)
LACTATE SERPL-SCNC: 14.4 MMOL/L (ref 0.4–2)
LACTATE SERPL-SCNC: 3 MMOL/L (ref 0.4–2)
LACTATE SERPL-SCNC: 5 MMOL/L (ref 0.4–2)
LACTATE SERPL-SCNC: 6.4 MMOL/L (ref 0.4–2)
LDH SERPL L TO P-CCNC: 1016 U/L (ref 81–246)
LDH SERPL L TO P-CCNC: 1019 U/L (ref 81–246)
LDH SERPL L TO P-CCNC: 1056 U/L (ref 81–246)
LDH SERPL L TO P-CCNC: 1061 U/L (ref 81–246)
LDH SERPL L TO P-CCNC: 1083 U/L (ref 81–246)
LDH SERPL L TO P-CCNC: 1101 U/L (ref 81–246)
LDH SERPL L TO P-CCNC: 501 U/L (ref 81–246)
LDH SERPL L TO P-CCNC: 978 U/L (ref 81–246)
LDH SERPL L TO P-CCNC: 988 U/L (ref 81–246)
LEUKOCYTE ESTERASE UR QL STRIP.AUTO: ABNORMAL
LIPASE SERPL-CCNC: 155 U/L (ref 73–393)
LIPASE SERPL-CCNC: 19 U/L (ref 73–393)
LIPASE SERPL-CCNC: 217 U/L (ref 73–393)
LIPASE SERPL-CCNC: 276 U/L (ref 73–393)
LIPASE SERPL-CCNC: 39 U/L (ref 73–393)
LIPASE SERPL-CCNC: 44 U/L (ref 73–393)
LYMPHOCYTES # BLD: 1 K/UL (ref 0.8–3.5)
LYMPHOCYTES # BLD: 1.2 K/UL (ref 0.8–3.5)
LYMPHOCYTES # BLD: 1.3 K/UL (ref 0.8–3.5)
LYMPHOCYTES # BLD: 1.4 K/UL (ref 0.8–3.5)
LYMPHOCYTES # BLD: 1.5 K/UL (ref 0.8–3.5)
LYMPHOCYTES # BLD: 1.8 K/UL (ref 0.8–3.5)
LYMPHOCYTES # BLD: 2.3 K/UL (ref 0.8–3.5)
LYMPHOCYTES NFR BLD: 10 % (ref 12–49)
LYMPHOCYTES NFR BLD: 11 % (ref 12–49)
LYMPHOCYTES NFR BLD: 11 % (ref 12–49)
LYMPHOCYTES NFR BLD: 12 % (ref 12–49)
LYMPHOCYTES NFR BLD: 15 % (ref 12–49)
LYMPHOCYTES NFR BLD: 15 % (ref 12–49)
LYMPHOCYTES NFR BLD: 18 % (ref 12–49)
LYMPHOCYTES NFR BLD: 19 % (ref 12–49)
LYMPHOCYTES NFR BLD: 23 % (ref 12–49)
LYMPHOCYTES NFR BLD: 3 % (ref 12–49)
LYMPHOCYTES NFR BLD: 4 % (ref 12–49)
LYMPHOCYTES NFR BLD: 6 % (ref 12–49)
LYMPHOCYTES NFR BLD: 8 % (ref 12–49)
LYMPHOCYTES NFR BLD: 9 % (ref 12–49)
MAGNESIUM SERPL-MCNC: 1.9 MG/DL (ref 1.6–2.4)
MAGNESIUM SERPL-MCNC: 2 MG/DL (ref 1.6–2.4)
MAGNESIUM SERPL-MCNC: 2.1 MG/DL (ref 1.6–2.4)
MAGNESIUM SERPL-MCNC: 2.2 MG/DL (ref 1.6–2.4)
MAGNESIUM SERPL-MCNC: 2.3 MG/DL (ref 1.6–2.4)
MAGNESIUM SERPL-MCNC: 2.3 MG/DL (ref 1.6–2.4)
MAGNESIUM SERPL-MCNC: 2.4 MG/DL (ref 1.6–2.4)
MAGNESIUM SERPL-MCNC: 2.4 MG/DL (ref 1.6–2.4)
MAGNESIUM SERPL-MCNC: 2.6 MG/DL (ref 1.6–2.4)
MCH RBC QN AUTO: 28.5 PG (ref 26–34)
MCH RBC QN AUTO: 29.2 PG (ref 26–34)
MCH RBC QN AUTO: 29.3 PG (ref 26–34)
MCH RBC QN AUTO: 29.3 PG (ref 26–34)
MCH RBC QN AUTO: 29.4 PG (ref 26–34)
MCH RBC QN AUTO: 29.4 PG (ref 26–34)
MCH RBC QN AUTO: 29.5 PG (ref 26–34)
MCH RBC QN AUTO: 29.7 PG (ref 26–34)
MCH RBC QN AUTO: 30.3 PG (ref 26–34)
MCH RBC QN AUTO: 30.5 PG (ref 26–34)
MCH RBC QN AUTO: 30.6 PG (ref 26–34)
MCH RBC QN AUTO: 30.7 PG (ref 26–34)
MCH RBC QN AUTO: 30.8 PG (ref 26–34)
MCH RBC QN AUTO: 30.9 PG (ref 26–34)
MCH RBC QN AUTO: 31 PG (ref 26–34)
MCH RBC QN AUTO: 31.1 PG (ref 26–34)
MCH RBC QN AUTO: 31.2 PG (ref 26–34)
MCH RBC QN AUTO: 31.2 PG (ref 26–34)
MCH RBC QN AUTO: 31.4 PG (ref 26–34)
MCH RBC QN AUTO: 31.5 PG (ref 26–34)
MCH RBC QN AUTO: 31.5 PG (ref 26–34)
MCH RBC QN AUTO: 31.6 PG (ref 26–34)
MCH RBC QN AUTO: 31.7 PG (ref 26–34)
MCHC RBC AUTO-ENTMCNC: 30.6 G/DL (ref 30–36.5)
MCHC RBC AUTO-ENTMCNC: 31 G/DL (ref 30–36.5)
MCHC RBC AUTO-ENTMCNC: 31.3 G/DL (ref 30–36.5)
MCHC RBC AUTO-ENTMCNC: 32.5 G/DL (ref 30–36.5)
MCHC RBC AUTO-ENTMCNC: 32.6 G/DL (ref 30–36.5)
MCHC RBC AUTO-ENTMCNC: 32.9 G/DL (ref 30–36.5)
MCHC RBC AUTO-ENTMCNC: 33 G/DL (ref 30–36.5)
MCHC RBC AUTO-ENTMCNC: 33.2 G/DL (ref 30–36.5)
MCHC RBC AUTO-ENTMCNC: 33.2 G/DL (ref 30–36.5)
MCHC RBC AUTO-ENTMCNC: 33.3 G/DL (ref 30–36.5)
MCHC RBC AUTO-ENTMCNC: 33.5 G/DL (ref 30–36.5)
MCHC RBC AUTO-ENTMCNC: 33.5 G/DL (ref 30–36.5)
MCHC RBC AUTO-ENTMCNC: 33.6 G/DL (ref 30–36.5)
MCHC RBC AUTO-ENTMCNC: 33.8 G/DL (ref 30–36.5)
MCHC RBC AUTO-ENTMCNC: 33.9 G/DL (ref 30–36.5)
MCHC RBC AUTO-ENTMCNC: 34 G/DL (ref 30–36.5)
MCHC RBC AUTO-ENTMCNC: 34.1 G/DL (ref 30–36.5)
MCHC RBC AUTO-ENTMCNC: 34.2 G/DL (ref 30–36.5)
MCHC RBC AUTO-ENTMCNC: 34.2 G/DL (ref 30–36.5)
MCHC RBC AUTO-ENTMCNC: 34.3 G/DL (ref 30–36.5)
MCHC RBC AUTO-ENTMCNC: 34.4 G/DL (ref 30–36.5)
MCHC RBC AUTO-ENTMCNC: 34.4 G/DL (ref 30–36.5)
MCHC RBC AUTO-ENTMCNC: 34.5 G/DL (ref 30–36.5)
MCHC RBC AUTO-ENTMCNC: 34.6 G/DL (ref 30–36.5)
MCHC RBC AUTO-ENTMCNC: 34.7 G/DL (ref 30–36.5)
MCHC RBC AUTO-ENTMCNC: 34.8 G/DL (ref 30–36.5)
MCHC RBC AUTO-ENTMCNC: 34.9 G/DL (ref 30–36.5)
MCHC RBC AUTO-ENTMCNC: 35 G/DL (ref 30–36.5)
MCHC RBC AUTO-ENTMCNC: 35.1 G/DL (ref 30–36.5)
MCV RBC AUTO: 100.3 FL (ref 80–99)
MCV RBC AUTO: 103.4 FL (ref 80–99)
MCV RBC AUTO: 83.8 FL (ref 80–99)
MCV RBC AUTO: 84.4 FL (ref 80–99)
MCV RBC AUTO: 84.9 FL (ref 80–99)
MCV RBC AUTO: 86.2 FL (ref 80–99)
MCV RBC AUTO: 86.5 FL (ref 80–99)
MCV RBC AUTO: 88.2 FL (ref 80–99)
MCV RBC AUTO: 88.3 FL (ref 80–99)
MCV RBC AUTO: 88.5 FL (ref 80–99)
MCV RBC AUTO: 88.7 FL (ref 80–99)
MCV RBC AUTO: 88.7 FL (ref 80–99)
MCV RBC AUTO: 89 FL (ref 80–99)
MCV RBC AUTO: 89 FL (ref 80–99)
MCV RBC AUTO: 89.2 FL (ref 80–99)
MCV RBC AUTO: 89.3 FL (ref 80–99)
MCV RBC AUTO: 89.4 FL (ref 80–99)
MCV RBC AUTO: 89.4 FL (ref 80–99)
MCV RBC AUTO: 89.6 FL (ref 80–99)
MCV RBC AUTO: 89.9 FL (ref 80–99)
MCV RBC AUTO: 89.9 FL (ref 80–99)
MCV RBC AUTO: 90.1 FL (ref 80–99)
MCV RBC AUTO: 90.2 FL (ref 80–99)
MCV RBC AUTO: 90.3 FL (ref 80–99)
MCV RBC AUTO: 90.3 FL (ref 80–99)
MCV RBC AUTO: 90.4 FL (ref 80–99)
MCV RBC AUTO: 90.4 FL (ref 80–99)
MCV RBC AUTO: 90.5 FL (ref 80–99)
MCV RBC AUTO: 90.7 FL (ref 80–99)
MCV RBC AUTO: 90.7 FL (ref 80–99)
MCV RBC AUTO: 91.1 FL (ref 80–99)
MCV RBC AUTO: 91.1 FL (ref 80–99)
MCV RBC AUTO: 91.4 FL (ref 80–99)
MCV RBC AUTO: 92.1 FL (ref 80–99)
MCV RBC AUTO: 92.9 FL (ref 80–99)
MCV RBC AUTO: 93.9 FL (ref 80–99)
MCV RBC AUTO: 94.1 FL (ref 80–99)
MCV RBC AUTO: 94.7 FL (ref 80–99)
MCV RBC AUTO: 98.9 FL (ref 80–99)
METAMYELOCYTES NFR BLD MANUAL: 1 %
MONOCYTES # BLD: 0.3 K/UL (ref 0–1)
MONOCYTES # BLD: 0.4 K/UL (ref 0–1)
MONOCYTES # BLD: 0.4 K/UL (ref 0–1)
MONOCYTES # BLD: 0.5 K/UL (ref 0–1)
MONOCYTES # BLD: 0.6 K/UL (ref 0–1)
MONOCYTES # BLD: 0.7 K/UL (ref 0–1)
MONOCYTES # BLD: 0.7 K/UL (ref 0–1)
MONOCYTES # BLD: 1.2 K/UL (ref 0–1)
MONOCYTES # BLD: 1.2 K/UL (ref 0–1)
MONOCYTES # BLD: 1.3 K/UL (ref 0–1)
MONOCYTES # BLD: 1.7 K/UL (ref 0–1)
MONOCYTES # BLD: 1.7 K/UL (ref 0–1)
MONOCYTES # BLD: 2.4 K/UL (ref 0–1)
MONOCYTES # BLD: 2.8 K/UL (ref 0–1)
MONOCYTES NFR BLD: 10 % (ref 5–13)
MONOCYTES NFR BLD: 11 % (ref 5–13)
MONOCYTES NFR BLD: 14 % (ref 5–13)
MONOCYTES NFR BLD: 3 % (ref 5–13)
MONOCYTES NFR BLD: 3 % (ref 5–13)
MONOCYTES NFR BLD: 5 % (ref 5–13)
MONOCYTES NFR BLD: 5 % (ref 5–13)
MONOCYTES NFR BLD: 7 % (ref 5–13)
MONOCYTES NFR BLD: 8 % (ref 5–13)
MONOCYTES NFR BLD: 8 % (ref 5–13)
MONOCYTES NFR BLD: 9 % (ref 5–13)
MONOCYTES NFR BLD: 9 % (ref 5–13)
NEUTS BAND NFR BLD MANUAL: 2 %
NEUTS BAND NFR BLD MANUAL: 3 %
NEUTS BAND NFR BLD MANUAL: 4 %
NEUTS SEG # BLD: 10 K/UL (ref 1.8–8)
NEUTS SEG # BLD: 10.4 K/UL (ref 1.8–8)
NEUTS SEG # BLD: 11.6 K/UL (ref 1.8–8)
NEUTS SEG # BLD: 12.3 K/UL (ref 1.8–8)
NEUTS SEG # BLD: 13.7 K/UL (ref 1.8–8)
NEUTS SEG # BLD: 14.4 K/UL (ref 1.8–8)
NEUTS SEG # BLD: 26.5 K/UL (ref 1.8–8)
NEUTS SEG # BLD: 30 K/UL (ref 1.8–8)
NEUTS SEG # BLD: 4.8 K/UL (ref 1.8–8)
NEUTS SEG # BLD: 5.2 K/UL (ref 1.8–8)
NEUTS SEG # BLD: 5.8 K/UL (ref 1.8–8)
NEUTS SEG # BLD: 6 K/UL (ref 1.8–8)
NEUTS SEG # BLD: 7 K/UL (ref 1.8–8)
NEUTS SEG # BLD: 8.2 K/UL (ref 1.8–8)
NEUTS SEG NFR BLD: 66 % (ref 32–75)
NEUTS SEG NFR BLD: 73 % (ref 32–75)
NEUTS SEG NFR BLD: 74 % (ref 32–75)
NEUTS SEG NFR BLD: 75 % (ref 32–75)
NEUTS SEG NFR BLD: 75 % (ref 32–75)
NEUTS SEG NFR BLD: 76 % (ref 32–75)
NEUTS SEG NFR BLD: 77 % (ref 32–75)
NEUTS SEG NFR BLD: 79 % (ref 32–75)
NEUTS SEG NFR BLD: 82 % (ref 32–75)
NEUTS SEG NFR BLD: 84 % (ref 32–75)
NEUTS SEG NFR BLD: 85 % (ref 32–75)
NEUTS SEG NFR BLD: 85 % (ref 32–75)
NEUTS SEG NFR BLD: 86 % (ref 32–75)
NEUTS SEG NFR BLD: 87 % (ref 32–75)
NITRITE UR QL STRIP.AUTO: NEGATIVE
NRBC # BLD: 0 K/UL (ref 0–0.01)
NRBC # BLD: 0.03 K/UL (ref 0–0.01)
NRBC # BLD: 0.04 K/UL (ref 0–0.01)
NRBC # BLD: 0.05 K/UL (ref 0–0.01)
NRBC # BLD: 0.06 K/UL (ref 0–0.01)
NRBC # BLD: 0.08 K/UL (ref 0–0.01)
NRBC # BLD: 0.08 K/UL (ref 0–0.01)
NRBC # BLD: 0.09 K/UL (ref 0–0.01)
NRBC # BLD: 0.09 K/UL (ref 0–0.01)
NRBC # BLD: 0.1 K/UL (ref 0–0.01)
NRBC # BLD: 0.1 K/UL (ref 0–0.01)
NRBC # BLD: 0.11 K/UL (ref 0–0.01)
NRBC # BLD: 0.37 K/UL (ref 0–0.01)
NRBC # BLD: 0.45 K/UL (ref 0–0.01)
NRBC # BLD: 1.4 K/UL (ref 0–0.01)
NRBC BLD-RTO: 0 PER 100 WBC
NRBC BLD-RTO: 0.3 PER 100 WBC
NRBC BLD-RTO: 0.4 PER 100 WBC
NRBC BLD-RTO: 0.5 PER 100 WBC
NRBC BLD-RTO: 0.6 PER 100 WBC
NRBC BLD-RTO: 0.9 PER 100 WBC
NRBC BLD-RTO: 1 PER 100 WBC
NRBC BLD-RTO: 1 PER 100 WBC
NRBC BLD-RTO: 1.1 PER 100 WBC
NRBC BLD-RTO: 1.2 PER 100 WBC
NRBC BLD-RTO: 1.2 PER 100 WBC
NRBC BLD-RTO: 1.5 PER 100 WBC
NRBC BLD-RTO: 5.6 PER 100 WBC
O2/TOTAL GAS SETTING VFR VENT: 30 %
P-R INTERVAL, ECG05: 120 MS
P-R INTERVAL, ECG05: 132 MS
P-R INTERVAL, ECG05: 142 MS
P-R INTERVAL, ECG05: 146 MS
P-R INTERVAL, ECG05: 150 MS
P-R INTERVAL, ECG05: 154 MS
P-R INTERVAL, ECG05: 164 MS
PCO2 BLD: 21.7 MMHG (ref 35–45)
PCO2 BLD: 29 MMHG (ref 35–45)
PCO2 BLD: 30.1 MMHG (ref 35–45)
PCO2 BLD: 33.9 MMHG (ref 35–45)
PCO2 BLDA: 22 MMHG (ref 35–45)
PCO2 BLDA: 22 MMHG (ref 35–45)
PCO2 BLDA: 24 MMHG (ref 35–45)
PCO2 BLDA: 25 MMHG (ref 35–45)
PCO2 BLDA: 27 MMHG (ref 35–45)
PCO2 BLDA: 28 MMHG (ref 35–45)
PCO2 BLDA: 29 MMHG (ref 35–45)
PCO2 BLDA: 30 MMHG (ref 35–45)
PCO2 BLDA: 31 MMHG (ref 35–45)
PCO2 BLDA: 32 MMHG (ref 35–45)
PCO2 BLDA: 33 MMHG (ref 35–45)
PCO2 BLDA: 34 MMHG (ref 35–45)
PCO2 BLDA: 35 MMHG (ref 35–45)
PCO2 BLDA: 36 MMHG (ref 35–45)
PCO2 BLDA: 37 MMHG (ref 35–45)
PCO2 BLDA: 37 MMHG (ref 35–45)
PCO2 BLDA: 38 MMHG (ref 35–45)
PCO2 BLDA: 39 MMHG (ref 35–45)
PCO2 BLDA: 46 MMHG (ref 35–45)
PCO2 BLDA: 49 MMHG (ref 35–45)
PCO2 BLDV: 27.2 MMHG (ref 41–51)
PCO2 BLDV: 33.5 MMHG (ref 41–51)
PCO2 BLDV: 34.3 MMHG (ref 41–51)
PCO2 BLDV: 34.5 MMHG (ref 41–51)
PCO2 BLDV: 34.9 MMHG (ref 41–51)
PCO2 BLDV: 35.6 MMHG (ref 41–51)
PCO2 BLDV: 36.2 MMHG (ref 41–51)
PCO2 BLDV: 38.2 MMHG (ref 41–51)
PCO2 BLDV: 38.4 MMHG (ref 41–51)
PCO2 BLDV: 39 MMHG (ref 41–51)
PEEP RESPIRATORY: 10 CM[H2O]
PEEP RESPIRATORY: 5 CMH2O
PEEP RESPIRATORY: 5 CM[H2O]
PH BLD: 7.43 [PH] (ref 7.35–7.45)
PH BLD: 7.44 [PH] (ref 7.35–7.45)
PH BLD: 7.53 [PH] (ref 7.35–7.45)
PH BLD: 7.59 [PH] (ref 7.35–7.45)
PH BLDA: 7.22 [PH] (ref 7.35–7.45)
PH BLDA: 7.33 [PH] (ref 7.35–7.45)
PH BLDA: 7.37 [PH] (ref 7.35–7.45)
PH BLDA: 7.38 [PH] (ref 7.35–7.45)
PH BLDA: 7.39 [PH] (ref 7.35–7.45)
PH BLDA: 7.4 [PH] (ref 7.35–7.45)
PH BLDA: 7.41 [PH] (ref 7.35–7.45)
PH BLDA: 7.42 [PH] (ref 7.35–7.45)
PH BLDA: 7.43 [PH] (ref 7.35–7.45)
PH BLDA: 7.44 [PH] (ref 7.35–7.45)
PH BLDA: 7.45 [PH] (ref 7.35–7.45)
PH BLDA: 7.46 [PH] (ref 7.35–7.45)
PH BLDA: 7.47 [PH] (ref 7.35–7.45)
PH BLDA: 7.48 [PH] (ref 7.35–7.45)
PH BLDA: 7.49 [PH] (ref 7.35–7.45)
PH BLDA: 7.5 [PH] (ref 7.35–7.45)
PH BLDA: 7.51 [PH] (ref 7.35–7.45)
PH BLDA: 7.51 [PH] (ref 7.35–7.45)
PH BLDA: 7.54 [PH] (ref 7.35–7.45)
PH BLDA: 7.57 [PH] (ref 7.35–7.45)
PH BLDA: 7.59 [PH] (ref 7.35–7.45)
PH BLDA: 7.62 [PH] (ref 7.35–7.45)
PH BLDV: 7.35 [PH] (ref 7.32–7.42)
PH BLDV: 7.38 [PH] (ref 7.32–7.42)
PH BLDV: 7.38 [PH] (ref 7.32–7.42)
PH BLDV: 7.39 [PH] (ref 7.32–7.42)
PH BLDV: 7.4 [PH] (ref 7.32–7.42)
PH BLDV: 7.42 [PH] (ref 7.32–7.42)
PH BLDV: 7.42 [PH] (ref 7.32–7.42)
PH BLDV: 7.43 [PH] (ref 7.32–7.42)
PH BLDV: 7.45 [PH] (ref 7.32–7.42)
PH BLDV: 7.48 [PH] (ref 7.32–7.42)
PH UR STRIP: 6 [PH] (ref 5–8)
PH UR STRIP: 8 [PH] (ref 5–8)
PH UR STRIP: 8 [PH] (ref 5–8)
PHOSPHATE SERPL-MCNC: 1.3 MG/DL (ref 2.6–4.7)
PHOSPHATE SERPL-MCNC: 1.4 MG/DL (ref 2.6–4.7)
PHOSPHATE SERPL-MCNC: 2.3 MG/DL (ref 2.6–4.7)
PHOSPHATE SERPL-MCNC: 2.5 MG/DL (ref 2.6–4.7)
PHOSPHATE SERPL-MCNC: 2.6 MG/DL (ref 2.6–4.7)
PHOSPHATE SERPL-MCNC: 2.6 MG/DL (ref 2.6–4.7)
PHOSPHATE SERPL-MCNC: 3.1 MG/DL (ref 2.6–4.7)
PHOSPHATE SERPL-MCNC: 3.1 MG/DL (ref 2.6–4.7)
PHOSPHATE SERPL-MCNC: 3.3 MG/DL (ref 2.6–4.7)
PHOSPHATE SERPL-MCNC: 3.3 MG/DL (ref 2.6–4.7)
PHOSPHATE SERPL-MCNC: 3.5 MG/DL (ref 2.6–4.7)
PHOSPHATE SERPL-MCNC: 3.6 MG/DL (ref 2.6–4.7)
PHOSPHATE SERPL-MCNC: 3.7 MG/DL (ref 2.6–4.7)
PHOSPHATE SERPL-MCNC: 3.8 MG/DL (ref 2.6–4.7)
PHOSPHATE SERPL-MCNC: 3.8 MG/DL (ref 2.6–4.7)
PHOSPHATE SERPL-MCNC: 3.9 MG/DL (ref 2.6–4.7)
PHOSPHATE SERPL-MCNC: 4.1 MG/DL (ref 2.6–4.7)
PHOSPHATE SERPL-MCNC: 4.3 MG/DL (ref 2.6–4.7)
PHOSPHATE SERPL-MCNC: 4.6 MG/DL (ref 2.6–4.7)
PHOSPHATE SERPL-MCNC: 4.7 MG/DL (ref 2.6–4.7)
PHOSPHATE SERPL-MCNC: 6 MG/DL (ref 2.6–4.7)
PLATELET # BLD AUTO: 101 K/UL (ref 150–400)
PLATELET # BLD AUTO: 102 K/UL (ref 150–400)
PLATELET # BLD AUTO: 106 K/UL (ref 150–400)
PLATELET # BLD AUTO: 122 K/UL (ref 150–400)
PLATELET # BLD AUTO: 123 K/UL (ref 150–400)
PLATELET # BLD AUTO: 126 K/UL (ref 150–400)
PLATELET # BLD AUTO: 135 K/UL (ref 150–400)
PLATELET # BLD AUTO: 180 K/UL (ref 150–400)
PLATELET # BLD AUTO: 215 K/UL (ref 150–400)
PLATELET # BLD AUTO: 231 K/UL (ref 150–400)
PLATELET # BLD AUTO: 267 K/UL (ref 150–400)
PLATELET # BLD AUTO: 281 K/UL (ref 150–400)
PLATELET # BLD AUTO: 300 K/UL (ref 150–400)
PLATELET # BLD AUTO: 305 K/UL (ref 150–400)
PLATELET # BLD AUTO: 359 K/UL (ref 150–400)
PLATELET # BLD AUTO: 45 K/UL (ref 150–400)
PLATELET # BLD AUTO: 45 K/UL (ref 150–400)
PLATELET # BLD AUTO: 53 K/UL (ref 150–400)
PLATELET # BLD AUTO: 55 K/UL (ref 150–400)
PLATELET # BLD AUTO: 57 K/UL (ref 150–400)
PLATELET # BLD AUTO: 58 K/UL (ref 150–400)
PLATELET # BLD AUTO: 60 K/UL (ref 150–400)
PLATELET # BLD AUTO: 60 K/UL (ref 150–400)
PLATELET # BLD AUTO: 61 K/UL (ref 150–400)
PLATELET # BLD AUTO: 63 K/UL (ref 150–400)
PLATELET # BLD AUTO: 64 K/UL (ref 150–400)
PLATELET # BLD AUTO: 68 K/UL (ref 150–400)
PLATELET # BLD AUTO: 69 K/UL (ref 150–400)
PLATELET # BLD AUTO: 72 K/UL (ref 150–400)
PLATELET # BLD AUTO: 73 K/UL (ref 150–400)
PLATELET # BLD AUTO: 73 K/UL (ref 150–400)
PLATELET # BLD AUTO: 74 K/UL (ref 150–400)
PLATELET # BLD AUTO: 78 K/UL (ref 150–400)
PLATELET # BLD AUTO: 80 K/UL (ref 150–400)
PLATELET # BLD AUTO: 84 K/UL (ref 150–400)
PLATELET # BLD AUTO: 87 K/UL (ref 150–400)
PLATELET # BLD AUTO: 89 K/UL (ref 150–400)
PLATELET # BLD AUTO: 93 K/UL (ref 150–400)
PLATELET # BLD AUTO: 95 K/UL (ref 150–400)
PLATELET # BLD AUTO: 97 K/UL (ref 150–400)
PLATELET # BLD AUTO: 98 K/UL (ref 150–400)
PMV BLD AUTO: 10.5 FL (ref 8.9–12.9)
PMV BLD AUTO: 10.6 FL (ref 8.9–12.9)
PMV BLD AUTO: 10.7 FL (ref 8.9–12.9)
PMV BLD AUTO: 10.8 FL (ref 8.9–12.9)
PMV BLD AUTO: 7.9 FL (ref 8.9–12.9)
PMV BLD AUTO: 8 FL (ref 8.9–12.9)
PMV BLD AUTO: 8 FL (ref 8.9–12.9)
PMV BLD AUTO: 8.2 FL (ref 8.9–12.9)
PMV BLD AUTO: 8.3 FL (ref 8.9–12.9)
PMV BLD AUTO: 8.4 FL (ref 8.9–12.9)
PMV BLD AUTO: 8.4 FL (ref 8.9–12.9)
PMV BLD AUTO: 8.5 FL (ref 8.9–12.9)
PMV BLD AUTO: 8.6 FL (ref 8.9–12.9)
PMV BLD AUTO: 8.7 FL (ref 8.9–12.9)
PMV BLD AUTO: 8.8 FL (ref 8.9–12.9)
PMV BLD AUTO: 8.9 FL (ref 8.9–12.9)
PMV BLD AUTO: 9 FL (ref 8.9–12.9)
PMV BLD AUTO: 9 FL (ref 8.9–12.9)
PMV BLD AUTO: 9.1 FL (ref 8.9–12.9)
PMV BLD AUTO: 9.2 FL (ref 8.9–12.9)
PMV BLD AUTO: 9.2 FL (ref 8.9–12.9)
PMV BLD AUTO: 9.3 FL (ref 8.9–12.9)
PMV BLD AUTO: 9.8 FL (ref 8.9–12.9)
PMV BLD AUTO: 9.9 FL (ref 8.9–12.9)
PO2 BLD: 203 MMHG (ref 80–100)
PO2 BLD: 468 MMHG (ref 80–100)
PO2 BLD: 613 MMHG (ref 80–100)
PO2 BLD: 81 MMHG (ref 80–100)
PO2 BLDA: 103 MMHG (ref 80–100)
PO2 BLDA: 109 MMHG (ref 80–100)
PO2 BLDA: 111 MMHG (ref 80–100)
PO2 BLDA: 112 MMHG (ref 80–100)
PO2 BLDA: 115 MMHG (ref 80–100)
PO2 BLDA: 116 MMHG (ref 80–100)
PO2 BLDA: 118 MMHG (ref 80–100)
PO2 BLDA: 119 MMHG (ref 80–100)
PO2 BLDA: 121 MMHG (ref 80–100)
PO2 BLDA: 126 MMHG (ref 80–100)
PO2 BLDA: 133 MMHG (ref 80–100)
PO2 BLDA: 135 MMHG (ref 80–100)
PO2 BLDA: 138 MMHG (ref 80–100)
PO2 BLDA: 139 MMHG (ref 80–100)
PO2 BLDA: 140 MMHG (ref 80–100)
PO2 BLDA: 140 MMHG (ref 80–100)
PO2 BLDA: 142 MMHG (ref 80–100)
PO2 BLDA: 142 MMHG (ref 80–100)
PO2 BLDA: 143 MMHG (ref 80–100)
PO2 BLDA: 143 MMHG (ref 80–100)
PO2 BLDA: 144 MMHG (ref 80–100)
PO2 BLDA: 148 MMHG (ref 80–100)
PO2 BLDA: 153 MMHG (ref 80–100)
PO2 BLDA: 155 MMHG (ref 80–100)
PO2 BLDA: 159 MMHG (ref 80–100)
PO2 BLDA: 163 MMHG (ref 80–100)
PO2 BLDA: 164 MMHG (ref 80–100)
PO2 BLDA: 164 MMHG (ref 80–100)
PO2 BLDA: 165 MMHG (ref 80–100)
PO2 BLDA: 169 MMHG (ref 80–100)
PO2 BLDA: 182 MMHG (ref 80–100)
PO2 BLDA: 183 MMHG (ref 80–100)
PO2 BLDA: 19 MMHG (ref 80–100)
PO2 BLDA: 195 MMHG (ref 80–100)
PO2 BLDA: 197 MMHG (ref 80–100)
PO2 BLDA: 200 MMHG (ref 80–100)
PO2 BLDA: 201 MMHG (ref 80–100)
PO2 BLDA: 202 MMHG (ref 80–100)
PO2 BLDA: 206 MMHG (ref 80–100)
PO2 BLDA: 207 MMHG (ref 80–100)
PO2 BLDA: 211 MMHG (ref 80–100)
PO2 BLDA: 214 MMHG (ref 80–100)
PO2 BLDA: 222 MMHG (ref 80–100)
PO2 BLDA: 252 MMHG (ref 80–100)
PO2 BLDA: 268 MMHG (ref 80–100)
PO2 BLDA: 272 MMHG (ref 80–100)
PO2 BLDA: 275 MMHG (ref 80–100)
PO2 BLDA: 284 MMHG (ref 80–100)
PO2 BLDA: 371 MMHG (ref 80–100)
PO2 BLDA: 375 MMHG (ref 80–100)
PO2 BLDA: 397 MMHG (ref 80–100)
PO2 BLDA: 403 MMHG (ref 80–100)
PO2 BLDA: 457 MMHG (ref 80–100)
PO2 BLDA: 47 MMHG (ref 80–100)
PO2 BLDA: 471 MMHG (ref 80–100)
PO2 BLDA: 473 MMHG (ref 80–100)
PO2 BLDA: 48 MMHG (ref 80–100)
PO2 BLDA: 484 MMHG (ref 80–100)
PO2 BLDA: 487 MMHG (ref 80–100)
PO2 BLDA: 493 MMHG (ref 80–100)
PO2 BLDA: 59 MMHG (ref 80–100)
PO2 BLDA: 60 MMHG (ref 80–100)
PO2 BLDA: 69 MMHG (ref 80–100)
PO2 BLDA: 71 MMHG (ref 80–100)
PO2 BLDA: 93 MMHG (ref 80–100)
PO2 BLDA: 94 MMHG (ref 80–100)
PO2 BLDV: 36 MMHG (ref 25–40)
PO2 BLDV: 364 MMHG (ref 25–40)
PO2 BLDV: 44 MMHG (ref 25–40)
PO2 BLDV: 52 MMHG (ref 25–40)
PO2 BLDV: 53 MMHG (ref 25–40)
PO2 BLDV: 54 MMHG (ref 25–40)
PO2 BLDV: 54 MMHG (ref 25–40)
PO2 BLDV: 55 MMHG (ref 25–40)
PO2 BLDV: 56 MMHG (ref 25–40)
PO2 BLDV: 67 MMHG (ref 25–40)
POTASSIUM BLD-SCNC: 2.6 MMOL/L (ref 3.5–5.5)
POTASSIUM BLD-SCNC: 3.1 MMOL/L (ref 3.5–5.5)
POTASSIUM BLD-SCNC: 3.8 MMOL/L (ref 3.5–5.5)
POTASSIUM BLD-SCNC: 5 MMOL/L (ref 3.5–5.5)
POTASSIUM SERPL-SCNC: 2.9 MMOL/L (ref 3.5–5.1)
POTASSIUM SERPL-SCNC: 3.1 MMOL/L (ref 3.5–5.1)
POTASSIUM SERPL-SCNC: 3.2 MMOL/L (ref 3.5–5.1)
POTASSIUM SERPL-SCNC: 3.4 MMOL/L (ref 3.5–5.1)
POTASSIUM SERPL-SCNC: 3.4 MMOL/L (ref 3.5–5.1)
POTASSIUM SERPL-SCNC: 3.5 MMOL/L (ref 3.5–5.1)
POTASSIUM SERPL-SCNC: 3.6 MMOL/L (ref 3.5–5.1)
POTASSIUM SERPL-SCNC: 3.7 MMOL/L (ref 3.5–5.1)
POTASSIUM SERPL-SCNC: 3.8 MMOL/L (ref 3.5–5.1)
POTASSIUM SERPL-SCNC: 3.9 MMOL/L (ref 3.5–5.1)
POTASSIUM SERPL-SCNC: 4 MMOL/L (ref 3.5–5.1)
POTASSIUM SERPL-SCNC: 4.1 MMOL/L (ref 3.5–5.1)
POTASSIUM SERPL-SCNC: 4.2 MMOL/L (ref 3.5–5.1)
POTASSIUM SERPL-SCNC: 4.2 MMOL/L (ref 3.5–5.1)
POTASSIUM SERPL-SCNC: 4.3 MMOL/L (ref 3.5–5.1)
POTASSIUM SERPL-SCNC: 4.7 MMOL/L (ref 3.5–5.1)
POTASSIUM SERPL-SCNC: 5 MMOL/L (ref 3.5–5.1)
POTASSIUM SERPL-SCNC: 5.4 MMOL/L (ref 3.5–5.1)
PROCALCITONIN SERPL-MCNC: 11.58 NG/ML
PROCALCITONIN SERPL-MCNC: 16.47 NG/ML
PROCALCITONIN SERPL-MCNC: 16.6 NG/ML
PROCALCITONIN SERPL-MCNC: 3.24 NG/ML
PROCALCITONIN SERPL-MCNC: 4.68 NG/ML
PROCALCITONIN SERPL-MCNC: 5.03 NG/ML
PROCALCITONIN SERPL-MCNC: 6.47 NG/ML
PROCALCITONIN SERPL-MCNC: 7.46 NG/ML
PROT SERPL-MCNC: 4.4 G/DL (ref 6.4–8.2)
PROT SERPL-MCNC: 4.4 G/DL (ref 6.4–8.2)
PROT SERPL-MCNC: 4.5 G/DL (ref 6.4–8.2)
PROT SERPL-MCNC: 4.7 G/DL (ref 6.4–8.2)
PROT SERPL-MCNC: 4.8 G/DL (ref 6.4–8.2)
PROT SERPL-MCNC: 4.8 G/DL (ref 6.4–8.2)
PROT SERPL-MCNC: 5 G/DL (ref 6.4–8.2)
PROT SERPL-MCNC: 5.1 G/DL (ref 6.4–8.2)
PROT SERPL-MCNC: 5.1 G/DL (ref 6.4–8.2)
PROT SERPL-MCNC: 5.2 G/DL (ref 6.4–8.2)
PROT SERPL-MCNC: 5.3 G/DL (ref 6.4–8.2)
PROT SERPL-MCNC: 5.3 G/DL (ref 6.4–8.2)
PROT SERPL-MCNC: 5.4 G/DL (ref 6.4–8.2)
PROT SERPL-MCNC: 5.5 G/DL (ref 6.4–8.2)
PROT SERPL-MCNC: 5.5 G/DL (ref 6.4–8.2)
PROT SERPL-MCNC: 5.6 G/DL (ref 6.4–8.2)
PROT SERPL-MCNC: 5.8 G/DL (ref 6.4–8.2)
PROT SERPL-MCNC: 6.1 G/DL (ref 6.4–8.2)
PROT SERPL-MCNC: 7.3 G/DL (ref 6.4–8.2)
PROT SERPL-MCNC: 7.3 G/DL (ref 6.4–8.2)
PROT SERPL-MCNC: 7.4 G/DL (ref 6.4–8.2)
PROT SERPL-MCNC: 7.9 G/DL (ref 6.4–8.2)
PROT SERPL-MCNC: 8.5 G/DL (ref 6.4–8.2)
PROT SERPL-MCNC: 9.2 G/DL (ref 6.4–8.2)
PROT SERPL-MCNC: 9.3 G/DL (ref 6.4–8.2)
PROT UR STRIP-MCNC: 300 MG/DL
PROTHROMBIN TIME: 11.9 SEC (ref 9–11.1)
PROTHROMBIN TIME: 13 SEC (ref 9–11.1)
PROTHROMBIN TIME: 14.8 SEC (ref 9–11.1)
Q-T INTERVAL, ECG07: 310 MS
Q-T INTERVAL, ECG07: 316 MS
Q-T INTERVAL, ECG07: 334 MS
Q-T INTERVAL, ECG07: 348 MS
Q-T INTERVAL, ECG07: 354 MS
Q-T INTERVAL, ECG07: 360 MS
Q-T INTERVAL, ECG07: 386 MS
Q-T INTERVAL, ECG07: 440 MS
Q-T INTERVAL, ECG07: 498 MS
Q-T INTERVAL, ECG07: 552 MS
QRS DURATION, ECG06: 116 MS
QRS DURATION, ECG06: 66 MS
QRS DURATION, ECG06: 70 MS
QRS DURATION, ECG06: 70 MS
QRS DURATION, ECG06: 72 MS
QRS DURATION, ECG06: 72 MS
QRS DURATION, ECG06: 74 MS
QRS DURATION, ECG06: 74 MS
QRS DURATION, ECG06: 88 MS
QRS DURATION, ECG06: 92 MS
QTC CALCULATION (BEZET), ECG08: 412 MS
QTC CALCULATION (BEZET), ECG08: 430 MS
QTC CALCULATION (BEZET), ECG08: 440 MS
QTC CALCULATION (BEZET), ECG08: 444 MS
QTC CALCULATION (BEZET), ECG08: 459 MS
QTC CALCULATION (BEZET), ECG08: 483 MS
QTC CALCULATION (BEZET), ECG08: 485 MS
QTC CALCULATION (BEZET), ECG08: 523 MS
QTC CALCULATION (BEZET), ECG08: 586 MS
QTC CALCULATION (BEZET), ECG08: 619 MS
RBC # BLD AUTO: 2.03 M/UL (ref 3.8–5.2)
RBC # BLD AUTO: 2.12 M/UL (ref 3.8–5.2)
RBC # BLD AUTO: 2.28 M/UL (ref 3.8–5.2)
RBC # BLD AUTO: 2.32 M/UL (ref 3.8–5.2)
RBC # BLD AUTO: 2.44 M/UL (ref 3.8–5.2)
RBC # BLD AUTO: 2.51 M/UL (ref 3.8–5.2)
RBC # BLD AUTO: 2.59 M/UL (ref 3.8–5.2)
RBC # BLD AUTO: 2.59 M/UL (ref 3.8–5.2)
RBC # BLD AUTO: 2.62 M/UL (ref 3.8–5.2)
RBC # BLD AUTO: 2.74 M/UL (ref 3.8–5.2)
RBC # BLD AUTO: 2.77 M/UL (ref 3.8–5.2)
RBC # BLD AUTO: 2.81 M/UL (ref 3.8–5.2)
RBC # BLD AUTO: 2.82 M/UL (ref 3.8–5.2)
RBC # BLD AUTO: 2.84 M/UL (ref 3.8–5.2)
RBC # BLD AUTO: 2.87 M/UL (ref 3.8–5.2)
RBC # BLD AUTO: 2.9 M/UL (ref 3.8–5.2)
RBC # BLD AUTO: 2.91 M/UL (ref 3.8–5.2)
RBC # BLD AUTO: 2.92 M/UL (ref 3.8–5.2)
RBC # BLD AUTO: 2.97 M/UL (ref 3.8–5.2)
RBC # BLD AUTO: 2.97 M/UL (ref 3.8–5.2)
RBC # BLD AUTO: 3 M/UL (ref 3.8–5.2)
RBC # BLD AUTO: 3 M/UL (ref 3.8–5.2)
RBC # BLD AUTO: 3.02 M/UL (ref 3.8–5.2)
RBC # BLD AUTO: 3.05 M/UL (ref 3.8–5.2)
RBC # BLD AUTO: 3.06 M/UL (ref 3.8–5.2)
RBC # BLD AUTO: 3.07 M/UL (ref 3.8–5.2)
RBC # BLD AUTO: 3.12 M/UL (ref 3.8–5.2)
RBC # BLD AUTO: 3.14 M/UL (ref 3.8–5.2)
RBC # BLD AUTO: 3.17 M/UL (ref 3.8–5.2)
RBC # BLD AUTO: 3.2 M/UL (ref 3.8–5.2)
RBC # BLD AUTO: 3.22 M/UL (ref 3.8–5.2)
RBC # BLD AUTO: 3.26 M/UL (ref 3.8–5.2)
RBC # BLD AUTO: 3.3 M/UL (ref 3.8–5.2)
RBC # BLD AUTO: 3.31 M/UL (ref 3.8–5.2)
RBC # BLD AUTO: 3.33 M/UL (ref 3.8–5.2)
RBC # BLD AUTO: 3.4 M/UL (ref 3.8–5.2)
RBC # BLD AUTO: 3.49 M/UL (ref 3.8–5.2)
RBC # BLD AUTO: 3.56 M/UL (ref 3.8–5.2)
RBC # BLD AUTO: 3.59 M/UL (ref 3.8–5.2)
RBC # BLD AUTO: 3.92 M/UL (ref 3.8–5.2)
RBC # BLD AUTO: 4.22 M/UL (ref 3.8–5.2)
RBC #/AREA URNS HPF: ABNORMAL /HPF (ref 0–5)
RBC MORPH BLD: ABNORMAL
SAMPLES BEING HELD,HOLD: NORMAL
SAO2 % BLD: 100 % (ref 92–97)
SAO2 % BLD: 29 % (ref 92–97)
SAO2 % BLD: 83 % (ref 92–97)
SAO2 % BLD: 89 % (ref 92–97)
SAO2 % BLD: 90 % (ref 92–97)
SAO2 % BLD: 91 % (ref 92–97)
SAO2 % BLD: 91 % (ref 92–97)
SAO2 % BLD: 96 % (ref 92–97)
SAO2 % BLD: 96.5 % (ref 92–97)
SAO2 % BLD: 97 % (ref 92–97)
SAO2 % BLD: 98 % (ref 92–97)
SAO2 % BLD: 99 % (ref 92–97)
SAO2 % BLDV: 67 % (ref 65–88)
SAO2 % BLDV: 80 % (ref 65–88)
SAO2 % BLDV: 88 % (ref 65–88)
SAO2 % BLDV: 89 % (ref 65–88)
SAO2 % BLDV: 90 % (ref 65–88)
SAO2 % BLDV: 92 % (ref 65–88)
SAO2% DEVICE SAO2% SENSOR NAME: ABNORMAL
SERVICE CMNT-IMP: ABNORMAL
SERVICE CMNT-IMP: NORMAL
SERVICE CMNT-IMP: YES
SERVICE CMNT-IMP: YES
SODIUM BLD-SCNC: 138 MMOL/L (ref 136–145)
SODIUM BLD-SCNC: 139 MMOL/L (ref 136–145)
SODIUM BLD-SCNC: 141 MMOL/L (ref 136–145)
SODIUM BLD-SCNC: 144 MMOL/L (ref 136–145)
SODIUM SERPL-SCNC: 130 MMOL/L (ref 136–145)
SODIUM SERPL-SCNC: 130 MMOL/L (ref 136–145)
SODIUM SERPL-SCNC: 132 MMOL/L (ref 136–145)
SODIUM SERPL-SCNC: 133 MMOL/L (ref 136–145)
SODIUM SERPL-SCNC: 133 MMOL/L (ref 136–145)
SODIUM SERPL-SCNC: 134 MMOL/L (ref 136–145)
SODIUM SERPL-SCNC: 135 MMOL/L (ref 136–145)
SODIUM SERPL-SCNC: 135 MMOL/L (ref 136–145)
SODIUM SERPL-SCNC: 136 MMOL/L (ref 136–145)
SODIUM SERPL-SCNC: 137 MMOL/L (ref 136–145)
SODIUM SERPL-SCNC: 138 MMOL/L (ref 136–145)
SODIUM SERPL-SCNC: 139 MMOL/L (ref 136–145)
SODIUM SERPL-SCNC: 144 MMOL/L (ref 136–145)
SODIUM SERPL-SCNC: 145 MMOL/L (ref 136–145)
SODIUM SERPL-SCNC: 146 MMOL/L (ref 136–145)
SODIUM SERPL-SCNC: 148 MMOL/L (ref 136–145)
SODIUM SERPL-SCNC: 148 MMOL/L (ref 136–145)
SOURCE, COVRS: NORMAL
SP GR UR REFRACTOMETRY: 1.01
SP GR UR REFRACTOMETRY: 1.01 (ref 1–1.03)
SP GR UR REFRACTOMETRY: 1.02
SPECIMEN EXP DATE BLD: NORMAL
SPECIMEN EXP DATE BLD: NORMAL
SPECIMEN SITE: ABNORMAL
SPECIMEN TYPE: ABNORMAL
STATUS OF UNIT,%ST: NORMAL
THERAPEUTIC RANGE,PTTT: ABNORMAL SECS (ref 58–77)
TIBC SERPL-MCNC: 73 UG/DL (ref 250–450)
TRIGL SERPL-MCNC: 91 MG/DL (ref ?–150)
TROPONIN-HIGH SENSITIVITY: 14 NG/L (ref 0–51)
TROPONIN-HIGH SENSITIVITY: 14 NG/L (ref 0–51)
TROPONIN-HIGH SENSITIVITY: 32 NG/L (ref 0–51)
TROPONIN-HIGH SENSITIVITY: 39 NG/L (ref 0–51)
TROPONIN-HIGH SENSITIVITY: 9724 NG/L (ref 0–51)
TROPONIN-HIGH SENSITIVITY: ABNORMAL NG/L (ref 0–51)
TSH SERPL DL<=0.05 MIU/L-ACNC: 0.58 UIU/ML (ref 0.36–3.74)
UA: UC IF INDICATED,UAUC: ABNORMAL
UA: UC IF INDICATED,UAUC: ABNORMAL
UNIT DIVISION, %UDIV: 0
UROBILINOGEN UR QL STRIP.AUTO: 0.2 EU/DL (ref 0.2–1)
VANCOMYCIN SERPL-MCNC: 16.5 UG/ML
VENTILATION MODE VENT: ABNORMAL
VENTRICULAR RATE, ECG03: 100 BPM
VENTRICULAR RATE, ECG03: 112 BPM
VENTRICULAR RATE, ECG03: 117 BPM
VENTRICULAR RATE, ECG03: 119 BPM
VENTRICULAR RATE, ECG03: 121 BPM
VENTRICULAR RATE, ECG03: 68 BPM
VENTRICULAR RATE, ECG03: 79 BPM
VENTRICULAR RATE, ECG03: 85 BPM
VENTRICULAR RATE, ECG03: 85 BPM
VENTRICULAR RATE, ECG03: 93 BPM
VT SETTING VENT: 220 ML
VT SETTING VENT: 350 ML
VT SETTING VENT: 350 ML
VT SETTING VENT: 360 ML
WBC # BLD AUTO: 10 K/UL (ref 3.6–11)
WBC # BLD AUTO: 10.2 K/UL (ref 3.6–11)
WBC # BLD AUTO: 10.5 K/UL (ref 3.6–11)
WBC # BLD AUTO: 10.6 K/UL (ref 3.6–11)
WBC # BLD AUTO: 10.6 K/UL (ref 3.6–11)
WBC # BLD AUTO: 10.7 K/UL (ref 3.6–11)
WBC # BLD AUTO: 11.3 K/UL (ref 3.6–11)
WBC # BLD AUTO: 11.4 K/UL (ref 3.6–11)
WBC # BLD AUTO: 11.8 K/UL (ref 3.6–11)
WBC # BLD AUTO: 11.9 K/UL (ref 3.6–11)
WBC # BLD AUTO: 13.2 K/UL (ref 3.6–11)
WBC # BLD AUTO: 15.6 K/UL (ref 3.6–11)
WBC # BLD AUTO: 15.8 K/UL (ref 3.6–11)
WBC # BLD AUTO: 16.5 K/UL (ref 3.6–11)
WBC # BLD AUTO: 17.2 K/UL (ref 3.6–11)
WBC # BLD AUTO: 17.3 K/UL (ref 3.6–11)
WBC # BLD AUTO: 18.4 K/UL (ref 3.6–11)
WBC # BLD AUTO: 24.9 K/UL (ref 3.6–11)
WBC # BLD AUTO: 30.8 K/UL (ref 3.6–11)
WBC # BLD AUTO: 33 K/UL (ref 3.6–11)
WBC # BLD AUTO: 6.3 K/UL (ref 3.6–11)
WBC # BLD AUTO: 6.5 K/UL (ref 3.6–11)
WBC # BLD AUTO: 7.4 K/UL (ref 3.6–11)
WBC # BLD AUTO: 7.6 K/UL (ref 3.6–11)
WBC # BLD AUTO: 7.8 K/UL (ref 3.6–11)
WBC # BLD AUTO: 7.9 K/UL (ref 3.6–11)
WBC # BLD AUTO: 7.9 K/UL (ref 3.6–11)
WBC # BLD AUTO: 8 K/UL (ref 3.6–11)
WBC # BLD AUTO: 8.3 K/UL (ref 3.6–11)
WBC # BLD AUTO: 8.4 K/UL (ref 3.6–11)
WBC # BLD AUTO: 8.6 K/UL (ref 3.6–11)
WBC # BLD AUTO: 8.6 K/UL (ref 3.6–11)
WBC # BLD AUTO: 9.1 K/UL (ref 3.6–11)
WBC # BLD AUTO: 9.2 K/UL (ref 3.6–11)
WBC # BLD AUTO: 9.3 K/UL (ref 3.6–11)
WBC # BLD AUTO: 9.4 K/UL (ref 3.6–11)
WBC # BLD AUTO: 9.5 K/UL (ref 3.6–11)
WBC # BLD AUTO: 9.5 K/UL (ref 3.6–11)
WBC # BLD AUTO: 9.6 K/UL (ref 3.6–11)
WBC # BLD AUTO: 9.9 K/UL (ref 3.6–11)
WBC # BLD AUTO: 9.9 K/UL (ref 3.6–11)
WBC URNS QL MICRO: ABNORMAL /HPF (ref 0–4)

## 2022-01-01 PROCEDURE — 74011250636 HC RX REV CODE- 250/636: Performed by: NURSE PRACTITIONER

## 2022-01-01 PROCEDURE — 74011250636 HC RX REV CODE- 250/636: Performed by: THORACIC SURGERY (CARDIOTHORACIC VASCULAR SURGERY)

## 2022-01-01 PROCEDURE — 65620000000 HC RM CCU GENERAL

## 2022-01-01 PROCEDURE — 83735 ASSAY OF MAGNESIUM: CPT

## 2022-01-01 PROCEDURE — 74011250637 HC RX REV CODE- 250/637: Performed by: INTERNAL MEDICINE

## 2022-01-01 PROCEDURE — 93005 ELECTROCARDIOGRAM TRACING: CPT

## 2022-01-01 PROCEDURE — 80076 HEPATIC FUNCTION PANEL: CPT

## 2022-01-01 PROCEDURE — 02F03ZZ FRAGMENTATION IN CORONARY ARTERY, ONE ARTERY, PERCUTANEOUS APPROACH: ICD-10-PCS | Performed by: INTERNAL MEDICINE

## 2022-01-01 PROCEDURE — 74011250636 HC RX REV CODE- 250/636: Performed by: HOSPITALIST

## 2022-01-01 PROCEDURE — 36415 COLL VENOUS BLD VENIPUNCTURE: CPT

## 2022-01-01 PROCEDURE — 85730 THROMBOPLASTIN TIME PARTIAL: CPT

## 2022-01-01 PROCEDURE — C1751 CATH, INF, PER/CENT/MIDLINE: HCPCS

## 2022-01-01 PROCEDURE — 74011250637 HC RX REV CODE- 250/637

## 2022-01-01 PROCEDURE — 74011250636 HC RX REV CODE- 250/636: Performed by: INTERNAL MEDICINE

## 2022-01-01 PROCEDURE — 80053 COMPREHEN METABOLIC PANEL: CPT

## 2022-01-01 PROCEDURE — 84100 ASSAY OF PHOSPHORUS: CPT

## 2022-01-01 PROCEDURE — C1761 HC CATH LITHO SHOCKWAVE SWMD -J: HCPCS | Performed by: INTERNAL MEDICINE

## 2022-01-01 PROCEDURE — 83605 ASSAY OF LACTIC ACID: CPT

## 2022-01-01 PROCEDURE — 33949 ECMO/ECLS DAILY MGMT ARTERY: CPT | Performed by: THORACIC SURGERY (CARDIOTHORACIC VASCULAR SURGERY)

## 2022-01-01 PROCEDURE — 92933 PRQ TRLML C ATHRC ST ANGIOP1: CPT | Performed by: INTERNAL MEDICINE

## 2022-01-01 PROCEDURE — 74011000250 HC RX REV CODE- 250: Performed by: INTERNAL MEDICINE

## 2022-01-01 PROCEDURE — C1769 GUIDE WIRE: HCPCS | Performed by: INTERNAL MEDICINE

## 2022-01-01 PROCEDURE — 82803 BLOOD GASES ANY COMBINATION: CPT

## 2022-01-01 PROCEDURE — 74011250637 HC RX REV CODE- 250/637: Performed by: EMERGENCY MEDICINE

## 2022-01-01 PROCEDURE — 92950 HEART/LUNG RESUSCITATION CPR: CPT

## 2022-01-01 PROCEDURE — 99291 CRITICAL CARE FIRST HOUR: CPT | Performed by: THORACIC SURGERY (CARDIOTHORACIC VASCULAR SURGERY)

## 2022-01-01 PROCEDURE — 82533 TOTAL CORTISOL: CPT

## 2022-01-01 PROCEDURE — C1887 CATHETER, GUIDING: HCPCS | Performed by: INTERNAL MEDICINE

## 2022-01-01 PROCEDURE — 71046 X-RAY EXAM CHEST 2 VIEWS: CPT

## 2022-01-01 PROCEDURE — P9045 ALBUMIN (HUMAN), 5%, 250 ML: HCPCS | Performed by: PHYSICIAN ASSISTANT

## 2022-01-01 PROCEDURE — 83615 LACTATE (LD) (LDH) ENZYME: CPT

## 2022-01-01 PROCEDURE — 71045 X-RAY EXAM CHEST 1 VIEW: CPT

## 2022-01-01 PROCEDURE — 76060000038 HC ANESTHESIA 3.5 TO 4 HR: Performed by: SURGERY

## 2022-01-01 PROCEDURE — 77030008462 HC STPLR SKN PROX J&J -A: Performed by: SURGERY

## 2022-01-01 PROCEDURE — 74011000250 HC RX REV CODE- 250: Performed by: PHYSICIAN ASSISTANT

## 2022-01-01 PROCEDURE — 77030002933 HC SUT MCRYL J&J -A: Performed by: SURGERY

## 2022-01-01 PROCEDURE — 74011250636 HC RX REV CODE- 250/636: Performed by: EMERGENCY MEDICINE

## 2022-01-01 PROCEDURE — 85027 COMPLETE CBC AUTOMATED: CPT

## 2022-01-01 PROCEDURE — C9113 INJ PANTOPRAZOLE SODIUM, VIA: HCPCS | Performed by: EMERGENCY MEDICINE

## 2022-01-01 PROCEDURE — 87070 CULTURE OTHR SPECIMN AEROBIC: CPT

## 2022-01-01 PROCEDURE — 84155 ASSAY OF PROTEIN SERUM: CPT

## 2022-01-01 PROCEDURE — 77030002987 HC SUT PROL J&J -B: Performed by: SURGERY

## 2022-01-01 PROCEDURE — 83880 ASSAY OF NATRIURETIC PEPTIDE: CPT

## 2022-01-01 PROCEDURE — 74011000250 HC RX REV CODE- 250: Performed by: THORACIC SURGERY (CARDIOTHORACIC VASCULAR SURGERY)

## 2022-01-01 PROCEDURE — 96374 THER/PROPH/DIAG INJ IV PUSH: CPT

## 2022-01-01 PROCEDURE — 51798 US URINE CAPACITY MEASURE: CPT

## 2022-01-01 PROCEDURE — 74011250637 HC RX REV CODE- 250/637: Performed by: PHYSICIAN ASSISTANT

## 2022-01-01 PROCEDURE — 74011636637 HC RX REV CODE- 636/637: Performed by: INTERNAL MEDICINE

## 2022-01-01 PROCEDURE — 86923 COMPATIBILITY TEST ELECTRIC: CPT

## 2022-01-01 PROCEDURE — C1769 GUIDE WIRE: HCPCS

## 2022-01-01 PROCEDURE — 99283 EMERGENCY DEPT VISIT LOW MDM: CPT

## 2022-01-01 PROCEDURE — 87040 BLOOD CULTURE FOR BACTERIA: CPT

## 2022-01-01 PROCEDURE — 36600 WITHDRAWAL OF ARTERIAL BLOOD: CPT

## 2022-01-01 PROCEDURE — 99292 CRITICAL CARE ADDL 30 MIN: CPT | Performed by: THORACIC SURGERY (CARDIOTHORACIC VASCULAR SURGERY)

## 2022-01-01 PROCEDURE — 82962 GLUCOSE BLOOD TEST: CPT

## 2022-01-01 PROCEDURE — 77030041063 HC CATH DX ANGI DXTRTY MEDT -A: Performed by: INTERNAL MEDICINE

## 2022-01-01 PROCEDURE — 74011000636 HC RX REV CODE- 636: Performed by: EMERGENCY MEDICINE

## 2022-01-01 PROCEDURE — 74011000258 HC RX REV CODE- 258: Performed by: INTERNAL MEDICINE

## 2022-01-01 PROCEDURE — 99153 MOD SED SAME PHYS/QHP EA: CPT | Performed by: INTERNAL MEDICINE

## 2022-01-01 PROCEDURE — 82550 ASSAY OF CK (CPK): CPT

## 2022-01-01 PROCEDURE — 30243K1 TRANSFUSION OF NONAUTOLOGOUS FROZEN PLASMA INTO CENTRAL VEIN, PERCUTANEOUS APPROACH: ICD-10-PCS | Performed by: INTERNAL MEDICINE

## 2022-01-01 PROCEDURE — 2709999900 HC NON-CHARGEABLE SUPPLY

## 2022-01-01 PROCEDURE — 74011250636 HC RX REV CODE- 250/636: Performed by: PHYSICIAN ASSISTANT

## 2022-01-01 PROCEDURE — 5A1D70Z PERFORMANCE OF URINARY FILTRATION, INTERMITTENT, LESS THAN 6 HOURS PER DAY: ICD-10-PCS | Performed by: HOSPITALIST

## 2022-01-01 PROCEDURE — 96372 THER/PROPH/DIAG INJ SC/IM: CPT

## 2022-01-01 PROCEDURE — 0271376 DILATION OF CORONARY ARTERY, TWO ARTERIES, BIFURCATION, WITH FOUR OR MORE DRUG-ELUTING INTRALUMINAL DEVICES, PERCUTANEOUS APPROACH: ICD-10-PCS | Performed by: INTERNAL MEDICINE

## 2022-01-01 PROCEDURE — 85025 COMPLETE CBC W/AUTO DIFF WBC: CPT

## 2022-01-01 PROCEDURE — 93750 INTERROGATION VAD IN PERSON: CPT | Performed by: THORACIC SURGERY (CARDIOTHORACIC VASCULAR SURGERY)

## 2022-01-01 PROCEDURE — 80048 BASIC METABOLIC PNL TOTAL CA: CPT

## 2022-01-01 PROCEDURE — P9047 ALBUMIN (HUMAN), 25%, 50ML: HCPCS | Performed by: HOSPITALIST

## 2022-01-01 PROCEDURE — 96375 TX/PRO/DX INJ NEW DRUG ADDON: CPT

## 2022-01-01 PROCEDURE — 90935 HEMODIALYSIS ONE EVALUATION: CPT

## 2022-01-01 PROCEDURE — C1752 CATH,HEMODIALYSIS,SHORT-TERM: HCPCS

## 2022-01-01 PROCEDURE — 99233 SBSQ HOSP IP/OBS HIGH 50: CPT | Performed by: CLINICAL NURSE SPECIALIST

## 2022-01-01 PROCEDURE — 94761 N-INVAS EAR/PLS OXIMETRY MLT: CPT

## 2022-01-01 PROCEDURE — 93308 TTE F-UP OR LMTD: CPT | Performed by: INTERNAL MEDICINE

## 2022-01-01 PROCEDURE — 33999 UNLISTED PX CARDIAC SURGERY: CPT | Performed by: INTERNAL MEDICINE

## 2022-01-01 PROCEDURE — 92928 PRQ TCAT PLMT NTRAC ST 1 LES: CPT | Performed by: INTERNAL MEDICINE

## 2022-01-01 PROCEDURE — 94003 VENT MGMT INPAT SUBQ DAY: CPT

## 2022-01-01 PROCEDURE — 82150 ASSAY OF AMYLASE: CPT

## 2022-01-01 PROCEDURE — P9045 ALBUMIN (HUMAN), 5%, 250 ML: HCPCS

## 2022-01-01 PROCEDURE — 74011000250 HC RX REV CODE- 250: Performed by: NURSE PRACTITIONER

## 2022-01-01 PROCEDURE — C1757 CATH, THROMBECTOMY/EMBOLECT: HCPCS | Performed by: SURGERY

## 2022-01-01 PROCEDURE — 74011000258 HC RX REV CODE- 258: Performed by: THORACIC SURGERY (CARDIOTHORACIC VASCULAR SURGERY)

## 2022-01-01 PROCEDURE — 04UK0KZ SUPPLEMENT RIGHT FEMORAL ARTERY WITH NONAUTOLOGOUS TISSUE SUBSTITUTE, OPEN APPROACH: ICD-10-PCS | Performed by: SURGERY

## 2022-01-01 PROCEDURE — 99284 EMERGENCY DEPT VISIT MOD MDM: CPT

## 2022-01-01 PROCEDURE — 77030020061 HC IV BLD WRMR ADMIN SET 3M -B: Performed by: STUDENT IN AN ORGANIZED HEALTH CARE EDUCATION/TRAINING PROGRAM

## 2022-01-01 PROCEDURE — 72131 CT LUMBAR SPINE W/O DYE: CPT

## 2022-01-01 PROCEDURE — 84145 PROCALCITONIN (PCT): CPT

## 2022-01-01 PROCEDURE — C9113 INJ PANTOPRAZOLE SODIUM, VIA: HCPCS | Performed by: INTERNAL MEDICINE

## 2022-01-01 PROCEDURE — 95816 EEG AWAKE AND DROWSY: CPT | Performed by: PSYCHIATRY & NEUROLOGY

## 2022-01-01 PROCEDURE — 87086 URINE CULTURE/COLONY COUNT: CPT

## 2022-01-01 PROCEDURE — 71275 CT ANGIOGRAPHY CHEST: CPT

## 2022-01-01 PROCEDURE — 93306 TTE W/DOPPLER COMPLETE: CPT

## 2022-01-01 PROCEDURE — 77030003029 HC SUT VCRL J&J -B: Performed by: SURGERY

## 2022-01-01 PROCEDURE — 86658 ENTEROVIRUS ANTIBODY: CPT

## 2022-01-01 PROCEDURE — 83690 ASSAY OF LIPASE: CPT

## 2022-01-01 PROCEDURE — C1725 CATH, TRANSLUMIN NON-LASER: HCPCS | Performed by: INTERNAL MEDICINE

## 2022-01-01 PROCEDURE — 93308 TTE F-UP OR LMTD: CPT

## 2022-01-01 PROCEDURE — 82947 ASSAY GLUCOSE BLOOD QUANT: CPT

## 2022-01-01 PROCEDURE — 74011000258 HC RX REV CODE- 258: Performed by: HOSPITALIST

## 2022-01-01 PROCEDURE — 74011250637 HC RX REV CODE- 250/637: Performed by: HOSPITALIST

## 2022-01-01 PROCEDURE — 99285 EMERGENCY DEPT VISIT HI MDM: CPT

## 2022-01-01 PROCEDURE — 90945 DIALYSIS ONE EVALUATION: CPT

## 2022-01-01 PROCEDURE — 81001 URINALYSIS AUTO W/SCOPE: CPT

## 2022-01-01 PROCEDURE — 77030013797 HC KT TRNSDUC PRSSR EDWD -A: Performed by: STUDENT IN AN ORGANIZED HEALTH CARE EDUCATION/TRAINING PROGRAM

## 2022-01-01 PROCEDURE — P9016 RBC LEUKOCYTES REDUCED: HCPCS

## 2022-01-01 PROCEDURE — 93312 ECHO TRANSESOPHAGEAL: CPT

## 2022-01-01 PROCEDURE — 77030019905 HC CATH URETH INTMIT MDII -A

## 2022-01-01 PROCEDURE — 82330 ASSAY OF CALCIUM: CPT

## 2022-01-01 PROCEDURE — 74011000258 HC RX REV CODE- 258: Performed by: NURSE PRACTITIONER

## 2022-01-01 PROCEDURE — P9045 ALBUMIN (HUMAN), 5%, 250 ML: HCPCS | Performed by: INTERNAL MEDICINE

## 2022-01-01 PROCEDURE — C1874 STENT, COATED/COV W/DEL SYS: HCPCS | Performed by: INTERNAL MEDICINE

## 2022-01-01 PROCEDURE — 99152 MOD SED SAME PHYS/QHP 5/>YRS: CPT | Performed by: INTERNAL MEDICINE

## 2022-01-01 PROCEDURE — 83540 ASSAY OF IRON: CPT

## 2022-01-01 PROCEDURE — 03HY32Z INSERTION OF MONITORING DEVICE INTO UPPER ARTERY, PERCUTANEOUS APPROACH: ICD-10-PCS | Performed by: THORACIC SURGERY (CARDIOTHORACIC VASCULAR SURGERY)

## 2022-01-01 PROCEDURE — 86900 BLOOD TYPING SEROLOGIC ABO: CPT

## 2022-01-01 PROCEDURE — C1892 INTRO/SHEATH,FIXED,PEEL-AWAY: HCPCS | Performed by: THORACIC SURGERY (CARDIOTHORACIC VASCULAR SURGERY)

## 2022-01-01 PROCEDURE — 74011250636 HC RX REV CODE- 250/636: Performed by: NURSE ANESTHETIST, CERTIFIED REGISTERED

## 2022-01-01 PROCEDURE — P9059 PLASMA, FRZ BETWEEN 8-24HOUR: HCPCS

## 2022-01-01 PROCEDURE — 76010000111 HC CV SURG 3.5 TO 4 HR: Performed by: SURGERY

## 2022-01-01 PROCEDURE — G0257 UNSCHED DIALYSIS ESRD PT HOS: HCPCS

## 2022-01-01 PROCEDURE — 80202 ASSAY OF VANCOMYCIN: CPT

## 2022-01-01 PROCEDURE — 65270000046 HC RM TELEMETRY

## 2022-01-01 PROCEDURE — 93971 EXTREMITY STUDY: CPT

## 2022-01-01 PROCEDURE — 36430 TRANSFUSION BLD/BLD COMPNT: CPT

## 2022-01-01 PROCEDURE — 76060000033 HC ANESTHESIA 1 TO 1.5 HR: Performed by: THORACIC SURGERY (CARDIOTHORACIC VASCULAR SURGERY)

## 2022-01-01 PROCEDURE — 74011000250 HC RX REV CODE- 250: Performed by: EMERGENCY MEDICINE

## 2022-01-01 PROCEDURE — 84484 ASSAY OF TROPONIN QUANT: CPT

## 2022-01-01 PROCEDURE — 77030029641 HC PMP CARD PERC IMPELLA CP ABIM -L: Performed by: INTERNAL MEDICINE

## 2022-01-01 PROCEDURE — C1892 INTRO/SHEATH,FIXED,PEEL-AWAY: HCPCS

## 2022-01-01 PROCEDURE — 74011250636 HC RX REV CODE- 250/636

## 2022-01-01 PROCEDURE — 0BH17EZ INSERTION OF ENDOTRACHEAL AIRWAY INTO TRACHEA, VIA NATURAL OR ARTIFICIAL OPENING: ICD-10-PCS | Performed by: ANESTHESIOLOGY

## 2022-01-01 PROCEDURE — 99291 CRITICAL CARE FIRST HOUR: CPT | Performed by: PSYCHIATRY & NEUROLOGY

## 2022-01-01 PROCEDURE — 86706 HEP B SURFACE ANTIBODY: CPT

## 2022-01-01 PROCEDURE — C1751 CATH, INF, PER/CENT/MIDLINE: HCPCS | Performed by: INTERNAL MEDICINE

## 2022-01-01 PROCEDURE — 96361 HYDRATE IV INFUSION ADD-ON: CPT

## 2022-01-01 PROCEDURE — 76010000113 HC CV SURG 1 TO 1.5 HR: Performed by: THORACIC SURGERY (CARDIOTHORACIC VASCULAR SURGERY)

## 2022-01-01 PROCEDURE — 76937 US GUIDE VASCULAR ACCESS: CPT | Performed by: INTERNAL MEDICINE

## 2022-01-01 PROCEDURE — 5A12012 PERFORMANCE OF CARDIAC OUTPUT, SINGLE, MANUAL: ICD-10-PCS | Performed by: INTERNAL MEDICINE

## 2022-01-01 PROCEDURE — 85384 FIBRINOGEN ACTIVITY: CPT

## 2022-01-01 PROCEDURE — 02703ZZ DILATION OF CORONARY ARTERY, ONE ARTERY, PERCUTANEOUS APPROACH: ICD-10-PCS | Performed by: INTERNAL MEDICINE

## 2022-01-01 PROCEDURE — 77030013079 HC BLNKT BAIR HGGR 3M -A: Performed by: STUDENT IN AN ORGANIZED HEALTH CARE EDUCATION/TRAINING PROGRAM

## 2022-01-01 PROCEDURE — 77030018729 HC ELECTRD DEFIB PAD CARD -B: Performed by: SURGERY

## 2022-01-01 PROCEDURE — 2709999900 HC NON-CHARGEABLE SUPPLY: Performed by: INTERNAL MEDICINE

## 2022-01-01 PROCEDURE — 84478 ASSAY OF TRIGLYCERIDES: CPT

## 2022-01-01 PROCEDURE — P9047 ALBUMIN (HUMAN), 25%, 50ML: HCPCS | Performed by: INTERNAL MEDICINE

## 2022-01-01 PROCEDURE — 77030013797 HC KT TRNSDUC PRSSR EDWD -A

## 2022-01-01 PROCEDURE — 02HV33Z INSERTION OF INFUSION DEVICE INTO SUPERIOR VENA CAVA, PERCUTANEOUS APPROACH: ICD-10-PCS | Performed by: ANESTHESIOLOGY

## 2022-01-01 PROCEDURE — 77030037878 HC DRSG MEPILEX >48IN BORD MOLN -B: Performed by: SURGERY

## 2022-01-01 PROCEDURE — 77030002916 HC SUT ETHLN J&J -A: Performed by: SURGERY

## 2022-01-01 PROCEDURE — 95816 EEG AWAKE AND DROWSY: CPT | Performed by: INTERNAL MEDICINE

## 2022-01-01 PROCEDURE — 87635 SARS-COV-2 COVID-19 AMP PRB: CPT

## 2022-01-01 PROCEDURE — P9045 ALBUMIN (HUMAN), 5%, 250 ML: HCPCS | Performed by: NURSE ANESTHETIST, CERTIFIED REGISTERED

## 2022-01-01 PROCEDURE — 74011000250 HC RX REV CODE- 250: Performed by: HOSPITALIST

## 2022-01-01 PROCEDURE — 87340 HEPATITIS B SURFACE AG IA: CPT

## 2022-01-01 PROCEDURE — B2151ZZ FLUOROSCOPY OF LEFT HEART USING LOW OSMOLAR CONTRAST: ICD-10-PCS | Performed by: INTERNAL MEDICINE

## 2022-01-01 PROCEDURE — 85610 PROTHROMBIN TIME: CPT

## 2022-01-01 PROCEDURE — 94002 VENT MGMT INPAT INIT DAY: CPT

## 2022-01-01 PROCEDURE — 30243N1 TRANSFUSION OF NONAUTOLOGOUS RED BLOOD CELLS INTO CENTRAL VEIN, PERCUTANEOUS APPROACH: ICD-10-PCS | Performed by: INTERNAL MEDICINE

## 2022-01-01 PROCEDURE — C1894 INTRO/SHEATH, NON-LASER: HCPCS | Performed by: INTERNAL MEDICINE

## 2022-01-01 PROCEDURE — 77030020089 HC KT PERC FEM ART MEDT -C: Performed by: THORACIC SURGERY (CARDIOTHORACIC VASCULAR SURGERY)

## 2022-01-01 PROCEDURE — 93571 IV DOP VEL&/PRESS C FLO 1ST: CPT | Performed by: INTERNAL MEDICINE

## 2022-01-01 PROCEDURE — 5A1D90Z PERFORMANCE OF URINARY FILTRATION, CONTINUOUS, GREATER THAN 18 HOURS PER DAY: ICD-10-PCS | Performed by: HOSPITALIST

## 2022-01-01 PROCEDURE — 74011000250 HC RX REV CODE- 250: Performed by: NURSE ANESTHETIST, CERTIFIED REGISTERED

## 2022-01-01 PROCEDURE — 5A1955Z RESPIRATORY VENTILATION, GREATER THAN 96 CONSECUTIVE HOURS: ICD-10-PCS | Performed by: ANESTHESIOLOGY

## 2022-01-01 PROCEDURE — 74011636637 HC RX REV CODE- 636/637: Performed by: HOSPITALIST

## 2022-01-01 PROCEDURE — B2111ZZ FLUOROSCOPY OF MULTIPLE CORONARY ARTERIES USING LOW OSMOLAR CONTRAST: ICD-10-PCS | Performed by: INTERNAL MEDICINE

## 2022-01-01 PROCEDURE — 77030005401 HC CATH RAD ARRO -A: Performed by: STUDENT IN AN ORGANIZED HEALTH CARE EDUCATION/TRAINING PROGRAM

## 2022-01-01 PROCEDURE — 36556 INSERT NON-TUNNEL CV CATH: CPT

## 2022-01-01 PROCEDURE — C1768 GRAFT, VASCULAR: HCPCS | Performed by: SURGERY

## 2022-01-01 PROCEDURE — 5A1522G EXTRACORPOREAL OXYGENATION, MEMBRANE, PERIPHERAL VENO-ARTERIAL: ICD-10-PCS | Performed by: THORACIC SURGERY (CARDIOTHORACIC VASCULAR SURGERY)

## 2022-01-01 PROCEDURE — 02HV33Z INSERTION OF INFUSION DEVICE INTO SUPERIOR VENA CAVA, PERCUTANEOUS APPROACH: ICD-10-PCS | Performed by: INTERNAL MEDICINE

## 2022-01-01 PROCEDURE — 31500 INSERT EMERGENCY AIRWAY: CPT

## 2022-01-01 PROCEDURE — 73502 X-RAY EXAM HIP UNI 2-3 VIEWS: CPT

## 2022-01-01 PROCEDURE — 2709999900 HC NON-CHARGEABLE SUPPLY: Performed by: THORACIC SURGERY (CARDIOTHORACIC VASCULAR SURGERY)

## 2022-01-01 PROCEDURE — P9045 ALBUMIN (HUMAN), 5%, 250 ML: HCPCS | Performed by: HOSPITALIST

## 2022-01-01 PROCEDURE — 3E043XZ INTRODUCTION OF VASOPRESSOR INTO CENTRAL VEIN, PERCUTANEOUS APPROACH: ICD-10-PCS | Performed by: INTERNAL MEDICINE

## 2022-01-01 PROCEDURE — 04PY03Z REMOVAL OF INFUSION DEVICE FROM LOWER ARTERY, OPEN APPROACH: ICD-10-PCS | Performed by: SURGERY

## 2022-01-01 PROCEDURE — 4A023N7 MEASUREMENT OF CARDIAC SAMPLING AND PRESSURE, LEFT HEART, PERCUTANEOUS APPROACH: ICD-10-PCS | Performed by: INTERNAL MEDICINE

## 2022-01-01 PROCEDURE — 93306 TTE W/DOPPLER COMPLETE: CPT | Performed by: INTERNAL MEDICINE

## 2022-01-01 PROCEDURE — 74177 CT ABD & PELVIS W/CONTRAST: CPT

## 2022-01-01 PROCEDURE — 02HV33Z INSERTION OF INFUSION DEVICE INTO SUPERIOR VENA CAVA, PERCUTANEOUS APPROACH: ICD-10-PCS | Performed by: RADIOLOGY

## 2022-01-01 PROCEDURE — 84443 ASSAY THYROID STIM HORMONE: CPT

## 2022-01-01 PROCEDURE — 33947 ECMO/ECLS INITIATION ARTERY: CPT | Performed by: THORACIC SURGERY (CARDIOTHORACIC VASCULAR SURGERY)

## 2022-01-01 PROCEDURE — C1894 INTRO/SHEATH, NON-LASER: HCPCS | Performed by: SURGERY

## 2022-01-01 PROCEDURE — 74011000636 HC RX REV CODE- 636: Performed by: INTERNAL MEDICINE

## 2022-01-01 PROCEDURE — 93458 L HRT ARTERY/VENTRICLE ANGIO: CPT | Performed by: INTERNAL MEDICINE

## 2022-01-01 PROCEDURE — 4A033BC MEASUREMENT OF ARTERIAL PRESSURE, CORONARY, PERCUTANEOUS APPROACH: ICD-10-PCS | Performed by: INTERNAL MEDICINE

## 2022-01-01 PROCEDURE — 5A0221D ASSISTANCE WITH CARDIAC OUTPUT USING IMPELLER PUMP, CONTINUOUS: ICD-10-PCS | Performed by: INTERNAL MEDICINE

## 2022-01-01 PROCEDURE — 77030014650 HC SEAL MTRX FLOSEL BAXT -C: Performed by: SURGERY

## 2022-01-01 PROCEDURE — APPNB45 APP NON BILLABLE 31-45 MINUTES: Performed by: NURSE PRACTITIONER

## 2022-01-01 PROCEDURE — 72100 X-RAY EXAM L-S SPINE 2/3 VWS: CPT

## 2022-01-01 PROCEDURE — 82040 ASSAY OF SERUM ALBUMIN: CPT

## 2022-01-01 PROCEDURE — 74011636637 HC RX REV CODE- 636/637: Performed by: EMERGENCY MEDICINE

## 2022-01-01 PROCEDURE — C1760 CLOSURE DEV, VASC: HCPCS | Performed by: SURGERY

## 2022-01-01 PROCEDURE — 74011250636 HC RX REV CODE- 250/636: Performed by: RADIOLOGY

## 2022-01-01 PROCEDURE — 77030031139 HC SUT VCRL2 J&J -A: Performed by: SURGERY

## 2022-01-01 PROCEDURE — 77030020140: Performed by: THORACIC SURGERY (CARDIOTHORACIC VASCULAR SURGERY)

## 2022-01-01 PROCEDURE — 85347 COAGULATION TIME ACTIVATED: CPT

## 2022-01-01 PROCEDURE — 02HA3RJ INSERTION OF SHORT-TERM EXTERNAL HEART ASSIST SYSTEM INTO HEART, INTRAOPERATIVE, PERCUTANEOUS APPROACH: ICD-10-PCS | Performed by: INTERNAL MEDICINE

## 2022-01-01 PROCEDURE — 74011000250 HC RX REV CODE- 250: Performed by: RADIOLOGY

## 2022-01-01 PROCEDURE — 77030003029 HC SUT VCRL J&J -B: Performed by: THORACIC SURGERY (CARDIOTHORACIC VASCULAR SURGERY)

## 2022-01-01 PROCEDURE — 82310 ASSAY OF CALCIUM: CPT

## 2022-01-01 PROCEDURE — B241ZZ3 ULTRASONOGRAPHY OF MULTIPLE CORONARY ARTERIES, INTRAVASCULAR: ICD-10-PCS | Performed by: INTERNAL MEDICINE

## 2022-01-01 PROCEDURE — 77030002986 HC SUT PROL J&J -A: Performed by: SURGERY

## 2022-01-01 PROCEDURE — 77030002996 HC SUT SLK J&J -A: Performed by: SURGERY

## 2022-01-01 PROCEDURE — 77030018314 HC SEAL FBRN TISSL2 BAXT -F: Performed by: SURGERY

## 2022-01-01 PROCEDURE — 93970 EXTREMITY STUDY: CPT

## 2022-01-01 PROCEDURE — 99233 SBSQ HOSP IP/OBS HIGH 50: CPT | Performed by: NURSE PRACTITIONER

## 2022-01-01 PROCEDURE — 2709999900 HC NON-CHARGEABLE SUPPLY: Performed by: SURGERY

## 2022-01-01 PROCEDURE — 74011000258 HC RX REV CODE- 258: Performed by: NURSE ANESTHETIST, CERTIFIED REGISTERED

## 2022-01-01 PROCEDURE — 82728 ASSAY OF FERRITIN: CPT

## 2022-01-01 PROCEDURE — 04CK0ZZ EXTIRPATION OF MATTER FROM RIGHT FEMORAL ARTERY, OPEN APPROACH: ICD-10-PCS | Performed by: SURGERY

## 2022-01-01 PROCEDURE — 33952 ECMO/ECLS INSJ PRPH CANNULA: CPT | Performed by: THORACIC SURGERY (CARDIOTHORACIC VASCULAR SURGERY)

## 2022-01-01 PROCEDURE — 77030002996 HC SUT SLK J&J -A: Performed by: THORACIC SURGERY (CARDIOTHORACIC VASCULAR SURGERY)

## 2022-01-01 DEVICE — STENT RONYX25018UX RESOLUTE ONYX 2.50X18
Type: IMPLANTABLE DEVICE | Status: FUNCTIONAL
Brand: RESOLUTE ONYX™

## 2022-01-01 DEVICE — STENT RONYX25015UX RESOLUTE ONYX 2.50X15
Type: IMPLANTABLE DEVICE | Status: FUNCTIONAL
Brand: RESOLUTE ONYX™

## 2022-01-01 DEVICE — STENT RONYX22518UX RESOLUTE ONYX 2.25X18
Type: IMPLANTABLE DEVICE | Status: FUNCTIONAL
Brand: RESOLUTE ONYX™

## 2022-01-01 DEVICE — STENT RONYX25008UX RESOLUTE ONYX 2.50X08
Type: IMPLANTABLE DEVICE | Status: FUNCTIONAL
Brand: RESOLUTE ONYX™

## 2022-01-01 DEVICE — PATCH VASC W0.8XL8CM PERIPH BOV PERICARD N PVC N DEHP CRSS: Type: IMPLANTABLE DEVICE | Site: GROIN | Status: FUNCTIONAL

## 2022-01-01 RX ORDER — DOPAMINE HYDROCHLORIDE 320 MG/100ML
INJECTION, SOLUTION INTRAVENOUS
Status: COMPLETED | OUTPATIENT
Start: 2022-01-01 | End: 2022-01-01

## 2022-01-01 RX ORDER — CYCLOBENZAPRINE HCL 10 MG
10 TABLET ORAL 2 TIMES DAILY
Qty: 20 TABLET | Refills: 0 | Status: SHIPPED | OUTPATIENT
Start: 2022-01-01

## 2022-01-01 RX ORDER — DEXTROSE MONOHYDRATE 100 MG/ML
INJECTION, SOLUTION INTRAVENOUS
Status: DISPENSED
Start: 2022-01-01 | End: 2022-01-01

## 2022-01-01 RX ORDER — PREDNISOLONE ACETATE 10 MG/ML
SUSPENSION/ DROPS OPHTHALMIC
COMMUNITY
Start: 2022-01-01

## 2022-01-01 RX ORDER — SODIUM CHLORIDE 9 MG/ML
250 INJECTION, SOLUTION INTRAVENOUS AS NEEDED
Status: DISCONTINUED | OUTPATIENT
Start: 2022-01-01 | End: 2022-01-01 | Stop reason: ALTCHOICE

## 2022-01-01 RX ORDER — GUAIFENESIN 100 MG/5ML
162 LIQUID (ML) ORAL
Status: ACTIVE | OUTPATIENT
Start: 2022-01-01 | End: 2022-01-01

## 2022-01-01 RX ORDER — MIDAZOLAM HYDROCHLORIDE 1 MG/ML
INJECTION, SOLUTION INTRAMUSCULAR; INTRAVENOUS AS NEEDED
Status: DISCONTINUED | OUTPATIENT
Start: 2022-01-01 | End: 2022-01-01 | Stop reason: HOSPADM

## 2022-01-01 RX ORDER — HYDROMORPHONE HYDROCHLORIDE 1 MG/ML
2 INJECTION, SOLUTION INTRAMUSCULAR; INTRAVENOUS; SUBCUTANEOUS ONCE
Status: COMPLETED | OUTPATIENT
Start: 2022-01-01 | End: 2022-01-01

## 2022-01-01 RX ORDER — HEPARIN SODIUM 1000 [USP'U]/ML
INJECTION, SOLUTION INTRAVENOUS; SUBCUTANEOUS AS NEEDED
Status: DISCONTINUED | OUTPATIENT
Start: 2022-01-01 | End: 2022-01-01 | Stop reason: HOSPADM

## 2022-01-01 RX ORDER — ONDANSETRON 4 MG/1
4 TABLET, ORALLY DISINTEGRATING ORAL
Qty: 20 TABLET | Refills: 0 | Status: SHIPPED | OUTPATIENT
Start: 2022-01-01

## 2022-01-01 RX ORDER — POLYETHYLENE GLYCOL 3350 17 G/17G
17 POWDER, FOR SOLUTION ORAL DAILY
Status: DISCONTINUED | OUTPATIENT
Start: 2022-01-01 | End: 2022-01-01

## 2022-01-01 RX ORDER — CALCIUM GLUCONATE 20 MG/ML
1 INJECTION, SOLUTION INTRAVENOUS ONCE
Status: COMPLETED | OUTPATIENT
Start: 2022-01-01 | End: 2022-01-01

## 2022-01-01 RX ORDER — MAGNESIUM SULFATE 1 G/100ML
1 INJECTION INTRAVENOUS ONCE
Status: COMPLETED | OUTPATIENT
Start: 2022-01-01 | End: 2022-01-01

## 2022-01-01 RX ORDER — HYDROCORTISONE SODIUM SUCCINATE 100 MG/2ML
50 INJECTION, POWDER, FOR SOLUTION INTRAMUSCULAR; INTRAVENOUS EVERY 6 HOURS
Status: DISCONTINUED | OUTPATIENT
Start: 2022-01-01 | End: 2022-06-11 | Stop reason: HOSPADM

## 2022-01-01 RX ORDER — PROPOFOL 10 MG/ML
INJECTION, EMULSION INTRAVENOUS AS NEEDED
Status: DISCONTINUED | OUTPATIENT
Start: 2022-01-01 | End: 2022-01-01 | Stop reason: HOSPADM

## 2022-01-01 RX ORDER — DROPERIDOL 2.5 MG/ML
0.62 INJECTION, SOLUTION INTRAMUSCULAR; INTRAVENOUS ONCE
Status: COMPLETED | OUTPATIENT
Start: 2022-01-01 | End: 2022-01-01

## 2022-01-01 RX ORDER — SODIUM BICARBONATE 1 MEQ/ML
SYRINGE (ML) INTRAVENOUS
Status: COMPLETED | OUTPATIENT
Start: 2022-01-01 | End: 2022-01-01

## 2022-01-01 RX ORDER — EPINEPHRINE 0.1 MG/ML
INJECTION INTRACARDIAC; INTRAVENOUS
Status: COMPLETED | OUTPATIENT
Start: 2022-01-01 | End: 2022-01-01

## 2022-01-01 RX ORDER — PREGABALIN 200 MG/1
200 CAPSULE ORAL 2 TIMES DAILY
Qty: 30 CAPSULE | Refills: 0 | Status: SHIPPED | OUTPATIENT
Start: 2022-01-01 | End: 2022-01-01

## 2022-01-01 RX ORDER — POTASSIUM CHLORIDE 29.8 MG/ML
20 INJECTION INTRAVENOUS
Status: DISPENSED | OUTPATIENT
Start: 2022-01-01 | End: 2022-01-01

## 2022-01-01 RX ORDER — GABAPENTIN 300 MG/1
300 CAPSULE ORAL 3 TIMES DAILY
Qty: 45 CAPSULE | Refills: 0 | Status: SHIPPED | OUTPATIENT
Start: 2022-01-01 | End: 2022-01-01

## 2022-01-01 RX ORDER — CALCIUM GLUCONATE 20 MG/ML
2 INJECTION, SOLUTION INTRAVENOUS ONCE
Status: COMPLETED | OUTPATIENT
Start: 2022-01-01 | End: 2022-01-01

## 2022-01-01 RX ORDER — POLYETHYLENE GLYCOL 3350 17 G/17G
17 POWDER, FOR SOLUTION ORAL DAILY PRN
Status: DISCONTINUED | OUTPATIENT
Start: 2022-01-01 | End: 2022-01-01

## 2022-01-01 RX ORDER — OXYCODONE HYDROCHLORIDE 5 MG/1
5 TABLET ORAL
Status: COMPLETED | OUTPATIENT
Start: 2022-01-01 | End: 2022-01-01

## 2022-01-01 RX ORDER — DIAZEPAM 10 MG/2ML
5 INJECTION INTRAMUSCULAR
Status: COMPLETED | OUTPATIENT
Start: 2022-01-01 | End: 2022-01-01

## 2022-01-01 RX ORDER — FENTANYL CITRATE 50 UG/ML
50 INJECTION, SOLUTION INTRAMUSCULAR; INTRAVENOUS
Status: DISCONTINUED | OUTPATIENT
Start: 2022-01-01 | End: 2022-01-01

## 2022-01-01 RX ORDER — PHENYLEPHRINE HCL IN 0.9% NACL 0.4MG/10ML
SYRINGE (ML) INTRAVENOUS AS NEEDED
Status: DISCONTINUED | OUTPATIENT
Start: 2022-01-01 | End: 2022-01-01 | Stop reason: HOSPADM

## 2022-01-01 RX ORDER — PROPOFOL 10 MG/ML
VIAL (ML) INTRAVENOUS
Status: COMPLETED | OUTPATIENT
Start: 2022-01-01 | End: 2022-01-01

## 2022-01-01 RX ORDER — MAGNESIUM SULFATE 100 %
4 CRYSTALS MISCELLANEOUS AS NEEDED
Status: DISCONTINUED | OUTPATIENT
Start: 2022-01-01 | End: 2022-01-01

## 2022-01-01 RX ORDER — HYDROCODONE BITARTRATE AND ACETAMINOPHEN 5; 325 MG/1; MG/1
1 TABLET ORAL
Qty: 10 TABLET | Refills: 0 | Status: SHIPPED | OUTPATIENT
Start: 2022-01-01 | End: 2022-01-01

## 2022-01-01 RX ORDER — EPINEPHRINE 0.1 MG/ML
INJECTION INTRACARDIAC; INTRAVENOUS AS NEEDED
Status: DISCONTINUED | OUTPATIENT
Start: 2022-01-01 | End: 2022-01-01 | Stop reason: HOSPADM

## 2022-01-01 RX ORDER — NALOXONE HYDROCHLORIDE 0.4 MG/ML
0.4 INJECTION, SOLUTION INTRAMUSCULAR; INTRAVENOUS; SUBCUTANEOUS AS NEEDED
Status: DISCONTINUED | OUTPATIENT
Start: 2022-01-01 | End: 2022-01-01

## 2022-01-01 RX ORDER — SODIUM BICARBONATE 1 MEQ/ML
SYRINGE (ML) INTRAVENOUS AS NEEDED
Status: DISCONTINUED | OUTPATIENT
Start: 2022-01-01 | End: 2022-01-01 | Stop reason: HOSPADM

## 2022-01-01 RX ORDER — LEVOFLOXACIN 5 MG/ML
750 INJECTION, SOLUTION INTRAVENOUS
Status: DISCONTINUED | OUTPATIENT
Start: 2022-01-01 | End: 2022-01-01 | Stop reason: DRUGHIGH

## 2022-01-01 RX ORDER — POTASSIUM CHLORIDE 750 MG/1
20 TABLET, FILM COATED, EXTENDED RELEASE ORAL
Status: DISCONTINUED | OUTPATIENT
Start: 2022-01-01 | End: 2022-01-01

## 2022-01-01 RX ORDER — ETOMIDATE 2 MG/ML
INJECTION INTRAVENOUS AS NEEDED
Status: DISCONTINUED | OUTPATIENT
Start: 2022-01-01 | End: 2022-01-01 | Stop reason: HOSPADM

## 2022-01-01 RX ORDER — ONDANSETRON 4 MG/1
4 TABLET, ORALLY DISINTEGRATING ORAL
Status: DISCONTINUED | OUTPATIENT
Start: 2022-01-01 | End: 2022-01-01

## 2022-01-01 RX ORDER — ALBUMIN HUMAN 50 G/1000ML
25 SOLUTION INTRAVENOUS
Status: DISCONTINUED | OUTPATIENT
Start: 2022-01-01 | End: 2022-06-11 | Stop reason: HOSPADM

## 2022-01-01 RX ORDER — DEXTROSE MONOHYDRATE 100 MG/ML
30 INJECTION, SOLUTION INTRAVENOUS CONTINUOUS
Status: DISCONTINUED | OUTPATIENT
Start: 2022-01-01 | End: 2022-01-01

## 2022-01-01 RX ORDER — HEPARIN SODIUM 200 [USP'U]/100ML
INJECTION, SOLUTION INTRAVENOUS
Status: COMPLETED | OUTPATIENT
Start: 2022-01-01 | End: 2022-01-01

## 2022-01-01 RX ORDER — SODIUM CHLORIDE 0.9 % (FLUSH) 0.9 %
5-40 SYRINGE (ML) INJECTION AS NEEDED
Status: DISCONTINUED | OUTPATIENT
Start: 2022-01-01 | End: 2022-06-11 | Stop reason: HOSPADM

## 2022-01-01 RX ORDER — SODIUM CHLORIDE 450 MG/100ML
10 INJECTION, SOLUTION INTRAVENOUS CONTINUOUS
Status: DISCONTINUED | OUTPATIENT
Start: 2022-01-01 | End: 2022-06-11 | Stop reason: HOSPADM

## 2022-01-01 RX ORDER — LEVOFLOXACIN 5 MG/ML
500 INJECTION, SOLUTION INTRAVENOUS
Status: DISCONTINUED | OUTPATIENT
Start: 2022-01-01 | End: 2022-01-01 | Stop reason: SDUPTHER

## 2022-01-01 RX ORDER — PROMETHAZINE HYDROCHLORIDE 25 MG/1
25 TABLET ORAL
Qty: 12 TABLET | Refills: 0 | Status: SHIPPED | OUTPATIENT
Start: 2022-01-01

## 2022-01-01 RX ORDER — NOREPINEPHRINE BITARTRATE/D5W 8 MG/250ML
.5-3 PLASTIC BAG, INJECTION (ML) INTRAVENOUS
Status: DISCONTINUED | OUTPATIENT
Start: 2022-01-01 | End: 2022-01-01

## 2022-01-01 RX ORDER — ONDANSETRON 2 MG/ML
4 INJECTION INTRAMUSCULAR; INTRAVENOUS
Status: COMPLETED | OUTPATIENT
Start: 2022-01-01 | End: 2022-01-01

## 2022-01-01 RX ORDER — BALSAM PERU/CASTOR OIL
OINTMENT (GRAM) TOPICAL 2 TIMES DAILY
Status: DISCONTINUED | OUTPATIENT
Start: 2022-01-01 | End: 2022-06-11 | Stop reason: HOSPADM

## 2022-01-01 RX ORDER — MIDAZOLAM HYDROCHLORIDE 1 MG/ML
1 INJECTION, SOLUTION INTRAMUSCULAR; INTRAVENOUS
Status: DISCONTINUED | OUTPATIENT
Start: 2022-01-01 | End: 2022-01-01

## 2022-01-01 RX ORDER — HEPARIN SODIUM 5000 [USP'U]/100ML
1-30 INJECTION, SOLUTION INTRAVENOUS
Status: DISCONTINUED | OUTPATIENT
Start: 2022-01-01 | End: 2022-01-01

## 2022-01-01 RX ORDER — LIDOCAINE HYDROCHLORIDE 10 MG/ML
INJECTION, SOLUTION EPIDURAL; INFILTRATION; INTRACAUDAL; PERINEURAL AS NEEDED
Status: DISCONTINUED | OUTPATIENT
Start: 2022-01-01 | End: 2022-01-01 | Stop reason: HOSPADM

## 2022-01-01 RX ORDER — FENTANYL CITRATE 50 UG/ML
25-50 INJECTION, SOLUTION INTRAMUSCULAR; INTRAVENOUS
Status: DISCONTINUED | OUTPATIENT
Start: 2022-01-01 | End: 2022-06-11 | Stop reason: HOSPADM

## 2022-01-01 RX ORDER — INSULIN LISPRO 100 [IU]/ML
INJECTION, SOLUTION INTRAVENOUS; SUBCUTANEOUS EVERY 6 HOURS
Status: DISCONTINUED | OUTPATIENT
Start: 2022-01-01 | End: 2022-06-11 | Stop reason: HOSPADM

## 2022-01-01 RX ORDER — ONDANSETRON 2 MG/ML
4 INJECTION INTRAMUSCULAR; INTRAVENOUS
Status: DISCONTINUED | OUTPATIENT
Start: 2022-01-01 | End: 2022-01-01

## 2022-01-01 RX ORDER — MUPIROCIN 20 MG/G
OINTMENT TOPICAL 2 TIMES DAILY
Status: COMPLETED | OUTPATIENT
Start: 2022-01-01 | End: 2022-01-01

## 2022-01-01 RX ORDER — SODIUM CHLORIDE 0.9 % (FLUSH) 0.9 %
5-40 SYRINGE (ML) INJECTION AS NEEDED
Status: DISCONTINUED | OUTPATIENT
Start: 2022-01-01 | End: 2022-01-01 | Stop reason: SDUPTHER

## 2022-01-01 RX ORDER — CALCITRIOL 0.25 UG/1
0.25 CAPSULE ORAL DAILY
Status: DISCONTINUED | OUTPATIENT
Start: 2022-01-01 | End: 2022-01-01

## 2022-01-01 RX ORDER — SODIUM CHLORIDE 0.9 % (FLUSH) 0.9 %
5-40 SYRINGE (ML) INJECTION AS NEEDED
Status: DISCONTINUED | OUTPATIENT
Start: 2022-01-01 | End: 2022-01-01 | Stop reason: HOSPADM

## 2022-01-01 RX ORDER — ACETAMINOPHEN 325 MG/1
650 TABLET ORAL
Status: DISCONTINUED | OUTPATIENT
Start: 2022-01-01 | End: 2022-01-01

## 2022-01-01 RX ORDER — CEPHALEXIN 500 MG/1
500 CAPSULE ORAL 4 TIMES DAILY
Qty: 28 CAPSULE | Refills: 0 | Status: SHIPPED | OUTPATIENT
Start: 2022-01-01 | End: 2022-01-01

## 2022-01-01 RX ORDER — POTASSIUM CHLORIDE 29.8 MG/ML
20 INJECTION INTRAVENOUS
Status: DISCONTINUED | OUTPATIENT
Start: 2022-01-01 | End: 2022-01-01

## 2022-01-01 RX ORDER — ROCURONIUM BROMIDE 10 MG/ML
INJECTION, SOLUTION INTRAVENOUS AS NEEDED
Status: DISCONTINUED | OUTPATIENT
Start: 2022-01-01 | End: 2022-01-01 | Stop reason: HOSPADM

## 2022-01-01 RX ORDER — BACITRACIN 500 UNIT/G
1 PACKET (EA) TOPICAL AS NEEDED
Status: DISCONTINUED | OUTPATIENT
Start: 2022-01-01 | End: 2022-06-11 | Stop reason: HOSPADM

## 2022-01-01 RX ORDER — CLONIDINE HYDROCHLORIDE 0.1 MG/1
0.1 TABLET ORAL
Status: COMPLETED | OUTPATIENT
Start: 2022-01-01 | End: 2022-01-01

## 2022-01-01 RX ORDER — POTASSIUM CHLORIDE 29.8 MG/ML
20 INJECTION INTRAVENOUS ONCE
Status: COMPLETED | OUTPATIENT
Start: 2022-01-01 | End: 2022-01-01

## 2022-01-01 RX ORDER — FAMOTIDINE 20 MG/1
20 TABLET, FILM COATED ORAL DAILY
Status: DISCONTINUED | OUTPATIENT
Start: 2022-01-01 | End: 2022-01-01

## 2022-01-01 RX ORDER — OXYCODONE AND ACETAMINOPHEN 5; 325 MG/1; MG/1
1 TABLET ORAL
Status: COMPLETED | OUTPATIENT
Start: 2022-01-01 | End: 2022-01-01

## 2022-01-01 RX ORDER — ALBUMIN HUMAN 50 G/1000ML
SOLUTION INTRAVENOUS
Status: DISPENSED
Start: 2022-01-01 | End: 2022-01-01

## 2022-01-01 RX ORDER — HEPARIN SODIUM 5000 [USP'U]/ML
5000 INJECTION, SOLUTION INTRAVENOUS; SUBCUTANEOUS EVERY 12 HOURS
Status: DISCONTINUED | OUTPATIENT
Start: 2022-01-01 | End: 2022-06-11 | Stop reason: HOSPADM

## 2022-01-01 RX ORDER — MORPHINE SULFATE 10 MG/ML
4 INJECTION, SOLUTION INTRAMUSCULAR; INTRAVENOUS
Status: COMPLETED | OUTPATIENT
Start: 2022-01-01 | End: 2022-01-01

## 2022-01-01 RX ORDER — PANTOPRAZOLE SODIUM 40 MG/1
40 TABLET, DELAYED RELEASE ORAL
Qty: 30 TABLET | Refills: 2 | Status: SHIPPED | OUTPATIENT
Start: 2022-01-01

## 2022-01-01 RX ORDER — VANCOMYCIN/0.9 % SOD CHLORIDE 1.5G/250ML
1500 PLASTIC BAG, INJECTION (ML) INTRAVENOUS ONCE
Status: COMPLETED | OUTPATIENT
Start: 2022-01-01 | End: 2022-01-01

## 2022-01-01 RX ORDER — SODIUM BICARBONATE 1 MEQ/ML
100 SYRINGE (ML) INTRAVENOUS ONCE
Status: COMPLETED | OUTPATIENT
Start: 2022-01-01 | End: 2022-01-01

## 2022-01-01 RX ORDER — LANOLIN ALCOHOL/MO/W.PET/CERES
400 CREAM (GRAM) TOPICAL 2 TIMES DAILY
Status: DISCONTINUED | OUTPATIENT
Start: 2022-01-01 | End: 2022-01-01

## 2022-01-01 RX ORDER — ALBUMIN HUMAN 50 G/1000ML
SOLUTION INTRAVENOUS
Status: COMPLETED | OUTPATIENT
Start: 2022-01-01 | End: 2022-01-01

## 2022-01-01 RX ORDER — DROPERIDOL 2.5 MG/ML
2.5 INJECTION, SOLUTION INTRAMUSCULAR; INTRAVENOUS ONCE
Status: COMPLETED | OUTPATIENT
Start: 2022-01-01 | End: 2022-01-01

## 2022-01-01 RX ORDER — OXYCODONE HYDROCHLORIDE 5 MG/1
5 TABLET ORAL
Qty: 12 TABLET | Refills: 0 | Status: SHIPPED | OUTPATIENT
Start: 2022-01-01 | End: 2022-01-01

## 2022-01-01 RX ORDER — DOPAMINE HYDROCHLORIDE 320 MG/100ML
5-20 INJECTION, SOLUTION INTRAVENOUS
Status: DISCONTINUED | OUTPATIENT
Start: 2022-01-01 | End: 2022-01-01

## 2022-01-01 RX ORDER — MORPHINE SULFATE 2 MG/ML
4 INJECTION, SOLUTION INTRAMUSCULAR; INTRAVENOUS
Status: COMPLETED | OUTPATIENT
Start: 2022-01-01 | End: 2022-01-01

## 2022-01-01 RX ORDER — OXYCODONE HYDROCHLORIDE 5 MG/1
5 TABLET ORAL EVERY 6 HOURS
Status: DISCONTINUED | OUTPATIENT
Start: 2022-01-01 | End: 2022-06-11 | Stop reason: HOSPADM

## 2022-01-01 RX ORDER — DEXMEDETOMIDINE HYDROCHLORIDE 4 UG/ML
.1-1.5 INJECTION, SOLUTION INTRAVENOUS
Status: DISCONTINUED | OUTPATIENT
Start: 2022-01-01 | End: 2022-06-11 | Stop reason: HOSPADM

## 2022-01-01 RX ORDER — FENTANYL CITRATE 50 UG/ML
100 INJECTION, SOLUTION INTRAMUSCULAR; INTRAVENOUS ONCE
Status: COMPLETED | OUTPATIENT
Start: 2022-01-01 | End: 2022-01-01

## 2022-01-01 RX ORDER — DIAZEPAM 10 MG/2ML
2.5 INJECTION INTRAMUSCULAR
Status: COMPLETED | OUTPATIENT
Start: 2022-01-01 | End: 2022-01-01

## 2022-01-01 RX ORDER — HYDROMORPHONE HYDROCHLORIDE 1 MG/ML
0.5 INJECTION, SOLUTION INTRAMUSCULAR; INTRAVENOUS; SUBCUTANEOUS
Status: COMPLETED | OUTPATIENT
Start: 2022-01-01 | End: 2022-01-01

## 2022-01-01 RX ORDER — ALBUMIN HUMAN 250 G/1000ML
12.5 SOLUTION INTRAVENOUS ONCE
Status: COMPLETED | OUTPATIENT
Start: 2022-01-01 | End: 2022-01-01

## 2022-01-01 RX ORDER — ACETAMINOPHEN 650 MG/1
650 SUPPOSITORY RECTAL
Status: DISCONTINUED | OUTPATIENT
Start: 2022-01-01 | End: 2022-01-01

## 2022-01-01 RX ORDER — KETAMINE HYDROCHLORIDE 10 MG/ML
20 INJECTION, SOLUTION INTRAMUSCULAR; INTRAVENOUS ONCE
Status: COMPLETED | OUTPATIENT
Start: 2022-01-01 | End: 2022-01-01

## 2022-01-01 RX ORDER — ALBUMIN HUMAN 250 G/1000ML
25 SOLUTION INTRAVENOUS ONCE
Status: COMPLETED | OUTPATIENT
Start: 2022-01-01 | End: 2022-01-01

## 2022-01-01 RX ORDER — SODIUM CHLORIDE 0.9 % (FLUSH) 0.9 %
5-40 SYRINGE (ML) INJECTION EVERY 8 HOURS
Status: DISCONTINUED | OUTPATIENT
Start: 2022-01-01 | End: 2022-01-01

## 2022-01-01 RX ORDER — METOPROLOL TARTRATE 25 MG/1
25 TABLET, FILM COATED ORAL 2 TIMES DAILY
Status: DISCONTINUED | OUTPATIENT
Start: 2022-01-01 | End: 2022-01-01

## 2022-01-01 RX ORDER — DOBUTAMINE HYDROCHLORIDE 200 MG/100ML
0-10 INJECTION INTRAVENOUS
Status: DISCONTINUED | OUTPATIENT
Start: 2022-01-01 | End: 2022-01-01

## 2022-01-01 RX ORDER — ONDANSETRON 2 MG/ML
4 INJECTION INTRAMUSCULAR; INTRAVENOUS
Status: DISCONTINUED | OUTPATIENT
Start: 2022-01-01 | End: 2022-06-11 | Stop reason: HOSPADM

## 2022-01-01 RX ORDER — OXYCODONE AND ACETAMINOPHEN 5; 325 MG/1; MG/1
1 TABLET ORAL
Qty: 15 TABLET | Refills: 0 | Status: SHIPPED | OUTPATIENT
Start: 2022-01-01 | End: 2022-01-01

## 2022-01-01 RX ORDER — ASPIRIN 325 MG
TABLET, DELAYED RELEASE (ENTERIC COATED) ORAL
COMMUNITY
Start: 2022-01-01

## 2022-01-01 RX ORDER — OFLOXACIN 3 MG/ML
SOLUTION/ DROPS OPHTHALMIC
COMMUNITY
Start: 2022-01-01

## 2022-01-01 RX ORDER — POTASSIUM CHLORIDE 29.8 MG/ML
20 INJECTION INTRAVENOUS
Status: COMPLETED | OUTPATIENT
Start: 2022-01-01 | End: 2022-01-01

## 2022-01-01 RX ORDER — SODIUM CHLORIDE 0.9 % (FLUSH) 0.9 %
5-40 SYRINGE (ML) INJECTION EVERY 8 HOURS
Status: DISCONTINUED | OUTPATIENT
Start: 2022-01-01 | End: 2022-01-01 | Stop reason: SDUPTHER

## 2022-01-01 RX ORDER — MORPHINE SULFATE 2 MG/ML
4 INJECTION, SOLUTION INTRAMUSCULAR; INTRAVENOUS
Status: DISCONTINUED | OUTPATIENT
Start: 2022-01-01 | End: 2022-01-01

## 2022-01-01 RX ORDER — PROPOFOL 10 MG/ML
5 VIAL (ML) INTRAVENOUS
Status: DISCONTINUED | OUTPATIENT
Start: 2022-01-01 | End: 2022-01-01

## 2022-01-01 RX ORDER — INSULIN LISPRO 100 [IU]/ML
INJECTION, SOLUTION INTRAVENOUS; SUBCUTANEOUS
Status: DISCONTINUED | OUTPATIENT
Start: 2022-01-01 | End: 2022-01-01

## 2022-01-01 RX ORDER — GABAPENTIN 300 MG/1
300 CAPSULE ORAL
Status: DISCONTINUED | OUTPATIENT
Start: 2022-01-01 | End: 2022-01-01

## 2022-01-01 RX ORDER — VANCOMYCIN HYDROCHLORIDE
1250 ONCE
Status: COMPLETED | OUTPATIENT
Start: 2022-01-01 | End: 2022-01-01

## 2022-01-01 RX ORDER — MAGNESIUM SULFATE 1 G/100ML
1 INJECTION INTRAVENOUS AS NEEDED
Status: DISCONTINUED | OUTPATIENT
Start: 2022-01-01 | End: 2022-01-01

## 2022-01-01 RX ORDER — HYDROCODONE BITARTRATE AND ACETAMINOPHEN 5; 325 MG/1; MG/1
1 TABLET ORAL
Qty: 5 TABLET | Refills: 0 | Status: SHIPPED | OUTPATIENT
Start: 2022-01-01 | End: 2022-01-01

## 2022-01-01 RX ORDER — SODIUM CHLORIDE 9 MG/ML
250 INJECTION, SOLUTION INTRAVENOUS AS NEEDED
Status: DISCONTINUED | OUTPATIENT
Start: 2022-01-01 | End: 2022-01-01

## 2022-01-01 RX ORDER — NOREPINEPHRINE BITARTRATE/D5W 8 MG/250ML
.5-1 PLASTIC BAG, INJECTION (ML) INTRAVENOUS
Status: DISCONTINUED | OUTPATIENT
Start: 2022-01-01 | End: 2022-01-01

## 2022-01-01 RX ORDER — NOREPINEPHRINE BITARTRATE/D5W 8 MG/250ML
.5-16 PLASTIC BAG, INJECTION (ML) INTRAVENOUS
Status: DISCONTINUED | OUTPATIENT
Start: 2022-01-01 | End: 2022-01-01

## 2022-01-01 RX ORDER — HEPARIN SODIUM 1000 [USP'U]/ML
60 INJECTION, SOLUTION INTRAVENOUS; SUBCUTANEOUS AS NEEDED
Status: DISCONTINUED | OUTPATIENT
Start: 2022-01-01 | End: 2022-01-01

## 2022-01-01 RX ORDER — HYDROCODONE BITARTRATE AND ACETAMINOPHEN 7.5; 325 MG/1; MG/1
1 TABLET ORAL ONCE
Status: COMPLETED | OUTPATIENT
Start: 2022-01-01 | End: 2022-01-01

## 2022-01-01 RX ORDER — HEPARIN SODIUM 10000 [USP'U]/100ML
1-25 INJECTION, SOLUTION INTRAVENOUS
Status: DISCONTINUED | OUTPATIENT
Start: 2022-01-01 | End: 2022-01-01

## 2022-01-01 RX ORDER — AMOXICILLIN 250 MG
1 CAPSULE ORAL 2 TIMES DAILY
Status: DISCONTINUED | OUTPATIENT
Start: 2022-01-01 | End: 2022-01-01

## 2022-01-01 RX ORDER — MORPHINE SULFATE 4 MG/ML
4 INJECTION INTRAVENOUS
Status: DISCONTINUED | OUTPATIENT
Start: 2022-01-01 | End: 2022-01-01

## 2022-01-01 RX ORDER — INSULIN GLARGINE 100 [IU]/ML
10 INJECTION, SOLUTION SUBCUTANEOUS ONCE
Status: COMPLETED | OUTPATIENT
Start: 2022-01-01 | End: 2022-01-01

## 2022-01-01 RX ORDER — MAGNESIUM SULFATE 100 %
4 CRYSTALS MISCELLANEOUS AS NEEDED
Status: DISCONTINUED | OUTPATIENT
Start: 2022-01-01 | End: 2022-06-11 | Stop reason: HOSPADM

## 2022-01-01 RX ORDER — SODIUM CHLORIDE 9 MG/ML
INJECTION, SOLUTION INTRAVENOUS
Status: DISCONTINUED | OUTPATIENT
Start: 2022-01-01 | End: 2022-01-01 | Stop reason: HOSPADM

## 2022-01-01 RX ORDER — LIDOCAINE HYDROCHLORIDE 20 MG/ML
10 INJECTION, SOLUTION INFILTRATION; PERINEURAL ONCE
Status: COMPLETED | OUTPATIENT
Start: 2022-01-01 | End: 2022-01-01

## 2022-01-01 RX ORDER — BALSAM PERU/CASTOR OIL
OINTMENT (GRAM) TOPICAL 3 TIMES DAILY
Status: DISCONTINUED | OUTPATIENT
Start: 2022-01-01 | End: 2022-01-01

## 2022-01-01 RX ORDER — TRAMADOL HYDROCHLORIDE 50 MG/1
50 TABLET ORAL
Status: DISCONTINUED | OUTPATIENT
Start: 2022-01-01 | End: 2022-01-01

## 2022-01-01 RX ORDER — PEN NEEDLE, DIABETIC 32GX 5/32"
NEEDLE, DISPOSABLE MISCELLANEOUS
COMMUNITY
Start: 2022-01-01

## 2022-01-01 RX ORDER — CEFAZOLIN SODIUM 1 G/3ML
INJECTION, POWDER, FOR SOLUTION INTRAMUSCULAR; INTRAVENOUS AS NEEDED
Status: DISCONTINUED | OUTPATIENT
Start: 2022-01-01 | End: 2022-01-01

## 2022-01-01 RX ORDER — CALCIUM CHLORIDE INJECTION 100 MG/ML
INJECTION, SOLUTION INTRAVENOUS AS NEEDED
Status: DISCONTINUED | OUTPATIENT
Start: 2022-01-01 | End: 2022-01-01 | Stop reason: HOSPADM

## 2022-01-01 RX ORDER — SODIUM CHLORIDE 9 MG/ML
25 INJECTION, SOLUTION INTRAVENOUS AS NEEDED
Status: DISCONTINUED | OUTPATIENT
Start: 2022-01-01 | End: 2022-06-11 | Stop reason: HOSPADM

## 2022-01-01 RX ORDER — SUCCINYLCHOLINE CHLORIDE 20 MG/ML
INJECTION INTRAMUSCULAR; INTRAVENOUS AS NEEDED
Status: DISCONTINUED | OUTPATIENT
Start: 2022-01-01 | End: 2022-01-01 | Stop reason: HOSPADM

## 2022-01-01 RX ORDER — DESMOPRESSIN ACETATE 4 UG/ML
INJECTION, SOLUTION INTRAVENOUS; SUBCUTANEOUS AS NEEDED
Status: DISCONTINUED | OUTPATIENT
Start: 2022-01-01 | End: 2022-01-01 | Stop reason: HOSPADM

## 2022-01-01 RX ORDER — INSULIN GLARGINE 100 [IU]/ML
1-50 INJECTION, SOLUTION SUBCUTANEOUS
Status: DISCONTINUED | OUTPATIENT
Start: 2022-01-01 | End: 2022-01-01

## 2022-01-01 RX ORDER — HYDROCODONE BITARTRATE AND ACETAMINOPHEN 5; 325 MG/1; MG/1
1 TABLET ORAL
Status: COMPLETED | OUTPATIENT
Start: 2022-01-01 | End: 2022-01-01

## 2022-01-01 RX ORDER — OXYCODONE HYDROCHLORIDE 5 MG/1
5 TABLET ORAL
Status: DISCONTINUED | OUTPATIENT
Start: 2022-01-01 | End: 2022-06-11 | Stop reason: HOSPADM

## 2022-01-01 RX ORDER — ATORVASTATIN CALCIUM 20 MG/1
20 TABLET, FILM COATED ORAL
Status: DISCONTINUED | OUTPATIENT
Start: 2022-01-01 | End: 2022-01-01

## 2022-01-01 RX ORDER — SODIUM CHLORIDE 0.9 % (FLUSH) 0.9 %
5-40 SYRINGE (ML) INJECTION EVERY 8 HOURS
Status: DISCONTINUED | OUTPATIENT
Start: 2022-01-01 | End: 2022-06-11 | Stop reason: HOSPADM

## 2022-01-01 RX ORDER — ALBUMIN HUMAN 50 G/1000ML
SOLUTION INTRAVENOUS AS NEEDED
Status: DISCONTINUED | OUTPATIENT
Start: 2022-01-01 | End: 2022-01-01 | Stop reason: HOSPADM

## 2022-01-01 RX ORDER — FENTANYL CITRATE 50 UG/ML
INJECTION, SOLUTION INTRAMUSCULAR; INTRAVENOUS
Status: DISPENSED
Start: 2022-01-01 | End: 2022-01-01

## 2022-01-01 RX ORDER — LEVOFLOXACIN 5 MG/ML
750 INJECTION, SOLUTION INTRAVENOUS ONCE
Status: DISCONTINUED | OUTPATIENT
Start: 2022-01-01 | End: 2022-01-01 | Stop reason: SDUPTHER

## 2022-01-01 RX ORDER — FACIAL-BODY WIPES
10 EACH TOPICAL DAILY PRN
Status: DISCONTINUED | OUTPATIENT
Start: 2022-01-01 | End: 2022-06-11 | Stop reason: HOSPADM

## 2022-01-01 RX ORDER — ALBUMIN HUMAN 50 G/1000ML
SOLUTION INTRAVENOUS
Status: COMPLETED
Start: 2022-01-01 | End: 2022-01-01

## 2022-01-01 RX ORDER — OXYCODONE HYDROCHLORIDE 5 MG/1
5 TABLET ORAL
Status: DISCONTINUED | OUTPATIENT
Start: 2022-01-01 | End: 2022-01-01

## 2022-01-01 RX ORDER — LEVOFLOXACIN 5 MG/ML
500 INJECTION, SOLUTION INTRAVENOUS
Status: DISCONTINUED | OUTPATIENT
Start: 2022-01-01 | End: 2022-01-01

## 2022-01-01 RX ORDER — FAMOTIDINE 20 MG/1
20 TABLET, FILM COATED ORAL EVERY 24 HOURS
Status: DISCONTINUED | OUTPATIENT
Start: 2022-01-01 | End: 2022-01-01

## 2022-01-01 RX ORDER — HEPARIN 100 UNIT/ML
500 SYRINGE INTRAVENOUS ONCE
Status: COMPLETED | OUTPATIENT
Start: 2022-01-01 | End: 2022-01-01

## 2022-01-01 RX ORDER — CEFAZOLIN SODIUM 1 G/3ML
INJECTION, POWDER, FOR SOLUTION INTRAMUSCULAR; INTRAVENOUS AS NEEDED
Status: DISCONTINUED | OUTPATIENT
Start: 2022-01-01 | End: 2022-01-01 | Stop reason: HOSPADM

## 2022-01-01 RX ORDER — ACETAMINOPHEN 500 MG
1000 TABLET ORAL ONCE
Status: COMPLETED | OUTPATIENT
Start: 2022-01-01 | End: 2022-01-01

## 2022-01-01 RX ORDER — ACETAMINOPHEN 325 MG/1
650 TABLET ORAL ONCE
Status: COMPLETED | OUTPATIENT
Start: 2022-01-01 | End: 2022-01-01

## 2022-01-01 RX ORDER — POTASSIUM CHLORIDE 7.45 MG/ML
10 INJECTION INTRAVENOUS ONCE
Status: COMPLETED | OUTPATIENT
Start: 2022-01-01 | End: 2022-01-01

## 2022-01-01 RX ORDER — FENTANYL CITRATE 50 UG/ML
INJECTION, SOLUTION INTRAMUSCULAR; INTRAVENOUS AS NEEDED
Status: DISCONTINUED | OUTPATIENT
Start: 2022-01-01 | End: 2022-01-01 | Stop reason: HOSPADM

## 2022-01-01 RX ORDER — LEVOFLOXACIN 5 MG/ML
750 INJECTION, SOLUTION INTRAVENOUS ONCE
Status: DISCONTINUED | OUTPATIENT
Start: 2022-01-01 | End: 2022-01-01

## 2022-01-01 RX ORDER — HEPARIN SODIUM 10000 [USP'U]/100ML
12-25 INJECTION, SOLUTION INTRAVENOUS
Status: DISCONTINUED | OUTPATIENT
Start: 2022-01-01 | End: 2022-01-01

## 2022-01-01 RX ORDER — BLOOD SUGAR DIAGNOSTIC
STRIP MISCELLANEOUS
COMMUNITY
Start: 2022-01-01

## 2022-01-01 RX ORDER — MAGNESIUM SULFATE HEPTAHYDRATE 40 MG/ML
2 INJECTION, SOLUTION INTRAVENOUS
Status: DISCONTINUED | OUTPATIENT
Start: 2022-01-01 | End: 2022-01-01

## 2022-01-01 RX ORDER — PROTAMINE SULFATE 10 MG/ML
INJECTION, SOLUTION INTRAVENOUS AS NEEDED
Status: DISCONTINUED | OUTPATIENT
Start: 2022-01-01 | End: 2022-01-01 | Stop reason: HOSPADM

## 2022-01-01 RX ORDER — LIDOCAINE 4 G/100G
1 PATCH TOPICAL EVERY 24 HOURS
Status: DISCONTINUED | OUTPATIENT
Start: 2022-01-01 | End: 2022-01-01 | Stop reason: HOSPADM

## 2022-01-01 RX ORDER — SODIUM CHLORIDE 0.9 % (FLUSH) 0.9 %
5-40 SYRINGE (ML) INJECTION AS NEEDED
Status: DISCONTINUED | OUTPATIENT
Start: 2022-01-01 | End: 2022-01-01

## 2022-01-01 RX ORDER — CHLORHEXIDINE GLUCONATE 1.2 MG/ML
10 RINSE ORAL EVERY 12 HOURS
Status: DISCONTINUED | OUTPATIENT
Start: 2022-01-01 | End: 2022-06-11 | Stop reason: HOSPADM

## 2022-01-01 RX ORDER — DEXTROSE MONOHYDRATE 100 MG/ML
0-250 INJECTION, SOLUTION INTRAVENOUS AS NEEDED
Status: DISCONTINUED | OUTPATIENT
Start: 2022-01-01 | End: 2022-06-11 | Stop reason: HOSPADM

## 2022-01-01 RX ORDER — METOPROLOL TARTRATE 5 MG/5ML
2.5 INJECTION INTRAVENOUS
Status: COMPLETED | OUTPATIENT
Start: 2022-01-01 | End: 2022-01-01

## 2022-01-01 RX ORDER — OXYCODONE HYDROCHLORIDE 5 MG/1
10 TABLET ORAL
Status: COMPLETED | OUTPATIENT
Start: 2022-01-01 | End: 2022-01-01

## 2022-01-01 RX ORDER — DEXTROSE MONOHYDRATE 100 MG/ML
0-250 INJECTION, SOLUTION INTRAVENOUS AS NEEDED
Status: DISCONTINUED | OUTPATIENT
Start: 2022-01-01 | End: 2022-01-01

## 2022-01-01 RX ORDER — GUAIFENESIN 100 MG/5ML
81 LIQUID (ML) ORAL DAILY
Status: DISCONTINUED | OUTPATIENT
Start: 2022-01-01 | End: 2022-06-11 | Stop reason: HOSPADM

## 2022-01-01 RX ORDER — KETOROLAC TROMETHAMINE 30 MG/ML
60 INJECTION, SOLUTION INTRAMUSCULAR; INTRAVENOUS
Status: COMPLETED | OUTPATIENT
Start: 2022-01-01 | End: 2022-01-01

## 2022-01-01 RX ORDER — FAMOTIDINE 10 MG/ML
20 INJECTION INTRAVENOUS
Status: COMPLETED | OUTPATIENT
Start: 2022-01-01 | End: 2022-01-01

## 2022-01-01 RX ORDER — MORPHINE SULFATE 2 MG/ML
2 INJECTION, SOLUTION INTRAMUSCULAR; INTRAVENOUS
Status: COMPLETED | OUTPATIENT
Start: 2022-01-01 | End: 2022-01-01

## 2022-01-01 RX ORDER — ALBUMIN HUMAN 50 G/1000ML
12.5 SOLUTION INTRAVENOUS
Status: COMPLETED | OUTPATIENT
Start: 2022-01-01 | End: 2022-01-01

## 2022-01-01 RX ORDER — OXYCODONE HYDROCHLORIDE 5 MG/1
10 TABLET ORAL
Status: DISCONTINUED | OUTPATIENT
Start: 2022-01-01 | End: 2022-06-11 | Stop reason: HOSPADM

## 2022-01-01 RX ORDER — CALCIUM CHLORIDE INJECTION 100 MG/ML
INJECTION, SOLUTION INTRAVENOUS
Status: COMPLETED | OUTPATIENT
Start: 2022-01-01 | End: 2022-01-01

## 2022-01-01 RX ORDER — ASPIRIN 81 MG/1
81 TABLET ORAL DAILY
Status: DISCONTINUED | OUTPATIENT
Start: 2022-01-01 | End: 2022-01-01

## 2022-01-01 RX ORDER — ACETAMINOPHEN 325 MG/1
650 TABLET ORAL EVERY 4 HOURS
Status: DISCONTINUED | OUTPATIENT
Start: 2022-01-01 | End: 2022-01-01

## 2022-01-01 RX ORDER — SODIUM CHLORIDE 9 MG/ML
10 INJECTION, SOLUTION INTRAVENOUS CONTINUOUS
Status: DISCONTINUED | OUTPATIENT
Start: 2022-01-01 | End: 2022-06-11 | Stop reason: HOSPADM

## 2022-01-01 RX ORDER — ALBUTEROL SULFATE 0.83 MG/ML
2.5 SOLUTION RESPIRATORY (INHALATION)
Status: DISCONTINUED | OUTPATIENT
Start: 2022-01-01 | End: 2022-06-11 | Stop reason: HOSPADM

## 2022-01-01 RX ORDER — HEPARIN SODIUM 200 [USP'U]/100ML
400 INJECTION, SOLUTION INTRAVENOUS ONCE
Status: COMPLETED | OUTPATIENT
Start: 2022-01-01 | End: 2022-01-01

## 2022-01-01 RX ORDER — NOREPINEPHRINE BITARTRATE/D5W 8 MG/250ML
.5-3 PLASTIC BAG, INJECTION (ML) INTRAVENOUS
Status: DISCONTINUED | OUTPATIENT
Start: 2022-01-01 | End: 2022-06-11 | Stop reason: HOSPADM

## 2022-01-01 RX ORDER — ONDANSETRON 4 MG/1
4 TABLET, ORALLY DISINTEGRATING ORAL
Qty: 10 TABLET | Refills: 0 | OUTPATIENT
Start: 2022-01-01 | End: 2022-01-01

## 2022-01-01 RX ORDER — FENTANYL CITRATE 50 UG/ML
50 INJECTION, SOLUTION INTRAMUSCULAR; INTRAVENOUS
Status: COMPLETED | OUTPATIENT
Start: 2022-01-01 | End: 2022-01-01

## 2022-01-01 RX ORDER — METOCLOPRAMIDE HYDROCHLORIDE 5 MG/ML
5 INJECTION INTRAMUSCULAR; INTRAVENOUS EVERY 12 HOURS
Status: DISCONTINUED | OUTPATIENT
Start: 2022-01-01 | End: 2022-06-11 | Stop reason: HOSPADM

## 2022-01-01 RX ORDER — METOPROLOL TARTRATE 25 MG/1
25 TABLET, FILM COATED ORAL
Status: COMPLETED | OUTPATIENT
Start: 2022-01-01 | End: 2022-01-01

## 2022-01-01 RX ORDER — ALBUMIN HUMAN 250 G/1000ML
25 SOLUTION INTRAVENOUS EVERY 6 HOURS
Status: COMPLETED | OUTPATIENT
Start: 2022-01-01 | End: 2022-01-01

## 2022-01-01 RX ORDER — PROPOFOL 10 MG/ML
0-50 VIAL (ML) INTRAVENOUS
Status: DISCONTINUED | OUTPATIENT
Start: 2022-01-01 | End: 2022-01-01

## 2022-01-01 RX ORDER — PHENYLEPHRINE HYDROCHLORIDE 10 MG/ML
INJECTION INTRAVENOUS AS NEEDED
Status: DISCONTINUED | OUTPATIENT
Start: 2022-01-01 | End: 2022-01-01 | Stop reason: HOSPADM

## 2022-01-01 RX ORDER — HEPARIN SODIUM 1000 [USP'U]/ML
30 INJECTION, SOLUTION INTRAVENOUS; SUBCUTANEOUS AS NEEDED
Status: DISCONTINUED | OUTPATIENT
Start: 2022-01-01 | End: 2022-01-01

## 2022-01-01 RX ORDER — PREDNISONE 20 MG/1
40 TABLET ORAL DAILY
Qty: 14 TABLET | Refills: 0 | Status: SHIPPED | OUTPATIENT
Start: 2022-01-01 | End: 2022-01-01

## 2022-01-01 RX ORDER — SODIUM CHLORIDE 0.9 % (FLUSH) 0.9 %
5-40 SYRINGE (ML) INJECTION EVERY 8 HOURS
Status: DISCONTINUED | OUTPATIENT
Start: 2022-01-01 | End: 2022-01-01 | Stop reason: HOSPADM

## 2022-01-01 RX ADMIN — POTASSIUM CHLORIDE 20 MEQ: 400 INJECTION, SOLUTION INTRAVENOUS at 18:15

## 2022-01-01 RX ADMIN — IOPAMIDOL 100 ML: 755 INJECTION, SOLUTION INTRAVENOUS at 08:23

## 2022-01-01 RX ADMIN — NOREPINEPHRINE BITARTRATE 24 MCG/MIN: 1 INJECTION, SOLUTION, CONCENTRATE INTRAVENOUS at 07:59

## 2022-01-01 RX ADMIN — FENTANYL CITRATE 100 MCG: 50 INJECTION, SOLUTION INTRAMUSCULAR; INTRAVENOUS at 20:59

## 2022-01-01 RX ADMIN — PIPERACILLIN AND TAZOBACTAM 3.38 G: 3; .375 INJECTION, POWDER, LYOPHILIZED, FOR SOLUTION INTRAVENOUS at 17:52

## 2022-01-01 RX ADMIN — CALCIUM CHLORIDE, MAGNESIUM CHLORIDE, DEXTROSE MONOHYDRATE, LACTIC ACID, SODIUM CHLORIDE, SODIUM BICARBONATE AND POTASSIUM CHLORIDE: 3.68; 3.05; 22; 5.4; 6.46; 3.09; .314 INJECTION INTRAVENOUS at 10:34

## 2022-01-01 RX ADMIN — ALBUMIN (HUMAN) 250 ML: 2.5 SOLUTION INTRAVENOUS at 09:59

## 2022-01-01 RX ADMIN — MUPIROCIN: 20 OINTMENT TOPICAL at 08:18

## 2022-01-01 RX ADMIN — EPINEPHRINE 1 MG: 0.1 INJECTION, SOLUTION ENDOTRACHEAL; INTRACARDIAC; INTRAVENOUS at 16:11

## 2022-01-01 RX ADMIN — CASTOR OIL AND BALSAM, PERU: 788; 87 OINTMENT TOPICAL at 21:03

## 2022-01-01 RX ADMIN — HEPARIN SODIUM 2 UNITS/KG/HR: 10000 INJECTION, SOLUTION INTRAVENOUS at 02:26

## 2022-01-01 RX ADMIN — SODIUM CHLORIDE 9 ML/HR: 9 INJECTION, SOLUTION INTRAVENOUS at 19:02

## 2022-01-01 RX ADMIN — ALBUMIN (HUMAN) 12.5 G: 0.25 INJECTION, SOLUTION INTRAVENOUS at 12:40

## 2022-01-01 RX ADMIN — CALCIUM CHLORIDE, MAGNESIUM CHLORIDE, DEXTROSE MONOHYDRATE, LACTIC ACID, SODIUM CHLORIDE, SODIUM BICARBONATE AND POTASSIUM CHLORIDE: 3.68; 3.05; 22; 5.4; 6.46; 3.09; .314 INJECTION INTRAVENOUS at 20:26

## 2022-01-01 RX ADMIN — FENTANYL CITRATE 50 MCG: 50 INJECTION, SOLUTION INTRAMUSCULAR; INTRAVENOUS at 04:45

## 2022-01-01 RX ADMIN — ALBUMIN (HUMAN) 12.5 G: 12.5 INJECTION, SOLUTION INTRAVENOUS at 09:25

## 2022-01-01 RX ADMIN — CALCIUM CHLORIDE, MAGNESIUM CHLORIDE, DEXTROSE MONOHYDRATE, LACTIC ACID, SODIUM CHLORIDE, SODIUM BICARBONATE AND POTASSIUM CHLORIDE: 3.68; 3.05; 22; 5.4; 6.46; 3.09; .314 INJECTION INTRAVENOUS at 08:03

## 2022-01-01 RX ADMIN — ASPIRIN 81 MG: 81 TABLET, CHEWABLE ORAL at 08:07

## 2022-01-01 RX ADMIN — CHLORHEXIDINE GLUCONATE 10 ML: 1.2 RINSE ORAL at 20:28

## 2022-01-01 RX ADMIN — EPINEPHRINE 30 MCG/MIN: 1 INJECTION INTRAMUSCULAR; INTRAVENOUS; SUBCUTANEOUS at 07:08

## 2022-01-01 RX ADMIN — EPINEPHRINE 1 MCG/MIN: 1 INJECTION INTRAMUSCULAR; INTRAVENOUS; SUBCUTANEOUS at 18:15

## 2022-01-01 RX ADMIN — Medication 2 UNITS: at 12:12

## 2022-01-01 RX ADMIN — MUPIROCIN: 20 OINTMENT TOPICAL at 21:36

## 2022-01-01 RX ADMIN — TICAGRELOR 90 MG: 90 TABLET ORAL at 21:35

## 2022-01-01 RX ADMIN — NOREPINEPHRINE BITARTRATE 25 MCG/MIN: 1 INJECTION, SOLUTION, CONCENTRATE INTRAVENOUS at 02:23

## 2022-01-01 RX ADMIN — KETAMINE HYDROCHLORIDE 20 MG: 10 INJECTION, SOLUTION INTRAMUSCULAR; INTRAVENOUS at 10:20

## 2022-01-01 RX ADMIN — PIPERACILLIN AND TAZOBACTAM 3.38 G: 3; .375 INJECTION, POWDER, LYOPHILIZED, FOR SOLUTION INTRAVENOUS at 17:30

## 2022-01-01 RX ADMIN — PROTAMINE SULFATE 40 MG: 10 INJECTION, SOLUTION INTRAVENOUS at 09:58

## 2022-01-01 RX ADMIN — PHENYLEPHRINE HYDROCHLORIDE 80 MCG/MIN: 10 INJECTION INTRAVENOUS at 10:21

## 2022-01-01 RX ADMIN — SODIUM CHLORIDE, PRESERVATIVE FREE 10 ML: 5 INJECTION INTRAVENOUS at 05:30

## 2022-01-01 RX ADMIN — CALCITRIOL 0.25 MCG: 0.25 CAPSULE ORAL at 08:45

## 2022-01-01 RX ADMIN — POTASSIUM CHLORIDE 10 MEQ: 7.46 INJECTION, SOLUTION INTRAVENOUS at 02:34

## 2022-01-01 RX ADMIN — ASPIRIN 81 MG: 81 TABLET, CHEWABLE ORAL at 08:38

## 2022-01-01 RX ADMIN — CALCIUM CHLORIDE, MAGNESIUM CHLORIDE, DEXTROSE MONOHYDRATE, LACTIC ACID, SODIUM CHLORIDE, SODIUM BICARBONATE AND POTASSIUM CHLORIDE: 3.68; 3.05; 22; 5.4; 6.46; 3.09; .314 INJECTION INTRAVENOUS at 10:22

## 2022-01-01 RX ADMIN — SODIUM BICARBONATE 100 MEQ: 84 INJECTION, SOLUTION INTRAVENOUS at 19:00

## 2022-01-01 RX ADMIN — VANCOMYCIN HYDROCHLORIDE 1250 MG: 10 INJECTION, POWDER, LYOPHILIZED, FOR SOLUTION INTRAVENOUS at 18:50

## 2022-01-01 RX ADMIN — CHLORHEXIDINE GLUCONATE 10 ML: 1.2 RINSE ORAL at 20:12

## 2022-01-01 RX ADMIN — CHLORHEXIDINE GLUCONATE 10 ML: 1.2 RINSE ORAL at 08:39

## 2022-01-01 RX ADMIN — NOREPINEPHRINE BITARTRATE 30 MCG/MIN: 1 INJECTION, SOLUTION, CONCENTRATE INTRAVENOUS at 20:00

## 2022-01-01 RX ADMIN — METOPROLOL TARTRATE 2.5 MG: 5 INJECTION INTRAVENOUS at 12:14

## 2022-01-01 RX ADMIN — EPINEPHRINE 30 MCG/MIN: 1 INJECTION INTRAMUSCULAR; INTRAVENOUS; SUBCUTANEOUS at 12:49

## 2022-01-01 RX ADMIN — SODIUM BICARBONATE 50 MEQ: 84 INJECTION, SOLUTION INTRAVENOUS at 16:56

## 2022-01-01 RX ADMIN — TICAGRELOR 90 MG: 90 TABLET ORAL at 05:08

## 2022-01-01 RX ADMIN — SODIUM CHLORIDE 0.4 MCG/KG/HR: 9 INJECTION, SOLUTION INTRAVENOUS at 20:23

## 2022-01-01 RX ADMIN — PROPOFOL 25 MCG/KG/MIN: 10 INJECTION, EMULSION INTRAVENOUS at 06:06

## 2022-01-01 RX ADMIN — SODIUM BICARBONATE 50 MEQ: 84 INJECTION, SOLUTION INTRAVENOUS at 16:31

## 2022-01-01 RX ADMIN — Medication 1 AMPULE: at 09:10

## 2022-01-01 RX ADMIN — CHLORHEXIDINE GLUCONATE 10 ML: 1.2 RINSE ORAL at 08:06

## 2022-01-01 RX ADMIN — SODIUM CHLORIDE 40 MG: 9 INJECTION, SOLUTION INTRAMUSCULAR; INTRAVENOUS; SUBCUTANEOUS at 08:19

## 2022-01-01 RX ADMIN — ONDANSETRON 4 MG: 2 INJECTION INTRAMUSCULAR; INTRAVENOUS at 14:21

## 2022-01-01 RX ADMIN — TICAGRELOR 90 MG: 90 TABLET ORAL at 05:16

## 2022-01-01 RX ADMIN — EPINEPHRINE 1 MG: 0.1 INJECTION, SOLUTION ENDOTRACHEAL; INTRACARDIAC; INTRAVENOUS at 16:56

## 2022-01-01 RX ADMIN — Medication: at 20:01

## 2022-01-01 RX ADMIN — EPINEPHRINE 75 MCG/MIN: 1 INJECTION INTRAMUSCULAR; INTRAVENOUS; SUBCUTANEOUS at 20:45

## 2022-01-01 RX ADMIN — POTASSIUM PHOSPHATE, MONOBASIC AND POTASSIUM PHOSPHATE, DIBASIC: 224; 236 INJECTION, SOLUTION, CONCENTRATE INTRAVENOUS at 04:03

## 2022-01-01 RX ADMIN — ONDANSETRON HYDROCHLORIDE 4 MG: 2 INJECTION, SOLUTION INTRAMUSCULAR; INTRAVENOUS at 11:27

## 2022-01-01 RX ADMIN — CALCIUM CHLORIDE, MAGNESIUM CHLORIDE, DEXTROSE MONOHYDRATE, LACTIC ACID, SODIUM CHLORIDE, SODIUM BICARBONATE AND POTASSIUM CHLORIDE: 3.68; 3.05; 22; 5.4; 6.46; 3.09; .314 INJECTION INTRAVENOUS at 03:00

## 2022-01-01 RX ADMIN — HEPARIN SODIUM IN SODIUM CHLORIDE 60 ML: 200 INJECTION INTRAVENOUS at 15:05

## 2022-01-01 RX ADMIN — CHLORHEXIDINE GLUCONATE 10 ML: 1.2 RINSE ORAL at 21:10

## 2022-01-01 RX ADMIN — SODIUM CHLORIDE, PRESERVATIVE FREE 10 ML: 5 INJECTION INTRAVENOUS at 16:44

## 2022-01-01 RX ADMIN — ALBUMIN (HUMAN) 25 G: 12.5 INJECTION, SOLUTION INTRAVENOUS at 16:45

## 2022-01-01 RX ADMIN — DIAZEPAM 2.5 MG: 5 INJECTION, SOLUTION INTRAMUSCULAR; INTRAVENOUS at 10:17

## 2022-01-01 RX ADMIN — CALCIUM CHLORIDE, MAGNESIUM CHLORIDE, DEXTROSE MONOHYDRATE, LACTIC ACID, SODIUM CHLORIDE, SODIUM BICARBONATE AND POTASSIUM CHLORIDE: 3.68; 3.05; 22; 5.4; 6.46; 3.09; .314 INJECTION INTRAVENOUS at 16:04

## 2022-01-01 RX ADMIN — MUPIROCIN: 20 OINTMENT TOPICAL at 21:46

## 2022-01-01 RX ADMIN — FENTANYL CITRATE 50 MCG: 50 INJECTION, SOLUTION INTRAMUSCULAR; INTRAVENOUS at 19:38

## 2022-01-01 RX ADMIN — ACETAMINOPHEN 325MG 650 MG: 325 TABLET ORAL at 19:36

## 2022-01-01 RX ADMIN — Medication 120 MCG: at 10:00

## 2022-01-01 RX ADMIN — METOCLOPRAMIDE 5 MG: 5 INJECTION, SOLUTION INTRAMUSCULAR; INTRAVENOUS at 08:38

## 2022-01-01 RX ADMIN — PHENYLEPHRINE HYDROCHLORIDE 30 MCG/MIN: 10 INJECTION INTRAVENOUS at 13:44

## 2022-01-01 RX ADMIN — SODIUM CHLORIDE 10 ML/HR: 4.5 INJECTION, SOLUTION INTRAVENOUS at 18:39

## 2022-01-01 RX ADMIN — TICAGRELOR 90 MG: 90 TABLET ORAL at 18:19

## 2022-01-01 RX ADMIN — Medication: at 00:13

## 2022-01-01 RX ADMIN — EPINEPHRINE 1 MG: 0.1 INJECTION, SOLUTION ENDOTRACHEAL; INTRACARDIAC; INTRAVENOUS at 17:53

## 2022-01-01 RX ADMIN — MUPIROCIN: 20 OINTMENT TOPICAL at 08:50

## 2022-01-01 RX ADMIN — Medication 1 AMPULE: at 09:43

## 2022-01-01 RX ADMIN — SODIUM CHLORIDE 40 MG: 9 INJECTION, SOLUTION INTRAMUSCULAR; INTRAVENOUS; SUBCUTANEOUS at 08:06

## 2022-01-01 RX ADMIN — SODIUM CHLORIDE, PRESERVATIVE FREE 10 ML: 5 INJECTION INTRAVENOUS at 05:10

## 2022-01-01 RX ADMIN — ALUMINUM HYDROXIDE AND MAGNESIUM HYDROXIDE 40 ML: 200; 200 SUSPENSION ORAL at 06:21

## 2022-01-01 RX ADMIN — ALBUMIN (HUMAN) 25 G: 12.5 INJECTION, SOLUTION INTRAVENOUS at 14:33

## 2022-01-01 RX ADMIN — CALCIUM CHLORIDE, MAGNESIUM CHLORIDE, DEXTROSE MONOHYDRATE, LACTIC ACID, SODIUM CHLORIDE, SODIUM BICARBONATE AND POTASSIUM CHLORIDE: 3.68; 3.05; 22; 5.4; 6.46; 3.09; .314 INJECTION INTRAVENOUS at 06:33

## 2022-01-01 RX ADMIN — FENTANYL CITRATE 50 MCG: 50 INJECTION, SOLUTION INTRAMUSCULAR; INTRAVENOUS at 06:21

## 2022-01-01 RX ADMIN — MUPIROCIN: 20 OINTMENT TOPICAL at 20:00

## 2022-01-01 RX ADMIN — CALCIUM CHLORIDE, MAGNESIUM CHLORIDE, DEXTROSE MONOHYDRATE, LACTIC ACID, SODIUM CHLORIDE, SODIUM BICARBONATE AND POTASSIUM CHLORIDE: 3.68; 3.05; 22; 5.4; 6.46; 3.09; .314 INJECTION INTRAVENOUS at 19:20

## 2022-01-01 RX ADMIN — TICAGRELOR 90 MG: 90 TABLET ORAL at 17:32

## 2022-01-01 RX ADMIN — FENTANYL CITRATE 50 MCG: 50 INJECTION, SOLUTION INTRAMUSCULAR; INTRAVENOUS at 07:53

## 2022-01-01 RX ADMIN — PIPERACILLIN AND TAZOBACTAM 3.38 G: 3; .375 INJECTION, POWDER, LYOPHILIZED, FOR SOLUTION INTRAVENOUS at 17:37

## 2022-01-01 RX ADMIN — Medication: at 08:05

## 2022-01-01 RX ADMIN — SODIUM CHLORIDE, PRESERVATIVE FREE 10 ML: 5 INJECTION INTRAVENOUS at 13:06

## 2022-01-01 RX ADMIN — METOPROLOL TARTRATE 25 MG: 25 TABLET, FILM COATED ORAL at 08:45

## 2022-01-01 RX ADMIN — CALCIUM GLUCONATE 2 G: 20 INJECTION, SOLUTION INTRAVENOUS at 00:33

## 2022-01-01 RX ADMIN — DEXTROSE MONOHYDRATE 30 ML/HR: 100 INJECTION, SOLUTION INTRAVENOUS at 00:00

## 2022-01-01 RX ADMIN — CALCIUM CHLORIDE, MAGNESIUM CHLORIDE, DEXTROSE MONOHYDRATE, LACTIC ACID, SODIUM CHLORIDE, SODIUM BICARBONATE AND POTASSIUM CHLORIDE: 3.68; 3.05; 22; 5.4; 6.46; 3.09; .314 INJECTION INTRAVENOUS at 14:10

## 2022-01-01 RX ADMIN — Medication 100 MCG: at 10:18

## 2022-01-01 RX ADMIN — HEPARIN SODIUM 7.3 ML/HR: 5000 INJECTION, SOLUTION INTRAVENOUS at 12:55

## 2022-01-01 RX ADMIN — Medication 10 UNITS: at 11:47

## 2022-01-01 RX ADMIN — PIPERACILLIN AND TAZOBACTAM 3.38 G: 3; .375 INJECTION, POWDER, LYOPHILIZED, FOR SOLUTION INTRAVENOUS at 00:57

## 2022-01-01 RX ADMIN — PROPOFOL 40 MCG/KG/MIN: 10 INJECTION, EMULSION INTRAVENOUS at 03:15

## 2022-01-01 RX ADMIN — SODIUM CHLORIDE 80 MG: 9 INJECTION INTRAMUSCULAR; INTRAVENOUS; SUBCUTANEOUS at 06:18

## 2022-01-01 RX ADMIN — CASTOR OIL AND BALSAM, PERU: 788; 87 OINTMENT TOPICAL at 20:10

## 2022-01-01 RX ADMIN — PROPOFOL 30 MG: 10 INJECTION, EMULSION INTRAVENOUS at 08:25

## 2022-01-01 RX ADMIN — HEPARIN SODIUM 8.3 ML/HR: 5000 INJECTION INTRAVENOUS; SUBCUTANEOUS at 01:14

## 2022-01-01 RX ADMIN — ASPIRIN 81 MG: 81 TABLET, CHEWABLE ORAL at 09:54

## 2022-01-01 RX ADMIN — FENTANYL CITRATE 150 MCG/HR: 50 INJECTION, SOLUTION INTRAMUSCULAR; INTRAVENOUS at 14:22

## 2022-01-01 RX ADMIN — CALCIUM CHLORIDE, MAGNESIUM CHLORIDE, DEXTROSE MONOHYDRATE, LACTIC ACID, SODIUM CHLORIDE, SODIUM BICARBONATE AND POTASSIUM CHLORIDE: 3.68; 3.05; 22; 5.4; 6.46; 3.09; .314 INJECTION INTRAVENOUS at 11:40

## 2022-01-01 RX ADMIN — OXYCODONE 5 MG: 5 TABLET ORAL at 13:23

## 2022-01-01 RX ADMIN — ACETAMINOPHEN 650 MG: 325 TABLET ORAL at 09:12

## 2022-01-01 RX ADMIN — SODIUM CHLORIDE 50 MCG/MIN: 900 INJECTION, SOLUTION INTRAVENOUS at 11:45

## 2022-01-01 RX ADMIN — SODIUM CHLORIDE, PRESERVATIVE FREE 10 ML: 5 INJECTION INTRAVENOUS at 21:13

## 2022-01-01 RX ADMIN — EPOETIN ALFA-EPBX 6000 UNITS: 3000 INJECTION, SOLUTION INTRAVENOUS; SUBCUTANEOUS at 21:14

## 2022-01-01 RX ADMIN — TICAGRELOR 90 MG: 90 TABLET ORAL at 17:52

## 2022-01-01 RX ADMIN — SODIUM CHLORIDE 0.4 MCG/KG/HR: 9 INJECTION, SOLUTION INTRAVENOUS at 19:49

## 2022-01-01 RX ADMIN — SODIUM CHLORIDE 1000 ML: 900 INJECTION, SOLUTION INTRAVENOUS at 22:04

## 2022-01-01 RX ADMIN — HYDROMORPHONE HYDROCHLORIDE 2 MG: 1 INJECTION, SOLUTION INTRAMUSCULAR; INTRAVENOUS; SUBCUTANEOUS at 17:21

## 2022-01-01 RX ADMIN — HEPARIN SODIUM 5000 UNITS: 5000 INJECTION INTRAVENOUS; SUBCUTANEOUS at 10:32

## 2022-01-01 RX ADMIN — MORPHINE SULFATE 4 MG: 10 INJECTION, SOLUTION INTRAMUSCULAR; INTRAVENOUS at 14:22

## 2022-01-01 RX ADMIN — TICAGRELOR 90 MG: 90 TABLET ORAL at 17:37

## 2022-01-01 RX ADMIN — TICAGRELOR 90 MG: 90 TABLET ORAL at 05:54

## 2022-01-01 RX ADMIN — CASTOR OIL AND BALSAM, PERU: 788; 87 OINTMENT TOPICAL at 20:11

## 2022-01-01 RX ADMIN — HEPARIN SODIUM 8.7 ML/HR: 1000 INJECTION INTRAVENOUS; SUBCUTANEOUS at 13:47

## 2022-01-01 RX ADMIN — PROPOFOL 30 MCG/KG/MIN: 10 INJECTION, EMULSION INTRAVENOUS at 06:14

## 2022-01-01 RX ADMIN — HEPARIN SODIUM 9.7 ML/HR: 5000 INJECTION INTRAVENOUS; SUBCUTANEOUS at 20:21

## 2022-01-01 RX ADMIN — Medication: at 21:37

## 2022-01-01 RX ADMIN — SODIUM CHLORIDE, PRESERVATIVE FREE 10 ML: 5 INJECTION INTRAVENOUS at 10:24

## 2022-01-01 RX ADMIN — MORPHINE SULFATE 4 MG: 2 INJECTION, SOLUTION INTRAMUSCULAR; INTRAVENOUS at 07:51

## 2022-01-01 RX ADMIN — SODIUM CHLORIDE 1.4 MCG/KG/HR: 9 INJECTION, SOLUTION INTRAVENOUS at 01:08

## 2022-01-01 RX ADMIN — EPOETIN ALFA-EPBX 6000 UNITS: 3000 INJECTION, SOLUTION INTRAVENOUS; SUBCUTANEOUS at 21:00

## 2022-01-01 RX ADMIN — POTASSIUM CHLORIDE 20 MEQ: 29.8 INJECTION, SOLUTION INTRAVENOUS at 09:12

## 2022-01-01 RX ADMIN — METOCLOPRAMIDE 5 MG: 5 INJECTION, SOLUTION INTRAMUSCULAR; INTRAVENOUS at 12:59

## 2022-01-01 RX ADMIN — OXYCODONE 5 MG: 5 TABLET ORAL at 13:13

## 2022-01-01 RX ADMIN — DOBUTAMINE 3 MCG/KG/MIN: 12.5 INJECTION, SOLUTION, CONCENTRATE INTRAVENOUS at 08:25

## 2022-01-01 RX ADMIN — Medication 1 AMPULE: at 08:18

## 2022-01-01 RX ADMIN — AMIODARONE HYDROCHLORIDE 1 MG/MIN: 50 INJECTION, SOLUTION INTRAVENOUS at 11:04

## 2022-01-01 RX ADMIN — Medication 2 UNITS: at 00:07

## 2022-01-01 RX ADMIN — SODIUM CHLORIDE 200 MCG/MIN: 900 INJECTION, SOLUTION INTRAVENOUS at 14:58

## 2022-01-01 RX ADMIN — SODIUM CHLORIDE, PRESERVATIVE FREE 10 ML: 5 INJECTION INTRAVENOUS at 05:25

## 2022-01-01 RX ADMIN — CHLORHEXIDINE GLUCONATE 10 ML: 1.2 RINSE ORAL at 13:21

## 2022-01-01 RX ADMIN — EPINEPHRINE 1 MG: 0.1 INJECTION, SOLUTION ENDOTRACHEAL; INTRACARDIAC; INTRAVENOUS at 17:47

## 2022-01-01 RX ADMIN — PIPERACILLIN AND TAZOBACTAM 3.38 G: 3; .375 INJECTION, POWDER, LYOPHILIZED, FOR SOLUTION INTRAVENOUS at 17:48

## 2022-01-01 RX ADMIN — ALBUMIN (HUMAN) 25 G: 0.25 INJECTION, SOLUTION INTRAVENOUS at 00:14

## 2022-01-01 RX ADMIN — SODIUM BICARBONATE: 84 INJECTION, SOLUTION INTRAVENOUS at 02:54

## 2022-01-01 RX ADMIN — DOBUTAMINE 10 MCG/KG/MIN: 12.5 INJECTION, SOLUTION, CONCENTRATE INTRAVENOUS at 04:25

## 2022-01-01 RX ADMIN — Medication: at 08:17

## 2022-01-01 RX ADMIN — SODIUM BICARBONATE 50 MEQ: 84 INJECTION, SOLUTION INTRAVENOUS at 17:56

## 2022-01-01 RX ADMIN — SODIUM CHLORIDE, PRESERVATIVE FREE 10 ML: 5 INJECTION INTRAVENOUS at 21:47

## 2022-01-01 RX ADMIN — CALCIUM CHLORIDE, MAGNESIUM CHLORIDE, DEXTROSE MONOHYDRATE, LACTIC ACID, SODIUM CHLORIDE, SODIUM BICARBONATE AND POTASSIUM CHLORIDE: 3.68; 3.05; 22; 5.4; 6.46; 3.09; .314 INJECTION INTRAVENOUS at 13:29

## 2022-01-01 RX ADMIN — PREGABALIN 200 MG: 150 CAPSULE ORAL at 01:26

## 2022-01-01 RX ADMIN — IOPAMIDOL 100 ML: 755 INJECTION, SOLUTION INTRAVENOUS at 13:25

## 2022-01-01 RX ADMIN — Medication 120 MCG: at 10:13

## 2022-01-01 RX ADMIN — Medication: at 08:51

## 2022-01-01 RX ADMIN — ASPIRIN 81 MG: 81 TABLET, CHEWABLE ORAL at 09:08

## 2022-01-01 RX ADMIN — TICAGRELOR 90 MG: 90 TABLET ORAL at 17:27

## 2022-01-01 RX ADMIN — EPINEPHRINE 1 MG: 0.1 INJECTION, SOLUTION ENDOTRACHEAL; INTRACARDIAC; INTRAVENOUS at 16:14

## 2022-01-01 RX ADMIN — Medication 80 MCG: at 09:55

## 2022-01-01 RX ADMIN — HEPARIN SODIUM 1 UNITS/KG/HR: 10000 INJECTION, SOLUTION INTRAVENOUS at 10:52

## 2022-01-01 RX ADMIN — Medication 1 AMPULE: at 21:09

## 2022-01-01 RX ADMIN — HYDROCODONE BITARTRATE AND ACETAMINOPHEN 1 TABLET: 7.5; 325 TABLET ORAL at 19:36

## 2022-01-01 RX ADMIN — PIPERACILLIN AND TAZOBACTAM 3.38 G: 3; .375 INJECTION, POWDER, LYOPHILIZED, FOR SOLUTION INTRAVENOUS at 05:36

## 2022-01-01 RX ADMIN — ALBUMIN (HUMAN) 12.5 G: 12.5 INJECTION, SOLUTION INTRAVENOUS at 13:29

## 2022-01-01 RX ADMIN — HEPARIN SODIUM 5000 UNITS: 1000 INJECTION, SOLUTION INTRAVENOUS; SUBCUTANEOUS at 08:52

## 2022-01-01 RX ADMIN — PHENYLEPHRINE HYDROCHLORIDE 30 MCG/MIN: 10 INJECTION INTRAVENOUS at 20:00

## 2022-01-01 RX ADMIN — CASTOR OIL AND BALSAM, PERU: 788; 87 OINTMENT TOPICAL at 21:34

## 2022-01-01 RX ADMIN — SODIUM BICARBONATE 50 MEQ: 84 INJECTION, SOLUTION INTRAVENOUS at 17:06

## 2022-01-01 RX ADMIN — CALCIUM CHLORIDE, MAGNESIUM CHLORIDE, DEXTROSE MONOHYDRATE, LACTIC ACID, SODIUM CHLORIDE, SODIUM BICARBONATE AND POTASSIUM CHLORIDE: 3.68; 3.05; 22; 5.4; 6.46; 3.09; .314 INJECTION INTRAVENOUS at 05:23

## 2022-01-01 RX ADMIN — EPINEPHRINE 1 MG: 0.1 INJECTION, SOLUTION ENDOTRACHEAL; INTRACARDIAC; INTRAVENOUS at 16:20

## 2022-01-01 RX ADMIN — PROPOFOL 20 MCG/KG/MIN: 10 INJECTION, EMULSION INTRAVENOUS at 22:13

## 2022-01-01 RX ADMIN — ALBUMIN (HUMAN) 25 G: 0.25 INJECTION, SOLUTION INTRAVENOUS at 20:30

## 2022-01-01 RX ADMIN — ASPIRIN 81 MG: 81 TABLET, CHEWABLE ORAL at 13:21

## 2022-01-01 RX ADMIN — VANCOMYCIN HYDROCHLORIDE 1500 MG: 10 INJECTION, POWDER, LYOPHILIZED, FOR SOLUTION INTRAVENOUS at 17:52

## 2022-01-01 RX ADMIN — CALCIUM CHLORIDE, MAGNESIUM CHLORIDE, DEXTROSE MONOHYDRATE, LACTIC ACID, SODIUM CHLORIDE, SODIUM BICARBONATE AND POTASSIUM CHLORIDE: 3.68; 3.05; 22; 5.4; 6.46; 3.09; .314 INJECTION INTRAVENOUS at 00:27

## 2022-01-01 RX ADMIN — FENTANYL CITRATE 50 MCG: 50 INJECTION, SOLUTION INTRAMUSCULAR; INTRAVENOUS at 02:32

## 2022-01-01 RX ADMIN — Medication 2 UNITS: at 18:46

## 2022-01-01 RX ADMIN — OXYCODONE 10 MG: 5 TABLET ORAL at 08:47

## 2022-01-01 RX ADMIN — SODIUM CHLORIDE, PRESERVATIVE FREE 10 ML: 5 INJECTION INTRAVENOUS at 05:15

## 2022-01-01 RX ADMIN — PROPOFOL 40 MCG/KG/MIN: 10 INJECTION, EMULSION INTRAVENOUS at 09:43

## 2022-01-01 RX ADMIN — DESMOPRESSIN ACETATE 20 MCG: 4 INJECTION INTRAVENOUS at 10:51

## 2022-01-01 RX ADMIN — SODIUM CHLORIDE 40 MG: 9 INJECTION, SOLUTION INTRAMUSCULAR; INTRAVENOUS; SUBCUTANEOUS at 09:40

## 2022-01-01 RX ADMIN — SODIUM CHLORIDE, PRESERVATIVE FREE 10 ML: 5 INJECTION INTRAVENOUS at 06:00

## 2022-01-01 RX ADMIN — ASPIRIN 81 MG: 81 TABLET, CHEWABLE ORAL at 12:40

## 2022-01-01 RX ADMIN — EPINEPHRINE 1 MG: 0.1 INJECTION, SOLUTION ENDOTRACHEAL; INTRACARDIAC; INTRAVENOUS at 17:35

## 2022-01-01 RX ADMIN — CALCIUM CHLORIDE, MAGNESIUM CHLORIDE, DEXTROSE MONOHYDRATE, LACTIC ACID, SODIUM CHLORIDE, SODIUM BICARBONATE AND POTASSIUM CHLORIDE: 3.68; 3.05; 22; 5.4; 6.46; 3.09; .314 INJECTION INTRAVENOUS at 02:50

## 2022-01-01 RX ADMIN — PIPERACILLIN AND TAZOBACTAM 3.38 G: 3; .375 INJECTION, POWDER, LYOPHILIZED, FOR SOLUTION INTRAVENOUS at 17:27

## 2022-01-01 RX ADMIN — FENTANYL CITRATE 50 MCG/HR: 50 INJECTION, SOLUTION INTRAMUSCULAR; INTRAVENOUS at 06:02

## 2022-01-01 RX ADMIN — EPINEPHRINE 1 MG: 0.1 INJECTION, SOLUTION ENDOTRACHEAL; INTRACARDIAC; INTRAVENOUS at 17:58

## 2022-01-01 RX ADMIN — PIPERACILLIN AND TAZOBACTAM 3.38 G: 3; .375 INJECTION, POWDER, LYOPHILIZED, FOR SOLUTION INTRAVENOUS at 19:46

## 2022-01-01 RX ADMIN — VANCOMYCIN HYDROCHLORIDE 500 MG: 500 INJECTION, POWDER, LYOPHILIZED, FOR SOLUTION INTRAVENOUS at 14:01

## 2022-01-01 RX ADMIN — ALBUMIN (HUMAN) 25 G: 0.25 INJECTION, SOLUTION INTRAVENOUS at 15:06

## 2022-01-01 RX ADMIN — PROPOFOL 40 MCG/KG/MIN: 10 INJECTION, EMULSION INTRAVENOUS at 23:16

## 2022-01-01 RX ADMIN — CHLORHEXIDINE GLUCONATE 10 ML: 1.2 RINSE ORAL at 21:03

## 2022-01-01 RX ADMIN — PROPOFOL 40 MCG/KG/MIN: 10 INJECTION, EMULSION INTRAVENOUS at 18:11

## 2022-01-01 RX ADMIN — CHLORHEXIDINE GLUCONATE 10 ML: 1.2 RINSE ORAL at 09:43

## 2022-01-01 RX ADMIN — SODIUM BICARBONATE 50 MEQ: 84 INJECTION, SOLUTION INTRAVENOUS at 16:40

## 2022-01-01 RX ADMIN — FAMOTIDINE 20 MG: 10 INJECTION, SOLUTION INTRAVENOUS at 04:45

## 2022-01-01 RX ADMIN — Medication: at 20:10

## 2022-01-01 RX ADMIN — HYDROCODONE BITARTRATE AND ACETAMINOPHEN 1 TABLET: 5; 325 TABLET ORAL at 12:24

## 2022-01-01 RX ADMIN — Medication: at 21:10

## 2022-01-01 RX ADMIN — PIPERACILLIN AND TAZOBACTAM 3.38 G: 3; .375 INJECTION, POWDER, LYOPHILIZED, FOR SOLUTION INTRAVENOUS at 05:25

## 2022-01-01 RX ADMIN — CALCIUM CHLORIDE, MAGNESIUM CHLORIDE, DEXTROSE MONOHYDRATE, LACTIC ACID, SODIUM CHLORIDE, SODIUM BICARBONATE AND POTASSIUM CHLORIDE: 3.68; 3.05; 22; 5.4; 6.46; 3.09; .314 INJECTION INTRAVENOUS at 17:53

## 2022-01-01 RX ADMIN — CHLORHEXIDINE GLUCONATE 10 ML: 1.2 RINSE ORAL at 09:40

## 2022-01-01 RX ADMIN — POTASSIUM CHLORIDE 20 MEQ: 29.8 INJECTION, SOLUTION INTRAVENOUS at 06:18

## 2022-01-01 RX ADMIN — FENTANYL CITRATE 50 MCG/HR: 50 INJECTION, SOLUTION INTRAMUSCULAR; INTRAVENOUS at 00:36

## 2022-01-01 RX ADMIN — Medication 1 CAPSULE: at 08:45

## 2022-01-01 RX ADMIN — CALCIUM CHLORIDE 1 G: 100 INJECTION, SOLUTION INTRAVENOUS at 16:20

## 2022-01-01 RX ADMIN — FENTANYL CITRATE 50 MCG: 50 INJECTION, SOLUTION INTRAMUSCULAR; INTRAVENOUS at 01:39

## 2022-01-01 RX ADMIN — PROPOFOL 40 MCG/KG/MIN: 10 INJECTION, EMULSION INTRAVENOUS at 21:09

## 2022-01-01 RX ADMIN — SODIUM CHLORIDE, PRESERVATIVE FREE 10 ML: 5 INJECTION INTRAVENOUS at 22:00

## 2022-01-01 RX ADMIN — VANCOMYCIN HYDROCHLORIDE 750 MG: 750 INJECTION, POWDER, LYOPHILIZED, FOR SOLUTION INTRAVENOUS at 17:50

## 2022-01-01 RX ADMIN — CALCIUM CHLORIDE, MAGNESIUM CHLORIDE, DEXTROSE MONOHYDRATE, LACTIC ACID, SODIUM CHLORIDE, SODIUM BICARBONATE AND POTASSIUM CHLORIDE: 3.68; 3.05; 22; 5.4; 6.46; 3.09; .314 INJECTION INTRAVENOUS at 05:31

## 2022-01-01 RX ADMIN — POTASSIUM CHLORIDE 20 MEQ: 29.8 INJECTION, SOLUTION INTRAVENOUS at 18:37

## 2022-01-01 RX ADMIN — EPINEPHRINE 1 MG: 0.1 INJECTION, SOLUTION ENDOTRACHEAL; INTRACARDIAC; INTRAVENOUS at 17:26

## 2022-01-01 RX ADMIN — CASTOR OIL AND BALSAM, PERU: 788; 87 OINTMENT TOPICAL at 08:39

## 2022-01-01 RX ADMIN — ACETAZOLAMIDE SODIUM 500 MG: 500 INJECTION, POWDER, LYOPHILIZED, FOR SOLUTION INTRAVENOUS at 23:51

## 2022-01-01 RX ADMIN — ONDANSETRON 4 MG: 2 INJECTION INTRAMUSCULAR; INTRAVENOUS at 19:31

## 2022-01-01 RX ADMIN — CHLORHEXIDINE GLUCONATE 10 ML: 1.2 RINSE ORAL at 08:43

## 2022-01-01 RX ADMIN — Medication: at 09:12

## 2022-01-01 RX ADMIN — FENTANYL CITRATE 50 MCG: 50 INJECTION, SOLUTION INTRAMUSCULAR; INTRAVENOUS at 19:54

## 2022-01-01 RX ADMIN — CASTOR OIL AND BALSAM, PERU: 788; 87 OINTMENT TOPICAL at 08:49

## 2022-01-01 RX ADMIN — SODIUM CHLORIDE 40 MG: 9 INJECTION, SOLUTION INTRAMUSCULAR; INTRAVENOUS; SUBCUTANEOUS at 09:09

## 2022-01-01 RX ADMIN — PROPOFOL 40 MCG/KG/MIN: 10 INJECTION, EMULSION INTRAVENOUS at 17:51

## 2022-01-01 RX ADMIN — TICAGRELOR 90 MG: 90 TABLET ORAL at 04:50

## 2022-01-01 RX ADMIN — PROPOFOL 35 MCG/KG/MIN: 10 INJECTION, EMULSION INTRAVENOUS at 16:55

## 2022-01-01 RX ADMIN — CALCIUM CHLORIDE, MAGNESIUM CHLORIDE, DEXTROSE MONOHYDRATE, LACTIC ACID, SODIUM CHLORIDE, SODIUM BICARBONATE AND POTASSIUM CHLORIDE: 3.68; 3.05; 22; 5.4; 6.46; 3.09; .314 INJECTION INTRAVENOUS at 12:52

## 2022-01-01 RX ADMIN — SODIUM CHLORIDE, PRESERVATIVE FREE 10 ML: 5 INJECTION INTRAVENOUS at 15:33

## 2022-01-01 RX ADMIN — HEPARIN SODIUM 9.5 ML/HR: 5000 INJECTION INTRAVENOUS; SUBCUTANEOUS at 10:45

## 2022-01-01 RX ADMIN — CASTOR OIL AND BALSAM, PERU: 788; 87 OINTMENT TOPICAL at 08:05

## 2022-01-01 RX ADMIN — FENTANYL CITRATE 50 MCG: 50 INJECTION, SOLUTION INTRAMUSCULAR; INTRAVENOUS at 22:19

## 2022-01-01 RX ADMIN — Medication 2 UNITS: at 13:23

## 2022-01-01 RX ADMIN — ALBUMIN (HUMAN) 25 G: 0.25 INJECTION, SOLUTION INTRAVENOUS at 05:31

## 2022-01-01 RX ADMIN — EPINEPHRINE 200 MCG/MIN: 1 INJECTION INTRAMUSCULAR; INTRAVENOUS; SUBCUTANEOUS at 17:52

## 2022-01-01 RX ADMIN — HEPARIN SODIUM 12 UNITS/KG/HR: 10000 INJECTION, SOLUTION INTRAVENOUS at 19:23

## 2022-01-01 RX ADMIN — SODIUM CHLORIDE, PRESERVATIVE FREE 10 ML: 5 INJECTION INTRAVENOUS at 21:05

## 2022-01-01 RX ADMIN — OXYCODONE 5 MG: 5 TABLET ORAL at 01:39

## 2022-01-01 RX ADMIN — ALBUMIN (HUMAN) 25 G: 12.5 INJECTION, SOLUTION INTRAVENOUS at 16:49

## 2022-01-01 RX ADMIN — SODIUM CHLORIDE 10 ML/HR: 4.5 INJECTION, SOLUTION INTRAVENOUS at 18:14

## 2022-01-01 RX ADMIN — Medication 1 AMPULE: at 20:11

## 2022-01-01 RX ADMIN — FENTANYL CITRATE 100 MCG: 50 INJECTION, SOLUTION INTRAMUSCULAR; INTRAVENOUS at 16:24

## 2022-01-01 RX ADMIN — CALCIUM CHLORIDE, MAGNESIUM CHLORIDE, DEXTROSE MONOHYDRATE, LACTIC ACID, SODIUM CHLORIDE, SODIUM BICARBONATE AND POTASSIUM CHLORIDE: 3.68; 3.05; 22; 5.4; 6.46; 3.09; .314 INJECTION INTRAVENOUS at 07:49

## 2022-01-01 RX ADMIN — EPINEPHRINE 85 MCG/MIN: 1 INJECTION INTRAMUSCULAR; INTRAVENOUS; SUBCUTANEOUS at 20:26

## 2022-01-01 RX ADMIN — MEROPENEM 1 G: 1 INJECTION, POWDER, FOR SOLUTION INTRAVENOUS at 16:05

## 2022-01-01 RX ADMIN — ALBUMIN (HUMAN) 25 G: 12.5 INJECTION, SOLUTION INTRAVENOUS at 14:07

## 2022-01-01 RX ADMIN — MORPHINE SULFATE 2 MG: 2 INJECTION, SOLUTION INTRAMUSCULAR; INTRAVENOUS at 19:21

## 2022-01-01 RX ADMIN — EPOETIN ALFA-EPBX 6000 UNITS: 3000 INJECTION, SOLUTION INTRAVENOUS; SUBCUTANEOUS at 20:13

## 2022-01-01 RX ADMIN — ACETAMINOPHEN 1000 MG: 500 TABLET ORAL at 14:49

## 2022-01-01 RX ADMIN — MUPIROCIN: 20 OINTMENT TOPICAL at 08:05

## 2022-01-01 RX ADMIN — CASTOR OIL AND BALSAM, PERU: 788; 87 OINTMENT TOPICAL at 08:17

## 2022-01-01 RX ADMIN — NOREPINEPHRINE BITARTRATE 27 MCG/MIN: 1 INJECTION, SOLUTION, CONCENTRATE INTRAVENOUS at 14:21

## 2022-01-01 RX ADMIN — ONDANSETRON 4 MG: 2 INJECTION INTRAMUSCULAR; INTRAVENOUS at 11:49

## 2022-01-01 RX ADMIN — METOCLOPRAMIDE 5 MG: 5 INJECTION, SOLUTION INTRAMUSCULAR; INTRAVENOUS at 08:49

## 2022-01-01 RX ADMIN — PROPOFOL 40 MCG/KG/MIN: 10 INJECTION, EMULSION INTRAVENOUS at 05:14

## 2022-01-01 RX ADMIN — PROPOFOL 40 MCG/KG/MIN: 10 INJECTION, EMULSION INTRAVENOUS at 21:46

## 2022-01-01 RX ADMIN — SODIUM CHLORIDE: 0.9 INJECTION, SOLUTION INTRAVENOUS at 07:59

## 2022-01-01 RX ADMIN — ACETAZOLAMIDE SODIUM 500 MG: 500 INJECTION, POWDER, LYOPHILIZED, FOR SOLUTION INTRAVENOUS at 21:30

## 2022-01-01 RX ADMIN — ALTEPLASE 2 MG: 2.2 INJECTION, POWDER, LYOPHILIZED, FOR SOLUTION INTRAVENOUS at 22:11

## 2022-01-01 RX ADMIN — CALCIUM CHLORIDE 1 G: 100 INJECTION, SOLUTION INTRAVENOUS at 18:42

## 2022-01-01 RX ADMIN — EPINEPHRINE 1 MG: 0.1 INJECTION, SOLUTION ENDOTRACHEAL; INTRACARDIAC; INTRAVENOUS at 16:33

## 2022-01-01 RX ADMIN — MUPIROCIN: 20 OINTMENT TOPICAL at 21:12

## 2022-01-01 RX ADMIN — SODIUM CHLORIDE, PRESERVATIVE FREE 10 ML: 5 INJECTION INTRAVENOUS at 13:22

## 2022-01-01 RX ADMIN — PROPOFOL 30 MCG/KG/MIN: 10 INJECTION, EMULSION INTRAVENOUS at 01:02

## 2022-01-01 RX ADMIN — TICAGRELOR 90 MG: 90 TABLET ORAL at 06:27

## 2022-01-01 RX ADMIN — EPINEPHRINE 1 MG: 0.1 INJECTION, SOLUTION ENDOTRACHEAL; INTRACARDIAC; INTRAVENOUS at 17:32

## 2022-01-01 RX ADMIN — SODIUM CHLORIDE 40 MG: 9 INJECTION, SOLUTION INTRAMUSCULAR; INTRAVENOUS; SUBCUTANEOUS at 08:38

## 2022-01-01 RX ADMIN — FENTANYL CITRATE 50 MCG: 50 INJECTION, SOLUTION INTRAMUSCULAR; INTRAVENOUS at 04:26

## 2022-01-01 RX ADMIN — ALBUMIN (HUMAN) 12.5 G: 12.5 INJECTION, SOLUTION INTRAVENOUS at 22:08

## 2022-01-01 RX ADMIN — CALCIUM CHLORIDE 1 G: 100 INJECTION, SOLUTION INTRAVENOUS at 16:54

## 2022-01-01 RX ADMIN — TICAGRELOR 90 MG: 90 TABLET ORAL at 17:23

## 2022-01-01 RX ADMIN — EPINEPHRINE 40 MCG/MIN: 1 INJECTION INTRAMUSCULAR; INTRAVENOUS; SUBCUTANEOUS at 02:16

## 2022-01-01 RX ADMIN — DEXTROSE MONOHYDRATE 250 ML: 100 INJECTION, SOLUTION INTRAVENOUS at 23:55

## 2022-01-01 RX ADMIN — CASTOR OIL AND BALSAM, PERU: 788; 87 OINTMENT TOPICAL at 08:50

## 2022-01-01 RX ADMIN — SODIUM CHLORIDE 1.1 MCG/KG/HR: 9 INJECTION, SOLUTION INTRAVENOUS at 11:27

## 2022-01-01 RX ADMIN — CALCIUM CHLORIDE, MAGNESIUM CHLORIDE, DEXTROSE MONOHYDRATE, LACTIC ACID, SODIUM CHLORIDE, SODIUM BICARBONATE AND POTASSIUM CHLORIDE: 3.68; 3.05; 22; 5.4; 6.46; 3.09; .314 INJECTION INTRAVENOUS at 22:57

## 2022-01-01 RX ADMIN — SODIUM CHLORIDE, PRESERVATIVE FREE 40 ML: 5 INJECTION INTRAVENOUS at 21:04

## 2022-01-01 RX ADMIN — CALCIUM CHLORIDE 1 G: 100 INJECTION, SOLUTION INTRAVENOUS at 17:11

## 2022-01-01 RX ADMIN — FAMOTIDINE 20 MG: 20 TABLET, FILM COATED ORAL at 08:45

## 2022-01-01 RX ADMIN — HEPARIN SODIUM 5 UNITS/KG/HR: 10000 INJECTION, SOLUTION INTRAVENOUS at 18:39

## 2022-01-01 RX ADMIN — SODIUM CHLORIDE 40 MG: 9 INJECTION, SOLUTION INTRAMUSCULAR; INTRAVENOUS; SUBCUTANEOUS at 09:08

## 2022-01-01 RX ADMIN — Medication 200 MCG: at 10:15

## 2022-01-01 RX ADMIN — Medication 2 UNITS: at 11:39

## 2022-01-01 RX ADMIN — CASTOR OIL AND BALSAM, PERU: 788; 87 OINTMENT TOPICAL at 21:10

## 2022-01-01 RX ADMIN — SODIUM CHLORIDE, PRESERVATIVE FREE 10 ML: 5 INJECTION INTRAVENOUS at 05:40

## 2022-01-01 RX ADMIN — CHLORHEXIDINE GLUCONATE 10 ML: 1.2 RINSE ORAL at 08:23

## 2022-01-01 RX ADMIN — TICAGRELOR 90 MG: 90 TABLET ORAL at 03:47

## 2022-01-01 RX ADMIN — Medication 200 MCG: at 10:23

## 2022-01-01 RX ADMIN — LIDOCAINE HYDROCHLORIDE 4 ML: 20 INJECTION, SOLUTION INFILTRATION; PERINEURAL at 15:05

## 2022-01-01 RX ADMIN — SODIUM CHLORIDE, PRESERVATIVE FREE 10 ML: 5 INJECTION INTRAVENOUS at 14:17

## 2022-01-01 RX ADMIN — SODIUM CHLORIDE, PRESERVATIVE FREE 10 ML: 5 INJECTION INTRAVENOUS at 04:15

## 2022-01-01 RX ADMIN — CASTOR OIL AND BALSAM, PERU: 788; 87 OINTMENT TOPICAL at 13:14

## 2022-01-01 RX ADMIN — SODIUM BICARBONATE: 84 INJECTION, SOLUTION INTRAVENOUS at 20:47

## 2022-01-01 RX ADMIN — ALBUMIN (HUMAN) 25 G: 0.25 INJECTION, SOLUTION INTRAVENOUS at 12:37

## 2022-01-01 RX ADMIN — PROPOFOL 30 MCG/KG/MIN: 10 INJECTION, EMULSION INTRAVENOUS at 14:19

## 2022-01-01 RX ADMIN — SODIUM CHLORIDE 40 MG: 9 INJECTION, SOLUTION INTRAMUSCULAR; INTRAVENOUS; SUBCUTANEOUS at 09:54

## 2022-01-01 RX ADMIN — CASTOR OIL AND BALSAM, PERU: 788; 87 OINTMENT TOPICAL at 20:01

## 2022-01-01 RX ADMIN — MUPIROCIN: 20 OINTMENT TOPICAL at 18:43

## 2022-01-01 RX ADMIN — CHLORHEXIDINE GLUCONATE 10 ML: 1.2 RINSE ORAL at 20:08

## 2022-01-01 RX ADMIN — PIPERACILLIN AND TAZOBACTAM 3.38 G: 3; .375 INJECTION, POWDER, LYOPHILIZED, FOR SOLUTION INTRAVENOUS at 06:12

## 2022-01-01 RX ADMIN — FENTANYL CITRATE 50 MCG/HR: 50 INJECTION, SOLUTION INTRAMUSCULAR; INTRAVENOUS at 05:39

## 2022-01-01 RX ADMIN — PROPOFOL 40 MCG/KG/MIN: 10 INJECTION, EMULSION INTRAVENOUS at 13:38

## 2022-01-01 RX ADMIN — CALCIUM CHLORIDE 1 G: 100 INJECTION, SOLUTION INTRAVENOUS at 12:40

## 2022-01-01 RX ADMIN — KETOROLAC TROMETHAMINE 60 MG: 30 INJECTION, SOLUTION INTRAMUSCULAR; INTRAVENOUS at 22:20

## 2022-01-01 RX ADMIN — OXYCODONE 5 MG: 5 TABLET ORAL at 21:37

## 2022-01-01 RX ADMIN — EPINEPHRINE 1 MG: 0.1 INJECTION, SOLUTION ENDOTRACHEAL; INTRACARDIAC; INTRAVENOUS at 16:53

## 2022-01-01 RX ADMIN — IRON SUCROSE 200 MG: 20 INJECTION, SOLUTION INTRAVENOUS at 12:40

## 2022-01-01 RX ADMIN — CASTOR OIL AND BALSAM, PERU: 788; 87 OINTMENT TOPICAL at 20:27

## 2022-01-01 RX ADMIN — SODIUM CHLORIDE 40 MG: 9 INJECTION, SOLUTION INTRAMUSCULAR; INTRAVENOUS; SUBCUTANEOUS at 08:44

## 2022-01-01 RX ADMIN — ASPIRIN 81 MG: 81 TABLET, CHEWABLE ORAL at 08:19

## 2022-01-01 RX ADMIN — SODIUM CHLORIDE 10 ML/HR: 9 INJECTION, SOLUTION INTRAVENOUS at 11:02

## 2022-01-01 RX ADMIN — TICAGRELOR 90 MG: 90 TABLET ORAL at 06:01

## 2022-01-01 RX ADMIN — SODIUM CHLORIDE, PRESERVATIVE FREE 40 ML: 5 INJECTION INTRAVENOUS at 21:37

## 2022-01-01 RX ADMIN — PIPERACILLIN AND TAZOBACTAM 3.38 G: 3; .375 INJECTION, POWDER, LYOPHILIZED, FOR SOLUTION INTRAVENOUS at 17:10

## 2022-01-01 RX ADMIN — FENTANYL CITRATE 50 MCG: 50 INJECTION, SOLUTION INTRAMUSCULAR; INTRAVENOUS at 14:21

## 2022-01-01 RX ADMIN — DIAZEPAM 5 MG: 5 INJECTION, SOLUTION INTRAMUSCULAR; INTRAVENOUS at 22:24

## 2022-01-01 RX ADMIN — PROPOFOL 40 MCG/KG/MIN: 10 INJECTION, EMULSION INTRAVENOUS at 15:54

## 2022-01-01 RX ADMIN — PROPOFOL 25 MCG/KG/MIN: 10 INJECTION, EMULSION INTRAVENOUS at 18:58

## 2022-01-01 RX ADMIN — OXYCODONE 5 MG: 5 TABLET ORAL at 06:27

## 2022-01-01 RX ADMIN — PROPOFOL 40 MCG/KG/MIN: 10 INJECTION, EMULSION INTRAVENOUS at 12:42

## 2022-01-01 RX ADMIN — PHENYLEPHRINE HYDROCHLORIDE 25 MCG/MIN: 10 INJECTION INTRAVENOUS at 20:01

## 2022-01-01 RX ADMIN — MUPIROCIN: 20 OINTMENT TOPICAL at 09:08

## 2022-01-01 RX ADMIN — Medication: at 09:45

## 2022-01-01 RX ADMIN — SODIUM BICARBONATE: 84 INJECTION, SOLUTION INTRAVENOUS at 10:26

## 2022-01-01 RX ADMIN — OXYCODONE 5 MG: 5 TABLET ORAL at 09:31

## 2022-01-01 RX ADMIN — INSULIN GLARGINE 10 UNITS: 100 INJECTION, SOLUTION SUBCUTANEOUS at 03:27

## 2022-01-01 RX ADMIN — HEPARIN SODIUM 3 UNITS/KG/HR: 10000 INJECTION, SOLUTION INTRAVENOUS at 09:26

## 2022-01-01 RX ADMIN — CALCIUM GLUCONATE 1000 MG: 20 INJECTION, SOLUTION INTRAVENOUS at 02:34

## 2022-01-01 RX ADMIN — SODIUM CHLORIDE, PRESERVATIVE FREE 10 ML: 5 INJECTION INTRAVENOUS at 13:25

## 2022-01-01 RX ADMIN — SODIUM CHLORIDE 2.5 MG/HR: 9 INJECTION, SOLUTION INTRAVENOUS at 08:31

## 2022-01-01 RX ADMIN — SODIUM CHLORIDE 10 ML/HR: 4.5 INJECTION, SOLUTION INTRAVENOUS at 00:32

## 2022-01-01 RX ADMIN — FENTANYL CITRATE 50 MCG: 50 INJECTION, SOLUTION INTRAMUSCULAR; INTRAVENOUS at 16:42

## 2022-01-01 RX ADMIN — Medication: at 21:04

## 2022-01-01 RX ADMIN — EPINEPHRINE 1 MG: 0.1 INJECTION, SOLUTION ENDOTRACHEAL; INTRACARDIAC; INTRAVENOUS at 16:30

## 2022-01-01 RX ADMIN — FENTANYL CITRATE 50 MCG: 50 INJECTION, SOLUTION INTRAMUSCULAR; INTRAVENOUS at 23:30

## 2022-01-01 RX ADMIN — CLONIDINE HYDROCHLORIDE 0.1 MG: 0.1 TABLET ORAL at 12:17

## 2022-01-01 RX ADMIN — KETOROLAC TROMETHAMINE 60 MG: 30 INJECTION, SOLUTION INTRAMUSCULAR at 00:48

## 2022-01-01 RX ADMIN — FENTANYL CITRATE 25 MCG: 50 INJECTION, SOLUTION INTRAMUSCULAR; INTRAVENOUS at 20:05

## 2022-01-01 RX ADMIN — MUPIROCIN: 20 OINTMENT TOPICAL at 09:44

## 2022-01-01 RX ADMIN — POTASSIUM CHLORIDE 20 MEQ: 400 INJECTION, SOLUTION INTRAVENOUS at 19:58

## 2022-01-01 RX ADMIN — SODIUM CHLORIDE 40 MG: 9 INJECTION, SOLUTION INTRAMUSCULAR; INTRAVENOUS; SUBCUTANEOUS at 13:21

## 2022-01-01 RX ADMIN — CALCIUM CHLORIDE 0.25 G: 100 INJECTION, SOLUTION INTRAVENOUS at 11:19

## 2022-01-01 RX ADMIN — Medication 1 AMPULE: at 08:49

## 2022-01-01 RX ADMIN — POTASSIUM BICARBONATE 40 MEQ: 391 TABLET, EFFERVESCENT ORAL at 19:10

## 2022-01-01 RX ADMIN — METOCLOPRAMIDE 5 MG: 5 INJECTION, SOLUTION INTRAMUSCULAR; INTRAVENOUS at 21:14

## 2022-01-01 RX ADMIN — CASTOR OIL AND BALSAM, PERU: 788; 87 OINTMENT TOPICAL at 09:08

## 2022-01-01 RX ADMIN — EPINEPHRINE 1 MG: 0.1 INJECTION, SOLUTION ENDOTRACHEAL; INTRACARDIAC; INTRAVENOUS at 16:17

## 2022-01-01 RX ADMIN — CHLORHEXIDINE GLUCONATE 10 ML: 1.2 RINSE ORAL at 21:12

## 2022-01-01 RX ADMIN — HEPARIN SODIUM 12 UNITS/KG/HR: 10000 INJECTION, SOLUTION INTRAVENOUS at 03:38

## 2022-01-01 RX ADMIN — EPINEPHRINE 50 MCG/MIN: 1 INJECTION INTRAMUSCULAR; INTRAVENOUS; SUBCUTANEOUS at 22:03

## 2022-01-01 RX ADMIN — METOCLOPRAMIDE 5 MG: 5 INJECTION, SOLUTION INTRAMUSCULAR; INTRAVENOUS at 20:30

## 2022-01-01 RX ADMIN — EPINEPHRINE 120 MCG/MIN: 1 INJECTION INTRAMUSCULAR; INTRAVENOUS; SUBCUTANEOUS at 16:42

## 2022-01-01 RX ADMIN — SODIUM CHLORIDE, PRESERVATIVE FREE 10 ML: 5 INJECTION INTRAVENOUS at 18:48

## 2022-01-01 RX ADMIN — POTASSIUM CHLORIDE 20 MEQ: 29.8 INJECTION, SOLUTION INTRAVENOUS at 00:20

## 2022-01-01 RX ADMIN — ROCURONIUM BROMIDE 50 MG: 10 INJECTION INTRAVENOUS at 07:55

## 2022-01-01 RX ADMIN — SODIUM CHLORIDE, PRESERVATIVE FREE 10 ML: 5 INJECTION INTRAVENOUS at 21:51

## 2022-01-01 RX ADMIN — PROPOFOL 40 MCG/KG/MIN: 10 INJECTION, EMULSION INTRAVENOUS at 11:21

## 2022-01-01 RX ADMIN — HYDROMORPHONE HYDROCHLORIDE 0.5 MG: 1 INJECTION, SOLUTION INTRAMUSCULAR; INTRAVENOUS; SUBCUTANEOUS at 02:16

## 2022-01-01 RX ADMIN — ASPIRIN 81 MG: 81 TABLET, CHEWABLE ORAL at 10:11

## 2022-01-01 RX ADMIN — LIDOCAINE HYDROCHLORIDE 1 G: 10 INJECTION, SOLUTION EPIDURAL; INFILTRATION; INTRACAUDAL; PERINEURAL at 14:00

## 2022-01-01 RX ADMIN — CALCIUM CHLORIDE, MAGNESIUM CHLORIDE, DEXTROSE MONOHYDRATE, LACTIC ACID, SODIUM CHLORIDE, SODIUM BICARBONATE AND POTASSIUM CHLORIDE: 3.68; 3.05; 22; 5.4; 6.46; 3.09; .314 INJECTION INTRAVENOUS at 01:27

## 2022-01-01 RX ADMIN — Medication 120 MCG: at 10:05

## 2022-01-01 RX ADMIN — CASTOR OIL AND BALSAM, PERU: 788; 87 OINTMENT TOPICAL at 09:45

## 2022-01-01 RX ADMIN — CALCIUM CHLORIDE 1 G: 100 INJECTION, SOLUTION INTRAVENOUS at 12:02

## 2022-01-01 RX ADMIN — ALUMINUM HYDROXIDE AND MAGNESIUM HYDROXIDE 40 ML: 200; 200 SUSPENSION ORAL at 04:11

## 2022-01-01 RX ADMIN — CALCIUM CHLORIDE, MAGNESIUM CHLORIDE, DEXTROSE MONOHYDRATE, LACTIC ACID, SODIUM CHLORIDE, SODIUM BICARBONATE AND POTASSIUM CHLORIDE: 3.68; 3.05; 22; 5.4; 6.46; 3.09; .314 INJECTION INTRAVENOUS at 16:43

## 2022-01-01 RX ADMIN — MAGNESIUM SULFATE HEPTAHYDRATE 1 G: 1 INJECTION, SOLUTION INTRAVENOUS at 05:04

## 2022-01-01 RX ADMIN — AMIODARONE HYDROCHLORIDE 1 MG/MIN: 50 INJECTION, SOLUTION INTRAVENOUS at 16:54

## 2022-01-01 RX ADMIN — CEFAZOLIN 2 G: 1 INJECTION, POWDER, FOR SOLUTION INTRAMUSCULAR; INTRAVENOUS; PARENTERAL at 08:25

## 2022-01-01 RX ADMIN — EPINEPHRINE 1 MG: 0.1 INJECTION, SOLUTION ENDOTRACHEAL; INTRACARDIAC; INTRAVENOUS at 16:59

## 2022-01-01 RX ADMIN — SODIUM BICARBONATE 50 MEQ: 84 INJECTION, SOLUTION INTRAVENOUS at 16:20

## 2022-01-01 RX ADMIN — CALCIUM GLUCONATE 2 G: 20 INJECTION, SOLUTION INTRAVENOUS at 05:56

## 2022-01-01 RX ADMIN — SODIUM CHLORIDE, PRESERVATIVE FREE 10 ML: 5 INJECTION INTRAVENOUS at 05:17

## 2022-01-01 RX ADMIN — TICAGRELOR 90 MG: 90 TABLET ORAL at 17:38

## 2022-01-01 RX ADMIN — SODIUM CHLORIDE, PRESERVATIVE FREE 10 ML: 5 INJECTION INTRAVENOUS at 13:09

## 2022-01-01 RX ADMIN — CHLORHEXIDINE GLUCONATE 10 ML: 1.2 RINSE ORAL at 20:01

## 2022-01-01 RX ADMIN — ONDANSETRON 4 MG: 2 INJECTION INTRAMUSCULAR; INTRAVENOUS at 06:17

## 2022-01-01 RX ADMIN — EPINEPHRINE 1 MG: 0.1 INJECTION, SOLUTION ENDOTRACHEAL; INTRACARDIAC; INTRAVENOUS at 16:36

## 2022-01-01 RX ADMIN — ALBUMIN (HUMAN) 12.5 G: 0.25 INJECTION, SOLUTION INTRAVENOUS at 17:37

## 2022-01-01 RX ADMIN — Medication 1 AMPULE: at 20:00

## 2022-01-01 RX ADMIN — HYDROCORTISONE SODIUM SUCCINATE 50 MG: 100 INJECTION, POWDER, FOR SOLUTION INTRAMUSCULAR; INTRAVENOUS at 13:43

## 2022-01-01 RX ADMIN — SODIUM PHOSPHATE, MONOBASIC, MONOHYDRATE: 276; 142 INJECTION, SOLUTION INTRAVENOUS at 00:09

## 2022-01-01 RX ADMIN — ALUMINUM HYDROXIDE AND MAGNESIUM HYDROXIDE 40 ML: 200; 200 SUSPENSION ORAL at 19:21

## 2022-01-01 RX ADMIN — TICAGRELOR 90 MG: 90 TABLET ORAL at 05:24

## 2022-01-01 RX ADMIN — PIPERACILLIN AND TAZOBACTAM 3.38 G: 3; .375 INJECTION, POWDER, LYOPHILIZED, FOR SOLUTION INTRAVENOUS at 06:00

## 2022-01-01 RX ADMIN — CALCIUM CHLORIDE, MAGNESIUM CHLORIDE, DEXTROSE MONOHYDRATE, LACTIC ACID, SODIUM CHLORIDE, SODIUM BICARBONATE AND POTASSIUM CHLORIDE: 3.68; 3.05; 22; 5.4; 6.46; 3.09; .314 INJECTION INTRAVENOUS at 09:04

## 2022-01-01 RX ADMIN — CASTOR OIL AND BALSAM, PERU: 788; 87 OINTMENT TOPICAL at 21:09

## 2022-01-01 RX ADMIN — ASPIRIN 81 MG: 81 TABLET, COATED ORAL at 08:45

## 2022-01-01 RX ADMIN — DROPERIDOL 2.5 MG: 2.5 INJECTION, SOLUTION INTRAMUSCULAR; INTRAVENOUS at 23:01

## 2022-01-01 RX ADMIN — PIPERACILLIN AND TAZOBACTAM 3.38 G: 3; .375 INJECTION, POWDER, LYOPHILIZED, FOR SOLUTION INTRAVENOUS at 05:15

## 2022-01-01 RX ADMIN — SODIUM CHLORIDE 9 ML/HR: 9 INJECTION, SOLUTION INTRAVENOUS at 18:15

## 2022-01-01 RX ADMIN — CASTOR OIL AND BALSAM, PERU: 788; 87 OINTMENT TOPICAL at 09:41

## 2022-01-01 RX ADMIN — PIPERACILLIN AND TAZOBACTAM 3.38 G: 3; .375 INJECTION, POWDER, LYOPHILIZED, FOR SOLUTION INTRAVENOUS at 05:37

## 2022-01-01 RX ADMIN — CHLORHEXIDINE GLUCONATE 10 ML: 1.2 RINSE ORAL at 09:08

## 2022-01-01 RX ADMIN — SODIUM CHLORIDE, PRESERVATIVE FREE 10 ML: 5 INJECTION INTRAVENOUS at 21:03

## 2022-01-01 RX ADMIN — SODIUM CHLORIDE 1.4 MCG/KG/HR: 9 INJECTION, SOLUTION INTRAVENOUS at 05:55

## 2022-01-01 RX ADMIN — CHLORHEXIDINE GLUCONATE 10 ML: 1.2 RINSE ORAL at 21:50

## 2022-01-01 RX ADMIN — METOPROLOL TARTRATE 25 MG: 25 TABLET, FILM COATED ORAL at 13:13

## 2022-01-01 RX ADMIN — TICAGRELOR 90 MG: 90 TABLET ORAL at 05:41

## 2022-01-01 RX ADMIN — FENTANYL CITRATE 150 MCG/HR: 50 INJECTION, SOLUTION INTRAMUSCULAR; INTRAVENOUS at 04:15

## 2022-01-01 RX ADMIN — FENTANYL CITRATE 150 MCG/HR: 50 INJECTION, SOLUTION INTRAMUSCULAR; INTRAVENOUS at 00:13

## 2022-01-01 RX ADMIN — CALCIUM CHLORIDE, MAGNESIUM CHLORIDE, DEXTROSE MONOHYDRATE, LACTIC ACID, SODIUM CHLORIDE, SODIUM BICARBONATE AND POTASSIUM CHLORIDE: 3.68; 3.05; 22; 5.4; 6.46; 3.09; .314 INJECTION INTRAVENOUS at 15:30

## 2022-01-01 RX ADMIN — SODIUM CHLORIDE, PRESERVATIVE FREE 220 UNITS: 5 INJECTION INTRAVENOUS at 15:05

## 2022-01-01 RX ADMIN — CALCIUM GLUCONATE 2 G: 20 INJECTION, SOLUTION INTRAVENOUS at 06:09

## 2022-01-01 RX ADMIN — CHLORHEXIDINE GLUCONATE 10 ML: 1.2 RINSE ORAL at 08:51

## 2022-01-01 RX ADMIN — ASPIRIN 81 MG: 81 TABLET, CHEWABLE ORAL at 09:40

## 2022-01-01 RX ADMIN — FENTANYL CITRATE 50 MCG: 50 INJECTION, SOLUTION INTRAMUSCULAR; INTRAVENOUS at 01:40

## 2022-01-01 RX ADMIN — EPINEPHRINE 1 MG: 0.1 INJECTION, SOLUTION ENDOTRACHEAL; INTRACARDIAC; INTRAVENOUS at 17:29

## 2022-01-01 RX ADMIN — CHLORHEXIDINE GLUCONATE 10 ML: 1.2 RINSE ORAL at 20:52

## 2022-01-01 RX ADMIN — Medication 120 MCG: at 09:48

## 2022-01-01 RX ADMIN — SODIUM CHLORIDE 250 ML: 9 INJECTION, SOLUTION INTRAVENOUS at 05:20

## 2022-01-01 RX ADMIN — OXYCODONE AND ACETAMINOPHEN 1 TABLET: 5; 325 TABLET ORAL at 06:24

## 2022-01-01 RX ADMIN — DROPERIDOL 0.62 MG: 2.5 INJECTION, SOLUTION INTRAMUSCULAR; INTRAVENOUS at 12:12

## 2022-01-01 RX ADMIN — SODIUM CHLORIDE, POTASSIUM CHLORIDE, SODIUM LACTATE AND CALCIUM CHLORIDE 500 ML: 600; 310; 30; 20 INJECTION, SOLUTION INTRAVENOUS at 18:00

## 2022-01-01 RX ADMIN — CALCIUM GLUCONATE 2 G: 20 INJECTION, SOLUTION INTRAVENOUS at 21:48

## 2022-01-01 RX ADMIN — FENTANYL CITRATE 50 MCG/HR: 50 INJECTION, SOLUTION INTRAMUSCULAR; INTRAVENOUS at 21:26

## 2022-01-01 RX ADMIN — CALCIUM CHLORIDE, MAGNESIUM CHLORIDE, DEXTROSE MONOHYDRATE, LACTIC ACID, SODIUM CHLORIDE, SODIUM BICARBONATE AND POTASSIUM CHLORIDE: 3.68; 3.05; 22; 5.4; 6.46; 3.09; .314 INJECTION INTRAVENOUS at 21:50

## 2022-03-07 NOTE — ED PROVIDER NOTES
EMERGENCY DEPARTMENT HISTORY AND PHYSICAL EXAM      Date: 3/7/2022  Patient Name: Laquita Lyons    Please note that this dictation was completed with SafeBoot, the computer voice recognition software. Quite often unanticipated grammatical, syntax, homophones, and other interpretive errors are inadvertently transcribed by the computer software. Please disregard these errors. Please excuse any errors that have escaped final proofreading. History of Presenting Illness     Chief Complaint   Patient presents with    Back Pain     middle to lower pain for 3 to 4 days; her doctor has to order a back brace for her, ; pt is upset crying in triage; she has old screws in her back ; she has no new injury       History Provided By: Patient     HPI: Laquita Lyons, 64 y.o. female,  past medical history significant for hypertension, diabetes, chronic back pain status post laminectomy at [facility] presenting the emergency department complaining of back pain, abdominal pain. Patient rates her pain as severe. Its left-sided abdominal pain associated with nausea and vomiting radiates into her back. She states this is somewhat different than her chronic back pain and she normally does not have abdominal pain. Of note though I do see several prior ED visits where she complains of back pain and abdominal pain. She has loosening hardware related to surgery at [facility], and she is supposed to be getting a back brace, has not received it yet. They wrote her for some hydrocodone last month which she states she is run out of. No one is ready her pain medicine at this time. Patient denies any fevers or chills. Reports nausea and vomiting. No urinary symptoms. No other exacerbating relieving factors or associated symptoms    PCP: Hector Ashley NP    No current facility-administered medications on file prior to encounter.      Current Outpatient Medications on File Prior to Encounter   Medication Sig Dispense Refill    lidocaine 4 % patch 1 Patch by TransDERmal route every twelve (12) hours every twelve (12) hours. 10 Patch 0    methocarbamoL (Robaxin-750) 750 mg tablet Take 1 Tablet by mouth four (4) times daily as needed for Muscle Spasm(s). 20 Tablet 0    pantoprazole (PROTONIX) 40 mg tablet Take 1 Tab by mouth Before breakfast and dinner. 30 Tab 2    ondansetron (ZOFRAN ODT) 4 mg disintegrating tablet Take 1 Tab by mouth every eight (8) hours as needed for Nausea. 10 Tab 0    metoprolol tartrate (LOPRESSOR) 25 mg tablet Take 1 Tab by mouth two (2) times a day. 60 Tab 0    calcitRIOL (ROCALTROL) 0.25 mcg capsule Take 0.25 mcg by mouth daily.  acetaminophen (TYLENOL) 500 mg tablet acetaminophen 500 mg      insulin degludec (TRESIBA U-100 INSULIN SC) 25 Units by SubCUTAneous route daily.  insulin aspart U-100 (NOVOLOG) 100 unit/mL injection 5 Units by SubCUTAneous route Before breakfast, lunch, and dinner.  aspirin 81 mg chewable tablet Take 1 Tab by mouth daily. 30 Tab 1    cloNIDine HCl (CATAPRES) 0.1 mg tablet Take 1 Tab by mouth two (2) times a day. 60 Tab 1    sodium bicarbonate 650 mg tablet Take 650 mg by mouth two (2) times a day.  atorvastatin (LIPITOR) 20 mg tablet Take 20 mg by mouth nightly.          Past History     Past Medical History:  Past Medical History:   Diagnosis Date    Abdominal pain 11/18/2013    Abdominal pain, LUQ (left upper quadrant) 6/7/2012    Back pain, lumbosacral 6/24/2012    Acute on chronic      C. difficile colitis 6/2012    Chronic kidney disease     Stage V- no dialysis yet    Chronic low back pain     Chronic pain     back & left leg    Constipation     Diabetes ([address]     A1c 8.2 3/2012    DKA (diabetic ketoacidoses) 7/10/2018    Flank pain 4/14/2015    Gastroparesis 6/7/2012    Hep C w/o coma, chronic (HCC)     Hyperlipemia     Hypertension     Hyponatremia 11/18/2013    Intractable abdominal pain 4/21/2012    Lower urinary tract infectious disease 11/18/2013    Lumbar disc disease     Migraines     Nausea & vomiting 3/16/2012    Pancreatitis 9096    alcoholic    UTI (lower urinary tract infection) 6/20012       Past Surgical History:  Past Surgical History:   Procedure Laterality Date    HX LUMBAR DISKECTOMY  1980's    HX ORTHOPAEDIC      lumbar sprain; back surgery    HX UROLOGICAL  2014    kidney stone removed    SD COLONOSCOPY FLX DX W/COLLJ SPEC WHEN PFRMD  11/12/2012         VASCULAR SURGERY PROCEDURE UNLIST  08/02/2019    insertion of left AVF    VASCULAR SURGERY PROCEDURE UNLIST  12/06/2019    Creation transposed AV fistula left arm       Family History:  Family History   Problem Relation Age of Onset    Diabetes Mother     Kidney Disease Mother     Diabetes Sister     Diabetes Father     Diabetes Brother        Social History:  Social History     Tobacco Use    Smoking status: Never Smoker    Smokeless tobacco: Never Used   Substance Use Topics    Alcohol use: No     Comment: Quit few months ago, hx of abuse    Drug use: Yes     Types: Prescription, OTC       Allergies:  No Known Allergies      Review of Systems   Review of Systems   Constitutional: Negative for chills and fever. HENT: Negative for congestion and sore throat. Eyes: Negative for visual disturbance. Respiratory: Negative for cough and shortness of breath. Cardiovascular: Negative for chest pain and leg swelling. Gastrointestinal: Positive for abdominal pain, nausea and vomiting. Negative for blood in stool and diarrhea. Endocrine: Negative for polyuria. Genitourinary: Negative for dysuria, flank pain, vaginal bleeding and vaginal discharge. Musculoskeletal: Positive for back pain. Negative for myalgias. Skin: Negative for rash. Allergic/Immunologic: Negative for immunocompromised state. Neurological: Negative for weakness and headaches. Psychiatric/Behavioral: Negative for confusion. Physical Exam   Physical Exam  Vitals and nursing note reviewed. Constitutional:       Appearance: She is well-developed. Comments: Uncomfortable appearing female, crying, retching, moderate distress   HENT:      Head: Normocephalic and atraumatic. Mouth/Throat:      Mouth: Mucous membranes are dry. Eyes:      General:         Right eye: No discharge. Left eye: No discharge. Conjunctiva/sclera: Conjunctivae normal.      Pupils: Pupils are equal, round, and reactive to light. Neck:      Trachea: No tracheal deviation. Cardiovascular:      Rate and Rhythm: Normal rate and regular rhythm. Heart sounds: Normal heart sounds. No murmur heard. Pulmonary:      Effort: Pulmonary effort is normal. No respiratory distress. Breath sounds: Normal breath sounds. No wheezing or rales. Abdominal:      General: Bowel sounds are normal.      Palpations: Abdomen is soft. Tenderness: There is no abdominal tenderness. There is no guarding or rebound. Musculoskeletal:         General: No tenderness or deformity. Normal range of motion. Cervical back: Normal range of motion and neck supple. Skin:     General: Skin is warm and dry. Findings: No erythema or rash. Neurological:      Mental Status: She is alert and oriented to person, place, and time.    Psychiatric:         Behavior: Behavior normal.         Diagnostic Study Results     Labs -     Recent Results (from the past 12 hour(s))   CBC WITH AUTOMATED DIFF    Collection Time: 03/07/22  1:26 PM   Result Value Ref Range    WBC 7.8 3.6 - 11.0 K/uL    RBC 3.92 3.80 - 5.20 M/uL    HGB 12.4 11.5 - 16.0 g/dL    HCT 35.7 35.0 - 47.0 %    MCV 91.1 80.0 - 99.0 FL    MCH 31.6 26.0 - 34.0 PG    MCHC 34.7 30.0 - 36.5 g/dL    RDW 12.6 11.5 - 14.5 %    PLATELET 741 499 - 424 K/uL    MPV 8.6 (L) 8.9 - 12.9 FL    NRBC 0.0 0  WBC    ABSOLUTE NRBC 0.00 0.00 - 0.01 K/uL    NEUTROPHILS 75 32 - 75 %    LYMPHOCYTES 19 12 - 49 %    MONOCYTES 5 5 - 13 %    EOSINOPHILS 1 0 - 7 %    BASOPHILS 0 0 - 1 %    IMMATURE GRANULOCYTES 0 0.0 - 0.5 %    ABS. NEUTROPHILS 5.8 1.8 - 8.0 K/UL    ABS. LYMPHOCYTES 1.5 0.8 - 3.5 K/UL    ABS. MONOCYTES 0.4 0.0 - 1.0 K/UL    ABS. EOSINOPHILS 0.1 0.0 - 0.4 K/UL    ABS. BASOPHILS 0.0 0.0 - 0.1 K/UL    ABS. IMM. GRANS. 0.0 0.00 - 0.04 K/UL    DF AUTOMATED     METABOLIC PANEL, COMPREHENSIVE    Collection Time: 03/07/22  1:26 PM   Result Value Ref Range    Sodium 133 (L) 136 - 145 mmol/L    Potassium 3.4 (L) 3.5 - 5.1 mmol/L    Chloride 98 97 - 108 mmol/L    CO2 25 21 - 32 mmol/L    Anion gap 10 5 - 15 mmol/L    Glucose 254 (H) 65 - 100 mg/dL    BUN 51 (H) 6 - 20 MG/DL    Creatinine 6.35 (H) 0.55 - 1.02 MG/DL    BUN/Creatinine ratio 8 (L) 12 - 20      GFR est AA 8 (L) >60 ml/min/1.73m2    GFR est non-AA 7 (L) >60 ml/min/1.73m2    Calcium 10.1 8.5 - 10.1 MG/DL    Bilirubin, total 0.4 0.2 - 1.0 MG/DL    ALT (SGPT) 29 12 - 78 U/L    AST (SGOT) 25 15 - 37 U/L    Alk. phosphatase 202 (H) 45 - 117 U/L    Protein, total 7.9 6.4 - 8.2 g/dL    Albumin 3.4 (L) 3.5 - 5.0 g/dL    Globulin 4.5 (H) 2.0 - 4.0 g/dL    A-G Ratio 0.8 (L) 1.1 - 2.2     LIPASE    Collection Time: 03/07/22  1:26 PM   Result Value Ref Range    Lipase 155 73 - 393 U/L       Radiologic Studies -   CT ABD PELV W CONT   Final Result      Nonspecific appearance of mural thickening in several loops of proximal small   bowel and within sigmoid colon and rectum. Finds could be on the basis of a   nonspecific enterocolitis which be correlated clinically. CT Results  (Last 48 hours)               03/07/22 1604  CT ABD PELV W CONT Final result    Impression:      Nonspecific appearance of mural thickening in several loops of proximal small   bowel and within sigmoid colon and rectum. Finds could be on the basis of a   nonspecific enterocolitis which be correlated clinically.        Narrative:  EXAM: CT ABD PELV W CONT       INDICATION: abdominal pain, left flank pain       COMPARISON: CT 10/24/2021        CONTRAST: 100 mL of Isovue-370. ORAL CONTRAST: None. The lack of oral contrast due to diminishes the capacity of   CT to evaluate the bowel and adjacent structures. TECHNIQUE:    Following the uneventful intravenous administration of contrast, thin axial   images were obtained through the abdomen and pelvis. Coronal and sagittal   reconstructions were generated. CT dose reduction was achieved through use of a   standardized protocol tailored for this examination and automatic exposure   control for dose modulation. FINDINGS:    LOWER THORAX: No significant abnormality in the incidentally imaged lower chest.   LIVER: No mass. BILIARY TREE: Gallbladder is within normal limits. CBD is not dilated. SPLEEN: within normal limits. PANCREAS: No mass or ductal dilatation. ADRENALS: Unremarkable. KIDNEYS: No mass, calculus, or hydronephrosis. Incidental extrarenal pelvis on   left. STOMACH: Unremarkable. SMALL BOWEL: Several loops of nonspecific circumferential mural thickening of   proximal small bowel. COLON: Nonspecific mural thickening shown throughout sigmoid colon through   rectum. APPENDIX: Normal.   PERITONEUM: No ascites or pneumoperitoneum. RETROPERITONEUM: Abundant atherosclerotic calcifications. No aneurysm. REPRODUCTIVE ORGANS: Unchanged appearance of numerous uterine and cervical   calcifications. URINARY BLADDER: No mass or calculus. BONES: Interval L2-3 posterior spinal fixation with previous appearance of   vertebral osteomyelitis. ABDOMINAL WALL: No mass or hernia. ADDITIONAL COMMENTS: N/A               CXR Results  (Last 48 hours)    None            Medical Decision Making   I am the first provider for this patient. I reviewed the vital signs, available nursing notes, past medical history, past surgical history, family history and social history. Vital Signs-Reviewed the patient's vital signs.   Patient Vitals for the past 12 hrs:   Temp Pulse Resp BP SpO2   03/07/22 1108 98.3 °F (36.8 °C) (!) 109 22 (!) 209/102 98 %           Records Reviewed:   Nursing notes, Prior visits     Provider Notes (Medical Decision Making):   No reproducible abdominal tenderness on exam, no CVA tenderness. Has surgical scars on the back. No concerning red flags right now, no fever, no saddle anesthesia. No weakness in the lower extremities noted. She is most likely having withdrawal symptoms from opiates. See chronic opiate addiction/dependence is in her chart. She has multiple visits for back pain, she is recently run out of opiates. I doubt acute intra-abdominal process based off the history and physical exam, but will obtain CT imaging. ED Course:   Initial assessment performed. The patients presenting problems have been discussed, and they are in agreement with the care plan formulated and outlined with them. I have encouraged them to ask questions as they arise throughout their visit. Critical Care Time:   none    Disposition:    DISCHARGE NOTE  Patients results have been reviewed with them. Patient and/or family have verbally conveyed their understanding and agreement of the patient's signs, symptoms, diagnosis, treatment and prognosis and additionally agree to follow up as recommended or return to the Emergency Room should their condition change or have any new concerns prior to their follow-up appointment. Patient verbally agrees with the care-plan and verbally conveys that all of their questions have been answered. Discharge instructions have also been provided to the patient with some educational information regarding their diagnosis as well a list of reasons why they would want to return to the ER prior to their follow-up appointment should their condition change. PLAN:  1.    Discharge Medication List as of 3/7/2022  4:49 PM      START taking these medications    Details   HYDROcodone-acetaminophen (Lorcet, HYDROcodone,) 5-325 mg per tablet Take 1 Tablet by mouth every four (4) hours as needed for Pain for up to 3 days. Max Daily Amount: 6 Tablets., Normal, Disp-5 Tablet, R-0         CONTINUE these medications which have NOT CHANGED    Details   lidocaine 4 % patch 1 Patch by TransDERmal route every twelve (12) hours every twelve (12) hours. , Normal, Disp-10 Patch, R-0      methocarbamoL (Robaxin-750) 750 mg tablet Take 1 Tablet by mouth four (4) times daily as needed for Muscle Spasm(s). , Normal, Disp-20 Tablet, R-0      pantoprazole (PROTONIX) 40 mg tablet Take 1 Tab by mouth Before breakfast and dinner., Normal, Disp-30 Tab, R-2      ondansetron (ZOFRAN ODT) 4 mg disintegrating tablet Take 1 Tab by mouth every eight (8) hours as needed for Nausea. , Print, Disp-10 Tab, R-0      metoprolol tartrate (LOPRESSOR) 25 mg tablet Take 1 Tab by mouth two (2) times a day., Normal, Disp-60 Tab, R-0      calcitRIOL (ROCALTROL) 0.25 mcg capsule Take 0.25 mcg by mouth daily. , Historical Med      acetaminophen (TYLENOL) 500 mg tablet acetaminophen 500 mg, Historical Med      insulin degludec (TRESIBA U-100 INSULIN SC) 25 Units by SubCUTAneous route daily. , Historical Med      insulin aspart U-100 (NOVOLOG) 100 unit/mL injection 5 Units by SubCUTAneous route Before breakfast, lunch, and dinner., Historical Med      aspirin 81 mg chewable tablet Take 1 Tab by mouth daily. , Print, Disp-30 Tab, R-1      cloNIDine HCl (CATAPRES) 0.1 mg tablet Take 1 Tab by mouth two (2) times a day., Print, Disp-60 Tab, R-1      sodium bicarbonate 650 mg tablet Take 650 mg by mouth two (2) times a day., Historical Med      atorvastatin (LIPITOR) 20 mg tablet Take 20 mg by mouth nightly., Historical Med           2.    Follow-up Information     Follow up With Specialties Details Why Contact Info    Jeffrey Manuel NP Nurse Practitioner Schedule an appointment as soon as possible for a visit   [address] [place] [id number] [phone]      [facility] EMERGENCY DEPT Emergency Medicine  If symptoms worsen [address] [facility] [address]    Jacob Olea MD Neurosurgery Schedule an appointment as soon as possible for a visit   [facility] [address] [place]  [phone]            Return to ED if worse     Diagnosis     Clinical Impression:   1. Chronic low back pain, unspecified back pain laterality, unspecified whether sciatica present        Attestations:   This note was completed by Марина Stephens DO

## 2022-03-12 NOTE — PROGRESS NOTES
3:10 PM-I returned the patient's call regarding her recent prescription. The patient did not answer. I left a voicemail to call back. A registration staff member tells me the patient's prescription from her ED visit five days ago is backordered at [facility] and is unable to be filled. On chart review, it appears this is in reference to a Lorcet prescription written by Dr. Meggan Gilliland. There was some concern about opioid withdrawal given patient's numerous pain related ED visits so a short prescription was provided and the patient was advised to follow-up with her neurosurgeon. At this time, it is a full 5 days after the prescription was written.

## 2022-03-13 NOTE — PROGRESS NOTES
Surgery    Sutures and staples removed at AV fistula site left arm. Good thrill in AVF. Patient should see me in the office in 1 month.     Greta Noriega MD normal...
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
## 2022-03-13 NOTE — ED NOTES
Patient given printed discharge instructions reviewed by the MD. Patient understands instructions/follow up recommendations. PT discharged via wheelchair into care of spouse.

## 2022-04-09 NOTE — ED NOTES
TRANSFER - IN REPORT:    Verbal report received from Corrine (name) on [place]. Report consisted of patients Situation, Background, Assessment and   Recommendations(SBAR). Information from the following report(s) SBAR and ED Summary was reviewed with the receiving nurse. Opportunity for questions and clarification was provided. Pt TRS dialysis pt, last session Tuesday. 1010 Pt reporting no n/v after PO trial or increased pain.  MD notified

## 2022-04-09 NOTE — ED NOTES
Pt to ED by EMS from home c/o vomiting. EMS states pt woke up and was getting ready for dialysis when she got nauseous and started vomiting. Pt reports having 3 episodes of vomiting that looked \"brown red\". States the entire vomit was blood. Reports \"pain like acid in the vomit\". State she has chest pain when she coughs. -thinners. Currently c/o epigastric pain. Last dialysis was Tuesday, states she missed her appointment on Thursday because she wanted to rest.    Pt A&Ox4 (person, place, time, and situation). Respirations even and unlabored. Skin warm, dry, and appropriate for ethnicity. PMS intact. Pt on continuous monitor. VSS. NAD noted. Stretcher in low and locked position. Denies having any further needs at this time. Call light within reach.

## 2022-04-09 NOTE — ED NOTES
Pt denies having any relief from epigastric pain. States pain is still a 10/10. MD Lovett Dears aware.

## 2022-04-09 NOTE — DISCHARGE INSTRUCTIONS
You were evaluated in the emergency department for chest pain and abdominal pain with nausea and vomiting. Your examination was reassuring as was your work-up including blood work and CT scans. You have a pyloric stricture which is causing gastric distention and significant reflux. Will be important for you to take pantoprazole and Zofran as needed to help with your symptoms. You were offered admission but you did not want to stay. You were dialyzed during this ED stay. .  It will be important for you to follow-up with your GI doctor in 3-7 days, please call this week to make an appointment. If you develop worsening symptoms such as worsening chest pain, abdominal pain, or inability to eat or drink, please return to the emergency department immediately.

## 2022-04-09 NOTE — CONSULTS
[facility]   [address] [facility], [address] [place] [address], [facility]  Phone: [phone] NOTE     Patient: Miya Doyle MRN: [id number]  PCP: Shara De Paz NP   :     1965  Age:   64 y.o. Sex:  female      Referring physician: Kathy Rai MD  Reason for consultation: 64 y.o. female with No admission diagnoses are documented for this encounter. complicated by EZEKIEL   Admission Date: 2022  5:55 AM  LOS: 0 days      ASSESSMENT and PLAN :     1 ESRD   - on HD t/t/s  - plan HD today   - Adam Lazar notifed       2 Abd pain  - GI has been consulted       3 ACD  - monitor Hbg     4 Acess   AVF, + B and + t       5 Nep will follow if admitted   Care Plan discussed with:  Er        Thank you for consulting [facility] in the care of your patient. Subjective:   HPI: Miya Doyle is a 64 y.o.  female who has been admitted to the hospital for abd pain. She does HD T/t/s at [address] [facility]. She missed HD Thursday. She was planning to go today and had abd pain and so she came to ER>    ER called requesting HD. No plans for admission has been made yet. Er attending will decide   Seen and examined in Er.  She has no new complains to offer     Past Medical Hx:   Past Medical History:   Diagnosis Date    Abdominal pain 2013    Abdominal pain, LUQ (left upper quadrant) 2012    Back pain, lumbosacral 2012    Acute on chronic      C. difficile colitis 2012    Chronic kidney disease     Stage V- no dialysis yet    Chronic low back pain     Chronic pain     back & left leg    Constipation     Diabetes ([facility] [address]     A1c 8.2 3/2012    DKA (diabetic ketoacidoses) 7/10/2018    Flank pain 2015    Gastroparesis 2012    Hep C w/o coma, chronic (HCC)     Hyperlipemia     Hypertension     Hyponatremia 2013    Intractable abdominal pain 2012    Lower urinary tract infectious disease 11/18/2013    Lumbar disc disease     Migraines     Nausea & vomiting 3/16/2012    Pancreatitis 4801    alcoholic    UTI (lower urinary tract infection) 6/20012        Past Surgical Hx:     Past Surgical History:   Procedure Laterality Date    HX LUMBAR DISKECTOMY  1980's    HX ORTHOPAEDIC      lumbar sprain; back surgery    HX UROLOGICAL  2014    kidney stone removed    RI COLONOSCOPY FLX DX W/COLLJ SPEC WHEN PFRMD  11/12/2012         VASCULAR SURGERY PROCEDURE UNLIST  08/02/2019    insertion of left AVF    VASCULAR SURGERY PROCEDURE UNLIST  12/06/2019    Creation transposed AV fistula left arm         No Known Allergies    Social Hx:  reports that she has never smoked. She has never used smokeless tobacco. She reports current drug use. Drugs: Prescription and OTC. She reports that she does not drink alcohol. Family History   Problem Relation Age of Onset    Diabetes Mother     Kidney Disease Mother     Diabetes Sister     Diabetes Father     Diabetes Brother        Review of Systems:  A thorough twelve point review of system was performed today. Pertinent positives and negatives are mentioned in the HPI. The reminder of the ROS is negative and noncontributory. Objective:    Vitals:    Vitals:    04/09/22 0753 04/09/22 0804 04/09/22 0904 04/09/22 0953   BP:  (!) 184/92 (!) 187/93 (!) 149/81   Pulse: 92 90 93 89   Resp: 17 18 13 13   Temp:       SpO2:  95% 90% 96%   Weight:       Height:         I&O's:  No intake/output data recorded. Visit Vitals  BP (!) 149/81   Pulse 89   Temp 99.1 °F (37.3 °C)   Resp 13   Ht 5' 5\" (1.651 m)   Wt 63.5 kg (140 lb)   LMP 09/15/2013   SpO2 96%   BMI 23.30 kg/m²       Physical Exam:  General:  No apparent Distress  HEENT: PERRL,  No Pallor , No Icterus  Neck: Supple,no mass palpable  Lungs : CTA  CVS: RRR, S1 S2 normal, No murmur   Abdomen: Soft, NT, BS +  Extremities: Edema  Skin: No rash or lesions.   MS: No joint swelling, erythema, warmth  Neurologic: non focal, AAO x 3  Psych: normal affect/   Laboratory Results:    Recent Labs     04/09/22  0608   *   K 4.3   CL 97   CO2 27   *   BUN 51*   CREA 6.72*   CA 9.0   ALB 2.6*   ALT 14     Recent Labs     04/09/22  0608   WBC 8.0   HGB 10.6*   HCT 32.2*        Lab Results   Component Value Date/Time    Specimen Description: NARES 11/18/2013 10:02 PM    Specimen Description: BLOOD 11/18/2013 09:18 PM    Specimen Description: URINE 11/18/2013 09:18 PM     Lab Results   Component Value Date/Time    Culture result: NO GROWTH 5 DAYS 10/24/2021 11:25 AM    Culture result: MIXED UROGENITAL SAMAN ISOLATED 03/20/2020 04:38 PM    Culture result: (A) 12/06/2019 06:31 AM     MRSA PRESENT CALLED TO AND READ BACK BY KENNETH LOVE,AT 8705 ON 12/7/19. RC    Culture result:  12/06/2019 06:31 AM         Screening of patient nares for MRSA is for surveillance purposes and, if positive, to facilitate isolation considerations in high risk settings. It is not intended for automatic decolonization interventions per se as regimens are not sufficiently effective to warrant routine use.      Recent Results (from the past 24 hour(s))   EKG, 12 LEAD, INITIAL    Collection Time: 04/09/22  6:04 AM   Result Value Ref Range    Ventricular Rate 100 BPM    Atrial Rate 100 BPM    P-R Interval 154 ms    QRS Duration 70 ms    Q-T Interval 334 ms    QTC Calculation (Bezet) 430 ms    Calculated P Axis 58 degrees    Calculated R Axis 32 degrees    Calculated T Axis 84 degrees    Diagnosis       Normal sinus rhythm  Normal ECG  When compared with ECG of 24-OCT-2021 08:41,  Criteria for Septal infarct are no longer present     CBC WITH AUTOMATED DIFF    Collection Time: 04/09/22  6:08 AM   Result Value Ref Range    WBC 8.0 3.6 - 11.0 K/uL    RBC 3.40 (L) 3.80 - 5.20 M/uL    HGB 10.6 (L) 11.5 - 16.0 g/dL    HCT 32.2 (L) 35.0 - 47.0 %    MCV 94.7 80.0 - 99.0 FL    MCH 31.2 26.0 - 34.0 PG    MCHC 32.9 30.0 - 36.5 g/dL    RDW 13.5 11.5 - 14.5 % PLATELET 739 589 - 244 K/uL    MPV 9.1 8.9 - 12.9 FL    NRBC 0.0 0  WBC    ABSOLUTE NRBC 0.00 0.00 - 0.01 K/uL    NEUTROPHILS 66 32 - 75 %    LYMPHOCYTES 23 12 - 49 %    MONOCYTES 9 5 - 13 %    EOSINOPHILS 2 0 - 7 %    BASOPHILS 0 0 - 1 %    IMMATURE GRANULOCYTES 0 0.0 - 0.5 %    ABS. NEUTROPHILS 5.2 1.8 - 8.0 K/UL    ABS. LYMPHOCYTES 1.8 0.8 - 3.5 K/UL    ABS. MONOCYTES 0.7 0.0 - 1.0 K/UL    ABS. EOSINOPHILS 0.2 0.0 - 0.4 K/UL    ABS. BASOPHILS 0.0 0.0 - 0.1 K/UL    ABS. IMM. GRANS. 0.0 0.00 - 0.04 K/UL    DF AUTOMATED     METABOLIC PANEL, COMPREHENSIVE    Collection Time: 04/09/22  6:08 AM   Result Value Ref Range    Sodium 134 (L) 136 - 145 mmol/L    Potassium 4.3 3.5 - 5.1 mmol/L    Chloride 97 97 - 108 mmol/L    CO2 27 21 - 32 mmol/L    Anion gap 10 5 - 15 mmol/L    Glucose 415 (H) 65 - 100 mg/dL    BUN 51 (H) 6 - 20 MG/DL    Creatinine 6.72 (H) 0.55 - 1.02 MG/DL    BUN/Creatinine ratio 8 (L) 12 - 20      GFR est AA 8 (L) >60 ml/min/1.73m2    GFR est non-AA 6 (L) >60 ml/min/1.73m2    Calcium 9.0 8.5 - 10.1 MG/DL    Bilirubin, total 0.3 0.2 - 1.0 MG/DL    ALT (SGPT) 14 12 - 78 U/L    AST (SGOT) 21 15 - 37 U/L    Alk.  phosphatase 174 (H) 45 - 117 U/L    Protein, total 7.3 6.4 - 8.2 g/dL    Albumin 2.6 (L) 3.5 - 5.0 g/dL    Globulin 4.7 (H) 2.0 - 4.0 g/dL    A-G Ratio 0.6 (L) 1.1 - 2.2     TROPONIN-HIGH SENSITIVITY    Collection Time: 04/09/22  6:08 AM   Result Value Ref Range    Troponin-High Sensitivity 14 0 - 51 ng/L   LIPASE    Collection Time: 04/09/22  6:08 AM   Result Value Ref Range    Lipase 276 73 - 393 U/L   TROPONIN-HIGH SENSITIVITY    Collection Time: 04/09/22  7:51 AM   Result Value Ref Range    Troponin-High Sensitivity 14 0 - 51 ng/L         Urine dipstick:   Lab Results   Component Value Date/Time    Color PINK 10/24/2021 09:58 AM    Appearance TURBID (A) 10/24/2021 09:58 AM    Specific gravity 1.010 10/24/2021 09:58 AM    Specific gravity 1.012 05/11/2018 09:49 AM    pH (UA) 7.5 10/24/2021 09:58 AM    Protein >300 (A) 10/24/2021 09:58 AM    Glucose Negative 10/24/2021 09:58 AM    Ketone Negative 10/24/2021 09:58 AM    Bilirubin Negative 10/24/2021 09:58 AM    Urobilinogen 0.2 10/24/2021 09:58 AM    Nitrites Negative 10/24/2021 09:58 AM    Leukocyte Esterase SMALL (A) 10/24/2021 09:58 AM    Epithelial cells FEW 10/24/2021 09:58 AM    Bacteria 4+ (A) 10/24/2021 09:58 AM    WBC >100 (H) 10/24/2021 09:58 AM    RBC >100 (H) 10/24/2021 09:58 AM       I have reviewed the following: All pertinent labs, microbiology data, radiology imaging for my assessment     Medications list Personally Reviewed   [x]      Yes     []               No       Medications:  Prior to Admission medications    Medication Sig Start Date End Date Taking? Authorizing Provider   lidocaine 4 % patch 1 Patch by TransDERmal route every twelve (12) hours every twelve (12) hours. 12/24/21   Karma Cardona MD   methocarbamoL (Robaxin-750) 750 mg tablet Take 1 Tablet by mouth four (4) times daily as needed for Muscle Spasm(s). 12/24/21   Karma Cardona MD   pantoprazole (PROTONIX) 40 mg tablet Take 1 Tab by mouth Before breakfast and dinner. 1/22/20   Jj GIRARD MD   ondansetron (ZOFRAN ODT) 4 mg disintegrating tablet Take 1 Tab by mouth every eight (8) hours as needed for Nausea. 1/14/20   Charo Upton DO   metoprolol tartrate (LOPRESSOR) 25 mg tablet Take 1 Tab by mouth two (2) times a day. 12/11/19   Gi Rome NP   calcitRIOL (ROCALTROL) 0.25 mcg capsule Take 0.25 mcg by mouth daily. Provider, Historical   acetaminophen (TYLENOL) 500 mg tablet acetaminophen 500 mg    Provider, Historical   insulin degludec (TRESIBA U-100 INSULIN SC) 25 Units by SubCUTAneous route daily. Provider, Historical   insulin aspart U-100 (NOVOLOG) 100 unit/mL injection 5 Units by SubCUTAneous route Before breakfast, lunch, and dinner. Provider, Historical   aspirin 81 mg chewable tablet Take 1 Tab by mouth daily.  5/12/18   Hussein Eligio Jimenez MD   cloNIDine HCl (CATAPRES) 0.1 mg tablet Take 1 Tab by mouth two (2) times a day. 5/11/18   Prashant Novoa MD   sodium bicarbonate 650 mg tablet Take 650 mg by mouth two (2) times a day. Provider, Historical   atorvastatin (LIPITOR) 20 mg tablet Take 20 mg by mouth nightly. Provider, Historical        Thank you for allowing us to participate in the care of this patient. We will follow patient. Please dont hesitate to call with any questions    Elli Cespedes MD  [facility]   [address] [address]  Phone - [phone]   Fax - [phone]  [email or web address]

## 2022-04-09 NOTE — PROCEDURES
Hemodialysis / [phone]    Vitals Pre Post Assessment Pre Post   /98 164/103 LOC A&Ox4, cooperative, follows commands A&Ox4, cooperative, follows commands   HR 90 100 Lungs CTA bilaterally, denies shortness of breath CTA bilaterally, denies shortness of breath   Resp 18 18 Cardiac Regular, S1, S2 Regular, S1, S2   Temp Temp: 97 °F (36.1 °C) (04/09/22 1600) 97 Skin LUE AVF LUE AVF   Weight    Edema None  None   Tele status N/A N/A Pain Pain Intensity 1: 10 (04/09/22 0645) 10 back pain (informed primary RN, pt uncomfortable from stretcher)     Orders   Duration: Start: 1548 End: 1848 Total: 3 hours   Dialyzer: Dialyzer/Set Up Inspection: Pritesh Scales (04/09/22 1548)   K Bath: Dialysate K (mEq/L): 2 (04/09/22 1548)   Ca Bath: Dialysate CA (mEq/L): 2.5 (04/09/22 1548)   Na: Dialysate NA (mEq/L): 138 (04/09/22 1548)   Bicarb: Dialysate HCO3 (mEq/L): 35 (04/09/22 1548)   Target Fluid Removal: Goal/Amount of Fluid to Remove (mL): 2000 mL (04/09/22 1548)     Access   Type & Location: LUE AVF: skin CDI. No s/s of infection. + B/T. No issues with cannulation or hemostasis. Running well at .     Comments:                                        Labs   HBsAg (Antigen) / date: 11/24/20 Negative                                       HBsAb (Antibody) / date: 03/15/22 Susceptible 3    Source: [facility]   Obtained/Reviewed  Critical Results Called HGB   Date Value Ref Range Status   04/09/2022 10.6 (L) 11.5 - 16.0 g/dL Final     Potassium   Date Value Ref Range Status   04/09/2022 4.3 3.5 - 5.1 mmol/L Final     Comment:     SPECIMEN HEMOLYZED, RESULTS MAY BE AFFECTED     Calcium   Date Value Ref Range Status   04/09/2022 9.0 8.5 - 10.1 MG/DL Final     BUN   Date Value Ref Range Status   04/09/2022 51 (H) 6 - 20 MG/DL Final     Creatinine   Date Value Ref Range Status   04/09/2022 6.72 (H) 0.55 - 1.02 MG/DL Final        Meds Given   Name Dose Route   None                 Adequacy / Fluid    Total Liters Process: 66.1 L   Net Fluid Removed: 2000 mL      Comments   Time Out Done:   (Time) @ 0899   Admitting Diagnosis: Missed HD, abdominal pain    Consent obtained/signed: Informed Consent Verified: Yes (04/09/22 1548)   Machine / RO # Machine Number: [id number] (04/09/22 5028)   Primary Nurse Rpt Pre: Denilson Gilbert RN   Primary Nurse Rpt Post: Denilson Gilbert RN   Pt Education: Ordered HD treatment    Care Plan: CKD   Pts outpatient clinic: [facility]      Tx Summary   Comments:    Pt arrived to HD suite A&Ox4. Treatment reviewed with patient, pt in agreement, Consent signed & will place on file. SBAR received from Primary RN. Pt intermittently complaining of back and neck pain related to stretcher, primary RN aware. 1548: Pt cannulated with 50I needles per policy & without issue. Labs drawn per request/ order. VSS. Dialysis Tx initiated. 0900: Pt resting quietly. 1130: Tx ended. VSS. All possible blood returned to patient. Hemostasis achieved without issue. Bed locked and in the lowest position, call bell and belongings in reach. SBAR given to Primary, RN. Patient is stable at time of their/ my departure. All Dialysis related medications have been reviewed.

## 2022-04-09 NOTE — ED NOTES
Pt currently in stretcher, curled on side, tearful. Pt reports no relief from medicine administered around 0600, and rates chest and side pain 10/10.     0905: PO challenge initiated     0925: pt to bedside commode, tolerated few steps to commode well.

## 2022-04-09 NOTE — ED NOTES
Pt presents back from dialysis session where she had 2L removed, pt in NAD and VS stable. Pt states that she is feeling better and describes no pain at this time.  Pt states that daughter is here to pick her up

## 2022-04-09 NOTE — PROGRESS NOTES
Called from Er for pt needing HD   Pt missed HD Thursday and comes to ER with abd pain.    D/w Er attending  Orders for HD placed   If pt admitted we will follow in am tomorrow

## 2022-04-09 NOTE — ED PROVIDER NOTES
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 documentation. Trae Gaines MD    Please note that this dictation was completed with Richard Pauer - 3P, the [employer] voice recognition software. Quite often unanticipated grammatical, syntax, homophones, and other interpretive errors are inadvertently transcribed by the computer software. Please disregard these errors. Please excuse any errors that have escaped final proofreading. Thank you.

## 2022-04-09 NOTE — ED NOTES
IV access lost, spoke w/ HD and dialysis RN to draw additional lab work during HD session     1430 Pt sitting on edge of bed tearful, reporting 10/10 side pain. MD notified and at bedside, MD offering admission for CT findings and pain control - pt refusing admission and reporting she is just uncomfortable in the bed. Orders received.  This RN spoke w/ dialysis, pt remains next in line

## 2022-04-17 NOTE — ED NOTES
Discharge paperwork reviewed with patient, denies pain at this time, no questions at this time. Patient will be transferred to ED waiting room, her sister is going to pick her up.
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
## 2022-04-18 NOTE — ED NOTES
I have reviewed verbal and written discharge instructions with the patient. The patient verbalized understanding. IV removed. Pt alert and ambulatory upon discharge.
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
## 2022-05-06 NOTE — ED NOTES
This RN reviewed discharge instructions with the patient. The patient verbalized understanding. All questions and concerns were addressed. The patient is discharged via wheelchair with instructions, all belongings, and prescriptions in hand. Pt is alert and oriented x 4. Respirations are clear and unlabored.

## 2022-05-06 NOTE — DISCHARGE INSTRUCTIONS
It was a pleasure taking care of you in our Emergency Department today. We know that when you come to [facility] you are entrusting us with your health, comfort, and safety. Our physicians and nurses honor that trust, and truly appreciate the opportunity to care for you and your loved ones. We also value your feedback. If you receive a survey about your Emergency Department experience today, please fill it out. We care about our patients' feedback, and we listen to what you have to say. Thank you!       Dr. Auston Brittle, MD.

## 2022-05-06 NOTE — ED NOTES
Assumed care of patient who is crying in stretcher. Patient reports 10/10 pain in her left leg and back \"for a long time now\" and reports worse today. Denies new injury to area. Reports that \"my leg is numb and cold\" \"for months now\", denies new changes in sensation. Patient able to move the extremity. Patient reports that she used to be given pain medication but Gisela Marvin said he can't give it anymore and my primary care has not prescribe it but I don't see him until the 21st\". Patient says when she gets this pain \"they normally give me Dilaudid. \"   Patient medicated for pain, denies additional needs, and call bell within reach.

## 2022-05-06 NOTE — ED NOTES
Patient no longer crying in room, no outward signs of pain or distress noted. MD aware. Call bell within reach, no needs verbalized.
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
## 2022-05-13 NOTE — ED NOTES
Pt appears comfortable at this time, talking calmly to daughter and granddaughter at bedside. Pt is no longer crying. Pt was able to ambulate independently with walker. CHARITY Champion made aware.

## 2022-05-13 NOTE — ED NOTES
AVS printed and explained to pt. All questions/concerns addressed. Pt d/c via wheelchair with daughter and granddaughter.
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
## 2022-05-14 NOTE — ED NOTES
I have reviewed verbal and written discharge instructions with the patient. The patient verbalized understanding. Pt alert and ambulatory to wheelchair at discharge, able to follow commands and answer questions appropriately. Pt reports that she will be utilizing uber home and is able to set up the transportation on her own. Pt placed in waiting room w/o issue to wait for uber to arrive.

## 2022-05-14 NOTE — DISCHARGE INSTRUCTIONS
your medications at the pharmacy you were prescribed, you should call you Nephrologist, you do not need immediate dialysis but they may want to get you in on Monday     Make sure you are taking all of your other medications

## 2022-05-27 NOTE — ED PROVIDER NOTES
EMERGENCY DEPARTMENT HISTORY AND PHYSICAL EXAM      Date: 5/27/2022  Patient Name: William Amaya    History of Presenting Illness     Chief Complaint   Patient presents with    Leg Pain     Pt arrives via EMS with CC of L thigh pain x 2-3 days, increased with putting weight on. Also inc pain with urination. \"numbness and tingling\" in L foot, per EMS. Dialysis patient Minor Larson. HPI: William Amaya, 64 y.o. female with history of chronic pain associated with previous back injury, opioid use disorder presenting to ED with chief complaint of left hip pain and dysuria. She states that about 3 weeks ago she fell due to imbalance from her previous back injuries and has had left hip pain ever since. Painful to walk on it. She also notes some pain with urination but denies vaginal discharge or bleeding. She has no abdominal pain. There are no other complaints, changes, or physical findings at this time. PCP: Deidra Pascual NP    No current facility-administered medications on file prior to encounter. Current Outpatient Medications on File Prior to Encounter   Medication Sig Dispense Refill    promethazine (PHENERGAN) 25 mg tablet Take 1 Tablet by mouth every six (6) hours as needed for Nausea. 12 Tablet 0    ondansetron (ZOFRAN ODT) 4 mg disintegrating tablet Take 1 Tablet by mouth every six (6) hours as needed for Nausea or Vomiting. 20 Tablet 0    pantoprazole (PROTONIX) 40 mg tablet Take 1 Tablet by mouth Before breakfast and dinner. 30 Tablet 2    lidocaine 4 % patch 1 Patch by TransDERmal route every twelve (12) hours every twelve (12) hours. 10 Patch 0    methocarbamoL (Robaxin-750) 750 mg tablet Take 1 Tablet by mouth four (4) times daily as needed for Muscle Spasm(s). 20 Tablet 0    metoprolol tartrate (LOPRESSOR) 25 mg tablet Take 1 Tab by mouth two (2) times a day. 60 Tab 0    calcitRIOL (ROCALTROL) 0.25 mcg capsule Take 0.25 mcg by mouth daily.       acetaminophen (TYLENOL) 500 mg tablet acetaminophen 500 mg      insulin degludec (TRESIBA U-100 INSULIN SC) 25 Units by SubCUTAneous route daily.  insulin aspart U-100 (NOVOLOG) 100 unit/mL injection 5 Units by SubCUTAneous route Before breakfast, lunch, and dinner.  aspirin 81 mg chewable tablet Take 1 Tab by mouth daily. 30 Tab 1    cloNIDine HCl (CATAPRES) 0.1 mg tablet Take 1 Tab by mouth two (2) times a day. 60 Tab 1    sodium bicarbonate 650 mg tablet Take 650 mg by mouth two (2) times a day.  atorvastatin (LIPITOR) 20 mg tablet Take 20 mg by mouth nightly.          Past History     Past Medical History:  Past Medical History:   Diagnosis Date    Abdominal pain 11/18/2013    Abdominal pain, LUQ (left upper quadrant) 6/7/2012    Back pain, lumbosacral 6/24/2012    Acute on chronic      C. difficile colitis 6/2012    Chronic kidney disease     Stage V- no dialysis yet    Chronic low back pain     Chronic pain     back & left leg    Constipation     [facility]     A1c 8.2 3/2012    DKA (diabetic ketoacidoses) 7/10/2018    Flank pain 4/14/2015    Gastroparesis 6/7/2012    Hep C w/o coma, chronic (HCC)     Hyperlipemia     Hypertension     Hyponatremia 11/18/2013    Intractable abdominal pain 4/21/2012    Lower urinary tract infectious disease 11/18/2013    Lumbar disc disease     Migraines     Nausea & vomiting 3/16/2012    Pancreatitis 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    alcoholic    UTI (lower urinary tract infection) 6/20012       Past Surgical History:  Past Surgical History:   Procedure Laterality Date    HX LUMBAR DISKECTOMY  1980's    HX ORTHOPAEDIC      lumbar sprain; back surgery    HX UROLOGICAL  2014    kidney stone removed    MI COLONOSCOPY FLX DX W/COLLJ SPEC WHEN PFRMD  11/12/2012         VASCULAR SURGERY PROCEDURE UNLIST  08/02/2019    insertion of left AVF    VASCULAR SURGERY PROCEDURE UNLIST  12/06/2019    Creation transposed AV fistula left arm       Family History:  Family History Problem Relation Age of Onset    Diabetes Mother     Kidney Disease Mother     Diabetes Sister     Diabetes Father     Diabetes Brother        Social History:  Social History     Tobacco Use    Smoking status: Never Smoker    Smokeless tobacco: Never Used   Substance Use Topics    Alcohol use: No     Comment: Quit few months ago, hx of abuse    Drug use: Yes     Types: Prescription, OTC       Allergies: Allergies   Allergen Reactions    Gabapentin Unable to Obtain         Review of Systems   Review of Systems   Constitutional: Negative for chills and fever. HENT: Negative for rhinorrhea. Eyes: Negative for redness. Respiratory: Negative for shortness of breath. Cardiovascular: Negative for chest pain. Gastrointestinal: Negative for abdominal pain. Genitourinary: Positive for dysuria. Musculoskeletal: Negative for back pain. Neurological: Negative for syncope. Psychiatric/Behavioral: The patient is not nervous/anxious. All other systems reviewed and are negative. Physical Exam   Physical Exam  Vitals and nursing note reviewed. HENT:      Head: Normocephalic and atraumatic. Mouth/Throat:      Mouth: Mucous membranes are moist.   Eyes:      Extraocular Movements: Extraocular movements intact. Cardiovascular:      Rate and Rhythm: Normal rate and regular rhythm. Pulses: Normal pulses. Pulmonary:      Effort: Pulmonary effort is normal.      Breath sounds: Normal breath sounds. Abdominal:      Palpations: Abdomen is soft. Tenderness: There is no abdominal tenderness. There is no guarding or rebound. Musculoskeletal:         General: No deformity. Cervical back: Neck supple. Comments: TTP overlying L ant hip   Skin:     General: Skin is warm and dry. Neurological:      General: No focal deficit present. Mental Status: She is alert.          Diagnostic Study Results     Labs -     Recent Results (from the past 24 hour(s))   URINALYSIS W/ RFLX MICROSCOPIC    Collection Time: 05/27/22  1:19 PM   Result Value Ref Range    Color YELLOW/STRAW      Appearance TURBID (A) CLEAR      Specific gravity 1.009      pH (UA) 8.0 5.0 - 8.0      Protein 300 (A) NEG mg/dL    Glucose Negative NEG mg/dL    Ketone Negative NEG mg/dL    Bilirubin Negative NEG      Blood TRACE (A) NEG      Urobilinogen 0.2 0.2 - 1.0 EU/dL    Nitrites Negative NEG      Leukocyte Esterase LARGE (A) NEG      WBC  0 - 4 /hpf    RBC 5-10 0 - 5 /hpf    Epithelial cells MANY (A) FEW /lpf    Bacteria 4+ (A) NEG /hpf       Radiologic Studies -   XR HIP LT W OR WO PELV 2-3 VWS   Final Result   No acute abnormality. CT Results  (Last 48 hours)    None        CXR Results  (Last 48 hours)    None            Medical Decision Making   I am the first provider for this patient. I reviewed the vital signs, available nursing notes, past medical history, past surgical history, family history and social history. Vital Signs-Reviewed the patient's vital signs. Patient Vitals for the past 24 hrs:   Temp Pulse Resp BP SpO2   05/27/22 1147 98.2 °F (36.8 °C) (!) 107 20 (!) 163/82 98 %         Provider Notes (Medical Decision Making):   61-year-old with multiple department visits presenting to ED with chief complaint of left hip pain and dysuria. Denies abdominal pain, nausea, vomiting, diarrhea or other gastrointestinal symptoms. Denies vaginal bleeding or discharge. Hip pain associated with trauma several weeks ago, x-ray done and is negative. Urinalysis suggestive of urinary tract infection. Low suspicion for sepsis at this time. Does not sound like tubo-ovarian abscess is today with no discharge or abdominal pain. We will plan for Rocephin, antibiotics at discharge, and further evaluation as an outpatient. Return precautions given. ED Course:     Initial assessment performed.  The patients presenting problems have been discussed, and they are in agreement with the care plan formulated and outlined with them. I have encouraged them to ask questions as they arise throughout their visit. Disposition:  dc    PLAN:  1. Discharge Medication List as of 5/27/2022  2:08 PM      START taking these medications    Details   cephALEXin (Keflex) 500 mg capsule Take 1 Capsule by mouth four (4) times daily for 7 days. , Normal, Disp-28 Capsule, R-0         CONTINUE these medications which have NOT CHANGED    Details   promethazine (PHENERGAN) 25 mg tablet Take 1 Tablet by mouth every six (6) hours as needed for Nausea., Normal, Disp-12 Tablet, R-0      ondansetron (ZOFRAN ODT) 4 mg disintegrating tablet Take 1 Tablet by mouth every six (6) hours as needed for Nausea or Vomiting., Normal, Disp-20 Tablet, R-0      pantoprazole (PROTONIX) 40 mg tablet Take 1 Tablet by mouth Before breakfast and dinner., Normal, Disp-30 Tablet, R-2      lidocaine 4 % patch 1 Patch by TransDERmal route every twelve (12) hours every twelve (12) hours. , Normal, Disp-10 Patch, R-0      methocarbamoL (Robaxin-750) 750 mg tablet Take 1 Tablet by mouth four (4) times daily as needed for Muscle Spasm(s). , Normal, Disp-20 Tablet, R-0      metoprolol tartrate (LOPRESSOR) 25 mg tablet Take 1 Tab by mouth two (2) times a day., Normal, Disp-60 Tab, R-0      calcitRIOL (ROCALTROL) 0.25 mcg capsule Take 0.25 mcg by mouth daily. , Historical Med      acetaminophen (TYLENOL) 500 mg tablet acetaminophen 500 mg, Historical Med      insulin degludec (TRESIBA U-100 INSULIN SC) 25 Units by SubCUTAneous route daily. , Historical Med      insulin aspart U-100 (NOVOLOG) 100 unit/mL injection 5 Units by SubCUTAneous route Before breakfast, lunch, and dinner., Historical Med      aspirin 81 mg chewable tablet Take 1 Tab by mouth daily. , Print, Disp-30 Tab, R-1      cloNIDine HCl (CATAPRES) 0.1 mg tablet Take 1 Tab by mouth two (2) times a day., Print, Disp-60 Tab, R-1      sodium bicarbonate 650 mg tablet Take 650 mg by mouth two (2) times a day. , Historical Med      atorvastatin (LIPITOR) 20 mg tablet Take 20 mg by mouth nightly., Historical Med           2.    Follow-up Information    None       Return to ED if worse     Diagnosis     Clinical Impression: L hip pain, UTI

## 2022-05-30 NOTE — ED TRIAGE NOTES
Pt arrives via wheelchair c/o back pain, left leg pain, hands numb and feet cold. Pt teary in triage, stating she hasnt taken diabetes medication x 3 days due to insurance coverage. Pt is AOx4.

## 2022-05-30 NOTE — ED NOTES
2:18 PM  Patient called the emergency department. She states that the [facility] [place] [facility] where the prescription was E prescribed at [employer]. was closed. I called that pharmacy and indeed they are closed. I called the [facility]   [phone]  Which is 24 hours in [place]. They state that they cannot see a prescription as it likely has not been approved yet. As such, they cannot cancel it.    2:26 PM  Patient with an overdose risk score of 600 when I reviewed the [facility]. I spoke with the patient Uriah Cho when she called back again. I advised her that I would be willing to prescribe the cyclobenzaprine but not willing to represcribe an opiate at this point. The initial opiate prescription cannot be canceled. Patient also has a high overdose risk score. She is declining for me to prescribe the Flexeril and instead will get both prescriptions filled tomorrow. I stated that she can always be reevaluated in an emergency department if needed.

## 2022-05-30 NOTE — ED PROVIDER NOTES
The patient is a 54-year-old female with multiple chronic medical problems that include chronic pain with narcotic dependency, chronic low back pain, abdominal pain, chronic kidney disease, gastroparesis, constipation, diabetes, DKA, hep C, alcoholism, multiple ER visits for chronic pain who presents to the ER with a complaint of generalized aches and pain for approximately 4 to 5 hours accompanied by nausea and vomiting. The patient denies any fever, sore throat, congestion, neck pain or stiffness, diarrhea, constipation, dysuria, hematuria, dizziness, extremity weakness or numbness, sick contact, skin rash or recent travel. She has been in the ER multiple occasions for the same symptoms.            Past Medical History:   Diagnosis Date    Abdominal pain 11/18/2013    Abdominal pain, LUQ (left upper quadrant) 6/7/2012    Back pain, lumbosacral 6/24/2012    Acute on chronic      C. difficile colitis 6/2012    Chronic kidney disease     Stage V- no dialysis yet    Chronic low back pain     Chronic pain     back & left leg    Constipation     Diabetes ([facility]     A1c 8.2 3/2012    DKA (diabetic ketoacidoses) 7/10/2018    Flank pain 4/14/2015    Gastroparesis 6/7/2012    Hep C w/o coma, chronic (HCC)     Hyperlipemia     Hypertension     Hyponatremia 11/18/2013    Intractable abdominal pain 4/21/2012    Lower urinary tract infectious disease 11/18/2013    Lumbar disc disease     Migraines     Nausea & vomiting 3/16/2012    Pancreatitis 6044    alcoholic    UTI (lower urinary tract infection) 6/20012       Past Surgical History:   Procedure Laterality Date    HX LUMBAR DISKECTOMY  1980's    HX ORTHOPAEDIC      lumbar sprain; back surgery    HX UROLOGICAL  2014    kidney stone removed    MD COLONOSCOPY FLX DX W/COLLJ SPEC WHEN PFRMD  11/12/2012         VASCULAR SURGERY PROCEDURE UNLIST  08/02/2019    insertion of left AVF    VASCULAR SURGERY PROCEDURE UNLIST  12/06/2019    Creation transposed AV fistula left arm         Family History:   Problem Relation Age of Onset    Diabetes Mother     Kidney Disease Mother     Diabetes Sister     Diabetes Father     Diabetes Brother        Social History     Socioeconomic History    Marital status:      Spouse name: manda maxwell give info    Number of children: 5    Years of education: 8th can re    Highest education level: Not on file   Occupational History     Employer: NOT EMPLOYED     Comment: 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    Tobacco Use    Smoking status: Never Smoker    Smokeless tobacco: Never Used   Substance and Sexual Activity    Alcohol use: No     Comment: Quit few months ago, hx of abuse    Drug use: Yes     Types: Prescription, OTC    Sexual activity: Yes     Partners: Male   Other Topics Concern    Not on file   Social History Narrative    Lives with daughter and     Ambulated independently    Doesn't work     Social Determinants of Health     Financial Resource Strain:     Difficulty of Paying Living Expenses: Not on file   Food Insecurity:     Worried About [address] in the Last Year: Not on file    [address] in the Last Year: Not on file   Transportation Needs:     Lack of Transportation (Medical): Not on file    Lack of Transportation (Non-Medical):  Not on file   Physical Activity:     Days of Exercise per Week: Not on file    Minutes of Exercise per Session: Not on file   Stress:     Feeling of Stress : Not on file   Social Connections:     Frequency of Communication with Friends and Family: Not on file    Frequency of Social Gatherings with Friends and Family: Not on file    Attends Restorationism Services: Not on file    Active Member of Clubs or Organizations: Not on file    Attends Club or Organization Meetings: Not on file    Marital Status: Not on file   Intimate Partner Violence:     Fear of Current or Ex-Partner: Not on file    Emotionally Abused: Not on file    Physically Abused: Not on file    Sexually Abused: Not on file   Housing Stability:     Unable to Pay for Housing in the Last Year: Not on file    Number of Places Lived in the Last Year: Not on file    Unstable Housing in the Last Year: Not on file         ALLERGIES: Gabapentin and Tramadol    Review of Systems   All other systems reviewed and are negative. Vitals:    05/29/22 2133   BP: (!) 210/101   Pulse: (!) 127   Resp: 17   Temp: 97.7 °F (36.5 °C)   SpO2: 99%   Weight: 63.5 kg (140 lb)   Height: 5' 5\" (1.651 m)            Physical Exam  Vitals and nursing note reviewed. Exam conducted with a chaperone present. CONSTITUTIONAL: Frail middle aged female who is chronically ill-appearing  HEAD: Normocephalic; atraumatic  EYES: PERRL; EOM intact; conjunctiva and sclera are clear bilaterally. ENT: No rhinorrhea; normal pharynx with no tonsillar hypertrophy; mucous membranes pink/moist, no erythema, no exudate. NECK: Supple; non-tender; no cervical lymphadenopathy  CARD: Normal S1, S2; no murmurs, rubs, or gallops. Regular rate and rhythm. RESP: Normal respiratory effort; breath sounds clear and equal bilaterally; no wheezes, rhonchi, or rales. ABD: Normal bowel sounds; non-distended; non-tender; no palpable organomegaly, no masses, no bruits. Back Exam: Normal inspection; L/S vertebral point tenderness, no CVA tenderness. Normal range of motion. EXT: Normal ROM in all four extremities; non-tender to palpation; no swelling or deformity; distal pulses are normal, no edema. SKIN: Warm; dry; no rash. NEURO:Alert and oriented x 3, coherent, JESSE-XII grossly intact, sensory and motor are non-focal.        MDM  Number of Diagnoses or Management Options  Diagnosis management comments: Assessment: 79-year-old female who presents to the ER for evaluation for generalized body aches and pain with nausea and vomiting and severe panic anxiety syndrome and crying hysterically who is hemodynamically stable and well.   The patient has a history of noncompliance with medication and treatment recommendation and a dependency to narcotic which is well-documented. Plan: Lab/IV fluid/antiemetic and analgesia/education, reassurance, symptomatic treatment/monitor and Reevaluate. Amount and/or Complexity of Data Reviewed  Clinical lab tests: ordered  Discussion of test results with the performing providers: yes  Decide to obtain previous medical records or to obtain history from someone other than the patient: yes  Obtain history from someone other than the patient: yes  Review and summarize past medical records: yes  Discuss the patient with other providers: yes  Independent visualization of images, tracings, or specimens: yes    Risk of Complications, Morbidity, and/or Mortality  Presenting problems: moderate  Diagnostic procedures: moderate  Management options: moderate           Procedures    ED EKG interpretation:  Rhythm: sinus tachycardia; and regular with fusion complexes Rate (approx.): 122; Axis: normal; P wave: normal; QRS interval: normal ; ST/T wave: non-specific changes; in  Lead: Diffusely; Other findings: abnormal ekg. This EKG was interpreted by Cristy Castillo MD,ED Provider. XRAY INTERPRETATION (ED MD)  Chest Xray  Bilateral pulmonary vascular congestion. Normal heart size. No bony abnormalities. Blaise Madrigal MD 2:57 AM    PROGRESS NOTE:  Pt has been reexamined by Blaise Madrigal MD all available results have been reviewed with pt and any available family. Pt understands sx, dx, and tx in ED. Care plan has been outlined and questions have been answered. Pt and any available family understands and agrees to need for admission to hospital for further tx not available in ED. Pt is ready for admission.     Written by Cristy Castillo MD,  2:59 AM    .

## 2022-05-31 PROBLEM — N18.6 ESRD (END STAGE RENAL DISEASE) ON DIALYSIS (HCC): Status: ACTIVE | Noted: 2022-01-01

## 2022-05-31 PROBLEM — J81.1 PULMONARY EDEMA: Status: ACTIVE | Noted: 2022-01-01

## 2022-05-31 PROBLEM — I21.4 NSTEMI (NON-ST ELEVATED MYOCARDIAL INFARCTION) (HCC): Status: ACTIVE | Noted: 2022-01-01

## 2022-05-31 PROBLEM — Z99.2 ESRD (END STAGE RENAL DISEASE) ON DIALYSIS (HCC): Status: ACTIVE | Noted: 2022-01-01

## 2022-05-31 PROBLEM — E87.5 HYPERKALEMIA: Status: ACTIVE | Noted: 2022-01-01
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
## 2022-05-31 NOTE — Clinical Note
Left femoral artery. Accessed successfully. Micropuncture needle used. Using fluoro and ultrasound guidance.  Number of attempts =  1.

## 2022-05-31 NOTE — PROGRESS NOTES
Nephrology Night Cover  6p-6a    Paged regarding ESRD patient TTS  Missed HD today, SOB, pulmonary edema on cxr, sating 90% on RA, K 5.4  Will order HD, Nancy notified   Consult to follow     Ryan Marx NP  [facility]
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
## 2022-05-31 NOTE — Clinical Note
TRANSFER - OUT REPORT:     Verbal report given to: KENNETH BAEZ. Report consisted of patient's Situation, Background, Assessment and   Recommendations(SBAR). Opportunity for questions and clarification was provided. Patient transported with a Registered Nurse and [address] [place] [address] / [facility]. Patient transported to: CCU, 2548.

## 2022-05-31 NOTE — Clinical Note
Impella inserted and position confirmed. Impella inserted over the wire. Impella insertion site: LFA, at the left ventricle.  Performance level set at/to = P9.

## 2022-06-01 NOTE — PROGRESS NOTES
Spiritual Care Assessment/Progress Note  [facility]      NAME: Jaciel Flores      MRN: [id number]  AGE: 64 y.o. SEX: female  Denominational Affiliation: No Mosque   Language: English     6/1/2022     Total Time (in minutes): 102     Spiritual Assessment begun in [facility] CRITICAL CARE 1 through conversation with:         []Patient        [x] Family    [] Friend(s)        Reason for Consult: Code Blue/99     Spiritual beliefs: (Please include comment if needed)     [] Identifies with a salma tradition:         [] Supported by a salma community:            [] Claims no spiritual orientation:           [] Seeking spiritual identity:                [] Adheres to an individual form of spirituality:           [x] Not able to assess:                           Identified resources for coping:      [] Prayer                               [] Music                  [] Guided Imagery     [x] Family/friends                 [] Pet visits     [] Devotional reading                         [] Unknown     [] Other:                                                Interventions offered during this visit: (See comments for more details)          Family/Friend(s):  Affirmation of emotions/emotional suffering,Integration of medical assessment with existing values and beliefs,End of life issues discussed,Guidance concerning next steps/process to be expected,Normalization of emotional/spiritual concerns,Coping skills reviewed/reinforced,Catharsis/review of pertinent events in supportive environment     Plan of Care:     [x] Support spiritual and/or cultural needs    [] Support AMD and/or advance care planning process      [] Support grieving process   [] Coordinate Rites and/or Rituals    [] Coordination with community clergy   [] No spiritual needs identified at this time   [] Detailed Plan of Care below (See Comments)  [] Make referral to Music Therapy  [] Make referral to Pet Therapy     [] Make referral to Addiction services  [] Make referral to [facility]  [] Make referral to Spiritual Care Partner  [] No future visits requested        [] Contact Spiritual Care for further referrals     Comments:  Page was sent on two occasion for a code blue in the Cat Lab.  responded to both pages. Patient's family were in 2162. One of her daughters, Arleth Rodriguez, was very distraught and tearful. She shared that she lost her father this February and has an aunt on life support, fighting for her life. There were other family members also struglling with serious medical issues. She therefore couldn't bear loosing her mother at this time.  remained a supportive listening presence, affirming and validating thoughts and emotions.  spoke calm words to encourage focus on the present, also liaised between staff and family and remained present when two Nurse Practitioners (NPs) came to brief family on patient's current condition. They were assured by NPs that medical team were still working on patient.  escorted family who indicated they needed a little break from the hospital. They were assured of on going  support. Family thanked  for the support. Visited by: Viviane Beatty.    Paging Service: [phone])

## 2022-06-01 NOTE — PROGRESS NOTES
0700: Bedside shift change report given to [facility] (oncoming nurse) [facility] Report included the following information SBAR and Kardex. 0170: Pt has ptt due at 0930 this AM for Heparin gtt. Pt was stuck several times last night for her 0330 ptt and required an arterial stick to get labs. She has ABx to be given and only has 1 IV access with Heparin infusing. Called PICC team and left message regarding situation and ?endurance catheter insertion. 1030: Echo at bedside. 1045: Report given to [facility] RN on [facility] for transfer after Heart Cath. Pt transported to cath lab with RNs.

## 2022-06-01 NOTE — BRIEF OP NOTE
BRIEF OP NOTE  Pre-Op Diagnosis: Cardiogenic shock, CAD    Post-Op Diagnosis: Cardiogenic shock, CAD      Procedure:   VA EXTRACORPOREAL MEMBRANE OXYGENATION (ECMO) via right CFV and right CFA and distal perfusion cannula    Surgeon: Jessika Durbin MD    Assistant(s): Rupa Sue PA-C, Chery Reynolds MD    Anesthesia: General     Infusions: dopamine, qing, propofol    Estimated Blood Loss:  106MH    Complications: None    Findings: Cardiogenic shock, CAD

## 2022-06-01 NOTE — PROGRESS NOTES
06/01/22 0725   Vitals   Temp 98.1 °F (36.7 °C)   Temp Source Oral   Pulse (Heart Rate) (!) 109   Heart Rate Source Monitor   Resp Rate 21   O2 Sat (%) 100 %   Level of Consciousness Responds to Voice (1)   /75   MAP (Monitor) 88   MAP (Calculated) 88   MEWS Score 3     ST POA; admitting with SIRS.

## 2022-06-01 NOTE — PROCEDURES
Intubation Note    Called to bedside secondary to  Code Blue. Start:  1306  End:   1309    Patient pre-oxygenated with 100% oxygen. Smooth RSI with Etomidate 20 mg + Succinylcholine 100 mg IV. DVL x 2 using Glide Scope. 7.0 ETT taped and secured at 22 cm at the teeth.    + Bilateral BS, + Chest rise, + ETCO2    CXR pending.     Care turned over to covering Attending MD.

## 2022-06-01 NOTE — PROGRESS NOTES
Shift Summary:  Pt arrived to CCU from cath lab at 1556. Pt on VA ECMO and Impella CP. Bedside report received. Pt unresponsive on propofol at 50mcg, dopamine at 20mcg, qing at 200mcg and NS at 75ml/hr. EKG done and showed a-fib with ST elevation. Echo tech and impella rep at bedside. Echo complete and placement good, but it appears pt needs volume. Order received for bolus from Dr. Aislinn Dee. Pt converted to SR at 1631. Dr. Td Sagastume and Dr. Aislinn Dee at bedside and discussed plan of care. CXR done and radiologist called to report critical result. Central line not central, all infusions should be stopped and line needs to be replaced. Dr. Morelia Us notified. Infusions stopped. Pt had one PIV with swelling visible at sight. Unable to flush PIV. BP remained stable when infusions stopped. Consent obtained for central line insertion from pt's . Central line placed by Dr. Morelia Us. CXR done and placement confirmed by Dr. Morelia Us. Pt became slightly restless with slight movements of arms and legs and eye opening. Pt did not focus or track. Pt calmed verbally and propofol restarted. LR bolus given 500ml. Impella still alarming periodically for retrograde flow, but maintaining at level P2.  hgb 6.4 called by . Orders received for type and cross and 1 unit to transfuse. Coags not available from lab, unable to result. . Tish NASH notified. Will start heparin gtt when ACT closer to goal 160-180 and lab able to obtain PTT result.

## 2022-06-01 NOTE — PROGRESS NOTES
Problem: Falls - Risk of  Goal: *Absence of Falls  Description: Document Megan Wu Fall Risk and appropriate interventions in the flowsheet.   Outcome: Progressing Towards Goal  Note: Fall Risk Interventions:  Mobility Interventions: Utilize walker, cane, or other assistive device       Problem: Unstable angina/NSTEMI: Day of Admission/Day 1  Goal: Activity/Safety  Outcome: Progressing Towards Goal  Goal: Consults, if ordered  Outcome: Progressing Towards Goal  Goal: Diagnostic Test/Procedures  Outcome: Progressing Towards Goal  Goal: Nutrition/Diet  Outcome: Progressing Towards Goal  Goal: Discharge Planning  Outcome: Progressing Towards Goal  Goal: Medications  Outcome: Progressing Towards Goal  Goal: Respiratory  Outcome: Progressing Towards Goal  Goal: Treatments/Interventions/Procedures  Outcome: Progressing Towards Goal  Goal: Psychosocial  Outcome: Progressing Towards Goal  Goal: *Hemodynamically stable  Outcome: Progressing Towards Goal  Goal: *Optimal pain control at patient's stated goal  Outcome: Progressing Towards Goal  Goal: *Lungs clear or at baseline  Outcome: Progressing Towards Goal     Problem: Chronic Renal Failure  Goal: *Fluid and electrolytes stabilized  Outcome: Progressing Towards Goal     Problem: Pain  Goal: *Control of Pain  Outcome: Progressing Towards Goal

## 2022-06-01 NOTE — CONSULTS
NEPHROLOGY CONSULT NOTE     Patient: Heidi Wheat MRN: [id number]  PCP: Russel Tolbert NP   :     1965  Age:   64 y.o. Sex:  female      Referring physician: Marcos Coffey MD  Reason for consultation: 64 y.o. female with NSTEMI (non-ST elevated myocardial infarction) ([address] [address] [I21.4]  Pulmonary edema [J81.1]  ESRD (end stage renal disease) on dialysis ([address] [address] [N18.6, Z99.2]  Hyperkalemia [T18.3] complicated by EZEKIEL   Admission Date: 2022  4:01 PM  LOS: 0 days     DISCUSSION / PLAN :   ESRD on dialysis  - patient at [address] [place] [address] HD treatment Saturday without any issues, missed HD today  - Access: JONATHAN AVF  - Fluid overload, mild hyperkalemia, hyponatremia  - Ordered HD tonight, Jim Severance RN currently at bedside  - Renally dose all medication  - Trend daily labs    NSTEMI  - on heparin drip  - cardiology consulted    Hypertension  - continue with home medication regiment    Chronic back pain    DM  - SSI per primary team         Subjective:   HPI: Heidi Wheat is a 64 y.o.  female who has a PMHx significant for chronic pain with narcotic dependency, chronic low back pain, abdominal pain, ESRD on dialysis, gastroparesis, constipation, diabetes, DKA, hep C, alcoholism who is being admitted for NSTEMI. Presented to the ED with complaints of shortness of breath and chest pain accompanied with abdominal pain that has been ongoing for the last 2-days. Patient is currently on heparin drip and cardiology has been consulted. She denies any urinary changes, edema, n/v/d.  Endorses SOB and CP   Nephrology was consulted for the management of dialysis  - Patient at [facility], [address] HD treatment Saturday  - Access: JONATHAN AVF  - cxr with pulmonary edema, pro-BNP > 35,000  - potassium mildly elevated at 5.4      Past Medical Hx:   Past Medical History:   Diagnosis Date    Abdominal pain 2013    Abdominal pain, LUQ (left upper quadrant) 2012    Back pain, lumbosacral 6/24/2012    Acute on chronic      C. difficile colitis 6/2012    Chronic kidney disease     Stage V- no dialysis yet    Chronic low back pain     Chronic pain     back & left leg    Constipation     Diabetes ([facility]     A1c 8.2 3/2012    DKA (diabetic ketoacidoses) 7/10/2018    Flank pain 4/14/2015    Gastroparesis 6/7/2012    Hep C w/o coma, chronic (HCC)     Hyperlipemia     Hypertension     Hyponatremia 11/18/2013    Intractable abdominal pain 4/21/2012    Lower urinary tract infectious disease 11/18/2013    Lumbar disc disease     Migraines     Nausea & vomiting 3/16/2012    Pancreatitis 7045    alcoholic    UTI (lower urinary tract infection) 6/20012        Past Surgical Hx:     Past Surgical History:   Procedure Laterality Date    HX LUMBAR DISKECTOMY  1980's    HX ORTHOPAEDIC      lumbar sprain; back surgery    HX UROLOGICAL  2014    kidney stone removed    WA COLONOSCOPY FLX DX W/COLLJ SPEC WHEN PFRMD  11/12/2012         VASCULAR SURGERY PROCEDURE UNLIST  08/02/2019    insertion of left AVF    VASCULAR SURGERY PROCEDURE UNLIST  12/06/2019    Creation transposed AV fistula left arm       Medications:  Prior to Admission medications    Medication Sig Start Date End Date Taking? Authorizing Provider   HYDROcodone-acetaminophen (Norco) 5-325 mg per tablet Take 1 Tablet by mouth every eight (8) hours as needed for Pain for up to 3 days. Max Daily Amount: 3 Tablets. 5/30/22 6/2/22 Yes Sergei Rowe MD   cephALEXin (Keflex) 500 mg capsule Take 1 Capsule by mouth four (4) times daily for 7 days. 5/27/22 6/3/22 Yes Ana Randolph MD   metoprolol tartrate (LOPRESSOR) 25 mg tablet Take 1 Tab by mouth two (2) times a day. 12/11/19  Yes Gi Rome NP   calcitRIOL (ROCALTROL) 0.25 mcg capsule Take 0.25 mcg by mouth daily. Yes Provider, Historical   insulin degludec (TRESIBA U-100 INSULIN SC) 30 Units by SubCUTAneous route daily.    Yes Provider, Historical   insulin aspart U-100 (NOVOLOG) 100 unit/mL injection 5 Units by SubCUTAneous route Before breakfast, lunch, and dinner. Yes Provider, Historical   atorvastatin (LIPITOR) 20 mg tablet Take 20 mg by mouth nightly. Yes Provider, Historical   cyclobenzaprine (FLEXERIL) 10 mg tablet Take 1 Tablet by mouth two (2) times a day. Patient not taking: Reported on 5/31/2022 5/30/22   Vladimir Lainez MD   promethazine (PHENERGAN) 25 mg tablet Take 1 Tablet by mouth every six (6) hours as needed for Nausea. 5/14/22   Dann Adams DO   ondansetron (ZOFRAN ODT) 4 mg disintegrating tablet Take 1 Tablet by mouth every six (6) hours as needed for Nausea or Vomiting. 4/17/22   Cruz Leiva MD   pantoprazole (PROTONIX) 40 mg tablet Take 1 Tablet by mouth Before breakfast and dinner. 4/9/22   Severo Bansal MD   lidocaine 4 % patch 1 Patch by TransDERmal route every twelve (12) hours every twelve (12) hours. Patient not taking: Reported on 5/31/2022 12/24/21   Kevin Sanchez MD   methocarbamoL (Robaxin-750) 750 mg tablet Take 1 Tablet by mouth four (4) times daily as needed for Muscle Spasm(s). Patient not taking: Reported on 5/31/2022 12/24/21   Kevin Sanchez MD   acetaminophen (TYLENOL) 500 mg tablet acetaminophen 500 mg    Provider, Historical   aspirin 81 mg chewable tablet Take 1 Tab by mouth daily. Patient not taking: Reported on 5/31/2022 5/12/18   Maurisio Ceballos MD   cloNIDine HCl (CATAPRES) 0.1 mg tablet Take 1 Tab by mouth two (2) times a day. Patient not taking: Reported on 5/31/2022 5/11/18   Maurisio Ceballos MD   sodium bicarbonate 650 mg tablet Take 650 mg by mouth two (2) times a day. Patient not taking: Reported on 5/31/2022    Provider, Historical       Allergies   Allergen Reactions    Gabapentin Unable to Obtain    Tramadol Itching       Social Hx:  reports that she has never smoked. She has never used smokeless tobacco. She reports current drug use. Drugs: Prescription and OTC.  She reports that she does not drink alcohol. Family History   Problem Relation Age of Onset    Diabetes Mother     Kidney Disease Mother     Diabetes Sister     Diabetes Father     Diabetes Brother        Review of Systems:  A twelve point review of system was performed today. Pertinent positives and negatives are mentioned in the HPI. The reminder of the ROS is negative and noncontributory. Objective:    Vitals:    Vitals:    05/31/22 2034 05/31/22 2049 05/31/22 2104 05/31/22 2119   BP: (!) 150/64 (!) 152/57 (!) 153/53 (!) 147/61   Pulse: (!) 115 (!) 115 (!) 113 (!) 112   Resp: 26 27 24 25   Temp:       SpO2: 99% 99% 98% 97%   Weight:       Height:         I&O's:  No intake/output data recorded. Visit Vitals  BP (!) 147/61   Pulse (!) 112   Temp 98.3 °F (36.8 °C)   Resp 25   Ht 5' 5\" (1.651 m)   Wt 63.5 kg (140 lb)   LMP 09/15/2013   SpO2 97%   BMI 23.30 kg/m²       Physical Exam:  General:Alert, No distress,   HEENT: Eyes are PERRL. Conjunctiva without pallor ,erythema. Neck: Supple  Lungs :  Normal respiratory effort, on O2  CVS: tachycardic, no LE edema  Abdomen: tender  Extremities: No cyanosis, No clubbing  Skin: No rash or lesions.   Neurologic: non focal, AAO x 3  Psych: normal affect    Laboratory Results:    Lab Results   Component Value Date    BUN 52 (H) 05/31/2022     (L) 05/31/2022    K 5.4 (H) 05/31/2022    CL 94 (L) 05/31/2022    CO2 23 05/31/2022       Lab Results   Component Value Date    BUN 52 (H) 05/31/2022    BUN 26 (H) 05/30/2022    BUN 53 (H) 05/14/2022    BUN 37 (H) 04/18/2022    BUN 36 (H) 04/18/2022    K 5.4 (H) 05/31/2022    K 3.8 05/30/2022    K 3.5 05/14/2022    K 3.8 04/18/2022    K 4.7 04/18/2022       Lab Results   Component Value Date    WBC 17.2 (H) 05/31/2022    RBC 3.59 (L) 05/31/2022    HGB 11.0 (L) 05/31/2022    HCT 35.5 05/31/2022    MCV 98.9 05/31/2022    MCH 30.6 05/31/2022    RDW 15.4 (H) 05/31/2022     05/31/2022       Lab Results   Component Value Date    PHOS 6.0 (H) 05/14/2022       Urine dipstick:   Lab Results   Component Value Date/Time    Color YELLOW/STRAW 05/30/2022 01:00 AM    Appearance CLEAR 05/30/2022 01:00 AM    Specific gravity 1.012 05/30/2022 01:00 AM    Specific gravity 1.012 05/11/2018 09:49 AM    pH (UA) 8.0 05/30/2022 01:00 AM    Protein 300 (A) 05/30/2022 01:00 AM    Glucose 500 (A) 05/30/2022 01:00 AM    Ketone Negative 05/30/2022 01:00 AM    Bilirubin Negative 05/30/2022 01:00 AM    Urobilinogen 0.2 05/30/2022 01:00 AM    Nitrites Negative 05/30/2022 01:00 AM    Leukocyte Esterase SMALL (A) 05/30/2022 01:00 AM    Epithelial cells FEW 05/30/2022 01:00 AM    Bacteria 3+ (A) 05/30/2022 01:00 AM    WBC 20-50 05/30/2022 01:00 AM    RBC 0-5 05/30/2022 01:00 AM       I have reviewed the following: All pertinent labs, microbiology data, radiology imaging for my assessment     ECG- Rev: Yes / No  Xray/CT/US/MRI REV: Yes/ No    Care Plan discussed with:  patient     Chart reviewed. Medications list Personally Reviewed   [x]      Yes     []               No      Thank you for allowing us to participate in the care of this patient. We will follow patient. Please dont hesitate to call with any questions    Shola Workman NP  5/31/2022    [address] [facility]  [address], [address]  Phone - [phone]  Fax - [phone]  [email or web address]

## 2022-06-01 NOTE — PROCEDURES
[facility]      Procedure Note - Central Venous Access:   Performed by Regan Pa MD    Obtained informed Consent. Immediately prior to the procedure, the patient was reevaluated and found suitable for the planned procedure and any planned medications. Immediately prior to the procedure a time out was called to verify the correct patient, procedure, equipment, staff, and marking as appropriate. The site was prepped with ChloraPrep. Using Seldinger technique a Triple Lumen CVC was placed in the Right, Internal Jugular Vein via direct cannulation with 1 number of attempts for Blood Drawing and IV Access. Ultrasound Guidance was utilized. Verbally/Visually confirmed wire removal with RN. Wire in IJ confirmation with US prior to dilation. There was good blood return. The following complications were encountered: None. A follow-up chest x-ray was ordered post procedure. The procedure was tolerated well.       Regan Pa MD [address] [place] [address] [phone]

## 2022-06-01 NOTE — PROGRESS NOTES
Came to see the patient, patient currently is in Cath Lab. We will plan for hemodialysis tomorrow. Vince is aware. She got dialyzed yesterday.

## 2022-06-01 NOTE — OP NOTES
Cardiothoracic Surgery Operative Note    Date of Dictation: 06/01/22    Date of Procedure: 06/01/22    Referring Physician: Naomie Laird MD.    Preoperative Diagnosis:    Multivessel coronary artery disease  Cardiogenic shock and hemodynamic collapse    Postoperative Diagnosis:    Same    Procedure:    1. VA ECMO Initiation (25 Fr R-CFV, 17 Fr R-CFA + distal perfusion cannula). Surgeon: Bashir Hills MD, PhD, MELISSA, [facility]. Assistant Surgeon(s):  Temo Campoverde MD., Naomie Laird MD.    Assistant(s): Merly Farfan. The assistance of the PA was required due to the critical nature of the surgery. Anesthesia: General endotracheal anesthesia and LENNY. Anesthesiologist(s):  Fern Miller MD.. Operative Details:    I was called emergently to the Cath Lab while in the operating room with regards to this patient. I was informed that she was suffering from severe cardiogenic shock during her PCI. At that point in time and the conduct of my operation, I was not able to step away from my patient. As a result, I recommended to the team in the Cath Lab that they place an Impella in the groin and call for additional surgical support from one of my colleagues at [facility].  I was informed that Dr. Tanya Bailey was available and was on his way. In the meantime, I finished my operation as expeditiously as I could and once my patient was safe, I proceeded directly to the Cath Lab to assess the situation. Upon my arrival.  I noted that an Impella had been placed and that CPR was being performed. A rhythm was reestablished but perfusion pressures remained low with MAPs in the 60s. Dr. Mayur Handy and I agreed that we should proceed with [place] ECMO in order to provide the patient with adequate perfusion. Transesophageal echo at this time demonstrated severely depressed LV function and severely compromised RV function. We felt that she would not survive without VA ECMO.     We proceeded to cannulate her for VA ECMO through previously obtained venous and arterial access sites in the right femoral vessels. A 25 Romanian venous cannula was positioned under fluoroscopic guidance at the SVC RA junction. We then proceeded to cannulate the right femoral artery. Initial attempts at placing a 19 Romanian arterial cannula were unsuccessful. Ultimately, a 16 Romanian cannula was placed and ECMO was initiated. Subsequently, a distal perfusion cannula was placed in the right femoral artery. The cannulas were secured to the skin with a series of silk stitches. After some additional coronary catheterization work by Dr. Rajan Fang, the patient was brought up to the CVICU. Specimens:  None. Complications:  None. Disposition: Cardiovascular recovery room. Condition: Critical but stable.

## 2022-06-01 NOTE — PROGRESS NOTES
0030: TRANSFER - IN REPORT:    Verbal report received from Cintia Linares, [facility] (name) on Nader Weber  being received from ED (unit) for routine progression of care      Report consisted of patients Situation, Background, Assessment and   Recommendations(SBAR). Information from the following report(s) SBAR, Kardex, ED Summary, [facility] and Recent Results was reviewed with the receiving nurse. Opportunity for questions and clarification was provided. Patient is currently receiving dialysis treatment in the ED, nurse to call back at  prior to patient transport. Per ED RN, cardiology consult was called, Dr. Karen Dixon on call. 0200: Assessment completed upon patients arrival to unit and care assumed. Dual skin assessment performed with KENNETH Allison. No impairment noted. 0300: This RN at bedside attempted to get second IV access for antibiotics. 0430: After 3 RNs attempts to gain IV access and/or needed labwork, orders received from Terrance Hills NP for arterial stick. 5: RT and this RN at bedside for arterial stick. Bloodwork obtained and sent to lab.    0515: aPTT resulted, therapeutic. Verified with pharmacy, no rate change, will recheck in 6 hours. End of Shift Note    Bedside shift change report given to Kenyon Jain RN (oncoming nurse) by Celine Moore RN (offgoing nurse). Report included the following information SBAR, Kardex, ED Summary, Intake/Output, MAR and Recent Results    Shift worked:  night     Shift summary and any significant changes:     pt arrived from ED, very drowsy and drifting off to sleep during conversation, unable to give medications d/t to drowsiness, RR 14     next ptt due at 0930     Concerns for physician to address:       Zone phone for oncoming shift:          Activity:  Activity Level:  Up with Assistance  Number times ambulated in hallways past shift: 0  Number of times OOB to chair past shift: 0    Cardiac:   Cardiac Monitoring: Yes      Cardiac Rhythm: Sinus Tachy    Access:   Current line(s): PIV     Genitourinary:   Urinary status: voiding    Respiratory:   O2 Device: Nasal cannula  Chronic home O2 use?: NO  Incentive spirometer at bedside: N/A       GI:  Last Bowel Movement Date: 05/29/22  Current diet:  DIET NPO  Passing flatus: YES  Tolerating current diet: NPO       Pain Management:   Patient states pain is manageable on current regimen: NO    Skin:  Julius Score: 20  Interventions: increase time out of bed    Patient Safety:  Fall Score:  Total Score: 2  Interventions: bed/chair alarm, assistive device (walker, cane, etc), gripper socks and pt to call before getting OOB       Length of Stay:  Expected LOS: - - -  Actual LOS: 1      Zain Mccarthy RN

## 2022-06-01 NOTE — PROGRESS NOTES
Provided brief update on patient's critical condition to patient's three daughters per request of IVCU RNs and . Discussed patient with  Roge Gardner RN in cath lab to obtain information on patient condition. Dr. Merline Linsey and Dr. Martinez Lord deeply involved in stabilizing patient with VA ECMO and can not  provide an update to family at this time. Kalyani, cardiac surgery NP also helped provide information and purpose of VA ECMO to family. Patient unstable at this time, and the physicians will be able to provide family with a more thorough update when they are able to leave patient's side. Family verbalized understanding. They wish to step out of the hospital for the time being and would like the physicians to call them if they are able to provide an update before their return. Above discussed with Juanita Denis and Claribel Jay RN.         Sophia Phillips NP  DNP, RN, AGACNP-BC

## 2022-06-01 NOTE — PROGRESS NOTES
Hospitalist Progress Note    NAME: Heidi Wheat   :  1965   MRN:  [id number]       Assessment / Plan:  NSTEMI, POA with new TWI anterior and inferior leads compared to prior and troponin 11,814 yesterday -->9724  Chest pain, POA  CAD with distal LCx and LAD disease on cardiac cath     --Continue heparin drip, aspirin  --Currently n.p.o. for cath today  --Continue metoprolol     SIRS, POA with tachycardia and acute leukocytosis WBC 17K. May be secondary to nstemi vs. possible  community acquired pneumonia with bibasilar airspace disease, cough  -- Likely PNA  --Procalcitonin high, continue Levaquin  -Follow blood culture     Volume overload with pulmonary edema, POA due to missed hemodialysis  ESRD on HD TTS  Mild hyperkalemia 5.4  Hypervolemic hyponatremia  --CXR with improved interstitial pulmonary edema, small b/l pleural effusions  -- Had hemodialysis yesterday     DM with hyperglycemia  --give lantus 10 units x 1. NPO after MN for possible cardiac cath. Resume tresiba and novolog with meals when no longer npo  --low dose SSI since npo     Chronic low back pain  Hx lumbar spine surgery, now with loose hardware  --CT L-spine  showing loosening of hardware. F/u with Dr. Prasanna Michael at [facility], per patient he is considering surgery  --cont norco prn pain     No sign of GI bleed in ER, hgb at baseline  --put on pepcid for GERD. Monitor hgb     Body mass index is 23.3 kg/m².     Code:  full  DVT prophylaxis: heparin drip  Surrogate decision maker:       Subjective:     Chief Complaint / Reason for Physician Visit  \" Reports her breathing is slightly better than yesterday, she feels SOB. No chest pain\". Discussed with RN events overnight.      Review of Systems:  Symptom Y/N Comments  Symptom Y/N Comments   Fever/Chills n   Chest Pain n    Poor Appetite n   Edema n    Cough    Abdominal Pain     Sputum    Joint Pain     SOB/GRIFFITHS    Pruritis/Rash     Nausea/vomit    Tolerating PT/OT     Diarrhea Tolerating Diet     Constipation    Other       Could NOT obtain due to:      Objective:     VITALS:   Last 24hrs VS reviewed since prior progress note. Most recent are:  Patient Vitals for the past 24 hrs:   Temp Pulse Resp BP SpO2   06/01/22 1054 -- -- -- 114/75 --   06/01/22 0725 98.1 °F (36.7 °C) (!) 109 21 114/75 100 %   06/01/22 0403 -- (!) 112 -- 126/64 --   06/01/22 0211 97.7 °F (36.5 °C) (!) 112 16 (!) 131/55 100 %   06/01/22 0130 -- (!) 116 19 (!) 155/76 --   06/01/22 0115 -- (!) 116 19 112/70 100 %   06/01/22 0100 -- (!) 106 21 -- 100 %   06/01/22 0045 -- (!) 115 21 125/63 100 %   06/01/22 0030 -- (!) 117 21 118/68 --   06/01/22 0015 -- (!) 117 20 (!) 120/59 --   06/01/22 0000 -- (!) 120 12 130/73 98 %   05/31/22 2345 -- (!) 120 22 (!) 141/60 100 %   05/31/22 2330 -- (!) 123 26 (!) 140/78 100 %   05/31/22 2315 -- (!) 122 21 (!) 144/69 100 %   05/31/22 2300 -- (!) 121 29 (!) 125/110 99 %   05/31/22 2245 -- (!) 120 24 (!) 144/81 99 %   05/31/22 2230 -- (!) 121 (!) 37 135/71 97 %   05/31/22 2215 -- (!) 116 26 (!) 150/40 97 %   05/31/22 2200 98 °F (36.7 °C) (!) 117 27 (!) 159/61 96 %   05/31/22 2119 -- (!) 112 25 (!) 147/61 97 %   05/31/22 2104 -- (!) 113 24 (!) 153/53 98 %   05/31/22 2049 -- (!) 115 27 (!) 152/57 99 %   05/31/22 2034 -- (!) 115 26 (!) 150/64 99 %   05/31/22 2019 -- (!) 113 23 (!) 141/81 99 %   05/31/22 2004 -- (!) 112 25 104/69 98 %   05/31/22 1901 -- (!) 119 20 (!) 134/53 98 %   05/31/22 1830 -- -- -- -- 98 %   05/31/22 1544 98.3 °F (36.8 °C) (!) 113 16 (!) 134/92 92 %       Intake/Output Summary (Last 24 hours) at 6/1/2022 1123  Last data filed at 6/1/2022 0734  Gross per 24 hour   Intake 92.59 ml   Output 1250 ml   Net -1157.41 ml        I had a face to face encounter and independently examined this patient on 6/1/2022, as outlined below:  PHYSICAL EXAM:  General: WD, WN. Alert, cooperative, no acute distress    EENT:  EOMI. Anicteric sclerae.  MMM  Resp:  bibasilar rales  CV:  Regular rhythm,  No edema  GI:  Soft, Non distended, Non tender. +Bowel sounds  Neurologic:  Alert and oriented X 3, normal speech,   Psych:   Good insight. Not anxious nor agitated  Skin:  No rashes. No jaundice    Reviewed most current lab test results and cultures  YES  Reviewed most current radiology test results   YES  Review and summation of old records today    NO  Reviewed patient's current orders and MAR    YES  PMH/SH reviewed - no change compared to H&P  ________________________________________________________________________  Care Plan discussed with:    Comments   Patient x    Family      RN x    Care Manager     Consultant                       x Multidiciplinary team rounds were held today with , nursing, pharmacist and clinical coordinator. Patient's plan of care was discussed; medications were reviewed and discharge planning was addressed. ________________________________________________________________________  Total NON critical care TIME:  23   Minutes    Total CRITICAL CARE TIME Spent:   Minutes non procedure based      Comments   >50% of visit spent in counseling and coordination of care     ________________________________________________________________________  Earnstine MD Jesús     Procedures: see electronic medical records for all procedures/Xrays and details which were not copied into this note but were reviewed prior to creation of Plan. LABS:  I reviewed today's most current labs and imaging studies.   Pertinent labs include:  Recent Labs     06/01/22  0327 05/31/22  1726 05/30/22  0014   WBC 13.2* 17.2* 6.5   HGB 10.2* 11.0* 10.4*   HCT 33.3* 35.5 33.2*    305 231     Recent Labs     06/01/22  0327 05/31/22  1726 05/30/22  0014   * 130* 135*   K 3.5 5.4* 3.8   CL 96* 94* 104   CO2 28 23 22   * 251* 215*   BUN 24* 52* 26*   CREA 3.01* 6.19* 4.45*   CA 8.2* 10.1 9.1   MG  --   --  2.0   ALB  --  3.0* 2.6*   TBILI  --  0.4 0.4   ALT  --  22 20 Signed: Andres Hilliard MD

## 2022-06-01 NOTE — PROGRESS NOTES
Nocturnist NP Note         7787- Notified by nursing the patient is complaining of 10 out of 10 chronic back pain. [address] was listed on PTA med list and was to be resumed, but was not. Reviewed . Recently took Flexeril within the past few days, so would avoid Tramadol. Ordered as needed Roxicodone. Please note that this note was dictated using Dragon computer voice recognition software. Quite often unanticipated grammatical, syntax, homophones, and other interpretive errors are inadvertently transcribed by the computer software. Please disregard these errors. Please excuse any errors that have escaped final proofreading.

## 2022-06-01 NOTE — DIALYSIS
Hemodialysis / [phone]    Vitals Pre Post Assessment Pre Post   BP BP: (!) 120/59 (06/01/22 0015) 155/76 LOC A/O x4 but sleepy Unchanged   HR Pulse (Heart Rate): (!) 117 (06/01/22 0015) 118 Lungs Diminished in bases Clear   Resp Resp Rate: 20 (06/01/22 0015) 19 Cardiac Sinus tach Sinus tach with occasional PVC's   Temp Temp: 98 °F (36.7 °C) (05/31/22 2200) 98.4   Skin Warm/dry/wnc Unchanged   Weight  63.5 kg (TW 52kg)  Edema None None   Tele status Bedside Bedside Pain Pain Intensity 1: 10 (05/31/22 1630) 4/10     Orders   Duration: Start: 1430 End: 0145 Total: 3.5 hours   Dialyzer: Dialyzer/Set Up Inspection: Revaclear (05/31/22 2200)   K Bath: Dialysate K (mEq/L): 2 (05/31/22 2200)   Ca Bath: Dialysate CA (mEq/L): 2.0 (05/31/22 2200)   Na: Dialysate NA (mEq/L): 140 (05/31/22 2200)   Bicarb: Dialysate HCO3 (mEq/L): 140 (05/31/22 2200)   Target Fluid Removal: Goal/Amount of Fluid to Remove (mL): 2500 mL (05/31/22 2200)     Access   Type & Location: Left upper arm AV Fistula without evidence of warmth, redness, or drainage. +thrill/+bruit. Each access site disinfected for 60 seconds per site with alcohol swabs per P&P. Cannulated with 15 G needles x2 and secured with paper tape. +aspiration/+flushed.    Comments:                                        Labs   HBsAg (Antigen) / date: Out of date (4/9/22), drawn today                                              HBsAb (Antibody) / date: Susceptible 5/26/22, drawn today   Source: Epic/Phys[facility] Portal   Obtained/Reviewed  Critical Results Called HGB   Date Value Ref Range Status   05/31/2022 11.0 (L) 11.5 - 16.0 g/dL Final     Potassium   Date Value Ref Range Status   05/31/2022 5.4 (H) 3.5 - 5.1 mmol/L Final     Calcium   Date Value Ref Range Status   05/31/2022 10.1 8.5 - 10.1 MG/DL Final     BUN   Date Value Ref Range Status   05/31/2022 52 (H) 6 - 20 MG/DL Final     Creatinine   Date Value Ref Range Status   05/31/2022 6.19 (H) 0.55 - 1.02 MG/DL Final my blood pressure is high Meds Given   Name Dose Route                    Adequacy / Fluid    Total Liters Process: 64.9   Net Fluid Removed: 1250 mL      Comments   Time Out Done:   (Time) 2145   Admitting Diagnosis: Abdominal Pain (NSTEMI)   Consent obtained/signed: Informed Consent Verified: Yes (05/31/22 2200)   Machine / RO # Machine Number: [id number] (05/31/22 2200)   Primary Nurse Rpt Pre: Fareed Mercado RN   Primary Nurse Rpt Post: Fareed Mercado RN   Pt Education: Procedure and run time   Care Plan: Continue HD per MD orders   Pts outpatient clinic: [facility] Summary   Comments:           Patient noted to be mildly tachycardic on arrival to room in ED. BP remained stable throughout treatment but patient became increasingly tachycardic with no resolve once UF goal lowered. Patient then became tachycardic with significant increase in QTc, then developed occasional PVC's - NP Vianne Nail advised. Patient treatment terminated at 3 hours. All blood rinsed back post treatment, sites remain +aspiration/flush, 15g needles removed intact, and pressure and bandages applied. Patient resting with eyes closed at time of my departure but patient wakes easily to voice or touch. Report given to primary ED RN Fareed Mercado at bedside.
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
## 2022-06-01 NOTE — PROCEDURES
Central Line Placement    Performed by: Jeaneth Alcantara MD  Authorized by: Jeaneth Alcantara MD     Indications: vascular access  Preanesthetic Checklist: patient identified, patient being monitored and timeout performed  Preanesthetic Checklist comment:  Line performed during active chest compressions as there was no IV access during ECMO initiation    Pre-procedure: All elements of maximal sterile barrier technique followed?  No        Medical Reason/Emergent          Procedure:   Prep:  Chlorhexidine  Location: internal jugular  Orientation:  Left  Patient position:  Trendelenburg  Catheter type:  Quad lumen  Catheter size:  8.5 Fr  Catheter length:  16 cm  Number of attempts:  1  Successful placement: Yes      Assessment:   Post-procedure:  Catheter secured and sterile dressing with CHG applied  Assessment:  Blood return through all ports and placement verified by x-ray  Insertion:  Uncomplicated  Patient tolerance:  Patient tolerated the procedure well with no immediate complications
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
## 2022-06-01 NOTE — ED NOTES
TRANSITION OF CARE - SBAR OUT    Patient is being transferred to [facility], Room# 2213. Report GIVEN TO Divya Garcia RN on Evelio King for routine progression of care. Report is consisted of the following information ED Summary. Patient transferred to receiving unit by: RN (RN or Tech Name)     Called outstanding consults: Yes   Collected routine labs: No      All current orders reviewed with accepting nurse: Yes    The following personal items will be sent with the patient during transfer to the floor:   All valuables:      Visual Aid: None                   CARDIAC MONITORING ORDERED: Yes     The following CURRENT information were reported to the receiving RN:    CODE STATUS: Full Code    NIH SCORE:    ELROY SCREENING: Swallow Screening  Is the Patient Unable to Remain Alert for Testing?: No (05/31/22 1905)  Is the Patient on a Modified Diet (Thickened Liquids) Due to Pre-existing Dysphagia?: No (05/31/22 1905)  Is There Presence of Existing Enteral Tube Feeding via the Stomach or Nose?: No (05/31/22 1905)  Is There Presence of Head-of-Bed Restrictions (Less than 30 Degrees)?: No (05/31/22 1905)  Is There Presence of Tracheotomy Tube?: No (05/31/22 1905)  Is the Patient Ordered Nothing-by-Mouth Status?: No (05/31/22 1905)    NEURO ASSESSMENT:        RESTRAINTS IN USE: No      IS DOCUMENTATION COMPLETE: Yes      Vital Signs  Level of Consciousness: Alert (0) (05/31/22 1901)  Temp: 98 °F (36.7 °C) (05/31/22 2200)  Temp Source: Oral (05/31/22 1544)  Pulse (Heart Rate): (!) 117 (06/01/22 0015)  Heart Rate Source: Monitor (05/31/22 1901)  Resp Rate: 20 (06/01/22 0015)  BP: (!) 120/59 (06/01/22 0015)  MAP (Monitor): 82 (05/31/22 2119)  MAP (Calculated): 79 (06/01/22 0015)  BP 1 Location: Left arm (05/31/22 1901)  BP 1 Method: Automatic (05/31/22 1901)  BP Patient Position: At rest (05/31/22 1901)  MEWS Score: 3 (05/31/22 1544)  Pain 1  Pain Scale 1: Numeric (0 - 10) (05/31/22 4462)  Pain Intensity 1: 10 (05/31/22 1630)  Pain Location 1: Back (05/31/22 1630)  Pain Orientation 1: Lower (05/31/22 1630)  Pain Description 1: Aching (05/31/22 1630)  Pain Intervention(s) 1: MD notified (comment) (05/31/22 1630)      OXYGEN: Oxygen Therapy  O2 Device: None (Room air) (05/31/22 2025)    RICHY FALL RISK: [facility] Fall Risk  Mobility: Ambulates or transfers with assist devices or assistance (05/31/22 1830)  Mobility Interventions: Utilize walker, cane, or other assistive device (05/31/22 1830)  Mentation: Alert, oriented x 3 (05/31/22 1830)  Medication: No anticonvulsants, sedatives(tranquilizers), psychotropics or hypnotics, hypoglycemics, narcotics, sleep aids, antihypertensives, laxatives, or diuretics (05/31/22 1830)  Elimination: Independent in elimination (05/31/22 1830)  Prior Fall History: No (05/31/22 1830)  Total Score: 1 (05/31/22 1830)  Standard Fall Precautions: Yes (05/31/22 1830)      WOUNDS: No      URINARY CATHETER: voiding    LINE ACCESS:   Peripheral IV 05/31/22 Right Antecubital (Active)        Opportunity for questions and clarification were provided.   Hi Marcum RN

## 2022-06-01 NOTE — ED NOTES
ER , Charlotte Lord, called cardiologist consult. Dr. Sun Louder on call and will be calling hospitalist directly.

## 2022-06-02 NOTE — PROGRESS NOTES
Nephrology Progress Note  Codey Calderon     [facility]. [facility]  Phone - [phone]   Patient: Vitor Meraz    YOB: 1965        Date- 6/2/2022   Admit Date: 5/31/2022  CC: Follow up for ESRD         IMPRESSION & PLAN:    ESRD on HD, [facility], TTS   PEA cardiac arrest x2   Cardiogenic shock   CAD   Biventricular failure   Ischemic cardiomyopathy   VA ECMO   Pulmonary edema   Acute respiratory failure on mechanical ventilation   Ischemic left foot    PLAN-   Discussed the case in detail with the critical care medicine and cardiology team.   Patient hemodynamics are more stabilized at this time.  I will attempt to ultrafiltration only with conventional method. Patient has not tolerated well and she will need CRRT.  She will need dialysis tomorrow. We will decide based upon her hemodynamics if she needs conventional HD versus CRRT.  [facility] team has been notified.  Patient carries a grim prognosis     Subjective: Interval History:   -Interval events noted. -PEA cardiac arrest x2 status post Impella and ECMO  -Complicated left heart cath yesterday    Objective:   Vitals:    06/02/22 1125 06/02/22 1130 06/02/22 1145 06/02/22 1200   BP:       Pulse: 92 90 88 83   Resp: 10 12 10 10   Temp: (!) 95.7 °F (35.4 °C) (!) 95.9 °F (35.5 °C) (!) 95.9 °F (35.5 °C) (!) 95.9 °F (35.5 °C)   TempSrc:       SpO2:  100% 100% 100%   Weight:       Height:          06/01 0701 - 06/02 0700  In: 4575.2 [I.V.:3331.9]  Out: 053 [Urine:320]  Last 3 Recorded Weights in this Encounter    06/01/22 1555 06/01/22 1556 06/02/22 0626   Weight: 50.8 kg (112 lb) 50.8 kg (112 lb) 56.2 kg (123 lb 14.4 oz)      Physical exam:    GEN: Intubated sedated on mechanical ventilation  NECK- Supple, no mass  RESP: Rales  CVS: Tachycardia  NEURO: Normal speech, Non focal  EXT: Left extremity cold, still has venous sheath. Right extremity has ECMO cannulas      Chart reviewed. Pertinent Notes reviewed. Data Review :  Recent Labs     06/02/22  0820 06/02/22  0353 06/01/22 2020 06/01/22  1656 06/01/22  1656    137 136   < > 138   K 4.1 3.9 5.0   < > 2.9*    102 101   < > 101   CO2 26 26 25   < > 24   BUN 30* 29* 29*   < > 30*   CREA 3.30* 3.18* 3.07*   < > 3.23*   * 136* 173*   < > 156*   CA 8.6 8.0* 7.0*   < > 6.7*   MG 2.3 1.9 1.9   < > 1.9   PHOS 3.8  --   --   --  4.1    < > = values in this interval not displayed. Recent Labs     06/02/22  0820 06/02/22  0353 06/01/22  2356   WBC 6.3 7.9 9.9   HGB 7.5* 8.0* 8.7*   HCT 22.0* 23.5* 25.6*   * 106* 126*     No results for input(s): FE, TIBC, PSAT, FERR in the last 72 hours.    Medication list  reviewed  Current Facility-Administered Medications   Medication Dose Route Frequency    balsam peru-castor oiL (VENELEX) ointment   Topical BID    alcohol 62% (NOZIN) nasal  1 Ampule  1 Ampule Topical Q12H    0.9% sodium chloride infusion 250 mL  250 mL IntraVENous PRN    sodium bicarbonate (8.4%) 25 mEq in dextrose 5% 1,000 mL infusion   Other TITRATE    propofol (DIPRIVAN) 10 mg/mL infusion  0-50 mcg/kg/min IntraVENous TITRATE    aspirin chewable tablet 81 mg  81 mg Per G Tube DAILY    alteplase (CATHFLO) 1 mg in sterile water (preservative free) 1 mL injection  1 mg InterCATHeter PRN    bacitracin 500 unit/gram packet 1 Packet  1 Packet Topical PRN    sodium chloride (NS) flush 5-40 mL  5-40 mL IntraVENous Q8H    sodium chloride (NS) flush 5-40 mL  5-40 mL IntraVENous PRN    albumin human 5% (BUMINATE) solution 12.5 g  12.5 g IntraVENous Q2H PRN    0.45% sodium chloride infusion  10 mL/hr IntraVENous CONTINUOUS    0.9% sodium chloride infusion  9 mL/hr IntraVENous CONTINUOUS    oxyCODONE IR (ROXICODONE) tablet 10 mg  10 mg Oral Q4H PRN    morphine injection 4 mg  4 mg IntraVENous Q2H PRN    naloxone (NARCAN) injection 0.4 mg  0.4 mg IntraVENous PRN    mupirocin (BACTROBAN) 2 % ointment Both Nostrils BID    ondansetron (ZOFRAN) injection 4 mg  4 mg IntraVENous Q4H PRN    albuterol (PROVENTIL VENTOLIN) nebulizer solution 2.5 mg  2.5 mg Nebulization Q4H PRN    midazolam (VERSED) injection 1 mg  1 mg IntraVENous Q1H PRN    chlorhexidine (PERIDEX) 0.12 % mouthwash 10 mL  10 mL Oral Q12H    calcium chloride 1 g in 0.9% sodium chloride 250 mL IVPB  1 g IntraVENous PRN    bisacodyL (DULCOLAX) suppository 10 mg  10 mg Rectal DAILY PRN    senna-docusate (PERICOLACE) 8.6-50 mg per tablet 1 Tablet  1 Tablet Oral BID    polyethylene glycol (MIRALAX) packet 17 g  17 g Oral DAILY    insulin glargine (LANTUS) injection 1-50 Units  1-50 Units SubCUTAneous ONCE OVER 24 HOURS    oxyCODONE IR (ROXICODONE) tablet 5 mg  5 mg Oral Q4H PRN    ticagrelor (BRILINTA) tablet 90 mg  90 mg Per NG tube Q12H    albumin human 5% (BUMINATE) solution 25 g  25 g IntraVENous Q4H PRN    piperacillin-tazobactam (ZOSYN) 3.375 g in 0.9% sodium chloride (MBP/ADV) 100 mL MBP  3.375 g IntraVENous Q12H    insulin lispro (HUMALOG) injection   SubCUTAneous Q6H    glucose chewable tablet 16 g  4 Tablet Oral PRN    glucagon (GLUCAGEN) injection 1 mg  1 mg IntraMUSCular PRN    dextrose 10% infusion 0-250 mL  0-250 mL IntraVENous PRN    PHENYLephrine (SELVIN-SYNEPHRINE) 30 mg in 0.9% sodium chloride 250 mL infusion   mcg/min IntraVENous TITRATE    NOREPINephrine (LEVOPHED) 8 mg in 5% dextrose 250mL (32 mcg/mL) infusion  0.5-16 mcg/min IntraVENous TITRATE    0.9% sodium chloride infusion 250 mL  250 mL IntraVENous PRN    pantoprazole (PROTONIX) 40 mg in 0.9% sodium chloride 10 mL injection  40 mg IntraVENous DAILY    albumin human 25% (BUMINATE) solution 25 g  25 g IntraVENous Q6H    white petrolatum-mineral oiL (SOOTHE NIGHT TIME) 80-20 % ophthalmic ointment   Both Eyes Q12H    fentaNYL (PF) 1,500 mcg/30 mL (50 mcg/mL) infusion  0-200 mcg/hr IntraVENous TITRATE    heparin 25,000 units in D5W 250 ml infusion  12-25 Units/kg/hr IntraVENous TITRATE          Bam Pena, [id number] [facility]  [facility] / [facility] JAYANT [facility]  [address], [address] [facility], [address] [facility]  Phone - [phone]               Fax - [phone]
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
## 2022-06-02 NOTE — INTERDISCIPLINARY ROUNDS
Interdisciplinary team rounds were held  with the following team members: Care Management, Diabetes Treatment Specialist, Nursing, Nutrition, Pharmacy, Physician and Respiratory Therapy. Plan of care discussed. See clinical pathway and/or care plan for interventions and desired outcomes. Goals of the Day: Continue current treatment regimen.
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
## 2022-06-02 NOTE — PROGRESS NOTES
Transition of Care Plan:    RUR:24%    Disposition:Home with family,poss hh,poss rehab. Follow up appointments: To be done prior to discharge. DME needed: To be determined. Transportation at Discharge: To be determined. Keys or means to access home:   Spouse has keys    IM Medicare Letter: To be given prior to discharge    Is patient a BCPI-A Bundle:  No          If yes, was Bundle Letter given?: N/A     Is patient a [place] and connected with the [address]? No              If yes, was [employer] transfer form completed and [facility] notified? N/A    Caregiver Contact:    Discharge Caregiver contacted prior to discharge? Caregiver to be contacted prior to discharge. Care Conference needed?:   Not at this time. Reason for Admission:   Patient came to ed for chest burning and abdominal pain. She went to the cath lab and coded and now is being monitored in the ccu. RUR Score: 24%          PCP: First and Last name:  Monisha Garcia NP     Name of Practice:[facility]     Are you a current patient: Yes/No:YES     Approximate date of last visit: May 20,2022      Can you do a virtual visit with your PCP: Yes             Resources/supports as identified by patient/family:   Patient has supportive family. Top Challenges facing patient (as identified by patient/family and CM): Finances/Medication cost?   Per  she does not have problems obtaining her medications. Transportation? She uses the [employer] transportation [place]. Support system or lack thereof? She has a supportive family. Living arrangements? She lives in one story home with her  and a daughter who she has guardian over. Self-care/ADLs/Cognition? She was independent prior to coming to the hospital. She uses a cane around the home . When she goes out she uses her rollator and for distances uses her wheelchair.  She does not use home oxygen or cpap. She receives dialysis at [facility] on [facility] per  on T,TH, Sat . She uses the [employer] to go to and from her dialysis. Current Advanced Directive/Advance Care Plan:  Full Code  Advance Care Planning     General Advance Care Planning (ACP) Conversation      Date of Conversation: 6/2/22  Conducted with: Patient with Decision Making Capacity and Healthcare Decision Maker: Next of Kin by law (only applies in absence of a Healthcare Power of  or Legal Guardian)    Healthcare Decision Maker:   No healthcare decision makers have been documented. Click here to complete [address] including selection of the Healthcare Decision Maker Relationship (ie \"Primary\")        Content/Action Overview:   Unable to complete at this time  Reviewed DNR/DNI and patient elects Full Code (Attempt Resuscitation)         Length of Voluntary ACP Conversation in minutes:  <16 minutes (Non-Billable)    Sung Wolfe RN               Healthcare Decision Maker:   Click here to complete [address] including selection of the Healthcare Decision Maker Relationship (ie \"Primary\")        Payor Source Payor: [facility] MEDICARE / Plan: [address] [place] [address] CCP / Product Type: Managed Care Medicare /                             Plan for utilizing home health:  She has used [facility] in the past. She has not used inpatient rehab. Transition of Care Plan:      Patient is currently vented and sedated in the ccu being monitored. Demographics,information  and pcp information confirmed with patient's . Antonette Webster RN BSN CRM        [phone]
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
## 2022-06-02 NOTE — PROGRESS NOTES
2030  Parra placed with immediate return of white thick discharge followed by yellow cloudy urine approximately 150 cc (bladder scan had noted 253)  Urine sent for urinalysis    2100  NP placed Rt wrist arterial line. ABG's obtained from new line. Diamox given. Second dose of IV kcl held due to potassium level of 5.0. After multiple attempts,  unable to dopple Lt pedal pulses with intermitant popliteal pulse. Lt toes dusky and cool but blanchable. No change in the measurement of Lt calf    2206 Cardiac Surgery notified of HGB 6.6 with orders for blood products provided by critical care NP. PA also instructed nurse to notify cardiology and vascular. 2213  Dr Tania Bernal notified of above peripheral vascular assessment. No new orders received. 2217 Per critical care NP and perfusion  request Cardiac Surgery recalled to establish guidelines for transfusion and PTT  Guidelines. 0000 No change in the measurement of Lt calf    03:38  PTT 50.7. Discuss with pharmacy and begin heparin@ 12u/kg/hr    0400  No change in the measurement of Lt calf    04:30  Impella cassette changed due to leaking.      0600  No change in overall status. No pressors or inotropes required   LLE pulses remain absent. No purposeful movement noted. Bites ETT when mouthcare performed. 3 green liquid stools overnight.

## 2022-06-02 NOTE — PROGRESS NOTES
[facility]  [address]  [phone]  www. [employer]         Cardiology Progress Note      6/2/2022 12:58 PM    Admit Date: 5/31/2022    Admit Diagnosis:   NSTEMI (non-ST elevated myocardial infarction) ([facility] [address] [I21.4]  Pulmonary edema [J81.1]  ESRD (end stage renal disease) on dialysis ([facility] [address] [N18.6, Z99.2]  Hyperkalemia [E87.5]    Interval History/Subjective:     Sharri Boxer is intubated. S/p GUERITA to LCx and RCA yesterday for NSTEMI. Procedure complicated by VT arrest with cardiogenic shock. Required Impella assist, and then placed on ECMO by CT surgery. This AM, noted to have cold, pulseless left foot. Seen by vascular surgery, no intervention at this time as prognosis appears very poor. EEG done this AM to further clarify neurologic status. Hgb down this AM, plan for 1 unit PRBC  Seen by nephrology, plan for CRRT.     Visit Vitals  BP (!) 55/30   Pulse 85   Temp (!) 95.9 °F (35.5 °C)   Resp 10   Ht 5' 5\" (1.651 m)   Wt 56.2 kg (123 lb 14.4 oz)   LMP 09/15/2013   SpO2 100%   BMI 20.62 kg/m²       Current Facility-Administered Medications   Medication Dose Route Frequency    balsam peru-castor oiL (VENELEX) ointment   Topical BID    alcohol 62% (NOZIN) nasal  1 Ampule  1 Ampule Topical Q12H    0.9% sodium chloride infusion 250 mL  250 mL IntraVENous PRN    sodium bicarbonate (8.4%) 25 mEq in dextrose 5% 1,000 mL infusion   Other TITRATE    propofol (DIPRIVAN) 10 mg/mL infusion  0-50 mcg/kg/min IntraVENous TITRATE    aspirin chewable tablet 81 mg  81 mg Per G Tube DAILY    alteplase (CATHFLO) 1 mg in sterile water (preservative free) 1 mL injection  1 mg InterCATHeter PRN    bacitracin 500 unit/gram packet 1 Packet  1 Packet Topical PRN    sodium chloride (NS) flush 5-40 mL  5-40 mL IntraVENous Q8H    sodium chloride (NS) flush 5-40 mL  5-40 mL IntraVENous PRN    albumin human 5% (BUMINATE) solution 12.5 g  12.5 g IntraVENous Q2H PRN    0.45% sodium chloride infusion  10 mL/hr IntraVENous CONTINUOUS    0.9% sodium chloride infusion  9 mL/hr IntraVENous CONTINUOUS    oxyCODONE IR (ROXICODONE) tablet 10 mg  10 mg Oral Q4H PRN    morphine injection 4 mg  4 mg IntraVENous Q2H PRN    naloxone (NARCAN) injection 0.4 mg  0.4 mg IntraVENous PRN    mupirocin (BACTROBAN) 2 % ointment   Both Nostrils BID    ondansetron (ZOFRAN) injection 4 mg  4 mg IntraVENous Q4H PRN    albuterol (PROVENTIL VENTOLIN) nebulizer solution 2.5 mg  2.5 mg Nebulization Q4H PRN    midazolam (VERSED) injection 1 mg  1 mg IntraVENous Q1H PRN    chlorhexidine (PERIDEX) 0.12 % mouthwash 10 mL  10 mL Oral Q12H    calcium chloride 1 g in 0.9% sodium chloride 250 mL IVPB  1 g IntraVENous PRN    bisacodyL (DULCOLAX) suppository 10 mg  10 mg Rectal DAILY PRN    senna-docusate (PERICOLACE) 8.6-50 mg per tablet 1 Tablet  1 Tablet Oral BID    polyethylene glycol (MIRALAX) packet 17 g  17 g Oral DAILY    insulin glargine (LANTUS) injection 1-50 Units  1-50 Units SubCUTAneous ONCE OVER 24 HOURS    oxyCODONE IR (ROXICODONE) tablet 5 mg  5 mg Oral Q4H PRN    ticagrelor (BRILINTA) tablet 90 mg  90 mg Per NG tube Q12H    albumin human 5% (BUMINATE) solution 25 g  25 g IntraVENous Q4H PRN    piperacillin-tazobactam (ZOSYN) 3.375 g in 0.9% sodium chloride (MBP/ADV) 100 mL MBP  3.375 g IntraVENous Q12H    insulin lispro (HUMALOG) injection   SubCUTAneous Q6H    glucose chewable tablet 16 g  4 Tablet Oral PRN    glucagon (GLUCAGEN) injection 1 mg  1 mg IntraMUSCular PRN    dextrose 10% infusion 0-250 mL  0-250 mL IntraVENous PRN    PHENYLephrine (SELVIN-SYNEPHRINE) 30 mg in 0.9% sodium chloride 250 mL infusion   mcg/min IntraVENous TITRATE    NOREPINephrine (LEVOPHED) 8 mg in 5% dextrose 250mL (32 mcg/mL) infusion  0.5-16 mcg/min IntraVENous TITRATE    0.9% sodium chloride infusion 250 mL  250 mL IntraVENous PRN    pantoprazole (PROTONIX) 40 mg in 0.9% sodium chloride 10 mL injection  40 mg IntraVENous DAILY    white petrolatum-mineral oiL (SOOTHE NIGHT TIME) 80-20 % ophthalmic ointment   Both Eyes Q12H    fentaNYL (PF) 1,500 mcg/30 mL (50 mcg/mL) infusion  0-200 mcg/hr IntraVENous TITRATE    heparin 25,000 units in D5W 250 ml infusion  12-25 Units/kg/hr IntraVENous TITRATE       Objective:      Physical Exam:  General: sedated. Heart:  reg rate and rhythm; normal S1/S2; no murmurs  Respiratory: intubated  Extremities:  Impella in left groin, VA ECMO right groin. Left arm with AVF. No palpable distal pulses. Cool to touch left foot. Right foot warm. Neuro: sedated. Skin: pale    Data Review:   Recent Labs     06/02/22 0820 06/02/22 0353 06/01/22 2356   WBC 6.3 7.9 9.9   HGB 7.5* 8.0* 8.7*   HCT 22.0* 23.5* 25.6*   * 106* 126*     Recent Labs     06/02/22  0820 06/02/22 0353 06/01/22 2020 06/01/22  1656 06/01/22  1656    137 136   < > 138   K 4.1 3.9 5.0   < > 2.9*    102 101   < > 101   CO2 26 26 25   < > 24   * 136* 173*   < > 156*   BUN 30* 29* 29*   < > 30*   CREA 3.30* 3.18* 3.07*   < > 3.23*   CA 8.6 8.0* 7.0*   < > 6.7*   MG 2.3 1.9 1.9   < > 1.9   PHOS 3.8  --   --   --  4.1   ALB 2.9* 2.5* 2.0*   < > 1.5*   TBILI 1.9* 1.8* 0.9   < > 0.8   ALT 35 33 32   < > 34   INR  --  1.3* 1.4*  --   --     < > = values in this interval not displayed. Recent Labs     06/02/22 0820 06/02/22 0353 06/01/22 2356   CPK 1,802* 1,249* 904*         Intake/Output Summary (Last 24 hours) at 6/2/2022 1258  Last data filed at 6/2/2022 1200  Gross per 24 hour   Intake 4861.13 ml   Output 323 ml   Net 4538.13 ml        Telemetry: sinus rhythm, vr 73 bpm    Echocardiogram (6/1/22 pre-cath):       Left Ventricle: Reduced left ventricular systolic function with a visually estimated EF of 30 - 35%. Left ventricle is moderately dilated. Moderately increased wall thickness. Findings consistent with moderate concentric hypertrophy. Moderate global hypokinesis present. Normal diastolic function.   Right Ventricle: Reduced systolic function.   Mitral Valve: Severely thickened leaflet, at the posterior leaflet. Severely calcified leaflet, at the posterior leaflet. Annular calcification of the mitral valve. Mild regurgitation. Echocardiogram (6/1/22 post-arrest):   Left Ventricle: Severely reduced left ventricular systolic function with a visually estimated EF of 5 - 10%. Severe global hypokinesis present. Left heart cath (6/1/22): Dominance: Right  Left Main   The vessel exhibits minimal luminal irregularities. Left Anterior Descending   There is moderate disease. Left Circumflex   Mid Cx lesion, 90% stenosed. The lesion is calcified. Right Coronary Artery   Ost RCA to Mid RCA lesion, 70% stenosed. Mid RCA lesion, 70% stenosed. Intervention      Mid Cx lesion   Angioplasty   Supplies used: CATH BLLN DIL 1.5X15MM RX -- EUPHORA; CATH GUID .056IN 6FR 150CM -- GUIDELINER V3; CATH GUID COR EB30 6FR 100CM -- LAUNCHER; GDWIRE RUNTHROUGH 180CM N STRL --    Angioplasty   Supplies used: CATH GUID .056IN 6FR 150CM -- GUIDELINER V3; CATH GUID COR EB30 6FR 100CM -- LAUNCHER; [address] [id number] -- ; CATH BLLN DIL 1.5X15MM RX -- [address]   Atherectomy   Coronary lithotripsy was performed. IVL cycles: 4. 4 CYCLES   Supplies used: CATH LITHOPLSTY 2.5X12MM SHOCKWAVE; CATH GUID COR EB30 6FR 100CM -- LAUNCHER; Patrick [employer] 180CM N STRL --    Angioplasty   Supplies used: CATH BLLN DIL 1.5X15MM RX -- EUPHORA; GDWIRE RUNTHROUGH 180CM N STRL -- ; CATH GUID .056IN 6FR 150CM -- GUIDELINER V3; CATH GUID COR EB30 6FR 100CM -- LAUNCHER   Post-Intervention Lesion Assessment   The intervention was unsuccessful. The pre-interventional distal flow is normal (LEBRON 3). Post-intervention LEBRON flow is 3. There were no complications. Unable to advance stent despite shockwave. LEBRON flow 3 at the end.  Held further intervention, may consider rotational atherectomy in future if needed. There is a 90% residual stenosis post intervention. Ost RCA to Mid RCA lesion   IVUS FFR OCT   FFR/iFR: iFR: 0.88. Supplies used: GUIDEWIRE SURG PRESSURE COMET II; CATHETER GUID 6FR L100CM DIA0.071IN NYL SHFT 3DRC W/O SIDE   Angioplasty   Supplies used: CATH BLLN DIL 2.0X12MM RX -- EUPHORA; CATHETER GUID 6FR L100CM DIA0.071IN NYL SHFT 3DRC W/O SIDE; GUIDEWIRE SURG PRESSURE COMET II   Stent   Supplies used: STENT COR 2.04U82FN RESOLUTE MARU RX GUERITA   Angioplasty (Also treats lesions: Mid RCA)   Supplies used: CATH BLLN DIL 2.5 X12MM RX -- EUPHORA   Stent   Supplies used: STENT COR 2.50X8MM RESOLUTE MARU RX GUERITA; CATHETER GUID 6FR L100CM DIA0.071IN NYL SHFT 3DRC W/O SIDE; GDWIRE RUNTHROUGH 180CM N STRL --    Angioplasty   Angioplasty was performed following stent deployment. Supplies used: STENT COR 2.50X8MM RESOLUTE MARU RX GUERITA; GDWIRE RUNTHROUGH 180CM N STRL -- ; CATHETER GUID 6FR L100CM DIA0.071IN NYL SHFT 3DRC W/O SIDE   Stent   Supplies used: STENT COR 2.49N74WO RESOLUTE MARU RX GUERITA   Angioplasty (Also treats lesions: Mid RCA)   Supplies used: CATH FRANTZ BLLN DIL 2.5X08MM -- NC EUPHORA   Post-Intervention Lesion Assessment   The intervention was successful. The pre-interventional distal flow is decreased (LEBRON 2). Post-intervention LEBRON flow is 3. There were no complications. There is a 0% residual stenosis post intervention. Mid RCA lesion   IVUS FFR OCT   FFR/iFR: iFR: 0.73.    Supplies used: GUIDEWIRE SURG PRESSURE COMET II; CATHETER GUID 6FR L100CM DIA0.071IN NYL SHFT 3DRC W/O SIDE   Angioplasty   Supplies used: CATH BLLN DIL 2.0X12MM RX -- EUPHORA; GUIDEWIRE SURG PRESSURE COMET II; CATHETER GUID 6FR L100CM DIA0.071IN NYL SHFT 3DRC W/O SIDE   Angioplasty (Also treats lesions: Ost RCA to Mid RCA)   Supplies used: CATH BLLN DIL 2.5 X12MM RX -- EUPHORA   Stent   Supplies used: STENT COR GUERITA 2.74F13AL -- GUERITA RESOLUTE MARU; GDWIRE RUNTHROUGH 180CM N STRL -- ; CATHETER GUID 6FR L100CM DIA0.071IN NYL SHFT 3DRC W/O SIDE   Stent   Supplies used: STENT COR GUERITA 2.09O85OP -- GUERITA RESOLUTE MARU; CATHETER GUID 6FR L100CM DIA0.071IN NYL SHFT 3DRC W/O SIDE; GDWIRE RUNTHROUGH 180CM N STRL --    Angioplasty (Also treats lesions: Ost RCA to Mid RCA)   Supplies used: CATH FRANTZ BLLN DIL 2.5X08MM -- NC EUPHORA   Post-Intervention Lesion Assessment   The intervention was successful. The pre-interventional distal flow is decreased (LEBRON 2). Post-intervention LEBRON flow is 3. There were no complications. There is a 0% residual stenosis post intervention. CXR:      Assessment:     Active Problems:    Hyperkalemia (5/31/2022)      Pulmonary edema (5/31/2022)      NSTEMI (non-ST elevated myocardial infarction) ([facility] [address] (5/31/2022)      ESRD (end stage renal disease) on dialysis ([facility] [address] (5/31/2022)        Plan:     1. NSTEMI  S/p GUERITA to LCX and RCA  Complicated by VT, cardiogenic shock requieing Impella insertion and now on ECMO  Continue Brilinta    2. Cardiogenic shock  On ECMO  On Levophed, Henrry gtts  Echo done post-cardiogenic shock with severely reduced LVEF 5-10%, previously 30-35% prior to cath  Continue care per intensivist, CTS    3. ESRD  Care per nephrology team    4. Acute respiratory failure  Intubated during code  On vent  Care per intensivists    Prognosis appears poor. Will discuss goals of care with primary team, CTS once EEG completed.     [facility], NP

## 2022-06-02 NOTE — PROGRESS NOTES
Occupational Therapy note:    Order acknowledged and chart reviewed. Patient currently intubated/sedated and not medically stable for OT eval. Will defer and continue to follow.     Joaquim Oliva OTR/L

## 2022-06-02 NOTE — CARDIO/PULMONARY
Cardiac Rehab Note: chart review/referral     NSTEMI  Cardiac cath with intervention 6/1/22    Patient coded during procedure    EF 30-35  on 6/1/22 per echo    Smoking history assessed. Patient is a non smoker. Smoking Cessation Program link has not been added to the AVS.     Patient in CCU on vent, Impella and VA ECMO. CP Rehab will follow.

## 2022-06-02 NOTE — PROCEDURES
EEG REPORT    Patient Name: Manoj De Paz  : 1965  Age: 64 y.o. Ordering physician: Hannah Sue MD    Date of EE22  EEG procedure number: [id number]  Diagnosis: Cardiac Arrest   Interpreting physician: Hilary Connor MD     Procedure: EEG    CLINICAL INDICATION: The patient is a 64 y.o. female who is being evaluated for baseline electro cerebral activities and to rule out seizure focus.       Current Facility-Administered Medications   Medication Dose Route Frequency    balsam peru-castor oiL (VENELEX) ointment   Topical BID    alcohol 62% (NOZIN) nasal  1 Ampule  1 Ampule Topical Q12H    0.9% sodium chloride infusion 250 mL  250 mL IntraVENous PRN    sodium bicarbonate (8.4%) 25 mEq in dextrose 5% 1,000 mL infusion   Other TITRATE    propofol (DIPRIVAN) 10 mg/mL infusion  0-50 mcg/kg/min IntraVENous TITRATE    aspirin chewable tablet 81 mg  81 mg Per G Tube DAILY    VANCOMYCIN INFORMATION NOTE   Other Rx Dosing/Monitoring    alteplase (CATHFLO) 1 mg in sterile water (preservative free) 1 mL injection  1 mg InterCATHeter PRN    bacitracin 500 unit/gram packet 1 Packet  1 Packet Topical PRN    sodium chloride (NS) flush 5-40 mL  5-40 mL IntraVENous Q8H    sodium chloride (NS) flush 5-40 mL  5-40 mL IntraVENous PRN    albumin human 5% (BUMINATE) solution 12.5 g  12.5 g IntraVENous Q2H PRN    0.45% sodium chloride infusion  10 mL/hr IntraVENous CONTINUOUS    0.9% sodium chloride infusion  9 mL/hr IntraVENous CONTINUOUS    oxyCODONE IR (ROXICODONE) tablet 10 mg  10 mg Oral Q4H PRN    morphine injection 4 mg  4 mg IntraVENous Q2H PRN    naloxone (NARCAN) injection 0.4 mg  0.4 mg IntraVENous PRN    mupirocin (BACTROBAN) 2 % ointment   Both Nostrils BID    ondansetron (ZOFRAN) injection 4 mg  4 mg IntraVENous Q4H PRN    albuterol (PROVENTIL VENTOLIN) nebulizer solution 2.5 mg  2.5 mg Nebulization Q4H PRN    midazolam (VERSED) injection 1 mg  1 mg IntraVENous Q1H PRN    chlorhexidine (PERIDEX) 0.12 % mouthwash 10 mL  10 mL Oral Q12H    calcium chloride 1 g in 0.9% sodium chloride 250 mL IVPB  1 g IntraVENous PRN    bisacodyL (DULCOLAX) suppository 10 mg  10 mg Rectal DAILY PRN    senna-docusate (PERICOLACE) 8.6-50 mg per tablet 1 Tablet  1 Tablet Oral BID    polyethylene glycol (MIRALAX) packet 17 g  17 g Oral DAILY    insulin glargine (LANTUS) injection 1-50 Units  1-50 Units SubCUTAneous ONCE OVER 24 HOURS    oxyCODONE IR (ROXICODONE) tablet 5 mg  5 mg Oral Q4H PRN    ticagrelor (BRILINTA) tablet 90 mg  90 mg Per NG tube Q12H    albumin human 5% (BUMINATE) solution 25 g  25 g IntraVENous Q4H PRN    piperacillin-tazobactam (ZOSYN) 3.375 g in 0.9% sodium chloride (MBP/ADV) 100 mL MBP  3.375 g IntraVENous Q12H    insulin lispro (HUMALOG) injection   SubCUTAneous Q6H    glucose chewable tablet 16 g  4 Tablet Oral PRN    glucagon (GLUCAGEN) injection 1 mg  1 mg IntraMUSCular PRN    dextrose 10% infusion 0-250 mL  0-250 mL IntraVENous PRN    PHENYLephrine (SELVIN-SYNEPHRINE) 30 mg in 0.9% sodium chloride 250 mL infusion   mcg/min IntraVENous TITRATE    NOREPINephrine (LEVOPHED) 8 mg in 5% dextrose 250mL (32 mcg/mL) infusion  0.5-16 mcg/min IntraVENous TITRATE    0.9% sodium chloride infusion 250 mL  250 mL IntraVENous PRN    pantoprazole (PROTONIX) 40 mg in 0.9% sodium chloride 10 mL injection  40 mg IntraVENous DAILY    white petrolatum-mineral oiL (SOOTHE NIGHT TIME) 80-20 % ophthalmic ointment   Both Eyes Q12H    fentaNYL (PF) 1,500 mcg/30 mL (50 mcg/mL) infusion  0-200 mcg/hr IntraVENous TITRATE    heparin 25,000 units in D5W 250 ml infusion  12-25 Units/kg/hr IntraVENous TITRATE           DESCRIPTION OF PROCEDURE:     This is a digitally recorded electroencephalogram  Electrodes were applied in accordance with the international 10-20 system of electrode placement.     18 channels of scalp EEG are recorded  A channel was used for EoG  Another channel was used for ECG   The data is stored digitally and reviewed in reformatted montages for optimal display  EEG  was reviewed in both bipolar and referential montages    Description of Activity: The background of this recording contains a posteriorly-located occipital rhythm of 5-6hz. This appeared to be poorly formed. There was no well-formed alpha activity noted throughout the recording. Throughout the recording, there were no clear areas of focal slowing nor spike or spike-and-wave discharges seen. Hyperventilation was not performed. Photic stimulation produced no response in the posterior head regions. There did appear to be reactivity noted upon noxious stimulation    During the recording, the patient did not achieve stage II sleep    Clinical Interpretation: This EEG, performed during wakefulness and drowsiness/sleep, is abnormal. There is moderate to severe generalized slowing as seen in encephalopathies. There is no focal asymmetry, seizures or epileptiform discharges seen. This EEG does not rule out the possibility of seizures or the diagnosis of epilepsy. Clinical correlation is recommended.      Freddie Gunderson MD

## 2022-06-02 NOTE — PROCEDURES
hospitals / [phone]    Vitals Pre Post Assessment Pre Post   /83 127/87 LOC Responds to pain Responds to pain   HR 82 93 Lungs On Vent and ECMO On Vent and ECMO   Resp 10 11 Cardiac RRR, On Impella On Impella   Temp 95.9 95.9 Skin intact intact   Weight  n/a n/a Edema 2+BLE, facial edema, 1+ generalized  1+BLE, improved facial edema, trace generalized   Tele status bedside bedside Pain 0 0     Orders   Duration: Start: 1255 End: 1556 Total: 3 hours   Dialyzer: Dialyzer/Set Up Inspection: Revaclear (06/02/22 1255)   K Bath: Dialysate K (mEq/L):  (UF only) (06/02/22 1255)   Ca Bath: Dialysate CA (mEq/L):  (UF only) (06/02/22 1255)   Na: Dialysate NA (mEq/L):  (UF only) (06/02/22 1255)   Bicarb: Dialysate HCO3 (mEq/L):  (UF only) (06/02/22 1255)   Target Fluid Removal: Goal/Amount of Fluid to Remove (mL): 2000 mL (06/02/22 1255)     Access   Type & Location: [facility]   Comments: Skin CDI. No s/s of infection. + B/T. No issues with cannulation or hemostasis. Running well at .                                   Labs   HBsAg (Antigen) / date: Negative   06/01/2022                                           HBsAb (Antibody) / date: Susceptible 06/01/2022   Source: Epic   Obtained/Reviewed  Critical Results Called HGB   Date Value Ref Range Status   06/02/2022 7.5 (L) 11.5 - 16.0 g/dL Final     Potassium   Date Value Ref Range Status   06/02/2022 4.1 3.5 - 5.1 mmol/L Final     Calcium   Date Value Ref Range Status   06/02/2022 8.6 8.5 - 10.1 MG/DL Final     BUN   Date Value Ref Range Status   06/02/2022 30 (H) 6 - 20 MG/DL Final     Creatinine   Date Value Ref Range Status   06/02/2022 3.30 (H) 0.55 - 1.02 MG/DL Final        Meds Given   Name Dose Route   None given by HD RN                 Adequacy / Fluid    Total Liters Process: 0ml   Net Fluid Removed: 2500ml      Comments   Time Out Done:   (Time) 1245   Admitting Diagnosis: NSTEMI   Consent obtained/signed: Informed Consent Verified: Yes (06/02/22 [id number])   Machine / Kurt Dickinsonmushtaqemeli # Machine Number: [email or web address] (06/02/22 [id number])   Primary Nurse Rpt Pre: Marichuy Reed RN   Primary Nurse Rpt Post: Marichuy Reed RN   Pt Education: Family educated on need for procedure   Care Plan: ongoing   P[facility] outpatient [facility]: [facility]     Tx Summary   Comments:  Arrived to pt's bedside. Consent signed & on file. SBAR received from Primary RN. Pt responds to pain per Primary RN but pt resting comfortably at this time. 1255: Pt cannulated with 74Z needles per policy & without issue. VSS. Dialysis Tx initiated. Vitals documented by Primary RN at bedside through out treatment. * no issues*  1556: Tx ended. VSS. All possible blood returned to patient. Hemostasis achieved without issue. Bed locked and in the lowest position, call bell and belongings in reach. SBAR given to Primary, RN. Patient is stable at time of my departure. All Dialysis related medications have been reviewed.

## 2022-06-02 NOTE — PROGRESS NOTES
Pharmacy Antimicrobial Kinetic Dosing    Indication for Antimicrobials: sepsis     Current Regimen of Each Antimicrobial:  Zosyn 3.375 gm iv every 12 hr; Start Date ; Day 2  Vancomycin 500 mg after each HD session; started ; day 2    Previous Antimicrobial Therapy:  Levaquin     Goal Level: AUC: 400-600 mg/hr/Liter/day    Date Dose & Interval Measured (mcg/mL) Predicted AUC/GERALD                       Date & time of next level:     Dosing calculator used: n/a    Significant Positive Cultures:    paired blood cx: NGTD, pending    urine cx pending   paired blood cx: NGTD, pending    Sputum cx (ETT aspirate) NGTD, pending     Conditions for Dosing Consideration: Hemodialysis    Labs:  Recent Labs     22  0820 22  0353 22  1656 22  1654 22  0327 22   CREA 3.30* 3.18* 3.07*   < >  --    < >  --    BUN 30* 29* 29*   < >  --    < >  --    PCT  --  16.60  --   --  3.24  --  4.68    < > = values in this interval not displayed. Recent Labs     22  0820 22  0353 226 22  2121   WBC 6.3 7.9 9.9 10.2     Temp (24hrs), Av.9 °F (34.4 °C), Min:89.6 °F (32 °C), Max:95.9 °F (35.5 °C)      Creatinine Clearance (mL/min): ESRD on HD TTS    Impression/Plan:   Vancomycin level tomorrow AM  HD vs CRRT tomorrow; ultrafiltration today per MD note, no need for vancomycin dose today  Antimicrobial stop date pending     Pharmacy will follow daily and adjust medications as appropriate for renal function and/or serum levels.     Thank you,  LAKSHMI Gonzalez

## 2022-06-02 NOTE — DIABETES MGMT
[address] [facility]    CLINICAL NURSE SPECIALIST CONSULT     Initial Presentation   Rodney Pack is a 64 y.o. female admitted from the ER 5/31/22 after experiencing burning chest pain and abdominal pain. Afebrile. Tachycardic. Hypertensive. O2 sas 92%  LAB: WBC 17.2. Low HGB. NT pro-BNP >35,000. Troponin 9724. CXR:   Improved interstitial pulmonary edema with residual nonspecific   bibasilar pulmonary densities. Small bilateral pleural effusions. HX:   Past Medical History:   Diagnosis Date    Abdominal pain 11/18/2013    Abdominal pain, LUQ (left upper quadrant) 6/7/2012    Back pain, lumbosacral 6/24/2012    Acute on chronic      C. difficile colitis 6/2012    Chronic kidney disease     Stage V- no dialysis yet    Chronic low back pain     Chronic pain     back & left leg    Constipation     Diabetes ([facility] [address]     A1c 8.2 3/2012    DKA (diabetic ketoacidoses) 7/10/2018    Flank pain 4/14/2015    Gastroparesis 6/7/2012    Hep C w/o coma, chronic (HCC)     Hyperlipemia     Hypertension     Hyponatremia 11/18/2013    Intractable abdominal pain 4/21/2012    Lower urinary tract infectious disease 11/18/2013    Lumbar disc disease     Migraines     Nausea & vomiting 3/16/2012    Pancreatitis 1522    alcoholic    UTI (lower urinary tract infection) 6/20012      INITIAL DX:   NSTEMI (non-ST elevated myocardial infarction) ([facility] [address] [I21.4]  Pulmonary edema [J81.1]  ESRD (end stage renal disease) on dialysis ([facility] [address] [N18.6, Z99.2]  Hyperkalemia [E87.5]     Current Treatment     TX: 6/1/22 VA EXTRACORPOREAL MEMBRANE OXYGENATION (ECMO) via right CFV and right CFA and distal perfusion cannula.   Lisa Mediate by Provider for advanced diabetes nursing assessment and care for:   [] Transitioning off Manuel Paniagua   [x] Inpatient management strategy  [] Home management assessment  [] Survival skill education    Hospital Course   Clinical progress has been complicated by need for ICU level of care. 5/31/22 Cardiology consult: Recommend cardiac cath  6/1/22 Went pulseless during cardiac cath => ECMO required. ECHO: Severely depressed function of bilateral ventricles. EKG: Afib  6/2/22 Incredibly ill. ECMO+. IMPELLA+. On multiple infusions. CKs rising. Poor perfusion to right leg with ECMO & Impella in that extremity. Chronic HD. Will get blood transfusion(s) today    Diabetes History   Type 2 diabetes on insulin therapy  Family history of diabetes in mother, father, sister and brother  Admission . A1cs are OLD & likely inaccurate (as patient probably on EPO)    Diabetes-related Medical History  Acute complications  DKA  Microvascular disease  Nephropathy on chronic HD  Macrovascular disease  CAD and Myocardial infarction    Diabetes Medication History  Key Antihyperglycemic Medications             insulin degludec (TRESIBA U-100 INSULIN SC) (Taking) 30 Units by SubCUTAneous route daily. insulin aspart U-100 (NOVOLOG) 100 unit/mL injection (Taking) 5 Units by SubCUTAneous route Before breakfast, lunch, and dinner. Diabetes self-management practices: Deferred    Subjective   Intubated      Objective   Physical exam  General Normal weight female who is ill-appearing  Neuro  Unresponsive on Propofol infusion  Vital Signs   Visit Vitals  BP (!) 55/30   Pulse 79   Temp (!) 95.2 °F (35.1 °C)   Resp 11   Ht 5' 5\" (1.651 m)   Wt 56.2 kg (123 lb 14.4 oz)   SpO2 100%   BMI 20.62 kg/m²     Laboratory  Recent Labs     06/02/22  0353 06/01/22  2356 06/01/22  2121 06/01/22  2020 06/01/22  1656 06/01/22  1656   *  --   --  173*  --  156*   AGAP 9  --   --  10  --  13   WBC 7.9 9.9 10.2  --    < > 11.8*   CREA 3.18*  --   --  3.07*  --  3.23*   GFRNA 15*  --   --  16*  --  15*   *  --   --  110*  --  112*   ALT 33  --   --  32  --  34    < > = values in this interval not displayed.        Factors impacting BG management  Factor Dose Comments   Nutrition:  NPO Drugs: Blood transfusion(s)   Multiple PRBCs 6/1/22   A1cs inaccurate   Pain Scheduled Tylenol  Fentanyl infusion    Infection Zosyn Q12 hrs Hypothermic. WBC normal   Other:   Kidney function  Liver function  Albumin  Procalcitonin   NT pro-BNP   CKD  AST elevated  2.5  16.6  >35,000     Trending up     Blood glucose pattern      Significant diabetes-related events over the past 24-72 hours  Admission   Received 10 units of Lantus insulin 6/1/22 => BGs 102-173  BG this     Assessment and Plan   Nursing Diagnosis Risk for unstable blood glucose pattern   Nursing Intervention Domain 9251 Decision-making Support   Nursing Interventions Examined current inpatient diabetes/blood glucose control   Explored factors facilitating and impeding inpatient management     Evaluation   This incredibly ill normal weight AA female was admitted & treated for NSTEMI & pulmonary edema. Went pulseless during cardiac cath => ECMO & IMPELLA placed. Chronic HD. Since patient has CKD and has required no insulin since 3am yesterday, recommend HIGH sensitivity insulin approach.     Recommendations     [x] Use of Subcutaneous Insulin Order set (1197)  Insulin Dosing Specific recommendation   Basal                                      (Based on weight, BMI & GFR) []        0.2 units/kg/D  [] 0.3 units/kg/D  [] 0.4 units/kg/D    Nutritional                                      (Based on CHO/dextrose load) [] Normal sensitivity  [] Insulin-resistant sensitivity    Corrective                                       (Useful in adjusting insulin dosing) [] Normal sensitivity  [x] HIGH sensitivity  [] Insulin-resistant sensitivity      Billing Code(s)   [x] 52998 IP subsequent hospital care - 35 minutes     Before making these care recommendations, I personally reviewed the hospitalization record, including notes, laboratory & diagnostic data and current medications, and examined the patient at the bedside (circumstances permitting) before making care recommendations. More than fifty (50) percent of the time was spent in patient counseling and/or care coordination.   Total minutes: Reji Delgadillo, CNS  Diabetes Clinical Nurse Specialist  [facility]  Access via [address] [place] [address]

## 2022-06-02 NOTE — PROGRESS NOTES
0700  Bedside and verbal report from Alice Simeon, RN    9415  Patient incontinent of moderate amount greenish dark loose stool. Incontinence care given, venelex applied to sacrum and heels, flexiseal placed. 0800  Assessment completed. Dr. Gt Malagon and heart team rounding, cardiac surgery and cardiology to meet later today to discuss goals of care. 0830  Dr. Eddie New here to see patient. 0900  Sukumar Pierre NP here to see patient. One unit RBC's ordered based on hgb 7.5 on 0800 CBC. 3741  Interdisciplinary team rounds were held 6/2/2022 with the following team members:Care Management, Nursing, Nutrition, Occupational Therapy, Pharmacy, Physical Therapy, Physician, Respiratory Therapy and Clinical Coordinator and the patient. Plan of care discussed. See clinical pathway and/or care plan for interventions and desired outcomes. Goals of the Day: EEG, neuro consult, renal consult (HD patient), determine goals of care with cardiology and cardiac surgery. 1000  Dr. Charis Blanco here to see patient. Propofol now @ 20 mcg/kg/min. Patient grimaces, bites down with mouth care. No command following. No movement of extremeties. 1050  EEG at bedside. Propofol paused during EEG    1100  Heel protector boots placed on both feet. 1120  EEG completed. Patient becoming restless. Propofol resumed @ 30 mcg/kg/min. [facility]  RBC's started    36  Dr. Karon Galvan (renal) and Dr. Hi Zaragoza (neuro) here to see patient. 1200  Reassessment completed. Daughters now here to see patient. HD nurses here with equipment for HD treatment. RBC's continue to infuse. 1300  HD treatment started. Ordered for 3 hour run today. 1340  RBC's infused without difficulty. 1400  HD continues without difficulty. Blood for PTT sent to lab.     1500 Dr. Charis Blanco now here to see patient. Feet now warm bilaterally. Continues on HD.     1508  PTT 65.8  Result verified with pharmacy, heparin drip resumed @ 9 units/kg/hr.  Palliative consult placed by Riana Oneill NP    1600  Reassessment completed. Feet now warm bilaterally. Doppler PT's noted bilaterally. HD completed. HD nurse reports 2.5 liters removed with treatment. 1700  Incontinent of liquid stool around flexiseal. Right and left groin sites are bleeding. Incontinence care given, dressings changed to groin sites. Daughter at bedside, anxious, tearful. 1800  Daughter spoke with Dr. Latosha Roth at bedside. Propofol now @ 40 mcg/kg/min for sedation (patient was bucking vent, chewing on ETT). 1830  Dressing to right neck site changed (site bleeding, oozing). Carl[employer] Flood members again at bedside, tearful, anxious, sobbing.      1900  Bedside and verbal report given to Radha Beard RN

## 2022-06-02 NOTE — PROGRESS NOTES
Order acknowledged and chart reviewed. Pt currently intubated/sedated and is not medically stable for PT. Will defer and continue to follow.     Maday Rosas, SPT

## 2022-06-02 NOTE — WOUND CARE
Wound care Nurse consult: Consult placed by CCU staff nurse because of very high risk for pressure injuries. Patient is a 63 y/o AAF admitted to CCU 6/1/22 after she underwent cardiac catheterization and went into cardiac arrest. CPR was initiated, she was intubated. An Impella device was placed for cardiogenic shock. She was found to have biventricular failure and VA ECMO was started.   PMHX: HTN, DM, ESRD on Hemodialysis  Past Medical History:   Diagnosis Date    Abdominal pain 11/18/2013    Abdominal pain, LUQ (left upper quadrant) 6/7/2012    Back pain, lumbosacral 6/24/2012    Acute on chronic      C. difficile colitis 6/2012    Chronic kidney disease     Stage V- no dialysis yet    Chronic low back pain     Chronic pain     back & left leg    Constipation     Diabetes ([facility] [address]     A1c 8.2 3/2012    DKA (diabetic ketoacidoses) 7/10/2018    Flank pain 4/14/2015    Gastroparesis 6/7/2012    Hep C w/o coma, chronic (HCC)     Hyperlipemia     Hypertension     Hyponatremia 11/18/2013    Intractable abdominal pain 4/21/2012    Lower urinary tract infectious disease 11/18/2013    Lumbar disc disease     Migraines     Nausea & vomiting 3/16/2012    Pancreatitis 0776    alcoholic    UTI (lower urinary tract infection) 6/20012     Past Surgical History:   Procedure Laterality Date    HX LUMBAR DISKECTOMY  1980's    HX ORTHOPAEDIC      lumbar sprain; back surgery    HX UROLOGICAL  2014    kidney stone removed    OK COLONOSCOPY FLX DX W/COLLJ SPEC WHEN PFRMD  11/12/2012         VASCULAR SURGERY PROCEDURE UNLIST  08/02/2019    insertion of left AVF    VASCULAR SURGERY PROCEDURE UNLIST  12/06/2019    Creation transposed AV fistula left arm     Currently:  Cardiogenic shock  Vascular consulted for a cool pulseless left leg - Heprin infusing  Palliative Care consult  HD at bedside being conducted  Low dose Propofol infusing - vented and sedated  Neuro consult - EEG ordered    Staff have both heels floated off bed  Venelex ointment ordered as preventative tx to sacrum and heels  Turning and repositioning needs to be q1 hr    Patient is very critically ill    Jessica Roman RN, [employer]

## 2022-06-02 NOTE — CONSULTS
Vascular Surgery Consult Note  6/2/2022    Subjective:     Emily Lopez is a 64 y.o. AAF with a pmhx significant for ESRF, DM, ASHD, HTN, HLD, hepatitis C, and alcoholism. She was recently treated for a UTI on 5/27/22 and was seen in the emergency room on 5/30/22 with complaint of lower back pain. She has a hx of DDD and is s/p lumbar surgery w/ known loose hardware. On this occasion she presents to the hospital with a two day hx of chest pain, abd pain, and SOB after missing HD earlier in the day (last session was 3 days prior). She was admitted with volume overload and diagnosed with an NSTEMI. She was initiated on a heparin drip and received HD. On 6/1/22 she underwent a cardiac cath. During the procedure she suffered a prolonged cardiac arrest. An Impella device was placed via the left groin and due to severely depressed function of bilateral ventricles VA ECMO was placed via the right groin by CTS. This am she was noted to have a cool pulseless left foot and we were asked to evaluate. This am she is hypothermic w/ a significant leukocytosis. In addition to the [place] ECMO and Impella support she is on multi-pressor support. Past medical history  ESRF  -HD TTHS  Secondary hyperparathyroidism   Anemia of chronic renal disease   Insulin dependent diabetes mellitus type II  Diabetic gastroparesis   Coronary artery disease  Hypertension  Hyperlipidemia   Chronic hepatitis C  Alcoholism in remission   Pancreatitis   DDD of the lumbar spine  Chronic pain syndrome   Depression   Constipation  Clostridioides difficile colitis   Migraines   Renal calculi    Past procedural history  Left arm AVF fistula creation 8/2019  Creation of transposed left arm AVF 12/2019  Lumbar diskectomy 1980s  Lithotripsy for renal calculi 2014    Family history  Diabetes mellitus mother, father, brother, & sister  Kidney disease: mother      Social history  She is a non-smoker   She recently stopped drinking alcohol.   She is . Home medications   cyclobenzaprine (FLEXERIL) 10 mg tablet Take 1 Tablet by mouth two (2) times a day. promethazine (PHENERGAN) 25 mg tablet Take 1 Tablet by mouth every six (6) hours as needed for Nausea. ondansetron (ZOFRAN ODT) 4 mg disintegrating tablet Take 1 Tablet by mouth every six (6) hours as needed for Nausea or Vomiting. pantoprazole (PROTONIX) 40 mg tablet Take 1 Tablet by mouth Before breakfast and dinner. metoprolol tartrate (LOPRESSOR) 25 mg tablet Take 1 Tab by mouth two (2) times a day. acetaminophen (TYLENOL) 500 mg tablet acetaminophen 500 mg   insulin degludec (TRESIBA U-100 INSULIN SC) 30 Units by SubCUTAneous route daily. insulin aspart U-100 (NOVOLOG) 100 unit/mL injection 5 Units by SubCUTAneous route Before breakfast, lunch, and dinner. atorvastatin (LIPITOR) 20 mg tablet Take 20 mg by mouth nightly. cholecalciferol (VITAMIN D3) (50,000 UNITS /1250 MCG) capsule TAKE ONE CAPSULE BY MOUTH EVERY WEEK   ofloxacin (FLOXIN) 0.3 % ophthalmic solution PLACE 1 DROP IN SURGICAL EYE 4 TIMES A DAY. *DO NOT START UNTIL AFTER SURGERY*   prednisoLONE acetate (PRED FORTE) 1 % ophthalmic suspension PLACE 1 DROP IN SURGICAL EYE 4 TIMES A DAY *DO NOT START UNTIL AFTER SURGERY*   lidocaine 4 % patch 1 Patch by TransDERmal route every twelve (12) hours every twelve (12) hours. Patient not taking: Reported on 5/31/2022   methocarbamoL (Robaxin-750) 750 mg tablet Take 1 Tablet by mouth four (4) times daily as needed for Muscle Spasm(s). Patient not taking: Reported on 5/31/2022   calcitRIOL (ROCALTROL) 0.25 mcg capsule Take 0.25 mcg by mouth daily. Patient not taking: Reported on 6/1/2022   aspirin 81 mg chewable tablet Take 1 Tab by mouth daily. Patient not taking: Reported on 5/31/2022   cloNIDine HCl (CATAPRES) 0.1 mg tablet Take 1 Tab by mouth two (2) times a day.   Patient not taking: Reported on 5/31/2022   sodium bicarbonate 650 mg tablet Take 650 mg by mouth two (2) times a day.   Patient not taking: Reported on 5/31/2022     Allergies  Gabapentin  Tramadol: itching     Review of Systems   Unable to perform ROS: Acuity of condition     Objective:     Patient Vitals for the past 24 hrs:   BP Temp Pulse Resp SpO2 Height Weight   06/02/22 0900 -- (!) 95.4 °F (35.2 °C) 80 10 100 % -- --   06/02/22 0832 -- -- 79 11 -- -- --   06/02/22 0800 -- (!) 95.2 °F (35.1 °C) 73 11 100 % -- --   06/02/22 0700 -- -- 77 10 -- -- --   06/02/22 0626 -- -- -- -- -- -- 56.2 kg (123 lb 14.4 oz)   06/02/22 0600 -- -- 71 10 -- -- --   06/02/22 0500 -- -- 67 (!) 7 -- -- --   06/02/22 0455 -- -- 68 10 100 % -- --   06/02/22 0400 -- (!) 94.8 °F (34.9 °C) 67 10 -- -- --   06/02/22 0300 -- -- 68 10 -- -- --   06/02/22 0200 -- -- 65 (!) 0 -- -- --   06/02/22 0145 -- -- 67 (!) 0 -- -- --   06/02/22 0130 -- -- 66 (!) 0 -- -- --   06/02/22 0115 -- -- 66 (!) 0 -- -- --   06/02/22 0100 -- (!) 93.2 °F (34 °C) 74 10 -- -- --   06/02/22 0015 -- -- 70 10 -- -- --   06/02/22 0000 -- (!) 93.2 °F (34 °C) 71 10 -- -- --   06/01/22 2315 -- (!) 91.9 °F (33.3 °C) 72 10 -- -- --   06/01/22 2307 -- -- 75 10 100 % -- --   06/01/22 2300 -- (!) 91.9 °F (33.3 °C) 69 10 -- -- --   06/01/22 2245 -- (!) 91.8 °F (33.2 °C) 72 10 -- -- --   06/01/22 2230 -- (!) 91.8 °F (33.2 °C) 71 10 -- -- --   06/01/22 2215 -- (!) 91.8 °F (33.2 °C) 67 10 -- -- --   06/01/22 2200 -- -- 74 (!) 0 -- -- --   06/01/22 2145 -- -- 77 (!) 0 -- -- --   06/01/22 2130 -- -- 76 (!) 0 -- -- --   06/01/22 2115 -- -- 77 (!) 0 -- -- --   06/01/22 2100 -- -- 84 (!) 0 -- -- --   06/01/22 2045 -- -- 79 (!) 0 -- -- --   06/01/22 2030 -- -- 68 9 -- -- --   06/01/22 2015 -- (!) 91.8 °F (33.2 °C) 71 10 -- -- --   06/01/22 2000 -- (!) 92.3 °F (33.5 °C) 71 13 -- -- --   06/01/22 1958 -- -- 70 10 100 % -- --   06/01/22 1945 -- -- 73 13 -- -- --   06/01/22 1930 -- -- 69 12 -- -- --   06/01/22 1915 -- -- 75 12 -- -- --   06/01/22 1900 -- -- 84 12 -- -- --   06/01/22 1845 -- -- 77 (!) 0 -- -- --   06/01/22 1830 -- -- 82 (!) 0 -- -- --   06/01/22 1829 -- (!) 89.6 °F (32 °C) -- -- -- -- --   06/01/22 1815 -- -- 90 16 -- -- --   06/01/22 1800 -- -- 92 25 -- -- --   06/01/22 1745 -- -- 75 13 -- -- --   06/01/22 1730 (!) 55/30 -- 80 14 -- -- --   06/01/22 1715 -- -- 78 13 -- -- --   06/01/22 1700 -- -- 78 13 -- -- --   06/01/22 1645 -- -- 79 14 -- -- --   06/01/22 1630 (!) 74/60 (!) 89.6 °F (32 °C) 92 16 -- -- --   06/01/22 1615 (!) 70/42 -- 86 16 -- -- --   06/01/22 1600 108/64 -- 91 16 -- -- --   06/01/22 1559 -- -- 91 16 -- -- --   06/01/22 1556 114/75 -- -- -- -- 5' 5\" (1.651 m) 50.8 kg (112 lb)   06/01/22 1555 114/75 -- -- -- -- 5' 5\" (1.651 m) 50.8 kg (112 lb)   06/01/22 1054 114/75 -- -- -- -- 5' 5\" (1.651 m) 50.8 kg (112 lb)     Physical Exam  Constitutional:       Appearance: She is underweight. She is toxic-appearing. Interventions: She is intubated. Cardiovascular:      Comments: Impella device left groin and VA ECMO right groin. Left arm aVF. RUE A-line. Patient does not have palpable pulses distally. Left foot is cool and does not have a signal.  Right foot is warm. Left toes dusky. Her B/L feet remain viable. Pulmonary:      Effort: No respiratory distress. She is intubated. Abdominal:      General: Abdomen is flat. There is no distension. Genitourinary:     Comments: Parra  Skin:     Coloration: Skin is pale.          Pertinent Test Results:   Recent Results (from the past 24 hour(s))   ECHO ADULT COMPLETE    Collection Time: 06/01/22 10:54 AM   Result Value Ref Range    Fractional Shortening 2D 27 28 - 44 %    LV ESV Index A4C 28 mL/m2    LV EDV Index A4C 45 mL/m2    LVIDd Index 2.84 cm/m2    LVIDs Index 2.06 cm/m2    LV RWT Ratio 0.36     LV Mass 2D 128.0 67 - 162 g    LV Mass 2D Index 82.6 43 - 95 g/m2    MV E/A 1.21     LVOT Area 1.8 cm2    LA Volume Index 4C 19 16 - 34 mL/m2    LA Size Index 1.94 cm/m2    LA/AO Root Ratio 1.25     Ao Root Index 1.55 cm/m2 Ascending Aorta Index 1.48 cm/m2    IVSd 1.0 (A) 0.6 - 0.9 cm    LVIDd 4.4 3.9 - 5.3 cm    LVIDs 3.2 cm    LVOT Diameter 1.5 cm    LVPWd 0.8 0.6 - 0.9 cm    EF BP 41 (A) 55 - 100 %    LV Ejection Fraction A2C 43 %    LV Ejection Fraction A4C 37 %    LV EDV A2C 120 mL    LV EDV A4C 70 mL    LV EDV BP 96 56 - 104 mL    LV ESV A2C 68 mL    LV ESV A4C 44 mL    LV ESV BP 57 (A) 19 - 49 mL    RVIDd 3.2 cm    LA Diameter 3.0 cm    LA Volume A/L 42 mL    LA Volume 2C 39 22 - 52 mL    LA Volume 4C 30 22 - 52 mL    LA Volume BP 36 22 - 52 mL    MV A Velocity 0.87 m/s    MV E Wave Deceleration Time 171.9 ms    MV E Velocity 1.05 m/s    MR Peak Gradient 12 mmHg    MR Peak Velocity 1.7 m/s    MV Peak Gradient 5 mmHg    MV Mean Gradient 2 mmHg    MV Max Velocity 1.1 m/s    MV Mean Velocity 0.8 m/s    MV VTI 28.8 cm    Pulmonary Artery EDP 6 mmHg    SD Max Velocity 1.2 m/s    PV Peak Gradient 3 mmHg    PV Max Velocity 0.8 m/s    TAPSE 1.2 (A) 1.7 cm    TR Peak Gradient 27 mmHg    TR Max Velocity 2.60 m/s    Ascending Aorta 2.3 cm    Aortic Root 2.4 cm    LV ESV Index BP 37 mL/m2    LV EDV Index BP 62 mL/m2    LV ESV Index A2C 44 mL/m2    LV EDV Index A2C 77 mL/m2    LA Volume Index BP 23 16 - 34 ml/m2    LA Volume Index A/L 27 16 - 34 mL/m2    LA Volume Index 2C 25 16 - 34 mL/m2   POC ACTIVATED CLOTTING TIME    Collection Time: 06/01/22 11:35 AM   Result Value Ref Range    Activated Clotting Time (POC) 150 (H) 79 - 138 SECS   POC ACTIVATED CLOTTING TIME    Collection Time: 06/01/22 12:04 PM   Result Value Ref Range    Activated Clotting Time (POC) 248 (H) 79 - 138 SECS   POC ACTIVATED CLOTTING TIME    Collection Time: 06/01/22  1:15 PM   Result Value Ref Range    Activated Clotting Time (POC) 248 (H) 79 - 138 SECS   POC ACTIVATED CLOTTING TIME    Collection Time: 06/01/22  1:57 PM   Result Value Ref Range    Activated Clotting Time (POC) 213 (H) 79 - 138 SECS   POC ACTIVATED CLOTTING TIME    Collection Time: 06/01/22  2:39 PM Result Value Ref Range    Activated Clotting Time (POC) 237 (H) 79 - 138 SECS   BLOOD GAS,CHEM8,LACTIC ACID POC    Collection Time: 06/01/22  2:40 PM   Result Value Ref Range    Calcium, ionized (POC) 0.85 (L) 1.12 - 1.32 mmol/L    pH (POC) 7.53 (H) 7.35 - 7.45      pCO2 (POC) 30.1 (L) 35.0 - 45.0 MMHG    pO2 (POC) 613 (H) 80 - 100 MMHG    BICARBONATE 25 mmol/L    Base excess (POC) 2.3 mmol/L    Sample source ARTERIAL      CO2, POC 25 (H) 19 - 24 MMOL/L    Sodium,  136 - 145 MMOL/L    Potassium, POC 3.1 (L) 3.5 - 5.5 MMOL/L    Chloride, POC 99 (L) 100 - 108 MMOL/L    Glucose,  74 - 106 MG/DL    Creatinine, POC 3.3 (H) 0.6 - 1.3 MG/DL   PROCALCITONIN    Collection Time: 06/01/22  4:54 PM   Result Value Ref Range    Procalcitonin 7.15 ng/mL   METABOLIC PANEL, COMPREHENSIVE    Collection Time: 06/01/22  4:56 PM   Result Value Ref Range    Sodium 138 136 - 145 mmol/L    Potassium 2.9 (L) 3.5 - 5.1 mmol/L    Chloride 101 97 - 108 mmol/L    CO2 24 21 - 32 mmol/L    Anion gap 13 5 - 15 mmol/L    Glucose 156 (H) 65 - 100 mg/dL    BUN 30 (H) 6 - 20 MG/DL    Creatinine 3.23 (H) 0.55 - 1.02 MG/DL    BUN/Creatinine ratio 9 (L) 12 - 20      GFR est AA 18 (L) >60 ml/min/1.73m2    GFR est non-AA 15 (L) >60 ml/min/1.73m2    Calcium 6.7 (L) 8.5 - 10.1 MG/DL    Bilirubin, total 0.8 0.2 - 1.0 MG/DL    ALT (SGPT) 34 12 - 78 U/L    AST (SGOT) 112 (H) 15 - 37 U/L    Alk.  phosphatase 96 45 - 117 U/L    Protein, total 4.4 (L) 6.4 - 8.2 g/dL    Albumin 1.5 (L) 3.5 - 5.0 g/dL    Globulin 2.9 2.0 - 4.0 g/dL    A-G Ratio 0.5 (L) 1.1 - 2.2     MAGNESIUM    Collection Time: 06/01/22  4:56 PM   Result Value Ref Range    Magnesium 1.9 1.6 - 2.4 mg/dL   CBC WITH AUTOMATED DIFF    Collection Time: 06/01/22  4:56 PM   Result Value Ref Range    WBC 11.8 (H) 3.6 - 11.0 K/uL    RBC 2.03 (L) 3.80 - 5.20 M/uL    HGB 6.4 (L) 11.5 - 16.0 g/dL    HCT 19.1 (L) 35.0 - 47.0 %    MCV 94.1 80.0 - 99.0 FL    MCH 31.5 26.0 - 34.0 PG    MCHC 33.5 30.0 - 36.5 g/dL    RDW 15.0 (H) 11.5 - 14.5 %    PLATELET 048 568 - 145 K/uL    MPV 9.1 8.9 - 12.9 FL    NRBC 0.3 (H) 0  WBC    ABSOLUTE NRBC 0.03 (H) 0.00 - 0.01 K/uL    NEUTROPHILS 85 (H) 32 - 75 %    LYMPHOCYTES 11 (L) 12 - 49 %    MONOCYTES 3 (L) 5 - 13 %    EOSINOPHILS 0 0 - 7 %    BASOPHILS 0 0 - 1 %    IMMATURE GRANULOCYTES 1 (H) 0.0 - 0.5 %    ABS. NEUTROPHILS 10.0 (H) 1.8 - 8.0 K/UL    ABS. LYMPHOCYTES 1.3 0.8 - 3.5 K/UL    ABS. MONOCYTES 0.4 0.0 - 1.0 K/UL    ABS. EOSINOPHILS 0.0 0.0 - 0.4 K/UL    ABS. BASOPHILS 0.0 0.0 - 0.1 K/UL    ABS. IMM.  GRANS. 0.1 (H) 0.00 - 0.04 K/UL    DF SMEAR SCANNED      RBC COMMENTS JASS CELLS  PRESENT        RBC COMMENTS ACANTHOCYTES     PHOSPHORUS    Collection Time: 06/01/22  4:56 PM   Result Value Ref Range    Phosphorus 4.1 2.6 - 4.7 MG/DL   BLOOD GAS,CHEM8,LACTIC ACID POC    Collection Time: 06/01/22  5:03 PM   Result Value Ref Range    Calcium, ionized (POC) 0.81 (L) 1.12 - 1.32 mmol/L    pH (POC) 7.59 (HH) 7.35 - 7.45      pCO2 (POC) 21.7 (L) 35.0 - 45.0 MMHG    pO2 (POC) 468 (H) 80 - 100 MMHG    BICARBONATE 21 mmol/L    Base excess (POC) 0.1 mmol/L    Sample source ARTERIAL      CO2, POC 21 19 - 24 MMOL/L    Sodium,  136 - 145 MMOL/L    Potassium, POC 2.6 (LL) 3.5 - 5.5 MMOL/L    Chloride,  100 - 108 MMOL/L    Glucose,  (H) 74 - 106 MG/DL    Creatinine, POC 2.7 (H) 0.6 - 1.3 MG/DL    Lactic Acid (POC) 3.64 (HH) 0.40 - 2.00 mmol/L    Critical value read back SCNEIDER    BLOOD GAS, ARTERIAL    Collection Time: 06/01/22  5:41 PM   Result Value Ref Range    pH 7.62 (HH) 7.35 - 7.45      PCO2 22 (L) 35 - 45 mmHg    PO2 375 (H) 80 - 100 mmHg    O2  (H) 92 - 97 %    BICARBONATE 22 22 - 26 mmol/L    BASE EXCESS 3.2 mmol/L    O2 METHOD VENT      FIO2 40 %    MODE ASSIST CONTROL      Tidal volume 350.0      SET RATE 12      PEEP/CPAP 5.0      Sample source ARTERIAL      SITE DRAWN FROM ARTERIAL LINE      REDD'S TEST NOT APPLICABLE Critical value read back Called to Dr Menchaca Reason on 06/01/2022 at 17:43    TYPE & SCREEN    Collection Time: 06/01/22  5:58 PM   Result Value Ref Range    Crossmatch Expiration 06/04/2022,2359     ABO/Rh(D) O POSITIVE     Antibody screen NEG     Unit number [id number]     Blood component type RC LR,1     Unit division 00     Status of unit TRANSFUSED     Crossmatch result Compatible     Unit number [id number]     Blood component type RC LR,1     Unit division 00     Status of unit TRANSFUSED     Crossmatch result Compatible     Unit number [id number]     Blood component type RC LR     Unit division 00     Status of unit TRANSFUSED     Crossmatch result Compatible     Unit number [id number]     Blood component type RC LR     Unit division 00     Status of unit ALLOCATED     Crossmatch result Compatible     Unit number [id number]     Blood component type RC LR     Unit division 00     Status of unit ALLOCATED     Crossmatch result Compatible    POC ACTIVATED CLOTTING TIME    Collection Time: 06/01/22  5:58 PM   Result Value Ref Range    Activated Clotting Time (POC) 213 (H) 79 - 138 SECS   RBC, ALLOCATE    Collection Time: 06/01/22  6:00 PM   Result Value Ref Range    HISTORY CHECKED?  Historical check performed    BLOOD GAS, ARTERIAL    Collection Time: 06/01/22  6:44 PM   Result Value Ref Range    pH 7.54 (H) 7.35 - 7.45      PCO2 27 (L) 35 - 45 mmHg    PO2 48 (LL) 80 - 100 mmHg    O2 SAT 89 (L) 92 - 97 %    BICARBONATE 23 22 - 26 mmol/L    BASE EXCESS 1.4 mmol/L    O2 METHOD VENT      MODE ASSIST CONTROL      Sample source ARTERIAL      SITE DRAWN FROM ARTERIAL LINE      REDD'S TEST NOT APPLICABLE      Critical value read back Called to Dr Menchaca Reason on 06/01/2022 at 18:46    BLOOD GAS, ARTERIAL    Collection Time: 06/01/22  6:47 PM   Result Value Ref Range    pH 7.57 (HH) 7.35 - 7.45      PCO2 24 (L) 35 - 45 mmHg    PO2 371 (H) 80 - 100 mmHg    O2  (H) 92 - 97 %    BICARBONATE 22 22 - 26 mmol/L    BASE EXCESS 1.5 mmol/L    O2 METHOD VENT      MODE ASSIST CONTROL      Sample source ARTERIAL      SITE DRAWN FROM ARTERIAL LINE      REDD'S TEST NOT APPLICABLE      Critical value read back Called to Dr Clementina Ramos on 06/01/2022 at 18:49    BLOOD GAS, ARTERIAL    Collection Time: 06/01/22  6:58 PM   Result Value Ref Range    pH 7.59 (HH) 7.35 - 7.45      PCO2 22 (L) 35 - 45 mmHg    PO2 272 (H) 80 - 100 mmHg    O2  (H) 92 - 97 %    BICARBONATE 21 (L) 22 - 26 mmol/L    BASE EXCESS 1.0 mmol/L    O2 METHOD VENT      FIO2 40 %    Tidal volume 350.0      SET RATE 12      PEEP/CPAP 5.0      Sample source ARTERIAL      SITE DRAWN FROM ARTERIAL LINE      REDD'S TEST NOT APPLICABLE      Critical value read back Called to MEGAN Carrasco RN on 06/01/2022 at 18:59    URINALYSIS W/ REFLEX CULTURE    Collection Time: 06/01/22  7:44 PM    Specimen: Urine   Result Value Ref Range    Color YELLOW/STRAW      Appearance TURBID (A) CLEAR      Specific gravity 1.021      pH (UA) 6.0 5.0 - 8.0      Protein 300 (A) NEG mg/dL    Glucose 500 (A) NEG mg/dL    Ketone TRACE (A) NEG mg/dL    Bilirubin Negative NEG      Blood MODERATE (A) NEG      Urobilinogen 0.2 0.2 - 1.0 EU/dL    Nitrites Negative NEG      Leukocyte Esterase LARGE (A) NEG      WBC 20-50 0 - 4 /hpf    RBC 0-5 0 - 5 /hpf    Epithelial cells FEW FEW /lpf    Bacteria 2+ (A) NEG /hpf    UA:UC IF INDICATED URINE CULTURE ORDERED (A) CNI     PTT    Collection Time: 06/01/22  8:20 PM   Result Value Ref Range    aPTT >130.0 (HH) 22.1 - 31.0 sec    aPTT, therapeutic range     58.0 - 54.1 SECS   METABOLIC PANEL, COMPREHENSIVE    Collection Time: 06/01/22  8:20 PM   Result Value Ref Range    Sodium 136 136 - 145 mmol/L    Potassium 5.0 3.5 - 5.1 mmol/L    Chloride 101 97 - 108 mmol/L    CO2 25 21 - 32 mmol/L    Anion gap 10 5 - 15 mmol/L    Glucose 173 (H) 65 - 100 mg/dL    BUN 29 (H) 6 - 20 MG/DL    Creatinine 3.07 (H) 0.55 - 1.02 MG/DL    BUN/Creatinine ratio 9 (L) 12 - 20      GFR est AA 19 (L) >60 ml/min/1.73m2    GFR est non-AA 16 (L) >60 ml/min/1.73m2    Calcium 7.0 (L) 8.5 - 10.1 MG/DL    Bilirubin, total 0.9 0.2 - 1.0 MG/DL    ALT (SGPT) 32 12 - 78 U/L    AST (SGOT) 110 (H) 15 - 37 U/L    Alk.  phosphatase 69 45 - 117 U/L    Protein, total 4.4 (L) 6.4 - 8.2 g/dL    Albumin 2.0 (L) 3.5 - 5.0 g/dL    Globulin 2.4 2.0 - 4.0 g/dL    A-G Ratio 0.8 (L) 1.1 - 2.2     MAGNESIUM    Collection Time: 06/01/22  8:20 PM   Result Value Ref Range    Magnesium 1.9 1.6 - 2.4 mg/dL   PROTHROMBIN TIME + INR    Collection Time: 06/01/22  8:20 PM   Result Value Ref Range    INR 1.4 (H) 0.9 - 1.1      Prothrombin time 14.8 (H) 9.0 - 11.1 sec   FIBRINOGEN    Collection Time: 06/01/22  8:20 PM   Result Value Ref Range    Fibrinogen 266 200 - 475 mg/dL   CK    Collection Time: 06/01/22  8:20 PM   Result Value Ref Range     (H) 26 - 192 U/L   POC ACTIVATED CLOTTING TIME    Collection Time: 06/01/22  8:21 PM   Result Value Ref Range    Activated Clotting Time (POC) 196 (H) 79 - 138 SECS   BLOOD GAS,CHEM8,LACTIC ACID POC    Collection Time: 06/01/22  8:31 PM   Result Value Ref Range    Calcium, ionized (POC) 0.78 (LL) 1.12 - 1.32 mmol/L    BICARBONATE 23 mmol/L    Base deficit (POC) 1.2 mmol/L    Sample source VENOUS BLOOD      CO2, POC 23 19 - 24 MMOL/L    Sodium,  136 - 145 MMOL/L    Potassium, POC 5.0 3.5 - 5.5 MMOL/L    Chloride,  100 - 108 MMOL/L    Glucose,  (H) 74 - 106 MG/DL    Creatinine, POC 3.1 (H) 0.6 - 1.3 MG/DL    Lactic Acid (POC) 1.99 0.40 - 2.00 mmol/L    Critical value read back NP     pH, venous (POC) 7.42 7.32 - 7.42      pCO2, venous (POC) 35.6 (L) 41 - 51 MMHG    pO2, venous (POC) 364 (H) 25 - 40 mmHg   BLOOD GAS, ARTERIAL    Collection Time: 06/01/22  9:19 PM   Result Value Ref Range    pH 7.50 (H) 7.35 - 7.45      PCO2 29 (L) 35 - 45 mmHg    PO2 284 (H) 80 - 100 mmHg    O2  (H) 92 - 97 %    BICARBONATE 22 22 - 26 mmol/L    BASE EXCESS 0.2 mmol/L O2 METHOD VENT      Sample source ARTERIAL      SITE DRAWN FROM ARTERIAL LINE      REDD'S TEST NOT APPLICABLE     CBC W/O DIFF    Collection Time: 06/01/22  9:21 PM   Result Value Ref Range    WBC 10.2 3.6 - 11.0 K/uL    RBC 2.12 (L) 3.80 - 5.20 M/uL    HGB 6.6 (L) 11.5 - 16.0 g/dL    HCT 19.9 (L) 35.0 - 47.0 %    MCV 93.9 80.0 - 99.0 FL    MCH 31.1 26.0 - 34.0 PG    MCHC 33.2 30.0 - 36.5 g/dL    RDW 15.1 (H) 11.5 - 14.5 %    PLATELET 384 (L) 504 - 400 K/uL    MPV 8.3 (L) 8.9 - 12.9 FL    NRBC 0.0 0  WBC    ABSOLUTE NRBC 0.00 0.00 - 0.01 K/uL   RBC, ALLOCATE    Collection Time: 06/01/22  9:45 PM   Result Value Ref Range    HISTORY CHECKED?  Historical check performed    PLASMA, ALLOCATE    Collection Time: 06/01/22  9:45 PM   Result Value Ref Range    Unit number [id number]     Blood component type FP 24h,Thaw2     Unit division 00     Status of unit TRANSFUSED    BLOOD GAS, ARTERIAL    Collection Time: 06/01/22 11:03 PM   Result Value Ref Range    pH 7.44 7.35 - 7.45      PCO2 34 (L) 35 - 45 mmHg    PO2 268 (H) 80 - 100 mmHg    O2  (H) 92 - 97 %    BICARBONATE 23 22 - 26 mmol/L    BASE DEFICIT 0.9 mmol/L    O2 METHOD VENT      Sample source ARTERIAL      SITE DRAWN FROM ARTERIAL LINE      REDD'S TEST NOT APPLICABLE     CULTURE, BLOOD, PAIRED    Collection Time: 06/01/22 11:56 PM    Specimen: Blood   Result Value Ref Range    Special Requests: NO SPECIAL REQUESTS      Culture result: NO GROWTH AFTER 7 HOURS     LACTIC ACID    Collection Time: 06/01/22 11:56 PM   Result Value Ref Range    Lactic acid 1.4 0.4 - 2.0 MMOL/L   CK    Collection Time: 06/01/22 11:56 PM   Result Value Ref Range     (H) 26 - 192 U/L   PTT    Collection Time: 06/01/22 11:56 PM   Result Value Ref Range    aPTT 61.7 (H) 22.1 - 31.0 sec    aPTT, therapeutic range     58.0 - 77.0 SECS   CBC WITH AUTOMATED DIFF    Collection Time: 06/01/22 11:56 PM   Result Value Ref Range    WBC 9.9 3.6 - 11.0 K/uL    RBC 2.81 (L) 3.80 - 5.20 M/uL    HGB 8.7 (L) 11.5 - 16.0 g/dL    HCT 25.6 (L) 35.0 - 47.0 %    MCV 91.1 80.0 - 99.0 FL    MCH 31.0 26.0 - 34.0 PG    MCHC 34.0 30.0 - 36.5 g/dL    RDW 15.1 (H) 11.5 - 14.5 %    PLATELET 218 (L) 667 - 400 K/uL    MPV 8.5 (L) 8.9 - 12.9 FL    NRBC 0.3 (H) 0  WBC    ABSOLUTE NRBC 0.03 (H) 0.00 - 0.01 K/uL    NEUTROPHILS 82 (H) 32 - 75 %    LYMPHOCYTES 10 (L) 12 - 49 %    MONOCYTES 7 5 - 13 %    EOSINOPHILS 0 0 - 7 %    BASOPHILS 0 0 - 1 %    IMMATURE GRANULOCYTES 1 (H) 0.0 - 0.5 %    ABS. NEUTROPHILS 8.2 (H) 1.8 - 8.0 K/UL    ABS. LYMPHOCYTES 1.0 0.8 - 3.5 K/UL    ABS. MONOCYTES 0.7 0.0 - 1.0 K/UL    ABS. EOSINOPHILS 0.0 0.0 - 0.4 K/UL    ABS. BASOPHILS 0.0 0.0 - 0.1 K/UL    ABS. IMM.  GRANS. 0.1 (H) 0.00 - 0.04 K/UL    DF AUTOMATED     POC ACTIVATED CLOTTING TIME    Collection Time: 06/02/22 12:11 AM   Result Value Ref Range    Activated Clotting Time (POC) 161 (H) 79 - 138 SECS   BLOOD GAS,CHEM8,LACTIC ACID POC    Collection Time: 06/02/22 12:13 AM   Result Value Ref Range    Calcium, ionized (POC) 0.96 (L) 1.12 - 1.32 mmol/L    pH (POC) 7.43 7.35 - 7.45      pCO2 (POC) 33.9 (L) 35.0 - 45.0 MMHG    pO2 (POC) 203 (H) 80 - 100 MMHG    BICARBONATE 22 mmol/L    Base deficit (POC) 1.7 mmol/L    Sample source ARTERIAL      CO2, POC 22 19 - 24 MMOL/L    Sodium,  136 - 145 MMOL/L    Potassium, POC 3.8 3.5 - 5.5 MMOL/L    Chloride,  100 - 108 MMOL/L    Glucose,  (H) 74 - 106 MG/DL    Creatinine, POC 2.8 (H) 0.6 - 1.3 MG/DL    Lactic Acid (POC) 0.85 0.40 - 2.00 mmol/L   BLOOD GAS, ARTERIAL    Collection Time: 06/02/22  1:01 AM   Result Value Ref Range    pH 7.41 7.35 - 7.45      PCO2 36 35 - 45 mmHg    PO2 155 (H) 80 - 100 mmHg    O2 SAT 99 (H) 92 - 97 %    BICARBONATE 22 22 - 26 mmol/L    BASE DEFICIT 1.6 mmol/L    O2 METHOD VENT      Sample source ARTERIAL      SITE DRAWN FROM ARTERIAL LINE      REDD'S TEST NOT APPLICABLE     PTT    Collection Time: 06/02/22  2:04 AM   Result Value Ref Range    aPTT 50.7 (H) 22.1 - 31.0 sec    aPTT, therapeutic range     58.0 - 77.0 SECS   EKG, 12 LEAD, INITIAL    Collection Time: 06/02/22  2:24 AM   Result Value Ref Range    Ventricular Rate 68 BPM    Atrial Rate 68 BPM    P-R Interval 142 ms    QRS Duration 70 ms    Q-T Interval 552 ms    QTC Calculation (Bezet) 586 ms    Calculated P Axis 32 degrees    Calculated R Axis 52 degrees    Calculated T Axis -148 degrees    Diagnosis       Normal sinus rhythm  Low voltage QRS  T wave abnormality, consider inferior ischemia  T wave abnormality, consider anterolateral ischemia  Prolonged QT  Abnormal ECG  When compared with ECG of 01-JUN-2022 16:10,  MANUAL COMPARISON REQUIRED, DATA IS UNCONFIRMED     BLOOD GAS, ARTERIAL    Collection Time: 06/02/22  2:59 AM   Result Value Ref Range    pH 7.42 7.35 - 7.45      PCO2 37 35 - 45 mmHg    PO2 133 (H) 80 - 100 mmHg    O2 SAT 99 (H) 92 - 97 %    BICARBONATE 23 22 - 26 mmol/L    BASE DEFICIT 0.7 mmol/L    O2 METHOD VENT      Sample source ARTERIAL      SITE DRAWN FROM ARTERIAL LINE      REDD'S TEST NOT APPLICABLE     METABOLIC PANEL, COMPREHENSIVE    Collection Time: 06/02/22  3:53 AM   Result Value Ref Range    Sodium 137 136 - 145 mmol/L    Potassium 3.9 3.5 - 5.1 mmol/L    Chloride 102 97 - 108 mmol/L    CO2 26 21 - 32 mmol/L    Anion gap 9 5 - 15 mmol/L    Glucose 136 (H) 65 - 100 mg/dL    BUN 29 (H) 6 - 20 MG/DL    Creatinine 3.18 (H) 0.55 - 1.02 MG/DL    BUN/Creatinine ratio 9 (L) 12 - 20      GFR est AA 18 (L) >60 ml/min/1.73m2    GFR est non-AA 15 (L) >60 ml/min/1.73m2    Calcium 8.0 (L) 8.5 - 10.1 MG/DL    Bilirubin, total 1.8 (H) 0.2 - 1.0 MG/DL    ALT (SGPT) 33 12 - 78 U/L    AST (SGOT) 164 (H) 15 - 37 U/L    Alk.  phosphatase 57 45 - 117 U/L    Protein, total 4.7 (L) 6.4 - 8.2 g/dL    Albumin 2.5 (L) 3.5 - 5.0 g/dL    Globulin 2.2 2.0 - 4.0 g/dL    A-G Ratio 1.1 1.1 - 2.2     MAGNESIUM    Collection Time: 06/02/22  3:53 AM   Result Value Ref Range    Magnesium 1.9 1.6 - 2.4 mg/dL   CBC W/O DIFF    Collection Time: 06/02/22  3:53 AM   Result Value Ref Range    WBC 7.9 3.6 - 11.0 K/uL    RBC 2.59 (L) 3.80 - 5.20 M/uL    HGB 8.0 (L) 11.5 - 16.0 g/dL    HCT 23.5 (L) 35.0 - 47.0 %    MCV 90.7 80.0 - 99.0 FL    MCH 30.9 26.0 - 34.0 PG    MCHC 34.0 30.0 - 36.5 g/dL    RDW 15.8 (H) 11.5 - 14.5 %    PLATELET 380 (L) 741 - 400 K/uL    MPV 8.3 (L) 8.9 - 12.9 FL    NRBC 0.0 0  WBC    ABSOLUTE NRBC 0.00 0.00 - 0.01 K/uL   LD    Collection Time: 06/02/22  3:53 AM   Result Value Ref Range     (H) 81 - 246 U/L   CK    Collection Time: 06/02/22  3:53 AM   Result Value Ref Range    CK 1,249 (H) 26 - 192 U/L   NT-PRO BNP    Collection Time: 06/02/22  3:53 AM   Result Value Ref Range    NT pro-BNP >35,000 (H) <125 PG/ML   PROCALCITONIN    Collection Time: 06/02/22  3:53 AM   Result Value Ref Range    Procalcitonin 16.60 ng/mL   PROTHROMBIN TIME + INR    Collection Time: 06/02/22  3:53 AM   Result Value Ref Range    INR 1.3 (H) 0.9 - 1.1      Prothrombin time 13.0 (H) 9.0 - 11.1 sec   PTT    Collection Time: 06/02/22  3:53 AM   Result Value Ref Range    aPTT 50.0 (H) 22.1 - 31.0 sec    aPTT, therapeutic range     58.0 - 77.0 SECS   FIBRINOGEN    Collection Time: 06/02/22  3:53 AM   Result Value Ref Range    Fibrinogen 253 200 - 475 mg/dL   LACTIC ACID    Collection Time: 06/02/22  4:01 AM   Result Value Ref Range    Lactic acid 1.0 0.4 - 2.0 MMOL/L   BLOOD GAS + IONIZED CALCIUM    Collection Time: 06/02/22  5:04 AM   Result Value Ref Range    pH 7.43 7.35 - 7.45      PCO2 38 35 - 45 mmHg    PO2 119 (H) 80 - 100 mmHg    BICARBONATE 25 22 - 26 mmol/L    BASE EXCESS 0.8 mmol/L    O2 SAT 98 (H) 92 - 97 %    Calcium, ionized 1.05 (L) 1.13 - 1.32 mmol/L    O2 METHOD VENT      Sample source ARTERIAL      SITE DRAWN FROM ARTERIAL LINE      REDD'S TEST NOT APPLICABLE     BLOOD GAS, ARTERIAL    Collection Time: 06/02/22  7:01 AM   Result Value Ref Range    pH 7.42 7.35 - 7.45      PCO2 39 35 - 45 mmHg    PO2 103 (H) 80 - 100 mmHg    O2 SAT 98 (H) 92 - 97 %    BICARBONATE 25 22 - 26 mmol/L    BASE EXCESS 0.6 mmol/L    O2 METHOD VENT      FIO2 40 %    Sample source ARTERIAL      SITE DRAWN FROM ARTERIAL LINE      REDD'S TEST NOT APPLICABLE     CBC W/O DIFF    Collection Time: 06/02/22  8:20 AM   Result Value Ref Range    WBC 6.3 3.6 - 11.0 K/uL    RBC 2.44 (L) 3.80 - 5.20 M/uL    HGB 7.5 (L) 11.5 - 16.0 g/dL    HCT 22.0 (L) 35.0 - 47.0 %    MCV 90.2 80.0 - 99.0 FL    MCH 30.7 26.0 - 34.0 PG    MCHC 34.1 30.0 - 36.5 g/dL    RDW 16.3 (H) 11.5 - 14.5 %    PLATELET 149 (L) 634 - 400 K/uL    MPV 8.0 (L) 8.9 - 12.9 FL    NRBC 0.5 (H) 0  WBC    ABSOLUTE NRBC 0.03 (H) 0.00 - 9.85 K/uL   METABOLIC PANEL, COMPREHENSIVE    Collection Time: 06/02/22  8:20 AM   Result Value Ref Range    Sodium 138 136 - 145 mmol/L    Potassium 4.1 3.5 - 5.1 mmol/L    Chloride 102 97 - 108 mmol/L    CO2 26 21 - 32 mmol/L    Anion gap 10 5 - 15 mmol/L    Glucose 113 (H) 65 - 100 mg/dL    BUN 30 (H) 6 - 20 MG/DL    Creatinine 3.30 (H) 0.55 - 1.02 MG/DL    BUN/Creatinine ratio 9 (L) 12 - 20      GFR est AA 18 (L) >60 ml/min/1.73m2    GFR est non-AA 14 (L) >60 ml/min/1.73m2    Calcium 8.6 8.5 - 10.1 MG/DL    Bilirubin, total 1.9 (H) 0.2 - 1.0 MG/DL    ALT (SGPT) 35 12 - 78 U/L    AST (SGOT) 206 (H) 15 - 37 U/L    Alk.  phosphatase 53 45 - 117 U/L    Protein, total 5.1 (L) 6.4 - 8.2 g/dL    Albumin 2.9 (L) 3.5 - 5.0 g/dL    Globulin 2.2 2.0 - 4.0 g/dL    A-G Ratio 1.3 1.1 - 2.2     MAGNESIUM    Collection Time: 06/02/22  8:20 AM   Result Value Ref Range    Magnesium 2.3 1.6 - 2.4 mg/dL   PHOSPHORUS    Collection Time: 06/02/22  8:20 AM   Result Value Ref Range    Phosphorus 3.8 2.6 - 4.7 MG/DL   BLOOD GAS, ARTERIAL    Collection Time: 06/02/22  8:38 AM   Result Value Ref Range    pH 7.44 7.35 - 7.45      PCO2 36 35 - 45 mmHg    PO2 109 (H) 80 - 100 mmHg    O2 SAT 98 (H) 92 - 97 %    BICARBONATE 24 22 - 26 mmol/L    BASE EXCESS 0.3 mmol/L    O2 METHOD VENT      FIO2 40 %    MODE ASSIST CONTROL      Tidal volume 220.0      SET RATE 10      PEEP/CPAP 5.0      Sample source ARTERIAL      SITE DRAWN FROM ARTERIAL LINE      REDD'S TEST NOT APPLICABLE         Assessmen/Plan:     Peripheral vascular disease   · No role for vascular procedural intervention. At risk for progression to amputation. Float heels at all times. Cardiogenic shock  Prolonged PEA cardiac arrest   Hypothermia   NSTEMI  Ischemic cardiomyopathy  -EF 30-35% post procedures 5-10%   Coronary artery disease   Pulmonary edema   Acute hypoxic respiratory failure   Mechanical intubation   -6/1/22 =>   New onset atrial fibrillation   Hx of hypertension  Hx of hyperlipidemia   · Management per cardiology and CTS. Available for removal of Impella device at the discretion of cardiology and CTS. Sepsis shock  -blood cx pending   UTI  -failed outpatient tx with Keflex   -urine cx pending   -empiric antbxs   Loose stool  Hx of clostridioides difficile colitis     Shock liver  Coagulopathy    Thrombocytopenia   Chronic hepatitis C  Alcoholism in remission     ESRF  -HD TTHS  Anemia of chronic renal disease   Anemia of acute blood loss  -transfused overnight & additional order placed for today  · Management per nephrology and CC team      Insulin dependent diabetes mellitus type II  Diabetic gastroparesis     DDD of the lumbar spine  Chronic pain syndrome     VTE Prophylaxis:  Heparin drip     Disposition:  Condition critical with overall poor prognosis.     Signed By: Mary Lam NP     June 2, 2022

## 2022-06-02 NOTE — PROCEDURES
[facility]      Procedure Note - Arterial Access:   Performed by Juwan Lovell NP. Immediately prior to the procedure, the patient was reevaluated and found suitable for the planned procedure and any planned medications. Immediately prior to the procedure a time out was called to verify the correct patient, procedure, equipment, staff, and marking as appropriate. Central line Bundle:  Full sterile barrier precautions used. 5 mL 1% Lidocaine placed at insertion site. Cardiogenic Shock Diagnosis    Insertion Date: 6/1/22 Time: 2130   Procedure Location:  ICU. Condition: Emergency. Consent:  NO.     Method: Seldinger technique. Site Prep: ChloraPrep. Procedure: Arterial Catheter Insertion in Right, Radial Artery   Catheter inserted into a new site. Number of Attempts:  1 Indication: Monitoring and Blood Drawing. There was bright red, pulsatile blood return. Femoral Site? N/A. If Yes, reason femoral site was chosen:   Catheter secured. Biopatch in place? no. Sterile Bio-occlusive dressing placed. Complication None. The procedure was tolerated well.     Juwan Lovell NP   Critical Care Medicine  [facility]

## 2022-06-03 PROBLEM — I50.82 BIVENTRICULAR HEART FAILURE (HCC): Status: ACTIVE | Noted: 2022-01-01

## 2022-06-03 PROBLEM — Z51.5 PALLIATIVE CARE BY SPECIALIST: Status: ACTIVE | Noted: 2022-01-01

## 2022-06-03 NOTE — PROGRESS NOTES
Occupational Therapy note:    Chart reviewed and discussed with nursing. Patient remains intubated/sedated and medically unstable, is inappropriate for OT evaluation. Will complete order and sign off at this time. Please reconsult if patient become medially appropriate for OT services.      Aziza Hopkins, OTR/L

## 2022-06-03 NOTE — PROGRESS NOTES
[facility] ICU Progress Note    Admit Date: 2022  POD:  2 Days Post-Op    Procedure:  Procedure(s):  LEFT HEART CATH / CORONARY ANGIOGRAPHY  LEFT VENTRICULOGRAPHY  ULTRASOUND GUIDED VASCULAR ACCESS  PERCUTANEOUS CORONARY INTERVENTION  ANGIOPLASTY CORONARY  ATHERECTOMY CORONARY  FRACTIONAL FLOW RESERVE  IMPELLA INSERTION  INSERT STENT GUERITA CORONARY  ECMO CATH LAB        Subjective:   Pt seen with Dr. Ирина Krishnan. Pt intubated and sedated on ECMO 3950 flowing 3.7, Impella @ P2    Vent 40%. Temp ~ 95     Objective:   Vitals: SBP 90/68 (77)  Blood pressure (!) 55/30, pulse 81, temperature (!) 95.5 °F (35.3 °C), resp. rate 10, height 5' 5\" (1.651 m), weight 123 lb 10.9 oz (56.1 kg), last menstrual period 09/15/2013, SpO2 100 %. Temp (24hrs), Av.7 °F (35.4 °C), Min:94.3 °F (34.6 °C), Max:96.1 °F (35.6 °C)    EKG/Rhythm:  NSR 70-80s    Ventilator:  Ventilator Volumes  Vt Set (ml): 220 ml (22)  Vt Exhaled (Machine Breath) (ml): 215 ml (22)  Ve Observed (l/min): 2.1 l/min (22)    Oxygen Therapy:  Oxygen Therapy  O2 Sat (%): 100 % (22)  Pulse via Oximetry: 82 beats per minute (22)  O2 Device: Endotracheal tube (22)  Skin Assessment: Clean, dry, & intact (22)  Skin Protection for O2 Device: N/A (22)  O2 Flow Rate (L/min): 2 l/min (22 1106)  FIO2 (%): 40 % (22 08)    CXR:    CXR Results  (Last 48 hours)               22 0436  XR CHEST PORT Final result    Impression:  No significant change in positions of the tubes and catheters. Malpositioned left IJ catheter is unchanged. There is increasing diffuse   pulmonary opacification consistent with pulmonary edema. Narrative:  EXAM:  XR CHEST PORT       INDICATION:  postop heart       COMPARISON:  2022       FINDINGS: A portable AP radiograph of the chest was obtained at 418 hours.  ET   tube, right IJ catheter a catheter entering from the IVC with the tip distal SVC   is unchanged. Malpositioned left internal jugular catheter is unchanged. Left   ventricular assist device is unchanged. NG tube is unchanged. .  Diffuse   pulmonary opacification has slightly progressed. Marnell Bryce Heart size is stable. .  Bony   structures are unchanged. 06/01/22 1808  XR CHEST PORT Final result    Impression:  Right internal jugular central venous catheter appears to be in   satisfactory position. No evidence of placement related complication. Increased   bilateral atelectasis. Otherwise, no significant change. Narrative:  INDICATION: Central line placement       COMPARISON: 6/1/2022 at 4:40 PM       FINDINGS: Single AP portable view of the chest obtained at 5:52 PM demonstrates   a new right internal jugular central venous catheter which has its tip overlying   the mid SVC. There is otherwise no change in position of the lines and tubes. Malpositioned left internal jugular central venous catheter is stable in   position. The cardiomediastinal silhouette is stable. There is increased   bilateral atelectasis. No pneumothorax is seen. There is no evidence of pleural   effusion. 06/01/22 1649  XR CHEST PORT Final result    Impression:  1. Malpositioned left internal jugular central venous catheter; repositioning is   recommended. 2. Other lines and tubes appear to be in satisfactory position. 3. Mild right basilar atelectasis. The findings were called to the CCU nurse on 6/1/2022 at 4:57 PM by Dr. Day Morgan. [phone]               Narrative:  INDICATION: Postop heart surgery       COMPARISON: 5/31/2022       FINDINGS: AP portable imaging of the chest performed at 4:40 PM demonstrates a   stable cardiomediastinal silhouette. Endotracheal tube tip lies approximately   3.3 cm above the nanci. Left internal jugular central venous catheter tip   overlies the left mediastinum. Intrapelvic catheter tip overlies the left   ventricular apex.  Nasogastric tube tip overlies the gastric fundus. Presumed IVC   sheath has its tip at the level of the distal SVC. There is mild right basilar   atelectasis. No pneumothorax or pleural effusion is evident. Admission Weight: Last Weight   Weight: 140 lb (63.5 kg) Weight: 123 lb 10.9 oz (56.1 kg)     Intake / Output / Drain:  Current Shift:  0701 -  1900  In: 340.1 [I.V.:310.1]  Out: -   Last 24 hrs.:     Intake/Output Summary (Last 24 hours) at 6/3/2022 0845  Last data filed at 6/3/2022 0800  Gross per 24 hour   Intake 2668.05 ml   Output 3565 ml   Net -896.95 ml       EXAM:  General:   Intubated and sedated on ECMO and Impella                                 Lungs:   Coarse to auscultation bilaterally. Heart:  Regular rate and rhythm, S1, S2 normal, no murmur, click, rub or gallop. Abdomen:   Soft, non-tender. Bowel sounds hypoactive. No masses,  No organomegaly. Extremities:  No edema. + pedal pulses bilat to doppler   Neurologic:  sedated. Labs:   Recent Labs     22  0749 22  0552 22  0415 22  0415 22  0226 22  0820 22  0353   WBC 9.1  --    < > 9.2  --    < > 7.9   HGB 10.2*  --    < > 10.1*  --    < > 8.0*   HCT 29.1*  --    < > 28.9*  --    < > 23.5*   PLT 89*  --    < > 98*  --    < > 106*   NA  --   --   --  137  --    < > 137   K  --   --   --  3.8  --    < > 3.9   BUN  --   --   --  31*  --    < > 29*   CREA  --   --   --  3.53*  --    < > 3.18*   GLU  --   --   --  136*  --    < > 136*   GLUCPOC  --  137*  --   --    < >   < >  --    INR  --   --   --   --   --   --  1.3*    < > = values in this interval not displayed.         Assessment:     Active Problems:    Hyperkalemia (2022)      Pulmonary edema (2022)      NSTEMI (non-ST elevated myocardial infarction) ([facility] [address] (2022)      ESRD (end stage renal disease) on dialysis [facility]) (2022)       Plan/Recommendations/Medical Decision Makin. VT, Cardiogenic shock: Emergent Impella and VA ECMO placement. On no pressors at this time. Flowing 3.6-3.7 on 3950 rpm. Cont heparin gtt. No issues with ECMO circuit. Repeat ECHO today per cardiology to assess LV function and plan. Much more pulsatile. Bilat LEs warm w + doppler pulses on my exam  2. NSTEMI, CAD s/p GUERITA RCA and Lcx: On brilinta. 3. Acute respiratory failure: intubated during code. On Prop and Fentanyl. Vent 40%. Wean prop today to assess neuro fx. on zosyn per ICU team  4. ESRD on HD: Creatinine 3.53 this am after HD yesterday. Nephro following. HD 6/2 w 2.5L off. Remove mckeon today, bladder scan Q shift. 5. HTN: On metoprolol 25mg, clonidine 0.1mg BID PTA. Controlled with sedation at this time. 6. HLD: on Lipitor 20mg PTA. 7. Hep C  8. T2DM: On Tresiba, insulin aspart PTA. On SSI  9. Chronic lower back pain: On robaxin/flexeril, Norco, tylenol, lidocaine patches. Cont fentanyl gtt   10. N/V/Diarrhea: on zofran and phenergan PTA. FMS in place, 400mL out last 24hrs. 11. Encephalopathy: Neuro following, EEG completed. No purposeful movements when sedation weaned. BP up with turning, will bite down during mouth care. Cont to monitor, cont daily sedation wean to assess neuro status. 12.      Dispo: per primary team. ECHO today.  Remove mckeon and complete bladder scan every shift.      Signed By: Dasha Anaya NP

## 2022-06-03 NOTE — DIALYSIS
Hemodialysis / [phone]    Vitals Pre Post Assessment Pre Post   BP  120/87 LOC Intubated/Sedated Intubated/Sedated   HR Pulse (Heart Rate): 82 (06/03/22 1500) 87 Lungs Clear Clear   Resp Resp Rate: 16 (06/03/22 1500) 16 Cardiac Sinus Sinus   ` Temp: 97.7 °F (36.5 °C) (06/03/22 1510) 97.6 Skin Cold/Dry Cold/Dry   Weight    Edema Trace Trace   Tele status   Pain Pain Intensity 1: 3 (06/03/22 1200)      Orders   Duration: Start: 1500 End: 1700 Total: 2hrs   Dialyzer: Dialyzer/Set Up Inspection: Revaclear (06/03/22 1510)   K Bath: Dialysate K (mEq/L):  (UF only) (06/02/22 1255)   Ca Bath: Dialysate CA (mEq/L):  (UF only) (06/02/22 1255)   Na: Dialysate NA (mEq/L):  (UF only) (06/02/22 1255)   Bicarb: Dialysate HCO3 (mEq/L):  (UF only) (06/02/22 1255)   Target Fluid Removal: Goal/Amount of Fluid to Remove (mL): 2000 mL (06/03/22 1510)     Access   Type & Location: [facility] AVF   Comments:  Left upper arm AV Fistula without evidence of warmth, redness, or drainage. +thrill/+bruit. Each access site disinfected for 60 seconds per site with alcohol swabs per P&P. Cannulated with 15 G needles x2 and secured with paper tape. +aspiration/+flushed.           Labs   HBsAg (Antigen) / date: Negative 6/1/22                        HBsAb (Antibody) / date: Susceptible 6/1/22   Source: EPIC   Obtained/Reviewed  Critical Results Called HGB   Date Value Ref Range Status   06/03/2022 9.7 (L) 11.5 - 16.0 g/dL Final     Potassium   Date Value Ref Range Status   06/03/2022 4.1 3.5 - 5.1 mmol/L Final     Calcium   Date Value Ref Range Status   06/03/2022 8.6 8.5 - 10.1 MG/DL Final     BUN   Date Value Ref Range Status   06/03/2022 29 (H) 6 - 20 MG/DL Final     Creatinine   Date Value Ref Range Status   06/03/2022 3.75 (H) 0.55 - 1.02 MG/DL Final        Meds Given   Name Dose Route                    Adequacy / Fluid    Total Liters Process: UF only   Net Fluid Removed: 2140ml      Comments   Time Out Done:   (Time) 1430   Admitting Diagnosis: Abdominal Pain (NSTEMI)   Consent obtained/signed: Informed Consent Verified: Yes (06/03/22 1510)   Machine / RO # Machine Number: [id number] (06/03/22 7530)   Primary Nurse Rpt Pre: KAI Del Toro RN   Primary Nurse Rpt Post: Nicholas Del Toro RN   Pt Education: Intubated/Sedated   Care Plan: HD per Nephrology orders   Pts outpatient clinic: [facility]     Tx Summary   Comments:    Treatment completed without issue. All blood rinsed back with NS. 15g needles removed intact and pressure held until hemostasis was achieved. Guaze & tape applied to needle sites. Education and Pre/post with primary rN.

## 2022-06-03 NOTE — PROGRESS NOTES
2000  Able to dopple Lt DP pulse. [id number]  BS 48. Repeat 47 per EPOC. 250 cc  D 10 given over 15 minutes. Discuss with NP and will start  D10 drip at 30 cc/ hr.  After initial repeat BS continue BS q 2hr x 2 until further orders  2345  Repeat     0100  HGB 7.9  1 UPRBC given    0400 Bathed. Dressings changed using Dry-Stat. Propofol decreased to 25 mcg. Rt thigh larger than left thigh. Pedal pulses remain the same    0600. ABG noted. Sweep to 1 and O2 to 50% per ECMO specialist.  Repeat gas in 1 hour    0630  Flexiseal leaked. Turning side to side to clean Impella with LV placement alarm. BP rapidly increased to 160/120  And ECMO flows drop to 3.3-3.4. Propofol resumed at 40 mcg.    0645  SBP return to 120. Flows 3.5-3.6  Rpm 3950. Propofol remains @40 mcg.

## 2022-06-03 NOTE — DIABETES MGMT
[address] [facility]    CLINICAL NURSE SPECIALIST CONSULT     Initial Presentation   Eddie Paniagua is a 64 y.o. female admitted from the ER 5/31/22 after experiencing burning chest pain and abdominal pain. Afebrile. Tachycardic. Hypertensive. O2 sas 92%  LAB: WBC 17.2. Low HGB. NT pro-BNP >35,000. Troponin 9724. CXR:   Improved interstitial pulmonary edema with residual nonspecific   bibasilar pulmonary densities. Small bilateral pleural effusions. HX:   Past Medical History:   Diagnosis Date    Abdominal pain 11/18/2013    Abdominal pain, LUQ (left upper quadrant) 6/7/2012    Back pain, lumbosacral 6/24/2012    Acute on chronic      C. 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   Alma Rosa Bouquet by Provider for advanced diabetes nursing assessment and care for:   [] Transitioning off Timothy Wilbert   [x] Inpatient management strategy  [] Home management assessment  [] Survival skill education    Hospital Course   Clinical progress has been complicated by need for ICU level of care. 5/31/22 Cardiology consult: Recommend cardiac cath  6/1/22 Went pulseless during cardiac cath => ECMO required. ECHO: Severely depressed function of bilateral ventricles. EKG: Afib  6/2/22 Incredibly ill. ECMO+. IMPELLA+. On multiple infusions. CKs rising. Poor perfusion to right leg with ECMO & Impella in that extremity. Chronic HD. Will get blood transfusion(s) today => received. 6/3/22 Responds to voice per nursing, but sedated on Propofol infusion. On PRVC vent support FiO2 40%/Peep 5. EEG completed; no seizures noted. Neurologist wants sedation weaned in order to do proper neuro exam. Discussion of ECMO/Impella approach will be addressed by intensivist & primary cardiologist today. HD yesterday with plans for today as well. Diarrhea => Flexiseal in place    Diabetes History   Type 2 diabetes on insulin therapy  Family history of diabetes in mother, father, sister and brother  Admission . A1cs are OLD & likely inaccurate (as patient probably on EPO)    Diabetes-related Medical History  Acute complications  DKA  Microvascular disease  Nephropathy on chronic HD  Macrovascular disease  CAD and Myocardial infarction    Diabetes Medication History  Key Antihyperglycemic Medications             insulin degludec (TRESIBA U-100 INSULIN SC) (Taking) 30 Units by SubCUTAneous route daily. insulin aspart U-100 (NOVOLOG) 100 unit/mL injection (Taking) 5 Units by SubCUTAneous route Before breakfast, lunch, and dinner.          Diabetes self-management practices: Deferred    Subjective   Intubated      Objective   Physical exam  General Normal weight female who is ill-appearing  Neuro  Responds to voice per nursing although on Propofol infusion  Vital Signs   Visit Vitals  BP (!) 55/30   Pulse 79   Temp (!) 95.5 °F (35.3 °C)   Resp 10   Ht 5' 5\" (1.651 m)   Wt 56.1 kg (123 lb 10.9 oz)   SpO2 100%   BMI 20.58 kg/m²     Laboratory  Recent Labs     06/03/22  0749 06/03/22  0415 06/02/22  0332 * 136* 220*   AGAP 11 11 12   WBC 9.1 9.2 7.6   CREA 3.67* 3.53* 3.50*   GFRNA 13* 13* 14*   * 365* 328*   ALT 49 50 45       Factors impacting BG management  Factor Dose Comments   Nutrition:  NPO     Drugs:  Blood transfusion(s)   Multiple PRBCs 6/1/22, 6/2/22   A1cs inaccurate   Pain Fentanyl infusion    Infection Zosyn Q12 hrs Hypothermic. WBC normal   Other:   Kidney function  Liver function  Albumin  Procalcitonin   NT pro-BNP   CKD  AST elevated  2.8  16.6 (6/2/22)  >35,000 (6/3/22)          Blood glucose pattern      Significant diabetes-related events over the past 24-72 hours  Admission   Received 10 units of Lantus insulin 6/1/22 => BGs 102-173   (6/2/22) => BG dropped to 47 1130pm 6/2/22  BGs 110-170s today    Assessment and Plan   Nursing Diagnosis Risk for unstable blood glucose pattern   Nursing Intervention Domain 525 Decision-making Support   Nursing Interventions Examined current inpatient diabetes/blood glucose control   Explored factors facilitating and impeding inpatient management     Evaluation   This incredibly ill normal weight AA female was admitted & treated for NSTEMI & pulmonary edema. Went pulseless during cardiac cath => ECMO & IMPELLA placed. Chronic HD. Since patient has CKD & required no insulin to date, had recommended use of HIGH sensitivity insulin approach yesterday (6/2/33). Despite this, BG dropped to 47 last night @1130pm => started on dextrose infusion & then stopped this morning. BGs in 100s this morning. No change in approach needed.     Recommendations     [x] Use of Subcutaneous Insulin Order set (6108)  Insulin Dosing Specific recommendation   Basal                                      (Based on weight, BMI & GFR) []        0.2 units/kg/D  [] 0.3 units/kg/D  [] 0.4 units/kg/D    Nutritional                                      (Based on CHO/dextrose load) [] Normal sensitivity  [] Insulin-resistant sensitivity    Corrective (Useful in adjusting insulin dosing) [] Normal sensitivity  [x] HIGH sensitivity  [] Insulin-resistant sensitivity      Billing Code(s)   [x] 02194 IP subsequent hospital care - 35 minutes     Before making these care recommendations, I personally reviewed the hospitalization record, including notes, laboratory & diagnostic data and current medications, and examined the patient at the bedside (circumstances permitting) before making care recommendations. More than fifty (50) percent of the time was spent in patient counseling and/or care coordination.   Total minutes: Reji Delgadillo, CNS  Diabetes Clinical Nurse Specialist  [facility]  Access via [address] [place] [address]

## 2022-06-03 NOTE — PROGRESS NOTES
0700  Bedside and verbal report from Jamie Phillip (CTS) and heart team rounding. Plan for ECHO today, continue current plan of care. 0800  Assessment completed. Parra removed per Gena Blas NP  0102  Dr. Israel Christiansen rounding at bedside. 8003  Dr. Sondra Garland (renal)  here to see patient. Plan for 2 hours HD today, planned fluid removal 2 liters  0900  Echo tech at bedside; after talking with cardiology, she states Echo is planned for ~1000  1000  Echo at bedside, Dr. Ezequiel Valencia here to evaluate echo. [address] [facility] Roxanna NP (palliative care) now at bedside. Status update given. Family contacted () by palliative NP re: patient status. Family states they Gleen Medal coming in to the hospital.\"  1050 PTT 82.2  Heparin decreased to 4 units/kg/hr, verified with pharmacy. 1100  Multiple family members now at bedside, very emotional, crying, shouting. Palliative care NP at bedside. 1130  MARSHA Mcknight NP and Dr. Israel Christiansen met with family re: patient status and prognosis. [facility]   now at bedside;  requests patient's purse and bag with personal belongings from closet. Bag of personal belongings with purse given to patient's , Mic Ibrahim  1200  Reassessment completed. Dr. Ezequiel Valencia met with family. [facility] heart failure center consulted.  met with family. 1300  Right neck dressing changed; dressing to right femoral ecmo cannulation sites changed. Patient turned and positioned after gown and bed pads changed. 1  Dr. Negar Phillip met with family re: patient status/ecmo. 1400  HD nurse at bedside; family members notified that patient is having treatment this afternoon. 1430  HD nurse setting up machine. 1510  HD started. 1600  Remains on HD. Reassessment completed. 1700  HD completed. Per HD RN pulled 2140 ml.    1730  Heart failure MD,  Dr. Darcy Kaiser  here to see patient.   1815  PTT 57.9  Heparin drip increased to 5 units/kg/hr  1830   and daughter now at the bedside. Propofol now @ 40 mcg/kg/min.   1900  Bedside and verbal report given to Janis Ordaz RN

## 2022-06-03 NOTE — PROGRESS NOTES
I had a lengthy conversation with the family in the company of Dr Eliseo Ortiz and palliative care. I explained to them that more than likely her condition is irreversible. I do not forsee improvement. The family has requested CHF consult. I will enter that. I spoke to Dr Joon Long of [facility] - she will come by this evening    . Thank you for allowing me to participate in this patients care.     Shahnaz Locke MD, Douglas Sands
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
## 2022-06-03 NOTE — PROGRESS NOTES
responded to the EOL support for Ms. Maurisio Ruiz in [address] [place] [address]. According to [address] [facility], patient's prognosis is not very good due to heart issue. A large family was gathered in the conference room.  reviewed the chart notes before visiting. Pt's three daughters were tearful and distraught, having facetime with other family members, grieving and weeping. They asked  to pray for her healing and recovery despite the grim prognosis.  prayed so. Soon after the prayer, Cardiology doctor had consult with them,  continued to support a daughter whose name is Kevin. According to her, pt's son is on his way.  will be remained attentive to any needs. Contact  if further needs arise.     [id number] [facility] Bonnie Hobbs,[place], Brittany [address] Provider   Paging Service [phone])
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
## 2022-06-03 NOTE — PROGRESS NOTES
Vascular Surgery Progress Note  Tres Ramos ACNP-BC  6/3/2022       Subjective:        Evelio King is a 64 y.o. AAF with a pmhx significant for ESRF, DM, ASHD, HTN, HLD, hepatitis C, and alcoholism. She was recently treated for a UTI on 5/27/22 and was seen in the emergency room on 5/30/22 with complaint of lower back pain. She has a hx of DDD and is s/p lumbar surgery w/ known loose hardware.     On this occasion she presents to the hospital with a two day hx of chest pain, abd pain, and SOB after missing HD earlier in the day (last session was 3 days prior). She was admitted with volume overload and diagnosed with an NSTEMI. She was initiated on a heparin drip and received HD. On 6/1/22 she underwent a cardiac cath. During the procedure she suffered a prolonged cardiac arrest. An Impella device was placed via the left groin and due to severely depressed function of bilateral ventricles VA ECMO was placed via the right groin by CTS. This am she was noted to have a cool pulseless left foot and we were asked to evaluate. This am her right thigh is swollen. She is oozing from the right groin access site. Her feet are relatively unchanged. She is no longer requiring pressure support. She remains hypothermic. Plan is for ECHO this am and then CTS will determine plan of care.      Nursing Data:     Patient Vitals for the past 24 hrs:   Temp Pulse Resp SpO2 Weight   06/03/22 1200 97.7 °F (36.5 °C) 81 13 100 % --   06/03/22 1118 -- 82 12 100 % --   06/03/22 1100 -- 79 14 100 % --   06/03/22 1000 97.7 °F (36.5 °C) 86 10 100 % --   06/03/22 0900 -- 79 10 -- --   06/03/22 0800 (!) 95.5 °F (35.3 °C) 81 10 100 % --   06/03/22 0731 -- 82 10 100 % --   06/03/22 0700 -- 79 10 -- --   06/03/22 0600 (!) 94.3 °F (34.6 °C) 86 11 -- --   06/03/22 0500 (!) 95.2 °F (35.1 °C) 80 10 -- --   06/03/22 0405 (!) 95.9 °F (35.5 °C) 82 10 100 % 56.1 kg (123 lb 10.9 oz)   06/03/22 0400 (!) 95.2 °F (35.1 °C) 84 11 -- -- 06/03/22 0300 -- 91 16 96 % --   06/03/22 0200 (!) 95.7 °F (35.4 °C) 77 10 -- --   06/03/22 0145 -- 76 10 -- --   06/03/22 0130 -- 80 10 -- --   06/03/22 0115 (!) 95.7 °F (35.4 °C) 77 10 -- --   06/03/22 0108 (!) 95.7 °F (35.4 °C) 75 10 -- --   06/03/22 0100 -- 75 10 -- --   06/03/22 0013 -- 93 10 100 % --   06/03/22 0000 (!) 95.7 °F (35.4 °C) 73 10 -- --   06/02/22 2300 (!) 95.7 °F (35.4 °C) 85 10 100 % --   06/02/22 2200 -- 81 10 -- --   06/02/22 2100 (!) 95.9 °F (35.5 °C) 83 10 -- --   06/02/22 2000 (!) 95.7 °F (35.4 °C) 87 10 -- --   06/02/22 1949 -- 88 10 100 % --   06/02/22 1900 (!) 95.9 °F (35.5 °C) 88 10 100 % --   06/02/22 1800 (!) 96.1 °F (35.6 °C) 92 10 98 % --   06/02/22 1700 (!) 95.9 °F (35.5 °C) 97 17 100 % --   06/02/22 1626 -- 91 14 100 % --   06/02/22 1600 (!) 95.9 °F (35.5 °C) 92 11 100 % --   06/02/22 1556 (!) 95.9 °F (35.5 °C) 93 11 100 % --   06/02/22 1545 (!) 95.9 °F (35.5 °C) 94 19 -- --   06/02/22 1530 (!) 95.9 °F (35.5 °C) 94 12 -- --   06/02/22 1515 (!) 95.9 °F (35.5 °C) 91 10 -- --   06/02/22 1500 (!) 95.9 °F (35.5 °C) 91 10 100 % --   06/02/22 1445 (!) 95.7 °F (35.4 °C) 88 10 100 % --   06/02/22 1430 (!) 95.7 °F (35.4 °C) 88 10 100 % --   06/02/22 1415 (!) 95.7 °F (35.4 °C) 89 10 100 % --   06/02/22 1400 (!) 95.7 °F (35.4 °C) 86 10 100 % --   06/02/22 1345 (!) 95.7 °F (35.4 °C) 84 10 100 % --   06/02/22 1330 (!) 95.7 °F (35.4 °C) 81 10 100 % --   06/02/22 1315 (!) 95.7 °F (35.4 °C) 81 10 -- --   06/02/22 1300 (!) 95.9 °F (35.5 °C) 81 10 100 % --   06/02/22 1255 -- 82 10 -- --   06/02/22 1245 (!) 95.9 °F (35.5 °C) 85 10 -- --   06/02/22 1230 (!) 95.9 °F (35.5 °C) 83 10 -- --   06/02/22 1215 (!) 95.9 °F (35.5 °C) 82 10 -- --         Intake/Output Summary (Last 24 hours) at 6/3/2022 1205  Last data filed at 6/3/2022 1100  Gross per 24 hour   Intake 2555.9 ml   Output 3565 ml   Net -1009.1 ml       Exam:     Physical Exam  Constitutional:       Appearance: She is underweight.  She is toxic-appearing. Interventions: She is intubated. NG w/ green brown drainage   Cardiovascular:      Comments: Impella device left groin and VA ECMO right groin. Left arm aVF. RUE A-line. Patient does not have palpable pulses distally. Left foot is cool and does not have a signal.  Right foot is warm. Left toes dusky. Her B/L feet remain viable. Pulmonary:      Effort: No respiratory distress. She is intubated. Abdominal:      General: Abdomen is flat. There is no distension. Green loose stool. Rectal tube. Genitourinary:     Comments: Parra  Skin:     Coloration: Skin is pale. Lab Review:     . Recent Results (from the past 24 hour(s))   CK    Collection Time: 06/02/22 12:28 PM   Result Value Ref Range    CK 2,305 (H) 26 - 066 U/L   METABOLIC PANEL, COMPREHENSIVE    Collection Time: 06/02/22 12:28 PM   Result Value Ref Range    Sodium 138 136 - 145 mmol/L    Potassium 3.9 3.5 - 5.1 mmol/L    Chloride 104 97 - 108 mmol/L    CO2 25 21 - 32 mmol/L    Anion gap 9 5 - 15 mmol/L    Glucose 94 65 - 100 mg/dL    BUN 30 (H) 6 - 20 MG/DL    Creatinine 3.27 (H) 0.55 - 1.02 MG/DL    BUN/Creatinine ratio 9 (L) 12 - 20      GFR est AA 18 (L) >60 ml/min/1.73m2    GFR est non-AA 15 (L) >60 ml/min/1.73m2    Calcium 9.3 8.5 - 10.1 MG/DL    Bilirubin, total 1.8 (H) 0.2 - 1.0 MG/DL    ALT (SGPT) 40 12 - 78 U/L    AST (SGOT) 280 (H) 15 - 37 U/L    Alk.  phosphatase 53 45 - 117 U/L    Protein, total 5.1 (L) 6.4 - 8.2 g/dL    Albumin 2.9 (L) 3.5 - 5.0 g/dL    Globulin 2.2 2.0 - 4.0 g/dL    A-G Ratio 1.3 1.1 - 2.2     MAGNESIUM    Collection Time: 06/02/22 12:28 PM   Result Value Ref Range    Magnesium 2.2 1.6 - 2.4 mg/dL   PHOSPHORUS    Collection Time: 06/02/22 12:28 PM   Result Value Ref Range    Phosphorus 3.7 2.6 - 4.7 MG/DL   GLUCOSE, POC    Collection Time: 06/02/22 12:32 PM   Result Value Ref Range    Glucose (POC) 105 65 - 117 mg/dL    Performed by Ronald Ballard RN    BLOOD GAS, ARTERIAL Collection Time: 06/02/22 12:35 PM   Result Value Ref Range    pH 7.46 (H) 7.35 - 7.45      PCO2 36 35 - 45 mmHg    PO2 138 (H) 80 - 100 mmHg    O2 SAT 99 (H) 92 - 97 %    BICARBONATE 25 22 - 26 mmol/L    BASE EXCESS 1.8 mmol/L    O2 METHOD VENT      Sample source ARTERIAL      SITE DRAWN FROM ARTERIAL LINE      REDD'S TEST NOT APPLICABLE     PTT    Collection Time: 06/02/22  2:10 PM   Result Value Ref Range    aPTT 65.8 (H) 22.1 - 31.0 sec    aPTT, therapeutic range     58.0 - 77.0 SECS   BLOOD GAS, ARTERIAL    Collection Time: 06/02/22  2:14 PM   Result Value Ref Range    pH 7.47 (H) 7.35 - 7.45      PCO2 33 (L) 35 - 45 mmHg    PO2 148 (H) 80 - 100 mmHg    O2 SAT 99 (H) 92 - 97 %    BICARBONATE 23 22 - 26 mmol/L    BASE EXCESS 0.2 mmol/L    O2 METHOD VENT      Sample source ARTERIAL      SITE DRAWN FROM ARTERIAL LINE      REDD'S TEST NOT APPLICABLE     CK    Collection Time: 06/02/22  4:09 PM   Result Value Ref Range    CK 2,746 (H) 26 - 192 U/L   CBC W/O DIFF    Collection Time: 06/02/22  4:09 PM   Result Value Ref Range    WBC 7.4 3.6 - 11.0 K/uL    RBC 3.06 (L) 3.80 - 5.20 M/uL    HGB 9.4 (L) 11.5 - 16.0 g/dL    HCT 27.3 (L) 35.0 - 47.0 %    MCV 89.2 80.0 - 99.0 FL    MCH 30.7 26.0 - 34.0 PG    MCHC 34.4 30.0 - 36.5 g/dL    RDW 16.4 (H) 11.5 - 14.5 %    PLATELET 577 (L) 168 - 400 K/uL    MPV 7.9 (L) 8.9 - 12.9 FL    NRBC 1.1 (H) 0  WBC    ABSOLUTE NRBC 0.08 (H) 0.00 - 5.81 K/uL   METABOLIC PANEL, COMPREHENSIVE    Collection Time: 06/02/22  4:09 PM   Result Value Ref Range    Sodium 137 136 - 145 mmol/L    Potassium 3.7 3.5 - 5.1 mmol/L    Chloride 102 97 - 108 mmol/L    CO2 25 21 - 32 mmol/L    Anion gap 10 5 - 15 mmol/L    Glucose 78 65 - 100 mg/dL    BUN 29 (H) 6 - 20 MG/DL    Creatinine 3.42 (H) 0.55 - 1.02 MG/DL    BUN/Creatinine ratio 8 (L) 12 - 20      GFR est AA 17 (L) >60 ml/min/1.73m2    GFR est non-AA 14 (L) >60 ml/min/1.73m2    Calcium 9.5 8.5 - 10.1 MG/DL    Bilirubin, total 2.1 (H) 0.2 - 1.0 MG/DL    ALT (SGPT) 49 12 - 78 U/L    AST (SGOT) 360 (H) 15 - 37 U/L    Alk.  phosphatase 59 45 - 117 U/L    Protein, total 6.1 (L) 6.4 - 8.2 g/dL    Albumin 3.7 3.5 - 5.0 g/dL    Globulin 2.4 2.0 - 4.0 g/dL    A-G Ratio 1.5 1.1 - 2.2     MAGNESIUM    Collection Time: 06/02/22  4:09 PM   Result Value Ref Range    Magnesium 2.3 1.6 - 2.4 mg/dL   PHOSPHORUS    Collection Time: 06/02/22  4:09 PM   Result Value Ref Range    Phosphorus 3.5 2.6 - 4.7 MG/DL   BLOOD GAS + IONIZED CALCIUM    Collection Time: 06/02/22  4:17 PM   Result Value Ref Range    pH 7.46 (H) 7.35 - 7.45      PCO2 33 (L) 35 - 45 mmHg    PO2 140 (H) 80 - 100 mmHg    BICARBONATE 23 22 - 26 mmol/L    BASE DEFICIT 0.7 mmol/L    O2 SAT 98 (H) 92 - 97 %    Calcium, ionized 1.23 1.13 - 1.32 mmol/L    O2 METHOD VENT      MODE ASSIST CONTROL      Sample source ARTERIAL      SITE DRAWN FROM ARTERIAL LINE      REDD'S TEST NOT APPLICABLE     GLUCOSE, POC    Collection Time: 06/02/22  6:01 PM   Result Value Ref Range    Glucose (POC) 88 65 - 117 mg/dL    Performed by Johnathan Kessler (AN RN)    BLOOD GAS, ARTERIAL    Collection Time: 06/02/22  6:02 PM   Result Value Ref Range    pH 7.42 7.35 - 7.45      PCO2 36 35 - 45 mmHg    PO2 135 (H) 80 - 100 mmHg    O2 SAT 99 (H) 92 - 97 %    BICARBONATE 22 22 - 26 mmol/L    BASE DEFICIT 1.5 mmol/L    O2 METHOD VENT      Sample source ARTERIAL      SITE DRAWN FROM ARTERIAL LINE      REDD'S TEST NOT APPLICABLE     BLOOD GAS, ARTERIAL    Collection Time: 06/02/22  6:05 PM   Result Value Ref Range    pH 7.38 7.35 - 7.45      PCO2 38 35 - 45 mmHg    PO2 47 (LL) 80 - 100 mmHg    O2 SAT 83 (L) 92 - 97 %    BICARBONATE 22 22 - 26 mmol/L    BASE DEFICIT 2.5 mmol/L    O2 METHOD VENT      Sample source ARTERIAL      SITE DRAWN FROM ARTERIAL LINE      REDD'S TEST NOT APPLICABLE      Critical value read back Called to Tiera Valadez MD on 06/02/2022 at 18:10    BLOOD GAS, ARTERIAL    Collection Time: 06/02/22  6:07 PM   Result Value Ref Range    pH 7.43 7.35 - 7.45      PCO2 30 (L) 35 - 45 mmHg    PO2 252 (H) 80 - 100 mmHg    O2  (H) 92 - 97 %    BICARBONATE 19 (L) 22 - 26 mmol/L    BASE DEFICIT 3.5 mmol/L    O2 METHOD VENT      Sample source ARTERIAL      SITE DRAWN FROM ARTERIAL LINE      REDD'S TEST NOT APPLICABLE     CBC W/O DIFF    Collection Time: 06/02/22  8:06 PM   Result Value Ref Range    WBC 8.4 3.6 - 11.0 K/uL    RBC 2.82 (L) 3.80 - 5.20 M/uL    HGB 8.8 (L) 11.5 - 16.0 g/dL    HCT 25.1 (L) 35.0 - 47.0 %    MCV 89.0 80.0 - 99.0 FL    MCH 31.2 26.0 - 34.0 PG    MCHC 35.1 30.0 - 36.5 g/dL    RDW 16.6 (H) 11.5 - 14.5 %    PLATELET 915 (L) 170 - 400 K/uL    MPV 8.7 (L) 8.9 - 12.9 FL    NRBC 1.1 (H) 0  WBC    ABSOLUTE NRBC 0.09 (H) 0.00 - 2.93 K/uL   METABOLIC PANEL, COMPREHENSIVE    Collection Time: 06/02/22  8:06 PM   Result Value Ref Range    Sodium 139 136 - 145 mmol/L    Potassium 3.7 3.5 - 5.1 mmol/L    Chloride 104 97 - 108 mmol/L    CO2 23 21 - 32 mmol/L    Anion gap 12 5 - 15 mmol/L    Glucose 77 65 - 100 mg/dL    BUN 32 (H) 6 - 20 MG/DL    Creatinine 3.47 (H) 0.55 - 1.02 MG/DL    BUN/Creatinine ratio 9 (L) 12 - 20      GFR est AA 17 (L) >60 ml/min/1.73m2    GFR est non-AA 14 (L) >60 ml/min/1.73m2    Calcium 9.1 8.5 - 10.1 MG/DL    Bilirubin, total 1.7 (H) 0.2 - 1.0 MG/DL    ALT (SGPT) 48 12 - 78 U/L    AST (SGOT) 365 (H) 15 - 37 U/L    Alk.  phosphatase 54 45 - 117 U/L    Protein, total 5.2 (L) 6.4 - 8.2 g/dL    Albumin 3.3 (L) 3.5 - 5.0 g/dL    Globulin 1.9 (L) 2.0 - 4.0 g/dL    A-G Ratio 1.7 1.1 - 2.2     MAGNESIUM    Collection Time: 06/02/22  8:06 PM   Result Value Ref Range    Magnesium 2.2 1.6 - 2.4 mg/dL   PHOSPHORUS    Collection Time: 06/02/22  8:06 PM   Result Value Ref Range    Phosphorus 3.7 2.6 - 4.7 MG/DL   PTT    Collection Time: 06/02/22  8:06 PM   Result Value Ref Range    aPTT 123.7 (HH) 22.1 - 31.0 sec    aPTT, therapeutic range     58.0 - 77.0 SECS   BLOOD GAS + IONIZED CALCIUM    Collection Time: 06/02/22  8:06 PM   Result Value Ref Range    pH 7.45 7.35 - 7.45      PCO2 32 (L) 35 - 45 mmHg    PO2 144 (H) 80 - 100 mmHg    BICARBONATE 22 22 - 26 mmol/L    BASE DEFICIT 0.9 mmol/L    O2 SAT 99 (H) 92 - 97 %    Calcium, ionized 1.18 1.13 - 1.32 mmol/L    O2 METHOD VENT      FIO2 40 %    MODE PRESSURE CONTROL      Tidal volume 220.0      SET RATE 10      PEEP/CPAP 5.0      Sample source ARTERIAL      SITE DRAWN FROM ARTERIAL LINE      REDD'S TEST NOT APPLICABLE     CK    Collection Time: 06/02/22  8:06 PM   Result Value Ref Range    CK 2,507 (H) 26 - 192 U/L   BLOOD GAS, ARTERIAL    Collection Time: 06/02/22  9:55 PM   Result Value Ref Range    pH 7.46 (H) 7.35 - 7.45      PCO2 33 (L) 35 - 45 mmHg    PO2 143 (H) 80 - 100 mmHg    O2 SAT 99 (H) 92 - 97 %    BICARBONATE 23 22 - 26 mmol/L    BASE DEFICIT 0.2 mmol/L    O2 METHOD VENT      FIO2 40 %    MODE PRESSURE CONTROL      Tidal volume 220.0      SET RATE 10      PEEP/CPAP 5.0      Sample source ARTERIAL      SITE DRAWN FROM ARTERIAL LINE      REDD'S TEST NOT APPLICABLE     PTT    Collection Time: 06/02/22 10:17 PM   Result Value Ref Range    aPTT 61.1 (H) 22.1 - 31.0 sec    aPTT, therapeutic range     58.0 - 77.0 SECS   GLUCOSE, POC    Collection Time: 06/02/22 11:13 PM   Result Value Ref Range    Glucose (POC) 47 (LL) 65 - 100 mg/dL    Performed by Meño Knight RN    GLUCOSE, POC    Collection Time: 06/02/22 11:21 PM   Result Value Ref Range    Glucose (POC) 47 (LL) 65 - 100 mg/dL    Performed by Meño Knight RN    GLUCOSE, POC    Collection Time: 06/02/22 11:50 PM   Result Value Ref Range    Glucose (POC) 248 (H) 65 - 117 mg/dL    Performed by Meño Knight RN    CBC W/O DIFF    Collection Time: 06/02/22 11:52 PM   Result Value Ref Range    WBC 7.6 3.6 - 11.0 K/uL    RBC 2.51 (L) 3.80 - 5.20 M/uL    HGB 7.9 (L) 11.5 - 16.0 g/dL    HCT 22.7 (L) 35.0 - 47.0 %    MCV 90.4 80.0 - 99.0 FL    MCH 31.5 26.0 - 34.0 PG    MCHC 34.8 30.0 - 36.5 g/dL    RDW 17.2 (H) 11.5 - 14.5 %    PLATELET 84 (L) 894 - 400 K/uL    MPV 8.3 (L) 8.9 - 12.9 FL    NRBC 1.1 (H) 0  WBC    ABSOLUTE NRBC 0.08 (H) 0.00 - 7.76 K/uL   METABOLIC PANEL, COMPREHENSIVE    Collection Time: 06/02/22 11:52 PM   Result Value Ref Range    Sodium 138 136 - 145 mmol/L    Potassium 3.4 (L) 3.5 - 5.1 mmol/L    Chloride 103 97 - 108 mmol/L    CO2 23 21 - 32 mmol/L    Anion gap 12 5 - 15 mmol/L    Glucose 220 (H) 65 - 100 mg/dL    BUN 31 (H) 6 - 20 MG/DL    Creatinine 3.50 (H) 0.55 - 1.02 MG/DL    BUN/Creatinine ratio 9 (L) 12 - 20      GFR est AA 16 (L) >60 ml/min/1.73m2    GFR est non-AA 14 (L) >60 ml/min/1.73m2    Calcium 8.4 (L) 8.5 - 10.1 MG/DL    Bilirubin, total 1.3 (H) 0.2 - 1.0 MG/DL    ALT (SGPT) 45 12 - 78 U/L    AST (SGOT) 328 (H) 15 - 37 U/L    Alk. phosphatase 46 45 - 117 U/L    Protein, total 4.5 (L) 6.4 - 8.2 g/dL    Albumin 2.8 (L) 3.5 - 5.0 g/dL    Globulin 1.7 (L) 2.0 - 4.0 g/dL    A-G Ratio 1.6 1.1 - 2.2     MAGNESIUM    Collection Time: 06/02/22 11:52 PM   Result Value Ref Range    Magnesium 2.0 1.6 - 2.4 mg/dL   PHOSPHORUS    Collection Time: 06/02/22 11:52 PM   Result Value Ref Range    Phosphorus 3.7 2.6 - 4.7 MG/DL   CK    Collection Time: 06/02/22 11:52 PM   Result Value Ref Range    CK 2,039 (H) 26 - 192 U/L   BLOOD GAS + IONIZED CALCIUM    Collection Time: 06/03/22 12:05 AM   Result Value Ref Range    pH 7.43 7.35 - 7.45      PCO2 33 (L) 35 - 45 mmHg    PO2 138 (H) 80 - 100 mmHg    BICARBONATE 21 (L) 22 - 26 mmol/L    BASE DEFICIT 2.1 mmol/L    O2 SAT 99 (H) 92 - 97 %    Calcium, ionized 1.13 1.13 - 1.32 mmol/L    O2 METHOD VENT      FIO2 40 %    MODE PRESSURE CONTROL      Tidal volume 220.0      SET RATE 10      PEEP/CPAP 5.0      Sample source ARTERIAL      SITE DRAWN FROM ARTERIAL LINE      REDD'S TEST NOT APPLICABLE     RBC, ALLOCATE    Collection Time: 06/03/22 12:45 AM   Result Value Ref Range    HISTORY CHECKED?  Historical check performed    BLOOD GAS, ARTERIAL    Collection Time: 06/03/22  2:10 AM   Result Value Ref Range    pH 7.38 7.35 - 7.45      PCO2 37 35 - 45 mmHg    PO2 115 (H) 80 - 100 mmHg    O2 SAT 98 (H) 92 - 97 %    BICARBONATE 21 (L) 22 - 26 mmol/L    BASE DEFICIT 3.3 mmol/L    O2 METHOD VENT      FIO2 40 %    MODE PRESSURE CONTROL      Tidal volume 220.0      SET RATE 10      PEEP/CPAP 5.0      Sample source ARTERIAL      SITE DRAWN FROM ARTERIAL LINE      REDD'S TEST NOT APPLICABLE     GLUCOSE, POC    Collection Time: 06/03/22  2:26 AM   Result Value Ref Range    Glucose (POC) 173 (H) 65 - 117 mg/dL    Performed by Duc Sr RN    BLOOD GAS + IONIZED CALCIUM    Collection Time: 06/03/22  4:14 AM   Result Value Ref Range    pH 7.41 7.35 - 7.45      PCO2 35 35 - 45 mmHg    PO2 206 (H) 80 - 100 mmHg    BICARBONATE 22 22 - 26 mmol/L    BASE DEFICIT 2.3 mmol/L    O2 SAT 99 (H) 92 - 97 %    Calcium, ionized 1.17 1.13 - 1.32 mmol/L    O2 METHOD VENT      FIO2 40 %    MODE PRESSURE CONTROL      Tidal volume 220.0      SET RATE 10      PEEP/CPAP 5.0      Sample source ARTERIAL      SITE DRAWN FROM ARTERIAL LINE      REDD'S TEST NOT APPLICABLE     PTT    Collection Time: 06/03/22  4:15 AM   Result Value Ref Range    aPTT 71.2 (H) 22.1 - 31.0 sec    aPTT, therapeutic range     58.0 - 77.0 SECS   LD    Collection Time: 06/03/22  4:15 AM   Result Value Ref Range    LD 1,056 (H) 81 - 246 U/L   CK    Collection Time: 06/03/22  4:15 AM   Result Value Ref Range    CK 2,135 (H) 26 - 192 U/L   NT-PRO BNP    Collection Time: 06/03/22  4:15 AM   Result Value Ref Range    NT pro-BNP >35,000 (H) <125 PG/ML   CBC W/O DIFF    Collection Time: 06/03/22  4:15 AM   Result Value Ref Range    WBC 9.2 3.6 - 11.0 K/uL    RBC 3.26 (L) 3.80 - 5.20 M/uL    HGB 10.1 (L) 11.5 - 16.0 g/dL    HCT 28.9 (L) 35.0 - 47.0 %    MCV 88.7 80.0 - 99.0 FL    MCH 31.0 26.0 - 34.0 PG    MCHC 34.9 30.0 - 36.5 g/dL    RDW 16.6 (H) 11.5 - 14.5 %    PLATELET 98 (L) 039 - 400 K/uL    MPV 8.6 (L) 8.9 - 12.9 FL    NRBC 1.2 (H) 0  WBC    ABSOLUTE NRBC 0.11 (H) 0.00 - 6.09 K/uL   METABOLIC PANEL, COMPREHENSIVE    Collection Time: 06/03/22  4:15 AM   Result Value Ref Range    Sodium 137 136 - 145 mmol/L    Potassium 3.8 3.5 - 5.1 mmol/L    Chloride 103 97 - 108 mmol/L    CO2 23 21 - 32 mmol/L    Anion gap 11 5 - 15 mmol/L    Glucose 136 (H) 65 - 100 mg/dL    BUN 31 (H) 6 - 20 MG/DL    Creatinine 3.53 (H) 0.55 - 1.02 MG/DL    BUN/Creatinine ratio 9 (L) 12 - 20      GFR est AA 16 (L) >60 ml/min/1.73m2    GFR est non-AA 13 (L) >60 ml/min/1.73m2    Calcium 8.9 8.5 - 10.1 MG/DL    Bilirubin, total 1.3 (H) 0.2 - 1.0 MG/DL    ALT (SGPT) 50 12 - 78 U/L    AST (SGOT) 365 (H) 15 - 37 U/L    Alk.  phosphatase 56 45 - 117 U/L    Protein, total 5.0 (L) 6.4 - 8.2 g/dL    Albumin 3.0 (L) 3.5 - 5.0 g/dL    Globulin 2.0 2.0 - 4.0 g/dL    A-G Ratio 1.5 1.1 - 2.2     MAGNESIUM    Collection Time: 06/03/22  4:15 AM   Result Value Ref Range    Magnesium 2.1 1.6 - 2.4 mg/dL   PHOSPHORUS    Collection Time: 06/03/22  4:15 AM   Result Value Ref Range    Phosphorus 3.9 2.6 - 4.7 MG/DL   VANCOMYCIN, RANDOM    Collection Time: 06/03/22  4:15 AM   Result Value Ref Range    Vancomycin, random 16.5 UG/ML   BLOOD GAS, VENOUS    Collection Time: 06/03/22  4:17 AM   Result Value Ref Range    VENOUS PH 7.38 7.32 - 7.42      VENOUS PCO2 38.2 (L) 41 - 51 mmHg    VENOUS PO2 56 (H) 25 - 40 mmHg    VENOUS BICARBONATE 22 (L) 23 - 28 mmol/L    VENOUS BASE DEFICIT 2.8 mmol/L    VENOUS O2 SATURATION 89 (H) 65 - 88 %    O2 METHOD VENT      FIO2 40 %    MODE PRESSURE CONTROL      Tidal volume 220.0      SET RATE 10      PEEP/CPAP 5.0      Sample source VENOUS BLOOD      SITE OTHER     LACTIC ACID    Collection Time: 06/03/22  4:19 AM   Result Value Ref Range    Lactic acid 0.9 0.4 - 2.0 MMOL/L   BLOOD GAS, ARTERIAL    Collection Time: 06/03/22  4:19 AM   Result Value Ref Range    pH 7.38 7.35 - 7.45      PCO2 33 (L) 35 - 45 mmHg    PO2 275 (H) 80 - 100 mmHg    O2  (H) 92 - 97 %    BICARBONATE 19 (L) 22 - 26 mmol/L    BASE DEFICIT 4.9 mmol/L    O2 METHOD VENT      FIO2 40 %    MODE PRESSURE CONTROL      Tidal volume 220.0      SET RATE 10      PEEP/CPAP 5.0      Sample source ARTERIAL      SITE Post-oxygenator      REDD'S TEST NOT APPLICABLE     GLUCOSE, POC    Collection Time: 06/03/22  5:52 AM   Result Value Ref Range    Glucose (POC) 137 (H) 65 - 117 mg/dL    Performed by Reddy Ventura RN    BLOOD GAS, ARTERIAL    Collection Time: 06/03/22  5:55 AM   Result Value Ref Range    pH 7.46 (H) 7.35 - 7.45      PCO2 31 (L) 35 - 45 mmHg    PO2 202 (H) 80 - 100 mmHg    O2 SAT 99 (H) 92 - 97 %    BICARBONATE 21 (L) 22 - 26 mmol/L    BASE DEFICIT 1.5 mmol/L    O2 METHOD VENT      FIO2 40 %    MODE PRESSURE CONTROL      Tidal volume 220.0      SET RATE 10      PEEP/CPAP 5.0      Sample source ARTERIAL      SITE DRAWN FROM ARTERIAL LINE      REDD'S TEST NOT APPLICABLE     BLOOD GAS, ARTERIAL    Collection Time: 06/03/22  6:55 AM   Result Value Ref Range    pH 7.43 7.35 - 7.45      PCO2 30 (L) 35 - 45 mmHg    PO2 164 (H) 80 - 100 mmHg    O2 SAT 99 (H) 92 - 97 %    BICARBONATE 20 (L) 22 - 26 mmol/L    BASE DEFICIT 3.4 mmol/L    O2 METHOD VENT      FIO2 40 %    MODE PRESSURE CONTROL      Tidal volume 220.0      SET RATE 10      PEEP/CPAP 5.0      Sample source ARTERIAL      SITE DRAWN FROM ARTERIAL LINE      REDD'S TEST NOT APPLICABLE     CK    Collection Time: 06/03/22  7:49 AM   Result Value Ref Range    CK 1,875 (H) 26 - 192 U/L   CBC W/O DIFF    Collection Time: 06/03/22  7:49 AM   Result Value Ref Range    WBC 9.1 3.6 - 11.0 K/uL    RBC 3.30 (L) 3.80 - 5.20 M/uL    HGB 10.2 (L) 11.5 - 16.0 g/dL    HCT 29.1 (L) 35.0 - 47.0 %    MCV 88.2 80.0 - 99.0 FL    MCH 30.9 26.0 - 34.0 PG    MCHC 35.1 30.0 - 36.5 g/dL    RDW 17.0 (H) 11.5 - 14.5 %    PLATELET 89 (L) 887 - 400 K/uL    MPV 8.2 (L) 8.9 - 12.9 FL    NRBC 1.2 (H) 0  WBC    ABSOLUTE NRBC 0.11 (H) 0.00 - 9.47 K/uL   METABOLIC PANEL, COMPREHENSIVE    Collection Time: 06/03/22  7:49 AM   Result Value Ref Range    Sodium 137 136 - 145 mmol/L    Potassium 3.7 3.5 - 5.1 mmol/L    Chloride 104 97 - 108 mmol/L    CO2 22 21 - 32 mmol/L    Anion gap 11 5 - 15 mmol/L    Glucose 117 (H) 65 - 100 mg/dL    BUN 29 (H) 6 - 20 MG/DL    Creatinine 3.67 (H) 0.55 - 1.02 MG/DL    BUN/Creatinine ratio 8 (L) 12 - 20      GFR est AA 16 (L) >60 ml/min/1.73m2    GFR est non-AA 13 (L) >60 ml/min/1.73m2    Calcium 8.7 8.5 - 10.1 MG/DL    Bilirubin, total 1.1 (H) 0.2 - 1.0 MG/DL    ALT (SGPT) 49 12 - 78 U/L    AST (SGOT) 350 (H) 15 - 37 U/L    Alk.  phosphatase 59 45 - 117 U/L    Protein, total 5.3 (L) 6.4 - 8.2 g/dL    Albumin 2.8 (L) 3.5 - 5.0 g/dL    Globulin 2.5 2.0 - 4.0 g/dL    A-G Ratio 1.1 1.1 - 2.2     MAGNESIUM    Collection Time: 06/03/22  7:49 AM   Result Value Ref Range    Magnesium 2.2 1.6 - 2.4 mg/dL   PHOSPHORUS    Collection Time: 06/03/22  7:49 AM   Result Value Ref Range    Phosphorus 3.5 2.6 - 4.7 MG/DL   BLOOD GAS + IONIZED CALCIUM    Collection Time: 06/03/22  9:05 AM   Result Value Ref Range    pH 7.43 7.35 - 7.45      PCO2 27 (L) 35 - 45 mmHg    PO2 153 (H) 80 - 100 mmHg    BICARBONATE 18 (L) 22 - 26 mmol/L    BASE DEFICIT 5.8 mmol/L    O2 SAT 99 (H) 92 - 97 %    Calcium, ionized 1.12 (L) 1.13 - 1.32 mmol/L    O2 METHOD VENT      Sample source ARTERIAL      SITE DRAWN FROM ARTERIAL LINE      REDD'S TEST YES     PTT    Collection Time: 06/03/22  9:59 AM   Result Value Ref Range    aPTT 82.2 (H) 22.1 - 31.0 sec    aPTT, therapeutic range     58.0 - 77.0 SECS   BLOOD GAS + IONIZED CALCIUM    Collection Time: 06/03/22 11:09 AM   Result Value Ref Range    pH 7.42 7.35 - 7.45      PCO2 34 (L) 35 - 45 mmHg    PO2 112 (H) 80 - 100 mmHg    BICARBONATE 21 (L) 22 - 26 mmol/L    BASE DEFICIT 2.4 mmol/L    O2 SAT 98 (H) 92 - 97 %    Calcium, ionized 1.16 1.13 - 1.32 mmol/L    O2 METHOD VENT      Sample source ARTERIAL      SITE DRAWN FROM ARTERIAL LINE REDD'S TEST NOT APPLICABLE     GLUCOSE, POC    Collection Time: 06/03/22 11:50 AM   Result Value Ref Range    Glucose (POC) 123 (H) 65 - 117 mg/dL    Performed by Daphne Oliva RN    SAMPLES BEING HELD    Collection Time: 06/03/22 11:57 AM   Result Value Ref Range    SAMPLES BEING HELD  PST     COMMENT        Add-on orders for these samples will be processed based on acceptable specimen integrity and analyte stability, which may vary by analyte. Assessment/Plan:     Peripheral vascular disease   · No role for vascular procedural intervention. Float heels at all times.      Cardiogenic shock  Prolonged PEA cardiac arrest   Hypothermia   NSTEMI  Ischemic cardiomyopathy  -EF 30-35% post procedure 5-10% ECHO pending for today. Coronary artery disease   Pulmonary edema   Acute hypoxic respiratory failure   Mechanical intubation   -6/1/22 =>   New onset atrial fibrillation   Hx of hypertension  Hx of hyperlipidemia   · Management per cardiology and CTS. Available for removal of Impella device at the discretion of cardiology and CTS.      SIRS  -blood and urine cx NG   -leukocytosis resolved   -hypothermic   Recent hx of UTI  -tx with Keflex   Loose stool  Hx of clostridioides difficile colitis   -empiric antbxs      Shock liver  -AST elevated   Coagulopathy    Thrombocytopenia   Chronic hepatitis C  Alcoholism in remission      ESRF  -HD TTHS  Anemia of chronic renal disease   Anemia of acute blood loss  -stable s/p transfusion   · Management per nephrology and CC team       Insulin dependent diabetes mellitus type II  Diabetic gastroparesis      DDD of the lumbar spine  Chronic pain syndrome      VTE Prophylaxis:  Heparin drip      Disposition:  Condition critical with overall poor prognosis.

## 2022-06-03 NOTE — PROGRESS NOTES
Nephrology Progress Note  Codey Calderon     [email or web address]  Phone - [phone]   Patient: Nader Weber    YOB: 1965        Date- 6/3/2022   Admit Date: 5/31/2022  CC: Follow up for  esrd         IMPRESSION & PLAN:    ESRD on HD, Dyllanita Labbrannonum, TTS   PEA cardiac arrest x2   Cardiogenic shock   CAD   Biventricular failure   S/p  ECMO   S/p impella insertion   Pulmonary edema   Acute respiratory failure on mechanical ventilation   Ischemic left foot    PLAN-   UF TODAY   HD TTS schedule   epogen TTS   venofer iv today       Subjective: Interval History:   -Interval events noted. -PEA cardiac arrest x2 status post Impella and ECMO  -Complicated left heart cath yesterday    Objective:   Vitals:    06/03/22 0600 06/03/22 0700 06/03/22 0731 06/03/22 0800   BP:       Pulse: 86 79 82 81   Resp: 11 10 10 10   Temp: (!) 94.3 °F (34.6 °C)   (!) 95.5 °F (35.3 °C)   TempSrc:       SpO2:   100% 100%   Weight:       Height:          06/02 0701 - 06/03 0700  In: 2583.2 [I.V.:1841.5]  Out: 3566 [Urine:165; Drains:400]  Last 3 Recorded Weights in this Encounter    06/01/22 1556 06/02/22 0626 06/03/22 0405   Weight: 50.8 kg (112 lb) 56.2 kg (123 lb 14.4 oz) 56.1 kg (123 lb 10.9 oz)      Physical exam:    GEN: intubated on vent  NECK-no mass, ET TUBE  RESP: Clear b/l, no wheezing  CVS: RRR,S1,S2    ABDO: soft , non tender,  NEURO:Can't access due to patient's current condition   EXT:Right extremity has ECMO cannulas      Chart reviewed. Pertinent Notes reviewed.      Data Review :  Recent Labs     06/03/22  0749 06/03/22  0415 06/02/22  2352    137 138   K 3.7 3.8 3.4*    103 103   CO2 22 23 23   BUN 29* 31* 31*   CREA 3.67* 3.53* 3.50*   * 136* 220*   CA 8.7 8.9 8.4*   MG 2.2 2.1 2.0   PHOS 3.5 3.9 3.7     Recent Labs     06/03/22  0749 06/03/22  0415 06/02/22  2352   WBC 9.1 9.2 7.6   HGB 10.2* 10.1* 7.9*   HCT 29.1* 28.9* 22.7*   PLT 89* 98* 84*     No results for input(s): FE, TIBC, PSAT, FERR in the last 72 hours.    Medication list  reviewed  Current Facility-Administered Medications   Medication Dose Route Frequency    0.9% sodium chloride infusion 250 mL  250 mL IntraVENous PRN    potassium chloride 20 mEq in 50 ml IVPB  20 mEq IntraVENous ONCE    balsam peru-castor oiL (VENELEX) ointment   Topical BID    alcohol 62% (NOZIN) nasal  1 Ampule  1 Ampule Topical Q12H    propofol (DIPRIVAN) 10 mg/mL infusion  0-50 mcg/kg/min IntraVENous TITRATE    aspirin chewable tablet 81 mg  81 mg Per G Tube DAILY    VANCOMYCIN INFORMATION NOTE   Other Rx Dosing/Monitoring    dextrose 10% 10 % infusion        alteplase (CATHFLO) 1 mg in sterile water (preservative free) 1 mL injection  1 mg InterCATHeter PRN    bacitracin 500 unit/gram packet 1 Packet  1 Packet Topical PRN    sodium chloride (NS) flush 5-40 mL  5-40 mL IntraVENous Q8H    sodium chloride (NS) flush 5-40 mL  5-40 mL IntraVENous PRN    albumin human 5% (BUMINATE) solution 12.5 g  12.5 g IntraVENous Q2H PRN    0.45% sodium chloride infusion  10 mL/hr IntraVENous CONTINUOUS    0.9% sodium chloride infusion  9 mL/hr IntraVENous CONTINUOUS    [Held by provider] oxyCODONE IR (ROXICODONE) tablet 10 mg  10 mg Oral Q4H PRN    morphine injection 4 mg  4 mg IntraVENous Q2H PRN    naloxone (NARCAN) injection 0.4 mg  0.4 mg IntraVENous PRN    mupirocin (BACTROBAN) 2 % ointment   Both Nostrils BID    ondansetron (ZOFRAN) injection 4 mg  4 mg IntraVENous Q4H PRN    albuterol (PROVENTIL VENTOLIN) nebulizer solution 2.5 mg  2.5 mg Nebulization Q4H PRN    midazolam (VERSED) injection 1 mg  1 mg IntraVENous Q1H PRN    chlorhexidine (PERIDEX) 0.12 % mouthwash 10 mL  10 mL Oral Q12H    calcium chloride 1 g in 0.9% sodium chloride 250 mL IVPB  1 g IntraVENous PRN    bisacodyL (DULCOLAX) suppository 10 mg  10 mg Rectal DAILY PRN    [Held by provider] oxyCODONE IR (ROXICODONE) tablet 5 mg 5 mg Oral Q4H PRN    ticagrelor (BRILINTA) tablet 90 mg  90 mg Per NG tube Q12H    albumin human 5% (BUMINATE) solution 25 g  25 g IntraVENous Q4H PRN    piperacillin-tazobactam (ZOSYN) 3.375 g in 0.9% sodium chloride (MBP/ADV) 100 mL MBP  3.375 g IntraVENous Q12H    insulin lispro (HUMALOG) injection   SubCUTAneous Q6H    glucose chewable tablet 16 g  4 Tablet Oral PRN    glucagon (GLUCAGEN) injection 1 mg  1 mg IntraMUSCular PRN    dextrose 10% infusion 0-250 mL  0-250 mL IntraVENous PRN    pantoprazole (PROTONIX) 40 mg in 0.9% sodium chloride 10 mL injection  40 mg IntraVENous DAILY    white petrolatum-mineral oiL (SOOTHE NIGHT TIME) 80-20 % ophthalmic ointment   Both Eyes Q12H    fentaNYL (PF) 1,500 mcg/30 mL (50 mcg/mL) infusion  0-200 mcg/hr IntraVENous TITRATE    heparin 25,000 units in D5W 250 ml infusion  12-25 Units/kg/hr IntraVENous TITRATE          Joe Cardoza MD              [facility] . Yazan Salgado / MAR[facility] [address] [facility], [address]  Phone - [phone]               Fax - [phone]

## 2022-06-03 NOTE — PROGRESS NOTES
Cardiology Progress Note       [facility]  [address]  [phone]  [email or web address]     6/3/2022 11:24 AM    Admit Date: 5/31/2022    Admit Diagnosis: NSTEMI (non-ST elevated myocardial infarction) ([facility] [address] [I21.4]  Pulmonary edema [J81.1]  ESRD (end stage renal disease) on dialysis ([facility] [address] [N18.6, Z99.2]  Hyperkalemia [E87.5]    Subjective:     Rosa Elena Ingram  Is intubated on ecmo    Visit Vitals  BP (!) 55/30   Pulse 82   Temp 97.7 °F (36.5 °C)   Resp 12   Ht 5' 5\" (1.651 m)   Wt 123 lb 10.9 oz (56.1 kg)   LMP 09/15/2013   SpO2 100%   BMI 20.58 kg/m²     Current Facility-Administered Medications   Medication Dose Route Frequency    0.9% sodium chloride infusion 250 mL  250 mL IntraVENous PRN    [START ON 6/4/2022] iron sucrose (VENOFER) injection 200 mg  200 mg IntraVENous ONCE    [START ON 6/4/2022] epoetin kourtney-epbx (RETACRIT) injection 6,000 Units  6,000 Units SubCUTAneous Q TUE, THU & SAT    balsam peru-castor oiL (VENELEX) ointment   Topical BID    alcohol 62% (NOZIN) nasal  1 Ampule  1 Ampule Topical Q12H    propofol (DIPRIVAN) 10 mg/mL infusion  0-50 mcg/kg/min IntraVENous TITRATE    aspirin chewable tablet 81 mg  81 mg Per G Tube DAILY    VANCOMYCIN INFORMATION NOTE   Other Rx Dosing/Monitoring    dextrose 10% 10 % infusion        alteplase (CATHFLO) 1 mg in sterile water (preservative free) 1 mL injection  1 mg InterCATHeter PRN    bacitracin 500 unit/gram packet 1 Packet  1 Packet Topical PRN    sodium chloride (NS) flush 5-40 mL  5-40 mL IntraVENous Q8H    sodium chloride (NS) flush 5-40 mL  5-40 mL IntraVENous PRN    albumin human 5% (BUMINATE) solution 12.5 g  12.5 g IntraVENous Q2H PRN    0.45% sodium chloride infusion  10 mL/hr IntraVENous CONTINUOUS    0.9% sodium chloride infusion  9 mL/hr IntraVENous CONTINUOUS    [Held by provider] oxyCODONE IR (ROXICODONE) tablet 10 mg  10 mg Oral Q4H PRN    morphine injection 4 mg  4 mg IntraVENous Q2H PRN    naloxone (NARCAN) injection 0.4 mg  0.4 mg IntraVENous PRN    mupirocin (BACTROBAN) 2 % ointment   Both Nostrils BID    ondansetron (ZOFRAN) injection 4 mg  4 mg IntraVENous Q4H PRN    albuterol (PROVENTIL VENTOLIN) nebulizer solution 2.5 mg  2.5 mg Nebulization Q4H PRN    midazolam (VERSED) injection 1 mg  1 mg IntraVENous Q1H PRN    chlorhexidine (PERIDEX) 0.12 % mouthwash 10 mL  10 mL Oral Q12H    calcium chloride 1 g in 0.9% sodium chloride 250 mL IVPB  1 g IntraVENous PRN    bisacodyL (DULCOLAX) suppository 10 mg  10 mg Rectal DAILY PRN    [Held by provider] oxyCODONE IR (ROXICODONE) tablet 5 mg  5 mg Oral Q4H PRN    ticagrelor (BRILINTA) tablet 90 mg  90 mg Per NG tube Q12H    albumin human 5% (BUMINATE) solution 25 g  25 g IntraVENous Q4H PRN    piperacillin-tazobactam (ZOSYN) 3.375 g in 0.9% sodium chloride (MBP/ADV) 100 mL MBP  3.375 g IntraVENous Q12H    insulin lispro (HUMALOG) injection   SubCUTAneous Q6H    glucose chewable tablet 16 g  4 Tablet Oral PRN    glucagon (GLUCAGEN) injection 1 mg  1 mg IntraMUSCular PRN    dextrose 10% infusion 0-250 mL  0-250 mL IntraVENous PRN    pantoprazole (PROTONIX) 40 mg in 0.9% sodium chloride 10 mL injection  40 mg IntraVENous DAILY    white petrolatum-mineral oiL (SOOTHE NIGHT TIME) 80-20 % ophthalmic ointment   Both Eyes Q12H    fentaNYL (PF) 1,500 mcg/30 mL (50 mcg/mL) infusion  0-200 mcg/hr IntraVENous TITRATE    heparin 25,000 units in D5W 250 ml infusion  12-25 Units/kg/hr IntraVENous TITRATE         Objective:      Visit Vitals  BP (!) 55/30   Pulse 82   Temp 97.7 °F (36.5 °C)   Resp 12   Ht 5' 5\" (1.651 m)   Wt 123 lb 10.9 oz (56.1 kg)   SpO2 100%   BMI 20.58 kg/m²       Physical Exam:  General:  Not responsive        Mouth/Throat: intubated       Lungs:   Vent bs   CV:  Regular rate and rhythm   Abdomen:   Soft, non-tender.  bowel sounds normal. non-distended   Extremities: No cyanosis or edema Skin: Skin color, texture, turgor normal. no acute rash or lesions   Lymph nodes: Cervical and supraclavicular normal   Musculoskeletal: No swelling or deformity       Data Review:   Labs:    Recent Labs     06/03/22  0749 06/03/22 0415 06/02/22 2352   WBC 9.1 9.2 7.6   HGB 10.2* 10.1* 7.9*   HCT 29.1* 28.9* 22.7*   PLT 89* 98* 84*     Recent Labs     06/03/22  0749 06/03/22 0415 06/02/22 2352 06/02/22  0820 06/02/22  0353 06/01/22 2020 06/01/22 2020    137 138   < > 137   < > 136   K 3.7 3.8 3.4*   < > 3.9   < > 5.0    103 103   < > 102   < > 101   CO2 22 23 23   < > 26   < > 25   * 136* 220*   < > 136*   < > 173*   BUN 29* 31* 31*   < > 29*   < > 29*   CREA 3.67* 3.53* 3.50*   < > 3.18*   < > 3.07*   CA 8.7 8.9 8.4*   < > 8.0*   < > 7.0*   MG 2.2 2.1 2.0   < > 1.9   < > 1.9   PHOS 3.5 3.9 3.7   < >  --   --   --    ALB 2.8* 3.0* 2.8*   < > 2.5*   < > 2.0*   TBILI 1.1* 1.3* 1.3*   < > 1.8*   < > 0.9   ALT 49 50 45   < > 33   < > 32   INR  --   --   --   --  1.3*  --  1.4*    < > = values in this interval not displayed. Recent Labs     06/03/22 0749 06/03/22 0415 06/02/22 2352   CPK 1,875* 2,135* 2,039*         Intake/Output Summary (Last 24 hours) at 6/3/2022 1124  Last data filed at 6/3/2022 1100  Gross per 24 hour   Intake 2604.12 ml   Output 3565 ml   Net -960.88 ml      Echo - lvef 5%, ecmo in place    Assessment:     Active Problems:    Hyperkalemia (5/31/2022)      Pulmonary edema (5/31/2022)      NSTEMI (non-ST elevated myocardial infarction) ([facility] [address] (5/31/2022)      ESRD (end stage renal disease) on dialysis [facility]) (5/31/2022)        Plan:     Lisa Sierra is critically ill on ventilator and ecmo support. I had a conversation with her daughter Catherine Edwards. I explained to her daughter that she is not a candidate for any further cardiac therapies. I explained to her that her LVEF is 5% and dependent on the ecmo. I suggested they consider withdrawing care.  The daughter stated they want to be full code and they are on the way in. I communicated the above with Ms Schoolcraft Marlen NP (palliative care). I discussed the patient with Dr Michelle Foley. I also d/w Dr Nick Gilbert that ECMO per protocol is managed by CT Surgery. They are in agreement. Cont brilinta. I have no further cardiac recommendations. Poor prognosis.      Nati Kinney MD, [facility]    6/3/2022

## 2022-06-03 NOTE — PROGRESS NOTES
Chart reviewed and discussed with RN. Pt intubated and sedated, not medically stable for PT services. Will sign off at this time.       Ariana Reyes, SPT
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
## 2022-06-04 NOTE — PROGRESS NOTES
JENNY WITT  [facility]  [address]  [phone]  [email or web address]         Cardiology Progress Note      6/4/2022 12:58 PM    Admit Date: 5/31/2022    Admit Diagnosis:   NSTEMI (non-ST elevated myocardial infarction) ([facility] [I21.4]  Pulmonary edema [J81.1]  ESRD (end stage renal disease) on dialysis ([facility] [N18.6, Z99.2]  Hyperkalemia [E87.5]    Interval History/Subjective:     Alfredito Frederick remains intubated, on MCS x2. Seen by Heart failure specialist yesterday. CTS team notes reviewed. Discussed with RN.       Visit Vitals  BP (P) 90/75   Pulse (!) (P) 108   Temp 97.2 °F (36.2 °C)   Resp (P) 11   Ht 5' 5\" (1.651 m)   Wt 53.8 kg (118 lb 9.7 oz)   LMP 09/15/2013   SpO2 (P) 99%   BMI 19.74 kg/m²       Current Facility-Administered Medications   Medication Dose Route Frequency    NOREPINephrine (LEVOPHED) 8 mg in 5% dextrose 250mL (32 mcg/mL) infusion  0.5-30 mcg/min IntraVENous TITRATE    epoetin kourtney-epbx (RETACRIT) injection 6,000 Units  6,000 Units SubCUTAneous Q TUE, THU & SAT    0.9% sodium chloride infusion 250 mL  250 mL IntraVENous PRN    iron sucrose (VENOFER) injection 200 mg  200 mg IntraVENous ONCE    sodium bicarbonate (8.4%) 25 mEq in dextrose 5% 1,000 mL infusion   Other TITRATE    balsam peru-castor oiL (VENELEX) ointment   Topical BID    alcohol 62% (NOZIN) nasal  1 Ampule  1 Ampule Topical Q12H    propofol (DIPRIVAN) 10 mg/mL infusion  0-50 mcg/kg/min IntraVENous TITRATE    aspirin chewable tablet 81 mg  81 mg Per G Tube DAILY    VANCOMYCIN INFORMATION NOTE   Other Rx Dosing/Monitoring    alteplase (CATHFLO) 1 mg in sterile water (preservative free) 1 mL injection  1 mg InterCATHeter PRN    bacitracin 500 unit/gram packet 1 Packet  1 Packet Topical PRN    sodium chloride (NS) flush 5-40 mL  5-40 mL IntraVENous Q8H    sodium chloride (NS) flush 5-40 mL  5-40 mL IntraVENous PRN    albumin human 5% (BUMINATE) solution 12.5 g  12.5 g IntraVENous Q2H PRN    0.45% sodium chloride infusion  10 mL/hr IntraVENous CONTINUOUS    0.9% sodium chloride infusion  9 mL/hr IntraVENous CONTINUOUS    [Held by provider] oxyCODONE IR (ROXICODONE) tablet 10 mg  10 mg Oral Q4H PRN    morphine injection 4 mg  4 mg IntraVENous Q2H PRN    mupirocin (BACTROBAN) 2 % ointment   Both Nostrils BID    ondansetron (ZOFRAN) injection 4 mg  4 mg IntraVENous Q4H PRN    albuterol (PROVENTIL VENTOLIN) nebulizer solution 2.5 mg  2.5 mg Nebulization Q4H PRN    midazolam (VERSED) injection 1 mg  1 mg IntraVENous Q1H PRN    chlorhexidine (PERIDEX) 0.12 % mouthwash 10 mL  10 mL Oral Q12H    calcium chloride 1 g in 0.9% sodium chloride 250 mL IVPB  1 g IntraVENous PRN    bisacodyL (DULCOLAX) suppository 10 mg  10 mg Rectal DAILY PRN    [Held by provider] oxyCODONE IR (ROXICODONE) tablet 5 mg  5 mg Oral Q4H PRN    ticagrelor (BRILINTA) tablet 90 mg  90 mg Per NG tube Q12H    albumin human 5% (BUMINATE) solution 25 g  25 g IntraVENous Q4H PRN    piperacillin-tazobactam (ZOSYN) 3.375 g in 0.9% sodium chloride (MBP/ADV) 100 mL MBP  3.375 g IntraVENous Q12H    insulin lispro (HUMALOG) injection   SubCUTAneous Q6H    glucose chewable tablet 16 g  4 Tablet Oral PRN    glucagon (GLUCAGEN) injection 1 mg  1 mg IntraMUSCular PRN    dextrose 10% infusion 0-250 mL  0-250 mL IntraVENous PRN    pantoprazole (PROTONIX) 40 mg in 0.9% sodium chloride 10 mL injection  40 mg IntraVENous DAILY    white petrolatum-mineral oiL (SOOTHE NIGHT TIME) 80-20 % ophthalmic ointment   Both Eyes Q12H    fentaNYL (PF) 1,500 mcg/30 mL (50 mcg/mL) infusion  0-200 mcg/hr IntraVENous TITRATE    heparin 25,000 units in D5W 250 ml infusion  12-25 Units/kg/hr IntraVENous TITRATE       Objective:      Physical Exam:  General: sedated. Heart:  reg rate and rhythm; normal S1/S2; no murmurs  Respiratory: intubated  Extremities:  Impella in left groin, VA ECMO right groin.   Left arm with AVF. No palpable distal pulses. Cool to touch left foot. Right foot warm. Neuro: sedated. Skin: pale    Data Review:   Recent Labs     06/04/22  1006 06/04/22  0320 06/03/22 2211   WBC 10.6 9.5 9.4   HGB 11.0* 9.4* 9.6*   HCT 31.4* 27.5* 27.8*   PLT 95* 73* 97*     Recent Labs     06/04/22  1006 06/04/22  0320 06/03/22  2211 06/03/22  1735 06/03/22  1735 06/03/22  1157 06/03/22  1157 06/03/22  0749 06/03/22  0749 06/03/22  0415 06/03/22  0415 06/02/22  0820 06/02/22  0353 06/01/22 2020 06/01/22 2020    138 138   < > 136   < > 137   < > 137   < > 137   < > 137   < > 136   K 3.1* 3.9 4.2   < > 4.0   < > 4.1   < > 3.7   < > 3.8   < > 3.9   < > 5.0   CL 96* 104 105   < > 104   < > 104   < > 104   < > 103   < > 102   < > 101   CO2 31 21 22   < > 20*   < > 24   < > 22   < > 23   < > 26   < > 25   * 93 104*   < > 114*   < > 106*   < > 117*   < > 136*   < > 136*   < > 173*   BUN 9 28* 29*   < > 28*   < > 29*   < > 29*   < > 31*   < > 29*   < > 29*   CREA 1.46* 3.99* 3.82*   < > 3.71*   < > 3.75*   < > 3.67*   < > 3.53*   < > 3.18*   < > 3.07*   CA 8.8  8.9 8.1* 7.8*   < > 8.9   < > 8.6   < > 8.7   < > 8.9   < > 8.0*   < > 7.0*   MG  --  2.0  --   --   --   --   --   --  2.2  --  2.1   < > 1.9   < > 1.9   PHOS  --  3.7  --   --  3.5  --  3.5   < > 3.5   < > 3.9   < >  --   --   --    ALB  --  2.4*  --   --   --   --  2.7*  --  2.8*   < > 3.0*   < > 2.5*   < > 2.0*   TBILI  --  0.7  --   --   --   --  1.2*  --  1.1*   < > 1.3*   < > 1.8*   < > 0.9   ALT  --  39  --   --   --   --  45  --  49   < > 50   < > 33   < > 32   INR  --   --   --   --   --   --   --   --   --   --   --   --  1.3*  --  1.4*    < > = values in this interval not displayed.        Recent Labs     06/04/22  0320 06/03/22  1157 06/03/22  0749   CPK 1,034* 1,652* 1,875*         Intake/Output Summary (Last 24 hours) at 6/4/2022 1133  Last data filed at 6/4/2022 0815  Gross per 24 hour   Intake 1705.08 ml   Output 3090 ml   Net -1384.92 ml        Telemetry: sinus rhythm, vr 99 bpm    Echocardiogram (6/1/22 pre-cath):       Left Ventricle: Reduced left ventricular systolic function with a visually estimated EF of 30 - 35%. Left ventricle is moderately dilated. Moderately increased wall thickness. Findings consistent with moderate concentric hypertrophy. Moderate global hypokinesis present. Normal diastolic function.   Right Ventricle: Reduced systolic function.   Mitral Valve: Severely thickened leaflet, at the posterior leaflet. Severely calcified leaflet, at the posterior leaflet. Annular calcification of the mitral valve. Mild regurgitation. Echocardiogram (6/1/22 post-arrest):   Left Ventricle: Severely reduced left ventricular systolic function with a visually estimated EF of 5 - 10%. Severe global hypokinesis present. Echocardiogram (6/3/22):   Left Ventricle: Severely reduced left ventricular systolic function with a visually estimated EF of less than 5%. Left ventricle size is normal. Normal wall thickness. Severe hypokinesis of the following segments: basal inferoseptal, mid anterior and mid inferoseptal. Akinesis of the following segments: basal anteroseptal, basal inferolateral, mid inferolateral, apical anterior, apical lateral, apical septal and apical inferior.   Right Ventricle: Severely reduced systolic function. Left heart cath (6/1/22): Dominance: Right  Left Main   The vessel exhibits minimal luminal irregularities. Left Anterior Descending   There is moderate disease. Left Circumflex   Mid Cx lesion, 90% stenosed. The lesion is calcified. Right Coronary Artery   Ost RCA to Mid RCA lesion, 70% stenosed. Mid RCA lesion, 70% stenosed.      Intervention      Mid Cx lesion   Angioplasty   Supplies used: CATH BLLN DIL 1.5X15MM RX -- EUPHORA; CATH GUID .056IN 6FR 150CM -- GUIDELINER V3; CATH GUID COR EB30 6FR 100CM -- LAUNCHER; GDWIRE RUNTHROUGH 180CM N STRL --    Angioplasty   Supplies used: CATH GUID . 056IN 6FR 150CM -- GUIDELINER V3; CATH GUID COR EB30 6FR 100CM -- LAUNCHER; GDWIRE COR STR ASAHI 2.331S439 -- ; CATH BLLN DIL 1.5X15MM RX -- EUPHORA   Atherectomy   Coronary lithotripsy was performed. IVL cycles: 4. 4 CYCLES   Supplies used: CATH LITHOPLSTY 2.5X12MM SHOCKWAVE; CATH GUID COR EB30 6FR 100CM -- LAUNCHER; Austin Musty 180CM N STRL --    Angioplasty   Supplies used: CATH BLLN DIL 1.5X15MM RX -- EUPHORA; GDWIRE RUNTHROUGH 180CM N STRL -- ; CATH GUID .056IN 6FR 150CM -- GUIDELINER V3; CATH GUID COR EB30 6FR 100CM -- LAUNCHER   Post-Intervention Lesion Assessment   The intervention was unsuccessful. The pre-interventional distal flow is normal (LEBRON 3). Post-intervention LEBRON flow is 3. There were no complications. Unable to advance stent despite shockwave. LEBRON flow 3 at the end. Held further intervention, may consider rotational atherectomy in future if needed. There is a 90% residual stenosis post intervention. Ost RCA to Mid RCA lesion   IVUS FFR OCT   FFR/iFR: iFR: 0.88. Supplies used: GUIDEWIRE SURG PRESSURE COMET II; CATHETER GUID 6FR L100CM DIA0.071IN NYL SHFT 3DRC W/O SIDE   Angioplasty   Supplies used: CATH BLLN DIL 2.0X12MM RX -- EUPHORA; CATHETER GUID 6FR L100CM DIA0.071IN NYL SHFT 3DRC W/O SIDE; GUIDEWIRE SURG PRESSURE COMET II   Stent   Supplies used: STENT COR 2.78H86TB RESOLUTE MARU RX GUERITA   Angioplasty (Also treats lesions: Mid RCA)   Supplies used: CATH BLLN DIL 2.5 X12MM RX -- EUPHORA   Stent   Supplies used: STENT COR 2.50X8MM RESOLUTE MARU RX GUERITA; CATHETER GUID 6FR L100CM DIA0.071IN NYL SHFT 3DRC W/O SIDE; GDWIRE RUNTHROUGH 180CM N STRL --    Angioplasty   Angioplasty was performed following stent deployment.    Supplies used: STENT COR 2.50X8MM RESOLUTE MARU RX GUERITA; GDWIRE RUNTHROUGH 180CM N STRL -- ; CATHETER GUID 6FR L100CM DIA0.071IN NYL SHFT 3DRC W/O SIDE   Stent   Supplies used: STENT COR 2.15I87QY RESOLUTE MARU RX GUERITA   Angioplasty (Also treats lesions: Mid RCA) Supplies used: CATH FRANTZ BLLN DIL 2.5X08MM -- NC EUPHORA   Post-Intervention Lesion Assessment   The intervention was successful. The pre-interventional distal flow is decreased (LEBRON 2). Post-intervention LEBRON flow is 3. There were no complications. There is a 0% residual stenosis post intervention. Mid RCA lesion   IVUS FFR OCT   FFR/iFR: iFR: 0.73. Supplies used: GUIDEWIRE SURG PRESSURE COMET II; CATHETER GUID 6FR L100CM DIA0.071IN NYL SHFT 3DRC W/O SIDE   Angioplasty   Supplies used: CATH BLLN DIL 2.0X12MM RX -- EUPHORA; GUIDEWIRE SURG PRESSURE COMET II; CATHETER GUID 6FR L100CM DIA0.071IN NYL SHFT 3DRC W/O SIDE   Angioplasty (Also treats lesions: Ost RCA to Mid RCA)   Supplies used: CATH BLLN DIL 2.5 X12MM RX -- EUPHORA   Stent   Supplies used: STENT COR GUERITA 2.54F29FU -- GUERITA RESOLUTE MARU; GDWIRE RUNTHROUGH 180CM N STRL -- ; CATHETER GUID 6FR L100CM DIA0.071IN NYL SHFT 3DRC W/O SIDE   Stent   Supplies used: STENT COR GUERITA 2.47Y49TM -- GUERITA RESOLUTE MARU; CATHETER GUID 6FR L100CM DIA0.071IN NYL SHFT 3DRC W/O SIDE; GDWIRE RUNTHROUGH 180CM N STRL --    Angioplasty (Also treats lesions: Ost RCA to Mid RCA)   Supplies used: CATH FRANTZ BLLN DIL 2.5X08MM -- NC EUPHORA   Post-Intervention Lesion Assessment   The intervention was successful. The pre-interventional distal flow is decreased (LEBRON 2). Post-intervention LEBRON flow is 3. There were no complications. There is a 0% residual stenosis post intervention. Assessment:     Active Problems:    Hyperkalemia (5/31/2022)      Pulmonary edema (5/31/2022)      NSTEMI (non-ST elevated myocardial infarction) ([facility] (5/31/2022)      ESRD (end stage renal disease) on dialysis [facility]) (5/31/2022)      Biventricular heart failure ([facility] (6/3/2022)      Palliative care by specialist (6/3/2022)        Plan:     Pt remains critically ill, at risk of imminent death, still on ECMO, Impella, remains intubated. Repeat echo done today, pending read but EF remains 5%.   Reviewed notes from CTS, plan for decannulation Monday with PA cath and LENNY. Prognosis remains very very poor.     Yg Cat NP

## 2022-06-04 NOTE — PROGRESS NOTES
Nephrology Progress Note  Codey Calderon     [facility]. [facility]  Phone - [phone]   Patient: Emily Lopez    YOB: 1965        Date- 6/4/2022   Admit Date: 5/31/2022  CC: Follow up for  ESRD         IMPRESSION & PLAN:    ESRD on HD, Vince Coronaum, TTS   PEA cardiac arrest x2   Cardiogenic shock   CAD   Biventricular failure   S/p  ECMO   S/p impella insertion   Pulmonary edema   Acute respiratory failure on mechanical ventilation   Ischemic left foot    PLAN-   Seen on hd today   Remove 2 kg as tolerated   HD TTS schedule   epogen TTS       Subjective: Interval History:   Seen on hd  She tolerated UF yesterday-     Objective:   Vitals:    06/04/22 0500 06/04/22 0600 06/04/22 0611 06/04/22 0750   BP:       Pulse: 80 77  82   Resp: 10 10  10   Temp:       TempSrc:       SpO2: 100% 100%  100%   Weight:   53.8 kg (118 lb 9.7 oz)    Height:          06/03 0701 - 06/04 0700  In: 1958.4 [I.V.:1678.4]  Out: 3090 [Drains:600]  Last 3 Recorded Weights in this Encounter    06/02/22 0626 06/03/22 0405 06/04/22 0611   Weight: 56.2 kg (123 lb 14.4 oz) 56.1 kg (123 lb 10.9 oz) 53.8 kg (118 lb 9.7 oz)      Physical exam:    GEN: intubated  On vent  NECK-no mass, ET TUBE  RESP: clear b/l, no wheezing  CVS: RRR,S1,S2    ABDO: soft , non tender,  NEURO:Can't access due to patient's current condition   EXT:Right extremity has ECMO cannulas      Chart reviewed. Pertinent Notes reviewed.      Data Review :  Recent Labs     06/04/22  0320 06/03/22  2211 06/03/22  1735 06/03/22  1157 06/03/22  1157 06/03/22  0749 06/03/22  0749 06/03/22  0415 06/03/22  0415    138 136   < > 137   < > 137   < > 137   K 3.9 4.2 4.0   < > 4.1   < > 3.7   < > 3.8    105 104   < > 104   < > 104   < > 103   CO2 21 22 20*   < > 24   < > 22   < > 23   BUN 28* 29* 28*   < > 29*   < > 29*   < > 31*   CREA 3.99* 3.82* 3.71*   < > 3.75*   < > 3.67*   < > 3.53*   GLU 93 104* 114*   < > 106*   < > 117*   < > 136*   CA 8.1* 7.8* 8.9   < > 8.6   < > 8.7   < > 8.9   MG 2.0  --   --   --   --   --  2.2  --  2.1   PHOS 3.7  --  3.5  --  3.5   < > 3.5   < > 3.9    < > = values in this interval not displayed. Recent Labs     06/04/22  0320 06/03/22  2211 06/03/22  1735   WBC 9.5 9.4 10.5   HGB 9.4* 9.6* 10.8*   HCT 27.5* 27.8* 30.8*   PLT 73* 97* 87*     No results for input(s): FE, TIBC, PSAT, FERR in the last 72 hours.    Medication list  reviewed  Current Facility-Administered Medications   Medication Dose Route Frequency    NOREPINephrine (LEVOPHED) 8 mg in 5% dextrose 250mL (32 mcg/mL) infusion  0.5-30 mcg/min IntraVENous TITRATE    0.9% sodium chloride infusion 250 mL  250 mL IntraVENous PRN    iron sucrose (VENOFER) injection 200 mg  200 mg IntraVENous ONCE    epoetin kourtney-epbx (RETACRIT) injection 6,000 Units  6,000 Units SubCUTAneous Q TUE, THU & SAT    sodium bicarbonate (8.4%) 25 mEq in dextrose 5% 1,000 mL infusion   Other TITRATE    balsam peru-castor oiL (VENELEX) ointment   Topical BID    alcohol 62% (NOZIN) nasal  1 Ampule  1 Ampule Topical Q12H    propofol (DIPRIVAN) 10 mg/mL infusion  0-50 mcg/kg/min IntraVENous TITRATE    aspirin chewable tablet 81 mg  81 mg Per G Tube DAILY    VANCOMYCIN INFORMATION NOTE   Other Rx Dosing/Monitoring    alteplase (CATHFLO) 1 mg in sterile water (preservative free) 1 mL injection  1 mg InterCATHeter PRN    bacitracin 500 unit/gram packet 1 Packet  1 Packet Topical PRN    sodium chloride (NS) flush 5-40 mL  5-40 mL IntraVENous Q8H    sodium chloride (NS) flush 5-40 mL  5-40 mL IntraVENous PRN    albumin human 5% (BUMINATE) solution 12.5 g  12.5 g IntraVENous Q2H PRN    0.45% sodium chloride infusion  10 mL/hr IntraVENous CONTINUOUS    0.9% sodium chloride infusion  9 mL/hr IntraVENous CONTINUOUS    [Held by provider] oxyCODONE IR (ROXICODONE) tablet 10 mg  10 mg Oral Q4H PRN    morphine injection 4 mg  4 mg IntraVENous Q2H PRN    mupirocin (BACTROBAN) 2 % ointment   Both Nostrils BID    ondansetron (ZOFRAN) injection 4 mg  4 mg IntraVENous Q4H PRN    albuterol (PROVENTIL VENTOLIN) nebulizer solution 2.5 mg  2.5 mg Nebulization Q4H PRN    midazolam (VERSED) injection 1 mg  1 mg IntraVENous Q1H PRN    chlorhexidine (PERIDEX) 0.12 % mouthwash 10 mL  10 mL Oral Q12H    calcium chloride 1 g in 0.9% sodium chloride 250 mL IVPB  1 g IntraVENous PRN    bisacodyL (DULCOLAX) suppository 10 mg  10 mg Rectal DAILY PRN    [Held by provider] oxyCODONE IR (ROXICODONE) tablet 5 mg  5 mg Oral Q4H PRN    ticagrelor (BRILINTA) tablet 90 mg  90 mg Per NG tube Q12H    albumin human 5% (BUMINATE) solution 25 g  25 g IntraVENous Q4H PRN    piperacillin-tazobactam (ZOSYN) 3.375 g in 0.9% sodium chloride (MBP/ADV) 100 mL MBP  3.375 g IntraVENous Q12H    insulin lispro (HUMALOG) injection   SubCUTAneous Q6H    glucose chewable tablet 16 g  4 Tablet Oral PRN    glucagon (GLUCAGEN) injection 1 mg  1 mg IntraMUSCular PRN    dextrose 10% infusion 0-250 mL  0-250 mL IntraVENous PRN    pantoprazole (PROTONIX) 40 mg in 0.9% sodium chloride 10 mL injection  40 mg IntraVENous DAILY    white petrolatum-mineral oiL (SOOTHE NIGHT TIME) 80-20 % ophthalmic ointment   Both Eyes Q12H    fentaNYL (PF) 1,500 mcg/30 mL (50 mcg/mL) infusion  0-200 mcg/hr IntraVENous TITRATE    heparin 25,000 units in D5W 250 ml infusion  12-25 Units/kg/hr IntraVENous TITRATE          Graham Reddy MD              [facility]  [facility]  [facility], [place], [address]  Phone - [phone]               Fax - [phone]
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
## 2022-06-04 NOTE — PROGRESS NOTES
2100 Bladder Scan 63 cc urine    2200  Radial art dampened and unable to draw back blood. NP attempt new access but unable to locate feasible site. Convert Impella to blaine . Radial blaine discontinued. Pressure held 15 minutes . No sign of hematoma. Pulse doppled below removal site     0600  No changes overnight. (See assessment flow sheets)  Flow remains 3.8  3950 rpm 40% Sweep 1. Calcium replenished    06:19  Bladder Scan 176 cc urine.

## 2022-06-04 NOTE — PROCEDURES
Hemodialysis / [phone]    Vitals Pre Post Assessment Pre Post   BP BP: (!) 86/78 (06/04/22 0756) 130/90 LOC Sedated same   HR Pulse (Heart Rate): 78 (06/04/22 0756) 108 Lungs Intubated and on vent and ecmo same   Resp Resp Rate: 10 (06/04/22 0756) 19 Cardiac NRR same   Temp Temp: 97.5 °F (36.4 °C) (06/04/22 0745) 96.8 Skin Warm and dry same   Weight  n/a n/a Edema generalized same   Tele status monitor monitor Pain 0 0     Orders   Duration: Start: 1979 End: 1111 Total: 3.25 hrs   Dialyzer: Dialyzer/Set Up Inspection: Jesus Alberto [place] (06/04/22 0756)   [place]: Dialysate K (mEq/L): 3 (06/04/22 0756)   Ca Bath: Dialysate CA (mEq/L): 2.5 (06/04/22 0756)   Na: Dialysate NA (mEq/L): 138 (06/04/22 0756)   Bicarb: Dialysate HCO3 (mEq/L): 40 (06/04/22 0756)   Target Fluid Removal: Goal/Amount of Fluid to Remove (mL): 2000 mL (06/04/22 0756)     Access   Type & Location: [facility] AVF   Comments:                                     skin CDI. No s/s of infection. + B/T. No issues with cannulation or hemostasis. Running well at .      Labs   HBsAg (Antigen) / date:               Neg 6/1/22                                HBsAb (Antibody) / date: Jessy 6/1/22   Source: epic   Obtained/Reviewed  Critical Results Called HGB   Date Value Ref Range Status   06/04/2022 9.4 (L) 11.5 - 16.0 g/dL Final     Potassium   Date Value Ref Range Status   06/04/2022 3.9 3.5 - 5.1 mmol/L Final     Calcium   Date Value Ref Range Status   06/04/2022 8.1 (L) 8.5 - 10.1 MG/DL Final     BUN   Date Value Ref Range Status   06/04/2022 28 (H) 6 - 20 MG/DL Final     Creatinine   Date Value Ref Range Status   06/04/2022 3.99 (H) 0.55 - 1.02 MG/DL Final        Meds Given   Name Dose Route   none                 Adequacy / Fluid    Total Liters Process: 71.2L   Net Fluid Removed: 2000ml      Comments   Time Out Done:   (Time) 1120   Admitting Diagnosis: NSTEMI   Consent obtained/signed: Informed Consent Verified: Yes (06/04/22 0756)   Machine / RO # Machine Number: [id number] (06/04/22 0756)   Primary Nurse Rpt Pre: Taniya Lutz RN   Primary Nurse Rpt Post: Taniya Lutz RN   Pt Education: n/a   Care Plan: To continue hd   Pts outpatient clinic: [facility] Summary   Comments:              Pt at ccu sedated. Consent signed & on file. SBAR received from Primary RN. 6074: Pt cannulated with 23I needles per policy & without issue. Labs drawn per request/ order. VSS. Dialysis Tx initiated. 0845: discussed pt's BP to primary RN since its in the 80's, Primary RN stated that as long as the MAP is not below 65 theres no reason to worry. 0900: Pt resting quietly. 1111: Tx ended. VSS. All possible blood returned to patient. Hemostasis achieved without issue. Bed locked and in the lowest position, call bell and belongings in reach. SBAR given to Primary, RN. Patient is stable at time of their/ my departure. All Dialysis related medications have been reviewed.

## 2022-06-04 NOTE — PROGRESS NOTES
2469- Bedside and Verbal shift change report given to [facility] [place] (oncoming nurse) by La Cole RN (offgoing nurse). Report included the following information SBAR, Kardex, Intake/Output, Accordion, Cardiac Rhythm NSR and Alarm Parameters . 46- Dr. Hermilo Fischer at the bedside; see progress note for details. 0800- Shift assessment completed; see flow sheet for details. Pt responds to noxious stimuli with physiological responses (BP elevates, HR increases) however no movement of BUE and BLE to any stimuli. Pupils remains fixed (1mm), + corneals, + cough/gag to suction that is weak. Currently in NSR with T wave inversion; MAPs >65, however pt has a narrow pulse pressure. Peripheral pulses to doppler; ECMO \"humming\" heard, however extremities as warm to the touch, + capillary refill. Lungs are diminished throughout; remains intubated; FiO2~40%. ECMO remains at 40% via oxygenator; Sweep~1. ABD is soft; flat; Flexiseal in place; stool is green in coloration, watery. Pt is oliguric; has not voided; orders in place to bladder scan. Skin is warm and dry. Pt repositioned      0925- Attempted to reposition pt; BP dropped with MAP in the low 09W; systolic pressures in the 60s. Waveform via the Impella is dampened. Both ECMO and Impella alarming. Moderate amount of \"swaying\" in ECMO cannulations. PRN Albumin given with a gradual improve in the above symptoms. Will continue to monitor. Levo ordered; on hold as of now to see if Albumin will improve BP    1010- Family at the bedside; verbal update given. BP improvement noted. Continuing HD as ordered    Dr. Hermilo Fischer called requesting to increase Impella to P3 to off load L. Ventricle following review of pt's ECHO. 1130- HD completed; 2L removed without incident     1215- Reassessment completed; see flow sheet for details. No acute changes in pt assessment. BP significantly improved following HD and PRN Albumin, as noted above.  Pt repositioned     1400- Dr. Alyson Garnica made aware of ABG results; no new orders at this time     1605- Reassessment completed; see flow sheet for details. No acute changes in pt assessment. Repositioned for comfort. Family remains at the bedside. 1735- Pt noted with increased RLE swelling from the upper thigh down to right below the right knee cap. Extremity remains warm to the touch; unable to fully doppler pulses due to ECMO \"hum,\" Capillary refill is unchanged. Area does not appear to be tender to touch. Spoke with Dr. Mary Merritt, verbal \"ok\" to obtain a doppler of RLE. Order placed. Also, K+ was noted to be 3.2 on 1600 labs; verbal order for 40mEq of IV Potassium    1825- Pt provided with incontinent care and bed pads changed; repositioned for comfort     1930- Bedside and Verbal shift change report given to DESIREE Contreras RN (oncoming nurse) by Zenobia Sargent RN (offgoing nurse). Report included the following information SBAR, Kardex, Intake/Output, Recent Results, Cardiac Rhythm NSR and Alarm Parameters .

## 2022-06-04 NOTE — PROGRESS NOTES
ECMO Management Note (CPT 43173)    Diagnosis: cardiogenic shock     Patient condition: critical     Blood flow: 3 L/min    MAP: 70-80's    AB.40 / 49 / 111 / 30 / 4     Anticoagulation: bival     PTT: 60-70's     Cannula site (extremity): R CFA, R CFV    Other:

## 2022-06-05 NOTE — PROGRESS NOTES
[facility] in [place], [place]  Inpatient Progress Note      Chart reviewed, overnight events noted. Noted plan by Dr. Latisha Bah and discussed case with Dr. Latisha Bah and Dr. Luis Armando Martinez. Pt remains critically ill with support on [place] EMCO and Impella. Last ramp trial without significant improvement in pulsatility, and noted minimal pulse pressure. Suspect she has not had significant LV recovery. Plan for OR tomorrow with PA cath and LENNY to assess cardiac function and attempt ECMO decannulation. In addition, pt has unclear neuro status, noted by nursing to only have physiologic response to noxious stimuli. Pt's prognosis is grave given her underlying renal failure, cardiogenic shock, and significant concern for anoxic brain injury.

## 2022-06-05 NOTE — PROGRESS NOTES
ECMO Management Note (CPT 47222)     Diagnosis: cardiogenic shock      Patient condition: critical      Blood flow: 3 L/min     MAP: 70-80's     AB.33 / 30 / 60 / 16 / -9      Anticoagulation: bival      PTT: 60-70's      Cannula site (extremity): R CFA, R CFV     Other: 28-Apr-2017

## 2022-06-05 NOTE — PROGRESS NOTES
Nephrology Progress Note  Codey Calderon     [email or web address]  Phone - [phone]   Patient: Francine Solorio    YOB: 1965        Date- 6/5/2022   Admit Date: 5/31/2022  CC: Follow up for  ESRD         IMPRESSION & PLAN:    ESRD on HD, [facility], TTS   PEA cardiac arrest x2   Cardiogenic shock   CAD   Biventricular failure   S/p  ECMO   S/p impella insertion   Pulmonary edema   Acute respiratory failure on mechanical ventilation   Ischemic left foot    PLAN-   Seen and examined   Tolerated hemo yesterday, 2 L UF   Continue HD TTS schedule   epogen TTS   Daily labs and I/Os     Subjective: Interval History:   Seen and examined. Tolerated HD, 2 L removed. BP elevated this AM.  Plans for possible ECMO decannulation tomorrow. Objective:   Vitals:    06/05/22 1030 06/05/22 1045 06/05/22 1100 06/05/22 1133   BP:       Pulse: 95 95 94 75   Resp: 10 10 10 26   Temp:       TempSrc:       SpO2:    97%   Weight:       Height:          06/04 0701 - 06/05 0700  In: 1804.4 [I.V.:1364.4]  Out: 2800 [Urine:400; Drains:400]  Last 3 Recorded Weights in this Encounter    06/03/22 0405 06/04/22 0611 06/05/22 0400   Weight: 56.1 kg (123 lb 10.9 oz) 53.8 kg (118 lb 9.7 oz) 52 kg (114 lb 10.2 oz)      Physical exam:    GEN: intubated  On vent  NECK-no mass, ET TUBE  RESP: clear b/l, no wheezing  CVS: RRR,S1,S2    ABDO: soft , non tender,  NEURO:Can't access due to patient's current condition   EXT:Right extremity has ECMO cannulas      Chart reviewed. Pertinent Notes reviewed.      Data Review :  Recent Labs     06/05/22  1041 06/05/22  0339 06/05/22  0006 06/04/22  1006 06/04/22  0320 06/03/22  1157 06/03/22  0749    137 138   < > 138   < > 137   K 3.9 3.9 4.0   < > 3.9   < > 3.7   CL 98 100 101   < > 104   < > 104   CO2 23 26 27   < > 21   < > 22   BUN 16 13 14   < > 28*   < > 29*   CREA 2.73* 2.49* 2.37*   < > 3.99*   < > 3.67*   * 113* 101* < > 93   < > 117*   CA 8.8 8.7 8.6   < > 8.1*   < > 8.7   MG  --  1.9  --   --  2.0  --  2.2   PHOS 3.1 2.6 2.6   < > 3.7   < > 3.5    < > = values in this interval not displayed.      Recent Labs     06/05/22  1041 06/05/22  0339 06/05/22  0006   WBC 11.3* 9.3 9.6   HGB 9.9* 9.7* 9.2*   HCT 28.6* 27.9* 26.2*   PLT 73* 72* 74*     Recent Labs     06/04/22  0320   TIBC 73*   PSAT 22   FERR 2,935*      Medication list  reviewed  Current Facility-Administered Medications   Medication Dose Route Frequency    niCARdipine (CARDENE) 25 mg in 0.9% sodium chloride 250 mL infusion  0-15 mg/hr IntraVENous TITRATE    heparin (porcine) 12,500 Units in dextrose 5% 500 mL (Impella Purge Solution)  1-30 mL/hr Other TITRATE    NOREPINephrine (LEVOPHED) 8 mg in 5% dextrose 250mL (32 mcg/mL) infusion  0.5-30 mcg/min IntraVENous TITRATE    epoetin kourtney-epbx (RETACRIT) injection 6,000 Units  6,000 Units SubCUTAneous Q TUE, THU & SAT    alteplase (CATHFLO) 2 mg in dextrose 5% 50 mL impella purge solution  2 mg Other TITRATE    0.9% sodium chloride infusion 250 mL  250 mL IntraVENous PRN    balsam peru-castor oiL (VENELEX) ointment   Topical BID    alcohol 62% (NOZIN) nasal  1 Ampule  1 Ampule Topical Q12H    propofol (DIPRIVAN) 10 mg/mL infusion  0-50 mcg/kg/min IntraVENous TITRATE    aspirin chewable tablet 81 mg  81 mg Per G Tube DAILY    VANCOMYCIN INFORMATION NOTE   Other Rx Dosing/Monitoring    alteplase (CATHFLO) 1 mg in sterile water (preservative free) 1 mL injection  1 mg InterCATHeter PRN    bacitracin 500 unit/gram packet 1 Packet  1 Packet Topical PRN    sodium chloride (NS) flush 5-40 mL  5-40 mL IntraVENous Q8H    sodium chloride (NS) flush 5-40 mL  5-40 mL IntraVENous PRN    albumin human 5% (BUMINATE) solution 12.5 g  12.5 g IntraVENous Q2H PRN    0.45% sodium chloride infusion  10 mL/hr IntraVENous CONTINUOUS    0.9% sodium chloride infusion  9 mL/hr IntraVENous CONTINUOUS    [Held by provider] oxyCODONE IR (ROXICODONE) tablet 10 mg  10 mg Oral Q4H PRN    morphine injection 4 mg  4 mg IntraVENous Q2H PRN    mupirocin (BACTROBAN) 2 % ointment   Both Nostrils BID    ondansetron (ZOFRAN) injection 4 mg  4 mg IntraVENous Q4H PRN    albuterol (PROVENTIL VENTOLIN) nebulizer solution 2.5 mg  2.5 mg Nebulization Q4H PRN    midazolam (VERSED) injection 1 mg  1 mg IntraVENous Q1H PRN    chlorhexidine (PERIDEX) 0.12 % mouthwash 10 mL  10 mL Oral Q12H    bisacodyL (DULCOLAX) suppository 10 mg  10 mg Rectal DAILY PRN    [Held by provider] oxyCODONE IR (ROXICODONE) tablet 5 mg  5 mg Oral Q4H PRN    ticagrelor (BRILINTA) tablet 90 mg  90 mg Per NG tube Q12H    albumin human 5% (BUMINATE) solution 25 g  25 g IntraVENous Q4H PRN    piperacillin-tazobactam (ZOSYN) 3.375 g in 0.9% sodium chloride (MBP/ADV) 100 mL MBP  3.375 g IntraVENous Q12H    insulin lispro (HUMALOG) injection   SubCUTAneous Q6H    glucose chewable tablet 16 g  4 Tablet Oral PRN    glucagon (GLUCAGEN) injection 1 mg  1 mg IntraMUSCular PRN    dextrose 10% infusion 0-250 mL  0-250 mL IntraVENous PRN    pantoprazole (PROTONIX) 40 mg in 0.9% sodium chloride 10 mL injection  40 mg IntraVENous DAILY    white petrolatum-mineral oiL (SOOTHE NIGHT TIME) 80-20 % ophthalmic ointment   Both Eyes Q12H    fentaNYL (PF) 1,500 mcg/30 mL (50 mcg/mL) infusion  0-200 mcg/hr IntraVENous TITRATE    heparin 25,000 units in D5W 250 ml infusion  12-25 Units/kg/hr IntraVENous TITRATE          Primo Salgado MD              [address] [facility]  [facility], [address]  [place], [address]  Phone - [phone]               Fax - [phone]

## 2022-06-05 NOTE — PROGRESS NOTES
ECMO Management Note (CPT 71456)     Diagnosis: cardiogenic shock      Patient condition: critical      Blood flow: 3 L/min     MAP: 70-80's     AB.44 / 32 / 165 / 21 / 2.2      Anticoagulation: bival      PTT: 60-70's      Cannula site (extremity): R CFA, R CFV     Other:

## 2022-06-05 NOTE — PROGRESS NOTES
[facility]  [address]  [address] [facility] [address], [address] [facility]  [phone]  [email or web address]         Cardiology Progress Note      6/5/2022 12:58 PM    Admit Date: 5/31/2022    Admit Diagnosis:   NSTEMI (non-ST elevated myocardial infarction) ([facility] [I21.4]  Pulmonary edema [J81.1]  ESRD (end stage renal disease) on dialysis ([facility] [N18.6, Z99.2]  Hyperkalemia [E87.5]    Interval History/Subjective:     Mortimer Seek remains intubated, on MCS x2. Had issues with Impella overnight, requiring local rep to assist.  No further events since. Started cardene gtt this AM for increased -160s.       Visit Vitals  BP (!) 130/90   Pulse 94   Temp 97.9 °F (36.6 °C)   Resp 10   Ht 5' 5\" (1.651 m)   Wt 52 kg (114 lb 10.2 oz)   LMP 09/15/2013   SpO2 98%   BMI 19.08 kg/m²       Current Facility-Administered Medications   Medication Dose Route Frequency    niCARdipine (CARDENE) 25 mg in 0.9% sodium chloride 250 mL infusion  0-15 mg/hr IntraVENous TITRATE    heparin (porcine) 12,500 Units in dextrose 5% 500 mL (Impella Purge Solution)  1-30 mL/hr Other TITRATE    NOREPINephrine (LEVOPHED) 8 mg in 5% dextrose 250mL (32 mcg/mL) infusion  0.5-30 mcg/min IntraVENous TITRATE    epoetin kourtney-epbx (RETACRIT) injection 6,000 Units  6,000 Units SubCUTAneous Q TUE, THU & SAT    alteplase (CATHFLO) 2 mg in dextrose 5% 50 mL impella purge solution  2 mg Other TITRATE    0.9% sodium chloride infusion 250 mL  250 mL IntraVENous PRN    balsam peru-castor oiL (VENELEX) ointment   Topical BID    alcohol 62% (NOZIN) nasal  1 Ampule  1 Ampule Topical Q12H    propofol (DIPRIVAN) 10 mg/mL infusion  0-50 mcg/kg/min IntraVENous TITRATE    aspirin chewable tablet 81 mg  81 mg Per G Tube DAILY    VANCOMYCIN INFORMATION NOTE   Other Rx Dosing/Monitoring    alteplase (CATHFLO) 1 mg in sterile water (preservative free) 1 mL injection  1 mg InterCATHeter PRN    bacitracin 500 unit/gram packet 1 Packet  1 Packet Topical PRN    sodium chloride (NS) flush 5-40 mL  5-40 mL IntraVENous Q8H    sodium chloride (NS) flush 5-40 mL  5-40 mL IntraVENous PRN    albumin human 5% (BUMINATE) solution 12.5 g  12.5 g IntraVENous Q2H PRN    0.45% sodium chloride infusion  10 mL/hr IntraVENous CONTINUOUS    0.9% sodium chloride infusion  9 mL/hr IntraVENous CONTINUOUS    [Held by provider] oxyCODONE IR (ROXICODONE) tablet 10 mg  10 mg Oral Q4H PRN    morphine injection 4 mg  4 mg IntraVENous Q2H PRN    mupirocin (BACTROBAN) 2 % ointment   Both Nostrils BID    ondansetron (ZOFRAN) injection 4 mg  4 mg IntraVENous Q4H PRN    albuterol (PROVENTIL VENTOLIN) nebulizer solution 2.5 mg  2.5 mg Nebulization Q4H PRN    midazolam (VERSED) injection 1 mg  1 mg IntraVENous Q1H PRN    chlorhexidine (PERIDEX) 0.12 % mouthwash 10 mL  10 mL Oral Q12H    bisacodyL (DULCOLAX) suppository 10 mg  10 mg Rectal DAILY PRN    [Held by provider] oxyCODONE IR (ROXICODONE) tablet 5 mg  5 mg Oral Q4H PRN    ticagrelor (BRILINTA) tablet 90 mg  90 mg Per NG tube Q12H    albumin human 5% (BUMINATE) solution 25 g  25 g IntraVENous Q4H PRN    piperacillin-tazobactam (ZOSYN) 3.375 g in 0.9% sodium chloride (MBP/ADV) 100 mL MBP  3.375 g IntraVENous Q12H    insulin lispro (HUMALOG) injection   SubCUTAneous Q6H    glucose chewable tablet 16 g  4 Tablet Oral PRN    glucagon (GLUCAGEN) injection 1 mg  1 mg IntraMUSCular PRN    dextrose 10% infusion 0-250 mL  0-250 mL IntraVENous PRN    pantoprazole (PROTONIX) 40 mg in 0.9% sodium chloride 10 mL injection  40 mg IntraVENous DAILY    white petrolatum-mineral oiL (SOOTHE NIGHT TIME) 80-20 % ophthalmic ointment   Both Eyes Q12H    fentaNYL (PF) 1,500 mcg/30 mL (50 mcg/mL) infusion  0-200 mcg/hr IntraVENous TITRATE    heparin 25,000 units in D5W 250 ml infusion  12-25 Units/kg/hr IntraVENous TITRATE       Objective:      Physical Exam:  General: sedated.   Heart:  reg rate and rhythm; normal S1/S2; no murmurs  Respiratory: intubated  Extremities:  Impella in left groin, VA ECMO right groin. Left arm with AVF. Bilateral LE warm to touch. Neuro: sedated. Skin: pale    Data Review:   Recent Labs     06/05/22  1041 06/05/22  0339 06/05/22  0006   WBC 11.3* 9.3 9.6   HGB 9.9* 9.7* 9.2*   HCT 28.6* 27.9* 26.2*   PLT 73* 72* 74*     Recent Labs     06/05/22  1041 06/05/22  0339 06/05/22  0006 06/04/22  1006 06/04/22  0320 06/03/22  1735 06/03/22  1157 06/03/22  0749 06/03/22  0749    137 138   < > 138   < > 137   < > 137   K 3.9 3.9 4.0   < > 3.9   < > 4.1   < > 3.7   CL 98 100 101   < > 104   < > 104   < > 104   CO2 23 26 27   < > 21   < > 24   < > 22   * 113* 101*   < > 93   < > 106*   < > 117*   BUN 16 13 14   < > 28*   < > 29*   < > 29*   CREA 2.73* 2.49* 2.37*   < > 3.99*   < > 3.75*   < > 3.67*   CA 8.8 8.7 8.6   < > 8.1*   < > 8.6   < > 8.7   MG  --  1.9  --   --  2.0  --   --   --  2.2   PHOS 3.1 2.6 2.6   < > 3.7   < > 3.5   < > 3.5   ALB  --  2.8*  --   --  2.4*  --  2.7*   < > 2.8*   TBILI  --  0.8  --   --  0.7  --  1.2*   < > 1.1*   ALT  --  32  --   --  39  --  45   < > 49    < > = values in this interval not displayed. Recent Labs     06/05/22  0339 06/04/22  0320 06/03/22  1157   * 1,034* 1,652*         Intake/Output Summary (Last 24 hours) at 6/5/2022 1113  Last data filed at 6/5/2022 0532  Gross per 24 hour   Intake 1706.41 ml   Output 800 ml   Net 906.41 ml        Telemetry: sinus rhythm, vr 99 bpm    Echocardiogram (6/1/22 pre-cath):       Left Ventricle: Reduced left ventricular systolic function with a visually estimated EF of 30 - 35%. Left ventricle is moderately dilated. Moderately increased wall thickness. Findings consistent with moderate concentric hypertrophy. Moderate global hypokinesis present. Normal diastolic function.   Right Ventricle: Reduced systolic function.   Mitral Valve: Severely thickened leaflet, at the posterior leaflet.  Severely calcified leaflet, at the posterior leaflet. Annular calcification of the mitral valve. Mild regurgitation. Echocardiogram (6/1/22 post-arrest):   Left Ventricle: Severely reduced left ventricular systolic function with a visually estimated EF of 5 - 10%. Severe global hypokinesis present. Echocardiogram (6/3/22):   Left Ventricle: Severely reduced left ventricular systolic function with a visually estimated EF of less than 5%. Left ventricle size is normal. Normal wall thickness. Severe hypokinesis of the following segments: basal inferoseptal, mid anterior and mid inferoseptal. Akinesis of the following segments: basal anteroseptal, basal inferolateral, mid inferolateral, apical anterior, apical lateral, apical septal and apical inferior.   Right Ventricle: Severely reduced systolic function. Left heart cath (6/1/22): Dominance: Right  Left Main   The vessel exhibits minimal luminal irregularities. Left Anterior Descending   There is moderate disease. Left Circumflex   Mid Cx lesion, 90% stenosed. The lesion is calcified. Right Coronary Artery   Ost RCA to Mid RCA lesion, 70% stenosed. Mid RCA lesion, 70% stenosed. Intervention      Mid Cx lesion   Angioplasty   Supplies used: CATH BLLN DIL 1.5X15MM RX -- EUPHORA; CATH GUID .056IN 6FR 150CM -- GUIDELINER V3; CATH GUID COR EB30 6FR 100CM -- LAUNCHER; GDWIRE RUNTHROUGH 180CM N STRL --    Angioplasty   Supplies used: CATH GUID .056IN 6FR 150CM -- GUIDELINER V3; CATH GUID COR EB30 6FR 100CM -- LAUNCHER; [address] [id number] -- ; CATH BLLN DIL 1.5X15MM RX -- [address]   Atherectomy   Coronary lithotripsy was performed.  IVL cycles: 4. 4 CYCLES   Supplies used: CATH LITHOPLSTY 2.5X12MM SHOCKWAVE; CATH GUID COR EB30 6FR 100CM -- LAUNCHER; GDWIRE RUNTHROUGH 180CM N STRL --    Angioplasty   Supplies used: CATH BLLN DIL 1.5X15MM RX -- EUPHORA; GDWIRE RUNTHROUGH 180CM N STRL -- ; CATH GUID .Minerva.Iftikhar 6FR 150CM -- GUIDELINER V3; CATH GUID COR EB30 6FR 100CM -- LAUNCHER   Post-Intervention Lesion Assessment   The intervention was unsuccessful. The pre-interventional distal flow is normal (LEBRON 3). Post-intervention LEBRON flow is 3. There were no complications. Unable to advance stent despite shockwave. LEBRON flow 3 at the end. Held further intervention, may consider rotational atherectomy in future if needed. There is a 90% residual stenosis post intervention. Ost RCA to Mid RCA lesion   IVUS FFR OCT   FFR/iFR: iFR: 0.88. Supplies used: GUIDEWIRE SURG PRESSURE COMET II; CATHETER GUID 6FR L100CM DIA0.071IN NYL SHFT 3DRC W/O SIDE   Angioplasty   Supplies used: CATH BLLN DIL 2.0X12MM RX -- EUPHORA; CATHETER GUID 6FR L100CM DIA0.071IN NYL SHFT 3DRC W/O SIDE; GUIDEWIRE SURG PRESSURE COMET II   Stent   Supplies used: STENT COR 2.97O17MQ RESOLUTE MARU RX GUERITA   Angioplasty (Also treats lesions: Mid RCA)   Supplies used: CATH BLLN DIL 2.5 X12MM RX -- EUPHORA   Stent   Supplies used: STENT COR 2.50X8MM RESOLUTE MARU RX GUERITA; CATHETER GUID 6FR L100CM DIA0.071IN NYL SHFT 3DRC W/O SIDE; GDWIRE RUNTHROUGH 180CM N STRL --    Angioplasty   Angioplasty was performed following stent deployment. Supplies used: STENT COR 2.50X8MM RESOLUTE MARU RX GUERITA; GDWIRE RUNTHROUGH 180CM N STRL -- ; CATHETER GUID 6FR L100CM DIA0.071IN NYL SHFT 3DRC W/O SIDE   Stent   Supplies used: STENT COR 2.97T81OJ RESOLUTE MARU RX GUERITA   Angioplasty (Also treats lesions: Mid RCA)   Supplies used: CATH FRANTZ BLLN DIL 2.5X08MM -- NC EUPHORA   Post-Intervention Lesion Assessment   The intervention was successful. The pre-interventional distal flow is decreased (LEBRON 2). Post-intervention LEBRON flow is 3. There were no complications. There is a 0% residual stenosis post intervention. Mid RCA lesion   IVUS FFR OCT   FFR/iFR: iFR: 0.73.    Supplies used: GUIDEWIRE SURG PRESSURE COMET II; CATHETER GUID 6FR L100CM DIA0.071IN NYL SHFT 3DRC W/O SIDE   Angioplasty   Supplies used: CATH BLLN DIL 2.0X12MM RX -- [address]; GUIDEWIRE SURG PRESSURE COMET II; CATHETER GUID 6FR L100CM DIA0.071IN NYL SHFT 3DRC W/O SIDE   Angioplasty (Also treats lesions: Ost RCA to Mid RCA)   Supplies used: CATH BLLN DIL 2.5 X12MM RX -- EUPHORA   Stent   Supplies used: STENT COR GUERITA 2.21V76WI -- GUERITA RESOLUTE MARU; GDWIRE RUNTHROUGH 180CM N STRL -- ; CATHETER GUID 6FR L100CM DIA0.071IN NYL SHFT 3DRC W/O SIDE   Stent   Supplies used: STENT COR GUERITA 2.74M69UM -- GUERITA RESOLUTE MARU; CATHETER GUID 6FR L100CM DIA0.071IN NYL SHFT 3DRC W/O SIDE; GDWIRE RUNTHROUGH 180CM N STRL --    Angioplasty (Also treats lesions: Ost RCA to Mid RCA)   Supplies used: CATH FRANTZ BLLN DIL 2.5X08MM -- NC EUPHORA   Post-Intervention Lesion Assessment   The intervention was successful. The pre-interventional distal flow is decreased (LEBRON 2). Post-intervention LEBRON flow is 3. There were no complications. There is a 0% residual stenosis post intervention. Assessment:     Active Problems:    Hyperkalemia (5/31/2022)      Pulmonary edema (5/31/2022)      NSTEMI (non-ST elevated myocardial infarction) ([address] (5/31/2022)      ESRD (end stage renal disease) on dialysis ([address] (5/31/2022)      Biventricular heart failure ([address] (6/3/2022)      Palliative care by specialist (6/3/2022)        Plan:     Pt remains critically ill, still on ECMO, Impella, remains intubated. BP up, requiring cardene gtt. Echo planned for today to recheck EF. Reviewed notes from CTS, plan for decannulation Monday.     Reji Echavarria NP

## 2022-06-05 NOTE — PROGRESS NOTES
[id number]  Impella with multiple purge flow blocked and multiple purge system open alarms. Called 1-800- Impella and changed both controllers and circuits multiple times without success. Local reps after receiving alarms called to unit and assisted with purge reservoir bypass technique. Instructed   To NOT REMOVE ADDITIONAL INSERTED TUBING AND DO NOT CHANGE THIS CASSETTE  After this technique was performed, Impella resume normal function with data within trends prior to alarms. All alarm codes ceased. Pt appeared to tolerate without additional stress. 0030  Bladder scan 356. NP ordered in and out cath with retun of 400 cc  Clear light yellow urine. 0200 PTT 73.3  Heparin restarted @ 1u/kg/hr. 0600  No further issues with impella alarms. No changes overnight with ECMO. No additional swelling noted in Rt leg. Pulses remain able to be doppled.

## 2022-06-05 NOTE — PROGRESS NOTES
Critical Care Note      Cardiac surgery was called for the evaluation and management of: cardiogenic shock, right ventricular failure, NYHA class IV acute systolic failure, acute liver injury      The critical nature of the patient's condition includes the following: The patient is at imminent risk of death. The patient is on 2 MCS devices.     Critical care diagnoses that are being treated:     Cardiogenic shock / NYHA class IV acute systolic failure  Right ventricular failure  Acute liver injury       Going over recent events as the patient is new to me.      HPI: Patient is a 64 woman suffering from cardiogenic shock, right ventricular failure, NYHA class IV acute systolic failure, and acute liver injury. The patient is at imminent risk of death.   The patient is on 2 MCS devices.     Cardiogenic shock / NYHA class IV acute systolic failure  Continues on MCS x 2    Not on pressors / inotropes  Pulsatility on a-line  Lactic acid 0.5  LDH 1000  NT pro-BNP 35 K  Neuro status unclear     OR plan:  1) Plan to take her to OR Monday 9 am for PA cath and LENNY  2) Start with Epi 10 / Vaso 0.04 / inhaled NO 40 ppm and see if her RV / LV improve  3) Impella to P-7  4) If she tolerates wean, she will need a right CFA cutdown and possible repair (would have vascular available)      Right ventricular failure  Continues on MCS x 2     Acute liver injury   LFTs 200's  Continues on MCS x 2     Sepsis  Pro-calcitonin 12  Vanc / Zosyn     Impella - flow 1-2 L @ P-3; HCO3- purge       Intake/Output Summary (Last 24 hours) at 6/5/2022 1157  Last data filed at 6/5/2022 0532  Gross per 24 hour   Intake 1706.41 ml   Output 800 ml   Net 906.41 ml      Visit Vitals  BP (!) 130/90   Pulse 75   Temp 97.9 °F (36.6 °C)   Resp 26   Ht 5' 5\" (1.651 m)   Wt 114 lb 10.2 oz (52 kg)   LMP 09/15/2013   SpO2 97%   BMI 19.08 kg/m²      CXR Results  (Last 48 hours)               06/05/22 0456  XR CHEST PORT Final result    Impression:  No significant change. Bilateral pleural effusions with bibasilar opacities. No   pneumothorax. Narrative:  INDICATION:    Vent dep        EXAMINATION:  AP CHEST, PORTABLE       COMPARISON: 6/4/2022       FINDINGS: Single AP portable view of the chest at 442 hours demonstrates no   change in position of the lines and tubes. The cardiomediastinal silhouette is   stable. There is no new airspace disease. Evidence of bilateral pleural   effusions with bibasilar opacities, grossly similar to the prior study. No   pneumothorax. 06/04/22 0444  XR CHEST PORT Final result    Impression:      Little interval change. Narrative:  EXAM:  XR CHEST PORT       INDICATION: Ventilator dependent       COMPARISON: Chest radiograph 6/3/2022       TECHNIQUE: Supine portable chest AP view       FINDINGS:        Stable support apparatus. Cardiomediastinal silhouette is within normal limits. Grossly unchanged hazy   bilateral mid to lower lung opacities, right worse on left. Grossly unchanged   moderate to large right and small-to-moderate left layering pleural effusions. No definite pneumothorax. Total critical care time - 30 minutes (CPT 32805)      I personally spent the above critical care time. This is time spent at this critically ill patient's bedside / unit / floor actively involved in patient care as well as the coordination of care. This does not include any procedural time which has been billed separately. This does not include any NP/PA patient care time. I had a face to face encounter with the patient, reviewed and interpreted patient data including clinical events, labs, images, vital signs, I/O's, and examined patient. I have discussed the case and the plan and management of the patient's care with the consulting services and bedside nurses.        This patient has a high probability of imminent, clinically significant deterioration, which requires the highest level of preparedness to intervene urgently. I participated in the decision-making and personally managed or directed the management of life and organ supporting interventions to treat or prevent imminent deterioration. This patient has a critical illness or injury that is acutely impairing one or more vital organ systems such that there is a high probability of imminent or life threatening deterioration in the patient's condition. This patient requires high complexity medical decision making to assess, manipulate, and support vital organ system failure. After stabilization, this patient still requires management to prevent further life / organ threatening deterioration.

## 2022-06-05 NOTE — PROGRESS NOTES
ECMO Management Note (CPT 18094)     Diagnosis: cardiogenic shock      Patient condition: critical      Blood flow: 3 L/min     MAP: 70-80's     AB.34 / 36 / 135 / 22 / -2      Anticoagulation: bival      PTT: 60-70's      Cannula site (extremity): R CFA, R CFV     Other:
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
## 2022-06-05 NOTE — PROGRESS NOTES
[id number]  Bedside report received and sites assessed. 200  Dr. Balwinder Jennings at bedside. Pt with 's-160's. Order received for cardene gtt. ECMO flows decreased to 2. 5LPM and impella decreased to P3 by Dr. Balwinder Jennings. BP remained elevated. Will plan to decannulate in OR tomorrow and leave impella in place. Notified him of issues with impella overnight. Order received to add heparin to purge fluid  1150  Called and spoke with Martin NASH to get specifics for consent form  1200  Pt suctioned and inline suction meeting resistance. Small clot obtained clogging end of inline suction and vent alarming for volume delivery restricted. Dr. Michelle Rdoriguez called to bedside to assess. Pt lavaged and suctioned. 56  Cardene weaned off. BP steadily decreasing since AM.  1300  Spoke with pt's  and daughter regarding procedure for tomorrow. Family does not feel comfortable consenting to procedure at this time. They do not feel that she is ready, especially since her BP has been getting lower throughout the day. 1350  BP 76/64 MAP 68. CVP transduced and reading 1-3. Albumin given. 1600  BP low stable 80's/60's MAP 70 after albumin given and CVP 7-8.  1630  Spoke with Dr. Michelle Rodriguez regarding pt condition and ABG results. No changes. BP still on low side. Discussed pt BP becoming more elevated with noxious stimulus (suctioning). Discussed possibility of starting epi if BP continues to decrease. Order received  1700  Pt suctioned and obtained bloody secretions. BP elevated to 's  1815  BP 80's/60's, pedal pulses consistently weaker and fleeting.   Epi started at 1mcg/min

## 2022-06-06 NOTE — PROGRESS NOTES
0700  Bedside and verbal report from Jennifer Landry RN and Bradford Reddy RN    0800  Assessment completed. [id number]  Vascular tech at bedside for lower extremity doppler study    0845  Dr. Agnes Barron and heart team rounding. ECMO turned down to 2700 rpm and 1.85 lpm. Impella increased to P-7.     0900   now at bedside. Demands to [facility] the doctor. \"  Status update given by nurse. 0930  Dr. Agnes Barron at bedside talking to . 3092  Dr. Jaycob Deras here to see patient. No HD today. Interdisciplinary team rounds were held 6/6/2022 with the following team members:Care Management, Diabetes Treatment Specialist, Nursing, Nutrition, Occupational Therapy, Pharmacy, Physical Therapy, Physician and Respiratory Therapy and the patient. Plan of care discussed. See clinical pathway and/or care plan for interventions and desired outcomes. Goals of the Day: Wean ECMO, increased Impella support, monitor hemodynamics, no HD today. Probable explant of ECMO on 6/7/2022    1000  Hemodynamics steady on P-7 Impella support and lower ECMO settings. 1030  Tube feeding resumed @ 10 ml/hr    1200  Reassessment completed. 1300  Daughter continues at the bedside. No further changes noted at this time. 1400  Has not yet voided today. Bladder scan shows 397 ml. Straight cath for 350 ml clear yellow urine. 1500  Continues on ECMO and Impella without issues. Dr. Agnes Barron here to check on patient. 1600  Reassessment completed. 1800  Daughter remains at the bedside. Patient bucking vent at intervals. Propofol now @ 25 mcg/kg/min. [address]  Patient's , Kayode Toscano and daughter, Savannah Oropeza at the bedside. Attempted to get consent signed for explant of ECMO by Dr. Royal Adam. Both  and daughter refuse to consent for the procedure; they state \"her heart won't be strong enough tomorrow, we need to wait until Wednesday. \"   Bedside and verbal report given to Jennifer Landry RN

## 2022-06-06 NOTE — PROGRESS NOTES
[facility]  [address]  [phone]  [email or web address]      Cardiology Progress Note      6/6/2022 4:37 PM    Admit Date: 5/31/2022    Admit Diagnosis:   NSTEMI (non-ST elevated myocardial infarction) ([facility] [I21.4]  Pulmonary edema [J81.1]  ESRD (end stage renal disease) on dialysis ([facility] [N18.6, Z99.2]  Hyperkalemia [E87.5]    Subjective:     [place]     intubated    Visit Vitals  BP (!) 107/90 (BP 1 Location: Right arm, BP Patient Position: At rest)   Pulse 95   Temp 97.8 °F (36.6 °C)   Resp 10   Ht 5' 5\" (1.651 m)   Wt 116 lb 6.5 oz (52.8 kg)   LMP 09/15/2013   SpO2 100%   BMI 19.37 kg/m²       Current Facility-Administered Medications   Medication Dose Route Frequency    fentaNYL citrate (PF) injection 50 mcg  50 mcg IntraVENous Q4H PRN    0.9% sodium chloride infusion 250 mL  250 mL IntraVENous PRN    niCARdipine (CARDENE) 25 mg in 0.9% sodium chloride 250 mL infusion  0-15 mg/hr IntraVENous TITRATE    heparin (porcine) 12,500 Units in dextrose 5% 500 mL (Impella Purge Solution)  1-30 mL/hr Other TITRATE    EPINEPHrine (ADRENALIN) 5 mg in 0.9% sodium chloride 250 mL infusion  0-10 mcg/min IntraVENous TITRATE    NOREPINephrine (LEVOPHED) 8 mg in 5% dextrose 250mL (32 mcg/mL) infusion  0.5-30 mcg/min IntraVENous TITRATE    epoetin kourtney-epbx (RETACRIT) injection 6,000 Units  6,000 Units SubCUTAneous Q TUE, THU & SAT    alteplase (CATHFLO) 2 mg in dextrose 5% 50 mL impella purge solution  2 mg Other TITRATE    balsam peru-castor oiL (VENELEX) ointment   Topical BID    propofol (DIPRIVAN) 10 mg/mL infusion  0-50 mcg/kg/min IntraVENous TITRATE    aspirin chewable tablet 81 mg  81 mg Per G Tube DAILY    alteplase (CATHFLO) 1 mg in sterile water (preservative free) 1 mL injection  1 mg InterCATHeter PRN    bacitracin 500 unit/gram packet 1 Packet  1 Packet Topical PRN    sodium chloride (NS) flush 5-40 mL  5-40 mL IntraVENous Q8H    sodium chloride (NS) flush 5-40 mL  5-40 mL IntraVENous PRN    albumin human 5% (BUMINATE) solution 12.5 g  12.5 g IntraVENous Q2H PRN    0.45% sodium chloride infusion  10 mL/hr IntraVENous CONTINUOUS    0.9% sodium chloride infusion  10 mL/hr IntraVENous CONTINUOUS    [Held by provider] oxyCODONE IR (ROXICODONE) tablet 10 mg  10 mg Oral Q4H PRN    ondansetron (ZOFRAN) injection 4 mg  4 mg IntraVENous Q4H PRN    albuterol (PROVENTIL VENTOLIN) nebulizer solution 2.5 mg  2.5 mg Nebulization Q4H PRN    midazolam (VERSED) injection 1 mg  1 mg IntraVENous Q1H PRN    chlorhexidine (PERIDEX) 0.12 % mouthwash 10 mL  10 mL Oral Q12H    bisacodyL (DULCOLAX) suppository 10 mg  10 mg Rectal DAILY PRN    [Held by provider] oxyCODONE IR (ROXICODONE) tablet 5 mg  5 mg Oral Q4H PRN    ticagrelor (BRILINTA) tablet 90 mg  90 mg Per NG tube Q12H    albumin human 5% (BUMINATE) solution 25 g  25 g IntraVENous Q4H PRN    piperacillin-tazobactam (ZOSYN) 3.375 g in 0.9% sodium chloride (MBP/ADV) 100 mL MBP  3.375 g IntraVENous Q12H    insulin lispro (HUMALOG) injection   SubCUTAneous Q6H    glucose chewable tablet 16 g  4 Tablet Oral PRN    glucagon (GLUCAGEN) injection 1 mg  1 mg IntraMUSCular PRN    dextrose 10% infusion 0-250 mL  0-250 mL IntraVENous PRN    pantoprazole (PROTONIX) 40 mg in 0.9% sodium chloride 10 mL injection  40 mg IntraVENous DAILY    white petrolatum-mineral oiL (SOOTHE NIGHT TIME) 80-20 % ophthalmic ointment   Both Eyes Q12H    fentaNYL (PF) 1,500 mcg/30 mL (50 mcg/mL) infusion  0-200 mcg/hr IntraVENous TITRATE    heparin 25,000 units in D5W 250 ml infusion  12-25 Units/kg/hr IntraVENous TITRATE       Objective:      Physical Exam:  General Appearance:    Chest:   Clear  Cardiovascular: rrr  Extremities: no edema  Skin:  Warm and dry.      Data Review:   Recent Labs     06/06/22  1547 06/06/22  1040 06/06/22  0356   WBC 11.4* 10.6 11.9*   HGB 8.9* 8.7* 9.0*   HCT 26.7* 25.7* 26.3*   PLT 64* 61* 57*     Recent Labs     06/06/22  1040 06/06/22  0356 06/05/22  2205 06/05/22  1541 06/05/22  1541 06/05/22  1041 06/05/22  0339 06/04/22  1006 06/04/22  0320    135* 136   < > 136   < > 137   < > 138   K 3.9 3.9 4.0   < > 3.9   < > 3.9   < > 3.9    102 101   < > 101   < > 100   < > 104   CO2 20* 20* 21   < > 23   < > 26   < > 21   * 201* 196*   < > 166*   < > 113*   < > 93   BUN 20 19 17   < > 16   < > 13   < > 28*   CREA 3.30* 3.09* 2.89*   < > 2.83*   < > 2.49*   < > 3.99*   CA 9.4 9.1 9.4   < > 8.8   < > 8.7   < > 8.1*   MG  --  1.9  --   --   --   --  1.9  --  2.0   PHOS 4.6 4.1  --   --  3.6   < > 2.6   < > 3.7   ALB  --  2.9*  --   --   --   --  2.8*  --  2.4*   TBILI  --  0.8  --   --   --   --  0.8  --  0.7   ALT  --  27  --   --   --   --  32  --  39    < > = values in this interval not displayed. Recent Labs     06/06/22  0356 06/05/22  0339 06/04/22  0320   * 708* 1,034*         Intake/Output Summary (Last 24 hours) at 6/6/2022 1637  Last data filed at 6/6/2022 1616  Gross per 24 hour   Intake 1917.42 ml   Output 450 ml   Net 1467.42 ml        Telemetry:   EKG:  Cxray:    Assessment:     Active Problems:    Hyperkalemia (5/31/2022)      Pulmonary edema (5/31/2022)      NSTEMI (non-ST elevated myocardial infarction) ([facility] (5/31/2022)      ESRD (end stage renal disease) on dialysis ([facility] (5/31/2022)      Biventricular heart failure ([facility] (6/3/2022)      Palliative care by specialist (6/3/2022)        Plan:     Numbers looking better.   Impella and ECMO being weaned    Dyana Peck M.D., [facility] [place]

## 2022-06-06 NOTE — PROGRESS NOTES
2000, Bedside and Verbal shift change report given to [facility], RN (oncoming nurse) by Blas Rothman RN (offgoing nurse). Report included the following information SBAR, Kardex, Intake/Output, MAR, Accordion, Recent Results, Med Rec Status and Cardiac Rhythm ST. Temp;  Afebrile  Neuro; unresponsive on Propofol 40 raymon/kg/min, Fentanyl at 50 raymon/hr, no follow command, no eyes contact, pupils sluggish  GCS;  Eye; 1, verbal; 1 for ETT, motor; 1.  Reassessment;  Propofol wean down to 10 raymon/kg/min, Fentanyl titrated up to 150 raymon/hr ( given her Hx of chronic pain) as he HR & BP up,  At am she grimace to suction, withdrawal at both feet no movement at BUE yet. Respiratory; Sat 100% on Ventilator A/C mode, , RR 10/10, Peep 5, FiO2 40%, ETT 26 cm at lips, secretion; scant to small bloody tinged, coarse  lung sounds. Reassessment; More clear lung sound,     Cardiac; ST, 111 B/min, NIBP 107/40 mmHg, on EpiNephrine  at 1 raymon/min.   IBP at Left Fem; 16468 mmHg, CVP 7-10 mmHg, Impella CP SA at Right Fem Ata at 80 cm> P3, ECMO VA at right Fem,   Reassessment;  Epi titrated off, SBP up to 140-150 mmHg, MAP  mmHg, Fentanyl increased and Propofol decreased as above, Cardene restarted then Titrated off, SBP down to 80s MAP still > 65 mmHg > Epi restarted, now t 0.5 raymon/min,     0615  EKG noticed > team will be shortly here to follow up,   Patient Vitals for the past 8 hrs:   Temp Pulse Resp BP SpO2   06/06/22 0515 -- 89 10 -- 100 %   06/06/22 0500 -- 93 10 -- 100 %   06/06/22 0445 -- 99 10 -- 100 %   06/06/22 0430 -- (!) 102 21 -- 100 %   06/06/22 0415 -- 92 10 -- 100 %   06/06/22 0401 -- 89 10 -- 100 %   06/06/22 0400 97.5 °F (36.4 °C) 93 10 107/79 100 %   06/06/22 0345 -- 89 10 -- 100 %   06/06/22 0330 -- 87 10 -- 100 %   06/06/22 0315 -- 86 10 -- 100 %   06/06/22 0309 -- 75 10 -- --   06/06/22 0303 -- 79 10 -- --   06/06/22 0300 -- 82 10 -- 100 %   06/06/22 0245 -- 75 14 (!) 86/77 100 %   06/06/22 0230 -- 79 10 -- --   06/06/22 0215 -- 83 10 -- 100 %   06/06/22 0200 -- 85 10 98/79 100 %   06/06/22 0145 -- 92 10 -- 100 %   06/06/22 0139 -- 94 10 -- 100 %   06/06/22 0130 -- 97 10 -- 100 %   06/06/22 0117 -- 99 10 -- 100 %   06/06/22 0115 -- 100 11 -- 100 %   06/06/22 0103 -- 99 10 -- 100 %   06/06/22 0100 -- 100 10 119/86 100 %   06/06/22 0055 -- 100 10 -- 100 %   06/06/22 0045 -- (!) 103 10 -- 100 %   06/06/22 0030 -- 98 10 -- 100 %   06/06/22 0025 -- 97 10 -- 100 %   06/06/22 0015 -- 99 10 -- 100 %   06/06/22 0000 97.6 °F (36.4 °C) 96 10 (!) 127/93 100 %   06/05/22 2345 -- 98 10 (!) 126/93 100 %   06/05/22 2330 -- 100 10 (!) 136/101 100 %   06/05/22 2315 -- 98 8 -- 100 %   06/05/22 2300 -- 99 8 -- 100 %   06/05/22 2245 -- (!) 101 -- -- 100 %   06/05/22 2230 -- (!) 103 -- -- 100 %   06/05/22 2215 -- (!) 105 11 -- 100 %   06/05/22 2200 -- (!) 106 10 -- 100 %   06/05/22 2145 -- (!) 104 (!) 0 -- 100 %         GI; NPO with OG tube ( 64 cm at lips) residual 150 ml in trickle feeding of 10 ml/hr > feeding held and residual returned back as brilinta given at 6 pm will be NPO after MN for OR tomorrow , Abd semi soft with hypoactive bowel sound, Flexisealpresent. Reassessment; Residual 10-20 ml Tan greenish with stained blood,     Renal; ESRD on HD TTF ;last UF 2 L on 06/04/2022. Left upper arm AV Fistula thrill & Bruit present,   Reassessment;  Bladder scan done 185 ml,   Skin; skin tear at upper mid chest 1 cm * 0.5 cm, old hyperpigmentation all over her body, right Leg Larger than Left one. Endo; SSI every 6 hours,   Lines; ETT, right IJ CVP,OGT, both groin cannulation . Code; Full. Lab;   DVT; Heparin via Systemic infusion been held for high [id number], Heparin via purge > PTT repeated was  81.2 > according to protocol to stay Zero > Pharmacist notified > agreed to repeat PTT after 2 hours.     2310; PTT 69.1 > Systemic Heparin still on hold > Pharmacist notified > to repeat it after 3 hours,    0151  PTT 76 > Systemic Heparin still on hold > Pharmacist notified > to repeat it after 2 hours,    0408  PTT 74.1 > Systemic Heparin still on hold > Pharmacist notified > to follow protocol and repeat after 6 hrs,    Plan; ventilator, ECMO & Imella management, pain and agitation management, follow lab tomorrow,      0700  Bedside and Verbal shift change report given to Merle Pike RN (oncoming nurse) by Miquel Rodriges RN (offgoing nurse).  Report included the following information SBAR, Kardex, Intake/Output, MAR, Accordion, Recent Results, Med Rec Status and Cardiac Rhythm SR.

## 2022-06-06 NOTE — PROGRESS NOTES
Comprehensive Nutrition Assessment    Type and Reason for Visit: Initial (new TF)    Nutrition Recommendations/Plan:   1. Continue trophic TF for today  2. After OR tomorrow, recommend advancing TF rate 10mL q 8h as tolerated to Goal of Nepro @ 35mL/h + Prosource daily (provides 1552kcals/79gPro/135gCHO/790mL)   3. Recommend adding motility agent given gastroparesis hx     Malnutrition Assessment:  Malnutrition Status:  Severe malnutrition (06/06/22 1401)    Context:  Acute illness     Findings of the 6 clinical characteristics of malnutrition:   Energy Intake:  50% or less of est energy requirements for 5 or more days  Weight Loss:  Greater than 7.5% over 3 months     Body Fat Loss: Moderate body fat loss, Triceps   Muscle Mass Loss: Moderate muscle mass loss, Clavicles (pectoralis & deltoids)  Fluid Accumulation:  Mild, Extremities,Generalized   Strength:  Not performed         Nutrition Assessment:  Pt admitted with hyperkalemia. PMH: CAD, ESRD, HTN, DM, hepatitis C, gastroparesis. Chart reviewed, case discussed during CCU rounds. Pt intubated. She is on ECMO and Impella. Epi at 1. Plans for HD tomorrow. K 3.9 and phos 4.6. Trophic TF started yesterday. Residuals have been 20-150mL. Daughter at bedside reports pt has had a decline in her PO intake PTA due to significant back pain and narcotics which caused n/v. She has noticed a decline in her muscle mass and confirmed wt loss seen in EMR. Pt is in reverse Trendelenburg to minimize aspiration risk. Discussed trial of motility agent to minimize aspiration risk as well, will discuss during IDR tomorrow as she will be NPO after midnight for ECMO decannulation. Provided goal rate recommendations above which would provide 29kcals/kg and 1.4gPro/kg.     Wt Readings from Last 15 Encounters:   06/06/22 52.8 kg (116 lb 6.5 oz)   05/29/22 63.5 kg (140 lb)   05/27/22 63.5 kg (140 lb)   05/14/22 65.8 kg (145 lb)   05/13/22 65.8 kg (145 lb)   05/05/22 63.5 kg (140 lb)   04/18/22 63.5 kg (139 lb 15.9 oz)   04/17/22 63.5 kg (140 lb)   04/09/22 63.5 kg (140 lb)   03/12/22 63.5 kg (140 lb)   03/07/22 63.5 kg (140 lb)   12/24/21 63.5 kg (140 lb)   10/24/21 51.9 kg (114 lb 6.7 oz)   10/18/21 63.5 kg (140 lb)   03/01/21 59 kg (130 lb 1.1 oz)          Nutrition Related Findings:    Meds: humalog, protonix, zosyn; Drips: epi, fentanyl. Edema: trace-generalized, +2-RLE. BM FMS Wound Type: Skin tears    Current Nutrition Intake & Therapies:  Average Meal Intake: NPO     Current Tube Feeding (TF) Orders:   · Feeding Route: Orogastric  · Formula: Renal  · Schedule:Continuous    · Regimen: 10  · Additives/Modulars: None  · Water Flushes: 30mL q 4h  · Current TF & Flush Orders Provides: 432kcals/19gPro/39gCHO/354mL  · Goal TF & Flush Orders Provides: 1552kcals/79gPro/135gCHO/790mL      Anthropometric Measures:  Height: 5' 5\" (165.1 cm)  Ideal Body Weight (IBW): 125 lbs (57 kg)     Current Body Wt:  52.8 kg (116 lb 6.5 oz), 93.1 % IBW. Bed scale  Current BMI (kg/m2): 19.4  Usual Body Weight: 63.5 kg (140 lb)  % Weight Change (Calculated): -16.9                    BMI Category: Underweight (BMI less than 22) age over 72    Estimated Daily Nutrient Needs:  Energy Requirements Based On: Kcal/kg  Weight Used for Energy Requirements: Current  Energy (kcal/day): 0923-2807 (25-28kcals/kg)     Protein (g/day): 74g (1.4gPro/kg)  Method Used for Fluid Requirements: Other (comment)  Fluid (ml/day): per MD    Nutrition Diagnosis:   · Severe malnutrition related to inadequate protein-energy intake,altered GI function as evidenced by weight loss greater than or equal to 10% in 6 months,moderate muscle loss      Nutrition Interventions:   Food and/or Nutrient Delivery: Modify tube feeding  Nutrition Education/Counseling: No recommendations at this time  Coordination of Nutrition Care: Continue to monitor while inpatient,Interdisciplinary rounds       Goals:     Goals:  Tolerate nutrition support at goal rate,by next RD assessment,within 2 days (in 2-4 days)       Nutrition Monitoring and Evaluation:   Behavioral-Environmental Outcomes: None identified  Food/Nutrient Intake Outcomes: Enteral nutrition intake/tolerance,IVF intake  Physical Signs/Symptoms Outcomes: Biochemical data,Nutrition focused physical findings,Skin,Weight,GI status,Fluid status or edema,Hemodynamic status    Discharge Planning:     Too soon to determine    Zuri Bazan RD, CNSC  Contact: ext [phone]
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
## 2022-06-06 NOTE — PROGRESS NOTES
ECMO Management Note (CPT 18672)     Diagnosis: cardiogenic shock      Patient condition: critical       Blood flow: 3 L/min -> to 1.8L this AM     MAP: 70-80's     ABG: see most recent in epic     Anticoagulation: bival      PTT: 60-70's      Cannula site (extremity): R CFA, R CFV     Other:  Will wean ecmo this AM and turn up Impella; plan for VA ECMO removal tmrw with vascular

## 2022-06-06 NOTE — PROGRESS NOTES
Nephrology Progress Note  Codey Calderon     www. [facility]  Phone - [phone]   Patient: Stewart Cronin    YOB: 1965        Date- 6/6/2022   Admit Date: 5/31/2022  CC: Follow up for  ESRD         IMPRESSION & PLAN:    ESRD on HD, [facility], TTS   PEA cardiac arrest x2   Cardiogenic shock   CAD   Biventricular failure   S/p  ECMO   S/p impella insertion   Pulmonary edema   Acute respiratory failure on mechanical ventilation   Ischemic left foot    PLAN-   Seen and examined   Last HD was on Saturday.  No need for hemodialysis today from volume laboratory standpoint.  Plan is for ECMO decannulation tomorrow.  Patient will probably need CRRT once patient is off ECMO. Discussed with critical care medicine.  epogen TTS   Daily labs and I/Os     Subjective: Interval History:   Seen and examined. Remains on minimal pressor support, ECMO and remains on mechanical ventilation. Objective:   Vitals:    06/06/22 0900 06/06/22 0915 06/06/22 1000 06/06/22 1100   BP: 100/83  (!) 113/90 105/84   Pulse: 90 91 92 91   Resp: 11 10 10 10   Temp:       TempSrc:       SpO2: 100% 100% 100% 100%   Weight:       Height:          06/05 0701 - 06/06 0700  In: 1840.3 [I.V.:1610.3]  Out: 650 [Urine:250; Drains:400]  Last 3 Recorded Weights in this Encounter    06/04/22 0611 06/05/22 0400 06/06/22 0400   Weight: 53.8 kg (118 lb 9.7 oz) 52 kg (114 lb 10.2 oz) 52.8 kg (116 lb 6.5 oz)      Physical exam:    GEN: intubated  On vent  NECK-no mass, ET TUBE  RESP: clear b/l, no wheezing  CVS: RRR,S1,S2    ABDO: soft , non tender,  NEURO:Can't access due to patient's current condition   EXT:Right extremity has ECMO cannulas      Chart reviewed. Pertinent Notes reviewed.      Data Review :  Recent Labs     06/06/22  0356 06/05/22  2205 06/05/22  1541 06/05/22  1041 06/05/22  1041 06/05/22  0339 06/05/22  0339 06/04/22  1006 06/04/22  0320   * 136 136   < > 136   < > 137   < > 138   K 3.9 4.0 3.9   < > 3.9   < > 3.9   < > 3.9    101 101   < > 98   < > 100   < > 104   CO2 20* 21 23   < > 23   < > 26   < > 21   BUN 19 17 16   < > 16   < > 13   < > 28*   CREA 3.09* 2.89* 2.83*   < > 2.73*   < > 2.49*   < > 3.99*   * 196* 166*   < > 177*   < > 113*   < > 93   CA 9.1 9.4 8.8   < > 8.8   < > 8.7   < > 8.1*   MG 1.9  --   --   --   --   --  1.9  --  2.0   PHOS 4.1  --  3.6  --  3.1   < > 2.6   < > 3.7    < > = values in this interval not displayed.      Recent Labs     06/06/22  0356 06/05/22  2205 06/05/22  1541   WBC 11.9* 9.9 8.6   HGB 9.0* 9.2* 8.4*   HCT 26.3* 26.7* 25.0*   PLT 57* 63* 60*     Recent Labs     06/04/22  0320   TIBC 73*   PSAT 22   FERR 2,935*      Medication list  reviewed  Current Facility-Administered Medications   Medication Dose Route Frequency    fentaNYL citrate (PF) injection 50 mcg  50 mcg IntraVENous Q4H PRN    0.9% sodium chloride infusion 250 mL  250 mL IntraVENous PRN    niCARdipine (CARDENE) 25 mg in 0.9% sodium chloride 250 mL infusion  0-15 mg/hr IntraVENous TITRATE    heparin (porcine) 12,500 Units in dextrose 5% 500 mL (Impella Purge Solution)  1-30 mL/hr Other TITRATE    EPINEPHrine (ADRENALIN) 5 mg in 0.9% sodium chloride 250 mL infusion  0-10 mcg/min IntraVENous TITRATE    NOREPINephrine (LEVOPHED) 8 mg in 5% dextrose 250mL (32 mcg/mL) infusion  0.5-30 mcg/min IntraVENous TITRATE    epoetin kourtney-epbx (RETACRIT) injection 6,000 Units  6,000 Units SubCUTAneous Q TUE, THU & SAT    alteplase (CATHFLO) 2 mg in dextrose 5% 50 mL impella purge solution  2 mg Other TITRATE    0.9% sodium chloride infusion 250 mL  250 mL IntraVENous PRN    balsam peru-castor oiL (VENELEX) ointment   Topical BID    propofol (DIPRIVAN) 10 mg/mL infusion  0-50 mcg/kg/min IntraVENous TITRATE    aspirin chewable tablet 81 mg  81 mg Per G Tube DAILY    alteplase (CATHFLO) 1 mg in sterile water (preservative free) 1 mL injection  1 mg InterCATHeter PRN    bacitracin 500 unit/gram packet 1 Packet  1 Packet Topical PRN    sodium chloride (NS) flush 5-40 mL  5-40 mL IntraVENous Q8H    sodium chloride (NS) flush 5-40 mL  5-40 mL IntraVENous PRN    albumin human 5% (BUMINATE) solution 12.5 g  12.5 g IntraVENous Q2H PRN    0.45% sodium chloride infusion  10 mL/hr IntraVENous CONTINUOUS    0.9% sodium chloride infusion  9 mL/hr IntraVENous CONTINUOUS    [Held by provider] oxyCODONE IR (ROXICODONE) tablet 10 mg  10 mg Oral Q4H PRN    ondansetron (ZOFRAN) injection 4 mg  4 mg IntraVENous Q4H PRN    albuterol (PROVENTIL VENTOLIN) nebulizer solution 2.5 mg  2.5 mg Nebulization Q4H PRN    midazolam (VERSED) injection 1 mg  1 mg IntraVENous Q1H PRN    chlorhexidine (PERIDEX) 0.12 % mouthwash 10 mL  10 mL Oral Q12H    bisacodyL (DULCOLAX) suppository 10 mg  10 mg Rectal DAILY PRN    [Held by provider] oxyCODONE IR (ROXICODONE) tablet 5 mg  5 mg Oral Q4H PRN    ticagrelor (BRILINTA) tablet 90 mg  90 mg Per NG tube Q12H    albumin human 5% (BUMINATE) solution 25 g  25 g IntraVENous Q4H PRN    piperacillin-tazobactam (ZOSYN) 3.375 g in 0.9% sodium chloride (MBP/ADV) 100 mL MBP  3.375 g IntraVENous Q12H    insulin lispro (HUMALOG) injection   SubCUTAneous Q6H    glucose chewable tablet 16 g  4 Tablet Oral PRN    glucagon (GLUCAGEN) injection 1 mg  1 mg IntraMUSCular PRN    dextrose 10% infusion 0-250 mL  0-250 mL IntraVENous PRN    pantoprazole (PROTONIX) 40 mg in 0.9% sodium chloride 10 mL injection  40 mg IntraVENous DAILY    white petrolatum-mineral oiL (SOOTHE NIGHT TIME) 80-20 % ophthalmic ointment   Both Eyes Q12H    fentaNYL (PF) 1,500 mcg/30 mL (50 mcg/mL) infusion  0-200 mcg/hr IntraVENous TITRATE    heparin 25,000 units in D5W 250 ml infusion  12-25 Units/kg/hr IntraVENous TITRATE          Burt Severs, MD              [address] [facility]  [facility]ly [facility] / [facility]  [address], [address]  Phone - [phone]               Fax - [phone]

## 2022-06-06 NOTE — PROGRESS NOTES
[facility] ICU Progress Note    Admit Date: 2022    Procedure:  Procedure(s):  LEFT HEART CATH / CORONARY ANGIOGRAPHY  LEFT VENTRICULOGRAPHY  ULTRASOUND GUIDED VASCULAR ACCESS  PERCUTANEOUS CORONARY INTERVENTION  ANGIOPLASTY CORONARY  ATHERECTOMY CORONARY  FRACTIONAL FLOW RESERVE  IMPELLA INSERTION  INSERT STENT GUERITA CORONARY  ECMO CATH LAB        Subjective:   Pt seen with Dr. Craig Covert. Pt intubated and sedated on ECMO flowing 2.75lpm FiO2 40% and sweep at 1, Impella @ P3. On epi at 1. Vent 40%. Temp ~ 97.5     Objective:   Vitals: SBP 90/68 (77)  Blood pressure 100/83, pulse 91, temperature 97.5 °F (36.4 °C), resp. rate 10, height 5' 5\" (1.651 m), weight 116 lb 6.5 oz (52.8 kg), last menstrual period 09/15/2013, SpO2 100 %. Temp (24hrs), Av.6 °F (36.4 °C), Min:97.5 °F (36.4 °C), Max:97.7 °F (36.5 °C)    EKG/Rhythm:  NSR 70-80s    Ventilator:  Ventilator Volumes  Vt Set (ml): 220 ml (22 0835)  Vt Exhaled (Machine Breath) (ml): 215 ml (22 0835)  Vt Spont (ml): 497 ml (22 1133)  Ve Observed (l/min): 2.1 l/min (22 0835)    Oxygen Therapy:  Oxygen Therapy  O2 Sat (%): 100 % (22 0915)  Pulse via Oximetry: 91 beats per minute (22 0915)  O2 Device: Endotracheal tube (22 0900)  Skin Assessment: Clean, dry, & intact (22 0736)  Skin Protection for O2 Device: N/A (22 0736)  O2 Flow Rate (L/min): 2 l/min (22 1106)  FIO2 (%): 40 % (22 0900)    CXR:    CXR Results  (Last 48 hours)               22 0444  XR CHEST PORT Final result    Impression:   impression: No definite changes. Narrative:  Clinical indication: Hypoxia. Portable AP semierect view of the chest obtained, comparison . ET tube is just above the nanci. Central line in place. Bilateral pleural   effusion once again seen. 22 0456  XR CHEST PORT Final result    Impression:  No significant change. Bilateral pleural effusions with bibasilar opacities.  No pneumothorax. Narrative:  INDICATION:    Vent dep        EXAMINATION:  AP CHEST, PORTABLE       COMPARISON: 6/4/2022       FINDINGS: Single AP portable view of the chest at 442 hours demonstrates no   change in position of the lines and tubes. The cardiomediastinal silhouette is   stable. There is no new airspace disease. Evidence of bilateral pleural   effusions with bibasilar opacities, grossly similar to the prior study. No   pneumothorax. Admission Weight: Last Weight   Weight: 140 lb (63.5 kg) Weight: 116 lb 6.5 oz (52.8 kg)     Intake / Output / Drain:  Current Shift: 06/06 0701 - 06/06 1900  In: 86.8 [I.V.:56.8]  Out: 0   Last 24 hrs.:     Intake/Output Summary (Last 24 hours) at 6/6/2022 0936  Last data filed at 6/6/2022 0900  Gross per 24 hour   Intake 1887.07 ml   Output 400 ml   Net 1487.07 ml       EXAM:  General:   Intubated and sedated on ECMO and Impella                                 Lungs:   Coarse to auscultation bilaterally. Heart:  Regular rate and rhythm, S1, S2 normal, no murmur, click, rub or gallop. Abdomen:   Soft, non-tender. Bowel sounds hypoactive. No masses,  No organomegaly. Extremities:  No edema. + pedal pulses bilat to doppler   Neurologic:  sedated. Labs:   Recent Labs     06/06/22  0559 06/06/22  0356 06/05/22  2356   WBC  --  11.9*  --    HGB  --  9.0*  --    HCT  --  26.3*  --    PLT  --  57*  --    NA  --  135*  --    K  --  3.9  --    BUN  --  19  --    CREA  --  3.09*  --    GLU  --  201*  --    GLUCPOC 199*  --    < >    < > = values in this interval not displayed.         Assessment:     Active Problems:    Hyperkalemia (5/31/2022)      Pulmonary edema (5/31/2022)      NSTEMI (non-ST elevated myocardial infarction) ([facility] [address] (5/31/2022)      ESRD (end stage renal disease) on dialysis ([facility] [address] (5/31/2022)      Biventricular heart failure ([facility] [address] (6/3/2022)      Palliative care by specialist (6/3/2022)       Plan/Recommendations/Medical Decision Makin. VT, Cardiogenic shock: Emergent Impella and VA ECMO placement. On no pressors at this time. Flowing 3.6-3.7 on 3950 rpm. Cont heparin gtt. No issues with ECMO circuit. Plan for decannulation tomorrow with vascular surgery. ECMO flows to 1.75lpm and impella to P7.    2. NSTEMI, CAD s/p GUERITA RCA and Lcx: On brilinta. 3. Acute respiratory failure: intubated during code. On Prop and Fentanyl. Vent 40%. Wean prop today to assess neuro fx. on zosyn per ICU team  4. ESRD on HD: Tolerating HD. Nephro following. bladder scan Q shift. 5. HTN: On metoprolol 25mg, clonidine 0.1mg BID PTA. Controlled with sedation at this time. 6. HLD: on Lipitor 20mg PTA. 7. Hep C  8. T2DM: On Tresiba, insulin aspart PTA. On SSI  9. Chronic lower back pain: On robaxin/flexeril, Norco, tylenol, lidocaine patches. Cont fentanyl gtt   10. N/V/Diarrhea: on zofran and phenergan PTA. FMS in place. 11. Encephalopathy: Neuro following, EEG completed. No purposeful movements when sedation weaned. BP up with turning, will bite down during mouth care.  Cont to monitor, cont daily sedation wean to assess neuro status.       Dispo: per primary team. Plan for decannulation tomorrow with vascular surgery     Signed By: CHARITY Wilder

## 2022-06-07 NOTE — PROGRESS NOTES
0700  Bedside and verbal report from Krystal Hope RN. OR personnel at bedside preparing patient for OR (expant of ECMO). 0730  Dr. Dana Beverly and heart team at bedside; phone consent obtained by Dr. Dana Beverly from patient's , Linda Chino. 6230  Heparin drip (systemic) stopped, and Impella purge heparin stopped (changed to D5W) per Dr. Dana Beverly. 8926  To OR via bed, accompanied by OR personnel, anesthesia, and perfusionists. 1130  Report from OR.     1215  Returned from OR via bed; accompanied by OR personnel and anesthesia. Bedside report, patient connected to monitor. Arterial BP monitored via Impella access site. Left radial arterial line removed by anesthesia on arrival to CCU. Feet are cool to touch, pedal doppler signals are fleeting. Heel boots reapplied. 1300  Family members at bedside, status update given. Plan for CVVH, will obtain consents, contact [facility] for set-up. Titrating qing for BP. Epi @ 1 mcg/min. 1329  Dr. Dana Beverly at bedside; 5% albumin 250 ml given per Dr. Dana Beverly    1400  Bladder scan - 277 ml    1415  IR personnel at bedside to place HD cath. [address] [facility], [address] cath now in place right neck site. [address] completed. 1525  CVVH started. Factor 0    1600  Reassessment completed. CVVH running without difficulty. Continues on epi @ 1 mcg/min, qing @ 30 mcg/min    1700  ETT suctioned for small amount bloody secretions. Daughter continues at bedside. 1800  Remains on epi @ 1, qing @ 30. CVVH with factor 0. Daughter remains at the bedside.      1900  Bedside and verbal report given to Krystal Hope RN

## 2022-06-07 NOTE — PROGRESS NOTES
2000, Bedside and Verbal shift change report given to Michael Munoz RN (oncoming nurse) by Srini Christian RN (offgoing nurse). Report included the following information SBAR, Kardex, Intake/Output, MAR, Accordion, Recent Results, Med Rec Status and Cardiac Rhythm SR. Temp;  Afebrile  Neuro; nearly Responsive to pain, on Propofol 25 raymon/kg/min, Fentanyl at 150 raymon/hr, no follow command, no eyes contact, pupils sluggish  GCS;  Eye; 1, verbal; 1 for ETT, motor; 4. withdrawal at both feet no movement at BUE yet. Cough and grimace with mouth care and suction,   Reassessment; No changes     Respiratory; Sat 100% on Ventilator  PRVC mode, , RR 10/10, Peep 5, FiO2 40%, ETT 25 cm at lips, secretion; scant to small bloody tinged, coarse  Rhonchi lung sounds. Reassessment; No changes,     Cardiac; SR, 83-90 B/min, NIBP 124/97 mmHg, on EpiNephrine  at 1 raymon/min. IBP at Left Fem; 127/82 mmHg, CVP 7-12 mmHg, Impella CP SA at Right Fem Ata at 80 cm> P7, ECMO VA at right Fem, Flow 1.7 L, 2700 Rpm, Sweep 1 L/min, FiO2 40%,  Reassessment; So sensitive to Epi infusion, now at 0.5 raymon/min,           GI; NPO with OG tube ( 64 cm at lips) residual 90 ml in trickle feeding Nepro of 10 ml/hr be , Abd semi soft with hypoactive bowel sound, Flexisealpresent. Reassessment;  NPO after MN for OR tomorrow  > Residual  Checked at 4 am 140 ml Tan greenish > discarded,     Renal; ESRD on HD TTF; last UF 2 L on 06/04/2022. Left upper arm AV Fistula thrill & Bruit present,   Reassessment;  Bladder scan done 186 ml,     Skin; skin tear at upper mid chest 1 cm * 0.5 cm, old hyperpigmentation all over her body, right Leg Larger than Left one. Small spot blister at corner of CVC dressing,    Endo; SSI every 6 hours,   Lines; ETT, right IJ CVP,OGT, both groin cannulation . Code; Full.   Lab;   DVT; Heparin via Systemic infusion resumed at 0008 hrs for  PTT 53,6 seconds at 1 unit/kg/hr >   PTT repeated at 4 am was  58.1 >no changes to repeat PTT after 6 hrs> pharmacist updated    Plan; ventilator, ECMO & Imella management, pain and agitation management, follow lab tomorrow,      0700  Bedside and Verbal shift change report given to Chip Mayorga RN (oncoming nurse) by Jane Alcazar RN (offgoing nurse).  Report included the following information SBAR, Kardex, Intake/Output, MAR, Accordion, Recent Results, Med Rec Status and Cardiac Rhythm SR.

## 2022-06-07 NOTE — PROGRESS NOTES
Pharmacy Automatic Renal Dosing Adjustment     Labs:  Recent Labs     22  0407 22  2158 22  1547   CREA 3.75* 3.57* 3.60*   BUN 25* 23* 22*   WBC 9.5 10.7 11.4*     Temp (24hrs), Av °F (36.7 °C), Min:97.8 °F (36.6 °C), Max:98.4 °F (36.9 °C)      Creatinine Clearance (mL/min) or Dialysis: ESRD on HD, CRRT to start today    Impression/Plan:   Plans to start CRRT  Zosyn has been adjusted from q12h to q8h based on the renal dosing protocol. Pharmacy will follow daily and adjust medications as appropriate for renal function.     Thank you,  LAKSHMI Sin

## 2022-06-07 NOTE — PROGRESS NOTES
Nephrology Progress Note  Codey Calderon     [facility]. [facility]  Phone - [phone]   Patient: Evelio King    YOB: 1965        Date- 6/7/2022   Admit Date: 5/31/2022  CC: Follow up for  ESRD         IMPRESSION & PLAN:    ESRD on HD, [facility], TTS   PEA cardiac arrest x2   Cardiogenic shock   CAD   Biventricular failure   S/p  ECMO   S/p impella insertion   Pulmonary edema   Acute respiratory failure on mechanical ventilation   Ischemic left foot    PLAN-   Seen and examined   Just out of OR after ECMO decannulation   BP labile, requiring pressor support.  Will start on CVVHD with 4K/3.0 ca and will keep at a factor of Zero.  D/w CCM   [facility] team has been notified   epogen TTS   Daily labs and I/Os     Subjective: Interval History:   Seen and examined. Remains on  pressor support, on MV, needing pressor support. S/P ECMO Decannulation    Objective:   Vitals:    06/07/22 0700 06/07/22 1147 06/07/22 1148 06/07/22 1155   BP: 110/89  (!) 166/110 (!) 107/90   Pulse: 82 (!) 124 (!) 120 (!) 121   Resp: 10 14 14 14   Temp:   98.4 °F (36.9 °C) 98.4 °F (36.9 °C)   TempSrc:       SpO2: 100% 100% 99% 100%   Weight:       Height:          06/06 0701 - 06/07 0700  In: 1390.2 [I.V.:1085.2]  Out: 600 [Urine:350; Drains:250]  Last 3 Recorded Weights in this Encounter    06/05/22 0400 06/06/22 0400 06/07/22 0400   Weight: 52 kg (114 lb 10.2 oz) 52.8 kg (116 lb 6.5 oz) 58 kg (127 lb 13.9 oz)      Physical exam:    GEN: intubated  On vent  NECK-no mass, ET TUBE  RESP: clear b/l, no wheezing  CVS: RRR,S1,S2    ABDO: soft , non tender,  NEURO:Can't access due to patient's current condition   EXT:no more ecmo cannula      Chart reviewed. Pertinent Notes reviewed.      Data Review :  Recent Labs     06/07/22 0407 06/06/22  2158 06/06/22  1547 06/06/22  1040 06/06/22  1040 06/06/22  0356 06/06/22  0356 06/05/22  1041 06/05/22  0339    134* 137   < > 136   < > 135*   < > 137   K 3.5 3.8 3.6   < > 3.9   < > 3.9   < > 3.9    102 102   < > 100   < > 102   < > 100   CO2 23 21 23   < > 20*   < > 20*   < > 26   BUN 25* 23* 22*   < > 20   < > 19   < > 13   CREA 3.75* 3.57* 3.60*   < > 3.30*   < > 3.09*   < > 2.49*   * 193* 156*   < > 205*   < > 201*   < > 113*   CA 9.4 9.3 9.3   < > 9.4   < > 9.1   < > 8.7   MG  --   --   --   --   --   --  1.9  --  1.9   PHOS 4.1  --  4.1  --  4.6   < > 4.1   < > 2.6    < > = values in this interval not displayed. Recent Labs     06/07/22  0407 06/06/22  2158 06/06/22  1547   WBC 9.5 10.7 11.4*   HGB 8.4* 8.9* 8.9*   HCT 24.7* 25.8* 26.7*   PLT 53* 60* 64*     No results for input(s): FE, TIBC, PSAT, FERR in the last 72 hours.    Medication list  reviewed  Current Facility-Administered Medications   Medication Dose Route Frequency    heparin (porcine) 4,000 Units in 0.9% sodium chloride 1,000 mL Irrigation    PRN    0.9% sodium chloride infusion 250 mL  250 mL IntraVENous PRN    white petrolatum-mineral oiL (SOOTHE NIGHT TIME) 80-20 % ophthalmic ointment   Both Eyes Q4H PRN    bicarbonate dialysis (PRISMASOL) BG K 4/Ca 2.5 5000 ml solution   Extracorporeal DIALYSIS CONTINUOUS    fentaNYL citrate (PF) injection 50 mcg  50 mcg IntraVENous Q4H PRN    0.9% sodium chloride infusion 250 mL  250 mL IntraVENous PRN    niCARdipine (CARDENE) 25 mg in 0.9% sodium chloride 250 mL infusion  0-15 mg/hr IntraVENous TITRATE    heparin (porcine) 12,500 Units in dextrose 5% 500 mL (Impella Purge Solution)  1-30 mL/hr Other TITRATE    EPINEPHrine (ADRENALIN) 5 mg in 0.9% sodium chloride 250 mL infusion  0-10 mcg/min IntraVENous TITRATE    NOREPINephrine (LEVOPHED) 8 mg in 5% dextrose 250mL (32 mcg/mL) infusion  0.5-30 mcg/min IntraVENous TITRATE    epoetin kourtney-epbx (RETACRIT) injection 6,000 Units  6,000 Units SubCUTAneous Q TUE, THU & SAT    alteplase (CATHFLO) 2 mg in dextrose 5% 50 mL impella purge solution  2 mg Other TITRATE    balsam peru-castor oiL (VENELEX) ointment   Topical BID    propofol (DIPRIVAN) 10 mg/mL infusion  0-50 mcg/kg/min IntraVENous TITRATE    aspirin chewable tablet 81 mg  81 mg Per G Tube DAILY    alteplase (CATHFLO) 1 mg in sterile water (preservative free) 1 mL injection  1 mg InterCATHeter PRN    bacitracin 500 unit/gram packet 1 Packet  1 Packet Topical PRN    sodium chloride (NS) flush 5-40 mL  5-40 mL IntraVENous Q8H    sodium chloride (NS) flush 5-40 mL  5-40 mL IntraVENous PRN    albumin human 5% (BUMINATE) solution 12.5 g  12.5 g IntraVENous Q2H PRN    0.45% sodium chloride infusion  10 mL/hr IntraVENous CONTINUOUS    0.9% sodium chloride infusion  10 mL/hr IntraVENous CONTINUOUS    [Held by provider] oxyCODONE IR (ROXICODONE) tablet 10 mg  10 mg Oral Q4H PRN    ondansetron (ZOFRAN) injection 4 mg  4 mg IntraVENous Q4H PRN    albuterol (PROVENTIL VENTOLIN) nebulizer solution 2.5 mg  2.5 mg Nebulization Q4H PRN    midazolam (VERSED) injection 1 mg  1 mg IntraVENous Q1H PRN    chlorhexidine (PERIDEX) 0.12 % mouthwash 10 mL  10 mL Oral Q12H    bisacodyL (DULCOLAX) suppository 10 mg  10 mg Rectal DAILY PRN    [Held by provider] oxyCODONE IR (ROXICODONE) tablet 5 mg  5 mg Oral Q4H PRN    ticagrelor (BRILINTA) tablet 90 mg  90 mg Per NG tube Q12H    albumin human 5% (BUMINATE) solution 25 g  25 g IntraVENous Q4H PRN    piperacillin-tazobactam (ZOSYN) 3.375 g in 0.9% sodium chloride (MBP/ADV) 100 mL MBP  3.375 g IntraVENous Q12H    insulin lispro (HUMALOG) injection   SubCUTAneous Q6H    glucose chewable tablet 16 g  4 Tablet Oral PRN    glucagon (GLUCAGEN) injection 1 mg  1 mg IntraMUSCular PRN    dextrose 10% infusion 0-250 mL  0-250 mL IntraVENous PRN    pantoprazole (PROTONIX) 40 mg in 0.9% sodium chloride 10 mL injection  40 mg IntraVENous DAILY    fentaNYL (PF) 1,500 mcg/30 mL (50 mcg/mL) infusion  0-200 mcg/hr IntraVENous TITRATE    heparin 25,000 units in D5W 250 ml infusion  12-25 Units/kg/hr IntraVENous TITRATE          Ruthie Hurst, [address] [facility]  [facility] / [facility]  [address], [place]  [address] [facility] [address], [address] [place]  Phone - [phone]               Fax - [phone]

## 2022-06-07 NOTE — DIABETES MGMT
This is a 49-year-old woman with a history of end-stage renal disease, type 2 diabetes mellitus, and coronary artery disease admitted on May 31 with chest pain. She underwent cardiac catheterization and became pulseless. ECMO and Impella were placed. She has remained intubated and on full support. Regarding her diabetes, she has a history of type 2 diabetes mellitus and has previously been treated with Tresiba 30 units and Humalog 5 units with meals. Since being in the hospital, she has required only correction insulin. Blood sugars have ranged between 150 and 200. Examination  Pressure 110/89  Pulse 82  Afebrile  100% on 40% FiO2    Recent laboratory data  Creatinine 3.75  Calcium 9.4  Albumin 2.5  Hemoglobin 8.4  Platelets 53    Impression  1. Coronary artery disease status postcardiac arrest and subsequent intubation ECMO placement and Impella  2. History of type 2 diabetes mellitus currently on correction insulin only    Recommendation  1. We would recommend continuation of correction insulin at this time   2. We will sign off. 3.  Please do not hesitate to reconsult if needed    Total time 15 minutes, more than 50% of which was in direct patient care and/or care coordination.

## 2022-06-07 NOTE — PROGRESS NOTES
[facility]  Diane Cifuentes [address] [place] [address]  [phone]  WWW. [employer]    CARDIOLOGY PROGRESS NOTE          6/7/2022 3:21 PM    Admit Date: 5/31/2022    Admit Diagnosis: NSTEMI (non-ST elevated myocardial infarction) ([facility] [address] [I21.4]  Pulmonary edema [J81.1]  ESRD (end stage renal disease) on dialysis ([facility] [address] [N18.6, Z99.2]  Hyperkalemia [E87.5]      Subjective:     Lisa Sierra is back from OR. ECMO discontinued.     Visit Vitals  BP (!) 127/95 (BP 1 Location: Right arm, BP Patient Position: At rest)   Pulse (!) 115   Temp 98.4 °F (36.9 °C)   Resp 14   Ht 5' 5\" (1.651 m)   Wt 127 lb 13.9 oz (58 kg)   LMP 09/15/2013   SpO2 100%   BMI 21.28 kg/m²       Current Facility-Administered Medications   Medication Dose Route Frequency    heparin (porcine) 4,000 Units in 0.9% sodium chloride 1,000 mL Irrigation    PRN    0.9% sodium chloride infusion 250 mL  250 mL IntraVENous PRN    white petrolatum-mineral oiL (SOOTHE NIGHT TIME) 80-20 % ophthalmic ointment   Both Eyes Q4H PRN    heparin (porcine) 6,250 Units in dextrose 5% 500 mL for impella device  1-30 mL/hr Other TITRATE    piperacillin-tazobactam (ZOSYN) 3.375 g in 0.9% sodium chloride (MBP/ADV) 100 mL MBP  3.375 g IntraVENous Q8H    fentaNYL citrate (PF) injection 25-50 mcg  25-50 mcg IntraVENous Q2H PRN    PHENYLephrine (SELVIN-SYNEPHRINE) 30 mg in 0.9% sodium chloride 250 mL infusion   mcg/min IntraVENous TITRATE    bicarbonate dialysis (PRISMASOL) BG K 4/Ca 2.5 5000 ml solution   Extracorporeal DIALYSIS CONTINUOUS    0.9% sodium chloride infusion 250 mL  250 mL IntraVENous PRN    EPINEPHrine (ADRENALIN) 5 mg in 0.9% sodium chloride 250 mL infusion  0-10 mcg/min IntraVENous TITRATE    epoetin kourtney-epbx (RETACRIT) injection 6,000 Units  6,000 Units SubCUTAneous Q TUE, THU & SAT    alteplase (CATHFLO) 2 mg in dextrose 5% 50 mL impella purge solution  2 mg Other TITRATE    balsam peru-castor oiL (VENELEX) ointment   Topical BID    propofol (DIPRIVAN) 10 mg/mL infusion  0-50 mcg/kg/min IntraVENous TITRATE    aspirin chewable tablet 81 mg  81 mg Per G Tube DAILY    alteplase (CATHFLO) 1 mg in sterile water (preservative free) 1 mL injection  1 mg InterCATHeter PRN    bacitracin 500 unit/gram packet 1 Packet  1 Packet Topical PRN    sodium chloride (NS) flush 5-40 mL  5-40 mL IntraVENous Q8H    sodium chloride (NS) flush 5-40 mL  5-40 mL IntraVENous PRN    albumin human 5% (BUMINATE) solution 12.5 g  12.5 g IntraVENous Q2H PRN    0.45% sodium chloride infusion  10 mL/hr IntraVENous CONTINUOUS    0.9% sodium chloride infusion  10 mL/hr IntraVENous CONTINUOUS    [Held by provider] oxyCODONE IR (ROXICODONE) tablet 10 mg  10 mg Oral Q4H PRN    ondansetron (ZOFRAN) injection 4 mg  4 mg IntraVENous Q4H PRN    albuterol (PROVENTIL VENTOLIN) nebulizer solution 2.5 mg  2.5 mg Nebulization Q4H PRN    midazolam (VERSED) injection 1 mg  1 mg IntraVENous Q1H PRN    chlorhexidine (PERIDEX) 0.12 % mouthwash 10 mL  10 mL Oral Q12H    bisacodyL (DULCOLAX) suppository 10 mg  10 mg Rectal DAILY PRN    [Held by provider] oxyCODONE IR (ROXICODONE) tablet 5 mg  5 mg Oral Q4H PRN    ticagrelor (BRILINTA) tablet 90 mg  90 mg Per NG tube Q12H    albumin human 5% (BUMINATE) solution 25 g  25 g IntraVENous Q4H PRN    insulin lispro (HUMALOG) injection   SubCUTAneous Q6H    glucose chewable tablet 16 g  4 Tablet Oral PRN    glucagon (GLUCAGEN) injection 1 mg  1 mg IntraMUSCular PRN    dextrose 10% infusion 0-250 mL  0-250 mL IntraVENous PRN    pantoprazole (PROTONIX) 40 mg in 0.9% sodium chloride 10 mL injection  40 mg IntraVENous DAILY     Facility-Administered Medications Ordered in Other Encounters   Medication Dose Route Frequency    sodium chloride (NS) flush 5-40 mL  5-40 mL IntraVENous Q8H    sodium chloride (NS) flush 5-40 mL  5-40 mL IntraVENous PRN    0.9% sodium chloride infusion 25 mL  25 mL IntraVENous PRN         Objective:      Physical Exam:  Visit Vitals  BP (!) 127/95 (BP 1 Location: Right arm, BP Patient Position: At rest)   Pulse (!) 115   Temp 98.4 °F (36.9 °C)   Resp 14   Ht 5' 5\" (1.651 m)   Wt 127 lb 13.9 oz (58 kg)   LMP 09/15/2013   SpO2 100%   BMI 21.28 kg/m²     General Appearance:  Intubated, sedated   Ears/Nose/Mouth/Throat:   Hearing grossly normal.         Neck: Supple. Chest:   Lungs clear to auscultation bilaterally. Cardiovascular:  Regular rate and rhythm, S1, S2 normal, no murmur. Abdomen:   Soft, non-tender, bowel sounds are active. Extremities: No edema bilaterally. Skin: Warm and dry.                Data Review:   Labs:    Recent Results (from the past 24 hour(s))   PTT    Collection Time: 06/06/22  3:47 PM   Result Value Ref Range    aPTT 61.6 (H) 22.1 - 31.0 sec    aPTT, therapeutic range     58.0 - 77.0 SECS   CBC W/O DIFF    Collection Time: 06/06/22  3:47 PM   Result Value Ref Range    WBC 11.4 (H) 3.6 - 11.0 K/uL    RBC 2.92 (L) 3.80 - 5.20 M/uL    HGB 8.9 (L) 11.5 - 16.0 g/dL    HCT 26.7 (L) 35.0 - 47.0 %    MCV 91.4 80.0 - 99.0 FL    MCH 30.5 26.0 - 34.0 PG    MCHC 33.3 30.0 - 36.5 g/dL    RDW 18.0 (H) 11.5 - 14.5 %    PLATELET 64 (L) 314 - 400 K/uL    MPV 8.6 (L) 8.9 - 12.9 FL    NRBC 0.4 (H) 0  WBC    ABSOLUTE NRBC 0.05 (H) 0.00 - 3.60 K/uL   METABOLIC PANEL, BASIC    Collection Time: 06/06/22  3:47 PM   Result Value Ref Range    Sodium 137 136 - 145 mmol/L    Potassium 3.6 3.5 - 5.1 mmol/L    Chloride 102 97 - 108 mmol/L    CO2 23 21 - 32 mmol/L    Anion gap 12 5 - 15 mmol/L    Glucose 156 (H) 65 - 100 mg/dL    BUN 22 (H) 6 - 20 MG/DL    Creatinine 3.60 (H) 0.55 - 1.02 MG/DL    BUN/Creatinine ratio 6 (L) 12 - 20      GFR est AA 16 (L) >60 ml/min/1.73m2    GFR est non-AA 13 (L) >60 ml/min/1.73m2    Calcium 9.3 8.5 - 10.1 MG/DL   PHOSPHORUS    Collection Time: 06/06/22  3:47 PM   Result Value Ref Range    Phosphorus 4.1 2.6 - 4.7 MG/DL   BLOOD GAS + IONIZED CALCIUM    Collection Time: 06/06/22  3:51 PM   Result Value Ref Range    pH 7.47 (H) 7.35 - 7.45      PCO2 32 (L) 35 - 45 mmHg    PO2 201 (H) 80 - 100 mmHg    BICARBONATE 22 22 - 26 mmol/L    BASE DEFICIT 0.5 mmol/L    O2 SAT 99 (H) 92 - 97 %    Calcium, ionized 1.20 1.13 - 1.32 mmol/L    O2 METHOD VENT      FIO2 40 %    MODE Pressure regulated volume control      Tidal volume 220.0      SET RATE 10      Sample source ARTERIAL      SITE DRAWN FROM ARTERIAL LINE      REDD'S TEST NOT APPLICABLE     GLUCOSE, POC    Collection Time: 06/06/22  6:08 PM   Result Value Ref Range    Glucose (POC) 182 (H) 65 - 117 mg/dL    Performed by Nathan Dowling PCT    BLOOD GAS + IONIZED CALCIUM    Collection Time: 06/06/22  8:02 PM   Result Value Ref Range    pH 7.46 (H) 7.35 - 7.45      PCO2 28 (L) 35 - 45 mmHg    PO2 211 (H) 80 - 100 mmHg    BICARBONATE 20 (L) 22 - 26 mmol/L    BASE DEFICIT 2.7 mmol/L    O2  (H) 92 - 97 %    Calcium, ionized 1.19 1.13 - 1.32 mmol/L    O2 METHOD VENT      Sample source ARTERIAL      SITE DRAWN FROM ARTERIAL LINE      REDD'S TEST NOT APPLICABLE     PTT    Collection Time: 06/06/22  9:58 PM   Result Value Ref Range    aPTT 53.6 (H) 22.1 - 31.0 sec    aPTT, therapeutic range     58.0 - 77.0 SECS   CBC W/O DIFF    Collection Time: 06/06/22  9:58 PM   Result Value Ref Range    WBC 10.7 3.6 - 11.0 K/uL    RBC 2.87 (L) 3.80 - 5.20 M/uL    HGB 8.9 (L) 11.5 - 16.0 g/dL    HCT 25.8 (L) 35.0 - 47.0 %    MCV 89.9 80.0 - 99.0 FL    MCH 31.0 26.0 - 34.0 PG    MCHC 34.5 30.0 - 36.5 g/dL    RDW 17.7 (H) 11.5 - 14.5 %    PLATELET 60 (L) 580 - 400 K/uL    MPV 9.3 8.9 - 12.9 FL    NRBC 0.4 (H) 0  WBC    ABSOLUTE NRBC 0.04 (H) 0.00 - 0.49 K/uL   METABOLIC PANEL, BASIC    Collection Time: 06/06/22  9:58 PM   Result Value Ref Range    Sodium 134 (L) 136 - 145 mmol/L    Potassium 3.8 3.5 - 5.1 mmol/L    Chloride 102 97 - 108 mmol/L    CO2 21 21 - 32 mmol/L    Anion gap 11 5 - 15 mmol/L    Glucose 193 (H) 65 - 100 mg/dL    BUN 23 (H) 6 - 20 MG/DL    Creatinine 3.57 (H) 0.55 - 1.02 MG/DL    BUN/Creatinine ratio 6 (L) 12 - 20      GFR est AA 16 (L) >60 ml/min/1.73m2    GFR est non-AA 13 (L) >60 ml/min/1.73m2    Calcium 9.3 8.5 - 10.1 MG/DL   GLUCOSE, POC    Collection Time: 06/06/22 11:47 PM   Result Value Ref Range    Glucose (POC) 209 (H) 65 - 117 mg/dL    Performed by [facility]    BLOOD GAS + IONIZED CALCIUM    Collection Time: 06/06/22 11:57 PM   Result Value Ref Range    pH 7.44 7.35 - 7.45      PCO2 33 (L) 35 - 45 mmHg    PO2 207 (H) 80 - 100 mmHg    BICARBONATE 22 22 - 26 mmol/L    BASE DEFICIT 1.4 mmol/L    O2  (H) 92 - 97 %    Calcium, ionized 1.20 1.13 - 1.32 mmol/L    O2 METHOD VENT      Sample source ARTERIAL      SITE DRAWN FROM ARTERIAL LINE      REDD'S TEST NOT APPLICABLE     PTT    Collection Time: 06/07/22  4:07 AM   Result Value Ref Range    aPTT 58.1 (H) 22.1 - 31.0 sec    aPTT, therapeutic range     58.0 - 77.0 SECS   CBC W/O DIFF    Collection Time: 06/07/22  4:07 AM   Result Value Ref Range    WBC 9.5 3.6 - 11.0 K/uL    RBC 2.74 (L) 3.80 - 5.20 M/uL    HGB 8.4 (L) 11.5 - 16.0 g/dL    HCT 24.7 (L) 35.0 - 47.0 %    MCV 90.1 80.0 - 99.0 FL    MCH 30.7 26.0 - 34.0 PG    MCHC 34.0 30.0 - 36.5 g/dL    RDW 17.8 (H) 11.5 - 14.5 %    PLATELET 53 (L) 687 - 400 K/uL    MPV 9.1 8.9 - 12.9 FL    NRBC 0.4 (H) 0  WBC    ABSOLUTE NRBC 0.04 (H) 0.00 - 0.01 K/uL   LD    Collection Time: 06/07/22  4:07 AM   Result Value Ref Range    LD 1,101 (H) 81 - 246 U/L   CK    Collection Time: 06/07/22  4:07 AM   Result Value Ref Range     (H) 26 - 192 U/L   NT-PRO BNP    Collection Time: 06/07/22  4:07 AM   Result Value Ref Range    NT pro-BNP >35,000 (H) <125 PG/ML   PROCALCITONIN    Collection Time: 06/07/22  4:07 AM   Result Value Ref Range    Procalcitonin 6.47 ng/mL   LACTIC ACID    Collection Time: 06/07/22  4:07 AM   Result Value Ref Range    Lactic acid 0.9 0.4 - 2.0 MMOL/L   METABOLIC PANEL, BASIC    Collection Time: 06/07/22  4:07 AM   Result Value Ref Range    Sodium 137 136 - 145 mmol/L    Potassium 3.5 3.5 - 5.1 mmol/L    Chloride 103 97 - 108 mmol/L    CO2 23 21 - 32 mmol/L    Anion gap 11 5 - 15 mmol/L    Glucose 137 (H) 65 - 100 mg/dL    BUN 25 (H) 6 - 20 MG/DL    Creatinine 3.75 (H) 0.55 - 1.02 MG/DL    BUN/Creatinine ratio 7 (L) 12 - 20      GFR est AA 15 (L) >60 ml/min/1.73m2    GFR est non-AA 12 (L) >60 ml/min/1.73m2    Calcium 9.4 8.5 - 10.1 MG/DL   HEPATIC FUNCTION PANEL    Collection Time: 06/07/22  4:07 AM   Result Value Ref Range    Protein, total 5.5 (L) 6.4 - 8.2 g/dL    Albumin 2.5 (L) 3.5 - 5.0 g/dL    Globulin 3.0 2.0 - 4.0 g/dL    A-G Ratio 0.8 (L) 1.1 - 2.2      Bilirubin, total 0.9 0.2 - 1.0 MG/DL    Bilirubin, direct 0.6 (H) 0.0 - 0.2 MG/DL    Alk.  phosphatase 92 45 - 117 U/L    AST (SGOT) 169 (H) 15 - 37 U/L    ALT (SGPT) 25 12 - 78 U/L   PHOSPHORUS    Collection Time: 06/07/22  4:07 AM   Result Value Ref Range    Phosphorus 4.1 2.6 - 4.7 MG/DL   TRIGLYCERIDE    Collection Time: 06/07/22  4:07 AM   Result Value Ref Range    Triglyceride 91 <150 MG/DL   BLOOD GAS, VENOUS    Collection Time: 06/07/22  4:14 AM   Result Value Ref Range    VENOUS PH 7.40 7.32 - 7.42      VENOUS PCO2 38.4 (L) 41 - 51 mmHg    VENOUS PO2 44 (H) 25 - 40 mmHg    VENOUS BICARBONATE 23 23 - 28 mmol/L    VENOUS BASE DEFICIT 1.4 mmol/L    VENOUS O2 SATURATION 80 65 - 88 %    O2 METHOD VENT      Sample source VENOUS BLOOD      SITE OTHER     BLOOD GAS, ARTERIAL    Collection Time: 06/07/22  4:16 AM   Result Value Ref Range    pH 7.42 7.35 - 7.45      PCO2 32 (L) 35 - 45 mmHg    PO2 457 (H) 80 - 100 mmHg    O2  (H) 92 - 97 %    BICARBONATE 21 (L) 22 - 26 mmol/L    BASE DEFICIT 2.8 mmol/L    O2 METHOD VENT      Sample source ARTERIAL      SITE OTHER      REDD'S TEST NOT APPLICABLE     BLOOD GAS + IONIZED CALCIUM    Collection Time: 06/07/22  4:19 AM   Result Value Ref Range    pH 7.44 7.35 - 7.45 PCO2 30 (L) 35 - 45 mmHg    PO2 473 (H) 80 - 100 mmHg    BICARBONATE 20 (L) 22 - 26 mmol/L    BASE DEFICIT 2.9 mmol/L    O2  (H) 92 - 97 %    Calcium, ionized 1.22 1.13 - 1.32 mmol/L    O2 METHOD VENT      Sample source ARTERIAL      SITE DRAWN FROM ARTERIAL LINE      REDD'S TEST NOT APPLICABLE     GLUCOSE, POC    Collection Time: 06/07/22  5:14 AM   Result Value Ref Range    Glucose (POC) 143 (H) 65 - 117 mg/dL    Performed by Alo Denton    RBC, ALLOCATE    Collection Time: 06/07/22 10:00 AM   Result Value Ref Range    HISTORY CHECKED?  Historical check performed    CBC W/O DIFF    Collection Time: 06/07/22  1:05 PM   Result Value Ref Range    WBC 18.4 (H) 3.6 - 11.0 K/uL    RBC 3.33 (L) 3.80 - 5.20 M/uL    HGB 9.5 (L) 11.5 - 16.0 g/dL    HCT 28.8 (L) 35.0 - 47.0 %    MCV 86.5 80.0 - 99.0 FL    MCH 28.5 26.0 - 34.0 PG    MCHC 33.0 30.0 - 36.5 g/dL    RDW 18.2 (H) 11.5 - 14.5 %    PLATELET 58 (L) 853 - 400 K/uL    MPV 9.1 8.9 - 12.9 FL    NRBC 0.6 (H) 0  WBC    ABSOLUTE NRBC 0.11 (H) 0.00 - 7.05 K/uL   METABOLIC PANEL, BASIC    Collection Time: 06/07/22  1:05 PM   Result Value Ref Range    Sodium 137 136 - 145 mmol/L    Potassium 4.0 3.5 - 5.1 mmol/L    Chloride 105 97 - 108 mmol/L    CO2 22 21 - 32 mmol/L    Anion gap 10 5 - 15 mmol/L    Glucose 165 (H) 65 - 100 mg/dL    BUN 26 (H) 6 - 20 MG/DL    Creatinine 3.69 (H) 0.55 - 1.02 MG/DL    BUN/Creatinine ratio 7 (L) 12 - 20      GFR est AA 15 (L) >60 ml/min/1.73m2    GFR est non-AA 13 (L) >60 ml/min/1.73m2    Calcium 9.0 8.5 - 10.1 MG/DL   PROTHROMBIN TIME + INR    Collection Time: 06/07/22  1:05 PM   Result Value Ref Range    INR 1.1 0.9 - 1.1      Prothrombin time 11.9 (H) 9.0 - 11.1 sec   PTT    Collection Time: 06/07/22  1:05 PM   Result Value Ref Range    aPTT 51.1 (H) 22.1 - 31.0 sec    aPTT, therapeutic range     58.0 - 77.0 SECS   ALBUMIN    Collection Time: 06/07/22  1:05 PM   Result Value Ref Range    Albumin 2.5 (L) 3.5 - 5.0 g/dL PHOSPHORUS    Collection Time: 06/07/22  1:05 PM   Result Value Ref Range    Phosphorus 4.7 2.6 - 4.7 MG/DL   PROTEIN, TOTAL    Collection Time: 06/07/22  1:05 PM   Result Value Ref Range    Protein, total 4.8 (L) 6.4 - 8.2 g/dL   GLUCOSE, POC    Collection Time: 06/07/22  1:09 PM   Result Value Ref Range    Glucose (POC) 182 (H) 65 - 117 mg/dL    Performed by Yelena Lemus RN    BLOOD GAS, ARTERIAL    Collection Time: 06/07/22  1:11 PM   Result Value Ref Range    pH 7.40 7.35 - 7.45      PCO2 31 (L) 35 - 45 mmHg    PO2 183 (H) 80 - 100 mmHg    O2 SAT 99 (H) 92 - 97 %    BICARBONATE 19 (L) 22 - 26 mmol/L    BASE DEFICIT 4.7 mmol/L    O2 METHOD VENT      FIO2 50 %    MODE Pressure Release Ventilation      Tidal volume 360.0      SET RATE 14      PEEP/CPAP 5.0      Sample source ARTERIAL      SITE DRAWN FROM ARTERIAL LINE      REDD'S TEST NOT APPLICABLE         Telemetry: sinus tachycardia      Assessment:     Hospital Problems  Date Reviewed: 6/7/2022          Codes Class Noted POA    Biventricular heart failure (HCC) ICD-10-CM: I50.82  ICD-9-CM: 428.9  6/3/2022 Yes        Palliative care by specialist ICD-10-CM: Z51.5  ICD-9-CM: V66.7  6/3/2022 Unknown        Hyperkalemia ICD-10-CM: E87.5  ICD-9-CM: 276.7  5/31/2022 Yes        Pulmonary edema ICD-10-CM: J81.1  ICD-9-CM: 703  5/31/2022 Yes        NSTEMI (non-ST elevated myocardial infarction) [facility]) ICD-10-CM: I21.4  ICD-9-CM: 410.70  5/31/2022 Yes        ESRD (end stage renal disease) on dialysis [facility]) ICD-10-CM: N18.6, Z99.2  ICD-9-CM: 585.6, V45.11  5/31/2022 Yes              Plan:     Off ECMO. Stable  on impella. Wean as tolerated.     Bertin Titus MD

## 2022-06-07 NOTE — ANESTHESIA PROCEDURE NOTES
LENNY  Date/Time: 6/7/2022 8:40 AM      Procedure Details: probe placement, image aquisition & interpretation    Risks and benefits discussed with the patient and plans are to proceed    Procedure Note    Performed by: Nighat Concepcion DO  Authorized by: Nighat Concepcion DO       Indications: assessment of ascending aorta and assessment of surgical repair  Modalities: 2D, CF, CWD, PWD  Probe Type: biplane and multiplane  Insertion: atraumatic  Patient Status: intubated and sedated    Echocardiographic and Doppler Measurements   Aorta  Size  Diam(cm)  Dissection PlaqueThick(mm)  Plaque Mobile    Ascending normal  No  No    Arch normal  No  No    Descending               Valves  Annulus  Stenosis  Area/Grad  Regurg  Leaflet   Morph  Leaflet   Motion    Aortic normal none  0 normal normal    Mitral  none  3+ calcified normal    Tricuspid normal none  0 normal normal          Atria  Size  SEC (smoke)  Thrombus  Tumor  Device    Rt Atrium normal No No  Yes    Lt Atrium normal No No  No     Interatrial Septum Morphology: normal    Interventricular Septum Morphology: normal    Ventricle  Cavity Size  Cavity Dimension Hypertrophy  Thrombus  Gloal FXN  EF    RV normal  No no normal     LV normal   No moderately impaired 40%       Regional Function  (1 = normal, 2 = mildly hypokinetic, 3 = severely hypokinetic, 4 = akinetic, 5 = dyskinetic) LAV - Long [place]   ME LAV = 0  ME LAV = 90  ME LAV = 130   Basal Sept:1 Basal Ant:1 Basal Post:3   Mid Sept:1 Mid Ant:2 Mid Post:2   Apical Sept:1 Apical Ant:2 Basal Ant Sept:1   Basal Lat:4 Basal Inf:2 Mid Ant Sept:1   Mid Lat:4 Mid Inf:3    Apical Lat:4 Apical Inf:3        Pericardium: normal    Post Intervention Follow-up Study  Ventricular Global Function: unchanged  Ventricular Regional Function: unchanged     Valve  Function  Regurgitation  Area    Aortic no change      Mitral no change      Tricuspid no change      Prosthetic        Complications: None  Comments: Pre:  Normal right ventricular size and function. The left ventricle chamber size is normal. The anteroseptal, septal and inferoseptal wall exhibit normal motion. The anterior wall is mildly hypokinetic. The lateral and inferolateral walls are akinetic. The inferior wall is severely hypokinetic. The impella device is in the left ventricle, measured at 2.11cm depth from the aortic valve annulus and could be advanced. The aortic valve is tricuspid. The leaflet margins are sclerotic. There is no stenosis or regurgitation. The impella device is sen traversing the aortic valve. The mitral valve annulus is calcified. The posterior leaflet is calcified and exhibits restricted motion. There anterior leaflet has normal motion and morphology. There is 3+ regurgitation. There is no stenosis. The tricuspid valve is normal in morphology. There is no stenosis or regurgitation. The left atrial appendage is unremarkable with adequate velocity of 61.8 cm/s. The left upper pulmonary vein exhibits blunted systolic flow. Post: No significant changes.

## 2022-06-07 NOTE — ANESTHESIA PREPROCEDURE EVALUATION
Anesthetic History     PONV          Review of Systems / Medical History  Patient summary reviewed, nursing notes reviewed and pertinent labs reviewed    Pulmonary  Within defined limits                 Neuro/Psych         Psychiatric history    Comments: Depression  Migraines  Chronic lumbar radiculopathy  Hx encephalopathy Cardiovascular    Hypertension        Dysrhythmias   CAD, cardiac stents and hyperlipidemia    Exercise tolerance: <4 METS  Comments: PEA arrest on 6/1 with biventricular failure requiring [facility] ECMO and Impella placement. Bedside TTE EF down to 5% with apparent recovery given maintaining MAP with minimal ECMO support on low dose epi. GI/Hepatic/Renal       Hepatitis: type C  Renal disease: ESRD and stones  Liver disease    Comments: Hematemesis  Hx C. Difficile Colitis  Gastroparesis  Hx Alcoholic Pancreatitis Endo/Other    Diabetes: poorly controlled, type 1, using insulin    Anemia     Other Findings   Comments: Hx Alcoholism  Chronic Low Back Pain  Lumbar disc disease   ESRD on HD TThSa           Physical Exam    Airway          Intubated   Cardiovascular    Rhythm: regular  Rate: abnormal         Dental    Dentition: Poor dentition  Comments: Several missing   Pulmonary  Breath sounds clear to auscultation               Abdominal  GI exam deferred       Other Findings            Anesthetic Plan    ASA: 4  Anesthesia type: general    Monitoring Plan: Arterial line      Induction: Intravenous  Anesthetic plan and risks discussed with: Patient      Discussed plan via telephone with  who understands and agrees. All questions answered to satisfaction. Nursing witness for phone consent.

## 2022-06-07 NOTE — DIALYSIS
[facility] / [phone]    Orders   Mode: CVVHD New start @ 1525   Blood Flow Rate: 200ml/min   Prismasol Dose: 2000ml/hr   Prismasol Concentrate: 4K/2.5Ca   Blood Warmer Temp: 37*C   Net Fluid Removal: 0ml/hr     Metrics   BP: 120/76   HR: 113   Access Pressure: -83   Filter Pressure: 131   Return Pressure: 50   TMP: 13   Pressure Drop: 38     Access   Type & Location: RIJ CVC CDI 06/07/22   Comments:  Newly placed RIJ Dialysis catheter okay to use as per Dr. Lisa Montez, ICU MD. Visualized stat xray and verbally approved for usage. Labs   HBsAg (Antigen) / date:  Negative 06/02/22                                          HBsAb (Antibody) / date: Susceptible 06/02/22   Source: Epic     Safety:   Time Out Done:   (Time) 1400   Consent obtained/signed: Obtained from family/filed  *Pt is chronic/ [id number] [facility] [address]   Education: Extensive education with family/spouse   Primary Nurse Rpt Pre: Angel De León RN   Primary Nurse Rpt Post: Angel De León RN     Comments / Plan:    Patient orders,notes, labs and code status reviewed. Consent obtained from family/filed. CVVHD initiated as per Dr. Otilia Max order. [id number] primed and tested. Lines are visible and secure with blood warmer attached to return line and set at 37*C. Systemic gtt infusing, Impella in progress.    Education pre/post with family as well at primary RN, Yue Schneider,

## 2022-06-07 NOTE — PROGRESS NOTES
[facility] ICU Progress Note    Admit Date: 2022    Procedure:  Procedure(s):  LEFT HEART CATH / CORONARY ANGIOGRAPHY  LEFT VENTRICULOGRAPHY  ULTRASOUND GUIDED VASCULAR ACCESS  PERCUTANEOUS CORONARY INTERVENTION  ANGIOPLASTY CORONARY  ATHERECTOMY CORONARY  FRACTIONAL FLOW RESERVE  IMPELLA INSERTION  INSERT STENT GUERITA CORONARY  ECMO CATH LAB        Subjective:   Pt seen with Dr. Ashlie Oliveros. Pt intubated and sedated on ECMO flowing 1.8lpm FiO2 40% and sweep at, Impella @ P7, 3.2lpm flow. On epi at 1. Objective:   Vitals: SBP 90/68 (77)  Blood pressure 110/89, pulse 82, temperature 97.8 °F (36.6 °C), resp. rate 10, height 5' 5\" (1.651 m), weight 127 lb 13.9 oz (58 kg), last menstrual period 09/15/2013, SpO2 100 %. Temp (24hrs), Av.9 °F (36.6 °C), Min:97.8 °F (36.6 °C), Max:98.3 °F (36.8 °C)    EKG/Rhythm:  NSR 70-80s    Ventilator:  Ventilator Volumes  Vt Set (ml): 220 ml (22 0400)  Vt Exhaled (Machine Breath) (ml): 215 ml (22 0400)  Vt Spont (ml): 497 ml (22 1133)  Ve Observed (l/min): 2.2 l/min (22 0400)    Oxygen Therapy:  Oxygen Therapy  O2 Sat (%): 100 % (22 0700)  Pulse via Oximetry: 82 beats per minute (22 0700)  O2 Device: Endotracheal tube (22)  Skin Assessment: Clean, dry, & intact (22)  Skin Protection for O2 Device: N/A (22)  O2 Flow Rate (L/min): 2 l/min (22 1106)  FIO2 (%): 40 % (22)    CXR:    CXR Results  (Last 48 hours)               22 0444  XR CHEST PORT Final result    Impression:   impression: No definite changes. Narrative:  Clinical indication: Hypoxia. Portable AP semierect view of the chest obtained, comparison . ET tube is just above the nanci. Central line in place. Bilateral pleural   effusion once again seen.                  Admission Weight: Last Weight   Weight: 140 lb (63.5 kg) Weight: 127 lb 13.9 oz (58 kg)     Intake / Output / Drain:  Current Shift: 701 -  1900  In: 16.8 [I.V.:16.8]  Out: -   Last 24 hrs.:     Intake/Output Summary (Last 24 hours) at 2022 0951  Last data filed at 2022 0730  Gross per 24 hour   Intake 1320.14 ml   Output 600 ml   Net 720.14 ml       EXAM:  General:   Intubated and sedated on ECMO and Impella                                 Lungs:   Coarse to auscultation bilaterally. Heart:  Regular rate and rhythm, S1, S2 normal, no murmur, click, rub or gallop. Abdomen:   Soft, non-tender. Bowel sounds hypoactive. No masses,  No organomegaly. Extremities:  No edema. + pedal pulses bilat to doppler   Neurologic:  sedated. Labs:   Recent Labs     22  0514 22  0407 22  2347   WBC  --  9.5  --    HGB  --  8.4*  --    HCT  --  24.7*  --    PLT  --  53*  --    NA  --  137  --    K  --  3.5  --    BUN  --  25*  --    CREA  --  3.75*  --    GLU  --  137*  --    GLUCPOC 143*  --    < >    < > = values in this interval not displayed. Assessment:     Active Problems:    Hyperkalemia (2022)      Pulmonary edema (2022)      NSTEMI (non-ST elevated myocardial infarction) ([facility] [address] (2022)      ESRD (end stage renal disease) on dialysis ([facility] [address] (2022)      Biventricular heart failure ([facility] [address] (6/3/2022)      Palliative care by specialist (6/3/2022)       Plan/Recommendations/Medical Decision Makin. VT, Cardiogenic shock: Emergent Impella and VA ECMO placement. On epi. Flowing 1.7 on 2700 rpm. Cont heparin gtt. No issues with ECMO circuit. Plan for decannulation this morning with vascular surgery. Impella at P8, 3.2 lpm flow. 2. NSTEMI, CAD s/p GUERITA RCA and Lcx: On brilinta, monitor platelets (PLT 34L). 3. Acute respiratory failure: intubated during code. On Prop. Vent 40%. On zosyn per ICU team  4. ESRD on HD: Tolerating HD. Will likely need to initiate CRRT following ECMO decannulatin per renal.   5. HTN: On metoprolol 25mg, clonidine 0.1mg BID PTA. Requiring epi. 6. HLD: on Lipitor 20mg PTA. 7. Hep C  8. T2DM: On Tresiba, insulin aspart PTA. On SSI  9. Chronic lower back pain: On robaxin/flexeril, Norco, tylenol, lidocaine patches. Cont fentanyl gtt   10. N/V/Diarrhea: on zofran and phenergan PTA. FMS in place. 11. Encephalopathy: Neuro following, EEG completed. No purposeful movements when sedation weaned. BP up with turning, will bite down during mouth care.  Cont to monitor, cont daily sedation wean to assess neuro status.       Dispo: per primary team. Plan for decannulation tomorrow with vascular surgery     Signed By: Vianney Vásquez PA-C

## 2022-06-07 NOTE — ANESTHESIA PROCEDURE NOTES
Arterial Line Placement    Start time: 6/7/2022 8:19 AM  End time: 6/7/2022 8:21 AM  Performed by: Nighat Concepcion DO  Authorized by: Nighat Concepcion DO     Pre-Procedure  Indications:  Arterial pressure monitoring and blood sampling  Preanesthetic Checklist: patient identified, risks and benefits discussed, anesthesia consent, site marked, patient being monitored, timeout performed and patient being monitored      Procedure:   Prep:  ChloraPrep  Seldinger Technique?: Yes    Orientation:  Left  Location:  Radial artery  Catheter size:  20 G  Number of attempts:  1    Assessment:   Post-procedure:  Line secured and sterile dressing applied  Patient Tolerance:  Patient tolerated the procedure well with no immediate complications  Comment:   Ultrasound guided, complicated.  Laterality used in event impella site moved to R axillary given current groin access ~ 3weeks old
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
## 2022-06-07 NOTE — ANESTHESIA POSTPROCEDURE EVALUATION
Post-Anesthesia Evaluation and Assessment    Patient: Steve Short MRN: [id number]  SSN: [id number]    YOB: 1965  Age: 64 y.o. Sex: female      I have evaluated the patient and they are stable in the ICU. Cardiovascular Function/Vital Signs  BP: 102/86  Pulse (Heart Rate): 122  O2 Sat (%): 100%  Resp Rate: 14  Temp: 36.9 °C (98.4 °F)  Temp Source: Oral    Patient is status post General anesthesia for Procedure(s):  REMOVAL OF EXTRACORPOREAL MEMBRANE OXYGENATION (ECMO). REPAIR OF RIGHT FEMORAL ARTERY. INTRAOPERATIVE LENNY DONE BY DR. Earline Lord. .    Nausea/Vomiting: None    Postoperative hydration reviewed and adequate. Pain:  Managed    Neurological Status:    Intubated and sedated     Pulmonary Status:   O2 Device: Ventilator (06/07/22 1333)   Intubated with adequate ventilation and oxygenation     Complications related to anesthesia: None    Post-anesthesia assessment completed. No concerns. Signout given to ICU nursing and ICU physician at time of transfer of care.       Signed By: Nadeem Pizano DO     June 7, 2022

## 2022-06-07 NOTE — PROGRESS NOTES
ECMO Management Note (CPT 18918)     Diagnosis: cardiogenic shock      Patient condition: critical       Blood flow:  1.8L last 24 hours     MAP: 70-80's     ABG: see most recent in epic     Anticoagulation: bival      PTT: 50     Cannula site (extremity): R CFA, R CFV     Other:  Tolerated low flow ecmo with Impella at p7.  Will proceed with decannulation this AM.

## 2022-06-08 NOTE — PROGRESS NOTES
Palliative Medicine  [phone]    Noted re-consult  We are still on this case but are working with the ICU team as to when we should step back in. Families goals are VERY clear, she remains a full code with the plan for full restorative care    We are available to participate in family meetings but at this stage are not taking point on any conversations with family as they need to hear the specifics from the specialists involved in her care.     Samra Novoa NP

## 2022-06-08 NOTE — PROGRESS NOTES
2000, Bedside and Verbal shift change report given to Michael Munoz RN (oncoming nurse) by Linda Fields RN (offgoing nurse). Report included the following information SBAR, Kardex, Intake/Output, MAR, Accordion, Recent Results, Med Rec Status and Cardiac Rhythm ST. Temp;  Afebrile  Neuro; nearly Responsive to pain, on Propofol titrated down to 20 from 35 raymon/kg/min, no follow command, no eyes contact, pupils sluggish > patient started to open her no movement in any limbs, GCS;  Eye; 2-4, verbal; 1 for ETT, motor; 1. Cough and grimace with mouth care and suction,   Reassessment;  Propofol up to 30 raymon as she started to cough and slight movement on her left leg, withdrawal to pain, at am seems she respond to voice as she tried to open her eyes, Fentanyl given 3 times overnight for increased HR up to 122-130, Propofol down to 20 raymon,     Respiratory; Sat 100% on Ventilator  PRVC mode, , RR 14/14, Peep 5, FiO2 30%, ETT 24 cm at lips, secretion; scant to small bloody tinged, coarse  Rhonchi lung sounds. Reassessment; No changes,     Cardiac; ST, 120 B/min, NIBP 124/92 mmHg, on EpiNephrine  at 1 raymon/min. Henrry  At 30 raymon/min, IBP at Left Fem; 122/74 mmHg, CVP 5-10 mmHg, Impella CP SA at Right Fem Ata at 80 cm> P7,    Reassessment; Henrry titrated off, Epi still at 1 raymon/min, -122   [id number]  Unable to Doppler pulses at left foot popliteal heard by doppler strong, right foot pedal pulse heard by doppler and weak, both feet cool to touch > NP updated > Albumin 5% 250 ml given. 2300  Pulses found now but still weak,         GI; NPO with OG tube ( 61 cm at lips) residual 90 ml in trickle feeding Nepro of 10 ml/hr, Abd semi soft with hypoactive bowel sound, Flexiseal present. Reassessment; Residual  ml Tan greenish with bloody stain >     Renal; ESRD on HD TTF; last UF 2 L on 06/04/2022.  Left upper arm AV Fistula thrill & Bruit present, CRRT 4K bath Zero factor,   Reassessment;  Bladder scan done 366 ml > straight cath done > 300 ml yellow greenish hazy     Intake/Output Summary (Last 24 hours) at 6/8/2022 9012  Last data filed at 6/8/2022 0700  Gross per 24 hour   Intake 2946.11 ml   Output 2918 ml   Net 28.11 ml         Skin; skin tear at upper mid chest 1 cm * 0.5 cm, old hyperpigmentation all over her body, right Leg Larger than Left one > dressing clean intact at right fem. Small spot blister at corner of CVC dressing,    Endo; SSI every 6 hours,   Lines; ETT, right IJ CVP,OGT, Left groin Impella . Code; Full. Lab; HGB 6.7, Platelet 45 > NP updated > 1 PRBC stat > consent for blood within 24 hrs from decannulation of ECMO,   DVT; Heparin via Impella >   PTT 47.7 seconds > Pharmacy notified according to Day shift nurse to change the concentration in purge fluid to 12.500 units/500 ml from 6.250 units >   PTT repeated at 4 am was  51.4 seconds that after 3 hours from new concentration purge fluid > Pharmacist updated > agreed to repeat PTT at 8 am and check with CCU team,   Plan; ventilator,  Imella management, pain and agitation management, follow lab tomorrow,      0700  Bedside and Verbal shift change report given to Narendra Guillen, [address] [facility] (oncoming nurse) by Alberto Madrigal RN (offgoing nurse).  Report included the following information SBAR, Kardex, Intake/Output, MAR, Accordion, Recent Results, Med Rec Status and Cardiac Rhythm ST.
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
## 2022-06-08 NOTE — PROGRESS NOTES
[facility]  [address]  [phone]  [email or web address]         Cardiology Progress Note      6/8/2022 12:58 PM    Admit Date: 5/31/2022    Admit Diagnosis:   NSTEMI (non-ST elevated myocardial infarction) ([facility] [address] [I21.4]  Pulmonary edema [J81.1]  ESRD (end stage renal disease) on dialysis ([facility] [address] [N18.6, Z99.2]  Hyperkalemia [E87.5]    Interval History/Subjective:     Manoj De Paz remains intubated. ECMO removed yesterday. On Impella, weaned down to P6. Seen by CTS and now ordered to stop epi gtt and propofol. On CRRT. HR up since epi d/c'd.     Visit Vitals  /76   Pulse (!) 122   Temp 98.5 °F (36.9 °C)   Resp 14   Ht 5' 5\" (1.651 m)   Wt 57.5 kg (126 lb 12.2 oz)   LMP 09/15/2013   SpO2 99%   BMI 21.09 kg/m²       Current Facility-Administered Medications   Medication Dose Route Frequency    heparin (porcine) 12,500 Units in dextrose 5% 500 mL (Impella Purge Solution)  1-30 mL/hr Other TITRATE    0.9% sodium chloride infusion 250 mL  250 mL IntraVENous PRN    heparin (porcine) 4,000 Units in 0.9% sodium chloride 1,000 mL Irrigation    PRN    white petrolatum-mineral oiL (SOOTHE NIGHT TIME) 80-20 % ophthalmic ointment   Both Eyes Q4H PRN    piperacillin-tazobactam (ZOSYN) 3.375 g in 0.9% sodium chloride (MBP/ADV) 100 mL MBP  3.375 g IntraVENous Q8H    fentaNYL citrate (PF) injection 25-50 mcg  25-50 mcg IntraVENous Q2H PRN    PHENYLephrine (SELVIN-SYNEPHRINE) 30 mg in 0.9% sodium chloride 250 mL infusion   mcg/min IntraVENous TITRATE    bicarbonate dialysis (PRISMASOL) BG K 4/Ca 2.5 5000 ml solution   Extracorporeal DIALYSIS CONTINUOUS    EPINEPHrine (ADRENALIN) 5 mg in 0.9% sodium chloride 250 mL infusion  0-10 mcg/min IntraVENous TITRATE    epoetin kourtney-epbx (RETACRIT) injection 6,000 Units  6,000 Units SubCUTAneous Q TUE, THU & SAT    alteplase (CATHFLO) 2 mg in dextrose 5% 50 mL impella purge solution  2 mg Other TITRATE    balsam peru-castor oiL (VENELEX) ointment   Topical BID    propofol (DIPRIVAN) 10 mg/mL infusion  0-50 mcg/kg/min IntraVENous TITRATE    aspirin chewable tablet 81 mg  81 mg Per G Tube DAILY    alteplase (CATHFLO) 1 mg in sterile water (preservative free) 1 mL injection  1 mg InterCATHeter PRN    bacitracin 500 unit/gram packet 1 Packet  1 Packet Topical PRN    sodium chloride (NS) flush 5-40 mL  5-40 mL IntraVENous Q8H    sodium chloride (NS) flush 5-40 mL  5-40 mL IntraVENous PRN    0.45% sodium chloride infusion  10 mL/hr IntraVENous CONTINUOUS    0.9% sodium chloride infusion  10 mL/hr IntraVENous CONTINUOUS    [Held by provider] oxyCODONE IR (ROXICODONE) tablet 10 mg  10 mg Oral Q4H PRN    ondansetron (ZOFRAN) injection 4 mg  4 mg IntraVENous Q4H PRN    albuterol (PROVENTIL VENTOLIN) nebulizer solution 2.5 mg  2.5 mg Nebulization Q4H PRN    midazolam (VERSED) injection 1 mg  1 mg IntraVENous Q1H PRN    chlorhexidine (PERIDEX) 0.12 % mouthwash 10 mL  10 mL Oral Q12H    bisacodyL (DULCOLAX) suppository 10 mg  10 mg Rectal DAILY PRN    [Held by provider] oxyCODONE IR (ROXICODONE) tablet 5 mg  5 mg Oral Q4H PRN    ticagrelor (BRILINTA) tablet 90 mg  90 mg Per NG tube Q12H    albumin human 5% (BUMINATE) solution 25 g  25 g IntraVENous Q4H PRN    insulin lispro (HUMALOG) injection   SubCUTAneous Q6H    glucose chewable tablet 16 g  4 Tablet Oral PRN    glucagon (GLUCAGEN) injection 1 mg  1 mg IntraMUSCular PRN    dextrose 10% infusion 0-250 mL  0-250 mL IntraVENous PRN    pantoprazole (PROTONIX) 40 mg in 0.9% sodium chloride 10 mL injection  40 mg IntraVENous DAILY     Facility-Administered Medications Ordered in Other Encounters   Medication Dose Route Frequency    sodium chloride (NS) flush 5-40 mL  5-40 mL IntraVENous Q8H    sodium chloride (NS) flush 5-40 mL  5-40 mL IntraVENous PRN    0.9% sodium chloride infusion 25 mL  25 mL IntraVENous PRN       Objective:      Physical Exam:  General: sedated. Heart:  tachycardia; normal S1/S2; no murmurs  Respiratory: intubated  Extremities:  Impella in left groin. Left arm with AVF. Bilateral LE warm to touch. Neuro: sedated. Data Review:   Recent Labs     06/08/22 0441 06/07/22 1305 06/07/22  0407   WBC 17.3* 18.4* 9.5   HGB 6.7* 9.5* 8.4*   HCT 19.1* 28.8* 24.7*   PLT 45* 58* 53*     Recent Labs     06/08/22 0441 06/07/22  2110 06/07/22  1803 06/07/22  1305 06/07/22  1305 06/07/22  0407 06/07/22  0407 06/06/22  1040 06/06/22  0356    138  --   --  137   < > 137   < > 135*   K 4.3 4.3  --   --  4.0   < > 3.5   < > 3.9    106  --   --  105   < > 103   < > 102   CO2 20* 21  --   --  22   < > 23   < > 20*   * 160*  --   --  165*   < > 137*   < > 201*   BUN 14 19  --   --  26*   < > 25*   < > 19   CREA 1.98* 2.64*  --   --  3.69*   < > 3.75*   < > 3.09*   CA 8.8 8.8  --   --  9.0   < > 9.4   < > 9.1   MG  --  2.2  --   --   --   --   --   --  1.9   PHOS 3.3 3.8 4.3   < > 4.7   < > 4.1   < > 4.1   ALB 2.9*  --   --   --  2.5*  --  2.5*  --  2.9*   TBILI 1.1*  --   --   --   --   --  0.9  --  0.8   ALT 17  --   --   --   --   --  25  --  27   INR  --   --   --   --  1.1  --   --   --   --     < > = values in this interval not displayed. Recent Labs     06/08/22 0441 06/07/22 0407 06/06/22  0356   * 314* 357*         Intake/Output Summary (Last 24 hours) at 6/8/2022 0848  Last data filed at 6/8/2022 0805  Gross per 24 hour   Intake 3254.36 ml   Output 2968 ml   Net 286.36 ml        Telemetry: sinus tachycardia, vr 120s bpm    Echocardiogram (6/1/22 pre-cath):       Left Ventricle: Reduced left ventricular systolic function with a visually estimated EF of 30 - 35%. Left ventricle is moderately dilated. Moderately increased wall thickness. Findings consistent with moderate concentric hypertrophy. Moderate global hypokinesis present. Normal diastolic function.   Right Ventricle: Reduced systolic function.    Mitral Valve: Severely thickened leaflet, at the posterior leaflet. Severely calcified leaflet, at the posterior leaflet. Annular calcification of the mitral valve. Mild regurgitation. Echocardiogram (6/1/22 post-arrest):   Left Ventricle: Severely reduced left ventricular systolic function with a visually estimated EF of 5 - 10%. Severe global hypokinesis present. Echocardiogram (6/3/22):   Left Ventricle: Severely reduced left ventricular systolic function with a visually estimated EF of less than 5%. Left ventricle size is normal. Normal wall thickness. Severe hypokinesis of the following segments: basal inferoseptal, mid anterior and mid inferoseptal. Akinesis of the following segments: basal anteroseptal, basal inferolateral, mid inferolateral, apical anterior, apical lateral, apical septal and apical inferior.   Right Ventricle: Severely reduced systolic function. Left heart cath (6/1/22): Dominance: Right  Left Main   The vessel exhibits minimal luminal irregularities. Left Anterior Descending   There is moderate disease. Left Circumflex   Mid Cx lesion, 90% stenosed. The lesion is calcified. Right Coronary Artery   Ost RCA to Mid RCA lesion, 70% stenosed. Mid RCA lesion, 70% stenosed. Intervention      Mid Cx lesion   Angioplasty   Supplies used: CATH BLLN DIL 1.5X15MM RX -- EUPHORA; CATH GUID .056IN 6FR 150CM -- GUIDELINER V3; CATH GUID COR EB30 6FR 100CM -- LAUNCHER; GDWIRE RUNTHROUGH 180CM N STRL --    Angioplasty   Supplies used: CATH GUID .056IN 6FR 150CM -- GUIDELINER V3; CATH GUID COR EB30 6FR 100CM -- LAUNCHER; [address] [id number] -- ; CATH BLLN DIL 1.5X15MM RX -- [address]   Atherectomy   Coronary lithotripsy was performed.  IVL cycles: 4. 4 CYCLES   Supplies used: CATH LITHOPLSTY 2.5X12MM SHOCKWAVE; CATH GUID COR EB30 6FR 100CM -- LAUNCHER; GDWIRE RUNTHROUGH 180CM N STRL --    Angioplasty   Supplies used: CATH BLLN DIL 1.5X15MM RX -- [address]; Linda Ing 180CM N STRL -- ; CATH GUID .056IN 6FR 150CM -- GUIDELINER V3; CATH GUID COR EB30 6FR 100CM -- LAUNCHER   Post-Intervention Lesion Assessment   The intervention was unsuccessful. The pre-interventional distal flow is normal (ELBRON 3). Post-intervention LEBRON flow is 3. There were no complications. Unable to advance stent despite shockwave. LEBRON flow 3 at the end. Held further intervention, may consider rotational atherectomy in future if needed. There is a 90% residual stenosis post intervention. Ost RCA to Mid RCA lesion   IVUS FFR OCT   FFR/iFR: iFR: 0.88. Supplies used: GUIDEWIRE SURG PRESSURE COMET II; CATHETER GUID 6FR L100CM DIA0.071IN NYL SHFT 3DRC W/O SIDE   Angioplasty   Supplies used: CATH BLLN DIL 2.0X12MM RX -- EUPHORA; CATHETER GUID 6FR L100CM DIA0.071IN NYL SHFT 3DRC W/O SIDE; GUIDEWIRE SURG PRESSURE COMET II   Stent   Supplies used: STENT COR 2.72P90SV RESOLUTE MARU RX GUERITA   Angioplasty (Also treats lesions: Mid RCA)   Supplies used: CATH BLLN DIL 2.5 X12MM RX -- EUPHORA   Stent   Supplies used: STENT COR 2.50X8MM RESOLUTE MARU RX GUERITA; CATHETER GUID 6FR L100CM DIA0.071IN NYL SHFT 3DRC W/O SIDE; GDWIRE RUNTHROUGH 180CM N STRL --    Angioplasty   Angioplasty was performed following stent deployment. Supplies used: STENT COR 2.50X8MM RESOLUTE MARU RX GUERITA; GDWIRE RUNTHROUGH 180CM N STRL -- ; CATHETER GUID 6FR L100CM DIA0.071IN NYL SHFT 3DRC W/O SIDE   Stent   Supplies used: STENT COR 2.00M84YT RESOLUTE MARU RX GUERITA   Angioplasty (Also treats lesions: Mid RCA)   Supplies used: CATH FRANTZ BLLN DIL 2.5X08MM -- NC EUPHORA   Post-Intervention Lesion Assessment   The intervention was successful. The pre-interventional distal flow is decreased (LEBRON 2). Post-intervention LEBRON flow is 3. There were no complications. There is a 0% residual stenosis post intervention. Mid RCA lesion   IVUS FFR OCT   FFR/iFR: iFR: 0.73.    Supplies used: GUIDEWIRE SURG PRESSURE COMET II; CATHETER GUID 6FR L100CM DIA0.071IN NYL SHFT 3DRC W/O SIDE   Angioplasty   Supplies used: CATH BLLN DIL 2.0X12MM RX -- EUPHORA; GUIDEWIRE SURG PRESSURE COMET II; CATHETER GUID 6FR L100CM DIA0.071IN NYL SHFT 3DRC W/O SIDE   Angioplasty (Also treats lesions: Ost RCA to Mid RCA)   Supplies used: CATH BLLN DIL 2.5 X12MM RX -- EUPHORA   Stent   Supplies used: STENT COR GUERITA 2.74W32JI -- GUERITA RESOLUTE MARU; GDWIRE RUNTHROUGH 180CM N STRL -- ; CATHETER GUID 6FR L100CM DIA0.071IN NYL SHFT 3DRC W/O SIDE   Stent   Supplies used: STENT COR GUERITA 2.35N77RN -- GUERITA RESOLUTE MARU; CATHETER GUID 6FR L100CM DIA0.071IN NYL SHFT 3DRC W/O SIDE; GDWIRE RUNTHROUGH 180CM N STRL --    Angioplasty (Also treats lesions: Ost RCA to Mid RCA)   Supplies used: CATH FRANTZ BLLN DIL 2.5X08MM -- NC EUPHORA   Post-Intervention Lesion Assessment   The intervention was successful. The pre-interventional distal flow is decreased (LEBRON 2). Post-intervention LEBRON flow is 3. There were no complications. There is a 0% residual stenosis post intervention. Assessment:     Active Problems:    Hyperkalemia (5/31/2022)      Pulmonary edema (5/31/2022)      NSTEMI (non-ST elevated myocardial infarction) ([address] (5/31/2022)      ESRD (end stage renal disease) on dialysis ([facility] [address] (5/31/2022)      Biventricular heart failure ([address] (6/3/2022)      Palliative care by specialist (6/3/2022)        Plan:     Pt remains critically ill. Off ECMO, Impella remains in place at P6. Discussed with RN. Continue to wean impella slowly, if tolerated. Lesly Marquez NP    Patient seen, examined by me personally. Plan discussed as detailed. Agree with note as outlined by NP with modifications as noted. My independent physical exam reveals : Physical Exam  Vitals and nursing note reviewed. Cardiovascular:      Rate and Rhythm: Normal rate and regular rhythm. Heart sounds: Normal heart sounds. Pulmonary:      on vent. Neurological:      sedated. No additional findings noted.  Agree with plan as outlined above with modifications as noted. D/W Dr. Rivas Betts. Remains critically ill with poor prognosis. Continue Impella. Wean off sedation to assess mental status. Off ECMO. Will follow.      Donya Donahue MD

## 2022-06-08 NOTE — PROGRESS NOTES
[facility] ICU Progress Note    Admit Date: 2022    Procedure:  Procedure(s):  LEFT HEART CATH / CORONARY ANGIOGRAPHY  LEFT VENTRICULOGRAPHY  ULTRASOUND GUIDED VASCULAR ACCESS  PERCUTANEOUS CORONARY INTERVENTION  ANGIOPLASTY CORONARY  ATHERECTOMY CORONARY  FRACTIONAL FLOW RESERVE  IMPELLA INSERTION  INSERT STENT GUERITA CORONARY  ECMO CATH LAB        Subjective:   Pt seen with Dr. Keith Jacobsen. OFF ECCMO, impella at P7, hgb 6.7 receiving a unit of RBC. Epi @1, Intubated with prop at 20. Scr down to 1.98. Vent 30%. Temp ~ 97.5     Objective:   Vitals: SBP 90/68 (77)  Blood pressure 101/76, pulse (!) 122, temperature 98.5 °F (36.9 °C), resp. rate 14, height 5' 5\" (1.651 m), weight 126 lb 12.2 oz (57.5 kg), last menstrual period 09/15/2013, SpO2 99 %. Temp (24hrs), Av.9 °F (37.2 °C), Min:98.4 °F (36.9 °C), Max:99.6 °F (37.6 °C)    EKG/Rhythm:  NSR 70-80s    Ventilator:  Ventilator Volumes  Vt Set (ml): 360 ml (22 0741)  Vt Exhaled (Machine Breath) (ml): 365 ml (22 0741)  Vt Spont (ml): 497 ml (22 1133)  Ve Observed (l/min): 5 l/min (22 0741)    Oxygen Therapy:  Oxygen Therapy  O2 Sat (%): 99 % (22 0830)  Pulse via Oximetry: 120 beats per minute (22 0830)  O2 Device: Endotracheal tube;Ventilator (22 0400)  Skin Assessment: Clean, dry, & intact (22 0736)  Skin Protection for O2 Device: N/A (22 0736)  O2 Flow Rate (L/min): 2 l/min (22 1106)  FIO2 (%): 30 % (22 0741)    CXR:    CXR Results  (Last 48 hours)               22 0436  XR CHEST PORT Final result    Impression:  impression: Stable congestive changes and support devices . Narrative:  Clinical indication: Pulmonary edema. Portable AP semiupright view of the chest is obtained, comparison 2022. Mild interstitial edema and bilateral pleural effusions once again demonstrated.    Double lumen catheter tip is in the right atrium, central line in the superior   vena cava, ET tube just above the nanci. NG tube in place unchanged. 06/07/22 1530  XR CHEST PORT Final result    Impression:  Interval double-lumen right IJ line placement terminating at   cavoatrial junction. Other findings as above. Narrative:  EXAM: XR CHEST PORT       INDICATION: Post right roya catheter       COMPARISON: 6/6/2022       FINDINGS: A portable AP radiograph of the chest was obtained at 1512 hours. There is interval placement of a double lumen right IJ line terminating at the   cavoatrial junction. Right IJ line is again shown terminating in the superior   vena cava, transesophageal tube in the stomach, endotracheal tube 1 cm proximal   to the nanci, and intra-aortic balloon pump in unchanged position. IVC catheter   is no longer shown. Small to moderate layering right pleural effusion is again   noted. There is no consolidation or pulmonary edema. No pneumothorax is shown. Cardiac and mediastinal contours are stable. Admission Weight: Last Weight   Weight: 140 lb (63.5 kg) Weight: 126 lb 12.2 oz (57.5 kg)     Intake / Output / Drain:  Current Shift: 06/08 0701 - 06/08 1900  In: 325   Out: 81.9   Last 24 hrs.:     Intake/Output Summary (Last 24 hours) at 6/8/2022 0942  Last data filed at 6/8/2022 0907  Gross per 24 hour   Intake 3254.36 ml   Output 2999.9 ml   Net 254.46 ml       EXAM:  General:   Intubated and sedated on  Impella                                 Lungs:   Coarse to auscultation bilaterally. Heart:  Regular rate and rhythm, S1, S2 normal, no murmur, click, rub or gallop. Abdomen:   Soft, non-tender. Bowel sounds hypoactive. No masses,  No organomegaly. Extremities:  No edema. + pedal pulses bilat to doppler   Neurologic:  sedated.       Labs:   Recent Labs     06/08/22  0517 06/08/22  0441 06/08/22  0002 06/07/22  1309 06/07/22  1305   WBC  --  17.3*  --    < > 18.4*   HGB  --  6.7*  --    < > 9.5*   HCT  --  19.1*  --    < > 28.8*   PLT  --  45*  --    < > 58*   NA  --  139  --    < > 137   K  --  4.3  --    < > 4.0   BUN  --  14  --    < > 26*   CREA  --  1.98*  --    < > 3.69*   GLU  --  168*  --    < > 165*   GLUCPOC 190*  --    < >   < >  --    INR  --   --   --   --  1.1    < > = values in this interval not displayed. Assessment:     Active Problems:    Hyperkalemia (2022)      Pulmonary edema (2022)      NSTEMI (non-ST elevated myocardial infarction) ([facility] [address] (2022)      ESRD (end stage renal disease) on dialysis ([facility] [address] (2022)      Biventricular heart failure ([facility] [address] (6/3/2022)      Palliative care by specialist (6/3/2022)       Plan/Recommendations/Medical Decision Makin. VT, Cardiogenic shock: Emergent Impella and VA ECMO placement. ECMO now off. Still on Impella at P7. On Epi @ 1 at this time. Flowing 3.6-3.7 on 3950 rpm. Cont heparin gtt. No issues with ECMO circuit. Plan for decannulation tomorrow with vascular surgery. ECMO flows to 1.75lpm and impella to P7.    2. NSTEMI, CAD s/p GUERITA RCA and Lcx: On brilinta. 3. Acute respiratory failure: intubated during code. On Prop and Fentanyl. Vent 30%. Wean prop today to assess neuro fx. on zosyn per ICU team  4. ESRD on HD: Tolerating HD. Nephro following. bladder scan Q shift. 5. HTN: On metoprolol 25mg, clonidine 0.1mg BID PTA. Controlled with sedation at this time. 6. HLD: on Lipitor 20mg PTA. 7. Hep C  8. T2DM: On Tresiba, insulin aspart PTA. On SSI  9. Chronic lower back pain: On robaxin/flexeril, Norco, tylenol, lidocaine patches. Cont fentanyl gtt   10. N/V/Diarrhea: on zofran and phenergan PTA. FMS in place. 11. Encephalopathy: Neuro following, EEG completed. No purposeful movements when sedation weaned. BP up with turning, will bite down during mouth care. Cont to monitor, cont daily sedation wean to assess neuro status.     12. Pallative care consult placed to discuss with family the plan of weaning off sedation and impella today to better assess neuro and hemodynamic function.      Dispo: per primary team. Plan for decannulation tomorrow with vascular surgery     Signed By: Chavo Dukes PA-C

## 2022-06-08 NOTE — PROGRESS NOTES
Nephrology Progress Note  Codey Calderon     [email or web address]  Phone - [phone]   Patient: Alfredito Brochabdiel    YOB: 1965        Date- 6/8/2022   Admit Date: 5/31/2022  CC: Follow up for  ESRD         IMPRESSION & PLAN:    ESRD on HD, [facility], TTS   PEA cardiac arrest x2   Cardiogenic shock   CAD   Biventricular failure   S/p  ECMO   S/p impella insertion   Pulmonary edema   Acute respiratory failure on mechanical ventilation   Ischemic left foot    PLAN-   Continue on CVVHD with 4K/3.0 ca and will keep at a factor of Zero.  D/w CCM   epogen for anemia   Daily labs and I/Os   Check Phos, replete if is less than 2     Subjective: Interval History:   Seen and examined. Remains on  pressor support, on MV, needing pressor support. S/P ECMO Decannulation 06/06/22     Objective:   Vitals:    06/08/22 1000 06/08/22 1015 06/08/22 1030 06/08/22 1119   BP: (!) 132/93  107/76    Pulse: (!) 131 (!) 128 (!) 122 (!) 120   Resp: 14 14 14 14   Temp:       TempSrc:       SpO2: 100% 99% 98% 100%   Weight:       Height:          06/07 0701 - 06/08 0700  In: 2946.1 [I.V.:2596.1]  Out: 0259 [Urine:300; Drains:150]  Last 3 Recorded Weights in this Encounter    06/06/22 0400 06/07/22 0400 06/08/22 0400   Weight: 52.8 kg (116 lb 6.5 oz) 58 kg (127 lb 13.9 oz) 57.5 kg (126 lb 12.2 oz)      Physical exam:    GEN: intubated  On vent  NECK-no mass, ET TUBE  RESP: clear b/l, no wheezing  CVS: RRR,S1,S2    ABDO: soft , non tender,  NEURO:Can't access due to patient's current condition   EXT:no more ecmo cannula      Chart reviewed. Pertinent Notes reviewed.      Data Review :  Recent Labs     06/08/22  0441 06/07/22  2110 06/07/22  1803 06/07/22  1305 06/07/22  1305 06/06/22  1040 06/06/22  0356    138  --   --  137   < > 135*   K 4.3 4.3  --   --  4.0   < > 3.9    106  --   --  105   < > 102   CO2 20* 21  --   --  22   < > 20*   BUN 14 19  --   --  26* < > 19   CREA 1.98* 2.64*  --   --  3.69*   < > 3.09*   * 160*  --   --  165*   < > 201*   CA 8.8 8.8  --   --  9.0   < > 9.1   MG  --  2.2  --   --   --   --  1.9   PHOS 3.3 3.8 4.3   < > 4.7   < > 4.1    < > = values in this interval not displayed. Recent Labs     06/08/22  0441 06/07/22  1305 06/07/22  0407   WBC 17.3* 18.4* 9.5   HGB 6.7* 9.5* 8.4*   HCT 19.1* 28.8* 24.7*   PLT 45* 58* 53*     No results for input(s): FE, TIBC, PSAT, FERR in the last 72 hours.    Medication list  reviewed  Current Facility-Administered Medications   Medication Dose Route Frequency    0.9% sodium chloride infusion 250 mL  250 mL IntraVENous PRN    metoclopramide HCl (REGLAN) injection 5 mg  5 mg IntraVENous Q12H    heparin 25,000 units in dextrose 500 mL infusion  1-30 mL/hr Other TITRATE    heparin (porcine) 4,000 Units in 0.9% sodium chloride 1,000 mL Irrigation    PRN    white petrolatum-mineral oiL (SOOTHE NIGHT TIME) 80-20 % ophthalmic ointment   Both Eyes Q4H PRN    fentaNYL citrate (PF) injection 25-50 mcg  25-50 mcg IntraVENous Q2H PRN    PHENYLephrine (SELVIN-SYNEPHRINE) 30 mg in 0.9% sodium chloride 250 mL infusion   mcg/min IntraVENous TITRATE    bicarbonate dialysis (PRISMASOL) BG K 4/Ca 2.5 5000 ml solution   Extracorporeal DIALYSIS CONTINUOUS    EPINEPHrine (ADRENALIN) 5 mg in 0.9% sodium chloride 250 mL infusion  0-10 mcg/min IntraVENous TITRATE    epoetin kourtney-epbx (RETACRIT) injection 6,000 Units  6,000 Units SubCUTAneous Q TUE, THU & SAT    alteplase (CATHFLO) 2 mg in dextrose 5% 50 mL impella purge solution  2 mg Other TITRATE    balsam peru-castor oiL (VENELEX) ointment   Topical BID    propofol (DIPRIVAN) 10 mg/mL infusion  0-50 mcg/kg/min IntraVENous TITRATE    aspirin chewable tablet 81 mg  81 mg Per G Tube DAILY    alteplase (CATHFLO) 1 mg in sterile water (preservative free) 1 mL injection  1 mg InterCATHeter PRN    bacitracin 500 unit/gram packet 1 Packet  1 Packet Topical PRN    sodium chloride (NS) flush 5-40 mL  5-40 mL IntraVENous Q8H    sodium chloride (NS) flush 5-40 mL  5-40 mL IntraVENous PRN    0.45% sodium chloride infusion  10 mL/hr IntraVENous CONTINUOUS    0.9% sodium chloride infusion  10 mL/hr IntraVENous CONTINUOUS    [Held by provider] oxyCODONE IR (ROXICODONE) tablet 10 mg  10 mg Oral Q4H PRN    ondansetron (ZOFRAN) injection 4 mg  4 mg IntraVENous Q4H PRN    albuterol (PROVENTIL VENTOLIN) nebulizer solution 2.5 mg  2.5 mg Nebulization Q4H PRN    midazolam (VERSED) injection 1 mg  1 mg IntraVENous Q1H PRN    chlorhexidine (PERIDEX) 0.12 % mouthwash 10 mL  10 mL Oral Q12H    bisacodyL (DULCOLAX) suppository 10 mg  10 mg Rectal DAILY PRN    [Held by provider] oxyCODONE IR (ROXICODONE) tablet 5 mg  5 mg Oral Q4H PRN    ticagrelor (BRILINTA) tablet 90 mg  90 mg Per NG tube Q12H    albumin human 5% (BUMINATE) solution 25 g  25 g IntraVENous Q4H PRN    insulin lispro (HUMALOG) injection   SubCUTAneous Q6H    glucose chewable tablet 16 g  4 Tablet Oral PRN    glucagon (GLUCAGEN) injection 1 mg  1 mg IntraMUSCular PRN    dextrose 10% infusion 0-250 mL  0-250 mL IntraVENous PRN    pantoprazole (PROTONIX) 40 mg in 0.9% sodium chloride 10 mL injection  40 mg IntraVENous DAILY     Facility-Administered Medications Ordered in Other Encounters   Medication Dose Route Frequency    sodium chloride (NS) flush 5-40 mL  5-40 mL IntraVENous Q8H    sodium chloride (NS) flush 5-40 mL  5-40 mL IntraVENous PRN    0.9% sodium chloride infusion 25 mL  25 mL IntraVENous PRN          Tori Aguayo MD              [facility]  [facility] / [facility]  [facility], [address]  Phone - [phone]               Fax - [phone]

## 2022-06-08 NOTE — PROGRESS NOTES
Transition of Care Plan:     RUR:24%     Disposition: To be Determined. .     Follow up appointments: To be done prior to discharge.     DME needed: To be determined.     Transportation at Discharge: To be determined.     Keys or means to access home:   Spouse has keys     IM Medicare Letter: To be given prior to discharge     Is patient a BCPI-A Bundle:  No                     If yes, was Bundle Letter given?: N/A     Is patient a [place] and connected with the [place]? No              If yes, was [place] transfer form completed and [facility] notified? N/A     Caregiver Contact:     Discharge Caregiver contacted prior to discharge? Caregiver to be contacted prior to discharge.     Care Conference needed?:   Not at this time. Patient continues to be monitored in the ccu. She is presently intubated and sedated. On CRRT. Will continue to monitor. Antonette Webster  RN BSN CRM        [phone]

## 2022-06-08 NOTE — PROGRESS NOTES
[id number]  Bedside report received. Surinder NASH at bedside. Discussed pt condition, labs, drips, events overnight and plan for today. Impella decreased to P6 from P7. Will wean as tolerated, leave epi @ 1mcg/min for now and replace volume as needed. 7776  Cardiac surgery rounds complete. Discussed plan with team.  Order received from Dr. Ana Paula Muhammad to stop epigtt and propofol. May use precedex if sedation needed once off propofol. Ok to keep impella at P6 for now. 1000  Interdisciplinary team rounds were held 6/8/2022 with the following team members:Care Management, Nursing, Nutrition, Pharmacy, Physician and Respiratory Therapy and the patient. Plan of care discussed. See clinical pathway and/or care plan for interventions and desired outcomes. Goals of the Day:  Assess neuro status as pt is now off sedation. Monitor BP off epi. Advance TF to 20ml. 200  Daughter (Phe) at bedside. Updated her on pt condition, new changes and plan for the day. 1200  Reassessment complete. Pt moves hands and feet spontaneously and opens eyes to voice. Epi gtt and propofol gtt remain off.    1305  Pt's  at bedside. Updated him on pt condition and plan. 1200 [facility] with Reddy NASH regarding plan for weaning. Plan to decrease impella to P5 at 1900, then team will reassess pt in AM.    410 [facility] with Dr. Azeem Santiago and Dr. Kasey Becerra regarding pt with increasing ectopy, multifocal PVC's. Order received to draw labs. [address] [facility] with Mecca Forman NP cardiology regarding pt with very frequent multifocal PVC's, couplets and triplets. Electrolytes WNL. No new orders at this time. Will start amio only if pt having runs of V-tach, not for frequent PVC's due to liver function risk. Continue to monitor for now. 1600  Reassessment complete.  at bedside. Pt able to open mouth to command during mouth care and squeeze both hands very lightly to command.   Pt only has slight movements of feet spontaneously    4639-1500  Pt continues to have very frequent ectopy and BP starting to drop due to frequency of ectopy. Family at bedside stimulating pt. Asked family to go to waiting room so pt could have bladder scan. Bladder scan revealed 51ml. EKG done. Paged Dr. Stephanie Gonzales to discuss intervention if BP continues to drop. CVP 0-1. Spoke with Dr. Nena Elaine as well and order received for 50ml 25% albumin. [address] with Dr. Stephanie Gonzales. Order received to maintain MAP >=60. Ok to give albumin for low CVP. No preference for vasoactive drip given at this time.   Ectopy expected due to weak heart per MD.    1900  Bedside report given to [facility]

## 2022-06-08 NOTE — DIALYSIS
CRRT / [phone]           Orders   Mode: CVVHD   Blood Flow Rate: 200 ml/min    Prismasol Dose: PBP: 0 ml/hr  Dialysate: 2,000 ml/hr   Prismasol Concentrate: 4K / 2.5Ca   Blood Warmer Temp: 37*C   Net Fluid Removal: 0 ml/hr            Metrics   BP: 121/75   HR: 125   Access Pressure: -80   Filter Pressure: 118   Return Pressure: 41   TMP: 16   Pressure Drop: 37            Access   Type & Location: [facility]. CVC: tegaderm dressing with biopatch C/D/I, dated 6/7/22.             Labs   HBsAg (Antigen) / date: Negative 6/1/22   HBsAb (Antibody) / date: Susceptible 6/1/22   Source: [employer]            Safety:   Consent obtained/signed: Verified   Education: Pt intubated/sedated   Primary Nurse Rpt: Juanpablo Mcfadden RN      Comments / Plan:       Patient, code status, labs, and orders verified. In at bedside to assess filter, [id number] running well with no indication for change at this time. Lines visible and connections secure with thermax blood warmer set at 37*C. Education & pre/post report to primary RN.

## 2022-06-09 NOTE — PROGRESS NOTES
1463  Cardiac surgery team at bedside. Updated on events overnight. Impella turned down to P3 by Dr. Bonita Iniguez. Order received to start dobutamine at [facility] and order placed for echo. If pt tolerates P3 today, will decrease to P2 tonight for possible explant tomorrow. May use nicardipine for hypertension with dobutamine  4506-0171  AM assessment complete. Pt opens eyes to voice and able to squeeze both hands and wiggle toes to command. Pt nods yes to pain. Fentanyl given. 9507-7555  Cardiology  NP on unit. Discussed with her concerns of pt's family. Echo tech at bedside and echo completed. Images reviewed with NP and Dr. Darya Taylor while on unit. Heart function appears greatly improved. Order received from Dr. Darya Taylor to turn impella down to P2. Discussed possible removal of impella either at bedside or in OR. Javier Matute NP notified of plan and will confer with Dr. Bonita Iniguez and Dr. Kenna Munoz. 36  Spoke with Dr. Darya Taylor via phone and decision made to remove impella at bedside using femstop after discussion with CTS and vascular. Order received to stop heparin gtt. 5  Family (daughter Phe and ) at bedside and updated by Dr. Darya Taylor. Discussed removal of impella. 1502  Arterial line disconnected from impella and stilette replaced. 364 [facility]  Dr. Darya Taylor at bedside and requested impella be turned to P0. Pump then pulled impella back through valve. Pt converted to bigeminy with drop in HR and BP. Epi increased to maintain BP per Dr. Darya Taylor. Epi up to 5mcg and rhythm improved to NSR 90's, but BP remains low. Epi increased to 7mcg with improvement in BP. 's-140  1510  Impella removed  3657-4724  Hand held pressure applied by Pancho COOPER, then femstop applied. Epi weaned back down to 5mcg slowly with stable BP due to   1545  Pulse present by doppler, femstop at 50mmhg  1555  Epi down to 3 and BP remains stable.   1600  Pulse present and femstop remains with light pressure  1606  Pt began having frequent PVC's. BP dropping. Epi increased  2349-7591  Ectopy increasing and BP continuing to drop. Epi up to 7mcg. Called Dr. Kasey Becerra to bedside. Henrry added to support BP. Dobutamine increased. Precedex increased and fentanyl given with no consistent improvement. Amio bolus given. BP continues to drop.   1630  Code blue called:  See code doc

## 2022-06-09 NOTE — PROGRESS NOTES
Glucose result was 61 > window was not fill completely, repeated was 164,     Results for Troy Fischer (MRN [id number]) as of 6/8/2022 23:42   Ref.  Range 6/8/2022 00:01 6/8/2022 00:02   GLUCOSE,FAST - POC Latest Ref Range: 65 - 117 mg/dL 61 (L) 164 (H)

## 2022-06-09 NOTE — PROGRESS NOTES
1630 responding to Code Blue secondary to pt. PEA arrest.  ACLS measures initiated. Pt. S/p removal of Impella. Already intubated . 1643 ROSC with trb586/97. . Albumin given *2.  1653 loss of pulse CPR resumed with CPR and drug therapies, family members present.  450 [id number]. Ongoing Code Blue measures in effect. 1711. ROSC  sbp 100/92. 149  1712 113/76 (99) 140 Henrry drip @ 75 mcg  1713 Levo drip adjusted 30 mcg 127/82 (96) 143 ST with Dobutamine at [facility].   See MD & Nursing notes Radha Cardozo RN)  and orders for plan of care

## 2022-06-09 NOTE — PROGRESS NOTES
With Dr Ismael De Jesus at pt bedside for impella removal.  MD pulled back impella and removed sheath from left femoral artery. This RN assumed manual pressure at site x 15 minutes. Then Femostop applied at 20 mm hg above SBP, 125 mm hg, x 3 minutes. Then femostop decreased to 80 mm hg x 5 minutes. Distal  Pedal pulses dopplerable. Femostop then down to 50 mm hg. No difficulty obtaining hemostasis. Site soft and dry. VSS. Pt kaleb well. Distal pulses cont to be dopplerable. Report off to bedside RN, Rufina French.

## 2022-06-09 NOTE — PROGRESS NOTES
[facility]  [address]  [phone]  www. [email or web address]         Cardiology Progress Note      6/9/2022 12:58 PM    Admit Date: 5/31/2022    Admit Diagnosis:   NSTEMI (non-ST elevated myocardial infarction) ([facility] [I21.4]  Pulmonary edema [J81.1]  ESRD (end stage renal disease) on dialysis ([facility] [N18.6, Z99.2]  Hyperkalemia [E87.5]    Interval History/Subjective:     Tyler Batista remains intubated. Making some neurological improvement -- tracking eye movements. CTS weaned Impella down to P3, off epi, on dobutamine. Echo done at bedside today. EF looks improved.     Visit Vitals  BP 91/70   Pulse 91   Temp 98.8 °F (37.1 °C)   Resp 25   Ht 5' 5\" (1.651 m)   Wt 57.5 kg (126 lb 12.2 oz)   LMP 09/15/2013   SpO2 97%   BMI 21.09 kg/m²       Current Facility-Administered Medications   Medication Dose Route Frequency    DOBUTamine (DOBUTREX) 500 MG/250 mL (2000 mcg/mL) in D5W infusion  0-10 mcg/kg/min IntraVENous TITRATE    niCARdipine (CARDENE) 25 mg in 0.9% sodium chloride 250 mL infusion  0-15 mg/hr IntraVENous TITRATE    ceFAZolin (ANCEF) 1 g in 0.9% sodium chloride (MBP/ADV) 50 mL MBP  1 g IntraVENous DAILY    heparin (porcine) 25,000 units in 0.45% saline 250 ml infusion  1-25 Units/kg/hr IntraVENous TITRATE    oxyCODONE IR (ROXICODONE) tablet 5 mg  5 mg Per G Tube Q6H    0.9% sodium chloride infusion 250 mL  250 mL IntraVENous PRN    metoclopramide HCl (REGLAN) injection 5 mg  5 mg IntraVENous Q12H    heparin (porcine) 12,500 Units in dextrose 5% 500 mL (Impella Purge Solution)  1-30 mL/hr Other TITRATE    dexmedeTOMidine in 0.9 % NaCl (PRECEDEX) 400 mcg/100 mL (4 mcg/mL) infusion soln  0.1-1.5 mcg/kg/hr IntraVENous TITRATE    heparin (porcine) 4,000 Units in 0.9% sodium chloride 1,000 mL Irrigation    PRN    white petrolatum-mineral oiL (SOOTHE NIGHT TIME) 80-20 % ophthalmic ointment   Both Eyes Q4H PRN    fentaNYL citrate (PF) injection 25-50 mcg  25-50 mcg IntraVENous Q2H PRN    bicarbonate dialysis (PRISMASOL) BG K 4/Ca 2.5 5000 ml solution   Extracorporeal DIALYSIS CONTINUOUS    EPINEPHrine (ADRENALIN) 5 mg in 0.9% sodium chloride 250 mL infusion  0-10 mcg/min IntraVENous TITRATE    epoetin kourtney-epbx (RETACRIT) injection 6,000 Units  6,000 Units SubCUTAneous Q TUE, THU & SAT    alteplase (CATHFLO) 2 mg in dextrose 5% 50 mL impella purge solution  2 mg Other TITRATE    balsam peru-castor oiL (VENELEX) ointment   Topical BID    aspirin chewable tablet 81 mg  81 mg Per G Tube DAILY    alteplase (CATHFLO) 1 mg in sterile water (preservative free) 1 mL injection  1 mg InterCATHeter PRN    bacitracin 500 unit/gram packet 1 Packet  1 Packet Topical PRN    0.45% sodium chloride infusion  10 mL/hr IntraVENous CONTINUOUS    0.9% sodium chloride infusion  10 mL/hr IntraVENous CONTINUOUS    [Held by provider] oxyCODONE IR (ROXICODONE) tablet 10 mg  10 mg Oral Q4H PRN    ondansetron (ZOFRAN) injection 4 mg  4 mg IntraVENous Q4H PRN    albuterol (PROVENTIL VENTOLIN) nebulizer solution 2.5 mg  2.5 mg Nebulization Q4H PRN    midazolam (VERSED) injection 1 mg  1 mg IntraVENous Q1H PRN    chlorhexidine (PERIDEX) 0.12 % mouthwash 10 mL  10 mL Oral Q12H    bisacodyL (DULCOLAX) suppository 10 mg  10 mg Rectal DAILY PRN    [Held by provider] oxyCODONE IR (ROXICODONE) tablet 5 mg  5 mg Oral Q4H PRN    ticagrelor (BRILINTA) tablet 90 mg  90 mg Per NG tube Q12H    albumin human 5% (BUMINATE) solution 25 g  25 g IntraVENous Q4H PRN    insulin lispro (HUMALOG) injection   SubCUTAneous Q6H    glucose chewable tablet 16 g  4 Tablet Oral PRN    glucagon (GLUCAGEN) injection 1 mg  1 mg IntraMUSCular PRN    dextrose 10% infusion 0-250 mL  0-250 mL IntraVENous PRN    pantoprazole (PROTONIX) 40 mg in 0.9% sodium chloride 10 mL injection  40 mg IntraVENous DAILY     Facility-Administered Medications Ordered in Other Encounters   Medication Dose Route Frequency    sodium chloride (NS) flush 5-40 mL  5-40 mL IntraVENous Q8H    sodium chloride (NS) flush 5-40 mL  5-40 mL IntraVENous PRN    0.9% sodium chloride infusion 25 mL  25 mL IntraVENous PRN       Objective:      Physical Exam:  General: moves eyes to verbal commands  Heart:  tachycardia; normal S1/S2; no murmurs  Respiratory: intubated  Extremities:  Impella in left groin. Left arm with AVF. Bilateral LE warm to touch. Neuro: sedated. Data Review:   Recent Labs     06/09/22 0214 06/08/22 1343 06/08/22  0441   WBC 15.8* 16.5* 17.3*   HGB 7.7* 8.5* 6.7*   HCT 22.1* 24.7* 19.1*   PLT 55* 45* 45*     Recent Labs     06/09/22 0214 06/08/22  1343 06/08/22  0441 06/07/22  2110 06/07/22  1803 06/07/22  1305 06/07/22  0407    138 139 138  --  137   < >   K 3.8 4.1 4.3 4.3  --  4.0   < >    105 105 106  --  105   < >   CO2 22 21 20* 21  --  22   < >   * 191* 168* 160*  --  165*   < >   BUN 10 12 14 19  --  26*   < >   CREA 1.20* 1.51* 1.98* 2.64*  --  3.69*   < >   CA 8.8 9.1 8.8 8.8  --  9.0   < >   MG  --  2.4  --  2.2  --   --   --    PHOS 1.4* 2.3* 3.3 3.8   < > 4.7   < >   ALB 3.0* 2.8* 2.9*  --   --  2.5*   < >   TBILI 0.9 0.9 1.1*  --   --   --    < >   ALT 15 16 17  --   --   --    < >   INR  --   --   --   --   --  1.1  --     < > = values in this interval not displayed. Recent Labs     06/08/22 0441 06/07/22  0407   * 314*         Intake/Output Summary (Last 24 hours) at 6/9/2022 1031  Last data filed at 6/9/2022 0919  Gross per 24 hour   Intake 1664.11 ml   Output 1579.2 ml   Net 84.91 ml        Telemetry: sinus tachycardia, vr 120s bpm    Echocardiogram (6/1/22 pre-cath):       Left Ventricle: Reduced left ventricular systolic function with a visually estimated EF of 30 - 35%. Left ventricle is moderately dilated. Moderately increased wall thickness. Findings consistent with moderate concentric hypertrophy. Moderate global hypokinesis present.  Normal diastolic function.   Right Ventricle: Reduced systolic function.   Mitral Valve: Severely thickened leaflet, at the posterior leaflet. Severely calcified leaflet, at the posterior leaflet. Annular calcification of the mitral valve. Mild regurgitation. Echocardiogram (6/1/22 post-arrest):   Left Ventricle: Severely reduced left ventricular systolic function with a visually estimated EF of 5 - 10%. Severe global hypokinesis present. Echocardiogram (6/3/22):   Left Ventricle: Severely reduced left ventricular systolic function with a visually estimated EF of less than 5%. Left ventricle size is normal. Normal wall thickness. Severe hypokinesis of the following segments: basal inferoseptal, mid anterior and mid inferoseptal. Akinesis of the following segments: basal anteroseptal, basal inferolateral, mid inferolateral, apical anterior, apical lateral, apical septal and apical inferior.   Right Ventricle: Severely reduced systolic function. Left heart cath (6/1/22): Dominance: Right  Left Main   The vessel exhibits minimal luminal irregularities. Left Anterior Descending   There is moderate disease. Left Circumflex   Mid Cx lesion, 90% stenosed. The lesion is calcified. Right Coronary Artery   Ost RCA to Mid RCA lesion, 70% stenosed. Mid RCA lesion, 70% stenosed. Intervention      Mid Cx lesion   Angioplasty   Supplies used: CATH BLLN DIL 1.5X15MM RX -- EUPHORA; CATH GUID .056IN 6FR 150CM -- GUIDELINER V3; CATH GUID COR EB30 6FR 100CM -- LAUNCHER; GDWIRE RUNTHROUGH 180CM N STRL --    Angioplasty   Supplies used: CATH GUID .056IN 6FR 150CM -- GUIDELINER V3; CATH GUID COR EB30 6FR 100CM -- LAUNCHER; [address] [id number] -- ; CATH BLLN DIL 1.5X15MM RX -- [address]   Atherectomy   Coronary lithotripsy was performed.  IVL cycles: 4. 4 CYCLES   Supplies used: CATH LITHOPLSTY 2.5X12MM SHOCKWAVE; CATH GUID COR EB30 6FR 100CM -- LAUNCHER; GDWIRE RUNTHROUGH 180CM N STRL --    Angioplasty Supplies used: CATH BLLN DIL 1.5X15MM RX -- EUPHORA; GDWIRE RUNTHROUGH 180CM N STRL -- ; CATH GUID .056IN 6FR 150CM -- GUIDELINER V3; CATH GUID COR EB30 6FR 100CM -- LAUNCHER   Post-Intervention Lesion Assessment   The intervention was unsuccessful. The pre-interventional distal flow is normal (LEBRON 3). Post-intervention LEBRON flow is 3. There were no complications. Unable to advance stent despite shockwave. LEBRON flow 3 at the end. Held further intervention, may consider rotational atherectomy in future if needed. There is a 90% residual stenosis post intervention. Ost RCA to Mid RCA lesion   IVUS FFR OCT   FFR/iFR: iFR: 0.88. Supplies used: GUIDEWIRE SURG PRESSURE COMET II; CATHETER GUID 6FR L100CM DIA0.071IN NYL SHFT 3DRC W/O SIDE   Angioplasty   Supplies used: CATH BLLN DIL 2.0X12MM RX -- EUPHORA; CATHETER GUID 6FR L100CM DIA0.071IN NYL SHFT 3DRC W/O SIDE; GUIDEWIRE SURG PRESSURE COMET II   Stent   Supplies used: STENT COR 2.93J36KL RESOLUTE MARU RX GUERITA   Angioplasty (Also treats lesions: Mid RCA)   Supplies used: CATH BLLN DIL 2.5 X12MM RX -- EUPHORA   Stent   Supplies used: STENT COR 2.50X8MM RESOLUTE MARU RX GUERITA; CATHETER GUID 6FR L100CM DIA0.071IN NYL SHFT 3DRC W/O SIDE; GDWIRE RUNTHROUGH 180CM N STRL --    Angioplasty   Angioplasty was performed following stent deployment. Supplies used: STENT COR 2.50X8MM RESOLUTE MARU RX GUERITA; GDWIRE RUNTHROUGH 180CM N STRL -- ; CATHETER GUID 6FR L100CM DIA0.071IN NYL SHFT 3DRC W/O SIDE   Stent   Supplies used: STENT COR 2.69D98MF RESOLUTE MARU RX GUERITA   Angioplasty (Also treats lesions: Mid RCA)   Supplies used: CATH FRANTZ BLLN DIL 2.5X08MM -- NC EUPHORA   Post-Intervention Lesion Assessment   The intervention was successful. The pre-interventional distal flow is decreased (LEBRON 2). Post-intervention LEBRON flow is 3. There were no complications. There is a 0% residual stenosis post intervention. Mid RCA lesion   IVUS FFR OCT   FFR/iFR: iFR: 0.73.    Supplies used: GUIDEWIRE SURG PRESSURE COMET II; CATHETER GUID 6FR L100CM DIA0.071IN NYL SHFT 3DRC W/O SIDE   Angioplasty   Supplies used: CATH BLLN DIL 2.0X12MM RX -- EUPHORA; GUIDEWIRE SURG PRESSURE COMET II; CATHETER GUID 6FR L100CM DIA0.071IN NYL SHFT 3DRC W/O SIDE   Angioplasty (Also treats lesions: Ost RCA to Mid RCA)   Supplies used: CATH BLLN DIL 2.5 X12MM RX -- EUPHORA   Stent   Supplies used: STENT COR GUERITA 2.23G57UF -- GUERITA RESOLUTE MARU; GDWIRE RUNTHROUGH 180CM N STRL -- ; CATHETER GUID 6FR L100CM DIA0.071IN NYL SHFT 3DRC W/O SIDE   Stent   Supplies used: STENT COR GUERITA 2.97B99CO -- GUERITA RESOLUTE MARU; CATHETER GUID 6FR L100CM DIA0.071IN NYL SHFT 3DRC W/O SIDE; GDWIRE RUNTHROUGH 180CM N STRL --    Angioplasty (Also treats lesions: Ost RCA to Mid RCA)   Supplies used: CATH FRANTZ BLLN DIL 2.5X08MM -- NC EUPHORA   Post-Intervention Lesion Assessment   The intervention was successful. The pre-interventional distal flow is decreased (LEBRON 2). Post-intervention LEBRON flow is 3. There were no complications. There is a 0% residual stenosis post intervention. Assessment:     Active Problems:    Hyperkalemia (5/31/2022)      Pulmonary edema (5/31/2022)      NSTEMI (non-ST elevated myocardial infarction) ([facility] (5/31/2022)      ESRD (end stage renal disease) on dialysis [facility]) (5/31/2022)      Biventricular heart failure ([address] (6/3/2022)      Palliative care by specialist (6/3/2022)        Plan:     Bedside echo done today while on Impella at P3 -- EF looks nearly recovered. Will wean Impella down to P2. Continue dobutamine gtt, epi to keep MAP > 60  If continues to remain stable on P2, then will plan to remove Impella by CTS + vascular in OR. Discussed with KENNETH.     Rosy Otto, NP

## 2022-06-09 NOTE — PROGRESS NOTES
Comprehensive Nutrition Assessment    Type and Reason for Visit: Reassess    Nutrition Recommendations/Plan:   1. RD to change TF to standard/low fiber formula: Osmolite 1.2 @ 20mL/h, advance rate 10mL q 6h as tolerated to Goal of 50mL/h + Prosource daily + 50mL flush q 6h (provides 1480kcals/78gPro/189gCHO/1184mL)   2. Continue reglan, residuals have improved      Malnutrition Assessment:  Malnutrition Status:  Severe malnutrition (06/06/22 1401)    Context:  Acute illness     Findings of the 6 clinical characteristics of malnutrition:   Energy Intake:  50% or less of est energy requirements for 5 or more days  Weight Loss:  Greater than 7.5% over 3 months     Body Fat Loss: Moderate body fat loss, Triceps   Muscle Mass Loss: Moderate muscle mass loss, Clavicles (pectoralis & deltoids)  Fluid Accumulation:  Mild, Extremities,Generalized   Strength:  Not performed         Nutrition Assessment:  Chart reviewed, case discussed during CCU rounds. Pt remains intubated, needs re-estimated. She is more responsive today! Pt remains on Impella. TF rate at 20mL/h with decreasing residuals since reglan was started yesterday. CVVHD continues. Dobutamine at 3, other pressors are off. K 3.8 and phos 1.4, being repleted. Given CVVHD and low lytes will switch to standard low fiber TF formula as above per discussion with Dr. Megan Dela Cruz. BG in the 170's. Also per IDR discussion okay to advance TF towards goal, which will meet >100% kcal and protein needs. Nutrition Related Findings:    Meds: humalog, protonix, zosyn, Kphos; Drips: fentanyl, precedex, dobutamine. Edema: generalized, +2-RLE.   BM; FMS Wound Type: Skin tears    Current Nutrition Intake & Therapies:  Average Meal Intake: NPO     Current Tube Feeding (TF) Orders:   · Feeding Route: Orogastric  · Formula: Renal  · Schedule:Continuous    · Regimen: 20mL/h  · Additives/Modulars: None  · Water Flushes: 30mL q 4h  · Goal TF & Flush Orders Provides: 1480kcals/78gPro/189gCHO/1184mL      Anthropometric Measures:  Height: 5' 5\" (165.1 cm)  Ideal Body Weight (IBW): 125 lbs (57 kg)     Current Body Wt:  57.5 kg (126 lb 12.2 oz), 93.1 % IBW. Bed scale  Current BMI (kg/m2): 21.1  Usual Body Weight: 63.5 kg (140 lb)  % Weight Change (Calculated): -16.9                    BMI Category: Underweight (BMI less than 22) age over 72    Estimated Daily Nutrient Needs:  Energy Requirements Based On: Kcal/kg  Weight Used for Energy Requirements: Current  Energy (kcal/day): PSU 1413 (MSJ 1166)     Protein (g/day): 74g (1.4gPro/kg)  Method Used for Fluid Requirements: Other (comment)  Fluid (ml/day): per MD    Nutrition Diagnosis:   · Severe malnutrition related to inadequate protein-energy intake,altered GI function as evidenced by weight loss greater than or equal to 10% in 6 months,moderate muscle loss  Previous dx continues. Nutrition Interventions:   Food and/or Nutrient Delivery: Modify tube feeding  Nutrition Education/Counseling: No recommendations at this time  Coordination of Nutrition Care: Continue to monitor while inpatient,Interdisciplinary rounds       Goals:  Previous Goal Met: Progressing toward goal(s)  Goals: Tolerate nutrition support at goal rate,by next RD assessment,within 2 days (with residuals <500mL in 2-4 days)       Nutrition Monitoring and Evaluation:   Behavioral-Environmental Outcomes: None identified  Food/Nutrient Intake Outcomes: Enteral nutrition intake/tolerance,IVF intake  Physical Signs/Symptoms Outcomes: Biochemical data,Nutrition focused physical findings,Skin,Weight,GI status,Fluid status or edema,Hemodynamic status    Discharge Planning:     Too soon to determine    Arya Abernathy RD, [facility]  Contact: ext [phone]

## 2022-06-09 NOTE — DIALYSIS
CRRT / [phone]           Orders   Mode: CVVHD   Blood Flow Rate: 200 ml/min    Prismasol Dose: DFR: 2,000 ml/hr   Prismasol Concentrate: 4K / 2.5Ca   Blood Warmer Temp: 37*C   Net Fluid Removal: 0 ml/hr            Metrics   BP: 83/57   HR: 104   Access Pressure: -70   Filter Pressure: 116   Return Pressure: 43   TMP: 18   Pressure Drop: 34            Access   Type & Location: [facility]. CVC: tegaderm dressing with biopatch C/D/I, dated 6/7/22.             Labs   HBsAg (Antigen) / date: Negative 6/1/22   HBsAb (Antibody) / date: Susceptible 6/1/22   Source: [employer]            Safety:   Consent obtained/signed: Verified   Education: Pt intubated/sedated   Primary Nurse Rpt: Cristina Zhao RN      Comments / Plan:   Patient orders, notes, labs, code status and consent reviewed. Time out complete. CVVHD running well at this time, no indication for change. Lines are visible and secure with Thermax set at 37*C.  Education pre/post.

## 2022-06-09 NOTE — PROGRESS NOTES
responded to page for code blue in the CCU. Patient was unresponsive at this time, large family present.  encountered one of her daughters, known to this  from a previous visit. She was very tearful and distress and was being consoled by another family member. She shared her concerns and frustration about patient's current situation. Staff continued to offer care.  remained on the unit for support, assured family of prayer and spiritual care availability. Visited by: Pilar Oneal.    Paging Service: [phone]
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
## 2022-06-09 NOTE — PROGRESS NOTES
Reviewed chart prior to visit on CCU for spiritual assessment. No family/friends present. Patient is currently intubated. Her nurse, KENNETH Waite was in room offering care. She indicated family members visit on most days. Her daughter is expected to visit this morning.  will return later this morning to try to connect with daughter.        CLAYTON Melendez, [facility], Staff [address]    [address] [place] [address] Road Paging Service  [phone]

## 2022-06-09 NOTE — PROGRESS NOTES
I was called by nurse that patient has worsening hypotension and had to go up on epi gtt. I immediately went to bedside. Sent all the basic labs including CBC, BMP, mag, phos, CBC, INR and ionized calcium. She had frequent ectopy, obtain EEG. She suddenly developed episodes of NSVTs. Gave her amio bolus. Patient continued to have worsening hypotension with increase doses of epinephrine and dobutamine increased to 10mic. Patient then lost the pulse. CPR was immediately started. Cardiology was also called and came to bedside. Bedside point of care US showed severe LV dysfunction. CPR was continued for >30min family was on the bedside and insisted to continue resuscitation. Eventually achieved ROSC. Epi increased to 100mg and dobutamine at 10mcg. Please refer to code sheet for details of meds given. CRRT was placed on hold. Discussed the plan with family along with cardiology. Will continue aggressive care. Patient has extremely poor prognosis.

## 2022-06-09 NOTE — PROGRESS NOTES
[id number]  Patient HR in pulse ox down to 55, -122 with multi ectopies, NIBP 89/63 mmHg, IBP 79/56 mmHg, MAP 55 mmHg, Impella's performance increased to P-8 then back up later to P-7, Epi restarted at 2 raymon/min then back to 0.5 raymon/min as BP and HR respond fast, ectopies less, -130, BP up 105/61 mmHg, MAP 75 mmHg, removal turned to Zero, nod yes for pain > fentanyl 50 raymon PRN IV given, Discussed with Dr Gwen Gutierrez, agreed for PRN pain medicine as she more responding and follow command, to start Precedex as keep gaging and coughing, ok to give Albumin 25% if needed for Low CVP, Phe (her  Daughter) at bed side been updated,     2000, Bedside and Verbal shift change report given to Michael Munoz RN (oncoming nurse) by Kwaku Flores RN (offgoing nurse). Report included the following information SBAR, Kardex, Intake/Output, MAR, Accordion, Recent Results, Med Rec Status and Cardiac Rhythm ST. Temp;  100.2  Axillary room emp adjusted and extra blanket removed, temp ok at am,    Neuro; To start her on Precedex at 0.4 miic/kg/hr, follow command, no eyes contact, pupils sluggish, GCS;  Eye; 2-4, verbal; 1 for ETT, motor; 6. Cough and grimace with mouth care and suction, move feet and nearly hold hands, nearly nod for question,  Reassessment;   Precedex now at 0.3 raymon, still communicated, started to move her legs and arms more frequent as she not comfortable, nod for pain at back > Fentanyl given multi time reposition frequent as well,     Respiratory; Sat 100% on Ventilator  PRVC mode, , RR 14/14, Peep 5, FiO2 30%, ETT 25 cm at lips, secretion; scant to small bloody tinged, coarse  Rhonchi lung sounds.   Reassessment;  Still bloody scant to small with Tan secretion, weak cough, RR 14-17,     Cardiac; ST, 120 B/min, NIBP 101/76 mmHg, on EpiNephrine  at 0.5 raymon/min, IBP at Left Fem; 101/63 mmHg, CVP 5-8 mmHg, Impella CP SA at Right Fem Ata at 80 cm> P7,    Reassessment;  Epi titrated off, Impella at P-6 at 1 am, HR 105-118, PVC less frequent. GI; NPO with OG tube ( 62 cm at lips) residual 50 ml in trickle feeding Nepro of 20 ml/hr, Abd semi soft with hypoactive bowel sound, Flexiseal present. Reassessment; Residual 40-60 ml Leal,      Renal; ESRD on HD TTF; last UF 2 L on 06/04/2022. Left upper arm AV Fistula thrill & Bruit present, CRRT 4K bath Zero factor,   Reassessment; Intake/Output Summary (Last 24 hours) at 6/9/2022 0725  Last data filed at 6/9/2022 0700  Gross per 24 hour   Intake 2009.11 ml   Output 1451.5 ml   Net 557.61 ml         Skin; skin tear at upper mid chest 1 cm * 0.5 cm, old hyperpigmentation all over her body, right Leg Larger than Left one > dressing clean intact at right fem. Small spot blister at corner of CVC dressing,    Endo; SSI every 6 hours,   Lines; ETT, right IJ CVP,OGT, Left groin Impella . Code; Full. Lab;    DVT; Heparin via Impella >   PTT 82.7 seconds > Pharmacy notified>  change the concentration in purge fluid back to 12.500 units/500 ml from 56944 units >   PTT repeated at 2 am was  49.3 seconds > NP & Pharmacist updated > agreed to repeat PTT at 6 am and check with CCU team,   Plan; ventilator &  Imella management, pain and agitation management, follow lab tomorrow,      0700  Bedside and Verbal shift change report given to Stu Ramírez RN (oncoming nurse) by Elio Alberto RN (offgoing nurse).  Report included the following information SBAR, Kardex, Intake/Output, MAR, Accordion, Recent Results, Med Rec Status and Cardiac Rhythm ST.

## 2022-06-09 NOTE — DIALYSIS
CRRT / [phone]           Orders   Mode: CVVHD restarted @ 1030   Blood Flow Rate: 200 ml/min    Prismasol Dose: DFR: 2,000 ml/hr   Prismasol Concentrate: 4K / 2.5Ca   Blood Warmer Temp: 37*C   Net Fluid Removal: 0 ml/hr            Metrics   BP:  91/51   HR: 118   Access Pressure: -72   Filter Pressure: 133   Return Pressure: 52   TMP: 16   Pressure Drop: 39            Access   Type & Location: [facility]. CVC: tegaderm dressing with biopatch C/D/I, dated 06/07/22. Each catheter limb disinfected per p&p, caps removed, hubs disinfected per p&p, +aspiration/flush X2.                 Labs   HBsAg (Antigen) / date: Negative 6/1/22   HBsAb (Antibody) / date: Susceptible 6/1/22   Source: [facility]            Safety:   Consent obtained/signed: Verified   Education: Pt intubated/sedated   Primary Nurse Rpt:  Saeid Mackay RN      Comments / Plan:   Patient orders, notes, labs, code status and consent reviewed. Time out complete. CVVHD restarted due to visible clot in deaeration chamber. All possible blood returned by CRRT RN. Bee Morrell primed and tested. Lines are visible and secure with Thermax set at 37*C. Education pre/post with primary RN. RN confirmed with ICU MD that head CT is not needed at this time.  Possible procedure scheduled for OR in the am of 06/10.

## 2022-06-09 NOTE — PROGRESS NOTES
Nephrology Progress Note  Codey Calderon     www. [facility]  Phone - [phone]   Patient: Leo Coyne    YOB: 1965        Date- 6/9/2022   Admit Date: 5/31/2022  CC: Follow up for  ESRD         IMPRESSION & PLAN:    ESRD on HD, [facility], TTS   PEA cardiac arrest x2   Cardiogenic shock   CAD   Biventricular failure   S/p  ECMO   S/p impella insertion   Pulmonary edema   Acute respiratory failure on mechanical ventilation   Ischemic left foot    PLAN-   Continue on CVVHD with 4K/3.0 ca and will keep at a factor of Zero.  D/w CCM   epogen for anemia   Daily labs and I/Os   Replete phosphorus today     Subjective: Interval History:   Seen and examined. Remains on  pressor support, on MV. S/P ECMO Decannulation 06/06/22   De-escalating support of Impella. Objective:   Vitals:    06/09/22 1030 06/09/22 1045 06/09/22 1100 06/09/22 1109   BP: (!) 91/51  94/61    Pulse: (!) 118 (!) 123 (!) 122 (!) 120   Resp: 20 16 14 16   Temp:       TempSrc:       SpO2: 94% 95% 94% 100%   Weight:       Height:          06/08 0701 - 06/09 0700  In: 2009.1 [I.V.:974.1]  Out: 1451.5 [Drains:200]  Last 3 Recorded Weights in this Encounter    06/06/22 0400 06/07/22 0400 06/08/22 0400   Weight: 52.8 kg (116 lb 6.5 oz) 58 kg (127 lb 13.9 oz) 57.5 kg (126 lb 12.2 oz)      Physical exam:    GEN: intubated  On vent  NECK-no mass, ET TUBE  RESP: clear b/l, no wheezing  CVS: RRR,S1,S2    ABDO: soft , non tender,  NEURO:Can't access due to patient's current condition   EXT:no more ecmo cannula      Chart reviewed. Pertinent Notes reviewed.      Data Review :  Recent Labs     06/09/22  0214 06/08/22  1343 06/08/22  0441 06/07/22  2110 06/07/22  2110    138 139   < > 138   K 3.8 4.1 4.3   < > 4.3    105 105   < > 106   CO2 22 21 20*   < > 21   BUN 10 12 14   < > 19   CREA 1.20* 1.51* 1.98*   < > 2.64*   * 191* 168*   < > 160*   CA 8.8 9.1 8.8   < > 8.8   MG  --  2.4  --   --  2.2   PHOS 1.4* 2.3* 3.3   < > 3.8    < > = values in this interval not displayed. Recent Labs     06/09/22  0214 06/08/22  1343 06/08/22  0441   WBC 15.8* 16.5* 17.3*   HGB 7.7* 8.5* 6.7*   HCT 22.1* 24.7* 19.1*   PLT 55* 45* 45*     No results for input(s): FE, TIBC, PSAT, FERR in the last 72 hours.    Medication list  reviewed  Current Facility-Administered Medications   Medication Dose Route Frequency    DOBUTamine (DOBUTREX) 500 MG/250 mL (2000 mcg/mL) in D5W infusion  0-10 mcg/kg/min IntraVENous TITRATE    niCARdipine (CARDENE) 25 mg in 0.9% sodium chloride 250 mL infusion  0-15 mg/hr IntraVENous TITRATE    heparin (porcine) 25,000 units in 0.45% saline 250 ml infusion  1-25 Units/kg/hr IntraVENous TITRATE    oxyCODONE IR (ROXICODONE) tablet 5 mg  5 mg Per G Tube Q6H    0.9% sodium chloride infusion 250 mL  250 mL IntraVENous PRN    metoclopramide HCl (REGLAN) injection 5 mg  5 mg IntraVENous Q12H    heparin (porcine) 12,500 Units in dextrose 5% 500 mL (Impella Purge Solution)  1-30 mL/hr Other TITRATE    dexmedeTOMidine in 0.9 % NaCl (PRECEDEX) 400 mcg/100 mL (4 mcg/mL) infusion soln  0.1-1.5 mcg/kg/hr IntraVENous TITRATE    heparin (porcine) 4,000 Units in 0.9% sodium chloride 1,000 mL Irrigation    PRN    white petrolatum-mineral oiL (SOOTHE NIGHT TIME) 80-20 % ophthalmic ointment   Both Eyes Q4H PRN    fentaNYL citrate (PF) injection 25-50 mcg  25-50 mcg IntraVENous Q2H PRN    bicarbonate dialysis (PRISMASOL) BG K 4/Ca 2.5 5000 ml solution   Extracorporeal DIALYSIS CONTINUOUS    EPINEPHrine (ADRENALIN) 5 mg in 0.9% sodium chloride 250 mL infusion  0-10 mcg/min IntraVENous TITRATE    epoetin kourtney-epbx (RETACRIT) injection 6,000 Units  6,000 Units SubCUTAneous Q TUE, THU & SAT    alteplase (CATHFLO) 2 mg in dextrose 5% 50 mL impella purge solution  2 mg Other TITRATE    balsam peru-castor oiL (VENELEX) ointment   Topical BID    aspirin chewable tablet 81 mg 81 mg Per G Tube DAILY    alteplase (CATHFLO) 1 mg in sterile water (preservative free) 1 mL injection  1 mg InterCATHeter PRN    bacitracin 500 unit/gram packet 1 Packet  1 Packet Topical PRN    0.45% sodium chloride infusion  10 mL/hr IntraVENous CONTINUOUS    0.9% sodium chloride infusion  10 mL/hr IntraVENous CONTINUOUS    [Held by provider] oxyCODONE IR (ROXICODONE) tablet 10 mg  10 mg Oral Q4H PRN    ondansetron (ZOFRAN) injection 4 mg  4 mg IntraVENous Q4H PRN    albuterol (PROVENTIL VENTOLIN) nebulizer solution 2.5 mg  2.5 mg Nebulization Q4H PRN    midazolam (VERSED) injection 1 mg  1 mg IntraVENous Q1H PRN    chlorhexidine (PERIDEX) 0.12 % mouthwash 10 mL  10 mL Oral Q12H    bisacodyL (DULCOLAX) suppository 10 mg  10 mg Rectal DAILY PRN    [Held by provider] oxyCODONE IR (ROXICODONE) tablet 5 mg  5 mg Oral Q4H PRN    ticagrelor (BRILINTA) tablet 90 mg  90 mg Per NG tube Q12H    albumin human 5% (BUMINATE) solution 25 g  25 g IntraVENous Q4H PRN    insulin lispro (HUMALOG) injection   SubCUTAneous Q6H    glucose chewable tablet 16 g  4 Tablet Oral PRN    glucagon (GLUCAGEN) injection 1 mg  1 mg IntraMUSCular PRN    dextrose 10% infusion 0-250 mL  0-250 mL IntraVENous PRN    pantoprazole (PROTONIX) 40 mg in 0.9% sodium chloride 10 mL injection  40 mg IntraVENous DAILY     Facility-Administered Medications Ordered in Other Encounters   Medication Dose Route Frequency    sodium chloride (NS) flush 5-40 mL  5-40 mL IntraVENous Q8H    sodium chloride (NS) flush 5-40 mL  5-40 mL IntraVENous PRN    0.9% sodium chloride infusion 25 mL  25 mL IntraVENous PRN          Victor M La MD              [facility]  [facility], [address]  [place], [address]  Phone - [phone]               Fax - [phone]

## 2022-06-10 NOTE — DIALYSIS
CRRT / [phone]           Orders   Mode: CVVHD rinsed back prior to code blue event   Blood Flow Rate:    Prismasol Dose:    Prismasol Concentrate:    Blood Warmer Temp:    Net Fluid Removal:             Metrics   BP:    HR:    Access Pressure:    Filter Pressure:    Return Pressure:    TMP:    Pressure Drop:         Comments / Plan:   Patient orders, notes, labs, code status and consent reviewed. Time out complete. 1400 At bedside for filter change due to increasing FP. Poor patient presentation, all possible blood returned by CRRT RN. Soon thereafter, Code blue initiated. Old cassette discarded, new [id number] primed and tested. ROSC achieved. Dr. Karen Majano notified of above events. *As per /Dr. Karen Majano verbal order to restart CRRT once cleared by ICU MD.    CRRT restart cleared by Dr. Juan Cramer. Treatment initiated @ [id number]    Pt presented poorly requiring stat rinse back/CRRT stopped. Code Blue initiated/ROSC achieved by primary staff.   *Dr. Karen Majano notified/updated. We will not restart CRRT at this time due to instability. Claudia Argueta NP and Dr. Karen Majano are on call tonight. Please page Travon Leahy RN if needed.  .

## 2022-06-10 NOTE — PROGRESS NOTES
responded to the Code Blue Alert for Mrs. Fly Ferguson in [address] [place] [address]. Upon arrival, large family was gathered and anticipatorily grieving their loss. Patient was declared, family was in distress but accepted their loss.  provided bereavement support.      [id number] [facility] Bonnie Hobbs,Th.M, Brittany 60 Provider   Paging Service [phone])

## 2022-06-10 NOTE — PROGRESS NOTES
1 - Report received from Jesusita Che, [address] [place] [address] - Assessment completed. Patient eyes opening spontaneously and biting on tube/breathing over vent, but not following any commands. Restarted precedex at 0.4 (See MAR for continued titrations) and fentanyl 50 given PRN. Sinus tachycardia with frequent PVCs, Levo at 30, Henrry at 30, Epi at 100, dobutamine at 10 (See MAR for continued titrations). On Ventilator PRVC PEEP of 5, FiO2 30%, rate of 14. Right IJ Quad lumen and Right IJ roya patent and flushed. OGT at 62 and clamped, placement checked. Left groin femstop in place but manually deflating. 2030 - Spoke with patient's daughter [facility] about allowing 1 visitor to stay overnight for patient's current critical condition. [facility] reports that she will be staying the night with patient. Notified security that patient will be having Phe at bedside overnight. 2200 - Femstop removed, hemostasis achieved. 2310 - Epi drip at 40 to wean Henrry drip off next per Ayesha Martinez NP.    0000 - Reassessment completed. No changes noted. 0005 -- Henrry drip off. To wean levo drip next after patient gets established on CRRT per Ayesha Martinez NP.    1981 - CRRT restarted by [facility] per Ayesha Martinez NP.    4354 - Patient tolerating CRRT notified Ayesha Martinez NP and received orders to attempt decrease Epi drip to 30. Epi drip now at 35 and tolerating well.

## 2022-06-10 NOTE — PROGRESS NOTES
Nephrology Progress Note  Codey Calderon     www. [facility] [employer]  Phone - [phone]   Patient: Leo Coyne    YOB: 1965        Date- 6/10/2022   Admit Date: 5/31/2022  CC: Follow up for  ESRD         IMPRESSION & PLAN:    ESRD on HD, [facility], TTS   PEA cardiac arrest x3   Cardiogenic shock   CAD   Biventricular failure   S/p  ECMO   S/p impella insertion   Pulmonary edema   Acute respiratory failure on mechanical ventilation   Ischemic left foot    PLAN-   Continue on CVVHD with 4K/3.0 ca and will keep at a factor of 50 for now.  D/w CCM   epogen for anemia   Daily labs and I/Os  Carlos Enrique Eldridge prognosis     Subjective: Interval History:   Seen and examined. Remains on  pressor support, on MV. S/P ECMO Decannulation 06/06/22   Removal of Impella yesterday 6/9/2022  PEA cardiac arrest  after removing Impella    Objective:   Vitals:    06/10/22 0900 06/10/22 1000 06/10/22 1100 06/10/22 1137   BP: 110/65 117/73 112/76    Pulse: (!) 148 (!) 149 (!) 149 (!) 145   Resp: 14 16 14 14   Temp:       TempSrc:       SpO2: 98% 99% 98% 100%   Weight:       Height:          06/09 0701 - 06/10 0700  In: 3601.8 [I.V.:3152.1]  Out: 2469.7 [Drains:100]  Last 3 Recorded Weights in this Encounter    06/06/22 0400 06/07/22 0400 06/08/22 0400   Weight: 52.8 kg (116 lb 6.5 oz) 58 kg (127 lb 13.9 oz) 57.5 kg (126 lb 12.2 oz)      Physical exam:    GEN: intubated  On vent  NECK-no mass, ET TUBE  RESP: clear b/l, no wheezing  CVS: RRR,S1,S2    ABDO: soft , non tender,  NEURO:Can't access due to patient's current condition   EXT:no more ecmo cannula      Chart reviewed. Pertinent Notes reviewed.      Data Review :  Recent Labs     06/10/22  1008 06/10/22  0403 06/09/22  2146 06/09/22  1628 06/09/22  1628 06/09/22  0214 06/09/22  0214 06/08/22  1343 06/08/22  1343 06/08/22  0441 06/07/22  2110   * 148* 148*   < > 138   < > 139   < > 138   < > 138   K 3.6 3.6 3.8   < > 4.0   < > 3.8   < > 4.1   < > 4.3    104 108   < > 105   < > 105   < > 105   < > 106   CO2 29 28 22   < > 20*   < > 22   < > 21   < > 21   BUN 11 12 12   < > 9   < > 10   < > 12   < > 19   CREA 1.13* 1.41* 1.47*   < > 1.09*   < > 1.20*   < > 1.51*   < > 2.64*   * 195* 188*   < > 198*   < > 190*   < > 191*   < > 160*   CA 8.7 9.0 9.3   < > 8.7   < > 8.8   < > 9.1   < > 8.8   MG  --   --   --   --  2.6*  --   --   --  2.4  --  2.2   PHOS  --  3.1  --   --  1.3*  --  1.4*   < > 2.3*   < > 3.8    < > = values in this interval not displayed. Recent Labs     06/10/22  0403 06/09/22  2358 06/09/22  1628   WBC 30.8* 33.0* 15.6*   HGB 8.9* 8.9* 6.8*   HCT 26.6* 27.3* 20.0*   PLT 80* 69* 68*     No results for input(s): FE, TIBC, PSAT, FERR in the last 72 hours.    Medication list  reviewed  Current Facility-Administered Medications   Medication Dose Route Frequency    heparin (porcine) injection 5,000 Units  5,000 Units SubCUTAneous Q12H    amiodarone (CORDARONE) 375 mg/250 mL D5W infusion  1 mg/min IntraVENous TITRATE    DOBUTamine (DOBUTREX) 500 MG/250 mL (2000 mcg/mL) in D5W infusion  0-10 mcg/kg/min IntraVENous TITRATE    oxyCODONE IR (ROXICODONE) tablet 5 mg  5 mg Per G Tube Q6H    EPINEPHrine (ADRENALIN) 10 mg in 0.9% sodium chloride 250 mL infusion  0-30 mcg/min IntraVENous TITRATE    NOREPINephrine (LEVOPHED) 8 mg in 5% dextrose 250mL (32 mcg/mL) infusion  0.5-30 mcg/min IntraVENous TITRATE    PHENYLephrine (SELVIN-SYNEPHRINE) 30 mg in 0.9% sodium chloride 250 mL infusion   mcg/min IntraVENous TITRATE    metoclopramide HCl (REGLAN) injection 5 mg  5 mg IntraVENous Q12H    dexmedeTOMidine in 0.9 % NaCl (PRECEDEX) 400 mcg/100 mL (4 mcg/mL) infusion soln  0.1-1.5 mcg/kg/hr IntraVENous TITRATE    white petrolatum-mineral oiL (SOOTHE NIGHT TIME) 80-20 % ophthalmic ointment   Both Eyes Q4H PRN    fentaNYL citrate (PF) injection 25-50 mcg  25-50 mcg IntraVENous Q2H PRN    bicarbonate dialysis (PRISMASOL) BG K 4/Ca 2.5 5000 ml solution   Extracorporeal DIALYSIS CONTINUOUS    epoetin kourtney-epbx (RETACRIT) injection 6,000 Units  6,000 Units SubCUTAneous Q TUE, THU & SAT    alteplase (CATHFLO) 2 mg in dextrose 5% 50 mL impella purge solution  2 mg Other TITRATE    balsam peru-castor oiL (VENELEX) ointment   Topical BID    aspirin chewable tablet 81 mg  81 mg Per G Tube DAILY    alteplase (CATHFLO) 1 mg in sterile water (preservative free) 1 mL injection  1 mg InterCATHeter PRN    bacitracin 500 unit/gram packet 1 Packet  1 Packet Topical PRN    0.45% sodium chloride infusion  10 mL/hr IntraVENous CONTINUOUS    0.9% sodium chloride infusion  10 mL/hr IntraVENous CONTINUOUS    [Held by provider] oxyCODONE IR (ROXICODONE) tablet 10 mg  10 mg Oral Q4H PRN    ondansetron (ZOFRAN) injection 4 mg  4 mg IntraVENous Q4H PRN    albuterol (PROVENTIL VENTOLIN) nebulizer solution 2.5 mg  2.5 mg Nebulization Q4H PRN    chlorhexidine (PERIDEX) 0.12 % mouthwash 10 mL  10 mL Oral Q12H    bisacodyL (DULCOLAX) suppository 10 mg  10 mg Rectal DAILY PRN    [Held by provider] oxyCODONE IR (ROXICODONE) tablet 5 mg  5 mg Oral Q4H PRN    ticagrelor (BRILINTA) tablet 90 mg  90 mg Per NG tube Q12H    albumin human 5% (BUMINATE) solution 25 g  25 g IntraVENous Q4H PRN    insulin lispro (HUMALOG) injection   SubCUTAneous Q6H    glucose chewable tablet 16 g  4 Tablet Oral PRN    glucagon (GLUCAGEN) injection 1 mg  1 mg IntraMUSCular PRN    dextrose 10% infusion 0-250 mL  0-250 mL IntraVENous PRN    pantoprazole (PROTONIX) 40 mg in 0.9% sodium chloride 10 mL injection  40 mg IntraVENous DAILY     Facility-Administered Medications Ordered in Other Encounters   Medication Dose Route Frequency    sodium chloride (NS) flush 5-40 mL  5-40 mL IntraVENous Q8H    sodium chloride (NS) flush 5-40 mL  5-40 mL IntraVENous PRN    0.9% sodium chloride infusion 25 mL  25 mL IntraVENous PRN          Ann Padilla MD [facility]  [facility] / [facility]  [facility], [address]  [place], [address]  Phone - [phone]               Fax - [phone]

## 2022-06-10 NOTE — DEATH NOTE
Death Declaration Note        [facility]    Pt Name  Ed Finley date:  5/31/2022   Date and time of death:  6/10/22 @ 18:05   Room Number  2548/01    Medical Record Number  [id number] @ [facility]   Age  64 y.o. Date of Birth 1965   PCP Marisela Guillen NP   Attending physician Gena Booth      Patient seen and examined     Mental status   Unresponsive    Pupils Dilated and Fixed    Respiration Nil    Pulse  Absent     Heart Sounds  Absent    Family  Notified on bedside.                                       6/10/2022

## 2022-06-10 NOTE — PROGRESS NOTES
Patient with Code Blue again at 1726 Please see code blue documentation for medications and interventions. Patient with multiple episodes of ROSC (1737, [id number] ), but unable to maintain perfusion. Family at the bedside during code blue. Dr. Skip Winn and Dr. Juan Cramer present for code. 1800-during pulse check  requested resuscitation to stop. Family immediately to bedside to allow time to be with patient. Pastoral care, Nursing Supervisor, and Unit Director present at bedside. Patient without pulse and Code Blue terminated at 1800.

## 2022-06-10 NOTE — PROGRESS NOTES
Antimicrobial Stewardship    Indication:  Sepsis    Meropenem 1gm IV q8h adjusted to 1gm IV q12h for CRRT dosing regimen    Thank you,  Zeb Li, [facility]

## 2022-06-10 NOTE — PROGRESS NOTES
Cardiology Progress Note       JENNY WITT  [facility]  [address]  [phone]  [email or web address]     6/10/2022 10:46 AM    Admit Date: 5/31/2022    Admit Diagnosis: NSTEMI (non-ST elevated myocardial infarction) ([facility] [I21.4]  Pulmonary edema [J81.1]  ESRD (end stage renal disease) on dialysis ([facility] [N18.6, Z99.2]  Hyperkalemia [E87.5]    Subjective:     Stephan Felix  Is intubated and on pressors.  Receiving HD    Visit Vitals  /73   Pulse (!) 149   Temp 98.7 °F (37.1 °C)   Resp 16   Ht 5' 5\" (1.651 m)   Wt 126 lb 12.2 oz (57.5 kg)   LMP 09/15/2013   SpO2 99%   BMI 21.09 kg/m²     Current Facility-Administered Medications   Medication Dose Route Frequency    heparin (porcine) injection 5,000 Units  5,000 Units SubCUTAneous Q12H    amiodarone (CORDARONE) 375 mg/250 mL D5W infusion  1 mg/min IntraVENous TITRATE    DOBUTamine (DOBUTREX) 500 MG/250 mL (2000 mcg/mL) in D5W infusion  0-10 mcg/kg/min IntraVENous TITRATE    oxyCODONE IR (ROXICODONE) tablet 5 mg  5 mg Per G Tube Q6H    EPINEPHrine (ADRENALIN) 10 mg in 0.9% sodium chloride 250 mL infusion  0-30 mcg/min IntraVENous TITRATE    NOREPINephrine (LEVOPHED) 8 mg in 5% dextrose 250mL (32 mcg/mL) infusion  0.5-30 mcg/min IntraVENous TITRATE    PHENYLephrine (SELVIN-SYNEPHRINE) 30 mg in 0.9% sodium chloride 250 mL infusion   mcg/min IntraVENous TITRATE    metoclopramide HCl (REGLAN) injection 5 mg  5 mg IntraVENous Q12H    dexmedeTOMidine in 0.9 % NaCl (PRECEDEX) 400 mcg/100 mL (4 mcg/mL) infusion soln  0.1-1.5 mcg/kg/hr IntraVENous TITRATE    white petrolatum-mineral oiL (SOOTHE NIGHT TIME) 80-20 % ophthalmic ointment   Both Eyes Q4H PRN    fentaNYL citrate (PF) injection 25-50 mcg  25-50 mcg IntraVENous Q2H PRN    bicarbonate dialysis (PRISMASOL) BG K 4/Ca 2.5 5000 ml solution   Extracorporeal DIALYSIS CONTINUOUS    epoetin kourtney-epbx (RETACRIT) injection 6,000 Units  6,000 Units SubCUTAneous Q TUE, THU & SAT    alteplase (CATHFLO) 2 mg in dextrose 5% 50 mL impella purge solution  2 mg Other TITRATE    balsam peru-castor oiL (VENELEX) ointment   Topical BID    aspirin chewable tablet 81 mg  81 mg Per G Tube DAILY    alteplase (CATHFLO) 1 mg in sterile water (preservative free) 1 mL injection  1 mg InterCATHeter PRN    bacitracin 500 unit/gram packet 1 Packet  1 Packet Topical PRN    0.45% sodium chloride infusion  10 mL/hr IntraVENous CONTINUOUS    0.9% sodium chloride infusion  10 mL/hr IntraVENous CONTINUOUS    [Held by provider] oxyCODONE IR (ROXICODONE) tablet 10 mg  10 mg Oral Q4H PRN    ondansetron (ZOFRAN) injection 4 mg  4 mg IntraVENous Q4H PRN    albuterol (PROVENTIL VENTOLIN) nebulizer solution 2.5 mg  2.5 mg Nebulization Q4H PRN    chlorhexidine (PERIDEX) 0.12 % mouthwash 10 mL  10 mL Oral Q12H    bisacodyL (DULCOLAX) suppository 10 mg  10 mg Rectal DAILY PRN    [Held by provider] oxyCODONE IR (ROXICODONE) tablet 5 mg  5 mg Oral Q4H PRN    ticagrelor (BRILINTA) tablet 90 mg  90 mg Per NG tube Q12H    albumin human 5% (BUMINATE) solution 25 g  25 g IntraVENous Q4H PRN    insulin lispro (HUMALOG) injection   SubCUTAneous Q6H    glucose chewable tablet 16 g  4 Tablet Oral PRN    glucagon (GLUCAGEN) injection 1 mg  1 mg IntraMUSCular PRN    dextrose 10% infusion 0-250 mL  0-250 mL IntraVENous PRN    pantoprazole (PROTONIX) 40 mg in 0.9% sodium chloride 10 mL injection  40 mg IntraVENous DAILY     Facility-Administered Medications Ordered in Other Encounters   Medication Dose Route Frequency    sodium chloride (NS) flush 5-40 mL  5-40 mL IntraVENous Q8H    sodium chloride (NS) flush 5-40 mL  5-40 mL IntraVENous PRN    0.9% sodium chloride infusion 25 mL  25 mL IntraVENous PRN         Objective:      Visit Vitals  /73   Pulse (!) 149   Temp 98.7 °F (37.1 °C)   Resp 16   Ht 5' 5\" (1.651 m)   Wt 126 lb 12.2 oz (57.5 kg)   SpO2 99%   BMI 21.09 kg/m² Physical Exam:  General:  intubated           Neck: intubated   Lungs:   Vent bs   CV:  Regular rate and rhythm,no murmur, click, rub or gallop   Abdomen:   Soft, non-tender. bowel sounds normal. non-distended   Extremities: Left AVF   Skin: Skin color, texture, turgor normal. no acute rash or lesions       Musculoskeletal: No swelling or deformity                 Data Review:   Labs:    Recent Labs     06/10/22  0403 06/09/22  2358 06/09/22  1628   WBC 30.8* 33.0* 15.6*   HGB 8.9* 8.9* 6.8*   HCT 26.6* 27.3* 20.0*   PLT 80* 69* 68*     Recent Labs     06/10/22  0403 06/09/22  2146 06/09/22  1628 06/09/22  0214 06/09/22  0214 06/08/22  1343 06/08/22  1343 06/08/22  0441 06/07/22  2110 06/07/22  1803 06/07/22  1305   * 148* 138   < > 139   < > 138   < > 138  --  137   K 3.6 3.8 4.0   < > 3.8   < > 4.1   < > 4.3  --  4.0    108 105   < > 105   < > 105   < > 106  --  105   CO2 28 22 20*   < > 22   < > 21   < > 21  --  22   * 188* 198*   < > 190*   < > 191*   < > 160*  --  165*   BUN 12 12 9   < > 10   < > 12   < > 19  --  26*   CREA 1.41* 1.47* 1.09*   < > 1.20*   < > 1.51*   < > 2.64*  --  3.69*   CA 9.0 9.3 8.7   < > 8.8   < > 9.1   < > 8.8  --  9.0   MG  --   --  2.6*  --   --   --  2.4  --  2.2  --   --    PHOS 3.1  --  1.3*  --  1.4*   < > 2.3*   < > 3.8   < > 4.7   ALB 3.2* 3.2* 3.2*   < > 3.0*   < > 2.8*   < >  --   --  2.5*   TBILI 2.2* 1.8* 0.7   < > 0.9   < > 0.9   < >  --   --   --    ALT 34 20 12   < > 15   < > 16   < >  --   --   --    INR  --   --   --   --   --   --   --   --   --   --  1.1    < > = values in this interval not displayed.        Recent Labs     06/08/22  0441   *         Intake/Output Summary (Last 24 hours) at 6/10/2022 1046  Last data filed at 6/10/2022 1000  Gross per 24 hour   Intake 3640.78 ml   Output 2840.2 ml   Net 800.58 ml        Telemetry: s tach    Echo - lvef 10-15    Assessment:     Active Problems:    Hyperkalemia (5/31/2022)      Pulmonary edema (5/31/2022)      NSTEMI (non-ST elevated myocardial infarction) ([facility] [address] [address] (5/31/2022)      ESRD (end stage renal disease) on dialysis [facility]) (5/31/2022)      Biventricular heart failure ([facility] [address] (6/3/2022)      Palliative care by specialist (6/3/2022)        Plan:     Francine Solorio is in sinus tach on dobutamine. impella was removed yesterday. Echo this am - lvef 10-15%. Cont pressor support.      Hawa Bowden MD, [facility]    6/10/2022

## 2022-06-10 NOTE — PROGRESS NOTES
Pharmacy Automatic Renal Dosing Protocol - Antimicrobials    Indication for Antimicrobials: Sepsis Unknown Origin    Current Regimen of Each Antimicrobial:  Vancomycin 1500 mg x 1 then 500 mg Q12H  (Start Date 6/10; Day # 1)  Meropenem 1 g Q12H (Start Date 6/10)    Previous Antimicrobial Therapy:    Vancomycin Goal Level: 15-20 mcg/ml    Vancomycin Levels  Date Dose & Interval Measured (mcg/mL) Steady State (mcg/mL)                       Date & time of next level:     Significant Cultures:     Radiology / Imaging results: (X-ray, CT scan or MRI):       Labs:  Recent Labs     06/10/22  1008 06/10/22  0403 22  2358 22  2146 22  1628 22  0214 22  1343 22  1343   CREA 1.13* 1.41*  --  1.47* 1.09* 1.20*   < > 1.51*   BUN 11 12  --  12 9 10   < > 12   WBC  --  30.8* 33.0*  --  15.6* 15.8*  --  16.5*    < > = values in this interval not displayed. Temp (24hrs), Av.4 °F (36.9 °C), Min:97.8 °F (36.6 °C), Max:99.9 °F (37.7 °C)      Paralysis, amputations, malnutrition:   Creatinine Clearance (mL/min) or Dialysis: CVVH    Impression/Plan:  Antibiotics as above     Pharmacy will follow daily and adjust medications as appropriate for renal function and/or serum levels. Thank you,  Natanael [facility]      Recommended duration of therapy  [email or web address] [email or web address]/Pharmacy/Clinical%20Companion/Duration%20of%20ABX%20therapy. docx    Renal Dosing  [email or web address] [email or web address]/Pharmacy/Clinical%20Companion/Renal%[email or web address]. pdf

## 2022-06-10 NOTE — PROGRESS NOTES
Dr. Asael Yan in to see patient. [id number] Interdisciplinary team rounds were held 6/10/2022 with the following team members:Care Management, Nursing, Nutrition, Pharmacy, Physician and Respiratory Therapy and the child(chandler). Plan of care discussed. See clinical pathway and/or care plan for interventions and desired outcomes. Goals of the Day: Monitor labs, wean BP meds as tolerated, maintain CRRT.    0957 Dr. Destinee Nye in to see patient. 1040 Patient flipping axis on rhythm and loosing p waves at times and frequent ectopy. Discussed with Dr. Xiomara Huang, order placed for amiodarone drip. 1151 Patient with precedex drip infusing. Slowly weaning precedex drip, patient still at RASS of -5. Discussed with Dr. Xiomara Huang and ok to hold off on roxicodone for this dose. 1200 Rhythm flipping in and out of irregular rhythm. MAP < 65. Factor removed from CVVH, factor now 0. Titrating   Up on levo to achieve MAP of 65  1611 Patient in rhythm with rate 70-80. Patient without pulse. CPR started and code blue called. 1800 Family at bedside. Code stopped at this time per family request. Dr. Xiomara Huang at bedside. 1805 Patient pronounced by Dr. Xiomara Huang.

## 2022-06-10 NOTE — DIALYSIS
CRRT / [phone]           Orders   Mode: CVVHD restarted @ 0050   Blood Flow Rate: 200 ml/min    Prismasol Dose: DFR: 2,000 ml/hr   Prismasol Concentrate: 4K / 2.5Ca   Blood Warmer Temp: 37*C   Net Fluid Removal: 0 ml/hr            Metrics   BP: 111/79   HR: 139   Access Pressure: -65   Filter Pressure: 136   Return Pressure: 57   TMP: 46   Pressure Drop: 40            Access   Type & Location: [facility]. CVC: tegaderm dressing with biopatch C/D/I, dated 06/07/22. Each catheter limb disinfected per p&p, caps removed, hubs disinfected per p&p, +aspiration/flush X2.              Labs   HBsAg (Antigen) / date: Negative 6/1/22   HBsAb (Antibody) / date: Susceptible 6/1/22   Source: [facility]            Safety:   Consent obtained/signed: Verified   Time Out: 0045   Education: Pt intubated/sedated   Primary Nurse Rpt: Gabriela Salas, RN      Comments / Plan:   Patient orders, notes, labs, code status and consent reviewed. Time out complete.     CVVHD restarted following patient coding 6/9/22. Primary RN was able to rinse patient back PRIOR to code. New [id number] primed and tested. Lines are visible and secure with Thermax set at 37*C. Education pre/post with primary RN.

## 2022-06-10 NOTE — PROGRESS NOTES
responded to the Code Blue Alert for Mrs. Vernon Milton in [address] [place] [address]. Large family was upset and frustrated,  provided calming presence for them. The patient was resuscitated, but the follow-up intervention is ongoing.  had to leave due for another patient who requested EOL support.      [id number] [facility] Bonnie Hobbs,Th.M, Brittany 609 Provider   Paging Service [phone])

## 2022-06-10 NOTE — PROGRESS NOTES
Patient developed cardiac arrest again while were in the family meeting with patient's daughters. We rushed back to her room, CPR was ongoing when I reached on bedside. Patient was in PEA. Please refer to code sheet for more details. Family was on bedside. Few minutes later patient again developed cardiac arrest and CPR was done and had ongoing recurrent cardiac arrest. Eventually family decided to stop CPR and patient passed away.

## 2022-06-10 NOTE — PROGRESS NOTES
1611  Patient became suddenly decreased from 120-130 range to Bradycardia. No pulse felt or heard and CPR and Code Blue called. See Code Blue documentation.
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
## 2022-06-10 NOTE — DISCHARGE SUMMARY
Death Discharge Summary            Patient ID:  Marcos Christina y.o.  1965  [id number]    Admit Date: 5/31/2022    Attending Physician:  Tj Sherman    Chief Complaint:    Chief Complaint   Patient presents with    Abdominal Pain     pt arrives to ED via EMS with a cc of chest \"buring\", abdominal pain x 2 days; pt was seen at [facility] and discharged this am; pt states that she did not receive the help that she needed at [facility]       Problem List:    Patient Active Problem List    Diagnosis Date Noted    Biventricular heart failure ([address] 06/03/2022    Palliative care by specialist 06/03/2022    Hyperkalemia 05/31/2022    Pulmonary edema 05/31/2022    NSTEMI (non-ST elevated myocardial infarction) ([address] 05/31/2022    ESRD (end stage renal disease) on dialysis ([address] 05/31/2022    Ventricular tachycardia ([address] 01/16/2020    Acute encephalopathy 01/15/2020    Elevated troponin 12/10/2019    S/P arteriovenous (AV) fistula creation 12/10/2019    Hyperparathyroidism ([address] 12/10/2019    Anemia due to chronic kidney disease, on chronic dialysis ([address] 12/10/2019    Uncontrolled type II diabetes mellitus 12/09/2019    ESRD (end stage renal disease) ([address] 12/06/2019    CKD (chronic kidney disease) stage 5, GFR less than 15 ml/min ([address] 11/23/2018    Chest pain 05/10/2018    HTN (hypertension) 12/20/2012    Chronic lumbar radiculopathy 12/06/2012    Chronic pain syndrome 12/06/2012    Depression 04/22/2012    Nausea and vomiting 03/16/2012       Death Diagnosis:    Heart failure. Acute coronary syndrome. Hospital Course:    Ms. Vernon Milton is 64 F with numerous chronic medical problems including ESRD presented to [facility] ED 05/29 with generalized complaints and discharge from ED. Presented to ED [facility] ED 05/31 with CC of chest pain and noted to have elevated troponin I. Underwent cardiac cath 06/01 revealing diffuse coronary disease. Underwent GUERITA placement to RCA.  Procedure was complicated by prolonged cardiac arrest and she required intubation, VA ECMO and L sided Impella placement. Below is the summary of daily events. Hospital course/major results:  05/31 Admission as above  06/01 Echocardiogram: LVEF 30-35%  06/01 [facility] with GUERITA to RCA complicated by prolonged cardiac arrest. VA ECMO initiated. Impella placed  06/01 Echocardiogram: LVEF 5-10%  06/02 Intubated, ECMO, Impella. No major changed. Dialyzed. 2 liters removed  06/03 Echocardiogram: LVEF < 5%  06/03 Responds to voice and pain. Remains on ECMO. Impella @ P2. Remains intubated. UF 2000 cc removed  06/03 Detailed conference with family describing current critical illness and very poor prognosis. They requested input from advanced heart failure team to address whether any heroic interventions could be considered options  06/03 Advanced Heart Failure [facility] Medico) consultation: Not candidate for LVAD, not candidate for emergent/urgent heart/kidney transplant. Recommended continued ECMO support for next couple of days and reassess for evidence of recovery. 06/04 No major changes. Remains on VA ECMO 3.5 LPM, Impella P2. When ECMO flow held briefly, BP drops to essentially zero. Minimal pulsatile flow on arterial line. No palpable pulses. Pulses cannot be detected by Doppler US. HD performed   06/05 Remains intubated, on ECMO and Impella in place. Pulse pressure has improved significantly and was hypertensive requiring nicardipine. ECMO flow reduced and Impella reduced from P4 to P3.   06/06: Patient remains intubated and sedated. ON ECMO at 1.6 lit flow. impella support increased to P7 today. On 1mcg/min of epi gtt. 6/7: Currently down to 0.5mcg/min of epi, 1.5lit flow on ECMO, P7 on impella. No acute events overnight. 6/8: Patient is currently intubated and sedated. impella down to P6. On CRRT with factor of zero. 6/9: Patient remains intubated and sedated. Impella support down to P3, was started on dobutamine at 3mcg.  Sedated with precedex. 6/10: Patient again had cardiac arrest yesterday  and CPR was done for >30. Currently on 30mcg of epi gtt. 23mcg of levophed and 5mcg of dobutamine gtt. On CRRT with factor of 50ml/hr. Remains intubated, on precedex for sedation.      Later on 6/10 patient again developed cardiac arrest and continued to have recurrent PEA cardiac arrests, family was present on bedside. It was communicated with the famaily that her prognosis is very poor and she is unlikely to survive. Family eventually made decision to stop CPR. She was pronounced dead at 18:05.       Condition at discharge:         Claudette Alcon, MD  [phone]

## 2022-06-10 NOTE — PROGRESS NOTES
[facility] will no longer be following the patient, now off ECMO. Please re-consult with any emergent needs. Thank you.

## 2022-06-11 LAB
ATRIAL RATE: 127 BPM
CALCULATED P AXIS, ECG09: 67 DEGREES
CALCULATED R AXIS, ECG10: 81 DEGREES
CALCULATED T AXIS, ECG11: 44 DEGREES
DIAGNOSIS, 93000: NORMAL
P-R INTERVAL, ECG05: 118 MS
Q-T INTERVAL, ECG07: 312 MS
QRS DURATION, ECG06: 94 MS
QTC CALCULATION (BEZET), ECG08: 451 MS
VENTRICULAR RATE, ECG03: 126 BPM

## 2022-06-12 LAB
ABO + RH BLD: NORMAL
BLD PROD TYP BPU: NORMAL
BLD PROD TYP BPU: NORMAL
BLOOD GROUP ANTIBODIES SERPL: NORMAL
BPU ID: NORMAL
BPU ID: NORMAL
CROSSMATCH RESULT,%XM: NORMAL
CROSSMATCH RESULT,%XM: NORMAL
SPECIMEN EXP DATE BLD: NORMAL
STATUS OF UNIT,%ST: NORMAL
STATUS OF UNIT,%ST: NORMAL
UNIT DIVISION, %UDIV: 0
UNIT DIVISION, %UDIV: 0

## 2022-06-13 LAB
BASE DEFICIT BLDA-SCNC: 7.1 MMOL/L
HCO3 BLDA-SCNC: 19 MMOL/L (ref 22–26)
PCO2 BLDA: 38 MMHG (ref 35–45)
PH BLDA: 7.31 [PH] (ref 7.35–7.45)
PO2 BLDA: 69 MMHG (ref 80–100)
SAO2 % BLD: 92 % (ref 92–97)
SAO2% DEVICE SAO2% SENSOR NAME: ABNORMAL
SPECIMEN SITE: ABNORMAL

## 2022-06-16 LAB
BACTERIA SPEC CULT: NORMAL
SERVICE CMNT-IMP: NORMAL

## 2022-06-21 NOTE — OP NOTES
[address]  OPERATIVE REPORT    Name:  Francisco Morris  MR#:  [id number]  :  1965  ACCOUNT #:  [de-identified]  DATE OF SERVICE:  2022    PREOPERATIVE DIAGNOSIS:  Cardiogenic shock. POSTOPERATIVE DIAGNOSIS:  Cardiogenic shock. PROCEDURE PERFORMED:  Removal of right femoral artery and femoral vein extracorporeal  membrane oxygenation cannulas with right femoral endarterectomy and patch angioplasty. SURGEON:  Ayesha Delgado MD    ASSISTANT:  Zaida Jackson MD    ANESTHESIA:  General.    COMPLICATIONS:  None. SPECIMENS REMOVED:  None. IMPLANTS:  Bovine pericardial patch. ESTIMATED BLOOD LOSS:  1000 mL. DRAINS:  None. INDICATIONS:  The patient is a 51-year-old female with cardiogenic shock from a prolonged cardiac arrest requiring both Impella and ECMO support. The patient has a left femoral artery Impella device and has right femoral artery and vein ECMO cannulas. According to the cardiac surgery service, ECMO support is no longer required. She presents for removal of the right femoral ECMO cannulas. The Impella and pharmacologic support will be maintained. She is a chronic dialysis patient and remains intubated and hemodynamically unstable. PROCEDURE:  After informed consent was obtained from the patient's family, she was brought to the operating room, intubated and on pharmacologic, hemodynamic support and Impella support and ECMO. After establishing adequate general anesthesia, the right groin and the associated ECMO cannulas were prepped and draped as a sterile field. Vertical incision was made extending above and below the arterial cannula. Dissection was carried down to the right common femoral artery. Of note, the patient is thrombocytopenic and on dual antiplatelet therapy due to coronary stenting. She is also heparinized due to the ECMO and Impella circuit.     Significant diffuse oozing and bleeding were encountered throughout the dissection and repair. Once the femoral artery was dissected free from the inguinal ligament of the femoral bifurcation and the profunda, SFA, and proximal common femoral artery were controlled with vessel loops. The ECMO cannula was removed. The patient had significant calcified plaque in the right common femoral artery and there was a significant anterior arterial defect at the site of the cannula, which was had been in place for approximately 1 week. It was clear that primary closure would not be an option. The arterial defect was enlarged by extending the arteriotomy proximally and distally on the anterior surface of the artery. There was a sufficient amount of bulky calcified plaque such that an endarterectomy would be required. An endarterectomy plane was developed and continued circumferentially. The plaque was elevated above the inguinal ligament and removed and there was now good inflow. The artery was clamped. The plaque was elevated distally and the endarterectomy was continued into the profunda and the SFA. More distal control of the SFA was required as it was not a sufficient endpoint until about 2 cm down the SFA. Once satisfactory endpoint was obtained, a few 6-0 Prolene tacking sutures were required. All loose debris was removed. A longitudinal arteriotomy was closed using a bovine pericardial patch and running 5-0 Prolene. Flushing maneuvers were performed prior to closure and then flow was released to the leg. There was several points of bleeding along the suture line, which were controlled with 6-0 Prolene sutures. There was then diffuse oozing throughout the entire suture line with no surgical bleeding. Protamine was given. Topical hemostatic agents such as fibrillar and Tisseel were also used.   Much of the blood loss of the case occurred at this point following patch closure, where there was simply persistent diffuse oozing from the patch of suture line, which did not seem amenable to suture repair. Finally hemostasis was achieved with application of CoSeal and further manual pressure. The wound was then closed with multiple layers of Vicryl suture and skin staples. The venous cannula was removed and the skin was closed with a figure-of-eight 3-0 nylon suture and this was hemostatic. Dry dressing was applied and the patient was transferred back to the ICU in critical condition. All counts were correct.         Aníbal Barber MD      [id number]  D:  06/21/2022 8:30  T:  06/21/2022 10:16  JOB #:  [id number]

## 2022-10-07 NOTE — PROGRESS NOTES
Biotin - supplement for hair   Critical Care Note     Cardiac surgery was called for the evaluation and management of: cardiogenic shock, right ventricular failure, NYHA class IV acute systolic failure, acute liver injury     The critical nature of the patient's condition includes the following: The patient is at imminent risk of death. The patient is on 2 MCS devices. Critical care diagnoses that are being treated:    Cardiogenic shock / NYHA class IV acute systolic failure  Right ventricular failure  Acute liver injury      Going over recent events as the patient is new to me. HPI: Patient is a 64 woman suffering from cardiogenic shock, right ventricular failure, NYHA class IV acute systolic failure, and acute liver injury. The patient is at imminent risk of death. The patient is on 2 MCS devices.     Cardiogenic shock / NYHA class IV acute systolic failure  Patient tolerating MCS x 2 well  Not currently on pressors / inotropes  Does have some pulsatility on a-line  Lactic acid 0.6  LDH 1000  NT pro-BNP 35 K  Tolerated HD without having to go on pressors  Neuro status unclear - on precedex and fentanyl    Addendum:  RAMP test this am ECMO flow 1 L/min with Impella at P-7  Lost pulsatility and MAPS dropped about 20 mmHg (not on pressors or inotropes)     Addendum:  Reviewed TTE  Severe LV dysfunction with EF 5% or so  LV still looks a little distended  Increasing Impella to P-3  RV moving somewhat - TAPSE probably 0.5-1.0 cm  RV free wall moving on some views     Addendum:  Given the above I think we should take a shot at decannulation Monday  She failed her RAMP test likely due to RV failure which is in tmoderate range on TTE  Inotropes / pressors should improve her RV function     1) Would probably plan to take her to OR Monday for PA cath and LENNY  2) Start with Epi 10 / Vaso 0.04 / inhaled NO 40 ppm and see if her RV / LV improve  3) Impella to P-7  4) If she tolerates wean, she will need a right CFA cutdown and possible repair (would have vascular available)     Right ventricular failure  Continues on MCS x 2    Acute liver injury   LFTs 200's  Continues on MCS x 2    Sepsis  Pro-calcitonin 16  Vanc / Zosyn    Impella - flow 1-2 L @ P-3; HCO3- purge       Intake/Output Summary (Last 24 hours) at 6/4/2022 1042  Last data filed at 6/4/2022 3070  Gross per 24 hour   Intake 1610.76 ml   Output 3090 ml   Net -1479.24 ml      Visit Vitals  BP (!) 87/73   Pulse (!) 106   Temp 97.5 °F (36.4 °C)   Resp 11   Ht 5' 5\" (1.651 m)   Wt 118 lb 9.7 oz (53.8 kg)   LMP 09/15/2013   SpO2 100%   BMI 19.74 kg/m²      CXR Results  (Last 48 hours)               06/04/22 0444  XR CHEST PORT Final result    Impression:      Little interval change. Narrative:  EXAM:  XR CHEST PORT       INDICATION: Ventilator dependent       COMPARISON: Chest radiograph 6/3/2022       TECHNIQUE: Supine portable chest AP view       FINDINGS:        Stable support apparatus. Cardiomediastinal silhouette is within normal limits. Grossly unchanged hazy   bilateral mid to lower lung opacities, right worse on left. Grossly unchanged   moderate to large right and small-to-moderate left layering pleural effusions. No definite pneumothorax. 06/03/22 0512  XR CHEST PORT Final result    Impression:      1. ET tube is in satisfactory position. Unchanged positions of the ECMO catheter   and Impella device. 2. No significant change in mild pulmonary interstitial edema and suspected   small bilateral effusions. Narrative:  EXAM: XR CHEST PORT       INDICATION: postop heart       COMPARISON: Chest radiograph June 2, 2022       FINDINGS: A portable AP radiograph of the chest was obtained at 0459 hours. The   ET tube is in satisfactory position. Impella device and ECMO catheters are   unchanged in appearance. There is a RIGHT IJ catheter in place with the tip in   the lower SVC. The left-sided approach IJ catheter has been removed.   The heart   size is normal. There is persistent bibasilar atelectasis and small bilateral   effusions. No pneumothorax is seen. Mild pulmonary edema persists. Total critical care time - 135 minutes (CPT 74973, 99292 x 3)      I personally spent the above critical care time. This is time spent at this critically ill patient's bedside / unit / floor actively involved in patient care as well as the coordination of care. This does not include any procedural time which has been billed separately. This does not include any NP/PA patient care time. I had a face to face encounter with the patient, reviewed and interpreted patient data including clinical events, labs, images, vital signs, I/O's, and examined patient. I have discussed the case and the plan and management of the patient's care with the consulting services and bedside nurses. This patient has a high probability of imminent, clinically significant deterioration, which requires the highest level of preparedness to intervene urgently. I participated in the decision-making and personally managed or directed the management of life and organ supporting interventions to treat or prevent imminent deterioration. This patient has a critical illness or injury that is acutely impairing one or more vital organ systems such that there is a high probability of imminent or life threatening deterioration in the patient's condition. This patient requires high complexity medical decision making to assess, manipulate, and support vital organ system failure. After stabilization, this patient still requires management to prevent further life / organ threatening deterioration.

## 2023-08-13 NOTE — PROGRESS NOTES
Otolaryngology Progress Note  8/13/2023    S: No acute events overnight. Stable on room air without airway distress or stridor. Voice and cough improved. Per primary team, planning for family care conference 8/14 with possible ICD placement 8/15 under general anesthesia.     O: BP 97/67 (BP Location: Left arm, Cuff Size: Adult Regular)   Pulse 75   Temp 97.6  F (36.4  C) (Oral)   Resp 18   Ht 1.524 m (5')   Wt 48.3 kg (106 lb 8 oz)   SpO2 97%   BMI 20.80 kg/m     General: Alert and oriented x 3, No acute distress, voice is stronger today   HEENT: Neck is soft and flat without LAD, FOM and tongue are soft and not edematous.   Pulmonary: Breathing non-labored, no stridor, no accessory muscle use.    Intake/Output Summary (Last 24 hours) at 8/12/2023 1005  Last data filed at 8/12/2023 0800  Gross per 24 hour   Intake 650 ml   Output --   Net 650 ml     Labs:  BMP  Recent Labs   Lab 08/12/23  0741 08/12/23  0730 08/11/23  1148 08/11/23  0610 08/10/23  0655   NA  --  140 143 141 142   POTASSIUM  --  4.3 3.9 3.8 3.5   CHLORIDE  --  104 105 106 106   CUATE  --  9.6 9.3 9.0 8.7   CO2  --  25 25 26 26   BUN  --  11.7 7.0 7.3 5.8*   CR  --  0.58 0.67 0.66 0.67   * 110* 106* 97 96       CBC  Recent Labs   Lab 08/12/23  0730 08/11/23  1148 08/11/23  0610 08/10/23  0655   WBC 7.9 4.5 4.0 4.4   RBC 4.02 4.26 3.62* 3.49*   HGB 11.3* 12.1 10.2* 9.9*   HCT 34.7* 36.4 31.3* 30.1*   MCV 86 85 87 86   MCH 28.1 28.4 28.2 28.4   MCHC 32.6 33.2 32.6 32.9   RDW 12.4 12.4 12.3 12.5    224 199 181       INR  Recent Labs   Lab 08/11/23  0610 08/10/23  0655 08/09/23  1342 08/08/23  1015   INR 1.37* 1.13 1.06 1.19*         A/P: Estela Fry is a 32 year old otherwise healthy who experienced cardiac arrest with unknown cause requiring intubation from 8/5 to 8/7. She developed barking cough and shortness of breath on the morning of 8/11 with acute onset from previously stable state concerning for anaphylaxis. She improved after  [facility] ICU Progress Note    Admit Date: 2022    Procedure:  Procedure(s):  LEFT HEART CATH / CORONARY ANGIOGRAPHY  LEFT VENTRICULOGRAPHY  ULTRASOUND GUIDED VASCULAR ACCESS  PERCUTANEOUS CORONARY INTERVENTION  ANGIOPLASTY CORONARY  ATHERECTOMY CORONARY  FRACTIONAL FLOW RESERVE  IMPELLA INSERTION  INSERT STENT GUERITA CORONARY  ECMO CATH LAB        Subjective:   Pt seen with Dr. Ashley Murcia. OFF ECMO, impella at P6 this am. Impella up to P9 overnight, Epi weaned off. Impella taken to P3 by Dr. Ashley Murcia. Need to start on Dobutamine. On Vent 30%. Tmax 100.2. On no gtts at present. Objective:   Vitals: SBP 90/68 (77)  Blood pressure 91/70, pulse 91, temperature 98.8 °F (37.1 °C), resp. rate 25, height 5' 5\" (1.651 m), weight 126 lb 12.2 oz (57.5 kg), last menstrual period 09/15/2013, SpO2 97 %. Temp (24hrs), Av.5 °F (37.5 °C), Min:98.8 °F (37.1 °C), Max:100.2 °F (37.9 °C)    EKG/Rhythm: -120s. Ventilator:  Ventilator Volumes  Vt Set (ml): 360 ml (22)  Vt Exhaled (Machine Breath) (ml): 463 ml (22)  Vt Spont (ml): 497 ml (22 1133)  Ve Observed (l/min): 9.9 l/min (22 07)    Oxygen Therapy:  Oxygen Therapy  O2 Sat (%): 97 % (22 08)  Pulse via Oximetry: 69 beats per minute (22 08)  O2 Device: Endotracheal tube;Ventilator (22 0400)  Skin Assessment: Clean, dry, & intact (22)  Skin Protection for O2 Device: N/A (22)  O2 Flow Rate (L/min): 2 l/min (22 1106)  FIO2 (%): 30 % (22)    CXR:    CXR Results  (Last 48 hours)               22 0437  XR CHEST PORT Final result    Impression:  No change. Narrative:  Clinical indication: Hypoxia. Portable AP semierect view of the chest obtained, comparison . Bilateral   pleural effusion and support devices are stable findings. 22 0436  XR CHEST PORT Final result    Impression:  impression: Stable congestive changes and support devices . Narrative:  Clinical indication: Pulmonary edema. Portable AP semiupright view of the chest is obtained, comparison June 7, 2022. Mild interstitial edema and bilateral pleural effusions once again demonstrated. Double lumen catheter tip is in the right atrium, central line in the superior   vena cava, ET tube just above the nanci. NG tube in place unchanged. 06/07/22 1530  XR CHEST PORT Final result    Impression:  Interval double-lumen right IJ line placement terminating at   cavoatrial junction. Other findings as above. Narrative:  EXAM: XR CHEST PORT       INDICATION: Post right roya catheter       COMPARISON: 6/6/2022       FINDINGS: A portable AP radiograph of the chest was obtained at 1512 hours. There is interval placement of a double lumen right IJ line terminating at the   cavoatrial junction. Right IJ line is again shown terminating in the superior   vena cava, transesophageal tube in the stomach, endotracheal tube 1 cm proximal   to the nanci, and intra-aortic balloon pump in unchanged position. IVC catheter   is no longer shown. Small to moderate layering right pleural effusion is again   noted. There is no consolidation or pulmonary edema. No pneumothorax is shown. Cardiac and mediastinal contours are stable. Admission Weight: Last Weight   Weight: 140 lb (63.5 kg) Weight: 126 lb 12.2 oz (57.5 kg)     Intake / Output / Drain:  Current Shift: 06/09 0701 - 06/09 1900  In: -   Out: 110.3   Last 24 hrs.:     Intake/Output Summary (Last 24 hours) at 6/9/2022 0907  Last data filed at 6/9/2022 0804  Gross per 24 hour   Intake 1634.11 ml   Output 1479.9 ml   Net 154.21 ml       EXAM:  General:   Intubated and sedated                           Lungs:   Coarse to auscultation bilaterally. Heart:  Regular rate and rhythm, S1, S2 normal, no murmur, click, rub or gallop. Abdomen:   Soft, non-tender. Bowel sounds hypoactive. No masses,  No organomegaly. receiving racemic epinephrine, albuterol, decadron, and Benadryl and is currently stable on RA without shortness of breath but with continued soft voice and biphasic stridor with effort. ENT laryngoscopy evaluation revealed significant subglottic stenosis but otherwise normal upper airway without signs of edema. CT neck demonstrated mild subglottic stenosis. This is most likely related to intubation considering location and timing. Given current status, recommend continuing medical management with steroids and nebulizer as needed with ENT follow up.    - Continue airway dose steroids (recommend decadron 10mg Q8H), can stop after today to complete 72 hours  - Racemic epinephrine as needed  - ENT follow up outpatient, will continue to follow peripherally   - Please contact Otolaryngology on call with any questions or concerns     Patient and plan was discussed with staff Dr. Chandana Price MD  Otolaryngology-Head & Neck Surgery PGY-2  Please contact ENT with questions by dialing [phone] and entering job code [phone] when prompted.     Extremities:  No edema. + pedal pulses bilat to doppler   Neurologic:  sedated. Labs:   Recent Labs     22  0608 22  0214 22  2342 22  1309 22  1305   WBC  --  15.8*  --    < > 18.4*   HGB  --  7.7*  --    < > 9.5*   HCT  --  22.1*  --    < > 28.8*   PLT  --  55*  --    < > 58*   NA  --  139  --    < > 137   K  --  3.8  --    < > 4.0   BUN  --  10  --    < > 26*   CREA  --  1.20*  --    < > 3.69*   GLU  --  190*  --    < > 165*   GLUCPOC 178*  --    < >   < >  --    INR  --   --   --   --  1.1    < > = values in this interval not displayed. Assessment:     Active Problems:    Hyperkalemia (2022)      Pulmonary edema (2022)      NSTEMI (non-ST elevated myocardial infarction) ([facility] (2022)      ESRD (end stage renal disease) on dialysis ([facility] [address] [facility] (2022)      Biventricular heart failure ([facility] [address] [facility] (6/3/2022)      Palliative care by specialist (6/3/2022)       Plan/Recommendations/Medical Decision Makin. VT, Cardiogenic shock: Emergent Impella and VA ECMO placement - now off ECMO. Still on Impella at P6 - now P3. Cont heparin gtt. Needs Abx coverage for impella. [facility] recs to start dobutamine gtt to support heart during Impella wean. Repeat ECHO today on P3 to assess RV/LV  2. NSTEMI, CAD s/p GUERITA RCA and Lcx: On brilinta. 3. Acute respiratory failure: intubated during code. On Precedex and Fentanyl. Vent 30%. 4. ESRD on HD:Nephro following. bladder scan Q shift. Now on CRRT, Factor 0  5. HTN: On metoprolol 25mg, clonidine 0.1mg BID PTA. Controlled with sedation at this time. 6. HLD: on Lipitor 20mg PTA. 7. Hep C  8. T2DM: On Tresiba, insulin aspart PTA. On SSI  9. Chronic lower back pain: On robaxin/flexeril, Norco, tylenol, lidocaine patches. Cont fentanyl gtt   10. N/V/Diarrhea: on zofran and phenergan PTA. FMS in place. 11. Encephalopathy: Neuro following, EEG completed. No purposeful movements when sedation weaned.  BP up with turning, will bite down during mouth care. Cont to monitor, cont daily sedation wean to assess neuro status. 12. Pallative care consult placed to discuss with family the plan of weaning off sedation and impella today to better assess neuro and hemodynamic function.    13. Hypophosphatemia: repleted this am by ICU team.      Dispo: per primary team. Jayant Hmam will continue to follow     Signed By: Tatianna Barraza NP

## 2023-08-28 NOTE — PROGRESS NOTES
DISCHARGE SUMMARY from Nurse    PATIENT INSTRUCTIONS:    Medications as prescribed. Attend all appointments; if unable to go to appointments, call as soon as possible and reschedule. .   Diabetes and hypertension  that is uncontrolled will continue to damage your kidneys. What to do at Home:  Recommended activity: Activity as tolerated,     If you experience any of the following symptoms N/V, excessive thirst, please follow up with PCP or come to ER    *  Please give a list of your current medications to your Primary Care Provider. *  Please update this list whenever your medications are discontinued, doses are      changed, or new medications (including over-the-counter products) are added. *  Please carry medication information at all times in case of emergency situations. These are general instructions for a healthy lifestyle:    No smoking/ No tobacco products/ Avoid exposure to second hand smoke  Surgeon General's Warning:  Quitting smoking now greatly reduces serious risk to your health. Obesity, smoking, and sedentary lifestyle greatly increases your risk for illness    A healthy diet, regular physical exercise & weight monitoring are important for maintaining a healthy lifestyle    You may be retaining fluid if you have a history of heart failure or if you experience any of the following symptoms:  Weight gain of 3 pounds or more overnight or 5 pounds in a week, increased swelling in our hands or feet or shortness of breath while lying flat in bed. Please call your doctor as soon as you notice any of these symptoms; do not wait until your next office visit. Recognize signs and symptoms of STROKE:    F-face looks uneven    A-arms unable to move or move unevenly    S-speech slurred or non-existent    T-time-call 911 as soon as signs and symptoms begin-DO NOT go       Back to bed or wait to see if you get better-TIME IS BRAIN.     Warning Signs of HEART ATTACK     Call 911 if you have these symptoms:   Chest discomfort. Most heart attacks involve discomfort in the center of the chest that lasts more than a few minutes, or that goes away and comes back. It can feel like uncomfortable pressure, squeezing, fullness, or pain.  Discomfort in other areas of the upper body. Symptoms can include pain or discomfort in one or both arms, the back, neck, jaw, or stomach.  Shortness of breath with or without chest discomfort.  Other signs may include breaking out in a cold sweat, nausea, or lightheadedness. Don't wait more than five minutes to call [address]! Fast action can save your life. Calling 911 is almost always the fastest way to get lifesaving treatment. Emergency Medical Services staff can begin treatment when they arrive  up to an hour sooner than if someone gets to the hospital by car. The discharge information has been reviewed with the patient. The patient verbalized understanding. Discharge medications reviewed with the patient and appropriate educational materials and side effects teaching were provided.   ___________________________________________________________________________________________________________________________________ ---
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
## (undated) DEVICE — ANGIOGRAPHY KIT CUST [K0910930B] [MERIT MEDICAL SYSTEMS INC]

## (undated) DEVICE — STERILE POLYISOPRENE POWDER-FREE SURGICAL GLOVES: Brand: PROTEXIS

## (undated) DEVICE — SUT SLK 2 60IN TIE MP BLK --

## (undated) DEVICE — 72" ARTERIAL PRESSURE TUBING: Brand: ICU MEDICAL

## (undated) DEVICE — DRAPE SURG W41XL74IN CLR FULL SZ C ARM 3 ADH POLY STRP E

## (undated) DEVICE — SPLINT WR POS F/ARTERIAL ACC -- BX/10

## (undated) DEVICE — SUT ETHLN 3-0 18IN PS2 BLK --

## (undated) DEVICE — SYR 3ML LL TIP 1/10ML GRAD --

## (undated) DEVICE — STOPCOCK IV 4 W TRNSPAR

## (undated) DEVICE — DRAPE,REIN 53X77,STERILE: Brand: MEDLINE

## (undated) DEVICE — PERCLOSE PROGLIDE™ SUTURE-MEDIATED CLOSURE SYSTEM: Brand: PERCLOSE PROGLIDE™

## (undated) DEVICE — PREP SKN CHLRAPRP APL 26ML STR --

## (undated) DEVICE — RUNTHROUGH NS EXTRA FLOPPY PTCA GUIDEWIRE: Brand: RUNTHROUGH

## (undated) DEVICE — Device

## (undated) DEVICE — CATH BLLN DIL 2.5 X12MM RX -- EUPHORA

## (undated) DEVICE — 3M™ TEGADERM™ TRANSPARENT FILM DRESSING FRAME STYLE, 1627, 4 IN X 10 IN (10 CM X 25 CM), 20/CT 4CT/CASE: Brand: 3M™ TEGADERM™

## (undated) DEVICE — CATH ANGI BLLN DIL 2.5X15MM -- NC EUPHORA

## (undated) DEVICE — GUIDEWIRE VASC L145CM 0.035IN J TIP L3MM PTFE FIX COR NAMIC

## (undated) DEVICE — FOGARTY ARTERIAL EMBOLECTOMY CATHETER 4F 80CM: Brand: FOGARTY

## (undated) DEVICE — TR BAND RADIAL ARTERY COMPRESSION DEVICE: Brand: TR BAND

## (undated) DEVICE — SOLUTION LACTATED RINGERS INJECTION USP

## (undated) DEVICE — ELECTRODE,RADIOTRANSLUCENT,FOAM,5PK: Brand: MEDLINE

## (undated) DEVICE — RESERVOIR,SUCTION,100CC,SILICONE: Brand: MEDLINE

## (undated) DEVICE — CATH ANGI BLLN DIL 2.5X08MM -- NC EUPHORA

## (undated) DEVICE — BAG SPEC BIOHZRD 10 X 10 IN --

## (undated) DEVICE — PINNACLE INTRODUCER SHEATH: Brand: PINNACLE

## (undated) DEVICE — (D)PREP SKN CHLRAPRP APPL 26ML -- CONVERT TO ITEM 371833

## (undated) DEVICE — SLIM BODY SKIN STAPLER: Brand: APPOSE ULC

## (undated) DEVICE — CATH GUID COR EB30 6FR 100CM -- LAUNCHER

## (undated) DEVICE — SPONGE GZ W4XL4IN COT RADPQ HIGHLY ABSRB

## (undated) DEVICE — GUIDEWIRE VASC L40CM DIA0018IN NDL 21GA L7CM Z S STL MAK

## (undated) DEVICE — CUFF,BP,DISP,1 TUBE,SM ADLT,H: Brand: MEDLINE

## (undated) DEVICE — KIT ACCS INTRO 4FR L10CM NDL 21GA L7CM GWIRE L40CM

## (undated) DEVICE — SET ADMIN IV PMP 20GTT 117IN -- 2 NDLS INJ SITE

## (undated) DEVICE — INFECTION CONTROL KIT SYS

## (undated) DEVICE — GOWN,SIRUS,NONRNF,SETINSLV,XL,20/CS: Brand: MEDLINE

## (undated) DEVICE — FLOSEAL HEMOSTATIC MATRIX, 10ML: Brand: FLOSEAL HEMOSTATIC MATRIX

## (undated) DEVICE — MAJOR LAPAROTOMY-MRMC: Brand: MEDLINE INDUSTRIES, INC.

## (undated) DEVICE — REM POLYHESIVE ADULT PATIENT RETURN ELECTRODE: Brand: VALLEYLAB

## (undated) DEVICE — SET TRNQT L55IN RED BLU CLR CLR CODE TB DLP

## (undated) DEVICE — TOWEL SURG W17XL27IN STD BLU COT NONFENESTRATED PREWASHED

## (undated) DEVICE — STRAP,POSITIONING,KNEE/BODY,FOAM,4X60": Brand: MEDLINE

## (undated) DEVICE — SOLIDIFIER MEDC 1200ML -- CONVERT TO 356117

## (undated) DEVICE — SYR POWER 150ML 8IN FILL TUBE --

## (undated) DEVICE — TOWEL 4 PLY TISS 19X30 SUE WHT

## (undated) DEVICE — SUTURE VCRL SZ 0 L18IN ABSRB VLT L40MM CT 1/2 CIR J752D

## (undated) DEVICE — NDL HYPO REG BVL RW 22GX1IN --

## (undated) DEVICE — SUT PROL 6-0 18IN BV1 DA BLU --

## (undated) DEVICE — SYR 10ML LUER LOK 1/5ML GRAD --

## (undated) DEVICE — SPONGE LAP 18X18IN STRL -- 5/PK

## (undated) DEVICE — CATHETER ETER CARD MULTIPAK MULTIPAK 5FR PERFORMA

## (undated) DEVICE — PACK PROCEDURE SURG HRT CATH

## (undated) DEVICE — SUT ETHLN 4-0 18IN PS2 BLK --

## (undated) DEVICE — SPONGE GZ W4XL4IN COT 12 PLY TYP VII WVN C FLD DSGN

## (undated) DEVICE — PRESSURE GUIDEWIRE: Brand: COMET™ II

## (undated) DEVICE — GLIDESHEATH SLENDER ACCESS KIT: Brand: GLIDESHEATH SLENDER

## (undated) DEVICE — CONTAINER SPEC 20 ML LID NEUT BUFF FORMALIN 10 % POLYPR STS

## (undated) DEVICE — PUMP HEART IMPELLA CP03 --

## (undated) DEVICE — SHEET, T, LAPAROTOMY, STERILE: Brand: MEDLINE

## (undated) DEVICE — CATHETER ANGIO L100CM OD5FR ID.046IN L2.5CM .038IN PROG R

## (undated) DEVICE — STENT RONYX25022UX RESOLUTE ONYX 2.50X22
Type: IMPLANTABLE DEVICE | Status: NON-FUNCTIONAL
Brand: RESOLUTE ONYX™

## (undated) DEVICE — Z DISCONTINUED PER MEDLINE LINE GAS SAMPLING O2/CO2 LNG AD 13 FT NSL W/ TBNG FILTERLINE

## (undated) DEVICE — SUTURE PERMA-HAND 0 L18IN NONABSORBABLE BLK CT-1 L36MM 1/2 C021D

## (undated) DEVICE — SUTURE MCRYL SZ 3-0 L27IN ABSRB UD L24MM PS-1 3/8 CIR PRIM Y936H

## (undated) DEVICE — STENT RONYX22538UX RESOLUTE ONYX 2.25X38
Type: IMPLANTABLE DEVICE | Status: NON-FUNCTIONAL
Brand: RESOLUTE ONYX™

## (undated) DEVICE — CATH BLLN DIL 2.0X12MM RX -- EUPHORA

## (undated) DEVICE — RADIFOCUS OPTITORQUE ANGIOGRAPHIC CATHETER: Brand: OPTITORQUE

## (undated) DEVICE — DRAPE PRB US TRNSDCR 6X96IN --

## (undated) DEVICE — PROBE VASC 8MHZ WTRPRF

## (undated) DEVICE — MEDI-TRACE CADENCE ADULT, DEFIBRILLATION ELECTRODE -RTS  (10 PR/PK) - PHILIPS: Brand: MEDI-TRACE CADENCE

## (undated) DEVICE — AIRLIFE™  ADULT CUSHION NASAL CANNULA WITH 7 FOOT (2.1 M) CRUSH-RESISTANT OXYGEN TUBING, AND U/CONNECT-IT ADAPTER: Brand: AIRLIFE™

## (undated) DEVICE — HANDLE LT SNAP ON ULT DURABLE LENS FOR TRUMPF ALC DISPOSABLE

## (undated) DEVICE — 3M™ TEGADERM™ TRANSPARENT FILM DRESSING FRAME STYLE, 1626W, 4 IN X 4-3/4 IN (10 CM X 12 CM), 50/CT 4CT/CASE: Brand: 3M™ TEGADERM™

## (undated) DEVICE — NEONATAL-ADULT SPO2 SENSOR: Brand: NELLCOR

## (undated) DEVICE — GLOVE SURG SZ 7 CRM LTX FREE POLYISOPRENE POLYMER BEAD ANTI

## (undated) DEVICE — SUTURE PROL SZ 5-0 L24IN NONABSORBABLE BLU RB-2 L13IN 1/2 8554H

## (undated) DEVICE — Device: Brand: PROWATER

## (undated) DEVICE — BASIN EMSIS 16OZ GRAPHITE PLAS KID SHP MOLD GRAD FOR ORAL

## (undated) DEVICE — 1200 GUARD II KIT W/5MM TUBE W/O VAC TUBE: Brand: GUARDIAN

## (undated) DEVICE — CATH IV AUTOGRD BC PNK 20GA 25 -- INSYTE

## (undated) DEVICE — GLOVE SURG SZ 65 L12IN FNGR THK94MIL STD WHT LTX FREE

## (undated) DEVICE — CANNULA PERF 25FR L30IN MULTISTAGE FEM VEN W/ INSRT KT

## (undated) DEVICE — CATH GUID COR AR10 6FR 100CM -- LAUNCHER

## (undated) DEVICE — SOLUTION IV 1000ML 0.9% SOD CHL

## (undated) DEVICE — SYRINGE ANGIO 10 CC BRL STD PRNT POLYCARB LT BLU MEDALLION

## (undated) DEVICE — BLOCK BITE ENDOSCP AD 21 MM W/ DIL BLU LF DISP

## (undated) DEVICE — CATH LITHOPLSTY 2.5X12MM SHOCKWAVE

## (undated) DEVICE — SUTURE PERMAHAND SZ 2-0 L30IN NONABSORBABLE BLK SILK W/O A305H

## (undated) DEVICE — GARMENT,MEDLINE,DVT,INT,CALF,MED, GEN2: Brand: MEDLINE

## (undated) DEVICE — KIT CANN 17FR TIP L18CM VENT CONN 3/8IN ART POLYUR PERC

## (undated) DEVICE — TOWEL,OR,DSP,ST,BLUE,STD,2/PK,40PK/CS: Brand: MEDLINE

## (undated) DEVICE — SUTURE VCRL SZ 3-0 L27IN ABSRB UD L26MM SH 1/2 CIR J416H

## (undated) DEVICE — KIT CANN 19FR TIP L18CM VENT CONN 3/8IN ART POLYUR PERC

## (undated) DEVICE — CATH GUID .056IN 6FR 150CM -- GUIDELINER V3

## (undated) DEVICE — NEEDLE HYPO 18GA L1.5IN PNK S STL HUB POLYPR SHLD REG BVL

## (undated) DEVICE — CATH ANGI BLLN DIL 2.0X12MM -- NC EUPHORA

## (undated) DEVICE — TUBING, SUCTION, 1/4" X 12', STRAIGHT: Brand: MEDLINE

## (undated) DEVICE — SUT PROL 6-0 24IN BV1 DA BLU --

## (undated) DEVICE — DRSG BORDR MPLX HEEL 8.7X9.1IN --

## (undated) DEVICE — CATH BLLN DIL 1.5X15MM RX -- EUPHORA

## (undated) DEVICE — TUBING PRSS MON L6IN PVC M FEM CONN

## (undated) DEVICE — PROVE COVER: Brand: UNBRANDED

## (undated) DEVICE — YANKAUER,TAPERED BULBOUS TIP,W/O VENT: Brand: MEDLINE

## (undated) DEVICE — LOOP VES MAXI YEL 2/PK

## (undated) DEVICE — CATH BLLN DIL 2.5 X15MM RX -- EUPHORA

## (undated) DEVICE — SUT CHRMC 3-0 27IN SH BRN --

## (undated) DEVICE — COSEAL SURGICAL SEALANT (COSEAL) IS COMPOSED OF TWO SYNTHETIC POLYETHYLENE GLYCOLS (PEGS), A DILUTE HYDROGEN CHLORIDE SOLUTION AND A SODIUM PHOSPHATE/SODIUM CARBONATE SOLUTION. THESE COMPONENTS COME IN A KIT THAT INCLUDES AN APPLICATOR(S). AT THE TIME OF ADMINISTRATION, THE MIXED PEGS AND SOLUTIONS FORM A HYDROGEL THAT ADHERES TO TISSUE, SYNTHETIC GRAFT MATERIALS AND COVALENTLY BONDS TO ITSELF: Brand: COSEAL SURGICAL SEALANT

## (undated) DEVICE — GUIDEWIRE VASC L180CM DIA0.035IN L7CM DIA3MM J TIP PTFE S

## (undated) DEVICE — SOLUTION IRRIG 1000ML 0.9% SOD CHL USP POUR PLAS BTL

## (undated) DEVICE — SLEEVE CABLE STRL -- SHOCKWAVE

## (undated) DEVICE — SYRINGE IRRIG 60ML SFT PLIABLE BLB EZ TO GRP 1 HND USE W/

## (undated) DEVICE — ADULT SPO2 SENSOR: Brand: NELLCOR

## (undated) DEVICE — ADAPTER IV TBNG CLR POLYCARB DBL M LUERLOCK

## (undated) DEVICE — CATHETER ETER ANGIO L110CM OD5FR ID046IN L75CM 038IN 145DEG CARD

## (undated) DEVICE — DISH PETRI W/LID STRL -- MIN CASE ORDER IS 2 CA

## (undated) DEVICE — GUIDEWIRE VASC L260CM 0.035IN J TIP L3MM PTFE FIX COR NAMIC

## (undated) DEVICE — MICROPUNCTURE INTRODUCER SET SILHOUETTE TRANSITIONLESS WITH STAINLESS STEEL WIRE GUIDE: Brand: MICROPUNCTURE

## (undated) DEVICE — CATHETER,URETHRAL,REDRUBBER,STRL,12FR: Brand: MEDLINE INDUSTRIES, INC.

## (undated) DEVICE — COVER,TABLE,HEAVY DUTY,60"X90",STRL: Brand: MEDLINE

## (undated) DEVICE — GLOVE SURG SZ 6 CRM LTX FREE POLYISOPRENE POLYMER BEAD ANTI

## (undated) DEVICE — SET ADMIN 16ML TBNG L100IN 2 Y INJ SITE IV PIGGY BK DISP

## (undated) DEVICE — Z DISCONTINUED NO SUB IDED SET EXTN W/ 4 W STPCOCK M SPIN LOK 36IN

## (undated) DEVICE — INTENDED FOR TISSUE SEPARATION, AND OTHER PROCEDURES THAT REQUIRE A SHARP SURGICAL BLADE TO PUNCTURE OR CUT.: Brand: BARD-PARKER ® CARBON RIB-BACK BLADES

## (undated) DEVICE — CATHETER GUID 6FR L100CM DIA0.071IN NYL SHFT 3DRC W/O SIDE

## (undated) DEVICE — CATH GUID COR RB35S 6FR 100CM -- LAUNCHER

## (undated) DEVICE — VALVE INSRT TOOL ADPT MTL 9FR -- ACCESS

## (undated) DEVICE — GLOVE SURG SZ 8 CRM LTX FREE POLYISOPRENE POLYMER BEAD ANTI

## (undated) DEVICE — SUT ETHLN 3-0 18IN PS1 BLK --

## (undated) DEVICE — SUTURE VCRL SZ 2-0 L36IN ABSRB UD L36MM CT-1 1/2 CIR J945H

## (undated) DEVICE — BAG RED 3PLY 2MIL 30X40 IN

## (undated) DEVICE — GLOVE SURG SZ 85 CRM LTX FREE POLYISOPRENE POLYMER BEAD ANTI

## (undated) DEVICE — SENSOR OXMTR AD PED DISP NSL ALAR SPO2 XHALE ASSURANCE

## (undated) DEVICE — PRESSURE GUIDEWIRE: Brand: COMET

## (undated) DEVICE — SUTURE PERMAHAND SZ 3-0 L30IN NONABSORBABLE BLK SILK BRAID A304H

## (undated) DEVICE — FORCEPS BX L160CM DIA8MM GRSP DISECT CUP TIP NONLOCKING ROT

## (undated) DEVICE — Device: Brand: ASAHI SION BLUE

## (undated) DEVICE — CATHETER DIAG 5FR L100CM SPEC 3 DRC CRV SZ DBL BRAID WIRE

## (undated) DEVICE — HI-TORQUE VERSACORE FLOPPY GUIDE WIRE SYSTEM 145 CM: Brand: HI-TORQUE VERSACORE

## (undated) DEVICE — CLAMP,EDSLAB HANDLELESS 8MM DBLE SOFTJAW: Brand: FOGARTY SOFTJAW

## (undated) DEVICE — SUTURE PROL SZ 5-0 L30IN NONABSORBABLE BLU L13MM RB-2 1/2 8710H

## (undated) DEVICE — CV INCISE SHEET: Brand: CONVERTORS